# Patient Record
Sex: MALE | Race: WHITE | NOT HISPANIC OR LATINO | Employment: OTHER | URBAN - METROPOLITAN AREA
[De-identification: names, ages, dates, MRNs, and addresses within clinical notes are randomized per-mention and may not be internally consistent; named-entity substitution may affect disease eponyms.]

---

## 2017-01-10 ENCOUNTER — TELEPHONE (OUTPATIENT)
Dept: ORTHOPEDICS | Facility: CLINIC | Age: 77
End: 2017-01-10

## 2017-01-10 DIAGNOSIS — M25.562 LEFT KNEE PAIN, UNSPECIFIED CHRONICITY: Primary | ICD-10-CM

## 2017-01-11 ENCOUNTER — HOSPITAL ENCOUNTER (OUTPATIENT)
Dept: RADIOLOGY | Facility: HOSPITAL | Age: 77
Discharge: HOME | End: 2017-01-11
Attending: PHYSICIAN ASSISTANT
Payer: MEDICARE

## 2017-01-11 ENCOUNTER — OFFICE VISIT (OUTPATIENT)
Dept: ORTHOPEDICS | Facility: CLINIC | Age: 77
End: 2017-01-11
Payer: MEDICARE

## 2017-01-11 VITALS
WEIGHT: 196 LBS | BODY MASS INDEX: 27.44 KG/M2 | RESPIRATION RATE: 18 BRPM | DIASTOLIC BLOOD PRESSURE: 62 MMHG | HEIGHT: 71 IN | SYSTOLIC BLOOD PRESSURE: 122 MMHG | HEART RATE: 58 BPM

## 2017-01-11 DIAGNOSIS — M23.92 INTERNAL DERANGEMENT OF KNEE JOINT, LEFT: ICD-10-CM

## 2017-01-11 DIAGNOSIS — M25.562 ACUTE PAIN OF LEFT KNEE: Primary | ICD-10-CM

## 2017-01-11 DIAGNOSIS — M25.562 LEFT KNEE PAIN, UNSPECIFIED CHRONICITY: ICD-10-CM

## 2017-01-11 PROCEDURE — 73560 X-RAY EXAM OF KNEE 1 OR 2: CPT | Mod: TC,59,RT,LT

## 2017-01-11 PROCEDURE — 73562 X-RAY EXAM OF KNEE 3: CPT | Mod: 26,LT,, | Performed by: RADIOLOGY

## 2017-01-11 PROCEDURE — 99213 OFFICE O/P EST LOW 20 MIN: CPT | Mod: S$GLB,,, | Performed by: PHYSICIAN ASSISTANT

## 2017-01-11 PROCEDURE — 1157F ADVNC CARE PLAN IN RCRD: CPT | Mod: S$GLB,,, | Performed by: PHYSICIAN ASSISTANT

## 2017-01-11 PROCEDURE — 73560 X-RAY EXAM OF KNEE 1 OR 2: CPT | Mod: 26,59,LT, | Performed by: RADIOLOGY

## 2017-01-11 PROCEDURE — 3074F SYST BP LT 130 MM HG: CPT | Mod: S$GLB,,, | Performed by: PHYSICIAN ASSISTANT

## 2017-01-11 PROCEDURE — 99999 PR PBB SHADOW E&M-EST. PATIENT-LVL III: CPT | Mod: PBBFAC,,, | Performed by: PHYSICIAN ASSISTANT

## 2017-01-11 PROCEDURE — 1160F RVW MEDS BY RX/DR IN RCRD: CPT | Mod: S$GLB,,, | Performed by: PHYSICIAN ASSISTANT

## 2017-01-11 PROCEDURE — 1125F AMNT PAIN NOTED PAIN PRSNT: CPT | Mod: S$GLB,,, | Performed by: PHYSICIAN ASSISTANT

## 2017-01-11 PROCEDURE — 1159F MED LIST DOCD IN RCRD: CPT | Mod: S$GLB,,, | Performed by: PHYSICIAN ASSISTANT

## 2017-01-11 PROCEDURE — 3078F DIAST BP <80 MM HG: CPT | Mod: S$GLB,,, | Performed by: PHYSICIAN ASSISTANT

## 2017-01-11 NOTE — PROGRESS NOTES
Subjective:      Patient ID: Ezequiel Hughes is a 76 y.o. male.    Chief Complaint: Knee Pain (left)    HPI  The patient presents to clinic for evaluation for left knee pain. He fell playing football about 8 weeks ago with his girlfriend's grandson. He fumbled to avoid knocking into the grandson. Not exactly sure what happened but he ended up on the ground. He states his pain was getting better and then kneeled and had severe pain. He feels that now it is getting better. Increased periods of sitting causes the pain. He does not report painful popping, swelling or giving out. The patient currently rates his pain a 2/10. X-rays were taken today. He localizes his pain to lateral joint line of left knee.    About two years ago, he tore a meniscus in his right knee. Dr. Jain performed a knee ATS.     Review of Systems   Constitution: Negative for chills, decreased appetite, weight gain and weight loss.   HENT: Negative for congestion, ear pain, hearing loss and sore throat.    Eyes: Negative for blurred vision, double vision, vision loss in left eye, vision loss in right eye and visual disturbance.   Cardiovascular: Negative for chest pain, irregular heartbeat, leg swelling and palpitations.   Respiratory: Negative for cough and shortness of breath.    Endocrine: Negative for cold intolerance and heat intolerance.   Hematologic/Lymphatic: Negative for adenopathy. Does not bruise/bleed easily.   Skin: Negative for color change, nail changes, rash and suspicious lesions.   Musculoskeletal: Positive for joint pain. Negative for falls and joint swelling.   Gastrointestinal: Negative for abdominal pain, nausea and vomiting.   Genitourinary: Negative for bladder incontinence and dysuria.   Neurological: Negative for dizziness, paresthesias, sensory change and tremors.   Psychiatric/Behavioral: Negative for altered mental status, depression, memory loss and substance abuse.   Allergic/Immunologic: Negative for hives and  persistent infections.         Objective:            General    Vitals reviewed.  Constitutional: He is oriented to person, place, and time. He appears well-developed and well-nourished.   HENT:   Head: Normocephalic.   Nose: Nose normal.   Eyes: EOM are normal. Pupils are equal, round, and reactive to light.   Neck: Normal range of motion. Neck supple.   Cardiovascular: Normal rate and regular rhythm.    Pulmonary/Chest: Effort normal.   Abdominal: Soft. He exhibits no distension. There is no tenderness.   Neurological: He is alert and oriented to person, place, and time.   Psychiatric: He has a normal mood and affect. His behavior is normal.     General Musculoskeletal Exam   Gait: normal   Pelvic Obliquity: none    Right Ankle/Foot Exam     Alignment   Forefoot Alignment: normal    Tests   Varus tilt: negative  Heel Walk: able to perform  Tiptoe Walk: able to perform    Other   Sensation: normal    Left Ankle/Foot Exam     Alignment   Forefoot Alignment: normal    Tests   Varus tilt: negative  Heel Walk: able to perform  Tiptoe Walk: able to perform    Other   Sensation: normal    Right Knee Exam     Inspection   Erythema: absent  Scars: absent  Effusion: effusion    Tenderness   The patient is experiencing no tenderness.         Range of Motion   Extension: normal   Flexion: normal     Tests   Ligament Examination   Posterior Sag Test: negative  Posterolateral Corner: unstable (>15 degrees difference)  Patella   Patellar Tracking: normal    Other   Sensation: normal    Left Knee Exam     Inspection   Erythema: absent  Scars: absent  Swelling: absent  Effusion: absent  Deformity: deformity  Bruising: absent    Tenderness   The patient tender to palpation of the lateral joint line, condyle and LCL.    Crepitus   The patient has crepitus of the patella, MFC and LFC.    Range of Motion   Extension: normal   Flexion: normal     Tests   Stability Lachman: normal (-1 to 2mm) PCL-Posterior Drawer: normal (0 to 2mm)  MCL  - Valgus: normal (0 to 2mm)  LCL - Varus: abnormal - grade I  Posterior Sag Test: negative  Posterolateral Corner: unstable (>15 degrees difference)  Patella   Patellar Tracking: normal  Patellar Grind: positive    Other   Sensation: normal    Right Hip Exam     Inspection   Swelling: absent  Bruising: absent    Other   Sensation: normal  Left Hip Exam     Inspection   Swelling: absent  Bruising: absent    Other   Sensation: normal      Back (L-Spine & T-Spine) / Neck (C-Spine) Exam   Back exam is normal.    Neck (C-Spine) Range of Motion   Flexion:     Normal  Extension: Normal  Right Lateral Bend: normal  Left Lateral Bend: normal  Right Rotation: normal  Left Rotation: normal    Spinal Sensation   Right Side Sensation  C-Spine Level: normal   L-Spine Level: normal  S-Spine Level: normal  T-Spine Level: normal  Left Side Sensation  C-Spine Level: normal  L-Spine Level: normal  S-Spine Level: normal  T-Spine Level: normal    Other He has no scoliosis .      Right Hand/Wrist Exam     Inspection   Deformity: Wrist - deformity     Tests   Phalens Sign: negative  Tinels Sign (Medial Nerve): negative  Finkelstein: negative  Cubital Tunnel Compression Test: negative      Other     Neuorologic Exam    Median Distribution: normal  Ulnar Distribution: normal  Radial Distribution: normal      Left Hand/Wrist Exam     Inspection   Deformity: Wrist - absent     Tests   Phalens Sign: negative  Tinels Sign (Medial Nerve): negative  Finkelstein: negative  Cubital Tunnel Compression Test: negative      Other     Sensory Exam  Median Distribution: normal  Ulnar Distribution: normal  Radial Distribution: normal      Right Elbow Exam     Inspection   Effusion: absent  Bruising: absent  Deformity: absent    Tests Tinel's Sign (cubital tunnel): negative    Other   Sensation: normal      Left Elbow Exam     Inspection   Effusion: absent  Bruising: absent  Deformity: absent    Tests Tinel's Sign (cubital tunnel): negative    Other    Sensation: normal    Right Shoulder Exam     Range of Motion   Active Abduction: normal   Passive Abduction: normal   Extension: normal   Forward Flexion: normal   Forward Elevation: normal  Adduction: normal  External Rotation 0 degrees: normal   Internal Rotation 0 degrees: normal     Tests & Signs   Apprehension: negative  Cross Arm: negative  Impingement: negative    Other   Sensation: normal    Left Shoulder Exam     Range of Motion   Active Abduction: normal   Passive Abduction: normal   Extension: normal   Forward Flexion: normal   Forward Elevation: normal  Adduction: normal  External Rotation 0 degrees: normal   Internal Rotation 0 degrees: normal     Tests & Signs   Apprehension: negative  Cross Arm: negative  Impingement: negative    Other   Sensation: normal       Muscle Strength   Right Upper Extremity   Wrist Extension:    Wrist Flexion:    :    Elbow Pronation:     Elbow Supination:     Elbow Extension:   Elbow Flexion:   Intrinsics:   EPL (Extensor Pollicis Longus):   Left Upper Extremity  Wrist Extension:    Wrist Flexion:    :     Elbow Pronation:     Elbow Supination:     Elbow Extension:   Elbow Flexion:   Intrinsics:   EPL (Extensor Pollicis Longus):   Right Lower Extremity   Hip Abduction:    Quadriceps:     Hamstrin/5   Left Lower Extremity   Hip Abduction:    Quadriceps:     Hamstrin/     Reflexes     Left Side  Biceps:  2+  Triceps:  2+  Quadriceps:  2+  Achilles:  2+    Right Side   Biceps:  2+  Triceps:  2+  Quadriceps:  2+  Achilles:  2+    Vascular Exam     Right Pulses      Radial:                    2+      Left Pulses      Radial:                    2+      Capillary Refill  Right Hand: normal capillary refill  Left Hand: normal capillary refill    Edema  Right Lower Leg: absent  Left Lower Leg: absent        X-rays:   Technique: AP standing views of both knees as well as lateral and  Merchant views of the left knee and a Merchant view of the right knee were obtained.    Comparison: 10/24/2014    Findings: There is no radiographic evidence of acute osseous, articular, or soft tissue abnormality. Small patellofemoral margin ossified present bilaterally with mild joint space loss on the left.  Findings appear similar to previous exam.   Impression     As above. No acute findings.             Assessment:       Encounter Diagnoses   Name Primary?    Acute pain of left knee Yes    Internal derangement of knee joint, left           Plan:       Ezequiel was seen today for knee pain.    Diagnoses and all orders for this visit:    Acute pain of left knee    Internal derangement of knee joint, left    The patient does not present with a surgical knee today.  He was instructed on application of warm compresses and the use of Tylenol arthritis or extra strength Tylenol.  He can take 2 tablets up to 3 times daily as needed.  The patient opted out of a steroid injection today.  He will hold off for now.  If the patient has any concerns, questions or increased pain he is instructed to return for a follow-up in 6 weeks.  If his pain is increased, I will order the MRI for further evaluation and to rule out for meniscal injury.  Otherwise he will return to the clinic as needed.

## 2017-01-11 NOTE — MR AVS SNAPSHOT
[facility]  [address] [facility] [address]  Phone: [phone]  Fax: [phone]                  Ezequiel Hughes   2017 8:15 AM   Office Visit    Description:  Male : 1940   Provider:  Sanaz Ventura PA-C   Department:  [facility]           Reason for Visit     Knee Pain                To Do List           Future Appointments        Provider Department Dept Phone    2017 8:00 AM Sanaz Ventura PA-C [facility] [phone]    3/14/2017 8:20 AM [facility] [phone]      Goals (5 Years of Data)     None      [facility]     [facility] Nurse Care Line -  Assistance  Registered nurses in the [facility] provide clinical advisement, health education, appointment booking, and other advisory services.  Call for this free service at [phone].             Medications           Message regarding Medications     Verify the changes and/or additions to your medication regime listed below are the same as discussed with your clinician today.  If any of these changes or additions are incorrect, please notify your healthcare provider.             Verify that the below list of medications is an accurate representation of the medications you are currently taking.  If none reported, the list may be blank. If incorrect, please contact your healthcare provider. Carry this list with you in case of emergency.           Current Medications     aspirin 81 MG Chew Take 81 mg by mouth once daily.    baclofen (LIORESAL) 10 MG tablet TAKE (1) TABLET by mouth (3) TIMES A DAY    CIALIS 5 mg tablet 5 mg.     famotidine (PEPCID) 10 MG tablet Take 10 mg by mouth 2 (two) times daily.    finasteride (PROSCAR) 5 mg tablet     fluticasone (FLONASE) 50 mcg/actuation nasal spray 1 spray by Each Nare route continuous prn.    losartan-hydrochlorothiazide 50-12.5 mg (HYZAAR) 50-12.5 mg per tablet TAKE 1  "TABLET ONE TIME DAILY    pantoprazole (PROTONIX) 40 MG tablet Take 1 tablet (40 mg total) by mouth once daily.    simvastatin (ZOCOR) 40 MG tablet TAKE 1 TABLET ONE TIME DAILY           Clinical Reference Information           Vital Signs - Last Recorded  Most recent update: 1/11/2017  8:13 AM by Maty Ch MA    BP Pulse Resp Ht Wt BMI    122/62 (BP Location: Right arm, Patient Position: Sitting, BP Method: Automatic) (!) 58 18 5' 11" (1.803 m) 88.9 kg (196 lb) 27.34 kg/m2      Blood Pressure          Most Recent Value    BP  122/62      Allergies as of 1/11/2017     Mobic  [Meloxicam]      Immunizations Administered on Date of Encounter - 1/11/2017     None      "

## 2017-01-12 RX ORDER — PANTOPRAZOLE SODIUM 40 MG/1
40 TABLET, DELAYED RELEASE ORAL DAILY
Qty: 30 TABLET | Refills: 3 | Status: SHIPPED | OUTPATIENT
Start: 2017-01-12 | End: 2017-03-14 | Stop reason: SDUPTHER

## 2017-01-16 ENCOUNTER — TELEPHONE (OUTPATIENT)
Dept: ORTHOPEDICS | Facility: CLINIC | Age: 77
End: 2017-01-16

## 2017-01-16 DIAGNOSIS — M25.562 ACUTE PAIN OF LEFT KNEE: Primary | ICD-10-CM

## 2017-01-16 NOTE — TELEPHONE ENCOUNTER
Contacted pt. Mri scheduled 1/23/17 and f/u scheduled 1/25/17 per pt's request. All questions answered.//lp

## 2017-01-16 NOTE — TELEPHONE ENCOUNTER
----- Message from Ambrose Dejesus sent at 1/16/2017  8:15 AM CST -----  Contact: PT  She's calling in regard to having orders for an MRI to be scheduled, please advise, [phone] (home)

## 2017-01-23 ENCOUNTER — HOSPITAL ENCOUNTER (OUTPATIENT)
Dept: RADIOLOGY | Facility: HOSPITAL | Age: 77
Discharge: HOME | End: 2017-01-23
Attending: ORTHOPAEDIC SURGERY
Payer: MEDICARE

## 2017-01-23 DIAGNOSIS — M25.562 ACUTE PAIN OF LEFT KNEE: ICD-10-CM

## 2017-01-23 PROCEDURE — 73721 MRI JNT OF LWR EXTRE W/O DYE: CPT | Mod: TC,LT

## 2017-01-25 ENCOUNTER — HOSPITAL ENCOUNTER (OUTPATIENT)
Dept: RADIOLOGY | Facility: HOSPITAL | Age: 77
Discharge: HOME | End: 2017-01-25
Attending: ORTHOPAEDIC SURGERY
Payer: MEDICARE

## 2017-01-25 ENCOUNTER — OFFICE VISIT (OUTPATIENT)
Dept: ORTHOPEDICS | Facility: CLINIC | Age: 77
End: 2017-01-25
Payer: MEDICARE

## 2017-01-25 VITALS
RESPIRATION RATE: 16 BRPM | HEIGHT: 70 IN | SYSTOLIC BLOOD PRESSURE: 126 MMHG | BODY MASS INDEX: 28.06 KG/M2 | DIASTOLIC BLOOD PRESSURE: 59 MMHG | WEIGHT: 196 LBS | HEART RATE: 58 BPM

## 2017-01-25 DIAGNOSIS — M25.541 PAIN, JOINT, HAND, RIGHT: ICD-10-CM

## 2017-01-25 DIAGNOSIS — M17.12 ARTHRITIS OF KNEE, LEFT: ICD-10-CM

## 2017-01-25 DIAGNOSIS — M25.562 LEFT KNEE PAIN, UNSPECIFIED CHRONICITY: ICD-10-CM

## 2017-01-25 DIAGNOSIS — M23.92 INTERNAL DERANGEMENT OF KNEE JOINT, LEFT: ICD-10-CM

## 2017-01-25 DIAGNOSIS — M25.541 PAIN, JOINT, HAND, RIGHT: Primary | ICD-10-CM

## 2017-01-25 DIAGNOSIS — M18.11 PRIMARY OSTEOARTHRITIS OF FIRST CARPOMETACARPAL JOINT OF RIGHT HAND: ICD-10-CM

## 2017-01-25 PROCEDURE — 3074F SYST BP LT 130 MM HG: CPT | Mod: S$GLB,,, | Performed by: PHYSICIAN ASSISTANT

## 2017-01-25 PROCEDURE — 99213 OFFICE O/P EST LOW 20 MIN: CPT | Mod: S$GLB,,, | Performed by: PHYSICIAN ASSISTANT

## 2017-01-25 PROCEDURE — 73130 X-RAY EXAM OF HAND: CPT | Mod: 26,RT,, | Performed by: RADIOLOGY

## 2017-01-25 PROCEDURE — 3078F DIAST BP <80 MM HG: CPT | Mod: S$GLB,,, | Performed by: PHYSICIAN ASSISTANT

## 2017-01-25 PROCEDURE — 1160F RVW MEDS BY RX/DR IN RCRD: CPT | Mod: S$GLB,,, | Performed by: PHYSICIAN ASSISTANT

## 2017-01-25 PROCEDURE — 1125F AMNT PAIN NOTED PAIN PRSNT: CPT | Mod: S$GLB,,, | Performed by: PHYSICIAN ASSISTANT

## 2017-01-25 PROCEDURE — 1157F ADVNC CARE PLAN IN RCRD: CPT | Mod: S$GLB,,, | Performed by: PHYSICIAN ASSISTANT

## 2017-01-25 PROCEDURE — 99999 PR PBB SHADOW E&M-EST. PATIENT-LVL III: CPT | Mod: PBBFAC,,, | Performed by: PHYSICIAN ASSISTANT

## 2017-01-25 PROCEDURE — 1159F MED LIST DOCD IN RCRD: CPT | Mod: S$GLB,,, | Performed by: PHYSICIAN ASSISTANT

## 2017-01-25 PROCEDURE — 73130 X-RAY EXAM OF HAND: CPT | Mod: TC,RT

## 2017-01-25 RX ORDER — DICLOFENAC SODIUM 75 MG/1
75 TABLET, DELAYED RELEASE ORAL 2 TIMES DAILY
Qty: 60 TABLET | Refills: 1 | Status: SHIPPED | OUTPATIENT
Start: 2017-01-25 | End: 2017-04-24

## 2017-01-25 RX ORDER — DICLOFENAC SODIUM 75 MG/1
75 TABLET, DELAYED RELEASE ORAL 2 TIMES DAILY
Qty: 60 TABLET | Refills: 1 | Status: SHIPPED | OUTPATIENT
Start: 2017-01-25 | End: 2017-01-25 | Stop reason: SDUPTHER

## 2017-01-25 NOTE — PROGRESS NOTES
Subjective:      Patient ID: Ezequiel Hughes is a 76 y.o. male.    Chief Complaint: Knee Pain (left)    HPI the patient returns to clinic for follow-up of left knee pain and to review MRI results.  The patient states that he has intermittent pain of his left knee mainly at the posterior lateral joint line.  However, the patient states that his pain has significantly improved.  The patient does not have a known meniscal tear or ligament injury.  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    The patient however does complain of acute right pain.  He states that he woke up yesterday morning, January 24 and had sudden swelling and pain over the dorsum of his right hand.  He does not attribute this to any trauma, injury or known bite.    Review of Systems   Constitution: Negative for chills and fever.   Gastrointestinal: Negative for abdominal pain, diarrhea, nausea and vomiting.         Objective:            Ortho/SPM Exam   On exam, the patient ambulates with a normal gait.  He has positive lateral joint line tenderness.  He also has tenderness over lateral femoral condyle.  His range of motion is within normal limits.  Cruciate and collateral ligaments are intact.    The patient has tenderness, pain, mild erythema and warmth over the dorsum of his right hand extending from distal radius to base of second and first metacarpal phalanges.  The patient also has swelling in this distribution.  He has a positive grind test of first CMC joint on right hand.        X-rays:  Findings: 3 views. Generalized osteopenia. Degenerative change in the distal interphalangeal joints and triscaphe joint with advanced degenerative change in the first carpometacarpal joint. No acute osseous findings.   Impression    As above.           MRI:  MRI of the left knee without IV contrast    Clinical History: Pain in left knee    Technique: Standard knee MRI protocol without IV contrast was performed.       Finding:  There is no fracture. There is no dislocation. There is a normal amount of fluid within the knee. There is no Baker's cyst. The cruciate and collateral ligaments appear intact.  There are moderate degenerative changes in both menisci.  There is no definite meniscal tear visualized.  There is moderate narrowing of the cartilage in all 3 compartments of the knee.   Impression     1.  There are moderate degenerative changes in both menisci.  There is no definite meniscal tear visualized.  If additional imaging evaluation is clinically indicated, I recommend consideration of an MRI arthrogram of the left knee.  2.  There is moderate narrowing of the cartilage in all 3 compartments of the knee           Assessment:       Encounter Diagnoses   Name Primary?    Pain, joint, hand, right Yes    Internal derangement of knee joint, left     Left knee pain, unspecified chronicity     Primary osteoarthritis of first carpometacarpal joint of right hand           Plan:       Eezquiel was seen today for knee pain.    Diagnoses and all orders for this visit:    Pain, joint, hand, right  -     X-Ray Hand 3 view Right; Future  -     CBC auto differential; Future  -     Uric acid; Future  -     Sedimentation rate, manual; Future  -     Rheumatoid factor; Future  -     C-reactive protein; Future  -     Sedimentation rate, manual; Future  -     PARRISH; Future    Internal derangement of knee joint, left    Left knee pain, unspecified chronicity    Primary osteoarthritis of first carpometacarpal joint of right hand  -     CBC auto differential; Future  -     Uric acid; Future  -     Sedimentation rate, manual; Future  -     Rheumatoid factor; Future  -     C-reactive protein; Future  -     Sedimentation rate, manual; Future  -     PARRISH; Future    Other orders  -     Discontinue: diclofenac (VOLTAREN) 75 MG EC tablet; Take 1 tablet (75 mg total) by mouth 2 (two) times daily.  -     diclofenac (VOLTAREN) 75 MG EC tablet; Take 1 tablet (75 mg  total) by mouth 2 (two) times daily.        MRI results were discussed with the patient.  He is aware that he does not have a confirmed injury or tear of his meniscus.  If his knee continues to give him problems, the patient is a candidate for a diagnostic knee arthroscopy.  The patient states that his pain has improved and is not much of a concern now as is his right hand.  I ordered x-rays of the patient's right hand today to rule out any type of fracture or soft tissue swelling.  The patient has evolving arthritis in his hand but nothing else was seen.  Given the patient's exam findings, I ordered lab work to rule out any type of inflammatory or infectious process.  The patient was started on Voltaren daily.  If the patient has any abnormalities in his labs, I will notify him via phone call and prescribe the necessary medications.

## 2017-01-25 NOTE — MR AVS SNAPSHOT
[facility]  [address] [facility] [address]  Phone: [phone]  Fax: [phone]                  Ezequiel Hughes   2017 8:45 AM   Office Visit    Description:  Male : 1940   Provider:  Sanaz Ventura PA-C   Department:  [facility]           Reason for Visit     Knee Pain           Diagnoses this Visit        Comments    Pain, joint, hand, right    -  Primary     Internal derangement of knee joint, left         Left knee pain, unspecified chronicity         Primary osteoarthritis of first carpometacarpal joint of right hand                To Do List           Future Appointments        Provider Department Dept Phone    2017 9:45 AM LAB, SAME DAY [facility] [phone]    3/14/2017 8:20 AM Valery Ozuna MD [facility] [phone]      Goals (5 Years of Data)     None       These Medications        Disp Refills Start End    diclofenac (VOLTAREN) 75 MG EC tablet 60 tablet 1 2017     Take 1 tablet (75 mg total) by mouth 2 (two) times daily. - Oral    Pharmacy: [facility], [address] #: [phone]         [facility]     [facility] Nurse Care [facility] -  Assistance  Registered nurses in the [facility] Center provide clinical advisement, health education, appointment booking, and other advisory services.  Call for this free service at [phone].             Medications           Message regarding Medications     Verify the changes and/or additions to your medication regime listed below are the same as discussed with your clinician today.  If any of these changes or additions are incorrect, please notify your healthcare provider.        START taking these NEW medications        Refills    diclofenac (VOLTAREN) 75 MG EC tablet 1    Sig: Take 1 tablet (75 mg total) by mouth 2 (two) times daily.    Class: Normal    Route: Oral          "  Verify that the below list of medications is an accurate representation of the medications you are currently taking.  If none reported, the list may be blank. If incorrect, please contact your healthcare provider. Carry this list with you in case of emergency.           Current Medications     aspirin 81 MG Chew Take 81 mg by mouth once daily.    baclofen (LIORESAL) 10 MG tablet TAKE (1) TABLET by mouth (3) TIMES A DAY    CIALIS 5 mg tablet 5 mg.     famotidine (PEPCID) 10 MG tablet Take 10 mg by mouth 2 (two) times daily.    finasteride (PROSCAR) 5 mg tablet     fluticasone (FLONASE) 50 mcg/actuation nasal spray 1 spray by Each Nare route continuous prn.    losartan-hydrochlorothiazide 50-12.5 mg (HYZAAR) 50-12.5 mg per tablet TAKE 1 TABLET ONE TIME DAILY    pantoprazole (PROTONIX) 40 MG tablet Take 1 tablet (40 mg total) by mouth once daily.    simvastatin (ZOCOR) 40 MG tablet TAKE 1 TABLET ONE TIME DAILY    diclofenac (VOLTAREN) 75 MG EC tablet Take 1 tablet (75 mg total) by mouth 2 (two) times daily.           Clinical Reference Information           Vital Signs - Last Recorded  Most recent update: 1/25/2017  8:34 AM by Maty Ch MA    BP Pulse Resp Ht Wt BMI    (!) 126/59 (BP Location: Right arm, Patient Position: Sitting, BP Method: Automatic) (!) 58 16 5' 10" (1.778 m) 88.9 kg (196 lb) 28.12 kg/m2      Blood Pressure          Most Recent Value    BP  (!)  126/59      Allergies as of 1/25/2017     Mobic  [Meloxicam]      Immunizations Administered on Date of Encounter - 1/25/2017     None      Orders Placed During Today's Visit     Future Labs/Procedures Expected by Expires    PARRISH  1/25/2017 3/26/2018    C-reactive protein  1/25/2017 3/26/2018    CBC auto differential  1/25/2017 3/26/2018    Rheumatoid factor  1/25/2017 3/26/2018    Sedimentation rate, manual  1/25/2017 3/26/2018    Sedimentation rate, manual  1/25/2017 3/26/2018    Uric acid  1/25/2017 3/26/2018    X-Ray Hand 3 view Right  1/25/2017 " 1/25/2018

## 2017-02-02 ENCOUNTER — HOSPITAL ENCOUNTER (OUTPATIENT)
Dept: RADIOLOGY | Facility: HOSPITAL | Age: 77
Discharge: HOME | End: 2017-02-02
Attending: FAMILY MEDICINE
Payer: MEDICARE

## 2017-02-02 ENCOUNTER — TELEPHONE (OUTPATIENT)
Dept: INTERNAL MEDICINE | Facility: CLINIC | Age: 77
End: 2017-02-02

## 2017-02-02 ENCOUNTER — OFFICE VISIT (OUTPATIENT)
Dept: INTERNAL MEDICINE | Facility: CLINIC | Age: 77
End: 2017-02-02
Payer: MEDICARE

## 2017-02-02 VITALS
OXYGEN SATURATION: 98 % | TEMPERATURE: 101 F | DIASTOLIC BLOOD PRESSURE: 72 MMHG | WEIGHT: 200 LBS | HEART RATE: 74 BPM | BODY MASS INDEX: 28 KG/M2 | SYSTOLIC BLOOD PRESSURE: 140 MMHG | HEIGHT: 71 IN

## 2017-02-02 DIAGNOSIS — R50.9 FEVER IN ADULT: ICD-10-CM

## 2017-02-02 DIAGNOSIS — R50.9 FEVER IN ADULT: Primary | ICD-10-CM

## 2017-02-02 DIAGNOSIS — J22 LOWER RESP. TRACT INFECTION: ICD-10-CM

## 2017-02-02 LAB
CTP QC/QA: YES
FLUAV AG NPH QL: NEGATIVE
FLUBV AG NPH QL: NEGATIVE

## 2017-02-02 PROCEDURE — 71020 XR CHEST PA AND LATERAL: CPT | Mod: 26,,, | Performed by: RADIOLOGY

## 2017-02-02 PROCEDURE — 87804 INFLUENZA ASSAY W/OPTIC: CPT | Mod: QW,S$GLB,, | Performed by: PHYSICIAN ASSISTANT

## 2017-02-02 PROCEDURE — 99999 PR PBB SHADOW E&M-EST. PATIENT-LVL III: CPT | Mod: PBBFAC,,, | Performed by: PHYSICIAN ASSISTANT

## 2017-02-02 PROCEDURE — 1159F MED LIST DOCD IN RCRD: CPT | Mod: S$GLB,,, | Performed by: PHYSICIAN ASSISTANT

## 2017-02-02 PROCEDURE — 3077F SYST BP >= 140 MM HG: CPT | Mod: S$GLB,,, | Performed by: PHYSICIAN ASSISTANT

## 2017-02-02 PROCEDURE — 1125F AMNT PAIN NOTED PAIN PRSNT: CPT | Mod: S$GLB,,, | Performed by: PHYSICIAN ASSISTANT

## 2017-02-02 PROCEDURE — 71020 XR CHEST PA AND LATERAL: CPT | Mod: TC,PO

## 2017-02-02 PROCEDURE — 1157F ADVNC CARE PLAN IN RCRD: CPT | Mod: S$GLB,,, | Performed by: PHYSICIAN ASSISTANT

## 2017-02-02 PROCEDURE — 1160F RVW MEDS BY RX/DR IN RCRD: CPT | Mod: S$GLB,,, | Performed by: PHYSICIAN ASSISTANT

## 2017-02-02 PROCEDURE — 99214 OFFICE O/P EST MOD 30 MIN: CPT | Mod: S$GLB,,, | Performed by: PHYSICIAN ASSISTANT

## 2017-02-02 PROCEDURE — 3078F DIAST BP <80 MM HG: CPT | Mod: S$GLB,,, | Performed by: PHYSICIAN ASSISTANT

## 2017-02-02 RX ORDER — AZITHROMYCIN 250 MG/1
TABLET, FILM COATED ORAL
Qty: 6 TABLET | Refills: 0 | Status: SHIPPED | OUTPATIENT
Start: 2017-02-02 | End: 2017-03-14

## 2017-02-02 RX ORDER — PROMETHAZINE HYDROCHLORIDE AND DEXTROMETHORPHAN HYDROBROMIDE 6.25; 15 MG/5ML; MG/5ML
5 SYRUP ORAL EVERY 6 HOURS PRN
Qty: 118 ML | Refills: 0 | Status: SHIPPED | OUTPATIENT
Start: 2017-02-02 | End: 2017-02-12

## 2017-02-02 RX ORDER — AZITHROMYCIN 250 MG/1
TABLET, FILM COATED ORAL
Qty: 6 TABLET | Refills: 0 | Status: SHIPPED | OUTPATIENT
Start: 2017-02-02 | End: 2017-02-02 | Stop reason: SDUPTHER

## 2017-02-02 RX ORDER — PROMETHAZINE HYDROCHLORIDE AND DEXTROMETHORPHAN HYDROBROMIDE 6.25; 15 MG/5ML; MG/5ML
5 SYRUP ORAL EVERY 6 HOURS PRN
Qty: 118 ML | Refills: 0 | Status: SHIPPED | OUTPATIENT
Start: 2017-02-02 | End: 2017-02-02 | Stop reason: SDUPTHER

## 2017-02-02 NOTE — TELEPHONE ENCOUNTER
Pt informed of xray results. Medications were sent to [facility] for .

## 2017-02-02 NOTE — PROGRESS NOTES
Subjective:       Patient ID: Ezequiel Hughes is a 76 y.o. male.    Chief Complaint: Wheezing; Generalized Body Aches; Fever; and Cough    Wheezing    This is a new problem. The current episode started yesterday. Associated symptoms include coryza, coughing, a fever and headaches. Pertinent negatives include no abdominal pain, chest pain, chills, diarrhea, ear pain, hemoptysis, neck pain, rash, rhinorrhea, shortness of breath, sore throat, sputum production, swollen glands or vomiting. Associated symptoms comments: Wheezing . Nothing aggravates the symptoms. There is no history of asthma, bronchiolitis, CAD, chronic lung disease, COPD, DVT or heart failure.   Fever    This is a new problem. Episode onset: on/off x 1 week  The maximum temperature noted was 100 to 100.9 F. The temperature was taken using an oral thermometer. Associated symptoms include coughing, headaches and wheezing. Pertinent negatives include no abdominal pain, chest pain, diarrhea, ear pain, rash, sore throat or vomiting.   Cough   Associated symptoms include a fever, headaches and wheezing. Pertinent negatives include no chest pain, chills, ear pain, hemoptysis, rash, rhinorrhea, sore throat or shortness of breath. There is no history of asthma or COPD.       Past Medical History   Diagnosis Date    Allergic rhinitis     Anemia     Back pain     BPH (benign prostatic hyperplasia)     Cancer      skin cancer to neck    Cataract     Disorder of kidney and ureter     ED (erectile dysfunction)     HLD (hyperlipidemia)     Hyperlipidemia     Hypertension     Lateral epicondylitis     OA (osteoarthritis)     Trouble in sleeping        Current Outpatient Prescriptions   Medication Sig Dispense Refill    CIALIS 5 mg tablet 5 mg.       diclofenac (VOLTAREN) 75 MG EC tablet Take 1 tablet (75 mg total) by mouth 2 (two) times daily. 60 tablet 1    finasteride (PROSCAR) 5 mg tablet       losartan-hydrochlorothiazide 50-12.5 mg (HYZAAR) 50-12.5  "mg per tablet TAKE 1 TABLET ONE TIME DAILY 90 tablet 3    pantoprazole (PROTONIX) 40 MG tablet Take 1 tablet (40 mg total) by mouth once daily. 30 tablet 3    simvastatin (ZOCOR) 40 MG tablet TAKE 1 TABLET ONE TIME DAILY 90 tablet 3    aspirin 81 MG Chew Take 81 mg by mouth once daily.      azithromycin (Z-MADDIE) 250 MG tablet As per packet instructions 6 tablet 0    baclofen (LIORESAL) 10 MG tablet TAKE (1) TABLET by mouth (3) TIMES A DAY 30 tablet 11    famotidine (PEPCID) 10 MG tablet Take 10 mg by mouth 2 (two) times daily.      fluticasone (FLONASE) 50 mcg/actuation nasal spray 1 spray by Each Nare route continuous prn. 3 Bottle 3    promethazine-dextromethorphan (PROMETHAZINE-DM) 6.25-15 mg/5 mL Syrp Take 5 mLs by mouth every 6 (six) hours as needed (cough). 118 mL 0     No current facility-administered medications for this visit.        Review of Systems   Constitutional: Positive for fever. Negative for chills.   HENT: Negative for ear pain, rhinorrhea and sore throat.    Respiratory: Positive for cough and wheezing. Negative for hemoptysis, sputum production and shortness of breath.    Cardiovascular: Negative for chest pain.   Gastrointestinal: Negative for abdominal pain, diarrhea and vomiting.   Musculoskeletal: Negative for neck pain.   Skin: Negative for rash.   Neurological: Positive for headaches.       Objective:     Visit Vitals    BP (!) 140/72    Pulse 74    Temp (!) 100.7 °F (38.2 °C) (Oral)    Ht 5' 10.5" (1.791 m)    Wt 90.7 kg (200 lb)    SpO2 98%    BMI 28.29 kg/m2        Physical Exam   Constitutional: He is oriented to person, place, and time. He appears well-developed and well-nourished. No distress.   HENT:   Head: Normocephalic and atraumatic.   Right Ear: Hearing, tympanic membrane, external ear and ear canal normal.   Left Ear: Hearing, tympanic membrane, external ear and ear canal normal.   Nose: Nose normal.   Mouth/Throat: Oropharynx is clear and moist.   Eyes: " Conjunctivae and EOM are normal. Pupils are equal, round, and reactive to light.   Neck: Normal range of motion. No thyromegaly present.   Cardiovascular: Normal rate, regular rhythm, normal heart sounds and intact distal pulses.    Pulmonary/Chest: Effort normal and breath sounds normal.   Lymphadenopathy:     He has no cervical adenopathy.   Neurological: He is alert and oriented to person, place, and time.         Lab Results   Component Value Date    WBC 5.61 01/25/2017    HGB 13.2 (L) 01/25/2017    HCT 39.7 (L) 01/25/2017     (L) 01/25/2017    CHOL 173 09/01/2016    TRIG 71 09/01/2016    HDL 56 09/01/2016    ALT 10 12/19/2016    AST 16 12/19/2016     12/19/2016    K 4.3 12/19/2016     12/19/2016    CREATININE 1.5 (H) 12/19/2016    BUN 26 (H) 12/19/2016    CO2 25 12/19/2016    TSH 1.435 09/03/2015    PSA 1.5 09/01/2016    INR 1.2 09/03/2015       Assessment:       1. Fever in adult    2. Lower resp. tract infection        Plan:   Fever in adult  -     POCT Influenza A/B--negative   -     X-Ray Chest PA And Lateral; Future; Expected date: 2/2/17    Lower resp. tract infection    Zpack  Promethazine DM   Fluids, rest.   Follow up if no improvement in 3 days

## 2017-03-06 ENCOUNTER — TELEPHONE (OUTPATIENT)
Dept: INTERNAL MEDICINE | Facility: CLINIC | Age: 77
End: 2017-03-06

## 2017-03-06 DIAGNOSIS — D69.6 THROMBOCYTOPENIA: Primary | ICD-10-CM

## 2017-03-06 DIAGNOSIS — D64.9 ANEMIA, UNSPECIFIED: ICD-10-CM

## 2017-03-06 DIAGNOSIS — E79.0 ELEVATED URIC ACID IN BLOOD: ICD-10-CM

## 2017-03-06 DIAGNOSIS — N18.30 CHRONIC KIDNEY DISEASE, STAGE 3: ICD-10-CM

## 2017-03-06 DIAGNOSIS — E78.2 MIXED HYPERLIPIDEMIA: ICD-10-CM

## 2017-03-06 DIAGNOSIS — Z00.00 WELLNESS EXAMINATION: ICD-10-CM

## 2017-03-06 DIAGNOSIS — I10 ESSENTIAL HYPERTENSION: ICD-10-CM

## 2017-03-06 NOTE — TELEPHONE ENCOUNTER
----- Message from Pinky Garcia sent at 3/6/2017 11:19 AM CST -----  please input labwork for pt. ..[phone] (home)

## 2017-03-07 ENCOUNTER — LAB VISIT (OUTPATIENT)
Dept: LAB | Facility: HOSPITAL | Age: 77
End: 2017-03-07
Attending: FAMILY MEDICINE
Payer: MEDICARE

## 2017-03-07 DIAGNOSIS — Z00.00 WELLNESS EXAMINATION: ICD-10-CM

## 2017-03-07 DIAGNOSIS — N18.30 CHRONIC KIDNEY DISEASE, STAGE 3: ICD-10-CM

## 2017-03-07 DIAGNOSIS — E79.0 ELEVATED URIC ACID IN BLOOD: ICD-10-CM

## 2017-03-07 DIAGNOSIS — E78.2 MIXED HYPERLIPIDEMIA: ICD-10-CM

## 2017-03-07 DIAGNOSIS — D64.9 ANEMIA, UNSPECIFIED: ICD-10-CM

## 2017-03-07 DIAGNOSIS — D69.6 THROMBOCYTOPENIA: ICD-10-CM

## 2017-03-07 DIAGNOSIS — I10 ESSENTIAL HYPERTENSION: ICD-10-CM

## 2017-03-07 LAB
ALBUMIN SERPL BCP-MCNC: 3.6 G/DL
ALP SERPL-CCNC: 46 U/L
ALT SERPL W/O P-5'-P-CCNC: 17 U/L
ANION GAP SERPL CALC-SCNC: 7 MMOL/L
AST SERPL-CCNC: 22 U/L
BASOPHILS # BLD AUTO: 0.04 K/UL
BASOPHILS NFR BLD: 0.9 %
BILIRUB SERPL-MCNC: 0.6 MG/DL
BUN SERPL-MCNC: 41 MG/DL
CALCIUM SERPL-MCNC: 8.5 MG/DL
CHLORIDE SERPL-SCNC: 109 MMOL/L
CHOLEST/HDLC SERPL: 3 {RATIO}
CO2 SERPL-SCNC: 24 MMOL/L
CREAT SERPL-MCNC: 1.3 MG/DL
DIFFERENTIAL METHOD: ABNORMAL
EOSINOPHIL # BLD AUTO: 0.2 K/UL
EOSINOPHIL NFR BLD: 5.3 %
ERYTHROCYTE [DISTWIDTH] IN BLOOD BY AUTOMATED COUNT: 13.8 %
EST. GFR  (AFRICAN AMERICAN): >60 ML/MIN/1.73 M^2
EST. GFR  (NON AFRICAN AMERICAN): 52.3 ML/MIN/1.73 M^2
GLUCOSE SERPL-MCNC: 92 MG/DL
HCT VFR BLD AUTO: 38.5 %
HDL/CHOLESTEROL RATIO: 33.1 %
HDLC SERPL-MCNC: 157 MG/DL
HDLC SERPL-MCNC: 52 MG/DL
HGB BLD-MCNC: 12.4 G/DL
LDLC SERPL CALC-MCNC: 89.6 MG/DL
LYMPHOCYTES # BLD AUTO: 1 K/UL
LYMPHOCYTES NFR BLD: 22.9 %
MCH RBC QN AUTO: 31.1 PG
MCHC RBC AUTO-ENTMCNC: 32.2 %
MCV RBC AUTO: 97 FL
MONOCYTES # BLD AUTO: 0.4 K/UL
MONOCYTES NFR BLD: 9.8 %
NEUTROPHILS # BLD AUTO: 2.7 K/UL
NEUTROPHILS NFR BLD: 60.9 %
NONHDLC SERPL-MCNC: 105 MG/DL
PLATELET # BLD AUTO: 123 K/UL
PMV BLD AUTO: 8.5 FL
POTASSIUM SERPL-SCNC: 4.2 MMOL/L
PROT SERPL-MCNC: 6.4 G/DL
RBC # BLD AUTO: 3.99 M/UL
SODIUM SERPL-SCNC: 140 MMOL/L
TRIGL SERPL-MCNC: 77 MG/DL
TSH SERPL DL<=0.005 MIU/L-ACNC: 1.63 UIU/ML
URATE SERPL-MCNC: 6.7 MG/DL
WBC # BLD AUTO: 4.5 K/UL

## 2017-03-07 PROCEDURE — 36415 COLL VENOUS BLD VENIPUNCTURE: CPT | Mod: PO

## 2017-03-07 PROCEDURE — 80061 LIPID PANEL: CPT

## 2017-03-07 PROCEDURE — 84443 ASSAY THYROID STIM HORMONE: CPT

## 2017-03-07 PROCEDURE — 85025 COMPLETE CBC W/AUTO DIFF WBC: CPT

## 2017-03-07 PROCEDURE — 80053 COMPREHEN METABOLIC PANEL: CPT

## 2017-03-07 PROCEDURE — 84550 ASSAY OF BLOOD/URIC ACID: CPT

## 2017-03-14 ENCOUNTER — OFFICE VISIT (OUTPATIENT)
Dept: INTERNAL MEDICINE | Facility: CLINIC | Age: 77
End: 2017-03-14
Payer: MEDICARE

## 2017-03-14 VITALS
BODY MASS INDEX: 28.31 KG/M2 | HEIGHT: 71 IN | DIASTOLIC BLOOD PRESSURE: 78 MMHG | HEART RATE: 64 BPM | WEIGHT: 202.19 LBS | TEMPERATURE: 98 F | SYSTOLIC BLOOD PRESSURE: 138 MMHG

## 2017-03-14 DIAGNOSIS — D64.9 ANEMIA, UNSPECIFIED: ICD-10-CM

## 2017-03-14 DIAGNOSIS — J84.10 LUNG GRANULOMA: ICD-10-CM

## 2017-03-14 DIAGNOSIS — E78.2 MIXED HYPERLIPIDEMIA: ICD-10-CM

## 2017-03-14 DIAGNOSIS — Z00.00 ROUTINE GENERAL MEDICAL EXAMINATION AT A HEALTH CARE FACILITY: Primary | ICD-10-CM

## 2017-03-14 DIAGNOSIS — D69.6 THROMBOCYTOPENIA: ICD-10-CM

## 2017-03-14 DIAGNOSIS — C61 PROSTATE CANCER: Chronic | ICD-10-CM

## 2017-03-14 DIAGNOSIS — I77.1 TORTUOUS AORTA: ICD-10-CM

## 2017-03-14 DIAGNOSIS — N18.30 CHRONIC KIDNEY DISEASE, STAGE 3: ICD-10-CM

## 2017-03-14 PROCEDURE — 3075F SYST BP GE 130 - 139MM HG: CPT | Mod: S$GLB,,, | Performed by: FAMILY MEDICINE

## 2017-03-14 PROCEDURE — 99999 PR PBB SHADOW E&M-EST. PATIENT-LVL III: CPT | Mod: PBBFAC,,, | Performed by: FAMILY MEDICINE

## 2017-03-14 PROCEDURE — 3078F DIAST BP <80 MM HG: CPT | Mod: S$GLB,,, | Performed by: FAMILY MEDICINE

## 2017-03-14 PROCEDURE — 99397 PER PM REEVAL EST PAT 65+ YR: CPT | Mod: S$GLB,,, | Performed by: FAMILY MEDICINE

## 2017-03-14 RX ORDER — SIMVASTATIN 20 MG/1
20 TABLET, FILM COATED ORAL NIGHTLY
Qty: 90 TABLET | Refills: 3 | Status: SHIPPED | OUTPATIENT
Start: 2017-03-14 | End: 2017-08-15

## 2017-03-14 RX ORDER — PANTOPRAZOLE SODIUM 40 MG/1
40 TABLET, DELAYED RELEASE ORAL DAILY PRN
Qty: 90 TABLET | Refills: 3 | Status: SHIPPED | OUTPATIENT
Start: 2017-03-14 | End: 2017-08-15

## 2017-03-14 RX ORDER — MOMETASONE FUROATE 50 UG/1
2 SPRAY, METERED NASAL DAILY
COMMUNITY
End: 2017-03-14

## 2017-03-14 RX ORDER — FLUTICASONE PROPIONATE 50 MCG
2 SPRAY, SUSPENSION (ML) NASAL DAILY
Qty: 3 BOTTLE | Refills: 3 | Status: SHIPPED | OUTPATIENT
Start: 2017-03-14 | End: 2017-06-12

## 2017-03-14 NOTE — MR AVS SNAPSHOT
[facility]  [address] [place] [address]  Phone: [phone]  Fax: [phone]                  Ezequiel Hughes   3/14/2017 8:20 AM   Office Visit    Description:  Male : 1940   Provider:  Valery Ozuna MD   Department[facility]  [facility]           Reason for Visit     Annual Exam           Diagnoses this Visit        Comments    Routine general medical examination at a health care facility    -  Primary     Prostate cancer     seeing Dr Hernandez urologist, Dr Wong, Dr Vegas with radiation oncology. active monitoring,  + for malignancy low danita score, 2016    Tortuous aorta     noted on cxr in 2017. not doing asa due to low platelets. control blood pressure.     Thrombocytopenia     followed by dr morgan. avoid asa.     Mixed hyperlipidemia     stable with statin. labs reviewed.     Chronic kidney disease, stage 3         Lung granuloma     noted of left lung on CT scan from 2016. no new problems. no sob.     Anemia, unspecified     with some decline, seeing Dr Morgan yearly. repeat cbc in 1 mo. start iron supplement due to some RLS symptoms.            To Do List           Future Appointments        Provider Department Dept Phone    2017 10:10 AM LABORATORY, [place] [facility] [phone]    2017 8:20 AM Valery Ozuna MD [facility] [phone]      Goals (5 Years of Data)     None      Follow-Up and Disposition     Return in about 6 months (around 2017) for chronic issues.       These Medications        Disp Refills Start End    pantoprazole (PROTONIX) 40 MG tablet 90 tablet 3 3/14/2017 3/14/2018    Take 1 tablet (40 mg total) by mouth daily as needed. - Oral    Pharmacy: [facility] - [place] [address] [facility] Ph #: [phone]       fluticasone (FLONASE) 50 mcg/actuation nasal spray 3 Bottle 3 3/14/2017 2017    2 sprays by Each  Nare route once daily. - Each Nare    Pharmacy: [facility] - [place], [place] - [address] [facility] Ph #: [phone]       simvastatin (ZOCOR) 20 MG tablet 90 tablet 3 3/14/2017     Take 1 tablet (20 mg total) by mouth every evening. - Oral    Pharmacy: [facility] - [place], [place] - [address] [facility] Ph #: [phone]         [facility]     [facility] On [facility] Nurse Care Line -  Assistance  Registered nurses in the [facility] provide clinical advisement, health education, appointment booking, and other advisory services.  Call for this free service at [phone].             Medications           Message regarding Medications     Verify the changes and/or additions to your medication regime listed below are the same as discussed with your clinician today.  If any of these changes or additions are incorrect, please notify your healthcare provider.        START taking these NEW medications        Refills    fluticasone (FLONASE) 50 mcg/actuation nasal spray 3    Si sprays by Each Nare route once daily.    Class: Normal    Route: Each Nare      CHANGE how you are taking these medications     Start Taking Instead of    pantoprazole (PROTONIX) 40 MG tablet pantoprazole (PROTONIX) 40 MG tablet    Dosage:  Take 1 tablet (40 mg total) by mouth daily as needed. Dosage:  Take 1 tablet (40 mg total) by mouth once daily.    Reason for Change:  Reorder     simvastatin (ZOCOR) 20 MG tablet simvastatin (ZOCOR) 40 MG tablet    Dosage:  Take 1 tablet (20 mg total) by mouth every evening. Dosage:  TAKE 1 TABLET ONE TIME DAILY    Reason for Change:  Reorder       STOP taking these medications     aspirin 81 MG Chew Take 81 mg by mouth once daily.    azithromycin (Z-MADDIE) 250 MG tablet As per packet instructions    baclofen (LIORESAL) 10 MG tablet TAKE (1) TABLET by mouth (3) TIMES A DAY    CIALIS 5 mg tablet 5 mg.     famotidine (PEPCID) 10 MG tablet Take 10 mg by mouth 2  "(two) times daily.    mometasone (NASONEX) 50 mcg/actuation nasal spray 2 sprays by Nasal route once daily.           Verify that the below list of medications is an accurate representation of the medications you are currently taking.  If none reported, the list may be blank. If incorrect, please contact your healthcare provider. Carry this list with you in case of emergency.           Current Medications     diclofenac (VOLTAREN) 75 MG EC tablet Take 1 tablet (75 mg total) by mouth 2 (two) times daily.    finasteride (PROSCAR) 5 mg tablet     losartan-hydrochlorothiazide 50-12.5 mg (HYZAAR) 50-12.5 mg per tablet TAKE 1 TABLET ONE TIME DAILY    pantoprazole (PROTONIX) 40 MG tablet Take 1 tablet (40 mg total) by mouth daily as needed.    simvastatin (ZOCOR) 20 MG tablet Take 1 tablet (20 mg total) by mouth every evening.    fluticasone (FLONASE) 50 mcg/actuation nasal spray 2 sprays by Each Nare route once daily.           Clinical Reference Information           Your Vitals Were     BP Pulse Temp Height Weight BMI    138/78 64 98 °F (36.7 °C) (Tympanic) 5' 10.5" (1.791 m) 91.7 kg (202 lb 2.6 oz) 28.6 kg/m2      Blood Pressure          Most Recent Value    BP  138/78      Allergies as of 3/14/2017     Mobic  [Meloxicam]      Immunizations Administered on Date of Encounter - 3/14/2017     None      Orders Placed During Today's Visit     Future Labs/Procedures Expected by Expires    Ferritin  3/14/2017 3/14/2018    Basic metabolic panel  4/13/2017 5/13/2018    CBC auto differential  4/13/2017 5/13/2018      Language Assistance Services     ATTENTION: Language assistance services are available, free of charge. Please call [phone].      ATENCIÓN: Si habla español, tiene a gorman disposición servicios gratuitos de asistencia lingüística. Llame al [phone].     CHÚ Ý: N?u b?n nói Ti?ng Vi?t, có các d?ch v? h? tr? ngôn ng? mi?n phí dành cho b?n. G?i s? [phone].         [facility] complies " with applicable Federal civil rights laws and does not discriminate on the basis of race, color, national origin, age, disability, or sex.

## 2017-03-14 NOTE — PROGRESS NOTES
Subjective:      Patient ID: Ezequiel Hughes is a 76 y.o. male.    Chief Complaint: Annual Exam    HPI Comments: Patient is a 76-year-old male with history of hypertension, hyperlipidemia, BPH, back pain, reflux, and ALLERGIC rhinitis.  He's presenting today for his prevention exam.  Recently met with the urologist and had 12 biopsies performed of the prostate with 1 being positive.  He reports that it had a very low score based on its progression and at this time they're just actively monitoring it.  He also has a history of low platelets for which she is seen Dr. Mccracken in the past for this.  For this reason he is not currently on aspirin or anti-inflammatory therapy.  He does occasionally use diclofenac for flareups of back pain.  He has some chronic kidney disease that is noted to be stage III.  On prior exams of his CT scan he had noted a calcified granuloma in the left lung base but currently no coughing no shortness of breath and no chest pain.  He also noted on a chest x-ray and a tortuous aorta but no current complaints of chest pain and no blood pressure problems.          Lab Results   Component Value Date    WBC 4.50 03/07/2017    HGB 12.4 (L) 03/07/2017    HCT 38.5 (L) 03/07/2017     (L) 03/07/2017    CHOL 157 03/07/2017    TRIG 77 03/07/2017    HDL 52 03/07/2017    ALT 17 03/07/2017    AST 22 03/07/2017     03/07/2017    K 4.2 03/07/2017     03/07/2017    CREATININE 1.3 03/07/2017    BUN 41 (H) 03/07/2017    CO2 24 03/07/2017    TSH 1.634 03/07/2017    PSA 1.5 09/01/2016    INR 1.2 09/03/2015       Review of Systems   Constitutional: Negative for chills, fatigue and unexpected weight change.   HENT: Negative for congestion, ear pain, postnasal drip, sneezing, sore throat and trouble swallowing.    Eyes: Negative for pain and visual disturbance.   Respiratory: Negative for cough and shortness of breath.    Cardiovascular: Negative for chest pain and leg swelling.   Gastrointestinal:  Negative for abdominal pain, constipation, diarrhea, nausea and vomiting.   Endocrine: Negative for cold intolerance and heat intolerance.   Genitourinary: Negative for difficulty urinating, dysuria and flank pain.   Musculoskeletal: Negative for arthralgias, back pain, joint swelling and neck pain.   Skin: Negative for color change and rash.   Neurological: Negative for dizziness, seizures and headaches.   Psychiatric/Behavioral: Negative for behavioral problems and dysphoric mood.     Objective:     Physical Exam   Constitutional: He is oriented to person, place, and time. He appears well-developed and well-nourished.   HENT:   Head: Normocephalic and atraumatic.   Right Ear: Tympanic membrane normal.   Left Ear: Tympanic membrane normal.   Mouth/Throat: Oropharynx is clear and moist.   Eyes: Conjunctivae and EOM are normal.   Neck: Normal range of motion. Neck supple.   Cardiovascular: Normal rate and regular rhythm.    Pulmonary/Chest: Effort normal and breath sounds normal.   Abdominal: Soft. Bowel sounds are normal. He exhibits no distension. There is no tenderness.   Neurological: He is alert and oriented to person, place, and time.   Psychiatric: He has a normal mood and affect. His behavior is normal.   Nursing note and vitals reviewed.    Assessment:     1. Routine general medical examination at a health care facility    2. Prostate cancer    3. Tortuous aorta    4. Thrombocytopenia    5. Mixed hyperlipidemia    6. Chronic kidney disease, stage 3    7. Lung granuloma    8. Anemia, unspecified      Plan:   Ezequiel was seen today for annual exam.    Diagnoses and all orders for this visit:    Routine general medical examination at a health care facility-reviewed labs today, discussed health maintenance issues.  Updated problem list.    Prostate cancer  Comments:  seeing Dr Hernandez urologist, Dr Wong, Dr Vegas with radiation oncology. active monitoring, 1 /12 + for malignancy low danita score,  12/2016    Tortuous aorta  Comments:  noted on cxr in 2017. not doing asa due to low platelets. control blood pressure.     Thrombocytopenia  Comments:  followed by dr galan. avoid asa.   Orders:  -     CBC auto differential; Future  -     Basic metabolic panel; Future    Mixed hyperlipidemia  Comments:  stable with statin. labs reviewed.     Chronic kidney disease, stage 3-repeat labs again in one month.  Discuss not to use NSAIDs and to push fluids.  -     CBC auto differential; Future  -     Basic metabolic panel; Future    Lung granuloma  Comments:  noted of left lung on CT scan from 1/2016. no new problems. no sob.     Anemia, unspecified  Comments:  with some decline, seeing Dr Galan yearly. repeat cbc in 1 mo. start iron supplement due to some RLS symptoms.   Orders:  -     CBC auto differential; Future  -     Basic metabolic panel; Future  -     Ferritin; Future    Other orders  -     pantoprazole (PROTONIX) 40 MG tablet; Take 1 tablet (40 mg total) by mouth daily as needed.  -     fluticasone (FLONASE) 50 mcg/actuation nasal spray; 2 sprays by Each Nare route once daily.  -     simvastatin (ZOCOR) 20 MG tablet; Take 1 tablet (20 mg total) by mouth every evening.            Return in about 6 months (around 9/14/2017) for chronic issues.

## 2017-03-16 PROBLEM — J84.10 LUNG GRANULOMA: Status: ACTIVE | Noted: 2017-03-16

## 2017-03-16 PROBLEM — I77.1 TORTUOUS AORTA: Status: ACTIVE | Noted: 2017-03-16

## 2017-04-11 ENCOUNTER — LAB VISIT (OUTPATIENT)
Dept: LAB | Facility: HOSPITAL | Age: 77
End: 2017-04-11
Attending: FAMILY MEDICINE
Payer: MEDICARE

## 2017-04-11 DIAGNOSIS — D64.9 ANEMIA, UNSPECIFIED: ICD-10-CM

## 2017-04-11 DIAGNOSIS — N18.30 CHRONIC KIDNEY DISEASE, STAGE 3: ICD-10-CM

## 2017-04-11 DIAGNOSIS — D69.6 THROMBOCYTOPENIA: ICD-10-CM

## 2017-04-11 LAB
ANION GAP SERPL CALC-SCNC: 9 MMOL/L
BASOPHILS # BLD AUTO: 0.02 K/UL
BASOPHILS NFR BLD: 0.5 %
BUN SERPL-MCNC: 41 MG/DL
CALCIUM SERPL-MCNC: 8.6 MG/DL
CHLORIDE SERPL-SCNC: 106 MMOL/L
CO2 SERPL-SCNC: 25 MMOL/L
CREAT SERPL-MCNC: 1.7 MG/DL
DIFFERENTIAL METHOD: ABNORMAL
EOSINOPHIL # BLD AUTO: 0.2 K/UL
EOSINOPHIL NFR BLD: 4.6 %
ERYTHROCYTE [DISTWIDTH] IN BLOOD BY AUTOMATED COUNT: 13.1 %
EST. GFR  (AFRICAN AMERICAN): 43.4 ML/MIN/1.73 M^2
EST. GFR  (NON AFRICAN AMERICAN): 37.5 ML/MIN/1.73 M^2
FERRITIN SERPL-MCNC: 167 NG/ML
GLUCOSE SERPL-MCNC: 93 MG/DL
HCT VFR BLD AUTO: 37.9 %
HGB BLD-MCNC: 12.6 G/DL
LYMPHOCYTES # BLD AUTO: 0.8 K/UL
LYMPHOCYTES NFR BLD: 17.3 %
MCH RBC QN AUTO: 31.6 PG
MCHC RBC AUTO-ENTMCNC: 33.2 %
MCV RBC AUTO: 95 FL
MONOCYTES # BLD AUTO: 0.4 K/UL
MONOCYTES NFR BLD: 8 %
NEUTROPHILS # BLD AUTO: 3.1 K/UL
NEUTROPHILS NFR BLD: 69.4 %
PLATELET # BLD AUTO: 116 K/UL
PMV BLD AUTO: 9 FL
POTASSIUM SERPL-SCNC: 4.5 MMOL/L
RBC # BLD AUTO: 3.99 M/UL
SODIUM SERPL-SCNC: 140 MMOL/L
WBC # BLD AUTO: 4.39 K/UL

## 2017-04-11 PROCEDURE — 85025 COMPLETE CBC W/AUTO DIFF WBC: CPT

## 2017-04-11 PROCEDURE — 82728 ASSAY OF FERRITIN: CPT

## 2017-04-11 PROCEDURE — 36415 COLL VENOUS BLD VENIPUNCTURE: CPT | Mod: PO

## 2017-04-11 PROCEDURE — 80048 BASIC METABOLIC PNL TOTAL CA: CPT

## 2017-04-12 ENCOUNTER — PATIENT MESSAGE (OUTPATIENT)
Dept: INTERNAL MEDICINE | Facility: CLINIC | Age: 77
End: 2017-04-12

## 2017-04-12 DIAGNOSIS — N18.3 CHRONIC KIDNEY DISEASE (CKD), STAGE 3 (MODERATE): Primary | ICD-10-CM

## 2017-04-12 NOTE — TELEPHONE ENCOUNTER
Pt needs to see kidney specialist due to worsening kidney function than last month. Anemia is stable. No signs of low iron at this time. If iron is helping his restless legs symptoms then can continue

## 2017-04-24 ENCOUNTER — TELEPHONE (OUTPATIENT)
Dept: INTERNAL MEDICINE | Facility: CLINIC | Age: 77
End: 2017-04-24

## 2017-04-24 DIAGNOSIS — N18.3 CKD (CHRONIC KIDNEY DISEASE), STAGE 3 (MODERATE): Primary | ICD-10-CM

## 2017-04-24 NOTE — TELEPHONE ENCOUNTER
Kidney function does decline just by pure aging, however in his case, his first decline was only minimal and noted in 2013. He had one episode of some dehydration in 2016 which caused some issues with his kidney function, but then it normalized. This time however, his kidney numbers didn't improve in the last 2 months, it got slightly worse. For this reason, he needs to have the imaging studies of the u/s done as well as meeting with kidney specialist. It would also help his kidney function for him to stop the diclofenac as this can worsen kidney function over time. Any anti-inflammatory can cause worsening of the kidney function. If he wants to see me first to discuss face to face I am happy to do so.

## 2017-04-24 NOTE — TELEPHONE ENCOUNTER
Pt received our message regarding needing to see a Kidney specialist regarding the functioning getting worse. Pt stated that he is confused as no one has every told him he had any kidney issues. He stated he was previously seeing another Dr and that he never got much from him about his labs anyway so he is wondering when this started and whats going on?

## 2017-04-24 NOTE — TELEPHONE ENCOUNTER
Called and gave pt this information. He verbalized understanding. He stated that he will go ahead and get off the diclofenac and any anti inflammatories. He will also increase his water intake as he does not drink enough at all. He stated that if you think this will make a change in his kidney's can he hold off and not see nephrology just yet? And maybe do some retesting in a couple months?     He stated that if you think he needs to see them and that this is a must he has no problem doing so...

## 2017-06-05 ENCOUNTER — LAB VISIT (OUTPATIENT)
Dept: LAB | Facility: HOSPITAL | Age: 77
End: 2017-06-05
Attending: FAMILY MEDICINE
Payer: MEDICARE

## 2017-06-05 DIAGNOSIS — N18.3 CKD (CHRONIC KIDNEY DISEASE), STAGE 3 (MODERATE): ICD-10-CM

## 2017-06-05 LAB
ANION GAP SERPL CALC-SCNC: 9 MMOL/L
BUN SERPL-MCNC: 27 MG/DL
CALCIUM SERPL-MCNC: 9 MG/DL
CHLORIDE SERPL-SCNC: 105 MMOL/L
CO2 SERPL-SCNC: 24 MMOL/L
CREAT SERPL-MCNC: 1.3 MG/DL
EST. GFR  (AFRICAN AMERICAN): 60 ML/MIN/1.73 M^2
EST. GFR  (NON AFRICAN AMERICAN): 51.9 ML/MIN/1.73 M^2
GLUCOSE SERPL-MCNC: 83 MG/DL
POTASSIUM SERPL-SCNC: 3.9 MMOL/L
SODIUM SERPL-SCNC: 138 MMOL/L

## 2017-06-05 PROCEDURE — 80048 BASIC METABOLIC PNL TOTAL CA: CPT

## 2017-06-05 PROCEDURE — 36415 COLL VENOUS BLD VENIPUNCTURE: CPT | Mod: PO

## 2017-07-24 ENCOUNTER — TELEPHONE (OUTPATIENT)
Dept: INTERNAL MEDICINE | Facility: CLINIC | Age: 77
End: 2017-07-24

## 2017-07-24 RX ORDER — DICLOFENAC SODIUM 75 MG/1
75 TABLET, DELAYED RELEASE ORAL 2 TIMES DAILY
Qty: 60 TABLET | Refills: 1 | Status: CANCELLED | OUTPATIENT
Start: 2017-07-24

## 2017-07-24 NOTE — TELEPHONE ENCOUNTER
----- Message from Allyn Lomas sent at 7/24/2017  3:47 PM CDT -----  Patient requesting a Rx for an anti inflammatory.    Pt use...  [facility] - [place], [place] - [address]  [address]  [facility] [address]  Phone: [phone] Fax: [phone]    Please adv/call [phone].//thanks. cw

## 2017-07-25 NOTE — TELEPHONE ENCOUNTER
I don't want him using the anti-inflammatory due to prior issues with his kidneys. Can use tylenol. If needing something stronger could use tramadol which would require a prescription. Please inform. Denying the diclofenac

## 2017-07-25 NOTE — TELEPHONE ENCOUNTER
Patient states that he will be willing to try tramadol you can send to [facility]

## 2017-07-26 RX ORDER — TRAMADOL HYDROCHLORIDE 50 MG/1
50 TABLET ORAL EVERY 6 HOURS PRN
Qty: 60 TABLET | Refills: 1 | Status: SHIPPED | OUTPATIENT
Start: 2017-07-26 | End: 2017-08-05

## 2017-08-02 ENCOUNTER — TELEPHONE (OUTPATIENT)
Dept: INTERNAL MEDICINE | Facility: CLINIC | Age: 77
End: 2017-08-02

## 2017-08-02 ENCOUNTER — OFFICE VISIT (OUTPATIENT)
Dept: URGENT CARE | Facility: CLINIC | Age: 77
End: 2017-08-02
Payer: MEDICARE

## 2017-08-02 VITALS
OXYGEN SATURATION: 100 % | DIASTOLIC BLOOD PRESSURE: 79 MMHG | BODY MASS INDEX: 28.56 KG/M2 | WEIGHT: 199.5 LBS | HEIGHT: 70 IN | HEART RATE: 86 BPM | SYSTOLIC BLOOD PRESSURE: 182 MMHG | TEMPERATURE: 98 F

## 2017-08-02 DIAGNOSIS — R11.0 NAUSEA: ICD-10-CM

## 2017-08-02 DIAGNOSIS — I10 ESSENTIAL HYPERTENSION: Primary | ICD-10-CM

## 2017-08-02 DIAGNOSIS — R42 DIZZINESS: ICD-10-CM

## 2017-08-02 DIAGNOSIS — R47.81 SLURRED SPEECH: ICD-10-CM

## 2017-08-02 PROCEDURE — 99999 PR PBB SHADOW E&M-EST. PATIENT-LVL IV: CPT | Mod: PBBFAC,,, | Performed by: NURSE PRACTITIONER

## 2017-08-02 PROCEDURE — 99499 UNLISTED E&M SERVICE: CPT | Mod: S$GLB,,, | Performed by: NURSE PRACTITIONER

## 2017-08-02 NOTE — TELEPHONE ENCOUNTER
Patient is feeling dizzy, nauseas, weak, and bp 181/ he is uncertain of bottem #  He is having some sob and have been having gas this has all been since today. He started tramadol on yesterday and took three but this am he took one on a empty stomache and he has eaten since but still feel this way. I consulted with  and she recc patient check in for u/c I went out and advised patient and he did check in for urgent care.aa

## 2017-08-02 NOTE — PROGRESS NOTES
"Subjective:      Patient ID: Ezequiel Hughes is a 77 y.o. male.    Chief Complaint: Dizziness; Nausea; and Hypertension ("weakness")    Mr. Hughes was brought in by a family member to Urgent Care today with complaints of dizziness, nausea, dyspepsia, and elevated BP. He does admit to some slurred speech earlier today. The dizziness is constant. He checked his BP when he started feeling bad this morning and got a reading of 175/85. Denies chest pain but reports some "acid reflux and belching." He did not have any vomiting or diaphoresis. Denies changes in diet. Only medication change was the addition of Tramadol, which he started taking two days ago.       Review of Systems   Constitutional: Negative for diaphoresis and fever.   HENT: Negative.    Respiratory: Negative.    Cardiovascular: Negative.    Gastrointestinal: Positive for nausea. Negative for vomiting.   Neurological: Positive for dizziness, speech difficulty, weakness (generalized) and light-headedness. Negative for facial asymmetry and headaches.       Objective:   BP (!) 182/79 (BP Location: Left arm, Patient Position: Sitting, BP Method: Manual)   Pulse 86   Temp 97.7 °F (36.5 °C) (Tympanic)   Ht 5' 10" (1.778 m)   Wt 90.5 kg (199 lb 8.3 oz)   SpO2 100%   BMI 28.63 kg/m²   Physical Exam   Constitutional: He is oriented to person, place, and time.   Full exam deferred as patient was sent to ER for evaluation.    Neurological: He is alert and oriented to person, place, and time.   Psychiatric: His speech is normal.     Assessment:      1. Essential hypertension    2. Dizziness    3. Nausea    4. Slurred speech       Plan:   Essential hypertension    Dizziness    Nausea    Slurred speech    To ER to rule out CVA or ACS as cause of symptoms.   Mr. Hughes requests to go to  General ER. Declines ambulance transport. He ambulated to the exit without difficulty. Report called to  General triage nurse.     "

## 2017-08-10 ENCOUNTER — TELEPHONE (OUTPATIENT)
Dept: INTERNAL MEDICINE | Facility: CLINIC | Age: 77
End: 2017-08-10

## 2017-08-10 NOTE — TELEPHONE ENCOUNTER
Pt was schedule with [facility] this week for a  general Hosp F/U. Pt needs to be rescheduled. He is requesting an apt next week some time with Dr. Ozuna. Requested records today from [facility]. Please advise if he can be worked in next week or if he needs to be schedule with [facility].

## 2017-08-15 ENCOUNTER — OFFICE VISIT (OUTPATIENT)
Dept: INTERNAL MEDICINE | Facility: CLINIC | Age: 77
End: 2017-08-15
Payer: MEDICARE

## 2017-08-15 VITALS
HEIGHT: 70 IN | BODY MASS INDEX: 28.28 KG/M2 | WEIGHT: 197.56 LBS | SYSTOLIC BLOOD PRESSURE: 110 MMHG | TEMPERATURE: 98 F | HEART RATE: 74 BPM | DIASTOLIC BLOOD PRESSURE: 70 MMHG | OXYGEN SATURATION: 99 %

## 2017-08-15 DIAGNOSIS — G45.9 TRANSIENT CEREBRAL ISCHEMIA, UNSPECIFIED TYPE: Primary | ICD-10-CM

## 2017-08-15 DIAGNOSIS — M79.604 BILATERAL LEG PAIN: ICD-10-CM

## 2017-08-15 DIAGNOSIS — I10 ESSENTIAL HYPERTENSION: ICD-10-CM

## 2017-08-15 DIAGNOSIS — N18.30 CHRONIC KIDNEY DISEASE, STAGE 3: ICD-10-CM

## 2017-08-15 DIAGNOSIS — E78.2 MIXED HYPERLIPIDEMIA: ICD-10-CM

## 2017-08-15 DIAGNOSIS — I77.1 TORTUOUS AORTA: ICD-10-CM

## 2017-08-15 DIAGNOSIS — I73.9 CLAUDICATION OF BOTH LOWER EXTREMITIES: ICD-10-CM

## 2017-08-15 DIAGNOSIS — M79.605 BILATERAL LEG PAIN: ICD-10-CM

## 2017-08-15 PROCEDURE — 3078F DIAST BP <80 MM HG: CPT | Mod: S$GLB,,, | Performed by: FAMILY MEDICINE

## 2017-08-15 PROCEDURE — 3074F SYST BP LT 130 MM HG: CPT | Mod: S$GLB,,, | Performed by: FAMILY MEDICINE

## 2017-08-15 PROCEDURE — 3008F BODY MASS INDEX DOCD: CPT | Mod: S$GLB,,, | Performed by: FAMILY MEDICINE

## 2017-08-15 PROCEDURE — 1126F AMNT PAIN NOTED NONE PRSNT: CPT | Mod: S$GLB,,, | Performed by: FAMILY MEDICINE

## 2017-08-15 PROCEDURE — 99214 OFFICE O/P EST MOD 30 MIN: CPT | Mod: S$GLB,,, | Performed by: FAMILY MEDICINE

## 2017-08-15 PROCEDURE — 99999 PR PBB SHADOW E&M-EST. PATIENT-LVL III: CPT | Mod: PBBFAC,,, | Performed by: FAMILY MEDICINE

## 2017-08-15 PROCEDURE — 1159F MED LIST DOCD IN RCRD: CPT | Mod: S$GLB,,, | Performed by: FAMILY MEDICINE

## 2017-08-15 PROCEDURE — 99499 UNLISTED E&M SERVICE: CPT | Mod: S$GLB,,, | Performed by: FAMILY MEDICINE

## 2017-08-15 RX ORDER — ATORVASTATIN CALCIUM 40 MG/1
TABLET, FILM COATED ORAL
COMMUNITY
Start: 2017-08-03 | End: 2017-08-15 | Stop reason: SDUPTHER

## 2017-08-15 RX ORDER — TADALAFIL 5 MG/1
TABLET, FILM COATED ORAL
COMMUNITY
Start: 2017-06-20 | End: 2018-01-23

## 2017-08-15 RX ORDER — ASPIRIN 81 MG/1
81 TABLET ORAL DAILY
COMMUNITY
End: 2017-08-24

## 2017-08-15 RX ORDER — AMLODIPINE BESYLATE 10 MG/1
TABLET ORAL
COMMUNITY
Start: 2017-08-03 | End: 2017-08-15 | Stop reason: SDUPTHER

## 2017-08-15 RX ORDER — ATORVASTATIN CALCIUM 40 MG/1
40 TABLET, FILM COATED ORAL DAILY
Qty: 90 TABLET | Refills: 3 | Status: SHIPPED | OUTPATIENT
Start: 2017-08-15 | End: 2018-08-13 | Stop reason: SDUPTHER

## 2017-08-15 RX ORDER — AMLODIPINE BESYLATE 10 MG/1
10 TABLET ORAL DAILY
Qty: 90 TABLET | Refills: 3 | Status: SHIPPED | OUTPATIENT
Start: 2017-08-15 | End: 2017-12-07 | Stop reason: ALTCHOICE

## 2017-08-15 NOTE — PROGRESS NOTES
Subjective:      Patient ID: Ezequiel Hughes is a 77 y.o. male.    Chief Complaint: Hospital Follow Up ([facility])    Patient's coming in today for hospital follow-up of recently at the August 2 to August 3 at [facility] for suspected TIA.  He had taken for tramadol that day and started feeling dizzy with some slurred speech and had some significantly elevated blood pressures.  He initially presented here to the urgent care however due to his signs and symptoms was recommended that he get evaluated by the emergency room.  There they kept him overnight a CT scan of the head chest x-ray carotid ultrasounds echo and cholesterol lab work.  He was kept overnight and discharged.  They changed his cholesterol medicine from simvastatin 22 dorsum 40.  The placed on a baby aspirin.  They discontinued the losartan HCTZ and switch him over to amlodipine 10.  He's presenting today for follow-up.  He reports he's doing well.  He really hasn't had any leg pains or charley horses since switching the blood pressure medicines.  He does have leg pain however sometimes with claudication symptoms before.  He has some known varicosities enters present today.  He is not having any leg swelling today.  He has a history of thrombus cytopenia for which she's being followed by hematology.  We also had limited his NSAID use prior because of some chronic kidney disease that was noted.  However he's now on the aspirin therapy due to some carotid atherosclerosis that was noted.  He feels fairly good.  He scheduled next month for annual exam with me.  He has not yet been taking his Cialis but he would like to get back on it.        Lab Results   Component Value Date    WBC 4.39 04/11/2017    HGB 12.6 (L) 04/11/2017    HCT 37.9 (L) 04/11/2017     (L) 04/11/2017    CHOL 157 03/07/2017    TRIG 77 03/07/2017    HDL 52 03/07/2017    ALT 17 03/07/2017    AST 22 03/07/2017     06/05/2017    K 3.9 06/05/2017     06/05/2017     CREATININE 1.3 06/05/2017    BUN 27 (H) 06/05/2017    CO2 24 06/05/2017    TSH 1.634 03/07/2017    PSA 1.5 09/01/2016    INR 1.2 09/03/2015       Review of Systems   Constitutional: Positive for activity change. Negative for chills, fatigue and unexpected weight change.   HENT: Negative for congestion, ear pain, postnasal drip, sneezing, sore throat and trouble swallowing.    Eyes: Negative for pain and visual disturbance.   Respiratory: Negative for cough and shortness of breath.    Cardiovascular: Negative for chest pain and leg swelling.   Gastrointestinal: Negative for abdominal pain, constipation, diarrhea, nausea and vomiting.   Endocrine: Negative for cold intolerance and heat intolerance.   Genitourinary: Negative for difficulty urinating, dysuria and flank pain.   Musculoskeletal: Positive for myalgias. Negative for arthralgias, back pain, joint swelling and neck pain.   Skin: Negative for color change and rash.   Neurological: Negative for dizziness, seizures and headaches.   Psychiatric/Behavioral: Negative for behavioral problems and dysphoric mood. The patient is not nervous/anxious.      Objective:     Physical Exam   Constitutional: He is oriented to person, place, and time. He appears well-developed and well-nourished. No distress.   HENT:   Head: Normocephalic and atraumatic.   Right Ear: External ear normal.   Left Ear: External ear normal.   Nose: Nose normal.   Mouth/Throat: Oropharynx is clear and moist.   Eyes: EOM are normal. Pupils are equal, round, and reactive to light.   Neck: Normal range of motion. Neck supple. Carotid bruit is not present. No thyroid mass and no thyromegaly present.   Cardiovascular: Normal rate and regular rhythm.  Exam reveals no gallop and no friction rub.    No murmur heard.  Pulses:       Dorsalis pedis pulses are 2+ on the right side, and 2+ on the left side.   Pulmonary/Chest: Effort normal and breath sounds normal. No respiratory distress.   Abdominal: Soft.  Bowel sounds are normal. He exhibits no distension. There is no tenderness. There is no rebound.   Musculoskeletal: Normal range of motion.   Lymphadenopathy:     He has no cervical adenopathy.   Neurological: He is alert and oriented to person, place, and time. Coordination normal.   Skin: Skin is warm and dry. No lesion noted. No erythema.        Psychiatric: He has a normal mood and affect.   Vitals reviewed.    Assessment:     1. Transient cerebral ischemia, unspecified type    2. Essential hypertension    3. Claudication of both lower extremities    4. Tortuous aorta    5. Chronic kidney disease, stage 3    6. Mixed hyperlipidemia    7. Bilateral leg pain      Plan:   Ezequiel was seen today for hospital follow up.    Diagnoses and all orders for this visit:    Transient cerebral ischemia, unspecified type-suspected will obtain discharge summary for follow-up.  Continue with aspirin therapy for now.  Repeat labs prior to next visit.  -     US Lower Extremity Veins Bilateral; Future  -     US Lower Extrem Arteries Bilat with NOEMI (xpd); Future  -     CBC auto differential; Future  -     Comprehensive metabolic panel; Future  -     Lipid panel; Future  -     TSH; Future    Essential hypertension-continue with amlodipine 10 holding losartan HCTZ for now.  -     US Lower Extremity Veins Bilateral; Future  -     US Lower Extrem Arteries Bilat with NOEMI (xpd); Future  -     CBC auto differential; Future  -     Comprehensive metabolic panel; Future  -     Lipid panel; Future  -     TSH; Future    Claudication of both lower extremities-new obtain ultrasound of veins and arteries prior to next visit to evaluate if need for Plavix necessary  -     US Lower Extremity Veins Bilateral; Future  -     US Lower Extrem Arteries Bilat with NOEMI (xpd); Future  -     CBC auto differential; Future  -     Comprehensive metabolic panel; Future  -     Lipid panel; Future  -     TSH; Future    Tortuous aorta-now on high intensity statin  therapy and aspirin  -     US Lower Extremity Veins Bilateral; Future  -     US Lower Extrem Arteries Bilat with NOEMI (xpd); Future  -     CBC auto differential; Future  -     Comprehensive metabolic panel; Future  -     Lipid panel; Future  -     TSH; Future    Chronic kidney disease, stage 3-we'll be monitoring since now on aspirin therapy  -     US Lower Extremity Veins Bilateral; Future  -     US Lower Extrem Arteries Bilat with NOEMI (xpd); Future  -     CBC auto differential; Future  -     Comprehensive metabolic panel; Future  -     Lipid panel; Future  -     TSH; Future    Mixed hyperlipidemia-now on atorvastatin 40  -     US Lower Extremity Veins Bilateral; Future  -     US Lower Extrem Arteries Bilat with NOEMI (xpd); Future  -     CBC auto differential; Future  -     Comprehensive metabolic panel; Future  -     Lipid panel; Future  -     TSH; Future    Bilateral leg pain-needing ultrasound to evaluate for varicosities  -     US Lower Extremity Veins Bilateral; Future  -     US Lower Extrem Arteries Bilat with NOEMI (xpd); Future  -     CBC auto differential; Future  -     Comprehensive metabolic panel; Future  -     Lipid panel; Future  -     TSH; Future    Other orders  -     amlodipine (NORVASC) 10 MG tablet; Take 1 tablet (10 mg total) by mouth once daily.  -     atorvastatin (LIPITOR) 40 MG tablet; Take 1 tablet (40 mg total) by mouth once daily.            Return if symptoms worsen or fail to improve.

## 2017-08-22 ENCOUNTER — TELEPHONE (OUTPATIENT)
Dept: RADIOLOGY | Facility: HOSPITAL | Age: 77
End: 2017-08-22

## 2017-08-23 ENCOUNTER — HOSPITAL ENCOUNTER (OUTPATIENT)
Dept: RADIOLOGY | Facility: HOSPITAL | Age: 77
Discharge: HOME | End: 2017-08-23
Attending: FAMILY MEDICINE
Payer: MEDICARE

## 2017-08-23 DIAGNOSIS — M79.604 BILATERAL LEG PAIN: ICD-10-CM

## 2017-08-23 DIAGNOSIS — E78.2 MIXED HYPERLIPIDEMIA: ICD-10-CM

## 2017-08-23 DIAGNOSIS — N18.30 CHRONIC KIDNEY DISEASE, STAGE 3: ICD-10-CM

## 2017-08-23 DIAGNOSIS — I73.9 CLAUDICATION OF BOTH LOWER EXTREMITIES: ICD-10-CM

## 2017-08-23 DIAGNOSIS — I10 ESSENTIAL HYPERTENSION: ICD-10-CM

## 2017-08-23 DIAGNOSIS — M79.605 BILATERAL LEG PAIN: ICD-10-CM

## 2017-08-23 DIAGNOSIS — G45.9 TRANSIENT CEREBRAL ISCHEMIA, UNSPECIFIED TYPE: ICD-10-CM

## 2017-08-23 DIAGNOSIS — I77.1 TORTUOUS AORTA: ICD-10-CM

## 2017-08-23 PROCEDURE — 93970 EXTREMITY STUDY: CPT | Mod: TC,PO

## 2017-08-23 PROCEDURE — 93925 LOWER EXTREMITY STUDY: CPT | Mod: TC,PO

## 2017-08-23 PROCEDURE — 93970 EXTREMITY STUDY: CPT | Mod: 26,,, | Performed by: RADIOLOGY

## 2017-08-23 PROCEDURE — 93925 LOWER EXTREMITY STUDY: CPT | Mod: 26,,, | Performed by: RADIOLOGY

## 2017-08-24 ENCOUNTER — TELEPHONE (OUTPATIENT)
Dept: INTERNAL MEDICINE | Facility: CLINIC | Age: 77
End: 2017-08-24

## 2017-08-24 RX ORDER — CLOPIDOGREL BISULFATE 75 MG/1
75 TABLET ORAL DAILY
Qty: 30 TABLET | Refills: 11 | Status: SHIPPED | OUTPATIENT
Start: 2017-08-24 | End: 2017-09-18 | Stop reason: SDUPTHER

## 2017-08-24 NOTE — TELEPHONE ENCOUNTER
I sent message with results. Cont with atorvastatin 40mg. Stop aspirin. Start plavix. Sent in plavix.

## 2017-08-24 NOTE — TELEPHONE ENCOUNTER
----- Message from Crystal Lomax sent at 8/24/2017  1:34 PM CDT -----  Contact: pt   Pt calling to discuss his results, he doesn't understand them,, please bobby him back at [phone]

## 2017-08-25 ENCOUNTER — TELEPHONE (OUTPATIENT)
Dept: INTERNAL MEDICINE | Facility: CLINIC | Age: 77
End: 2017-08-25

## 2017-08-25 NOTE — TELEPHONE ENCOUNTER
----- Message from Elin Crespo sent at 8/25/2017  8:46 AM CDT -----  Contact: patient  Calling concerning what medication should he take. Not sure if he stop taking aspirin with the Plavix. Please call patient ASAP @ [phone]. Thanks, gabriela

## 2017-08-25 NOTE — TELEPHONE ENCOUNTER
Called pt and notified that Dr. Ozuna stated to Stop aspirin while taking the plavix. Pt verbalized understanding.

## 2017-09-07 ENCOUNTER — LAB VISIT (OUTPATIENT)
Dept: LAB | Facility: HOSPITAL | Age: 77
End: 2017-09-07
Attending: FAMILY MEDICINE
Payer: MEDICARE

## 2017-09-07 DIAGNOSIS — E78.2 MIXED HYPERLIPIDEMIA: ICD-10-CM

## 2017-09-07 DIAGNOSIS — I73.9 CLAUDICATION OF BOTH LOWER EXTREMITIES: ICD-10-CM

## 2017-09-07 DIAGNOSIS — N18.30 CHRONIC KIDNEY DISEASE, STAGE 3: ICD-10-CM

## 2017-09-07 DIAGNOSIS — M79.605 BILATERAL LEG PAIN: ICD-10-CM

## 2017-09-07 DIAGNOSIS — G45.9 TRANSIENT CEREBRAL ISCHEMIA, UNSPECIFIED TYPE: ICD-10-CM

## 2017-09-07 DIAGNOSIS — I77.1 TORTUOUS AORTA: ICD-10-CM

## 2017-09-07 DIAGNOSIS — M79.604 BILATERAL LEG PAIN: ICD-10-CM

## 2017-09-07 DIAGNOSIS — I10 ESSENTIAL HYPERTENSION: ICD-10-CM

## 2017-09-07 LAB
ALBUMIN SERPL BCP-MCNC: 3.6 G/DL
ALP SERPL-CCNC: 54 U/L
ALT SERPL W/O P-5'-P-CCNC: 16 U/L
ANION GAP SERPL CALC-SCNC: 9 MMOL/L
AST SERPL-CCNC: 17 U/L
BASOPHILS # BLD AUTO: 0.05 K/UL
BASOPHILS NFR BLD: 1.2 %
BILIRUB SERPL-MCNC: 0.9 MG/DL
BUN SERPL-MCNC: 25 MG/DL
CALCIUM SERPL-MCNC: 8.9 MG/DL
CHLORIDE SERPL-SCNC: 107 MMOL/L
CHOLEST SERPL-MCNC: 132 MG/DL
CHOLEST/HDLC SERPL: 2.1 {RATIO}
CO2 SERPL-SCNC: 23 MMOL/L
CREAT SERPL-MCNC: 1.2 MG/DL
DIFFERENTIAL METHOD: ABNORMAL
EOSINOPHIL # BLD AUTO: 0.2 K/UL
EOSINOPHIL NFR BLD: 5.5 %
ERYTHROCYTE [DISTWIDTH] IN BLOOD BY AUTOMATED COUNT: 13.2 %
EST. GFR  (AFRICAN AMERICAN): >60 ML/MIN/1.73 M^2
EST. GFR  (NON AFRICAN AMERICAN): 57.2 ML/MIN/1.73 M^2
GLUCOSE SERPL-MCNC: 76 MG/DL
HCT VFR BLD AUTO: 38.6 %
HDLC SERPL-MCNC: 62 MG/DL
HDLC SERPL: 47 %
HGB BLD-MCNC: 13 G/DL
LDLC SERPL CALC-MCNC: 60 MG/DL
LYMPHOCYTES # BLD AUTO: 1 K/UL
LYMPHOCYTES NFR BLD: 24.2 %
MCH RBC QN AUTO: 31.3 PG
MCHC RBC AUTO-ENTMCNC: 33.7 G/DL
MCV RBC AUTO: 93 FL
MONOCYTES # BLD AUTO: 0.5 K/UL
MONOCYTES NFR BLD: 12.6 %
NEUTROPHILS # BLD AUTO: 2.4 K/UL
NEUTROPHILS NFR BLD: 56 %
NONHDLC SERPL-MCNC: 70 MG/DL
PLATELET # BLD AUTO: 126 K/UL
PMV BLD AUTO: 7.7 FL
POTASSIUM SERPL-SCNC: 4.3 MMOL/L
PROT SERPL-MCNC: 6.6 G/DL
RBC # BLD AUTO: 4.16 M/UL
SODIUM SERPL-SCNC: 139 MMOL/L
TRIGL SERPL-MCNC: 50 MG/DL
WBC # BLD AUTO: 4.22 K/UL

## 2017-09-07 PROCEDURE — 80053 COMPREHEN METABOLIC PANEL: CPT

## 2017-09-07 PROCEDURE — 85025 COMPLETE CBC W/AUTO DIFF WBC: CPT

## 2017-09-07 PROCEDURE — 80061 LIPID PANEL: CPT

## 2017-09-07 PROCEDURE — 36415 COLL VENOUS BLD VENIPUNCTURE: CPT | Mod: PO

## 2017-09-07 PROCEDURE — 84443 ASSAY THYROID STIM HORMONE: CPT

## 2017-09-08 LAB — TSH SERPL DL<=0.005 MIU/L-ACNC: 1.91 UIU/ML

## 2017-09-14 ENCOUNTER — OFFICE VISIT (OUTPATIENT)
Dept: INTERNAL MEDICINE | Facility: CLINIC | Age: 77
End: 2017-09-14
Payer: MEDICARE

## 2017-09-14 VITALS
BODY MASS INDEX: 28.12 KG/M2 | SYSTOLIC BLOOD PRESSURE: 130 MMHG | DIASTOLIC BLOOD PRESSURE: 80 MMHG | HEIGHT: 70 IN | WEIGHT: 196.44 LBS | TEMPERATURE: 98 F | HEART RATE: 66 BPM

## 2017-09-14 DIAGNOSIS — E78.2 MIXED HYPERLIPIDEMIA: ICD-10-CM

## 2017-09-14 DIAGNOSIS — I10 ESSENTIAL HYPERTENSION: ICD-10-CM

## 2017-09-14 DIAGNOSIS — D69.6 THROMBOCYTOPENIA: ICD-10-CM

## 2017-09-14 DIAGNOSIS — N18.30 CHRONIC KIDNEY DISEASE, STAGE 3: ICD-10-CM

## 2017-09-14 DIAGNOSIS — C61 PROSTATE CANCER: ICD-10-CM

## 2017-09-14 DIAGNOSIS — I77.1 TORTUOUS AORTA: ICD-10-CM

## 2017-09-14 DIAGNOSIS — I73.9 PAD (PERIPHERAL ARTERY DISEASE): Primary | ICD-10-CM

## 2017-09-14 DIAGNOSIS — R73.09 ABNORMAL GLUCOSE: ICD-10-CM

## 2017-09-14 PROCEDURE — 99499 UNLISTED E&M SERVICE: CPT | Mod: S$GLB,,, | Performed by: FAMILY MEDICINE

## 2017-09-14 PROCEDURE — 3075F SYST BP GE 130 - 139MM HG: CPT | Mod: S$GLB,,, | Performed by: FAMILY MEDICINE

## 2017-09-14 PROCEDURE — 3008F BODY MASS INDEX DOCD: CPT | Mod: S$GLB,,, | Performed by: FAMILY MEDICINE

## 2017-09-14 PROCEDURE — 1159F MED LIST DOCD IN RCRD: CPT | Mod: S$GLB,,, | Performed by: FAMILY MEDICINE

## 2017-09-14 PROCEDURE — 99999 PR PBB SHADOW E&M-EST. PATIENT-LVL III: CPT | Mod: PBBFAC,,, | Performed by: FAMILY MEDICINE

## 2017-09-14 PROCEDURE — 99214 OFFICE O/P EST MOD 30 MIN: CPT | Mod: S$GLB,,, | Performed by: FAMILY MEDICINE

## 2017-09-14 PROCEDURE — 3079F DIAST BP 80-89 MM HG: CPT | Mod: S$GLB,,, | Performed by: FAMILY MEDICINE

## 2017-09-14 PROCEDURE — 1126F AMNT PAIN NOTED NONE PRSNT: CPT | Mod: S$GLB,,, | Performed by: FAMILY MEDICINE

## 2017-09-14 NOTE — PROGRESS NOTES
Subjective:      Patient ID: Ezequiel Hughes is a 77 y.o. male.    Chief Complaint: Follow-up (Labs, peripheral vascular disease, medications)    Disclaimer:  This note is prepared using voice recognition software and as such is likely to have errors and has not been proof read. Please contact me for questions.     Patient's coming in today for follow-up of recent leg studies.  He was having symptoms related to peripheral vascular disease.  We did ultrasounds which showed no evidence of DVT but he does have 50% stenosis of his peripheral arteries in his legs.  For this reason we changed him from aspirin to Plavix therapy.  I also increased his atorvastatin from 20-40 mg.  His cholesterol medicines were also increased because of his TIA that he had just experienced as well.  Blood pressure medicines were change was in the hospital to amlodipine.  He is noticing some leg swelling with the amlodipine but is controlling his blood pressure.  While in the hospital he was also noted that he had carotid atherosclerosis as well.    He reports he does get pain with prolonged walking that improves with rest however he also gets pain in his legs with swelling after prolonged standing.  He is to issues ongoing not only the multiple varicosities in his main but also with the amlodipine this is causing some of the leg swelling.  The Plavix should help to improve his overall blood flow.  He would be willing to see an interventional cardiologist in the future but he would like to wait until November.      His cholesterol levels look good with the current dosing of the atorvastatin 40 mg.      He also has a history of thrombocytopenia for which she is now followed by hematology.  For this reason were not using the aspirin either.              Lab Results   Component Value Date    WBC 4.22 09/07/2017    HGB 13.0 (L) 09/07/2017    HCT 38.6 (L) 09/07/2017     (L) 09/07/2017    CHOL 132 09/07/2017    TRIG 50 09/07/2017    HDL 62  09/07/2017    ALT 16 09/07/2017    AST 17 09/07/2017     09/07/2017    K 4.3 09/07/2017     09/07/2017    CREATININE 1.2 09/07/2017    BUN 25 (H) 09/07/2017    CO2 23 09/07/2017    TSH 1.909 09/07/2017    PSA 1.5 09/01/2016    INR 1.2 09/03/2015       Review of Systems   Constitutional: Negative for chills, fatigue and unexpected weight change.   HENT: Negative for congestion, ear pain, postnasal drip, sneezing, sore throat and trouble swallowing.    Eyes: Negative for pain and visual disturbance.   Respiratory: Negative for cough and shortness of breath.    Cardiovascular: Positive for leg swelling. Negative for chest pain and palpitations.   Gastrointestinal: Negative for abdominal pain, constipation, diarrhea, nausea and vomiting.   Endocrine: Negative for cold intolerance and heat intolerance.   Genitourinary: Negative for difficulty urinating, dysuria and flank pain.   Musculoskeletal: Negative for arthralgias, back pain, joint swelling and neck pain.   Skin: Negative for color change and rash.   Neurological: Negative for dizziness, seizures and headaches.   Hematological: Bruises/bleeds easily.   Psychiatric/Behavioral: Negative for behavioral problems, dysphoric mood and sleep disturbance.     Objective:     Physical Exam   Constitutional: He is oriented to person, place, and time. He appears well-developed and well-nourished. No distress.   HENT:   Head: Normocephalic and atraumatic.   Right Ear: External ear normal.   Left Ear: External ear normal.   Nose: Nose normal.   Mouth/Throat: Oropharynx is clear and moist.   Eyes: EOM are normal. Pupils are equal, round, and reactive to light.   Neck: Normal range of motion. Neck supple. No thyromegaly present.   Cardiovascular: Normal rate and regular rhythm.  Exam reveals no gallop and no friction rub.    No murmur heard.  Pulmonary/Chest: Effort normal and breath sounds normal. No respiratory distress.   Abdominal: Soft. Bowel sounds are normal. He  exhibits no distension. There is no tenderness. There is no rebound.   Musculoskeletal: Normal range of motion.   Lymphadenopathy:     He has no cervical adenopathy.   Neurological: He is alert and oriented to person, place, and time. Coordination normal.   Skin: Skin is warm and dry.   Psychiatric: He has a normal mood and affect.   Vitals reviewed.    Assessment:     1. PAD (peripheral artery disease)    2. Chronic kidney disease, stage 3    3. Essential hypertension    4. Prostate cancer    5. Thrombocytopenia    6. Tortuous aorta    7. Mixed hyperlipidemia    8. Abnormal glucose      Plan:   Ezequiel was seen today for follow-up.    Diagnoses and all orders for this visit:    PAD (peripheral artery disease)-new problem.  Comments:  now on plavix and higher dose lipitor.  Establish care with interventional cardiology in November.  Orders:  -     Ambulatory referral to Cardiology  -     Lipid panel; Future  -     Hemoglobin A1c; Future  -     Comprehensive metabolic panel; Future  -     CBC auto differential; Future  -     Uric acid; Future    Chronic kidney disease, stage 3-steadily improving continue with medications for blood pressure control and increased water  -     Lipid panel; Future  -     Hemoglobin A1c; Future  -     Comprehensive metabolic panel; Future  -     CBC auto differential; Future  -     Uric acid; Future    Essential hypertension-controlled with amlodipine 10 discussed the side effects of leg swelling with this medication  -     Ambulatory referral to Cardiology  -     Lipid panel; Future  -     Hemoglobin A1c; Future  -     Comprehensive metabolic panel; Future  -     CBC auto differential; Future  -     Uric acid; Future    Prostate cancer  Comments:  every 3 mo dpsa with dr hadley. [facility]. mri no lesions    Thrombocytopenia-followed by Dr. Morgan currently stable  -     Ambulatory referral to Cardiology  -     Lipid panel; Future  -     Hemoglobin A1c; Future  -     Comprehensive  metabolic panel; Future  -     CBC auto differential; Future  -     Uric acid; Future    Tortuous aorta-on Plavix and Lipitor  -     Ambulatory referral to Cardiology  -     Lipid panel; Future  -     Hemoglobin A1c; Future  -     Comprehensive metabolic panel; Future  -     CBC auto differential; Future  -     Uric acid; Future    Mixed hyperlipidemia-on Plavix and Lipitor, labs reviewed    -     Ambulatory referral to Cardiology  -     Lipid panel; Future  -     Hemoglobin A1c; Future  -     Comprehensive metabolic panel; Future  -     CBC auto differential; Future  -     Uric acid; Future    Abnormal glucose-noted but not diabetic  -     Lipid panel; Future  -     Hemoglobin A1c; Future  -     Comprehensive metabolic panel; Future  -     CBC auto differential; Future  -     Uric acid; Future            Return in about 6 months (around 3/14/2018) for physical, pad, platelets, chol, bp.

## 2017-09-18 ENCOUNTER — OFFICE VISIT (OUTPATIENT)
Dept: INTERNAL MEDICINE | Facility: CLINIC | Age: 77
End: 2017-09-18
Payer: MEDICARE

## 2017-09-18 VITALS
SYSTOLIC BLOOD PRESSURE: 128 MMHG | DIASTOLIC BLOOD PRESSURE: 78 MMHG | HEIGHT: 70 IN | BODY MASS INDEX: 28.37 KG/M2 | OXYGEN SATURATION: 98 % | WEIGHT: 198.19 LBS | HEART RATE: 76 BPM

## 2017-09-18 DIAGNOSIS — D64.9 ANEMIA, UNSPECIFIED: ICD-10-CM

## 2017-09-18 DIAGNOSIS — N40.0 BENIGN PROSTATIC HYPERPLASIA WITHOUT LOWER URINARY TRACT SYMPTOMS: ICD-10-CM

## 2017-09-18 DIAGNOSIS — I77.1 TORTUOUS AORTA: ICD-10-CM

## 2017-09-18 DIAGNOSIS — I70.203 ATHEROSCLEROSIS OF ARTERY OF BOTH LOWER EXTREMITIES: ICD-10-CM

## 2017-09-18 DIAGNOSIS — I10 ESSENTIAL HYPERTENSION: ICD-10-CM

## 2017-09-18 DIAGNOSIS — Z00.00 ENCOUNTER FOR PREVENTIVE HEALTH EXAMINATION: Primary | ICD-10-CM

## 2017-09-18 DIAGNOSIS — N18.30 CHRONIC KIDNEY DISEASE, STAGE 3: ICD-10-CM

## 2017-09-18 DIAGNOSIS — H26.9 CATARACT OF BOTH EYES, UNSPECIFIED CATARACT TYPE: ICD-10-CM

## 2017-09-18 DIAGNOSIS — C61 PROSTATE CANCER: ICD-10-CM

## 2017-09-18 DIAGNOSIS — M47.817 LUMBOSACRAL SPONDYLOSIS WITHOUT MYELOPATHY: ICD-10-CM

## 2017-09-18 DIAGNOSIS — E78.2 MIXED HYPERLIPIDEMIA: ICD-10-CM

## 2017-09-18 DIAGNOSIS — D69.6 THROMBOCYTOPENIA: ICD-10-CM

## 2017-09-18 PROCEDURE — G0439 PPPS, SUBSEQ VISIT: HCPCS | Mod: S$GLB,,, | Performed by: PHYSICIAN ASSISTANT

## 2017-09-18 PROCEDURE — 99999 PR PBB SHADOW E&M-EST. PATIENT-LVL IV: CPT | Mod: PBBFAC,,, | Performed by: PHYSICIAN ASSISTANT

## 2017-09-18 PROCEDURE — 99499 UNLISTED E&M SERVICE: CPT | Mod: S$GLB,,, | Performed by: PHYSICIAN ASSISTANT

## 2017-09-18 RX ORDER — CLOPIDOGREL BISULFATE 75 MG/1
75 TABLET ORAL DAILY
Qty: 90 TABLET | Refills: 3 | Status: SHIPPED | OUTPATIENT
Start: 2017-09-18 | End: 2018-09-03 | Stop reason: SDUPTHER

## 2017-09-18 NOTE — PATIENT INSTRUCTIONS
Counseling and Referral of Other Preventative  (Italic type indicates deductible and co-insurance are waived)    Patient Name: Ezequiel Hughes  Today's Date: 9/18/2017      SERVICE LIMITATIONS RECOMMENDATION    Vaccines    · Pneumococcal (once after 65)    · Influenza (annually)    · Hepatitis B (if medium/high risk)    · Prevnar 13      Hepatitis B medium/high risk factors:       - End-stage renal disease       - Hemophiliacs who received Factor VII or         IX concentrates       - Clients of institutions for the mentally             retarded       - Persons who live in the same house as          a HepB carrier       - Homosexual men       - Illicit injectable drug abusers     Pneumococcal: done 7/2011     Influenza: done 12/2016     Hepatitis B: N/A     Prevnar 13: done 9/2016    Prostate cancer screening (annually to age 75)     Prostate specific antigen (PSA) Shared decision making with Provider. Sometimes a co-pay may be required if the patient decides to have this test. The USPSTF no longer recommends prostate cancer screening routinely in medicine: Follow Dr. Wong    Colorectal cancer screening (to age 75)    · Fecal occult blood test (annual)  · Flexible sigmoidoscopy (5y)  · Screening colonoscopy (10y)  · Barium enema   Last done 11/2016, recommend to repeat every 3  years    Diabetes self-management training (no USPSTF recommendations)  Requires referral by treating physician for patient with diabetes or renal disease. 10 hours of initial DSMT sessions of no less than 30 minutes each in a continuous 12-month period. 2 hours of follow-up DSMT in subsequent years.  N/A    Glaucoma screening (no USPSTF recommendation)  Diabetes mellitus, family history   , age 50 or over    American, age 65 or over  Recommend follow up with eye care professional regularly. Dr. JAY Vizcarra    Medical nutrition therapy for diabetes or renal disease (no recommended schedule)  Requires referral by  treating physician for patient with diabetes or renal disease or kidney transplant within the past 3 years.  Can be provided in same year as diabetes self-management training (DSMT), and CMS recommends medical nutrition therapy take place after DSMT. Up to 3 hours for initial year and 2 hours in subsequent years.  N/A    Cardiovascular screening blood tests (every 5 years)  · Fasting lipid panel  Order as a panel if possible  Done this year, repeat every year    Diabetes screening tests (at least every 3 years, Medicare covers annually or at 6-month intervals for prediabetic patients)  · Fasting blood sugar (FBS) or glucose tolerance test (GTT)  Patient must be diagnosed with one of the following:       - Hypertension       - Dyslipidemia       - Obesity (BMI 30kg/m2)       - Previous elevated impaired FBS or GTT       ... or any two of the following:       - Overweight (BMI 25 but <30)       - Family history of diabetes       - Age 65 or older       - History of gestational diabetes or birth of baby weighing more than 9 pounds  follow PCP    Abdominal aortic aneurysm screening (once)  · Sonogram   Limited to patients who meet one of the following criteria:       - Men who are 65-75 years old and have smoked more than 100 cigarette in their lifetime       - Anyone with a family history of abdominal aortic aneurysm       - Anyone recommended for screening by the USPSTF  N/A    HIV screening (annually for increased risk patients)  · HIV-1 and HIV-2 by EIA, or ALIRIO, rapid antibody test or oral mucosa transudate  Patients must be at increased risk for HIV infection per USPSTF guidelines or pregnant. Tests covered annually for patient at increased risk or as requested by the patient. Pregnant patients may receive up to 3 tests during pregnancy.  Risks discussed, screening is not recommended    Smoking cessation counseling (up to 8 sessions per year)  Patients must be asymptomatic of tobacco-related conditions to receive  as a preventative service.  Non-smoker    Subsequent annual wellness visit  At least 12 months since last AWV  Return in one year     The following information is provided to all patients.  This information is to help you find resources for any of the problems found today that may be affecting your health:                Living healthy guide: www.[facility].[place].[facility]      Understanding Diabetes: www.diabetes.org      Eating healthy: www.cdc.gov/healthyweight      CDC home safety checklist: www.cdc.gov/steadi/patient.html      Agency on Aging: www.goea.[place].[facility]      Alcoholics anonymous (AA): www.aa.org      Physical Activity: www.maureen.nih.gov/uy8zwmp      Tobacco use: www.quitwithusla.org

## 2017-09-18 NOTE — TELEPHONE ENCOUNTER
----- Message from Lucy Glaser sent at 9/18/2017  8:08 AM CDT -----  Contact: pt   .1. What is the name of the medication you are requesting? Clopidogrel plavix  2. What is the dose? 75 mg   3. How do you take the medication? Orally, topically, etc?   4. How often do you take this medication? One a day   5. Do you need a 30 day or 90 day supply? 90  6. How many refills are you requesting? 1  7. What is your preferred pharmacy and location of the pharmacy?.  [facility]     8. Who can we contact with further questions? the patient

## 2017-09-18 NOTE — PROGRESS NOTES
"Ezequiel Hughes presented for a  Medicare AWV and comprehensive Health Risk Assessment today. The following components were reviewed and updated:    · Medical history  · Family History  · Social history  · Allergies and Current Medications  · Health Risk Assessment  · Health Maintenance  · Care Team     ** See Completed Assessments for Annual Wellness Visit within the encounter summary.**       The following assessments were completed:  · Living Situation  · CAGE  · Depression Screening  · Timed Get Up and Go  · Whisper Test  · Cognitive Function Screening  · Nutrition Screening  · ADL Screening  · PAQ Screening    Vitals:    09/18/17 1328   BP: 128/78   BP Location: Left arm   Patient Position: Sitting   BP Method: Large (Manual)   Pulse: 76   SpO2: 98%   Weight: 89.9 kg (198 lb 3.1 oz)   Height: 5' 10" (1.778 m)     Body mass index is 28.44 kg/m².  Physical Exam   Constitutional: He appears well-developed and well-nourished. He is cooperative. No distress.   HENT:   Head: Normocephalic and atraumatic.   Eyes: Conjunctivae and EOM are normal. Right eye exhibits no discharge. Left eye exhibits no discharge.   Neck: Normal range of motion. Neck supple. No tracheal deviation present. No thyromegaly present.   Cardiovascular: Normal rate, regular rhythm and normal heart sounds.    No murmur heard.  Pulses:       Radial pulses are 2+ on the right side, and 2+ on the left side.   Pulmonary/Chest: Effort normal and breath sounds normal. No respiratory distress. He has no wheezes.   Abdominal: Soft. Bowel sounds are normal. He exhibits no distension. There is no tenderness. There is no rebound and no guarding.   Musculoskeletal: Normal range of motion. He exhibits no edema or tenderness.   Neurological: He displays no tremor. No cranial nerve deficit.   Grasp equal both hands,No tremors, or muscle fasciculations noted. Toes downgoing, Sensation intact to soft touch. Gait: No ataxia.    Skin: Skin is warm and dry. No rash noted. " He is not diaphoretic. No erythema.   Psychiatric: He has a normal mood and affect. His behavior is normal. Judgment and thought content normal.   Nursing note and vitals reviewed.        Diagnoses and health risks identified today and associated recommendations/orders:    1. Encounter for preventive health examination  Completed today    2. Thrombocytopenia  Stable. Follows Dr. Cates. Continue current treatment plan as previously prescribed with your hematologist. Appt 1/23/18    3. Prostate cancer  Stable. Follows Dr. Wong q3m. Continue current treatment plan as previously prescribed with your urologist.    4. Anemia, unspecified  Stable. Continue current treatment plan as previously prescribed with your PCP and hematology.    5. Mixed hyperlipidemia  Stable. On Lipitor. Continue current treatment plan as previously prescribed with your PCP.    6. Atherosclerosis of artery of both lower extremities  US LE 8/23/17- Normal triphasic arterial waveforms are present throughout the bilateral lower extremities with no focal elevated segmental velocities demonstrated to suggest stenosis greater than 50%.  Scattered calcified atherosclerotic plaque noted, most notably in the bilateral lower legs.. On Plavix and increased Lipitor 20 to 40mg.   Continue follow PCP and was referred to cardiology. Appt Dr. Thompson 11/17/2017    7. Essential hypertension  Stable. On Amlodipine. Continue current treatment plan as previously prescribed with your PCP.    8. Benign prostatic hyperplasia without lower urinary tract symptoms  Stable. On Finasteride. Continue current treatment plan as previously prescribed with your PCP and urology.    9. Chronic kidney disease, stage 3  Stable. Recommend increase water intake. Avoid NSAIDS. Continue current treatment plan as previously prescribed with your PCP.    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   Discussed need to continue control BP, lipids, glucose, diet/  exercise, avoid smoking. Follow PCP and cardiology.    11. Lumbosacral spondylosis without myelopathy  Lumbar xray 8/216- There is mild anterolisthesis of L4 on L5 and L5 on S1. Mild narrowing of the disc spaces and early marginal osteophyte formation is noted. No compression fractures or acute osseous findings. Facet arthropathy at L4-5 and L5-S1 and degenerative change in the SI joints.  Stable. Intermittent discomfort. No radiating pain. Continue current treatment plan as previously prescribed with your PCP.    12. Cataract of both eyes, unspecified cataract type  Stable. Continue current treatment plan as previously prescribed with your optometrist, Dr. JAY Vizcarra. Pt is due. Will make F/u. .    Provided Ezequiel with a 5-10 year written screening schedule and personal prevention plan. Recommendations were developed using the USPSTF age appropriate recommendations. Education, counseling, and referrals were provided as needed. After Visit Summary printed and given to patient which includes a list of additional screenings\tests needed.  Continue to follow with your PCP as scheduled 3/14/18 or sooner if necessary.     Renny Luu PA-C

## 2017-09-26 ENCOUNTER — TELEPHONE (OUTPATIENT)
Dept: CARDIOLOGY | Facility: CLINIC | Age: 77
End: 2017-09-26

## 2017-09-26 ENCOUNTER — HOSPITAL ENCOUNTER (EMERGENCY)
Facility: HOSPITAL | Age: 77
Discharge: HOME | End: 2017-09-26
Attending: EMERGENCY MEDICINE
Payer: MEDICARE

## 2017-09-26 VITALS
BODY MASS INDEX: 27.3 KG/M2 | HEART RATE: 72 BPM | HEIGHT: 71 IN | RESPIRATION RATE: 18 BRPM | DIASTOLIC BLOOD PRESSURE: 58 MMHG | TEMPERATURE: 99 F | WEIGHT: 195 LBS | SYSTOLIC BLOOD PRESSURE: 135 MMHG | OXYGEN SATURATION: 99 %

## 2017-09-26 DIAGNOSIS — J32.9 SINUSITIS, UNSPECIFIED CHRONICITY, UNSPECIFIED LOCATION: ICD-10-CM

## 2017-09-26 DIAGNOSIS — J40 BRONCHITIS: ICD-10-CM

## 2017-09-26 DIAGNOSIS — R05.9 COUGH: Primary | ICD-10-CM

## 2017-09-26 DIAGNOSIS — R07.9 CHEST PAIN: ICD-10-CM

## 2017-09-26 LAB
ALBUMIN SERPL BCP-MCNC: 4 G/DL
ALP SERPL-CCNC: 70 U/L
ALT SERPL W/O P-5'-P-CCNC: 18 U/L
ANION GAP SERPL CALC-SCNC: 11 MMOL/L
AST SERPL-CCNC: 16 U/L
BASOPHILS # BLD AUTO: 0.02 K/UL
BASOPHILS NFR BLD: 0.4 %
BILIRUB SERPL-MCNC: 1 MG/DL
BNP SERPL-MCNC: 35 PG/ML
BUN SERPL-MCNC: 23 MG/DL
CALCIUM SERPL-MCNC: 9.6 MG/DL
CHLORIDE SERPL-SCNC: 109 MMOL/L
CK MB SERPL-MCNC: 1.6 NG/ML
CK MB SERPL-RTO: 1.7 %
CK SERPL-CCNC: 92 U/L
CK SERPL-CCNC: 92 U/L
CO2 SERPL-SCNC: 21 MMOL/L
CREAT SERPL-MCNC: 1.4 MG/DL
DIFFERENTIAL METHOD: ABNORMAL
EOSINOPHIL # BLD AUTO: 0.1 K/UL
EOSINOPHIL NFR BLD: 0.9 %
ERYTHROCYTE [DISTWIDTH] IN BLOOD BY AUTOMATED COUNT: 12.6 %
EST. GFR  (AFRICAN AMERICAN): 55 ML/MIN/1.73 M^2
EST. GFR  (NON AFRICAN AMERICAN): 47 ML/MIN/1.73 M^2
GLUCOSE SERPL-MCNC: 134 MG/DL
HCT VFR BLD AUTO: 42 %
HGB BLD-MCNC: 14.5 G/DL
INR PPP: 1.2
LYMPHOCYTES # BLD AUTO: 0.5 K/UL
LYMPHOCYTES NFR BLD: 8.7 %
MCH RBC QN AUTO: 31.8 PG
MCHC RBC AUTO-ENTMCNC: 34.5 G/DL
MCV RBC AUTO: 92 FL
MONOCYTES # BLD AUTO: 0.4 K/UL
MONOCYTES NFR BLD: 7.1 %
NEUTROPHILS # BLD AUTO: 4.6 K/UL
NEUTROPHILS NFR BLD: 82.9 %
PLATELET # BLD AUTO: 136 K/UL
PMV BLD AUTO: 8.1 FL
POTASSIUM SERPL-SCNC: 4.4 MMOL/L
PROT SERPL-MCNC: 7.6 G/DL
PROTHROMBIN TIME: 12.1 SEC
RBC # BLD AUTO: 4.56 M/UL
SODIUM SERPL-SCNC: 141 MMOL/L
TROPONIN I SERPL DL<=0.01 NG/ML-MCNC: 0.02 NG/ML
WBC # BLD AUTO: 5.53 K/UL

## 2017-09-26 PROCEDURE — 93005 ELECTROCARDIOGRAM TRACING: CPT

## 2017-09-26 PROCEDURE — 82553 CREATINE MB FRACTION: CPT

## 2017-09-26 PROCEDURE — 85610 PROTHROMBIN TIME: CPT

## 2017-09-26 PROCEDURE — 80053 COMPREHEN METABOLIC PANEL: CPT

## 2017-09-26 PROCEDURE — 93010 ELECTROCARDIOGRAM REPORT: CPT | Mod: ,,, | Performed by: INTERNAL MEDICINE

## 2017-09-26 PROCEDURE — 99284 EMERGENCY DEPT VISIT MOD MDM: CPT

## 2017-09-26 PROCEDURE — 83880 ASSAY OF NATRIURETIC PEPTIDE: CPT

## 2017-09-26 PROCEDURE — 84484 ASSAY OF TROPONIN QUANT: CPT

## 2017-09-26 PROCEDURE — 85025 COMPLETE CBC W/AUTO DIFF WBC: CPT

## 2017-09-26 RX ORDER — AZITHROMYCIN 250 MG/1
250 TABLET, FILM COATED ORAL DAILY
Qty: 6 TABLET | Refills: 0 | Status: SHIPPED | OUTPATIENT
Start: 2017-09-26 | End: 2017-10-06

## 2017-09-26 RX ORDER — BENZONATATE 100 MG/1
100 CAPSULE ORAL 3 TIMES DAILY PRN
Qty: 20 CAPSULE | Refills: 0 | Status: SHIPPED | OUTPATIENT
Start: 2017-09-26 | End: 2017-10-06

## 2017-09-26 NOTE — TELEPHONE ENCOUNTER
Returned phone call to Zeeshan with [facility] regarding Mr. Hughes  Received copy of Ekgs today's and two for comparison (August 2017 and December 2015)  Dr. Thompson notified and sent ekg copies

## 2017-09-26 NOTE — TELEPHONE ENCOUNTER
----- Message from Gerald Melvin sent at 9/26/2017 12:31 PM CDT -----  Contact: [facility] (Zeeshan)  Caller needs to speak with nurse regarding pt. Please give Zeeshan a call at [phone]

## 2017-09-26 NOTE — ED NOTES
Patient reports being sent to ER by physician at Urgent Care to see Dr. Thompson for an abnormal EKG. Patient did not bring EKG from Urgent Care, but reports Dr. Thompson has seen the EKG.    Patient identifiers verified and correct for Ezequiel Hughes.    LOC: The patient is awake, alert and aware of environment with an appropriate affect, the patient is oriented x 3 and speaking appropriately.  APPEARANCE: Patient resting comfortably and in no acute distress, patient is clean and well groomed, patient's clothing is properly fastened.  SKIN: The skin is warm and dry, color consistent with ethnicity, patient has normal skin turgor and moist mucus membranes, skin intact, no breakdown or bruising noted.  MUSCULOSKELETAL: Patient moving all extremities spontaneously.  RESPIRATORY: Airway is open and patent, respirations are spontaneous.  CARDIAC: Patient has a normal rate, no periphreal edema noted, capillary refill < 3 seconds.  ABDOMEN: Soft and non tender to palpation.

## 2017-09-26 NOTE — ED PROVIDER NOTES
SCRIBE #1 NOTE: I, Shawanda Rivera, am scribing for, and in the presence of, Benton Trevino MD. I have scribed the HPI, ROS, and PEx.     SCRIBE #2 NOTE: I, Angela Dejesus, am scribing for, and in the presence of,  Braulio Valadez MD. I have scribed the remaining portions of the note not scribed by Scribe #1.     History      Chief Complaint   Patient presents with    other     had abnormal ekg today and was sent to ED from urgent care       Review of patient's allergies indicates:   Allergen Reactions    Mobic  [meloxicam]      Other reaction(s): hypertension        HPI   HPI    9/26/2017, 2:38 PM   History obtained from the patient      History of Present Illness: Ezequiel Hughes is a 77 y.o. male patient with PMHx of TIA who presents to the Emergency Department for abnormal EKG which onset gradually today. Symptoms are constant and moderate in severity. Urgent care consulted Dr. Thompson and sent pt to ED. Pt went to urgent care for cough and sore throat. Pt reports imaging shows NAF. No mitigating or exacerbating factors reported. No associated sxs. Patient denies any CP, leg swelling, SOB, fever, n/v/d, abd pain, HA, dizziness, weakness, numbness, and all other sxs at this time. No further complaints or concerns at this time.       Arrival mode: Personal vehicle     PCP: Valery Ozuna MD       Past Medical History:  Past Medical History:   Diagnosis Date    Allergic rhinitis     Anemia     Back pain     BPH (benign prostatic hyperplasia)     Cancer     skin cancer to neck, Dr. Graves    Cataract     Disorder of kidney and ureter     ED (erectile dysfunction)     Hiatal hernia     small    History of colon polyps     colonoscopy 11/2016    HLD (hyperlipidemia)     Hyperlipidemia     Hypertension     Lateral epicondylitis     OA (osteoarthritis)     Prostate cancer     Dr. Wong    TIA (transient ischemic attack)     Trouble in sleeping        Past Surgical History:  Past Surgical History:    Procedure Laterality Date    COLONOSCOPY N/A 11/14/2016    Procedure: COLONOSCOPY;  Surgeon: Karuna Rodriguez MD;  Location: [facility];  Service: Endoscopy;  Laterality: N/A;    HEMORRHOID SURGERY      KNEE ARTHROSCOPY W/ MENISCAL REPAIR Right 2015    Dr. Jain    PLANTAR FASCIA RELEASE      right    ROTATOR CUFF REPAIR Bilateral     bilateral    SHOULDER SURGERY Bilateral around 2000    Dr. Pepper.  rotator cuff surgeries         Family History:  Family History   Problem Relation Age of Onset    Lung cancer Father      life long smoker    Cancer Father      prostate, lung    Stroke Sister      TIA    Cancer Brother      prostate    Melanoma Neg Hx     Psoriasis Neg Hx     Lupus Neg Hx     Eczema Neg Hx     Diabetes Neg Hx     Heart disease Neg Hx     Kidney disease Neg Hx     Colon cancer Neg Hx        Social History:  Social History     Social History Main Topics    Smoking status: Former Smoker     Packs/day: 3.00     Years: 20.00     Types: Cigarettes     Quit date: 7/23/1985    Smokeless tobacco: Never Used    Alcohol use 4.2 oz/week     6 Cans of beer, 1 Standard drinks or equivalent per week      Comment: socially    Drug use: No    Sexual activity: No       ROS   Review of Systems   Constitutional: Negative for fever.   HENT: Negative for sore throat.    Respiratory: Negative for shortness of breath.    Cardiovascular: Negative for chest pain and leg swelling.        (+) abnormal EKG   Gastrointestinal: Negative for abdominal pain, blood in stool, constipation, diarrhea, nausea and vomiting.   Genitourinary: Negative for decreased urine volume, difficulty urinating, dysuria, flank pain and hematuria.   Musculoskeletal: Negative for back pain.   Skin: Negative for rash.   Neurological: Negative for dizziness, weakness, light-headedness, numbness and headaches.   Hematological: Does not bruise/bleed easily.       Physical Exam      Initial Vitals [09/26/17 1402]   BP Pulse Resp  "Temp SpO2   (!) 141/75 83 18 98.6 °F (37 °C) 97 %      MAP       97          Physical Exam  Nursing Notes and Vital Signs Reviewed.  Constitutional: Patient is in no acute distress. Well-developed and well-nourished.  Head: Atraumatic. Normocephalic.  Eyes: PERRL. EOM intact. Conjunctivae are not pale. No scleral icterus.  ENT: Mucous membranes are moist. Oropharynx is clear and symmetric.    Neck: Supple. Full ROM. No lymphadenopathy.  Cardiovascular: Regular rate. Regular rhythm. No murmurs, rubs, or gallops. Distal pulses are 2+ and symmetric.  Pulmonary/Chest: No respiratory distress. Clear to auscultation bilaterally. No wheezing, rales, or rhonchi.  Abdominal: Soft and non-distended.  There is no tenderness.  No rebound, guarding, or rigidity. Good bowel sounds.  Musculoskeletal: Moves all extremities. No obvious deformities. No edema. No calf tenderness.  Skin: Warm and dry.  Neurological:  Alert, awake, and appropriate.  Normal speech.  No acute focal neurological deficits are appreciated.  Psychiatric: Normal affect. Good eye contact. Appropriate in content.    ED Course    Procedures  ED Vital Signs:  Vitals:    09/26/17 1402 09/26/17 1459   BP: (!) 141/75    Pulse: 83 83   Resp: 18    Temp: 98.6 °F (37 °C)    TempSrc: Oral    SpO2: 97%    Weight: 88.5 kg (195 lb)    Height: 5' 10.5" (1.791 m)        Abnormal Lab Results:  Labs Reviewed   CBC W/ AUTO DIFFERENTIAL - Abnormal; Notable for the following:        Result Value    RBC 4.56 (*)     MCH 31.8 (*)     Platelets 136 (*)     MPV 8.1 (*)     Lymph # 0.5 (*)     Gran% 82.9 (*)     Lymph% 8.7 (*)     All other components within normal limits   COMPREHENSIVE METABOLIC PANEL - Abnormal; Notable for the following:     CO2 21 (*)     Glucose 134 (*)     eGFR if  55 (*)     eGFR if non  47 (*)     All other components within normal limits   TROPONIN I   PROTIME-INR   B-TYPE NATRIURETIC PEPTIDE   CK   CK-MB        All Lab " Results:  Results for orders placed or performed during the hospital encounter of 09/26/17   CBC auto differential   Result Value Ref Range    WBC 5.53 3.90 - 12.70 K/uL    RBC 4.56 (L) 4.60 - 6.20 M/uL    Hemoglobin 14.5 14.0 - 18.0 g/dL    Hematocrit 42.0 40.0 - 54.0 %    MCV 92 82 - 98 fL    MCH 31.8 (H) 27.0 - 31.0 pg    MCHC 34.5 32.0 - 36.0 g/dL    RDW 12.6 11.5 - 14.5 %    Platelets 136 (L) 150 - 350 K/uL    MPV 8.1 (L) 9.2 - 12.9 fL    Gran # 4.6 1.8 - 7.7 K/uL    Lymph # 0.5 (L) 1.0 - 4.8 K/uL    Mono # 0.4 0.3 - 1.0 K/uL    Eos # 0.1 0.0 - 0.5 K/uL    Baso # 0.02 0.00 - 0.20 K/uL    Gran% 82.9 (H) 38.0 - 73.0 %    Lymph% 8.7 (L) 18.0 - 48.0 %    Mono% 7.1 4.0 - 15.0 %    Eosinophil% 0.9 0.0 - 8.0 %    Basophil% 0.4 0.0 - 1.9 %    Differential Method Automated    Comprehensive metabolic panel   Result Value Ref Range    Sodium 141 136 - 145 mmol/L    Potassium 4.4 3.5 - 5.1 mmol/L    Chloride 109 95 - 110 mmol/L    CO2 21 (L) 23 - 29 mmol/L    Glucose 134 (H) 70 - 110 mg/dL    BUN, Bld 23 8 - 23 mg/dL    Creatinine 1.4 0.5 - 1.4 mg/dL    Calcium 9.6 8.7 - 10.5 mg/dL    Total Protein 7.6 6.0 - 8.4 g/dL    Albumin 4.0 3.5 - 5.2 g/dL    Total Bilirubin 1.0 0.1 - 1.0 mg/dL    Alkaline Phosphatase 70 55 - 135 U/L    AST 16 10 - 40 U/L    ALT 18 10 - 44 U/L    Anion Gap 11 8 - 16 mmol/L    eGFR if African American 55 (A) >60 mL/min/1.73 m^2    eGFR if non African American 47 (A) >60 mL/min/1.73 m^2   Troponin I   Result Value Ref Range    Troponin I 0.019 0.000 - 0.026 ng/mL   Protime-INR   Result Value Ref Range    Prothrombin Time 12.1 9.0 - 12.5 sec    INR 1.2 0.8 - 1.2   Brain natriuretic peptide   Result Value Ref Range    BNP 35 0 - 99 pg/mL   CK   Result Value Ref Range    CPK 92 20 - 200 U/L   CK-MB   Result Value Ref Range    CPK 92 20 - 200 U/L    CPK MB 1.6 0.1 - 6.5 ng/mL    MB% 1.7 0.0 - 5.0 %         Imaging Results:  Imaging Results          X-Ray Chest 1 View (Final result)  Result time 09/26/17  16:34:34    Final result by Jaya Howell MD (09/26/17 16:34:34)                 Impression:       No acute process seen.      Electronically signed by: JAYA HOWELL MD  Date:     09/26/17  Time:    16:34              Narrative:    Clinical Data:Abnormal EKG    Comparison:  2/2/17    Findings:  Single view of the chest.   Aorta demonstrates atherosclerotic disease.    Cardiac silhouette is normal.  The lungs demonstrate no evidence of active disease.  No evidence of pleural effusion or pneumothorax.  Bones demonstrate degenerative changes.                             The EKG was ordered, reviewed, and independently interpreted by the ED provider.  Interpretation time: 1427  Rate: 81 BPM  Rhythm: normal sinus rhythm  Interpretation: left axis deviation. Incomplete right bundle branch block.. No STEMI.         The Emergency Provider reviewed the vital signs and test results, which are outlined above.    ED Discussion       4:00 PM: Dr. Trevino transfers care of pt to Dr. Valadez, pending lab and imaging results.    4:07 PM: Re-evaluated pt. Pt is resting comfortably and is in no acute distress. Pt only c/o cough and denies any CP or SOB.  Pt states that he has an upcoming appointment with Dr. Thompson, cardiologist. D/w pt any concerns expressed at this time. Answered all questions. Pt expresses understanding at this time.    4:28 PM: Dr. Rory MD discussed the pt's case with Dr. Thompson (Cardiologist) who recommends d/c to home and pt should attend scheduled appointment as out pt.    4:42 PM: Reassessed pt at this time. Discussed with pt all pertinent ED information and results. Discussed pt dx  and plan of tx. Gave pt all f/u and return to the ED instructions. All questions and concerns were addressed at this time. Pt expresses understanding of information and instructions, and is comfortable with plan to discharge. Pt is stable for discharge.        ED Medication(s):  Medications - No data to display    New  Prescriptions    No medications on file             Medical Decision Making    Medical Decision Making:   Clinical Tests:   Lab Tests: Ordered and Reviewed  Radiological Study: Reviewed and Ordered  Medical Tests: Ordered and Reviewed           Scribe Attestation:   Scribe #1: I performed the above scribed service and the documentation accurately describes the services I performed. I attest to the accuracy of the note.    Attending:   Physician Attestation Statement for Scribe #1: I, Benton Trevino MD, personally performed the services described in this documentation, as scribed by Shawanda Rivera, in my presence, and it is both accurate and complete.       Scribe Attestation:   Scribe #2: I performed the above scribed service and the documentation accurately describes the services I performed. I attest to the accuracy of the note.    Attending Attestation:           Physician Attestation for Scribe:    Physician Attestation Statement for Scribe #2: I, Braulio Valadez MD, reviewed documentation, as scribed by Angela Dejesus in my presence, and it is both accurate and complete. I also acknowledge and confirm the content of the note done by Scribe #1.          Clinical Impression       ICD-10-CM ICD-9-CM   1. Cough R05 786.2   2. Chest pain R07.9 786.50   3. Bronchitis J40 490   4. Sinusitis, unspecified chronicity, unspecified location J32.9 473.9       Disposition:   Disposition: Discharged  Condition: Stable         Braulio Valadez Jr., MD  09/27/17 0004

## 2017-09-28 ENCOUNTER — OFFICE VISIT (OUTPATIENT)
Dept: OPHTHALMOLOGY | Facility: CLINIC | Age: 77
End: 2017-09-28
Payer: MEDICARE

## 2017-09-28 DIAGNOSIS — H40.003 GLAUCOMA SUSPECT, BILATERAL: ICD-10-CM

## 2017-09-28 PROCEDURE — 92014 COMPRE OPH EXAM EST PT 1/>: CPT | Mod: S$GLB,,, | Performed by: OPTOMETRIST

## 2017-09-28 PROCEDURE — 99999 PR PBB SHADOW E&M-EST. PATIENT-LVL II: CPT | Mod: PBBFAC,,, | Performed by: OPTOMETRIST

## 2017-09-28 NOTE — PROGRESS NOTES
HPI     Yearly Eye Exam  Problem with near [place], feels cataract getting worse  No CL x 8-9 months  [facility] patient    Last edited by Nereida Smith on 9/28/2017  8:55 AM. (History)            Assessment /Plan     For exam results, see Encounter Report.    Cataract, nuclear, bilateral    Glaucoma suspect, bilateral      Cataract(s)  affecting activities of daily living and patient consents to surgery consult.  We briefly discussed premium IOLs options and the information on pricing was given to the patient. Patient was very comfortable with monovision contacts, and is inclined to elect a monovision for his post-op vision.  Intraocular pressure higher today, which could be phacomorphic in origin.  Patient referred to Dr. Javier for surgery consult.

## 2017-10-04 ENCOUNTER — TELEPHONE (OUTPATIENT)
Dept: OPHTHALMOLOGY | Facility: CLINIC | Age: 77
End: 2017-10-04

## 2017-10-04 NOTE — TELEPHONE ENCOUNTER
----- Message from Sherri Osborne sent at 10/4/2017  1:53 PM CDT -----  Contact: Pt  Pt called and stated he needed to speak to the nurse. He stated that he had questions about his last visit. He can be reached at [phone].    Thanks,  Tf

## 2017-10-05 ENCOUNTER — IMMUNIZATION (OUTPATIENT)
Dept: INTERNAL MEDICINE | Facility: CLINIC | Age: 77
End: 2017-10-05
Payer: MEDICARE

## 2017-10-05 PROCEDURE — G0008 ADMIN INFLUENZA VIRUS VAC: HCPCS | Mod: S$GLB,,, | Performed by: FAMILY MEDICINE

## 2017-10-05 PROCEDURE — 90662 IIV NO PRSV INCREASED AG IM: CPT | Mod: S$GLB,,, | Performed by: FAMILY MEDICINE

## 2017-10-06 ENCOUNTER — INITIAL CONSULT (OUTPATIENT)
Dept: CARDIOLOGY | Facility: CLINIC | Age: 77
End: 2017-10-06
Payer: MEDICARE

## 2017-10-06 VITALS
SYSTOLIC BLOOD PRESSURE: 132 MMHG | BODY MASS INDEX: 27.66 KG/M2 | WEIGHT: 197.56 LBS | HEART RATE: 72 BPM | DIASTOLIC BLOOD PRESSURE: 60 MMHG | HEIGHT: 71 IN

## 2017-10-06 DIAGNOSIS — N40.0 BENIGN PROSTATIC HYPERPLASIA WITHOUT LOWER URINARY TRACT SYMPTOMS: ICD-10-CM

## 2017-10-06 DIAGNOSIS — D64.9 ANEMIA, UNSPECIFIED TYPE: ICD-10-CM

## 2017-10-06 DIAGNOSIS — I70.203 ATHEROSCLEROSIS OF ARTERY OF BOTH LOWER EXTREMITIES: Primary | ICD-10-CM

## 2017-10-06 DIAGNOSIS — N18.30 CHRONIC KIDNEY DISEASE, STAGE 3: ICD-10-CM

## 2017-10-06 DIAGNOSIS — I77.1 TORTUOUS AORTA: ICD-10-CM

## 2017-10-06 DIAGNOSIS — C61 PROSTATE CANCER: ICD-10-CM

## 2017-10-06 DIAGNOSIS — R07.9 CHEST PAIN SYNDROME: ICD-10-CM

## 2017-10-06 DIAGNOSIS — E78.2 MIXED HYPERLIPIDEMIA: Chronic | ICD-10-CM

## 2017-10-06 DIAGNOSIS — M47.817 LUMBOSACRAL SPONDYLOSIS WITHOUT MYELOPATHY: ICD-10-CM

## 2017-10-06 DIAGNOSIS — I10 ESSENTIAL HYPERTENSION: Chronic | ICD-10-CM

## 2017-10-06 DIAGNOSIS — I45.10 INCOMPLETE RIGHT BUNDLE BRANCH BLOCK: ICD-10-CM

## 2017-10-06 DIAGNOSIS — D69.6 THROMBOCYTOPENIA: ICD-10-CM

## 2017-10-06 PROCEDURE — 99499 UNLISTED E&M SERVICE: CPT | Mod: S$GLB,,, | Performed by: INTERNAL MEDICINE

## 2017-10-06 PROCEDURE — 99204 OFFICE O/P NEW MOD 45 MIN: CPT | Mod: S$GLB,,, | Performed by: INTERNAL MEDICINE

## 2017-10-06 PROCEDURE — 99999 PR PBB SHADOW E&M-EST. PATIENT-LVL III: CPT | Mod: PBBFAC,,, | Performed by: INTERNAL MEDICINE

## 2017-10-06 NOTE — PROGRESS NOTES
Subjective:   Patient ID:  Ezequiel Hughes is a 77 y.o. male who presents for evaluation of Chest Pain      HPI  A 78 yo male with h/o htn hlp oa exsmoker tia (2 months ago had htn nausea slurred speech negative w/u) is here for evaluation of pvd he has leg pain but no definite claudication has also knee pain while standing. He also has been complaining of  Cold like symptoms manifested as cough was seen in after hours and in er tretaed with antibiotics he still complains of chest tightness occurring at rest and while walking. He is on his elliptical w/o any worsening symptoms. His vascular studies doid not show any critical disease.his htn is usually controlled but now is a little elEvated when he got his uri. Ha SNO CHF SYMPTOMS. HE FEELS HE IS NOT ABLE TO DO MUCHA DN HIS EXERCISE TOLERANCE IS DECREASED. HIS COLD NOT IMPROVING FAST ENOUGH. NO PLAPITATION SYNCOPE NEAR SYNCOPE OR BLEEDING.LIPIDS ON TARGET  Past Medical History:   Diagnosis Date    Allergic rhinitis     Anemia     Back pain     BPH (benign prostatic hyperplasia)     Cancer     skin cancer to neck, Dr. Graves    Cataract     Disorder of kidney and ureter     ED (erectile dysfunction)     Hiatal hernia     small    History of colon polyps     colonoscopy 11/2016    HLD (hyperlipidemia)     Hyperlipidemia     Hypertension     Lateral epicondylitis     OA (osteoarthritis)     Prostate cancer     Dr. Wong    TIA (transient ischemic attack)     Trouble in sleeping        Past Surgical History:   Procedure Laterality Date    COLONOSCOPY N/A 11/14/2016    Procedure: COLONOSCOPY;  Surgeon: Karuna Rodriguez MD;  Location: [facility];  Service: Endoscopy;  Laterality: N/A;    HEMORRHOID SURGERY      KNEE ARTHROSCOPY W/ MENISCAL REPAIR Right 2015    Dr. Jain    PLANTAR FASCIA RELEASE      right    ROTATOR CUFF REPAIR Bilateral     bilateral    SHOULDER SURGERY Bilateral around 2000    Dr. Pepper.  rotator cuff surgeries        Social History   Substance Use Topics    Smoking status: Former Smoker     Packs/day: 3.00     Years: 20.00     Types: Cigarettes     Quit date: 7/23/1985    Smokeless tobacco: Never Used    Alcohol use 4.2 oz/week     6 Cans of beer, 1 Standard drinks or equivalent per week      Comment: socially       Family History   Problem Relation Age of Onset    Lung cancer Father      life long smoker    Cancer Father      prostate, lung    Stroke Sister      TIA    Cataracts Sister     Cancer Brother      prostate    Cataracts Brother     Cataracts Sister     Melanoma Neg Hx     Psoriasis Neg Hx     Lupus Neg Hx     Eczema Neg Hx     Diabetes Neg Hx     Heart disease Neg Hx     Kidney disease Neg Hx     Colon cancer Neg Hx        Current Outpatient Prescriptions   Medication Sig    amlodipine (NORVASC) 10 MG tablet Take 1 tablet (10 mg total) by mouth once daily.    atorvastatin (LIPITOR) 40 MG tablet Take 1 tablet (40 mg total) by mouth once daily.    clopidogrel (PLAVIX) 75 mg tablet Take 1 tablet (75 mg total) by mouth once daily.    finasteride (PROSCAR) 5 mg tablet     CIALIS 5 mg tablet      No current facility-administered medications for this visit.      Current Outpatient Prescriptions on File Prior to Visit   Medication Sig    amlodipine (NORVASC) 10 MG tablet Take 1 tablet (10 mg total) by mouth once daily.    atorvastatin (LIPITOR) 40 MG tablet Take 1 tablet (40 mg total) by mouth once daily.    clopidogrel (PLAVIX) 75 mg tablet Take 1 tablet (75 mg total) by mouth once daily.    finasteride (PROSCAR) 5 mg tablet     CIALIS 5 mg tablet     [DISCONTINUED] azithromycin (Z-AMDDIE) 250 MG tablet Take 1 tablet (250 mg total) by mouth once daily. Take first 2 tablets together, then 1 every day until finished.    [DISCONTINUED] benzonatate (TESSALON) 100 MG capsule Take 1 capsule (100 mg total) by mouth 3 (three) times daily as needed.     No current facility-administered medications on  file prior to visit.        Review of patient's allergies indicates:   Allergen Reactions    Mobic  [meloxicam]      Other reaction(s): hypertension       Review of Systems   Constitution: Negative for weakness and malaise/fatigue.   Eyes: Negative for blurred vision.   Cardiovascular: Negative for chest pain, claudication, cyanosis, dyspnea on exertion, irregular heartbeat, leg swelling, near-syncope, orthopnea, palpitations and paroxysmal nocturnal dyspnea.   Respiratory: Positive for cough and sleep disturbances due to breathing. Negative for hemoptysis and shortness of breath.    Hematologic/Lymphatic: Negative for bleeding problem. Does not bruise/bleed easily.   Skin: Negative for dry skin and itching.   Musculoskeletal: Positive for arthritis, back pain and joint pain. Negative for falls, muscle weakness and myalgias.   Gastrointestinal: Negative for abdominal pain, diarrhea, heartburn, hematemesis, hematochezia and melena.   Genitourinary: Negative for flank pain and hematuria.   Neurological: Negative for dizziness, focal weakness, headaches, light-headedness, numbness, paresthesias and seizures.   Psychiatric/Behavioral: Negative for altered mental status and memory loss. The patient is not nervous/anxious.    Allergic/Immunologic: Negative for hives.       Objective:   Physical Exam   Constitutional: He is oriented to person, place, and time. He appears well-developed and well-nourished. No distress.   HENT:   Head: Normocephalic and atraumatic.   Eyes: EOM are normal. Pupils are equal, round, and reactive to light. Right eye exhibits no discharge. Left eye exhibits no discharge.   Neck: Neck supple. No JVD present. No thyromegaly present.   Cardiovascular: Normal rate, regular rhythm, normal heart sounds and intact distal pulses.  Exam reveals no gallop and no friction rub.    No murmur heard.  HAS AORTIC SCLEROSIS MURMUR G2/6   Pulmonary/Chest: Effort normal and breath sounds normal. No respiratory  "distress. He has no wheezes. He has no rales. He exhibits no tenderness.   Abdominal: Soft. Bowel sounds are normal. He exhibits no distension. There is no tenderness.   Musculoskeletal: Normal range of motion. He exhibits no edema.   Neurological: He is alert and oriented to person, place, and time. No cranial nerve deficit.   Skin: Skin is warm and dry. No rash noted. He is not diaphoretic. No erythema.   Psychiatric: He has a normal mood and affect. His behavior is normal.   Nursing note and vitals reviewed.    Vitals:    10/06/17 0820   BP: 132/60   Pulse: 72   Weight: 89.6 kg (197 lb 8.5 oz)   Height: 5' 10.5" (1.791 m)     Lab Results   Component Value Date    CHOL 132 09/07/2017    CHOL 157 03/07/2017    CHOL 173 09/01/2016     Lab Results   Component Value Date    HDL 62 09/07/2017    HDL 52 03/07/2017    HDL 56 09/01/2016     Lab Results   Component Value Date    LDLCALC 60.0 (L) 09/07/2017    LDLCALC 89.6 03/07/2017    LDLCALC 102.8 09/01/2016     Lab Results   Component Value Date    TRIG 50 09/07/2017    TRIG 77 03/07/2017    TRIG 71 09/01/2016     Lab Results   Component Value Date    CHOLHDL 47.0 09/07/2017    CHOLHDL 33.1 03/07/2017    CHOLHDL 32.4 09/01/2016       Chemistry        Component Value Date/Time     09/26/2017 1503    K 4.4 09/26/2017 1503     09/26/2017 1503    CO2 21 (L) 09/26/2017 1503    BUN 23 09/26/2017 1503    CREATININE 1.4 09/26/2017 1503     (H) 09/26/2017 1503        Component Value Date/Time    CALCIUM 9.6 09/26/2017 1503    ALKPHOS 70 09/26/2017 1503    AST 16 09/26/2017 1503    ALT 18 09/26/2017 1503    BILITOT 1.0 09/26/2017 1503    ESTGFRAFRICA 55 (A) 09/26/2017 1503    EGFRNONAA 47 (A) 09/26/2017 1503        No results found for: LABA1C, HGBA1C    Lab Results   Component Value Date    TSH 1.909 09/07/2017     Lab Results   Component Value Date    INR 1.2 09/26/2017    INR 1.2 09/03/2015     Lab Results   Component Value Date    WBC 5.53 09/26/2017    HGB " 14.5 09/26/2017    HCT 42.0 09/26/2017    MCV 92 09/26/2017     (L) 09/26/2017     BNP  @LABRCNTIP(BNP,BNPTRIAGEBLO)@  CrCl cannot be calculated (Patient's most recent lab result is older than the maximum 7 days allowed.).   REEVIEWED ER VISIT AT [facility] AND [facility] AND ALL ORDERED TESTS  Assessment:     1. Essential hypertension    2. Mixed hyperlipidemia    3. Lumbosacral spondylosis without myelopathy    4. Benign prostatic hyperplasia without lower urinary tract symptoms    5. Thrombocytopenia    6. Anemia, unspecified type    7. Chronic kidney disease, stage 3    8. Incomplete right bundle branch block    9. Tortuous aorta    10. Prostate cancer    11. Atherosclerosis of artery of both lower extremities    12. Chest pain syndrome      HAS ASYMPTOMATIC PVD LIPIDS ON TARGET ANTIPLATELET ON BOARD   SYMPTOMS OF URI ARE LINGUERING  BUT NO DEFINITE ANGINA   HAS RBBB CHRONIC WILL NEED TO ASSESS FOR PULMONARY HTN   ISCHEMIA NEEDS TO BE R/O SINCE HE IS A SET UP FOR IT DUE TO HIS RISK FACTORS.  Plan:   PROFILE BP  LOW SALT DIET   CONTINUE CURRENT THERAPY  ANTIPLATELET AS HE IS DOING  NEEDS BASELINE NOEMI   WILL OBTAIN STRESS CARDIOLITE   PHONE REVIEW AND DECIDE F/U

## 2017-10-06 NOTE — LETTER
October 6, 2017      Valery Ozuna MD  [address]  [address]  [place] [address]           [facility] - Vascular Cardiology  [address]  [place] [address]  Phone: [phone]  Fax: [phone]          Patient: Ezequiel Hughes   MR Number: [id number]   YOB: 1940   Date of Visit: 10/6/2017       Dear Dr. Valery Ozuna:    Thank you for referring Ezequiel Hughes to me for evaluation. Attached you will find relevant portions of my assessment and plan of care.    If you have questions, please do not hesitate to call me. I look forward to following Ezequiel Hughes along with you.    Sincerely,    Aparna Thompson MD    Enclosure  CC:  No Recipients    If you would like to receive this communication electronically, please contact [email or web address] or [phone] to request more information on [employer] Link access.    For providers and/or their staff who would like to refer a patient to [facility], please contact us through our one-stop-shop provider referral line, [facility], at [phone].    If you feel you have received this communication in error or would no longer like to receive these types of communications, please e-mail [email or web address]

## 2017-10-10 ENCOUNTER — CLINICAL SUPPORT (OUTPATIENT)
Dept: CARDIOLOGY | Facility: CLINIC | Age: 77
End: 2017-10-10
Payer: MEDICARE

## 2017-10-10 ENCOUNTER — HOSPITAL ENCOUNTER (OUTPATIENT)
Dept: RADIOLOGY | Facility: HOSPITAL | Age: 77
Discharge: HOME | End: 2017-10-10
Attending: INTERNAL MEDICINE
Payer: MEDICARE

## 2017-10-10 DIAGNOSIS — R07.9 CHEST PAIN SYNDROME: ICD-10-CM

## 2017-10-10 DIAGNOSIS — I70.203 ATHEROSCLEROSIS OF ARTERY OF BOTH LOWER EXTREMITIES: ICD-10-CM

## 2017-10-10 LAB
DIASTOLIC DYSFUNCTION: NO
VASCULAR ANKLE BRACHIAL INDEX (ABI) LEFT: 1.33 (ref 0.9–1.2)

## 2017-10-10 PROCEDURE — 78452 HT MUSCLE IMAGE SPECT MULT: CPT | Mod: TC,PO

## 2017-10-10 PROCEDURE — A9502 TC99M TETROFOSMIN: HCPCS | Mod: PO

## 2017-10-10 PROCEDURE — 93015 CV STRESS TEST SUPVJ I&R: CPT | Mod: S$GLB,,, | Performed by: INTERNAL MEDICINE

## 2017-10-10 PROCEDURE — 93922 UPR/L XTREMITY ART 2 LEVELS: CPT | Mod: S$GLB,,, | Performed by: INTERNAL MEDICINE

## 2017-10-10 PROCEDURE — 78452 HT MUSCLE IMAGE SPECT MULT: CPT | Mod: 26,,, | Performed by: INTERNAL MEDICINE

## 2017-10-12 ENCOUNTER — TELEPHONE (OUTPATIENT)
Dept: CARDIOLOGY | Facility: CLINIC | Age: 77
End: 2017-10-12

## 2017-10-12 NOTE — TELEPHONE ENCOUNTER
----- Message from Allyn Tejada sent at 10/12/2017  3:28 PM CDT -----  Would like to speak to nurse about results. Please call back at [phone]. thanks

## 2017-11-10 ENCOUNTER — TELEPHONE (OUTPATIENT)
Dept: INTERNAL MEDICINE | Facility: CLINIC | Age: 77
End: 2017-11-10

## 2017-11-10 NOTE — TELEPHONE ENCOUNTER
dont think he needs another specialist. Nuclear stress test neg. abis at baseline show good blood flow even though on cholesterol and plavix. Needs to give the medications time to work further and reduce the over all cholesterol. Can use the stockings. Needing to maintain his BP. If starts to worsen with leg pain in next 1-2 months with activity would let dr monroy know so additional tests can be done. At now, looks like they are trying to avoid any invasive tests such as heart cath / dye studies as symptoms are not severe yet and on maximum medical management for now.

## 2017-11-10 NOTE — TELEPHONE ENCOUNTER
----- Message from Elin Crespo sent at 11/10/2017  9:36 AM CST -----  Contact: patient  Calling concerning having issues with leg pain. Please call patient @ [phone]. Thanks, gabriela

## 2017-11-10 NOTE — TELEPHONE ENCOUNTER
Pt says, he has a stress test and seen Dr. Thompson for leg pain, but he doesn't think he had a good follow up for this. He still had pain from his knees to his feet and occasional swelling if he's on feet a lot. He has more pain in the rt leg than the left. He did buy compression stockings and they help with the swelling some. He wants to know if he should see another specialist?

## 2017-12-06 ENCOUNTER — TELEPHONE (OUTPATIENT)
Dept: CARDIOLOGY | Facility: CLINIC | Age: 77
End: 2017-12-06

## 2017-12-06 NOTE — TELEPHONE ENCOUNTER
----- Message from Faith Caldwell sent at 12/6/2017 10:00 AM CST -----  Contact: pt  He's calling to see if he can get a sooner appointment than 1/05/18, please advise [phone] (home)

## 2017-12-07 ENCOUNTER — OFFICE VISIT (OUTPATIENT)
Dept: CARDIOLOGY | Facility: CLINIC | Age: 77
End: 2017-12-07
Payer: MEDICARE

## 2017-12-07 ENCOUNTER — OFFICE VISIT (OUTPATIENT)
Dept: PULMONOLOGY | Facility: CLINIC | Age: 77
End: 2017-12-07
Payer: MEDICARE

## 2017-12-07 VITALS
SYSTOLIC BLOOD PRESSURE: 140 MMHG | DIASTOLIC BLOOD PRESSURE: 68 MMHG | BODY MASS INDEX: 27.64 KG/M2 | HEART RATE: 80 BPM | WEIGHT: 197.44 LBS | HEIGHT: 71 IN

## 2017-12-07 VITALS
RESPIRATION RATE: 15 BRPM | WEIGHT: 198.19 LBS | OXYGEN SATURATION: 97 % | DIASTOLIC BLOOD PRESSURE: 78 MMHG | BODY MASS INDEX: 28.37 KG/M2 | HEIGHT: 70 IN | SYSTOLIC BLOOD PRESSURE: 148 MMHG | HEART RATE: 71 BPM

## 2017-12-07 DIAGNOSIS — R60.0 LOCALIZED EDEMA: ICD-10-CM

## 2017-12-07 DIAGNOSIS — I10 ESSENTIAL HYPERTENSION: Primary | Chronic | ICD-10-CM

## 2017-12-07 DIAGNOSIS — J84.10 LUNG GRANULOMA: ICD-10-CM

## 2017-12-07 DIAGNOSIS — I70.203 ATHEROSCLEROSIS OF ARTERY OF BOTH LOWER EXTREMITIES: ICD-10-CM

## 2017-12-07 DIAGNOSIS — E78.2 MIXED HYPERLIPIDEMIA: Chronic | ICD-10-CM

## 2017-12-07 DIAGNOSIS — I77.1 TORTUOUS AORTA: ICD-10-CM

## 2017-12-07 DIAGNOSIS — I10 ESSENTIAL HYPERTENSION: ICD-10-CM

## 2017-12-07 DIAGNOSIS — G47.33 OSA (OBSTRUCTIVE SLEEP APNEA): Primary | ICD-10-CM

## 2017-12-07 DIAGNOSIS — M79.89 LEG SWELLING: ICD-10-CM

## 2017-12-07 PROCEDURE — 99205 OFFICE O/P NEW HI 60 MIN: CPT | Mod: S$GLB,,, | Performed by: INTERNAL MEDICINE

## 2017-12-07 PROCEDURE — 99999 PR PBB SHADOW E&M-EST. PATIENT-LVL III: CPT | Mod: PBBFAC,,, | Performed by: INTERNAL MEDICINE

## 2017-12-07 PROCEDURE — 99499 UNLISTED E&M SERVICE: CPT | Mod: S$GLB,,, | Performed by: INTERNAL MEDICINE

## 2017-12-07 PROCEDURE — 99214 OFFICE O/P EST MOD 30 MIN: CPT | Mod: S$GLB,,, | Performed by: INTERNAL MEDICINE

## 2017-12-07 RX ORDER — FLUTICASONE PROPIONATE 50 MCG
1 SPRAY, SUSPENSION (ML) NASAL DAILY PRN
COMMUNITY
Start: 2017-11-14 | End: 2018-05-09 | Stop reason: SDUPTHER

## 2017-12-07 RX ORDER — FUROSEMIDE 20 MG/1
20 TABLET ORAL DAILY
Qty: 30 TABLET | Refills: 11 | Status: SHIPPED | OUTPATIENT
Start: 2017-12-07 | End: 2017-12-07 | Stop reason: CLARIF

## 2017-12-07 RX ORDER — LOSARTAN POTASSIUM AND HYDROCHLOROTHIAZIDE 12.5; 5 MG/1; MG/1
1 TABLET ORAL DAILY
Qty: 90 TABLET | Refills: 3 | Status: SHIPPED | OUTPATIENT
Start: 2017-12-07 | End: 2018-02-19 | Stop reason: SDUPTHER

## 2017-12-07 NOTE — LETTER
December 7, 2017      Aparna Thompson MD  [address] [place] [address]           [facility] - Pulmonary Services  [address] [place] [address]  Phone: [phone]  Fax: [phone]          Patient: Ezequiel Hughes   MR Number: [id number]   YOB: 1940   Date of Visit: 12/7/2017       Dear Dr. Aparna Thompson:    Thank you for referring Ezequiel Hughes to me for evaluation. Attached you will find relevant portions of my assessment and plan of care.    If you have questions, please do not hesitate to call me. I look forward to following Ezequiel Hughes along with you.    Sincerely,    Tawanda Riley MD    Enclosure  CC:  No Recipients    If you would like to receive this communication electronically, please contact [email or web address] or [phone] to request more information on [employer] Link access.    For providers and/or their staff who would like to refer a patient to [facility], please contact us through our one-stop-shop provider referral line, [facility], at [phone].    If you feel you have received this communication in error or would no longer like to receive these types of communications, please e-mail [email or web address]

## 2017-12-07 NOTE — PROGRESS NOTES
Subjective:   Patient ID:  Ezequiel Hughes is a 77 y.o. male who presents for follow up of Edema; Shortness of Breath; Hypertension; and Hyperlipidemia      HPI  A 78 yo male with h/o htn hlp is here f0r f/u pvd . Has noticed the leg swelling that gets worse at the end of the day. Leg elevation helps wearing stocking helps he exercises regularily . Has negative stress test,. Still short of breath withe xertion has snoring he needs a little better compliance with salt. jhas no chest pain  Has  Daytime sleepiness.he has decrease energy.  Past Medical History:   Diagnosis Date    Allergic rhinitis     Anemia     Back pain     BPH (benign prostatic hyperplasia)     Cancer     skin cancer to neck, Dr. Graves    Cataract     Disorder of kidney and ureter     ED (erectile dysfunction)     Hiatal hernia     small    History of colon polyps     colonoscopy 11/2016    HLD (hyperlipidemia)     Hyperlipidemia     Hypertension     Lateral epicondylitis     OA (osteoarthritis)     Prostate cancer     Dr. Wong    TIA (transient ischemic attack)     Trouble in sleeping        Past Surgical History:   Procedure Laterality Date    COLONOSCOPY N/A 11/14/2016    Procedure: COLONOSCOPY;  Surgeon: Karuna Rdoriguez MD;  Location: [facility];  Service: Endoscopy;  Laterality: N/A;    HEMORRHOID SURGERY      KNEE ARTHROSCOPY W/ MENISCAL REPAIR Right 2015    Dr. Jain    PLANTAR FASCIA RELEASE      right    ROTATOR CUFF REPAIR Bilateral     bilateral    SHOULDER SURGERY Bilateral around 2000    Dr. Pepper.  rotator cuff surgeries       Social History   Substance Use Topics    Smoking status: Former Smoker     Packs/day: 3.00     Years: 20.00     Types: Cigarettes     Quit date: 7/23/1985    Smokeless tobacco: Never Used    Alcohol use 4.2 oz/week     6 Cans of beer, 1 Standard drinks or equivalent per week      Comment: socially       Family History   Problem Relation Age of Onset    Lung cancer Father       life long smoker    Cancer Father      prostate, lung    Stroke Sister      TIA    Cataracts Sister     Cancer Brother      prostate    Cataracts Brother     Cataracts Sister     Melanoma Neg Hx     Psoriasis Neg Hx     Lupus Neg Hx     Eczema Neg Hx     Diabetes Neg Hx     Heart disease Neg Hx     Kidney disease Neg Hx     Colon cancer Neg Hx        Current Outpatient Prescriptions   Medication Sig    amlodipine (NORVASC) 10 MG tablet Take 1 tablet (10 mg total) by mouth once daily.    atorvastatin (LIPITOR) 40 MG tablet Take 1 tablet (40 mg total) by mouth once daily.    clopidogrel (PLAVIX) 75 mg tablet Take 1 tablet (75 mg total) by mouth once daily.    finasteride (PROSCAR) 5 mg tablet     CIALIS 5 mg tablet     fluticasone (FLONASE) 50 mcg/actuation nasal spray      No current facility-administered medications for this visit.      Current Outpatient Prescriptions on File Prior to Visit   Medication Sig    amlodipine (NORVASC) 10 MG tablet Take 1 tablet (10 mg total) by mouth once daily.    atorvastatin (LIPITOR) 40 MG tablet Take 1 tablet (40 mg total) by mouth once daily.    clopidogrel (PLAVIX) 75 mg tablet Take 1 tablet (75 mg total) by mouth once daily.    finasteride (PROSCAR) 5 mg tablet     CIALIS 5 mg tablet      No current facility-administered medications on file prior to visit.      Review of patient's allergies indicates:   Allergen Reactions    Mobic  [meloxicam]      Other reaction(s): hypertension       Review of Systems   Constitution: Negative for weakness and malaise/fatigue.   Eyes: Negative for blurred vision.   Cardiovascular: Positive for dyspnea on exertion and leg swelling. Negative for chest pain, claudication, cyanosis, irregular heartbeat, near-syncope, orthopnea, palpitations and paroxysmal nocturnal dyspnea.   Respiratory: Positive for shortness of breath and snoring. Negative for cough and hemoptysis.    Hematologic/Lymphatic: Negative for bleeding  problem. Does not bruise/bleed easily.   Skin: Negative for dry skin and itching.   Musculoskeletal: Positive for arthritis and joint pain. Negative for falls, muscle weakness and myalgias.   Gastrointestinal: Negative for abdominal pain, diarrhea, heartburn, hematemesis, hematochezia and melena.   Genitourinary: Negative for flank pain and hematuria.   Neurological: Negative for dizziness, focal weakness, headaches, light-headedness, numbness, paresthesias and seizures.   Psychiatric/Behavioral: Negative for altered mental status and memory loss. The patient is not nervous/anxious.    Allergic/Immunologic: Negative for hives.       Objective:   Physical Exam   Constitutional: He is oriented to person, place, and time. He appears well-developed and well-nourished. No distress.   HENT:   Head: Normocephalic and atraumatic.   Eyes: EOM are normal. Pupils are equal, round, and reactive to light. Right eye exhibits no discharge. Left eye exhibits no discharge.   Neck: Neck supple. No JVD present. No thyromegaly present.   Cardiovascular: Normal rate, regular rhythm and intact distal pulses.  Exam reveals no gallop and no friction rub.    Murmur heard.   Harsh midsystolic murmur is present with a grade of 2/6  at the upper right sternal border radiating to the neck  Pulses:       Carotid pulses are 2+ on the right side.       Radial pulses are 2+ on the right side, and 2+ on the left side.        Femoral pulses are 2+ on the right side, and 2+ on the left side.       Popliteal pulses are 2+ on the right side, and 2+ on the left side.        Dorsalis pedis pulses are 2+ on the right side, and 2+ on the left side.        Posterior tibial pulses are 2+ on the right side, and 2+ on the left side.   Trace edema bilateral   Spider veins and superficial varicosities in lower extremity noted.   Pulmonary/Chest: Effort normal and breath sounds normal. No respiratory distress. He has no wheezes. He has no rales. He exhibits no  "tenderness.   Abdominal: Soft. Bowel sounds are normal. He exhibits no distension. There is no tenderness.   Musculoskeletal: Normal range of motion. He exhibits no edema.   Neurological: He is alert and oriented to person, place, and time. No cranial nerve deficit.   Skin: Skin is warm and dry. No rash noted. He is not diaphoretic. No erythema.   Psychiatric: He has a normal mood and affect. His behavior is normal.   Nursing note and vitals reviewed.    Vitals:    12/07/17 0814   BP: (!) 140/68   Pulse: 80   Weight: 89.5 kg (197 lb 6.8 oz)   Height: 5' 10.5" (1.791 m)     Lab Results   Component Value Date    CHOL 132 09/07/2017    CHOL 157 03/07/2017    CHOL 173 09/01/2016     Lab Results   Component Value Date    HDL 62 09/07/2017    HDL 52 03/07/2017    HDL 56 09/01/2016     Lab Results   Component Value Date    LDLCALC 60.0 (L) 09/07/2017    LDLCALC 89.6 03/07/2017    LDLCALC 102.8 09/01/2016     Lab Results   Component Value Date    TRIG 50 09/07/2017    TRIG 77 03/07/2017    TRIG 71 09/01/2016     Lab Results   Component Value Date    CHOLHDL 47.0 09/07/2017    CHOLHDL 33.1 03/07/2017    CHOLHDL 32.4 09/01/2016       Chemistry        Component Value Date/Time     09/26/2017 1503    K 4.4 09/26/2017 1503     09/26/2017 1503    CO2 21 (L) 09/26/2017 1503    BUN 23 09/26/2017 1503    CREATININE 1.4 09/26/2017 1503     (H) 09/26/2017 1503        Component Value Date/Time    CALCIUM 9.6 09/26/2017 1503    ALKPHOS 70 09/26/2017 1503    AST 16 09/26/2017 1503    ALT 18 09/26/2017 1503    BILITOT 1.0 09/26/2017 1503    ESTGFRAFRICA 55 (A) 09/26/2017 1503    EGFRNONAA 47 (A) 09/26/2017 1503          Lab Results   Component Value Date    TSH 1.909 09/07/2017     Lab Results   Component Value Date    INR 1.2 09/26/2017    INR 1.2 09/03/2015     Lab Results   Component Value Date    WBC 5.53 09/26/2017    HGB 14.5 09/26/2017    HCT 42.0 09/26/2017    MCV 92 09/26/2017     (L) 09/26/2017 "     BMP  Sodium   Date Value Ref Range Status   09/26/2017 141 136 - 145 mmol/L Final     Potassium   Date Value Ref Range Status   09/26/2017 4.4 3.5 - 5.1 mmol/L Final     Chloride   Date Value Ref Range Status   09/26/2017 109 95 - 110 mmol/L Final     CO2   Date Value Ref Range Status   09/26/2017 21 (L) 23 - 29 mmol/L Final     BUN, Bld   Date Value Ref Range Status   09/26/2017 23 8 - 23 mg/dL Final     Creatinine   Date Value Ref Range Status   09/26/2017 1.4 0.5 - 1.4 mg/dL Final     Calcium   Date Value Ref Range Status   09/26/2017 9.6 8.7 - 10.5 mg/dL Final     Anion Gap   Date Value Ref Range Status   09/26/2017 11 8 - 16 mmol/L Final     eGFR if    Date Value Ref Range Status   09/26/2017 55 (A) >60 mL/min/1.73 m^2 Final     eGFR if non    Date Value Ref Range Status   09/26/2017 47 (A) >60 mL/min/1.73 m^2 Final     Comment:     Calculation used to obtain the estimated glomerular filtration  rate (eGFR) is the CKD-EPI equation. Since race is unknown   in our information system, the eGFR values for   -American and Non--American patients are given   for each creatinine result.       CrCl cannot be calculated (Patient's most recent lab result is older than the maximum 7 days allowed.).    Assessment:     1. Essential hypertension    2. Mixed hyperlipidemia    3. Tortuous aorta    4. Atherosclerosis of artery of both lower extremities    5. Lung granuloma    6. Leg swelling      Has stigmata for sleep apnea.  Leg swelling is related to amlodipine and venous insufficiency   Plan:   Leg elevation  Low salt diet   Compression stockings   Take amlodipine at nite   Continue exercise.  Sleep consult   Echo bnp .  Phone review   F/u in 3 months

## 2017-12-07 NOTE — PATIENT INSTRUCTIONS
What is a Sleep Study?    Do you often have problems sleeping? Do you feel tired most days of the week? Talk to your healthcare provider or a sleep specialist. He or she may suggest that you have a sleep study. It can help diagnose a sleep disorder such as sleep apnea or narcolepsy. During the study, a special machine is used to monitor your sleep.  Who needs a sleep study?  If you have sleep problems that last longer than a few weeks, you may need a sleep study. Talk to your healthcare provider. Be prepared to answer questions about your health history. Try to keep a daily sleep diary for a week or 2. Write down the time you go to bed, the time you wake up, and anything that seems to affect your sleep. Then your healthcare provider can refer you to a sleep specialist and recommend a sleep study.  Monitoring your sleep  Your sleep can be monitored at a sleep clinic or at your home. In either case, your healthcare provider will discuss the results with you at a future visit:  · At a sleep clinic. Most sleep studies are done at a sleep clinic or a sleep lab. In many cases, you will need to stay overnight. You will sleep in a private room, much like a hotel or hospital room. A family member or a friend can come along, but cannot stay overnight. Most people dont have trouble sleeping during the study. In the morning you can go home. Sometimes you may be asked to remain at the lab the next day for a daytime nap study.  · At home. At times, a sleep study can be done at home. A home sleep study provides most of the same information as a study done at a clinic. A special computer is loaned to you by a sleep clinic or a medical supplier. You will be given instructions on how to use it. Or, someone may come to your home to help. Before bedtime, the computer is turned on to monitor your sleep all night. In the morning, you return the computer.  Date Last Reviewed: 8/9/2015  © 5973-6570 [employer] [address]  [place] [address]. All rights reserved. This information is not intended as a substitute for professional medical care. Always follow your healthcare professional's instructions.        Monitoring Your Sleep: Sleep Lab Testing     During a sleep study, sensors track and record body functions.     Checking your sleep during a nighttime sleep study is often the only way to find out if you have conditions such as sleep apnea or other sleep problems. A sleep study records how your lungs, heart, brain, and other parts of your body function while youre asleep. Its painless, risk-free, and in most cases takes a single, full night.  Testing in a sleep clinic  If you spend the night in a sleep clinic, you will have a private bedroom. A technician will attach many sensors to your body, then go into another room. As you sleep, your heart rate, breathing, oxygen level, brain activity, and other functions will be tracked. A microphone and video camera will record your breathing sounds and body movements. The technician will keep watch nearby. If you need an air pressure device to help you breathe, one will be available.  Tips for testing in a sleep lab  · Before your sleep study, bathe and wash your hair. Dont use conditioners, oils, or makeup.  · Stick to your normal routine. If you usually drink alcohol, exercise, or take medication before bed, ask your healthcare provider whether you should do so the night of your study. Most people undergoing a sleep study should take all of their medicines as they typically would at home. But, it is best to check with your healthcare provider to confirm.  · Bring your toothbrush, sleepwear, pillow, something to read, and anything else that will help you sleep well.  Getting the results  The results of your sleep study need to be scored and interpreted. Once this is done, your healthcare provider will discuss the findings with you. The sleep study results will show  whether you have sleep apnea. It can also tell how severe the apnea is. The findings help your healthcare provider know which treatment or treatments may be the right ones for you.  Date Last Reviewed: 8/9/2015 © 2000-2017 [employer]. [address] [place] [address], [place] [address]. All rights reserved. This information is not intended as a substitute for professional medical care. Always follow your healthcare professional's instructions.        Obstructive Sleep Apnea  Obstructive sleep apnea is a condition that causes your air passages to become narrowed or blocked during sleep. As a result, breathing stops for short periods. Your body wakes up enough for breathing to begin again, though you don't remember it. The cycle of stopped breathing and brief awakenings can repeat dozens of times a night. This prevents the body from getting to the deeper stages of sleep that are needed for good rest and may cause your body's oxygen level to fall.  Signs of sleep apnea include loud snoring, noisy breathing, and gasping sounds during sleep. Daytime symptoms include waking up tired after a full night's sleep, waking up with headaches, feeling very sleepy or falling asleep during the day, and having problems with memory or concentration.  Risk factors for sleep apnea include:  · Being overweight  · Being a man, or a woman in menopause  · Smoking  · Using alcohol or sedating medicines  · Having enlarged structures in the nose or throat  Home care  Lifestyle changes that can help treat snoring and sleep apnea include the following:  · If you are overweight, lose weight. Talk to your healthcare provider about a weight-loss plan for you.  · Avoid alcohol for 3 to 4 hours before bedtime. Avoid sedating medications. Ask your healthcare provider about the medicines you take.  · If you smoke, talk to your healthcare provider about ways to quit.  · Sleep on your side. This can help prevent gravity from pulling relaxed throat  tissues into your breathing passages.  · If you have allergies or sinus problems that block your nose, ask your healthcare provider for help.  Follow-up care  Follow up with your healthcare provider, or as advised. A diagnosis of sleep apnea is made with a sleep study. Your healthcare provider can tell you more about this test.  When to seek medical advice  Sleep apnea can make you more likely to have certain health problems. These include high blood pressure, heart attack, stroke, and sexual dysfunction. If you have sleep apnea, talk to your healthcare provider about the best treatments for you.  Date Last Reviewed: 4/1/2017  © 4614-1141 [employer]. [address] [place] [address]. All rights reserved. This information is not intended as a substitute for professional medical care. Always follow your healthcare professional's instructions.      Please call the [facility] to schedule sleep study at  [phone] . Usually take 1- 2 days to get insurance company approval.  You will need to schedule at follow up clinic appointment 7 days after the sleep study to review the results.

## 2017-12-07 NOTE — PROGRESS NOTES
Subjective:       Patient ID: Ezequiel Hughes is a 77 y.o. male.    Chief Complaint: He       Sleep Apnea    HPI     Transient Ischemic Attack in June 2017  Daily leg swelling since he was placed on norvasc  Pain in leg due to swelling    Sleep Apnea  He presents for a sleep evaluation. He complains of snoring, periods of not breathing, decreased concentration, excessive daytime sleepiness, falling asleep while reading, watching television, feels sleepy during the day, take naps during the day.  Symptoms began 6 months ago, gradually worsening since that time.  He goes to sleep at 10 weekdays and  weekends. He awakens 5:30 weekdays and  weekends. He falls asleep in 15  min minutes.  Collar size na. He denies knees buckling with laughing, completely or partially paralyzed while falling asleep or waking up. Previous evaluation and treatment has included none.    Legs are moving at night  history Of smoking in past    Past Medical History:   Diagnosis Date    Allergic rhinitis     Anemia     Back pain     BPH (benign prostatic hyperplasia)     Cancer     skin cancer to neck, Dr. Graves    Cataract     Disorder of kidney and ureter     ED (erectile dysfunction)     Hiatal hernia     small    History of colon polyps     colonoscopy 11/2016    HLD (hyperlipidemia)     Hyperlipidemia     Hypertension     Lateral epicondylitis     OA (osteoarthritis)     Prostate cancer     Dr. Wong    TIA (transient ischemic attack)     Trouble in sleeping      Past Surgical History:   Procedure Laterality Date    COLONOSCOPY N/A 11/14/2016    Procedure: COLONOSCOPY;  Surgeon: Karuna Rodriguez MD;  Location: [facility];  Service: Endoscopy;  Laterality: N/A;    HEMORRHOID SURGERY      KNEE ARTHROSCOPY W/ MENISCAL REPAIR Right 2015    Dr. Jain    PLANTAR FASCIA RELEASE      right    ROTATOR CUFF REPAIR Bilateral     bilateral    SHOULDER SURGERY Bilateral around 2000    Dr. Pepper.  rotator cuff  surgeries     Social History     Social History    Marital status:      Spouse name: N/A    Number of children: N/A    Years of education: N/A     Occupational History    Not on file.     Social History Main Topics    Smoking status: Former Smoker     Packs/day: 3.00     Years: 35.00     Types: Cigarettes     Start date: 1/1/1960     Quit date: 7/23/1985    Smokeless tobacco: Never Used    Alcohol use 4.2 oz/week     6 Cans of beer, 1 Standard drinks or equivalent per week      Comment: socially    Drug use: No    Sexual activity: No     Other Topics Concern    Not on file     Social History Narrative         Review of Systems   Constitutional: Positive for fatigue. Negative for fever.   HENT: Negative for postnasal drip and rhinorrhea.    Eyes: Negative for redness and itching.   Respiratory: Positive for apnea and Paroxysmal Nocturnal Dyspnea. Negative for cough, shortness of breath, wheezing and dyspnea on extertion.    Cardiovascular: Positive for leg swelling. Negative for chest pain.   Genitourinary: Negative for difficulty urinating and hematuria.   Endocrine: Negative for polyphagia, cold intolerance and heat intolerance.    Musculoskeletal: Negative for arthralgias.   Skin: Negative for rash.   Gastrointestinal: Negative for nausea, vomiting, abdominal pain and abdominal distention.   Neurological: Negative for dizziness and headaches.   Hematological: Negative for adenopathy. Does not bruise/bleed easily and no excessive bruising.   Psychiatric/Behavioral: Positive for sleep disturbance. The patient is not nervous/anxious.        Objective:      Physical Exam   Constitutional: He is oriented to person, place, and time. He appears well-developed and well-nourished.   HENT:   Head: Normocephalic and atraumatic.   Eyes: Conjunctivae are normal. Pupils are equal, round, and reactive to light.   Neck: Neck supple. No JVD present. No tracheal deviation present. No thyromegaly present.    Cardiovascular: Normal rate, regular rhythm and normal heart sounds.    Pulmonary/Chest: Effort normal. He has decreased breath sounds in the right lower field and the left lower field.   Abdominal: Soft.   Musculoskeletal: Normal range of motion. He exhibits edema.   Lymphadenopathy:     He has no cervical adenopathy.   Neurological: He is alert and oriented to person, place, and time.   Skin: Skin is warm and dry.   Nursing note and vitals reviewed.    Personal Diagnostic Review  Chest x-ray: hyperinflation      No flowsheet data found.      Assessment:       1. GUIDO (obstructive sleep apnea)    2. Localized edema    3. Essential hypertension        Outpatient Encounter Prescriptions as of 12/7/2017   Medication Sig Dispense Refill    atorvastatin (LIPITOR) 40 MG tablet Take 1 tablet (40 mg total) by mouth once daily. 90 tablet 3    CIALIS 5 mg tablet       clopidogrel (PLAVIX) 75 mg tablet Take 1 tablet (75 mg total) by mouth once daily. 90 tablet 3    finasteride (PROSCAR) 5 mg tablet       fluticasone (FLONASE) 50 mcg/actuation nasal spray       [DISCONTINUED] amlodipine (NORVASC) 10 MG tablet Take 1 tablet (10 mg total) by mouth once daily. 90 tablet 3    losartan-hydrochlorothiazide 50-12.5 mg (HYZAAR) 50-12.5 mg per tablet Take 1 tablet by mouth once daily. 90 tablet 3    [DISCONTINUED] furosemide (LASIX) 20 MG tablet Take 1 tablet (20 mg total) by mouth once daily. 30 tablet 11     No facility-administered encounter medications on file as of 12/7/2017.      Orders Placed This Encounter   Procedures    Polysomnogram (CPAP will be added if patient meets diagnostic criteria.)     Standing Status:   Future     Standing Expiration Date:   12/7/2018    COMPRESSION STOCKINGS     [facility] for CPAP/Oxygen/Nebulizer supplies.  Customer Service: [phone]  Call: [phone]  Fax: [phone]  Billing Inquiries: [phone] or [phone]     Order Specific Question:   Pressure amount:      Answer:   20-30 mmHg     Plan:       Requested Prescriptions     Signed Prescriptions Disp Refills    losartan-hydrochlorothiazide 50-12.5 mg (HYZAAR) 50-12.5 mg per tablet 90 tablet 3     Sig: Take 1 tablet by mouth once daily.     GUIDO (obstructive sleep apnea)  -     Polysomnogram (CPAP will be added if patient meets diagnostic criteria.); Future    Localized edema  -     Discontinue: furosemide (LASIX) 20 MG tablet; Take 1 tablet (20 mg total) by mouth once daily.  Dispense: 30 tablet; Refill: 11  -     COMPRESSION STOCKINGS    Essential hypertension  -     losartan-hydrochlorothiazide 50-12.5 mg (HYZAAR) 50-12.5 mg per tablet; Take 1 tablet by mouth once daily.  Dispense: 90 tablet; Refill: 3           Return in about 5 weeks (around 1/11/2018) for review of PSG.    MEDICAL DECISION MAKING: Moderate to high complexity.  Overall, the multiple problems listed are of moderate to high severity that may impact quality of life and activities of daily living. Side effects of medications, treatment plan as well as options and alternatives reviewed and discussed with patient. There was counseling of patient concerning these issues.    Total time spent in face to face counseling and coordination of care - 60  minutes over 50% of time was used in discussion of prognosis, risks, benefits of treatment, instructions and compliance with regimen . Discussion with other physicians or health care providers (DME, NP, pharmacy, respiratory therapy) occurred.

## 2017-12-08 ENCOUNTER — PATIENT MESSAGE (OUTPATIENT)
Dept: PULMONOLOGY | Facility: CLINIC | Age: 77
End: 2017-12-08

## 2017-12-09 DIAGNOSIS — M79.89 LEG SWELLING: ICD-10-CM

## 2017-12-09 DIAGNOSIS — I70.203 ATHEROSCLEROSIS OF ARTERY OF BOTH LOWER EXTREMITIES: ICD-10-CM

## 2017-12-09 DIAGNOSIS — N18.30 CHRONIC KIDNEY DISEASE, STAGE 3: ICD-10-CM

## 2017-12-09 DIAGNOSIS — N18.30 CKD (CHRONIC KIDNEY DISEASE) STAGE 3, GFR 30-59 ML/MIN: Primary | ICD-10-CM

## 2017-12-11 ENCOUNTER — TELEPHONE (OUTPATIENT)
Dept: CARDIOLOGY | Facility: CLINIC | Age: 77
End: 2017-12-11

## 2017-12-11 ENCOUNTER — CLINICAL SUPPORT (OUTPATIENT)
Dept: CARDIOLOGY | Facility: CLINIC | Age: 77
End: 2017-12-11
Attending: INTERNAL MEDICINE
Payer: MEDICARE

## 2017-12-11 ENCOUNTER — LAB VISIT (OUTPATIENT)
Dept: LAB | Facility: HOSPITAL | Age: 77
End: 2017-12-11
Attending: INTERNAL MEDICINE
Payer: MEDICARE

## 2017-12-11 DIAGNOSIS — M79.89 LEG SWELLING: ICD-10-CM

## 2017-12-11 DIAGNOSIS — I10 ESSENTIAL HYPERTENSION: Chronic | ICD-10-CM

## 2017-12-11 LAB
AORTIC VALVE REGURGITATION: NORMAL
BNP SERPL-MCNC: 33 PG/ML
DIASTOLIC DYSFUNCTION: NO
ESTIMATED PA SYSTOLIC PRESSURE: 29.01
RETIRED EF AND QEF - SEE NOTES: 60 (ref 55–65)
TRICUSPID VALVE REGURGITATION: NORMAL

## 2017-12-11 PROCEDURE — 93306 TTE W/DOPPLER COMPLETE: CPT | Mod: S$GLB,,, | Performed by: NUCLEAR MEDICINE

## 2017-12-11 PROCEDURE — 36415 COLL VENOUS BLD VENIPUNCTURE: CPT

## 2017-12-11 PROCEDURE — 83880 ASSAY OF NATRIURETIC PEPTIDE: CPT

## 2017-12-11 NOTE — TELEPHONE ENCOUNTER
----- Message from Aparna Thompson MD sent at 12/11/2017  4:43 PM CST -----  Heart function normal.

## 2017-12-12 ENCOUNTER — TELEPHONE (OUTPATIENT)
Dept: CARDIOLOGY | Facility: CLINIC | Age: 77
End: 2017-12-12

## 2017-12-14 ENCOUNTER — HOSPITAL ENCOUNTER (OUTPATIENT)
Dept: SLEEP MEDICINE | Facility: HOSPITAL | Age: 77
Discharge: HOME | End: 2017-12-14
Attending: INTERNAL MEDICINE
Payer: MEDICARE

## 2017-12-14 DIAGNOSIS — G47.61 PERIODIC LIMB MOVEMENT DISORDER (PLMD): ICD-10-CM

## 2017-12-14 DIAGNOSIS — Z72.821 INADEQUATE SLEEP HYGIENE: ICD-10-CM

## 2017-12-14 DIAGNOSIS — G47.33 OSA (OBSTRUCTIVE SLEEP APNEA): Primary | ICD-10-CM

## 2017-12-14 PROCEDURE — 95810 POLYSOM 6/> YRS 4/> PARAM: CPT | Mod: 26,,, | Performed by: PSYCHOLOGIST

## 2017-12-14 PROCEDURE — 95810 POLYSOM 6/> YRS 4/> PARAM: CPT

## 2018-01-10 ENCOUNTER — OFFICE VISIT (OUTPATIENT)
Dept: OPHTHALMOLOGY | Facility: CLINIC | Age: 78
End: 2018-01-10
Payer: MEDICARE

## 2018-01-10 ENCOUNTER — OFFICE VISIT (OUTPATIENT)
Dept: PULMONOLOGY | Facility: CLINIC | Age: 78
End: 2018-01-10
Payer: MEDICARE

## 2018-01-10 VITALS
DIASTOLIC BLOOD PRESSURE: 70 MMHG | OXYGEN SATURATION: 98 % | WEIGHT: 199.5 LBS | HEART RATE: 55 BPM | HEIGHT: 70 IN | RESPIRATION RATE: 12 BRPM | SYSTOLIC BLOOD PRESSURE: 118 MMHG | BODY MASS INDEX: 28.56 KG/M2

## 2018-01-10 DIAGNOSIS — H35.373 EPIRETINAL MEMBRANE (ERM) OF BOTH EYES: ICD-10-CM

## 2018-01-10 DIAGNOSIS — H40.1122 PRIMARY OPEN ANGLE GLAUCOMA (POAG) OF LEFT EYE, MODERATE STAGE: ICD-10-CM

## 2018-01-10 DIAGNOSIS — H25.13 NUCLEAR SCLEROSIS OF BOTH EYES: Primary | ICD-10-CM

## 2018-01-10 DIAGNOSIS — H26.9 CORTICAL CATARACT OF BOTH EYES: ICD-10-CM

## 2018-01-10 DIAGNOSIS — H40.1111 PRIMARY OPEN ANGLE GLAUCOMA (POAG) OF RIGHT EYE, MILD STAGE: ICD-10-CM

## 2018-01-10 DIAGNOSIS — G47.61 PERIODIC LIMB MOVEMENT SLEEP DISORDER: Primary | ICD-10-CM

## 2018-01-10 PROCEDURE — 99499 UNLISTED E&M SERVICE: CPT | Mod: S$GLB,,, | Performed by: OPHTHALMOLOGY

## 2018-01-10 PROCEDURE — 99999 PR PBB SHADOW E&M-EST. PATIENT-LVL III: CPT | Mod: PBBFAC,,, | Performed by: OPHTHALMOLOGY

## 2018-01-10 PROCEDURE — 92134 CPTRZ OPH DX IMG PST SGM RTA: CPT | Mod: S$GLB,,, | Performed by: OPHTHALMOLOGY

## 2018-01-10 PROCEDURE — 92014 COMPRE OPH EXAM EST PT 1/>: CPT | Mod: S$GLB,,, | Performed by: OPHTHALMOLOGY

## 2018-01-10 PROCEDURE — 99999 PR PBB SHADOW E&M-EST. PATIENT-LVL IV: CPT | Mod: PBBFAC,,, | Performed by: INTERNAL MEDICINE

## 2018-01-10 PROCEDURE — 99214 OFFICE O/P EST MOD 30 MIN: CPT | Mod: S$GLB,,, | Performed by: INTERNAL MEDICINE

## 2018-01-10 RX ORDER — KETOROLAC TROMETHAMINE 5 MG/ML
1 SOLUTION OPHTHALMIC 4 TIMES DAILY
Qty: 1 BOTTLE | Refills: 2 | Status: CANCELLED | OUTPATIENT
Start: 2018-01-10

## 2018-01-10 RX ORDER — PREDNISOLONE ACETATE 10 MG/ML
1 SUSPENSION/ DROPS OPHTHALMIC 4 TIMES DAILY
Qty: 1 BOTTLE | Refills: 2 | Status: CANCELLED | OUTPATIENT
Start: 2018-01-10

## 2018-01-10 RX ORDER — TRAVOPROST OPHTHALMIC SOLUTION 0.04 MG/ML
1 SOLUTION OPHTHALMIC NIGHTLY
Qty: 3 ML | Refills: 11
Start: 2018-01-10 | End: 2018-01-22

## 2018-01-10 RX ORDER — PRAMIPEXOLE DIHYDROCHLORIDE 0.25 MG/1
0.25 TABLET ORAL NIGHTLY
Qty: 30 TABLET | Refills: 11 | Status: SHIPPED | OUTPATIENT
Start: 2018-01-10 | End: 2018-01-23

## 2018-01-10 RX ORDER — GATIFLOXACIN 5 MG/ML
1 SOLUTION/ DROPS OPHTHALMIC 2 TIMES DAILY
Qty: 1 BOTTLE | Refills: 2 | Status: CANCELLED | OUTPATIENT
Start: 2018-01-10

## 2018-01-10 NOTE — PROGRESS NOTES
SUBJECTIVE:   Ezequiel Hughes is a 77 y.o. male   Uncorrected distance visual acuity was 20/60 +1 in the right eye and 20/70 -2 in the left eye.   Chief Complaint   Patient presents with    Cataract     cataract eval per [facility]        HPI:  HPI     Cataract    Additional comments: cataract eval per [facility]           Comments   Pt states he's been having trouble seeing clearly at a distance, only   using readers for now. Saw Dr. Vizcarra last year and was advised to come in   for a cataract eval. He desires surgery to see better to read and drive.   Not using any drops. No ocular pain or irritation.     Referred by [facility]  1. Cataract OU  2. COAG Susp       Last edited by Alonso Moulton on 1/10/2018  1:50 PM. (History)        Assessment /Plan :  1. Nuclear sclerosis of both eyes monitor for now, discussed RBA of [place] with IOL OS with I stent   2. Cortical cataract of both eyes monitor for now   3. Primary open angle glaucoma (POAG) of left eye, moderate stage IOP not within acceptable range relative to target IOP with risk of irreversible visual loss. Better IOP control is recommended. Discussed options, risks, and benefits of additional medication, SLT laser, and/or incisional glaucoma surgery. Reviewed importance of continued compliance with treatment and follow up.     Patient chooses to add Travatan Z qhs OU     4. Epiretinal membrane (ERM) of both eyes mild, monitor for now   5. Primary open angle glaucoma (POAG) of right eye, mild stage IOP not within acceptable range relative to target IOP with risk of irreversible visual loss. Better IOP control is recommended. Discussed options, risks, and benefits of additional medication, SLT laser, and/or incisional glaucoma surgery. Reviewed importance of continued compliance with treatment and follow up.     Patient chooses to add Travatan Z qhs OU       Return to clinic in 2-3 weeks  or as needed.  With gonioscopy, IOP Check and 24-2 HVF

## 2018-01-10 NOTE — LETTER
January 10, 2018      Valery Ozuna MD  [address]  [address]  [place] [address]           [facility]  [address] [facility]  [place] [address]  Phone: [phone]  Fax: [phone]          Patient: Ezequiel Hughes   MR Number: [id number]   YOB: 1940   Date of Visit: 1/10/2018       Dear No ref. provider found:    Thank you for referring Ezequiel Hughes to me for evaluation. Attached you will find relevant portions of my assessment and plan of care.    If you have questions, please do not hesitate to call me. I look forward to following Ezequiel Hughes along with you.    Sincerely,    Tawanda Riley MD    Enclosure  CC:  No Recipients    IIf you would like to receive this communication electronically, please contact [email or web address] or [phone] to request [employer] Link access.    [employer] Link is a tool which provides read-only access to select patient information with whom you have a relationship. Its easy to use and provides real time access to review your patients record including encounter summaries, notes, results, and demographic information.    If you feel you have received this communication in error or would no longer like to receive these types of communications, please e-mail [email or web address]

## 2018-01-10 NOTE — PROGRESS NOTES
Subjective:       Patient ID: Ezequiel Hughes is a 77 y.o. male.    Chief Complaint: He       Sleep Apnea (rev PSG)    HPI     Sleep Apnea  He presents for a sleep evaluation. He complains of mild snoring, periods of not breathing, decreased concentration, excessive daytime sleepiness, falling asleep while reading, watching television, feels sleepy during the day, take naps during the day. Leg movement awakens him at night. WIfe reports his legs move so much that she sometimes has to leave room.  Symptoms began 6 months ago, gradually worsening since that time.  He goes to sleep at 10 weekdays and  weekends. He awakens 5:30 weekdays and  weekends. He falls asleep in 15  min minutes.  Collar size na. He denies knees buckling with laughing, completely or partially paralyzed while falling asleep or waking up. Previous evaluation and treatment has included polysomnogram that was reviewed today     Swelling:  Swelling in legs has gone down with losartan/hctz    Past Medical History:   Diagnosis Date    Allergic rhinitis     Anemia     Back pain     BPH (benign prostatic hyperplasia)     Cancer     skin cancer to neck, Dr. Graves    Cataract     Disorder of kidney and ureter     ED (erectile dysfunction)     Hiatal hernia     small    History of colon polyps     colonoscopy 11/2016    HLD (hyperlipidemia)     Hyperlipidemia     Hypertension     Lateral epicondylitis     OA (osteoarthritis)     GUIDO (obstructive sleep apnea)     Prostate cancer     Dr. Wong    TIA (transient ischemic attack)     Trouble in sleeping      Past Surgical History:   Procedure Laterality Date    COLONOSCOPY N/A 11/14/2016    Procedure: COLONOSCOPY;  Surgeon: Karuna Rodriguez MD;  Location: [facility];  Service: Endoscopy;  Laterality: N/A;    HEMORRHOID SURGERY      KNEE ARTHROSCOPY W/ MENISCAL REPAIR Right 2015    Dr. Jain    PLANTAR FASCIA RELEASE      right    ROTATOR CUFF REPAIR Bilateral     bilateral     SHOULDER SURGERY Bilateral around 2000    Dr. Pepper.  rotator cuff surgeries     Social History     Social History    Marital status:      Spouse name: N/A    Number of children: N/A    Years of education: N/A     Occupational History    Not on file.     Social History Main Topics    Smoking status: Former Smoker     Packs/day: 3.00     Years: 35.00     Types: Cigarettes     Start date: 1/1/1960     Quit date: 7/23/1985    Smokeless tobacco: Never Used    Alcohol use 4.2 oz/week     6 Cans of beer, 1 Standard drinks or equivalent per week      Comment: socially    Drug use: No    Sexual activity: No     Other Topics Concern    Not on file     Social History Narrative         Review of Systems   Constitutional: Negative for fever and fatigue.   HENT: Negative for postnasal drip and rhinorrhea.    Eyes: Negative for redness and itching.   Respiratory: Positive for somnolence. Negative for cough, shortness of breath, wheezing, dyspnea on extertion and Paroxysmal Nocturnal Dyspnea.    Cardiovascular: Negative for chest pain.   Genitourinary: Negative for difficulty urinating and hematuria.   Endocrine: Negative for polyphagia, cold intolerance and heat intolerance.    Musculoskeletal: Negative for arthralgias.   Skin: Negative for rash.   Gastrointestinal: Negative for nausea, vomiting, abdominal pain and abdominal distention.   Neurological: Negative for dizziness and headaches.   Hematological: Negative for adenopathy. Does not bruise/bleed easily and no excessive bruising.   Psychiatric/Behavioral: Positive for sleep disturbance. The patient is not nervous/anxious.        Objective:      Physical Exam   Constitutional: He is oriented to person, place, and time. He appears well-developed and well-nourished.   HENT:   Head: Normocephalic and atraumatic.   Eyes: Conjunctivae are normal. Pupils are equal, round, and reactive to light.   Neck: Neck supple. No JVD present. No tracheal  deviation present. No thyromegaly present.   Cardiovascular: Normal rate, regular rhythm and normal heart sounds.    Pulmonary/Chest: Effort normal and breath sounds normal.   Abdominal: Soft.   Musculoskeletal: Normal range of motion.   Lymphadenopathy:     He has no cervical adenopathy.   Neurological: He is alert and oriented to person, place, and time.   Skin: Skin is warm and dry.   Nursing note and vitals reviewed.    Personal Diagnostic Review  PSG: significant for borderline Obstructive Sleep Apnea and severe PLMD    No flowsheet data found.      Assessment:       1. Periodic limb movement sleep disorder        Outpatient Encounter Prescriptions as of 1/10/2018   Medication Sig Dispense Refill    atorvastatin (LIPITOR) 40 MG tablet Take 1 tablet (40 mg total) by mouth once daily. 90 tablet 3    CIALIS 5 mg tablet       clopidogrel (PLAVIX) 75 mg tablet Take 1 tablet (75 mg total) by mouth once daily. 90 tablet 3    finasteride (PROSCAR) 5 mg tablet       fluticasone (FLONASE) 50 mcg/actuation nasal spray       losartan-hydrochlorothiazide 50-12.5 mg (HYZAAR) 50-12.5 mg per tablet Take 1 tablet by mouth once daily. 90 tablet 3    travoprost (TRAVATAN Z) 0.004 % Drop Place 1 drop into both eyes every evening. 3 mL 11    pramipexole (MIRAPEX) 0.25 MG tablet Take 1 tablet (0.25 mg total) by mouth every evening. 30 tablet 11     No facility-administered encounter medications on file as of 1/10/2018.      No orders of the defined types were placed in this encounter.    Plan:       Requested Prescriptions     Signed Prescriptions Disp Refills    pramipexole (MIRAPEX) 0.25 MG tablet 30 tablet 11     Sig: Take 1 tablet (0.25 mg total) by mouth every evening.     Periodic limb movement sleep disorder  -     pramipexole (MIRAPEX) 0.25 MG tablet; Take 1 tablet (0.25 mg total) by mouth every evening.  Dispense: 30 tablet; Refill: 11           Return in about 6 months (around 7/10/2018) for review.    MEDICAL  DECISION MAKING: Moderate to high complexity.  Overall, the multiple problems listed are of moderate to high severity that may impact quality of life and activities of daily living. Side effects of medications, treatment plan as well as options and alternatives reviewed and discussed with patient. There was counseling of patient concerning these issues.    Total time spent in face to face counseling and coordination of care - 25  minutes over 50% of time was used in discussion of prognosis, risks, benefits of treatment, instructions and compliance with regimen . Discussion with other physicians or health care providers (DME, NP, pharmacy, respiratory therapy) occurred.

## 2018-01-10 NOTE — PATIENT INSTRUCTIONS
Pramipexole tablets  What is this medicine?  PRAMIPEXOLE (pra mi PEX ole) is used to treat symptoms of Parkinson's disease. It is also used to treat Restless Legs Syndrome.  How should I use this medicine?  Take this medicine by mouth with a glass of water. Follow the directions on the prescription label. Take with food. Take your doses at regular intervals. Do not take your medicine more often than directed. Do not stop taking except on the advice of your doctor or health care professional.  Talk to your pediatrician regarding the use of this medicine in children. Special care may be needed.  What side effects may I notice from receiving this medicine?  Side effects that you should report to your doctor or health care professional as soon as possible:  · confusion  · double vision or other vision problems  · fainting spells  · falling asleep during normal activities like driving  · hallucination, loss of contact with reality  · mental changes  · muscle pain or severe muscle weakness  · uncontrollable movements of the arms, face, hands, head, mouth, shoulders, or upper body  Side effects that usually do not require medical attention (report to your doctor or health care professional if they continue or are bothersome):  · constipation  · frequent urination  · mild weakness  · nausea  What may interact with this medicine?  · amantadine  · cimetidine  · diltiazem  · medicines for mental problems or psychotic disturbances  · medicines for sleep  · metoclopramide  · quinidine or quinine  · ranitidine  · triamterene  · verapamil  What if I miss a dose?  If you miss a dose, take it as soon as you can. If it is almost time for your next dose, take only that dose. Do not take double or extra doses.  Where should I keep my medicine?  Keep out of the reach of children.  Store at room temperature between 15 and 30 degrees C (59 and 86 degrees F). Protect from light. Throw away any unused medicine after the expiration  date.  What should I tell my health care provider before I take this medicine?  They need to know if you have any of these conditions:  · dizzy or fainting spells  · heart disease  · kidney disease  · low blood pressure  · schizophrenia  · sleeping problems  · an unusual or allergic reaction to pramipexole, other medicines, foods, dyes, or preservatives  · pregnant or trying to get pregnant  · breast-feeding  What should I watch for while using this medicine?  Visit your doctor or health care professional for regular checks on your progress. It may be several weeks or months before you feel the full effect of this medicine. Continue to take your medicine on a regular schedule.  You may get drowsy or dizzy. Do not drive, use machinery, or do anything that needs mental alertness until you know how this drug affects you. Do not stand or sit up quickly, especially if you are an older patient. This reduces the risk of dizzy or fainting spells. If you find that you have sudden feelings of wanting to sleep during normal activities, like cooking, watching television, or while driving or riding in a car, you should contact your health care professional.  Your mouth may get dry. Chewing sugarless gum or sucking hard candy, and drinking plenty of water may help. Contact your doctor if the problem does not go away or is severe.  There have been reports of increased sexual urges or other strong urges such as gambling while taking some medicines for Parkinson's disease. If you experience any of these urges while taking this medicine, you should report it to your health care provider as soon as possible.  You should check your skin often for changes to moles and new growths while taking this medicine. Call your doctor if you notice any of these changes.  NOTE:This sheet is a summary. It may not cover all possible information. If you have questions about this medicine, talk to your doctor, pharmacist, or health care provider.  Copyright© 2017 [employer]        Hydrochlorothiazide, HCTZ; Losartan tablets  What is this medicine?  LOSARTAN; HYDROCHLOROTHIAZIDE (juan BRIANDA tan; janina droe klor oh THYE a zide) is a combination of a drug that relaxes blood vessels and a diuretic. It is used to treat high blood pressure. This medicine may also reduce the risk of stroke in certain patients.  How should I use this medicine?  Take this medicine by mouth with a glass of water. Follow the directions on the prescription label. You can take it with or without food. If it upsets your stomach, take it with food. Take your medicine at regular intervals. Do not take it more often than directed. Do not stop taking except on your doctor's advice.  Talk to your pediatrician regarding the use of this medicine in children. Special care may be needed.  What side effects may I notice from receiving this medicine?  Side effects that you should report to your doctor or health care professional as soon as possible:  · allergic reactions like skin rash, itching or hives, swelling of the face, lips, or tongue  · breathing problems  · changes in vision  · dark urine  · eye pain  · fast or irregular heart beat, palpitations, or chest pain  · feeling faint or lightheaded  · muscle cramps  · persistent dry cough  · redness, blistering, peeling or loosening of the skin, including inside the mouth  · stomach pain  · trouble passing urine or change in the amount of urine  · unusual bleeding or bruising  · worsened gout pain  · yellowing of the eyes or skin  Side effects that usually do not require medical attention (report to your doctor or health care professional if they continue or are bothersome):  · change in sex drive or performance  · headache  What may interact with this medicine?  · barbiturates, like phenobarbital  · blood pressure medicines  · celecoxib  · cimetidine  · corticosteroids  · diabetic medicines  · diuretics, especially triamterene, spironolactone or  amiloride  · fluconazole  · lithium  · NSAIDs, medicines for pain and inflammation, like ibuprofen or naproxen  · potassium salts or potassium supplements  · prescription pain medicines  · rifampin  · skeletal muscle relaxants like tubocurarine  · some cholesterol-lowering medicines like cholestyramine or colestipol  What if I miss a dose?  If you miss a dose, take it as soon as you can. If it is almost time for your next dose, take only that dose. Do not take double or extra doses.  Where should I keep my medicine?  Keep out of the reach of children.  Store at room temperature between 15 and 30 degrees C (59 and 86 degrees F). Protect from light. Keep container tightly closed. Throw away any unused medicine after the expiration date.  What should I tell my health care provider before I take this medicine?  They need to know if you have any of these conditions:  · decreased urine  · kidney disease  · liver disease  · if you are on a special diet, like a low-salt diet  · immune system problems, like lupus  · an unusual or allergic reaction to losartan, hydrochlorothiazide, sulfa drugs, other medicines, foods, dyes, or preservatives  · pregnant or trying to get pregnant  · breast-feeding  What should I watch for while using this medicine?  Check your blood pressure regularly while you are taking this medicine. Ask your doctor or health care professional what your blood pressure should be and when you should contact him or her. When you check your blood pressure, write down the measurements to show your doctor or health care professional. If you are taking this medicine for a long time, you must visit your health care professional for regular checks on your progress. Make sure you schedule appointments on a regular basis.  You must not get dehydrated. Ask your doctor or health care professional how much fluid you need to drink a day. Check with him or her if you get an attack of severe diarrhea, nausea and vomiting, or  if you sweat a lot. The loss of too much body fluid can make it dangerous for you to take this medicine.  Women should inform their doctor if they wish to become pregnant or think they might be pregnant. There is a potential for serious side effects to an unborn child, particularly in the second or third trimester. Talk to your health care professional or pharmacist for more information.  You may get drowsy or dizzy. Do not drive, use machinery, or do anything that needs mental alertness until you know how this drug affects you. Do not stand or sit up quickly, especially if you are an older patient. This reduces the risk of dizzy or fainting spells. Alcohol can make you more drowsy and dizzy. Avoid alcoholic drinks.  This medicine may affect your blood sugar level. If you have diabetes, check with your doctor or health care professional before changing the dose of your diabetic medicine.  Avoid salt substitutes unless you are told otherwise by your doctor or health care professional.  Do not treat yourself for coughs, colds, or pain while you are taking this medicine without asking your doctor or health care professional for advice. Some ingredients may increase your blood pressure.  NOTE:This sheet is a summary. It may not cover all possible information. If you have questions about this medicine, talk to your doctor, pharmacist, or health care provider. Copyright© 2017 [employer]

## 2018-01-22 RX ORDER — LATANOPROST 50 UG/ML
1 SOLUTION/ DROPS OPHTHALMIC DAILY
Qty: 2.5 ML | Refills: 1 | Status: SHIPPED | OUTPATIENT
Start: 2018-01-22 | End: 2018-07-10

## 2018-01-22 NOTE — TELEPHONE ENCOUNTER
----- Message from Khushbu Lomas sent at 1/22/2018  9:33 AM CST -----  Contact: pt  [phone] c/o burning and discomfort with drops pls advise travoprost (TRAVATAN Z) 0.004 % Drop

## 2018-01-23 ENCOUNTER — LAB VISIT (OUTPATIENT)
Dept: LAB | Facility: HOSPITAL | Age: 78
End: 2018-01-23
Attending: INTERNAL MEDICINE
Payer: MEDICARE

## 2018-01-23 ENCOUNTER — OFFICE VISIT (OUTPATIENT)
Dept: HEMATOLOGY/ONCOLOGY | Facility: CLINIC | Age: 78
End: 2018-01-23
Payer: MEDICARE

## 2018-01-23 VITALS
SYSTOLIC BLOOD PRESSURE: 118 MMHG | TEMPERATURE: 98 F | HEIGHT: 70 IN | BODY MASS INDEX: 28 KG/M2 | OXYGEN SATURATION: 99 % | DIASTOLIC BLOOD PRESSURE: 64 MMHG | WEIGHT: 195.56 LBS | HEART RATE: 64 BPM

## 2018-01-23 DIAGNOSIS — N18.30 CHRONIC KIDNEY DISEASE, STAGE 3: Primary | ICD-10-CM

## 2018-01-23 DIAGNOSIS — D69.6 THROMBOCYTOPENIA: ICD-10-CM

## 2018-01-23 DIAGNOSIS — N18.30 CHRONIC KIDNEY DISEASE, STAGE 3: ICD-10-CM

## 2018-01-23 LAB
ALBUMIN SERPL BCP-MCNC: 3.5 G/DL
ALP SERPL-CCNC: 67 U/L
ALT SERPL W/O P-5'-P-CCNC: 15 U/L
ANION GAP SERPL CALC-SCNC: 6 MMOL/L
AST SERPL-CCNC: 18 U/L
BASOPHILS # BLD AUTO: 0.01 K/UL
BASOPHILS NFR BLD: 0.3 %
BILIRUB SERPL-MCNC: 0.8 MG/DL
BUN SERPL-MCNC: 24 MG/DL
CALCIUM SERPL-MCNC: 8.7 MG/DL
CHLORIDE SERPL-SCNC: 105 MMOL/L
CO2 SERPL-SCNC: 29 MMOL/L
CREAT SERPL-MCNC: 1.3 MG/DL
DIFFERENTIAL METHOD: ABNORMAL
EOSINOPHIL # BLD AUTO: 0.2 K/UL
EOSINOPHIL NFR BLD: 4.1 %
ERYTHROCYTE [DISTWIDTH] IN BLOOD BY AUTOMATED COUNT: 12.3 %
EST. GFR  (AFRICAN AMERICAN): 60 ML/MIN/1.73 M^2
EST. GFR  (NON AFRICAN AMERICAN): 52 ML/MIN/1.73 M^2
GLUCOSE SERPL-MCNC: 94 MG/DL
HCT VFR BLD AUTO: 39.9 %
HGB BLD-MCNC: 13.2 G/DL
LDH SERPL L TO P-CCNC: 157 U/L
LYMPHOCYTES # BLD AUTO: 0.8 K/UL
LYMPHOCYTES NFR BLD: 22.1 %
MCH RBC QN AUTO: 30.7 PG
MCHC RBC AUTO-ENTMCNC: 33.1 G/DL
MCV RBC AUTO: 93 FL
MONOCYTES # BLD AUTO: 0.3 K/UL
MONOCYTES NFR BLD: 9 %
NEUTROPHILS # BLD AUTO: 2.4 K/UL
NEUTROPHILS NFR BLD: 64.5 %
PLATELET # BLD AUTO: 137 K/UL
PMV BLD AUTO: 8.7 FL
POTASSIUM SERPL-SCNC: 4.4 MMOL/L
PROT SERPL-MCNC: 6.7 G/DL
RBC # BLD AUTO: 4.3 M/UL
SODIUM SERPL-SCNC: 140 MMOL/L
WBC # BLD AUTO: 3.66 K/UL

## 2018-01-23 PROCEDURE — 99999 PR PBB SHADOW E&M-EST. PATIENT-LVL III: CPT | Mod: PBBFAC,,, | Performed by: INTERNAL MEDICINE

## 2018-01-23 PROCEDURE — 85025 COMPLETE CBC W/AUTO DIFF WBC: CPT | Mod: PO

## 2018-01-23 PROCEDURE — 83615 LACTATE (LD) (LDH) ENZYME: CPT | Mod: PO

## 2018-01-23 PROCEDURE — 99499 UNLISTED E&M SERVICE: CPT | Mod: S$GLB,,, | Performed by: INTERNAL MEDICINE

## 2018-01-23 PROCEDURE — 80053 COMPREHEN METABOLIC PANEL: CPT | Mod: PO

## 2018-01-23 PROCEDURE — 99214 OFFICE O/P EST MOD 30 MIN: CPT | Mod: S$GLB,,, | Performed by: INTERNAL MEDICINE

## 2018-01-23 PROCEDURE — 36415 COLL VENOUS BLD VENIPUNCTURE: CPT | Mod: PO

## 2018-01-23 RX ORDER — AZITHROMYCIN 250 MG/1
250 TABLET, FILM COATED ORAL DAILY
COMMUNITY
Start: 2018-01-19 | End: 2018-02-01

## 2018-01-23 NOTE — PROGRESS NOTES
Hematology/Oncology Office Note    Reason for referral:  Thrombocytopenia    CC:  Thrombocytopenia    Referred by:  No ref. provider found    Diagnosis:  Progressive thrombocytopenia    75-year-old gentleman referred for asymptomatic slowly progressive thrombocytopenia.  Thrombocytopenia first noted in 2008And noted to be slowly progressive over the previous 6 years with most recent platelet count of 109 on 8/26/15.  He denies any new or recent medication changes.  No report of excessive bruising/bleeding/fever, chills, night sweats or lymphadenopathy.    I have reviewed and updated the HPI, ROS, PMHx, Social Hx, Family Hx and treatment history.    Today's visit:  Patient follows up for routine visit today.  He has no specific complaints and specifically denies excessive bleeding or bruising.      Past Medical History:   Diagnosis Date    Allergic rhinitis     Anemia     Back pain     BPH (benign prostatic hyperplasia)     Cancer     skin cancer to neck, Dr. Graves    Cataract     Disorder of kidney and ureter     ED (erectile dysfunction)     Hiatal hernia     small    History of colon polyps     colonoscopy 11/2016    HLD (hyperlipidemia)     Hyperlipidemia     Hypertension     Lateral epicondylitis     OA (osteoarthritis)     GUIDO (obstructive sleep apnea)     Prostate cancer     Dr. Wong    TIA (transient ischemic attack)     Trouble in sleeping          Social History:  Former smoker quit in 1985 (60-pack-year history)  4 drinks per night most nights  No illicit drugs    Family History: family history includes Cancer in his brother and father; Cataracts in his brother, sister, and sister; Lung cancer in his father; Stroke in his sister.        HPI    Review of Systems   Constitutional: Negative for activity change, appetite change, fatigue, fever and unexpected weight change.   HENT: Negative for congestion, dental problem, drooling, ear discharge, ear pain, hearing loss, sneezing,  "trouble swallowing and voice change.    Eyes: Negative.    Respiratory: Negative for apnea, cough, shortness of breath, wheezing and stridor.    Cardiovascular: Negative for chest pain, palpitations and leg swelling.   Gastrointestinal: Negative for abdominal distention, abdominal pain, constipation, diarrhea, nausea, rectal pain and vomiting.   Endocrine: Negative.    Genitourinary: Negative for decreased urine volume, difficulty urinating, dysuria, flank pain, genital sores, hematuria, testicular pain and urgency.   Musculoskeletal: Negative for arthralgias, back pain, joint swelling, neck pain and neck stiffness.   Skin: Negative for color change, pallor, rash and wound.   Allergic/Immunologic: Negative.  Negative for environmental allergies.   Neurological: Negative for dizziness, syncope, speech difficulty, weakness and headaches.   Hematological: Negative for adenopathy. Does not bruise/bleed easily.   Psychiatric/Behavioral: Negative for agitation, behavioral problems, confusion, decreased concentration and hallucinations. The patient is not nervous/anxious and is not hyperactive.        Objective:       Vitals:    01/23/18 1326   BP: 118/64   Pulse: 64   Temp: 97.9 °F (36.6 °C)   TempSrc: Oral   SpO2: 99%   Weight: 88.7 kg (195 lb 8.8 oz)   Height: 5' 10" (1.778 m)     Physical Exam   Constitutional: He is oriented to person, place, and time. He appears well-developed and well-nourished.  Non-toxic appearance. He does not appear ill. No distress.   HENT:   Head: Normocephalic and atraumatic.   Right Ear: External ear normal.   Left Ear: External ear normal.   Nose: Nose normal. Right sinus exhibits no maxillary sinus tenderness and no frontal sinus tenderness. Left sinus exhibits no maxillary sinus tenderness and no frontal sinus tenderness.   Mouth/Throat: Uvula is midline, oropharynx is clear and moist and mucous membranes are normal. No oral lesions. Normal dentition.   Eyes: Conjunctivae, EOM and lids " are normal. Pupils are equal, round, and reactive to light. Right eye exhibits no discharge. Left eye exhibits no discharge. Right conjunctiva is not injected. Right conjunctiva has no hemorrhage. Left conjunctiva is not injected. Left conjunctiva has no hemorrhage. No scleral icterus.   Neck: Normal range of motion. Neck supple. No JVD present. No spinous process tenderness and no muscular tenderness present. No tracheal deviation present. No thyromegaly present.   Cardiovascular: Normal rate, regular rhythm, normal heart sounds and intact distal pulses.    No murmur heard.  Pulmonary/Chest: Effort normal and breath sounds normal. No accessory muscle usage. No respiratory distress. He has no decreased breath sounds. He has no wheezes. He has no rhonchi. He has no rales. He exhibits no tenderness.   Abdominal: Soft. Bowel sounds are normal. He exhibits no distension and no mass. There is no hepatosplenomegaly. There is no tenderness. There is no rebound and no guarding.   Musculoskeletal: Normal range of motion. He exhibits no edema or tenderness.   Lymphadenopathy:        Head (right side): No submental and no submandibular adenopathy present.        Head (left side): No submental and no submandibular adenopathy present.     He has no cervical adenopathy.     He has no axillary adenopathy.        Right: No supraclavicular adenopathy present.        Left: No supraclavicular adenopathy present.   Neurological: He is alert and oriented to person, place, and time. No cranial nerve deficit. He exhibits normal muscle tone. Coordination normal.   Skin: Skin is warm, dry and intact. Capillary refill takes less than 2 seconds. No abrasion, no bruising, no ecchymosis and no rash noted. He is not diaphoretic. No pallor.   Psychiatric: He has a normal mood and affect. His speech is normal and behavior is normal. Judgment and thought content normal.         Lab Results   Component Value Date    WBC 3.66 (L) 01/23/2018    HGB  13.2 (L) 01/23/2018    HCT 39.9 (L) 01/23/2018    MCV 93 01/23/2018     (L) 01/23/2018     Lab Results   Component Value Date    CREATININE 1.3 01/23/2018    BUN 24 (H) 01/23/2018     01/23/2018    K 4.4 01/23/2018     01/23/2018    CO2 29 01/23/2018     Lab Results   Component Value Date    ALT 15 01/23/2018    AST 18 01/23/2018    ALKPHOS 67 01/23/2018    BILITOT 0.8 01/23/2018         Assessment:       75-year-old gentleman referred for thrombocytopenia with normal WBC and hemoglobin.    Platelet count has steadily improved after decreasing alcohol intake, however, mild thrombocytopenia remains.  I suspect there may be a component of low-grade MDS but cytopenias are not significant enough to warrant bone marrow biopsy at this time.  He will follow-up in 1 year with repeat CBC    Mild asymptomatic thrombocytopenia:    --continue to decrease alcohol intake  --repeat cbc, cmp, ldh in 12 months  --bone marrow bx upon progression of cytopenias

## 2018-02-01 ENCOUNTER — OFFICE VISIT (OUTPATIENT)
Dept: OPHTHALMOLOGY | Facility: CLINIC | Age: 78
End: 2018-02-01
Payer: MEDICARE

## 2018-02-01 DIAGNOSIS — H40.1111 PRIMARY OPEN ANGLE GLAUCOMA (POAG) OF RIGHT EYE, MILD STAGE: Primary | ICD-10-CM

## 2018-02-01 DIAGNOSIS — H40.1122 PRIMARY OPEN ANGLE GLAUCOMA (POAG) OF LEFT EYE, MODERATE STAGE: ICD-10-CM

## 2018-02-01 DIAGNOSIS — H26.9 CORTICAL CATARACT OF BOTH EYES: ICD-10-CM

## 2018-02-01 PROCEDURE — 92020 GONIOSCOPY: CPT | Mod: S$GLB,,, | Performed by: OPHTHALMOLOGY

## 2018-02-01 PROCEDURE — 92083 EXTENDED VISUAL FIELD XM: CPT | Mod: S$GLB,,, | Performed by: OPHTHALMOLOGY

## 2018-02-01 PROCEDURE — 99999 PR PBB SHADOW E&M-EST. PATIENT-LVL II: CPT | Mod: PBBFAC,,, | Performed by: OPHTHALMOLOGY

## 2018-02-01 PROCEDURE — 92136 OPHTHALMIC BIOMETRY: CPT | Mod: RT,S$GLB,, | Performed by: OPHTHALMOLOGY

## 2018-02-01 PROCEDURE — 92012 INTRM OPH EXAM EST PATIENT: CPT | Mod: S$GLB,,, | Performed by: OPHTHALMOLOGY

## 2018-02-01 PROCEDURE — 99499 UNLISTED E&M SERVICE: CPT | Mod: S$GLB,,, | Performed by: OPHTHALMOLOGY

## 2018-02-01 RX ORDER — PREDNISOLONE ACETATE 10 MG/ML
1 SUSPENSION/ DROPS OPHTHALMIC 4 TIMES DAILY
Qty: 1 BOTTLE | Refills: 2 | Status: SHIPPED | OUTPATIENT
Start: 2018-02-01 | End: 2018-03-28 | Stop reason: ALTCHOICE

## 2018-02-01 RX ORDER — KETOROLAC TROMETHAMINE 5 MG/ML
1 SOLUTION OPHTHALMIC 4 TIMES DAILY
Qty: 1 BOTTLE | Refills: 2 | Status: SHIPPED | OUTPATIENT
Start: 2018-02-01 | End: 2018-03-28 | Stop reason: ALTCHOICE

## 2018-02-01 RX ORDER — GATIFLOXACIN 5 MG/ML
1 SOLUTION/ DROPS OPHTHALMIC 2 TIMES DAILY
Qty: 1 BOTTLE | Refills: 2 | Status: SHIPPED | OUTPATIENT
Start: 2018-02-01 | End: 2018-03-28 | Stop reason: ALTCHOICE

## 2018-02-01 NOTE — PROGRESS NOTES
SUBJECTIVE:   Ezequiel Hughes is a 77 y.o. male   Uncorrected distance visual acuity was 20/50 -1 in the right eye and 20/60 +1 in the left eye.   Chief Complaint   Patient presents with    Glaucoma     2-3 wk gonioscopy, iop ck, and hvf        HPI:  HPI     Glaucoma    Additional comments: 2-3 wk gonioscopy, iop ck, and hvf           Comments   Pt is intolerant of travatan     1. Mod COAG OS + Mild COAG OD (init 22/26 post dilation IOP ) goal = 17  2. Cataract OU  3. ERM OU     Latanoprost qhs ou        Last edited by Deb Del Cid MA on 2/1/2018  2:25 PM. (History)        Assessment /Plan :  1. Primary open angle glaucoma (POAG) of right eye, mild stage Doing well, IOP within acceptable range relative to target IOP and no evidence of progression. Continue current treatment. Reviewed importance of continued compliance with treatment and follow up.     2. Primary open angle glaucoma (POAG) of left eye, moderate stage as above   3. Cortical cataract of both eyes  Visually Significant Cataract OU:   Patient reports decreased vision consistent with the clinical amount of lenticular opacity,  which reaches the level of visual significance and affects activities of daily living such as  read. Risks, benefits, and alternatives to cataract surgery were  discussed.  IOL options were discussed, including Premium IOL'S and the associated  side effects and additional patient cost associated with them as well as patient's visual  goals. The pt expressed a desire to proceed with surgery with the potential for some  reasonable degree of visual improvement and was consented.  Risks of loss of vision and eye reviewed as well as possibility of need for spectacle correction after surgery even with premium implants. Verbal and written preop  instructions were provided to the pt.            Pt is interested in traditional monofocal IOL aiming for:       Distance OU and understands that  glasses will be generally needed at all times  for near vision and often for finer distance correction especially for higher degrees of astigmatism.           Final visual acuity may be limited by Astigmatism,Glaucoma, ERM    Pt wishes to have Phaco/IOL with iStent  OD     Requests a Monofocal IOL.    Will aim for distance    Other considerations: None    Monofocal vision with contacts may elect os set for near when ready for surgery

## 2018-02-14 ENCOUNTER — TELEPHONE (OUTPATIENT)
Dept: OPHTHALMOLOGY | Facility: CLINIC | Age: 78
End: 2018-02-14

## 2018-02-19 DIAGNOSIS — I10 ESSENTIAL HYPERTENSION: ICD-10-CM

## 2018-02-19 NOTE — TELEPHONE ENCOUNTER
----- Message from Brenda Floyd sent at 2/19/2018  1:31 PM CST -----  Contact: pt  Please call pt @ [phone] regarding medication Losartan HCtz-50/12.5, pt need a script called in [facility].

## 2018-02-20 RX ORDER — LOSARTAN POTASSIUM AND HYDROCHLOROTHIAZIDE 12.5; 5 MG/1; MG/1
1 TABLET ORAL DAILY
Qty: 90 TABLET | Refills: 3 | Status: SHIPPED | OUTPATIENT
Start: 2018-02-20 | End: 2018-02-27 | Stop reason: SDUPTHER

## 2018-02-22 ENCOUNTER — OFFICE VISIT (OUTPATIENT)
Dept: OPHTHALMOLOGY | Facility: CLINIC | Age: 78
End: 2018-02-22
Payer: MEDICARE

## 2018-02-22 DIAGNOSIS — Z98.890 POST-OPERATIVE STATE: Primary | ICD-10-CM

## 2018-02-22 PROCEDURE — 99999 PR PBB SHADOW E&M-EST. PATIENT-LVL II: CPT | Mod: PBBFAC,,, | Performed by: OPHTHALMOLOGY

## 2018-02-22 PROCEDURE — 99024 POSTOP FOLLOW-UP VISIT: CPT | Mod: S$GLB,,, | Performed by: OPHTHALMOLOGY

## 2018-02-27 DIAGNOSIS — I10 ESSENTIAL HYPERTENSION: ICD-10-CM

## 2018-02-27 RX ORDER — LOSARTAN POTASSIUM AND HYDROCHLOROTHIAZIDE 12.5; 5 MG/1; MG/1
1 TABLET ORAL DAILY
Qty: 90 TABLET | Refills: 3 | Status: SHIPPED | OUTPATIENT
Start: 2018-02-27 | End: 2018-07-10 | Stop reason: SDUPTHER

## 2018-02-27 NOTE — TELEPHONE ENCOUNTER
----- Message from George Bingham sent at 2/27/2018  7:25 AM CST -----  Contact: [phone]  Pt would like a 90 day supply and refills called in on Rx medication  Losartan HCTZ 50-12.5.  Please call back at [phone]. Domenic             Pt Uses:  [facility] .

## 2018-03-01 ENCOUNTER — OFFICE VISIT (OUTPATIENT)
Dept: OPHTHALMOLOGY | Facility: CLINIC | Age: 78
End: 2018-03-01
Payer: MEDICARE

## 2018-03-01 DIAGNOSIS — H40.1122 PRIMARY OPEN ANGLE GLAUCOMA (POAG) OF LEFT EYE, MODERATE STAGE: ICD-10-CM

## 2018-03-01 DIAGNOSIS — H25.12 NUCLEAR SCLEROSIS OF LEFT EYE: ICD-10-CM

## 2018-03-01 DIAGNOSIS — H26.9 CORTICAL CATARACT OF LEFT EYE: ICD-10-CM

## 2018-03-01 DIAGNOSIS — Z98.890 POST-OPERATIVE STATE: Primary | ICD-10-CM

## 2018-03-01 PROCEDURE — 92136 OPHTHALMIC BIOMETRY: CPT | Mod: 26,LT,S$GLB, | Performed by: OPHTHALMOLOGY

## 2018-03-01 PROCEDURE — 99499 UNLISTED E&M SERVICE: CPT | Mod: S$GLB,,, | Performed by: OPHTHALMOLOGY

## 2018-03-01 PROCEDURE — 99024 POSTOP FOLLOW-UP VISIT: CPT | Mod: S$GLB,,, | Performed by: OPHTHALMOLOGY

## 2018-03-01 PROCEDURE — 99999 PR PBB SHADOW E&M-EST. PATIENT-LVL II: CPT | Mod: PBBFAC,,, | Performed by: OPHTHALMOLOGY

## 2018-03-01 RX ORDER — PREDNISOLONE ACETATE 10 MG/ML
1 SUSPENSION/ DROPS OPHTHALMIC 4 TIMES DAILY
Qty: 1 BOTTLE | Refills: 2 | Status: SHIPPED | OUTPATIENT
Start: 2018-03-01 | End: 2018-04-30

## 2018-03-01 RX ORDER — KETOROLAC TROMETHAMINE 5 MG/ML
1 SOLUTION OPHTHALMIC 4 TIMES DAILY
Qty: 1 BOTTLE | Refills: 2 | Status: SHIPPED | OUTPATIENT
Start: 2018-03-01 | End: 2018-04-30

## 2018-03-01 RX ORDER — GATIFLOXACIN 5 MG/ML
1 SOLUTION/ DROPS OPHTHALMIC 2 TIMES DAILY
Qty: 1 BOTTLE | Refills: 2 | Status: SHIPPED | OUTPATIENT
Start: 2018-03-01 | End: 2018-04-30

## 2018-03-01 NOTE — PROGRESS NOTES
SUBJECTIVE:   Ezequiel Hughes is a 77 y.o. male   Uncorrected distance visual acuity was 20/25 +2 in the right eye and not recorded in the left eye.   Chief Complaint   Patient presents with    Post-op Evaluation        HPI:  HPI     Patient returns for a one week p.o. Visit OD, patient states OS  Is very   blurry and vision dingy , patient c/o difficulty reading and is ready to   schedule Phaco OS , patient has been using Latanoprost bid os        Referred by [facility]    1. Mod COAG OS + Mild COAG OD (init 22/26 post dilation IOP ) goal = 17  2. PCIOL / I-Stent OD +18.5 SN6OWF (distance) 2-21-18  Cat os  3. ERM OU   *intolerant of travatan*    Latanoprost bid  os  Pred QID, Ket QID, Gat BID OD              Last edited by VINICIO Anne on 3/1/2018  7:58 AM. (History)        Assessment /Plan :  1. Post-operative state Slit lamp exam:  L/L: nl  K: clear, wound sealed  AC: 0 cell and flare  Iris/Lens: IOL centered and stable      IMP:  PO Week 1 S/P Phaco/ IOL OD with I stent : Doing well with no evidence of infection or abnormal inflammation.     Plan:  Pt given and instructed in one week PO instructions. D/C zymaxid and start to taper off Ketorlac and Pred Forte 1% weekly. Can resume normal activitites and d/c eye shield. OTC reading glasses can be used until evaluated for final MR. Follow up in one month or PRN pain, redness, vision loss, or other concerns.     2. Nuclear sclerosis of left eye   Patient reports decreased vision in the fellow eye consistent with the clinical amount of lenticular opacity, which reaches the level of visual significance and affects activities of daily living including reading and glare. Risks, benefits, and alternatives to cataract surgery were discussed and pt desired to schedule cataract surgery. Pt was consented and the biometry and lens options were reviewed.     Schedule Cataract Surgery OS        Final visual acuity may be limited by ERM, astigmatism and glaucoma damage     Requests  a Monofocal  IOL.     Will aim for -1.75     Other considerations: i stent         3. Cortical cataract of left eye    4. Primary open angle glaucoma (POAG) of left eye, moderate stage

## 2018-03-07 ENCOUNTER — LAB VISIT (OUTPATIENT)
Dept: LAB | Facility: HOSPITAL | Age: 78
End: 2018-03-07
Attending: FAMILY MEDICINE
Payer: MEDICARE

## 2018-03-07 DIAGNOSIS — I73.9 PAD (PERIPHERAL ARTERY DISEASE): ICD-10-CM

## 2018-03-07 DIAGNOSIS — R73.09 ABNORMAL GLUCOSE: ICD-10-CM

## 2018-03-07 DIAGNOSIS — N18.30 CHRONIC KIDNEY DISEASE, STAGE 3: ICD-10-CM

## 2018-03-07 DIAGNOSIS — E78.2 MIXED HYPERLIPIDEMIA: ICD-10-CM

## 2018-03-07 DIAGNOSIS — I77.1 TORTUOUS AORTA: ICD-10-CM

## 2018-03-07 DIAGNOSIS — D69.6 THROMBOCYTOPENIA: ICD-10-CM

## 2018-03-07 DIAGNOSIS — I10 ESSENTIAL HYPERTENSION: ICD-10-CM

## 2018-03-07 LAB
ALBUMIN SERPL BCP-MCNC: 3.9 G/DL
ALP SERPL-CCNC: 55 U/L
ALT SERPL W/O P-5'-P-CCNC: 17 U/L
ANION GAP SERPL CALC-SCNC: 9 MMOL/L
AST SERPL-CCNC: 17 U/L
BASOPHILS # BLD AUTO: 0.04 K/UL
BASOPHILS NFR BLD: 1.1 %
BILIRUB SERPL-MCNC: 1.3 MG/DL
BUN SERPL-MCNC: 26 MG/DL
CALCIUM SERPL-MCNC: 9.2 MG/DL
CHLORIDE SERPL-SCNC: 106 MMOL/L
CHOLEST SERPL-MCNC: 136 MG/DL
CHOLEST/HDLC SERPL: 2.1 {RATIO}
CO2 SERPL-SCNC: 25 MMOL/L
CREAT SERPL-MCNC: 1.3 MG/DL
DIFFERENTIAL METHOD: ABNORMAL
EOSINOPHIL # BLD AUTO: 0.2 K/UL
EOSINOPHIL NFR BLD: 4.6 %
ERYTHROCYTE [DISTWIDTH] IN BLOOD BY AUTOMATED COUNT: 13.8 %
EST. GFR  (AFRICAN AMERICAN): 60 ML/MIN/1.73 M^2
EST. GFR  (NON AFRICAN AMERICAN): 51.9 ML/MIN/1.73 M^2
ESTIMATED AVG GLUCOSE: 103 MG/DL
GLUCOSE SERPL-MCNC: 91 MG/DL
HBA1C MFR BLD HPLC: 5.2 %
HCT VFR BLD AUTO: 38.3 %
HDLC SERPL-MCNC: 65 MG/DL
HDLC SERPL: 47.8 %
HGB BLD-MCNC: 12.3 G/DL
IMM GRANULOCYTES # BLD AUTO: 0.02 K/UL
IMM GRANULOCYTES NFR BLD AUTO: 0.5 %
LDLC SERPL CALC-MCNC: 58.2 MG/DL
LYMPHOCYTES # BLD AUTO: 0.7 K/UL
LYMPHOCYTES NFR BLD: 18.8 %
MCH RBC QN AUTO: 30.3 PG
MCHC RBC AUTO-ENTMCNC: 32.1 G/DL
MCV RBC AUTO: 94 FL
MONOCYTES # BLD AUTO: 0.4 K/UL
MONOCYTES NFR BLD: 9.9 %
NEUTROPHILS # BLD AUTO: 2.4 K/UL
NEUTROPHILS NFR BLD: 65.1 %
NONHDLC SERPL-MCNC: 71 MG/DL
NRBC BLD-RTO: 0 /100 WBC
PLATELET # BLD AUTO: 140 K/UL
PMV BLD AUTO: 8.3 FL
POTASSIUM SERPL-SCNC: 4.3 MMOL/L
PROT SERPL-MCNC: 6.7 G/DL
RBC # BLD AUTO: 4.06 M/UL
SODIUM SERPL-SCNC: 140 MMOL/L
TRIGL SERPL-MCNC: 64 MG/DL
URATE SERPL-MCNC: 6.9 MG/DL
WBC # BLD AUTO: 3.72 K/UL

## 2018-03-07 PROCEDURE — 36415 COLL VENOUS BLD VENIPUNCTURE: CPT | Mod: PO

## 2018-03-07 PROCEDURE — 80061 LIPID PANEL: CPT

## 2018-03-07 PROCEDURE — 85025 COMPLETE CBC W/AUTO DIFF WBC: CPT

## 2018-03-07 PROCEDURE — 80053 COMPREHEN METABOLIC PANEL: CPT

## 2018-03-07 PROCEDURE — 83036 HEMOGLOBIN GLYCOSYLATED A1C: CPT

## 2018-03-07 PROCEDURE — 84550 ASSAY OF BLOOD/URIC ACID: CPT

## 2018-03-14 ENCOUNTER — OFFICE VISIT (OUTPATIENT)
Dept: INTERNAL MEDICINE | Facility: CLINIC | Age: 78
End: 2018-03-14
Payer: MEDICARE

## 2018-03-14 VITALS
DIASTOLIC BLOOD PRESSURE: 68 MMHG | HEART RATE: 74 BPM | TEMPERATURE: 97 F | SYSTOLIC BLOOD PRESSURE: 122 MMHG | WEIGHT: 201.5 LBS | HEIGHT: 71 IN | BODY MASS INDEX: 28.21 KG/M2

## 2018-03-14 DIAGNOSIS — D64.9 ANEMIA, UNSPECIFIED TYPE: ICD-10-CM

## 2018-03-14 DIAGNOSIS — S86.912A KNEE STRAIN, LEFT, INITIAL ENCOUNTER: ICD-10-CM

## 2018-03-14 DIAGNOSIS — I10 ESSENTIAL HYPERTENSION: Chronic | ICD-10-CM

## 2018-03-14 DIAGNOSIS — C61 PROSTATE CANCER: ICD-10-CM

## 2018-03-14 DIAGNOSIS — D69.6 THROMBOCYTOPENIA: ICD-10-CM

## 2018-03-14 DIAGNOSIS — Z00.00 ROUTINE GENERAL MEDICAL EXAMINATION AT A HEALTH CARE FACILITY: Primary | ICD-10-CM

## 2018-03-14 DIAGNOSIS — E79.0 ELEVATED URIC ACID IN BLOOD: ICD-10-CM

## 2018-03-14 DIAGNOSIS — N18.30 CHRONIC KIDNEY DISEASE, STAGE 3: ICD-10-CM

## 2018-03-14 DIAGNOSIS — M47.817 LUMBOSACRAL SPONDYLOSIS WITHOUT MYELOPATHY: ICD-10-CM

## 2018-03-14 DIAGNOSIS — E78.2 MIXED HYPERLIPIDEMIA: Chronic | ICD-10-CM

## 2018-03-14 DIAGNOSIS — G47.33 OSA (OBSTRUCTIVE SLEEP APNEA): ICD-10-CM

## 2018-03-14 DIAGNOSIS — I70.203 ATHEROSCLEROSIS OF ARTERY OF BOTH LOWER EXTREMITIES: ICD-10-CM

## 2018-03-14 PROCEDURE — 99499 UNLISTED E&M SERVICE: CPT | Mod: S$GLB,,, | Performed by: FAMILY MEDICINE

## 2018-03-14 PROCEDURE — 99397 PER PM REEVAL EST PAT 65+ YR: CPT | Mod: S$GLB,,, | Performed by: FAMILY MEDICINE

## 2018-03-14 PROCEDURE — 99999 PR PBB SHADOW E&M-EST. PATIENT-LVL III: CPT | Mod: PBBFAC,,, | Performed by: FAMILY MEDICINE

## 2018-03-14 NOTE — PROGRESS NOTES
Subjective:      Patient ID: Ezequiel Hughes is a 78 y.o. male.    Chief Complaint: Annual Exam    Disclaimer:  This note is prepared using voice recognition software and as such is likely to have errors and has not been proof read. Please contact me for questions.       He's presenting today for his prevention exam.    Sees Dr. Rendon for peripheral vascular disease.  Has an upcoming appointment in a few weeks.  Currently doing well now working out at the gym 3 times a week.  Did fill left knee pop but it's better now.  Does have still little bit of swelling.  Walking his hips hurt at times but on the treadmill and elliptical it seems to be doing fine for him.  Does not currently take anything but would be willing to take some Tylenol arthritis.    From a peripheral vascular standpoint he is currently on Lipitor 40 and Plavix at 75 mg.  He's doing well with these medications and is here to review his lab work.    Also has some thrombocytopenia for which he has seen Dr. Garcia.  He also has slightly lower white count last check as well.  He has upcoming appointment yearly at this time.    He has a known conditions of BPH and prostate cancer that  is currently being followed by Dr. Wong for his prostate.  For this reason he's on the Proscar    Does have occasional ALLERGY flareups as well for which she'll use Flonase.      We did take him off any anti-inflammatories due to some mildly decreased GFR.  He also had consistent with chronic kidney disease stage III.  Since that time he still has a little bit of decline but otherwise doing well.      There are no current blood pressure issues at this time.      Does have evidence of a tortuous aorta but no current complaints of chest pain.      Has a history of a calcified granuloma in the left lung base but no current issues with chest discomfort or shortness of breath.      Does report there is a visual change for him but he sees an outside eye specialist and is  "going to have his second cataract performed as well by Dr. Almaraz soon.    Does have mildly elevated uric acid levels but no recent gout flareups              Lab Results   Component Value Date    WBC 3.72 (L) 03/07/2018    HGB 12.3 (L) 03/07/2018    HCT 38.3 (L) 03/07/2018     (L) 03/07/2018    CHOL 136 03/07/2018    TRIG 64 03/07/2018    HDL 65 03/07/2018    ALT 17 03/07/2018    AST 17 03/07/2018     03/07/2018    K 4.3 03/07/2018     03/07/2018    CREATININE 1.3 03/07/2018    BUN 26 (H) 03/07/2018    CO2 25 03/07/2018    TSH 1.909 09/07/2017    PSA 1.5 09/01/2016    INR 1.2 09/26/2017    HGBA1C 5.2 03/07/2018       Review of Systems   Constitutional: Negative for activity change, appetite change, fatigue and unexpected weight change.   HENT: Negative for hearing loss, rhinorrhea and trouble swallowing.    Eyes: Positive for visual disturbance. Negative for discharge.   Respiratory: Negative for chest tightness and wheezing.    Cardiovascular: Negative for chest pain and palpitations.   Gastrointestinal: Negative for blood in stool, constipation, diarrhea and vomiting.   Endocrine: Negative for polydipsia and polyuria.   Genitourinary: Negative for difficulty urinating, hematuria and urgency.   Musculoskeletal: Positive for arthralgias. Negative for joint swelling and neck pain.   Neurological: Negative for weakness and headaches.   Psychiatric/Behavioral: Negative for confusion and dysphoric mood.     Objective:     Vitals:    03/14/18 0716   BP: 122/68   Pulse: 74   Temp: 97.3 °F (36.3 °C)   TempSrc: Tympanic   Weight: 91.4 kg (201 lb 8 oz)   Height: 5' 10.5" (1.791 m)     Physical Exam   Constitutional: He appears well-developed and well-nourished.   HENT:   Head: Normocephalic and atraumatic.   Right Ear: Tympanic membrane normal.   Left Ear: Tympanic membrane normal.   Mouth/Throat: Oropharynx is clear and moist.   Eyes: Conjunctivae and EOM are normal.   Neck: Normal range of motion. Neck " supple.   Cardiovascular: Normal rate and regular rhythm.    Pulmonary/Chest: Effort normal and breath sounds normal.   Abdominal: Soft. Bowel sounds are normal. He exhibits no distension and no mass. There is no tenderness. There is no guarding.   Musculoskeletal:        Left knee: He exhibits swelling and effusion. He exhibits normal range of motion. Tenderness found. Medial joint line tenderness noted.        Lumbar back: He exhibits decreased range of motion and pain. He exhibits no tenderness, no bony tenderness and no spasm.   Psychiatric: He has a normal mood and affect. His behavior is normal.   Nursing note and vitals reviewed.    Assessment:     1. Routine general medical examination at a health care facility    2. Essential hypertension    3. Lumbosacral spondylosis without myelopathy    4. Mixed hyperlipidemia    5. Chronic kidney disease, stage 3    6. Atherosclerosis of artery of both lower extremities    7. Anemia, unspecified type    8. Thrombocytopenia    9. Knee strain, left, initial encounter    10. Prostate cancer    11. GUIDO (obstructive sleep apnea)    12. Elevated uric acid in blood      Plan:   Ezequiel was seen today for annual exam.    Diagnoses and all orders for this visit:    Routine general medical examination at a health care facility-labs reviewed today discussed health maintenance issues reviewed questions with the patient extensively    Essential hypertension  Comments:  stable with losartan/ hctz.   Orders:  -     Iron and TIBC; Future  -     CBC auto differential; Future  -     Comprehensive metabolic panel; Future  -     Uric acid; Future    Lumbosacral spondylosis without myelopathy-advised patient to try Tylenol arthritis avoiding NSAIDs due to chronic kidney disease reviewed x-rays from prior  -     Iron and TIBC; Future  -     CBC auto differential; Future  -     Comprehensive metabolic panel; Future  -     Uric acid; Future    Mixed hyperlipidemia - stable, Continue with current  medications and interventions. Labs reviewed.     -     Iron and TIBC; Future  -     CBC auto differential; Future  -     Comprehensive metabolic panel; Future  -     Uric acid; Future    Chronic kidney disease, stage 3- stable, Continue with current medications and interventions. Labs reviewed.   -     Iron and TIBC; Future  -     CBC auto differential; Future  -     Comprehensive metabolic panel; Future  -     Uric acid; Future    Atherosclerosis of artery of both lower extremities - stable, Continue with current medications and interventions. Labs reviewed.  Followed by Dr. Rendon  -     Iron and TIBC; Future  -     CBC auto differential; Future  -     Comprehensive metabolic panel; Future  -     Uric acid; Future    Anemia, unspecified type  Comments:  stable, on plavix. followed by dr galan.   Orders:  -     Iron and TIBC; Future  -     CBC auto differential; Future  -     Comprehensive metabolic panel; Future  -     Uric acid; Future    Thrombocytopenia  Comments:  followed by dr galan. stable. at 140.   Orders:  -     Iron and TIBC; Future  -     CBC auto differential; Future  -     Comprehensive metabolic panel; Future  -     Uric acid; Future    Knee strain, left, initial encounter-new finding continue with ice and conservative treatment.  Improving    Prostate cancer  Comments:  followed by dr hadley, psa every 3 months, stable MRI. on proscar    GUIDO (obstructive sleep apnea)  Comments:  borderline, not on cpap.     Elevated uric acid in blood  Comments:  no gout flareups, monitor for now. increase water. less processed food.   Orders:  -     Iron and TIBC; Future  -     CBC auto differential; Future  -     Comprehensive metabolic panel; Future  -     Uric acid; Future            Follow-up in about 6 months (around 9/14/2018) for chronic issues Dr Ozuna.

## 2018-03-22 ENCOUNTER — OFFICE VISIT (OUTPATIENT)
Dept: OPHTHALMOLOGY | Facility: CLINIC | Age: 78
End: 2018-03-22
Payer: MEDICARE

## 2018-03-22 DIAGNOSIS — Z98.890 POST-OPERATIVE STATE: Primary | ICD-10-CM

## 2018-03-22 DIAGNOSIS — Z98.42 CATARACT EXTRACTION STATUS OF EYE, LEFT: ICD-10-CM

## 2018-03-22 PROCEDURE — 99024 POSTOP FOLLOW-UP VISIT: CPT | Mod: S$GLB,,, | Performed by: OPHTHALMOLOGY

## 2018-03-22 PROCEDURE — 99999 PR PBB SHADOW E&M-EST. PATIENT-LVL II: CPT | Mod: PBBFAC,,, | Performed by: OPHTHALMOLOGY

## 2018-03-22 NOTE — PROGRESS NOTES
SUBJECTIVE:   Ezequiel Hughes is a 78 y.o. male   Uncorrected distance visual acuity was not recorded in the right eye and 20/200 in the left eye. Uncorrected near visual acuity was not recorded in the right eye and J1 in the left eye.   Chief Complaint   Patient presents with    Post-op Evaluation        HPI:  HPI     Pt here for 1 day PCIOL OS. No pain or discomfort. Pt states that   yesterday, he was experiencing some fb sensation.     Referred by [facility] [place]    1. Mod COAG OS + Mild COAG OD (init 22/26 post dilation IOP ) goal = 17  2. PCIOL / I-Stent OD +18.5 SN6OWF (distance) 2-21-18  PCIOL /I-Stent OS 3/21/18  3. ERM OU   *intolerant of travatan*    Latanoprost bid os   Pred QID, Ket QID, Gat BID OS    Last edited by Govind Luu, Patient Care Assistant on 3/22/2018  7:48   AM. (History)        Assessment /Plan :  1. Post-operative state    2. Cataract extraction status of eye, left Exam:  SLE:  S/C: normal  K : trace k fold  AC: trace cell and flare  Iris: normal  Lens: PCIOL    IMP:  PO Day 1 S/P Phaco/IOL with Istent  OS : Doing well.    Plan:  Continue gtts to operative eye:  Pred 1% QID  Ketorolac QID  Zymaxid BID  Reinstructed in importance of absolute compliance with Post-OP instructions including medications, shield at bedtime, and limitation of activities. Follow up appointments in approximately one and six weeks or call immediately for increased pain, redness or vision loss.

## 2018-03-28 ENCOUNTER — OFFICE VISIT (OUTPATIENT)
Dept: OPHTHALMOLOGY | Facility: CLINIC | Age: 78
End: 2018-03-28
Payer: MEDICARE

## 2018-03-28 ENCOUNTER — OFFICE VISIT (OUTPATIENT)
Dept: CARDIOLOGY | Facility: CLINIC | Age: 78
End: 2018-03-28
Payer: MEDICARE

## 2018-03-28 VITALS
DIASTOLIC BLOOD PRESSURE: 58 MMHG | HEART RATE: 64 BPM | HEIGHT: 71 IN | SYSTOLIC BLOOD PRESSURE: 110 MMHG | BODY MASS INDEX: 27.84 KG/M2 | WEIGHT: 198.88 LBS

## 2018-03-28 DIAGNOSIS — I45.10 INCOMPLETE RIGHT BUNDLE BRANCH BLOCK: ICD-10-CM

## 2018-03-28 DIAGNOSIS — N18.30 CHRONIC KIDNEY DISEASE, STAGE 3: ICD-10-CM

## 2018-03-28 DIAGNOSIS — I10 ESSENTIAL HYPERTENSION: Chronic | ICD-10-CM

## 2018-03-28 DIAGNOSIS — Z98.42 CATARACT EXTRACTION STATUS OF EYE, LEFT: ICD-10-CM

## 2018-03-28 DIAGNOSIS — Z98.890 POST-OPERATIVE STATE: Primary | ICD-10-CM

## 2018-03-28 DIAGNOSIS — E78.2 MIXED HYPERLIPIDEMIA: Chronic | ICD-10-CM

## 2018-03-28 DIAGNOSIS — H40.1122 PRIMARY OPEN ANGLE GLAUCOMA (POAG) OF LEFT EYE, MODERATE STAGE: ICD-10-CM

## 2018-03-28 DIAGNOSIS — I70.203 ATHEROSCLEROSIS OF ARTERY OF BOTH LOWER EXTREMITIES: Primary | ICD-10-CM

## 2018-03-28 DIAGNOSIS — D64.9 ANEMIA, UNSPECIFIED TYPE: ICD-10-CM

## 2018-03-28 DIAGNOSIS — H40.1111 PRIMARY OPEN ANGLE GLAUCOMA (POAG) OF RIGHT EYE, MILD STAGE: ICD-10-CM

## 2018-03-28 DIAGNOSIS — G47.33 OSA (OBSTRUCTIVE SLEEP APNEA): ICD-10-CM

## 2018-03-28 PROCEDURE — 99999 PR PBB SHADOW E&M-EST. PATIENT-LVL III: CPT | Mod: PBBFAC,,, | Performed by: INTERNAL MEDICINE

## 2018-03-28 PROCEDURE — 99999 PR PBB SHADOW E&M-EST. PATIENT-LVL II: CPT | Mod: PBBFAC,,, | Performed by: OPHTHALMOLOGY

## 2018-03-28 PROCEDURE — 3078F DIAST BP <80 MM HG: CPT | Mod: CPTII,S$GLB,, | Performed by: INTERNAL MEDICINE

## 2018-03-28 PROCEDURE — 99213 OFFICE O/P EST LOW 20 MIN: CPT | Mod: S$GLB,,, | Performed by: INTERNAL MEDICINE

## 2018-03-28 PROCEDURE — 99024 POSTOP FOLLOW-UP VISIT: CPT | Mod: S$GLB,,, | Performed by: OPHTHALMOLOGY

## 2018-03-28 PROCEDURE — 99499 UNLISTED E&M SERVICE: CPT | Mod: S$GLB,,, | Performed by: INTERNAL MEDICINE

## 2018-03-28 PROCEDURE — 3074F SYST BP LT 130 MM HG: CPT | Mod: CPTII,S$GLB,, | Performed by: INTERNAL MEDICINE

## 2018-03-28 NOTE — PROGRESS NOTES
Subjective:   Patient ID:  Ezequiel Hughes is a 78 y.o. male who presents for follow up of Hypertension (Patient presents to clinic today for 3 month follow up. ) and Hyperlipidemia      HPI  An 77 yo male with htn hlp is here for f/u.he has been compliant with salt now he has no legs welling has chest pain or shortness of breath bp well controlled has no shortness of breath. No tia or claudication.he exercises regularily.no more ankle symptoms. lipids on target.  Past Medical History:   Diagnosis Date    Allergic rhinitis     Anemia     Back pain     BPH (benign prostatic hyperplasia)     Cancer     skin cancer to neck, Dr. Graves    Cataract     Disorder of kidney and ureter     ED (erectile dysfunction)     Hiatal hernia     small    History of colon polyps     colonoscopy 11/2016    HLD (hyperlipidemia)     Hyperlipidemia     Hypertension     Lateral epicondylitis     OA (osteoarthritis)     GUIDO (obstructive sleep apnea)     Prostate cancer     Dr. Wong    TIA (transient ischemic attack)     Trouble in sleeping        Past Surgical History:   Procedure Laterality Date    CATARACT EXTRACTION W/  INTRAOCULAR LENS IMPLANT Right 02/21/2018    I-Stent    CATARACT EXTRACTION W/  INTRAOCULAR LENS IMPLANT Left 03/21/2018    I - Stent    COLONOSCOPY N/A 11/14/2016    Procedure: COLONOSCOPY;  Surgeon: Karuna Rodriguez MD;  Location: [facility];  Service: Endoscopy;  Laterality: N/A;    HEMORRHOID SURGERY      I-STENT Right 02/21/2018    DR. REECE    KNEE ARTHROSCOPY W/ MENISCAL REPAIR Right 2015    Dr. Jain    PCIOL Right 02/21/2018    DR. REECE    PLANTAR FASCIA RELEASE      right    ROTATOR CUFF REPAIR Bilateral     bilateral    SHOULDER SURGERY Bilateral around 2000    Dr. Pepper.  rotator cuff surgeries       Social History   Substance Use Topics    Smoking status: Former Smoker     Packs/day: 3.00     Years: 35.00     Types: Cigarettes     Start date: 1/1/1960     Quit  date: 7/23/1985    Smokeless tobacco: Never Used    Alcohol use 4.2 oz/week     6 Cans of beer, 1 Standard drinks or equivalent per week      Comment: socially       Family History   Problem Relation Age of Onset    Lung cancer Father      life long smoker    Cancer Father      prostate, lung    Stroke Sister      TIA    Cataracts Sister     Cancer Brother      prostate    Cataracts Brother     Cataracts Sister     Melanoma Neg Hx     Psoriasis Neg Hx     Lupus Neg Hx     Eczema Neg Hx     Diabetes Neg Hx     Heart disease Neg Hx     Kidney disease Neg Hx     Colon cancer Neg Hx        Current Outpatient Prescriptions   Medication Sig    atorvastatin (LIPITOR) 40 MG tablet Take 1 tablet (40 mg total) by mouth once daily.    clopidogrel (PLAVIX) 75 mg tablet Take 1 tablet (75 mg total) by mouth once daily.    finasteride (PROSCAR) 5 mg tablet Take 5 mg by mouth once daily.     fluticasone (FLONASE) 50 mcg/actuation nasal spray 1 spray by Each Nare route daily as needed.     gatifloxacin 0.5 % Drop drops Place 1 drop into the left eye 2 (two) times daily. Eyedrops to start one day before surgery    ketorolac 0.5% (ACULAR) 0.5 % Drop Place 1 drop into the left eye 4 (four) times daily. Eyedrops to start one day before surgery    latanoprost (XALATAN) 0.005 % ophthalmic solution Place 1 drop into both eyes once daily.    losartan-hydrochlorothiazide 50-12.5 mg (HYZAAR) 50-12.5 mg per tablet Take 1 tablet by mouth once daily.    prednisoLONE acetate (PRED FORTE) 1 % DrpS Place 1 drop into the left eye 4 (four) times daily. Eyedrops to start one day before surgery     No current facility-administered medications for this visit.      Current Outpatient Prescriptions on File Prior to Visit   Medication Sig    atorvastatin (LIPITOR) 40 MG tablet Take 1 tablet (40 mg total) by mouth once daily.    clopidogrel (PLAVIX) 75 mg tablet Take 1 tablet (75 mg total) by mouth once daily.    finasteride  (PROSCAR) 5 mg tablet Take 5 mg by mouth once daily.     fluticasone (FLONASE) 50 mcg/actuation nasal spray 1 spray by Each Nare route daily as needed.     gatifloxacin 0.5 % Drop drops Place 1 drop into the left eye 2 (two) times daily. Eyedrops to start one day before surgery    ketorolac 0.5% (ACULAR) 0.5 % Drop Place 1 drop into the left eye 4 (four) times daily. Eyedrops to start one day before surgery    latanoprost (XALATAN) 0.005 % ophthalmic solution Place 1 drop into both eyes once daily.    losartan-hydrochlorothiazide 50-12.5 mg (HYZAAR) 50-12.5 mg per tablet Take 1 tablet by mouth once daily.    prednisoLONE acetate (PRED FORTE) 1 % DrpS Place 1 drop into the left eye 4 (four) times daily. Eyedrops to start one day before surgery    [DISCONTINUED] gatifloxacin 0.5 % Drop drops Apply 1 drop to eye 2 (two) times daily. Start one day before surgery    [DISCONTINUED] ketorolac 0.5% (ACULAR) 0.5 % Drop Place 1 drop into the right eye 4 (four) times daily. Eyedrops to start one day before surgery    [DISCONTINUED] prednisoLONE acetate (PRED FORTE) 1 % DrpS Place 1 drop into the right eye 4 (four) times daily. Start one day before surgery     No current facility-administered medications on file prior to visit.      Review of patient's allergies indicates:   Allergen Reactions    Mobic  [meloxicam]      Other reaction(s): hypertension       Review of Systems   Constitution: Negative for weakness and malaise/fatigue.   Eyes: Negative for blurred vision.   Cardiovascular: Negative for chest pain, claudication, cyanosis, dyspnea on exertion, irregular heartbeat, leg swelling, near-syncope, orthopnea, palpitations and paroxysmal nocturnal dyspnea.   Respiratory: Negative for cough, hemoptysis and shortness of breath.    Hematologic/Lymphatic: Negative for bleeding problem. Does not bruise/bleed easily.   Skin: Negative for dry skin and itching.   Musculoskeletal: Negative for falls, muscle weakness and  "myalgias.   Gastrointestinal: Negative for abdominal pain, diarrhea, heartburn, hematemesis, hematochezia and melena.   Genitourinary: Negative for flank pain and hematuria.   Neurological: Negative for dizziness, focal weakness, headaches, light-headedness, numbness, paresthesias and seizures.   Psychiatric/Behavioral: Negative for altered mental status and memory loss. The patient is not nervous/anxious.    Allergic/Immunologic: Negative for hives.       Objective:   Physical Exam  Vitals:    03/28/18 1459   BP: (!) 110/58   Pulse: 64   Weight: 90.2 kg (198 lb 13.7 oz)   Height: 5' 10.5" (1.791 m)   Constitutional: He is oriented to person, place, and time. He appears well-developed and well-nourished. No distress.   HENT:   Head: Normocephalic and atraumatic.   Eyes: EOM are normal. Pupils are equal, round, and reactive to light. Right eye exhibits no discharge. Left eye exhibits no discharge.   Neck: Neck supple. No JVD present. No thyromegaly present.   Cardiovascular: Normal rate, regular rhythm and intact distal pulses.  Exam reveals no gallop and no friction rub.    Murmur heard.   Harsh midsystolic murmur is present with a grade of 2/6  at the upper right sternal border radiating to the neck  Pulses:       Carotid pulses are 2+ on the right side.       Radial pulses are 2+ on the right side, and 2+ on the left side.        Femoral pulses are 2+ on the right side, and 2+ on the left side.       Popliteal pulses are 2+ on the right side, and 2+ on the left side.        Dorsalis pedis pulses are 2+ on the right side, and 2+ on the left side.        Posterior tibial pulses are 2+ on the right side, and 2+ on the left side.   Trace edema bilateral   Spider veins and superficial varicosities in lower extremity noted.   Pulmonary/Chest: Effort normal and breath sounds normal. No respiratory distress. He has no wheezes. He has no rales. He exhibits no tenderness.   Abdominal: Soft. Bowel sounds are normal. He " exhibits no distension. There is no tenderness.   Musculoskeletal: Normal range of motion. He exhibits no edema.   Neurological: He is alert and oriented to person, place, and time. No cranial nerve deficit.   Skin: Skin is warm and dry. No rash noted. He is not diaphoretic. No erythema.   Psychiatric: He has a normal mood and affect. His behavior is normal.   Nursing note and vitals reviewed  Lab Results   Component Value Date    CHOL 136 03/07/2018    CHOL 132 09/07/2017    CHOL 157 03/07/2017     Lab Results   Component Value Date    HDL 65 03/07/2018    HDL 62 09/07/2017    HDL 52 03/07/2017     Lab Results   Component Value Date    LDLCALC 58.2 (L) 03/07/2018    LDLCALC 60.0 (L) 09/07/2017    LDLCALC 89.6 03/07/2017     Lab Results   Component Value Date    TRIG 64 03/07/2018    TRIG 50 09/07/2017    TRIG 77 03/07/2017     Lab Results   Component Value Date    CHOLHDL 47.8 03/07/2018    CHOLHDL 47.0 09/07/2017    CHOLHDL 33.1 03/07/2017       Chemistry        Component Value Date/Time     03/07/2018 0826    K 4.3 03/07/2018 0826     03/07/2018 0826    CO2 25 03/07/2018 0826    BUN 26 (H) 03/07/2018 0826    CREATININE 1.3 03/07/2018 0826    GLU 91 03/07/2018 0826        Component Value Date/Time    CALCIUM 9.2 03/07/2018 0826    ALKPHOS 55 03/07/2018 0826    AST 17 03/07/2018 0826    ALT 17 03/07/2018 0826    BILITOT 1.3 (H) 03/07/2018 0826    ESTGFRAFRICA 60.0 03/07/2018 0826    EGFRNONAA 51.9 (A) 03/07/2018 0826          Lab Results   Component Value Date    TSH 1.909 09/07/2017     Lab Results   Component Value Date    INR 1.2 09/26/2017    INR 1.2 09/03/2015     Lab Results   Component Value Date    WBC 3.72 (L) 03/07/2018    HGB 12.3 (L) 03/07/2018    HCT 38.3 (L) 03/07/2018    MCV 94 03/07/2018     (L) 03/07/2018     BMP  Sodium   Date Value Ref Range Status   03/07/2018 140 136 - 145 mmol/L Final     Potassium   Date Value Ref Range Status   03/07/2018 4.3 3.5 - 5.1 mmol/L Final      Chloride   Date Value Ref Range Status   03/07/2018 106 95 - 110 mmol/L Final     CO2   Date Value Ref Range Status   03/07/2018 25 23 - 29 mmol/L Final     BUN, Bld   Date Value Ref Range Status   03/07/2018 26 (H) 8 - 23 mg/dL Final     Creatinine   Date Value Ref Range Status   03/07/2018 1.3 0.5 - 1.4 mg/dL Final     Calcium   Date Value Ref Range Status   03/07/2018 9.2 8.7 - 10.5 mg/dL Final     Anion Gap   Date Value Ref Range Status   03/07/2018 9 8 - 16 mmol/L Final     eGFR if    Date Value Ref Range Status   03/07/2018 60.0 >60 mL/min/1.73 m^2 Final     eGFR if non    Date Value Ref Range Status   03/07/2018 51.9 (A) >60 mL/min/1.73 m^2 Final     Comment:     Calculation used to obtain the estimated glomerular filtration  rate (eGFR) is the CKD-EPI equation.        CrCl cannot be calculated (Patient's most recent lab result is older than the maximum 7 days allowed.).    Assessment:     1. Atherosclerosis of artery of both lower extremities    2. Essential hypertension    3. Mixed hyperlipidemia    4. Anemia, unspecified type    5. Chronic kidney disease, stage 3    6. Incomplete right bundle branch block    7. GUIDO (obstructive sleep apnea)      Asymptomatic doing well.  Plan:   Continue current therapy   Cardiac low salt diet.  Risk factor modification and excercise program.  F/u in 1 year earlier if any problem

## 2018-03-28 NOTE — PROGRESS NOTES
SUBJECTIVE:   Ezequiel Hughes is a 78 y.o. male   Uncorrected distance visual acuity was not recorded in the right eye and 20/50 in the left eye. Uncorrected near visual acuity was not recorded in the right eye and J1 in the left eye.   Chief Complaint   Patient presents with    Post-op Evaluation     1 wk PO PCIOL / I Stent OS        HPI:  HPI     Post-op Evaluation    Additional comments: 1 wk PO PCIOL / I Stent OS           Comments   1 wk PO PCIOL / I Stent OS  No pain or discomfort OU  VA improving OS    Referred by [facility]    1. Mod COAG OS + Mild COAG OD (init 22/26 post dilation IOP ) goal = 17  2. PCIOL / I-Stent OD +18.5 SN6OWF (distance) 2-21-18  PCIOL /I-Stent OS +21.0 (-1.75) 3/21/18  3. ERM OU   *intolerant of travatan*    Pred QID, Ket QID, Gat BID OS  No Latanoprost since 3-21-18       Last edited by Nereida Smith on 3/28/2018  1:40 PM. (History)        Assessment /Plan :  1. Post-operative state    2. Cataract extraction status of eye, left Slit lamp exam:  L/L: nl  K: clear, wound sealed  AC: trace cell and flare  Iris/Lens: IOL centered and stable      IMP:  PO Week 1 S/P Phaco/ IOL with istent  OS : Doing well with no evidence of infection or abnormal inflammation.     Plan:  Pt given and instructed in one week PO instructions. D/C zymaxid and start to taper off Ketorlac and Pred Forte 1% weekly. Can resume normal activitites and d/c eye shield. OTC reading glasses can be used until evaluated for final MR. Follow up in one month or PRN pain, redness, vision loss, or other concerns.

## 2018-04-30 ENCOUNTER — OFFICE VISIT (OUTPATIENT)
Dept: OPHTHALMOLOGY | Facility: CLINIC | Age: 78
End: 2018-04-30
Payer: MEDICARE

## 2018-04-30 DIAGNOSIS — H02.831 DERMATOCHALASIS OF BOTH UPPER EYELIDS: ICD-10-CM

## 2018-04-30 DIAGNOSIS — Z98.890 POST-OPERATIVE STATE: Primary | ICD-10-CM

## 2018-04-30 DIAGNOSIS — H57.819 BROW PTOSIS: ICD-10-CM

## 2018-04-30 DIAGNOSIS — H02.834 DERMATOCHALASIS OF BOTH UPPER EYELIDS: ICD-10-CM

## 2018-04-30 PROCEDURE — 99999 PR PBB SHADOW E&M-EST. PATIENT-LVL II: CPT | Mod: PBBFAC,,, | Performed by: OPHTHALMOLOGY

## 2018-04-30 PROCEDURE — 99024 POSTOP FOLLOW-UP VISIT: CPT | Mod: S$GLB,,, | Performed by: OPHTHALMOLOGY

## 2018-04-30 NOTE — PROGRESS NOTES
SUBJECTIVE:   Ezequiel Hughes is a 78 y.o. male   Uncorrected distance visual acuity was 20/25 +1 in the right eye and 20/40 -2 in the left eye. Uncorrected near visual acuity was not recorded in the right eye and J1 in the left eye.   Chief Complaint   Patient presents with    Post-op Evaluation     1 mth s/p phaco/istent OU. doing well.     Blurred Vision     c/o cloudy vision over OD. clears when he raises lid.         HPI:  HPI     Post-op Evaluation    Additional comments: 1 mth s/p phaco/istent OU. doing well.            Blurred Vision    Additional comments: c/o cloudy vision over OD. clears when he raises   lid.            Comments   1. Mod COAG OS + Mild COAG OD (init 22/26 post dilation IOP ) goal = 17  2. PCIOL / I-Stent OD +18.5 SN6OWF (distance) 2-21-18  PCIOL /I-Stent OS +21.0 (-1.75) 3/21/18  3. ERM OU   *intolerant of travatan*         Last edited by Deb Del Cid MA on 4/30/2018  7:49 AM. (History)        Assessment /Plan :  1. Post-operative state   Exam:    Slit lamp exam:  L/L: nl  S/C: quiet and uninflamed  K: clear, wound sealed  AC: no cell or flare  Iris/Lens: IOL centered and stable      Assessment:    PO Month 1 S/P Phaco/IOL OS with I stent: Doing Well and completing healing process. Final visual correction options discussed and appropriate prescriptions and/or OTC reading glass recommendations offered to patient. Pt desires no glasses at this time. Pt instructed to follow up with Dr. Javier in 4 months for IOP check or PRN any pain, redness, vision changes or other concerns.     2.      Dermatochalasis and Brow Ptosis Consult Dr. Black, Dr. Black's information was given to the patient today

## 2018-05-10 RX ORDER — FLUTICASONE PROPIONATE 50 MCG
SPRAY, SUSPENSION (ML) NASAL
Qty: 48 G | Refills: 3 | Status: SHIPPED | OUTPATIENT
Start: 2018-05-10 | End: 2019-08-07 | Stop reason: SDUPTHER

## 2018-06-12 ENCOUNTER — TELEPHONE (OUTPATIENT)
Dept: OPHTHALMOLOGY | Facility: CLINIC | Age: 78
End: 2018-06-12

## 2018-07-10 ENCOUNTER — OFFICE VISIT (OUTPATIENT)
Dept: PULMONOLOGY | Facility: CLINIC | Age: 78
End: 2018-07-10
Payer: MEDICARE

## 2018-07-10 VITALS
WEIGHT: 197.31 LBS | SYSTOLIC BLOOD PRESSURE: 122 MMHG | HEIGHT: 70 IN | OXYGEN SATURATION: 97 % | RESPIRATION RATE: 14 BRPM | DIASTOLIC BLOOD PRESSURE: 66 MMHG | BODY MASS INDEX: 28.25 KG/M2 | HEART RATE: 66 BPM

## 2018-07-10 DIAGNOSIS — I10 ESSENTIAL HYPERTENSION: ICD-10-CM

## 2018-07-10 PROCEDURE — 3074F SYST BP LT 130 MM HG: CPT | Mod: CPTII,S$GLB,, | Performed by: INTERNAL MEDICINE

## 2018-07-10 PROCEDURE — 99999 PR PBB SHADOW E&M-EST. PATIENT-LVL III: CPT | Mod: PBBFAC,,, | Performed by: INTERNAL MEDICINE

## 2018-07-10 PROCEDURE — 99213 OFFICE O/P EST LOW 20 MIN: CPT | Mod: S$GLB,,, | Performed by: INTERNAL MEDICINE

## 2018-07-10 PROCEDURE — 3078F DIAST BP <80 MM HG: CPT | Mod: CPTII,S$GLB,, | Performed by: INTERNAL MEDICINE

## 2018-07-10 RX ORDER — LOSARTAN POTASSIUM AND HYDROCHLOROTHIAZIDE 12.5; 5 MG/1; MG/1
1 TABLET ORAL DAILY
Qty: 90 TABLET | Refills: 3 | Status: SHIPPED | OUTPATIENT
Start: 2018-07-10 | End: 2019-05-04 | Stop reason: SDUPTHER

## 2018-07-10 NOTE — PATIENT INSTRUCTIONS
Hydrochlorothiazide, HCTZ; Lisinopril tablets  What is this medicine?  HYDROCHLOROTHIAZIDE; LISINOPRIL (janina droe klor oh THYE a zide; lyse IN oh pril) is a combination of a diuretic and an ACE inhibitor. It is used to treat high blood pressure.  How should I use this medicine?  Take this medicine by mouth with a glass of water. Follow the directions on the prescription label. You can take it with or without food. If it upsets your stomach, take it with food. Take your medicine at regular intervals. Do not take it more often than directed. Do not stop taking except on your doctor's advice.  Talk to your pediatrician regarding the use of this medicine in children. Special care may be needed.  What side effects may I notice from receiving this medicine?  Side effects that you should report to your doctor or health care professional as soon as possible:  · changes in vision  · confusion, dizziness, light headedness or fainting spells  · decreased amount of urine passed  · difficulty breathing or swallowing, hoarseness, or tightening of the throat  · eye pain  · fast or irregular heart beat, palpitations, or chest pain  · muscle cramps  · nausea and vomiting  · persistent dry cough  · redness, blistering, peeling or loosening of the skin, including inside the mouth  · stomach pain  · swelling of your face, lips, tongue, hands, or feet  · unusual rash, bleeding or bruising, or pinpoint red spots on the skin  · worsened gout pain  · yellowing of the eyes or skin  Side effects that usually do not require medical attention (report to your doctor or health care professional if they continue or are bothersome):  · change in sex drive or performance  · cough  · headache  What may interact with this medicine?  · barbiturates like phenobarbital  · blood pressure medicines  · corticosteroids like prednisone  · diabetic medications  · diuretics, especially triamterene, spironolactone or amiloride  · lithium  · NSAIDs like  ibuprofen  · potassium salts or potassium supplements  · prescription pain medicines  · skeletal muscle relaxants like tubocurarine  · some cholesterol lowering medications like cholestyramine or colestipol  What if I miss a dose?  If you miss a dose, take it as soon as you can. If it is almost time for your next dose, take only that dose. Do not take double or extra doses.  Where should I keep my medicine?  Keep out of the reach of children.  Store at room temperature between 20 and 25 degrees C (68 and 77 degrees F). Protect from moisture and excessive light. Keep container tightly closed. Throw away any unused medicine after the expiration date.  What should I tell my health care provider before I take this medicine?  They need to know if you have any of these conditions:  · bone marrow disease  · decreased urine  · heart or blood vessel disease  · if you are on a special diet like a low salt diet  · immune system problems, like lupus  · kidney disease  · liver disease  · previous swelling of the tongue, face, or lips with difficulty breathing, difficulty swallowing, hoarseness, or tightening of the throat  · recent heart attack or stroke  · an unusual or allergic reaction to lisinopril, hydrochlorothiazide, sulfa drugs, other medicines, insect venom, foods, dyes, or preservatives  · pregnant or trying to get pregnant  · breast-feeding  What should I watch for while using this medicine?  Visit your doctor or health care professional for regular checks on your progress. Check your blood pressure as directed. Ask your doctor or health care professional what your blood pressure should be and when you should contact him or her. Call your doctor or health care professional if you notice an irregular or fast heart beat.  You must not get dehydrated. Ask your doctor or health care professional how much fluid you need to drink a day. Check with him or her if you get an attack of severe diarrhea, nausea and vomiting, or  if you sweat a lot. The loss of too much body fluid can make it dangerous for you to take this medicine.  Women should inform their doctor if they wish to become pregnant or think they might be pregnant. There is a potential for serious side effects to an unborn child. Talk to your health care professional or pharmacist for more information.  You may get drowsy or dizzy. Do not drive, use machinery, or do anything that needs mental alertness until you know how this drug affects you. Do not stand or sit up quickly, especially if you are an older patient. This reduces the risk of dizzy or fainting spells. Alcohol can make you more drowsy and dizzy. Avoid alcoholic drinks.  This medicine may affect your blood sugar level. If you have diabetes, check with your doctor or health care professional before changing the dose of your diabetic medicine.  Avoid salt substitutes unless you are told otherwise by your doctor or health care professional.  This medicine can make you more sensitive to the sun. Keep out of the sun. If you cannot avoid being in the sun, wear protective clothing and use sunscreen. Do not use sun lamps or tanning beds/booths.  Do not treat yourself for coughs, colds, or pain while you are taking this medicine without asking your doctor or health care professional for advice. Some ingredients may increase your blood pressure.  NOTE:This sheet is a summary. It may not cover all possible information. If you have questions about this medicine, talk to your doctor, pharmacist, or health care provider. Copyright© 2017 [employer]

## 2018-07-10 NOTE — PROGRESS NOTES
Subjective:       Patient ID: Ezequiel Hughes is a 78 y.o. male.    Chief Complaint: He       No chief complaint on file.    HPI     No longer taking medications for restless legs  ONly meds he is on include:  BP meds   Plavix and PSA    Sleep Apnea  He presents for a sleep evaluation. He complains of mild snoring, periods of not breathing, decreased concentration, excessive daytime sleepiness, falling asleep while reading, watching television, feels sleepy during the day, take naps during the day. Leg movement awakens him at night. WIfe reports his legs move so much that she sometimes has to leave room.  Symptoms began 6 months ago, gradually worsening since that time.  He goes to sleep at 10 weekdays and  weekends. He awakens 5:30 weekdays and  weekends. He falls asleep in 15  min minutes.  Collar size na. He denies knees buckling with laughing, completely or partially paralyzed while falling asleep or waking up. Previous evaluation and treatment has included polysomnogram that was reviewed today       Past Medical History:   Diagnosis Date    Allergic rhinitis     Anemia     Back pain     BPH (benign prostatic hyperplasia)     Cancer     skin cancer to neck, Dr. Graves    Cataract     Disorder of kidney and ureter     ED (erectile dysfunction)     Hiatal hernia     small    History of colon polyps     colonoscopy 11/2016    HLD (hyperlipidemia)     Hyperlipidemia     Hypertension     Lateral epicondylitis     OA (osteoarthritis)     GUIDO (obstructive sleep apnea)     Prostate cancer     Dr. Wong    TIA (transient ischemic attack)     Trouble in sleeping      Past Surgical History:   Procedure Laterality Date    CATARACT EXTRACTION W/  INTRAOCULAR LENS IMPLANT Right 02/21/2018    I-Stent    CATARACT EXTRACTION W/  INTRAOCULAR LENS IMPLANT Left 03/21/2018    I - Stent    COLONOSCOPY N/A 11/14/2016    Procedure: COLONOSCOPY;  Surgeon: Karuna Rodriguez MD;  Location: [facility];   Service: Endoscopy;  Laterality: N/A;    HEMORRHOID SURGERY      I-STENT Right 02/21/2018    DR. REECE    KNEE ARTHROSCOPY W/ MENISCAL REPAIR Right 2015    Dr. Jain    PCIOL Right 02/21/2018    DR. REECE    PLANTAR FASCIA RELEASE      right    ROTATOR CUFF REPAIR Bilateral     bilateral    SHOULDER SURGERY Bilateral around 2000    Dr. Pepper.  rotator cuff surgeries     Social History     Social History    Marital status:      Spouse name: N/A    Number of children: N/A    Years of education: N/A     Occupational History    Not on file.     Social History Main Topics    Smoking status: Former Smoker     Packs/day: 3.00     Years: 35.00     Types: Cigarettes     Start date: 1/1/1960     Quit date: 7/23/1985    Smokeless tobacco: Never Used    Alcohol use 4.2 oz/week     6 Cans of beer, 1 Standard drinks or equivalent per week      Comment: socially    Drug use: No    Sexual activity: No     Other Topics Concern    Not on file     Social History Narrative         Review of Systems   Constitutional: Negative for fever and fatigue.   HENT: Negative for postnasal drip and rhinorrhea.    Eyes: Negative for redness and itching.   Respiratory: Negative for cough, shortness of breath, wheezing, dyspnea on extertion and Paroxysmal Nocturnal Dyspnea.    Cardiovascular: Negative for chest pain.   Genitourinary: Negative for difficulty urinating and hematuria.   Endocrine: Negative for polyphagia, cold intolerance and heat intolerance.    Musculoskeletal: Negative for arthralgias.   Skin: Negative for rash.   Gastrointestinal: Negative for nausea, vomiting, abdominal pain and abdominal distention.   Neurological: Negative for dizziness and headaches.   Hematological: Negative for adenopathy. Does not bruise/bleed easily and no excessive bruising.   Psychiatric/Behavioral: The patient is not nervous/anxious.        Objective:      Physical Exam   Constitutional: He is oriented to person,  place, and time. He appears well-developed and well-nourished.   HENT:   Head: Normocephalic and atraumatic.   Eyes: Conjunctivae are normal. Pupils are equal, round, and reactive to light.   Neck: Neck supple. No JVD present. No tracheal deviation present. No thyromegaly present.   Cardiovascular: Normal rate, regular rhythm and normal heart sounds.    Pulmonary/Chest: Effort normal and breath sounds normal.   Abdominal: Soft.   Musculoskeletal: Normal range of motion.   Lymphadenopathy:     He has no cervical adenopathy.   Neurological: He is alert and oriented to person, place, and time.   Skin: Skin is warm and dry.   Nursing note and vitals reviewed.    Personal Diagnostic Review  none pertinent  Office Spirometry Results:     No flowsheet data found.  Pulmonary Studies Review 7/10/2018   SpO2 97   Height 70.472   Weight 3156.99   BMI (Calculated) 28   Predicted Distance 274.72   Predicted Distance Meters (Calculated) 486.91         Assessment:       1. Essential hypertension        Outpatient Encounter Prescriptions as of 7/10/2018   Medication Sig Dispense Refill    atorvastatin (LIPITOR) 40 MG tablet Take 1 tablet (40 mg total) by mouth once daily. 90 tablet 3    clopidogrel (PLAVIX) 75 mg tablet Take 1 tablet (75 mg total) by mouth once daily. 90 tablet 3    finasteride (PROSCAR) 5 mg tablet Take 5 mg by mouth once daily.       fluticasone (FLONASE) 50 mcg/actuation nasal spray USE 2 SPRAYS IN EACH NOSTRIL ONE TIME DAILY 48 g 3    losartan-hydrochlorothiazide 50-12.5 mg (HYZAAR) 50-12.5 mg per tablet Take 1 tablet by mouth once daily. 90 tablet 3    [DISCONTINUED] latanoprost (XALATAN) 0.005 % ophthalmic solution Place 1 drop into both eyes once daily. 2.5 mL 1    [DISCONTINUED] losartan-hydrochlorothiazide 50-12.5 mg (HYZAAR) 50-12.5 mg per tablet Take 1 tablet by mouth once daily. 90 tablet 3     No facility-administered encounter medications on file as of 7/10/2018.      No orders of the defined  types were placed in this encounter.    Plan:       Requested Prescriptions     Signed Prescriptions Disp Refills    losartan-hydrochlorothiazide 50-12.5 mg (HYZAAR) 50-12.5 mg per tablet 90 tablet 3     Sig: Take 1 tablet by mouth once daily.     Essential hypertension  -     losartan-hydrochlorothiazide 50-12.5 mg (HYZAAR) 50-12.5 mg per tablet; Take 1 tablet by mouth once daily.  Dispense: 90 tablet; Refill: 3           Follow-up if symptoms worsen or fail to improve.    MEDICAL DECISION MAKING: Moderate to high complexity.  Overall, the multiple problems listed are of moderate to high severity that may impact quality of life and activities of daily living. Side effects of medications, treatment plan as well as options and alternatives reviewed and discussed with patient. There was counseling of patient concerning these issues.    Total time spent in face to face counseling and coordination of care - 20  minutes over 50% of time was used in discussion of prognosis, risks, benefits of treatment, instructions and compliance with regimen . Discussion with other physicians or health care providers (DME, NP, pharmacy, respiratory therapy) occurred.

## 2018-07-12 ENCOUNTER — OFFICE VISIT (OUTPATIENT)
Dept: OPHTHALMOLOGY | Facility: CLINIC | Age: 78
End: 2018-07-12
Payer: MEDICARE

## 2018-07-12 DIAGNOSIS — H02.834 DERMATOCHALASIS OF BOTH UPPER EYELIDS: ICD-10-CM

## 2018-07-12 DIAGNOSIS — H40.1111 PRIMARY OPEN ANGLE GLAUCOMA (POAG) OF RIGHT EYE, MILD STAGE: ICD-10-CM

## 2018-07-12 DIAGNOSIS — H43.813 POSTERIOR VITREOUS DETACHMENT OF BOTH EYES: Primary | ICD-10-CM

## 2018-07-12 DIAGNOSIS — H57.819 BROW PTOSIS: ICD-10-CM

## 2018-07-12 DIAGNOSIS — Z96.1 PSEUDOPHAKIA: ICD-10-CM

## 2018-07-12 DIAGNOSIS — H02.831 DERMATOCHALASIS OF BOTH UPPER EYELIDS: ICD-10-CM

## 2018-07-12 PROCEDURE — 92014 COMPRE OPH EXAM EST PT 1/>: CPT | Mod: S$GLB,,, | Performed by: OPHTHALMOLOGY

## 2018-07-12 PROCEDURE — 99999 PR PBB SHADOW E&M-EST. PATIENT-LVL II: CPT | Mod: PBBFAC,,, | Performed by: OPHTHALMOLOGY

## 2018-07-12 NOTE — PROGRESS NOTES
SUBJECTIVE:   Ezequiel Hughes is a 78 y.o. male   Uncorrected distance visual acuity was 20/25 +2 in the right eye and 20/30 -1 in the left eye.   Chief Complaint   Patient presents with    Blurred Vision     OD        HPI:  HPI     Blurred Vision    Additional comments: OD           Comments   Pt states that he has been seeing a fog in his right eye's vision since   the cataract surgery. He says when he looks to the right corner, he sees   the cloud move with his vision. He says the fog doesn't cover his whole   vision. No pain or discomfort.     Referred by [facility]    1. Mod COAG OS + Mild COAG OD (init 22/26 post dilation IOP ) goal = 17  2. PCIOL / I-Stent OD +18.5 [id number] (distance) 2-21-18  PCIOL /I-Stent OS +21.0 (-1.75) 3/21/18  3. ERM OU   *intolerant of travatan*      No Latanoprost since 3-21-18       Last edited by Govind Luu, Patient Care Assistant on 7/12/2018  8:29   AM. (History)        Assessment /Plan :  1. Posterior vitreous detachment of both eyes Patient seen and evaluated for PVD OU. No tears or breaks were seen after careful retinal evaluation. Discussed retinal detachment signs and symptoms including flashes of lights, floaters, perceived curtains or veils. Advised to patient to monitor visual status including increase in flashes and floaters, or the development of visual field changes including curtain and /or veils. Advised patient to RTC urgently if these symptoms occur. Explained the need for follow up exams to the patient even if there are no changes in the symptoms.         2. Primary open angle glaucoma (POAG) of right eye, mild stage Doing well, IOP within acceptable range relative to target IOP and no evidence of progression. Continue current treatment. Reviewed importance of continued compliance with treatment and follow up.     3. Dermatochalasis of both upper eyelids Scheduled with  for surgery   4. Brow ptosis as stated above   5. Pseudophakia  -- Condition stable, no  therapeutic change required. Monitoring routinely.           Return to clinic in 3-4 months  or as needed.  With IOP Check and GOCT

## 2018-07-18 ENCOUNTER — OFFICE VISIT (OUTPATIENT)
Dept: INTERNAL MEDICINE | Facility: CLINIC | Age: 78
End: 2018-07-18
Payer: MEDICARE

## 2018-07-18 VITALS
WEIGHT: 195.31 LBS | TEMPERATURE: 98 F | DIASTOLIC BLOOD PRESSURE: 60 MMHG | HEIGHT: 70 IN | HEART RATE: 76 BPM | OXYGEN SATURATION: 95 % | SYSTOLIC BLOOD PRESSURE: 110 MMHG | BODY MASS INDEX: 27.96 KG/M2

## 2018-07-18 DIAGNOSIS — I10 ESSENTIAL HYPERTENSION: ICD-10-CM

## 2018-07-18 DIAGNOSIS — J32.9 SINOBRONCHITIS: ICD-10-CM

## 2018-07-18 DIAGNOSIS — R05.9 COUGH: Primary | ICD-10-CM

## 2018-07-18 DIAGNOSIS — J40 SINOBRONCHITIS: ICD-10-CM

## 2018-07-18 PROCEDURE — 3078F DIAST BP <80 MM HG: CPT | Mod: CPTII,S$GLB,, | Performed by: PHYSICIAN ASSISTANT

## 2018-07-18 PROCEDURE — 3074F SYST BP LT 130 MM HG: CPT | Mod: CPTII,S$GLB,, | Performed by: PHYSICIAN ASSISTANT

## 2018-07-18 PROCEDURE — 99999 PR PBB SHADOW E&M-EST. PATIENT-LVL III: CPT | Mod: PBBFAC,,, | Performed by: PHYSICIAN ASSISTANT

## 2018-07-18 PROCEDURE — 99213 OFFICE O/P EST LOW 20 MIN: CPT | Mod: S$GLB,,, | Performed by: PHYSICIAN ASSISTANT

## 2018-07-18 RX ORDER — DOXYCYCLINE 100 MG/1
100 CAPSULE ORAL EVERY 12 HOURS
Qty: 14 CAPSULE | Refills: 0 | Status: SHIPPED | OUTPATIENT
Start: 2018-07-18 | End: 2018-07-18 | Stop reason: SDUPTHER

## 2018-07-18 RX ORDER — DOXYCYCLINE 100 MG/1
100 CAPSULE ORAL EVERY 12 HOURS
Qty: 14 CAPSULE | Refills: 0 | Status: SHIPPED | OUTPATIENT
Start: 2018-07-18 | End: 2018-07-25

## 2018-07-18 RX ORDER — ALBUTEROL SULFATE 90 UG/1
2 AEROSOL, METERED RESPIRATORY (INHALATION) EVERY 6 HOURS PRN
Qty: 1 INHALER | Refills: 1 | Status: SHIPPED | OUTPATIENT
Start: 2018-07-18 | End: 2019-01-04

## 2018-07-18 NOTE — PROGRESS NOTES
Subjective:       Patient ID: Ezequiel Hughes is a 78 y.o. male.    Chief Complaint: Nasal Congestion    Sinus Problem   This is a new problem. The current episode started 1 to 4 weeks ago. The problem is unchanged. There has been no fever. Associated symptoms include congestion, coughing and sinus pressure. Pertinent negatives include no chills, diaphoresis, ear pain, headaches, hoarse voice, neck pain, shortness of breath, sneezing, sore throat or swollen glands.     Went to  twice within the last 2 weeks, given some cough medication   Still not feeling like he is getting much better   There are no preventive care reminders to display for this patient.    Past Medical History:   Diagnosis Date    Allergic rhinitis     Anemia     Back pain     BPH (benign prostatic hyperplasia)     Cancer     skin cancer to neck, Dr. Graves    Cataract     Disorder of kidney and ureter     ED (erectile dysfunction)     Hiatal hernia     small    History of colon polyps     colonoscopy 11/2016    HLD (hyperlipidemia)     Hyperlipidemia     Hypertension     Lateral epicondylitis     OA (osteoarthritis)     GUIDO (obstructive sleep apnea)     Prostate cancer     Dr. Wong    TIA (transient ischemic attack)     Trouble in sleeping        Current Outpatient Prescriptions   Medication Sig Dispense Refill    atorvastatin (LIPITOR) 40 MG tablet Take 1 tablet (40 mg total) by mouth once daily. 90 tablet 3    clopidogrel (PLAVIX) 75 mg tablet Take 1 tablet (75 mg total) by mouth once daily. 90 tablet 3    finasteride (PROSCAR) 5 mg tablet Take 5 mg by mouth once daily.       fluticasone (FLONASE) 50 mcg/actuation nasal spray USE 2 SPRAYS IN EACH NOSTRIL ONE TIME DAILY 48 g 3    losartan-hydrochlorothiazide 50-12.5 mg (HYZAAR) 50-12.5 mg per tablet Take 1 tablet by mouth once daily. 90 tablet 3    albuterol 90 mcg/actuation inhaler Inhale 2 puffs into the lungs every 6 (six) hours as needed for Wheezing or  "Shortness of Breath. Rescue 1 Inhaler 1    doxycycline (VIBRAMYCIN) 100 MG Cap Take 1 capsule (100 mg total) by mouth every 12 (twelve) hours. for 7 days 14 capsule 0    inhalation spacing device Use as directed for inhalation. 1 each 0     No current facility-administered medications for this visit.        Review of Systems   Constitutional: Negative for chills and diaphoresis.   HENT: Positive for congestion and sinus pressure. Negative for ear pain, hoarse voice, sneezing and sore throat.    Respiratory: Positive for cough. Negative for shortness of breath.    Musculoskeletal: Negative for neck pain.   Neurological: Negative for headaches.       Objective:   /60   Pulse 76   Temp 97.9 °F (36.6 °C) (Tympanic)   Ht 5' 10.47" (1.79 m)   Wt 88.6 kg (195 lb 5.2 oz)   SpO2 95%   BMI 27.65 kg/m²      Physical Exam   Constitutional: He is oriented to person, place, and time. He appears well-developed and well-nourished. No distress.   HENT:   Head: Normocephalic and atraumatic.   Right Ear: Hearing, tympanic membrane, external ear and ear canal normal.   Left Ear: Hearing, tympanic membrane, external ear and ear canal normal.   Nose: Nose normal.   Mouth/Throat: Oropharynx is clear and moist. No oropharyngeal exudate.   TMs normal    Eyes: Conjunctivae and EOM are normal. Pupils are equal, round, and reactive to light.   Neck: Normal range of motion. Neck supple. No thyromegaly present.   Cardiovascular: Normal rate, regular rhythm, normal heart sounds and intact distal pulses.    Pulmonary/Chest: Effort normal and breath sounds normal.   Abdominal: Soft. Bowel sounds are normal.   Genitourinary:   Genitourinary Comments: Exam not done    Musculoskeletal: Normal range of motion.   Lymphadenopathy:     He has no cervical adenopathy.   Neurological: He is alert and oriented to person, place, and time. He has normal reflexes.   Skin: Skin is warm.   Psychiatric: He has a normal mood and affect. His behavior is " normal. Judgment and thought content normal.   Vitals reviewed.        Lab Results   Component Value Date    WBC 3.72 (L) 03/07/2018    HGB 12.3 (L) 03/07/2018    HCT 38.3 (L) 03/07/2018     (L) 03/07/2018    CHOL 136 03/07/2018    TRIG 64 03/07/2018    HDL 65 03/07/2018    ALT 17 03/07/2018    AST 17 03/07/2018     03/07/2018    K 4.3 03/07/2018     03/07/2018    CREATININE 1.3 03/07/2018    BUN 26 (H) 03/07/2018    CO2 25 03/07/2018    TSH 1.909 09/07/2017    PSA 1.5 09/01/2016    INR 1.2 09/26/2017    HGBA1C 5.2 03/07/2018       Assessment:       1. Cough    2. Essential hypertension    3. Sinobronchitis        Plan:   Cough  Inhaler for bronchial symptoms   Essential hypertension  Avoid decongestants due to hypertension   Sinobronchitis  Trial of inhaler.   Other orders  -     Discontinue: doxycycline (VIBRAMYCIN) 100 MG Cap; Take 1 capsule (100 mg total) by mouth every 12 (twelve) hours. for 7 days  Dispense: 14 capsule; Refill: 0  -     albuterol 90 mcg/actuation inhaler; Inhale 2 puffs into the lungs every 6 (six) hours as needed for Wheezing or Shortness of Breath. Rescue  Dispense: 1 Inhaler; Refill: 1  -     inhalation spacing device; Use as directed for inhalation.  Dispense: 1 each; Refill: 0  Only use abx if no improvement in the next 72 hours -     doxycycline (VIBRAMYCIN) 100 MG Cap; Take 1 capsule (100 mg total) by mouth every 12 (twelve) hours. for 7 days  Dispense: 14 capsule; Refill: 0        No Follow-up on file.

## 2018-08-07 ENCOUNTER — TELEPHONE (OUTPATIENT)
Dept: INTERNAL MEDICINE | Facility: CLINIC | Age: 78
End: 2018-08-07

## 2018-08-07 NOTE — TELEPHONE ENCOUNTER
Pre op - 8/28/18  Surgery - 9/14/18    Dr. Black - eyelid surgery     Called and spoke with pt. He is going to call and speak with Dr. Black, finding out what all he needs for clearance. If the EKG is all he needs or does he need Dr. Ozuna to clear him. He will call back regarding this.

## 2018-08-07 NOTE — TELEPHONE ENCOUNTER
----- Message from Ambrose Dejesus sent at 8/7/2018  1:39 PM CDT -----  Contact: pt  She's calling in regards to a missed call, [phone] (home)

## 2018-08-07 NOTE — TELEPHONE ENCOUNTER
----- Message from Natalie Beatty sent at 8/7/2018 11:30 AM CDT -----  Contact: self  Patient scheduled for surgery in Sept. Requesting order for EKG. Please call back at [phone].    Thanks,   Natalie Beatty

## 2018-08-08 ENCOUNTER — TELEPHONE (OUTPATIENT)
Dept: INTERNAL MEDICINE | Facility: CLINIC | Age: 78
End: 2018-08-08

## 2018-08-08 DIAGNOSIS — Z01.810 PREOP CARDIOVASCULAR EXAM: Primary | ICD-10-CM

## 2018-08-08 NOTE — TELEPHONE ENCOUNTER
Patient is having eyebrow sx 9/14/18 and he has a 6m with you on 9/12/18 lab on 9/5/18 he needs a ekg can you place order? Also is this date for you ok or would you like him to come earlier for preop clearence?

## 2018-08-13 RX ORDER — ATORVASTATIN CALCIUM 40 MG/1
TABLET, FILM COATED ORAL
Qty: 90 TABLET | Refills: 3 | Status: SHIPPED | OUTPATIENT
Start: 2018-08-13 | End: 2019-12-20

## 2018-08-18 ENCOUNTER — PES CALL (OUTPATIENT)
Dept: ADMINISTRATIVE | Facility: CLINIC | Age: 78
End: 2018-08-18

## 2018-08-30 ENCOUNTER — PATIENT OUTREACH (OUTPATIENT)
Dept: INTERNAL MEDICINE | Facility: CLINIC | Age: 78
End: 2018-08-30

## 2018-09-04 RX ORDER — CLOPIDOGREL BISULFATE 75 MG/1
TABLET ORAL
Qty: 90 TABLET | Refills: 3 | Status: SHIPPED | OUTPATIENT
Start: 2018-09-04 | End: 2019-09-03 | Stop reason: SDUPTHER

## 2018-09-05 ENCOUNTER — CLINICAL SUPPORT (OUTPATIENT)
Dept: CARDIOLOGY | Facility: CLINIC | Age: 78
End: 2018-09-05
Payer: MEDICARE

## 2018-09-05 ENCOUNTER — LAB VISIT (OUTPATIENT)
Dept: LAB | Facility: HOSPITAL | Age: 78
End: 2018-09-05
Attending: FAMILY MEDICINE
Payer: MEDICARE

## 2018-09-05 ENCOUNTER — OFFICE VISIT (OUTPATIENT)
Dept: INTERNAL MEDICINE | Facility: CLINIC | Age: 78
End: 2018-09-05
Payer: MEDICARE

## 2018-09-05 VITALS
WEIGHT: 195.31 LBS | HEIGHT: 70 IN | BODY MASS INDEX: 27.96 KG/M2 | SYSTOLIC BLOOD PRESSURE: 128 MMHG | DIASTOLIC BLOOD PRESSURE: 80 MMHG | TEMPERATURE: 97 F

## 2018-09-05 DIAGNOSIS — M47.817 LUMBOSACRAL SPONDYLOSIS WITHOUT MYELOPATHY: ICD-10-CM

## 2018-09-05 DIAGNOSIS — H53.40 VISUAL FIELD DEFECTS: ICD-10-CM

## 2018-09-05 DIAGNOSIS — K21.9 GASTROESOPHAGEAL REFLUX DISEASE, ESOPHAGITIS PRESENCE NOT SPECIFIED: ICD-10-CM

## 2018-09-05 DIAGNOSIS — Z01.810 PREOP CARDIOVASCULAR EXAM: ICD-10-CM

## 2018-09-05 DIAGNOSIS — E79.0 ELEVATED URIC ACID IN BLOOD: ICD-10-CM

## 2018-09-05 DIAGNOSIS — I10 ESSENTIAL HYPERTENSION: Chronic | ICD-10-CM

## 2018-09-05 DIAGNOSIS — I70.203 ATHEROSCLEROSIS OF ARTERY OF BOTH LOWER EXTREMITIES: ICD-10-CM

## 2018-09-05 DIAGNOSIS — N18.30 CHRONIC KIDNEY DISEASE, STAGE 3: ICD-10-CM

## 2018-09-05 DIAGNOSIS — D64.9 ANEMIA, UNSPECIFIED TYPE: ICD-10-CM

## 2018-09-05 DIAGNOSIS — D69.6 THROMBOCYTOPENIA: ICD-10-CM

## 2018-09-05 DIAGNOSIS — E78.2 MIXED HYPERLIPIDEMIA: Chronic | ICD-10-CM

## 2018-09-05 DIAGNOSIS — N18.30 CKD (CHRONIC KIDNEY DISEASE) STAGE 3, GFR 30-59 ML/MIN: ICD-10-CM

## 2018-09-05 DIAGNOSIS — I77.1 TORTUOUS AORTA: ICD-10-CM

## 2018-09-05 DIAGNOSIS — Z01.818 PREOP EXAM FOR INTERNAL MEDICINE: Primary | ICD-10-CM

## 2018-09-05 LAB
ALBUMIN SERPL BCP-MCNC: 3.8 G/DL
ALP SERPL-CCNC: 58 U/L
ALT SERPL W/O P-5'-P-CCNC: 12 U/L
ANION GAP SERPL CALC-SCNC: 7 MMOL/L
ANION GAP SERPL CALC-SCNC: 7 MMOL/L
AST SERPL-CCNC: 16 U/L
BASOPHILS # BLD AUTO: 0.04 K/UL
BASOPHILS NFR BLD: 1 %
BILIRUB SERPL-MCNC: 1.3 MG/DL
BUN SERPL-MCNC: 30 MG/DL
BUN SERPL-MCNC: 30 MG/DL
CALCIUM SERPL-MCNC: 9.5 MG/DL
CALCIUM SERPL-MCNC: 9.5 MG/DL
CHLORIDE SERPL-SCNC: 106 MMOL/L
CHLORIDE SERPL-SCNC: 106 MMOL/L
CO2 SERPL-SCNC: 27 MMOL/L
CO2 SERPL-SCNC: 27 MMOL/L
CREAT SERPL-MCNC: 1.3 MG/DL
CREAT SERPL-MCNC: 1.3 MG/DL
DIFFERENTIAL METHOD: ABNORMAL
EOSINOPHIL # BLD AUTO: 0.2 K/UL
EOSINOPHIL NFR BLD: 5.9 %
ERYTHROCYTE [DISTWIDTH] IN BLOOD BY AUTOMATED COUNT: 13.3 %
EST. GFR  (AFRICAN AMERICAN): 59.6 ML/MIN/1.73 M^2
EST. GFR  (AFRICAN AMERICAN): 59.6 ML/MIN/1.73 M^2
EST. GFR  (NON AFRICAN AMERICAN): 51.5 ML/MIN/1.73 M^2
EST. GFR  (NON AFRICAN AMERICAN): 51.5 ML/MIN/1.73 M^2
GLUCOSE SERPL-MCNC: 90 MG/DL
GLUCOSE SERPL-MCNC: 90 MG/DL
HCT VFR BLD AUTO: 41.4 %
HGB BLD-MCNC: 13 G/DL
IMM GRANULOCYTES # BLD AUTO: 0.01 K/UL
IMM GRANULOCYTES NFR BLD AUTO: 0.2 %
IRON SERPL-MCNC: 98 UG/DL
LYMPHOCYTES # BLD AUTO: 1 K/UL
LYMPHOCYTES NFR BLD: 24.6 %
MCH RBC QN AUTO: 31.4 PG
MCHC RBC AUTO-ENTMCNC: 31.4 G/DL
MCV RBC AUTO: 100 FL
MONOCYTES # BLD AUTO: 0.6 K/UL
MONOCYTES NFR BLD: 13.8 %
NEUTROPHILS # BLD AUTO: 2.2 K/UL
NEUTROPHILS NFR BLD: 54.5 %
NRBC BLD-RTO: 0 /100 WBC
PLATELET # BLD AUTO: 133 K/UL
PMV BLD AUTO: 8.2 FL
POTASSIUM SERPL-SCNC: 4.3 MMOL/L
POTASSIUM SERPL-SCNC: 4.3 MMOL/L
PROT SERPL-MCNC: 6.7 G/DL
RBC # BLD AUTO: 4.14 M/UL
SATURATED IRON: 32 %
SODIUM SERPL-SCNC: 140 MMOL/L
SODIUM SERPL-SCNC: 140 MMOL/L
TOTAL IRON BINDING CAPACITY: 308 UG/DL
TRANSFERRIN SERPL-MCNC: 208 MG/DL
URATE SERPL-MCNC: 7.1 MG/DL
WBC # BLD AUTO: 4.07 K/UL

## 2018-09-05 PROCEDURE — 80053 COMPREHEN METABOLIC PANEL: CPT

## 2018-09-05 PROCEDURE — 99999 PR PBB SHADOW E&M-EST. PATIENT-LVL III: CPT | Mod: PBBFAC,,, | Performed by: FAMILY MEDICINE

## 2018-09-05 PROCEDURE — 3074F SYST BP LT 130 MM HG: CPT | Mod: CPTII,,, | Performed by: FAMILY MEDICINE

## 2018-09-05 PROCEDURE — 83540 ASSAY OF IRON: CPT

## 2018-09-05 PROCEDURE — 1101F PT FALLS ASSESS-DOCD LE1/YR: CPT | Mod: CPTII,,, | Performed by: FAMILY MEDICINE

## 2018-09-05 PROCEDURE — 3079F DIAST BP 80-89 MM HG: CPT | Mod: CPTII,,, | Performed by: FAMILY MEDICINE

## 2018-09-05 PROCEDURE — 93010 ELECTROCARDIOGRAM REPORT: CPT | Mod: ,,, | Performed by: INTERNAL MEDICINE

## 2018-09-05 PROCEDURE — 99499 UNLISTED E&M SERVICE: CPT | Mod: HCNC,S$GLB,, | Performed by: FAMILY MEDICINE

## 2018-09-05 PROCEDURE — 36415 COLL VENOUS BLD VENIPUNCTURE: CPT | Mod: PO

## 2018-09-05 PROCEDURE — 99214 OFFICE O/P EST MOD 30 MIN: CPT | Mod: S$PBB,,, | Performed by: FAMILY MEDICINE

## 2018-09-05 PROCEDURE — 85025 COMPLETE CBC W/AUTO DIFF WBC: CPT

## 2018-09-05 PROCEDURE — 93005 ELECTROCARDIOGRAM TRACING: CPT | Mod: PBBFAC,PO | Performed by: FAMILY MEDICINE

## 2018-09-05 PROCEDURE — 84550 ASSAY OF BLOOD/URIC ACID: CPT

## 2018-09-05 PROCEDURE — 99213 OFFICE O/P EST LOW 20 MIN: CPT | Mod: PBBFAC,PO | Performed by: FAMILY MEDICINE

## 2018-09-05 RX ORDER — PANTOPRAZOLE SODIUM 40 MG/1
40 TABLET, DELAYED RELEASE ORAL DAILY PRN
Qty: 90 TABLET | Refills: 0 | Status: SHIPPED | OUTPATIENT
Start: 2018-09-05 | End: 2018-12-26 | Stop reason: SDUPTHER

## 2018-09-05 NOTE — PROGRESS NOTES
Subjective:      Patient ID: Ezequiel Hughes is a 78 y.o. male.    Chief Complaint: Pre-op Exam and Follow-up (6 month )    Disclaimer:  This note is prepared using voice recognition software and as such is likely to have errors and has not been proof read. Please contact me for questions.     Ezequiel Hughes is a 78 y.o. male who presents today for preop exam. Having eyelid surgery on 18 by Dr Black. Doing well. Needing to stop plavix 7 days prior to surgery. ekg done. Similar to before. No chest pain, no sob.  Does affect his visual fields. On tylenol arthritis and helping.  Does want to have the ability to take some Protonix occasionally if he needs it.  Has an old prescription but it has .  Also want to make sure it is okay to use Cialis if needed prior to sexual activity with his current medications.  He is okay to do this.  Has some pending lab work including uric acid levels iron levels and platelet counts.  It will be back tomorrow.    Patient Active Problem List:     Lumbosacral spondylosis without myelopathy     BPH (benign prostatic hyperplasia)     Essential hypertension     Mixed hyperlipidemia     Cataract     Thrombocytopenia     Anemia     Chronic kidney disease, stage 3     Incomplete right bundle branch block     Prostate cancer     Tortuous aorta     Lung granuloma     Atherosclerosis of artery of both lower extremities      GUIDO (obstructive sleep apnea)     Periodic limb movement disorder (PLMD)     Inadequate sleep hygiene          Lab Results   Component Value Date    WBC 3.72 (L) 2018    HGB 12.3 (L) 2018    HCT 38.3 (L) 2018     (L) 2018    CHOL 136 2018    TRIG 64 2018    HDL 65 2018    ALT 17 2018    AST 17 2018     2018    K 4.3 2018     2018    CREATININE 1.3 2018    BUN 26 (H) 2018    CO2 25 2018    TSH 1.909 2017    PSA 1.5 2016    INR 1.2 2017    HGBA1C  "5.2 03/07/2018       Review of Systems   Constitutional: Negative for activity change, appetite change, chills and fatigue.   HENT: Negative for sore throat and trouble swallowing.    Eyes: Positive for visual disturbance.   Respiratory: Negative for cough and shortness of breath.    Cardiovascular: Negative for chest pain and leg swelling.   Gastrointestinal: Negative for abdominal pain, constipation, diarrhea and nausea.   Genitourinary: Negative for difficulty urinating, dysuria and flank pain.   Musculoskeletal: Negative for arthralgias and joint swelling.   Neurological: Negative for dizziness and headaches.     Objective:     Vitals:    09/05/18 0846   BP: 128/80   Temp: 97 °F (36.1 °C)   TempSrc: Tympanic   Weight: 88.6 kg (195 lb 5.2 oz)   Height: 5' 10.47" (1.79 m)     Physical Exam   Constitutional: He appears well-developed and well-nourished.   HENT:   Head: Normocephalic and atraumatic.   Right Ear: Tympanic membrane and external ear normal.   Left Ear: Tympanic membrane and external ear normal.   Mouth/Throat: Oropharynx is clear and moist.   Eyes: Conjunctivae and EOM are normal.   Neck: Normal range of motion. Neck supple.   Cardiovascular: Normal rate and regular rhythm.   Pulmonary/Chest: Effort normal and breath sounds normal.   Psychiatric: He has a normal mood and affect. His behavior is normal.   Nursing note and vitals reviewed.    Assessment:     1. Preop exam for internal medicine    2. Visual field defects    3. Atherosclerosis of artery of both lower extremities    4. Tortuous aorta    5. Gastroesophageal reflux disease, esophagitis presence not specified      Plan:   Ezequiel was seen today for pre-op exam and follow-up.    Diagnoses and all orders for this visit:    Preop exam for internal medicine  Comments:  ok to proceed with proposed surgery. hold plavix 1 week prior to surgery. I reviewed the patient's past medical, surgical, social and family history and with  physical exam findings " and the proposed surgery and I make the following recommendations:     From a cardiac standpoint the patient is low risk for surgery with a low risk surgery.  The patient may proceed with surgery without further cardiac workup.     From a pulmonary standpoint the patient presents as a good candidate as well. The patient has no history of lung disease or pulmonary symptoms. Good pulmonary toilet, incentive spirometry, early ambulation are all recommended to improve the pulmonary outcome. No further pulmonary workup as noted prior to surgery.   DVT prophylaxis should be per standard. Venous compression devices are recommended. Early ambulation. Patient has been educated on signs and symptoms of both DVT and pulmonary embolus and instructed on what to do if there are symptoms postop.     The patient has been instructed to take  blood pressure medication the morning of surgery with a sip of water. Avoid any aspirin or anti-inflammatories between now and surgery.     If there is any further I can do to assist in the care of this patient please not hesitate to contact me. I will forward the lab results upon my receipt.        Visual field defects-   Patient is going for the proposed surgery for eyelids for  improvement for visual field defects by Dr. Burgos.    Atherosclerosis of artery of both lower extremities -   Okay to hold Plavix 1 week prior to surgery.  Continue with Lipitor 40    Tortuous aortaOkay to hold Plavix 1 week prior to surgery.  Continue with Lipitor 40    Gastroesophageal reflux disease, esophagitis presence not specified-  Refill Protonix for the patient to use intermittently     erectile dysfunction - okay to use Cialis as needed prior to sexual intercourse with his current medications  -     pantoprazole (PROTONIX) 40 MG tablet; Take 1 tablet (40 mg total) by mouth daily as needed.

## 2018-09-12 ENCOUNTER — OFFICE VISIT (OUTPATIENT)
Dept: INTERNAL MEDICINE | Facility: CLINIC | Age: 78
End: 2018-09-12
Payer: MEDICARE

## 2018-09-12 VITALS
HEART RATE: 60 BPM | BODY MASS INDEX: 27.28 KG/M2 | DIASTOLIC BLOOD PRESSURE: 64 MMHG | WEIGHT: 194.88 LBS | HEIGHT: 71 IN | TEMPERATURE: 98 F | SYSTOLIC BLOOD PRESSURE: 118 MMHG

## 2018-09-12 DIAGNOSIS — E78.2 MIXED HYPERLIPIDEMIA: Chronic | ICD-10-CM

## 2018-09-12 DIAGNOSIS — N18.30 CHRONIC KIDNEY DISEASE, STAGE 3: ICD-10-CM

## 2018-09-12 DIAGNOSIS — D69.6 THROMBOCYTOPENIA: ICD-10-CM

## 2018-09-12 DIAGNOSIS — E79.0 ELEVATED URIC ACID IN BLOOD: Primary | ICD-10-CM

## 2018-09-12 DIAGNOSIS — I10 ESSENTIAL HYPERTENSION: Chronic | ICD-10-CM

## 2018-09-12 DIAGNOSIS — D64.9 ANEMIA, UNSPECIFIED TYPE: ICD-10-CM

## 2018-09-12 PROCEDURE — 99214 OFFICE O/P EST MOD 30 MIN: CPT | Mod: S$PBB,,, | Performed by: FAMILY MEDICINE

## 2018-09-12 PROCEDURE — 3078F DIAST BP <80 MM HG: CPT | Mod: CPTII,,, | Performed by: FAMILY MEDICINE

## 2018-09-12 PROCEDURE — 99213 OFFICE O/P EST LOW 20 MIN: CPT | Mod: PBBFAC,PO | Performed by: FAMILY MEDICINE

## 2018-09-12 PROCEDURE — 1101F PT FALLS ASSESS-DOCD LE1/YR: CPT | Mod: CPTII,,, | Performed by: FAMILY MEDICINE

## 2018-09-12 PROCEDURE — 3074F SYST BP LT 130 MM HG: CPT | Mod: CPTII,,, | Performed by: FAMILY MEDICINE

## 2018-09-12 PROCEDURE — 99999 PR PBB SHADOW E&M-EST. PATIENT-LVL III: CPT | Mod: PBBFAC,,, | Performed by: FAMILY MEDICINE

## 2018-09-12 RX ORDER — ALLOPURINOL 100 MG/1
100 TABLET ORAL DAILY
Qty: 90 TABLET | Refills: 3 | Status: SHIPPED | OUTPATIENT
Start: 2018-09-12 | End: 2019-09-03 | Stop reason: SDUPTHER

## 2018-09-12 NOTE — PROGRESS NOTES
Subjective:      Patient ID: Ezequiel Hughes is a 78 y.o. male.    Chief Complaint: Follow-up (6 months chronic )    Disclaimer:  This note is prepared using voice recognition software and as such is likely to have errors and has not been proof read. Please contact me for questions.       He's presenting today for followup of labs.  Has surgery scheduled for tomorrow for Ophthalmology.  This is being done by Dr. Burgos.  Currently stop this Tylenol but can't tell a big difference without taking it for the arthritis.    Uric acid level is elevated at 7.1 but no gout flare ups.  Does have stage 3 chronic kidney disease based on age baseline creatinine is at 1.3.  When discussing the diet does not think he can eliminate these things.  Willing to start low-dose allopurinol 100 mg.    Blood count still showed low platelet counts but no issues with low iron.  Hematocrit is normal hemoglobin at 13 it is improving.    Does have peripheral vascular disease and hyperlipidemia for which she is on Plavix and Lipitor.  Has held the Plavix currently for the eye surgery.    Otherwise doing well.  No blood pressure issues at this time.            Lab Results   Component Value Date    WBC 4.07 09/05/2018    HGB 13.0 (L) 09/05/2018    HCT 41.4 09/05/2018     (L) 09/05/2018    CHOL 136 03/07/2018    TRIG 64 03/07/2018    HDL 65 03/07/2018    ALT 12 09/05/2018    AST 16 09/05/2018     09/05/2018     09/05/2018    K 4.3 09/05/2018    K 4.3 09/05/2018     09/05/2018     09/05/2018    CREATININE 1.3 09/05/2018    CREATININE 1.3 09/05/2018    BUN 30 (H) 09/05/2018    BUN 30 (H) 09/05/2018    CO2 27 09/05/2018    CO2 27 09/05/2018    TSH 1.909 09/07/2017    PSA 1.5 09/01/2016    INR 1.2 09/26/2017    HGBA1C 5.2 03/07/2018       Review of Systems   Constitutional: Negative for activity change, appetite change, fatigue and unexpected weight change.   HENT: Negative for hearing loss, rhinorrhea and trouble  "swallowing.    Eyes: Positive for visual disturbance. Negative for discharge.   Respiratory: Negative for chest tightness and wheezing.    Cardiovascular: Negative for chest pain and palpitations.   Gastrointestinal: Negative for blood in stool, constipation, diarrhea and vomiting.   Endocrine: Negative for polydipsia and polyuria.   Genitourinary: Negative for difficulty urinating, hematuria and urgency.   Musculoskeletal: Negative for arthralgias, joint swelling and neck pain.   Neurological: Negative for weakness and headaches.   Psychiatric/Behavioral: Negative for confusion and dysphoric mood.     Objective:     Vitals:    09/12/18 0704   BP: 118/64   Pulse: 60   Temp: 97.7 °F (36.5 °C)   TempSrc: Tympanic   Weight: 88.4 kg (194 lb 14.2 oz)   Height: 5' 10.5" (1.791 m)     Physical Exam   Constitutional: He appears well-developed and well-nourished.   HENT:   Head: Normocephalic and atraumatic.   Right Ear: Tympanic membrane normal.   Left Ear: Tympanic membrane normal.   Mouth/Throat: Oropharynx is clear and moist.   Eyes: Conjunctivae and EOM are normal.   Neck: Normal range of motion. Neck supple. Carotid bruit is not present. No thyroid mass and no thyromegaly present.   Cardiovascular: Normal rate and regular rhythm.   Pulmonary/Chest: Effort normal and breath sounds normal.   Psychiatric: He has a normal mood and affect. His behavior is normal.   Nursing note and vitals reviewed.    Assessment:     1. Elevated uric acid in blood    2. Chronic kidney disease, stage 3    3. Essential hypertension    4. Mixed hyperlipidemia    5. Thrombocytopenia    6. Anemia, unspecified type      Plan:   Ezequiel was seen today for follow-up.    Diagnoses and all orders for this visit:    Elevated uric acid in blood  Comments:  elevated 7.1, stage 3 ckd, start allopurinol 100mg and cherry juice in am. repeat labs in 3 mo  Orders:  -     Uric acid; Future  -     Comprehensive metabolic panel; Future  -     CBC auto " differential; Future  -     Lipid panel; Future  -     TSH; Future    Chronic kidney disease, stage 3  Comments:  creatinine 1.3, starting low dose allopurinol  Orders:  -     Uric acid; Future  -     Comprehensive metabolic panel; Future  -     CBC auto differential; Future  -     Lipid panel; Future  -     TSH; Future    Essential hypertension-stable with medications  -     Uric acid; Future  -     Comprehensive metabolic panel; Future  -     CBC auto differential; Future  -     Lipid panel; Future  -     TSH; Future    Mixed hyperlipidemia continue with Lipitor continue with Plavix once completed eye surgery  -     Uric acid; Future  -     Comprehensive metabolic panel; Future  -     CBC auto differential; Future  -     Lipid panel; Future  -     TSH; Future    Thrombocytopenia  Comments:  stable, no iron issues, improving  Orders:  -     Uric acid; Future  -     Comprehensive metabolic panel; Future  -     CBC auto differential; Future  -     Lipid panel; Future  -     TSH; Future    Anemia, unspecified type  Comments:  resolved, no iron issues  Orders:  -     Uric acid; Future  -     Comprehensive metabolic panel; Future  -     CBC auto differential; Future  -     Lipid panel; Future  -     TSH; Future    Other orders  -     allopurinol (ZYLOPRIM) 100 MG tablet; Take 1 tablet (100 mg total) by mouth once daily.            Follow-up in about 4 months (around 1/12/2019) for physical with Dr BAIRD.

## 2018-09-12 NOTE — PATIENT INSTRUCTIONS
Shingrix vaccine is the new shingles vaccine. Ask at pharmacy.  2 shots, not live, covered by insurance. 90 % effective against Shingles. Can start it now at age 50. Not doing the old Zostavax anymore. Even if you got zostavax, it is recommended to get the new shingles vaccine.         Gout Diet  Gout is a painful condition caused by an excess of uric acid, a waste product made by the body. Uric acid forms crystals that collect in the joints. The immune response to these crystals brings on symptoms of joint pain and swelling. This is called a gout attack. Often, medications and diet changes are combined to manage gout. Below are some guidelines for changing your diet to help you manage gout and prevent attacks. Your health care provider will help you determine the best eating plan for you.     Eating to manage gout  Weight loss for those who are overweight may help reduce gout attacks.  Eat less of these foods  Eating too many foods containing purines may raise the levels of uric acid in your body. This raises your risk for a gout attack. Try to limit these foods and drinks:  · Alcohol, such as beer and red wine. You may be told to avoid alcohol completely.  · Soft drinks that contain sugar or high fructose corn syrup  · Certain fish, including anchovies, sardines, fish eggs, and herring  · Shellfish  · Certain meats, such as red meat, hot dogs, luncheon meats, and turkey  · Organ meats, such as liver, kidneys, and sweetbreads  · Legumes, such as dried beans and peas  · Other high fat foods such as gravy, whole milk, and high fat cheeses  · Vegetables such as asparagus, cauliflower, spinach, and mushrooms used to be thought to contribute to an increased risk for a gout attack, but recent studies show that high purine vegetables don't increase the risk for a gout attack.  Eat more of these foods  Other foods may be helpful for people with gout. Add some of these foods to your diet:  · Cherries contain chemicals that  may lower uric acid.  · Omega fatty acids. These are found in some fatty fish such as salmon, certain oils (flax, olive, or nut), and nuts themselves. Omega fatty acids may help prevent inflammation due to gout.  · Dairy products that are low-fat or fat-free, such as cheese and yogurt  · Complex carbohydrate foods, including whole grains, brown rice, oats, and beans  · Coffee, in moderation  · Water, approximately 64 ounces per day  Follow-up care  Follow up with your healthcare provider as advised.  When to seek medical advice  Call your healthcare provider right away if any of these occur:  · Return of gout symptoms, usually at night:  · Severe pain, swelling, and heat in a joint, especially the base of the big toe  · Affected joint is hard to move  · Skin of the affected joint is purple or red  · Fever of 100.4°F (38°C) or higher  · Pain that doesn't get better even with prescribed medicine   Date Last Reviewed: 1/12/2016  © 4338-3595 [employer]. [address] [place] [address]. All rights reserved. This information is not intended as a substitute for professional medical care. Always follow your healthcare professional's instructions.        Eating to Prevent Gout  Gout is a painful form of arthritis caused by an excess of uric acid. This is a waste product made by the body. It builds up in the body and forms crystals that collect in the joints, bringing on a gout attack. Alcohol and certain foods can trigger a gout attack. Below are some guidelines for changing your diet to help you manage gout. Your healthcare provider can work with you to determine the best eating plan for you. Know that diet is only one part of managing gout. Take your medicines as prescribed and follow the other guidelines your healthcare provider has given you.  Foods to limit  Eating too many foods containing purines may increase the levels of uric acid in your body and increase your risk for a gout attack. It may  be best to limit these high-purine foods:  · Alcohol (beer, red wine). You may be told to avoid alcohol completely.  · Certain fish (anchovies, sardines, fish roes, herring, tuna, mussels, codfish, scallops, trout, and marcus)  · Certain meats (red meat, processed meat, varela, turkey, wild game, and goose)  · Sauces and gravies made with meat  · Organ meats (such as liver, kidneys, sweetbreads, and tripe)  · Legumes (such as dried beans, peas)  · Mushrooms, spinach, asparagus, and cauliflower  · Yeast and yeast extract supplements  Foods to try  Some foods may be helpful for people with gout. You may want to try adding some of the following foods to your diet:  · Dark berries: These include blueberries, blackberries, and cherries. These berries contain chemicals that may lower uric acid.  · Tofu: Tofu, which is made from soy, is a good source of protein. Studies have shown that it may be a better choice than meat for people with gout.  · Omega fatty acids: These acids are found in fatty fish (such as salmon), certain oils (such as flax, olive, or nut oils), or nuts. They may help prevent inflammation due to gout.  The following guidelines are recommended by the American Medical Association for people with gout. Your diet should be:  · High in fiber, whole grains, fruits, and vegetables.  · Low in protein (15% of calories should come from protein. Choose lean sources such as soy, lean meats, and poultry).  · Low in fat (no more than 30% of calories should come from fat, with only 10% coming from animal fat).   Date Last Reviewed: 6/17/2015  © 5776-0726 [employer]. [address] [place] [address], [place] [address]. All rights reserved. This information is not intended as a substitute for professional medical care. Always follow your healthcare professional's instructions.

## 2018-10-22 ENCOUNTER — IMMUNIZATION (OUTPATIENT)
Dept: INTERNAL MEDICINE | Facility: CLINIC | Age: 78
End: 2018-10-22
Payer: MEDICARE

## 2018-10-22 PROCEDURE — 90662 IIV NO PRSV INCREASED AG IM: CPT | Mod: PBBFAC,HCNC,PO

## 2018-10-22 PROCEDURE — 99999 PR PBB SHADOW E&M-EST. PATIENT-LVL I: CPT | Mod: PBBFAC,HCNC,,

## 2018-10-22 PROCEDURE — 99211 OFF/OP EST MAY X REQ PHY/QHP: CPT | Mod: PBBFAC,PO,HCNC,25

## 2018-11-05 ENCOUNTER — PES CALL (OUTPATIENT)
Dept: ADMINISTRATIVE | Facility: CLINIC | Age: 78
End: 2018-11-05

## 2018-11-29 ENCOUNTER — OFFICE VISIT (OUTPATIENT)
Dept: OPHTHALMOLOGY | Facility: CLINIC | Age: 78
End: 2018-11-29
Payer: MEDICARE

## 2018-11-29 DIAGNOSIS — Z96.1 PSEUDOPHAKIA: ICD-10-CM

## 2018-11-29 DIAGNOSIS — H40.1111 PRIMARY OPEN ANGLE GLAUCOMA (POAG) OF RIGHT EYE, MILD STAGE: Primary | ICD-10-CM

## 2018-11-29 PROCEDURE — 92133 CPTRZD OPH DX IMG PST SGM ON: CPT | Mod: HCNC,S$GLB,, | Performed by: OPHTHALMOLOGY

## 2018-11-29 PROCEDURE — 99999 PR PBB SHADOW E&M-EST. PATIENT-LVL II: CPT | Mod: PBBFAC,HCNC,, | Performed by: OPHTHALMOLOGY

## 2018-11-29 PROCEDURE — 92012 INTRM OPH EXAM EST PATIENT: CPT | Mod: HCNC,S$GLB,, | Performed by: OPHTHALMOLOGY

## 2018-11-29 NOTE — PROGRESS NOTES
SUBJECTIVE:   Ezequiel Hughes is a 78 y.o. male   Uncorrected distance visual acuity was 20/25 -2 in the right eye and 20/30 -2 in the left eye.   Chief Complaint   Patient presents with    Glaucoma     3-4 mth IOP ck and GOCT         HPI:  HPI     Glaucoma      Additional comments: 3-4 mth IOP ck and GOCT               Comments       1. Mod COAG OS + Mild COAG OD (init 22/26 post dilation IOP ) goal = 17  2. PCIOL / I-Stent OD +18.5 [id number] (distance) 2-21-18  PCIOL /I-Stent OS +21.0 (-1.75) 3/21/18  3. ERM OU   *intolerant of travatan*          Last edited by Deb Del Cid MA on 11/29/2018  8:06 AM. (History)        Assessment /Plan :  1. Primary open angle glaucoma (POAG) of right eye, mild stage Doing well, IOP within acceptable range relative to target IOP and no evidence of progression. Continue current treatment. Reviewed importance of continued compliance with treatment and follow up.     2. Pseudophakia  -- Condition stable, no therapeutic change required. Monitoring routinely.           Return to clinic in 3-4 months  or as needed.  With IOP Check

## 2018-12-26 RX ORDER — PANTOPRAZOLE SODIUM 40 MG/1
TABLET, DELAYED RELEASE ORAL
Qty: 90 TABLET | Refills: 0 | Status: SHIPPED | OUTPATIENT
Start: 2018-12-26 | End: 2019-01-16

## 2019-01-02 ENCOUNTER — HOSPITAL ENCOUNTER (EMERGENCY)
Facility: HOSPITAL | Age: 79
Discharge: HOME | End: 2019-01-02
Attending: EMERGENCY MEDICINE
Payer: MEDICARE

## 2019-01-02 ENCOUNTER — TELEPHONE (OUTPATIENT)
Dept: CARDIOLOGY | Facility: CLINIC | Age: 79
End: 2019-01-02

## 2019-01-02 VITALS
HEART RATE: 66 BPM | RESPIRATION RATE: 16 BRPM | DIASTOLIC BLOOD PRESSURE: 73 MMHG | OXYGEN SATURATION: 99 % | SYSTOLIC BLOOD PRESSURE: 149 MMHG | TEMPERATURE: 98 F | BODY MASS INDEX: 28.88 KG/M2 | WEIGHT: 201.75 LBS | HEIGHT: 70 IN

## 2019-01-02 DIAGNOSIS — R07.9 CHEST PAIN: Primary | ICD-10-CM

## 2019-01-02 LAB
ALBUMIN SERPL BCP-MCNC: 4.3 G/DL
ALP SERPL-CCNC: 61 U/L
ALT SERPL W/O P-5'-P-CCNC: 12 U/L
ANION GAP SERPL CALC-SCNC: 10 MMOL/L
AST SERPL-CCNC: 18 U/L
BASOPHILS # BLD AUTO: 0.02 K/UL
BASOPHILS NFR BLD: 0.4 %
BILIRUB SERPL-MCNC: 1.1 MG/DL
BNP SERPL-MCNC: 58 PG/ML
BUN SERPL-MCNC: 29 MG/DL
CALCIUM SERPL-MCNC: 9.6 MG/DL
CHLORIDE SERPL-SCNC: 105 MMOL/L
CO2 SERPL-SCNC: 24 MMOL/L
CREAT SERPL-MCNC: 1.5 MG/DL
DIFFERENTIAL METHOD: ABNORMAL
EOSINOPHIL # BLD AUTO: 0.1 K/UL
EOSINOPHIL NFR BLD: 2.2 %
ERYTHROCYTE [DISTWIDTH] IN BLOOD BY AUTOMATED COUNT: 13 %
EST. GFR  (AFRICAN AMERICAN): 50 ML/MIN/1.73 M^2
EST. GFR  (NON AFRICAN AMERICAN): 43 ML/MIN/1.73 M^2
GLUCOSE SERPL-MCNC: 101 MG/DL
HCT VFR BLD AUTO: 41.3 %
HGB BLD-MCNC: 13.8 G/DL
LYMPHOCYTES # BLD AUTO: 0.7 K/UL
LYMPHOCYTES NFR BLD: 16.3 %
MCH RBC QN AUTO: 32 PG
MCHC RBC AUTO-ENTMCNC: 33.4 G/DL
MCV RBC AUTO: 96 FL
MONOCYTES # BLD AUTO: 0.3 K/UL
MONOCYTES NFR BLD: 6.2 %
NEUTROPHILS # BLD AUTO: 3.4 K/UL
NEUTROPHILS NFR BLD: 74.9 %
PLATELET # BLD AUTO: 123 K/UL
PMV BLD AUTO: 7.9 FL
POTASSIUM SERPL-SCNC: 4.2 MMOL/L
PROT SERPL-MCNC: 7.3 G/DL
RBC # BLD AUTO: 4.31 M/UL
SODIUM SERPL-SCNC: 139 MMOL/L
TROPONIN I SERPL DL<=0.01 NG/ML-MCNC: <0.006 NG/ML
WBC # BLD AUTO: 4.55 K/UL

## 2019-01-02 PROCEDURE — 84484 ASSAY OF TROPONIN QUANT: CPT | Mod: HCNC

## 2019-01-02 PROCEDURE — 85025 COMPLETE CBC W/AUTO DIFF WBC: CPT | Mod: HCNC

## 2019-01-02 PROCEDURE — 93010 EKG 12-LEAD: ICD-10-PCS | Mod: HCNC,,, | Performed by: INTERNAL MEDICINE

## 2019-01-02 PROCEDURE — 99285 EMERGENCY DEPT VISIT HI MDM: CPT | Mod: 25,HCNC

## 2019-01-02 PROCEDURE — 80053 COMPREHEN METABOLIC PANEL: CPT | Mod: HCNC

## 2019-01-02 PROCEDURE — 83880 ASSAY OF NATRIURETIC PEPTIDE: CPT | Mod: HCNC

## 2019-01-02 PROCEDURE — 93010 ELECTROCARDIOGRAM REPORT: CPT | Mod: HCNC,,, | Performed by: INTERNAL MEDICINE

## 2019-01-02 NOTE — ED PROVIDER NOTES
SCRIBE #1 NOTE: I, Nellie Darcy, am scribing for, and in the presence of, Benton Trevino MD. I have scribed the entire note.       History     Chief Complaint   Patient presents with    Chest Pain     tightness in chest, bilat calf tightness. States he feels the same way he did when he had TIA in past. Denies slurred speech, extremity weakness, or confusion.      Review of patient's allergies indicates:   Allergen Reactions    Mobic  [meloxicam]      Other reaction(s): hypertension         History of Present Illness     HPI    1/2/2019, 11:30 AM  History obtained from the patient      History of Present Illness: Ezequiel Hughes is a 78 y.o. male patient with a PMHx of TIA who presents to the Emergency Department for evaluation of chest tightness which onset suddenly today and lasted for a short period of time. Pt states the tightness was a 1-2 out of a 10. Symptoms are episodic and moderate in severity. Pt states he is not having tightness at this time. No mitigating or exacerbating factors reported. Associated sxs include bilat calf soreness. Pt states these sxs onset after working on a ladder all day. Patient denies any fever, chills, n/v/d, abd pain, weakness, numbness, CP, SOB, palpitations, leg swelling, and all other sxs at this time. No prior tx. No further complaints or concerns at this time.       Arrival mode: Personal vehicle      PCP: Valery Ozuna MD        Past Medical History:  Past Medical History:   Diagnosis Date    Allergic rhinitis     Anemia     Back pain     BPH (benign prostatic hyperplasia)     Cancer     skin cancer to neck, Dr. Graves    Cataract     Disorder of kidney and ureter     ED (erectile dysfunction)     Hiatal hernia     small    History of colon polyps     colonoscopy 11/2016    HLD (hyperlipidemia)     Hyperlipidemia     Hypertension     Lateral epicondylitis     OA (osteoarthritis)     GUIDO (obstructive sleep apnea)     Prostate cancer       Marvin    TIA (transient ischemic attack)     Trouble in sleeping        Past Surgical History:  Past Surgical History:   Procedure Laterality Date    ARTHROSCOPY-KNEE Right 2015    Performed by [facility] Sr[facility] OR    CATARACT EXTRACTION W/  INTRAOCULAR LENS IMPLANT Right 2018    I-Stent    CATARACT EXTRACTION W/  INTRAOCULAR LENS IMPLANT Left 2018    I - Stent    COLONOSCOPY N/A 2016    Performed by Karuna Rodriguez [facility] at [facility] ENDO    COLONOSCOPY N/A 2013    Performed by Tushar Madrid MD at [facility] ENDO    HEMORRHOID SURGERY      I-STENT Right 2018    DR. REECE    KNEE ARTHROSCOPY W/ MENISCAL REPAIR Right     Dr. Jain    MENISCECTOMY Right 2015    Performed by [facility] Sr[facility] at [facility] OR    PCIOL Right 2018    DR. REECE    PLANTAR FASCIA RELEASE      right    ROTATOR CUFF REPAIR Bilateral     bilateral    SHOULDER SURGERY Bilateral around     Dr. Pepper.  rotator cuff surgeries    SYNOVECTOMY-KNEE Right 2015    Performed by [facility] Sr[facility] at [facility] OR         Family History:  Family History   Problem Relation Age of Onset    Lung cancer Father         life long smoker    Cancer Father         prostate, lung    Stroke Sister         TIA    Cataracts Sister     Cancer Brother         prostate    Cataracts Brother     Cataracts Sister     Melanoma Neg Hx     Psoriasis Neg Hx     Lupus Neg Hx     Eczema Neg Hx     Diabetes Neg Hx     Heart disease Neg Hx     Kidney disease Neg Hx     Colon cancer Neg Hx        Social History:  Social History     Tobacco Use    Smoking status: Former Smoker     Packs/day: 3.00     Years: 35.00     Pack years: 105.00     Types: Cigarettes     Start date: 1960     Last attempt to quit: 1985     Years since quittin.4    Smokeless tobacco: Never Used   Substance and Sexual Activity    Alcohol use: Yes     Alcohol/week: 4.2 oz     Types: 6 Cans of  beer, 1 Standard drinks or equivalent per week     Comment: socially    Drug use: No    Sexual activity: No        Review of Systems     Review of Systems   Constitutional: Negative for chills, diaphoresis and fever.   HENT: Negative for congestion, rhinorrhea and sore throat.    Respiratory: Positive for chest tightness. Negative for cough and shortness of breath.    Cardiovascular: Negative for chest pain.   Gastrointestinal: Negative for abdominal pain, diarrhea, nausea and vomiting.   Genitourinary: Negative for dysuria, frequency and hematuria.   Musculoskeletal: Negative for back pain and neck pain.        (+) bilat calf soreness   Skin: Negative for rash.   Neurological: Negative for dizziness, weakness, numbness and headaches.   Hematological: Does not bruise/bleed easily.   All other systems reviewed and are negative.       Physical Exam     Initial Vitals [01/02/19 1111]   BP Pulse Resp Temp SpO2   (!) 167/82 70 18 97.8 °F (36.6 °C) 98 %      MAP       --          Physical Exam  Nursing Notes and Vital Signs Reviewed.  Constitutional: Patient is in no acute distress. Well-developed and well-nourished.  Head: Atraumatic. Normocephalic.  Eyes: PERRL. EOM intact. Conjunctivae are not pale. No scleral icterus.  ENT: Mucous membranes are moist. Oropharynx is clear and symmetric.    Neck: Supple. Full ROM. No lymphadenopathy.  Cardiovascular: Regular rate. Regular rhythm. No murmurs, rubs, or gallops. Distal pulses are 2+ and symmetric.  Pulmonary/Chest: No respiratory distress. Clear to auscultation bilaterally. No wheezing or rales.  Abdominal: Soft and non-distended.  There is no tenderness.  No rebound, guarding, or rigidity.   Musculoskeletal: Moves all extremities. No obvious deformities. No edema. No calf tenderness.  Neurological: Patient is alert and oriented to person, place and time. Pupils ERRL and EOM normal. Cranial nerves II-XII are intact. Strength is full bilaterally; it is equal and 5/5 in  "bilateral upper and lower extremities. There is no pronator drift of outstretched arms. Light touch sense is intact. Speech is clear and normal. No acute focal neurological deficits noted.  Skin: Warm and dry.  Psychiatric: Normal affect. Good eye contact. Appropriate in content.     ED Course   Procedures  ED Vital Signs:  Vitals:    01/02/19 1111 01/02/19 1134 01/02/19 1137 01/02/19 1232   BP: (!) 167/82 (!) 147/66  (!) 157/73   Pulse: 70 63 60 61   Resp: 18 19  16   Temp: 97.8 °F (36.6 °C)      TempSrc: Oral      SpO2: 98% 98%  97%   Weight: 91.5 kg (201 lb 11.5 oz)      Height: 5' 10" (1.778 m)          Abnormal Lab Results:  Labs Reviewed   CBC W/ AUTO DIFFERENTIAL - Abnormal; Notable for the following components:       Result Value    RBC 4.31 (*)     Hemoglobin 13.8 (*)     MCH 32.0 (*)     Platelets 123 (*)     MPV 7.9 (*)     Lymph # 0.7 (*)     Gran% 74.9 (*)     Lymph% 16.3 (*)     All other components within normal limits   COMPREHENSIVE METABOLIC PANEL - Abnormal; Notable for the following components:    BUN, Bld 29 (*)     Creatinine 1.5 (*)     Total Bilirubin 1.1 (*)     eGFR if  50 (*)     eGFR if non  43 (*)     All other components within normal limits   TROPONIN I   B-TYPE NATRIURETIC PEPTIDE        All Lab Results:  Results for orders placed or performed during the hospital encounter of 01/02/19   CBC auto differential   Result Value Ref Range    WBC 4.55 3.90 - 12.70 K/uL    RBC 4.31 (L) 4.60 - 6.20 M/uL    Hemoglobin 13.8 (L) 14.0 - 18.0 g/dL    Hematocrit 41.3 40.0 - 54.0 %    MCV 96 82 - 98 fL    MCH 32.0 (H) 27.0 - 31.0 pg    MCHC 33.4 32.0 - 36.0 g/dL    RDW 13.0 11.5 - 14.5 %    Platelets 123 (L) 150 - 350 K/uL    MPV 7.9 (L) 9.2 - 12.9 fL    Gran # (ANC) 3.4 1.8 - 7.7 K/uL    Lymph # 0.7 (L) 1.0 - 4.8 K/uL    Mono # 0.3 0.3 - 1.0 K/uL    Eos # 0.1 0.0 - 0.5 K/uL    Baso # 0.02 0.00 - 0.20 K/uL    Gran% 74.9 (H) 38.0 - 73.0 %    Lymph% 16.3 (L) 18.0 - 48.0 " %    Mono% 6.2 4.0 - 15.0 %    Eosinophil% 2.2 0.0 - 8.0 %    Basophil% 0.4 0.0 - 1.9 %    Differential Method Automated    Comprehensive metabolic panel   Result Value Ref Range    Sodium 139 136 - 145 mmol/L    Potassium 4.2 3.5 - 5.1 mmol/L    Chloride 105 95 - 110 mmol/L    CO2 24 23 - 29 mmol/L    Glucose 101 70 - 110 mg/dL    BUN, Bld 29 (H) 8 - 23 mg/dL    Creatinine 1.5 (H) 0.5 - 1.4 mg/dL    Calcium 9.6 8.7 - 10.5 mg/dL    Total Protein 7.3 6.0 - 8.4 g/dL    Albumin 4.3 3.5 - 5.2 g/dL    Total Bilirubin 1.1 (H) 0.1 - 1.0 mg/dL    Alkaline Phosphatase 61 55 - 135 U/L    AST 18 10 - 40 U/L    ALT 12 10 - 44 U/L    Anion Gap 10 8 - 16 mmol/L    eGFR if African American 50 (A) >60 mL/min/1.73 m^2    eGFR if non African American 43 (A) >60 mL/min/1.73 m^2   Troponin I #1   Result Value Ref Range    Troponin I <0.006 0.000 - 0.026 ng/mL   B-Type natriuretic peptide (BNP)   Result Value Ref Range    BNP 58 0 - 99 pg/mL       Imaging Results:  Imaging Results          X-Ray Chest AP Portable (Final result)  Result time 01/02/19 11:58:40    Final result by Wiley Wong MD (01/02/19 11:58:40)                 Impression:      No acute findings.      Electronically signed by: Wiley Wong MD  Date:    01/02/2019  Time:    11:58             Narrative:    EXAMINATION:  XR CHEST AP PORTABLE    CLINICAL HISTORY:  Chest Pain;    TECHNIQUE:  AP view of the chest was performed.    COMPARISON:  09/26/2017    FINDINGS:  The cardiac and mediastinal silhouettes appear within normal limits.   The lungs are clear bilaterally.  No acute osseous findings demonstrated.                                 The EKG was ordered, reviewed, and independently interpreted by the ED provider.  Interpretation time: 11:18  Rate: 63 BPM  Rhythm: normal sinus rhythm  Interpretation: LAD. Incomplete RBBB. No STEMI.           The Emergency Provider reviewed the vital signs and test results, which are outlined above.     ED Discussion     1:00 PM:  Reassessed pt at this time. Patient is awake, alert, and in NAD. Pt states his condition has improved at this time. Discussed with pt all pertinent ED information and results. Discussed pt dx and plan of tx. Gave pt all f/u and return to the ED instructions. All questions and concerns were addressed at this time. Pt expresses understanding of information and instructions, and is comfortable with plan to discharge. Pt is stable for discharge.    I have discussed with patient and/or family/caretaker chest pain precautions, specifically to return for worsening chest pain, shortness of breath, fever, or any concern.  I have low suspicion for cardiopulmonary, vascular, infectious, respiratory, or other emergent medical condition based on my evaluation in the ED.    ED Medication(s):  Medications - No data to display      Follow-up Information     Valery Ozuna MD. Call in 1 day.    Specialty:  Family Medicine  Contact information:  [id number]  [address]  [facility] [id number] [phone]             Cardiology. Call in 1 day.    Why:  As needed                       Medical Decision Making:   Clinical Tests:   Lab Tests: Reviewed and Ordered  Radiological Study: Reviewed and Ordered  Medical Tests: Reviewed and Ordered             Scribe Attestation:   Scribe #1: I performed the above scribed service and the documentation accurately describes the services I performed. I attest to the accuracy of the note.     Attending:   Physician Attestation Statement for Scribe #1: I, Benton Trevino MD, personally performed the services described in this documentation, as scribed by Nellie Taveras, in my presence, and it is both accurate and complete.           Clinical Impression       ICD-10-CM ICD-9-CM   1. Chest pain R07.9 786.50       Disposition:   Disposition: Discharged  Condition: Stable         Benton Trevino MD  01/02/19 1958

## 2019-01-02 NOTE — TELEPHONE ENCOUNTER
----- Message from Ofelia Collins sent at 1/2/2019  1:55 PM CST -----  Contact: Patient  Patient needs a hospital follow up but there are no openings, please call him back at [phone]. Thank you

## 2019-01-04 ENCOUNTER — OFFICE VISIT (OUTPATIENT)
Dept: CARDIOLOGY | Facility: CLINIC | Age: 79
End: 2019-01-04
Payer: MEDICARE

## 2019-01-04 VITALS
DIASTOLIC BLOOD PRESSURE: 68 MMHG | HEIGHT: 70 IN | HEART RATE: 64 BPM | WEIGHT: 201.25 LBS | SYSTOLIC BLOOD PRESSURE: 120 MMHG | BODY MASS INDEX: 28.81 KG/M2

## 2019-01-04 DIAGNOSIS — E78.2 MIXED HYPERLIPIDEMIA: Chronic | ICD-10-CM

## 2019-01-04 DIAGNOSIS — G47.33 OSA (OBSTRUCTIVE SLEEP APNEA): ICD-10-CM

## 2019-01-04 DIAGNOSIS — I10 ESSENTIAL HYPERTENSION: Primary | Chronic | ICD-10-CM

## 2019-01-04 DIAGNOSIS — N18.9 CHRONIC KIDNEY DISEASE, UNSPECIFIED CKD STAGE: ICD-10-CM

## 2019-01-04 DIAGNOSIS — I45.10 INCOMPLETE RIGHT BUNDLE BRANCH BLOCK: ICD-10-CM

## 2019-01-04 PROCEDURE — 1101F PR PT FALLS ASSESS DOC 0-1 FALLS W/OUT INJ PAST YR: ICD-10-PCS | Mod: CPTII,HCNC,S$GLB, | Performed by: PHYSICIAN ASSISTANT

## 2019-01-04 PROCEDURE — 3078F PR MOST RECENT DIASTOLIC BLOOD PRESSURE < 80 MM HG: ICD-10-PCS | Mod: CPTII,HCNC,S$GLB, | Performed by: PHYSICIAN ASSISTANT

## 2019-01-04 PROCEDURE — 99213 PR OFFICE/OUTPT VISIT, EST, LEVL III, 20-29 MIN: ICD-10-PCS | Mod: HCNC,S$GLB,, | Performed by: PHYSICIAN ASSISTANT

## 2019-01-04 PROCEDURE — 99999 PR PBB SHADOW E&M-EST. PATIENT-LVL III: CPT | Mod: PBBFAC,HCNC,, | Performed by: PHYSICIAN ASSISTANT

## 2019-01-04 PROCEDURE — 99213 OFFICE O/P EST LOW 20 MIN: CPT | Mod: HCNC,S$GLB,, | Performed by: PHYSICIAN ASSISTANT

## 2019-01-04 PROCEDURE — 3074F PR MOST RECENT SYSTOLIC BLOOD PRESSURE < 130 MM HG: ICD-10-PCS | Mod: CPTII,HCNC,S$GLB, | Performed by: PHYSICIAN ASSISTANT

## 2019-01-04 PROCEDURE — 3078F DIAST BP <80 MM HG: CPT | Mod: CPTII,HCNC,S$GLB, | Performed by: PHYSICIAN ASSISTANT

## 2019-01-04 PROCEDURE — 1101F PT FALLS ASSESS-DOCD LE1/YR: CPT | Mod: CPTII,HCNC,S$GLB, | Performed by: PHYSICIAN ASSISTANT

## 2019-01-04 PROCEDURE — 99999 PR PBB SHADOW E&M-EST. PATIENT-LVL III: ICD-10-PCS | Mod: PBBFAC,HCNC,, | Performed by: PHYSICIAN ASSISTANT

## 2019-01-04 PROCEDURE — 3074F SYST BP LT 130 MM HG: CPT | Mod: CPTII,HCNC,S$GLB, | Performed by: PHYSICIAN ASSISTANT

## 2019-01-04 NOTE — PROGRESS NOTES
Subjective:    Patient ID:  Ezequiel Hughes is a 78 y.o. male who presents for follow-up of ED follow-up    HPI   Mr. Hughes is an 78 year old male patient with a PMHx of HTN, hyperlipidemia, and TIA who presents today for follow-up. Patient with recent ED visit due to bilateral leg tightness, ? Chest discomfort, and nausea. Felt symptoms were similar to what he experienced prior to TIA. ED workup unremarkable except for mildly elevated BP and patient was subsequently discharged. He returns today and states he feels well. Reports he did some heavy leg exercises and climbed a ladder prior to ED visit and noticed his calves were extremely sore and tight. Feels that led to his constellation of symptoms. No real complaints today in office. Denies any chest pain or SOB. No lightheadedness, dizziness, palpitations, near syncope, or syncope. No s/s suggestive of CHF. Exercises regularly on treadmill without any issues. BP stable in office. Patient reports compliance with his medications. Previous stress test and 2D echo in 12/17 were normal. NOEMI also showed adequate flow (10/17).    Review of Systems   Constitution: Negative for chills, decreased appetite, fever, weakness and malaise/fatigue.   HENT: Negative for congestion, hoarse voice and sore throat.    Eyes: Negative for blurred vision and discharge.   Cardiovascular: Negative for chest pain, claudication, cyanosis, dyspnea on exertion, irregular heartbeat, leg swelling, near-syncope, orthopnea, palpitations and paroxysmal nocturnal dyspnea.   Respiratory: Negative for cough, hemoptysis, shortness of breath, snoring, sputum production and wheezing.    Endocrine: Negative for cold intolerance and heat intolerance.   Hematologic/Lymphatic: Negative for bleeding problem. Does not bruise/bleed easily.   Skin: Negative for rash.   Musculoskeletal: Positive for arthritis. Negative for back pain, joint pain, joint swelling, muscle cramps, muscle weakness and myalgias.  "  Gastrointestinal: Negative for abdominal pain, constipation, diarrhea, heartburn, melena and nausea.   Genitourinary: Negative for hematuria.   Neurological: Negative for dizziness, focal weakness, headaches, light-headedness, loss of balance, numbness, paresthesias and seizures.   Psychiatric/Behavioral: Negative for memory loss. The patient does not have insomnia.    Allergic/Immunologic: Negative for hives.       /68 (BP Location: Left arm, Patient Position: Sitting, BP Method: Medium (Manual))   Pulse 64   Ht 5' 10" (1.778 m)   Wt 91.3 kg (201 lb 4.5 oz)   BMI 28.88 kg/m²     Objective:    Physical Exam   Constitutional: He is oriented to person, place, and time. He appears well-developed and well-nourished. No distress.   HENT:   Head: Normocephalic and atraumatic.   Eyes: Pupils are equal, round, and reactive to light. Right eye exhibits no discharge. Left eye exhibits no discharge.   Neck: Neck supple. No JVD present. No thyromegaly present.   Cardiovascular: Normal rate, regular rhythm, S1 normal, S2 normal and normal heart sounds.   No murmur heard.  Pulmonary/Chest: Effort normal and breath sounds normal. No respiratory distress. He has no wheezes. He has no rales.   Abdominal: Soft. He exhibits no distension. There is no tenderness. There is no rebound.   Musculoskeletal: He exhibits no edema.   Neurological: He is alert and oriented to person, place, and time.   Skin: Skin is warm and dry. He is not diaphoretic. No erythema.   Psychiatric: He has a normal mood and affect. His behavior is normal. Thought content normal.   Nursing note and vitals reviewed.        2D Echo CONCLUSIONS     1 - No wall motion abnormalities.     2 - Normal left ventricular systolic function (EF 60-65%).     3 - Normal left ventricular diastolic function.     4 - Normal right ventricular systolic function .     5 - The estimated PA systolic pressure is 29 mmHg.     6 - Mild aortic regurgitation.     7 - Trivial " tricuspid regurgitation.     Impression: NORMAL MYOCARDIAL PERFUSION  1. The perfusion scan is free of evidence for myocardial ischemia or injury.   2. Resting wall motion is physiologic.   3. Resting LV function is normal.   4. The ventricular volumes are normal at rest and stress.   5. The extracardiac distribution of radioactivity is normal.       Chemistry        Component Value Date/Time     01/02/2019 1139    K 4.2 01/02/2019 1139     01/02/2019 1139    CO2 24 01/02/2019 1139    BUN 29 (H) 01/02/2019 1139    CREATININE 1.5 (H) 01/02/2019 1139     01/02/2019 1139        Component Value Date/Time    CALCIUM 9.6 01/02/2019 1139    ALKPHOS 61 01/02/2019 1139    AST 18 01/02/2019 1139    ALT 12 01/02/2019 1139    BILITOT 1.1 (H) 01/02/2019 1139    ESTGFRAFRICA 50 (A) 01/02/2019 1139    EGFRNONAA 43 (A) 01/02/2019 1139        Lab Results   Component Value Date    CHOL 136 03/07/2018    CHOL 132 09/07/2017    CHOL 157 03/07/2017     Lab Results   Component Value Date    HDL 65 03/07/2018    HDL 62 09/07/2017    HDL 52 03/07/2017     Lab Results   Component Value Date    LDLCALC 58.2 (L) 03/07/2018    LDLCALC 60.0 (L) 09/07/2017    LDLCALC 89.6 03/07/2017     Lab Results   Component Value Date    TRIG 64 03/07/2018    TRIG 50 09/07/2017    TRIG 77 03/07/2017     Lab Results   Component Value Date    CHOLHDL 47.8 03/07/2018    CHOLHDL 47.0 09/07/2017    CHOLHDL 33.1 03/07/2017       Assessment:       1. Essential hypertension    2. GUIDO (obstructive sleep apnea)    3. Mixed hyperlipidemia    4. Incomplete right bundle branch block       Patient presents for f/u. Suspect ED visit caused by over-exertion and bumped BP. Appears stable CV wise in clinic today. No chest pain or anginal equivalent with recent negative workup. Declined repeating exercise NOEMI for now. Continue same mgmt.   Plan:   -Continue current medical management and risk factor modification  -Cardiac, low salt diet  -Continue active  lifestyle  -Patient to contact office if any issues beforehand  -RTC 3 months with Dr. Thompson          Chart reviewed. Dr. Thompson agrees with plan as outlined above.

## 2019-01-07 ENCOUNTER — LAB VISIT (OUTPATIENT)
Dept: LAB | Facility: HOSPITAL | Age: 79
End: 2019-01-07
Attending: FAMILY MEDICINE
Payer: MEDICARE

## 2019-01-07 DIAGNOSIS — D64.9 ANEMIA, UNSPECIFIED TYPE: ICD-10-CM

## 2019-01-07 DIAGNOSIS — I10 ESSENTIAL HYPERTENSION: Chronic | ICD-10-CM

## 2019-01-07 DIAGNOSIS — D69.6 THROMBOCYTOPENIA: ICD-10-CM

## 2019-01-07 DIAGNOSIS — E78.2 MIXED HYPERLIPIDEMIA: Chronic | ICD-10-CM

## 2019-01-07 DIAGNOSIS — N18.30 CHRONIC KIDNEY DISEASE, STAGE 3: ICD-10-CM

## 2019-01-07 DIAGNOSIS — E79.0 ELEVATED URIC ACID IN BLOOD: ICD-10-CM

## 2019-01-07 LAB
ALBUMIN SERPL BCP-MCNC: 4 G/DL
ALP SERPL-CCNC: 58 U/L
ALT SERPL W/O P-5'-P-CCNC: 15 U/L
ANION GAP SERPL CALC-SCNC: 6 MMOL/L
AST SERPL-CCNC: 16 U/L
BASOPHILS # BLD AUTO: 0.05 K/UL
BASOPHILS NFR BLD: 1 %
BILIRUB SERPL-MCNC: 1.2 MG/DL
BUN SERPL-MCNC: 29 MG/DL
CALCIUM SERPL-MCNC: 9.7 MG/DL
CHLORIDE SERPL-SCNC: 106 MMOL/L
CHOLEST SERPL-MCNC: 151 MG/DL
CHOLEST/HDLC SERPL: 2.6 {RATIO}
CO2 SERPL-SCNC: 28 MMOL/L
CREAT SERPL-MCNC: 1.4 MG/DL
DIFFERENTIAL METHOD: ABNORMAL
EOSINOPHIL # BLD AUTO: 0.3 K/UL
EOSINOPHIL NFR BLD: 5.1 %
ERYTHROCYTE [DISTWIDTH] IN BLOOD BY AUTOMATED COUNT: 12.8 %
EST. GFR  (AFRICAN AMERICAN): 54.5 ML/MIN/1.73 M^2
EST. GFR  (NON AFRICAN AMERICAN): 47.1 ML/MIN/1.73 M^2
GLUCOSE SERPL-MCNC: 78 MG/DL
HCT VFR BLD AUTO: 42.3 %
HDLC SERPL-MCNC: 58 MG/DL
HDLC SERPL: 38.4 %
HGB BLD-MCNC: 13.6 G/DL
IMM GRANULOCYTES # BLD AUTO: 0.02 K/UL
IMM GRANULOCYTES NFR BLD AUTO: 0.4 %
LDLC SERPL CALC-MCNC: 68.2 MG/DL
LYMPHOCYTES # BLD AUTO: 1.1 K/UL
LYMPHOCYTES NFR BLD: 21.7 %
MCH RBC QN AUTO: 32.2 PG
MCHC RBC AUTO-ENTMCNC: 32.2 G/DL
MCV RBC AUTO: 100 FL
MONOCYTES # BLD AUTO: 0.5 K/UL
MONOCYTES NFR BLD: 10 %
NEUTROPHILS # BLD AUTO: 3 K/UL
NEUTROPHILS NFR BLD: 61.8 %
NONHDLC SERPL-MCNC: 93 MG/DL
NRBC BLD-RTO: 0 /100 WBC
PLATELET # BLD AUTO: 136 K/UL
PMV BLD AUTO: 8.3 FL
POTASSIUM SERPL-SCNC: 4 MMOL/L
PROT SERPL-MCNC: 7 G/DL
RBC # BLD AUTO: 4.23 M/UL
SODIUM SERPL-SCNC: 140 MMOL/L
TRIGL SERPL-MCNC: 124 MG/DL
TSH SERPL DL<=0.005 MIU/L-ACNC: 2.1 UIU/ML
URATE SERPL-MCNC: 5.9 MG/DL
WBC # BLD AUTO: 4.89 K/UL

## 2019-01-07 PROCEDURE — 80061 LIPID PANEL: CPT | Mod: HCNC

## 2019-01-07 PROCEDURE — 85025 COMPLETE CBC W/AUTO DIFF WBC: CPT | Mod: HCNC

## 2019-01-07 PROCEDURE — 80053 COMPREHEN METABOLIC PANEL: CPT | Mod: HCNC

## 2019-01-07 PROCEDURE — 84550 ASSAY OF BLOOD/URIC ACID: CPT | Mod: HCNC

## 2019-01-07 PROCEDURE — 84443 ASSAY THYROID STIM HORMONE: CPT | Mod: HCNC

## 2019-01-07 PROCEDURE — 36415 COLL VENOUS BLD VENIPUNCTURE: CPT | Mod: HCNC,PO

## 2019-01-16 ENCOUNTER — OFFICE VISIT (OUTPATIENT)
Dept: INTERNAL MEDICINE | Facility: CLINIC | Age: 79
End: 2019-01-16
Payer: MEDICARE

## 2019-01-16 VITALS
BODY MASS INDEX: 28.55 KG/M2 | HEIGHT: 71 IN | DIASTOLIC BLOOD PRESSURE: 76 MMHG | TEMPERATURE: 98 F | SYSTOLIC BLOOD PRESSURE: 122 MMHG | HEART RATE: 72 BPM | WEIGHT: 203.94 LBS

## 2019-01-16 DIAGNOSIS — I77.1 TORTUOUS AORTA: ICD-10-CM

## 2019-01-16 DIAGNOSIS — K21.9 GASTROESOPHAGEAL REFLUX DISEASE, ESOPHAGITIS PRESENCE NOT SPECIFIED: ICD-10-CM

## 2019-01-16 DIAGNOSIS — C61 PROSTATE CANCER: ICD-10-CM

## 2019-01-16 DIAGNOSIS — N18.30 CHRONIC KIDNEY DISEASE, STAGE 3: ICD-10-CM

## 2019-01-16 DIAGNOSIS — Z00.00 ROUTINE GENERAL MEDICAL EXAMINATION AT A HEALTH CARE FACILITY: Primary | ICD-10-CM

## 2019-01-16 DIAGNOSIS — M1A.9XX0 CHRONIC GOUT WITHOUT TOPHUS, UNSPECIFIED CAUSE, UNSPECIFIED SITE: ICD-10-CM

## 2019-01-16 DIAGNOSIS — I70.203 ATHEROSCLEROSIS OF ARTERY OF BOTH LOWER EXTREMITIES: ICD-10-CM

## 2019-01-16 DIAGNOSIS — I10 ESSENTIAL HYPERTENSION: Chronic | ICD-10-CM

## 2019-01-16 DIAGNOSIS — D69.6 THROMBOCYTOPENIA: ICD-10-CM

## 2019-01-16 DIAGNOSIS — D64.9 ANEMIA, UNSPECIFIED TYPE: ICD-10-CM

## 2019-01-16 DIAGNOSIS — E78.2 MIXED HYPERLIPIDEMIA: Chronic | ICD-10-CM

## 2019-01-16 DIAGNOSIS — C44.519 BASAL CELL CARCINOMA (BCC) OF ANTERIOR CHEST: ICD-10-CM

## 2019-01-16 PROCEDURE — 3074F SYST BP LT 130 MM HG: CPT | Mod: CPTII,HCNC,S$GLB, | Performed by: FAMILY MEDICINE

## 2019-01-16 PROCEDURE — 3074F PR MOST RECENT SYSTOLIC BLOOD PRESSURE < 130 MM HG: ICD-10-PCS | Mod: CPTII,HCNC,S$GLB, | Performed by: FAMILY MEDICINE

## 2019-01-16 PROCEDURE — 99499 UNLISTED E&M SERVICE: CPT | Mod: HCNC,S$GLB,, | Performed by: FAMILY MEDICINE

## 2019-01-16 PROCEDURE — 3078F PR MOST RECENT DIASTOLIC BLOOD PRESSURE < 80 MM HG: ICD-10-PCS | Mod: CPTII,HCNC,S$GLB, | Performed by: FAMILY MEDICINE

## 2019-01-16 PROCEDURE — 99999 PR PBB SHADOW E&M-EST. PATIENT-LVL III: ICD-10-PCS | Mod: PBBFAC,HCNC,, | Performed by: FAMILY MEDICINE

## 2019-01-16 PROCEDURE — 99999 PR PBB SHADOW E&M-EST. PATIENT-LVL III: CPT | Mod: PBBFAC,HCNC,, | Performed by: FAMILY MEDICINE

## 2019-01-16 PROCEDURE — 99499 RISK ADDL DX/OHS AUDIT: ICD-10-PCS | Mod: HCNC,S$GLB,, | Performed by: FAMILY MEDICINE

## 2019-01-16 PROCEDURE — 99397 PER PM REEVAL EST PAT 65+ YR: CPT | Mod: HCNC,S$GLB,, | Performed by: FAMILY MEDICINE

## 2019-01-16 PROCEDURE — 3078F DIAST BP <80 MM HG: CPT | Mod: CPTII,HCNC,S$GLB, | Performed by: FAMILY MEDICINE

## 2019-01-16 PROCEDURE — 99397 PR PREVENTIVE VISIT,EST,65 & OVER: ICD-10-PCS | Mod: HCNC,S$GLB,, | Performed by: FAMILY MEDICINE

## 2019-01-16 NOTE — PATIENT INSTRUCTIONS
You can get a shingles vaccine, but currently the old one is not recommended anymore. Shingrix vaccine is the new shingles vaccine. You can ask at your local pharmacy.  It is 2 shots, and is not a live vaccine. It is usually  covered by insurance. It is 90 % effective against Shingles. You can start it now at age 50.  It is currently on backorder at some pharmacies and thus it may be difficult to get the 2nd shot. We will have it at our clinics around Jan and Feb of 2019 once the back-order has been dealt with by the . You do not need a prescription to receive it.     Please let me know if you have further questions.    Sincerely,   Valery Ozuna MD

## 2019-01-16 NOTE — PROGRESS NOTES
Subjective:      Patient ID: Ezequiel Hughes is a 78 y.o. male.    Chief Complaint: Annual Exam    Disclaimer:  This note is prepared using voice recognition software and as such is likely to have errors and has not been proof read. Please contact me for questions.     Ezequiel Hughes is a 78 y.o. male who presents today for chronic issues and annual [employer] exam.   Mr. Hughes is an 78 year old male patient with a PMHx of HTN, hyperlipidemia, PAD, and  TIA . Went to  ED  due to bilateral leg tightness, ED workup negative except for elevated BP. Pt felt like it was more related to fatigue with being on 2 step ladder doing overhead activity. With known hx of PAD. Improved already. Saw Cards for followup. No current symptoms at this time.     No s/s suggestive of CHF. Exercises regularly on treadmill without any issues. BP stable in office. Patient reports compliance with his medications. Previous stress test and 2D echo in 12/17 were normal. NOEMI also showed adequate flow (10/17).    Didn't go to gym for 2 weeks because girlfriend was sick. This past week started back to gym and felt better last 2 days since exercises.     On allopurinol 100mg. Uric acid is < 7. No flareups.     On Plavix and statin. Doing well.     Has skin lesion on chest, saw Dr Graves and Dr Irving. Recommended he see Dr Corcoran. Has upcoming appt soon. Suspected BCC on chest.     jonelle- stable.     CKD stage 3- stable. No significant decline.               Lab Results   Component Value Date    WBC 4.89 01/07/2019    HGB 13.6 (L) 01/07/2019    HCT 42.3 01/07/2019     (L) 01/07/2019    CHOL 151 01/07/2019    TRIG 124 01/07/2019    HDL 58 01/07/2019    ALT 15 01/07/2019    AST 16 01/07/2019     01/07/2019    K 4.0 01/07/2019     01/07/2019    CREATININE 1.4 01/07/2019    BUN 29 (H) 01/07/2019    CO2 28 01/07/2019    TSH 2.104 01/07/2019    PSA 1.5 09/01/2016    INR 1.2 09/26/2017    HGBA1C 5.2 03/07/2018       X-Ray Chest AP  "Portable  Narrative: EXAMINATION:  XR CHEST AP PORTABLE    CLINICAL HISTORY:  Chest Pain;    TECHNIQUE:  AP view of the chest was performed.    COMPARISON:  09/26/2017    FINDINGS:  The cardiac and mediastinal silhouettes appear within normal limits.   The lungs are clear bilaterally.  No acute osseous findings demonstrated.  Impression: No acute findings.    Electronically signed by: Wiley Wong MD  Date:    01/02/2019  Time:    11:58        Review of Systems   Constitutional: Negative for activity change, appetite change, chills, fatigue and unexpected weight change.   HENT: Negative for congestion, ear pain, postnasal drip, sneezing, sore throat and trouble swallowing.    Eyes: Negative for pain and visual disturbance.   Respiratory: Negative for cough and shortness of breath.    Cardiovascular: Negative for chest pain and leg swelling.   Gastrointestinal: Negative for abdominal pain, constipation, diarrhea, nausea and vomiting.   Endocrine: Negative for cold intolerance and heat intolerance.   Genitourinary: Negative for difficulty urinating, dysuria and flank pain.   Musculoskeletal: Negative for arthralgias, back pain, joint swelling and neck pain.   Skin: Positive for color change. Negative for rash.   Neurological: Negative for dizziness, seizures and headaches.   Psychiatric/Behavioral: Negative for behavioral problems, dysphoric mood and sleep disturbance.     Objective:     Vitals:    01/16/19 0655   BP: 122/76   Pulse: 72   Temp: 97.6 °F (36.4 °C)   TempSrc: Tympanic   Weight: 92.5 kg (203 lb 14.8 oz)   Height: 5' 10.5" (1.791 m)     Physical Exam   Constitutional: He is oriented to person, place, and time. He appears well-developed and well-nourished. No distress.   HENT:   Head: Normocephalic and atraumatic.   Right Ear: External ear normal.   Left Ear: External ear normal.   Nose: Nose normal.   Mouth/Throat: Oropharynx is clear and moist.   Eyes: EOM are normal. Pupils are equal, round, and reactive " to light.   Neck: Normal range of motion. Neck supple. No thyromegaly present.   Cardiovascular: Normal rate, regular rhythm, normal heart sounds and intact distal pulses. Exam reveals no gallop and no friction rub.   No murmur heard.  Pulses:       Posterior tibial pulses are 2+ on the right side, and 2+ on the left side.   Pulmonary/Chest: Effort normal and breath sounds normal. No respiratory distress.   Abdominal: Soft. Bowel sounds are normal. He exhibits no distension. There is no tenderness. There is no rebound.   Musculoskeletal: Normal range of motion.   Feet:   Right Foot:   Skin Integrity: Negative for skin breakdown or erythema.   Left Foot:   Skin Integrity: Negative for skin breakdown or erythema.   Lymphadenopathy:     He has no cervical adenopathy.   Neurological: He is alert and oriented to person, place, and time. Coordination normal.   Skin: Skin is warm and dry. Lesion noted. No rash noted.        Psychiatric: He has a normal mood and affect. His speech is normal and behavior is normal. Thought content normal.   Vitals reviewed.    Assessment:     1. Routine general medical examination at a health care facility    2. Atherosclerosis of artery of both lower extremities    3. Tortuous aorta    4. Thrombocytopenia    5. Mixed hyperlipidemia    6. Essential hypertension    7. Anemia, unspecified type    8. Chronic kidney disease, stage 3    9. Prostate cancer    10. Basal cell carcinoma (BCC) of anterior chest    11. Chronic gout without tophus, unspecified cause, unspecified site    12. Gastroesophageal reflux disease, esophagitis presence not specified      Plan:   Ezequiel was seen today for annual exam.    Diagnoses and all orders for this visit:    Routine general medical examination at a health care facility  Comments:  labs reviewed. Discussed HM.   Orders:  -     Lipid panel; Future  -     TSH; Future  -     Uric acid; Future  -     Comprehensive metabolic panel; Future  -     CBC auto  differential; Future    Atherosclerosis of artery of both lower extremities  Comments:  on plavix and atorvastatin 40mg. cont with exercise.   Orders:  -     Lipid panel; Future  -     TSH; Future  -     Uric acid; Future  -     Comprehensive metabolic panel; Future  -     CBC auto differential; Future    Tortuous aorta  Comments:  on plavix and atorvastatin 40mg.   Orders:  -     Lipid panel; Future  -     TSH; Future  -     Uric acid; Future  -     Comprehensive metabolic panel; Future  -     CBC auto differential; Future    Thrombocytopenia  Comments:  at 136,000, improved. Cont with hem/onc followup yearly.   Orders:  -     Lipid panel; Future  -     TSH; Future  -     Uric acid; Future  -     Comprehensive metabolic panel; Future  -     CBC auto differential; Future    Mixed hyperlipidemia  Comments:  stable < ldl 70. Cont with statin.   Orders:  -     Lipid panel; Future  -     TSH; Future  -     Uric acid; Future  -     Comprehensive metabolic panel; Future  -     CBC auto differential; Future    Essential hypertension  Comments:  well controlled.   Orders:  -     Lipid panel; Future  -     TSH; Future  -     Uric acid; Future  -     Comprehensive metabolic panel; Future  -     CBC auto differential; Future    Anemia, unspecified type  Comments:  improving with time, followed by Dr Morgan.  Orders:  -     Lipid panel; Future  -     TSH; Future  -     Uric acid; Future  -     Comprehensive metabolic panel; Future  -     CBC auto differential; Future    Chronic kidney disease, stage 3  Comments:  improving, cont with hydration.   Orders:  -     Lipid panel; Future  -     TSH; Future  -     Uric acid; Future  -     Comprehensive metabolic panel; Future  -     CBC auto differential; Future    Prostate cancer  -     Lipid panel; Future  -     TSH; Future  -     Uric acid; Future  -     Comprehensive metabolic panel; Future  -     CBC auto differential; Future    Basal cell carcinoma (BCC) of anterior  chest  Comments:  going to see Dr Moraes next week for discussion on removal.   Orders:  -     Lipid panel; Future  -     TSH; Future  -     Uric acid; Future  -     Comprehensive metabolic panel; Future  -     CBC auto differential; Future    Chronic gout without tophus, unspecified cause, unspecified site  Comments:  continue with allopurinol 100mg. levels < 6.   Orders:  -     Lipid panel; Future  -     TSH; Future  -     Uric acid; Future  -     Comprehensive metabolic panel; Future  -     CBC auto differential; Future    Gastroesophageal reflux disease, esophagitis presence not specified  Comments:  stopping protonix due to resolution of symptoms and ckd.   Orders:  -     Lipid panel; Future  -     TSH; Future  -     Uric acid; Future  -     Comprehensive metabolic panel; Future  -     CBC auto differential; Future            Follow-up in about 6 months (around 7/16/2019) for chronic issues Dr Ozuna.    Patient Instructions   You can get a shingles vaccine, but currently the old one is not recommended anymore. Shingrix vaccine is the new shingles vaccine. You can ask at your local pharmacy.  It is 2 shots, and is not a live vaccine. It is usually  covered by insurance. It is 90 % effective against Shingles. You can start it now at age 50.  It is currently on backorder at some pharmacies and thus it may be difficult to get the 2nd shot. We will have it at our clinics around Jan and Feb of 2019 once the back-order has been dealt with by the . You do not need a prescription to receive it.     Please let me know if you have further questions.    Sincerely,   Valery Ozuna MD

## 2019-01-30 ENCOUNTER — TELEPHONE (OUTPATIENT)
Dept: INTERNAL MEDICINE | Facility: CLINIC | Age: 79
End: 2019-01-30

## 2019-01-30 NOTE — TELEPHONE ENCOUNTER
----- Message from Marietta Marie sent at 1/30/2019 12:41 PM CST -----  Contact: Patient  Patient called to find out if the office has the Shingles shot. He can be contacted at [phone].    Thanks,  Marietta

## 2019-01-30 NOTE — TELEPHONE ENCOUNTER
I called and advised patient that we did not have injection and he can check pharmacy and he expressed understanding.aa

## 2019-03-15 ENCOUNTER — PES CALL (OUTPATIENT)
Dept: ADMINISTRATIVE | Facility: CLINIC | Age: 79
End: 2019-03-15

## 2019-03-28 ENCOUNTER — OFFICE VISIT (OUTPATIENT)
Dept: OPHTHALMOLOGY | Facility: CLINIC | Age: 79
End: 2019-03-28
Payer: MEDICARE

## 2019-03-28 DIAGNOSIS — Z96.1 PSEUDOPHAKIA: ICD-10-CM

## 2019-03-28 DIAGNOSIS — H43.391 VITREOUS FLOATERS OF RIGHT EYE: ICD-10-CM

## 2019-03-28 DIAGNOSIS — H40.1111 PRIMARY OPEN ANGLE GLAUCOMA (POAG) OF RIGHT EYE, MILD STAGE: Primary | ICD-10-CM

## 2019-03-28 PROCEDURE — 92012 PR EYE EXAM, EST PATIENT,INTERMED: ICD-10-PCS | Mod: HCNC,S$GLB,, | Performed by: OPHTHALMOLOGY

## 2019-03-28 PROCEDURE — 92012 INTRM OPH EXAM EST PATIENT: CPT | Mod: HCNC,S$GLB,, | Performed by: OPHTHALMOLOGY

## 2019-03-28 PROCEDURE — 99999 PR PBB SHADOW E&M-EST. PATIENT-LVL II: ICD-10-PCS | Mod: PBBFAC,HCNC,, | Performed by: OPHTHALMOLOGY

## 2019-03-28 PROCEDURE — 99999 PR PBB SHADOW E&M-EST. PATIENT-LVL II: CPT | Mod: PBBFAC,HCNC,, | Performed by: OPHTHALMOLOGY

## 2019-03-28 PROCEDURE — 92250 COLOR FUNDUS PHOTOGRAPHY - OU - BOTH EYES: ICD-10-PCS | Mod: HCNC,S$GLB,, | Performed by: OPHTHALMOLOGY

## 2019-03-28 PROCEDURE — 92250 FUNDUS PHOTOGRAPHY W/I&R: CPT | Mod: HCNC,S$GLB,, | Performed by: OPHTHALMOLOGY

## 2019-03-28 NOTE — PROGRESS NOTES
SUBJECTIVE:   Ezequiel Hughes is a 79 y.o. male   Uncorrected distance visual acuity was 20/20 -1 in the right eye and 20/30 -1 in the left eye.   Chief Complaint   Patient presents with    Glaucoma     no eyedrops        HPI:  HPI     Glaucoma      Additional comments: no eyedrops              Comments     3 to 4 months glaucoma check (IOP check)    Patient states that he has hazy vision when he looks to the right then   straight ahead. (Lasts for about a couple of seconds)  No eye pain  No flashing lights   No veils and no curtains    1. Mod COAG OS + Mild COAG OD (init 22/26 post dilation IOP ) goal = 17  2. PCIOL / I-Stent OD +18.5 [id number] (distance) 2-21-18  PCIOL /I-Stent OS +21.0 (-1.75) 3/21/18  3. ERM OU   4. Brow Lift 9/18   *intolerant of travatan*      No eyedrops          Last edited by Daria Fernandez on 3/28/2019  8:57 AM. (History)        Assessment /Plan :  1. Primary open angle glaucoma (POAG) of right eye, mild stage Doing well, IOP within acceptable range relative to target IOP and no evidence of progression. Continue current treatment. Reviewed importance of continued compliance with treatment and follow up.     2. Pseudophakia stable   3. Vitreous floaters of right eye monitor for now     Return to clinic in 3-4 months  or as needed.  With Dilation, HVF 24-2 and SDP

## 2019-03-29 ENCOUNTER — OFFICE VISIT (OUTPATIENT)
Dept: CARDIOLOGY | Facility: CLINIC | Age: 79
End: 2019-03-29
Payer: MEDICARE

## 2019-03-29 VITALS
WEIGHT: 203.69 LBS | HEIGHT: 71 IN | DIASTOLIC BLOOD PRESSURE: 66 MMHG | BODY MASS INDEX: 28.52 KG/M2 | HEART RATE: 64 BPM | SYSTOLIC BLOOD PRESSURE: 122 MMHG

## 2019-03-29 DIAGNOSIS — E78.2 MIXED HYPERLIPIDEMIA: Chronic | ICD-10-CM

## 2019-03-29 DIAGNOSIS — D69.6 THROMBOCYTOPENIA: ICD-10-CM

## 2019-03-29 DIAGNOSIS — I77.1 TORTUOUS AORTA: ICD-10-CM

## 2019-03-29 DIAGNOSIS — N18.30 CHRONIC KIDNEY DISEASE, STAGE 3: ICD-10-CM

## 2019-03-29 DIAGNOSIS — I70.203 ATHEROSCLEROSIS OF ARTERY OF BOTH LOWER EXTREMITIES: ICD-10-CM

## 2019-03-29 DIAGNOSIS — I10 ESSENTIAL HYPERTENSION: Primary | Chronic | ICD-10-CM

## 2019-03-29 DIAGNOSIS — D64.9 ANEMIA, UNSPECIFIED TYPE: ICD-10-CM

## 2019-03-29 DIAGNOSIS — G47.61 PERIODIC LIMB MOVEMENT DISORDER (PLMD): ICD-10-CM

## 2019-03-29 DIAGNOSIS — G47.33 OSA (OBSTRUCTIVE SLEEP APNEA): ICD-10-CM

## 2019-03-29 DIAGNOSIS — I45.10 INCOMPLETE RIGHT BUNDLE BRANCH BLOCK: ICD-10-CM

## 2019-03-29 PROCEDURE — 99999 PR PBB SHADOW E&M-EST. PATIENT-LVL III: ICD-10-PCS | Mod: PBBFAC,HCNC,, | Performed by: INTERNAL MEDICINE

## 2019-03-29 PROCEDURE — 3074F SYST BP LT 130 MM HG: CPT | Mod: HCNC,CPTII,S$GLB, | Performed by: INTERNAL MEDICINE

## 2019-03-29 PROCEDURE — 99999 PR PBB SHADOW E&M-EST. PATIENT-LVL III: CPT | Mod: PBBFAC,HCNC,, | Performed by: INTERNAL MEDICINE

## 2019-03-29 PROCEDURE — 1101F PR PT FALLS ASSESS DOC 0-1 FALLS W/OUT INJ PAST YR: ICD-10-PCS | Mod: HCNC,CPTII,S$GLB, | Performed by: INTERNAL MEDICINE

## 2019-03-29 PROCEDURE — 99214 OFFICE O/P EST MOD 30 MIN: CPT | Mod: HCNC,S$GLB,, | Performed by: INTERNAL MEDICINE

## 2019-03-29 PROCEDURE — 3074F PR MOST RECENT SYSTOLIC BLOOD PRESSURE < 130 MM HG: ICD-10-PCS | Mod: HCNC,CPTII,S$GLB, | Performed by: INTERNAL MEDICINE

## 2019-03-29 PROCEDURE — 99214 PR OFFICE/OUTPT VISIT, EST, LEVL IV, 30-39 MIN: ICD-10-PCS | Mod: HCNC,S$GLB,, | Performed by: INTERNAL MEDICINE

## 2019-03-29 PROCEDURE — 99499 RISK ADDL DX/OHS AUDIT: ICD-10-PCS | Mod: HCNC,S$GLB,, | Performed by: INTERNAL MEDICINE

## 2019-03-29 PROCEDURE — 1101F PT FALLS ASSESS-DOCD LE1/YR: CPT | Mod: HCNC,CPTII,S$GLB, | Performed by: INTERNAL MEDICINE

## 2019-03-29 PROCEDURE — 3078F DIAST BP <80 MM HG: CPT | Mod: HCNC,CPTII,S$GLB, | Performed by: INTERNAL MEDICINE

## 2019-03-29 PROCEDURE — 3078F PR MOST RECENT DIASTOLIC BLOOD PRESSURE < 80 MM HG: ICD-10-PCS | Mod: HCNC,CPTII,S$GLB, | Performed by: INTERNAL MEDICINE

## 2019-03-29 PROCEDURE — 99499 UNLISTED E&M SERVICE: CPT | Mod: HCNC,S$GLB,, | Performed by: INTERNAL MEDICINE

## 2019-03-29 NOTE — PROGRESS NOTES
Subjective:   Patient ID:  Ezequiel Hughes is a 79 y.o. male who presents for follow up of Annual Exam; Claudication; and Hypertension      HPI  An 80 yo male with htn ckd sleep apnea untretaed is her efor f/u has been exercising doing well clinicaally no chest pain or shortness of breath ehe xercises regularily no symptoms of angina. He ha sleg pain and cramps but can 2 miles on treadmill w/o symptoms. He feels wore out at the end of the day.  Past Medical History:   Diagnosis Date    Allergic rhinitis     Anemia     Back pain     BPH (benign prostatic hyperplasia)     Cancer     skin cancer to neck, Dr. Graves    Cataract     Disorder of kidney and ureter     ED (erectile dysfunction)     Hiatal hernia     small    History of colon polyps     colonoscopy 11/2016    HLD (hyperlipidemia)     Hyperlipidemia     Hypertension     Lateral epicondylitis     OA (osteoarthritis)     GUIDO (obstructive sleep apnea)     Prostate cancer     Dr. Wong    TIA (transient ischemic attack)     Trouble in sleeping        Past Surgical History:   Procedure Laterality Date    ARTHROSCOPY-KNEE Right 4/22/2015    Performed by Alexandre [facility]ain Sr., [facility] at [facility] OR    CATARACT EXTRACTION W/  INTRAOCULAR LENS IMPLANT Right 02/21/2018    I-Stent    CATARACT EXTRACTION W/  INTRAOCULAR LENS IMPLANT Left 03/21/2018    I - Stent    COLONOSCOPY N/A 11/14/2016    Performed by Karuna Rodriguez MD at [facility] ENDO    COLONOSCOPY N/A 8/29/2013    Performed by Tushar Madrid MD at [facility] ENDO    HEMORRHOID SURGERY      I-STENT Right 02/21/2018    DR. REECE    KNEE ARTHROSCOPY W/ MENISCAL REPAIR Right 2015    Dr. Jain    MENISCECTOMY Right 4/22/2015    Performed by Alexand[facility] Sr[facility] at [facility] OR    PCIOL Right 02/21/2018    DR. REECE    PLANTAR FASCIA RELEASE      right    ROTATOR CUFF REPAIR Bilateral     bilateral    SHOULDER SURGERY Bilateral around 2000    Dr. Pepper.  rotator cuff surgeries     SYNOVECTOMY-KNEE Right 2015    [facility]       Social History     Tobacco Use    Smoking status: Former Smoker     Packs/day: 3.00     Years: 35.00     Pack years: 105.00     Types: Cigarettes     Start date: 1960     Last attempt to quit: 1985     Years since quittin.7    Smokeless tobacco: Never Used   Substance Use Topics    Alcohol use: Yes     Alcohol/week: 4.2 oz     Types: 6 Cans of beer, 1 Standard drinks or equivalent per week     Comment: socially    Drug use: No       Family History   Problem Relation Age of Onset    Lung cancer Father         life long smoker    Cancer Father         prostate, lung    Stroke Sister         TIA    Cataracts Sister     Cancer Brother         prostate    Cataracts Brother     Cataracts Sister     Melanoma Neg Hx     Psoriasis Neg Hx     Lupus Neg Hx     Eczema Neg Hx     Diabetes Neg Hx     Heart disease Neg Hx     Kidney disease Neg Hx     Colon cancer Neg Hx        Current Outpatient Medications   Medication Sig    acetaminophen (TYLENOL ARTHRITIS PAIN ORAL) Take by mouth. Patient states that he takes 2 tablets in the morning and 2 tablets in the evening    allopurinol (ZYLOPRIM) 100 MG tablet Take 1 tablet (100 mg total) by mouth once daily.    atorvastatin (LIPITOR) 40 MG tablet TAKE 1 TABLET (40 MG TOTAL) BY MOUTH ONE TIME DAILY.    clopidogrel (PLAVIX) 75 mg tablet TAKE 1 TABLET EVERY DAY    finasteride (PROSCAR) 5 mg tablet Take 5 mg by mouth once daily.     fluticasone (FLONASE) 50 mcg/actuation nasal spray USE 2 SPRAYS IN EACH NOSTRIL ONE TIME DAILY (Patient taking differently: USE 2 SPRAYS IN EACH NOSTRIL ONE TIME DAILY patient states that he uses this medication as needed)    losartan-hydrochlorothiazide 50-12.5 mg (HYZAAR) 50-12.5 mg per tablet Take 1 tablet by mouth once daily.    UNKNOWN TO PATIENT Acid reflux medication     No current facility-administered medications for this  visit.      Current Outpatient Medications on File Prior to Visit   Medication Sig    acetaminophen (TYLENOL ARTHRITIS PAIN ORAL) Take by mouth. Patient states that he takes 2 tablets in the morning and 2 tablets in the evening    allopurinol (ZYLOPRIM) 100 MG tablet Take 1 tablet (100 mg total) by mouth once daily.    atorvastatin (LIPITOR) 40 MG tablet TAKE 1 TABLET (40 MG TOTAL) BY MOUTH ONE TIME DAILY.    clopidogrel (PLAVIX) 75 mg tablet TAKE 1 TABLET EVERY DAY    finasteride (PROSCAR) 5 mg tablet Take 5 mg by mouth once daily.     fluticasone (FLONASE) 50 mcg/actuation nasal spray USE 2 SPRAYS IN EACH NOSTRIL ONE TIME DAILY (Patient taking differently: USE 2 SPRAYS IN EACH NOSTRIL ONE TIME DAILY patient states that he uses this medication as needed)    losartan-hydrochlorothiazide 50-12.5 mg (HYZAAR) 50-12.5 mg per tablet Take 1 tablet by mouth once daily.    UNKNOWN TO PATIENT Acid reflux medication     No current facility-administered medications on file prior to visit.      Review of patient's allergies indicates:   Allergen Reactions    Mobic  [meloxicam]      Other reaction(s): hypertension     Review of Systems   Constitution: Negative for malaise/fatigue.   Eyes: Negative for blurred vision.   Cardiovascular: Negative for chest pain, claudication, cyanosis, dyspnea on exertion, irregular heartbeat, leg swelling, near-syncope, orthopnea, palpitations and paroxysmal nocturnal dyspnea.   Respiratory: Negative for cough, hemoptysis and shortness of breath.    Hematologic/Lymphatic: Negative for bleeding problem. Does not bruise/bleed easily.   Skin: Negative for dry skin and itching.   Musculoskeletal: Negative for falls, muscle weakness and myalgias.        Leg pain.   Gastrointestinal: Negative for abdominal pain, diarrhea, heartburn, hematemesis, hematochezia and melena.   Genitourinary: Negative for flank pain and hematuria.   Neurological: Negative for dizziness, focal weakness, headaches,  "light-headedness, numbness, paresthesias, seizures and weakness.   Psychiatric/Behavioral: Negative for altered mental status and memory loss. The patient is not nervous/anxious.    Allergic/Immunologic: Negative for hives.       Objective:   Physical Exam   Constitutional: He is oriented to person, place, and time. He appears well-developed and well-nourished. No distress.   HENT:   Head: Normocephalic and atraumatic.   Eyes: Pupils are equal, round, and reactive to light. EOM are normal. Right eye exhibits no discharge. Left eye exhibits no discharge.   Neck: Neck supple. No JVD present. No thyromegaly present.   Cardiovascular: Normal rate, regular rhythm, normal heart sounds and intact distal pulses. Exam reveals no gallop and no friction rub.   No murmur heard.  Pulmonary/Chest: Effort normal and breath sounds normal. No respiratory distress. He has no wheezes. He has no rales. He exhibits no tenderness.   Abdominal: Soft. Bowel sounds are normal. He exhibits no distension. There is no tenderness.   Musculoskeletal: Normal range of motion. He exhibits no edema.   Neurological: He is alert and oriented to person, place, and time. No cranial nerve deficit.   Skin: Skin is warm and dry. No rash noted. He is not diaphoretic. No erythema.   Psychiatric: He has a normal mood and affect. His behavior is normal.   Nursing note and vitals reviewed.    Vitals:    03/29/19 1029   BP: 122/66   BP Location: Right arm   Patient Position: Sitting   BP Method: Medium (Manual)   Pulse: 64   Weight: 92.4 kg (203 lb 11.3 oz)   Height: 5' 10.5" (1.791 m)     Lab Results   Component Value Date    CHOL 151 01/07/2019    CHOL 136 03/07/2018    CHOL 132 09/07/2017     Lab Results   Component Value Date    HDL 58 01/07/2019    HDL 65 03/07/2018    HDL 62 09/07/2017     Lab Results   Component Value Date    LDLCALC 68.2 01/07/2019    LDLCALC 58.2 (L) 03/07/2018    LDLCALC 60.0 (L) 09/07/2017     Lab Results   Component Value Date    " TRIG 124 01/07/2019    TRIG 64 03/07/2018    TRIG 50 09/07/2017     Lab Results   Component Value Date    CHOLHDL 38.4 01/07/2019    CHOLHDL 47.8 03/07/2018    CHOLHDL 47.0 09/07/2017       Chemistry        Component Value Date/Time     01/07/2019 0853    K 4.0 01/07/2019 0853     01/07/2019 0853    CO2 28 01/07/2019 0853    BUN 29 (H) 01/07/2019 0853    CREATININE 1.4 01/07/2019 0853    GLU 78 01/07/2019 0853        Component Value Date/Time    CALCIUM 9.7 01/07/2019 0853    ALKPHOS 58 01/07/2019 0853    AST 16 01/07/2019 0853    ALT 15 01/07/2019 0853    BILITOT 1.2 (H) 01/07/2019 0853    ESTGFRAFRICA 54.5 (A) 01/07/2019 0853    EGFRNONAA 47.1 (A) 01/07/2019 0853          Lab Results   Component Value Date    TSH 2.104 01/07/2019     Lab Results   Component Value Date    INR 1.2 09/26/2017    INR 1.2 09/03/2015     Lab Results   Component Value Date    WBC 4.89 01/07/2019    HGB 13.6 (L) 01/07/2019    HCT 42.3 01/07/2019     (H) 01/07/2019     (L) 01/07/2019     BMP  Sodium   Date Value Ref Range Status   01/07/2019 140 136 - 145 mmol/L Final     Potassium   Date Value Ref Range Status   01/07/2019 4.0 3.5 - 5.1 mmol/L Final     Chloride   Date Value Ref Range Status   01/07/2019 106 95 - 110 mmol/L Final     CO2   Date Value Ref Range Status   01/07/2019 28 23 - 29 mmol/L Final     BUN, Bld   Date Value Ref Range Status   01/07/2019 29 (H) 8 - 23 mg/dL Final     Creatinine   Date Value Ref Range Status   01/07/2019 1.4 0.5 - 1.4 mg/dL Final     Calcium   Date Value Ref Range Status   01/07/2019 9.7 8.7 - 10.5 mg/dL Final     Anion Gap   Date Value Ref Range Status   01/07/2019 6 (L) 8 - 16 mmol/L Final     eGFR if    Date Value Ref Range Status   01/07/2019 54.5 (A) >60 mL/min/1.73 m^2 Final     eGFR if non    Date Value Ref Range Status   01/07/2019 47.1 (A) >60 mL/min/1.73 m^2 Final     Comment:     Calculation used to obtain the estimated glomerular  filtration  rate (eGFR) is the CKD-EPI equation.        CrCl cannot be calculated (Patient's most recent lab result is older than the maximum 7 days allowed.).    Assessment:     1. Essential hypertension    2. Mixed hyperlipidemia    3. Thrombocytopenia    4. Anemia, unspecified type    5. Chronic kidney disease, stage 3    6. Incomplete right bundle branch block    7. Atherosclerosis of artery of both lower extremities    8. GUIDO (obstructive sleep apnea)    9. Periodic limb movement disorder (PLMD)    10. Tortuous aorta      Stable clinically has leg pain has typical and atypical features will obtain joceline with exercise.  htn controlled  Lipids on target.  Plan:   joceline withe xercise   Continue current therapy  Cardiac low salt diet.  Risk factor modification and excercise program.  F/u in 6 months with lipid cmp .

## 2019-04-02 RX ORDER — PANTOPRAZOLE SODIUM 40 MG/1
TABLET, DELAYED RELEASE ORAL
Qty: 90 TABLET | Refills: 0 | Status: SHIPPED | OUTPATIENT
Start: 2019-04-02 | End: 2019-06-26 | Stop reason: SDUPTHER

## 2019-04-22 ENCOUNTER — CLINICAL SUPPORT (OUTPATIENT)
Dept: CARDIOLOGY | Facility: CLINIC | Age: 79
End: 2019-04-22
Attending: INTERNAL MEDICINE
Payer: MEDICARE

## 2019-04-22 DIAGNOSIS — I77.1 TORTUOUS AORTA: ICD-10-CM

## 2019-04-22 DIAGNOSIS — I70.203 ATHEROSCLEROSIS OF ARTERY OF BOTH LOWER EXTREMITIES: ICD-10-CM

## 2019-04-22 PROCEDURE — 93924 LWR XTR VASC STDY BILAT: CPT | Mod: HCNC,S$GLB,, | Performed by: INTERNAL MEDICINE

## 2019-04-22 PROCEDURE — 93924 CAR ANKLE BRACHIAL INDICES W STRESS: ICD-10-PCS | Mod: HCNC,S$GLB,, | Performed by: INTERNAL MEDICINE

## 2019-04-25 ENCOUNTER — TELEPHONE (OUTPATIENT)
Dept: CARDIOLOGY | Facility: CLINIC | Age: 79
End: 2019-04-25

## 2019-04-25 LAB — VASCULAR ANKLE BRACHIAL INDEX (ABI) RIGHT: 1.33 (ref 0.9–1.2)

## 2019-04-25 NOTE — TELEPHONE ENCOUNTER
----- Message from Aparna Thompson MD sent at 4/25/2019 12:35 PM CDT -----  No significant pvd on stress joceline

## 2019-04-26 ENCOUNTER — TELEPHONE (OUTPATIENT)
Dept: INTERNAL MEDICINE | Facility: CLINIC | Age: 79
End: 2019-04-26

## 2019-04-26 NOTE — TELEPHONE ENCOUNTER
----- Message from Monica Medina sent at 4/26/2019 10:05 AM CDT -----  Contact: self [phone]  .Type:  Patient Returning Call    Who Called:Ezequiel Hughes  Who Left Message for Patient:Tricia  Does the patient know what this is regarding?:no  Would the patient rather a call back or a response via [facility]? Call back  Best Call Back Number:[phone]  Additional Information:

## 2019-04-26 NOTE — TELEPHONE ENCOUNTER
----- Message from Oneida Lama sent at 4/25/2019  4:17 PM CDT -----  Contact: pt  Type:  Needs Medical Advice    Who Called: Ezequiel  Symptoms (please be specific): legs pain  How long has patient had these symptoms: 1 year  Pharmacy name and phone #:    [email or web address] [address] [address] [address] [facility] [address]  Phone: [phone] Fax: [phone]  Would the patient rather a call back or a response via [facility]? Callback  Best Call Back Number: [phone]  Additional Information: No

## 2019-04-26 NOTE — TELEPHONE ENCOUNTER
----- Message from Natalie Beatty sent at 4/26/2019 11:02 AM CDT -----  Contact: Patient   Type:  Patient Returning Call    Who Called:Altagracia   Who Left Message for Patient:Dr. Ozuna nurse   Does the patient know what this is regarding?:leg pain  Would the patient rather a call back or a response via [facility]? call  Best Call Back Number:[phone]  Additional Information: n/a

## 2019-04-26 NOTE — TELEPHONE ENCOUNTER
Pt stated that he has been having leg cramps and was told by several people that it may be his Lipitor causing the cramps. Pt would like to know if he can switch cholesterol medications.

## 2019-04-29 ENCOUNTER — TELEPHONE (OUTPATIENT)
Dept: INTERNAL MEDICINE | Facility: CLINIC | Age: 79
End: 2019-04-29

## 2019-04-29 DIAGNOSIS — M79.18 MUSCLE ACHE OF EXTREMITY: Primary | ICD-10-CM

## 2019-04-29 NOTE — TELEPHONE ENCOUNTER
"Returned call to patient and gave results of leg study.  Patient questioned then "why does my legs hurt so bad do you think it could be that Cholesterol medicine"  Advised would check with Dr. Thompson and that he should stop taking for 2 weeks also that he may need a blood test to check his muscles and we can get it done in [place].    Dr. Thompson- please advise further      Notes recorded by Aparna Thompson MD on 4/25/2019 at 12:35 PM CDT  No significant pvd on stress joceline  "

## 2019-04-29 NOTE — TELEPHONE ENCOUNTER
For him he could potentially try an alternative cholesterol medicine but he also has peripheral vascular issues so it may be more of a sign of his leg cramping.  Would advise him to follow up with cardiology Dr. Thompson

## 2019-04-29 NOTE — TELEPHONE ENCOUNTER
----- Message from Ofelia Collins sent at 4/29/2019  9:31 AM CDT -----  Contact: Patient  Type:  Test Results    Who Called: Patient  Name of Test (Lab/Mammo/Etc): Stress test  Date of Test: 4/22/19  Ordering Provider: Dr Montejo  Where the test was performed: [facility]  Would the patient rather a call back or a response via [facility]? Call  Best Call Back Number: [phone]  Additional Information:

## 2019-04-30 NOTE — TELEPHONE ENCOUNTER
Spoke with pt. He did speak with Dr. Thompson yesterday. He was instructed to hold cholesterol medicine for 2 weeks and then they will repeat CPK at that time and go from there.

## 2019-05-04 DIAGNOSIS — I10 ESSENTIAL HYPERTENSION: ICD-10-CM

## 2019-05-05 RX ORDER — LOSARTAN POTASSIUM AND HYDROCHLOROTHIAZIDE 12.5; 5 MG/1; MG/1
TABLET ORAL
Qty: 90 TABLET | Refills: 3 | Status: SHIPPED | OUTPATIENT
Start: 2019-05-05 | End: 2020-01-09 | Stop reason: ALTCHOICE

## 2019-05-10 ENCOUNTER — LAB VISIT (OUTPATIENT)
Dept: LAB | Facility: HOSPITAL | Age: 79
End: 2019-05-10
Attending: INTERNAL MEDICINE
Payer: MEDICARE

## 2019-05-10 DIAGNOSIS — M79.18 MUSCLE ACHE OF EXTREMITY: ICD-10-CM

## 2019-05-10 LAB — CK SERPL-CCNC: 129 U/L (ref 20–200)

## 2019-05-10 PROCEDURE — 36415 COLL VENOUS BLD VENIPUNCTURE: CPT | Mod: HCNC,PO

## 2019-05-10 PROCEDURE — 82550 ASSAY OF CK (CPK): CPT | Mod: HCNC

## 2019-05-14 ENCOUNTER — TELEPHONE (OUTPATIENT)
Dept: CARDIOLOGY | Facility: CLINIC | Age: 79
End: 2019-05-14

## 2019-05-14 DIAGNOSIS — E78.2 MIXED HYPERLIPIDEMIA: Primary | ICD-10-CM

## 2019-05-14 NOTE — TELEPHONE ENCOUNTER
----- Message from Aparna Thompson MD sent at 5/13/2019  5:49 PM CDT -----  Yes lower dose   ----- Message -----  From: Alyx Camara LPN  Sent: 5/13/2019   2:40 PM  To: Aparna Thompson MD    Do you want him to restart atorvastatin at a lower dose has been on hold > 2 weeks ?  ----- Message -----  From: Aparna Thompson MD  Sent: 5/11/2019   4:23 PM  To: Donna HARTMAN Staff    MUSCLE ENZYMES NORMAL

## 2019-06-26 RX ORDER — PANTOPRAZOLE SODIUM 40 MG/1
TABLET, DELAYED RELEASE ORAL
Qty: 90 TABLET | Refills: 1 | Status: SHIPPED | OUTPATIENT
Start: 2019-06-26 | End: 2019-07-29

## 2019-07-01 ENCOUNTER — OFFICE VISIT (OUTPATIENT)
Dept: OPHTHALMOLOGY | Facility: CLINIC | Age: 79
End: 2019-07-01
Payer: MEDICARE

## 2019-07-01 DIAGNOSIS — Z96.1 PSEUDOPHAKIA: ICD-10-CM

## 2019-07-01 DIAGNOSIS — H40.1111 PRIMARY OPEN ANGLE GLAUCOMA (POAG) OF RIGHT EYE, MILD STAGE: Primary | ICD-10-CM

## 2019-07-01 PROCEDURE — 92014 COMPRE OPH EXAM EST PT 1/>: CPT | Mod: HCNC,S$GLB,, | Performed by: OPHTHALMOLOGY

## 2019-07-01 PROCEDURE — 92250 FUNDUS PHOTOGRAPHY W/I&R: CPT | Mod: HCNC,S$GLB,, | Performed by: OPHTHALMOLOGY

## 2019-07-01 PROCEDURE — 92083 EXTENDED VISUAL FIELD XM: CPT | Mod: HCNC,S$GLB,, | Performed by: OPHTHALMOLOGY

## 2019-07-01 PROCEDURE — 99999 PR PBB SHADOW E&M-EST. PATIENT-LVL II: CPT | Mod: PBBFAC,HCNC,, | Performed by: OPHTHALMOLOGY

## 2019-07-01 PROCEDURE — 99999 PR PBB SHADOW E&M-EST. PATIENT-LVL II: ICD-10-PCS | Mod: PBBFAC,HCNC,, | Performed by: OPHTHALMOLOGY

## 2019-07-01 PROCEDURE — 92014 PR EYE EXAM, EST PATIENT,COMPREHESV: ICD-10-PCS | Mod: HCNC,S$GLB,, | Performed by: OPHTHALMOLOGY

## 2019-07-01 PROCEDURE — 92250 COLOR FUNDUS PHOTOGRAPHY - OU - BOTH EYES: ICD-10-PCS | Mod: HCNC,S$GLB,, | Performed by: OPHTHALMOLOGY

## 2019-07-01 PROCEDURE — 92083 HUMPHREY VISUAL FIELD - OU - BOTH EYES: ICD-10-PCS | Mod: HCNC,S$GLB,, | Performed by: OPHTHALMOLOGY

## 2019-07-01 NOTE — PROGRESS NOTES
SUBJECTIVE:   Ezequiel Hughes is a 79 y.o. male   Uncorrected near visual acuity was not recorded in the right eye and J1 in the left eye.   Chief Complaint   Patient presents with    Glaucoma        HPI:  HPI     Patient returns for a 3 month hvf review, and dilation.    Last edited by VINICIO Anne on 7/1/2019  8:13 AM. (History)        Assessment /Plan :  1. Primary open angle glaucoma (POAG) of right eye, mild stage Doing well, IOP within acceptable range relative to target IOP and no evidence of progression. Continue current treatment. Reviewed importance of continued compliance with treatment and follow up.     2. Pseudophakia  -- Condition stable, no therapeutic change required. Monitoring routinely.           Return to clinic in 3-4 months  or as needed.  With IOP Check and GOCT

## 2019-07-16 ENCOUNTER — LAB VISIT (OUTPATIENT)
Dept: LAB | Facility: HOSPITAL | Age: 79
End: 2019-07-16
Attending: FAMILY MEDICINE
Payer: MEDICARE

## 2019-07-16 DIAGNOSIS — D64.9 ANEMIA, UNSPECIFIED TYPE: ICD-10-CM

## 2019-07-16 DIAGNOSIS — I10 ESSENTIAL HYPERTENSION: Chronic | ICD-10-CM

## 2019-07-16 DIAGNOSIS — E78.2 MIXED HYPERLIPIDEMIA: Chronic | ICD-10-CM

## 2019-07-16 DIAGNOSIS — I70.203 ATHEROSCLEROSIS OF ARTERY OF BOTH LOWER EXTREMITIES: ICD-10-CM

## 2019-07-16 DIAGNOSIS — Z00.00 ROUTINE GENERAL MEDICAL EXAMINATION AT A HEALTH CARE FACILITY: ICD-10-CM

## 2019-07-16 DIAGNOSIS — C44.519 BASAL CELL CARCINOMA (BCC) OF ANTERIOR CHEST: ICD-10-CM

## 2019-07-16 DIAGNOSIS — I77.1 TORTUOUS AORTA: ICD-10-CM

## 2019-07-16 DIAGNOSIS — M1A.9XX0 CHRONIC GOUT WITHOUT TOPHUS, UNSPECIFIED CAUSE, UNSPECIFIED SITE: ICD-10-CM

## 2019-07-16 DIAGNOSIS — K21.9 GASTROESOPHAGEAL REFLUX DISEASE, ESOPHAGITIS PRESENCE NOT SPECIFIED: ICD-10-CM

## 2019-07-16 DIAGNOSIS — C61 PROSTATE CANCER: ICD-10-CM

## 2019-07-16 DIAGNOSIS — D69.6 THROMBOCYTOPENIA: ICD-10-CM

## 2019-07-16 DIAGNOSIS — N18.30 CHRONIC KIDNEY DISEASE, STAGE 3: ICD-10-CM

## 2019-07-16 LAB
ALBUMIN SERPL BCP-MCNC: 3.8 G/DL (ref 3.5–5.2)
ALP SERPL-CCNC: 54 U/L (ref 55–135)
ALT SERPL W/O P-5'-P-CCNC: 11 U/L (ref 10–44)
ANION GAP SERPL CALC-SCNC: 7 MMOL/L (ref 8–16)
AST SERPL-CCNC: 12 U/L (ref 10–40)
BASOPHILS # BLD AUTO: 0.05 K/UL (ref 0–0.2)
BASOPHILS NFR BLD: 1.3 % (ref 0–1.9)
BILIRUB SERPL-MCNC: 0.7 MG/DL (ref 0.1–1)
BUN SERPL-MCNC: 29 MG/DL (ref 8–23)
CALCIUM SERPL-MCNC: 9 MG/DL (ref 8.7–10.5)
CHLORIDE SERPL-SCNC: 106 MMOL/L (ref 95–110)
CHOLEST SERPL-MCNC: 145 MG/DL (ref 120–199)
CHOLEST/HDLC SERPL: 2.9 {RATIO} (ref 2–5)
CO2 SERPL-SCNC: 27 MMOL/L (ref 23–29)
CREAT SERPL-MCNC: 1.8 MG/DL (ref 0.5–1.4)
DIFFERENTIAL METHOD: ABNORMAL
EOSINOPHIL # BLD AUTO: 0.3 K/UL (ref 0–0.5)
EOSINOPHIL NFR BLD: 7.1 % (ref 0–8)
ERYTHROCYTE [DISTWIDTH] IN BLOOD BY AUTOMATED COUNT: 12.7 % (ref 11.5–14.5)
EST. GFR  (AFRICAN AMERICAN): 39.9 ML/MIN/1.73 M^2
EST. GFR  (NON AFRICAN AMERICAN): 34.5 ML/MIN/1.73 M^2
GLUCOSE SERPL-MCNC: 91 MG/DL (ref 70–110)
HCT VFR BLD AUTO: 41.7 % (ref 40–54)
HDLC SERPL-MCNC: 50 MG/DL (ref 40–75)
HDLC SERPL: 34.5 % (ref 20–50)
HGB BLD-MCNC: 13.1 G/DL (ref 14–18)
IMM GRANULOCYTES # BLD AUTO: 0.02 K/UL (ref 0–0.04)
IMM GRANULOCYTES NFR BLD AUTO: 0.5 % (ref 0–0.5)
LDLC SERPL CALC-MCNC: 70.6 MG/DL (ref 63–159)
LYMPHOCYTES # BLD AUTO: 1 K/UL (ref 1–4.8)
LYMPHOCYTES NFR BLD: 26.9 % (ref 18–48)
MCH RBC QN AUTO: 32.3 PG (ref 27–31)
MCHC RBC AUTO-ENTMCNC: 31.4 G/DL (ref 32–36)
MCV RBC AUTO: 103 FL (ref 82–98)
MONOCYTES # BLD AUTO: 0.5 K/UL (ref 0.3–1)
MONOCYTES NFR BLD: 12.1 % (ref 4–15)
NEUTROPHILS # BLD AUTO: 2 K/UL (ref 1.8–7.7)
NEUTROPHILS NFR BLD: 52.1 % (ref 38–73)
NONHDLC SERPL-MCNC: 95 MG/DL
NRBC BLD-RTO: 0 /100 WBC
PLATELET # BLD AUTO: 130 K/UL (ref 150–350)
PMV BLD AUTO: 8.4 FL (ref 9.2–12.9)
POTASSIUM SERPL-SCNC: 4.4 MMOL/L (ref 3.5–5.1)
PROT SERPL-MCNC: 6.4 G/DL (ref 6–8.4)
RBC # BLD AUTO: 4.06 M/UL (ref 4.6–6.2)
SODIUM SERPL-SCNC: 140 MMOL/L (ref 136–145)
TRIGL SERPL-MCNC: 122 MG/DL (ref 30–150)
TSH SERPL DL<=0.005 MIU/L-ACNC: 1.74 UIU/ML (ref 0.4–4)
URATE SERPL-MCNC: 6.1 MG/DL (ref 3.4–7)
WBC # BLD AUTO: 3.79 K/UL (ref 3.9–12.7)

## 2019-07-16 PROCEDURE — 85025 COMPLETE CBC W/AUTO DIFF WBC: CPT | Mod: HCNC

## 2019-07-16 PROCEDURE — 80053 COMPREHEN METABOLIC PANEL: CPT | Mod: HCNC

## 2019-07-16 PROCEDURE — 84550 ASSAY OF BLOOD/URIC ACID: CPT | Mod: HCNC

## 2019-07-16 PROCEDURE — 80061 LIPID PANEL: CPT | Mod: HCNC

## 2019-07-16 PROCEDURE — 36415 COLL VENOUS BLD VENIPUNCTURE: CPT | Mod: HCNC,PO

## 2019-07-16 PROCEDURE — 84443 ASSAY THYROID STIM HORMONE: CPT | Mod: HCNC

## 2019-07-29 ENCOUNTER — OFFICE VISIT (OUTPATIENT)
Dept: INTERNAL MEDICINE | Facility: CLINIC | Age: 79
End: 2019-07-29
Payer: MEDICARE

## 2019-07-29 VITALS
HEIGHT: 71 IN | SYSTOLIC BLOOD PRESSURE: 112 MMHG | TEMPERATURE: 98 F | RESPIRATION RATE: 18 BRPM | WEIGHT: 200.81 LBS | DIASTOLIC BLOOD PRESSURE: 60 MMHG | BODY MASS INDEX: 28.11 KG/M2 | HEART RATE: 88 BPM

## 2019-07-29 DIAGNOSIS — E78.2 MIXED HYPERLIPIDEMIA: Chronic | ICD-10-CM

## 2019-07-29 DIAGNOSIS — I10 ESSENTIAL HYPERTENSION: Chronic | ICD-10-CM

## 2019-07-29 DIAGNOSIS — N18.30 CKD (CHRONIC KIDNEY DISEASE) STAGE 3, GFR 30-59 ML/MIN: Primary | ICD-10-CM

## 2019-07-29 DIAGNOSIS — N18.30 CHRONIC KIDNEY DISEASE, STAGE 3: ICD-10-CM

## 2019-07-29 PROCEDURE — 99213 PR OFFICE/OUTPT VISIT, EST, LEVL III, 20-29 MIN: ICD-10-PCS | Mod: HCNC,S$GLB,, | Performed by: NURSE PRACTITIONER

## 2019-07-29 PROCEDURE — 1101F PR PT FALLS ASSESS DOC 0-1 FALLS W/OUT INJ PAST YR: ICD-10-PCS | Mod: HCNC,CPTII,S$GLB, | Performed by: NURSE PRACTITIONER

## 2019-07-29 PROCEDURE — 3078F DIAST BP <80 MM HG: CPT | Mod: HCNC,CPTII,S$GLB, | Performed by: NURSE PRACTITIONER

## 2019-07-29 PROCEDURE — 99999 PR PBB SHADOW E&M-EST. PATIENT-LVL IV: ICD-10-PCS | Mod: PBBFAC,HCNC,, | Performed by: NURSE PRACTITIONER

## 2019-07-29 PROCEDURE — 99213 OFFICE O/P EST LOW 20 MIN: CPT | Mod: HCNC,S$GLB,, | Performed by: NURSE PRACTITIONER

## 2019-07-29 PROCEDURE — 3074F PR MOST RECENT SYSTOLIC BLOOD PRESSURE < 130 MM HG: ICD-10-PCS | Mod: HCNC,CPTII,S$GLB, | Performed by: NURSE PRACTITIONER

## 2019-07-29 PROCEDURE — 3074F SYST BP LT 130 MM HG: CPT | Mod: HCNC,CPTII,S$GLB, | Performed by: NURSE PRACTITIONER

## 2019-07-29 PROCEDURE — 99999 PR PBB SHADOW E&M-EST. PATIENT-LVL IV: CPT | Mod: PBBFAC,HCNC,, | Performed by: NURSE PRACTITIONER

## 2019-07-29 PROCEDURE — 1101F PT FALLS ASSESS-DOCD LE1/YR: CPT | Mod: HCNC,CPTII,S$GLB, | Performed by: NURSE PRACTITIONER

## 2019-07-29 PROCEDURE — 3078F PR MOST RECENT DIASTOLIC BLOOD PRESSURE < 80 MM HG: ICD-10-PCS | Mod: HCNC,CPTII,S$GLB, | Performed by: NURSE PRACTITIONER

## 2019-07-29 RX ORDER — PANTOPRAZOLE SODIUM 40 MG/1
40 TABLET, DELAYED RELEASE ORAL DAILY
COMMUNITY
End: 2019-11-12 | Stop reason: SDUPTHER

## 2019-07-29 NOTE — PROGRESS NOTES
"Subjective:       Patient ID: Ezequiel Hughes is a 79 y.o. male.    Chief Complaint: Follow-up    Patient here for 6 month follow up and lab review. He is followed by Hem/onc, Dr. Cates for thrombocytopenia. Also followed by cards. Taking lipitor 40mg 1/2 tablet daily. Lipids stable today. Thyroid stable. CMET on labs show an elevated creatinine at 1.8 which has worsened since labs 6 months ago. States he is not good at drinking water. Willing to work on that. CBC stable. Has follow up with Hem onc in January. bp stable. Not taking nsaids.       /60 (BP Location: Left arm, Patient Position: Sitting)   Pulse 88   Temp 97.6 °F (36.4 °C) (Tympanic)   Resp 18   Ht 5' 10.5" (1.791 m)   Wt 91.1 kg (200 lb 13.4 oz)   BMI 28.41 kg/m²     Review of Systems   Constitutional: Negative for activity change, appetite change, chills, diaphoresis, fatigue, fever and unexpected weight change.   HENT: Negative for congestion, ear pain, nosebleeds, postnasal drip, rhinorrhea, sinus pressure, sneezing, sore throat and trouble swallowing.    Eyes: Negative for photophobia, pain and visual disturbance.   Respiratory: Negative for apnea, cough, choking, chest tightness, shortness of breath and wheezing.    Cardiovascular: Negative for chest pain, palpitations and leg swelling.   Gastrointestinal: Negative for abdominal pain, blood in stool, constipation, diarrhea, nausea and vomiting.   Genitourinary: Negative for decreased urine volume, difficulty urinating, dysuria, hematuria and urgency.   Musculoskeletal: Negative for arthralgias, gait problem, joint swelling and myalgias.   Skin: Negative for rash.   Neurological: Negative for dizziness, tremors, seizures, syncope, weakness, light-headedness, numbness and headaches.   Psychiatric/Behavioral: Negative for agitation, confusion, decreased concentration, hallucinations and sleep disturbance. The patient is not nervous/anxious.        Objective:      Physical Exam "   Constitutional: He is oriented to person, place, and time. He appears well-developed and well-nourished. He is cooperative. No distress.   HENT:   Head: Normocephalic and atraumatic.   Eyes: Conjunctivae are normal. Right eye exhibits no discharge. Left eye exhibits no discharge.   Cardiovascular: Normal rate, regular rhythm and normal heart sounds.   No murmur heard.  Pulmonary/Chest: Effort normal and breath sounds normal. No respiratory distress. He has no wheezes. He has no rales. He exhibits no tenderness.   Abdominal: Soft. He exhibits no distension.   Musculoskeletal: Normal range of motion.   Neurological: He is alert and oriented to person, place, and time.   Skin: Skin is warm and dry. No rash noted. He is not diaphoretic.   Psychiatric: He has a normal mood and affect. His behavior is normal. Judgment and thought content normal.   Nursing note and vitals reviewed.      Assessment:       1. CKD (chronic kidney disease) stage 3, GFR 30-59 ml/min    2. Essential hypertension    3. Mixed hyperlipidemia    4. Chronic kidney disease, stage 3        Plan:       Ezequiel was seen today for follow-up.    Diagnoses and all orders for this visit:    CKD (chronic kidney disease) stage 3, GFR 30-59 ml/min  -     Basic metabolic panel; Future    Essential hypertension    Mixed hyperlipidemia    Chronic kidney disease, stage 3      Patient Instructions   Drink 64 ounces of water a day MINIMUM    Avoid advil, motrin, aleve, ibuprofen  repeat labs in 4 weeks after working on hydration   bp stable  Keep follow up with hem onc in Jan  Follow up with Dr. Ozuna in January for annual

## 2019-08-07 RX ORDER — FLUTICASONE PROPIONATE 50 MCG
SPRAY, SUSPENSION (ML) NASAL
Qty: 48 G | Refills: 3 | Status: SHIPPED | OUTPATIENT
Start: 2019-08-07 | End: 2020-11-09

## 2019-08-29 ENCOUNTER — LAB VISIT (OUTPATIENT)
Dept: LAB | Facility: HOSPITAL | Age: 79
End: 2019-08-29
Attending: NURSE PRACTITIONER
Payer: MEDICARE

## 2019-08-29 DIAGNOSIS — N18.30 CKD (CHRONIC KIDNEY DISEASE) STAGE 3, GFR 30-59 ML/MIN: ICD-10-CM

## 2019-09-04 RX ORDER — ALLOPURINOL 100 MG/1
TABLET ORAL
Qty: 90 TABLET | Refills: 3 | Status: SHIPPED | OUTPATIENT
Start: 2019-09-04 | End: 2020-08-13

## 2019-09-04 RX ORDER — CLOPIDOGREL BISULFATE 75 MG/1
TABLET ORAL
Qty: 90 TABLET | Refills: 3 | Status: SHIPPED | OUTPATIENT
Start: 2019-09-04 | End: 2020-08-22 | Stop reason: SDUPTHER

## 2019-09-11 ENCOUNTER — OFFICE VISIT (OUTPATIENT)
Dept: CARDIOLOGY | Facility: CLINIC | Age: 79
End: 2019-09-11
Payer: MEDICARE

## 2019-09-11 VITALS
HEART RATE: 63 BPM | HEIGHT: 71 IN | OXYGEN SATURATION: 98 % | SYSTOLIC BLOOD PRESSURE: 126 MMHG | WEIGHT: 199.31 LBS | BODY MASS INDEX: 27.9 KG/M2 | DIASTOLIC BLOOD PRESSURE: 66 MMHG

## 2019-09-11 DIAGNOSIS — I45.10 INCOMPLETE RIGHT BUNDLE BRANCH BLOCK: ICD-10-CM

## 2019-09-11 DIAGNOSIS — G47.33 OSA (OBSTRUCTIVE SLEEP APNEA): ICD-10-CM

## 2019-09-11 DIAGNOSIS — E78.2 MIXED HYPERLIPIDEMIA: Chronic | ICD-10-CM

## 2019-09-11 DIAGNOSIS — I10 ESSENTIAL HYPERTENSION: Primary | Chronic | ICD-10-CM

## 2019-09-11 DIAGNOSIS — N18.30 CHRONIC KIDNEY DISEASE, STAGE 3: ICD-10-CM

## 2019-09-11 PROCEDURE — 99999 PR PBB SHADOW E&M-EST. PATIENT-LVL III: CPT | Mod: PBBFAC,HCNC,, | Performed by: INTERNAL MEDICINE

## 2019-09-11 PROCEDURE — 99214 PR OFFICE/OUTPT VISIT, EST, LEVL IV, 30-39 MIN: ICD-10-PCS | Mod: HCNC,S$GLB,, | Performed by: INTERNAL MEDICINE

## 2019-09-11 PROCEDURE — 3078F DIAST BP <80 MM HG: CPT | Mod: HCNC,CPTII,S$GLB, | Performed by: INTERNAL MEDICINE

## 2019-09-11 PROCEDURE — 1101F PR PT FALLS ASSESS DOC 0-1 FALLS W/OUT INJ PAST YR: ICD-10-PCS | Mod: HCNC,CPTII,S$GLB, | Performed by: INTERNAL MEDICINE

## 2019-09-11 PROCEDURE — 3074F SYST BP LT 130 MM HG: CPT | Mod: HCNC,CPTII,S$GLB, | Performed by: INTERNAL MEDICINE

## 2019-09-11 PROCEDURE — 99999 PR PBB SHADOW E&M-EST. PATIENT-LVL III: ICD-10-PCS | Mod: PBBFAC,HCNC,, | Performed by: INTERNAL MEDICINE

## 2019-09-11 PROCEDURE — 1101F PT FALLS ASSESS-DOCD LE1/YR: CPT | Mod: HCNC,CPTII,S$GLB, | Performed by: INTERNAL MEDICINE

## 2019-09-11 PROCEDURE — 99214 OFFICE O/P EST MOD 30 MIN: CPT | Mod: HCNC,S$GLB,, | Performed by: INTERNAL MEDICINE

## 2019-09-11 PROCEDURE — 3078F PR MOST RECENT DIASTOLIC BLOOD PRESSURE < 80 MM HG: ICD-10-PCS | Mod: HCNC,CPTII,S$GLB, | Performed by: INTERNAL MEDICINE

## 2019-09-11 PROCEDURE — 3074F PR MOST RECENT SYSTOLIC BLOOD PRESSURE < 130 MM HG: ICD-10-PCS | Mod: HCNC,CPTII,S$GLB, | Performed by: INTERNAL MEDICINE

## 2019-09-11 NOTE — PROGRESS NOTES
Subjective:   Patient ID:  Ezequiel Hughes is a 79 y.o. male who presents for follow up of Follow-up (6 month f/u)      HPI  An 80 yo male with ckd htn guido is here for f/u. He has been exercising regularily has no chest pain or shortness of breath. He has been on lower dose of stains his leg pain improved. He has no new issues of chest pain shortness of breath no claudication tia. He has joint pain. No leg swelling he does not use cpap.lipids on target.joceline with exercise unremarkable.  Past Medical History:   Diagnosis Date    Allergic rhinitis     Anemia     Back pain     BPH (benign prostatic hyperplasia)     Cancer     skin cancer to neck, Dr. Graves    Cataract     Disorder of kidney and ureter     ED (erectile dysfunction)     Hiatal hernia     small    History of colon polyps     colonoscopy 11/2016    HLD (hyperlipidemia)     Hyperlipidemia     Hypertension     Lateral epicondylitis     OA (osteoarthritis)     GUIDO (obstructive sleep apnea)     Prostate cancer     Dr. Wong    TIA (transient ischemic attack)     Trouble in sleeping        Past Surgical History:   Procedure Laterality Date    ARTHROSCOPY-KNEE Right 4/22/2015    Performed by Alexandre Jain Sr., [facility] at [facility] OR    CATARACT EXTRACTION W/  INTRAOCULAR LENS IMPLANT Right 02/21/2018    I-Stent    CATARACT EXTRACTION W/  INTRAOCULAR LENS IMPLANT Left 03/21/2018    I - Stent    COLONOSCOPY N/A 11/14/2016    Performed by Karuna Rodriguez MD at [facility] ENDO    COLONOSCOPY N/A 8/29/2013    Performed by Tushar Madrid MD at [facility] ENDO    HEMORRHOID SURGERY      I-STENT Right 02/21/2018    DR. REECE    KNEE ARTHROSCOPY W/ MENISCAL REPAIR Right 2015    Dr. Jain    MENISCECTOMY Right 4/22/2015    Performed by Alexandre [facility] Sr., [facility] at [facility] OR    PCIOL Right 02/21/2018    DR. REECE    PLANTAR FASCIA RELEASE      right    ROTATOR CUFF REPAIR Bilateral     bilateral    SHOULDER SURGERY Bilateral around 2000      Shantelle.  rotator cuff surgeries    SYNOVECTOMY-KNEE Right 2015    Per[facility] Alexand[facility] OR       Social History     Tobacco Use    Smoking status: Former Smoker     Packs/day: 3.00     Years: 35.00     Pack years: 105.00     Types: Cigarettes     Start date: 1960     Last attempt to quit: 1985     Years since quittin.1    Smokeless tobacco: Never Used   Substance Use Topics    Alcohol use: Yes     Alcohol/week: 4.2 oz     Types: 6 Cans of beer, 1 Standard drinks or equivalent per week     Comment: socially    Drug use: No       Family History   Problem Relation Age of Onset    Lung cancer Father         life long smoker    Cancer Father         prostate, lung    Stroke Sister         TIA    Cataracts Sister     Cancer Brother         prostate    Cataracts Brother     Cataracts Sister     Melanoma Neg Hx     Psoriasis Neg Hx     Lupus Neg Hx     Eczema Neg Hx     Diabetes Neg Hx     Heart disease Neg Hx     Kidney disease Neg Hx     Colon cancer Neg Hx        Current Outpatient Medications   Medication Sig    acetaminophen (TYLENOL ARTHRITIS PAIN ORAL) Take by mouth. Patient states that he takes 2 tablets in the morning and 2 tablets in the evening    allopurinol (ZYLOPRIM) 100 MG tablet TAKE 1 TABLET EVERY DAY    atorvastatin (LIPITOR) 40 MG tablet TAKE 1 TABLET (40 MG TOTAL) BY MOUTH ONE TIME DAILY. (Patient taking differently: restart with 20 mg (half tablet) daily as of 19)    clopidogrel (PLAVIX) 75 mg tablet TAKE 1 TABLET EVERY DAY    finasteride (PROSCAR) 5 mg tablet Take 5 mg by mouth once daily.     fluticasone propionate (FLONASE) 50 mcg/actuation nasal spray USE 2 SPRAYS IN EACH NOSTRIL ONE TIME DAILY    losartan-hydrochlorothiazide 50-12.5 mg (HYZAAR) 50-12.5 mg per tablet TAKE 1 TABLET EVERY DAY    pantoprazole (PROTONIX) 40 MG tablet Take 40 mg by mouth once daily.    UNKNOWN TO PATIENT Acid reflux medication     No current  facility-administered medications for this visit.      Current Outpatient Medications on File Prior to Visit   Medication Sig    acetaminophen (TYLENOL ARTHRITIS PAIN ORAL) Take by mouth. Patient states that he takes 2 tablets in the morning and 2 tablets in the evening    allopurinol (ZYLOPRIM) 100 MG tablet TAKE 1 TABLET EVERY DAY    atorvastatin (LIPITOR) 40 MG tablet TAKE 1 TABLET (40 MG TOTAL) BY MOUTH ONE TIME DAILY. (Patient taking differently: restart with 20 mg (half tablet) daily as of 5/14/19)    clopidogrel (PLAVIX) 75 mg tablet TAKE 1 TABLET EVERY DAY    finasteride (PROSCAR) 5 mg tablet Take 5 mg by mouth once daily.     fluticasone propionate (FLONASE) 50 mcg/actuation nasal spray USE 2 SPRAYS IN EACH NOSTRIL ONE TIME DAILY    losartan-hydrochlorothiazide 50-12.5 mg (HYZAAR) 50-12.5 mg per tablet TAKE 1 TABLET EVERY DAY    pantoprazole (PROTONIX) 40 MG tablet Take 40 mg by mouth once daily.    UNKNOWN TO PATIENT Acid reflux medication     No current facility-administered medications on file prior to visit.      Review of patient's allergies indicates:   Allergen Reactions    Mobic  [meloxicam]      Other reaction(s): hypertension     Review of Systems   Constitution: Negative for malaise/fatigue.   Eyes: Negative for blurred vision.   Cardiovascular: Negative for chest pain, claudication, cyanosis, dyspnea on exertion, irregular heartbeat, leg swelling, near-syncope, orthopnea, palpitations and paroxysmal nocturnal dyspnea.   Respiratory: Negative for cough, hemoptysis and shortness of breath.    Hematologic/Lymphatic: Negative for bleeding problem. Does not bruise/bleed easily.   Skin: Negative for dry skin and itching.   Musculoskeletal: Positive for arthritis, back pain and joint pain. Negative for falls, muscle weakness and myalgias.   Gastrointestinal: Negative for abdominal pain, diarrhea, heartburn, hematemesis, hematochezia and melena.   Genitourinary: Negative for flank pain and  hematuria.   Neurological: Negative for dizziness, focal weakness, headaches, light-headedness, numbness, paresthesias, seizures and weakness.   Psychiatric/Behavioral: Negative for altered mental status and memory loss. The patient is not nervous/anxious.    Allergic/Immunologic: Negative for hives.       Objective:   Physical Exam   Constitutional: He is oriented to person, place, and time. He appears well-developed and well-nourished. No distress.   HENT:   Head: Normocephalic and atraumatic.   Eyes: Pupils are equal, round, and reactive to light. EOM are normal. Right eye exhibits no discharge. Left eye exhibits no discharge.   Neck: Neck supple. No JVD present. No thyromegaly present.   Cardiovascular: Normal rate, regular rhythm, normal heart sounds and intact distal pulses. Exam reveals no gallop and no friction rub.   No murmur heard.  Pulses:       Carotid pulses are 2+ on the right side, and 2+ on the left side.       Radial pulses are 2+ on the right side, and 2+ on the left side.        Femoral pulses are 2+ on the right side, and 2+ on the left side.       Popliteal pulses are 2+ on the right side, and 2+ on the left side.        Dorsalis pedis pulses are 2+ on the right side, and 2+ on the left side.        Posterior tibial pulses are 2+ on the right side, and 2+ on the left side.   Pulmonary/Chest: Effort normal and breath sounds normal. No respiratory distress. He has no wheezes. He has no rales. He exhibits no tenderness.   Abdominal: Soft. Bowel sounds are normal. He exhibits no distension. There is no tenderness.   Musculoskeletal: Normal range of motion. He exhibits no edema.   Spider veins in both legs. And arthritic knees./   Neurological: He is alert and oriented to person, place, and time. No cranial nerve deficit.   Skin: Skin is warm and dry. No rash noted. He is not diaphoretic. No erythema.   Psychiatric: He has a normal mood and affect. His behavior is normal.   Nursing note and vitals  "reviewed.    Vitals:    09/11/19 0845 09/11/19 0849   BP: 124/64 126/66   BP Location: Right arm Left arm   Patient Position: Sitting Sitting   BP Method: Large (Manual) Large (Manual)   Pulse: 63    SpO2: 98%    Weight: 90.4 kg (199 lb 4.7 oz)    Height: 5' 10.5" (1.791 m)      Lab Results   Component Value Date    CHOL 146 08/29/2019    CHOL 145 07/16/2019    CHOL 151 01/07/2019     Lab Results   Component Value Date    HDL 60 08/29/2019    HDL 50 07/16/2019    HDL 58 01/07/2019     Lab Results   Component Value Date    LDLCALC 71.4 08/29/2019    LDLCALC 70.6 07/16/2019    LDLCALC 68.2 01/07/2019     Lab Results   Component Value Date    TRIG 73 08/29/2019    TRIG 122 07/16/2019    TRIG 124 01/07/2019     Lab Results   Component Value Date    CHOLHDL 41.1 08/29/2019    CHOLHDL 34.5 07/16/2019    CHOLHDL 38.4 01/07/2019       Chemistry        Component Value Date/Time     08/29/2019 0853    K 4.1 08/29/2019 0853     08/29/2019 0853    CO2 25 08/29/2019 0853    BUN 39 (H) 08/29/2019 0853    CREATININE 1.8 (H) 08/29/2019 0853    GLU 97 08/29/2019 0853        Component Value Date/Time    CALCIUM 9.0 08/29/2019 0853    ALKPHOS 47 (L) 08/29/2019 0853    AST 15 08/29/2019 0853    ALT 11 08/29/2019 0853    BILITOT 0.9 08/29/2019 0853    ESTGFRAFRICA 39.9 (A) 08/29/2019 0853    EGFRNONAA 34.5 (A) 08/29/2019 0853          Lab Results   Component Value Date    TSH 1.741 07/16/2019     Lab Results   Component Value Date    INR 1.2 09/26/2017    INR 1.2 09/03/2015     Lab Results   Component Value Date    WBC 3.79 (L) 07/16/2019    HGB 13.1 (L) 07/16/2019    HCT 41.7 07/16/2019     (H) 07/16/2019     (L) 07/16/2019     BMP  Sodium   Date Value Ref Range Status   08/29/2019 138 136 - 145 mmol/L Final     Potassium   Date Value Ref Range Status   08/29/2019 4.1 3.5 - 5.1 mmol/L Final     Chloride   Date Value Ref Range Status   08/29/2019 106 95 - 110 mmol/L Final     CO2   Date Value Ref Range Status "   08/29/2019 25 23 - 29 mmol/L Final     BUN, Bld   Date Value Ref Range Status   08/29/2019 39 (H) 8 - 23 mg/dL Final     Creatinine   Date Value Ref Range Status   08/29/2019 1.8 (H) 0.5 - 1.4 mg/dL Final     Calcium   Date Value Ref Range Status   08/29/2019 9.0 8.7 - 10.5 mg/dL Final     Anion Gap   Date Value Ref Range Status   08/29/2019 7 (L) 8 - 16 mmol/L Final     eGFR if    Date Value Ref Range Status   08/29/2019 39.9 (A) >60 mL/min/1.73 m^2 Final     eGFR if non    Date Value Ref Range Status   08/29/2019 34.5 (A) >60 mL/min/1.73 m^2 Final     Comment:     Calculation used to obtain the estimated glomerular filtration  rate (eGFR) is the CKD-EPI equation.        CrCl cannot be calculated (Patient's most recent lab result is older than the maximum 7 days allowed.).    Assessment:     1. Essential hypertension    2. Mixed hyperlipidemia    3. Chronic kidney disease, stage 3    4. GUIDO (obstructive sleep apnea)    5. Incomplete right bundle branch block      Stable clinically no claudication tia angina.  counseled about continued exercise.  Lipids on target.   Plan:   Continue current therapy  Cardiac low salt diet.  Risk factor modification and excercise program.  F/u in 6 months with lipid cmp

## 2019-10-28 ENCOUNTER — OFFICE VISIT (OUTPATIENT)
Dept: OPHTHALMOLOGY | Facility: CLINIC | Age: 79
End: 2019-10-28
Payer: MEDICARE

## 2019-10-28 DIAGNOSIS — Z96.1 PSEUDOPHAKIA: ICD-10-CM

## 2019-10-28 DIAGNOSIS — H40.1111 PRIMARY OPEN ANGLE GLAUCOMA (POAG) OF RIGHT EYE, MILD STAGE: Primary | ICD-10-CM

## 2019-10-28 PROCEDURE — 92012 INTRM OPH EXAM EST PATIENT: CPT | Mod: HCNC,S$GLB,, | Performed by: OPHTHALMOLOGY

## 2019-10-28 PROCEDURE — 92012 PR EYE EXAM, EST PATIENT,INTERMED: ICD-10-PCS | Mod: HCNC,S$GLB,, | Performed by: OPHTHALMOLOGY

## 2019-10-28 PROCEDURE — 92133 CPTRZD OPH DX IMG PST SGM ON: CPT | Mod: HCNC,S$GLB,, | Performed by: OPHTHALMOLOGY

## 2019-10-28 PROCEDURE — 99999 PR PBB SHADOW E&M-EST. PATIENT-LVL II: CPT | Mod: PBBFAC,HCNC,, | Performed by: OPHTHALMOLOGY

## 2019-10-28 PROCEDURE — 92133 POSTERIOR SEGMENT OCT OPTIC NERVE(OCULAR COHERENCE TOMOGRAPHY) - OU - BOTH EYES: ICD-10-PCS | Mod: HCNC,S$GLB,, | Performed by: OPHTHALMOLOGY

## 2019-10-28 PROCEDURE — 99999 PR PBB SHADOW E&M-EST. PATIENT-LVL II: ICD-10-PCS | Mod: PBBFAC,HCNC,, | Performed by: OPHTHALMOLOGY

## 2019-10-28 NOTE — PROGRESS NOTES
SUBJECTIVE:   Ezequiel Hughes is a 79 y.o. male   Uncorrected distance visual acuity was 20/20 in the right eye and 20/30 -2 in the left eye.   Chief Complaint   Patient presents with    Glaucoma     3-4 mth IOP check and GOCT         HPI:  HPI     Glaucoma      Additional comments: 3-4 mth IOP check and GOCT               Comments     1. Mod COAG OS + Mild COAG OD (init 22/26 post dilation IOP ) goal = 17  2. PCIOL / I-Stent OD +18.5 [id number] (distance) 2-21-18  PCIOL /I-Stent OS +21.0 (-1.75) 3/21/18  3. ERM OU   4. Brow Lift 9/18   *intolerant of travatan*      No eyedrops          Last edited by Deb Del Cid MA on 10/28/2019  7:55 AM. (History)        Assessment /Plan :  1. Primary open angle glaucoma (POAG) of right eye, mild stage Doing well, IOP within acceptable range relative to target IOP and no evidence of progression. Continue current treatment. Reviewed importance of continued compliance with treatment and follow up.     2. Pseudophakia  -- Condition stable, no therapeutic change required. Monitoring routinely.       Return to clinic in 4 months  or as needed.  With IOP Check

## 2019-10-29 ENCOUNTER — IMMUNIZATION (OUTPATIENT)
Dept: INTERNAL MEDICINE | Facility: CLINIC | Age: 79
End: 2019-10-29
Payer: MEDICARE

## 2019-10-29 PROCEDURE — 90662 IIV NO PRSV INCREASED AG IM: CPT | Mod: HCNC,S$GLB,, | Performed by: FAMILY MEDICINE

## 2019-10-29 PROCEDURE — G0008 FLU VACCINE - HIGH DOSE (65+) PRESERVATIVE FREE IM: ICD-10-PCS | Mod: HCNC,S$GLB,, | Performed by: FAMILY MEDICINE

## 2019-10-29 PROCEDURE — G0008 ADMIN INFLUENZA VIRUS VAC: HCPCS | Mod: HCNC,S$GLB,, | Performed by: FAMILY MEDICINE

## 2019-10-29 PROCEDURE — 90662 FLU VACCINE - HIGH DOSE (65+) PRESERVATIVE FREE IM: ICD-10-PCS | Mod: HCNC,S$GLB,, | Performed by: FAMILY MEDICINE

## 2019-11-12 ENCOUNTER — TELEPHONE (OUTPATIENT)
Dept: INTERNAL MEDICINE | Facility: CLINIC | Age: 79
End: 2019-11-12

## 2019-11-12 RX ORDER — PANTOPRAZOLE SODIUM 40 MG/1
40 TABLET, DELAYED RELEASE ORAL DAILY
Qty: 90 TABLET | Refills: 3 | Status: SHIPPED | OUTPATIENT
Start: 2019-11-12 | End: 2020-11-09

## 2019-11-12 NOTE — TELEPHONE ENCOUNTER
----- Message from Mely Anderson sent at 11/12/2019 11:28 AM CST -----  Patient needs call back to check status of medication.[phone] (home)

## 2019-11-12 NOTE — TELEPHONE ENCOUNTER
----- Message from Chely Villegas sent at 11/12/2019 12:02 PM CST -----  Contact: pt  Type:  Patient Returning Call    Who Called: Pt   Who Left Message for Patient:   Does the patient know what this is regarding?: medication refill  Would the patient rather a call back or a response via [employer]? call  Best Call Back Number: [phone]  Additional Information: n/a

## 2019-12-20 RX ORDER — ATORVASTATIN CALCIUM 40 MG/1
TABLET, FILM COATED ORAL
Qty: 90 TABLET | Refills: 0 | Status: SHIPPED | OUTPATIENT
Start: 2019-12-20 | End: 2020-01-16 | Stop reason: SDUPTHER

## 2020-01-06 ENCOUNTER — HOSPITAL ENCOUNTER (EMERGENCY)
Facility: HOSPITAL | Age: 80
Discharge: HOME | End: 2020-01-07
Attending: EMERGENCY MEDICINE
Payer: MEDICARE

## 2020-01-06 ENCOUNTER — TELEPHONE (OUTPATIENT)
Dept: INTERNAL MEDICINE | Facility: CLINIC | Age: 80
End: 2020-01-06

## 2020-01-06 DIAGNOSIS — E86.0 DEHYDRATION: ICD-10-CM

## 2020-01-06 DIAGNOSIS — R07.9 CHEST PAIN: ICD-10-CM

## 2020-01-06 DIAGNOSIS — I95.1 ORTHOSTATIC HYPOTENSION: Primary | ICD-10-CM

## 2020-01-06 PROCEDURE — 84484 ASSAY OF TROPONIN QUANT: CPT | Mod: HCNC

## 2020-01-06 PROCEDURE — 99285 EMERGENCY DEPT VISIT HI MDM: CPT | Mod: 25,HCNC

## 2020-01-06 PROCEDURE — 80053 COMPREHEN METABOLIC PANEL: CPT | Mod: HCNC

## 2020-01-06 PROCEDURE — 93010 EKG 12-LEAD: ICD-10-PCS | Mod: HCNC,,, | Performed by: INTERNAL MEDICINE

## 2020-01-06 PROCEDURE — 81000 URINALYSIS NONAUTO W/SCOPE: CPT | Mod: HCNC

## 2020-01-06 PROCEDURE — 93005 ELECTROCARDIOGRAM TRACING: CPT | Mod: HCNC

## 2020-01-06 PROCEDURE — 82550 ASSAY OF CK (CPK): CPT | Mod: HCNC

## 2020-01-06 PROCEDURE — 83880 ASSAY OF NATRIURETIC PEPTIDE: CPT | Mod: HCNC

## 2020-01-06 PROCEDURE — 93010 ELECTROCARDIOGRAM REPORT: CPT | Mod: HCNC,,, | Performed by: INTERNAL MEDICINE

## 2020-01-06 PROCEDURE — 96360 HYDRATION IV INFUSION INIT: CPT | Mod: HCNC

## 2020-01-06 PROCEDURE — 85025 COMPLETE CBC W/AUTO DIFF WBC: CPT | Mod: HCNC

## 2020-01-06 PROCEDURE — 63600175 PHARM REV CODE 636 W HCPCS: Mod: HCNC | Performed by: EMERGENCY MEDICINE

## 2020-01-06 RX ADMIN — SODIUM CHLORIDE 500 ML: 0.9 INJECTION, SOLUTION INTRAVENOUS at 11:01

## 2020-01-07 VITALS
BODY MASS INDEX: 27.9 KG/M2 | DIASTOLIC BLOOD PRESSURE: 62 MMHG | WEIGHT: 199.31 LBS | SYSTOLIC BLOOD PRESSURE: 136 MMHG | HEART RATE: 59 BPM | OXYGEN SATURATION: 97 % | TEMPERATURE: 67 F | HEIGHT: 71 IN | RESPIRATION RATE: 13 BRPM

## 2020-01-07 LAB
ALBUMIN SERPL BCP-MCNC: 4.1 G/DL (ref 3.5–5.2)
ALP SERPL-CCNC: 50 U/L (ref 55–135)
ALT SERPL W/O P-5'-P-CCNC: 11 U/L (ref 10–44)
ANION GAP SERPL CALC-SCNC: 13 MMOL/L (ref 8–16)
AST SERPL-CCNC: 15 U/L (ref 10–40)
BASOPHILS # BLD AUTO: 0.04 K/UL (ref 0–0.2)
BASOPHILS NFR BLD: 0.8 % (ref 0–1.9)
BILIRUB SERPL-MCNC: 0.7 MG/DL (ref 0.1–1)
BILIRUB UR QL STRIP: NEGATIVE
BNP SERPL-MCNC: 30 PG/ML (ref 0–99)
BUN SERPL-MCNC: 21 MG/DL (ref 8–23)
CALCIUM SERPL-MCNC: 9 MG/DL (ref 8.7–10.5)
CHLORIDE SERPL-SCNC: 106 MMOL/L (ref 95–110)
CK SERPL-CCNC: 111 U/L (ref 20–200)
CLARITY UR: CLEAR
CO2 SERPL-SCNC: 22 MMOL/L (ref 23–29)
COLOR UR: YELLOW
CREAT SERPL-MCNC: 1.3 MG/DL (ref 0.5–1.4)
DIFFERENTIAL METHOD: ABNORMAL
EOSINOPHIL # BLD AUTO: 0.1 K/UL (ref 0–0.5)
EOSINOPHIL NFR BLD: 2.5 % (ref 0–8)
ERYTHROCYTE [DISTWIDTH] IN BLOOD BY AUTOMATED COUNT: 12 % (ref 11.5–14.5)
EST. GFR  (AFRICAN AMERICAN): 59 ML/MIN/1.73 M^2
EST. GFR  (NON AFRICAN AMERICAN): 51 ML/MIN/1.73 M^2
GLUCOSE SERPL-MCNC: 91 MG/DL (ref 70–110)
GLUCOSE UR QL STRIP: NEGATIVE
HCT VFR BLD AUTO: 40.3 % (ref 40–54)
HGB BLD-MCNC: 13.1 G/DL (ref 14–18)
HGB UR QL STRIP: ABNORMAL
IMM GRANULOCYTES # BLD AUTO: 0.02 K/UL (ref 0–0.04)
IMM GRANULOCYTES NFR BLD AUTO: 0.4 % (ref 0–0.5)
KETONES UR QL STRIP: NEGATIVE
LEUKOCYTE ESTERASE UR QL STRIP: NEGATIVE
LYMPHOCYTES # BLD AUTO: 0.6 K/UL (ref 1–4.8)
LYMPHOCYTES NFR BLD: 13.3 % (ref 18–48)
MCH RBC QN AUTO: 31.6 PG (ref 27–31)
MCHC RBC AUTO-ENTMCNC: 32.5 G/DL (ref 32–36)
MCV RBC AUTO: 97 FL (ref 82–98)
MICROSCOPIC COMMENT: NORMAL
MONOCYTES # BLD AUTO: 0.4 K/UL (ref 0.3–1)
MONOCYTES NFR BLD: 9.1 % (ref 4–15)
NEUTROPHILS # BLD AUTO: 3.5 K/UL (ref 1.8–7.7)
NEUTROPHILS NFR BLD: 73.9 % (ref 38–73)
NITRITE UR QL STRIP: NEGATIVE
NRBC BLD-RTO: 0 /100 WBC
PH UR STRIP: 6 [PH] (ref 5–8)
PLATELET # BLD AUTO: 113 K/UL (ref 150–350)
PMV BLD AUTO: 8.1 FL (ref 9.2–12.9)
POTASSIUM SERPL-SCNC: 4.2 MMOL/L (ref 3.5–5.1)
PROT SERPL-MCNC: 6.9 G/DL (ref 6–8.4)
PROT UR QL STRIP: NEGATIVE
RBC # BLD AUTO: 4.14 M/UL (ref 4.6–6.2)
RBC #/AREA URNS HPF: 0 /HPF (ref 0–4)
SODIUM SERPL-SCNC: 141 MMOL/L (ref 136–145)
SP GR UR STRIP: 1.01 (ref 1–1.03)
TROPONIN I SERPL DL<=0.01 NG/ML-MCNC: 0.01 NG/ML (ref 0–0.03)
URN SPEC COLLECT METH UR: ABNORMAL
UROBILINOGEN UR STRIP-ACNC: NEGATIVE EU/DL
WBC # BLD AUTO: 4.75 K/UL (ref 3.9–12.7)

## 2020-01-07 NOTE — DISCHARGE INSTRUCTIONS
Drink plenty of fluids.  Supplement your intake with Gatorade.  Months her blood pressure in right and the findings 3 times daily.  Brings with her to her next appointment with primary care doctor.  Return as needed for any worsening symptoms, problems, questions or concerns.

## 2020-01-07 NOTE — ED NOTES
Pt presents to ED for hypertension. He states that his systolic bp was in the 170s which is unusual for him. Pt bp is currently 137/68. He states that he is feeling good right now. He is not in any distress. He is lying in bed calm and cooperative.

## 2020-01-07 NOTE — ED PROVIDER NOTES
SCRIBE #1 NOTE: I, Tammy Sauceda, am scribing for, and in the presence of, Anshul Mark Jr., MD. I have scribed the entire note.       History     Chief Complaint   Patient presents with    Hypertension     patient reports high blood pressure readings at home with systolic ove 170's. reports feeling fatigued and lightheaded     Review of patient's allergies indicates:   Allergen Reactions    Mobic  [meloxicam]      Other reaction(s): hypertension         History of Present Illness     HPI    1/6/2020, 10:38 PM  History obtained from the patient      History of Present Illness: Ezequiel Hughes is a 79 y.o. male patient with a PMHx of anemia, BPH, cataract, hiatal hernia, TIA, prostate CA currently being managed with watchful waiting, who presents to the Emergency Department for evaluation of weakness and dizziness which onset gradually 3 days ago. Pt reports feeling numbness in her fingers 3 days ago in the morning when sxs presented. Symptoms are intermittent and moderate in severity. No mitigating or exacerbating factors reported. Associated sxs include light-headedness, fatigue, and generalized weakness. Patient denies any fever/chills, congestion, sore throat, SOB, cough, CP, palpitations, n/v/d, dysuria, hematuria, rash, back pain, bruises/blds easily, HA, and all other sxs at this time. No prior Tx reported. No further complaints or concerns at this time.  Patient has a prior history of TIA.  Patient also has a history of hypertension is compliant with his medications.  Patient notes mild generalized weakness and light-headedness over the past several days.  This is not positional.  Has no URI symptoms. No tinnitus.  No visual changes or headache. The patient does note over the past several days that his blood pressure has elevated.  This was noticed after he had been symptomatic for several days.  He has no palpitations or shortness of breath.  No abdominal pain. No urinary complaints. Patient has no  prior history of similar.      Arrival mode: Personal vehicle     PCP: Valery Ozuna MD        Past Medical History:  Past Medical History:   Diagnosis Date    Allergic rhinitis     Anemia     Back pain     BPH (benign prostatic hyperplasia)     Cancer     skin cancer to neck, Dr. Graves    Cataract     Disorder of kidney and ureter     ED (erectile dysfunction)     Hiatal hernia     small    History of colon polyps     colonoscopy 11/2016    HLD (hyperlipidemia)     Hyperlipidemia     Hypertension     Lateral epicondylitis     OA (osteoarthritis)     GUIDO (obstructive sleep apnea)     Prostate cancer     Dr. Wong    TIA (transient ischemic attack)     Trouble in sleeping        Past Surgical History:  Past Surgical History:   Procedure Laterality Date    CATARACT EXTRACTION W/  INTRAOCULAR LENS IMPLANT Right 02/21/2018    I-Stent    CATARACT EXTRACTION W/  INTRAOCULAR LENS IMPLANT Left 03/21/2018    I - Stent    COLONOSCOPY N/A 11/14/2016    Procedure: COLONOSCOPY;  Surgeon: Karuna Rodriguez MD;  Location: [facility];  Service: Endoscopy;  Laterality: N/A;    HEMORRHOID SURGERY      I-STENT Right 02/21/2018    DR. REECE    KNEE ARTHROSCOPY W/ MENISCAL REPAIR Right 2015    Dr. Jain    PCIOL Right 02/21/2018    DR. REECE    PLANTAR FASCIA RELEASE      right    ROTATOR CUFF REPAIR Bilateral     bilateral    SHOULDER SURGERY Bilateral around 2000    Dr. Pepper.  rotator cuff surgeries         Family History:  Family History   Problem Relation Age of Onset    Lung cancer Father         life long smoker    Cancer Father         prostate, lung    Stroke Sister         TIA    Cataracts Sister     Cancer Brother         prostate    Cataracts Brother     Cataracts Sister     Melanoma Neg Hx     Psoriasis Neg Hx     Lupus Neg Hx     Eczema Neg Hx     Diabetes Neg Hx     Heart disease Neg Hx     Kidney disease Neg Hx     Colon cancer Neg Hx        Social  History:  Social History     Tobacco Use    Smoking status: Former Smoker     Packs/day: 3.00     Years: 35.00     Pack years: 105.00     Types: Cigarettes     Start date: 1960     Last attempt to quit: 1985     Years since quittin.4    Smokeless tobacco: Never Used   Substance and Sexual Activity    Alcohol use: Yes     Alcohol/week: 7.0 standard drinks     Types: 6 Cans of beer, 1 Standard drinks or equivalent per week     Comment: socially    Drug use: No    Sexual activity: Never        Review of Systems     Review of Systems   Constitutional: Positive for fatigue. Negative for chills and fever.   HENT: Negative for congestion and sore throat.    Respiratory: Negative for cough and shortness of breath.    Cardiovascular: Negative for chest pain and palpitations.   Gastrointestinal: Negative for diarrhea, nausea and vomiting.   Genitourinary: Negative for dysuria and hematuria.   Musculoskeletal: Negative for back pain.   Skin: Negative for rash.   Neurological: Positive for weakness (generalized) and light-headedness. Negative for dizziness and headaches.   Hematological: Does not bruise/bleed easily.        (+) HTN   All other systems reviewed and are negative.     Physical Exam     Initial Vitals [20 2231]   BP Pulse Resp Temp SpO2   (!) 169/75 68 20 98.4 °F (36.9 °C) 96 %      MAP       --          Physical Exam  Nursing Notes and Vital Signs Reviewed.  Constitutional: Patient is in no apparent distress. Well-developed and well-nourished.  Head: Atraumatic. Normocephalic.  Eyes: EOM intact. No scleral icterus.  No nystagmus noted.  ENT: Mucous membranes are moist. Oropharynx is clear and symmetric.  nares are clear.  TMs are normal.  There is no mastoid tenderness. There is no erythema over the mastoid.  Neck: Supple. Full ROM. No lymphadenopathy.  No nuchal rigidity or meningismus.  Cardiovascular: Regular rate. Regular rhythm. No murmurs, rubs, or gallops. Distal pulses are 2+ and  "symmetric.  Pulmonary/Chest: No respiratory distress. Clear to auscultation bilaterally. No wheezing or rales.  Abdominal: Soft and non-distended.  There is no tenderness.  No rebound, guarding, or rigidity. Good bowel sounds.  Genitourinary: No CVA tenderness no suprapubic tenderness.  Musculoskeletal: Moves all extremities. No obvious deformities. No edema. No calf tenderness.  Skin: Warm and dry.  Neurological:  Alert, awake, and appropriate.  Normal speech.  No acute focal neurological deficits are appreciated. Negative Romberg.  Finger-to-nose intact. Normal gait.  5 x 5 strength in all extremities. No focal lateralizing signs.  Two through 12 intact bilaterally.  Psychiatric: Normal affect. Good eye contact. Appropriate in content.     ED Course   Procedures  ED Vital Signs:  Vitals:    01/06/20 2231 01/06/20 2340 01/06/20 2343 01/06/20 2347   BP: (!) 169/75 136/66 136/69 137/68   Pulse: 68 64  73   Resp: 20      Temp: 98.4 °F (36.9 °C)  (!) 67 °F (19.4 °C)    TempSrc: Oral      SpO2: 96% 98% 98% 97%   Weight: 90.4 kg (199 lb 4.7 oz)      Height: 5' 10.5" (1.791 m)       01/06/20 2356   BP:    Pulse: (!) 58   Resp:    Temp:    TempSrc:    SpO2:    Weight:    Height:        Abnormal Lab Results:  Labs Reviewed   CBC W/ AUTO DIFFERENTIAL - Abnormal; Notable for the following components:       Result Value    RBC 4.14 (*)     Hemoglobin 13.1 (*)     Mean Corpuscular Hemoglobin 31.6 (*)     Platelets 113 (*)     MPV 8.1 (*)     Lymph # 0.6 (*)     Gran% 73.9 (*)     Lymph% 13.3 (*)     All other components within normal limits   COMPREHENSIVE METABOLIC PANEL - Abnormal; Notable for the following components:    CO2 22 (*)     Alkaline Phosphatase 50 (*)     eGFR if  59 (*)     eGFR if non  51 (*)     All other components within normal limits   URINALYSIS, REFLEX TO URINE CULTURE - Abnormal; Notable for the following components:    Occult Blood UA 1+ (*)     All other components " within normal limits    Narrative:     Preferred Collection Type->Urine, Clean Catch   TROPONIN I   B-TYPE NATRIURETIC PEPTIDE   CK   URINALYSIS MICROSCOPIC    Narrative:     Preferred Collection Type->Urine, Clean Catch        All Lab Results:  Results for orders placed or performed during the hospital encounter of 01/06/20   CBC auto differential   Result Value Ref Range    WBC 4.75 3.90 - 12.70 K/uL    RBC 4.14 (L) 4.60 - 6.20 M/uL    Hemoglobin 13.1 (L) 14.0 - 18.0 g/dL    Hematocrit 40.3 40.0 - 54.0 %    Mean Corpuscular Volume 97 82 - 98 fL    Mean Corpuscular Hemoglobin 31.6 (H) 27.0 - 31.0 pg    Mean Corpuscular Hemoglobin Conc 32.5 32.0 - 36.0 g/dL    RDW 12.0 11.5 - 14.5 %    Platelets 113 (L) 150 - 350 K/uL    MPV 8.1 (L) 9.2 - 12.9 fL    Immature Granulocytes 0.4 0.0 - 0.5 %    Gran # (ANC) 3.5 1.8 - 7.7 K/uL    Immature Grans (Abs) 0.02 0.00 - 0.04 K/uL    Lymph # 0.6 (L) 1.0 - 4.8 K/uL    Mono # 0.4 0.3 - 1.0 K/uL    Eos # 0.1 0.0 - 0.5 K/uL    Baso # 0.04 0.00 - 0.20 K/uL    nRBC 0 0 /100 WBC    Gran% 73.9 (H) 38.0 - 73.0 %    Lymph% 13.3 (L) 18.0 - 48.0 %    Mono% 9.1 4.0 - 15.0 %    Eosinophil% 2.5 0.0 - 8.0 %    Basophil% 0.8 0.0 - 1.9 %    Differential Method Automated    Comprehensive metabolic panel   Result Value Ref Range    Sodium 141 136 - 145 mmol/L    Potassium 4.2 3.5 - 5.1 mmol/L    Chloride 106 95 - 110 mmol/L    CO2 22 (L) 23 - 29 mmol/L    Glucose 91 70 - 110 mg/dL    BUN, Bld 21 8 - 23 mg/dL    Creatinine 1.3 0.5 - 1.4 mg/dL    Calcium 9.0 8.7 - 10.5 mg/dL    Total Protein 6.9 6.0 - 8.4 g/dL    Albumin 4.1 3.5 - 5.2 g/dL    Total Bilirubin 0.7 0.1 - 1.0 mg/dL    Alkaline Phosphatase 50 (L) 55 - 135 U/L    AST 15 10 - 40 U/L    ALT 11 10 - 44 U/L    Anion Gap 13 8 - 16 mmol/L    eGFR if African American 59 (A) >60 mL/min/1.73 m^2    eGFR if non African American 51 (A) >60 mL/min/1.73 m^2   Troponin I #1   Result Value Ref Range    Troponin I 0.010 0.000 - 0.026 ng/mL   B-Type  natriuretic peptide (BNP)   Result Value Ref Range    BNP 30 0 - 99 pg/mL   CPK   Result Value Ref Range     20 - 200 U/L   Urinalysis, Reflex to Urine Culture Urine, Clean Catch   Result Value Ref Range    Specimen UA Urine, Clean Catch     Color, UA Yellow Yellow, Straw, Sonia    Appearance, UA Clear Clear    pH, UA 6.0 5.0 - 8.0    Specific Gravity, UA 1.015 1.005 - 1.030    Protein, UA Negative Negative    Glucose, UA Negative Negative    Ketones, UA Negative Negative    Bilirubin (UA) Negative Negative    Occult Blood UA 1+ (A) Negative    Nitrite, UA Negative Negative    Urobilinogen, UA Negative <2.0 EU/dL    Leukocytes, UA Negative Negative   Urinalysis Microscopic   Result Value Ref Range    RBC, UA 0 0 - 4 /hpf    Microscopic Comment SEE COMMENT        Imaging Results:  Imaging Results          X-Ray Chest AP Portable (Final result)  Result time 01/06/20 23:15:43    Final result by Jaya Schroeder III, MD (01/06/20 23:15:43)                 Impression:      No detrimental change.  No new/acute abnormality suggested.      Electronically signed by: Jaya Schroeder MD  Date:    01/06/2020  Time:    23:15             Narrative:    EXAMINATION:  XR CHEST AP PORTABLE    CLINICAL HISTORY:  Chest Pain;    COMPARISON:  January 2nd    FINDINGS:  Heart size is normal. The lung fields are clear. No acute pulmonary infiltrate.                                 The EKG was ordered, reviewed, and independently interpreted by the ED provider.  Interpretation time: 23:38:56  Rate: 61 BPM  Rhythm: sinus rhythm with occasional premature ventricular complexes  Interpretation: Left axis deviation. Incomplete right bundle branch block. No STEMI.         The Emergency Provider reviewed the vital signs and test results, which are outlined above.     ED Discussion     11:50 PM: Re-evaluated pt. Pt is resting comfortably and is in no acute distress.    1:02 AM: Reassessed pt at this time.  Pt states his condition has  improved at this time. Discussed with pt all pertinent ED information and results. Discussed pt dx and plan of tx. Gave pt all f/u and return to the ED instructions. All questions and concerns were addressed at this time. Pt expresses understanding of information and instructions, and is comfortable with plan to discharge. Pt is stable for discharge.    I discussed with patient and/or family/caretaker that evaluation in the ED does not suggest any emergent or life threatening medical conditions requiring immediate intervention beyond what was provided in the ED, and I believe patient is safe for discharge.  Regardless, an unremarkable evaluation in the ED does not preclude the development or presence of a serious of life threatening condition. As such, patient was instructed to return immediately for any worsening or change in current symptoms.      1:11 AM  Patient is stable nontoxic.  Patient appears very well. Has mild orthostatic changes with dizziness prior to her arrival.  Blood pressure here as well controlled although he was concerned with his pressure being bumped earlier.  Pressure came down nicely on its own.  I have advised the patient to monitor his blood pressure morning and night and a marked these numbers down for his primary care doctor.  I have advised to increase his fluid intake as well. He is to follow up with primary care physician and will return if his symptoms worsen in any way.  He is very reliable.  Patient looks very well and appears much younger than his stated age.    Medical Decision Making:   Clinical Tests:   Lab Tests: Ordered and Reviewed  Radiological Study: Ordered and Reviewed  Medical Tests: Ordered and Reviewed           ED Medication(s):  Medications   sodium chloride 0.9% bolus 500 mL (0 mLs Intravenous Stopped 1/7/20 0103)       New Prescriptions    No medications on file               Scribe Attestation:   Scribe #1: I performed the above scribed service and the  documentation accurately describes the services I performed. I attest to the accuracy of the note.     Attending:   Physician Attestation Statement for Scribe #1: I, Anshul Mark Jr., MD, personally performed the services described in this documentation, as scribed by Tammy Sauceda, in my presence, and it is both accurate and complete.           Clinical Impression       ICD-10-CM ICD-9-CM   1. Chest pain R07.9 786.50       Disposition:   Disposition: Discharged  Condition: Stable       Anshul Mark Jr., MD  01/07/20 0112

## 2020-01-09 ENCOUNTER — PATIENT OUTREACH (OUTPATIENT)
Dept: ADMINISTRATIVE | Facility: HOSPITAL | Age: 80
End: 2020-01-09

## 2020-01-09 ENCOUNTER — OFFICE VISIT (OUTPATIENT)
Dept: INTERNAL MEDICINE | Facility: CLINIC | Age: 80
End: 2020-01-09
Payer: MEDICARE

## 2020-01-09 VITALS
HEART RATE: 66 BPM | HEIGHT: 71 IN | WEIGHT: 200.81 LBS | DIASTOLIC BLOOD PRESSURE: 78 MMHG | TEMPERATURE: 98 F | BODY MASS INDEX: 28.11 KG/M2 | SYSTOLIC BLOOD PRESSURE: 144 MMHG

## 2020-01-09 DIAGNOSIS — I70.203 ATHEROSCLEROSIS OF ARTERY OF BOTH LOWER EXTREMITIES: ICD-10-CM

## 2020-01-09 DIAGNOSIS — D69.6 THROMBOCYTOPENIA: ICD-10-CM

## 2020-01-09 DIAGNOSIS — I10 ESSENTIAL HYPERTENSION: Primary | Chronic | ICD-10-CM

## 2020-01-09 PROCEDURE — 1101F PT FALLS ASSESS-DOCD LE1/YR: CPT | Mod: HCNC,CPTII,S$GLB, | Performed by: FAMILY MEDICINE

## 2020-01-09 PROCEDURE — 1101F PR PT FALLS ASSESS DOC 0-1 FALLS W/OUT INJ PAST YR: ICD-10-PCS | Mod: HCNC,CPTII,S$GLB, | Performed by: FAMILY MEDICINE

## 2020-01-09 PROCEDURE — 99999 PR PBB SHADOW E&M-EST. PATIENT-LVL III: ICD-10-PCS | Mod: PBBFAC,HCNC,, | Performed by: FAMILY MEDICINE

## 2020-01-09 PROCEDURE — 3078F DIAST BP <80 MM HG: CPT | Mod: HCNC,CPTII,S$GLB, | Performed by: FAMILY MEDICINE

## 2020-01-09 PROCEDURE — 1126F AMNT PAIN NOTED NONE PRSNT: CPT | Mod: HCNC,S$GLB,, | Performed by: FAMILY MEDICINE

## 2020-01-09 PROCEDURE — 1159F PR MEDICATION LIST DOCUMENTED IN MEDICAL RECORD: ICD-10-PCS | Mod: HCNC,S$GLB,, | Performed by: FAMILY MEDICINE

## 2020-01-09 PROCEDURE — 1126F PR PAIN SEVERITY QUANTIFIED, NO PAIN PRESENT: ICD-10-PCS | Mod: HCNC,S$GLB,, | Performed by: FAMILY MEDICINE

## 2020-01-09 PROCEDURE — 99214 OFFICE O/P EST MOD 30 MIN: CPT | Mod: HCNC,S$GLB,, | Performed by: FAMILY MEDICINE

## 2020-01-09 PROCEDURE — 99999 PR PBB SHADOW E&M-EST. PATIENT-LVL III: CPT | Mod: PBBFAC,HCNC,, | Performed by: FAMILY MEDICINE

## 2020-01-09 PROCEDURE — 3077F SYST BP >= 140 MM HG: CPT | Mod: HCNC,CPTII,S$GLB, | Performed by: FAMILY MEDICINE

## 2020-01-09 PROCEDURE — 1159F MED LIST DOCD IN RCRD: CPT | Mod: HCNC,S$GLB,, | Performed by: FAMILY MEDICINE

## 2020-01-09 PROCEDURE — 99214 PR OFFICE/OUTPT VISIT, EST, LEVL IV, 30-39 MIN: ICD-10-PCS | Mod: HCNC,S$GLB,, | Performed by: FAMILY MEDICINE

## 2020-01-09 PROCEDURE — 3077F PR MOST RECENT SYSTOLIC BLOOD PRESSURE >= 140 MM HG: ICD-10-PCS | Mod: HCNC,CPTII,S$GLB, | Performed by: FAMILY MEDICINE

## 2020-01-09 PROCEDURE — 3078F PR MOST RECENT DIASTOLIC BLOOD PRESSURE < 80 MM HG: ICD-10-PCS | Mod: HCNC,CPTII,S$GLB, | Performed by: FAMILY MEDICINE

## 2020-01-09 RX ORDER — OLMESARTAN MEDOXOMIL 40 MG/1
40 TABLET ORAL DAILY
Qty: 30 TABLET | Refills: 0 | Status: SHIPPED | OUTPATIENT
Start: 2020-01-09 | End: 2020-01-16 | Stop reason: SDUPTHER

## 2020-01-09 NOTE — PROGRESS NOTES
Subjective:       Patient ID: Ezequiel Hughes is a 79 y.o. male.    Chief Complaint: Hypertension (went to ER for HTN - put on IV fluids) and health maintenance (shingles, colonoscopy)    Hypertension   This is a chronic problem. The current episode started more than 1 year ago. The problem has been gradually worsening since onset. The problem is uncontrolled. Pertinent negatives include no anxiety, blurred vision, chest pain, headaches or shortness of breath. There are no associated agents to hypertension. Risk factors for coronary artery disease include male gender. Past treatments include angiotensin blockers and diuretics. The current treatment provides moderate improvement. There are no compliance problems.      Review of Systems   Eyes: Negative for blurred vision.   Respiratory: Negative for shortness of breath.    Cardiovascular: Negative for chest pain.   Gastrointestinal: Negative for abdominal pain.   Neurological: Negative for headaches.       Objective:      Physical Exam   Constitutional: He appears well-developed and well-nourished. No distress.   HENT:   Head: Normocephalic and atraumatic.   Pulmonary/Chest: Effort normal and breath sounds normal. No respiratory distress. He has no wheezes.   Abdominal: Soft. He exhibits no distension. There is no tenderness. There is no guarding.   Skin: Skin is warm and dry. No rash noted. He is not diaphoretic. No erythema.   Nursing note and vitals reviewed.      Assessment:       1. Essential hypertension    2. Atherosclerosis of artery of both lower extremities    3. Thrombocytopenia        Plan:     Problem List Items Addressed This Visit        Cardiac/Vascular    Essential hypertension - Primary (Chronic)    Current Assessment & Plan     Take 1/2 tab, if elevated above 140/90, start taking 1 tab         Relevant Medications    olmesartan (BENICAR) 40 MG tablet    Atherosclerosis of artery of both lower extremities    Overview     Cont plavix             Hematology    Thrombocytopenia    Overview     1/6/2020 - plts less than 120

## 2020-01-16 ENCOUNTER — OFFICE VISIT (OUTPATIENT)
Dept: INTERNAL MEDICINE | Facility: CLINIC | Age: 80
End: 2020-01-16
Payer: MEDICARE

## 2020-01-16 VITALS
WEIGHT: 203.5 LBS | TEMPERATURE: 99 F | HEIGHT: 71 IN | DIASTOLIC BLOOD PRESSURE: 72 MMHG | HEART RATE: 62 BPM | SYSTOLIC BLOOD PRESSURE: 124 MMHG | BODY MASS INDEX: 28.49 KG/M2

## 2020-01-16 DIAGNOSIS — Z12.11 ENCOUNTER FOR SCREENING COLONOSCOPY: ICD-10-CM

## 2020-01-16 DIAGNOSIS — E78.2 MIXED HYPERLIPIDEMIA: Chronic | ICD-10-CM

## 2020-01-16 DIAGNOSIS — J84.10 LUNG GRANULOMA: ICD-10-CM

## 2020-01-16 DIAGNOSIS — N18.30 CHRONIC KIDNEY DISEASE, STAGE 3: ICD-10-CM

## 2020-01-16 DIAGNOSIS — I10 ESSENTIAL HYPERTENSION: Primary | Chronic | ICD-10-CM

## 2020-01-16 PROCEDURE — 1126F AMNT PAIN NOTED NONE PRSNT: CPT | Mod: HCNC,S$GLB,, | Performed by: FAMILY MEDICINE

## 2020-01-16 PROCEDURE — 99215 OFFICE O/P EST HI 40 MIN: CPT | Mod: HCNC,S$GLB,, | Performed by: FAMILY MEDICINE

## 2020-01-16 PROCEDURE — 1126F PR PAIN SEVERITY QUANTIFIED, NO PAIN PRESENT: ICD-10-PCS | Mod: HCNC,S$GLB,, | Performed by: FAMILY MEDICINE

## 2020-01-16 PROCEDURE — 99499 UNLISTED E&M SERVICE: CPT | Mod: HCNC,S$GLB,, | Performed by: FAMILY MEDICINE

## 2020-01-16 PROCEDURE — 99215 PR OFFICE/OUTPT VISIT, EST, LEVL V, 40-54 MIN: ICD-10-PCS | Mod: HCNC,S$GLB,, | Performed by: FAMILY MEDICINE

## 2020-01-16 PROCEDURE — 99999 PR PBB SHADOW E&M-EST. PATIENT-LVL III: CPT | Mod: PBBFAC,HCNC,, | Performed by: FAMILY MEDICINE

## 2020-01-16 PROCEDURE — 1159F PR MEDICATION LIST DOCUMENTED IN MEDICAL RECORD: ICD-10-PCS | Mod: HCNC,S$GLB,, | Performed by: FAMILY MEDICINE

## 2020-01-16 PROCEDURE — 99999 PR PBB SHADOW E&M-EST. PATIENT-LVL III: ICD-10-PCS | Mod: PBBFAC,HCNC,, | Performed by: FAMILY MEDICINE

## 2020-01-16 PROCEDURE — 3078F PR MOST RECENT DIASTOLIC BLOOD PRESSURE < 80 MM HG: ICD-10-PCS | Mod: HCNC,CPTII,S$GLB, | Performed by: FAMILY MEDICINE

## 2020-01-16 PROCEDURE — 1101F PR PT FALLS ASSESS DOC 0-1 FALLS W/OUT INJ PAST YR: ICD-10-PCS | Mod: HCNC,CPTII,S$GLB, | Performed by: FAMILY MEDICINE

## 2020-01-16 PROCEDURE — 3078F DIAST BP <80 MM HG: CPT | Mod: HCNC,CPTII,S$GLB, | Performed by: FAMILY MEDICINE

## 2020-01-16 PROCEDURE — 3074F PR MOST RECENT SYSTOLIC BLOOD PRESSURE < 130 MM HG: ICD-10-PCS | Mod: HCNC,CPTII,S$GLB, | Performed by: FAMILY MEDICINE

## 2020-01-16 PROCEDURE — 1101F PT FALLS ASSESS-DOCD LE1/YR: CPT | Mod: HCNC,CPTII,S$GLB, | Performed by: FAMILY MEDICINE

## 2020-01-16 PROCEDURE — 3074F SYST BP LT 130 MM HG: CPT | Mod: HCNC,CPTII,S$GLB, | Performed by: FAMILY MEDICINE

## 2020-01-16 PROCEDURE — 1159F MED LIST DOCD IN RCRD: CPT | Mod: HCNC,S$GLB,, | Performed by: FAMILY MEDICINE

## 2020-01-16 PROCEDURE — 99499 RISK ADDL DX/OHS AUDIT: ICD-10-PCS | Mod: HCNC,S$GLB,, | Performed by: FAMILY MEDICINE

## 2020-01-16 RX ORDER — ATORVASTATIN CALCIUM 40 MG/1
40 TABLET, FILM COATED ORAL DAILY
Qty: 90 TABLET | Refills: 1 | Status: SHIPPED | OUTPATIENT
Start: 2020-01-16 | End: 2021-06-11

## 2020-01-16 RX ORDER — OLMESARTAN MEDOXOMIL 40 MG/1
40 TABLET ORAL DAILY
Qty: 90 TABLET | Refills: 1 | Status: SHIPPED | OUTPATIENT
Start: 2020-01-16 | End: 2020-01-29

## 2020-01-16 NOTE — PROGRESS NOTES
Subjective:       Patient ID: Ezequiel Hughes is a 79 y.o. male.    Chief Complaint: Hypertension (1 week follow up) and health maintenance (shingles/ colonoscopy)    Hypertension   This is a chronic problem. The current episode started more than 1 year ago. The problem has been resolved since onset. The problem is controlled. Pertinent negatives include no anxiety, blurred vision, chest pain, headaches, neck pain or shortness of breath. There are no associated agents to hypertension. Risk factors for coronary artery disease include male gender. Past treatments include diuretics and angiotensin blockers. The current treatment provides moderate improvement. There are no compliance problems.      Review of Systems   Constitutional: Negative for activity change.   HENT: Negative for ear pain.    Eyes: Negative for blurred vision and pain.   Respiratory: Negative for shortness of breath.    Cardiovascular: Negative for chest pain.   Gastrointestinal: Negative for abdominal pain.   Genitourinary: Negative for dysuria.   Musculoskeletal: Negative for neck pain.   Skin: Negative for rash.   Neurological: Negative for headaches.       Objective:      Physical Exam   Constitutional: He appears well-developed and well-nourished. No distress.   HENT:   Head: Normocephalic and atraumatic.   Cardiovascular: Normal rate and regular rhythm.   Pulmonary/Chest: Effort normal and breath sounds normal. No respiratory distress. He has no wheezes.   Abdominal: Soft. Bowel sounds are normal. There is no tenderness.   Musculoskeletal: He exhibits no edema.   Neurological: He is alert.   Skin: Skin is warm and dry. No rash noted. He is not diaphoretic. No erythema.   Psychiatric: He has a normal mood and affect. His behavior is normal.   Nursing note and vitals reviewed.      Assessment:       1. Essential hypertension    2. Lung granuloma    3. Mixed hyperlipidemia    4. Chronic kidney disease, stage 3    5. Encounter for screening  colonoscopy        Plan:     Problem List Items Addressed This Visit        Pulmonary    Lung granuloma    Overview     Per IM OV of 3/14/17, Dr. Valery Ozuna, diagnosis:   Lung granuloma    ICD-10-CM: J84.10  ICD-9-CM: 515   noted of left lung on CT scan from 1/2016. no new problems. no sob.                Relevant Orders    CT Chest With Contrast       Cardiac/Vascular    Essential hypertension - Primary (Chronic)    Relevant Medications    olmesartan (BENICAR) 40 MG tablet    Mixed hyperlipidemia (Chronic)    Current Assessment & Plan     Cont statin         Relevant Medications    atorvastatin (LIPITOR) 40 MG tablet       Renal/    Chronic kidney disease, stage 3    Current Assessment & Plan     GFR 59 - 01/07/2020           Other Visit Diagnoses     Encounter for screening colonoscopy        Relevant Orders    Case request GI: COLONOSCOPY (Completed)

## 2020-01-17 ENCOUNTER — TELEPHONE (OUTPATIENT)
Dept: ENDOSCOPY | Facility: HOSPITAL | Age: 80
End: 2020-01-17

## 2020-01-23 ENCOUNTER — LAB VISIT (OUTPATIENT)
Dept: LAB | Facility: HOSPITAL | Age: 80
End: 2020-01-23
Attending: FAMILY MEDICINE
Payer: MEDICARE

## 2020-01-23 ENCOUNTER — TELEPHONE (OUTPATIENT)
Dept: INTERNAL MEDICINE | Facility: CLINIC | Age: 80
End: 2020-01-23

## 2020-01-23 DIAGNOSIS — N18.30 CHRONIC KIDNEY DISEASE, STAGE 3: ICD-10-CM

## 2020-01-23 DIAGNOSIS — I10 ESSENTIAL HYPERTENSION: ICD-10-CM

## 2020-01-23 DIAGNOSIS — D69.6 THROMBOCYTOPENIA: ICD-10-CM

## 2020-01-23 DIAGNOSIS — E78.2 MIXED HYPERLIPIDEMIA: ICD-10-CM

## 2020-01-23 DIAGNOSIS — I10 ESSENTIAL HYPERTENSION: Primary | ICD-10-CM

## 2020-01-23 DIAGNOSIS — Z12.5 PROSTATE CANCER SCREENING: ICD-10-CM

## 2020-01-23 DIAGNOSIS — M1A.9XX0 CHRONIC GOUT WITHOUT TOPHUS, UNSPECIFIED CAUSE, UNSPECIFIED SITE: ICD-10-CM

## 2020-01-23 DIAGNOSIS — E78.2 MIXED HYPERLIPIDEMIA: Chronic | ICD-10-CM

## 2020-01-23 LAB
ALBUMIN SERPL BCP-MCNC: 3.8 G/DL (ref 3.5–5.2)
ALP SERPL-CCNC: 51 U/L (ref 55–135)
ALT SERPL W/O P-5'-P-CCNC: 13 U/L (ref 10–44)
ANION GAP SERPL CALC-SCNC: 9 MMOL/L (ref 8–16)
AST SERPL-CCNC: 16 U/L (ref 10–40)
BASOPHILS # BLD AUTO: 0.04 K/UL (ref 0–0.2)
BASOPHILS NFR BLD: 1 % (ref 0–1.9)
BILIRUB SERPL-MCNC: 0.5 MG/DL (ref 0.1–1)
BUN SERPL-MCNC: 31 MG/DL (ref 8–23)
CALCIUM SERPL-MCNC: 8.6 MG/DL (ref 8.7–10.5)
CHLORIDE SERPL-SCNC: 111 MMOL/L (ref 95–110)
CHOLEST SERPL-MCNC: 142 MG/DL (ref 120–199)
CHOLEST/HDLC SERPL: 2.5 {RATIO} (ref 2–5)
CO2 SERPL-SCNC: 24 MMOL/L (ref 23–29)
COMPLEXED PSA SERPL-MCNC: 1.1 NG/ML (ref 0–4)
CREAT SERPL-MCNC: 1.5 MG/DL (ref 0.5–1.4)
DIFFERENTIAL METHOD: ABNORMAL
EOSINOPHIL # BLD AUTO: 0.3 K/UL (ref 0–0.5)
EOSINOPHIL NFR BLD: 8.1 % (ref 0–8)
ERYTHROCYTE [DISTWIDTH] IN BLOOD BY AUTOMATED COUNT: 13.1 % (ref 11.5–14.5)
EST. GFR  (AFRICAN AMERICAN): 49.8 ML/MIN/1.73 M^2
EST. GFR  (NON AFRICAN AMERICAN): 43 ML/MIN/1.73 M^2
FERRITIN SERPL-MCNC: 102 NG/ML (ref 20–300)
GLUCOSE SERPL-MCNC: 93 MG/DL (ref 70–110)
HCT VFR BLD AUTO: 39.6 % (ref 40–54)
HDLC SERPL-MCNC: 57 MG/DL (ref 40–75)
HDLC SERPL: 40.1 % (ref 20–50)
HGB BLD-MCNC: 12.6 G/DL (ref 14–18)
IMM GRANULOCYTES # BLD AUTO: 0.02 K/UL (ref 0–0.04)
IMM GRANULOCYTES NFR BLD AUTO: 0.5 % (ref 0–0.5)
IRON SERPL-MCNC: 54 UG/DL (ref 45–160)
LDLC SERPL CALC-MCNC: 73.4 MG/DL (ref 63–159)
LYMPHOCYTES # BLD AUTO: 0.9 K/UL (ref 1–4.8)
LYMPHOCYTES NFR BLD: 24.1 % (ref 18–48)
MCH RBC QN AUTO: 32.5 PG (ref 27–31)
MCHC RBC AUTO-ENTMCNC: 31.8 G/DL (ref 32–36)
MCV RBC AUTO: 102 FL (ref 82–98)
MONOCYTES # BLD AUTO: 0.4 K/UL (ref 0.3–1)
MONOCYTES NFR BLD: 11.5 % (ref 4–15)
NEUTROPHILS # BLD AUTO: 2.1 K/UL (ref 1.8–7.7)
NEUTROPHILS NFR BLD: 54.8 % (ref 38–73)
NONHDLC SERPL-MCNC: 85 MG/DL
NRBC BLD-RTO: 0 /100 WBC
PLATELET # BLD AUTO: 102 K/UL (ref 150–350)
PMV BLD AUTO: 8.2 FL (ref 9.2–12.9)
POTASSIUM SERPL-SCNC: 4.5 MMOL/L (ref 3.5–5.1)
PROT SERPL-MCNC: 6.3 G/DL (ref 6–8.4)
RBC # BLD AUTO: 3.88 M/UL (ref 4.6–6.2)
SATURATED IRON: 19 % (ref 20–50)
SODIUM SERPL-SCNC: 144 MMOL/L (ref 136–145)
TOTAL IRON BINDING CAPACITY: 281 UG/DL (ref 250–450)
TRANSFERRIN SERPL-MCNC: 190 MG/DL (ref 200–375)
TRIGL SERPL-MCNC: 58 MG/DL (ref 30–150)
TSH SERPL DL<=0.005 MIU/L-ACNC: 1.33 UIU/ML (ref 0.4–4)
URATE SERPL-MCNC: 5.5 MG/DL (ref 3.4–7)
WBC # BLD AUTO: 3.81 K/UL (ref 3.9–12.7)

## 2020-01-23 PROCEDURE — 84443 ASSAY THYROID STIM HORMONE: CPT | Mod: HCNC

## 2020-01-23 PROCEDURE — 84153 ASSAY OF PSA TOTAL: CPT | Mod: HCNC

## 2020-01-23 PROCEDURE — 83540 ASSAY OF IRON: CPT | Mod: HCNC

## 2020-01-23 PROCEDURE — 80061 LIPID PANEL: CPT | Mod: HCNC

## 2020-01-23 PROCEDURE — 36415 COLL VENOUS BLD VENIPUNCTURE: CPT | Mod: HCNC,PO

## 2020-01-23 PROCEDURE — 82728 ASSAY OF FERRITIN: CPT | Mod: HCNC

## 2020-01-23 PROCEDURE — 80053 COMPREHEN METABOLIC PANEL: CPT | Mod: HCNC

## 2020-01-23 PROCEDURE — 84550 ASSAY OF BLOOD/URIC ACID: CPT | Mod: HCNC

## 2020-01-23 PROCEDURE — 85025 COMPLETE CBC W/AUTO DIFF WBC: CPT | Mod: HCNC

## 2020-01-23 NOTE — TELEPHONE ENCOUNTER
Added labs for patient today under my name along with dr monroy's lipid and cmet please. In lobby now. Thanks.

## 2020-01-29 ENCOUNTER — OFFICE VISIT (OUTPATIENT)
Dept: INTERNAL MEDICINE | Facility: CLINIC | Age: 80
End: 2020-01-29
Payer: MEDICARE

## 2020-01-29 VITALS
WEIGHT: 208.13 LBS | SYSTOLIC BLOOD PRESSURE: 138 MMHG | BODY MASS INDEX: 29.14 KG/M2 | DIASTOLIC BLOOD PRESSURE: 84 MMHG | HEART RATE: 64 BPM | HEIGHT: 71 IN

## 2020-01-29 DIAGNOSIS — M21.70 DISPARITY OF LEG LENGTH, ACQUIRED: ICD-10-CM

## 2020-01-29 DIAGNOSIS — M1A.9XX0 CHRONIC GOUT WITHOUT TOPHUS, UNSPECIFIED CAUSE, UNSPECIFIED SITE: ICD-10-CM

## 2020-01-29 DIAGNOSIS — R10.9 FLANK PAIN: ICD-10-CM

## 2020-01-29 DIAGNOSIS — I10 ESSENTIAL HYPERTENSION: Primary | Chronic | ICD-10-CM

## 2020-01-29 DIAGNOSIS — N18.30 CHRONIC KIDNEY DISEASE, STAGE 3: ICD-10-CM

## 2020-01-29 DIAGNOSIS — M79.89 LEG SWELLING: ICD-10-CM

## 2020-01-29 DIAGNOSIS — D69.6 THROMBOCYTOPENIA: ICD-10-CM

## 2020-01-29 PROCEDURE — 1159F MED LIST DOCD IN RCRD: CPT | Mod: HCNC,S$GLB,, | Performed by: FAMILY MEDICINE

## 2020-01-29 PROCEDURE — 99499 UNLISTED E&M SERVICE: CPT | Mod: HCNC,S$GLB,, | Performed by: FAMILY MEDICINE

## 2020-01-29 PROCEDURE — 3079F PR MOST RECENT DIASTOLIC BLOOD PRESSURE 80-89 MM HG: ICD-10-PCS | Mod: HCNC,CPTII,S$GLB, | Performed by: FAMILY MEDICINE

## 2020-01-29 PROCEDURE — 1126F PR PAIN SEVERITY QUANTIFIED, NO PAIN PRESENT: ICD-10-PCS | Mod: HCNC,S$GLB,, | Performed by: FAMILY MEDICINE

## 2020-01-29 PROCEDURE — 1126F AMNT PAIN NOTED NONE PRSNT: CPT | Mod: HCNC,S$GLB,, | Performed by: FAMILY MEDICINE

## 2020-01-29 PROCEDURE — 99215 OFFICE O/P EST HI 40 MIN: CPT | Mod: HCNC,S$GLB,, | Performed by: FAMILY MEDICINE

## 2020-01-29 PROCEDURE — 99215 PR OFFICE/OUTPT VISIT, EST, LEVL V, 40-54 MIN: ICD-10-PCS | Mod: HCNC,S$GLB,, | Performed by: FAMILY MEDICINE

## 2020-01-29 PROCEDURE — 3079F DIAST BP 80-89 MM HG: CPT | Mod: HCNC,CPTII,S$GLB, | Performed by: FAMILY MEDICINE

## 2020-01-29 PROCEDURE — 1101F PR PT FALLS ASSESS DOC 0-1 FALLS W/OUT INJ PAST YR: ICD-10-PCS | Mod: HCNC,CPTII,S$GLB, | Performed by: FAMILY MEDICINE

## 2020-01-29 PROCEDURE — 3075F PR MOST RECENT SYSTOLIC BLOOD PRESS GE 130-139MM HG: ICD-10-PCS | Mod: HCNC,CPTII,S$GLB, | Performed by: FAMILY MEDICINE

## 2020-01-29 PROCEDURE — 1159F PR MEDICATION LIST DOCUMENTED IN MEDICAL RECORD: ICD-10-PCS | Mod: HCNC,S$GLB,, | Performed by: FAMILY MEDICINE

## 2020-01-29 PROCEDURE — 1101F PT FALLS ASSESS-DOCD LE1/YR: CPT | Mod: HCNC,CPTII,S$GLB, | Performed by: FAMILY MEDICINE

## 2020-01-29 PROCEDURE — 3075F SYST BP GE 130 - 139MM HG: CPT | Mod: HCNC,CPTII,S$GLB, | Performed by: FAMILY MEDICINE

## 2020-01-29 PROCEDURE — 99999 PR PBB SHADOW E&M-EST. PATIENT-LVL III: CPT | Mod: PBBFAC,HCNC,, | Performed by: FAMILY MEDICINE

## 2020-01-29 PROCEDURE — 99999 PR PBB SHADOW E&M-EST. PATIENT-LVL III: ICD-10-PCS | Mod: PBBFAC,HCNC,, | Performed by: FAMILY MEDICINE

## 2020-01-29 PROCEDURE — 99499 RISK ADDL DX/OHS AUDIT: ICD-10-PCS | Mod: HCNC,S$GLB,, | Performed by: FAMILY MEDICINE

## 2020-01-29 RX ORDER — HYDROCHLOROTHIAZIDE 12.5 MG/1
12.5 TABLET ORAL DAILY
Qty: 30 TABLET | Refills: 3 | Status: SHIPPED | OUTPATIENT
Start: 2020-01-29 | End: 2020-03-06

## 2020-01-29 RX ORDER — LOSARTAN POTASSIUM 50 MG/1
50 TABLET ORAL DAILY
Qty: 30 TABLET | Refills: 3 | Status: SHIPPED | OUTPATIENT
Start: 2020-01-29 | End: 2020-03-06

## 2020-01-29 NOTE — PATIENT INSTRUCTIONS
Collagen protein + MCT oil ( at [facility])     Ear wax debrox and h202 + warm water to flush out the ear wax   Oil ( tea tree or mineral EVOO) to moisturize the ear.     Wear your tennis shoes ( good ones) for next few days while working to see if that helps with the back.

## 2020-01-29 NOTE — PROGRESS NOTES
Subjective:      Patient ID: Ezequiel Hughes is a 79 y.o. male.    Chief Complaint: Follow-up (6 months )    Disclaimer:  This note is prepared using voice recognition software and as such is likely to have errors and has not been proof read. Please contact me for questions.     Ezequiel Hughes is a 79 y.o. male who presents today for recent emergency room visit due to elevated blood pressure and recent visits here at the clinic with similar diagnosis.  Reports that went to the emergency room in December because had significantly elevated blood pressure there received 1 bag of normal saline IV fluids.  Seem to improve dramatically and blood pressure resolved on its own and 2 days later followed up with Dr. Barboza here.  During that time blood pressure was still elevated so his medications were changed.  He was previously on losartan 50 HCTZ 12.5.  He was changed to Benicar 20.  He was noticing at that time that if his blood pressure was not getting better control that he could increase the Benicar to additional 40 mg.  They discontinued HCTZ due to some concerns about some acute kidney injury and with the noted previous dehydration.  Since that time he returned back another week later without any improvement with his blood pressure and noticing also that his legs have been swelling more.  He reports he gets lightheaded at times.  Calves also seem to hurt.  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    He reports that he has had a leg length discrepancy for quite some time.  Been wearing several she use but none recently with any shoe inserts.  Would be interested in seeing a podiatrist to see about inserts as well as additional options for the leg with describes the as well as to see if his foot issues may be related to his back issues as well.  Sometimes he appears to be wobbling with some his walking as well.  Seems to be  bothering his right lower back worse but when he is doing the treadmill it is better.    Also reports that his ears have been itching a good bit more.  Uncertain why.  He has been trying to take some supplements as well to help not only with joints and arthritis but also was uncertain what he could do for the itching in his ears.    No recent gout flare ups is still on allopurinol 200 mg.    Is still on Plavix and statin therapy due to previous peripheral vascular disease. Does still see Cardiology as well in going to see them soon.    Does have issues in the past with chronic kidney disease and had to have it 1 bag of normal saline previously at the emergency room however creatinine appears to be worse recently than prior.  He did benefit before with HCTZ and would like to go back on it as well.    Brings is daughter in today with him for further assistance as well.      jonelle- stable.     Wt Readings from Last 10 Encounters:  01/29/20 : 94.4 kg (208 lb 1.8 oz)  01/16/20 : 92.3 kg (203 lb 7.8 oz)  01/09/20 : 91.1 kg (200 lb 13.4 oz)  01/06/20 : 90.4 kg (199 lb 4.7 oz)  09/11/19 : 90.4 kg (199 lb 4.7 oz)  07/29/19 : 91.1 kg (200 lb 13.4 oz)  03/29/19 : 92.4 kg (203 lb 11.3 oz)  01/16/19 : 92.5 kg (203 lb 14.8 oz)  01/04/19 : 91.3 kg (201 lb 4.5 oz)  01/02/19 : 91.5 kg (201 lb 11.5 oz)    The ASCVD Risk score (Barbara AVIS Jr., et al., 2013) failed to calculate for the following reasons:    The 2013 ASCVD risk score is only valid for ages 40 to 79  Lab Results       Component                Value               Date                       HGBA1C                   5.2                 03/07/2018             Lab Results       Component                Value               Date                       CHOL                     142                 01/23/2020                 CHOL                     146                 08/29/2019                 CHOL                     145                 07/16/2019            Lab Results        Component                Value               Date                       LDLCALC                  73.4                01/23/2020                 LDLCALC                  71.4                08/29/2019                 LDLCALC                  70.6                07/16/2019                          Lab Results   Component Value Date    WBC 3.81 (L) 01/23/2020    HGB 12.6 (L) 01/23/2020    HCT 39.6 (L) 01/23/2020     (L) 01/23/2020    CHOL 142 01/23/2020    TRIG 58 01/23/2020    HDL 57 01/23/2020    ALT 13 01/23/2020    AST 16 01/23/2020     01/23/2020    K 4.5 01/23/2020     (H) 01/23/2020    CREATININE 1.5 (H) 01/23/2020    BUN 31 (H) 01/23/2020    CO2 24 01/23/2020    TSH 1.330 01/23/2020    PSA 1.1 01/23/2020    INR 1.2 09/26/2017    HGBA1C 5.2 03/07/2018       X-Ray Chest AP Portable  Narrative: EXAMINATION:  XR CHEST AP PORTABLE    CLINICAL HISTORY:  Chest Pain;    COMPARISON:  January 2nd    FINDINGS:  Heart size is normal. The lung fields are clear. No acute pulmonary infiltrate.  Impression: No detrimental change.  No new/acute abnormality suggested.    Electronically signed by: Jaya Schroeder MD  Date:    01/06/2020  Time:    23:15        Review of Systems   Constitutional: Positive for activity change, fatigue and unexpected weight change. Negative for appetite change and chills.   HENT: Positive for ear pain. Negative for congestion, postnasal drip, sneezing, sore throat and trouble swallowing.    Eyes: Negative for pain and visual disturbance.   Respiratory: Negative for cough and shortness of breath.    Cardiovascular: Positive for leg swelling. Negative for chest pain.   Gastrointestinal: Negative for abdominal pain, constipation, diarrhea, nausea and vomiting.   Endocrine: Negative for cold intolerance and heat intolerance.   Genitourinary: Negative for difficulty urinating, dysuria and flank pain.   Musculoskeletal: Positive for arthralgias, back pain and gait problem. Negative for  "joint swelling and neck pain.   Skin: Negative for color change, rash and wound.   Neurological: Positive for dizziness, weakness and numbness. Negative for seizures and headaches.   Psychiatric/Behavioral: Negative for behavioral problems, decreased concentration, dysphoric mood and sleep disturbance. The patient is not nervous/anxious.      Objective:     Vitals:    01/29/20 0705   BP: 138/84   Pulse: 64   Weight: 94.4 kg (208 lb 1.8 oz)   Height: 5' 10.5" (1.791 m)     Physical Exam   Constitutional: He is oriented to person, place, and time. He appears well-developed and well-nourished. No distress.   HENT:   Head: Normocephalic and atraumatic.   Right Ear: Tympanic membrane and external ear normal.   Left Ear: Tympanic membrane and external ear normal.   Ears:    Nose: Nose normal.   Mouth/Throat: Oropharynx is clear and moist.   Eyes: Pupils are equal, round, and reactive to light. EOM are normal.   Neck: Normal range of motion. Neck supple. Carotid bruit is not present. No thyroid mass and no thyromegaly present.   Cardiovascular: Normal rate and regular rhythm. Exam reveals no gallop and no friction rub.   No murmur heard.  Pulses:       Dorsalis pedis pulses are 2+ on the right side, and 2+ on the left side.   Pulmonary/Chest: Effort normal and breath sounds normal. No respiratory distress.   Abdominal: Soft. Bowel sounds are normal. He exhibits no distension. There is no tenderness. There is no rebound.   Musculoskeletal: Normal range of motion.        Right foot: There is normal range of motion and no deformity.        Left foot: There is normal range of motion and no deformity.   Feet:   Right Foot:   Protective Sensation: 4 sites tested. 4 sites sensed.   Skin Integrity: Positive for warmth. Negative for ulcer, blister, skin breakdown, erythema, callus or dry skin.   Left Foot:   Protective Sensation: 4 sites tested. 4 sites sensed.   Skin Integrity: Positive for warmth. Negative for ulcer, blister, " skin breakdown, erythema, callus or dry skin.   Lymphadenopathy:     He has no cervical adenopathy.   Neurological: He is alert and oriented to person, place, and time. Coordination normal.   Skin: Skin is warm and dry. No rash noted. No erythema.   Psychiatric: He has a normal mood and affect. His speech is normal and behavior is normal.   Vitals reviewed.    Assessment:     1. Essential hypertension    2. Disparity of leg length, acquired    3. Flank pain    4. Chronic kidney disease, stage 3    5. Thrombocytopenia    6. Leg swelling    7. Chronic gout without tophus, unspecified cause, unspecified site      Plan:   Ezequiel was seen today for follow-up.    Diagnoses and all orders for this visit:    Essential hypertension-not controlled.  Patient at this time would like to go back to the losartan HCTZ but we will separate out the too.  Will also get enrolled in the digital hypertension program to better assess this.  Suspect his weight increased recently is more from the dehydration previously and the resultant 1 bag of IV fluids as well as discontinuation of the HCTZ.  Will monitor his kidney function at this time.  Follow-up based on the results.  Patient is finding that the losartan was more beneficial than the Benicar.  He will also start to do daily weights as well at home  -     Comprehensive metabolic panel; Future  -     CBC auto differential; Future  -     Iron and TIBC; Future  -     MyChart Patient Entered Weight  -     MyChart Patient Entered Blood Pressure  -     [facility]  -     [facility] Assign Onboarding Questionnaires    Disparity of leg length, acquired-per the patient would like to see Podiatry may be affecting his gait and his back issues.  Would benefit from a shoe insert.  Refer to Podiatry  -     Ambulatory referral to Podiatry    Flank pain-suspect this is more related to musculoskeletal issues due to severity of the leg  length refer to podiatry if not can refer back to do additional workup  -     Ambulatory referral to Podiatry    Chronic kidney disease, stage 3-noted but previously benefit greatly from the HCTZ without complications will restart HCTZ and monitor closely.  -     Comprehensive metabolic panel; Future  -     CBC auto differential; Future  -     Iron and TIBC; Future  -     MyChart Patient Entered Weight  -     MyChart Patient Entered Blood Pressure    Thrombocytopenia-noted previously in the setting of most likely dilutional due the 1 bag of IV fluids so recent to the blood work.  Repeat lab work again in about 2 months.  -     Comprehensive metabolic panel; Future  -     CBC auto differential; Future  -     Iron and TIBC; Future  -     MyChart Patient Entered Weight  -     MyChart Patient Entered Blood Pressure    Leg swelling-suspect related to the discontinuation of the HCTZ in the setting of the previous dehydration.  Restart at this time monitor weights.  -     Ambulatory referral to Podiatry  -     Comprehensive metabolic panel; Future  -     CBC auto differential; Future  -     Iron and TIBC; Future  -     MyChart Patient Entered Weight  -     MyChart Patient Entered Blood Pressure    Chronic gout without tophus, unspecified cause, unspecified site-no recent flare ups continue with allopurinol lab work prior to his next visit.  -     Comprehensive metabolic panel; Future  -     CBC auto differential; Future  -     Iron and TIBC; Future  -     Uric acid; Future  -     MyChart Patient Entered Weight  -     MyChart Patient Entered Blood Pressure    Other orders  -     losartan (COZAAR) 50 MG tablet; Take 1 tablet (50 mg total) by mouth once daily.  -     hydroCHLOROthiazide (HYDRODIURIL) 12.5 MG Tab; Take 1 tablet (12.5 mg total) by mouth once daily.        Time spent: 40 minutes in face to face discussion concerning diagnosis, prognosis, review of lab and test results, benefits of treatment as well as management  of disease, counseling of patient and coordination of care between various health care providers . Greater than half the time spent was used for coordination of care and counseling of patient.       Follow up in about 3 months (around 4/29/2020) for F/u WT, Labs, Meds Dr Oznua.    Patient Instructions   Collagen protein + MCT oil ( at [employer])     Ear wax debrox and h202 + warm water to flush out the ear wax   Oil ( tea tree or mineral EVOO) to moisturize the ear.     Wear your tennis shoes ( good ones) for next few days while working to see if that helps with the back.

## 2020-01-31 ENCOUNTER — OFFICE VISIT (OUTPATIENT)
Dept: PODIATRY | Facility: CLINIC | Age: 80
End: 2020-01-31
Payer: MEDICARE

## 2020-01-31 VITALS — HEIGHT: 71 IN | BODY MASS INDEX: 29.14 KG/M2 | WEIGHT: 208.13 LBS

## 2020-01-31 DIAGNOSIS — M54.50 LOW BACK PAIN WITHOUT SCIATICA, UNSPECIFIED BACK PAIN LATERALITY, UNSPECIFIED CHRONICITY: ICD-10-CM

## 2020-01-31 DIAGNOSIS — I70.203 ATHEROSCLEROSIS OF ARTERY OF BOTH LOWER EXTREMITIES: ICD-10-CM

## 2020-01-31 DIAGNOSIS — M21.70 ACQUIRED UNEQUAL LIMB LENGTH: Primary | ICD-10-CM

## 2020-01-31 DIAGNOSIS — N18.30 CHRONIC KIDNEY DISEASE, STAGE 3: ICD-10-CM

## 2020-01-31 PROCEDURE — 99999 PR PBB SHADOW E&M-EST. PATIENT-LVL II: ICD-10-PCS | Mod: PBBFAC,HCNC,, | Performed by: PODIATRIST

## 2020-01-31 PROCEDURE — 1159F PR MEDICATION LIST DOCUMENTED IN MEDICAL RECORD: ICD-10-PCS | Mod: HCNC,S$GLB,, | Performed by: PODIATRIST

## 2020-01-31 PROCEDURE — 99999 PR PBB SHADOW E&M-EST. PATIENT-LVL II: CPT | Mod: PBBFAC,HCNC,, | Performed by: PODIATRIST

## 2020-01-31 PROCEDURE — 1126F AMNT PAIN NOTED NONE PRSNT: CPT | Mod: HCNC,S$GLB,, | Performed by: PODIATRIST

## 2020-01-31 PROCEDURE — 1101F PR PT FALLS ASSESS DOC 0-1 FALLS W/OUT INJ PAST YR: ICD-10-PCS | Mod: HCNC,CPTII,S$GLB, | Performed by: PODIATRIST

## 2020-01-31 PROCEDURE — 99203 PR OFFICE/OUTPT VISIT, NEW, LEVL III, 30-44 MIN: ICD-10-PCS | Mod: HCNC,S$GLB,, | Performed by: PODIATRIST

## 2020-01-31 PROCEDURE — 1159F MED LIST DOCD IN RCRD: CPT | Mod: HCNC,S$GLB,, | Performed by: PODIATRIST

## 2020-01-31 PROCEDURE — 1126F PR PAIN SEVERITY QUANTIFIED, NO PAIN PRESENT: ICD-10-PCS | Mod: HCNC,S$GLB,, | Performed by: PODIATRIST

## 2020-01-31 PROCEDURE — 1101F PT FALLS ASSESS-DOCD LE1/YR: CPT | Mod: HCNC,CPTII,S$GLB, | Performed by: PODIATRIST

## 2020-01-31 PROCEDURE — 99203 OFFICE O/P NEW LOW 30 MIN: CPT | Mod: HCNC,S$GLB,, | Performed by: PODIATRIST

## 2020-01-31 NOTE — PROGRESS NOTES
Subjective:       Patient ID: Ezequiel Hughes is a 79 y.o. male.    Chief Complaint: Follow-up (Pt states the R hip has been painful in regards to one leg being shorter. c/o no pain. Non diabetic Pt. Wears casual shoes. PCP Dr Ozuna, )    HPI: Ezequiel Hughes presents to the office today, complaining of right hip pain.  Patient states that he feels as if he has the left leg shorter than the right which is causing him to be off kill her.  States that he does hobble when he walks secondary to pain.  He denies history of total knee arthroplasty or total hip arthroplasty.  States that he has not utilized inserts or heel lifts in the past.  Also relates back issues as well when he is ambulating.  He states that the pain dissipates when he is off his feet.  Denies history recent trauma or injury.  Denies foot or ankle pain.    Review of patient's allergies indicates:   Allergen Reactions    Mobic  [meloxicam]      Other reaction(s): hypertension       Past Medical History:   Diagnosis Date    Allergic rhinitis     Anemia     Back pain     BPH (benign prostatic hyperplasia)     Cancer     skin cancer to neck, Dr. Graves    Cataract     Disorder of kidney and ureter     ED (erectile dysfunction)     Hiatal hernia     small    History of colon polyps     colonoscopy 11/2016    HLD (hyperlipidemia)     Hyperlipidemia     Hypertension     Lateral epicondylitis     OA (osteoarthritis)     GUIDO (obstructive sleep apnea)     Prostate cancer     Dr. Wong    TIA (transient ischemic attack)     Trouble in sleeping        Family History   Problem Relation Age of Onset    Lung cancer Father         life long smoker    Cancer Father         prostate, lung    Stroke Sister         TIA    Cataracts Sister     Cancer Brother         prostate    Cataracts Brother     Cataracts Sister     Melanoma Neg Hx     Psoriasis Neg Hx     Lupus Neg Hx     Eczema Neg Hx     Diabetes Neg Hx     Heart disease Neg Hx      Kidney disease Neg Hx     Colon cancer Neg Hx        Social History     Socioeconomic History    Marital status:      Spouse name: Not on file    Number of children: Not on file    Years of education: Not on file    Highest education level: Not on file   Occupational History    Not on file   Social Needs    Financial resource strain: Not on file    Food insecurity:     Worry: Not on file     Inability: Not on file    Transportation needs:     Medical: Not on file     Non-medical: Not on file   Tobacco Use    Smoking status: Former Smoker     Packs/day: 3.00     Years: 35.00     Pack years: 105.00     Types: Cigarettes     Start date: 1960     Last attempt to quit: 1985     Years since quittin.5    Smokeless tobacco: Never Used   Substance and Sexual Activity    Alcohol use: Yes     Alcohol/week: 7.0 standard drinks     Types: 6 Cans of beer, 1 Standard drinks or equivalent per week     Comment: socially    Drug use: No    Sexual activity: Never   Lifestyle    Physical activity:     Days per week: Not on file     Minutes per session: Not on file    Stress: Not on file   Relationships    Social connections:     Talks on phone: Not on file     Gets together: Not on file     Attends [employer] service: Not on file     Active member of club or organization: Not on file     Attends meetings of clubs or organizations: Not on file     Relationship status: Not on file   Other Topics Concern    Not on file   Social History Narrative           Past Surgical History:   Procedure Laterality Date    CATARACT EXTRACTION W/  INTRAOCULAR LENS IMPLANT Right 2018    I-Stent    CATARACT EXTRACTION W/  INTRAOCULAR LENS IMPLANT Left 2018    I - Stent    COLONOSCOPY N/A 2016    Procedure: COLONOSCOPY;  Surgeon: Karuna Rodriguez MD;  Location: [facility];  Service: Endoscopy;  Laterality: N/A;    HEMORRHOID SURGERY      I-STENT Right 2018    DR. REECE     "KNEE ARTHROSCOPY W/ MENISCAL REPAIR Right 2015    Dr. Jain    PCIOL Right 02/21/2018    DR. REECE    PLANTAR FASCIA RELEASE      right    ROTATOR CUFF REPAIR Bilateral     bilateral    SHOULDER SURGERY Bilateral around 2000    Dr. Pepper.  rotator cuff surgeries       Review of Systems   Constitutional: Negative for activity change, appetite change, chills and fever.   HENT: Negative for sinus pain, sore throat and voice change.    Eyes: Negative for pain, redness and visual disturbance.   Respiratory: Negative for cough and shortness of breath.    Cardiovascular: Negative for chest pain, palpitations and leg swelling.   Gastrointestinal: Negative for diarrhea, nausea and vomiting.   Musculoskeletal: Positive for arthralgias ( hip pain), back pain and gait problem (Limb length discrepancy: left shorter than right). Negative for joint swelling.   Skin: Negative for color change and wound.   Neurological: Negative for dizziness, weakness and numbness.   Psychiatric/Behavioral: The patient is not nervous/anxious.           Objective:   Ht 5' 10.5" (1.791 m)   Wt 94.4 kg (208 lb 1.8 oz)   BMI 29.44 kg/m²     X-Ray Chest AP Portable  Narrative: EXAMINATION:  XR CHEST AP PORTABLE    CLINICAL HISTORY:  Chest Pain;    COMPARISON:  January 2nd    FINDINGS:  Heart size is normal. The lung fields are clear. No acute pulmonary infiltrate.  Impression: No detrimental change.  No new/acute abnormality suggested.    Electronically signed by: Jaya Schroeder MD  Date:    01/06/2020  Time:    23:15       Physical Exam   LOWER EXTREMITY PHYSICAL EXAMINATION    Vascular: Dorsalis pedis pulse on the right 2/4, on the left 1/4.  Posterior tibial pulse on the right 2/4, on the left 2/4.  Capillary refill intact less than 3 sec of bilateral lower extremities.  Pedal hair growth is present to the dorsal aspect of the foot and digits bilaterally.  No presence of edema to lower extremities. Rubor on dependency is noted. "   Neurological:  Protective sensation is intact with [place]iana monofilament. Proprioception is intact. Intact sensation to light touch. No neurological symptoms noted on palpation of interspace with radiating sensations proximally or distally. Tinel's and Valliex's sign is negative. No neurological symptoms with compression of metatarsal heads. No numbness or tingling reported on examination.     Musculoskeletal:  No pain on palpation of the bilateral foot or ankle region.  There is a limb length discrepancy with the left heel being approximately 1/2 inch shorter than the right.  This is also causing ipsilateral hip pain as well as low back pain.  Patient does have a rectus foot on weight-bearing.  There is no equinus deformity noted.  There is a slight hallux abductovalgus deformity of the right hallux however this is asymptomatic.  Manual Muscle Testing is 5/5 with dorsiflexion, plantar flexion, abduction, and adduction.   There is normal range of motion in the forefoot, hindfoot, and Ankle joint.  No pain with muscle testing with or without resistance. Gait pattern is slightly antalgic.    Dermatological:  Skin is supple, moist, intact..  There are no ulcerations, calluses, or lesions noted to the dorsal or plantar aspect of the bilateral feet.  Mild plantar fat pad atrophy noted.   Hair growth is present.      Assessment:     1. Acquired unequal limb length    2. Low back pain without sciatica, unspecified back pain laterality, unspecified chronicity    3. Atherosclerosis of artery of both lower extremities    4. Chronic kidney disease, stage 3        Plan:     Acquired unequal limb length  -     HME - OTHER  Discuss limb length discrepancy with the patient. I do recommend that he obtain a heel lift or shoe insert to make up the difference of the left leg being shorter than the right.  A prescription for heel lift was given to the patient. Encourage patient to wear this in his shoe gear and transition  between other shoes that he is wearing to prevent from developing subsequent pain in the low back region secondary to the limb length discrepancy.    Low back pain without sciatica, unspecified back pain laterality, unspecified chronicity  With patient's allergy to meloxicam, patient has been taking Tylenol Arthritis twice daily which was prescribed by his PCP.  I recommend he continue to utilize this for his pain and discomfort.    Atherosclerosis of artery of both lower extremities  Continue anticoagulation therapy being on her by Dr. Thompson with Cardiology    Chronic kidney disease, stage 3  Co-morbid state being managed PCP.            Future Appointments   Date Time Provider Department Center   2/28/2020  7:45 AM Benton Javier MD [facility]   3/11/2020  9:00 AM Aparna Thompson MD [facility] [place]   3/17/2020  9:00 AM LAB[facility]ATORY, [place] [facility]   4/29/2020  9:20 AM Valery Ozuna MD [facility]

## 2020-01-31 NOTE — LETTER
January 31, 2020      Valery Ozuna MD  [address] [facility]  [address]  [place] [id number]           [place] - Podiatry  [address] [facility] [address]  Phone: [phone]          Patient: Ezequiel Hughes   MR Number: [id number]   YOB: 1940   Date of Visit: 1/31/2020       Dear Dr. Valery Ozuna:    Thank you for referring Ezequiel Hughes to me for evaluation. Attached you will find relevant portions of my assessment and plan of care.    If you have questions, please do not hesitate to call me. I look forward to following Ezequiel Hughes along with you.    Sincerely,    Cinthia Escudero, ROSA    Enclosure  CC:  No Recipients    If you would like to receive this communication electronically, please contact [email or web address] or [phone] to request more information on [facility] access.    For providers and/or their staff who would like to refer a patient to [facility], please contact us through our one-stop-shop provider referral line, [facility], at [phone].    If you feel you have received this communication in error or would no longer like to receive these types of communications, please e-mail [email or web address]

## 2020-02-15 ENCOUNTER — PATIENT MESSAGE (OUTPATIENT)
Dept: ADMINISTRATIVE | Facility: OTHER | Age: 80
End: 2020-02-15

## 2020-02-17 ENCOUNTER — PATIENT OUTREACH (OUTPATIENT)
Dept: OTHER | Facility: OTHER | Age: 80
End: 2020-02-17

## 2020-02-17 NOTE — LETTER
February 18, 2020     Ezequiel Hughes  [address] [address]  [place] [address]       Dear Ezequiel,    Welcome to [facility] Our goal is to make care effective, proactive and convenient by using data you send us from home to better treat your chronic conditions.              My name is Nichole Gonzalez, and I am your dedicated Digital Medicine clinician. As an expert in medication management, I will help ensure that the medications you are taking continue to provide the intended benefits and help you reach your goals. You can reach me directly at [phone] or by sending me a message directly through your [facility] account.      I am Adriana Mojica and I will be your health . My job is to help you identify lifestyle changes to improve your disease control. We will talk about nutrition, exercise, and other ways you may be able to adjust your current habits to better your health. Additionally, we will help ensure you are completing the tests and screenings that are necessary to help manage your conditions. You can reach me directly at [phone] or by sending me a message directly through your [facility] account.    Most importantly, YOU are at the center of this team. Together, we will work to improve your overall health and encourage you to meet your goals for a healthier lifestyle.     What we expect from YOU:  · Please take frequent home blood pressure measurements. We ask that you take at least 1 blood pressure reading per week, but more information will better help us get you know you. Be sure you rest for a few minutes before taking the reading in a quiet, comfortable place.     Be available to receive phone calls or [employer] messages, when appropriate, from your care team. Please let us know if there are any specific days or times that work best for us to reach you via phone.     Complete routine tests and screenings. Dont worry, we will help keep you on track!           What you  should expect from your Digital Medicine Care Team:   We will work with you to create a personalized plan of care and provide you with encouragement and education, including regarding lifestyle changes, that could help you manage your disease states.     We will adjust your current medications, if needed, and continue to monitor your long-term progress.     We will provide you and your physician with monthly progress reports after you have been in the program for more than 30 days.     We will send you reminders through [employer] and text messages to help ensure you do not miss any testing deadlines to help manage your disease states.    You will be able to reach us by phone or through your [employer] account by clicking our names under Care Team on the right side of the home screen.    I look forward to working with you to achieve your blood pressure goals!    We look forward to working with you to help manage your health,    Sincerely,    Your Digital Medicine Team    Please visit our websites to learn more:   · Hypertension: [email or web address]      Remember, we are not available for emergencies. If you have an emergency, please contact your doctors office directly or call [facility] on[facility]call ([phone] or [phone]) or [phone].

## 2020-02-17 NOTE — LETTER
February 18, 2020     Ezequiel Hughes  [address] [address]  [place] [address]       Dear Ezequiel,    Welcome to [facility] Our goal is to make care effective, proactive and convenient by using data you send us from home to better treat your chronic conditions.              My name is Nichole Gonzalez, and I am your dedicated Digital Medicine clinician. As an expert in medication management, I will help ensure that the medications you are taking continue to provide the intended benefits and help you reach your goals. You can reach me directly at [phone] or by sending me a message directly through your [facility] account.      I am Adriana Mojica and I will be your health . My job is to help you identify lifestyle changes to improve your disease control. We will talk about nutrition, exercise, and other ways you may be able to adjust your current habits to better your health. Additionally, we will help ensure you are completing the tests and screenings that are necessary to help manage your conditions. You can reach me directly at [phone] or by sending me a message directly through your [facility] account.    Most importantly, YOU are at the center of this team. Together, we will work to improve your overall health and encourage you to meet your goals for a healthier lifestyle.     What we expect from YOU:  · Please take frequent home blood pressure measurements. We ask that you take at least 1 blood pressure reading per week, but more information will better help us get you know you. Be sure you rest for a few minutes before taking the reading in a quiet, comfortable place.     Be available to receive phone calls or [employer] messages, when appropriate, from your care team. Please let us know if there are any specific days or times that work best for us to reach you via phone.     Complete routine tests and screenings. Dont worry, we will help keep you on track!           What you  should expect from your Digital Medicine Care Team:   We will work with you to create a personalized plan of care and provide you with encouragement and education, including regarding lifestyle changes, that could help you manage your disease states.     We will adjust your current medications, if needed, and continue to monitor your long-term progress.     We will provide you and your physician with monthly progress reports after you have been in the program for more than 30 days.     We will send you reminders through [employer] and text messages to help ensure you do not miss any testing deadlines to help manage your disease states.    You will be able to reach us by phone or through your [employer] account by clicking our names under Care Team on the right side of the home screen.    I look forward to working with you to achieve your blood pressure goals!    We look forward to working with you to help manage your health,    Sincerely,    Your Digital Medicine Team    Please visit our websites to learn more:   · Hypertension: [email or web address]      Remember, we are not available for emergencies. If you have an emergency, please contact your doctors office directly or call [facility] on-call ([phone] or [phone]) or [phone].

## 2020-02-18 NOTE — PROGRESS NOTES
Digital Medicine Program Enrollment      Our goal is to get BP to consistently below 130/80mmHg and make the process convenient so patient can avoid extra trips to the office. Getting your blood pressure below 130/80mmHg (definition of control) will reduce your risk for heart attack, kidney failure, stroke and death (as well as kidney failure, eye disease, & dementia)      Reviewed that the Digital Medicine care team - consisting of a clinician and a health  - will follow the most current evidence-based national guidelines for treating your condition.  The health  will focus on lifestyle modifications and motivation while the clinician will focus on medication therapy.  The care team will review all data on a regular basis and reach out as needed.      Explained that one of the key parts of the program is communication with the care team.  Asked patient to respond to outreach attempts and complete questionnaires.  Stressed importance of medication adherence.      Explained that we expect patient to obtain several blood pressures per week at random times of day.  Instructed patient not to allow anyone else to use phone and monitoring device.  Confirmed appropriate BP monitoring technique.      Explained to patient that the digital medicine team is not available for emergencies.  Patient will call [facility] ([phone] or [phone]) or [phone] if needed.

## 2020-02-20 ENCOUNTER — PATIENT OUTREACH (OUTPATIENT)
Dept: OTHER | Facility: OTHER | Age: 80
End: 2020-02-20

## 2020-02-20 NOTE — PROGRESS NOTES
Digital Medicine: Health  Introduction    Introduced Ezequiel Hughes to Digital Medicine. Discussed health  role and recommended lifestyle modifications.    Patient stated he used to take his BP while seated on his recliner. Recently switched to firm chair with back. Patient requests weekly check in early in the program by health . Then taper off.    The history is provided by the patient.     HYPERTENSION  Our goal is to get BP to consistently below 130/80mmHg and make the process convenient so patient can avoid extra trips to the office. Getting your blood pressure below 130/80mmHg (definition of control) will reduce your risk for heart attack, kidney failure, stroke and death (as well as kidney failure, eye disease, & dementia)      Reviewed that the Digital Medicine care team - consisting of a clinician and a health  - will follow the most current evidence-based national guidelines for treating your condition.  The health  will focus on lifestyle modifications and motivation while the clinician will focus on medication therapy.  The care team will review all data on a regular basis and reach out as needed.      Explained that one of the key parts of the program is communication with the care team.  Asked patient to respond to outreach attempts and complete questionnaires.  Stressed importance of medication adherence.    Reviewed non-pharmacologic therapies and impact on BP.      Explained that we expect patient to obtain several blood pressures per week at random times of day.  Instructed patient not to allow anyone else to use phone and monitoring device.  Confirmed appropriate BP monitoring technique.      Explained to patient that the digital medicine team is not available for emergencies.  [facility] call [facility] on[facility]call ([phone] or [phone]) or [phone] if needed.      Patient's BP goal is 140/90.Patient's BP average is 145/77 mmHg, which is above goal, per 2017 ACC/AHA  Hypertension Guidelines.          Last 5 Patient Entered Readings                                      Current 30 Day Average: 145/77     Recent Readings 2/20/2020 2/20/2020 2/19/2020 2/19/2020 2/19/2020    SBP (mmHg) 152 152 139 139 157    DBP (mmHg) 76 76 73 73 76            INTERVENTION(S)  reviewed monitoring technique, encouragement/support and denied questions    PLAN  patient verbalizes understanding and patient amenable to changes      There are no preventive care reminders to display for this patient.    Reviewed the importance of self-monitoring, medication adherence, and that the health  can be used as a resource for lifestyle modifications to help reduce or maintain a healthy lifestyle.    Sent link to [facility] webpages and my contact information via [employer] for future questions. Follow up scheduled.             Physical Activity Screening   When asked if exercising, patient responded: yes    Goes to gym at 6 am 3 or 4 x per week.      SDOH

## 2020-02-26 ENCOUNTER — OFFICE VISIT (OUTPATIENT)
Dept: URGENT CARE | Facility: CLINIC | Age: 80
End: 2020-02-26
Payer: MEDICARE

## 2020-02-26 VITALS
BODY MASS INDEX: 27.99 KG/M2 | WEIGHT: 199.94 LBS | HEART RATE: 93 BPM | SYSTOLIC BLOOD PRESSURE: 162 MMHG | DIASTOLIC BLOOD PRESSURE: 76 MMHG | OXYGEN SATURATION: 97 % | RESPIRATION RATE: 18 BRPM | HEIGHT: 71 IN | TEMPERATURE: 99 F

## 2020-02-26 DIAGNOSIS — J32.9 BACTERIAL SINUSITIS: Primary | ICD-10-CM

## 2020-02-26 DIAGNOSIS — B96.89 BACTERIAL SINUSITIS: Primary | ICD-10-CM

## 2020-02-26 DIAGNOSIS — R05.9 COUGH: ICD-10-CM

## 2020-02-26 DIAGNOSIS — R09.82 POSTNASAL DRIP: ICD-10-CM

## 2020-02-26 PROCEDURE — 99214 PR OFFICE/OUTPT VISIT, EST, LEVL IV, 30-39 MIN: ICD-10-PCS | Mod: S$GLB,,, | Performed by: NURSE PRACTITIONER

## 2020-02-26 PROCEDURE — 99214 OFFICE O/P EST MOD 30 MIN: CPT | Mod: S$GLB,,, | Performed by: NURSE PRACTITIONER

## 2020-02-26 RX ORDER — DOXYCYCLINE HYCLATE 100 MG
100 TABLET ORAL 2 TIMES DAILY
Qty: 14 TABLET | Refills: 0 | Status: SHIPPED | OUTPATIENT
Start: 2020-02-26 | End: 2020-03-04

## 2020-02-26 NOTE — PATIENT INSTRUCTIONS
· Rest and increase fluids.   · May apply warm compresses as needed.   · Saline nasal spray or saline irrigation (Neti pot) to loosen nasal congestion.  · Flonase or Nasacort to reduce inflammation in the sinus cavities.  · Take over the counter 10 mg Claritin daily for the next 2-3 weeks for allergy symptoms.  · Take antibiotics exactly as prescribed. Make sure to complete the entire course of antibiotics even if you start feeling better. This will prevent recurrence of your infection and bacterial resistance.   · Do not drive, drink alcohol, or take any other sedating medications or substances while taking cough syrup.   · Follow up with your primary care provider or with ENT if not improved within a few days or sooner for any new or worsening symptoms.   · Go to the ER for any fever that does not improve with Tylenol/Ibuprofen, neck stiffness, rash, severe headache, vision changes, shortness of breath, chest pain, severe facial pain or swelling, or for any other new and concerning symptoms.

## 2020-02-26 NOTE — PROGRESS NOTES
"Subjective:       Patient ID: Ezequiel Hughes is a 79 y.o. male.    Vitals:  height is 5' 10.5" (1.791 m) and weight is 90.7 kg (199 lb 15.3 oz). His temperature is 98.9 °F (37.2 °C). His blood pressure is 162/76 (abnormal) and his pulse is 93. His respiration is 18 and oxygen saturation is 97%.     Chief Complaint: URI    URI    This is a new problem. The current episode started 1 to 4 weeks ago (Onset week ago). The problem has been unchanged. There has been no fever. Associated symptoms include coughing, sinus pain, sneezing and a sore throat. Pertinent negatives include no abdominal pain, chest pain, congestion, diarrhea, dysuria, ear pain, headaches, joint pain, joint swelling, nausea, neck pain, plugged ear sensation, rash, rhinorrhea, swollen glands, vomiting or wheezing. He has tried acetaminophen (Sambuccol, OTC Cold and flu) for the symptoms. The treatment provided no relief.       Constitution: Negative for chills, sweating, fatigue and fever.   HENT: Positive for postnasal drip, sinus pain, sinus pressure and sore throat. Negative for ear pain, congestion and voice change.    Neck: Negative for neck pain and painful lymph nodes.   Cardiovascular: Negative for chest pain.   Eyes: Negative for eye redness.   Respiratory: Positive for cough and shortness of breath. Negative for chest tightness, sputum production, bloody sputum, COPD, stridor, wheezing and asthma.    Gastrointestinal: Negative for abdominal pain, nausea, vomiting and diarrhea.   Genitourinary: Negative for dysuria.   Musculoskeletal: Negative for muscle ache.   Skin: Negative for rash.   Allergic/Immunologic: Positive for sneezing. Negative for seasonal allergies and asthma.   Neurological: Negative for headaches.   Hematologic/Lymphatic: Negative for swollen lymph nodes.       Objective:      Physical Exam   Constitutional: He is oriented to person, place, and time. He appears well-developed and well-nourished. He is cooperative.  Non-toxic " appearance. He does not have a sickly appearance. He does not appear ill. No distress.   HENT:   Head: Normocephalic and atraumatic.   Right Ear: Hearing, tympanic membrane, external ear and ear canal normal. No middle ear effusion.   Left Ear: Hearing, tympanic membrane, external ear and ear canal normal. Tympanic membrane is not erythematous.  No middle ear effusion.   Nose: Mucosal edema and rhinorrhea present. No nasal deformity. No epistaxis. Right sinus exhibits no maxillary sinus tenderness and no frontal sinus tenderness. Left sinus exhibits no maxillary sinus tenderness and no frontal sinus tenderness.   Mouth/Throat: Uvula is midline, oropharynx is clear and moist and mucous membranes are normal. No trismus in the jaw. Normal dentition. No uvula swelling. Cobblestoning present. No oropharyngeal exudate, posterior oropharyngeal edema or posterior oropharyngeal erythema.   Eyes: Conjunctivae and lids are normal. No scleral icterus.   Neck: Trachea normal, full passive range of motion without pain and phonation normal. Neck supple. No neck rigidity. No edema and no erythema present.   Cardiovascular: Normal rate, regular rhythm, intact distal pulses and normal pulses.   Murmur heard.   Systolic murmur is present.  Pulmonary/Chest: Effort normal and breath sounds normal. No respiratory distress. He has no decreased breath sounds. He has no rhonchi.   Abdominal: Normal appearance.   Musculoskeletal: Normal range of motion. He exhibits no edema or deformity.   Neurological: He is alert and oriented to person, place, and time. He exhibits normal muscle tone. Coordination normal.   Skin: Skin is warm, dry, intact, not diaphoretic and not pale.   Psychiatric: He has a normal mood and affect. His speech is normal and behavior is normal. Judgment and thought content normal. Cognition and memory are normal.   Nursing note and vitals reviewed.        Assessment:       1. Bacterial sinusitis    2. Cough    3. Postnasal  drip        Plan:         Bacterial sinusitis  -     doxycycline (VIBRA-TABS) 100 MG tablet; Take 1 tablet (100 mg total) by mouth 2 (two) times daily. for 7 days  Dispense: 14 tablet; Refill: 0    Cough    Postnasal drip         · Rest and increase fluids.   · May apply warm compresses as needed.   · Saline nasal spray or saline irrigation (Neti pot) to loosen nasal congestion.  · Flonase or Nasacort to reduce inflammation in the sinus cavities.  · Take over the counter 10 mg Claritin daily for the next 2-3 weeks for allergy symptoms.  · Take antibiotics exactly as prescribed. Make sure to complete the entire course of antibiotics even if you start feeling better. This will prevent recurrence of your infection and bacterial resistance.   · Do not drive, drink alcohol, or take any other sedating medications or substances while taking cough syrup.   · Follow up with your primary care provider or with ENT if not improved within a few days or sooner for any new or worsening symptoms.   · Go to the ER for any fever that does not improve with Tylenol/Ibuprofen, neck stiffness, rash, severe headache, vision changes, shortness of breath, chest pain, severe facial pain or swelling, or for any other new and concerning symptoms.

## 2020-02-28 ENCOUNTER — PATIENT OUTREACH (OUTPATIENT)
Dept: OTHER | Facility: OTHER | Age: 80
End: 2020-02-28

## 2020-02-28 ENCOUNTER — OFFICE VISIT (OUTPATIENT)
Dept: OPHTHALMOLOGY | Facility: CLINIC | Age: 80
End: 2020-02-28
Payer: MEDICARE

## 2020-02-28 ENCOUNTER — TELEPHONE (OUTPATIENT)
Dept: URGENT CARE | Facility: CLINIC | Age: 80
End: 2020-02-28

## 2020-02-28 DIAGNOSIS — H40.1122 PRIMARY OPEN ANGLE GLAUCOMA (POAG) OF LEFT EYE, MODERATE STAGE: ICD-10-CM

## 2020-02-28 DIAGNOSIS — H40.1111 PRIMARY OPEN ANGLE GLAUCOMA (POAG) OF RIGHT EYE, MILD STAGE: Primary | ICD-10-CM

## 2020-02-28 DIAGNOSIS — Z96.1 PSEUDOPHAKIA: ICD-10-CM

## 2020-02-28 PROCEDURE — 99999 PR PBB SHADOW E&M-EST. PATIENT-LVL II: ICD-10-PCS | Mod: PBBFAC,HCNC,, | Performed by: OPHTHALMOLOGY

## 2020-02-28 PROCEDURE — 92012 INTRM OPH EXAM EST PATIENT: CPT | Mod: HCNC,S$GLB,, | Performed by: OPHTHALMOLOGY

## 2020-02-28 PROCEDURE — 99999 PR PBB SHADOW E&M-EST. PATIENT-LVL II: CPT | Mod: PBBFAC,HCNC,, | Performed by: OPHTHALMOLOGY

## 2020-02-28 PROCEDURE — 92012 PR EYE EXAM, EST PATIENT,INTERMED: ICD-10-PCS | Mod: HCNC,S$GLB,, | Performed by: OPHTHALMOLOGY

## 2020-02-28 NOTE — PROGRESS NOTES
SUBJECTIVE  Ezequiel Hughes is 79 y.o. male  Uncorrected distance visual acuity was 20/20 -2 in the right eye and 20/30 -2 in the left eye.   Chief Complaint   Patient presents with    Glaucoma          HPI     Here for 4 month iop check per pt doing well       1. Mod COAG OS + Mild COAG OD (init 22/26 post dilation IOP ) goal = 17  2. PCIOL / I-Stent OD +18.5 SN6OWF (distance) 2-21-18  PCIOL /I-Stent OS +21.0 (-1.75) 3/21/18  3. ERM OU   4. Brow Lift 9/18   *intolerant of travatan*      No eyedrops    Last edited by Pat Nazario MA on 2/28/2020  7:50 AM. (History)         Assessment /Plan :  1. Primary open angle glaucoma (POAG) of right eye, mild stage Doing well, IOP within acceptable range relative to target IOP and no evidence of progression. Continue current treatment. Reviewed importance of continued compliance with treatment and follow up.     2. Primary open angle glaucoma (POAG) of left eye, moderate stage Doing well, IOP within acceptable range relative to target IOP and no evidence of progression. Continue current treatment. Reviewed importance of continued compliance with treatment and follow up.     3. Pseudophakia  -- Condition stable, no therapeutic change required. Monitoring routinely.       Return to clinic in 4 months  or as needed.  With Dilation, HVF 24-2 and SDP

## 2020-02-28 NOTE — PROGRESS NOTES
Digital Medicine: Health  Follow-Up    Patient requests weekly Health  check in. Stated he has been sick the past week or so and is on antibiotics. He had company at time of call so call was brief.    The history is provided by the patient.       INTERVENTION(S)  encouragement/support and denied questions    PLAN  patient amenable to changes      There are no preventive care reminders to display for this patient.    Last 5 Patient Entered Readings                                      Current 30 Day Average: 142/75     Recent Readings 2/25/2020 2/25/2020 2/24/2020 2/24/2020 2/22/2020    SBP (mmHg) 116 116 124 124 139    DBP (mmHg) 65 65 67 67 80                      Physical Activity Screening   When asked if exercising, patient responded: no    Patient has been sick so he has not gone to the gym in 2 weeks.       SDOH

## 2020-03-06 ENCOUNTER — PATIENT OUTREACH (OUTPATIENT)
Dept: OTHER | Facility: OTHER | Age: 80
End: 2020-03-06

## 2020-03-06 DIAGNOSIS — I10 ESSENTIAL HYPERTENSION: Primary | Chronic | ICD-10-CM

## 2020-03-06 RX ORDER — HYDROCHLOROTHIAZIDE 12.5 MG/1
12.5 TABLET ORAL DAILY PRN
Qty: 30 TABLET | Refills: 3
Start: 2020-03-06 | End: 2020-03-11 | Stop reason: SDUPTHER

## 2020-03-06 RX ORDER — LOSARTAN POTASSIUM 100 MG/1
100 TABLET ORAL DAILY
Qty: 90 TABLET | Refills: 1 | Status: SHIPPED | OUTPATIENT
Start: 2020-03-06 | End: 2020-03-23 | Stop reason: ALTCHOICE

## 2020-03-06 NOTE — PROGRESS NOTES
Digital Medicine: Clinician Introduction    Ezequiel Hughes is a 79 y.o. male who is newly enrolled in the Digital Medicine Clinic.    The following information was reviewed and updated:  Preferred pharmacy   [facility] - [place], [place] - [address]  [address] [address]  Phone: [phone] Fax: [phone]    [facility], - [place] - [address]  [address]  [place] [address]  Phone: [phone] Fax: [phone]      Patient prefers a 90 days supply.     Review of patient's allergies indicates:   Allergen Reactions    Mobic  [meloxicam]      Other reaction(s): hypertension       I spoke with the patient today for the initial enrollment call. He is taking losartan in the morning. And was told by his PCP to take the HCTZ prn due to his kidney Dz.   Diet: sodium intake goal discussed  Exercise: he goes to the gym around 5:30-6am, 3 days a week  Caffeine - 1-2 cups of coffee  PMH: GIUDO no CPAP and says he does not want one    The history is provided by the patient. No  was used.     HYPERTENSION  Our goal is to get BP to consistently below 130/80mmHg and make the process convenient so patient can avoid extra trips to the office. Getting your blood pressure below 130/80mmHg (definition of control) will reduce your risk for heart attack, kidney failure, stroke and death (as well as kidney failure, eye disease, & dementia)      Reviewed non-pharmacologic therapies and impact on BP      Explained that we expect patient to obtain several blood pressures per week at random times of day.  Instructed patient not to allow anyone else to use phone and monitoring device.  Confirmed appropriate BP monitoring technique.      Explained to patient that the digital medicine team is not available for emergencies.  Patient [facility] call [facility] on-call ([phone] or [phone]) or [phone] if needed.     Patient's BP goal is 130/80. Patients BP average is 140/75 mmHg, which is above goal, per 2017 ACC/AHA Hypertension Guidelines.  When asked what the patient thinks is causing BP to be elevated, they state: he is unsure      Medication Change: dose increase    Med Review complete.    Allergies reviewed.      Last 5 Patient Entered Readings                                      Current 30 Day Average: 140/75     Recent Readings 3/5/2020 3/5/2020 3/4/2020 3/4/2020 3/3/2020    SBP (mmHg) 162 162 138 138 123    DBP (mmHg) 78 78 73 73 75            INTERVENTION(S)  reviewed appropriate dose schedule, recommended diet modifications, recommended physical activity, reviewed monitoring technique and encouragement/support    PLAN  patient verbalizes understanding, patient amenable to changes and additional monitoring needed    Avr BP above goal 140/75    Will increase losartan to 100mg. F/u in 2 weeks      There are no preventive care reminders to display for this patient.    Current Medication Regimen:  Hypertension Medications             hydroCHLOROthiazide (HYDRODIURIL) 12.5 MG Tab Take 1 tablet (12.5 mg total) by mouth once daily.    losartan (COZAAR) 50 MG tablet Take 1 tablet (50 mg total) by mouth once daily.            Reviewed the importance of self-monitoring, medication adherence, and that the health  can be used as a resource for lifestyle modifications to help reduce or maintain a healthy lifestyle.    Sent link to [facility] webpages and my contact information via [employer] for future questions. Follow up scheduled.             Sleep Apnea Screening  Patient previously diagnosed with GUIDO He reports he is not currently using CPAP. He is not using CPAP because: unable to tolerate machine.     Medication Affordability Screening  Patient is currently not having problems affording medications    Medication Adherence Screening   He did not miss a dose this month.

## 2020-03-06 NOTE — PROGRESS NOTES
Digital Medicine: Health  Follow-Up    Patient was busy so call was a brief check-in. He spoke with clinician this morning and she is increasing the losartan. Otherwise, he stated he is doing well and is over his illness from last week. He has also returned to his workout routine. Patient requests weekly check-in from health .    The history is provided by the patient.       INTERVENTION(S)  encouragement/support and denied questions    PLAN  patient amenable to changes      There are no preventive care reminders to display for this patient.    Last 5 Patient Entered Readings                                      Current 30 Day Average: 140/75     Recent Readings 3/5/2020 3/5/2020 3/4/2020 3/4/2020 3/3/2020    SBP (mmHg) 162 162 138 138 123    DBP (mmHg) 78 78 73 73 75                      Physical Activity Screening   No change to exercise routine.          SDOH

## 2020-03-09 ENCOUNTER — PATIENT OUTREACH (OUTPATIENT)
Dept: ADMINISTRATIVE | Facility: OTHER | Age: 80
End: 2020-03-09

## 2020-03-09 NOTE — PROGRESS NOTES
LINKS immunization registry, Care Everywhere and Health Maintenance updated.  Chart reviewed for overdue Proactive [facility] health maintenance testing.  Patient has open case request for colonoscopy.

## 2020-03-11 ENCOUNTER — OFFICE VISIT (OUTPATIENT)
Dept: CARDIOLOGY | Facility: CLINIC | Age: 80
End: 2020-03-11
Payer: MEDICARE

## 2020-03-11 VITALS
DIASTOLIC BLOOD PRESSURE: 70 MMHG | BODY MASS INDEX: 28.75 KG/M2 | HEART RATE: 64 BPM | SYSTOLIC BLOOD PRESSURE: 116 MMHG | HEIGHT: 70 IN | WEIGHT: 200.81 LBS

## 2020-03-11 DIAGNOSIS — E78.2 MIXED HYPERLIPIDEMIA: Chronic | ICD-10-CM

## 2020-03-11 DIAGNOSIS — I70.203 ATHEROSCLEROSIS OF ARTERY OF BOTH LOWER EXTREMITIES: ICD-10-CM

## 2020-03-11 DIAGNOSIS — C61 PROSTATE CANCER: ICD-10-CM

## 2020-03-11 DIAGNOSIS — I10 ESSENTIAL HYPERTENSION: Chronic | ICD-10-CM

## 2020-03-11 DIAGNOSIS — N18.30 CHRONIC KIDNEY DISEASE, STAGE 3: ICD-10-CM

## 2020-03-11 DIAGNOSIS — I10 ESSENTIAL HYPERTENSION: Primary | Chronic | ICD-10-CM

## 2020-03-11 DIAGNOSIS — I77.1 TORTUOUS AORTA: ICD-10-CM

## 2020-03-11 DIAGNOSIS — G47.33 OSA (OBSTRUCTIVE SLEEP APNEA): ICD-10-CM

## 2020-03-11 PROCEDURE — 99499 RISK ADDL DX/OHS AUDIT: ICD-10-PCS | Mod: HCNC,S$GLB,, | Performed by: INTERNAL MEDICINE

## 2020-03-11 PROCEDURE — 3074F SYST BP LT 130 MM HG: CPT | Mod: HCNC,CPTII,S$GLB, | Performed by: INTERNAL MEDICINE

## 2020-03-11 PROCEDURE — 1159F PR MEDICATION LIST DOCUMENTED IN MEDICAL RECORD: ICD-10-PCS | Mod: HCNC,S$GLB,, | Performed by: INTERNAL MEDICINE

## 2020-03-11 PROCEDURE — 99999 PR PBB SHADOW E&M-EST. PATIENT-LVL III: CPT | Mod: PBBFAC,HCNC,, | Performed by: INTERNAL MEDICINE

## 2020-03-11 PROCEDURE — 1159F MED LIST DOCD IN RCRD: CPT | Mod: HCNC,S$GLB,, | Performed by: INTERNAL MEDICINE

## 2020-03-11 PROCEDURE — 1126F PR PAIN SEVERITY QUANTIFIED, NO PAIN PRESENT: ICD-10-PCS | Mod: HCNC,S$GLB,, | Performed by: INTERNAL MEDICINE

## 2020-03-11 PROCEDURE — 1101F PR PT FALLS ASSESS DOC 0-1 FALLS W/OUT INJ PAST YR: ICD-10-PCS | Mod: HCNC,CPTII,S$GLB, | Performed by: INTERNAL MEDICINE

## 2020-03-11 PROCEDURE — 1126F AMNT PAIN NOTED NONE PRSNT: CPT | Mod: HCNC,S$GLB,, | Performed by: INTERNAL MEDICINE

## 2020-03-11 PROCEDURE — 99214 PR OFFICE/OUTPT VISIT, EST, LEVL IV, 30-39 MIN: ICD-10-PCS | Mod: HCNC,S$GLB,, | Performed by: INTERNAL MEDICINE

## 2020-03-11 PROCEDURE — 99499 UNLISTED E&M SERVICE: CPT | Mod: HCNC,S$GLB,, | Performed by: INTERNAL MEDICINE

## 2020-03-11 PROCEDURE — 3078F DIAST BP <80 MM HG: CPT | Mod: HCNC,CPTII,S$GLB, | Performed by: INTERNAL MEDICINE

## 2020-03-11 PROCEDURE — 1101F PT FALLS ASSESS-DOCD LE1/YR: CPT | Mod: HCNC,CPTII,S$GLB, | Performed by: INTERNAL MEDICINE

## 2020-03-11 PROCEDURE — 3078F PR MOST RECENT DIASTOLIC BLOOD PRESSURE < 80 MM HG: ICD-10-PCS | Mod: HCNC,CPTII,S$GLB, | Performed by: INTERNAL MEDICINE

## 2020-03-11 PROCEDURE — 99999 PR PBB SHADOW E&M-EST. PATIENT-LVL III: ICD-10-PCS | Mod: PBBFAC,HCNC,, | Performed by: INTERNAL MEDICINE

## 2020-03-11 PROCEDURE — 99214 OFFICE O/P EST MOD 30 MIN: CPT | Mod: HCNC,S$GLB,, | Performed by: INTERNAL MEDICINE

## 2020-03-11 PROCEDURE — 3074F PR MOST RECENT SYSTOLIC BLOOD PRESSURE < 130 MM HG: ICD-10-PCS | Mod: HCNC,CPTII,S$GLB, | Performed by: INTERNAL MEDICINE

## 2020-03-11 RX ORDER — LOSARTAN POTASSIUM 50 MG/1
50 TABLET ORAL DAILY
Qty: 90 TABLET | Refills: 3 | Status: SHIPPED | OUTPATIENT
Start: 2020-03-11 | End: 2020-12-03

## 2020-03-11 RX ORDER — LOSARTAN POTASSIUM 50 MG/1
50 TABLET ORAL DAILY
COMMUNITY
End: 2020-03-11 | Stop reason: SDUPTHER

## 2020-03-11 RX ORDER — HYDROCHLOROTHIAZIDE 12.5 MG/1
12.5 TABLET ORAL DAILY PRN
Qty: 90 TABLET | Refills: 3 | Status: SHIPPED | OUTPATIENT
Start: 2020-03-11 | End: 2020-12-10

## 2020-03-11 NOTE — TELEPHONE ENCOUNTER
Pt states that he went to cardiology and saw Dr. Thompson stay at 50 mg of losartan and HCTZ 12.5 mg every day. He would like to make sure you are okay with this as you were increasing the losartan to 100 mg, however he was never able to get due to insurance. Also, will need refills.

## 2020-03-11 NOTE — PROGRESS NOTES
Subjective:   Patient ID:  Ezequiel Hughes is a 79 y.o. male who presents for follow up of Hypertension (6 mo f/u) and Hyperlipidemia      HPI  An 78 yo male with htn hlp is here fro f/u had an orthostatic hypotension episode  Treated with ivf. He ahs a change in bp meds. He is walking for exercise goes to the gym he is asymptomatic he  Uses the treadmill. Has been losing few pounds has no complaints of angina chf tia claudication.his bp well controlled. He is on digital htn program. He si doing well w/o muscle aches on lower dose statins. His lipids on target.   Past Medical History:   Diagnosis Date    Allergic rhinitis     Anemia     Back pain     BPH (benign prostatic hyperplasia)     Cancer     skin cancer to neck, Dr. Graves    Cataract     Disorder of kidney and ureter     ED (erectile dysfunction)     Hiatal hernia     small    History of colon polyps     colonoscopy 11/2016    HLD (hyperlipidemia)     Hyperlipidemia     Hypertension     Lateral epicondylitis     OA (osteoarthritis)     GUIDO (obstructive sleep apnea)     Prostate cancer     Dr. Wong    TIA (transient ischemic attack)     Trouble in sleeping        Past Surgical History:   Procedure Laterality Date    CATARACT EXTRACTION W/  INTRAOCULAR LENS IMPLANT Right 02/21/2018    I-Stent    CATARACT EXTRACTION W/  INTRAOCULAR LENS IMPLANT Left 03/21/2018    I - Stent    COLONOSCOPY N/A 11/14/2016    Procedure: COLONOSCOPY;  Surgeon: Karuna Rodriguez MD;  Location: [facility];  Service: Endoscopy;  Laterality: N/A;    HEMORRHOID SURGERY      I-STENT Right 02/21/2018    DR. REECE    KNEE ARTHROSCOPY W/ MENISCAL REPAIR Right 2015    Dr. Jain    PCIOL Right 02/21/2018    DR. REECE    PLANTAR FASCIA RELEASE      right    ROTATOR CUFF REPAIR Bilateral     bilateral    SHOULDER SURGERY Bilateral around 2000    Dr. Pepper.  rotator cuff surgeries       Social History     Tobacco Use    Smoking status: Former Smoker      Packs/day: 3.00     Years: 35.00     Pack years: 105.00     Types: Cigarettes     Start date: 1960     Last attempt to quit: 1985     Years since quittin.6    Smokeless tobacco: Never Used   Substance Use Topics    Alcohol use: Yes     Alcohol/week: 7.0 standard drinks     Types: 6 Cans of beer, 1 Standard drinks or equivalent per week     Frequency: 4 or more times a week     Drinks per session: 1 or 2     Binge frequency: Less than monthly     Comment: socially    Drug use: No       Family History   Problem Relation Age of Onset    Lung cancer Father         life long smoker    Cancer Father         prostate, lung    Stroke Sister         TIA    Cataracts Sister     Cancer Brother         prostate    Cataracts Brother     Cataracts Sister     Melanoma Neg Hx     Psoriasis Neg Hx     Lupus Neg Hx     Eczema Neg Hx     Diabetes Neg Hx     Heart disease Neg Hx     Kidney disease Neg Hx     Colon cancer Neg Hx        Current Outpatient Medications   Medication Sig    acetaminophen (TYLENOL ARTHRITIS PAIN ORAL) Take by mouth. Patient states that he takes 2 tablets in the morning and 2 tablets in the evening    allopurinol (ZYLOPRIM) 100 MG tablet TAKE 1 TABLET EVERY DAY    atorvastatin (LIPITOR) 40 MG tablet Take 1 tablet (40 mg total) by mouth once daily.    clopidogrel (PLAVIX) 75 mg tablet TAKE 1 TABLET EVERY DAY    finasteride (PROSCAR) 5 mg tablet Take 5 mg by mouth once daily.     fluticasone propionate (FLONASE) 50 mcg/actuation nasal spray USE 2 SPRAYS IN EACH NOSTRIL ONE TIME DAILY    hydroCHLOROthiazide (HYDRODIURIL) 12.5 MG Tab Take 1 tablet (12.5 mg total) by mouth daily as needed (before a meal high in sodium).    losartan (COZAAR) 50 MG tablet Take 50 mg by mouth once daily.    pantoprazole (PROTONIX) 40 MG tablet Take 1 tablet (40 mg total) by mouth once daily.    losartan (COZAAR) 100 MG tablet Take 1 tablet (100 mg total) by mouth once daily. (Patient not  taking: Reported on 3/11/2020)     No current facility-administered medications for this visit.      Current Outpatient Medications on File Prior to Visit   Medication Sig    acetaminophen (TYLENOL ARTHRITIS PAIN ORAL) Take by mouth. Patient states that he takes 2 tablets in the morning and 2 tablets in the evening    allopurinol (ZYLOPRIM) 100 MG tablet TAKE 1 TABLET EVERY DAY    atorvastatin (LIPITOR) 40 MG tablet Take 1 tablet (40 mg total) by mouth once daily.    clopidogrel (PLAVIX) 75 mg tablet TAKE 1 TABLET EVERY DAY    finasteride (PROSCAR) 5 mg tablet Take 5 mg by mouth once daily.     fluticasone propionate (FLONASE) 50 mcg/actuation nasal spray USE 2 SPRAYS IN EACH NOSTRIL ONE TIME DAILY    hydroCHLOROthiazide (HYDRODIURIL) 12.5 MG Tab Take 1 tablet (12.5 mg total) by mouth daily as needed (before a meal high in sodium).    losartan (COZAAR) 50 MG tablet Take 50 mg by mouth once daily.    pantoprazole (PROTONIX) 40 MG tablet Take 1 tablet (40 mg total) by mouth once daily.    losartan (COZAAR) 100 MG tablet Take 1 tablet (100 mg total) by mouth once daily. (Patient not taking: Reported on 3/11/2020)     No current facility-administered medications on file prior to visit.      Review of patient's allergies indicates:   Allergen Reactions    Mobic  [meloxicam]      Other reaction(s): hypertension     Review of Systems   Constitution: Negative for malaise/fatigue.   Eyes: Negative for blurred vision.   Cardiovascular: Negative for chest pain, claudication, cyanosis, dyspnea on exertion, irregular heartbeat, leg swelling, near-syncope, orthopnea, palpitations and paroxysmal nocturnal dyspnea.   Respiratory: Negative for cough, hemoptysis and shortness of breath.    Hematologic/Lymphatic: Negative for bleeding problem. Does not bruise/bleed easily.   Skin: Negative for dry skin and itching.   Musculoskeletal: Negative for falls, muscle weakness and myalgias.   Gastrointestinal: Negative for  abdominal pain, diarrhea, heartburn, hematemesis, hematochezia and melena.   Genitourinary: Negative for flank pain and hematuria.   Neurological: Negative for dizziness, focal weakness, headaches, light-headedness, numbness, paresthesias, seizures and weakness.   Psychiatric/Behavioral: Negative for altered mental status and memory loss. The patient is not nervous/anxious.    Allergic/Immunologic: Negative for hives.       Objective:   Physical Exam   Constitutional: He is oriented to person, place, and time. He appears well-developed and well-nourished. No distress.   HENT:   Head: Normocephalic and atraumatic.   Eyes: Pupils are equal, round, and reactive to light. EOM are normal. Right eye exhibits no discharge. Left eye exhibits no discharge.   Neck: Neck supple. No JVD present. No thyromegaly present.   Cardiovascular: Normal rate, regular rhythm, normal heart sounds and intact distal pulses. Exam reveals no gallop and no friction rub.   No murmur heard.  Pulses:       Carotid pulses are 2+ on the right side, and 2+ on the left side.       Radial pulses are 2+ on the right side, and 2+ on the left side.        Femoral pulses are 2+ on the right side, and 2+ on the left side.       Popliteal pulses are 2+ on the right side, and 2+ on the left side.        Dorsalis pedis pulses are 2+ on the right side, and 2+ on the left side.        Posterior tibial pulses are 2+ on the right side, and 2+ on the left side.   Pulmonary/Chest: Effort normal and breath sounds normal. No respiratory distress. He has no wheezes. He has no rales. He exhibits no tenderness.   Abdominal: Soft. Bowel sounds are normal. He exhibits no distension. There is no tenderness.   Musculoskeletal: Normal range of motion. He exhibits no edema.   Neurological: He is alert and oriented to person, place, and time. No cranial nerve deficit.   Skin: Skin is warm and dry. No rash noted. He is not diaphoretic. No erythema.   Psychiatric: He has a normal  "mood and affect. His behavior is normal.   Nursing note and vitals reviewed.    Vitals:    03/11/20 0916   BP: 116/70   BP Method: Large (Manual)   Pulse: 64   Weight: 91.1 kg (200 lb 13.4 oz)   Height: 5' 10" (1.778 m)     Lab Results   Component Value Date    CHOL 142 01/23/2020    CHOL 146 08/29/2019    CHOL 145 07/16/2019     Lab Results   Component Value Date    HDL 57 01/23/2020    HDL 60 08/29/2019    HDL 50 07/16/2019     Lab Results   Component Value Date    LDLCALC 73.4 01/23/2020    LDLCALC 71.4 08/29/2019    LDLCALC 70.6 07/16/2019     Lab Results   Component Value Date    TRIG 58 01/23/2020    TRIG 73 08/29/2019    TRIG 122 07/16/2019     Lab Results   Component Value Date    CHOLHDL 40.1 01/23/2020    CHOLHDL 41.1 08/29/2019    CHOLHDL 34.5 07/16/2019       Chemistry        Component Value Date/Time     01/23/2020 0834    K 4.5 01/23/2020 0834     (H) 01/23/2020 0834    CO2 24 01/23/2020 0834    BUN 31 (H) 01/23/2020 0834    CREATININE 1.5 (H) 01/23/2020 0834    GLU 93 01/23/2020 0834        Component Value Date/Time    CALCIUM 8.6 (L) 01/23/2020 0834    ALKPHOS 51 (L) 01/23/2020 0834    AST 16 01/23/2020 0834    ALT 13 01/23/2020 0834    BILITOT 0.5 01/23/2020 0834    ESTGFRAFRICA 49.8 (A) 01/23/2020 0834    EGFRNONAA 43.0 (A) 01/23/2020 0834          Lab Results   Component Value Date    TSH 1.330 01/23/2020     Lab Results   Component Value Date    INR 1.2 09/26/2017    INR 1.2 09/03/2015     Lab Results   Component Value Date    WBC 3.81 (L) 01/23/2020    HGB 12.6 (L) 01/23/2020    HCT 39.6 (L) 01/23/2020     (H) 01/23/2020     (L) 01/23/2020     BMP  Sodium   Date Value Ref Range Status   01/23/2020 144 136 - 145 mmol/L Final     Potassium   Date Value Ref Range Status   01/23/2020 4.5 3.5 - 5.1 mmol/L Final     Chloride   Date Value Ref Range Status   01/23/2020 111 (H) 95 - 110 mmol/L Final     CO2   Date Value Ref Range Status   01/23/2020 24 23 - 29 mmol/L Final "     BUN, Bld   Date Value Ref Range Status   01/23/2020 31 (H) 8 - 23 mg/dL Final     Creatinine   Date Value Ref Range Status   01/23/2020 1.5 (H) 0.5 - 1.4 mg/dL Final     Calcium   Date Value Ref Range Status   01/23/2020 8.6 (L) 8.7 - 10.5 mg/dL Final     Anion Gap   Date Value Ref Range Status   01/23/2020 9 8 - 16 mmol/L Final     eGFR if    Date Value Ref Range Status   01/23/2020 49.8 (A) >60 mL/min/1.73 m^2 Final     eGFR if non    Date Value Ref Range Status   01/23/2020 43.0 (A) >60 mL/min/1.73 m^2 Final     Comment:     Calculation used to obtain the estimated glomerular filtration  rate (eGFR) is the CKD-EPI equation.        CrCl cannot be calculated (Patient's most recent lab result is older than the maximum 7 days allowed.).    Assessment:     1. Essential hypertension    2. Mixed hyperlipidemia    3. Chronic kidney disease, stage 3    4. Prostate cancer    5. Atherosclerosis of artery of both lower extremities    6. Tortuous aorta    7. GUIDO (obstructive sleep apnea)      asymptomatic doing well clinically lipids on target bp well controlled.   Plan:   Continue current therapy  Cardiac low salt diet.  Risk factor modification and excercise program.  F/u in 6 months with lipid cmp .

## 2020-03-11 NOTE — TELEPHONE ENCOUNTER
----- Message from Ofelia Collins sent at 3/11/2020 12:18 PM CDT -----  Contact: Patient  Patient has a question regarding his heart medications, please call him back at [phone]. Thank you

## 2020-03-13 ENCOUNTER — PATIENT OUTREACH (OUTPATIENT)
Dept: OTHER | Facility: OTHER | Age: 80
End: 2020-03-13

## 2020-03-13 NOTE — PROGRESS NOTES
Digital Medicine: Health  Follow-Up    Patient request frequent Health  check-in. States he is doing well.     The history is provided by the patient.     Follow Up  Follow-up reason(s): reading review      Readings are trending down       INTERVENTION(S)  encouragement/support and denied questions    PLAN  patient verbalizes understanding and patient amenable to changes      There are no preventive care reminders to display for this patient.    Last 5 Patient Entered Readings                                      Current 30 Day Average: 139/75     Recent Readings 3/13/2020 3/13/2020 3/12/2020 3/12/2020 3/10/2020    SBP (mmHg) 136 136 114 114 131    DBP (mmHg) 78 78 63 63 70                      Diet Screening       Patient stated he is trying to cut back and lose a few pounds. Admits he does not consider sodium content.    Physical Activity Screening   No change to exercise routine.    When asked if exercising, patient responded: yes    Patient participates in the following activities: walking, weights and body weight exercises    Goes to [employer] 4 x per week. Only 2 x this week. Walks 1 1/2 miles, does weight machines, and sit ups.      SDOH

## 2020-03-23 ENCOUNTER — PATIENT OUTREACH (OUTPATIENT)
Dept: OTHER | Facility: OTHER | Age: 80
End: 2020-03-23

## 2020-03-23 NOTE — PROGRESS NOTES
Digital Medicine: Clinician Follow-Up    I spoke with the patient today to follow-up on his blood pressure.  At our last seeing outreach my notes indicate that his dose of the losartan was increased to 100 mg.  However he says that this is not the case.  His PCP and cardiologist instructed him to return back to the 50 mg dosing.  He did have a cardiology appointment on 03/13 and is in office blood pressure was at goal 116/70.  Today he is doing well.  He is trying to take walks for exercise.    The history is provided by the patient. No  was used.     Follow Up  Follow-up reason(s): reading review          INTERVENTION(S)  reviewed appropriate dose schedule and recommended physical activity    PLAN  patient amenable to changes, additional monitoring needed and Health  follow up    Average blood pressure 136/75, near goal    In office BP at goal.  No med changes today. F/u in 4-6 months      There are no preventive care reminders to display for this patient.    Last 5 Patient Entered Readings                                      Current 30 Day Average: 136/75     Recent Readings 3/22/2020 3/22/2020 3/21/2020 3/21/2020 3/19/2020    SBP (mmHg) 139 139 143 143 144    DBP (mmHg) 71 71 80 80 75             Hypertension Medications             hydroCHLOROthiazide (HYDRODIURIL) 12.5 MG Tab Take 1 tablet (12.5 mg total) by mouth daily as needed (before a meal high in sodium).         losartan (COZAAR) 50 MG tablet Take 1 tablet (50 mg total) by mouth once daily.                 Screenings

## 2020-03-24 ENCOUNTER — PATIENT OUTREACH (OUTPATIENT)
Dept: OTHER | Facility: OTHER | Age: 80
End: 2020-03-24

## 2020-03-26 NOTE — PROGRESS NOTES
Digital Medicine: Health  Follow-Up    Patient prefers frequent, brief check-ins from health . Stated he is doing well, exercising, eating right, and trying to lose a couple of pounds.    The history is provided by the patient.       INTERVENTION(S)  encouragement/support and denied questions    PLAN  patient verbalizes understanding and patient amenable to changes      There are no preventive care reminders to display for this patient.    Last 5 Patient Entered Readings                                      Current 30 Day Average: 136/75     Recent Readings 3/25/2020 3/25/2020 3/23/2020 3/23/2020 3/22/2020    SBP (mmHg) 134 134 132 132 139    DBP (mmHg) 73 73 80 80 71                      Diet Screening   No change to diet.      Patient stated he is maintaining a balanced diet and is not stress eating.    Physical Activity Screening   When asked if exercising, patient responded: yes    Patient was going to the gym but has replaced this with walking around the block 3-4 times daily.      SDOH

## 2020-04-08 ENCOUNTER — PATIENT MESSAGE (OUTPATIENT)
Dept: INTERNAL MEDICINE | Facility: CLINIC | Age: 80
End: 2020-04-08

## 2020-04-08 ENCOUNTER — PATIENT OUTREACH (OUTPATIENT)
Dept: OTHER | Facility: OTHER | Age: 80
End: 2020-04-08

## 2020-04-08 NOTE — PROGRESS NOTES
Digital Medicine: Health  Follow-Up    Patient stated he is doing well. Prefers frequent check-in from health . Wants to discuss lifestyle further once coronavirus crisis has passed. He is experiencing some stress about safety and wellness of loved ones.    The history is provided by the patient.     Follow Up  Follow-up reason(s): reading review      Readings are trending up due to lifestyle change.        INTERVENTION(S)  encouragement/support and denied questions    PLAN  patient verbalizes understanding and patient amenable to changes      There are no preventive care reminders to display for this patient.    Last 5 Patient Entered Readings                                      Current 30 Day Average: 133/73     Recent Readings 4/7/2020 4/7/2020 4/7/2020 4/7/2020 4/5/2020    SBP (mmHg) 135 135 163 163 145    DBP (mmHg) 80 80 79 79 71                      Physical Activity Screening   When asked if exercising, patient responded: yes    Patient participates in the following activities: walking    Patient can't go to his gym now so he has been walking 1 mile in the park everyday. Stated that he remains active throughout the day.      SDOH

## 2020-04-15 ENCOUNTER — PATIENT MESSAGE (OUTPATIENT)
Dept: INTERNAL MEDICINE | Facility: CLINIC | Age: 80
End: 2020-04-15

## 2020-04-22 ENCOUNTER — PATIENT OUTREACH (OUTPATIENT)
Dept: OTHER | Facility: OTHER | Age: 80
End: 2020-04-22

## 2020-04-22 NOTE — PROGRESS NOTES
Digital Medicine: Health  Follow-Up    Patient stated he is doing well. He wants to hold off on further lifestyle discussion until coronavirus crisis has passed.  Request less frequent contact from health , for now.    The history is provided by the patient.     Follow Up  Follow-up reason(s): reading review          INTERVENTION(S)  encouragement/support and denied questions    PLAN  patient verbalizes understanding      There are no preventive care reminders to display for this patient.    Last 5 Patient Entered Readings                                      Current 30 Day Average: 132/72     Recent Readings 4/20/2020 4/20/2020 4/17/2020 4/17/2020 4/15/2020    SBP (mmHg) 122 122 123 123 139    DBP (mmHg) 73 73 68 68 71                      Diet Screening   No change to diet.      Admits to snacking a little bit more than before.    Physical Activity Screening   When asked if exercising, patient responded: yes    Patient participates in the following activities: walking and weights    Admits he is not getting as much exercise as before. Has also been using some weights at home.      SDOH

## 2020-04-29 ENCOUNTER — PATIENT MESSAGE (OUTPATIENT)
Dept: INTERNAL MEDICINE | Facility: CLINIC | Age: 80
End: 2020-04-29

## 2020-04-29 ENCOUNTER — OFFICE VISIT (OUTPATIENT)
Dept: INTERNAL MEDICINE | Facility: CLINIC | Age: 80
End: 2020-04-29
Payer: MEDICARE

## 2020-04-29 DIAGNOSIS — I70.203 ATHEROSCLEROSIS OF ARTERY OF BOTH LOWER EXTREMITIES: ICD-10-CM

## 2020-04-29 DIAGNOSIS — C61 PROSTATE CANCER: ICD-10-CM

## 2020-04-29 DIAGNOSIS — H91.93 BILATERAL HEARING LOSS, UNSPECIFIED HEARING LOSS TYPE: ICD-10-CM

## 2020-04-29 DIAGNOSIS — I10 ESSENTIAL HYPERTENSION: Primary | Chronic | ICD-10-CM

## 2020-04-29 DIAGNOSIS — N40.0 BENIGN PROSTATIC HYPERPLASIA WITHOUT LOWER URINARY TRACT SYMPTOMS: ICD-10-CM

## 2020-04-29 DIAGNOSIS — R73.09 ABNORMAL GLUCOSE: ICD-10-CM

## 2020-04-29 PROCEDURE — 1101F PR PT FALLS ASSESS DOC 0-1 FALLS W/OUT INJ PAST YR: ICD-10-PCS | Mod: CPTII,95,, | Performed by: FAMILY MEDICINE

## 2020-04-29 PROCEDURE — 1159F PR MEDICATION LIST DOCUMENTED IN MEDICAL RECORD: ICD-10-PCS | Mod: 95,,, | Performed by: FAMILY MEDICINE

## 2020-04-29 PROCEDURE — 1101F PT FALLS ASSESS-DOCD LE1/YR: CPT | Mod: CPTII,95,, | Performed by: FAMILY MEDICINE

## 2020-04-29 PROCEDURE — 1159F MED LIST DOCD IN RCRD: CPT | Mod: 95,,, | Performed by: FAMILY MEDICINE

## 2020-04-29 PROCEDURE — 99214 PR OFFICE/OUTPT VISIT, EST, LEVL IV, 30-39 MIN: ICD-10-PCS | Mod: 95,,, | Performed by: FAMILY MEDICINE

## 2020-04-29 PROCEDURE — 99214 OFFICE O/P EST MOD 30 MIN: CPT | Mod: 95,,, | Performed by: FAMILY MEDICINE

## 2020-04-29 NOTE — PROGRESS NOTES
"Subjective:      Patient ID: Ezequiel Hughes is a 80 y.o. male.    Chief Complaint: Other (wt, labs, bp)    Disclaimer:  This note is prepared using voice recognition software and as such is likely to have errors and has not been proof read. Please contact me for questions.     The patient location is:home  The chief complaint leading to consultation is: followup / my chart request  Visit type: Virtual visit with synchronous audio and video  Total time spent with patient: 920am -944am  Each patient to whom he or she provides medical services by telemedicine is:  (1) informed of the relationship between the physician and patient and the respective role of any other health care provider with respect to management of the patient; and (2) notified that he or she may decline to receive medical services by telemedicine and may withdraw from such care at any time.    Notes: pt is enrolled in digital BP program. Below is copy of most recent readings and weights.     4/28/2020  9:49 AM 4/28/2020  9:49 AM    193.6   4/28/2020  9:48 AM 4/28/2020  9:49  63     4/25/2020  8:37 AM 4/25/2020  8:37 AM    194   4/25/2020  8:36 AM 4/25/2020  8:37  66     4/22/2020 10:08 AM 4/25/2020  8:37  76     4/22/2020 10:03 AM 4/22/2020 10:04 AM    194.2   4/20/2020 10:34 AM 4/20/2020 10:35 AM    192.9   4/20/2020 10:34 AM 4/20/2020 10:35  73     4/17/2020  9:52 AM 4/17/2020  9:52 AM    194.7   4/17/2020  9:51 AM 4/17/2020  9:52  68       Currently on Losartan on 50mg and hctz 12.5- switched from a capsule to tablet.  Explained reasons and meds. Does go to the bathroom a lot of night at times. Sometimes is 4-5 times a night. Sometimes has little control with leakage.   Needing a "new" urologist. Using Dr. Wong currently.  Takes all bp meds in the am.     Has appt with Dr. Vegas with [facility] and [facility].   Wants to know about Nathanael Cintron but discussed not really affective as the proscar. Is being monitored by " "them for a "spot".   Seeing them May 25th. Doing MRI of prostate each year. Backed off from doing psa from every 3 months to every 6 months. Declined biopsy.     Still on the allopurinol at 200mg. No gout flareups currently.     Does wake up at night with leg cramps sometimes outside of calfs. Has PAD did last cad in 4/2019. Wants to hold off on doing addtl procedures for now. Is scheduled to see Dr. Thompson again in Sept.     Hearing issues. Desires to have hearing eval done in 1 month.     Is still on Plavix and statin therapy due to previous peripheral vascular disease.     jonelle- stable.         Lab Results   Component Value Date    WBC 3.81 (L) 01/23/2020    HGB 12.6 (L) 01/23/2020    HCT 39.6 (L) 01/23/2020     (L) 01/23/2020    CHOL 142 01/23/2020    TRIG 58 01/23/2020    HDL 57 01/23/2020    ALT 13 01/23/2020    AST 16 01/23/2020     01/23/2020    K 4.5 01/23/2020     (H) 01/23/2020    CREATININE 1.5 (H) 01/23/2020    BUN 31 (H) 01/23/2020    CO2 24 01/23/2020    TSH 1.330 01/23/2020    PSA 1.1 01/23/2020    INR 1.2 09/26/2017    HGBA1C 5.2 03/07/2018       X-Ray Chest AP Portable  Narrative: EXAMINATION:  XR CHEST AP PORTABLE    CLINICAL HISTORY:  Chest Pain;    COMPARISON:  January 2nd    FINDINGS:  Heart size is normal. The lung fields are clear. No acute pulmonary infiltrate.  Impression: No detrimental change.  No new/acute abnormality suggested.    Electronically signed by: Jaya Schroeder MD  Date:    01/06/2020  Time:    23:15        Review of Systems   Constitutional: Negative for activity change, appetite change, fatigue and unexpected weight change.   HENT: Positive for hearing loss. Negative for rhinorrhea and trouble swallowing.    Eyes: Negative for discharge and visual disturbance.   Respiratory: Negative for chest tightness and wheezing.    Cardiovascular: Negative for chest pain and palpitations.   Gastrointestinal: Negative for blood in stool, constipation, diarrhea and " vomiting.   Endocrine: Positive for polyuria. Negative for polydipsia.   Genitourinary: Positive for frequency. Negative for difficulty urinating, hematuria and urgency.   Musculoskeletal: Positive for myalgias. Negative for arthralgias, joint swelling and neck pain.   Neurological: Negative for weakness and headaches.   Psychiatric/Behavioral: Positive for sleep disturbance. Negative for confusion and dysphoric mood. The patient is not nervous/anxious.      Objective:   There were no vitals filed for this visit.  Physical Exam   Constitutional: He appears well-developed and well-nourished. He is active and cooperative. He does not have a sickly appearance. He does not appear ill. No distress.   HENT:   Head: Normocephalic and atraumatic.   Right Ear: External ear normal.   Left Ear: External ear normal.   Pulmonary/Chest: Effort normal.   Neurological: He is alert.   Psychiatric: He has a normal mood and affect. His behavior is normal. Judgment and thought content normal. His mood appears not anxious. His affect is not angry, not blunt, not labile and not inappropriate. His speech is not rapid and/or pressured, not delayed, not tangential and not slurred. He is not agitated, not aggressive, not hyperactive, not slowed, not withdrawn, not actively hallucinating and not combative. Thought content is not paranoid and not delusional. Cognition and memory are normal. Cognition and memory are not impaired. He does not express impulsivity or inappropriate judgment. He does not exhibit a depressed mood. He expresses no homicidal and no suicidal ideation. He expresses no suicidal plans and no homicidal plans. He is communicative. He exhibits normal recent memory and normal remote memory. He is attentive.   Vitals reviewed.    Assessment:     1. Essential hypertension    2. Bilateral hearing loss, unspecified hearing loss type    3. Benign prostatic hyperplasia without lower urinary tract symptoms    4. Prostate cancer    5.  Atherosclerosis of artery of both lower extremities    6. Abnormal glucose      Plan:   Ezequiel was seen today for other.    Diagnoses and all orders for this visit:    Essential hypertension  Comments:  reviewed digital bp program readings. improved.   Orders:  -     Hemoglobin A1c; Future  -     Lipid panel; Future  -     CBC auto differential; Future  -     Comprehensive metabolic panel; Future  -     Uric acid; Future    Bilateral hearing loss, unspecified hearing loss type  Comments:  desires referral to audiology in 1 month. placed.   Orders:  -     Ambulatory referral/consult to Audiology; Future  -     Hemoglobin A1c; Future  -     Lipid panel; Future  -     CBC auto differential; Future  -     Comprehensive metabolic panel; Future  -     Uric acid; Future    Benign prostatic hyperplasia without lower urinary tract symptoms  Comments:  will followup with dr coto in 1 month for mri and med adjustment. on proscar 5mg.   Orders:  -     Hemoglobin A1c; Future  -     Lipid panel; Future  -     CBC auto differential; Future  -     Comprehensive metabolic panel; Future  -     Uric acid; Future    Prostate cancer  Comments:  will followup with dr coto in 1 month for mri and med adjustment.   Orders:  -     Hemoglobin A1c; Future  -     Lipid panel; Future  -     CBC auto differential; Future  -     Comprehensive metabolic panel; Future  -     Uric acid; Future    Atherosclerosis of artery of both lower extremities  Comments:  waiting till sept for now to see dr monroy. doesn't feel it is needed at this time, but advised if leg cramping is worse to message us to setup sooner appt.   Orders:  -     Hemoglobin A1c; Future  -     Lipid panel; Future  -     CBC auto differential; Future  -     Comprehensive metabolic panel; Future  -     Uric acid; Future    Abnormal glucose  -     Hemoglobin A1c; Future            Follow up in about 3 months (around 7/29/2020) for f/u telemed Dr Ozuna/ f/u labs.    There are no Patient  Instructions on file for this visit.

## 2020-05-28 ENCOUNTER — PATIENT OUTREACH (OUTPATIENT)
Dept: OTHER | Facility: OTHER | Age: 80
End: 2020-05-28

## 2020-05-28 NOTE — PROGRESS NOTES
Digital Medicine: Health  Follow-Up    Brief follow-up. Patient stated he is doing well and staying active.    The history is provided by the patient.     Follow Up  Follow-up reason(s): reading review          INTERVENTION(S)  encouragement/support and denied questions    PLAN  patient verbalizes understanding      There are no preventive care reminders to display for this patient.    Last 5 Patient Entered Readings                                      Current 30 Day Average: 131/71     Recent Readings 5/26/2020 5/26/2020 5/23/2020 5/23/2020 5/19/2020    SBP (mmHg) 130 130 142 142 132    DBP (mmHg) 78 78 71 71 75                      Diet Screening   No change to diet.      Physical Activity Screening   No change to exercise routine.    When asked if exercising, patient responded: yes    Patient stated his gym just reopened and he will be returning next week.      SDOH

## 2020-06-29 ENCOUNTER — TELEPHONE (OUTPATIENT)
Dept: ENDOSCOPY | Facility: HOSPITAL | Age: 80
End: 2020-06-29

## 2020-06-29 DIAGNOSIS — Z12.11 COLON CANCER SCREENING: ICD-10-CM

## 2020-06-29 DIAGNOSIS — Z12.11 SCREEN FOR COLON CANCER: Primary | ICD-10-CM

## 2020-06-29 RX ORDER — SODIUM, POTASSIUM,MAG SULFATES 17.5-3.13G
SOLUTION, RECONSTITUTED, ORAL ORAL
Qty: 354 ML | Refills: 0 | Status: SHIPPED | OUTPATIENT
Start: 2020-06-29 | End: 2020-10-28

## 2020-06-29 NOTE — TELEPHONE ENCOUNTER
Ezequiel Hughes  Male, 80 y.o., 1940  Phone:   [phone] (M)  PCP:   Valery Ozuna MD  Language:   English  Need Interp:   No  Allergies Last Reviewed:   04/29/20  Allergies:   Mobic [Meloxicam]  Health Maintenance:   Due  Primary Ins.:   [employer]  MRN:   [id number]  Pt Comm Pref:   Patient Portal, Mail  Last MyChart Login:   6/24/2020 2:28 PM  Next Appt:   With Internal Medicine  07/08/2020 at 1:00 PM  My Sticky Note:     Specialty Comments:   Message  Received: 5 days ago  Message Contents   Rama Broussard RN             COVID Screening      1. Have you had a fever in the last 7 days or have you used fever reducing medicines for a fever in the last 7 days?  no     2. Are you experiencing shortness of breath, cough, muscle aches, loss of taste or loss of smell?  no     3. Are you residing with anyone who has tested positive for Covid?  no     If answered yes to any of the above questions, the pt must be scheduled for an appointment with their PCP.     A message also needs to be sent to the endoscopist to ensure the patient gets rescheduled at a later date.     ENDO screening     Have you been admitted overnight to the hospital in the past 3 months? no   If yes, schedule an appointment with PCP before scheduling endoscopic procedure.     2. Have you had a stent placed in the last 12 months? no   If yes, for a screening visit, cancel and message the ordering provider.  The patient will need a new order when the time is appropriate.     3. Have you had a stroke or heart attack in the past 6 months? no   If yes, cancel and refer patient to ordering provider for clearance, also message ordering provider to inform.     4. Have you had any chest pain in the past 3 months? no   If yes, Have you been evaluated by your PCP and/or cardiologist and it was determined to not be heart related? not applicable   If No, Pt needs to be seen by PCP or Cardiologist .  Pt can be scheduled once clearance obtained by  "either of those providers.     5. Do you take prescription weight loss medications?  no   If yes, must stop for 2 weeks prior to procedure.     6. Have you been diagnosed with diverticulitis within the past 3 months? no   If yes, must have been seen by GI within the last 3 months, if not schedule with GI MIGDALIA.   If pt has been seen by GI, schedule procedure 8-12 weeks post antibiotic treatment.     7. Are you on Dialysis? no   If yes, schedule procedure for the day AFTER dialysis.  Appt time should be 9am or later, patient arrival time is 2 hours prior.  Nulytely or    miralax prep for all patients with kidney disease.     8. Are you diabetic?  no   If yes, schedule morning appt. Advise pt to hold all diabetic meds day of procedure.     9. If pt is older than 80 years of age and HAS NOT been seen by GI or PCP within the last 6 months, needs appt with GI MIGDALIA.   If pt has been seen by the GI provider or PCP within the past 6  months AND meets criteria, schedule procedure AND send message to the endoscopist.     10. Is patient on a "high risk" medication (blood thinner/antiplatelet agent)?  yes   If yes, has cardiac clearance been obtained within the last 60 days? No   If no, a new clearance needs to be obtained.       I have reviewed the last colonoscopy for recommendations regarding next procedure bowel prep.  yes   I have reviewed medications and allergies.  yes   I have verified the pharmacy information and appropriate prep sent if needed. yes   Prep instructions have been mailed or sent to portal per patient request. yes        "

## 2020-06-30 ENCOUNTER — TELEPHONE (OUTPATIENT)
Dept: ENDOSCOPY | Facility: HOSPITAL | Age: 80
End: 2020-06-30

## 2020-07-07 ENCOUNTER — TELEPHONE (OUTPATIENT)
Dept: ENDOSCOPY | Facility: HOSPITAL | Age: 80
End: 2020-07-07

## 2020-07-07 ENCOUNTER — LAB VISIT (OUTPATIENT)
Dept: LAB | Facility: HOSPITAL | Age: 80
End: 2020-07-07
Attending: FAMILY MEDICINE
Payer: MEDICARE

## 2020-07-07 ENCOUNTER — OFFICE VISIT (OUTPATIENT)
Dept: INTERNAL MEDICINE | Facility: CLINIC | Age: 80
End: 2020-07-07
Payer: MEDICARE

## 2020-07-07 VITALS
RESPIRATION RATE: 16 BRPM | DIASTOLIC BLOOD PRESSURE: 64 MMHG | WEIGHT: 196.44 LBS | TEMPERATURE: 99 F | HEART RATE: 80 BPM | BODY MASS INDEX: 28.18 KG/M2 | SYSTOLIC BLOOD PRESSURE: 122 MMHG

## 2020-07-07 DIAGNOSIS — Z01.818 PRE-OP EXAM: ICD-10-CM

## 2020-07-07 DIAGNOSIS — Z01.818 PRE-OP EXAM: Primary | ICD-10-CM

## 2020-07-07 DIAGNOSIS — Z79.02 PLATELET INHIBITION DUE TO PLAVIX: ICD-10-CM

## 2020-07-07 DIAGNOSIS — R79.1 ABNORMAL COAGULATION PROFILE: ICD-10-CM

## 2020-07-07 DIAGNOSIS — Z85.46 HISTORY OF PROSTATE CANCER: ICD-10-CM

## 2020-07-07 LAB
ALBUMIN SERPL BCP-MCNC: 3.8 G/DL (ref 3.5–5.2)
ALP SERPL-CCNC: 52 U/L (ref 55–135)
ALT SERPL W/O P-5'-P-CCNC: 12 U/L (ref 10–44)
ANION GAP SERPL CALC-SCNC: 6 MMOL/L (ref 8–16)
AST SERPL-CCNC: 14 U/L (ref 10–40)
BASOPHILS # BLD AUTO: 0.06 K/UL (ref 0–0.2)
BASOPHILS NFR BLD: 1.3 % (ref 0–1.9)
BILIRUB SERPL-MCNC: 0.6 MG/DL (ref 0.1–1)
BUN SERPL-MCNC: 24 MG/DL (ref 8–23)
CALCIUM SERPL-MCNC: 9 MG/DL (ref 8.7–10.5)
CHLORIDE SERPL-SCNC: 104 MMOL/L (ref 95–110)
CO2 SERPL-SCNC: 29 MMOL/L (ref 23–29)
CREAT SERPL-MCNC: 1.4 MG/DL (ref 0.5–1.4)
DIFFERENTIAL METHOD: ABNORMAL
EOSINOPHIL # BLD AUTO: 0.3 K/UL (ref 0–0.5)
EOSINOPHIL NFR BLD: 6.2 % (ref 0–8)
ERYTHROCYTE [DISTWIDTH] IN BLOOD BY AUTOMATED COUNT: 12.5 % (ref 11.5–14.5)
EST. GFR  (AFRICAN AMERICAN): 53.7 ML/MIN/1.73 M^2
EST. GFR  (NON AFRICAN AMERICAN): 46.5 ML/MIN/1.73 M^2
GLUCOSE SERPL-MCNC: 91 MG/DL (ref 70–110)
HCT VFR BLD AUTO: 41.6 % (ref 40–54)
HGB BLD-MCNC: 13 G/DL (ref 14–18)
IMM GRANULOCYTES # BLD AUTO: 0.03 K/UL (ref 0–0.04)
IMM GRANULOCYTES NFR BLD AUTO: 0.6 % (ref 0–0.5)
LYMPHOCYTES # BLD AUTO: 0.9 K/UL (ref 1–4.8)
LYMPHOCYTES NFR BLD: 19.1 % (ref 18–48)
MCH RBC QN AUTO: 32 PG (ref 27–31)
MCHC RBC AUTO-ENTMCNC: 31.3 G/DL (ref 32–36)
MCV RBC AUTO: 103 FL (ref 82–98)
MONOCYTES # BLD AUTO: 0.5 K/UL (ref 0.3–1)
MONOCYTES NFR BLD: 9.9 % (ref 4–15)
NEUTROPHILS # BLD AUTO: 2.9 K/UL (ref 1.8–7.7)
NEUTROPHILS NFR BLD: 62.9 % (ref 38–73)
NRBC BLD-RTO: 0 /100 WBC
PLATELET # BLD AUTO: 121 K/UL (ref 150–350)
PMV BLD AUTO: 8.2 FL (ref 9.2–12.9)
POTASSIUM SERPL-SCNC: 4.3 MMOL/L (ref 3.5–5.1)
PROT SERPL-MCNC: 6.5 G/DL (ref 6–8.4)
RBC # BLD AUTO: 4.06 M/UL (ref 4.6–6.2)
SODIUM SERPL-SCNC: 139 MMOL/L (ref 136–145)
WBC # BLD AUTO: 4.65 K/UL (ref 3.9–12.7)

## 2020-07-07 PROCEDURE — 99214 OFFICE O/P EST MOD 30 MIN: CPT | Mod: HCNC,S$GLB,, | Performed by: NURSE PRACTITIONER

## 2020-07-07 PROCEDURE — 1101F PT FALLS ASSESS-DOCD LE1/YR: CPT | Mod: HCNC,CPTII,S$GLB, | Performed by: NURSE PRACTITIONER

## 2020-07-07 PROCEDURE — 3078F PR MOST RECENT DIASTOLIC BLOOD PRESSURE < 80 MM HG: ICD-10-PCS | Mod: HCNC,CPTII,S$GLB, | Performed by: NURSE PRACTITIONER

## 2020-07-07 PROCEDURE — 3078F DIAST BP <80 MM HG: CPT | Mod: HCNC,CPTII,S$GLB, | Performed by: NURSE PRACTITIONER

## 2020-07-07 PROCEDURE — 1159F MED LIST DOCD IN RCRD: CPT | Mod: HCNC,S$GLB,, | Performed by: NURSE PRACTITIONER

## 2020-07-07 PROCEDURE — 1126F AMNT PAIN NOTED NONE PRSNT: CPT | Mod: HCNC,S$GLB,, | Performed by: NURSE PRACTITIONER

## 2020-07-07 PROCEDURE — 99999 PR PBB SHADOW E&M-EST. PATIENT-LVL IV: CPT | Mod: PBBFAC,HCNC,, | Performed by: NURSE PRACTITIONER

## 2020-07-07 PROCEDURE — 99214 PR OFFICE/OUTPT VISIT, EST, LEVL IV, 30-39 MIN: ICD-10-PCS | Mod: HCNC,S$GLB,, | Performed by: NURSE PRACTITIONER

## 2020-07-07 PROCEDURE — 85730 THROMBOPLASTIN TIME PARTIAL: CPT | Mod: HCNC

## 2020-07-07 PROCEDURE — 85025 COMPLETE CBC W/AUTO DIFF WBC: CPT | Mod: HCNC

## 2020-07-07 PROCEDURE — 85610 PROTHROMBIN TIME: CPT | Mod: HCNC

## 2020-07-07 PROCEDURE — 36415 COLL VENOUS BLD VENIPUNCTURE: CPT | Mod: HCNC,PO

## 2020-07-07 PROCEDURE — 1101F PR PT FALLS ASSESS DOC 0-1 FALLS W/OUT INJ PAST YR: ICD-10-PCS | Mod: HCNC,CPTII,S$GLB, | Performed by: NURSE PRACTITIONER

## 2020-07-07 PROCEDURE — 99999 PR PBB SHADOW E&M-EST. PATIENT-LVL IV: ICD-10-PCS | Mod: PBBFAC,HCNC,, | Performed by: NURSE PRACTITIONER

## 2020-07-07 PROCEDURE — 3074F SYST BP LT 130 MM HG: CPT | Mod: HCNC,CPTII,S$GLB, | Performed by: NURSE PRACTITIONER

## 2020-07-07 PROCEDURE — 1126F PR PAIN SEVERITY QUANTIFIED, NO PAIN PRESENT: ICD-10-PCS | Mod: HCNC,S$GLB,, | Performed by: NURSE PRACTITIONER

## 2020-07-07 PROCEDURE — 80053 COMPREHEN METABOLIC PANEL: CPT | Mod: HCNC

## 2020-07-07 PROCEDURE — 3074F PR MOST RECENT SYSTOLIC BLOOD PRESSURE < 130 MM HG: ICD-10-PCS | Mod: HCNC,CPTII,S$GLB, | Performed by: NURSE PRACTITIONER

## 2020-07-07 PROCEDURE — 1159F PR MEDICATION LIST DOCUMENTED IN MEDICAL RECORD: ICD-10-PCS | Mod: HCNC,S$GLB,, | Performed by: NURSE PRACTITIONER

## 2020-07-07 RX ORDER — MUPIROCIN 20 MG/G
OINTMENT TOPICAL
COMMUNITY
Start: 2020-06-01 | End: 2020-12-10

## 2020-07-07 RX ORDER — TADALAFIL 5 MG/1
TABLET ORAL
COMMUNITY
Start: 2020-05-15 | End: 2021-05-11

## 2020-07-07 NOTE — PROGRESS NOTES
Subjective:     Ezequiel Hughes is a 80 y.o. male who presents to the office today for a preoperative consultation at the request of surgeon Dr. Wong who plans on performing mri fusion biopsy on August 4. This consultation is requested for the specific conditions prompting preoperative evaluation (i.e. because of potential affect on operative risk). Planned anesthesia: general. The patient has the following known anesthesia issues: none. Patients bleeding risk: pt on plavix. has been instructed to stop plavix 5 days prior. Patient does not have objections to receiving blood products if needed.    The following portions of the patient's history were reviewed and updated as appropriate:  Past Medical History:   Diagnosis Date    Allergic rhinitis     Anemia     Back pain     BPH (benign prostatic hyperplasia)     Cancer     skin cancer to neck, Dr. Graves    Cataract     Disorder of kidney and ureter     ED (erectile dysfunction)     Hiatal hernia     small    History of colon polyps     colonoscopy 11/2016    HLD (hyperlipidemia)     Hyperlipidemia     Hypertension     Lateral epicondylitis     OA (osteoarthritis)     GUIDO (obstructive sleep apnea)     Prostate cancer     Dr. Wong    TIA (transient ischemic attack)     Trouble in sleeping      Past Medical History:   Diagnosis Date    Allergic rhinitis     Anemia     Back pain     BPH (benign prostatic hyperplasia)     Cancer     skin cancer to neck, Dr. Graves    Cataract     Disorder of kidney and ureter     ED (erectile dysfunction)     Hiatal hernia     small    History of colon polyps     colonoscopy 11/2016    HLD (hyperlipidemia)     Hyperlipidemia     Hypertension     Lateral epicondylitis     OA (osteoarthritis)     GUIDO (obstructive sleep apnea)     Prostate cancer     Dr. Wong    TIA (transient ischemic attack)     Trouble in sleeping      Family History   Problem Relation Age of Onset    Lung  cancer Father         life long smoker    Cancer Father         prostate, lung    Stroke Sister         TIA    Cataracts Sister     Cancer Brother         prostate    Cataracts Brother     Cataracts Sister     Melanoma Neg Hx     Psoriasis Neg Hx     Lupus Neg Hx     Eczema Neg Hx     Diabetes Neg Hx     Heart disease Neg Hx     Kidney disease Neg Hx     Colon cancer Neg Hx      Past Surgical History:   Procedure Laterality Date    CATARACT EXTRACTION W/  INTRAOCULAR LENS IMPLANT Right 2018    I-Stent    CATARACT EXTRACTION W/  INTRAOCULAR LENS IMPLANT Left 2018    I - Stent    COLONOSCOPY N/A 2016    Procedure: COLONOSCOPY;  Surgeon: Karuna Rodriguez MD;  Location: [facility];  Service: Endoscopy;  Laterality: N/A;    HEMORRHOID SURGERY      I-STENT Right 2018    DR. REECE    KNEE ARTHROSCOPY W/ MENISCAL REPAIR Right     Dr. Jain    PCIOL Right 2018    DR. REECE    PLANTAR FASCIA RELEASE      right    ROTATOR CUFF REPAIR Bilateral     bilateral    SHOULDER SURGERY Bilateral around     Dr. Pepper.  rotator cuff surgeries     Social History     Socioeconomic History    Marital status:      Spouse name: Not on file    Number of children: Not on file    Years of education: Not on file    Highest education level: Not on file   Occupational History    Not on file   Social Needs    Financial resource strain: Not hard at all    Food insecurity     Worry: Never true     Inability: Never true    Transportation needs     Medical: No     Non-medical: No   Tobacco Use    Smoking status: Former Smoker     Packs/day: 3.00     Years: 35.00     Pack years: 105.00     Types: Cigarettes     Start date: 1960     Quit date: 1985     Years since quittin.9    Smokeless tobacco: Never Used   Substance and Sexual Activity    Alcohol use: Yes     Alcohol/week: 7.0 standard drinks     Types: 6 Cans of beer, 1 Standard drinks or  equivalent per week     Frequency: 4 or more times a week     Drinks per session: 1 or 2     Binge frequency: Never     Comment: socially    Drug use: No    Sexual activity: Never   Lifestyle    Physical activity     Days per week: 3 days     Minutes per session: 60 min    Stress: Not at all   Relationships    Social connections     Talks on phone: More than three times a week     Gets together: More than three times a week     Attends Shinto service: 1 to 4 times per year     Active member of club or organization: Yes     Attends meetings of clubs or organizations: Never     Relationship status:    Other Topics Concern    Not on file   Social History Narrative         Review of patient's allergies indicates:   Allergen Reactions    Mobic  [meloxicam]      Other reaction(s): hypertension     Medication List with Changes/Refills   Current Medications    ACETAMINOPHEN (TYLENOL ARTHRITIS PAIN ORAL)    Take by mouth. Patient states that he takes 2 tablets in the morning and 2 tablets in the evening    ALLOPURINOL (ZYLOPRIM) 100 MG TABLET    TAKE 1 TABLET EVERY DAY    ATORVASTATIN (LIPITOR) 40 MG TABLET    Take 1 tablet (40 mg total) by mouth once daily.    CLOPIDOGREL (PLAVIX) 75 MG TABLET    TAKE 1 TABLET EVERY DAY    FINASTERIDE (PROSCAR) 5 MG TABLET    Take 5 mg by mouth once daily.     FLUTICASONE PROPIONATE (FLONASE) 50 MCG/ACTUATION NASAL SPRAY    USE 2 SPRAYS IN EACH NOSTRIL ONE TIME DAILY    HYDROCHLOROTHIAZIDE (HYDRODIURIL) 12.5 MG TAB    Take 1 tablet (12.5 mg total) by mouth daily as needed (before a meal high in sodium).    LOSARTAN (COZAAR) 50 MG TABLET    Take 1 tablet (50 mg total) by mouth once daily.    MUPIROCIN (BACTROBAN) 2 % OINTMENT    Apply to open wounds twice daily    PANTOPRAZOLE (PROTONIX) 40 MG TABLET    Take 1 tablet (40 mg total) by mouth once daily.    SODIUM,POTASSIUM,MAG SULFATES (SUPREP BOWEL PREP KIT) 17.5-3.13-1.6 GRAM SOLR    Use as directed    TADALAFIL  (CIALIS) 5 MG TABLET         Patient Active Problem List   Diagnosis    Lumbosacral spondylosis without myelopathy    BPH (benign prostatic hyperplasia)    Essential hypertension    Mixed hyperlipidemia    Cataract    Thrombocytopenia    Anemia    Chronic kidney disease, stage 3    Incomplete right bundle branch block    Screen for colon cancer    Prostate cancer    Tortuous aorta    Lung granuloma    Atherosclerosis of artery of both lower extremities    Leg swelling    GUIDO (obstructive sleep apnea)    Periodic limb movement disorder (PLMD)    Inadequate sleep hygiene    Basal cell carcinoma (BCC) of anterior chest    Chronic gout without tophus       Review of Systems  Review of Systems   Constitutional: Negative for activity change, appetite change, chills, diaphoresis, fatigue, fever and unexpected weight change.   HENT: Negative for congestion, ear pain, hearing loss, nosebleeds, postnasal drip, rhinorrhea, sinus pressure, sneezing, sore throat and trouble swallowing.    Eyes: Negative for photophobia, pain, discharge and visual disturbance.   Respiratory: Negative for apnea, cough, choking, chest tightness, shortness of breath and wheezing.    Cardiovascular: Negative for chest pain, palpitations and leg swelling.   Gastrointestinal: Negative for abdominal pain, blood in stool, constipation, diarrhea, nausea and vomiting.   Endocrine: Positive for polyuria. Negative for polydipsia.   Genitourinary: Positive for urgency. Negative for decreased urine volume, difficulty urinating, dysuria and hematuria.   Musculoskeletal: Negative for arthralgias, gait problem, joint swelling, myalgias and neck pain.   Skin: Negative for rash.   Neurological: Negative for dizziness, tremors, seizures, syncope, weakness, light-headedness, numbness and headaches.   Hematological: Bruises/bleeds easily.   Psychiatric/Behavioral: Negative for agitation, confusion, decreased concentration, dysphoric mood,  hallucinations and sleep disturbance. The patient is not nervous/anxious.         Objective:     Vitals:    07/07/20 0713   BP: 122/64   Pulse: 80   Resp: 16   Temp: 98.8 °F (37.1 °C)       Physical Exam  Vitals signs and nursing note reviewed.   Constitutional:       General: He is not in acute distress.     Appearance: He is well-developed. He is not diaphoretic.   HENT:      Head: Normocephalic and atraumatic.   Eyes:      General:         Right eye: No discharge.         Left eye: No discharge.      Conjunctiva/sclera: Conjunctivae normal.   Cardiovascular:      Rate and Rhythm: Normal rate and regular rhythm.      Heart sounds: Normal heart sounds. No murmur.   Pulmonary:      Effort: Pulmonary effort is normal. No respiratory distress.      Breath sounds: Normal breath sounds. No wheezing or rales.   Chest:      Chest wall: No tenderness.   Abdominal:      General: There is no distension.      Palpations: Abdomen is soft.   Musculoskeletal: Normal range of motion.   Skin:     General: Skin is warm and dry.      Findings: No rash.   Neurological:      Mental Status: He is alert and oriented to person, place, and time.   Psychiatric:         Behavior: Behavior normal. Behavior is cooperative.         Thought Content: Thought content normal.         Judgment: Judgment normal.          Assessment:       Encounter Diagnoses   Name Primary?    Pre-op exam Yes    History of prostate cancer     Platelet inhibition due to Plavix     Abnormal coagulation profile          Plan:     Pre-op exam  -     CBC auto differential; Future; Expected date: 07/07/2020  -     Comprehensive metabolic panel; Future; Expected date: 07/07/2020  -     Protime-INR; Future; Expected date: 07/07/2020  -     APTT; Future; Expected date: 07/07/2020  -     X-Ray Chest PA And Lateral; Future; Expected date: 07/07/2020    History of prostate cancer    Platelet inhibition due to Plavix  -     Protime-INR; Future; Expected date: 07/07/2020  -      APTT; Future; Expected date: 07/07/2020    Abnormal coagulation profile   -     Protime-INR; Future; Expected date: 07/07/2020  -     APTT; Future; Expected date: 07/07/2020        I reviewed the patient's past medical, surgical, social and family history and with  physical exam findings and the proposed surgery and I make the following recommendations:     From a cardiac standpoint the patient will be getting clearance from his cardiologist, Dr. Thompson.    From a pulmonary standpoint the patient presents as a good candidate as well. The patient has no history of lung disease or pulmonary symptoms. Good pulmonary toilet, incentive spirometry, early ambulation are all recommended to improve the pulmonary outcome. No further pulmonary workup as noted prior to surgery.     DVT prophylaxis should be per standard. Venous compression devices are recommended. Early ambulation. Patient has been educated on signs and symptoms of both DVT and pulmonary embolus and instructed on what to do if there are symptoms postop.     The patient has been instructed to take blood pressure medication the morning of surgery with a sip of water. Avoid any aspirin or anti-inflammatories between now and surgery.     If there is any further I can do to assist in the care of this patient please not hesitate to contact me. I will forward the lab results upon my receipt.    PARESH Arboleda

## 2020-07-08 ENCOUNTER — HOSPITAL ENCOUNTER (OUTPATIENT)
Dept: RADIOLOGY | Facility: CLINIC | Age: 80
Discharge: HOME | End: 2020-07-08
Attending: NURSE PRACTITIONER
Payer: MEDICARE

## 2020-07-08 ENCOUNTER — PATIENT OUTREACH (OUTPATIENT)
Dept: OTHER | Facility: OTHER | Age: 80
End: 2020-07-08

## 2020-07-08 DIAGNOSIS — Z01.818 PRE-OP EXAM: ICD-10-CM

## 2020-07-08 LAB
APTT BLDCRRT: 24.9 SEC (ref 21–32)
INR PPP: 1.2 (ref 0.8–1.2)
PROTHROMBIN TIME: 11.8 SEC (ref 9–12.5)

## 2020-07-08 PROCEDURE — 71046 XR CHEST PA AND LATERAL: ICD-10-PCS | Mod: S$GLB,,, | Performed by: RADIOLOGY

## 2020-07-08 PROCEDURE — 71046 X-RAY EXAM CHEST 2 VIEWS: CPT | Mod: S$GLB,,, | Performed by: RADIOLOGY

## 2020-07-09 NOTE — PROGRESS NOTES
Digital Medicine: Health  Follow-Up    Patient stated he is doing well, eating right, and staying active.    The history is provided by the patient.   Follow Up  Follow-up reason(s): reading review and routine education      Readings are trending down due to lifestyle change.    Routine Education Topics: physical activity        INTERVENTION(S)  encouragement/support and denied questions    PLAN  patient verbalizes understanding      There are no preventive care reminders to display for this patient.    Last 5 Patient Entered Readings                                      Current 30 Day Average: 127/67     Recent Readings 7/6/2020 7/6/2020 7/6/2020 7/6/2020 7/5/2020    SBP (mmHg) 122 122 108 108 133    DBP (mmHg) 67 67 56 56 72                      Diet Screening   No change to diet.      Patient is currently working on increasing his water intake.    Physical Activity Screening   No change to exercise routine.    When asked if exercising, patient responded: yes    Patient has been to the gym 4 x since it reopened. He is currently exercising more at home because he is not sure how safe the gym is at this time.      SDOH

## 2020-07-13 ENCOUNTER — OFFICE VISIT (OUTPATIENT)
Dept: INTERNAL MEDICINE | Facility: CLINIC | Age: 80
End: 2020-07-13
Payer: MEDICARE

## 2020-07-13 VITALS
DIASTOLIC BLOOD PRESSURE: 62 MMHG | TEMPERATURE: 98 F | SYSTOLIC BLOOD PRESSURE: 116 MMHG | BODY MASS INDEX: 27.75 KG/M2 | WEIGHT: 193.81 LBS | HEART RATE: 64 BPM | HEIGHT: 70 IN

## 2020-07-13 DIAGNOSIS — L29.9 SCALP ITCH: ICD-10-CM

## 2020-07-13 DIAGNOSIS — R23.4 FLAKING OF SCALP: Primary | ICD-10-CM

## 2020-07-13 PROCEDURE — 1126F AMNT PAIN NOTED NONE PRSNT: CPT | Mod: HCNC,S$GLB,, | Performed by: NURSE PRACTITIONER

## 2020-07-13 PROCEDURE — 99214 OFFICE O/P EST MOD 30 MIN: CPT | Mod: HCNC,S$GLB,, | Performed by: NURSE PRACTITIONER

## 2020-07-13 PROCEDURE — 1101F PT FALLS ASSESS-DOCD LE1/YR: CPT | Mod: HCNC,CPTII,S$GLB, | Performed by: NURSE PRACTITIONER

## 2020-07-13 PROCEDURE — 3074F PR MOST RECENT SYSTOLIC BLOOD PRESSURE < 130 MM HG: ICD-10-PCS | Mod: HCNC,CPTII,S$GLB, | Performed by: NURSE PRACTITIONER

## 2020-07-13 PROCEDURE — 3078F DIAST BP <80 MM HG: CPT | Mod: HCNC,CPTII,S$GLB, | Performed by: NURSE PRACTITIONER

## 2020-07-13 PROCEDURE — 1126F PR PAIN SEVERITY QUANTIFIED, NO PAIN PRESENT: ICD-10-PCS | Mod: HCNC,S$GLB,, | Performed by: NURSE PRACTITIONER

## 2020-07-13 PROCEDURE — 1101F PR PT FALLS ASSESS DOC 0-1 FALLS W/OUT INJ PAST YR: ICD-10-PCS | Mod: HCNC,CPTII,S$GLB, | Performed by: NURSE PRACTITIONER

## 2020-07-13 PROCEDURE — 3078F PR MOST RECENT DIASTOLIC BLOOD PRESSURE < 80 MM HG: ICD-10-PCS | Mod: HCNC,CPTII,S$GLB, | Performed by: NURSE PRACTITIONER

## 2020-07-13 PROCEDURE — 99999 PR PBB SHADOW E&M-EST. PATIENT-LVL IV: CPT | Mod: PBBFAC,HCNC,, | Performed by: NURSE PRACTITIONER

## 2020-07-13 PROCEDURE — 3074F SYST BP LT 130 MM HG: CPT | Mod: HCNC,CPTII,S$GLB, | Performed by: NURSE PRACTITIONER

## 2020-07-13 PROCEDURE — 99999 PR PBB SHADOW E&M-EST. PATIENT-LVL IV: ICD-10-PCS | Mod: PBBFAC,HCNC,, | Performed by: NURSE PRACTITIONER

## 2020-07-13 PROCEDURE — 1159F PR MEDICATION LIST DOCUMENTED IN MEDICAL RECORD: ICD-10-PCS | Mod: HCNC,S$GLB,, | Performed by: NURSE PRACTITIONER

## 2020-07-13 PROCEDURE — 99214 PR OFFICE/OUTPT VISIT, EST, LEVL IV, 30-39 MIN: ICD-10-PCS | Mod: HCNC,S$GLB,, | Performed by: NURSE PRACTITIONER

## 2020-07-13 PROCEDURE — 1159F MED LIST DOCD IN RCRD: CPT | Mod: HCNC,S$GLB,, | Performed by: NURSE PRACTITIONER

## 2020-07-13 RX ORDER — FLUOCINONIDE TOPICAL SOLUTION USP, 0.05% 0.5 MG/ML
SOLUTION TOPICAL 2 TIMES DAILY
Qty: 60 ML | Refills: 1 | Status: SHIPPED | OUTPATIENT
Start: 2020-07-13 | End: 2020-09-08

## 2020-07-13 RX ORDER — KETOCONAZOLE 20 MG/ML
SHAMPOO, SUSPENSION TOPICAL
Qty: 120 ML | Refills: 1 | Status: SHIPPED | OUTPATIENT
Start: 2020-07-13 | End: 2020-09-08

## 2020-07-13 NOTE — PROGRESS NOTES
"Subjective:       Patient ID: Ezequiel Hughes is a 80 y.o. male.    Chief Complaint: itching (head )    Patient here today with itching scalp x 3 months  No new shampoos, detergents  He reports some occasional flaking  No headaches  Denies hx of psoriasis   No fever      /62 (BP Location: Right arm, Patient Position: Sitting, BP Method: Large (Manual))   Pulse 64   Temp 98.4 °F (36.9 °C) (Tympanic)   Ht 5' 10" (1.778 m)   Wt 87.9 kg (193 lb 12.6 oz)   BMI 27.81 kg/m²     Review of Systems   Constitutional: Negative for activity change, appetite change, chills, diaphoresis, fatigue, fever and unexpected weight change.   HENT: Negative.    Eyes: Negative.    Respiratory: Negative for apnea, chest tightness, shortness of breath and stridor.    Cardiovascular: Negative for chest pain, palpitations and leg swelling.   Gastrointestinal: Negative.    Endocrine: Negative.    Genitourinary: Negative.    Musculoskeletal: Negative for arthralgias and myalgias.   Skin: Negative for color change, pallor, rash and wound.        Positive for scalp itching   Allergic/Immunologic: Negative.    Neurological: Negative for dizziness, facial asymmetry, light-headedness and headaches.   Hematological: Negative for adenopathy.   Psychiatric/Behavioral: Negative for agitation and behavioral problems.       Objective:      Physical Exam  Vitals signs and nursing note reviewed.   Constitutional:       General: He is not in acute distress.     Appearance: He is well-developed. He is not diaphoretic.   HENT:      Head: Normocephalic and atraumatic.   Cardiovascular:      Rate and Rhythm: Normal rate and regular rhythm.      Heart sounds: Normal heart sounds.   Pulmonary:      Effort: Pulmonary effort is normal. No respiratory distress.      Breath sounds: Normal breath sounds.   Skin:     General: Skin is warm and dry.      Findings: No rash.      Comments: Some excoriations to posterior scalp. No flaking or lesions   Neurological:     "  Mental Status: He is alert and oriented to person, place, and time.   Psychiatric:         Behavior: Behavior normal.         Thought Content: Thought content normal.         Judgment: Judgment normal.         Assessment:       1. Flaking of scalp    2. Scalp itch        Plan:       Ezequiel was seen today for itching.    Diagnoses and all orders for this visit:    Flaking of scalp    Scalp itch    Other orders  -     ketoconazole (NIZORAL) 2 % shampoo; Apply topically twice a week.  -     fluocinonide (LIDEX) 0.05 % external solution; Apply topically 2 (two) times daily.    meds as above  Derm if not improving

## 2020-07-22 ENCOUNTER — LAB VISIT (OUTPATIENT)
Dept: LAB | Facility: HOSPITAL | Age: 80
End: 2020-07-22
Attending: FAMILY MEDICINE
Payer: MEDICARE

## 2020-07-22 DIAGNOSIS — N40.0 BENIGN PROSTATIC HYPERPLASIA WITHOUT LOWER URINARY TRACT SYMPTOMS: ICD-10-CM

## 2020-07-22 DIAGNOSIS — C61 PROSTATE CANCER: ICD-10-CM

## 2020-07-22 DIAGNOSIS — I70.203 ATHEROSCLEROSIS OF ARTERY OF BOTH LOWER EXTREMITIES: ICD-10-CM

## 2020-07-22 DIAGNOSIS — H91.93 BILATERAL HEARING LOSS, UNSPECIFIED HEARING LOSS TYPE: ICD-10-CM

## 2020-07-22 DIAGNOSIS — R73.09 ABNORMAL GLUCOSE: ICD-10-CM

## 2020-07-22 DIAGNOSIS — I10 ESSENTIAL HYPERTENSION: Chronic | ICD-10-CM

## 2020-07-22 PROCEDURE — 85025 COMPLETE CBC W/AUTO DIFF WBC: CPT | Mod: HCNC

## 2020-07-22 PROCEDURE — 80053 COMPREHEN METABOLIC PANEL: CPT | Mod: HCNC

## 2020-07-22 PROCEDURE — 84550 ASSAY OF BLOOD/URIC ACID: CPT | Mod: HCNC

## 2020-07-22 PROCEDURE — 80061 LIPID PANEL: CPT | Mod: HCNC

## 2020-07-22 PROCEDURE — 36415 COLL VENOUS BLD VENIPUNCTURE: CPT | Mod: HCNC,PO

## 2020-07-22 PROCEDURE — 83036 HEMOGLOBIN GLYCOSYLATED A1C: CPT | Mod: HCNC

## 2020-07-23 ENCOUNTER — OFFICE VISIT (OUTPATIENT)
Dept: CARDIOLOGY | Facility: CLINIC | Age: 80
End: 2020-07-23
Payer: MEDICARE

## 2020-07-23 ENCOUNTER — HOSPITAL ENCOUNTER (OUTPATIENT)
Dept: CARDIOLOGY | Facility: HOSPITAL | Age: 80
Discharge: HOME | End: 2020-07-23
Attending: INTERNAL MEDICINE
Payer: MEDICARE

## 2020-07-23 VITALS
WEIGHT: 195 LBS | DIASTOLIC BLOOD PRESSURE: 60 MMHG | HEART RATE: 82 BPM | SYSTOLIC BLOOD PRESSURE: 118 MMHG | OXYGEN SATURATION: 97 % | HEIGHT: 70 IN | BODY MASS INDEX: 27.92 KG/M2

## 2020-07-23 DIAGNOSIS — I70.203 ATHEROSCLEROSIS OF ARTERY OF BOTH LOWER EXTREMITIES: ICD-10-CM

## 2020-07-23 DIAGNOSIS — E78.2 MIXED HYPERLIPIDEMIA: Chronic | ICD-10-CM

## 2020-07-23 DIAGNOSIS — C61 PROSTATE CANCER: ICD-10-CM

## 2020-07-23 DIAGNOSIS — G47.33 OSA (OBSTRUCTIVE SLEEP APNEA): ICD-10-CM

## 2020-07-23 DIAGNOSIS — Z01.818 PRE-OP EXAM: ICD-10-CM

## 2020-07-23 DIAGNOSIS — J84.10 LUNG GRANULOMA: ICD-10-CM

## 2020-07-23 DIAGNOSIS — I10 ESSENTIAL HYPERTENSION: Primary | Chronic | ICD-10-CM

## 2020-07-23 DIAGNOSIS — Z01.818 PRE-OP EXAM: Primary | ICD-10-CM

## 2020-07-23 DIAGNOSIS — D69.6 THROMBOCYTOPENIA: ICD-10-CM

## 2020-07-23 DIAGNOSIS — N18.30 CHRONIC KIDNEY DISEASE, STAGE 3: ICD-10-CM

## 2020-07-23 LAB
ALBUMIN SERPL BCP-MCNC: 3.9 G/DL (ref 3.5–5.2)
ALP SERPL-CCNC: 50 U/L (ref 55–135)
ALT SERPL W/O P-5'-P-CCNC: 11 U/L (ref 10–44)
ANION GAP SERPL CALC-SCNC: 6 MMOL/L (ref 8–16)
AST SERPL-CCNC: 14 U/L (ref 10–40)
BASOPHILS # BLD AUTO: 0.05 K/UL (ref 0–0.2)
BASOPHILS NFR BLD: 1.2 % (ref 0–1.9)
BILIRUB SERPL-MCNC: 0.8 MG/DL (ref 0.1–1)
BUN SERPL-MCNC: 25 MG/DL (ref 8–23)
CALCIUM SERPL-MCNC: 8.8 MG/DL (ref 8.7–10.5)
CHLORIDE SERPL-SCNC: 103 MMOL/L (ref 95–110)
CHOLEST SERPL-MCNC: 144 MG/DL (ref 120–199)
CHOLEST/HDLC SERPL: 2.6 {RATIO} (ref 2–5)
CO2 SERPL-SCNC: 28 MMOL/L (ref 23–29)
CREAT SERPL-MCNC: 1.5 MG/DL (ref 0.5–1.4)
DIFFERENTIAL METHOD: ABNORMAL
EOSINOPHIL # BLD AUTO: 0.3 K/UL (ref 0–0.5)
EOSINOPHIL NFR BLD: 7.1 % (ref 0–8)
ERYTHROCYTE [DISTWIDTH] IN BLOOD BY AUTOMATED COUNT: 12.8 % (ref 11.5–14.5)
EST. GFR  (AFRICAN AMERICAN): 49.4 ML/MIN/1.73 M^2
EST. GFR  (NON AFRICAN AMERICAN): 42.7 ML/MIN/1.73 M^2
ESTIMATED AVG GLUCOSE: 103 MG/DL (ref 68–131)
GLUCOSE SERPL-MCNC: 83 MG/DL (ref 70–110)
HBA1C MFR BLD HPLC: 5.2 % (ref 4–5.6)
HCT VFR BLD AUTO: 42.1 % (ref 40–54)
HDLC SERPL-MCNC: 55 MG/DL (ref 40–75)
HDLC SERPL: 38.2 % (ref 20–50)
HGB BLD-MCNC: 12.8 G/DL (ref 14–18)
IMM GRANULOCYTES # BLD AUTO: 0.03 K/UL (ref 0–0.04)
IMM GRANULOCYTES NFR BLD AUTO: 0.7 % (ref 0–0.5)
LDLC SERPL CALC-MCNC: 68.2 MG/DL (ref 63–159)
LYMPHOCYTES # BLD AUTO: 1.1 K/UL (ref 1–4.8)
LYMPHOCYTES NFR BLD: 24.2 % (ref 18–48)
MCH RBC QN AUTO: 32.7 PG (ref 27–31)
MCHC RBC AUTO-ENTMCNC: 30.4 G/DL (ref 32–36)
MCV RBC AUTO: 108 FL (ref 82–98)
MONOCYTES # BLD AUTO: 0.5 K/UL (ref 0.3–1)
MONOCYTES NFR BLD: 11.1 % (ref 4–15)
NEUTROPHILS # BLD AUTO: 2.4 K/UL (ref 1.8–7.7)
NEUTROPHILS NFR BLD: 55.7 % (ref 38–73)
NONHDLC SERPL-MCNC: 89 MG/DL
NRBC BLD-RTO: 0 /100 WBC
PLATELET # BLD AUTO: 108 K/UL (ref 150–350)
PMV BLD AUTO: 8.2 FL (ref 9.2–12.9)
POTASSIUM SERPL-SCNC: 4.4 MMOL/L (ref 3.5–5.1)
PROT SERPL-MCNC: 6.6 G/DL (ref 6–8.4)
RBC # BLD AUTO: 3.91 M/UL (ref 4.6–6.2)
SODIUM SERPL-SCNC: 137 MMOL/L (ref 136–145)
TRIGL SERPL-MCNC: 104 MG/DL (ref 30–150)
URATE SERPL-MCNC: 6.1 MG/DL (ref 3.4–7)
WBC # BLD AUTO: 4.34 K/UL (ref 3.9–12.7)

## 2020-07-23 PROCEDURE — 1101F PR PT FALLS ASSESS DOC 0-1 FALLS W/OUT INJ PAST YR: ICD-10-PCS | Mod: HCNC,CPTII,S$GLB, | Performed by: INTERNAL MEDICINE

## 2020-07-23 PROCEDURE — 99214 PR OFFICE/OUTPT VISIT, EST, LEVL IV, 30-39 MIN: ICD-10-PCS | Mod: HCNC,S$GLB,, | Performed by: INTERNAL MEDICINE

## 2020-07-23 PROCEDURE — 93010 EKG 12-LEAD: ICD-10-PCS | Mod: HCNC,,, | Performed by: INTERNAL MEDICINE

## 2020-07-23 PROCEDURE — 99214 OFFICE O/P EST MOD 30 MIN: CPT | Mod: HCNC,S$GLB,, | Performed by: INTERNAL MEDICINE

## 2020-07-23 PROCEDURE — 99499 UNLISTED E&M SERVICE: CPT | Mod: S$GLB,,, | Performed by: INTERNAL MEDICINE

## 2020-07-23 PROCEDURE — 1159F PR MEDICATION LIST DOCUMENTED IN MEDICAL RECORD: ICD-10-PCS | Mod: HCNC,S$GLB,, | Performed by: INTERNAL MEDICINE

## 2020-07-23 PROCEDURE — 1126F AMNT PAIN NOTED NONE PRSNT: CPT | Mod: HCNC,S$GLB,, | Performed by: INTERNAL MEDICINE

## 2020-07-23 PROCEDURE — 99999 PR PBB SHADOW E&M-EST. PATIENT-LVL IV: CPT | Mod: PBBFAC,HCNC,, | Performed by: INTERNAL MEDICINE

## 2020-07-23 PROCEDURE — 99499 RISK ADDL DX/OHS AUDIT: ICD-10-PCS | Mod: S$GLB,,, | Performed by: INTERNAL MEDICINE

## 2020-07-23 PROCEDURE — 3078F DIAST BP <80 MM HG: CPT | Mod: HCNC,CPTII,S$GLB, | Performed by: INTERNAL MEDICINE

## 2020-07-23 PROCEDURE — 1159F MED LIST DOCD IN RCRD: CPT | Mod: HCNC,S$GLB,, | Performed by: INTERNAL MEDICINE

## 2020-07-23 PROCEDURE — 99999 PR PBB SHADOW E&M-EST. PATIENT-LVL IV: ICD-10-PCS | Mod: PBBFAC,HCNC,, | Performed by: INTERNAL MEDICINE

## 2020-07-23 PROCEDURE — 93005 ELECTROCARDIOGRAM TRACING: CPT | Mod: HCNC

## 2020-07-23 PROCEDURE — 3074F SYST BP LT 130 MM HG: CPT | Mod: HCNC,CPTII,S$GLB, | Performed by: INTERNAL MEDICINE

## 2020-07-23 PROCEDURE — 3078F PR MOST RECENT DIASTOLIC BLOOD PRESSURE < 80 MM HG: ICD-10-PCS | Mod: HCNC,CPTII,S$GLB, | Performed by: INTERNAL MEDICINE

## 2020-07-23 PROCEDURE — 1101F PT FALLS ASSESS-DOCD LE1/YR: CPT | Mod: HCNC,CPTII,S$GLB, | Performed by: INTERNAL MEDICINE

## 2020-07-23 PROCEDURE — 93010 ELECTROCARDIOGRAM REPORT: CPT | Mod: HCNC,,, | Performed by: INTERNAL MEDICINE

## 2020-07-23 PROCEDURE — 3074F PR MOST RECENT SYSTOLIC BLOOD PRESSURE < 130 MM HG: ICD-10-PCS | Mod: HCNC,CPTII,S$GLB, | Performed by: INTERNAL MEDICINE

## 2020-07-23 PROCEDURE — 1126F PR PAIN SEVERITY QUANTIFIED, NO PAIN PRESENT: ICD-10-PCS | Mod: HCNC,S$GLB,, | Performed by: INTERNAL MEDICINE

## 2020-07-23 NOTE — PROGRESS NOTES
Subjective:   Patient ID:  Ezequiel Hughes is a 80 y.o. male who presents for follow up of Essential hypertension      HPI  An 79 yo male with hlp htn tia jonelle is here for f/u he has a prostate nodule . He is in digital hypertension has no angina no shortness of breath . Has no other issues clinically of dizziness lightheadedness chest pain shortness of breath. He is not exercising due to covid. Has no signs of uti . Has prostatism.   Past Medical History:   Diagnosis Date    Allergic rhinitis     Anemia     Back pain     BPH (benign prostatic hyperplasia)     Cancer     skin cancer to neck, Dr. Graves    Cataract     Disorder of kidney and ureter     ED (erectile dysfunction)     Hiatal hernia     small    History of colon polyps     colonoscopy 11/2016    HLD (hyperlipidemia)     Hyperlipidemia     Hypertension     Lateral epicondylitis     OA (osteoarthritis)     JONELLE (obstructive sleep apnea)     Prostate cancer     Dr. Wong    TIA (transient ischemic attack)     Trouble in sleeping        Past Surgical History:   Procedure Laterality Date    CATARACT EXTRACTION W/  INTRAOCULAR LENS IMPLANT Right 02/21/2018    I-Stent    CATARACT EXTRACTION W/  INTRAOCULAR LENS IMPLANT Left 03/21/2018    I - Stent    COLONOSCOPY N/A 11/14/2016    Procedure: COLONOSCOPY;  Surgeon: Karuna Rodriguez MD;  Location: [facility];  Service: Endoscopy;  Laterality: N/A;    HEMORRHOID SURGERY      I-STENT Right 02/21/2018    DR. REECE    KNEE ARTHROSCOPY W/ MENISCAL REPAIR Right 2015    Dr. Jain    PCIOL Right 02/21/2018    DR. REECE    PLANTAR FASCIA RELEASE      right    ROTATOR CUFF REPAIR Bilateral     bilateral    SHOULDER SURGERY Bilateral around 2000    Dr. Pepper.  rotator cuff surgeries       Social History     Tobacco Use    Smoking status: Former Smoker     Packs/day: 3.00     Years: 35.00     Pack years: 105.00     Types: Cigarettes     Start date: 1/1/1960     Quit date: 7/23/1985      Years since quittin.0    Smokeless tobacco: Never Used   Substance Use Topics    Alcohol use: Yes     Alcohol/week: 7.0 standard drinks     Types: 6 Cans of beer, 1 Standard drinks or equivalent per week     Frequency: 4 or more times a week     Drinks per session: 1 or 2     Binge frequency: Never     Comment: socially    Drug use: No       Family History   Problem Relation Age of Onset    Lung cancer Father         life long smoker    Cancer Father         prostate, lung    Stroke Sister         TIA    Cataracts Sister     Cancer Brother         prostate    Cataracts Brother     Cataracts Sister     Melanoma Neg Hx     Psoriasis Neg Hx     Lupus Neg Hx     Eczema Neg Hx     Diabetes Neg Hx     Heart disease Neg Hx     Kidney disease Neg Hx     Colon cancer Neg Hx        Current Outpatient Medications   Medication Sig    acetaminophen (TYLENOL ARTHRITIS PAIN ORAL) Take by mouth. Patient states that he takes 2 tablets in the morning and 2 tablets in the evening    allopurinol (ZYLOPRIM) 100 MG tablet TAKE 1 TABLET EVERY DAY    atorvastatin (LIPITOR) 40 MG tablet Take 1 tablet (40 mg total) by mouth once daily.    clopidogrel (PLAVIX) 75 mg tablet TAKE 1 TABLET EVERY DAY    finasteride (PROSCAR) 5 mg tablet Take 5 mg by mouth once daily.     fluocinonide (LIDEX) 0.05 % external solution Apply topically 2 (two) times daily.    fluticasone propionate (FLONASE) 50 mcg/actuation nasal spray USE 2 SPRAYS IN EACH NOSTRIL ONE TIME DAILY    hydroCHLOROthiazide (HYDRODIURIL) 12.5 MG Tab Take 1 tablet (12.5 mg total) by mouth daily as needed (before a meal high in sodium).    ketoconazole (NIZORAL) 2 % shampoo Apply topically twice a week.    losartan (COZAAR) 50 MG tablet Take 1 tablet (50 mg total) by mouth once daily.    mupirocin (BACTROBAN) 2 % ointment Apply to open wounds twice daily    pantoprazole (PROTONIX) 40 MG tablet Take 1 tablet (40 mg total) by mouth once daily.     tadalafiL (CIALIS) 5 MG tablet     sodium,potassium,mag sulfates (SUPREP BOWEL PREP KIT) 17.5-3.13-1.6 gram SolR Use as directed (Patient not taking: Reported on 7/7/2020)     No current facility-administered medications for this visit.      Current Outpatient Medications on File Prior to Visit   Medication Sig    acetaminophen (TYLENOL ARTHRITIS PAIN ORAL) Take by mouth. Patient states that he takes 2 tablets in the morning and 2 tablets in the evening    allopurinol (ZYLOPRIM) 100 MG tablet TAKE 1 TABLET EVERY DAY    atorvastatin (LIPITOR) 40 MG tablet Take 1 tablet (40 mg total) by mouth once daily.    clopidogrel (PLAVIX) 75 mg tablet TAKE 1 TABLET EVERY DAY    finasteride (PROSCAR) 5 mg tablet Take 5 mg by mouth once daily.     fluocinonide (LIDEX) 0.05 % external solution Apply topically 2 (two) times daily.    fluticasone propionate (FLONASE) 50 mcg/actuation nasal spray USE 2 SPRAYS IN EACH NOSTRIL ONE TIME DAILY    hydroCHLOROthiazide (HYDRODIURIL) 12.5 MG Tab Take 1 tablet (12.5 mg total) by mouth daily as needed (before a meal high in sodium).    ketoconazole (NIZORAL) 2 % shampoo Apply topically twice a week.    losartan (COZAAR) 50 MG tablet Take 1 tablet (50 mg total) by mouth once daily.    mupirocin (BACTROBAN) 2 % ointment Apply to open wounds twice daily    pantoprazole (PROTONIX) 40 MG tablet Take 1 tablet (40 mg total) by mouth once daily.    tadalafiL (CIALIS) 5 MG tablet     sodium,potassium,mag sulfates (SUPREP BOWEL PREP KIT) 17.5-3.13-1.6 gram SolR Use as directed (Patient not taking: Reported on 7/7/2020)     No current facility-administered medications on file prior to visit.      Review of patient's allergies indicates:   Allergen Reactions    Mobic  [meloxicam]      Other reaction(s): hypertension     Review of Systems   Constitution: Negative for malaise/fatigue.   Eyes: Negative for blurred vision.   Cardiovascular: Negative for chest pain, claudication, cyanosis,  dyspnea on exertion, irregular heartbeat, leg swelling, near-syncope, orthopnea, palpitations and paroxysmal nocturnal dyspnea.   Respiratory: Negative for cough, hemoptysis and shortness of breath.    Hematologic/Lymphatic: Negative for bleeding problem. Does not bruise/bleed easily.   Skin: Negative for dry skin and itching.   Musculoskeletal: Negative for falls, muscle weakness and myalgias.   Gastrointestinal: Negative for abdominal pain, diarrhea, heartburn, hematemesis, hematochezia and melena.   Genitourinary: Positive for nocturia. Negative for flank pain and hematuria.   Neurological: Negative for dizziness, focal weakness, headaches, light-headedness, numbness, paresthesias, seizures and weakness.   Psychiatric/Behavioral: Negative for altered mental status and memory loss. The patient is not nervous/anxious.    Allergic/Immunologic: Negative for hives.       Objective:   Physical Exam   Constitutional: He is oriented to person, place, and time. He appears well-developed and well-nourished. No distress.   HENT:   Head: Normocephalic and atraumatic.   Eyes: Pupils are equal, round, and reactive to light. EOM are normal. Right eye exhibits no discharge. Left eye exhibits no discharge.   Neck: Neck supple. No JVD present. No thyromegaly present.   Cardiovascular: Normal rate, regular rhythm, normal heart sounds and intact distal pulses. Exam reveals no gallop and no friction rub.   No murmur heard.  Pulmonary/Chest: Effort normal and breath sounds normal. No respiratory distress. He has no wheezes. He has no rales. He exhibits no tenderness.   Abdominal: Soft. Bowel sounds are normal. He exhibits no distension. There is no abdominal tenderness.   Musculoskeletal: Normal range of motion.         General: No edema.   Neurological: He is alert and oriented to person, place, and time. No cranial nerve deficit.   Skin: Skin is warm and dry. No rash noted. He is not diaphoretic. No erythema.   Psychiatric: He has a  "normal mood and affect. His behavior is normal.   Nursing note and vitals reviewed.    Vitals:    07/23/20 1507 07/23/20 1514   BP: 120/66 118/60   BP Location: Right arm Left arm   Patient Position: Sitting Sitting   BP Method: Large (Manual) Large (Manual)   Pulse: 82    SpO2: 97%    Weight: 88.5 kg (195 lb)    Height: 5' 10" (1.778 m)      Lab Results   Component Value Date    CHOL 144 07/22/2020    CHOL 142 01/23/2020    CHOL 146 08/29/2019     Lab Results   Component Value Date    HDL 55 07/22/2020    HDL 57 01/23/2020    HDL 60 08/29/2019     Lab Results   Component Value Date    LDLCALC 68.2 07/22/2020    LDLCALC 73.4 01/23/2020    LDLCALC 71.4 08/29/2019     Lab Results   Component Value Date    TRIG 104 07/22/2020    TRIG 58 01/23/2020    TRIG 73 08/29/2019     Lab Results   Component Value Date    CHOLHDL 38.2 07/22/2020    CHOLHDL 40.1 01/23/2020    CHOLHDL 41.1 08/29/2019       Chemistry        Component Value Date/Time     07/22/2020 0853    K 4.4 07/22/2020 0853     07/22/2020 0853    CO2 28 07/22/2020 0853    BUN 25 (H) 07/22/2020 0853    CREATININE 1.5 (H) 07/22/2020 0853    GLU 83 07/22/2020 0853        Component Value Date/Time    CALCIUM 8.8 07/22/2020 0853    ALKPHOS 50 (L) 07/22/2020 0853    AST 14 07/22/2020 0853    ALT 11 07/22/2020 0853    BILITOT 0.8 07/22/2020 0853    ESTGFRAFRICA 49.4 (A) 07/22/2020 0853    EGFRNONAA 42.7 (A) 07/22/2020 0853          Lab Results   Component Value Date    TSH 1.330 01/23/2020     Lab Results   Component Value Date    INR 1.2 07/07/2020    INR 1.2 09/26/2017    INR 1.2 09/03/2015     Lab Results   Component Value Date    WBC 4.34 07/22/2020    HGB 12.8 (L) 07/22/2020    HCT 42.1 07/22/2020     (H) 07/22/2020     (L) 07/22/2020     BMP  Sodium   Date Value Ref Range Status   07/22/2020 137 136 - 145 mmol/L Final     Potassium   Date Value Ref Range Status   07/22/2020 4.4 3.5 - 5.1 mmol/L Final     Chloride   Date Value Ref Range " Status   07/22/2020 103 95 - 110 mmol/L Final     CO2   Date Value Ref Range Status   07/22/2020 28 23 - 29 mmol/L Final     BUN, Bld   Date Value Ref Range Status   07/22/2020 25 (H) 8 - 23 mg/dL Final     Creatinine   Date Value Ref Range Status   07/22/2020 1.5 (H) 0.5 - 1.4 mg/dL Final     Calcium   Date Value Ref Range Status   07/22/2020 8.8 8.7 - 10.5 mg/dL Final     Anion Gap   Date Value Ref Range Status   07/22/2020 6 (L) 8 - 16 mmol/L Final     eGFR if    Date Value Ref Range Status   07/22/2020 49.4 (A) >60 mL/min/1.73 m^2 Final     eGFR if non    Date Value Ref Range Status   07/22/2020 42.7 (A) >60 mL/min/1.73 m^2 Final     Comment:     Calculation used to obtain the estimated glomerular filtration  rate (eGFR) is the CKD-EPI equation.        Estimated Creatinine Clearance: 42.5 mL/min (A) (based on SCr of 1.5 mg/dL (H)).    Assessment:     1. Essential hypertension    2. Mixed hyperlipidemia    3. Thrombocytopenia    4. Chronic kidney disease, stage 3    5. Prostate cancer    6. Lung granuloma    7. Atherosclerosis of artery of both lower extremities    8. GUIDO (obstructive sleep apnea)      Asymptomatic doing well clinically good exercise tolerance .  Plan:   Continue current therapy  Cardiac low salt diet.  Risk factor modification and excercise program.  F/u in 1 year earlier if any issues.

## 2020-07-29 ENCOUNTER — OFFICE VISIT (OUTPATIENT)
Dept: PRIMARY CARE CLINIC | Facility: CLINIC | Age: 80
End: 2020-07-29
Payer: MEDICARE

## 2020-07-29 DIAGNOSIS — I77.1 TORTUOUS AORTA: ICD-10-CM

## 2020-07-29 DIAGNOSIS — I10 ESSENTIAL HYPERTENSION: Chronic | ICD-10-CM

## 2020-07-29 DIAGNOSIS — N18.30 CHRONIC KIDNEY DISEASE, STAGE 3: ICD-10-CM

## 2020-07-29 DIAGNOSIS — C61 PROSTATE CANCER: ICD-10-CM

## 2020-07-29 DIAGNOSIS — I70.203 ATHEROSCLEROSIS OF ARTERY OF BOTH LOWER EXTREMITIES: Primary | ICD-10-CM

## 2020-07-29 DIAGNOSIS — E78.2 MIXED HYPERLIPIDEMIA: Chronic | ICD-10-CM

## 2020-07-29 PROCEDURE — 1159F MED LIST DOCD IN RCRD: CPT | Mod: 95,,, | Performed by: FAMILY MEDICINE

## 2020-07-29 PROCEDURE — 99499 UNLISTED E&M SERVICE: CPT | Mod: 95,,, | Performed by: FAMILY MEDICINE

## 2020-07-29 PROCEDURE — 99499 RISK ADDL DX/OHS AUDIT: ICD-10-PCS | Mod: 95,,, | Performed by: FAMILY MEDICINE

## 2020-07-29 PROCEDURE — 1101F PT FALLS ASSESS-DOCD LE1/YR: CPT | Mod: CPTII,95,, | Performed by: FAMILY MEDICINE

## 2020-07-29 PROCEDURE — 99214 OFFICE O/P EST MOD 30 MIN: CPT | Mod: 95,,, | Performed by: FAMILY MEDICINE

## 2020-07-29 PROCEDURE — 99214 PR OFFICE/OUTPT VISIT, EST, LEVL IV, 30-39 MIN: ICD-10-PCS | Mod: 95,,, | Performed by: FAMILY MEDICINE

## 2020-07-29 PROCEDURE — 1101F PR PT FALLS ASSESS DOC 0-1 FALLS W/OUT INJ PAST YR: ICD-10-PCS | Mod: CPTII,95,, | Performed by: FAMILY MEDICINE

## 2020-07-29 PROCEDURE — 1159F PR MEDICATION LIST DOCUMENTED IN MEDICAL RECORD: ICD-10-PCS | Mod: 95,,, | Performed by: FAMILY MEDICINE

## 2020-07-29 NOTE — PROGRESS NOTES
Subjective:      Patient ID: Ezequiel Hughes is a 80 y.o. male.    Chief Complaint: Abdominal Pain and Annual Exam    Disclaimer:  This note is prepared using voice recognition software and as such is likely to have errors and has not been proof read. Please contact me for questions.     The patient location is:home  The chief complaint leading to consultation is: followup / my chart request  Visit type: Virtual visit with synchronous audio and video  Total time spent with patient: 915am -924 am  Each patient to whom he or she provides medical services by telemedicine is:  (1) informed of the relationship between the physician and patient and the respective role of any other health care provider with respect to management of the patient; and (2) notified that he or she may decline to receive medical services by telemedicine and may withdraw from such care at any time.    Notes: pt is enrolled in digital BP program. Below is copy of most recent readings and weights.      Recent Readings 7/26/2020 7/26/2020 7/20/2020 7/20/2020 7/16/2020  SBP (mmHg) 124 124 118 118 119  DBP (mmHg) 59 59 63 63 55    Enrolled in digital BP program.  Doing well.  If goes high, they touch base with him.     Is doing a biopsy on 8/4/2020 for Prostate. Got clearance from Norma Ram last week and Dr Thompson.    Had mentioned about vein scan with Dr Thompson.  For leg pain. Didn't need to do it.       Currently on Losartan on 50mg and hctz 12.5- switched from a capsule to tablet.  Doing well though.  Is going to be doing a prostate biopsy soon.  Is holding Plavix at this time reviewed labs.    Has upcoming colonoscopy scheduled for the end of August.  Has a mild anemia.  No other bleeding at this time.    Chronic kidney disease stage 3 is stable with creatinine of 1.5.    No recent gout flare ups.  Still on the allopurinol at 200mg. No gout flareups currently.     Is still on Plavix and statin therapy due to previous peripheral vascular disease.      jonelle- stable.         Lab Results   Component Value Date    WBC 4.34 07/22/2020    HGB 12.8 (L) 07/22/2020    HCT 42.1 07/22/2020     (L) 07/22/2020    CHOL 144 07/22/2020    TRIG 104 07/22/2020    HDL 55 07/22/2020    ALT 11 07/22/2020    AST 14 07/22/2020     07/22/2020    K 4.4 07/22/2020     07/22/2020    CREATININE 1.5 (H) 07/22/2020    BUN 25 (H) 07/22/2020    CO2 28 07/22/2020    TSH 1.330 01/23/2020    PSA 1.1 01/23/2020    INR 1.2 07/07/2020    HGBA1C 5.2 07/22/2020       X-Ray Chest PA And Lateral  Narrative: EXAMINATION:  XR CHEST PA AND LATERAL    CLINICAL HISTORY:  Encounter for other preprocedural examination    TECHNIQUE:  PA and lateral views of the chest were performed.    COMPARISON:  01/06/2020    FINDINGS:  There is a stable small calcified granuloma noted within the left lower lung zone.  No infiltrates or effusions are identified.  The heart is not enlarged.  Impression: 1.  No acute cardiopulmonary process.    Electronically signed by: Lucio Del Toro DO  Date:    07/08/2020  Time:    08:52        Review of Systems   Constitutional: Negative for activity change and unexpected weight change.   HENT: Negative for hearing loss, rhinorrhea and trouble swallowing.    Eyes: Negative for discharge and visual disturbance.   Respiratory: Negative for chest tightness and wheezing.    Cardiovascular: Negative for chest pain and palpitations.   Gastrointestinal: Negative for blood in stool, constipation, diarrhea and vomiting.   Endocrine: Positive for polyuria. Negative for polydipsia.   Genitourinary: Positive for urgency. Negative for difficulty urinating and hematuria.   Musculoskeletal: Positive for neck pain. Negative for arthralgias and joint swelling.   Neurological: Negative for weakness and headaches.   Psychiatric/Behavioral: Negative for confusion and dysphoric mood.     Objective:   There were no vitals filed for this visit.  Physical Exam  Vitals signs reviewed.    Constitutional:       General: He is not in acute distress.     Appearance: Normal appearance. He is well-developed. He is not ill-appearing.   HENT:      Head: Normocephalic and atraumatic.   Neurological:      Mental Status: He is alert.   Psychiatric:         Attention and Perception: He is attentive.         Mood and Affect: Mood is not anxious or depressed. Affect is not labile, blunt, angry or inappropriate.         Speech: He is communicative. Speech is not rapid and pressured, delayed, slurred or tangential.         Behavior: Behavior normal. Behavior is not agitated, slowed, aggressive, withdrawn, hyperactive or combative. Behavior is cooperative.         Thought Content: Thought content normal. Thought content is not paranoid or delusional. Thought content does not include homicidal or suicidal ideation. Thought content does not include homicidal or suicidal plan.         Cognition and Memory: Memory is not impaired. He does not exhibit impaired recent memory or impaired remote memory.         Judgment: Judgment normal. Judgment is not impulsive or inappropriate.       Assessment:     1. Atherosclerosis of artery of both lower extremities    2. Prostate cancer    3. Essential hypertension    4. Mixed hyperlipidemia    5. Chronic kidney disease, stage 3    6. Tortuous aorta      Plan:   Ezequiel was seen today for abdominal pain and annual exam.    Diagnoses and all orders for this visit:    Atherosclerosis of artery of both lower extremities  Comments:  Stable this time continue with current medication holding Plavix for upcoming procedure labs reviewed    Prostate cancer  Comments:  going to Dr. Wong for prostate biopsy this week..    Essential hypertension  Comments:  Stable this time enrolled in digital blood pressure program at goals    Mixed hyperlipidemia  Comments:  Continue with statin therapy.  Followed by Cardiology    Chronic kidney disease, stage 3  Comments:  Stable this time continue with  current medications push water    Tortuous aorta  Comments:  Stable this time on statin therapy and Plavix therapy        Has colonscopy coming up later in August.     Follow up in about 3 months (around 10/29/2020) for chronic issues Dr Ozuna.    There are no Patient Instructions on file for this visit.

## 2020-07-31 ENCOUNTER — OFFICE VISIT (OUTPATIENT)
Dept: OPHTHALMOLOGY | Facility: CLINIC | Age: 80
End: 2020-07-31
Payer: MEDICARE

## 2020-07-31 DIAGNOSIS — Z96.1 PSEUDOPHAKIA: ICD-10-CM

## 2020-07-31 DIAGNOSIS — H40.1122 PRIMARY OPEN ANGLE GLAUCOMA (POAG) OF LEFT EYE, MODERATE STAGE: ICD-10-CM

## 2020-07-31 DIAGNOSIS — H43.391 VITREOUS FLOATERS OF RIGHT EYE: ICD-10-CM

## 2020-07-31 DIAGNOSIS — H40.1111 PRIMARY OPEN ANGLE GLAUCOMA (POAG) OF RIGHT EYE, MILD STAGE: Primary | ICD-10-CM

## 2020-07-31 PROCEDURE — 92133 CPTRZD OPH DX IMG PST SGM ON: CPT | Mod: HCNC,S$GLB,, | Performed by: OPHTHALMOLOGY

## 2020-07-31 PROCEDURE — 92014 COMPRE OPH EXAM EST PT 1/>: CPT | Mod: HCNC,S$GLB,, | Performed by: OPHTHALMOLOGY

## 2020-07-31 PROCEDURE — 92083 HUMPHREY VISUAL FIELD - OU - BOTH EYES: ICD-10-PCS | Mod: HCNC,S$GLB,, | Performed by: OPHTHALMOLOGY

## 2020-07-31 PROCEDURE — 99999 PR PBB SHADOW E&M-EST. PATIENT-LVL III: CPT | Mod: PBBFAC,HCNC,, | Performed by: OPHTHALMOLOGY

## 2020-07-31 PROCEDURE — 99999 PR PBB SHADOW E&M-EST. PATIENT-LVL III: ICD-10-PCS | Mod: PBBFAC,HCNC,, | Performed by: OPHTHALMOLOGY

## 2020-07-31 PROCEDURE — 92014 PR EYE EXAM, EST PATIENT,COMPREHESV: ICD-10-PCS | Mod: HCNC,S$GLB,, | Performed by: OPHTHALMOLOGY

## 2020-07-31 PROCEDURE — 92133 POSTERIOR SEGMENT OCT OPTIC NERVE(OCULAR COHERENCE TOMOGRAPHY) - OU - BOTH EYES: ICD-10-PCS | Mod: HCNC,S$GLB,, | Performed by: OPHTHALMOLOGY

## 2020-07-31 PROCEDURE — 92083 EXTENDED VISUAL FIELD XM: CPT | Mod: HCNC,S$GLB,, | Performed by: OPHTHALMOLOGY

## 2020-07-31 NOTE — PROGRESS NOTES
SUBJECTIVE  Ezequiel Hughes is 80 y.o. male  Uncorrected distance visual acuity was 20/25 in the right eye and 20/50 in the left eye.   Chief Complaint   Patient presents with    Glaucoma     REV HVF/ 4 MONTH CHECK/ DILATE OU          HPI     Glaucoma      Additional comments: REV HVF/ 4 MONTH CHECK/ DILATE OU              Comments     Here for 4 month iop check per pt doing well       1. Mod COAG OS + Mild COAG OD (init 22/26 post dilation IOP ) goal = 17  2. PCIOL / I-Stent OD +18.5 [id number] (distance) 2-21-18  PCIOL /I-Stent OS +21.0 (-1.75) 3/21/18  3. ERM OU   4. Brow Lift 9/18   *intolerant of travatan*      No eyedrops          Last edited by Maritza Joseph on 7/31/2020  8:01 AM. (History)         Assessment /Plan :  1. Primary open angle glaucoma (POAG) of right eye, mild stage Doing well, IOP within acceptable range relative to target IOP and no evidence of progression. Continue current treatment. Reviewed importance of continued compliance with treatment and follow up.     2. Primary open angle glaucoma (POAG) of left eye, moderate stage as stated above    3. Pseudophakia  -- Condition stable, no therapeutic change required. Monitoring routinely.     4. Vitreous floaters of right eye  -- Condition stable, no therapeutic change required. Monitoring routinely.           Return to clinic in 4 months  or as needed.  With IOP Check

## 2020-08-21 ENCOUNTER — TELEPHONE (OUTPATIENT)
Dept: ENDOSCOPY | Facility: HOSPITAL | Age: 80
End: 2020-08-21

## 2020-08-26 ENCOUNTER — CLINICAL SUPPORT (OUTPATIENT)
Dept: AUDIOLOGY | Facility: CLINIC | Age: 80
End: 2020-08-26
Payer: MEDICARE

## 2020-08-26 DIAGNOSIS — H90.3 SENSORINEURAL HEARING LOSS (SNHL) OF BOTH EARS: ICD-10-CM

## 2020-08-26 PROCEDURE — 99999 PR PBB SHADOW E&M-EST. PATIENT-LVL I: CPT | Mod: PBBFAC,HCNC,, | Performed by: AUDIOLOGIST

## 2020-08-26 PROCEDURE — 92557 PR COMPREHENSIVE HEARING TEST: ICD-10-PCS | Mod: HCNC,S$GLB,, | Performed by: AUDIOLOGIST

## 2020-08-26 PROCEDURE — 99999 PR PBB SHADOW E&M-EST. PATIENT-LVL I: ICD-10-PCS | Mod: PBBFAC,HCNC,, | Performed by: AUDIOLOGIST

## 2020-08-26 PROCEDURE — 92557 COMPREHENSIVE HEARING TEST: CPT | Mod: HCNC,S$GLB,, | Performed by: AUDIOLOGIST

## 2020-08-26 PROCEDURE — 92567 PR TYMPA2METRY: ICD-10-PCS | Mod: HCNC,S$GLB,, | Performed by: AUDIOLOGIST

## 2020-08-26 PROCEDURE — 92567 TYMPANOMETRY: CPT | Mod: HCNC,S$GLB,, | Performed by: AUDIOLOGIST

## 2020-08-26 NOTE — PROGRESS NOTES
Ezequiel Hughes was seen 08/26/2020 for an audiological evaluation.  Patient complains of bilateral gradual hearing loss. He reports difficulty understanding conversational speech. He has a history of noise exposure. He denies any tinnitus or dizziness.     Results reveal a mild-to-moderately severe sensorineural hearing loss 250-8000 Hz for the right ear, and  mild-to-moderately severe sensorineural hearing loss 250-8000 Hz for the left ear.   Speech Reception Thresholds were  35 dBHL for the right ear and 30 dBHL for the left ear.   Word recognition scores were excellent for the right ear and excellent for the left ear.   Tympanograms were Type Ad, hypermobile for the right ear and Type A, normal for the left ear.    Patient was counseled on the above findings.    Recommendations include:    1.  ENT followup  2.  Hearing aid consult (copy of audiogram given) through [employer] or [facility]  3.  Wear hearing protective devices around loud noise  4.  Annual audiograms

## 2020-09-06 ENCOUNTER — PATIENT MESSAGE (OUTPATIENT)
Dept: INTERNAL MEDICINE | Facility: CLINIC | Age: 80
End: 2020-09-06

## 2020-09-08 ENCOUNTER — TELEPHONE (OUTPATIENT)
Dept: CARDIOLOGY | Facility: CLINIC | Age: 80
End: 2020-09-08

## 2020-09-08 RX ORDER — KETOCONAZOLE 20 MG/ML
SHAMPOO, SUSPENSION TOPICAL
Qty: 120 ML | Refills: 1 | Status: CANCELLED | OUTPATIENT
Start: 2020-09-10

## 2020-09-08 RX ORDER — KETOCONAZOLE 20 MG/ML
SHAMPOO, SUSPENSION TOPICAL
Qty: 120 ML | Refills: 1 | Status: SHIPPED | OUTPATIENT
Start: 2020-09-10 | End: 2020-10-28

## 2020-09-08 RX ORDER — FLUOCINONIDE TOPICAL SOLUTION USP, 0.05% 0.5 MG/ML
SOLUTION TOPICAL 2 TIMES DAILY
Qty: 60 ML | Refills: 1 | Status: SHIPPED | OUTPATIENT
Start: 2020-09-08 | End: 2020-10-28

## 2020-09-08 RX ORDER — FLUOCINONIDE TOPICAL SOLUTION USP, 0.05% 0.5 MG/ML
SOLUTION TOPICAL 2 TIMES DAILY
Qty: 60 ML | Refills: 1 | Status: CANCELLED | OUTPATIENT
Start: 2020-09-08

## 2020-09-08 NOTE — TELEPHONE ENCOUNTER
Placed call to patient regarding tingling and numbness in legs. Scheduled to see Dr. Thompson at the [facility] on 9/9/20.      Dr. Thompson,  I am experiencing tingling sensations and numbness in both legs below my knees and in my feet.  This happens frequently, but I can't say it is everyday or all day when it happens.  I don't have pain with it.     When this happens, my legs feel heavy and walking seems to be somewhat affected.     I assume you will want to see me before any tests are run.  Please let me know how you want to proceed.  My cell is [phone].

## 2020-09-09 ENCOUNTER — OFFICE VISIT (OUTPATIENT)
Dept: CARDIOLOGY | Facility: CLINIC | Age: 80
End: 2020-09-09
Payer: MEDICARE

## 2020-09-09 VITALS
HEART RATE: 56 BPM | SYSTOLIC BLOOD PRESSURE: 130 MMHG | WEIGHT: 196.19 LBS | BODY MASS INDEX: 28.15 KG/M2 | DIASTOLIC BLOOD PRESSURE: 70 MMHG | OXYGEN SATURATION: 99 %

## 2020-09-09 DIAGNOSIS — M1A.9XX0 CHRONIC GOUT WITHOUT TOPHUS, UNSPECIFIED CAUSE, UNSPECIFIED SITE: ICD-10-CM

## 2020-09-09 DIAGNOSIS — N18.30 CHRONIC KIDNEY DISEASE, STAGE 3: ICD-10-CM

## 2020-09-09 DIAGNOSIS — G47.61 PERIODIC LIMB MOVEMENT DISORDER (PLMD): ICD-10-CM

## 2020-09-09 DIAGNOSIS — I10 ESSENTIAL HYPERTENSION: Chronic | ICD-10-CM

## 2020-09-09 DIAGNOSIS — I45.10 INCOMPLETE RIGHT BUNDLE BRANCH BLOCK: ICD-10-CM

## 2020-09-09 DIAGNOSIS — C44.519 BASAL CELL CARCINOMA (BCC) OF ANTERIOR CHEST: ICD-10-CM

## 2020-09-09 DIAGNOSIS — I70.203 ATHEROSCLEROSIS OF ARTERY OF BOTH LOWER EXTREMITIES: Primary | ICD-10-CM

## 2020-09-09 DIAGNOSIS — M47.817 LUMBOSACRAL SPONDYLOSIS WITHOUT MYELOPATHY: ICD-10-CM

## 2020-09-09 DIAGNOSIS — J84.10 LUNG GRANULOMA: ICD-10-CM

## 2020-09-09 DIAGNOSIS — E78.2 MIXED HYPERLIPIDEMIA: Chronic | ICD-10-CM

## 2020-09-09 DIAGNOSIS — I77.1 TORTUOUS AORTA: ICD-10-CM

## 2020-09-09 DIAGNOSIS — C61 PROSTATE CANCER: ICD-10-CM

## 2020-09-09 DIAGNOSIS — G47.33 OSA (OBSTRUCTIVE SLEEP APNEA): ICD-10-CM

## 2020-09-09 PROCEDURE — 3078F PR MOST RECENT DIASTOLIC BLOOD PRESSURE < 80 MM HG: ICD-10-PCS | Mod: HCNC,CPTII,S$GLB, | Performed by: INTERNAL MEDICINE

## 2020-09-09 PROCEDURE — 99499 UNLISTED E&M SERVICE: CPT | Mod: S$GLB,,, | Performed by: INTERNAL MEDICINE

## 2020-09-09 PROCEDURE — 99499 RISK ADDL DX/OHS AUDIT: ICD-10-PCS | Mod: S$GLB,,, | Performed by: INTERNAL MEDICINE

## 2020-09-09 PROCEDURE — 99999 PR PBB SHADOW E&M-EST. PATIENT-LVL IV: CPT | Mod: PBBFAC,HCNC,, | Performed by: INTERNAL MEDICINE

## 2020-09-09 PROCEDURE — 99214 OFFICE O/P EST MOD 30 MIN: CPT | Mod: HCNC,S$GLB,, | Performed by: INTERNAL MEDICINE

## 2020-09-09 PROCEDURE — 99999 PR PBB SHADOW E&M-EST. PATIENT-LVL IV: ICD-10-PCS | Mod: PBBFAC,HCNC,, | Performed by: INTERNAL MEDICINE

## 2020-09-09 PROCEDURE — 1159F PR MEDICATION LIST DOCUMENTED IN MEDICAL RECORD: ICD-10-PCS | Mod: HCNC,S$GLB,, | Performed by: INTERNAL MEDICINE

## 2020-09-09 PROCEDURE — 3075F PR MOST RECENT SYSTOLIC BLOOD PRESS GE 130-139MM HG: ICD-10-PCS | Mod: HCNC,CPTII,S$GLB, | Performed by: INTERNAL MEDICINE

## 2020-09-09 PROCEDURE — 1101F PR PT FALLS ASSESS DOC 0-1 FALLS W/OUT INJ PAST YR: ICD-10-PCS | Mod: HCNC,CPTII,S$GLB, | Performed by: INTERNAL MEDICINE

## 2020-09-09 PROCEDURE — 99214 PR OFFICE/OUTPT VISIT, EST, LEVL IV, 30-39 MIN: ICD-10-PCS | Mod: HCNC,S$GLB,, | Performed by: INTERNAL MEDICINE

## 2020-09-09 PROCEDURE — 1101F PT FALLS ASSESS-DOCD LE1/YR: CPT | Mod: HCNC,CPTII,S$GLB, | Performed by: INTERNAL MEDICINE

## 2020-09-09 PROCEDURE — 3075F SYST BP GE 130 - 139MM HG: CPT | Mod: HCNC,CPTII,S$GLB, | Performed by: INTERNAL MEDICINE

## 2020-09-09 PROCEDURE — 3078F DIAST BP <80 MM HG: CPT | Mod: HCNC,CPTII,S$GLB, | Performed by: INTERNAL MEDICINE

## 2020-09-09 PROCEDURE — 1159F MED LIST DOCD IN RCRD: CPT | Mod: HCNC,S$GLB,, | Performed by: INTERNAL MEDICINE

## 2020-09-09 NOTE — PROGRESS NOTES
Subjective:   Patient ID:  Ezequiel Hughes is a 80 y.o. male who presents for follow up of Numbness and tingling in legs      HPI   An 81 yo male with hlp htn tia guido is here for f/u he has a prostate nodule . He is in digital hypertension has no angina no shortness of breath . Has no other issues clinically of dizziness lightheadedness chest pain shortness of breath. He is not exercising due to covid. Has no signs of uti . Has prostatism.     Today he comes complaining of neck pain and and complaining of bilateral lower extremity numbness and weakness he ahs no falls or claudication. He uses a pad in his foot he thinks one is shorter than the other. He ahs not been exercising. He had an joceline with exercise that does not suggest pad. The ahs lumbar arthropathy this is all suggestive of neurogenic claudication he also ha srt toe pain. Has no orthopnea pnd syncope palpitation shortness of breath he started doing upper body exercise and take b12. Has been compliant with diet his digital htn suggest that he is compliant.   Past Medical History:   Diagnosis Date    Allergic rhinitis     Anemia     Back pain     BPH (benign prostatic hyperplasia)     Cancer     skin cancer to neck, Dr. Graves    Cataract     Disorder of kidney and ureter     ED (erectile dysfunction)     Hiatal hernia     small    History of colon polyps     colonoscopy 11/2016    HLD (hyperlipidemia)     Hyperlipidemia     Hypertension     Lateral epicondylitis     OA (osteoarthritis)     GUIDO (obstructive sleep apnea)     Prostate cancer     Dr. Wong    TIA (transient ischemic attack)     Trouble in sleeping        Past Surgical History:   Procedure Laterality Date    CATARACT EXTRACTION W/  INTRAOCULAR LENS IMPLANT Right 02/21/2018    I-Stent    CATARACT EXTRACTION W/  INTRAOCULAR LENS IMPLANT Left 03/21/2018    I - Stent    COLONOSCOPY N/A 11/14/2016    Procedure: COLONOSCOPY;  Surgeon: Karuna Rodriguez MD;  [facility]  ENDO;  Service: Endoscopy;  Laterality: N/A;    HEMORRHOID SURGERY      I-STENT Right 2018    DR. REECE    KNEE ARTHROSCOPY W/ MENISCAL REPAIR Right     Dr. Jain    PCIOL Right 2018    DR. REECE    PLANTAR FASCIA RELEASE      right    ROTATOR CUFF REPAIR Bilateral     bilateral    SHOULDER SURGERY Bilateral around     Dr. Pepper.  rotator cuff surgeries       Social History     Tobacco Use    Smoking status: Former Smoker     Packs/day: 3.00     Years: 35.00     Pack years: 105.00     Types: Cigarettes     Start date: 1960     Quit date: 1985     Years since quittin.1    Smokeless tobacco: Never Used   Substance Use Topics    Alcohol use: Yes     Alcohol/week: 7.0 standard drinks     Types: 6 Cans of beer, 1 Standard drinks or equivalent per week     Frequency: 4 or more times a week     Drinks per session: 1 or 2     Binge frequency: Never     Comment: socially    Drug use: No       Family History   Problem Relation Age of Onset    Lung cancer Father         life long smoker    Cancer Father         prostate, lung    Stroke Sister         TIA    Cataracts Sister     Cancer Brother         prostate    Cataracts Brother     Cataracts Sister     Melanoma Neg Hx     Psoriasis Neg Hx     Lupus Neg Hx     Eczema Neg Hx     Diabetes Neg Hx     Heart disease Neg Hx     Kidney disease Neg Hx     Colon cancer Neg Hx        Current Outpatient Medications   Medication Sig    acetaminophen (TYLENOL ARTHRITIS PAIN ORAL) Take by mouth. Patient states that he takes 2 tablets in the morning and 2 tablets in the evening    allopurinoL (ZYLOPRIM) 100 MG tablet TAKE 1 TABLET EVERY DAY    atorvastatin (LIPITOR) 40 MG tablet Take 1 tablet (40 mg total) by mouth once daily.    clopidogreL (PLAVIX) 75 mg tablet Take 1 tablet (75 mg total) by mouth once daily.    finasteride (PROSCAR) 5 mg tablet Take 5 mg by mouth once daily.     fluocinonide (LIDEX) 0.05 % external  solution Apply topically 2 (two) times daily.    fluticasone propionate (FLONASE) 50 mcg/actuation nasal spray USE 2 SPRAYS IN EACH NOSTRIL ONE TIME DAILY    hydroCHLOROthiazide (HYDRODIURIL) 12.5 MG Tab Take 1 tablet (12.5 mg total) by mouth daily as needed (before a meal high in sodium).    [START ON 9/10/2020] ketoconazole (NIZORAL) 2 % shampoo Apply topically twice a week.    losartan (COZAAR) 50 MG tablet Take 1 tablet (50 mg total) by mouth once daily.    pantoprazole (PROTONIX) 40 MG tablet Take 1 tablet (40 mg total) by mouth once daily.    tadalafiL (CIALIS) 5 MG tablet     mupirocin (BACTROBAN) 2 % ointment Apply to open wounds twice daily    sodium,potassium,mag sulfates (SUPREP BOWEL PREP KIT) 17.5-3.13-1.6 gram SolR Use as directed (Patient not taking: Reported on 9/9/2020)     No current facility-administered medications for this visit.      Current Outpatient Medications on File Prior to Visit   Medication Sig    acetaminophen (TYLENOL ARTHRITIS PAIN ORAL) Take by mouth. Patient states that he takes 2 tablets in the morning and 2 tablets in the evening    allopurinoL (ZYLOPRIM) 100 MG tablet TAKE 1 TABLET EVERY DAY    atorvastatin (LIPITOR) 40 MG tablet Take 1 tablet (40 mg total) by mouth once daily.    clopidogreL (PLAVIX) 75 mg tablet Take 1 tablet (75 mg total) by mouth once daily.    finasteride (PROSCAR) 5 mg tablet Take 5 mg by mouth once daily.     fluocinonide (LIDEX) 0.05 % external solution Apply topically 2 (two) times daily.    fluticasone propionate (FLONASE) 50 mcg/actuation nasal spray USE 2 SPRAYS IN EACH NOSTRIL ONE TIME DAILY    hydroCHLOROthiazide (HYDRODIURIL) 12.5 MG Tab Take 1 tablet (12.5 mg total) by mouth daily as needed (before a meal high in sodium).    [START ON 9/10/2020] ketoconazole (NIZORAL) 2 % shampoo Apply topically twice a week.    losartan (COZAAR) 50 MG tablet Take 1 tablet (50 mg total) by mouth once daily.    pantoprazole (PROTONIX) 40 MG  tablet Take 1 tablet (40 mg total) by mouth once daily.    tadalafiL (CIALIS) 5 MG tablet     mupirocin (BACTROBAN) 2 % ointment Apply to open wounds twice daily    sodium,potassium,mag sulfates (SUPREP BOWEL PREP KIT) 17.5-3.13-1.6 gram SolR Use as directed (Patient not taking: Reported on 9/9/2020)     No current facility-administered medications on file prior to visit.      Review of patient's allergies indicates:   Allergen Reactions    Mobic  [meloxicam]      Other reaction(s): hypertension     Review of Systems   Constitution: Negative for malaise/fatigue.   Eyes: Negative for blurred vision.   Cardiovascular: Negative for chest pain, claudication, cyanosis, dyspnea on exertion, irregular heartbeat, leg swelling, near-syncope, orthopnea, palpitations and paroxysmal nocturnal dyspnea.   Respiratory: Negative for cough, hemoptysis and shortness of breath.    Hematologic/Lymphatic: Negative for bleeding problem. Does not bruise/bleed easily.   Skin: Negative for dry skin and itching.   Musculoskeletal: Positive for arthritis, back pain, neck pain and stiffness. Negative for falls, muscle weakness and myalgias.   Gastrointestinal: Negative for abdominal pain, diarrhea, heartburn, hematemesis, hematochezia and melena.   Genitourinary: Negative for flank pain and hematuria.   Neurological: Positive for numbness and paresthesias. Negative for dizziness, focal weakness, headaches, light-headedness, seizures and weakness.   Psychiatric/Behavioral: Negative for altered mental status and memory loss. The patient is not nervous/anxious.    Allergic/Immunologic: Negative for hives.       Objective:   Physical Exam   Constitutional: He is oriented to person, place, and time. He appears well-developed and well-nourished. No distress.   HENT:   Head: Normocephalic and atraumatic.   Eyes: Pupils are equal, round, and reactive to light. EOM are normal. Right eye exhibits no discharge. Left eye exhibits no discharge.   Neck:  Neck supple. No JVD present. No thyromegaly present.   Cardiovascular: Normal rate, regular rhythm and intact distal pulses. Exam reveals no gallop and no friction rub.   Murmur heard.   Harsh midsystolic murmur is present with a grade of 1/6 at the upper right sternal border radiating to the neck.  Pulses:       Carotid pulses are 2+ on the right side and 2+ on the left side.       Radial pulses are 2+ on the right side and 2+ on the left side.        Femoral pulses are 2+ on the right side and 2+ on the left side.       Popliteal pulses are 2+ on the right side and 2+ on the left side.        Dorsalis pedis pulses are 2+ on the right side and 2+ on the left side.        Posterior tibial pulses are 2+ on the right side and 2+ on the left side.   Pulmonary/Chest: Effort normal and breath sounds normal. No respiratory distress. He has no wheezes. He has no rales. He exhibits no tenderness.   Abdominal: Soft. Bowel sounds are normal. He exhibits no distension. There is no abdominal tenderness.   Musculoskeletal: Normal range of motion.         General: No edema.   Neurological: He is alert and oriented to person, place, and time. No cranial nerve deficit.   Skin: Skin is warm and dry. No rash noted. He is not diaphoretic. No erythema.   Psychiatric: He has a normal mood and affect. His behavior is normal.   Nursing note and vitals reviewed.    Vitals:    09/09/20 0950 09/09/20 0953   BP: 124/68 130/70   BP Location: Left arm Right arm   Patient Position: Sitting Sitting   BP Method: Medium (Manual) Medium (Manual)   Pulse: (!) 56    SpO2: 99%    Weight: 89 kg (196 lb 3.4 oz)      Lab Results   Component Value Date    CHOL 144 07/22/2020    CHOL 142 01/23/2020    CHOL 146 08/29/2019     Lab Results   Component Value Date    HDL 55 07/22/2020    HDL 57 01/23/2020    HDL 60 08/29/2019     Lab Results   Component Value Date    LDLCALC 68.2 07/22/2020    LDLCALC 73.4 01/23/2020    LDLCALC 71.4 08/29/2019     Lab Results    Component Value Date    TRIG 104 07/22/2020    TRIG 58 01/23/2020    TRIG 73 08/29/2019     Lab Results   Component Value Date    CHOLHDL 38.2 07/22/2020    CHOLHDL 40.1 01/23/2020    CHOLHDL 41.1 08/29/2019       Chemistry        Component Value Date/Time     07/22/2020 0853    K 4.4 07/22/2020 0853     07/22/2020 0853    CO2 28 07/22/2020 0853    BUN 25 (H) 07/22/2020 0853    CREATININE 1.5 (H) 07/22/2020 0853    GLU 83 07/22/2020 0853        Component Value Date/Time    CALCIUM 8.8 07/22/2020 0853    ALKPHOS 50 (L) 07/22/2020 0853    AST 14 07/22/2020 0853    ALT 11 07/22/2020 0853    BILITOT 0.8 07/22/2020 0853    ESTGFRAFRICA 49.4 (A) 07/22/2020 0853    EGFRNONAA 42.7 (A) 07/22/2020 0853          Lab Results   Component Value Date    TSH 1.330 01/23/2020     Lab Results   Component Value Date    INR 1.2 07/07/2020    INR 1.2 09/26/2017    INR 1.2 09/03/2015     Lab Results   Component Value Date    WBC 4.34 07/22/2020    HGB 12.8 (L) 07/22/2020    HCT 42.1 07/22/2020     (H) 07/22/2020     (L) 07/22/2020     BMP  Sodium   Date Value Ref Range Status   07/22/2020 137 136 - 145 mmol/L Final     Potassium   Date Value Ref Range Status   07/22/2020 4.4 3.5 - 5.1 mmol/L Final     Chloride   Date Value Ref Range Status   07/22/2020 103 95 - 110 mmol/L Final     CO2   Date Value Ref Range Status   07/22/2020 28 23 - 29 mmol/L Final     BUN, Bld   Date Value Ref Range Status   07/22/2020 25 (H) 8 - 23 mg/dL Final     Creatinine   Date Value Ref Range Status   07/22/2020 1.5 (H) 0.5 - 1.4 mg/dL Final     Calcium   Date Value Ref Range Status   07/22/2020 8.8 8.7 - 10.5 mg/dL Final     Anion Gap   Date Value Ref Range Status   07/22/2020 6 (L) 8 - 16 mmol/L Final     eGFR if    Date Value Ref Range Status   07/22/2020 49.4 (A) >60 mL/min/1.73 m^2 Final     eGFR if non    Date Value Ref Range Status   07/22/2020 42.7 (A) >60 mL/min/1.73 m^2 Final     Comment:      Calculation used to obtain the estimated glomerular filtration  rate (eGFR) is the CKD-EPI equation.        CrCl cannot be calculated (Patient's most recent lab result is older than the maximum 7 days allowed.).    Assessment:     1. Atherosclerosis of artery of both lower extremities    2. Chronic gout without tophus, unspecified cause, unspecified site    3. Lung granuloma    4. Tortuous aorta    5. GUIDO (obstructive sleep apnea)    6. Periodic limb movement disorder (PLMD)    7. Basal cell carcinoma (BCC) of anterior chest    8. Lumbosacral spondylosis without myelopathy    9. Mixed hyperlipidemia    10. Essential hypertension    11. Chronic kidney disease, stage 3    12. Incomplete right bundle branch block    13. Prostate cancer    the patient has non significant pad his symptoms are neurogenic claudication thatt needs pain mangement imaging evaluation.   Has no angina or shortness of breath   Lipids on taregt   htn controlled.   Encouraged back to exercise stretching   Plan:     Continue current therapy  Cardiac low salt diet.  Risk factor modification and excercise program.  F/u in 6 months with lipid cmp

## 2020-09-13 ENCOUNTER — LAB VISIT (OUTPATIENT)
Dept: URGENT CARE | Facility: CLINIC | Age: 80
End: 2020-09-13
Payer: MEDICARE

## 2020-09-13 VITALS — OXYGEN SATURATION: 98 % | TEMPERATURE: 97 F | HEART RATE: 81 BPM

## 2020-09-13 DIAGNOSIS — Z12.11 COLON CANCER SCREENING: ICD-10-CM

## 2020-09-13 DIAGNOSIS — Z12.11 SCREEN FOR COLON CANCER: ICD-10-CM

## 2020-09-13 PROCEDURE — U0003 INFECTIOUS AGENT DETECTION BY NUCLEIC ACID (DNA OR RNA); SEVERE ACUTE RESPIRATORY SYNDROME CORONAVIRUS 2 (SARS-COV-2) (CORONAVIRUS DISEASE [COVID-19]), AMPLIFIED PROBE TECHNIQUE, MAKING USE OF HIGH THROUGHPUT TECHNOLOGIES AS DESCRIBED BY CMS-2020-01-R: HCPCS | Mod: HCNC

## 2020-09-14 ENCOUNTER — TELEPHONE (OUTPATIENT)
Dept: ENDOSCOPY | Facility: HOSPITAL | Age: 80
End: 2020-09-14

## 2020-09-14 DIAGNOSIS — Z12.11 SCREEN FOR COLON CANCER: Primary | ICD-10-CM

## 2020-09-14 DIAGNOSIS — Z01.818 PRE-OPERATIVE CLEARANCE: ICD-10-CM

## 2020-09-14 LAB — SARS-COV-2 RNA RESP QL NAA+PROBE: NOT DETECTED

## 2020-09-21 ENCOUNTER — LAB VISIT (OUTPATIENT)
Dept: URGENT CARE | Facility: CLINIC | Age: 80
End: 2020-09-21
Payer: MEDICARE

## 2020-09-21 VITALS — TEMPERATURE: 98 F | OXYGEN SATURATION: 99 % | RESPIRATION RATE: 18 BRPM | HEART RATE: 73 BPM

## 2020-09-21 DIAGNOSIS — Z01.818 PRE-OPERATIVE CLEARANCE: ICD-10-CM

## 2020-09-21 DIAGNOSIS — Z12.11 SCREEN FOR COLON CANCER: ICD-10-CM

## 2020-09-21 PROCEDURE — U0003 INFECTIOUS AGENT DETECTION BY NUCLEIC ACID (DNA OR RNA); SEVERE ACUTE RESPIRATORY SYNDROME CORONAVIRUS 2 (SARS-COV-2) (CORONAVIRUS DISEASE [COVID-19]), AMPLIFIED PROBE TECHNIQUE, MAKING USE OF HIGH THROUGHPUT TECHNOLOGIES AS DESCRIBED BY CMS-2020-01-R: HCPCS | Mod: HCNC

## 2020-09-22 ENCOUNTER — HOSPITAL ENCOUNTER (OUTPATIENT)
Facility: HOSPITAL | Age: 80
Discharge: HOME | End: 2020-09-22
Attending: FAMILY MEDICINE | Admitting: FAMILY MEDICINE
Payer: MEDICARE

## 2020-09-22 ENCOUNTER — ANESTHESIA (OUTPATIENT)
Dept: ENDOSCOPY | Facility: HOSPITAL | Age: 80
End: 2020-09-22
Payer: MEDICARE

## 2020-09-22 ENCOUNTER — PATIENT MESSAGE (OUTPATIENT)
Dept: INTERNAL MEDICINE | Facility: CLINIC | Age: 80
End: 2020-09-22

## 2020-09-22 ENCOUNTER — ANESTHESIA EVENT (OUTPATIENT)
Dept: ENDOSCOPY | Facility: HOSPITAL | Age: 80
End: 2020-09-22
Payer: MEDICARE

## 2020-09-22 VITALS
SYSTOLIC BLOOD PRESSURE: 141 MMHG | RESPIRATION RATE: 14 BRPM | HEIGHT: 71 IN | BODY MASS INDEX: 27.62 KG/M2 | HEART RATE: 58 BPM | TEMPERATURE: 98 F | OXYGEN SATURATION: 97 % | WEIGHT: 197.31 LBS | DIASTOLIC BLOOD PRESSURE: 86 MMHG

## 2020-09-22 DIAGNOSIS — K63.5 POLYP OF COLON, UNSPECIFIED PART OF COLON, UNSPECIFIED TYPE: ICD-10-CM

## 2020-09-22 DIAGNOSIS — Z12.11 COLON CANCER SCREENING: Primary | ICD-10-CM

## 2020-09-22 DIAGNOSIS — K57.30 DIVERTICULOSIS OF COLON: ICD-10-CM

## 2020-09-22 DIAGNOSIS — K64.8 INTERNAL HEMORRHOIDS: ICD-10-CM

## 2020-09-22 DIAGNOSIS — Z86.010 HISTORY OF COLON POLYPS: ICD-10-CM

## 2020-09-22 DIAGNOSIS — R23.4 FLAKING OF SCALP: Primary | ICD-10-CM

## 2020-09-22 DIAGNOSIS — L29.9 SCALP ITCH: ICD-10-CM

## 2020-09-22 PROBLEM — Z86.0100 HISTORY OF COLON POLYPS: Status: ACTIVE | Noted: 2020-09-22

## 2020-09-22 LAB
SARS-COV-2 RDRP RESP QL NAA+PROBE: NEGATIVE
SARS-COV-2 RNA RESP QL NAA+PROBE: NOT DETECTED

## 2020-09-22 PROCEDURE — 45380 COLONOSCOPY AND BIOPSY: CPT | Mod: 59,HCNC,, | Performed by: FAMILY MEDICINE

## 2020-09-22 PROCEDURE — 37000009 HC ANESTHESIA EA ADD 15 MINS: Mod: HCNC | Performed by: FAMILY MEDICINE

## 2020-09-22 PROCEDURE — 45385 COLONOSCOPY W/LESION REMOVAL: CPT | Mod: HCNC | Performed by: FAMILY MEDICINE

## 2020-09-22 PROCEDURE — 63600175 PHARM REV CODE 636 W HCPCS: Mod: HCNC | Performed by: NURSE ANESTHETIST, CERTIFIED REGISTERED

## 2020-09-22 PROCEDURE — 27201089 HC SNARE, DISP (ANY): Mod: HCNC | Performed by: FAMILY MEDICINE

## 2020-09-22 PROCEDURE — 25000003 PHARM REV CODE 250: Mod: HCNC | Performed by: NURSE ANESTHETIST, CERTIFIED REGISTERED

## 2020-09-22 PROCEDURE — U0002 COVID-19 LAB TEST NON-CDC: HCPCS | Mod: HCNC

## 2020-09-22 PROCEDURE — 45380 PR COLONOSCOPY,BIOPSY: ICD-10-PCS | Mod: 59,HCNC,, | Performed by: FAMILY MEDICINE

## 2020-09-22 PROCEDURE — 45385 COLONOSCOPY W/LESION REMOVAL: CPT | Mod: PT,HCNC,, | Performed by: FAMILY MEDICINE

## 2020-09-22 PROCEDURE — 45385 PR COLONOSCOPY,REMV LESN,SNARE: ICD-10-PCS | Mod: PT,HCNC,, | Performed by: FAMILY MEDICINE

## 2020-09-22 PROCEDURE — 27201012 HC FORCEPS, HOT/COLD, DISP: Mod: HCNC | Performed by: FAMILY MEDICINE

## 2020-09-22 PROCEDURE — 88305 TISSUE EXAM BY PATHOLOGIST: CPT | Mod: HCNC | Performed by: PATHOLOGY

## 2020-09-22 PROCEDURE — 88305 TISSUE EXAM BY PATHOLOGIST: ICD-10-PCS | Mod: 26,HCNC,, | Performed by: PATHOLOGY

## 2020-09-22 PROCEDURE — 37000008 HC ANESTHESIA 1ST 15 MINUTES: Mod: HCNC | Performed by: FAMILY MEDICINE

## 2020-09-22 PROCEDURE — 45380 COLONOSCOPY AND BIOPSY: CPT | Mod: HCNC | Performed by: FAMILY MEDICINE

## 2020-09-22 PROCEDURE — 88305 TISSUE EXAM BY PATHOLOGIST: CPT | Mod: 26,HCNC,, | Performed by: PATHOLOGY

## 2020-09-22 RX ORDER — LIDOCAINE HYDROCHLORIDE 10 MG/ML
INJECTION, SOLUTION EPIDURAL; INFILTRATION; INTRACAUDAL; PERINEURAL
Status: DISCONTINUED | OUTPATIENT
Start: 2020-09-22 | End: 2020-09-22

## 2020-09-22 RX ORDER — SODIUM CHLORIDE, SODIUM LACTATE, POTASSIUM CHLORIDE, CALCIUM CHLORIDE 600; 310; 30; 20 MG/100ML; MG/100ML; MG/100ML; MG/100ML
INJECTION, SOLUTION INTRAVENOUS CONTINUOUS PRN
Status: DISCONTINUED | OUTPATIENT
Start: 2020-09-22 | End: 2020-09-22

## 2020-09-22 RX ORDER — PROPOFOL 10 MG/ML
VIAL (ML) INTRAVENOUS
Status: DISCONTINUED | OUTPATIENT
Start: 2020-09-22 | End: 2020-09-22

## 2020-09-22 RX ADMIN — PROPOFOL 20 MG: 10 INJECTION, EMULSION INTRAVENOUS at 08:09

## 2020-09-22 RX ADMIN — PROPOFOL 30 MG: 10 INJECTION, EMULSION INTRAVENOUS at 08:09

## 2020-09-22 RX ADMIN — LIDOCAINE HYDROCHLORIDE 50 MG: 10 INJECTION, SOLUTION EPIDURAL; INFILTRATION; INTRACAUDAL; PERINEURAL at 08:09

## 2020-09-22 RX ADMIN — SODIUM CHLORIDE, SODIUM LACTATE, POTASSIUM CHLORIDE, AND CALCIUM CHLORIDE: .6; .31; .03; .02 INJECTION, SOLUTION INTRAVENOUS at 08:09

## 2020-09-22 RX ADMIN — PROPOFOL 70 MG: 10 INJECTION, EMULSION INTRAVENOUS at 08:09

## 2020-09-22 NOTE — DISCHARGE INSTRUCTIONS
Hemorrhoids    Hemorrhoids are swollen and inflamed veins inside the rectum and near the anus. The rectum is the last several inches of the colon. The anus is the passage between the rectum and the outside of the body.  Causes  The veins can become swollen due to increased pressure in them. This is most often caused by:  · Chronic constipation or diarrhea  · Straining when having a bowel movement  · Sitting too long on the toilet  · A low-fiber diet  · Pregnancy  Symptoms  · Bleeding from the rectum (this may be noticeable after bowel movements)  · Lump near the anus  · Itching around the anus  · Pain around the anus  There are different types of hemorrhoids. Depending on the type you have and the severity, you may be able to treat yourself at home. In some cases, a procedure may be the best treatment option. Your healthcare provider can tell you more about this, if needed.  Home care  General care  · To get relief from pain or itching, try:  ¨ Topical products. Your healthcare provider may prescribe or recommend creams, ointments, or pads that can be applied to the hemorrhoid. Use these exactly as directed.  ¨ Medicines. Your healthcare provider may recommend stool softeners, suppositories, or laxatives to help manage constipation. Use these exactly as directed.  ¨ Sitz baths. A sitz bath involves sitting in a few inches of warm bath water. Be careful not to make the water so hot that you burn yourself--test it before sitting in it. Soak for about 10 to 15 minutes a few times a day. This may help relieve pain.  Tips to help prevent hemorrhoids  · Eat more fiber. Fiber adds bulk to stool and absorbs water as it moves through your colon. This makes stool softer and easier to pass.  ¨ Increase the fiber in your diet with more fiber-rich foods. These include fresh fruit, vegetables, and whole grains.  ¨ Take a fiber supplement or bulking agent, if advised to by your provider. These include products such as psyllium  or methylcellulose.  · Drink plenty of water, if directed to by your provider. This can help keep stool soft.  · Be more active. Frequent exercise aids digestion and helps prevent constipation. It may also help make bowel movements more regular.  · Dont strain during bowel movements. This can make hemorrhoids more likely. Also, dont sit on the toilet for long periods of time.  Follow-up care  Follow up with your healthcare provider, or as advised. If a culture or imaging tests were done, you will be notified of the results when they are ready. This may take a few days or longer.  When to seek medical advice  Call your healthcare provider right away if any of these occur:  · Increased bleeding from the rectum  · Increased pain around the rectum or anus  · Weakness or dizziness  Call 911  Call 911 or return to the emergency department right away if any of these occur:  · Trouble breathing or swallowing  · Fainting or loss of consciousness  · Unusually fast heart rate  · Vomiting blood  · Large amounts of blood in stool  Date Last Reviewed: 6/22/2015 © 2000-2017 [employer]. [address] [place] [address]. All rights reserved. This information is not intended as a substitute for professional medical care. Always follow your healthcare professional's instructions.        Discharge Instructions for Diverticulitis  You have been diagnosed with diverticulitis. This is a condition in which small pouches form in your colon (large intestine) and become inflamed or infected. Follow the guidelines below for home care.  As you recover  Tips for recovery include:  · Eat a low-fiber diet. Your healthcare provider may advise a liquid diet. This gives your bowel a chance to rest so that it can recover.  · Foods to include: flake cereal, mashed potatoes, pancakes, waffles, pasta, white bread, rice, applesauce, bananas, eggs, fish, poultry, tofu, and well-cooked vegetables  · Take your medicines as  directed. Do not stop taking the medicines, even if you feel better.  · Monitor your temperature and report any rising temperature to your healthcare provider.  · Take antibiotics exactly as directed. Do not miss any and keep taking them even if you feel better.   · Drink 6 to 8 glasses of water every day, unless directed otherwise.  · Use a heating pad or hot water bottle to reduce abdominal cramping or pain.  Preventing diverticulitis in the future  Tips for prevention include:  · Eat a high-fiber diet. Fiber adds bulk to the stool so that it passes through the large intestine more easily.  · Keep drinking 6 to 8 glasses of water every day, unless directed otherwise.  · Begin an exercise program. Ask your healthcare provider how to get started. You can benefit from simple activities such as walking or gardening.  · Treat diarrhea with a bland diet. Start with liquids only, then slowly add fiber over time.  · Watch for changes in your bowel movements (constipation to diarrhea).  · Avoid constipation with fiber and add a stool softener if needed.   · Get plenty of rest and sleep.  Follow-up care  Make a follow-up appointment as directed by our staff.  When to call your healthcare provider  Call your healthcare provider immediately if you have any of the following:  · Fever of 100.4°F (38.0°C) or higher, or as directed by your healthcare provider  · Chills  · Severe cramps in the belly, most commonly the lower left side  · Tenderness in the belly, most commonly the lower left side  · Nausea and vomiting  · Bleeding from your rectum   Date Last Reviewed: 7/1/2016  © 3742-3200 [employer]. [address] [place] [address], [place] [address]. All rights reserved. This information is not intended as a substitute for professional medical care. Always follow your healthcare professional's instructions.        Understanding Colon and Rectal Polyps    The colon (also called the large intestine) is a muscular tube that  forms the last part of the digestive tract. It absorbs water and stores food waste. The colon is about 4 to 6 feet long. The rectum is the last 6 inches of the colon. The colon and rectum have a smooth lining composed of millions of cells. Changes in these cells can lead to growths in the colon that can become cancerous and should be removed. Multiple tests are available to screen for colon cancer, but the colonoscopy is the most recommended test. During colonoscopy, these polyps can be removed. How often you need this test depends on many things including your condition, your family history, symptoms, and what the findings were at the previous colonoscopy.   When the colon lining changes  Changes that happen in the cells that line the colon or rectum can lead to growths called polyps. Over a period of years, polyps can turn cancerous. Removing polyps early may prevent cancer from ever forming.  Polyps  Polyps are fleshy clumps of tissue that form on the lining of the colon or rectum. Small polyps are usually benign (not cancerous). However, over time, cells in a polyp can change and become cancerous. Certain types of polyps known as adenomatous polyps are premalignant. The risk for invasive cancer increases with the size of the polyp and certain cell and gene features. This means that they can become cancerous if they're not removed. Hyperplastic polyps are benign. They can grow quite large and not turn cancerous.   Cancer  Almost all colorectal cancers start when polyp cells begin growing abnormally. As a cancerous tumor grows, it may involve more and more of the colon or rectum. In time, cancer can also grow beyond the colon or rectum and spread to nearby organs or to glands called lymph nodes. The cells can also travel to other parts of the body. This is known as metastasis. The earlier a cancerous tumor is removed, the better the chance of preventing its spread.    Date Last Reviewed: 8/1/2016  © 1485-4902 [employer]  [employer]. [address] [place] [address], [place] [address]. All rights reserved. This information is not intended as a substitute for professional medical care. Always follow your healthcare professional's instructions.

## 2020-09-22 NOTE — PLAN OF CARE
DR TRAN BEDSIDE TO SPEAK TO PT. REGARDING RESULTS.  VSS, NO GI BLEEDING, NO ABD. PAIN, NO N/V. PT. DISCHARGED FROM UNIT.

## 2020-09-22 NOTE — TRANSFER OF CARE
"Anesthesia Transfer of Care Note    Patient: Ezequiel Hughes    Procedure(s) Performed: Procedure(s) (LRB):  COLONOSCOPY (N/A)    Patient location: PACU    Anesthesia Type: MAC    Transport from OR: Transported from OR on room air with adequate spontaneous ventilation    Post pain: adequate analgesia    Post assessment: no apparent anesthetic complications and tolerated procedure well    Post vital signs: stable    Level of consciousness: awake    Nausea/Vomiting: no nausea/vomiting    Complications: none    Transfer of care protocol was followed      Last vitals:   Visit Vitals  BP (!) 114/94   Pulse 62   Temp 36.5 °C (97.7 °F)   Resp 16   Ht 5' 10.5" (1.791 m)   Wt 89.5 kg (197 lb 5 oz)   SpO2 98%   BMI 27.91 kg/m²     "

## 2020-09-22 NOTE — ANESTHESIA PREPROCEDURE EVALUATION
09/22/2020  Ezequiel Hughes is a 80 y.o., male.    Anesthesia Evaluation    I have reviewed the Patient Summary Reports.    I have reviewed the Nursing Notes. I have reviewed the NPO Status.   I have reviewed the Medications.     Review of Systems  Anesthesia Hx:  No problems with previous Anesthesia  Denies Family Hx of Anesthesia complications.   Denies Personal Hx of Anesthesia complications.   Social:  Former Smoker, Alcohol Use    Hematology/Oncology:         -- Anemia: Hematology Comments: Thrombocytopenia Oncology Comments: Prostate  Basal cell carcinoma (BCC) of anterior chest     EENT/Dental:   chronic allergic rhinitis cataract   Cardiovascular:   Hypertension Denies MI.   Denies CABG/stent. Dysrhythmias      hyperlipidemia Echo 12/2017:   1 - No wall motion abnormalities.     2 - Normal left ventricular systolic function (EF 60-65%).     3 - Normal left ventricular diastolic function.     4 - Normal right ventricular systolic function .     5 - The estimated PA systolic pressure is 29 mmHg.     6 - Mild aortic regurgitation.     7 - Trivial tricuspid regurgitation   Pulmonary:   Denies COPD.  Denies Asthma. Sleep Apnea Lung granuloma   Renal/:   Chronic Renal Disease, CRI BPH    Hepatic/GI:   Bowel Prep. Hiatal Hernia, Denies Liver Disease. Denies Hepatitis.    Musculoskeletal:   Arthritis  Lumbosacral spondylosis without myelopathy    gout   Neurological:   TIA, Denies CVA. Denies Seizures. Periodic limb movement disorder (PLMD   Endocrine:  Endocrine Normal        Physical Exam  General:  Well nourished    Airway/Jaw/Neck:  Airway Findings: Mouth Opening: Normal Tongue: Normal  General Airway Assessment: Adult  Mallampati: II      Dental:  Dental Findings: In tact   Chest/Lungs:  Chest/Lungs Findings: Clear to auscultation, Normal Respiratory Rate     Heart/Vascular:  Heart Findings: Rate: Normal   Rhythm: Regular Rhythm             Anesthesia Plan  Type of Anesthesia, risks & benefits discussed:  Anesthesia Type:  MAC  Patient's Preference:   Intra-op Monitoring Plan: standard ASA monitors  Intra-op Monitoring Plan Comments:   Post Op Pain Control Plan:   Post Op Pain Control Plan Comments:   Induction:   IV  Beta Blocker:  Patient is not currently on a Beta-Blocker (No further documentation required).       Informed Consent: Patient understands risks and agrees with Anesthesia plan.  Questions answered. Anesthesia consent signed with patient.  ASA Score: 2     Day of Surgery Review of History & Physical: I have interviewed and examined the patient. I have reviewed the patient's H&P dated:  There are no significant changes.  H&P update referred to the surgeon.         Ready For Surgery From Anesthesia Perspective.

## 2020-09-22 NOTE — H&P
Short Stay Endoscopy History and Physical    PCP - Valery Ozuna MD    Procedure - Colonoscopy  ASA - 2  Mallampati - per anesthesia  History of Anesthesia problems - no  Family history Anesthesia problems -  no     HPI:  This is a 80 y.o. male here for evaluation of :   Active Hospital Problems    Diagnosis  POA    *Colon cancer screening [Z12.11]  Not Applicable    History of colon polyps [Z86.010]  Not Applicable      Resolved Hospital Problems   No resolved problems to display.         Health Maintenance       Date Due Completion Date    Shingles Vaccine (2 of 3) 09/18/2012 7/24/2012    Colonoscopy 11/14/2019 11/14/2016    Lipid Panel 07/22/2021 7/22/2020    Aspirin/Antiplatelet Therapy 09/09/2021 9/9/2020    TETANUS VACCINE 07/24/2022 7/24/2012          Screening - Yes  History of polyps - Yes     Diarrhea - no  Anemia - no  Blood in stools - no  Abdominal pain - no  Other - no    ROS:  CONSTITUTIONAL: Denies weight change,  fatigue, fevers, chills, night sweats.  CARDIOVASCULAR: Denies chest pain, shortness of breath, orthopnea and edema.  RESPIRATORY: Denies cough, hemoptysis, dyspnea, and wheezing.  GI: See HPI.    Medical History:   Past Medical History:   Diagnosis Date    Allergic rhinitis     Anemia     Back pain     BPH (benign prostatic hyperplasia)     Cancer     skin cancer to neck, Dr. Graves    Cataract     Disorder of kidney and ureter     ED (erectile dysfunction)     Hiatal hernia     small    History of colon polyps     colonoscopy 11/2016    HLD (hyperlipidemia)     Hyperlipidemia     Hypertension     Lateral epicondylitis     OA (osteoarthritis)     GUIDO (obstructive sleep apnea)     Prostate cancer     Dr. Wong    TIA (transient ischemic attack)     Trouble in sleeping        Surgical History:   Past Surgical History:   Procedure Laterality Date    CATARACT EXTRACTION W/  INTRAOCULAR LENS IMPLANT Right 02/21/2018    I-Stent    CATARACT EXTRACTION W/   INTRAOCULAR LENS IMPLANT Left 2018    I - Stent    COLONOSCOPY N/A 2016    Procedure: COLONOSCOPY;  Surgeon: Karuna Rodriguez MD;  Location: [facility];  Service: Endoscopy;  Laterality: N/A;    HEMORRHOID SURGERY      I-STENT Right 2018    DR. REECE    KNEE ARTHROSCOPY W/ MENISCAL REPAIR Right     Dr. Jain    PCIOL Right 2018    DR. REECE    PLANTAR FASCIA RELEASE      right    ROTATOR CUFF REPAIR Bilateral     bilateral    SHOULDER SURGERY Bilateral around     Dr. Pepper.  rotator cuff surgeries       Family History:   Family History   Problem Relation Age of Onset    Lung cancer Father         life long smoker    Cancer Father         prostate, lung    Stroke Sister         TIA    Cataracts Sister     Cancer Brother         prostate    Cataracts Brother     Cataracts Sister     Melanoma Neg Hx     Psoriasis Neg Hx     Lupus Neg Hx     Eczema Neg Hx     Diabetes Neg Hx     Heart disease Neg Hx     Kidney disease Neg Hx     Colon cancer Neg Hx        Social History:   Social History     Tobacco Use    Smoking status: Former Smoker     Packs/day: 3.00     Years: 35.00     Pack years: 105.00     Types: Cigarettes     Start date: 1960     Quit date: 1985     Years since quittin.1    Smokeless tobacco: Never Used   Substance Use Topics    Alcohol use: Yes     Alcohol/week: 7.0 standard drinks     Types: 6 Cans of beer, 1 Standard drinks or equivalent per week     Frequency: 4 or more times a week     Drinks per session: 1 or 2     Binge frequency: Never     Comment: socially    Drug use: No       Allergies:   Review of patient's allergies indicates:   Allergen Reactions    Mobic  [meloxicam]      Other reaction(s): hypertension       Medications:   No current facility-administered medications on file prior to encounter.      Current Outpatient Medications on File Prior to Encounter   Medication Sig Dispense Refill    acetaminophen  (TYLENOL ARTHRITIS PAIN ORAL) Take by mouth. Patient states that he takes 2 tablets in the morning and 2 tablets in the evening      atorvastatin (LIPITOR) 40 MG tablet Take 1 tablet (40 mg total) by mouth once daily. 90 tablet 1    finasteride (PROSCAR) 5 mg tablet Take 5 mg by mouth once daily.       fluticasone propionate (FLONASE) 50 mcg/actuation nasal spray USE 2 SPRAYS IN EACH NOSTRIL ONE TIME DAILY 48 g 3    pantoprazole (PROTONIX) 40 MG tablet Take 1 tablet (40 mg total) by mouth once daily. 90 tablet 3       Physical Exam:  Vital Signs:   Vitals:    09/22/20 0743   BP: (!) 114/94   Pulse: 62   Resp: 16   Temp: 97.7 °F (36.5 °C)     General Appearance: Well appearing in no acute distress  ENT: OP clear  Chest: CTA B  CV: RRR, no m/r/g  Abd: s/nt/nd/nabs  Ext: no edema    Labs:Reviewed    IMP:  Active Hospital Problems    Diagnosis  POA    *Colon cancer screening [Z12.11]  Not Applicable    History of colon polyps [Z86.010]  Not Applicable      Resolved Hospital Problems   No resolved problems to display.         Plan:   I have explained the risks and benefits of colonoscopy to the patient including but not limited to bleeding, perforation, infection, and death. The patient wishes to proceed.

## 2020-09-22 NOTE — ANESTHESIA POSTPROCEDURE EVALUATION
Anesthesia Post Evaluation    Patient: Ezequiel Hughes    Procedure(s) Performed: Procedure(s) (LRB):  COLONOSCOPY (N/A)    Final Anesthesia Type: MAC    Patient location during evaluation: PACU  Patient participation: Yes- Able to Participate  Level of consciousness: awake  Post-procedure vital signs: reviewed and stable  Pain management: adequate  Airway patency: patent    PONV status at discharge: No PONV  Anesthetic complications: no      Cardiovascular status: blood pressure returned to baseline and hemodynamically stable  Respiratory status: unassisted and spontaneous ventilation  Hydration status: euvolemic  Follow-up not needed.          Vitals Value Taken Time   BP  09/22/20 0907   Temp  09/22/20 0907   Pulse  09/22/20 0907   Resp  09/22/20 0907   SpO2  09/22/20 0907         No case tracking events are documented in the log.      Pain/Gucci Score: No data recorded

## 2020-09-22 NOTE — [employer] PATIENT INSTRUCTIONS
Discharge Summary/Instructions after an Endoscopic Procedure  Patient Name: Ezequiel Hughes  Patient MRN: [id number]  Patient YOB: 1940  Tuesday, September 22, 2020 Chuy Fish MD  RESTRICTIONS:  During your procedure today, you received medications for sedation.  These   medications may affect your judgment, balance and coordination.  Therefore,   for 24 hours, you have the following restrictions:   - DO NOT drive a car, operate machinery, make legal/financial decisions,   sign important papers or drink alcohol.    ACTIVITY:  Today: no heavy lifting, straining or running due to procedural   sedation/anesthesia.  The following day: return to full activity including work.  DIET:  Eat and drink normally unless instructed otherwise.     TREATMENT FOR COMMON SIDE EFFECTS:  - Mild abdominal pain, nausea, belching, bloating or excessive gas:  rest,   eat lightly and use a heating pad.  - Sore Throat: treat with throat lozenges and/or gargle with warm salt   water.  - Because air was used during the procedure, expelling large amounts of air   from your rectum or belching is normal.  - If a bowel prep was taken, you may not have a bowel movement for 1-3 days.    This is normal.  SYMPTOMS TO WATCH FOR AND REPORT TO YOUR PHYSICIAN:  1. Abdominal pain or bloating, other than gas cramps.  2. Chest pain.  3. Back pain.  4. Signs of infection such as: chills or fever occurring within 24 hours   after the procedure.  5. Rectal bleeding, which would show as bright red, maroon, or black stools.   (A tablespoon of blood from the rectum is not serious, especially if   hemorrhoids are present.)  6. Vomiting.  7. Weakness or dizziness.  GO DIRECTLY TO THE NEAREST EMERGENCY ROOM IF YOU HAVE ANY OF THE FOLLOWING:      Difficulty breathing              Chills and/or fever over 101 F   Persistent vomiting and/or vomiting blood   Severe abdominal pain   Severe chest pain   Black, tarry stools   Bleeding- more than one  tablespoon   Any other symptom or condition that you feel may need urgent attention  Your doctor recommends these additional instructions:  If any biopsies were taken, your doctors clinic will contact you in 1 to 2   weeks with any results.  - Patient has a contact number available for emergencies.  The signs and   symptoms of potential delayed complications were discussed with the   patient.  Return to normal activities tomorrow.  Written discharge   instructions were provided to the patient.   - Resume previous diet.   - Continue present medications.   - Await pathology results.   - No repeat colonoscopy due to age.   - Telephone my office for pathology results in 2 weeks.   - Discharge patient to home (via wheelchair).  For questions, problems or results please call your physician Chuy Fish MD at Work:  [phone]  If you have any questions about the above instructions, call the GI   department at [phone] or call the endoscopy unit at [phone]   from 7am until 3 pm.  [facility] [place], EMERGENCY ROOM PHONE NUMBER:   [phone]  IF A COMPLICATION OR EMERGENCY SITUATION ARISES AND YOU ARE UNABLE TO REACH   YOUR PHYSICIAN - GO DIRECTLY TO THE EMERGENCY ROOM.  I have read or have had read to me these discharge instructions for my   procedure and have received a written copy.  I understand these   instructions and will follow-up with my physician if I have any questions.     __________________________________       _____________________________________  Nurse Signature                                          Patient/Designated   Responsible Party Signature  Chuy Fish MD  9/22/2020 9:12:58 AM  This report has been verified and signed electronically.  [employer]

## 2020-09-22 NOTE — DISCHARGE SUMMARY
Endoscopy Discharge Summary      Admit Date: 9/22/2020    Discharge Date and Time:  9/22/2020 9:15 AM    Attending Physician: Chuy Fish MD     Discharge Physician: Chuy Fish MD     Principal Admitting Diagnoses: Colon cancer screening         Discharge Diagnosis: The primary encounter diagnosis was Colon cancer screening. Diagnoses of History of colon polyps, Polyp of colon, unspecified part of colon, unspecified type, Diverticulosis of colon, and Internal hemorrhoids were also pertinent to this visit.     Discharged Condition: Good    Indication for Admission: Colon cancer screening     Hospital Course: Patient was admitted for an inpatient procedure and tolerated the procedure well with no complications.    Significant Diagnostic Studies: Colonoscopy with cold biopsy polypectomy, Colonoscopy with cold snare polypectomy and Colonoscopy with hot snare polypectomy    Pathology (if any):  Specimen (12h ago, onward)    None          Estimated Blood Loss: 1 ml.    Discussed with: patient and family.    Disposition: Home.    Follow Up/Patient Instructions:   Current Discharge Medication List      CONTINUE these medications which have NOT CHANGED    Details   acetaminophen (TYLENOL ARTHRITIS PAIN ORAL) Take by mouth. Patient states that he takes 2 tablets in the morning and 2 tablets in the evening      allopurinoL (ZYLOPRIM) 100 MG tablet TAKE 1 TABLET EVERY DAY  Qty: 90 tablet, Refills: 3      atorvastatin (LIPITOR) 40 MG tablet Take 1 tablet (40 mg total) by mouth once daily.  Qty: 90 tablet, Refills: 1    Associated Diagnoses: Mixed hyperlipidemia      finasteride (PROSCAR) 5 mg tablet Take 5 mg by mouth once daily.       fluocinonide (LIDEX) 0.05 % external solution Apply topically 2 (two) times daily.  Qty: 60 mL, Refills: 1      fluticasone propionate (FLONASE) 50 mcg/actuation nasal spray USE 2 SPRAYS IN EACH NOSTRIL ONE TIME DAILY  Qty: 48 g, Refills: 3      hydroCHLOROthiazide (HYDRODIURIL) 12.5  MG Tab Take 1 tablet (12.5 mg total) by mouth daily as needed (before a meal high in sodium).  Qty: 90 tablet, Refills: 3    Associated Diagnoses: Essential hypertension      ketoconazole (NIZORAL) 2 % shampoo Apply topically twice a week.  Qty: 120 mL, Refills: 1      losartan (COZAAR) 50 MG tablet Take 1 tablet (50 mg total) by mouth once daily.  Qty: 90 tablet, Refills: 3      mupirocin (BACTROBAN) 2 % ointment Apply to open wounds twice daily      pantoprazole (PROTONIX) 40 MG tablet Take 1 tablet (40 mg total) by mouth once daily.  Qty: 90 tablet, Refills: 3      tadalafiL (CIALIS) 5 MG tablet       clopidogreL (PLAVIX) 75 mg tablet Take 1 tablet (75 mg total) by mouth once daily.  Qty: 90 tablet, Refills: 3      sodium,potassium,mag sulfates (SUPREP BOWEL PREP KIT) 17.5-3.13-1.6 gram SolR Use as directed  Qty: 354 mL, Refills: 0             Discharge Procedure Orders   Diet general     Call MD for:  temperature >100.4     Call MD for:  persistent nausea and vomiting     Call MD for:  severe uncontrolled pain     Call MD for:  difficulty breathing, headache or visual disturbances     Call MD for:  redness, tenderness, or signs of infection (pain, swelling, redness, odor or green/yellow discharge around incision site)     Call MD for:  hives     Call MD for:  persistent dizziness or light-headedness     No dressing needed       Follow-up Information     Chuy Fish MD. Call in 2 weeks.    Specialty: Family Medicine  Why: To receive pathology results.  Contact information:  [address] [facility] [place] [id number] [phone]

## 2020-09-24 ENCOUNTER — TELEPHONE (OUTPATIENT)
Dept: ALLERGY | Facility: CLINIC | Age: 80
End: 2020-09-24

## 2020-09-24 ENCOUNTER — OFFICE VISIT (OUTPATIENT)
Dept: DERMATOLOGY | Facility: CLINIC | Age: 80
End: 2020-09-24
Payer: MEDICARE

## 2020-09-24 DIAGNOSIS — Z85.828 HISTORY OF NONMELANOMA SKIN CANCER: Primary | ICD-10-CM

## 2020-09-24 DIAGNOSIS — Z12.83 SCREENING EXAM FOR SKIN CANCER: ICD-10-CM

## 2020-09-24 DIAGNOSIS — L57.0 ACTINIC KERATOSES: ICD-10-CM

## 2020-09-24 DIAGNOSIS — D18.00 HEMANGIOMA, UNSPECIFIED SITE: ICD-10-CM

## 2020-09-24 DIAGNOSIS — L81.4 LENTIGO: ICD-10-CM

## 2020-09-24 PROCEDURE — 1159F PR MEDICATION LIST DOCUMENTED IN MEDICAL RECORD: ICD-10-PCS | Mod: HCNC,S$GLB,, | Performed by: STUDENT IN AN ORGANIZED HEALTH CARE EDUCATION/TRAINING PROGRAM

## 2020-09-24 PROCEDURE — 1101F PR PT FALLS ASSESS DOC 0-1 FALLS W/OUT INJ PAST YR: ICD-10-PCS | Mod: HCNC,CPTII,S$GLB, | Performed by: STUDENT IN AN ORGANIZED HEALTH CARE EDUCATION/TRAINING PROGRAM

## 2020-09-24 PROCEDURE — 17003 DESTRUCTION, PREMALIGNANT LESIONS; SECOND THROUGH 14 LESIONS: ICD-10-PCS | Mod: HCNC,S$GLB,, | Performed by: STUDENT IN AN ORGANIZED HEALTH CARE EDUCATION/TRAINING PROGRAM

## 2020-09-24 PROCEDURE — 99999 PR PBB SHADOW E&M-EST. PATIENT-LVL III: ICD-10-PCS | Mod: PBBFAC,HCNC,, | Performed by: STUDENT IN AN ORGANIZED HEALTH CARE EDUCATION/TRAINING PROGRAM

## 2020-09-24 PROCEDURE — 99999 PR PBB SHADOW E&M-EST. PATIENT-LVL III: CPT | Mod: PBBFAC,HCNC,, | Performed by: STUDENT IN AN ORGANIZED HEALTH CARE EDUCATION/TRAINING PROGRAM

## 2020-09-24 PROCEDURE — 1159F MED LIST DOCD IN RCRD: CPT | Mod: HCNC,S$GLB,, | Performed by: STUDENT IN AN ORGANIZED HEALTH CARE EDUCATION/TRAINING PROGRAM

## 2020-09-24 PROCEDURE — 1101F PT FALLS ASSESS-DOCD LE1/YR: CPT | Mod: HCNC,CPTII,S$GLB, | Performed by: STUDENT IN AN ORGANIZED HEALTH CARE EDUCATION/TRAINING PROGRAM

## 2020-09-24 PROCEDURE — 17000 PR DESTRUCTION(LASER SURGERY,CRYOSURGERY,CHEMOSURGERY),PREMALIGNANT LESIONS,FIRST LESION: ICD-10-PCS | Mod: HCNC,S$GLB,, | Performed by: STUDENT IN AN ORGANIZED HEALTH CARE EDUCATION/TRAINING PROGRAM

## 2020-09-24 PROCEDURE — 99202 OFFICE O/P NEW SF 15 MIN: CPT | Mod: 25,HCNC,S$GLB, | Performed by: STUDENT IN AN ORGANIZED HEALTH CARE EDUCATION/TRAINING PROGRAM

## 2020-09-24 PROCEDURE — 99202 PR OFFICE/OUTPT VISIT, NEW, LEVL II, 15-29 MIN: ICD-10-PCS | Mod: 25,HCNC,S$GLB, | Performed by: STUDENT IN AN ORGANIZED HEALTH CARE EDUCATION/TRAINING PROGRAM

## 2020-09-24 PROCEDURE — 17003 DESTRUCT PREMALG LES 2-14: CPT | Mod: HCNC,S$GLB,, | Performed by: STUDENT IN AN ORGANIZED HEALTH CARE EDUCATION/TRAINING PROGRAM

## 2020-09-24 PROCEDURE — 17000 DESTRUCT PREMALG LESION: CPT | Mod: HCNC,S$GLB,, | Performed by: STUDENT IN AN ORGANIZED HEALTH CARE EDUCATION/TRAINING PROGRAM

## 2020-09-24 RX ORDER — SULFAMETHOXAZOLE AND TRIMETHOPRIM 800; 160 MG/1; MG/1
TABLET ORAL
COMMUNITY
Start: 2020-08-04 | End: 2020-10-28

## 2020-09-24 RX ORDER — FLUOROURACIL 50 MG/G
CREAM TOPICAL 2 TIMES DAILY
Qty: 40 G | Refills: 1 | Status: SHIPPED | OUTPATIENT
Start: 2020-09-24 | End: 2020-10-16 | Stop reason: SDUPTHER

## 2020-09-24 NOTE — PATIENT INSTRUCTIONS
Detail Level: Zone Field Treatment for Actinic Keratoses (precancerous lesions)    5-Fluorouracil - This is a topical chemotherapy for your skin. This cream should never be applied without discussing with you dermatologist.    This treatment gets your immune system involved in fighting precancers (actinic keratoses), even those we can't yet see.     HOW TO APPLY: Apply a fingertip length amount to the entire area 2x/day for 2 weeks on, one for 1 week, resume for 2 weeks. Wash your hands carefully after applying the cream and wipe glasses, BIPAP/CPAP machines, or anything else that comes in frequent contact with the cream.    WHAT TO EXPECT: Your skin will likely become red, crusted, sore, and tender during the treatment usually starting around day 3 and continuing for about 1 week after last application. Your skin may take up to 6 weeks to return to its normal coloring (lose the pink).     HOW TO HEAL FAST: To speed healing, wash with a gentle wash and apply Vaseline jelly especially to crusted open areas.    WE CAN HELP: Please contact us for any questions or problem shooting the application of these creams: [email or web address]    IT WILL BE WORTH IT!!!    CRYOSURGERY      Your doctor has used a method called cryosurgery to treat your skin condition. Cryosurgery refers to the use of very cold substances to treat a variety of skin conditions such as warts, pre-skin cancers, molluscum contagiosum, sun spots, and several benign growths. The substance we use in cryosurgery is liquid nitrogen and is so cold (-195 degrees Celsius) that is burns when administered.     Following treatment in the office, the skin may immediately burn and become red. You may find the area around the lesion is affected as well. It is sometimes necessary to treat not only the lesion, but a small area of the surrounding normal skin to achieve a good response.     A blister, and even a blood filled blister, may form after treatment.   This is a normal response. If the  blister is painful, it is acceptable to sterilize a needle and with rubbing alcohol and gently pop the blister. It is important that you gently wash the area with soap and warm water as the blister fluid may contain wart virus if a wart was treated. Do no remove the roof of the blister.     The area treated can take anywhere from 1-3 weeks to heal. Healing time depends on the kind skin lesion treated, the location, and how aggressively the lesion was treated. It is recommended that the areas treated are covered with Vaseline or bacitracin ointment and a band-aid. If a band-aid is not practical, just ointment applied several times per day will do. Keeping these areas moist will speed the healing time.    Treatment with liquid nitrogen can leave a scar. In dark skin, it may be a light or dark scar, in light skin it may be a white or pink scar. These will generally fade with time, but may never go away completely.     If you have any concerns after your treatment, please feel free to call the office.

## 2020-09-24 NOTE — TELEPHONE ENCOUNTER
Looking at the chart, I believe he is suppose to be seeing Dermatology and not us, please assist in changing his appointment.    Thank you

## 2020-09-24 NOTE — PROGRESS NOTES
Subjective:       Patient ID:  Ezequiel Hughes is a 80 y.o. male who presents for   Chief Complaint   Patient presents with    dry scalp     x months , tx ketoconazle shampoo and flucinoinde shampoo     History of Present Illness: The patient presents with chief complaint of dry scalp .  Location: head   Duration: months   Signs/Symptoms: itching   Prior treatments: ketconazole shampoo     Pt also has a hx of skin cancer (last one on his neck). Reports that he needed Moh's. He reports a few lesions on his hands. Scaly. Present for a while. No treatment tried. This is a high risk patient here to check for the development of new lesions.        Review of Systems   Skin: Positive for wears hat. Negative for itching, rash, daily sunscreen use and recent sunburn.   Hematologic/Lymphatic: Bruises/bleeds easily.        Objective:    Physical Exam   Constitutional: He appears well-developed and well-nourished. No distress.   Neurological: He is alert and oriented to person, place, and time. He is not disoriented.   Psychiatric: He has a normal mood and affect.   Skin:   Areas Examined (abnormalities noted in diagram):   Scalp / Hair Palpated and Inspected  Head / Face Inspection Performed  Neck Inspection Performed  Chest / Axilla Inspection Performed  Back Inspection Performed  RUE Inspected  LUE Inspection Performed                   Diagram Legend     Erythematous scaling macule/papule c/w actinic keratosis       Vascular papule c/w angioma      Pigmented verrucoid papule/plaque c/w seborrheic keratosis      Yellow umbilicated papule c/w sebaceous hyperplasia      Irregularly shaped tan macule c/w lentigo     1-2 mm smooth white papules consistent with Milia      Movable subcutaneous cyst with punctum c/w epidermal inclusion cyst      Subcutaneous movable cyst c/w pilar cyst      Firm pink to brown papule c/w dermatofibroma      Pedunculated fleshy papule(s) c/w skin tag(s)      Evenly pigmented macule c/w junctional  nevus     Mildly variegated pigmented, slightly irregular-bordered macule c/w mildly atypical nevus      Flesh colored to evenly pigmented papule c/w intradermal nevus       Pink pearly papule/plaque c/w basal cell carcinoma      Erythematous hyperkeratotic cursted plaque c/w SCC      Surgical scar with no sign of skin cancer recurrence      Open and closed comedones      Inflammatory papules and pustules      Verrucoid papule consistent consistent with wart     Erythematous eczematous patches and plaques     Dystrophic onycholytic nail with subungual debris c/w onychomycosis     Umbilicated papule    Erythematous-base heme-crusted tan verrucoid plaque consistent with inflamed seborrheic keratosis     Erythematous Silvery Scaling Plaque c/w Psoriasis     See annotation      Assessment / Plan:        History of nonmelanoma skin cancer/Screening exam for skin cancer  Area(s) of previous NMSC evaluated with no signs of recurrence.  Upper body skin examination performed today including at least 6 points as noted in physical examination. No lesions suspicious for malignancy noted.    Actinic keratoses  -     fluorouraciL (EFUDEX) 5 % cream; Apply topically 2 (two) times daily. Apply two weeks on, hold off for one week, resume for another 2 weeks.  Dispense: 40 g; Refill: 1  Cryosurgery Procedure Note    Verbal consent from the patient is obtained including, but not limited to, risk of hypopigmentation/hyperpigmentation, scar, recurrence of lesion. The patient is aware of the precancerous quality and need for treatment of these lesions. Liquid nitrogen cryosurgery is applied to the 5 actinic keratoses, as detailed in the physical exam, to produce a freeze injury. The patient is aware that blisters may form and is instructed on wound care with gentle cleansing and use of vaseline ointment to keep moist until healed. The patient is supplied a handout on cryosurgery and is instructed to call if lesions do not completely  resolve.    Lentigo  This is a benign hyperpigmented sun induced lesion. Daily sun protection will reduce the number of new lesions. Treatment of these benign lesions are considered cosmetic.    Hemangioma, unspecified site  This is a benign vascular lesion. Reassurance given. No treatment required.              Follow up in about 2 months (around 11/24/2020).

## 2020-09-29 ENCOUNTER — PATIENT MESSAGE (OUTPATIENT)
Dept: DERMATOLOGY | Facility: CLINIC | Age: 80
End: 2020-09-29

## 2020-09-29 LAB
FINAL PATHOLOGIC DIAGNOSIS: NORMAL
GROSS: NORMAL

## 2020-09-30 ENCOUNTER — PATIENT OUTREACH (OUTPATIENT)
Dept: OTHER | Facility: OTHER | Age: 80
End: 2020-09-30

## 2020-09-30 ENCOUNTER — PATIENT MESSAGE (OUTPATIENT)
Dept: ADMINISTRATIVE | Facility: OTHER | Age: 80
End: 2020-09-30

## 2020-09-30 NOTE — PROGRESS NOTES
Digital Medicine: Clinician Follow-Up    I spoke with the patient today to follow-up on his blood pressure readings.  Blood pressure is typically at goal.  About 3 times a month his diastolic blood pressure is dropping into the 50s and 40s.  Systolic blood pressures typically at goal.  He denies lightheadedness or dizziness.  We discussed hydration, any does think that he drinks a good bit of water.  He did not have any questions or concerns regarding his medication today.    The history is provided by the patient.   Follow-up reason(s): routine follow up.     Hypertension    Patient readings are stable   Patient is not experiencing signs/symptoms of hypotension.  Patient is not experiencing signs/symptoms of hypertension.          Last 5 Patient Entered Readings                                      Current 30 Day Average: 130/72     Recent Readings 9/26/2020 9/26/2020 9/23/2020 9/23/2020 9/21/2020    SBP (mmHg) 119 119 134 134 141    DBP (mmHg) 62 62 95 95 84               Screenings    ASSESSMENT(S)  Patients BP average is 130/72 mmHg, which is above goal. Patient's BP goal is less than or equal to 130/80 per 2017 ACC/AHA Hypertension Guidelines.     Hypertension Plan  Additional monitoring needed.  Continue current therapy.       Addressed patient questions and patient has my contact information if needed prior to next outreach. Patient verbalizes understanding.            There are no preventive care reminders to display for this patient.  There are no preventive care reminders to display for this patient.    Hypertension Medications             hydroCHLOROthiazide (HYDRODIURIL) 12.5 MG Tab Take 1 tablet (12.5 mg total) by mouth daily as needed (before a meal high in sodium).    losartan (COZAAR) 50 MG tablet Take 1 tablet (50 mg total) by mouth once daily.

## 2020-10-06 ENCOUNTER — IMMUNIZATION (OUTPATIENT)
Dept: INTERNAL MEDICINE | Facility: CLINIC | Age: 80
End: 2020-10-06
Payer: MEDICARE

## 2020-10-06 PROCEDURE — 90694 FLU VACCINE - QUADRIVALENT - ADJUVANTED: ICD-10-PCS | Mod: HCNC,S$GLB,, | Performed by: FAMILY MEDICINE

## 2020-10-06 PROCEDURE — G0008 FLU VACCINE - QUADRIVALENT - ADJUVANTED: ICD-10-PCS | Mod: HCNC,S$GLB,, | Performed by: FAMILY MEDICINE

## 2020-10-06 PROCEDURE — 90694 VACC AIIV4 NO PRSRV 0.5ML IM: CPT | Mod: HCNC,S$GLB,, | Performed by: FAMILY MEDICINE

## 2020-10-06 PROCEDURE — G0008 ADMIN INFLUENZA VIRUS VAC: HCPCS | Mod: HCNC,S$GLB,, | Performed by: FAMILY MEDICINE

## 2020-10-08 ENCOUNTER — PATIENT MESSAGE (OUTPATIENT)
Dept: INTERNAL MEDICINE | Facility: CLINIC | Age: 80
End: 2020-10-08

## 2020-10-08 ENCOUNTER — PATIENT MESSAGE (OUTPATIENT)
Dept: DERMATOLOGY | Facility: CLINIC | Age: 80
End: 2020-10-08

## 2020-10-16 DIAGNOSIS — L57.0 ACTINIC KERATOSES: ICD-10-CM

## 2020-10-16 RX ORDER — FLUOROURACIL 50 MG/G
CREAM TOPICAL 2 TIMES DAILY
Qty: 40 G | Refills: 1 | Status: SHIPPED | OUTPATIENT
Start: 2020-10-16 | End: 2020-12-10

## 2020-10-18 ENCOUNTER — HOSPITAL ENCOUNTER (EMERGENCY)
Facility: HOSPITAL | Age: 80
Discharge: HOME | End: 2020-10-18
Attending: EMERGENCY MEDICINE
Payer: MEDICARE

## 2020-10-18 VITALS
WEIGHT: 200.75 LBS | DIASTOLIC BLOOD PRESSURE: 72 MMHG | BODY MASS INDEX: 28.1 KG/M2 | SYSTOLIC BLOOD PRESSURE: 158 MMHG | OXYGEN SATURATION: 99 % | RESPIRATION RATE: 20 BRPM | HEART RATE: 63 BPM | HEIGHT: 71 IN | TEMPERATURE: 98 F

## 2020-10-18 DIAGNOSIS — I10 HTN (HYPERTENSION): ICD-10-CM

## 2020-10-18 DIAGNOSIS — Z78.9 ALCOHOL USE: ICD-10-CM

## 2020-10-18 DIAGNOSIS — F41.9 ANXIOUSNESS: ICD-10-CM

## 2020-10-18 DIAGNOSIS — I10 ACCELERATED HYPERTENSION: Primary | ICD-10-CM

## 2020-10-18 LAB
ALBUMIN SERPL BCP-MCNC: 4.1 G/DL (ref 3.5–5.2)
ALP SERPL-CCNC: 51 U/L (ref 55–135)
ALT SERPL W/O P-5'-P-CCNC: 14 U/L (ref 10–44)
ANION GAP SERPL CALC-SCNC: 9 MMOL/L (ref 8–16)
AST SERPL-CCNC: 19 U/L (ref 10–40)
BASOPHILS # BLD AUTO: 0.04 K/UL (ref 0–0.2)
BASOPHILS NFR BLD: 0.7 % (ref 0–1.9)
BILIRUB SERPL-MCNC: 0.9 MG/DL (ref 0.1–1)
BILIRUB UR QL STRIP: NEGATIVE
BUN SERPL-MCNC: 22 MG/DL (ref 8–23)
CALCIUM SERPL-MCNC: 9 MG/DL (ref 8.7–10.5)
CHLORIDE SERPL-SCNC: 100 MMOL/L (ref 95–110)
CLARITY UR: CLEAR
CO2 SERPL-SCNC: 23 MMOL/L (ref 23–29)
COLOR UR: YELLOW
CREAT SERPL-MCNC: 1.4 MG/DL (ref 0.5–1.4)
DIFFERENTIAL METHOD: ABNORMAL
EOSINOPHIL # BLD AUTO: 0.2 K/UL (ref 0–0.5)
EOSINOPHIL NFR BLD: 2.9 % (ref 0–8)
ERYTHROCYTE [DISTWIDTH] IN BLOOD BY AUTOMATED COUNT: 11.7 % (ref 11.5–14.5)
EST. GFR  (AFRICAN AMERICAN): 54 ML/MIN/1.73 M^2
EST. GFR  (NON AFRICAN AMERICAN): 46 ML/MIN/1.73 M^2
GLUCOSE SERPL-MCNC: 94 MG/DL (ref 70–110)
GLUCOSE UR QL STRIP: NEGATIVE
HCT VFR BLD AUTO: 37.1 % (ref 40–54)
HGB BLD-MCNC: 12.5 G/DL (ref 14–18)
HGB UR QL STRIP: NEGATIVE
IMM GRANULOCYTES # BLD AUTO: 0.03 K/UL (ref 0–0.04)
IMM GRANULOCYTES NFR BLD AUTO: 0.5 % (ref 0–0.5)
KETONES UR QL STRIP: NEGATIVE
LEUKOCYTE ESTERASE UR QL STRIP: NEGATIVE
LYMPHOCYTES # BLD AUTO: 0.6 K/UL (ref 1–4.8)
LYMPHOCYTES NFR BLD: 10.1 % (ref 18–48)
MCH RBC QN AUTO: 32.3 PG (ref 27–31)
MCHC RBC AUTO-ENTMCNC: 33.7 G/DL (ref 32–36)
MCV RBC AUTO: 96 FL (ref 82–98)
MONOCYTES # BLD AUTO: 0.4 K/UL (ref 0.3–1)
MONOCYTES NFR BLD: 7.1 % (ref 4–15)
NEUTROPHILS # BLD AUTO: 4.7 K/UL (ref 1.8–7.7)
NEUTROPHILS NFR BLD: 78.7 % (ref 38–73)
NITRITE UR QL STRIP: NEGATIVE
NRBC BLD-RTO: 0 /100 WBC
PH UR STRIP: 6 [PH] (ref 5–8)
PLATELET # BLD AUTO: 109 K/UL (ref 150–350)
PMV BLD AUTO: 8.1 FL (ref 9.2–12.9)
POTASSIUM SERPL-SCNC: 4.1 MMOL/L (ref 3.5–5.1)
PROT SERPL-MCNC: 6.8 G/DL (ref 6–8.4)
PROT UR QL STRIP: NEGATIVE
RBC # BLD AUTO: 3.87 M/UL (ref 4.6–6.2)
SODIUM SERPL-SCNC: 132 MMOL/L (ref 136–145)
SP GR UR STRIP: 1.01 (ref 1–1.03)
TROPONIN I SERPL DL<=0.01 NG/ML-MCNC: 0.01 NG/ML (ref 0–0.03)
URN SPEC COLLECT METH UR: NORMAL
UROBILINOGEN UR STRIP-ACNC: NEGATIVE EU/DL
WBC # BLD AUTO: 5.95 K/UL (ref 3.9–12.7)

## 2020-10-18 PROCEDURE — 80053 COMPREHEN METABOLIC PANEL: CPT | Mod: HCNC

## 2020-10-18 PROCEDURE — 84484 ASSAY OF TROPONIN QUANT: CPT | Mod: HCNC

## 2020-10-18 PROCEDURE — 99284 EMERGENCY DEPT VISIT MOD MDM: CPT | Mod: 25,HCNC

## 2020-10-18 PROCEDURE — 93010 EKG 12-LEAD: ICD-10-PCS | Mod: HCNC,,, | Performed by: INTERNAL MEDICINE

## 2020-10-18 PROCEDURE — 93010 ELECTROCARDIOGRAM REPORT: CPT | Mod: HCNC,,, | Performed by: INTERNAL MEDICINE

## 2020-10-18 PROCEDURE — 85025 COMPLETE CBC W/AUTO DIFF WBC: CPT | Mod: HCNC

## 2020-10-18 PROCEDURE — 81003 URINALYSIS AUTO W/O SCOPE: CPT | Mod: HCNC

## 2020-10-18 PROCEDURE — 93005 ELECTROCARDIOGRAM TRACING: CPT | Mod: HCNC

## 2020-10-18 RX ORDER — CLONIDINE HYDROCHLORIDE 0.1 MG/1
0.1 TABLET ORAL
Status: DISCONTINUED | OUTPATIENT
Start: 2020-10-18 | End: 2020-10-18 | Stop reason: HOSPADM

## 2020-10-18 NOTE — ED PROVIDER NOTES
"SCRIBE #1 NOTE: I, Jeanie Rodríguez, am scribing for, and in the presence of, Madalyn Taveras MD. I have scribed the HPI, ROS, and PEx.     SCRIBE #2 NOTE: I, Oliver Reed, am scribing for, and in the presence of,  Patricia Miller MD. I have scribed the remaining portions of the note not scribed by Scribe #1.      History     Chief Complaint   Patient presents with    Hypertension     pt c/o elevated BP readings at home today (SBP 180s), pt c/o "lightheadedness"     Review of patient's allergies indicates:   Allergen Reactions    Mobic  [meloxicam]      Other reaction(s): hypertension         History of Present Illness     HPI    10/18/2020, 3:24 PM  History obtained from the patient      History of Present Illness: Ezequiel Hughes is a 80 y.o. male patient with a PMHx of anemia, back pain, BPH, HLD, HTN, GUIDO, and TIA who presents to the Emergency Department for evaluation of elevated BP which onset gradually PTA. Symptoms are constant and moderate in severity. No mitigating or exacerbating factors reported. Associated sxs include light-headedness and neck pain. Patient denies any fever, chills, n/v, dizziness, numbness, weakness, HA, SOB, CP, palpitations, and all other sxs at this time. Pt reports compliance with his current BP medications. No further complaints or concerns at this time.       Arrival mode: Personal vehicle    PCP: Valery Ozuna MD        Past Medical History:  Past Medical History:   Diagnosis Date    Allergic rhinitis     Anemia     Back pain     BPH (benign prostatic hyperplasia)     Cancer     skin cancer to neck, Dr. Graves    Cataract     Disorder of kidney and ureter     ED (erectile dysfunction)     Hiatal hernia     small    History of colon polyps     colonoscopy 11/2016    HLD (hyperlipidemia)     Hyperlipidemia     Hypertension     Lateral epicondylitis     OA (osteoarthritis)     GUIDO (obstructive sleep apnea)     Prostate cancer     Dr. Wong    TIA " (transient ischemic attack)     Trouble in sleeping        Past Surgical History:  Past Surgical History:   Procedure Laterality Date    CATARACT EXTRACTION W/  INTRAOCULAR LENS IMPLANT Right 2018    I-Stent    CATARACT EXTRACTION W/  INTRAOCULAR LENS IMPLANT Left 2018    I - Stent    COLONOSCOPY N/A 2016    Procedure: COLONOSCOPY;  Surgeon: Karuna Rodriguez MD;  Location: [facility] ENDO;  Service: Endoscopy;  Laterality: N/A;    COLONOSCOPY N/A 2020    Procedure: COLONOSCOPY;  Surgeon: Chuy Fish MD;  Location: [facility] ENDO;  Service: Endoscopy;  Laterality: N/A;    HEMORRHOID SURGERY      I-STENT Right 2018    DR. REECE    KNEE ARTHROSCOPY W/ MENISCAL REPAIR Right     Dr. Jain    PCIOL Right 2018    DR. REECE    PLANTAR FASCIA RELEASE      right    ROTATOR CUFF REPAIR Bilateral     bilateral    SHOULDER SURGERY Bilateral around     Dr. Pepper.  rotator cuff surgeries         Family History:  Family History   Problem Relation Age of Onset    Lung cancer Father         life long smoker    Cancer Father         prostate, lung    Stroke Sister         TIA    Cataracts Sister     Cancer Brother         prostate    Cataracts Brother     Cataracts Sister     Melanoma Neg Hx     Psoriasis Neg Hx     Lupus Neg Hx     Eczema Neg Hx     Diabetes Neg Hx     Heart disease Neg Hx     Kidney disease Neg Hx     Colon cancer Neg Hx        Social History:  Social History     Tobacco Use    Smoking status: Former Smoker     Packs/day: 3.00     Years: 35.00     Pack years: 105.00     Types: Cigarettes     Start date: 1960     Quit date: 1985     Years since quittin.2    Smokeless tobacco: Never Used   Substance and Sexual Activity    Alcohol use: Yes     Alcohol/week: 7.0 standard drinks     Types: 6 Cans of beer, 1 Standard drinks or equivalent per week     Frequency: 4 or more times a week     Drinks per session: 1 or 2     Binge  frequency: Never     Comment: socially    Drug use: No    Sexual activity: Never        Review of Systems     Review of Systems   Constitutional: Negative for chills and fever.        + elevated BP   HENT: Negative for sore throat.    Respiratory: Negative for shortness of breath.    Cardiovascular: Negative for chest pain and palpitations.   Gastrointestinal: Negative for nausea and vomiting.   Genitourinary: Negative for dysuria.   Musculoskeletal: Positive for neck pain. Negative for back pain.   Skin: Negative for rash.   Neurological: Positive for light-headedness. Negative for dizziness, weakness, numbness and headaches.   Hematological: Does not bruise/bleed easily.   All other systems reviewed and are negative.     Physical Exam     Initial Vitals [10/18/20 1443]   BP Pulse Resp Temp SpO2   (!) 172/78 60 20 98 °F (36.7 °C) 99 %      MAP       --          Physical Exam  Nursing Notes and Vital Signs Reviewed.  Constitutional: Patient is in no acute distress. Well-developed and well-nourished.  Head: Atraumatic. Normocephalic.  Eyes: PERRL. EOM intact. Conjunctivae are not pale. No scleral icterus.  ENT: Mucous membranes are moist. Oropharynx is clear and symmetric.    Neck: Supple. Full ROM. No lymphadenopathy.  Cardiovascular: Regular rate. Regular rhythm. No murmurs, rubs, or gallops. Distal pulses are 2+ and symmetric.  Pulmonary/Chest: No respiratory distress. Clear to auscultation bilaterally. No wheezing or rales.  Abdominal: Soft and non-distended.  There is no tenderness.  No rebound, guarding, or rigidity. Good bowel sounds.  Genitourinary: No CVA tenderness  Musculoskeletal: Moves all extremities. No obvious deformities. No edema. No calf tenderness.  Skin: Warm and dry.  Neurological:  Alert, awake, and appropriate.  Normal speech.  No acute focal neurological deficits are appreciated.  Psychiatric: Normal affect. Good eye contact. Appropriate in content.     ED Course   Procedures  ED Vital  "Signs:  Vitals:    10/18/20 1443 10/18/20 1445 10/18/20 1518 10/18/20 1645   BP: (!) 172/78 (!) 166/70 (!) 168/75 (!) 158/72   Pulse: 60  63    Resp: 20      Temp: 98 °F (36.7 °C)      TempSrc: Oral      SpO2: 99%  99%    Weight: 91 kg (200 lb 11.7 oz)      Height: 5' 10.5" (1.791 m)          Abnormal Lab Results:  Labs Reviewed   CBC W/ AUTO DIFFERENTIAL - Abnormal; Notable for the following components:       Result Value    RBC 3.87 (*)     Hemoglobin 12.5 (*)     Hematocrit 37.1 (*)     Mean Corpuscular Hemoglobin 32.3 (*)     Platelets 109 (*)     MPV 8.1 (*)     Lymph # 0.6 (*)     Gran% 78.7 (*)     Lymph% 10.1 (*)     All other components within normal limits   COMPREHENSIVE METABOLIC PANEL - Abnormal; Notable for the following components:    Sodium 132 (*)     Alkaline Phosphatase 51 (*)     eGFR if  54 (*)     eGFR if non  46 (*)     All other components within normal limits   TROPONIN I   URINALYSIS, REFLEX TO URINE CULTURE    Narrative:     Specimen Source->Urine        All Lab Results:  Results for orders placed or performed during the hospital encounter of 10/18/20   CBC auto differential   Result Value Ref Range    WBC 5.95 3.90 - 12.70 K/uL    RBC 3.87 (L) 4.60 - 6.20 M/uL    Hemoglobin 12.5 (L) 14.0 - 18.0 g/dL    Hematocrit 37.1 (L) 40.0 - 54.0 %    Mean Corpuscular Volume 96 82 - 98 fL    Mean Corpuscular Hemoglobin 32.3 (H) 27.0 - 31.0 pg    Mean Corpuscular Hemoglobin Conc 33.7 32.0 - 36.0 g/dL    RDW 11.7 11.5 - 14.5 %    Platelets 109 (L) 150 - 350 K/uL    MPV 8.1 (L) 9.2 - 12.9 fL    Immature Granulocytes 0.5 0.0 - 0.5 %    Gran # (ANC) 4.7 1.8 - 7.7 K/uL    Immature Grans (Abs) 0.03 0.00 - 0.04 K/uL    Lymph # 0.6 (L) 1.0 - 4.8 K/uL    Mono # 0.4 0.3 - 1.0 K/uL    Eos # 0.2 0.0 - 0.5 K/uL    Baso # 0.04 0.00 - 0.20 K/uL    nRBC 0 0 /100 WBC    Gran% 78.7 (H) 38.0 - 73.0 %    Lymph% 10.1 (L) 18.0 - 48.0 %    Mono% 7.1 4.0 - 15.0 %    Eosinophil% 2.9 0.0 - 8.0 % "    Basophil% 0.7 0.0 - 1.9 %    Differential Method Automated    Comprehensive metabolic panel   Result Value Ref Range    Sodium 132 (L) 136 - 145 mmol/L    Potassium 4.1 3.5 - 5.1 mmol/L    Chloride 100 95 - 110 mmol/L    CO2 23 23 - 29 mmol/L    Glucose 94 70 - 110 mg/dL    BUN, Bld 22 8 - 23 mg/dL    Creatinine 1.4 0.5 - 1.4 mg/dL    Calcium 9.0 8.7 - 10.5 mg/dL    Total Protein 6.8 6.0 - 8.4 g/dL    Albumin 4.1 3.5 - 5.2 g/dL    Total Bilirubin 0.9 0.1 - 1.0 mg/dL    Alkaline Phosphatase 51 (L) 55 - 135 U/L    AST 19 10 - 40 U/L    ALT 14 10 - 44 U/L    Anion Gap 9 8 - 16 mmol/L    eGFR if African American 54 (A) >60 mL/min/1.73 m^2    eGFR if non African American 46 (A) >60 mL/min/1.73 m^2   Troponin I   Result Value Ref Range    Troponin I 0.012 0.000 - 0.026 ng/mL   Urinalysis, Reflex to Urine Culture Urine, Clean Catch    Specimen: Urine   Result Value Ref Range    Specimen UA Urine, Clean Catch     Color, UA Yellow Yellow, Straw, Sonia    Appearance, UA Clear Clear    pH, UA 6.0 5.0 - 8.0    Specific Gravity, UA 1.010 1.005 - 1.030    Protein, UA Negative Negative    Glucose, UA Negative Negative    Ketones, UA Negative Negative    Bilirubin (UA) Negative Negative    Occult Blood UA Negative Negative    Nitrite, UA Negative Negative    Urobilinogen, UA Negative <2.0 EU/dL    Leukocytes, UA Negative Negative         Imaging Results:  Imaging Results    None          The EKG was ordered, reviewed, and independently interpreted by the ED provider.  Interpretation time: 1532  Rate: 54 BPM  Rhythm: sinus bradycardia  Interpretation: Incomplete RBBB. No STEMI.           The Emergency Provider reviewed the vital signs and test results, which are outlined above.     ED Discussion     4:00 PM: Dr. Taveras transfers care of patient to Dr. Miller pending lab results.    4:21 PM: Reassessed pt at this time.  Pt states his condition has improved at this time. Pt reports that he drank 7 beers yesterday and ate  nette during a celebration. He reports that he does not normally drink this much in one day. Explained to patient likely cause in an abrupt change in BP. Discussed with pt all pertinent ED information and results. Discussed pt dx and plan of tx. Gave pt all f/u and return to the ED instructions. All questions and concerns were addressed at this time. Pt expresses understanding of information and instructions, and is comfortable with plan to discharge. Pt is stable for discharge.    I discussed with patient and/or family/caretaker that evaluation in the ED does not suggest any emergent or life threatening medical conditions requiring immediate intervention beyond what was provided in the ED, and I believe patient is safe for discharge.  Regardless, an unremarkable evaluation in the ED does not preclude the development or presence of a serious of life threatening condition. As such, patient was instructed to return immediately for any worsening or change in current symptoms.           Medical Decision Making:   Clinical Tests:   Lab Tests: Ordered and Reviewed  Medical Tests: Ordered and Reviewed           ED Medication(s):  Medications   cloNIDine tablet 0.1 mg (0 mg Oral Hold 10/18/20 1615)       Discharge Medication List as of 10/18/2020  4:29 PM          Follow-up Information     Valery Ozuna MD. Schedule an appointment as soon as possible for a visit in 2 days.    Specialty: Family Medicine  Why: Return to the Emergency Room, If symptoms worsen  Contact information:  [address] [facility]  [address]  [facility] [id number] [phone]                       Scribe Attestation:   Scribe #1: I performed the above scribed service and the documentation accurately describes the services I performed. I attest to the accuracy of the note.     Attending:   Physician Attestation Statement for Scribe #1: I, Madalyn Taveras MD, personally performed the services described in this documentation, as scribed by Jeanie Rodríguez, in  my presence, and it is both accurate and complete.       Scribe Attestation:   Scribe #2: I performed the above scribed service and the documentation accurately describes the services I performed. I attest to the accuracy of the note.    Attending Attestation:           Physician Attestation for Scribe:    Physician Attestation Statement for Scribe #2: I, Patricia Miller MD, reviewed documentation, as scribed by Oliver Reed in my presence, and it is both accurate and complete. I also acknowledge and confirm the content of the note done by Scribe #1.           Clinical Impression       ICD-10-CM ICD-9-CM   1. Accelerated hypertension  I10 401.0   2. HTN (hypertension)  I10 401.9   3. Alcohol use  Z72.89 V49.89   4. Anxiousness  F41.9 300.00       Disposition:   Disposition: Discharged  Condition: Stable         Patricia Miller MD  10/18/20 2176

## 2020-10-19 ENCOUNTER — PATIENT MESSAGE (OUTPATIENT)
Dept: PRIMARY CARE CLINIC | Facility: CLINIC | Age: 80
End: 2020-10-19

## 2020-10-19 DIAGNOSIS — E87.1 HYPONATREMIA: Primary | ICD-10-CM

## 2020-10-20 ENCOUNTER — TELEPHONE (OUTPATIENT)
Dept: PRIMARY CARE CLINIC | Facility: CLINIC | Age: 80
End: 2020-10-20

## 2020-10-20 DIAGNOSIS — E87.1 LOW SODIUM LEVELS: Primary | ICD-10-CM

## 2020-10-20 NOTE — TELEPHONE ENCOUNTER
----- Message from Ofelia Collins sent at 10/20/2020 10:14 AM CDT -----  Regarding: labs  Contact: patient  Patient requesting lab orders, please call him back at [phone]

## 2020-10-23 ENCOUNTER — LAB VISIT (OUTPATIENT)
Dept: LAB | Facility: HOSPITAL | Age: 80
End: 2020-10-23
Attending: FAMILY MEDICINE
Payer: MEDICARE

## 2020-10-23 DIAGNOSIS — E87.1 LOW SODIUM LEVELS: ICD-10-CM

## 2020-10-23 LAB
ALBUMIN SERPL BCP-MCNC: 4 G/DL (ref 3.5–5.2)
ALP SERPL-CCNC: 54 U/L (ref 55–135)
ALT SERPL W/O P-5'-P-CCNC: 11 U/L (ref 10–44)
ANION GAP SERPL CALC-SCNC: 10 MMOL/L (ref 8–16)
AST SERPL-CCNC: 16 U/L (ref 10–40)
BILIRUB SERPL-MCNC: 0.9 MG/DL (ref 0.1–1)
BUN SERPL-MCNC: 33 MG/DL (ref 8–23)
CALCIUM SERPL-MCNC: 9.1 MG/DL (ref 8.7–10.5)
CHLORIDE SERPL-SCNC: 103 MMOL/L (ref 95–110)
CO2 SERPL-SCNC: 26 MMOL/L (ref 23–29)
CREAT SERPL-MCNC: 1.6 MG/DL (ref 0.5–1.4)
EST. GFR  (AFRICAN AMERICAN): 45.7 ML/MIN/1.73 M^2
EST. GFR  (NON AFRICAN AMERICAN): 39.5 ML/MIN/1.73 M^2
GLUCOSE SERPL-MCNC: 87 MG/DL (ref 70–110)
POTASSIUM SERPL-SCNC: 4.3 MMOL/L (ref 3.5–5.1)
PROT SERPL-MCNC: 6.8 G/DL (ref 6–8.4)
SODIUM SERPL-SCNC: 139 MMOL/L (ref 136–145)

## 2020-10-23 PROCEDURE — 80053 COMPREHEN METABOLIC PANEL: CPT | Mod: HCNC

## 2020-10-23 PROCEDURE — 36415 COLL VENOUS BLD VENIPUNCTURE: CPT | Mod: HCNC,PO

## 2020-10-28 ENCOUNTER — OFFICE VISIT (OUTPATIENT)
Dept: PRIMARY CARE CLINIC | Facility: CLINIC | Age: 80
End: 2020-10-28
Payer: MEDICARE

## 2020-10-28 VITALS
DIASTOLIC BLOOD PRESSURE: 60 MMHG | SYSTOLIC BLOOD PRESSURE: 100 MMHG | OXYGEN SATURATION: 98 % | WEIGHT: 194.88 LBS | HEART RATE: 68 BPM | TEMPERATURE: 98 F | BODY MASS INDEX: 27.57 KG/M2

## 2020-10-28 DIAGNOSIS — I10 ESSENTIAL HYPERTENSION: Primary | Chronic | ICD-10-CM

## 2020-10-28 DIAGNOSIS — L29.9 ITCHY SCALP: ICD-10-CM

## 2020-10-28 DIAGNOSIS — N18.30 STAGE 3 CHRONIC KIDNEY DISEASE, UNSPECIFIED WHETHER STAGE 3A OR 3B CKD: ICD-10-CM

## 2020-10-28 DIAGNOSIS — M54.30 SCIATICA, UNSPECIFIED LATERALITY: ICD-10-CM

## 2020-10-28 DIAGNOSIS — R29.898 DECREASED RANGE OF MOTION OF NECK: ICD-10-CM

## 2020-10-28 PROCEDURE — 3078F PR MOST RECENT DIASTOLIC BLOOD PRESSURE < 80 MM HG: ICD-10-PCS | Mod: HCNC,CPTII,S$GLB, | Performed by: FAMILY MEDICINE

## 2020-10-28 PROCEDURE — 1159F PR MEDICATION LIST DOCUMENTED IN MEDICAL RECORD: ICD-10-PCS | Mod: HCNC,S$GLB,, | Performed by: FAMILY MEDICINE

## 2020-10-28 PROCEDURE — 3074F SYST BP LT 130 MM HG: CPT | Mod: HCNC,CPTII,S$GLB, | Performed by: FAMILY MEDICINE

## 2020-10-28 PROCEDURE — 1159F MED LIST DOCD IN RCRD: CPT | Mod: HCNC,S$GLB,, | Performed by: FAMILY MEDICINE

## 2020-10-28 PROCEDURE — 99999 PR PBB SHADOW E&M-EST. PATIENT-LVL V: ICD-10-PCS | Mod: PBBFAC,HCNC,, | Performed by: FAMILY MEDICINE

## 2020-10-28 PROCEDURE — 99999 PR PBB SHADOW E&M-EST. PATIENT-LVL V: CPT | Mod: PBBFAC,HCNC,, | Performed by: FAMILY MEDICINE

## 2020-10-28 PROCEDURE — 1101F PT FALLS ASSESS-DOCD LE1/YR: CPT | Mod: HCNC,CPTII,S$GLB, | Performed by: FAMILY MEDICINE

## 2020-10-28 PROCEDURE — 1126F PR PAIN SEVERITY QUANTIFIED, NO PAIN PRESENT: ICD-10-PCS | Mod: HCNC,S$GLB,, | Performed by: FAMILY MEDICINE

## 2020-10-28 PROCEDURE — 99214 PR OFFICE/OUTPT VISIT, EST, LEVL IV, 30-39 MIN: ICD-10-PCS | Mod: HCNC,S$GLB,, | Performed by: FAMILY MEDICINE

## 2020-10-28 PROCEDURE — 1101F PR PT FALLS ASSESS DOC 0-1 FALLS W/OUT INJ PAST YR: ICD-10-PCS | Mod: HCNC,CPTII,S$GLB, | Performed by: FAMILY MEDICINE

## 2020-10-28 PROCEDURE — 1126F AMNT PAIN NOTED NONE PRSNT: CPT | Mod: HCNC,S$GLB,, | Performed by: FAMILY MEDICINE

## 2020-10-28 PROCEDURE — 3074F PR MOST RECENT SYSTOLIC BLOOD PRESSURE < 130 MM HG: ICD-10-PCS | Mod: HCNC,CPTII,S$GLB, | Performed by: FAMILY MEDICINE

## 2020-10-28 PROCEDURE — 3078F DIAST BP <80 MM HG: CPT | Mod: HCNC,CPTII,S$GLB, | Performed by: FAMILY MEDICINE

## 2020-10-28 PROCEDURE — 99214 OFFICE O/P EST MOD 30 MIN: CPT | Mod: HCNC,S$GLB,, | Performed by: FAMILY MEDICINE

## 2020-10-28 RX ORDER — CLOBETASOL PROPIONATE 0.46 MG/ML
SOLUTION TOPICAL NIGHTLY
Qty: 50 ML | Refills: 1 | Status: SHIPPED | OUTPATIENT
Start: 2020-10-28 | End: 2020-11-05 | Stop reason: SDUPTHER

## 2020-10-28 NOTE — PROGRESS NOTES
Subjective:      Patient ID: Ezequiel Hughes is a 80 y.o. male.    Chief Complaint: Follow-up (ED, for elevated BP) and Back Pain (neck pain )    Disclaimer:  This note is prepared using voice recognition software and as such is likely to have errors and has not been proof read. Please contact me for questions.     Ezequiel is here for followup from ED.  Previously was attending his family members 1-year-old birthday party had 6 beers and some gentle IR.  Blood pressure when significantly elevated.  When on into the emergency room.  Initially was uncertain why but appears to have been the amount of salt in beer he had had.  Weight was up to 200 lb at that time now back down to 194.  Resolved just with medication and fluids.    He is enrolled in the digital hypertension program and has been doing very well.  Also sees cardiology.    Did see dr gutierres for derm for scalp itching.  Base of head does itch and did some salve.  Did 5FU and other steroids but too hard with shampoo to get it to the affected areas.  Would be willing to do a solution of steroids as well for the itching.    Wanting to do some exercises for sciatic nerve. Wasn't certain if should go to back doctor or not.    Does have some home exercises to do as well.  Willing to see Dr. Paez in about 1-2 weeks after trying the exercises.     Some neck soreness and gets into top of head as well.  Has limited range of motion as well.  Would be willing to do physical therapy locally also    Lab Results       Component                Value               Date                       HGBA1C                   5.2                 07/22/2020                 HGBA1C                   5.2                 03/07/2018             Lab Results       Component                Value               Date                       CHOL                     144                 07/22/2020                 CHOL                     142                 01/23/2020                 CHOL                    "  146                 08/29/2019            Lab Results       Component                Value               Date                       LDLCALC                  68.2                07/22/2020                 LDLCALC                  73.4                01/23/2020                 LDLCALC                  71.4                08/29/2019              Wt Readings from Last 10 Encounters:  10/28/20 : 88.4 kg (194 lb 14.2 oz)  10/18/20 : 91 kg (200 lb 11.7 oz)  09/22/20 : 89.5 kg (197 lb 5 oz)  09/09/20 : 89 kg (196 lb 3.4 oz)  07/23/20 : 88.5 kg (195 lb)  07/13/20 : 87.9 kg (193 lb 12.6 oz)  07/07/20 : 89.1 kg (196 lb 6.9 oz)  03/11/20 : 91.1 kg (200 lb 13.4 oz)  02/26/20 : 90.7 kg (199 lb 15.3 oz)  01/31/20 : 94.4 kg (208 lb 1.8 oz)      The ASCVD Risk score (Barbara AVIS Jr., et al., 2013) failed to calculate for the following reasons:    The 2013 ASCVD risk score is only valid for ages 40 to 79       Is still on Plavix and statin therapy due to previous peripheral vascular disease.     guido- stable.   Below is a copy of the emergency room report.  That is been reviewed.  Chief Complaint  Patient presents with   Hypertension      pt c/o elevated BP readings at home today (SBP 180s), pt c/o "lightheadedness"     Review of patient's allergies indicates:  Allergen Reactions   Mobic  (meloxicam)        Other reaction(s): hypertension         History of Present Illness     HPI     10/18/2020, 3:24 PM  History obtained from the patient                  History of Present Illness: Ezequiel Hughes is a 80 y.o. male patient with a PMHx of anemia, back pain, BPH, HLD, HTN, GUIDO, and TIA who presents to the Emergency Department for evaluation of elevated BP which onset gradually PTA. Symptoms are constant and moderate in severity. No mitigating or exacerbating factors reported. Associated sxs include light-headedness and neck pain. Patient denies any fever, chills, n/v, dizziness, numbness, weakness, HA, SOB, CP, palpitations, and all other " sxs at this time. Pt reports compliance with his current BP medications. No further complaints or concerns at this time.         Arrival mode: Personal vehicle     PCP: Valery Ozuna MD         Past Medical History:  Past Medical History:  Diagnosis Date   Allergic rhinitis     Anemia     Back pain     BPH (benign prostatic hyperplasia)     Cancer      skin cancer to neck, Dr. Graves   Cataract     Disorder of kidney and ureter     ED (erectile dysfunction)     Hiatal hernia      small   History of colon polyps      colonoscopy 11/2016   HLD (hyperlipidemia)     Hyperlipidemia     Hypertension     Lateral epicondylitis     OA (osteoarthritis)     GUIDO (obstructive sleep apnea)     Prostate cancer      Dr. Wong   TIA (transient ischemic attack)     Trouble in sleeping          Past Surgical History:  Past Surgical History:  Procedure Laterality Date   CATARACT EXTRACTION W/  INTRAOCULAR LENS IMPLANT Right 02/21/2018    I-Stent   CATARACT EXTRACTION W/  INTRAOCULAR LENS IMPLANT Left 03/21/2018    I - Stent   COLONOSCOPY N/A 11/14/2016    Procedure: COLONOSCOPY;  Surgeon: Karuna Rodriguez MD;  Location: [facility];  Service: Endoscopy;  Laterality: N/A;   COLONOSCOPY N/A 9/22/2020    Procedure: COLONOSCOPY;  Surgeon: Chuy Fish MD;  Location: [facility];  Service: Endoscopy;  Laterality: N/A;   HEMORRHOID SURGERY       I-STENT Right 02/21/2018    DR. REECE   KNEE ARTHROSCOPY W/ MENISCAL REPAIR Right 2015    Dr. Jain   PCIOL Right 02/21/2018    DR. REECE   PLANTAR FASCIA RELEASE        right   ROTATOR CUFF REPAIR Bilateral      bilateral   SHOULDER SURGERY Bilateral around 2000    Dr. Pepper.  rotator cuff surgeries         Family History:  Family History  Problem Relation Age of Onset   Lung cancer Father          life long smoker   Cancer Father          prostate, lung   Stroke Sister          TIA   Cataracts Sister     Cancer Brother           prostate   Cataracts Brother     Cataracts Sister     Melanoma Neg Hx     Psoriasis Neg Hx     Lupus Neg Hx     Eczema Neg Hx     Diabetes Neg Hx     Heart disease Neg Hx     Kidney disease Neg Hx     Colon cancer Neg Hx          Social History:  Social History     Tobacco Use   Smoking status: Former Smoker      Packs/day: 3.00      Years: 35.00      Pack years: 105.00      Types: Cigarettes      Start date: 1960      Quit date: 1985      Years since quittin.2   Smokeless tobacco: Never Used  Substance and Sexual Activity   Alcohol use: Yes      Alcohol/week: 7.0 standard drinks      Types: 6 Cans of beer, 1 Standard drinks or equivalent per week      Frequency: 4 or more times a week      Drinks per session: 1 or 2      Binge frequency: Never      Comment: socially   Drug use: No   Sexual activity: Never         Review of Systems     Review of Systems   Constitutional: Negative for chills and fever.        + elevated BP   HENT: Negative for sore throat.    Respiratory: Negative for shortness of breath.    Cardiovascular: Negative for chest pain and palpitations.   Gastrointestinal: Negative for nausea and vomiting.   Genitourinary: Negative for dysuria.   Musculoskeletal: Positive for neck pain. Negative for back pain.   Skin: Negative for rash.   Neurological: Positive for light-headedness. Negative for dizziness, weakness, numbness and headaches.   Hematological: Does not bruise/bleed easily.   All other systems reviewed and are negative.      Physical Exam     Initial Vitals (10/18/20 1443)  BP Pulse Resp Temp SpO2  (!) 172/78 60 20 98 °F (36.7 °C) 99 %     MAP          --             Physical Exam  Nursing Notes and Vital Signs Reviewed.  Constitutional: Patient is in no acute distress. Well-developed and well-nourished.  Head: Atraumatic. Normocephalic.  Eyes: PERRL. EOM intact. Conjunctivae are not pale. No scleral icterus.  ENT: Mucous membranes are moist. Oropharynx is clear  "and symmetric.    Neck: Supple. Full ROM. No lymphadenopathy.  Cardiovascular: Regular rate. Regular rhythm. No murmurs, rubs, or gallops. Distal pulses are 2+ and symmetric.  Pulmonary/Chest: No respiratory distress. Clear to auscultation bilaterally. No wheezing or rales.  Abdominal: Soft and non-distended.  There is no tenderness.  No rebound, guarding, or rigidity. Good bowel sounds.  Genitourinary: No CVA tenderness  Musculoskeletal: Moves all extremities. No obvious deformities. No edema. No calf tenderness.  Skin: Warm and dry.  Neurological:  Alert, awake, and appropriate.  Normal speech.  No acute focal neurological deficits are appreciated.  Psychiatric: Normal affect. Good eye contact. Appropriate in content.      ED Course  Procedures  ED Vital Signs:  Vitals:    10/18/20 1443 10/18/20 1445 10/18/20 1518 10/18/20 1645  BP: (!) 172/78 (!) 166/70 (!) 168/75 (!) 158/72  Pulse: 60   63    Resp: 20        Temp: 98 °F (36.7 °C)        TempSrc: Oral        SpO2: 99%   99%    Weight: 91 kg (200 lb 11.7 oz)        Height: 5' 10.5" (1.791 m)              Abnormal Lab Results:  Labs Reviewed  CBC W/ AUTO DIFFERENTIAL - Abnormal; Notable for the following components:      Result Value     RBC 3.87 (*)      Hemoglobin 12.5 (*)      Hematocrit 37.1 (*)      Mean Corpuscular Hemoglobin 32.3 (*)      Platelets 109 (*)      MPV 8.1 (*)      Lymph # 0.6 (*)      Gran% 78.7 (*)      Lymph% 10.1 (*)      All other components within normal limits  COMPREHENSIVE METABOLIC PANEL - Abnormal; Notable for the following components:    Sodium 132 (*)      Alkaline Phosphatase 51 (*)      eGFR if  54 (*)      eGFR if non  46 (*)      All other components within normal limits  TROPONIN I  URINALYSIS, REFLEX TO URINE CULTURE    Narrative:     Specimen Source->Urine        All Lab Results:  Results for orders placed or performed during the hospital encounter of 10/18/20  CBC auto " differential  Result Value Ref Range    WBC 5.95 3.90 - 12.70 K/uL    RBC 3.87 (L) 4.60 - 6.20 M/uL    Hemoglobin 12.5 (L) 14.0 - 18.0 g/dL    Hematocrit 37.1 (L) 40.0 - 54.0 %    Mean Corpuscular Volume 96 82 - 98 fL    Mean Corpuscular Hemoglobin 32.3 (H) 27.0 - 31.0 pg    Mean Corpuscular Hemoglobin Conc 33.7 32.0 - 36.0 g/dL    RDW 11.7 11.5 - 14.5 %    Platelets 109 (L) 150 - 350 K/uL    MPV 8.1 (L) 9.2 - 12.9 fL    Immature Granulocytes 0.5 0.0 - 0.5 %    Gran # (ANC) 4.7 1.8 - 7.7 K/uL    Immature Grans (Abs) 0.03 0.00 - 0.04 K/uL    Lymph # 0.6 (L) 1.0 - 4.8 K/uL    Mono # 0.4 0.3 - 1.0 K/uL    Eos # 0.2 0.0 - 0.5 K/uL    Baso # 0.04 0.00 - 0.20 K/uL    nRBC 0 0 /100 WBC    Gran% 78.7 (H) 38.0 - 73.0 %    Lymph% 10.1 (L) 18.0 - 48.0 %    Mono% 7.1 4.0 - 15.0 %    Eosinophil% 2.9 0.0 - 8.0 %    Basophil% 0.7 0.0 - 1.9 %    Differential Method Automated    Comprehensive metabolic panel  Result Value Ref Range    Sodium 132 (L) 136 - 145 mmol/L    Potassium 4.1 3.5 - 5.1 mmol/L    Chloride 100 95 - 110 mmol/L    CO2 23 23 - 29 mmol/L    Glucose 94 70 - 110 mg/dL    BUN, Bld 22 8 - 23 mg/dL    Creatinine 1.4 0.5 - 1.4 mg/dL    Calcium 9.0 8.7 - 10.5 mg/dL    Total Protein 6.8 6.0 - 8.4 g/dL    Albumin 4.1 3.5 - 5.2 g/dL    Total Bilirubin 0.9 0.1 - 1.0 mg/dL    Alkaline Phosphatase 51 (L) 55 - 135 U/L    AST 19 10 - 40 U/L    ALT 14 10 - 44 U/L    Anion Gap 9 8 - 16 mmol/L    eGFR if African American 54 (A) >60 mL/min/1.73 m^2    eGFR if non African American 46 (A) >60 mL/min/1.73 m^2  Troponin I  Result Value Ref Range    Troponin I 0.012 0.000 - 0.026 ng/mL  Urinalysis, Reflex to Urine Culture Urine, Clean Catch    Specimen: Urine  Result Value Ref Range    Specimen UA Urine, Clean Catch      Color, UA Yellow Yellow, Straw, Sonia    Appearance, UA Clear Clear    pH, UA 6.0 5.0 - 8.0    Specific Gravity, UA 1.010 1.005 - 1.030    Protein, UA Negative Negative    Glucose, UA Negative Negative    Ketones,  UA Negative Negative    Bilirubin (UA) Negative Negative    Occult Blood UA Negative Negative    Nitrite, UA Negative Negative    Urobilinogen, UA Negative <2.0 EU/dL    Leukocytes, UA Negative Negative           Imaging Results:  Imaging Results   None           The EKG was ordered, reviewed, and independently interpreted by the ED provider.  Interpretation time: 1532  Rate: 54 BPM  Rhythm: sinus bradycardia  Interpretation: Incomplete RBBB. No STEMI.             The Emergency Provider reviewed the vital signs and test results, which are outlined above.      ED Discussion     4:00 PM: Dr. Taveras transfers care of patient to Dr. Miller pending lab results.     4:21 PM: Reassessed pt at this time.  Pt states his condition has improved at this time. Pt reports that he drank 7 beers yesterday and ate jambalaya during a celebration. He reports that he does not normally drink this much in one day. Explained to patient likely cause in an abrupt change in BP. Discussed with pt all pertinent ED information and results. Discussed pt dx and plan of tx. Gave pt all f/u and return to the ED instructions. All questions and concerns were addressed at this time. Pt expresses understanding of information and instructions, and is comfortable with plan to discharge. Pt is stable for discharge.          Lab Results   Component Value Date    WBC 5.95 10/18/2020    HGB 12.5 (L) 10/18/2020    HCT 37.1 (L) 10/18/2020     (L) 10/18/2020    CHOL 144 07/22/2020    TRIG 104 07/22/2020    HDL 55 07/22/2020    ALT 11 10/23/2020    AST 16 10/23/2020     10/23/2020    K 4.3 10/23/2020     10/23/2020    CREATININE 1.6 (H) 10/23/2020    BUN 33 (H) 10/23/2020    CO2 26 10/23/2020    TSH 1.330 01/23/2020    PSA 1.1 01/23/2020    INR 1.2 07/07/2020    HGBA1C 5.2 07/22/2020       Posterior Segment OCT Optic Nerve- Both eyes  Findings  Right Eye  Progression has been stable.     Left Eye  Progression has been stable.     Notes  RNFL  Thinning OU but no significant progression of either RNFL compared   with previous studies.  Ceballos Visual Field - OU - Extended - Both Eyes  Right Eye  Reliability was borderline. Findings include non-specific defects. Stable.     Left Eye  Reliability was borderline. Findings include normal observations. Stable.         Review of Systems   Constitutional: Negative for activity change and unexpected weight change.   HENT: Negative for hearing loss, rhinorrhea and trouble swallowing.    Eyes: Negative for discharge and visual disturbance.   Respiratory: Negative for chest tightness and wheezing.    Cardiovascular: Negative for chest pain, palpitations and leg swelling.   Gastrointestinal: Negative for blood in stool, constipation, diarrhea and vomiting.   Endocrine: Negative for polydipsia and polyuria.   Genitourinary: Negative for difficulty urinating, hematuria and urgency.   Musculoskeletal: Positive for back pain, neck pain and neck stiffness. Negative for arthralgias and joint swelling.   Neurological: Negative for weakness and headaches.   Psychiatric/Behavioral: Negative for confusion, dysphoric mood and sleep disturbance. The patient is not nervous/anxious.      Objective:     Vitals:    10/28/20 0942   BP: 100/60   Pulse: 68   Temp: 97.9 °F (36.6 °C)   SpO2: 98%   Weight: 88.4 kg (194 lb 14.2 oz)     Physical Exam  Vitals signs and nursing note reviewed.   Constitutional:       General: He is awake.      Appearance: Normal appearance. He is well-developed, well-groomed and normal weight.   HENT:      Head: Normocephalic and atraumatic.      Right Ear: External ear normal.      Left Ear: External ear normal.      Nose: Nose normal.   Eyes:      Conjunctiva/sclera: Conjunctivae normal.   Neck:      Musculoskeletal: Normal range of motion and neck supple.      Thyroid: No thyromegaly or thyroid tenderness.   Cardiovascular:      Rate and Rhythm: Normal rate and regular rhythm.      Heart sounds: Normal  heart sounds.   Pulmonary:      Effort: Pulmonary effort is normal. No accessory muscle usage.      Breath sounds: Normal breath sounds.   Musculoskeletal:      Cervical back: He exhibits decreased range of motion, tenderness and bony tenderness.      Lumbar back: He exhibits decreased range of motion, tenderness, pain and spasm. He exhibits no bony tenderness.   Neurological:      General: No focal deficit present.      Mental Status: He is alert. Mental status is at baseline.   Psychiatric:         Attention and Perception: Attention normal.         Mood and Affect: Mood normal.         Speech: Speech normal.         Behavior: Behavior normal. Behavior is cooperative.         Thought Content: Thought content normal.         Judgment: Judgment normal.       Assessment:     1. Essential hypertension    2. Stage 3 chronic kidney disease, unspecified whether stage 3a or 3b CKD    3. Sciatica, unspecified laterality    4. Decreased range of motion of neck    5. Itchy scalp      Plan:   Ezequiel was seen today for follow-up and back pain.    Diagnoses and all orders for this visit:    Essential hypertension  Comments:  Discussed diet salt intake continue current medicines continue with digital hypertension program,    Stage 3 chronic kidney disease, unspecified whether stage 3a or 3b CKD  Comments:  Noted continue with hydration.  Labs reviewed    Sciatica, unspecified laterality  Comments:  Patient can complete exercises referral given to oxygen physical therapy can see physical medicine in 2 weeks if not improving for possibly healthy back program  Orders:  -     Ambulatory referral/consult to Physical Medicine Rehab; Future  -     Ambulatory referral/consult to Physical/Occupational Therapy; Future    Decreased range of motion of neck  Comments:  Can refer to physical therapy at [facility] for strengthening and stretching exercises.  Follow-up with physical medicine if not improving in 2 weeks  Orders:  -     Ambulatory  referral/consult to Physical/Occupational Therapy; Future    Itchy scalp  Comments:  Send in clobetasol solution for itchy scalp noted.    Other orders  -     clobetasoL (TEMOVATE) 0.05 % external solution; Apply topically every evening. To scalp massage in for itchy areas            Follow up in about 3 months (around 1/28/2021) for f/u Telemed Dr Ozuna/ bp, physical therapy .    Patient Instructions   Use eurcerin lotion to hands or aquaphor medicated lotions to hands for itch/dry skin.

## 2020-11-05 RX ORDER — CLOBETASOL PROPIONATE 0.46 MG/ML
SOLUTION TOPICAL NIGHTLY
Qty: 50 ML | Refills: 1 | Status: SHIPPED | OUTPATIENT
Start: 2020-11-05 | End: 2020-12-10

## 2020-11-08 ENCOUNTER — PATIENT MESSAGE (OUTPATIENT)
Dept: PRIMARY CARE CLINIC | Facility: CLINIC | Age: 80
End: 2020-11-08

## 2020-11-08 DIAGNOSIS — M65.319 TRIGGER FINGER OF THUMB, UNSPECIFIED LATERALITY: Primary | ICD-10-CM

## 2020-11-09 ENCOUNTER — PATIENT MESSAGE (OUTPATIENT)
Dept: PRIMARY CARE CLINIC | Facility: CLINIC | Age: 80
End: 2020-11-09

## 2020-11-09 ENCOUNTER — TELEPHONE (OUTPATIENT)
Dept: ORTHOPEDICS | Facility: CLINIC | Age: 80
End: 2020-11-09

## 2020-11-09 ENCOUNTER — OFFICE VISIT (OUTPATIENT)
Dept: OPHTHALMOLOGY | Facility: CLINIC | Age: 80
End: 2020-11-09
Payer: MEDICARE

## 2020-11-09 DIAGNOSIS — M79.641 PAIN OF RIGHT HAND: Primary | ICD-10-CM

## 2020-11-09 DIAGNOSIS — Z96.1 PSEUDOPHAKIA: ICD-10-CM

## 2020-11-09 DIAGNOSIS — H40.1122 PRIMARY OPEN ANGLE GLAUCOMA (POAG) OF LEFT EYE, MODERATE STAGE: ICD-10-CM

## 2020-11-09 DIAGNOSIS — H40.1111 PRIMARY OPEN ANGLE GLAUCOMA (POAG) OF RIGHT EYE, MILD STAGE: Primary | ICD-10-CM

## 2020-11-09 PROCEDURE — 92012 INTRM OPH EXAM EST PATIENT: CPT | Mod: HCNC,S$GLB,, | Performed by: OPHTHALMOLOGY

## 2020-11-09 PROCEDURE — 92012 PR EYE EXAM, EST PATIENT,INTERMED: ICD-10-PCS | Mod: HCNC,S$GLB,, | Performed by: OPHTHALMOLOGY

## 2020-11-09 PROCEDURE — 99999 PR PBB SHADOW E&M-EST. PATIENT-LVL III: CPT | Mod: PBBFAC,HCNC,, | Performed by: OPHTHALMOLOGY

## 2020-11-09 PROCEDURE — 99999 PR PBB SHADOW E&M-EST. PATIENT-LVL III: ICD-10-PCS | Mod: PBBFAC,HCNC,, | Performed by: OPHTHALMOLOGY

## 2020-11-09 RX ORDER — KETOROLAC TROMETHAMINE 5 MG/ML
SOLUTION OPHTHALMIC
Qty: 1 BOTTLE | Refills: 0 | Status: SHIPPED | OUTPATIENT
Start: 2020-11-09 | End: 2020-12-10

## 2020-11-09 NOTE — PROGRESS NOTES
SUBJECTIVE  Ezequiel Hughes is 80 y.o. male  Uncorrected distance visual acuity was 20/25 +2 in the right eye and not recorded in the left eye. Uncorrected near visual acuity was not recorded in the right eye and J1 in the left eye.   Chief Complaint   Patient presents with    Glaucoma     4 month IOP check, no drops          HPI     Glaucoma      Additional comments: 4 month IOP check, no drops              Comments     1. Mod COAG OS + Mild COAG OD (init 22/26 post dilation IOP ) goal = 17  2. PCIOL / I-Stent OD +18.5 [id number] (distance) 2-21-18  PCIOL /I-Stent OS +21.0 (-1.75) 3/21/18  3. ERM OU   4. Brow Lift 9/18   *intolerant of travatan*      No eyedrops          Last edited by Alonso Moulton on 11/9/2020  3:05 PM. (History)         Assessment /Plan :  1. Primary open angle glaucoma (POAG) of right eye, mild stage IOP OU not within acceptable range relative to target IOP with risk of irreversible visual loss. Better IOP control is recommended. Discussed options, risks, and benefits of additional medication, SLT laser, and/or incisional glaucoma surgery. Reviewed importance of continued compliance with treatment and follow up.     Patient chooses schedule SLT  OS     2. Primary open angle glaucoma (POAG) of left eye, moderate stage    3. Pseudophakia  -- Condition stable, no therapeutic change required. Monitoring routinely.       Return for the SLT OS

## 2020-11-10 ENCOUNTER — OFFICE VISIT (OUTPATIENT)
Dept: ORTHOPEDICS | Facility: CLINIC | Age: 80
End: 2020-11-10
Payer: MEDICARE

## 2020-11-10 ENCOUNTER — PATIENT OUTREACH (OUTPATIENT)
Dept: ADMINISTRATIVE | Facility: HOSPITAL | Age: 80
End: 2020-11-10

## 2020-11-10 ENCOUNTER — HOSPITAL ENCOUNTER (OUTPATIENT)
Dept: RADIOLOGY | Facility: HOSPITAL | Age: 80
Discharge: HOME | End: 2020-11-10
Attending: STUDENT IN AN ORGANIZED HEALTH CARE EDUCATION/TRAINING PROGRAM
Payer: MEDICARE

## 2020-11-10 ENCOUNTER — TELEPHONE (OUTPATIENT)
Dept: ORTHOPEDICS | Facility: CLINIC | Age: 80
End: 2020-11-10

## 2020-11-10 DIAGNOSIS — M65.319 TRIGGER FINGER OF THUMB, UNSPECIFIED LATERALITY: Primary | ICD-10-CM

## 2020-11-10 DIAGNOSIS — M79.641 PAIN OF RIGHT HAND: ICD-10-CM

## 2020-11-10 PROCEDURE — 73130 XR HAND COMPLETE 3 VIEW RIGHT: ICD-10-PCS | Mod: 26,HCNC,RT, | Performed by: RADIOLOGY

## 2020-11-10 PROCEDURE — 73130 X-RAY EXAM OF HAND: CPT | Mod: TC,HCNC,RT

## 2020-11-10 PROCEDURE — 73130 X-RAY EXAM OF HAND: CPT | Mod: 26,HCNC,RT, | Performed by: RADIOLOGY

## 2020-11-10 PROCEDURE — 1101F PT FALLS ASSESS-DOCD LE1/YR: CPT | Mod: HCNC,CPTII,S$GLB, | Performed by: STUDENT IN AN ORGANIZED HEALTH CARE EDUCATION/TRAINING PROGRAM

## 2020-11-10 PROCEDURE — 1101F PR PT FALLS ASSESS DOC 0-1 FALLS W/OUT INJ PAST YR: ICD-10-PCS | Mod: HCNC,CPTII,S$GLB, | Performed by: STUDENT IN AN ORGANIZED HEALTH CARE EDUCATION/TRAINING PROGRAM

## 2020-11-10 PROCEDURE — 99203 OFFICE O/P NEW LOW 30 MIN: CPT | Mod: HCNC,25,S$GLB, | Performed by: STUDENT IN AN ORGANIZED HEALTH CARE EDUCATION/TRAINING PROGRAM

## 2020-11-10 PROCEDURE — 1159F MED LIST DOCD IN RCRD: CPT | Mod: HCNC,S$GLB,, | Performed by: STUDENT IN AN ORGANIZED HEALTH CARE EDUCATION/TRAINING PROGRAM

## 2020-11-10 PROCEDURE — 20550 NJX 1 TENDON SHEATH/LIGAMENT: CPT | Mod: HCNC,F5,S$GLB, | Performed by: STUDENT IN AN ORGANIZED HEALTH CARE EDUCATION/TRAINING PROGRAM

## 2020-11-10 PROCEDURE — 99999 PR PBB SHADOW E&M-EST. PATIENT-LVL III: ICD-10-PCS | Mod: PBBFAC,HCNC,, | Performed by: STUDENT IN AN ORGANIZED HEALTH CARE EDUCATION/TRAINING PROGRAM

## 2020-11-10 PROCEDURE — 1159F PR MEDICATION LIST DOCUMENTED IN MEDICAL RECORD: ICD-10-PCS | Mod: HCNC,S$GLB,, | Performed by: STUDENT IN AN ORGANIZED HEALTH CARE EDUCATION/TRAINING PROGRAM

## 2020-11-10 PROCEDURE — 99203 PR OFFICE/OUTPT VISIT, NEW, LEVL III, 30-44 MIN: ICD-10-PCS | Mod: HCNC,25,S$GLB, | Performed by: STUDENT IN AN ORGANIZED HEALTH CARE EDUCATION/TRAINING PROGRAM

## 2020-11-10 PROCEDURE — 99999 PR PBB SHADOW E&M-EST. PATIENT-LVL III: CPT | Mod: PBBFAC,HCNC,, | Performed by: STUDENT IN AN ORGANIZED HEALTH CARE EDUCATION/TRAINING PROGRAM

## 2020-11-10 PROCEDURE — 20550 TENDON SHEATH: R THUMB MCP: ICD-10-PCS | Mod: HCNC,F5,S$GLB, | Performed by: STUDENT IN AN ORGANIZED HEALTH CARE EDUCATION/TRAINING PROGRAM

## 2020-11-10 RX ORDER — BETAMETHASONE SODIUM PHOSPHATE AND BETAMETHASONE ACETATE 3; 3 MG/ML; MG/ML
3 INJECTION, SUSPENSION INTRA-ARTICULAR; INTRALESIONAL; INTRAMUSCULAR; SOFT TISSUE
Status: DISCONTINUED | OUTPATIENT
Start: 2020-11-10 | End: 2020-11-10 | Stop reason: HOSPADM

## 2020-11-10 RX ADMIN — BETAMETHASONE SODIUM PHOSPHATE AND BETAMETHASONE ACETATE 3 MG: 3; 3 INJECTION, SUSPENSION INTRA-ARTICULAR; INTRALESIONAL; INTRAMUSCULAR; SOFT TISSUE at 04:11

## 2020-11-10 NOTE — PROGRESS NOTES
Patient ID: Ezequiel Hughes  YOB: 1940  MRN: [id number]    Chief Complaint: Pain of the Right Hand      Referred By: Dr. Valery Ozuna for trigger finger of Right hand pain    History of Present Illness: Ezequiel Hughes is a Right hand dominant 80 y.o. male who presents today with right hand pain .     It has been present for several years.   Pain is located in right hand / right thumb.   The pain is described as intermittent.  The symptoms are worse with movement  The patient has hand exercises,injection treatment.   Related to injury: no.    The patient is active in none.  Occupation: Retired     He had a right thumb trigger finger injection in 2017 with good relief for many years.  He has noticed it starting to trigger again, it is not so much painful as just an annoyance to him, and can be painful from time to time in the distal IP joint of the thumb.  He denies any distal numbness and tingling or weakness associated.  He has not been taking any medicine or doing anything else to try make it better.    Past Medical History:   Past Medical History:   Diagnosis Date    Allergic rhinitis     Anemia     Back pain     BPH (benign prostatic hyperplasia)     Cancer     skin cancer to neck, Dr. Graves    Cataract     Disorder of kidney and ureter     ED (erectile dysfunction)     Hiatal hernia     small    History of colon polyps     colonoscopy 11/2016    HLD (hyperlipidemia)     Hyperlipidemia     Hypertension     Lateral epicondylitis     OA (osteoarthritis)     GUIDO (obstructive sleep apnea)     Prostate cancer     Dr. Wong    TIA (transient ischemic attack)     Trouble in sleeping      Past Surgical History:   Procedure Laterality Date    CATARACT EXTRACTION W/  INTRAOCULAR LENS IMPLANT Right 02/21/2018    I-Stent    CATARACT EXTRACTION W/  INTRAOCULAR LENS IMPLANT Left 03/21/2018    I - Stent    COLONOSCOPY N/A 11/14/2016    Procedure: COLONOSCOPY;  Surgeon: Karuna NIEVES  MD Jennifer;  Location: [facility] ENDO;  Service: Endoscopy;  Laterality: N/A;    COLONOSCOPY N/A 2020    Procedure: COLONOSCOPY;  Surgeon: Chuy Fish MD;  Location: [facility] ENDO;  Service: Endoscopy;  Laterality: N/A;    HEMORRHOID SURGERY      I-STENT Right 2018    DR. REECE    KNEE ARTHROSCOPY W/ MENISCAL REPAIR Right     Dr. Jain    PCIOL Right 2018    DR. REECE    PLANTAR FASCIA RELEASE      right    ROTATOR CUFF REPAIR Bilateral     bilateral    SHOULDER SURGERY Bilateral around     Dr. Pepper.  rotator cuff surgeries     Family History   Problem Relation Age of Onset    Lung cancer Father         life long smoker    Cancer Father         prostate, lung    Stroke Sister         TIA    Cataracts Sister     Cancer Brother         prostate    Cataracts Brother     Cataracts Sister     Melanoma Neg Hx     Psoriasis Neg Hx     Lupus Neg Hx     Eczema Neg Hx     Diabetes Neg Hx     Heart disease Neg Hx     Kidney disease Neg Hx     Colon cancer Neg Hx      Social History     Socioeconomic History    Marital status:      Spouse name: Not on file    Number of children: Not on file    Years of education: Not on file    Highest education level: Not on file   Occupational History    Not on file   Social Needs    Financial resource strain: Not hard at all    Food insecurity     Worry: Never true     Inability: Never true    Transportation needs     Medical: No     Non-medical: No   Tobacco Use    Smoking status: Former Smoker     Packs/day: 3.00     Years: 35.00     Pack years: 105.00     Types: Cigarettes     Start date: 1960     Quit date: 1985     Years since quittin.3    Smokeless tobacco: Never Used   Substance and Sexual Activity    Alcohol use: Yes     Alcohol/week: 7.0 standard drinks     Types: 6 Cans of beer, 1 Standard drinks or equivalent per week     Frequency: 4 or more times a week     Drinks per session: 1 or 2      Binge frequency: Never     Comment: socially    Drug use: No    Sexual activity: Never   Lifestyle    Physical activity     Days per week: 3 days     Minutes per session: 60 min    Stress: Not at all   Relationships    Social connections     Talks on phone: More than three times a week     Gets together: More than three times a week     Attends [employer] service: 1 to 4 times per year     Active member of club or organization: Yes     Attends meetings of clubs or organizations: Never     Relationship status:    Other Topics Concern    Not on file   Social History Narrative         Medication List with Changes/Refills   Current Medications    ACETAMINOPHEN (TYLENOL ARTHRITIS PAIN ORAL)    Take by mouth. Patient states that he takes 2 tablets in the morning and 2 tablets in the evening    ALLOPURINOL (ZYLOPRIM) 100 MG TABLET    TAKE 1 TABLET EVERY DAY    ATORVASTATIN (LIPITOR) 40 MG TABLET    Take 1 tablet (40 mg total) by mouth once daily.    CLOBETASOL (TEMOVATE) 0.05 % EXTERNAL SOLUTION    Apply topically every evening. To scalp massage in for itchy areas    CLOPIDOGREL (PLAVIX) 75 MG TABLET    Take 1 tablet (75 mg total) by mouth once daily.    FINASTERIDE (PROSCAR) 5 MG TABLET    Take 5 mg by mouth once daily.     FLUOROURACIL (EFUDEX) 5 % CREAM    Apply topically 2 (two) times daily. Apply two weeks on, hold off for one week, resume for another 2 weeks.    FLUTICASONE PROPIONATE (FLONASE) 50 MCG/ACTUATION NASAL SPRAY    USE 2 SPRAYS IN EACH NOSTRIL ONE TIME DAILY  (SUBSTITUTED FOR FLONASE)    HYDROCHLOROTHIAZIDE (HYDRODIURIL) 12.5 MG TAB    Take 1 tablet (12.5 mg total) by mouth daily as needed (before a meal high in sodium).    KETOROLAC 0.5% (ACULAR) 0.5 % DROP    Put one eyedrop in left eye four times a day. Start first drop morning of laser and stop after five days.    LOSARTAN (COZAAR) 50 MG TABLET    Take 1 tablet (50 mg total) by mouth once daily.    MUPIROCIN (BACTROBAN) 2 %  OINTMENT    Apply to open wounds twice daily    PANTOPRAZOLE (PROTONIX) 40 MG TABLET    TAKE 1 TABLET EVERY DAY    TADALAFIL (CIALIS) 5 MG TABLET         Review of patient's allergies indicates:   Allergen Reactions    Mobic  [meloxicam]      Other reaction(s): hypertension     Review of Systems   Constitution: Negative for chills and fever.   HENT: Negative for congestion.    Eyes: Negative for photophobia.   Cardiovascular: Negative for chest pain and leg swelling.   Respiratory: Negative for shortness of breath.    Musculoskeletal: Negative for falls and myalgias.   Gastrointestinal: Negative for abdominal pain, constipation and diarrhea.   Neurological: Negative for headaches, numbness and weakness.       Physical Exam:   There is no height or weight on file to calculate BMI.    GENERAL: Well appearing, in no acute distress.  HEAD: Normocephalic and atraumatic.  ENT: External ears and nose grossly normal.  EYES: EOMI bilaterally  PULMONARY: Respirations are grossly even and non-labored.  NEURO: Awake, alert, and oriented x 3.  SKIN: No obvious rashes appreciated.  PSYCH: Mood & affect are appropriate.    Detailed MSK exam:     Right hand exam  Full range of motion of the wrist in flexion-extension and good strength without any pain.  Good strength in flexion at the distal IP as well as the 1st MCP joint.  No tenderness noted at the CMC joint and a negative Arredondo grind test.  We are able to palpate a tender lump over the 1st digit A1 pulley system that is slightly bent tender to touch with positive triggering on my exam today.    Imaging:    Narrative & Impression     EXAMINATION:  XR HAND COMPLETE 3 VIEW RIGHT     CLINICAL HISTORY:  Pain in right hand     TECHNIQUE:  PA, lateral, and oblique views of the right hand were performed.     COMPARISON:  01/25/2017     FINDINGS:  Moderate degenerative changes seen scattered throughout the interphalangeal joints and MCP joints particularly the 2nd and 3rd MCP joints.   Prominent degenerative change noted at the 1st CMC joint with subluxation.     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  This was discussed with the patient and / or family today.     Assessment:  Ezequiel Hughes is a 80 y.o. male presenting today with right thumb trigger finger.  He had good relief with an injection in 2017 for many years and is asking for another cortisone injection today.  Please refer to the procedure note for further details on the injection.  He can follow up with me as needed, unfortunately we did not have an oval 8 splint large enough for his thumb, we discussed the increased amount of triggering over the next 24-48 hours secondary to the injection.    Trigger finger of thumb, unspecified laterality  -     Ambulatory referral/consult to Orthopedics         A copy of today's visit note has been sent to the referring provider.     Elena Smith    Disclaimer: This note was prepared using a voice recognition system and is likely to have sound alike errors within the text.

## 2020-11-10 NOTE — TELEPHONE ENCOUNTER
Called the pt to offer him to be seen for 1 due to a cancellation. Pt stated that he will be coming in between 1-2pm today 11/10/2020. Pt was grateful for the call.

## 2020-11-10 NOTE — LETTER
November 10, 2020      Valery Ozuna MD  [address] [facility] [address]           [facility]  [address] [address] [address]  Phone: [phone]  Fax: [phone]          Patient: Ezequiel Hughes   MR Number: [id number]   YOB: 1940   Date of Visit: 11/10/2020       Dear Dr. Valery Ozuna:    Thank you for referring Ezequiel Hughes to me for evaluation. Attached you will find relevant portions of my assessment and plan of care.    If you have questions, please do not hesitate to call me. I look forward to following Ezequiel Hughes along with you.    Sincerely,    Darrian Cole MD    Enclosure  CC:  No Recipients    If you would like to receive this communication electronically, please contact [email or web address] or [phone] to request more information on [employer] Link access.    For providers and/or their staff who would like to refer a patient to [facility], please contact us through our one-stop-shop provider referral line, [facility] , at [phone].    If you feel you have received this communication in error or would no longer like to receive these types of communications, please e-mail [email or web address]

## 2020-11-10 NOTE — PROCEDURES
Tendon Sheath: R thumb MCP    Date/Time: 11/10/2020 4:00 PM  Performed by: Darrian Cole MD  Authorized by: Darrian Cole MD     Consent Done?:  Yes (Verbal)  Indications:  Pain  Site marked: the procedure site was marked    Timeout: prior to procedure the correct patient, procedure, and site was verified    Prep: patient was prepped and draped in usual sterile fashion      Local anesthesia used?: Yes    Anesthesia:  Local infiltration  Local anesthetic:  Lidocaine 1% without epinephrine  Location:  Thumb  Site:  R thumb MCP (A1 pulley system)  Ultrasonic guidance for needle placement?: No    Needle size:  25 G  Approach:  Volar  Medications:  3 mg betamethasone acetate-betamethasone sodium phosphate 6 mg/mL  Patient tolerance:  Patient tolerated the procedure well with no immediate complications

## 2020-11-15 ENCOUNTER — PATIENT MESSAGE (OUTPATIENT)
Dept: PRIMARY CARE CLINIC | Facility: CLINIC | Age: 80
End: 2020-11-15

## 2020-11-17 ENCOUNTER — PATIENT MESSAGE (OUTPATIENT)
Dept: PRIMARY CARE CLINIC | Facility: CLINIC | Age: 80
End: 2020-11-17

## 2020-11-17 ENCOUNTER — OFFICE VISIT (OUTPATIENT)
Dept: DERMATOLOGY | Facility: CLINIC | Age: 80
End: 2020-11-17
Payer: MEDICARE

## 2020-11-17 DIAGNOSIS — L29.9 SCALP PRURITUS: Primary | ICD-10-CM

## 2020-11-17 PROCEDURE — 99999 PR PBB SHADOW E&M-EST. PATIENT-LVL III: CPT | Mod: PBBFAC,HCNC,, | Performed by: STUDENT IN AN ORGANIZED HEALTH CARE EDUCATION/TRAINING PROGRAM

## 2020-11-17 PROCEDURE — 1159F PR MEDICATION LIST DOCUMENTED IN MEDICAL RECORD: ICD-10-PCS | Mod: HCNC,S$GLB,, | Performed by: STUDENT IN AN ORGANIZED HEALTH CARE EDUCATION/TRAINING PROGRAM

## 2020-11-17 PROCEDURE — 99214 PR OFFICE/OUTPT VISIT, EST, LEVL IV, 30-39 MIN: ICD-10-PCS | Mod: HCNC,S$GLB,, | Performed by: STUDENT IN AN ORGANIZED HEALTH CARE EDUCATION/TRAINING PROGRAM

## 2020-11-17 PROCEDURE — 99214 OFFICE O/P EST MOD 30 MIN: CPT | Mod: HCNC,S$GLB,, | Performed by: STUDENT IN AN ORGANIZED HEALTH CARE EDUCATION/TRAINING PROGRAM

## 2020-11-17 PROCEDURE — 99999 PR PBB SHADOW E&M-EST. PATIENT-LVL III: ICD-10-PCS | Mod: PBBFAC,HCNC,, | Performed by: STUDENT IN AN ORGANIZED HEALTH CARE EDUCATION/TRAINING PROGRAM

## 2020-11-17 PROCEDURE — 1159F MED LIST DOCD IN RCRD: CPT | Mod: HCNC,S$GLB,, | Performed by: STUDENT IN AN ORGANIZED HEALTH CARE EDUCATION/TRAINING PROGRAM

## 2020-11-17 RX ORDER — HYDROXYZINE HYDROCHLORIDE 25 MG/1
25 TABLET, FILM COATED ORAL NIGHTLY
Qty: 30 TABLET | Refills: 0 | Status: SHIPPED | OUTPATIENT
Start: 2020-11-17 | End: 2020-12-10

## 2020-11-17 RX ORDER — FLUOCINONIDE TOPICAL SOLUTION USP, 0.05% 0.5 MG/ML
SOLUTION TOPICAL 2 TIMES DAILY
Qty: 120 ML | Refills: 2 | Status: SHIPPED | OUTPATIENT
Start: 2020-11-17 | End: 2020-11-27 | Stop reason: SDUPTHER

## 2020-11-17 RX ORDER — CETIRIZINE HYDROCHLORIDE 10 MG/1
10 TABLET ORAL DAILY
Qty: 30 TABLET | Refills: 0 | Status: SHIPPED | OUTPATIENT
Start: 2020-11-17 | End: 2020-12-10

## 2020-11-17 NOTE — PROGRESS NOTES
Subjective:       Patient ID:  Ezequiel Hughes is a 80 y.o. male who presents for   Chief Complaint   Patient presents with    dry scalp     itching , shampoo and cream isnt working      Mr. Nieves is an 79 yo M with hx of AKs here for follow up. Patient states that he has been using the efudex cream but it has not helped with his scalp pruritus. He currently uses topical clobetasol solution and ketoconazole shampoo without much improvement.       Review of Systems   Skin: Positive for itching. Negative for rash.   Hematologic/Lymphatic: Does not bruise/bleed easily.        Objective:    Physical Exam   Constitutional: He appears well-developed and well-nourished. No distress.   Neurological: He is alert and oriented to person, place, and time. He is not disoriented.   Psychiatric: He has a normal mood and affect.   Skin:   Areas Examined (abnormalities noted in diagram):   Scalp / Hair Palpated and Inspected  Head / Face Inspection Performed  Neck Inspection Performed  RUE Inspected  LUE Inspection Performed              Diagram Legend     Erythematous scaling macule/papule c/w actinic keratosis       Vascular papule c/w angioma      Pigmented verrucoid papule/plaque c/w seborrheic keratosis      Yellow umbilicated papule c/w sebaceous hyperplasia      Irregularly shaped tan macule c/w lentigo     1-2 mm smooth white papules consistent with Milia      Movable subcutaneous cyst with punctum c/w epidermal inclusion cyst      Subcutaneous movable cyst c/w pilar cyst      Firm pink to brown papule c/w dermatofibroma      Pedunculated fleshy papule(s) c/w skin tag(s)      Evenly pigmented macule c/w junctional nevus     Mildly variegated pigmented, slightly irregular-bordered macule c/w mildly atypical nevus      Flesh colored to evenly pigmented papule c/w intradermal nevus       Pink pearly papule/plaque c/w basal cell carcinoma      Erythematous hyperkeratotic cursted plaque c/w SCC      Surgical scar with no sign of  skin cancer recurrence      Open and closed comedones      Inflammatory papules and pustules      Verrucoid papule consistent consistent with wart     Erythematous eczematous patches and plaques     Dystrophic onycholytic nail with subungual debris c/w onychomycosis     Umbilicated papule    Erythematous-base heme-crusted tan verrucoid plaque consistent with inflamed seborrheic keratosis     Erythematous Silvery Scaling Plaque c/w Psoriasis     See annotation      Assessment / Plan:        Scalp pruritus  Patient reports no improvement on topical clobetasol and ketoconazole shampoo. Patient reports no effect with efudex; however, I think his scalp looks much improved in terms of the amount of AKs on his scalp. Will treat his scalp pruritus as dry skin/seb derm. Adding antihistamines to help with pruritus.   -     fluocinonide (LIDEX) 0.05 % external solution; Apply topically 2 (two) times daily.  Dispense: 120 mL; Refill: 2  -     hydrOXYzine HCL (ATARAX) 25 MG tablet; Take 1 tablet (25 mg total) by mouth every evening. Prn pruritus. Do not drive or operate machinery while taking this medicine.  Dispense: 30 tablet; Refill: 0  -     cetirizine (ZYRTEC) 10 MG tablet; Take 1 tablet (10 mg total) by mouth once daily.  Dispense: 30 tablet; Refill: 0  - Recommend OTC apple cidar vinegar shampoo              Follow up in about 3 months (around 2/17/2021).

## 2020-11-18 ENCOUNTER — PES CALL (OUTPATIENT)
Dept: ADMINISTRATIVE | Facility: CLINIC | Age: 80
End: 2020-11-18

## 2020-11-18 ENCOUNTER — CLINICAL SUPPORT (OUTPATIENT)
Dept: REHABILITATION | Facility: HOSPITAL | Age: 80
End: 2020-11-18
Attending: FAMILY MEDICINE
Payer: MEDICARE

## 2020-11-18 DIAGNOSIS — R29.898 DECREASED RANGE OF MOTION OF NECK: ICD-10-CM

## 2020-11-18 DIAGNOSIS — M54.30 SCIATICA, UNSPECIFIED LATERALITY: ICD-10-CM

## 2020-11-18 DIAGNOSIS — G89.29 CHRONIC MIDLINE LOW BACK PAIN, UNSPECIFIED WHETHER SCIATICA PRESENT: ICD-10-CM

## 2020-11-18 DIAGNOSIS — G89.29 CHRONIC BILATERAL LOW BACK PAIN WITHOUT SCIATICA: ICD-10-CM

## 2020-11-18 DIAGNOSIS — R26.2 DIFFICULTY WALKING: ICD-10-CM

## 2020-11-18 DIAGNOSIS — M54.50 CHRONIC BILATERAL LOW BACK PAIN WITHOUT SCIATICA: ICD-10-CM

## 2020-11-18 DIAGNOSIS — R53.1 GENERALIZED WEAKNESS: ICD-10-CM

## 2020-11-18 DIAGNOSIS — M54.50 CHRONIC MIDLINE LOW BACK PAIN, UNSPECIFIED WHETHER SCIATICA PRESENT: ICD-10-CM

## 2020-11-18 PROCEDURE — 97162 PT EVAL MOD COMPLEX 30 MIN: CPT | Mod: HCNC,PN

## 2020-11-18 NOTE — PLAN OF CARE
"[facility]  Physical Therapy Initial Evaluation    Date: 11/18/2020   Name: Ezequiel Hughes  Clinic Number: [id number]    Therapy Diagnosis:   Encounter Diagnoses   Name Primary?    Sciatica, unspecified laterality     Decreased range of motion of neck     Chronic bilateral low back pain without sciatica     Chronic midline low back pain, unspecified whether sciatica present     Difficulty walking     Generalized weakness      Physician: Valery Ozuna MD    Physician Orders: PT Eval and Treat   Medical Diagnosis from Referral:   M54.30 (ICD-10-CM) - Sciatica, unspecified laterality   R29.898 (ICD-10-CM) - Decreased range of motion of neck   Evaluation Date: 11/18/2020  Authorization Period Expiration: 11/30/2020  Plan of Care Expiration: 2/18/2020  Visit # / Visits authorized: 1/ 1    Time In: 7:45 am   Time Out: 8:30 am   Total Appointment Time (timed & untimed codes): 45 minutes    Precautions: Standard    Subjective   Date of onset: about 2 years  History of current condition - Ezequiel reports: to PT with complaints of low back, R LE and neck pain that has been ongoing for at least 2 years. Pt states that he has learned to live with the pain, however, recently spoke with his doctor about PT and at this time is seeking guidance on correct exercise and stretching routines in order to help alleviate and make pain more tolerable. He states that he is very motivated to address his pain and is willing to do all exercise and stretches asked of him. Pt states that neck pain is more of "a bother because it limits my motion" however, states that his low back and RLE pain restricts his mobility and activity level d/t pain intensifying with standing or walking for long periods of time.      Medical History:   Past Medical History:   Diagnosis Date    Allergic rhinitis     Anemia     Back pain     BPH (benign prostatic hyperplasia)     Cancer     skin cancer to neck, Dr. Graves    " Cataract     Disorder of kidney and ureter     ED (erectile dysfunction)     Hiatal hernia     small    History of colon polyps     colonoscopy 11/2016    HLD (hyperlipidemia)     Hyperlipidemia     Hypertension     Lateral epicondylitis     OA (osteoarthritis)     GUIDO (obstructive sleep apnea)     Prostate cancer     Dr. Wong    TIA (transient ischemic attack)     Trouble in sleeping        Surgical History:   Ezequiel Hughes  has a past surgical history that includes Rotator cuff repair (Bilateral); Plantar fascia release; Hemorrhoid surgery; Shoulder surgery (Bilateral, around 2000); Knee arthroscopy w/ meniscal repair (Right, 2015); Colonoscopy (N/A, 11/14/2016); PCIOL (Right, 02/21/2018); I-STENT (Right, 02/21/2018); Cataract extraction w/  intraocular lens implant (Right, 02/21/2018); Cataract extraction w/  intraocular lens implant (Left, 03/21/2018); and Colonoscopy (N/A, 9/22/2020).    Medications:   Ezequiel has a current medication list which includes the following prescription(s): acetaminophen, allopurinol, atorvastatin, cetirizine, clobetasol, clopidogrel, finasteride, fluocinonide, fluorouracil, fluticasone propionate, hydrochlorothiazide, hydroxyzine hcl, ketorolac 0.5%, losartan, mupirocin, pantoprazole, and tadalafil.    Allergies:   Review of patient's allergies indicates:   Allergen Reactions    Mobic  [meloxicam]      Other reaction(s): hypertension        Imaging, no recent relevant imaging    Prior Therapy: no prior PT   Social History: pt lives with his family   Occupation: pt is retired   Prior Level of Function: pt states that prior to about 2 years ago he was very active and was able to work out frequently without pain or discomfort  Current Level of Function: pt has daily pain and states that he has pain in his R LE when standing for prolonged periods of time. Reports daily neck stiffness    Pain:  Current 3/10, worst 5/10, best 2/10   Location:   Low back, R side of low  "back, neck   Description: Aching, Dull and Deep  Aggravating Factors: Standing, Walking, Lifting and Getting out of bed/chair  Easing Factors: rest and warm shower     Patients goals: "I just want to be able to move my neck and get rid of some of the pain"     Objective       Observation: pt pleasant and appropriate throughout evaluation; A&O x 4       Posture:  Mild forward head, rounded shoulders, R iliac crest elevation in standing     Lumbar Range of Motion:    % of normal motion Pain   Flexion 50   no        Extension 25   no        Left Side Bending 25 no        Right Side Bending 25 no        Left rotation   25 no        Right Rotation   25 no             Lower Extremity Strength  Right LE  Left LE    Knee extension: 4-/5 Knee extension: 4-/5   Knee flexion: 4-/5 Knee flexion: 4-/5   Hip flexion: 3+/5 Hip flexion: 3+/5   Hip extension:  3+/5 Hip extension: 3+/5   Hip abduction: 3+/5 Hip abduction: 3+/5   Hip adduction: 3+/5 Hip adduction 3+/5   Ankle dorsiflexion: 4+/5 Ankle dorsiflexion: 4+/5   Ankle plantarflexion: 4+/5 Ankle plantarflexion: 4+/5         Special Tests:  -Repeated Flexion: no increased pain   -Repeated Ext: no increased pain   -Piriformis Test: + on R   -Bridge Test: negative     Neuro Dynamic Testing:    Sciatic nerve:      Slump: R = +      L = negative       Femoral Nerve:    Femoral nerve test: negative       Flexibility: impaired B piriformis extensibility       Cervical Range of Motion:    Degrees   Flexion 35   Extension 25   Right Rotation 55   Left Rotation 55   Right Side Bending 10   Left Side Bending 10        Upper Extremity Strength  (R) UE  (L) UE    Shoulder flexion: 4-/5 Shoulder flexion: 4-/5   Shoulder Abduction: 4-/5 Shoulder abduction: 4-/5   Shoulder ER 4-/5 Shoulder ER 4-/5   Shoulder IR 4-/5 Shoulder IR 4-/5   Lower Trap 3+/5 Lower Trap 3+/5   Middle Trap 3+/5 Middle Trap 3+/5   Rhomboids 3+/5 Rhomboids 3+/5       KARI: 3/50 (6% disability)     TREATMENT   Treatment " "Time In: 8:23  Treatment Time Out: 8:30 am   Total Treatment time (time-based codes) separate from Evaluation: 7 minutes    Ezequiel received therapeutic exercises to develop strength, endurance, ROM, flexibility, posture and core stabilization for 7 minutes including:    LTR x 1'   Bridges 2 x 10   Piriformis stretch 3 x 30" each   Open books x 10 each side   Supine chin tucks 3" hold x 10 reps  Seated UT stretch 3 x 30"    Standing rows RTB 2 x 10   Standing resisted pulldowns RTB 2 x 10        Home Exercises and Patient Education Provided    Education provided:   - - PT POC  - HEP  - PT prognosis and diagnosis  - all questions and concerns answered       Written Home Exercises Provided: yes.  Exercises were reviewed and Ezequiel was able to demonstrate them prior to the end of the session.  Ezequiel demonstrated good  understanding of the education provided.     See EMR under Patient Instructions for exercises provided 11/18/2020.    Assessment   Ezequiel is a 80 y.o. male referred to outpatient Physical Therapy with a medical diagnosis of   M54.30 (ICD-10-CM) - Sciatica, unspecified laterality   R29.898 (ICD-10-CM) - Decreased range of motion of neck     Patient presents with reports of low back and neck pain. Pt with evaluative findings indicative of R sided sciatica as well as impaired overall spinal mobility. Pt with decreased ROM in lumbar and cervical spine with restrictions to B UT as well as B piriformis. No SIJ obliquity noted. Pt will benefit from skilled PT intervention in order to address deficits identified above in order to promote improved QOL and return to PLOF as well as facilitate a more active lifestyle.     Patient prognosis is Good.   Patientt will benefit from skilled outpatient Physical Therapy to address the deficits stated above and in the chart below, provide patient /family education, and to maximize patientt's level of independence.     Plan of care discussed with patient: Yes  Patient's " spiritual, cultural and educational needs considered and patient is agreeable to the plan of care and goals as stated below:     Anticipated Barriers for therapy: none anticipated     Medical Necessity is demonstrated by the following  History  Co-morbidities and personal factors that may impact the plan of care Co-morbidities:   see PMH above     Personal Factors:   age     high   Examination  Body Structures and Functions, activity limitations and participation restrictions that may impact the plan of care Body Regions:   head  neck  back  lower extremities  upper extremities  trunk    Body Systems:    gross symmetry  ROM  strength  gross coordinated movement  balance  gait  transfers  transitions  motor control    Participation Restrictions:   Daily pain, difficulty with ADLs, difficulty walking    Activity limitations:   Learning and applying knowledge  no deficits    General Tasks and Commands  no deficits    Communication  no deficits    Mobility  lifting and carrying objects  walking    Self care  toileting  looking after one's health    Domestic Life  shopping  cooking  doing house work (cleaning house, washing dishes, laundry)  assisting others    Interactions/Relationships  no deficits    Life Areas  no deficits    Community and Social Life  no deficits         moderate   Clinical Presentation evolving clinical presentation with changing clinical characteristics moderate   Decision Making/ Complexity Score: moderate     Goals:  Short Term Goals: 4 weeks   1. Pt will be independent with HEP performance in order to continue PT progress at home.   2. Pt will report centralization of R LE sciatica symptoms   3. Pt will demonstrate improved cervical ROM by 10 degrees or greater  4. Pt will improve LE strength to at least 4/5 in all planes     Long Term Goals: 8 weeks   1. Pt will improve KARI disability score to 5% disability or less in order to improve overall QOL and return to PLOF.   2. Pt will demo ability  to ambulate up to 20 minutes for exercise without increased low back or RLE pain  3. Pt will be independent with progressed HEP   4. Pt will demo cervical ROM that is at least 75% of normal ROM in all planes     Plan   Plan of care Certification: 11/18/2020 to 2/18/2020.    Outpatient Physical Therapy 1-2 times weekly for 8 weeks to include the following interventions: Cervical/Lumbar Traction, Manual Therapy, Moist Heat/ Ice, Neuromuscular Re-ed, Patient Education, Self Care, Therapeutic Activites and Therapeutic Exercise. And any other therapies and modalities as clinically indicated and appropriate, including but not limited to FDN and telehealth. Pt may be seen by PTA as a part of pt's care team.     Pt reports that he is confident in his ability to perform HEP at home and at this time d/t time and financial concerns requests to attend once weekly.       Shannon Wilcox, PT, DPT  11/18/2020

## 2020-11-23 ENCOUNTER — PATIENT OUTREACH (OUTPATIENT)
Dept: OTHER | Facility: OTHER | Age: 80
End: 2020-11-23

## 2020-11-23 NOTE — PROGRESS NOTES
"  Physical Therapy Treatment Note     Name: Ezequiel Hughes  Clinic Number: [id number]    Therapy Diagnosis:   Encounter Diagnoses   Name Primary?    Chronic bilateral low back pain without sciatica     Chronic midline low back pain, unspecified whether sciatica present     Difficulty walking     Generalized weakness      Physician: Valery Ozuna MD    Visit Date: 11/24/2020    Physician Orders: PT Eval and Treat  Medical Diagnosis:   M54.30 (ICD-10-CM) - Sciatica, unspecified laterality   R29.898 (ICD-10-CM) - Decreased range of motion of neck   Evaluation Date: 11/18/2020  Authorization Period Expiration: 11/30/2020  Plan of Care Certification Period: 2/18/2021  Visit #/Visits authorized: 2/ 20     Time In: 7:30  Time Out: 8:15  Total Billable Time: 45 minutes    Precautions: Standard    Subjective     Pt reports: Very tight back today but he has been doing his home exercises and is getting a treadmill today to start exercising agan.  He was compliant with home exercise program.  Response to previous treatment: pain is a little better  Functional change: none reported    Pain: 3/10  Location: right hip    Objective     Ezequiel received therapeutic exercises to develop strength, endurance, ROM, flexibility, posture and core stabilization for 35 minutes including:    LTR x 2'   Bridges with ball between knees 2 x 10   +DKTC on physioball 2'  Piriformis stretch 3 x 30" each   Open books x 10 each side   Supine chin tucks 3" hold x 10 reps  Seated UT/LS stretch 3 x 30" *added Levator scap stretch  +Seated W's RTB 2 x 10  +Side plank on elbow with combined shoulder ER 3# *Partial side plank     Standing rows RTB 2 x 10   Standing resisted pulldowns RTB 2 x 10      Ezequiel received the following manual therapy techniques: Myofacial release and Soft tissue Mobilization were applied to the following for 10 minutes, including:    Myofascial release under passive stretch to R piriformis and bilateral quadratus " lumborum.  Traction to Low back applied in HL with gait belt to address muscle tension and pain    Home Exercises Provided and Patient Education Provided     Education provided:   - Mechanism of pain with his condition. Provided eduction on posible benefits of inversion table and precautions for it as well when asked if it would be beneficial to use at home.    Written Home Exercises Provided: Patient instructed to cont prior HEP.  Exercises were reviewed and Ezequiel was able to demonstrate them prior to the end of the session.  Ezequiel demonstrated good  understanding of the education provided.     See EMR under Patient Instructions for exercises provided 11/18/2020.    Assessment     Pt presents today with low back pain affecting his gait and posture with a posterior pelvic tilt. He tolerated exercises well today with no reproduction of pain. Pt presents with hypertonic R QL and hip musculature compared to the left, relieved by manual therapy. Pt presents with weakness in core musculature but otherwise tolerated exercises well today with no reproduction of pain. Pt will benefit from cont skilled PT and progression of core stability and extension exercises.    Ezequiel is progressing well towards his goals.   Pt prognosis is Good.     Pt will continue to benefit from skilled outpatient physical therapy to address the deficits listed in the problem list box on initial evaluation, provide pt/family education and to maximize pt's level of independence in the home and community environment.     Pt's spiritual, cultural and educational needs considered and pt agreeable to plan of care and goals.     Anticipated barriers to physical therapy: none anticipated    Goals:  Short Term Goals: 4 weeks   1. Pt will be independent with HEP performance in order to continue PT progress at home. Met 11/24/2020  2. Pt will report centralization of R LE sciatica symptoms Progressing not met  3. Pt will demonstrate improved cervical ROM by 10  degrees or greater Progressing not met  4. Pt will improve LE strength to at least 4/5 in all planes Progressing not met     Long Term Goals: 8 weeks   1. Pt will improve KARI disability score to 5% disability or less in order to improve overall QOL and return to PLOF. Progressing not met  2. Pt will demo ability to ambulate up to 20 minutes for exercise without increased low back or RLE pain Progressing not met  3. Pt will be independent with progressed HEP  Progressing not met  4. Pt will demo cervical ROM that is at least 75% of normal ROM in all planes Progressing not met    Plan     Continue to progress pt as tolerated and indicated per POC. Postural education/re-education for pelvis.    Plan of care Certification: 11/18/2020 to 2/18/2021.  (updated to year 2021 d/t typo by PT on eval)      Outpatient Physical Therapy 1-2 times weekly for 8 weeks to include the following interventions: Cervical/Lumbar Traction, Manual Therapy, Moist Heat/ Ice, Neuromuscular Re-ed, Patient Education, Self Care, Therapeutic Activites and Therapeutic Exercise. And any other therapies and modalities as clinically indicated and appropriate, including but not limited to FDN and telehealth. Pt may be seen by PTA as a part of pt's care team.     Keven Amin, PTA   Shannon Wilcox, PT

## 2020-11-23 NOTE — PROGRESS NOTES
[facility]: Health  Follow-Up    The history is provided by the patient.             Reason for review: Blood pressure at goal        Topics Covered on Call: physical activity and Diet    Additional Follow-up details: Patient stated he is doing well.             Diet-no change to diet    No change to diet.        Physical Activity-Change      Additional physical activity details: Patient stated he has not been comfortable going to the gym during the pandemic so he recently bought a treadmill. Arrives 11/27/20. Also bought some weights. Believes his BP readings are getting higher because he has not been getting enough exercise, since he is not going to the gym anymore.      Medication Adherence-Medication Adherence not addressed.        Substance, Sleep, Stress-change  stress-not assessed  Details:  Intervention(s):    Sleep-not assessed  Details:  Intervention(s):    Alcohol -assessed  Details:Patient stated he did have several beers one night and his BP was elevated after. Does not usually drink.  Intervention(s):    Tobacco-Not Assessed  Details:  Intervention(s):          Provided patient education.       Addressed patient questions and patient has my contact information if needed prior to next outreach. Patient verbalizes understanding.      Explained the importance of self-monitoring and medication adherence. Encouraged the patient to communicate with their health  for lifestyle modifications to help improve or maintain a healthy lifestyle.               There are no preventive care reminders to display for this patient.      Last 5 Patient Entered Readings                                      Current 30 Day Average: 128/65     Recent Readings 11/21/2020 11/21/2020 11/16/2020 11/16/2020 11/11/2020    SBP (mmHg) 137 137 130 130 115    DBP (mmHg) 66 66 61 61 56

## 2020-11-24 ENCOUNTER — CLINICAL SUPPORT (OUTPATIENT)
Dept: REHABILITATION | Facility: HOSPITAL | Age: 80
End: 2020-11-24
Payer: MEDICARE

## 2020-11-24 DIAGNOSIS — G89.29 CHRONIC MIDLINE LOW BACK PAIN, UNSPECIFIED WHETHER SCIATICA PRESENT: ICD-10-CM

## 2020-11-24 DIAGNOSIS — R53.1 GENERALIZED WEAKNESS: ICD-10-CM

## 2020-11-24 DIAGNOSIS — R26.2 DIFFICULTY WALKING: ICD-10-CM

## 2020-11-24 DIAGNOSIS — G89.29 CHRONIC BILATERAL LOW BACK PAIN WITHOUT SCIATICA: ICD-10-CM

## 2020-11-24 DIAGNOSIS — M54.50 CHRONIC BILATERAL LOW BACK PAIN WITHOUT SCIATICA: ICD-10-CM

## 2020-11-24 DIAGNOSIS — M54.50 CHRONIC MIDLINE LOW BACK PAIN, UNSPECIFIED WHETHER SCIATICA PRESENT: ICD-10-CM

## 2020-11-24 PROCEDURE — 97110 THERAPEUTIC EXERCISES: CPT | Mod: HCNC,PN,CQ

## 2020-11-24 PROCEDURE — 97140 MANUAL THERAPY 1/> REGIONS: CPT | Mod: HCNC,PN,CQ

## 2020-11-24 NOTE — PROGRESS NOTES
"  Physical Therapy Treatment Note     Name: Ezequiel Hughes  Clinic Number: [id number]    Therapy Diagnosis:   Encounter Diagnoses   Name Primary?    Chronic bilateral low back pain without sciatica     Chronic midline low back pain, unspecified whether sciatica present     Difficulty walking     Generalized weakness      Physician: Valery Ozuna MD    Visit Date: 12/1/2020    Physician Orders: PT Eval and Treat  Medical Diagnosis:   M54.30 (ICD-10-CM) - Sciatica, unspecified laterality   R29.898 (ICD-10-CM) - Decreased range of motion of neck   Evaluation Date: 11/18/2020  Authorization Period Expiration: 11/30/2020  Plan of Care Certification Period: 2/18/2021  Visit #/Visits authorized: 3/ 20     Time In: 9:45 am   Time Out: 10:30 am   Total Billable Time: 45 minutes    Precautions: Standard    Subjective     Pt reports: no new complaints. He states that he has still been performing his HEP daily and feels that his hip and sciatica is getting somewhat better.   He was compliant with home exercise program.  Response to previous treatment: pain is much less  Functional change: able to walk 20-30 minutes with no pain     Pain: 0/10  Location: right hip    Objective     Ezequiel received therapeutic exercises (exercises performed today are bolded)  to develop strength, endurance, ROM, flexibility, posture and core stabilization for 40 minutes including:    LTR x 1'   Bridges with ball between knees 2 x 10   +SLR 2 x 10 each   DKTC on physioball 2'  Piriformis stretch 3 x 30" each      Supine chin tucks 3" hold x 10 reps  Side plank on elbow with combined shoulder ER 3# *Partial side plank  Open books x 10 each side  +clamshells 2 x 10 each side  +SL SLR 2 x 10 each side     Seated UT/LS stretch 3 x 30"   Standing W's RTB 2 x 10       Standing rows GTB 2 x 10   Standing resisted pulldowns GTB 2 x 10   B shoulder extension at free motion #10 2 x 10   antirotational press at free motion #10 x 10 each side     Ezequiel " received the following manual therapy techniques: Myofacial release and Soft tissue Mobilization were applied to the following for 5 minutes, including:    Myofascial release under passive stretch to R piriformis and bilateral quadratus lumborum.  +manual R scaitic nerve glides   Traction to Low back applied in HL with gait belt to address muscle tension and pain    Home Exercises Provided and Patient Education Provided     Education provided:   - Mechanism of pain with his condition. Provided eduction on posible benefits of inversion table and precautions for it as well when asked if it would be beneficial to use at home.    Written Home Exercises Provided: Patient instructed to cont prior HEP.  Exercises were reviewed and Ezequiel was able to demonstrate them prior to the end of the session.  Ezequiel demonstrated good  understanding of the education provided.     See EMR under Patient Instructions for exercises provided 11/18/2020.    Assessment     Pt tolerated session well with no complaints. He demonstrates much improved postural stability and postural awareness today and is progressing with his functional strength. Pt with good fatigue with progressed exercise today with no complaints. Pt to continue with skilled PT intervention for one further session to assess tolerance to progressed exercise today and to progress HEP for discharge to [facility].   Ezequiel is progressing well towards his goals.   Pt prognosis is Good.     Pt will continue to benefit from skilled outpatient physical therapy to address the deficits listed in the problem list box on initial evaluation, provide pt/family education and to maximize pt's level of independence in the home and community environment.     Pt's spiritual, cultural and educational needs considered and pt agreeable to plan of care and goals.     Anticipated barriers to physical therapy: none anticipated    Goals:  Short Term Goals: 4 weeks   1. Pt will be independent with HEP  performance in order to continue PT progress at home. Met 11/24/2020  2. Pt will report centralization of R LE sciatica symptoms goal met, 12/1/2020  3. Pt will demonstrate improved cervical ROM by 10 degrees or greater Progressing not met  4. Pt will improve LE strength to at least 4/5 in all planes Progressing not met     Long Term Goals: 8 weeks   1. Pt will improve KARI disability score to 5% disability or less in order to improve overall QOL and return to PLOF. Progressing not met  2. Pt will demo ability to ambulate up to 20 minutes for exercise without increased low back or RLE pain goal met, 12/1/2020  3. Pt will be independent with progressed HEP  Progressing not met  4. Pt will demo cervical ROM that is at least 75% of normal ROM in all planes Progressing not met    Plan     Continue to progress pt as tolerated and indicated per POC. Postural education/re-education for pelvis., issued progressed HEP, discharge to HEP if pt reports no complaints.     Plan of care Certification: 11/18/2020 to 2/18/2021       Shannon Wilcox, PT   12/1/2020

## 2020-11-27 ENCOUNTER — PATIENT MESSAGE (OUTPATIENT)
Dept: OPHTHALMOLOGY | Facility: CLINIC | Age: 80
End: 2020-11-27

## 2020-11-27 ENCOUNTER — PATIENT MESSAGE (OUTPATIENT)
Dept: DERMATOLOGY | Facility: CLINIC | Age: 80
End: 2020-11-27

## 2020-11-30 ENCOUNTER — TELEPHONE (OUTPATIENT)
Dept: PRIMARY CARE CLINIC | Facility: CLINIC | Age: 80
End: 2020-11-30

## 2020-11-30 NOTE — TELEPHONE ENCOUNTER
Those two medications are really just for itching on the scalp, so yes he can stop those two and see how he is doing with blood pressure after stopping them for a few days. Valery Ozuna MD

## 2020-11-30 NOTE — TELEPHONE ENCOUNTER
Pt called. He is concerned because he feels as if his blood pressure has been elevated since he started two new medicines. It was the hydroxyzine and zrytec that he recently started on. Since then he feels as if his blood pressure is up. Today he stated that it was 160 something on top number. He would like to know if he should increase his blood pressure medicine or if he can stop the two new medicines?

## 2020-12-01 ENCOUNTER — CLINICAL SUPPORT (OUTPATIENT)
Dept: REHABILITATION | Facility: HOSPITAL | Age: 80
End: 2020-12-01
Payer: MEDICARE

## 2020-12-01 DIAGNOSIS — M54.50 CHRONIC MIDLINE LOW BACK PAIN, UNSPECIFIED WHETHER SCIATICA PRESENT: ICD-10-CM

## 2020-12-01 DIAGNOSIS — R53.1 GENERALIZED WEAKNESS: ICD-10-CM

## 2020-12-01 DIAGNOSIS — M54.50 CHRONIC BILATERAL LOW BACK PAIN WITHOUT SCIATICA: ICD-10-CM

## 2020-12-01 DIAGNOSIS — G89.29 CHRONIC MIDLINE LOW BACK PAIN, UNSPECIFIED WHETHER SCIATICA PRESENT: ICD-10-CM

## 2020-12-01 DIAGNOSIS — G89.29 CHRONIC BILATERAL LOW BACK PAIN WITHOUT SCIATICA: ICD-10-CM

## 2020-12-01 DIAGNOSIS — R26.2 DIFFICULTY WALKING: ICD-10-CM

## 2020-12-01 PROCEDURE — 97110 THERAPEUTIC EXERCISES: CPT | Mod: HCNC,PN

## 2020-12-02 ENCOUNTER — OUTSIDE PLACE OF SERVICE (OUTPATIENT)
Dept: ADMINISTRATIVE | Facility: OTHER | Age: 80
End: 2020-12-02
Payer: MEDICARE

## 2020-12-02 ENCOUNTER — PATIENT MESSAGE (OUTPATIENT)
Dept: PRIMARY CARE CLINIC | Facility: CLINIC | Age: 80
End: 2020-12-02

## 2020-12-02 PROCEDURE — 65855 PR TRABECULOPLASTY LASER SURGERY: ICD-10-PCS | Mod: LT,,, | Performed by: OPHTHALMOLOGY

## 2020-12-02 PROCEDURE — 65855 TRABECULOPLASTY LASER SURG: CPT | Mod: LT,,, | Performed by: OPHTHALMOLOGY

## 2020-12-03 ENCOUNTER — PATIENT OUTREACH (OUTPATIENT)
Dept: OTHER | Facility: OTHER | Age: 80
End: 2020-12-03

## 2020-12-03 ENCOUNTER — TELEPHONE (OUTPATIENT)
Dept: PRIMARY CARE CLINIC | Facility: CLINIC | Age: 80
End: 2020-12-03

## 2020-12-03 DIAGNOSIS — I10 ESSENTIAL HYPERTENSION: Primary | ICD-10-CM

## 2020-12-03 DIAGNOSIS — I10 ESSENTIAL HYPERTENSION: Primary | Chronic | ICD-10-CM

## 2020-12-03 RX ORDER — LOSARTAN POTASSIUM 50 MG/1
50 TABLET ORAL 2 TIMES DAILY
Qty: 90 TABLET | Refills: 3
Start: 2020-12-03 | End: 2020-12-10

## 2020-12-03 NOTE — TELEPHONE ENCOUNTER
Pt called. He is stating that blood pressure is still high. Today it was 171 on the top and then again a little while later 160 something on top. Below is his medicines he is taking. Takes both in the morning.     Losartan 50 mg once a day  HCTZ 12.5 mg once a day     He is on the HTN program. States other readings should be on there.

## 2020-12-03 NOTE — TELEPHONE ENCOUNTER
Spoke to patient.   Not feeling well. bp has been running high. In upper 170s on systolics.      Encouraged to increase losartan to 50mg bid. Keep the hctz at 12.5mg     Labs tomorrow [facility] [place]  Please assist with scheduling. Nonfasting.     Will message his digital health  and PA to check in on him next week and to look at readings and note adjustment in meds.     Adriana Mojica 1/18/2021 7/8/2020  9:03 AM Telephone  Adriana Mojica 9/3/2020   [facility] - Clinician    Date Method of Outreach Associated Actions User Next Outreach   9/30/2020  1:13 PM Telephone  Nichole Gonzalez PA-C

## 2020-12-03 NOTE — PROGRESS NOTES
Physical Therapy Treatment Note     Name: Ezequiel Hughes  Clinic Number: [id number]    Therapy Diagnosis:   Encounter Diagnoses   Name Primary?    Chronic bilateral low back pain without sciatica     Chronic midline low back pain, unspecified whether sciatica present     Difficulty walking     Generalized weakness      Physician: Valery Ozuna MD    Visit Date: 12/8/2020    Physician Orders: PT Eval and Treat  Medical Diagnosis:   M54.30 (ICD-10-CM) - Sciatica, unspecified laterality   R29.898 (ICD-10-CM) - Decreased range of motion of neck   Evaluation Date: 11/18/2020  Authorization Period Expiration: 11/30/2020  Plan of Care Certification Period: 2/18/2021  Visit #/Visits authorized:  4/ 20     Time In: 11:10 am    Time Out: 11:52 am    Total Billable Time: 42 minutes    Precautions: Standard    Subjective     Pt reports: that he has still been having trouble with his BP. He states that he knows this is not exercise related and that he is in contact with his PCP about addressing his BP and adjusting his BP meds. Pt states that overall he has found PT helpful and that he feels that he can continue with HEP on his own to continue his progress.   He was compliant with home exercise program.  Response to previous treatment: pain is much less  Functional change: able to walk 20-30 minutes with no pain     Pain: 0/10  Location: right hip    Objective     MEASURES:       Lumbar Range of Motion:     % of normal motion Pain   Flexion 75    no         Extension 75    no         Left Side Bending 75 no         Right Side Bending 75 no         Left rotation    75 no         Right Rotation    75 no               Lower Extremity Strength  Right LE   Left LE     Knee extension: 4/5 Knee extension: 4/5   Knee flexion: 4/5 Knee flexion: 4/5   Hip flexion: 4/5 Hip flexion: 4/5   Hip extension:  4/5 Hip extension: 4/5   Hip abduction: 4/5 Hip abduction: 4/5   Hip adduction: 4/5 Hip adduction 4/5   Ankle dorsiflexion: 4+/5 Ankle  "dorsiflexion: 4+/5   Ankle plantarflexion: 4+/5 Ankle plantarflexion: 4+/5      Cervical Range of Motion:     Degrees   Flexion 45   Extension 25   Right Rotation 65   Left Rotation 65   Right Side Bending 10   Left Side Bending 10         Upper Extremity Strength  (R) UE   (L) UE     Shoulder flexion: 4+/5 Shoulder flexion: 4+/5   Shoulder Abduction: 4+/5 Shoulder abduction: 4+/5   Shoulder ER 4+/5 Shoulder ER 4+/5   Shoulder IR 4+/5 Shoulder IR 4+/5   Lower Trap 4-/5 Lower Trap 4-/5   Middle Trap 4-/5 Middle Trap 4-/5   Rhomboids 4-/5 Rhomboids 4-/5         KARI: 2/50 (4% disability)       TREATMENT:   Ezequiel received therapeutic exercises (exercises performed today are bolded)  to develop strength, endurance, ROM, flexibility, posture and core stabilization for 42 minutes including:    LTR x 1'   Bridges with ball between knees 2 x 10   SLR 2 x 10 each   DKTC on physioball 2'  Piriformis stretch 3 x 30" each      Supine chin tucks 3" hold x 10 reps  Side plank on elbow with combined shoulder ER 3# *Partial side plank  Open books x 10 each side  clamshells 2 x 10 each side  SL SLR 2 x 10 each side     Seated UT/LS stretch 3 x 30"   Standing W's RTB 2 x 10       Standing rows GTB 2 x 10   Standing resisted pulldowns GTB 2 x 10   B shoulder extension at free motion #10 2 x 10   antirotational press at free motion #10 x 10 each side     Ezequiel received the following manual therapy techniques: Myofacial release and Soft tissue Mobilization were applied to the following for 0 minutes, including:    Myofascial release under passive stretch to R piriformis and bilateral quadratus lumborum.  manual R scaitic nerve glides   Traction to Low back applied in HL with gait belt to address muscle tension and pain    Home Exercises Provided and Patient Education Provided     Education provided:   - Mechanism of pain with his condition. Provided eduction on posible benefits of inversion table and precautions for it as well when asked " if it would be beneficial to use at home.  - educated pt in progressed HEP and the importance of continuing with HEP performance even though discharged from PT services     Written Home Exercises Provided: Patient instructed to cont prior HEP. And issued updated and progressed HEP today   Exercises were reviewed and Ezequiel was able to demonstrate them prior to the end of the session.  Ezequiel demonstrated good  understanding of the education provided.     See EMR under Patient Instructions for exercises provided 11/18/2020.    Assessment     Pt progressed well with skilled PT. He demonstrates good knowledge of improved posture as well as the need for daily HEP performance and the need to be aware of his posture throughout the day. Pt reports that he has had a resolution of pain that was initially limiting him and has demonstrated good knowledge in self management, especially with self management of sciatica symptoms. Pt has met all LTGs and self reports that he feels that he is ready for discharge at this time and feels confident that he will be able to continue his progress at home. Pt issued revised and progressed HEP today for continued performance at home.   Ezequiel is progressing well towards his goals.   Pt prognosis is Good.     Pt will continue to benefit from skilled outpatient physical therapy to address the deficits listed in the problem list box on initial evaluation, provide pt/family education and to maximize pt's level of independence in the home and community environment.     Pt's spiritual, cultural and educational needs considered and pt agreeable to plan of care and goals.     Anticipated barriers to physical therapy: none anticipated    Goals:  Short Term Goals: 4 weeks   1. Pt will be independent with HEP performance in order to continue PT progress at home. Met 11/24/2020  2. Pt will report centralization of R LE sciatica symptoms goal met, 12/1/2020  3. Pt will demonstrate improved cervical ROM by  10 degrees or greater goal met, 12/8/2020  4. Pt will improve LE strength to at least 4/5 in all planes goal met, 12/8/2020     Long Term Goals: 8 weeks   1. Pt will improve KARI disability score to 5% disability or less in order to improve overall QOL and return to PLOF. goal met, 12/8/2020  2. Pt will demo ability to ambulate up to 20 minutes for exercise without increased low back or RLE pain goal met, 12/1/2020  3. Pt will be independent with progressed HEP  goal met, 12/8/2020  4. Pt will demo cervical ROM that is at least 75% of normal ROM in all planes goal met, 12/8/2020    Plan     Pt discharged from skilled PT today. Instructed to follow up with BP concerns with PCP and to reach out to our office and clinic with any further concerns and if he feels that he needs to return to PT.     Plan of care Certification: 11/18/2020 to 2/18/2021       Shannon Wilcox, PT   12/8/2020

## 2020-12-04 ENCOUNTER — LAB VISIT (OUTPATIENT)
Dept: LAB | Facility: HOSPITAL | Age: 80
End: 2020-12-04
Attending: FAMILY MEDICINE
Payer: MEDICARE

## 2020-12-04 DIAGNOSIS — I10 ESSENTIAL HYPERTENSION: ICD-10-CM

## 2020-12-04 LAB
ANION GAP SERPL CALC-SCNC: 6 MMOL/L (ref 8–16)
BUN SERPL-MCNC: 18 MG/DL (ref 8–23)
CALCIUM SERPL-MCNC: 9 MG/DL (ref 8.7–10.5)
CHLORIDE SERPL-SCNC: 94 MMOL/L (ref 95–110)
CO2 SERPL-SCNC: 28 MMOL/L (ref 23–29)
CREAT SERPL-MCNC: 1.3 MG/DL (ref 0.5–1.4)
EST. GFR  (AFRICAN AMERICAN): 58.7 ML/MIN/1.73 M^2
EST. GFR  (NON AFRICAN AMERICAN): 50.8 ML/MIN/1.73 M^2
GLUCOSE SERPL-MCNC: 107 MG/DL (ref 70–110)
POTASSIUM SERPL-SCNC: 3.6 MMOL/L (ref 3.5–5.1)
SODIUM SERPL-SCNC: 128 MMOL/L (ref 136–145)

## 2020-12-04 PROCEDURE — 80048 BASIC METABOLIC PNL TOTAL CA: CPT | Mod: HCNC

## 2020-12-04 PROCEDURE — 36415 COLL VENOUS BLD VENIPUNCTURE: CPT | Mod: HCNC,PO

## 2020-12-08 ENCOUNTER — TELEPHONE (OUTPATIENT)
Dept: PRIMARY CARE CLINIC | Facility: CLINIC | Age: 80
End: 2020-12-08

## 2020-12-08 ENCOUNTER — PATIENT MESSAGE (OUTPATIENT)
Dept: PRIMARY CARE CLINIC | Facility: CLINIC | Age: 80
End: 2020-12-08

## 2020-12-08 ENCOUNTER — HOSPITAL ENCOUNTER (EMERGENCY)
Facility: HOSPITAL | Age: 80
Discharge: HOME | End: 2020-12-08
Attending: FAMILY MEDICINE
Payer: MEDICARE

## 2020-12-08 ENCOUNTER — CLINICAL SUPPORT (OUTPATIENT)
Dept: REHABILITATION | Facility: HOSPITAL | Age: 80
End: 2020-12-08
Payer: MEDICARE

## 2020-12-08 ENCOUNTER — NURSE TRIAGE (OUTPATIENT)
Dept: ADMINISTRATIVE | Facility: CLINIC | Age: 80
End: 2020-12-08

## 2020-12-08 VITALS
DIASTOLIC BLOOD PRESSURE: 79 MMHG | BODY MASS INDEX: 27.96 KG/M2 | OXYGEN SATURATION: 100 % | SYSTOLIC BLOOD PRESSURE: 135 MMHG | TEMPERATURE: 98 F | HEART RATE: 59 BPM | WEIGHT: 195.31 LBS | HEIGHT: 70 IN | RESPIRATION RATE: 22 BRPM

## 2020-12-08 DIAGNOSIS — G89.29 CHRONIC MIDLINE LOW BACK PAIN, UNSPECIFIED WHETHER SCIATICA PRESENT: ICD-10-CM

## 2020-12-08 DIAGNOSIS — R53.1 GENERALIZED WEAKNESS: ICD-10-CM

## 2020-12-08 DIAGNOSIS — R53.83 FATIGUE: ICD-10-CM

## 2020-12-08 DIAGNOSIS — G89.29 CHRONIC BILATERAL LOW BACK PAIN WITHOUT SCIATICA: ICD-10-CM

## 2020-12-08 DIAGNOSIS — M54.50 CHRONIC BILATERAL LOW BACK PAIN WITHOUT SCIATICA: ICD-10-CM

## 2020-12-08 DIAGNOSIS — M54.50 CHRONIC MIDLINE LOW BACK PAIN, UNSPECIFIED WHETHER SCIATICA PRESENT: ICD-10-CM

## 2020-12-08 DIAGNOSIS — E87.1 HYPONATREMIA: Primary | ICD-10-CM

## 2020-12-08 DIAGNOSIS — R26.2 DIFFICULTY WALKING: ICD-10-CM

## 2020-12-08 LAB
ALBUMIN SERPL BCP-MCNC: 4.5 G/DL (ref 3.5–5.2)
ALP SERPL-CCNC: 62 U/L (ref 55–135)
ALT SERPL W/O P-5'-P-CCNC: 13 U/L (ref 10–44)
ANION GAP SERPL CALC-SCNC: 14 MMOL/L (ref 8–16)
AST SERPL-CCNC: 17 U/L (ref 10–40)
BASOPHILS # BLD AUTO: 0.05 K/UL (ref 0–0.2)
BASOPHILS NFR BLD: 0.8 % (ref 0–1.9)
BILIRUB SERPL-MCNC: 0.9 MG/DL (ref 0.1–1)
BILIRUB UR QL STRIP: NEGATIVE
BNP SERPL-MCNC: 22 PG/ML (ref 0–99)
BUN SERPL-MCNC: 19 MG/DL (ref 8–23)
CALCIUM SERPL-MCNC: 8.8 MG/DL (ref 8.7–10.5)
CHLORIDE SERPL-SCNC: 90 MMOL/L (ref 95–110)
CLARITY UR: CLEAR
CO2 SERPL-SCNC: 23 MMOL/L (ref 23–29)
COLOR UR: YELLOW
CREAT SERPL-MCNC: 1.3 MG/DL (ref 0.5–1.4)
DIFFERENTIAL METHOD: ABNORMAL
EOSINOPHIL # BLD AUTO: 0.1 K/UL (ref 0–0.5)
EOSINOPHIL NFR BLD: 1.8 % (ref 0–8)
ERYTHROCYTE [DISTWIDTH] IN BLOOD BY AUTOMATED COUNT: 11.3 % (ref 11.5–14.5)
EST. GFR  (AFRICAN AMERICAN): 59 ML/MIN/1.73 M^2
EST. GFR  (NON AFRICAN AMERICAN): 51 ML/MIN/1.73 M^2
GLUCOSE SERPL-MCNC: 97 MG/DL (ref 70–110)
GLUCOSE UR QL STRIP: NEGATIVE
HCT VFR BLD AUTO: 40.8 % (ref 40–54)
HGB BLD-MCNC: 14.4 G/DL (ref 14–18)
HGB UR QL STRIP: ABNORMAL
IMM GRANULOCYTES # BLD AUTO: 0.04 K/UL (ref 0–0.04)
IMM GRANULOCYTES NFR BLD AUTO: 0.7 % (ref 0–0.5)
KETONES UR QL STRIP: NEGATIVE
LEUKOCYTE ESTERASE UR QL STRIP: NEGATIVE
LYMPHOCYTES # BLD AUTO: 0.9 K/UL (ref 1–4.8)
LYMPHOCYTES NFR BLD: 14.6 % (ref 18–48)
MCH RBC QN AUTO: 33 PG (ref 27–31)
MCHC RBC AUTO-ENTMCNC: 35.3 G/DL (ref 32–36)
MCV RBC AUTO: 93 FL (ref 82–98)
MONOCYTES # BLD AUTO: 0.6 K/UL (ref 0.3–1)
MONOCYTES NFR BLD: 9.4 % (ref 4–15)
NEUTROPHILS # BLD AUTO: 4.3 K/UL (ref 1.8–7.7)
NEUTROPHILS NFR BLD: 72.7 % (ref 38–73)
NITRITE UR QL STRIP: NEGATIVE
NRBC BLD-RTO: 0 /100 WBC
PH UR STRIP: 7 [PH] (ref 5–8)
PLATELET # BLD AUTO: 160 K/UL (ref 150–350)
PMV BLD AUTO: 8 FL (ref 9.2–12.9)
POTASSIUM SERPL-SCNC: 4.4 MMOL/L (ref 3.5–5.1)
PROT SERPL-MCNC: 7.4 G/DL (ref 6–8.4)
PROT UR QL STRIP: NEGATIVE
RBC # BLD AUTO: 4.37 M/UL (ref 4.6–6.2)
SARS-COV-2 RDRP RESP QL NAA+PROBE: NEGATIVE
SODIUM SERPL-SCNC: 127 MMOL/L (ref 136–145)
SP GR UR STRIP: 1.01 (ref 1–1.03)
TROPONIN I SERPL DL<=0.01 NG/ML-MCNC: 0.01 NG/ML (ref 0–0.03)
URN SPEC COLLECT METH UR: ABNORMAL
UROBILINOGEN UR STRIP-ACNC: NEGATIVE EU/DL
WBC # BLD AUTO: 5.96 K/UL (ref 3.9–12.7)

## 2020-12-08 PROCEDURE — 80053 COMPREHEN METABOLIC PANEL: CPT | Mod: HCNC

## 2020-12-08 PROCEDURE — 93010 EKG 12-LEAD: ICD-10-PCS | Mod: HCNC,,, | Performed by: INTERNAL MEDICINE

## 2020-12-08 PROCEDURE — U0002 COVID-19 LAB TEST NON-CDC: HCPCS | Mod: HCNC

## 2020-12-08 PROCEDURE — 96360 HYDRATION IV INFUSION INIT: CPT | Mod: HCNC

## 2020-12-08 PROCEDURE — 36415 COLL VENOUS BLD VENIPUNCTURE: CPT | Mod: HCNC

## 2020-12-08 PROCEDURE — 81003 URINALYSIS AUTO W/O SCOPE: CPT | Mod: HCNC

## 2020-12-08 PROCEDURE — 93005 ELECTROCARDIOGRAM TRACING: CPT | Mod: HCNC

## 2020-12-08 PROCEDURE — 25000003 PHARM REV CODE 250: Mod: HCNC | Performed by: EMERGENCY MEDICINE

## 2020-12-08 PROCEDURE — 85025 COMPLETE CBC W/AUTO DIFF WBC: CPT | Mod: HCNC

## 2020-12-08 PROCEDURE — 99285 EMERGENCY DEPT VISIT HI MDM: CPT | Mod: 25,HCNC,CS

## 2020-12-08 PROCEDURE — 84484 ASSAY OF TROPONIN QUANT: CPT | Mod: HCNC

## 2020-12-08 PROCEDURE — 97110 THERAPEUTIC EXERCISES: CPT | Mod: HCNC,PN

## 2020-12-08 PROCEDURE — 83880 ASSAY OF NATRIURETIC PEPTIDE: CPT | Mod: HCNC

## 2020-12-08 PROCEDURE — 93010 ELECTROCARDIOGRAM REPORT: CPT | Mod: HCNC,,, | Performed by: INTERNAL MEDICINE

## 2020-12-08 RX ORDER — SODIUM CHLORIDE 9 MG/ML
INJECTION, SOLUTION INTRAVENOUS
Status: COMPLETED | OUTPATIENT
Start: 2020-12-08 | End: 2020-12-08

## 2020-12-08 RX ORDER — SODIUM CHLORIDE 9 MG/ML
INJECTION, SOLUTION INTRAVENOUS
Status: DISCONTINUED | OUTPATIENT
Start: 2020-12-08 | End: 2020-12-08

## 2020-12-08 RX ORDER — LABETALOL HYDROCHLORIDE 5 MG/ML
10 INJECTION, SOLUTION INTRAVENOUS
Status: DISCONTINUED | OUTPATIENT
Start: 2020-12-08 | End: 2020-12-08 | Stop reason: HOSPADM

## 2020-12-08 RX ADMIN — SODIUM CHLORIDE 500 ML/HR: 0.9 INJECTION, SOLUTION INTRAVENOUS at 08:12

## 2020-12-08 NOTE — TELEPHONE ENCOUNTER
----- Message from Lisa Ramsey sent at 12/8/2020  1:15 PM CST -----  Regarding: Sooner Appt  Contact: Patient  Type:  Sooner Apoointment Request    Caller is requesting a sooner appointment.  Caller declined first available appointment listed below.  Caller will not accept being placed on the waitlist and is requesting a message be sent to doctor.  Name of Caller:Patient   When is the first available appointment?01/20/2021  Symptoms: high blood pressure   Would the patient rather a call back or a response via [facility]? Call back   Best Call Back Number: [phone]  Additional Information: Patient stated bp has been high would like to be seen as soon as possible

## 2020-12-08 NOTE — ED PROVIDER NOTES
12/8/2020, 4:10 PM   History obtained from the patient      History of Present Illness: Ezequiel Hughes is a 80 y.o. male patient who presents to the Emergency Department for hyponatremia. Pt states that he feels fatigued with poor appetite which onset approx 10 days ago.    4:11 PM: Pt understands they will be seen and dispositioned by the next available physician. Pt voices understanding and agrees with plan. Pt also understands the longer than normal wait time. I am removing myself from the care of pt and pt will now be assigned to the next available physician.     RENO Hollingsworth FNP-C      SCRIBE #1 NOTE: I, Oliver Reed, am scribing for, and in the presence of, Jannet Mckeon MD. I have scribed the HPI, ROS, & PEx.     SCRIBE #2 NOTE: I, Karlene Amor, am scribing for, and in the presence of,  Madalyn Taveras MD. I have scribed the remaining portions of the note not scribed by Scribe #1.      History     Chief Complaint   Patient presents with    Abnormal Lab     low sodium, pt reports extreme fatigue x 10days. Dr. Ozuna sent pt over      Review of patient's allergies indicates:   Allergen Reactions    Mobic  [meloxicam]      Other reaction(s): hypertension         History of Present Illness     HPI    12/8/2020, 7:17 PM  History obtained from the patient      History of Present Illness: Ezequiel Hughes is a 80 y.o. male patient who presents to the Emergency Department for evaluation of abnormal lab results. Pt reports he checks his BP at home and his pressure has been elevated for that past 10 days. C/o weakness and fatigue over that time. Pt was seen by his PCP who took pt off his diuretic because his sodium was low on most recent blood work. Symptoms are constant and moderate in severity. No mitigating or exacerbating factors reported. No other associated sxs reported. Patient denies any CP, SOB, fever, chills, n/v, abd pain, and all other sxs at this time. No further complaints or concerns at this time.        Arrival mode: Personal vehicle    PCP: Valery Ozuna MD        Past Medical History:  Past Medical History:   Diagnosis Date    Allergic rhinitis     Anemia     Back pain     BPH (benign prostatic hyperplasia)     Cancer     skin cancer to neck, Dr. Graves    Cataract     Disorder of kidney and ureter     ED (erectile dysfunction)     Hiatal hernia     small    History of colon polyps     colonoscopy 11/2016    HLD (hyperlipidemia)     Hyperlipidemia     Hypertension     Lateral epicondylitis     OA (osteoarthritis)     GUIDO (obstructive sleep apnea)     Prostate cancer     Dr. Wong    TIA (transient ischemic attack)     Trouble in sleeping        Past Surgical History:  Past Surgical History:   Procedure Laterality Date    CATARACT EXTRACTION W/  INTRAOCULAR LENS IMPLANT Right 02/21/2018    I-Stent    CATARACT EXTRACTION W/  INTRAOCULAR LENS IMPLANT Left 03/21/2018    I - Stent    COLONOSCOPY N/A 11/14/2016    Procedure: COLONOSCOPY;  Surgeon: Karuna Rodriguez MD;  Location: [facility];  Service: Endoscopy;  Laterality: N/A;    COLONOSCOPY N/A 9/22/2020    Procedure: COLONOSCOPY;  Surgeon: Chuy Fish MD;  Location: [facility];  Service: Endoscopy;  Laterality: N/A;    HEMORRHOID SURGERY      I-STENT Right 02/21/2018    DR. REECE    KNEE ARTHROSCOPY W/ MENISCAL REPAIR Right 2015    Dr. Jain    PCIOL Right 02/21/2018    DR. REEEC    PLANTAR FASCIA RELEASE      right    ROTATOR CUFF REPAIR Bilateral     bilateral    SHOULDER SURGERY Bilateral around 2000    Dr. Pepper.  rotator cuff surgeries         Family History:  Family History   Problem Relation Age of Onset    Lung cancer Father         life long smoker    Cancer Father         prostate, lung    Stroke Sister         TIA    Cataracts Sister     Cancer Brother         prostate    Cataracts Brother     Cataracts Sister     Melanoma Neg Hx     Psoriasis Neg Hx     Lupus Neg Hx     Eczema Neg  Hx     Diabetes Neg Hx     Heart disease Neg Hx     Kidney disease Neg Hx     Colon cancer Neg Hx        Social History:  Social History     Tobacco Use    Smoking status: Former Smoker     Packs/day: 3.00     Years: 35.00     Pack years: 105.00     Types: Cigarettes     Start date: 1960     Quit date: 1985     Years since quittin.4    Smokeless tobacco: Never Used   Substance and Sexual Activity    Alcohol use: Yes     Alcohol/week: 7.0 standard drinks     Types: 6 Cans of beer, 1 Standard drinks or equivalent per week     Frequency: 4 or more times a week     Drinks per session: 1 or 2     Binge frequency: Never     Comment: socially    Drug use: No    Sexual activity: Never        Review of Systems     Review of Systems   Constitutional: Positive for fatigue. Negative for activity change, chills, diaphoresis and fever.        (+) abnormal labs   HENT: Negative for congestion, drooling, ear pain, rhinorrhea, sneezing, sore throat and trouble swallowing.    Eyes: Negative for pain.   Respiratory: Negative for cough, chest tightness, shortness of breath, wheezing and stridor.    Cardiovascular: Negative for chest pain, palpitations and leg swelling.        (+) HTN   Gastrointestinal: Negative for abdominal distention, abdominal pain, constipation, diarrhea, nausea and vomiting.   Genitourinary: Negative for difficulty urinating, dysuria, frequency and urgency.   Musculoskeletal: Negative for arthralgias, back pain, myalgias, neck pain and neck stiffness.   Skin: Negative for pallor, rash and wound.   Neurological: Positive for weakness (generalized). Negative for dizziness, syncope, light-headedness, numbness and headaches.   All other systems reviewed and are negative.       Physical Exam     Initial Vitals [20 1611]   BP Pulse Resp Temp SpO2   133/72 66 18 97.9 °F (36.6 °C) 98 %      MAP       --          Physical Exam  Nursing Notes and Vital Signs Reviewed.  Constitutional: Patient  "is in no acute distress.  Head: Atraumatic. Normocephalic.  Eyes: EOM intact. Conjunctivae are not pale. No scleral icterus.  ENT: Mucous membranes are moist. Oropharynx is clear and symmetric.    Neck: Supple. Full ROM.  Cardiovascular: Regular rate. Regular rhythm. No murmurs, rubs, or gallops. Distal pulses are 2+ and symmetric.  Pulmonary/Chest: No respiratory distress. Clear to auscultation bilaterally. No wheezing or rales.  Abdominal: Soft and non-distended.  There is no tenderness.  No rebound, guarding, or rigidity. Good bowel sounds.  Genitourinary: No CVA tenderness  Musculoskeletal: Moves all extremities. No obvious deformities. No edema. No calf tenderness.  Skin: Warm and dry.  Neurological:  Alert, awake, and appropriate.  Normal speech.  No acute focal neurological deficits are appreciated.  Psychiatric: Normal affect. Good eye contact. Appropriate in content.     ED Course   Procedures  ED Vital Signs:  Vitals:    12/08/20 1611 12/08/20 1805 12/08/20 2006   BP: 133/72 (!) 177/78 135/79   Pulse: 66 64 (!) 59   Resp: 18 18 (!) 22   Temp: 97.9 °F (36.6 °C)     TempSrc: Oral     SpO2: 98% 99% 100%   Weight: 88.6 kg (195 lb 5.2 oz)     Height: 5' 10" (1.778 m)         Abnormal Lab Results:  Labs Reviewed   CBC W/ AUTO DIFFERENTIAL - Abnormal; Notable for the following components:       Result Value    RBC 4.37 (*)     MCH 33.0 (*)     RDW 11.3 (*)     MPV 8.0 (*)     Immature Granulocytes 0.7 (*)     Lymph # 0.9 (*)     Lymph % 14.6 (*)     All other components within normal limits   COMPREHENSIVE METABOLIC PANEL - Abnormal; Notable for the following components:    Sodium 127 (*)     Chloride 90 (*)     eGFR if  59 (*)     eGFR if non  51 (*)     All other components within normal limits   URINALYSIS, REFLEX TO URINE CULTURE - Abnormal; Notable for the following components:    Occult Blood UA Trace (*)     All other components within normal limits    Narrative:     " Specimen Source->Urine   TROPONIN I   B-TYPE NATRIURETIC PEPTIDE   SARS-COV-2 RNA AMPLIFICATION, QUAL   B-TYPE NATRIURETIC PEPTIDE        All Lab Results:  Results for orders placed or performed during the hospital encounter of 12/08/20   CBC auto differential   Result Value Ref Range    WBC 5.96 3.90 - 12.70 K/uL    RBC 4.37 (L) 4.60 - 6.20 M/uL    Hemoglobin 14.4 14.0 - 18.0 g/dL    Hematocrit 40.8 40.0 - 54.0 %    MCV 93 82 - 98 fL    MCH 33.0 (H) 27.0 - 31.0 pg    MCHC 35.3 32.0 - 36.0 g/dL    RDW 11.3 (L) 11.5 - 14.5 %    Platelets 160 150 - 350 K/uL    MPV 8.0 (L) 9.2 - 12.9 fL    Immature Granulocytes 0.7 (H) 0.0 - 0.5 %    Gran # (ANC) 4.3 1.8 - 7.7 K/uL    Immature Grans (Abs) 0.04 0.00 - 0.04 K/uL    Lymph # 0.9 (L) 1.0 - 4.8 K/uL    Mono # 0.6 0.3 - 1.0 K/uL    Eos # 0.1 0.0 - 0.5 K/uL    Baso # 0.05 0.00 - 0.20 K/uL    nRBC 0 0 /100 WBC    Gran % 72.7 38.0 - 73.0 %    Lymph % 14.6 (L) 18.0 - 48.0 %    Mono % 9.4 4.0 - 15.0 %    Eosinophil % 1.8 0.0 - 8.0 %    Basophil % 0.8 0.0 - 1.9 %    Differential Method Automated    Comprehensive metabolic panel   Result Value Ref Range    Sodium 127 (L) 136 - 145 mmol/L    Potassium 4.4 3.5 - 5.1 mmol/L    Chloride 90 (L) 95 - 110 mmol/L    CO2 23 23 - 29 mmol/L    Glucose 97 70 - 110 mg/dL    BUN 19 8 - 23 mg/dL    Creatinine 1.3 0.5 - 1.4 mg/dL    Calcium 8.8 8.7 - 10.5 mg/dL    Total Protein 7.4 6.0 - 8.4 g/dL    Albumin 4.5 3.5 - 5.2 g/dL    Total Bilirubin 0.9 0.1 - 1.0 mg/dL    Alkaline Phosphatase 62 55 - 135 U/L    AST 17 10 - 40 U/L    ALT 13 10 - 44 U/L    Anion Gap 14 8 - 16 mmol/L    eGFR if African American 59 (A) >60 mL/min/1.73 m^2    eGFR if non African American 51 (A) >60 mL/min/1.73 m^2   Troponin I   Result Value Ref Range    Troponin I 0.011 0.000 - 0.026 ng/mL   Urinalysis, Reflex to Urine Culture Urine, Clean Catch    Specimen: Urine   Result Value Ref Range    Specimen UA Urine, Clean Catch     Color, UA Yellow Yellow, Straw, Sonia     Appearance, UA Clear Clear    pH, UA 7.0 5.0 - 8.0    Specific Gravity, UA 1.010 1.005 - 1.030    Protein, UA Negative Negative    Glucose, UA Negative Negative    Ketones, UA Negative Negative    Bilirubin (UA) Negative Negative    Occult Blood UA Trace (A) Negative    Nitrite, UA Negative Negative    Urobilinogen, UA Negative <2.0 EU/dL    Leukocytes, UA Negative Negative   COVID-19 Rapid Screening   Result Value Ref Range    SARS-CoV-2 RNA, Amplification, Qual Negative Negative   BNP   Result Value Ref Range    BNP 22 0 - 99 pg/mL         Imaging Results:  Imaging Results          X-Ray Chest 1 View (Final result)  Result time 12/08/20 18:05:46    Final result by Britt Baeza MD (12/08/20 18:05:46)                 Impression:      No acute abnormality.      Electronically signed by: Aryan De La Torre  Date:    12/08/2020  Time:    18:05             Narrative:    EXAMINATION:  XR CHEST 1 VIEW    CLINICAL HISTORY:  Other fatigue    TECHNIQUE:  Single frontal view of the chest was performed.    COMPARISON:  Prior    FINDINGS:  The lungs are clear, with normal appearance of pulmonary vasculature and no pleural effusion or pneumothorax.    The cardiac silhouette is prominent. The hilar and mediastinal contours are unremarkable.    Bones are intact.                                 The EKG was ordered, reviewed, and independently interpreted by the ED provider.  Interpretation time: 16:47  Rate: 67 BPM  Rhythm: Sinus rhythm with PAC's  Interpretation: Left axis deviation. Incomplete RBBB. Septal infarct. No STEMI.  When compared to EKG performed 18-OCT-2020, PAC's are now present. Septal infarct is now present.           The Emergency Provider reviewed the vital signs and test results, which are outlined above.     ED Discussion       8:00 PM: Dr. Mckeon transfers care of patient to Dr. Taveras pending imaging results.    8:59 PM: Reassessed pt at this time. Discussed with pt all pertinent ED information and results.  Discussed pt dx and plan of tx. Gave pt all f/u and return to the ED instructions. All questions and concerns were addressed at this time. Pt expresses understanding of information and instructions, and is comfortable with plan to discharge. Pt is stable for discharge.    I discussed with patient and/or family/caretaker that evaluation in the ED does not suggest any emergent or life threatening medical conditions requiring immediate intervention beyond what was provided in the ED, and I believe patient is safe for discharge.  Regardless, an unremarkable evaluation in the ED does not preclude the development or presence of a serious of life threatening condition. As such, patient was instructed to return immediately for any worsening or change in current symptoms.         Medical Decision Making:   Clinical Tests:   Lab Tests: Ordered and Reviewed  Radiological Study: Ordered and Reviewed  Medical Tests: Ordered and Reviewed           ED Medication(s):  Medications   0.9%  NaCl infusion ( Intravenous Stopped 12/8/20 2123)       Discharge Medication List as of 12/8/2020  9:06 PM          Follow-up Information     Valery Ozuna MD In 2 days.    Specialty: Family Medicine  Contact information:  [address]  [address]  [facility] [address] [phone]             [facility]    Specialty: Emergency Medicine  Why: As needed, If symptoms worsen  Contact information:  [address]  [facility] [address] [phone]                     Scribe Attestation:   Scribe #1: I performed the above scribed service and the documentation accurately describes the services I performed. I attest to the accuracy of the note.     Attending:   Physician Attestation Statement for Scribe #1: I, Jannet Mckeon MD, personally performed the services described in this documentation, as scribed by Oliver Reed, in my presence, and it is both accurate and complete.       Scribe Attestation:    Scribe #2: I performed the above scribed service and the documentation accurately describes the services I performed. I attest to the accuracy of the note.    Attending Attestation:           Physician Attestation for Scribe:    Physician Attestation Statement for Scribe #2: I, Maadlyn Taveras MD, reviewed documentation, as scribed by Karlene Amor in my presence, and it is both accurate and complete. I also acknowledge and confirm the content of the note done by Bre #1.           Clinical Impression       ICD-10-CM ICD-9-CM   1. Hyponatremia  E87.1 276.1   2. Fatigue  R53.83 780.79       Disposition:   Disposition: Discharged  Condition: Stable                   Madalyn Taveras MD  12/09/20 0423

## 2020-12-08 NOTE — TELEPHONE ENCOUNTER
Sodium level too low again. Not feeling well. Needs to stop the HCTZ again. Possibly may need to go on in to ED to get proper hydration as labs form 12/4 shows low sodium levels and can cause dizziness, lightheadedness, and confusion. Would need to be normal saline and monitored please inform.

## 2020-12-08 NOTE — PLAN OF CARE
Outpatient Therapy Discharge Summary     Name: Ezequiel Hughes  Clinic Number: [id number]    Therapy Diagnosis:   Encounter Diagnoses   Name Primary?    Chronic bilateral low back pain without sciatica     Chronic midline low back pain, unspecified whether sciatica present     Difficulty walking     Generalized weakness      Physician: Valery Ozuna MD    Physician Orders: PT Eval and Treat  Medical Diagnosis:   M54.30 (ICD-10-CM) - Sciatica, unspecified laterality   R29.898 (ICD-10-CM) - Decreased range of motion of neck   Evaluation Date: 11/18/2020  Authorization Period Expiration: 11/30/2020  Plan of Care Certification Period: 2/18/2021  Visit #/Visits authorized:  4/ 20       Assessment    Goals: Pt progressed well with skilled PT. He demonstrates good knowledge of improved posture as well as the need for daily HEP performance and the need to be aware of his posture throughout the day. Pt reports that he has had a resolution of pain that was initially limiting him and has demonstrated good knowledge in self management, especially with self management of sciatica symptoms. Pt has met all LTGs and self reports that he feels that he is ready for discharge at this time and feels confident that he will be able to continue his progress at home. Pt issued revised and progressed HEP today for continued performance at home.   Ezequiel is progressing well towards his goals.   Pt prognosis is Good.     Pt will continue to benefit from skilled outpatient physical therapy to address the deficits listed in the problem list box on initial evaluation, provide pt/family education and to maximize pt's level of independence in the home and community environment.     Pt's spiritual, cultural and educational needs considered and pt agreeable to plan of care and goals.     Anticipated barriers to physical therapy: none anticipated    Goals:  Short Term Goals: 4 weeks   1. Pt will be independent with HEP performance in order to  continue PT progress at home. Met 11/24/2020  2. Pt will report centralization of R LE sciatica symptoms goal met, 12/1/2020  3. Pt will demonstrate improved cervical ROM by 10 degrees or greater goal met, 12/8/2020  4. Pt will improve LE strength to at least 4/5 in all planes goal met, 12/8/2020     Long Term Goals: 8 weeks   1. Pt will improve KARI disability score to 5% disability or less in order to improve overall QOL and return to PLOF. goal met, 12/8/2020  2. Pt will demo ability to ambulate up to 20 minutes for exercise without increased low back or RLE pain goal met, 12/1/2020  3. Pt will be independent with progressed HEP  goal met, 12/8/2020  4. Pt will demo cervical ROM that is at least 75% of normal ROM in all planes goal met, 12/8/2020    Plan     Pt discharged from skilled PT today. Instructed to follow up with BP concerns with PCP and to reach out to our office and clinic with any further concerns and if he feels that he needs to return to PT.     Plan of care Certification: 11/18/2020 to 2/18/2021       Shannon Wilcox, PT   12/8/2020

## 2020-12-08 NOTE — TELEPHONE ENCOUNTER
OCC RN  Patient is calling to get doctors appt for his b/p.   I did blood work on Friday.  146/78 at 1100.   States he feels terrible, crick in my neck,  Nausea.  Denies fever, no HA.  Fatigued.  I did PT today for neck and Sciatic nerve.  Have neck pain.  Care protocol states to make appointment to see  In 2 weeks.  Warm transfer to . For worsening symptoms to call back.  Patient VU.     Reason for Disposition   Systolic BP >= 130 OR Diastolic >= 80, and is taking BP medications    Additional Information   Negative: Sounds like a life-threatening emergency to the triager   Negative: Pregnant > 20 weeks or postpartum (< 6 weeks after delivery) and new hand or face swelling   Negative: Pregnant > 20 weeks and BP > 140/90   Negative: Systolic BP >= 160 OR Diastolic >= 100, and any cardiac or neurologic symptoms (e.g., chest pain, difficulty breathing, unsteady gait, blurred vision)   Negative: Patient sounds very sick or weak to the triager   Negative: BP Systolic BP >= 140 OR Diastolic >= 90 and postpartum (from 0 to 6 weeks after delivery)   Negative: Systolic BP >= 180 OR Diastolic >= 110   Negative: Patient wants to be seen   Negative: Ran out of BP medications   Negative: Taking BP medications and feels is having side effects (e.g., impotence, cough, dizziness)   Negative: Systolic BP >= 130 OR Diastolic >= 80, and pregnant   Negative: Systolic BP >= 160 OR Diastolic >= 100    Protocols used: HIGH BLOOD PRESSURE-A-OH

## 2020-12-10 ENCOUNTER — OFFICE VISIT (OUTPATIENT)
Dept: PRIMARY CARE CLINIC | Facility: CLINIC | Age: 80
End: 2020-12-10
Payer: MEDICARE

## 2020-12-10 DIAGNOSIS — E87.1 HYPONATREMIA: Primary | ICD-10-CM

## 2020-12-10 DIAGNOSIS — I70.203 ATHEROSCLEROSIS OF ARTERY OF BOTH LOWER EXTREMITIES: ICD-10-CM

## 2020-12-10 DIAGNOSIS — T45.0X5A: ICD-10-CM

## 2020-12-10 DIAGNOSIS — I10 ESSENTIAL HYPERTENSION: Chronic | ICD-10-CM

## 2020-12-10 PROCEDURE — 1159F MED LIST DOCD IN RCRD: CPT | Mod: HCNC,95,, | Performed by: FAMILY MEDICINE

## 2020-12-10 PROCEDURE — 99215 OFFICE O/P EST HI 40 MIN: CPT | Mod: HCNC,95,, | Performed by: FAMILY MEDICINE

## 2020-12-10 PROCEDURE — 1159F PR MEDICATION LIST DOCUMENTED IN MEDICAL RECORD: ICD-10-PCS | Mod: HCNC,95,, | Performed by: FAMILY MEDICINE

## 2020-12-10 PROCEDURE — 99215 PR OFFICE/OUTPT VISIT, EST, LEVL V, 40-54 MIN: ICD-10-PCS | Mod: HCNC,95,, | Performed by: FAMILY MEDICINE

## 2020-12-10 RX ORDER — LOSARTAN POTASSIUM 50 MG/1
50 TABLET ORAL DAILY
Qty: 90 TABLET | Refills: 3
Start: 2020-12-10 | End: 2020-12-21

## 2020-12-10 NOTE — PROGRESS NOTES
Subjective:      Patient ID: Ezequiel Hughes is a 80 y.o. male.    Chief Complaint: Follow-up (ED, sodium )    Disclaimer:  This note is prepared using voice recognition software and as such is likely to have errors and has not been proof read. Please contact me for questions.     The patient location is:home  The chief complaint leading to consultation is: followup / my chart request/ facetime  Visit type: Virtual visit with synchronous audio and video  Total time spent with patient:1226pm -105pm   Each patient to whom he or she provides medical services by telemedicine is:  (1) informed of the relationship between the physician and patient and the respective role of any other health care provider with respect to management of the patient; and (2) notified that he or she may decline to receive medical services by telemedicine and may withdraw from such care at any time.    Notes:  Patient is here today for telemedicine visit for recent follow-up of emergency room as well as not feeling well.  Has had significantly elevated blood pressures in the past 2 weeks.  Recently did a car to see call with face time for the patient.  Was not feeling as well ended up doing lab work which showed a low sodium.  Ultimately advised patient to get into the emergency room to have IV fluids.  Received 1 bag of normal saline IV fluids at the emergency room as well as additional labs.  Sodium there was 127.  Felt a little bit better.  Since that time he has discontinued the HCTZ.  He is now only on losartan 50 mg once daily.  Blood pressure readings have been reviewed through the digital monitoring program..  He is in very good control other than on November 30th he had significantly elevated blood pressure readings.  It appears at that time was related to antihistamines as being prescribed for itching in for dermatologic reasons.  After further discussion he has discontinued all of these as well.  These been now labeled on his  intolerance list.    Otherwise he does have a treadmill was hoping to get back in resume walking.  Has known peripheral artery disease but currently on Plavix and atorvastatin.  BNP at the emergency room was noted be normal.  Does have a cardiologist has been told that his heart is fine.    Did discuss occasionally if he is feeling bad he can check his blood pressure if blood pressure systolic [place] as above 150 can take an additional dose of losartan to be equal to 50 mg b.i.d. if necessary.  Were going to discontinue HCTZ at this time.    Also patient was wanting to know what to do occasionally has leg cramping did advise potentially to use Powerade or Gatorade low-dose as well as hydration to help with this as well.  Patient was in agreement with this plan.    Lab Results       Component                Value               Date                       HGBA1C                   5.2                 07/22/2020                 HGBA1C                   5.2                 03/07/2018             Lab Results       Component                Value               Date                       CHOL                     144                 07/22/2020                 CHOL                     142                 01/23/2020                 CHOL                     146                 08/29/2019            Lab Results       Component                Value               Date                       LDLCALC                  68.2                07/22/2020                 LDLCALC                  73.4                01/23/2020                 LDLCALC                  71.4                08/29/2019              Wt Readings from Last 10 Encounters:  12/08/20 : 88.6 kg (195 lb 5.2 oz)  10/28/20 : 88.4 kg (194 lb 14.2 oz)  10/18/20 : 91 kg (200 lb 11.7 oz)  09/22/20 : 89.5 kg (197 lb 5 oz)  09/09/20 : 89 kg (196 lb 3.4 oz)  07/23/20 : 88.5 kg (195 lb)  07/13/20 : 87.9 kg (193 lb 12.6 oz)  07/07/20 : 89.1 kg (196 lb 6.9 oz)  03/11/20 : 91.1 kg (200 lb  13.4 oz)  02/26/20 : 90.7 kg (199 lb 15.3 oz)      The ASCVD Risk score (Barbara DC Jr., et al., 2013) failed to calculate for the following reasons:    The 2013 ASCVD risk score is only valid for ages 40 to 79          Hypertension  This is a recurrent problem. The current episode started more than 1 year ago. The problem has been waxing and waning since onset. The problem is resistant. Associated symptoms include anxiety, malaise/fatigue and neck pain. Pertinent negatives include no blurred vision, chest pain, headaches, orthopnea, palpitations, peripheral edema, PND, shortness of breath or sweats. Agents associated with hypertension include NSAIDs. Risk factors for coronary artery disease include dyslipidemia, family history and stress. Past treatments include diuretics. The current treatment provides moderate improvement. Compliance problems include exercise.        Lab Results   Component Value Date    WBC 5.96 12/08/2020    HGB 14.4 12/08/2020    HCT 40.8 12/08/2020     12/08/2020    CHOL 144 07/22/2020    TRIG 104 07/22/2020    HDL 55 07/22/2020    ALT 13 12/08/2020    AST 17 12/08/2020     (L) 12/08/2020    K 4.4 12/08/2020    CL 90 (L) 12/08/2020    CREATININE 1.3 12/08/2020    BUN 19 12/08/2020    CO2 23 12/08/2020    TSH 1.330 01/23/2020    PSA 1.1 01/23/2020    INR 1.2 07/07/2020    HGBA1C 5.2 07/22/2020       X-Ray Chest 1 View  Narrative: EXAMINATION:  XR CHEST 1 VIEW    CLINICAL HISTORY:  Other fatigue    TECHNIQUE:  Single frontal view of the chest was performed.    COMPARISON:  Prior    FINDINGS:  The lungs are clear, with normal appearance of pulmonary vasculature and no pleural effusion or pneumothorax.    The cardiac silhouette is prominent. The hilar and mediastinal contours are unremarkable.    Bones are intact.  Impression: No acute abnormality.    Electronically signed by: Aryan De La Torre  Date:    12/08/2020  Time:    18:05        Review of Systems   Constitutional: Positive for  activity change and malaise/fatigue. Negative for appetite change, fatigue and unexpected weight change.   Eyes: Negative for blurred vision.   Respiratory: Negative for cough, shortness of breath and stridor.    Cardiovascular: Negative for chest pain, palpitations, orthopnea, leg swelling and PND.   Musculoskeletal: Positive for neck pain.   Neurological: Negative for weakness, light-headedness and headaches.     Objective:   There were no vitals filed for this visit.  Physical Exam  Vitals signs reviewed.   Constitutional:       General: He is awake. He is not in acute distress.     Appearance: Normal appearance. He is well-developed, well-groomed and normal weight. He is not ill-appearing.   HENT:      Head: Normocephalic and atraumatic.      Right Ear: External ear normal.      Left Ear: External ear normal.      Nose: Nose normal.      Mouth/Throat:      Lips: Pink.   Eyes:      Conjunctiva/sclera: Conjunctivae normal.   Pulmonary:      Effort: Pulmonary effort is normal.   Neurological:      Mental Status: He is alert.   Psychiatric:         Attention and Perception: Attention and perception normal. He is attentive.         Mood and Affect: Mood and affect normal. Mood is not anxious or depressed. Affect is not labile, blunt, angry or inappropriate.         Speech: Speech normal. He is communicative. Speech is not rapid and pressured, delayed, slurred or tangential.         Behavior: Behavior normal. Behavior is not agitated, slowed, aggressive, withdrawn, hyperactive or combative. Behavior is cooperative.         Thought Content: Thought content normal. Thought content is not paranoid or delusional. Thought content does not include homicidal or suicidal ideation. Thought content does not include homicidal or suicidal plan.         Cognition and Memory: Cognition and memory normal. Memory is not impaired. He does not exhibit impaired recent memory or impaired remote memory.         Judgment: Judgment normal.  Judgment is not impulsive or inappropriate.       Assessment:     1. Hyponatremia    2. Essential hypertension    3. Atherosclerosis of artery of both lower extremities    4. Adverse effect of antihistamine, initial encounter      Plan:   Ezequiel was seen today for follow-up.    Diagnoses and all orders for this visit:    Hyponatremia  Comments:  Improved symptomaticly with IV normal saline 1 bag and discontinuation of HCTZ repeat labs again on Monday  Orders:  -     Basic Metabolic Panel; Future    Essential hypertension  Comments:  Stable improved with losartan 50 mg once daily for systolic blood pressures above 150 and symptomatic can increase to 50 mg b.i.d. avoid HCTZ due to hyponatremi    Atherosclerosis of artery of both lower extremities  Comments:  On Plavix on statin therapy okay to resume daily exercise    Adverse effect of antihistamine, initial encounter  Comments:  Appears to have called significantly higher blood pressure readings on November 30th advised patient not to use at this time placed on allergy intolerance list    Other orders  -     losartan (COZAAR) 50 MG tablet; Take 1 tablet (50 mg total) by mouth once daily.        Time spent: 40 minutes in face to face discussion concerning diagnosis, prognosis, review of lab and test results, benefits of treatment as well as management of disease, counseling of patient and coordination of care between various health care providers . Greater than half the time spent was used for coordination of care and counseling of patient.       No follow-ups on file.    There are no Patient Instructions on file for this visit.

## 2020-12-14 ENCOUNTER — LAB VISIT (OUTPATIENT)
Dept: LAB | Facility: HOSPITAL | Age: 80
End: 2020-12-14
Attending: FAMILY MEDICINE
Payer: MEDICARE

## 2020-12-14 DIAGNOSIS — E87.1 HYPONATREMIA: ICD-10-CM

## 2020-12-14 LAB
ANION GAP SERPL CALC-SCNC: 9 MMOL/L (ref 8–16)
BUN SERPL-MCNC: 20 MG/DL (ref 8–23)
CALCIUM SERPL-MCNC: 9 MG/DL (ref 8.7–10.5)
CHLORIDE SERPL-SCNC: 104 MMOL/L (ref 95–110)
CO2 SERPL-SCNC: 24 MMOL/L (ref 23–29)
CREAT SERPL-MCNC: 1.5 MG/DL (ref 0.5–1.4)
EST. GFR  (AFRICAN AMERICAN): 49.4 ML/MIN/1.73 M^2
EST. GFR  (NON AFRICAN AMERICAN): 42.7 ML/MIN/1.73 M^2
GLUCOSE SERPL-MCNC: 112 MG/DL (ref 70–110)
POTASSIUM SERPL-SCNC: 4.3 MMOL/L (ref 3.5–5.1)
SODIUM SERPL-SCNC: 137 MMOL/L (ref 136–145)

## 2020-12-14 PROCEDURE — 36415 COLL VENOUS BLD VENIPUNCTURE: CPT | Mod: HCNC,PO

## 2020-12-14 PROCEDURE — 80048 BASIC METABOLIC PNL TOTAL CA: CPT | Mod: HCNC

## 2020-12-15 ENCOUNTER — PATIENT MESSAGE (OUTPATIENT)
Dept: PRIMARY CARE CLINIC | Facility: CLINIC | Age: 80
End: 2020-12-15

## 2020-12-15 ENCOUNTER — TELEPHONE (OUTPATIENT)
Dept: PRIMARY CARE CLINIC | Facility: CLINIC | Age: 80
End: 2020-12-15

## 2020-12-15 DIAGNOSIS — I10 ESSENTIAL HYPERTENSION: Primary | ICD-10-CM

## 2020-12-15 RX ORDER — AMLODIPINE BESYLATE 2.5 MG/1
2.5 TABLET ORAL DAILY
Qty: 30 TABLET | Refills: 0 | Status: SHIPPED | OUTPATIENT
Start: 2020-12-15 | End: 2021-01-04 | Stop reason: SDUPTHER

## 2020-12-15 NOTE — TELEPHONE ENCOUNTER
Patient stated his BP is elevated 172/? AM  179/? PM   He took his meds this morning and at 1 pm he took his bp again and it was higher than this morning. He is worried and would like you too call him.

## 2020-12-15 NOTE — TELEPHONE ENCOUNTER
Ok, so are you doing the 2 losartan 50mg a day now since the top #'s have been > 160? If yes, then please let me know as we will start a new medication called amlodpine 2.5mg.   If you are NOT yet doing the 2 losartan 50mg a day, then start. 1 50mg losartan in the AM and 1 50mg losartan in the PM.   Let me know how this works.     Blood work was good this time on your sodium levels.   Valery Ozuna MD

## 2020-12-15 NOTE — TELEPHONE ENCOUNTER
I called and spoke with Mr. Hughes. He hadn't seen Dr. Ozuna's message regarding the twice daily losartan and possibly adding norvasc. He took the Losartan twice daily today (just took dose #2 about 30 minutes ago). Hadn't been doing that consistently though. Advised to start having him regularly do morning and evening doses of that one for now. We also discussed possibly adding amlodipine per Dr. Ozuna's message. He would like to have that sent over since the numbers he's getting are causing him a good bit of stress. He was advised to hold the norvasc for now. See what the BP looks like now that he's taking the two doses of losartan and will have him go ahead and start the amlodipine if SBP still >160 or DBP >90. He's going to hold off on any dedicated exercise while the BP is up, but is aware that he can do small amounts of walking here and there throughout the day and he'll still get some benefits that way. Also of note, he's been drinking about 3 cups of coffee daily. He feels like he would definitely be able to cut back some on that. Watch sodium in the diet. He will continue to keep in touch with us about the BP.

## 2020-12-15 NOTE — TELEPHONE ENCOUNTER
----- Message from Sammie Marin sent at 12/15/2020  1:12 PM CST -----  Contact: Ezequiel Canales is requesting a call in regards to his blood pressure being high. Please call him back at [phone].      Thanks  DD

## 2020-12-16 ENCOUNTER — PATIENT MESSAGE (OUTPATIENT)
Dept: PRIMARY CARE CLINIC | Facility: CLINIC | Age: 80
End: 2020-12-16

## 2020-12-17 ENCOUNTER — PATIENT MESSAGE (OUTPATIENT)
Dept: PRIMARY CARE CLINIC | Facility: CLINIC | Age: 80
End: 2020-12-17

## 2020-12-21 ENCOUNTER — PATIENT MESSAGE (OUTPATIENT)
Dept: PRIMARY CARE CLINIC | Facility: CLINIC | Age: 80
End: 2020-12-21

## 2020-12-21 RX ORDER — LOSARTAN POTASSIUM 50 MG/1
50 TABLET ORAL 2 TIMES DAILY
Qty: 180 TABLET | Refills: 3
Start: 2020-12-21 | End: 2021-01-06 | Stop reason: SDUPTHER

## 2020-12-28 NOTE — PROGRESS NOTES
Digital Medicine: Clinician Follow-Up    I spoke with the patient today to f/u on his elevated BP readings. Recently his PCP increase his losartan to 50mg twice daily and added amlodipine 2.5mg. Today he says he is tolerating the amlodipine well and denies swelling in the foot or legs.     He admits to going to the [facility] today for a device check and a large discrepency was found between his and another device. The readings he took this morning with his new device have not transmitted.    The history is provided by the patient.      Review of patient's allergies indicates:   -- Atarax (hydroxyzine hcl) -- Other (See Comments)    --  Raised blood pressure   -- Zyrtec (cetirizine) -- Other (See Comments)    --  Raised blood pressure   -- Mobic  (meloxicam)     --  Other reaction(s): hypertension  Follow-up reason(s): medication change follow-up.     Patient is not experiencing signs/symptoms of hypotension.  Patient is not experiencing signs/symptoms of hypertension.            Last 5 Patient Entered Readings                                      Current 30 Day Average: 151/80     Recent Readings 12/28/2020 12/28/2020 12/27/2020 12/27/2020 12/26/2020    SBP (mmHg) 150 150 125 125 163    DBP (mmHg) 98 98 64 64 74                 Depression Screening  Did not address depression screening.    Sleep Apnea Screening    Did not address sleep apnea screening.     Medication Affordability Screening  Did not address medication affordability screening.     Medication Adherence-Medication Adherence not addressed.          ASSESSMENT(S)  Patients BP average is 151/80 mmHg, which is above goal. Patient's BP goal is less than or equal to 130/80.     Hypertension Plan  Additional monitoring needed.  Continue current therapy.  F/u in 2 week, will consider increasing amlodipine dose further if needed     Addressed patient questions and patient has my contact information if needed prior to next outreach. Patient verbalizes  understanding.             There are no preventive care reminders to display for this patient.  There are no preventive care reminders to display for this patient.      Hypertension Medications             amLODIPine (NORVASC) 2.5 MG tablet Take 1 tablet (2.5 mg total) by mouth once daily.    losartan (COZAAR) 50 MG tablet Take 1 tablet (50 mg total) by mouth 2 (two) times a day.

## 2021-01-01 ENCOUNTER — PATIENT MESSAGE (OUTPATIENT)
Dept: PRIMARY CARE CLINIC | Facility: CLINIC | Age: 81
End: 2021-01-01

## 2021-01-01 DIAGNOSIS — I10 ESSENTIAL HYPERTENSION: ICD-10-CM

## 2021-01-04 ENCOUNTER — PATIENT MESSAGE (OUTPATIENT)
Dept: PRIMARY CARE CLINIC | Facility: CLINIC | Age: 81
End: 2021-01-04

## 2021-01-04 RX ORDER — AMLODIPINE BESYLATE 2.5 MG/1
2.5 TABLET ORAL DAILY
Qty: 90 TABLET | Refills: 3 | Status: SHIPPED | OUTPATIENT
Start: 2021-01-04 | End: 2021-01-06

## 2021-01-13 ENCOUNTER — IMMUNIZATION (OUTPATIENT)
Dept: INTERNAL MEDICINE | Facility: CLINIC | Age: 81
End: 2021-01-13
Payer: MEDICARE

## 2021-01-13 DIAGNOSIS — Z23 NEED FOR VACCINATION: ICD-10-CM

## 2021-01-13 PROCEDURE — 91300 COVID-19, MRNA, LNP-S, PF, 30 MCG/0.3 ML DOSE VACCINE: CPT | Mod: HCNC,PBBFAC | Performed by: FAMILY MEDICINE

## 2021-01-14 ENCOUNTER — PATIENT MESSAGE (OUTPATIENT)
Dept: DERMATOLOGY | Facility: CLINIC | Age: 81
End: 2021-01-14

## 2021-01-15 ENCOUNTER — OFFICE VISIT (OUTPATIENT)
Dept: DERMATOLOGY | Facility: CLINIC | Age: 81
End: 2021-01-15
Payer: MEDICARE

## 2021-01-15 DIAGNOSIS — L57.0 ACTINIC KERATOSIS: ICD-10-CM

## 2021-01-15 DIAGNOSIS — L29.9 SCALP PRURITUS: Primary | ICD-10-CM

## 2021-01-15 PROCEDURE — 1159F MED LIST DOCD IN RCRD: CPT | Mod: HCNC,S$GLB,, | Performed by: STUDENT IN AN ORGANIZED HEALTH CARE EDUCATION/TRAINING PROGRAM

## 2021-01-15 PROCEDURE — 99999 PR PBB SHADOW E&M-EST. PATIENT-LVL III: CPT | Mod: PBBFAC,HCNC,, | Performed by: STUDENT IN AN ORGANIZED HEALTH CARE EDUCATION/TRAINING PROGRAM

## 2021-01-15 PROCEDURE — 17003 DESTRUCT PREMALG LES 2-14: CPT | Mod: HCNC,S$GLB,, | Performed by: STUDENT IN AN ORGANIZED HEALTH CARE EDUCATION/TRAINING PROGRAM

## 2021-01-15 PROCEDURE — 17000 PR DESTRUCTION(LASER SURGERY,CRYOSURGERY,CHEMOSURGERY),PREMALIGNANT LESIONS,FIRST LESION: ICD-10-PCS | Mod: HCNC,S$GLB,, | Performed by: STUDENT IN AN ORGANIZED HEALTH CARE EDUCATION/TRAINING PROGRAM

## 2021-01-15 PROCEDURE — 99999 PR PBB SHADOW E&M-EST. PATIENT-LVL III: ICD-10-PCS | Mod: PBBFAC,HCNC,, | Performed by: STUDENT IN AN ORGANIZED HEALTH CARE EDUCATION/TRAINING PROGRAM

## 2021-01-15 PROCEDURE — 17000 DESTRUCT PREMALG LESION: CPT | Mod: HCNC,S$GLB,, | Performed by: STUDENT IN AN ORGANIZED HEALTH CARE EDUCATION/TRAINING PROGRAM

## 2021-01-15 PROCEDURE — 99214 PR OFFICE/OUTPT VISIT, EST, LEVL IV, 30-39 MIN: ICD-10-PCS | Mod: 25,HCNC,S$GLB, | Performed by: STUDENT IN AN ORGANIZED HEALTH CARE EDUCATION/TRAINING PROGRAM

## 2021-01-15 PROCEDURE — 1159F PR MEDICATION LIST DOCUMENTED IN MEDICAL RECORD: ICD-10-PCS | Mod: HCNC,S$GLB,, | Performed by: STUDENT IN AN ORGANIZED HEALTH CARE EDUCATION/TRAINING PROGRAM

## 2021-01-15 PROCEDURE — 99214 OFFICE O/P EST MOD 30 MIN: CPT | Mod: 25,HCNC,S$GLB, | Performed by: STUDENT IN AN ORGANIZED HEALTH CARE EDUCATION/TRAINING PROGRAM

## 2021-01-15 PROCEDURE — 17003 DESTRUCTION, PREMALIGNANT LESIONS; SECOND THROUGH 14 LESIONS: ICD-10-PCS | Mod: HCNC,S$GLB,, | Performed by: STUDENT IN AN ORGANIZED HEALTH CARE EDUCATION/TRAINING PROGRAM

## 2021-01-15 RX ORDER — FLUOCINOLONE ACETONIDE 0.11 MG/ML
OIL TOPICAL 3 TIMES DAILY
Qty: 120 ML | Refills: 5 | Status: SHIPPED | OUTPATIENT
Start: 2021-01-15 | End: 2021-03-11

## 2021-01-19 ENCOUNTER — PATIENT MESSAGE (OUTPATIENT)
Dept: DERMATOLOGY | Facility: CLINIC | Age: 81
End: 2021-01-19

## 2021-01-22 ENCOUNTER — OFFICE VISIT (OUTPATIENT)
Dept: OPHTHALMOLOGY | Facility: CLINIC | Age: 81
End: 2021-01-22
Payer: MEDICARE

## 2021-01-22 DIAGNOSIS — H40.1111 PRIMARY OPEN ANGLE GLAUCOMA (POAG) OF RIGHT EYE, MILD STAGE: ICD-10-CM

## 2021-01-22 DIAGNOSIS — H40.1122 PRIMARY OPEN ANGLE GLAUCOMA (POAG) OF LEFT EYE, MODERATE STAGE: Primary | ICD-10-CM

## 2021-01-22 DIAGNOSIS — Z96.1 PSEUDOPHAKIA: ICD-10-CM

## 2021-01-22 PROCEDURE — 92012 INTRM OPH EXAM EST PATIENT: CPT | Mod: HCNC,S$GLB,, | Performed by: OPHTHALMOLOGY

## 2021-01-22 PROCEDURE — 99999 PR PBB SHADOW E&M-EST. PATIENT-LVL III: ICD-10-PCS | Mod: PBBFAC,HCNC,, | Performed by: OPHTHALMOLOGY

## 2021-01-22 PROCEDURE — 99999 PR PBB SHADOW E&M-EST. PATIENT-LVL III: CPT | Mod: PBBFAC,HCNC,, | Performed by: OPHTHALMOLOGY

## 2021-01-22 PROCEDURE — 92012 PR EYE EXAM, EST PATIENT,INTERMED: ICD-10-PCS | Mod: HCNC,S$GLB,, | Performed by: OPHTHALMOLOGY

## 2021-01-26 ENCOUNTER — LAB VISIT (OUTPATIENT)
Dept: LAB | Facility: HOSPITAL | Age: 81
End: 2021-01-26
Attending: STUDENT IN AN ORGANIZED HEALTH CARE EDUCATION/TRAINING PROGRAM
Payer: MEDICARE

## 2021-01-26 ENCOUNTER — PATIENT MESSAGE (OUTPATIENT)
Dept: DERMATOLOGY | Facility: CLINIC | Age: 81
End: 2021-01-26

## 2021-01-26 DIAGNOSIS — D64.9 ANEMIA, UNSPECIFIED TYPE: ICD-10-CM

## 2021-01-26 DIAGNOSIS — L29.9 SCALP PRURITUS: Primary | ICD-10-CM

## 2021-01-26 DIAGNOSIS — L29.9 PRURITUS: Primary | ICD-10-CM

## 2021-01-26 DIAGNOSIS — L29.9 PRURITUS: ICD-10-CM

## 2021-01-26 DIAGNOSIS — D72.19 EOSINOPHILIC LEUKOCYTOSIS, UNSPECIFIED TYPE: ICD-10-CM

## 2021-01-26 LAB
ALBUMIN SERPL BCP-MCNC: 3.9 G/DL (ref 3.5–5.2)
ALP SERPL-CCNC: 56 U/L (ref 55–135)
ALT SERPL W/O P-5'-P-CCNC: 11 U/L (ref 10–44)
ANION GAP SERPL CALC-SCNC: 8 MMOL/L (ref 8–16)
AST SERPL-CCNC: 15 U/L (ref 10–40)
BASOPHILS # BLD AUTO: 0.05 K/UL (ref 0–0.2)
BASOPHILS NFR BLD: 0.9 % (ref 0–1.9)
BILIRUB SERPL-MCNC: 0.7 MG/DL (ref 0.1–1)
BUN SERPL-MCNC: 28 MG/DL (ref 8–23)
CALCIUM SERPL-MCNC: 8.7 MG/DL (ref 8.7–10.5)
CHLORIDE SERPL-SCNC: 108 MMOL/L (ref 95–110)
CO2 SERPL-SCNC: 24 MMOL/L (ref 23–29)
CREAT SERPL-MCNC: 1.4 MG/DL (ref 0.5–1.4)
DIFFERENTIAL METHOD: ABNORMAL
EOSINOPHIL # BLD AUTO: 0.5 K/UL (ref 0–0.5)
EOSINOPHIL NFR BLD: 8.9 % (ref 0–8)
ERYTHROCYTE [DISTWIDTH] IN BLOOD BY AUTOMATED COUNT: 12.5 % (ref 11.5–14.5)
EST. GFR  (AFRICAN AMERICAN): 53.7 ML/MIN/1.73 M^2
EST. GFR  (NON AFRICAN AMERICAN): 46.5 ML/MIN/1.73 M^2
GLUCOSE SERPL-MCNC: 116 MG/DL (ref 70–110)
HCT VFR BLD AUTO: 38.7 % (ref 40–54)
HGB BLD-MCNC: 12.5 G/DL (ref 14–18)
IMM GRANULOCYTES # BLD AUTO: 0.03 K/UL (ref 0–0.04)
IMM GRANULOCYTES NFR BLD AUTO: 0.6 % (ref 0–0.5)
LYMPHOCYTES # BLD AUTO: 0.9 K/UL (ref 1–4.8)
LYMPHOCYTES NFR BLD: 16.4 % (ref 18–48)
MCH RBC QN AUTO: 32.5 PG (ref 27–31)
MCHC RBC AUTO-ENTMCNC: 32.3 G/DL (ref 32–36)
MCV RBC AUTO: 101 FL (ref 82–98)
MONOCYTES # BLD AUTO: 0.5 K/UL (ref 0.3–1)
MONOCYTES NFR BLD: 8.5 % (ref 4–15)
NEUTROPHILS # BLD AUTO: 3.5 K/UL (ref 1.8–7.7)
NEUTROPHILS NFR BLD: 64.7 % (ref 38–73)
NRBC BLD-RTO: 0 /100 WBC
PLATELET # BLD AUTO: 137 K/UL (ref 150–350)
PMV BLD AUTO: 8.4 FL (ref 9.2–12.9)
POTASSIUM SERPL-SCNC: 4.2 MMOL/L (ref 3.5–5.1)
PROT SERPL-MCNC: 6.6 G/DL (ref 6–8.4)
RBC # BLD AUTO: 3.85 M/UL (ref 4.6–6.2)
SODIUM SERPL-SCNC: 140 MMOL/L (ref 136–145)
TSH SERPL DL<=0.005 MIU/L-ACNC: 1.43 UIU/ML (ref 0.4–4)
WBC # BLD AUTO: 5.42 K/UL (ref 3.9–12.7)

## 2021-01-26 PROCEDURE — 84443 ASSAY THYROID STIM HORMONE: CPT

## 2021-01-26 PROCEDURE — 85025 COMPLETE CBC W/AUTO DIFF WBC: CPT

## 2021-01-26 PROCEDURE — 80053 COMPREHEN METABOLIC PANEL: CPT

## 2021-01-26 PROCEDURE — 36415 COLL VENOUS BLD VENIPUNCTURE: CPT | Mod: PO

## 2021-01-27 ENCOUNTER — PATIENT MESSAGE (OUTPATIENT)
Dept: HEMATOLOGY/ONCOLOGY | Facility: CLINIC | Age: 81
End: 2021-01-27

## 2021-01-28 PROBLEM — D72.19 OTHER EOSINOPHILIA: Status: ACTIVE | Noted: 2021-01-28

## 2021-01-29 ENCOUNTER — PATIENT MESSAGE (OUTPATIENT)
Dept: PRIMARY CARE CLINIC | Facility: CLINIC | Age: 81
End: 2021-01-29

## 2021-01-29 ENCOUNTER — OFFICE VISIT (OUTPATIENT)
Dept: HEMATOLOGY/ONCOLOGY | Facility: CLINIC | Age: 81
End: 2021-01-29
Payer: MEDICARE

## 2021-01-29 ENCOUNTER — LAB VISIT (OUTPATIENT)
Dept: LAB | Facility: HOSPITAL | Age: 81
End: 2021-01-29
Attending: INTERNAL MEDICINE
Payer: MEDICARE

## 2021-01-29 VITALS
WEIGHT: 199.94 LBS | OXYGEN SATURATION: 97 % | SYSTOLIC BLOOD PRESSURE: 130 MMHG | HEIGHT: 70 IN | DIASTOLIC BLOOD PRESSURE: 78 MMHG | HEART RATE: 65 BPM | TEMPERATURE: 99 F | BODY MASS INDEX: 28.62 KG/M2

## 2021-01-29 DIAGNOSIS — D72.19 EOSINOPHILIC LEUKOCYTOSIS, UNSPECIFIED TYPE: ICD-10-CM

## 2021-01-29 DIAGNOSIS — L29.9 ITCHING: ICD-10-CM

## 2021-01-29 DIAGNOSIS — C61 PROSTATE CANCER: ICD-10-CM

## 2021-01-29 DIAGNOSIS — K63.5 POLYP OF COLON, UNSPECIFIED PART OF COLON, UNSPECIFIED TYPE: ICD-10-CM

## 2021-01-29 DIAGNOSIS — D69.6 THROMBOCYTOPENIA: ICD-10-CM

## 2021-01-29 DIAGNOSIS — L29.9 SCALP PRURITUS: ICD-10-CM

## 2021-01-29 DIAGNOSIS — D72.19 EOSINOPHILIC LEUKOCYTOSIS, UNSPECIFIED TYPE: Primary | ICD-10-CM

## 2021-01-29 DIAGNOSIS — N40.0 BENIGN PROSTATIC HYPERPLASIA WITHOUT LOWER URINARY TRACT SYMPTOMS: ICD-10-CM

## 2021-01-29 DIAGNOSIS — D64.9 ANEMIA, UNSPECIFIED TYPE: ICD-10-CM

## 2021-01-29 DIAGNOSIS — D72.19 OTHER EOSINOPHILIA: ICD-10-CM

## 2021-01-29 LAB
ALBUMIN SERPL BCP-MCNC: 4 G/DL (ref 3.5–5.2)
ALP SERPL-CCNC: 62 U/L (ref 55–135)
ALT SERPL W/O P-5'-P-CCNC: 16 U/L (ref 10–44)
ANION GAP SERPL CALC-SCNC: 11 MMOL/L (ref 8–16)
AST SERPL-CCNC: 17 U/L (ref 10–40)
BASOPHILS # BLD AUTO: 0.07 K/UL (ref 0–0.2)
BASOPHILS NFR BLD: 1 % (ref 0–1.9)
BILIRUB SERPL-MCNC: 1 MG/DL (ref 0.1–1)
BUN SERPL-MCNC: 37 MG/DL (ref 8–23)
CALCIUM SERPL-MCNC: 8.5 MG/DL (ref 8.7–10.5)
CHLORIDE SERPL-SCNC: 107 MMOL/L (ref 95–110)
CO2 SERPL-SCNC: 21 MMOL/L (ref 23–29)
CREAT SERPL-MCNC: 1.4 MG/DL (ref 0.5–1.4)
DIFFERENTIAL METHOD: ABNORMAL
EOSINOPHIL # BLD AUTO: 0.5 K/UL (ref 0–0.5)
EOSINOPHIL NFR BLD: 7.4 % (ref 0–8)
ERYTHROCYTE [DISTWIDTH] IN BLOOD BY AUTOMATED COUNT: 12.1 % (ref 11.5–14.5)
EST. GFR  (AFRICAN AMERICAN): 54 ML/MIN/1.73 M^2
EST. GFR  (NON AFRICAN AMERICAN): 46 ML/MIN/1.73 M^2
GLUCOSE SERPL-MCNC: 98 MG/DL (ref 70–110)
HCT VFR BLD AUTO: 40.7 % (ref 40–54)
HGB BLD-MCNC: 13.1 G/DL (ref 14–18)
IMM GRANULOCYTES # BLD AUTO: 0.03 K/UL (ref 0–0.04)
IMM GRANULOCYTES NFR BLD AUTO: 0.4 % (ref 0–0.5)
LYMPHOCYTES # BLD AUTO: 0.8 K/UL (ref 1–4.8)
LYMPHOCYTES NFR BLD: 11.6 % (ref 18–48)
MCH RBC QN AUTO: 32 PG (ref 27–31)
MCHC RBC AUTO-ENTMCNC: 32.2 G/DL (ref 32–36)
MCV RBC AUTO: 99 FL (ref 82–98)
MONOCYTES # BLD AUTO: 0.6 K/UL (ref 0.3–1)
MONOCYTES NFR BLD: 8.7 % (ref 4–15)
NEUTROPHILS # BLD AUTO: 4.9 K/UL (ref 1.8–7.7)
NEUTROPHILS NFR BLD: 70.9 % (ref 38–73)
NRBC BLD-RTO: 0 /100 WBC
PATH REV BLD -IMP: NORMAL
PLATELET # BLD AUTO: 150 K/UL (ref 150–350)
PMV BLD AUTO: 8.2 FL (ref 9.2–12.9)
POTASSIUM SERPL-SCNC: 4.2 MMOL/L (ref 3.5–5.1)
PROT SERPL-MCNC: 6.8 G/DL (ref 6–8.4)
RBC # BLD AUTO: 4.1 M/UL (ref 4.6–6.2)
SODIUM SERPL-SCNC: 139 MMOL/L (ref 136–145)
TSH SERPL DL<=0.005 MIU/L-ACNC: 1.05 UIU/ML (ref 0.4–4)
WBC # BLD AUTO: 6.88 K/UL (ref 3.9–12.7)

## 2021-01-29 PROCEDURE — 86334 IMMUNOFIX E-PHORESIS SERUM: CPT | Mod: 26,,, | Performed by: PATHOLOGY

## 2021-01-29 PROCEDURE — 83540 ASSAY OF IRON: CPT

## 2021-01-29 PROCEDURE — 1126F PR PAIN SEVERITY QUANTIFIED, NO PAIN PRESENT: ICD-10-PCS | Mod: S$GLB,,, | Performed by: INTERNAL MEDICINE

## 2021-01-29 PROCEDURE — 86334 PATHOLOGIST INTERPRETATION IFE: ICD-10-PCS | Mod: 26,,, | Performed by: PATHOLOGY

## 2021-01-29 PROCEDURE — 3078F PR MOST RECENT DIASTOLIC BLOOD PRESSURE < 80 MM HG: ICD-10-PCS | Mod: CPTII,S$GLB,, | Performed by: INTERNAL MEDICINE

## 2021-01-29 PROCEDURE — 99205 PR OFFICE/OUTPT VISIT, NEW, LEVL V, 60-74 MIN: ICD-10-PCS | Mod: S$GLB,,, | Performed by: INTERNAL MEDICINE

## 2021-01-29 PROCEDURE — 84165 PROTEIN E-PHORESIS SERUM: CPT | Mod: 26,,, | Performed by: PATHOLOGY

## 2021-01-29 PROCEDURE — 1159F PR MEDICATION LIST DOCUMENTED IN MEDICAL RECORD: ICD-10-PCS | Mod: S$GLB,,, | Performed by: INTERNAL MEDICINE

## 2021-01-29 PROCEDURE — 85025 COMPLETE CBC W/AUTO DIFF WBC: CPT

## 2021-01-29 PROCEDURE — 3075F SYST BP GE 130 - 139MM HG: CPT | Mod: CPTII,S$GLB,, | Performed by: INTERNAL MEDICINE

## 2021-01-29 PROCEDURE — 86334 IMMUNOFIX E-PHORESIS SERUM: CPT

## 2021-01-29 PROCEDURE — 1159F MED LIST DOCD IN RCRD: CPT | Mod: S$GLB,,, | Performed by: INTERNAL MEDICINE

## 2021-01-29 PROCEDURE — 84443 ASSAY THYROID STIM HORMONE: CPT

## 2021-01-29 PROCEDURE — 84165 PATHOLOGIST INTERPRETATION SPE: ICD-10-PCS | Mod: 26,,, | Performed by: PATHOLOGY

## 2021-01-29 PROCEDURE — 3078F DIAST BP <80 MM HG: CPT | Mod: CPTII,S$GLB,, | Performed by: INTERNAL MEDICINE

## 2021-01-29 PROCEDURE — 84165 PROTEIN E-PHORESIS SERUM: CPT

## 2021-01-29 PROCEDURE — 82746 ASSAY OF FOLIC ACID SERUM: CPT

## 2021-01-29 PROCEDURE — 83520 IMMUNOASSAY QUANT NOS NONAB: CPT | Mod: 59

## 2021-01-29 PROCEDURE — 86038 ANTINUCLEAR ANTIBODIES: CPT

## 2021-01-29 PROCEDURE — 1101F PT FALLS ASSESS-DOCD LE1/YR: CPT | Mod: CPTII,S$GLB,, | Performed by: INTERNAL MEDICINE

## 2021-01-29 PROCEDURE — 3075F PR MOST RECENT SYSTOLIC BLOOD PRESS GE 130-139MM HG: ICD-10-PCS | Mod: CPTII,S$GLB,, | Performed by: INTERNAL MEDICINE

## 2021-01-29 PROCEDURE — 82607 VITAMIN B-12: CPT

## 2021-01-29 PROCEDURE — 99999 PR PBB SHADOW E&M-EST. PATIENT-LVL IV: CPT | Mod: PBBFAC,,, | Performed by: INTERNAL MEDICINE

## 2021-01-29 PROCEDURE — 99205 OFFICE O/P NEW HI 60 MIN: CPT | Mod: S$GLB,,, | Performed by: INTERNAL MEDICINE

## 2021-01-29 PROCEDURE — 1126F AMNT PAIN NOTED NONE PRSNT: CPT | Mod: S$GLB,,, | Performed by: INTERNAL MEDICINE

## 2021-01-29 PROCEDURE — 86703 HIV-1/HIV-2 1 RESULT ANTBDY: CPT

## 2021-01-29 PROCEDURE — 3288F FALL RISK ASSESSMENT DOCD: CPT | Mod: CPTII,S$GLB,, | Performed by: INTERNAL MEDICINE

## 2021-01-29 PROCEDURE — 80053 COMPREHEN METABOLIC PANEL: CPT

## 2021-01-29 PROCEDURE — 82784 ASSAY IGA/IGD/IGG/IGM EACH: CPT

## 2021-01-29 PROCEDURE — 83520 IMMUNOASSAY QUANT NOS NONAB: CPT

## 2021-01-29 PROCEDURE — 36415 COLL VENOUS BLD VENIPUNCTURE: CPT | Mod: PO

## 2021-01-29 PROCEDURE — 99999 PR PBB SHADOW E&M-EST. PATIENT-LVL IV: ICD-10-PCS | Mod: PBBFAC,,, | Performed by: INTERNAL MEDICINE

## 2021-01-29 PROCEDURE — 3288F PR FALLS RISK ASSESSMENT DOCUMENTED: ICD-10-PCS | Mod: CPTII,S$GLB,, | Performed by: INTERNAL MEDICINE

## 2021-01-29 PROCEDURE — 83921 ORGANIC ACID SINGLE QUANT: CPT

## 2021-01-29 PROCEDURE — 1101F PR PT FALLS ASSESS DOC 0-1 FALLS W/OUT INJ PAST YR: ICD-10-PCS | Mod: CPTII,S$GLB,, | Performed by: INTERNAL MEDICINE

## 2021-01-29 PROCEDURE — 82785 ASSAY OF IGE: CPT

## 2021-01-29 PROCEDURE — 82728 ASSAY OF FERRITIN: CPT

## 2021-01-30 ENCOUNTER — PATIENT MESSAGE (OUTPATIENT)
Dept: DERMATOLOGY | Facility: CLINIC | Age: 81
End: 2021-01-30

## 2021-01-30 ENCOUNTER — PATIENT MESSAGE (OUTPATIENT)
Dept: PRIMARY CARE CLINIC | Facility: CLINIC | Age: 81
End: 2021-01-30

## 2021-01-30 DIAGNOSIS — I10 ESSENTIAL HYPERTENSION: ICD-10-CM

## 2021-01-30 LAB
FERRITIN SERPL-MCNC: 134 NG/ML (ref 20–300)
FOLATE SERPL-MCNC: 15.9 NG/ML (ref 4–24)
IGA SERPL-MCNC: 84 MG/DL (ref 40–350)
IGE SERPL-ACNC: <35 IU/ML (ref 0–100)
IGG SERPL-MCNC: 902 MG/DL (ref 650–1600)
IGM SERPL-MCNC: 45 MG/DL (ref 50–300)
IRON SERPL-MCNC: 102 UG/DL (ref 45–160)
SATURATED IRON: 33 % (ref 20–50)
TOTAL IRON BINDING CAPACITY: 305 UG/DL (ref 250–450)
TRANSFERRIN SERPL-MCNC: 206 MG/DL (ref 200–375)
VIT B12 SERPL-MCNC: 511 PG/ML (ref 210–950)

## 2021-01-31 PROCEDURE — 99457 PR MONITORING, PHYSIOL PARAM, REMOTE, 1ST 20 MINS, PER MONTH: ICD-10-PCS | Mod: S$GLB,,, | Performed by: FAMILY MEDICINE

## 2021-01-31 PROCEDURE — 99457 RPM TX MGMT 1ST 20 MIN: CPT | Mod: S$GLB,,, | Performed by: FAMILY MEDICINE

## 2021-02-01 ENCOUNTER — OFFICE VISIT (OUTPATIENT)
Dept: ALLERGY | Facility: CLINIC | Age: 81
End: 2021-02-01
Payer: MEDICARE

## 2021-02-01 ENCOUNTER — LAB VISIT (OUTPATIENT)
Dept: LAB | Facility: HOSPITAL | Age: 81
End: 2021-02-01
Attending: ALLERGY & IMMUNOLOGY
Payer: MEDICARE

## 2021-02-01 VITALS
HEIGHT: 71 IN | BODY MASS INDEX: 28.18 KG/M2 | WEIGHT: 201.25 LBS | SYSTOLIC BLOOD PRESSURE: 141 MMHG | TEMPERATURE: 98 F | DIASTOLIC BLOOD PRESSURE: 71 MMHG

## 2021-02-01 DIAGNOSIS — L29.9 ITCHING: ICD-10-CM

## 2021-02-01 DIAGNOSIS — R21 RASH: Primary | ICD-10-CM

## 2021-02-01 DIAGNOSIS — R21 RASH: ICD-10-CM

## 2021-02-01 LAB
ALBUMIN SERPL ELPH-MCNC: 4.08 G/DL (ref 3.35–5.55)
ALPHA1 GLOB SERPL ELPH-MCNC: 0.29 G/DL (ref 0.17–0.41)
ALPHA2 GLOB SERPL ELPH-MCNC: 0.68 G/DL (ref 0.43–0.99)
ANA SER QL IF: NORMAL
B-GLOBULIN SERPL ELPH-MCNC: 0.65 G/DL (ref 0.5–1.1)
BASOPHILS # BLD AUTO: 0.06 K/UL (ref 0–0.2)
BASOPHILS NFR BLD: 1.1 % (ref 0–1.9)
DIFFERENTIAL METHOD: ABNORMAL
EOSINOPHIL # BLD AUTO: 0.6 K/UL (ref 0–0.5)
EOSINOPHIL NFR BLD: 10.6 % (ref 0–8)
ERYTHROCYTE [DISTWIDTH] IN BLOOD BY AUTOMATED COUNT: 12.5 % (ref 11.5–14.5)
GAMMA GLOB SERPL ELPH-MCNC: 0.8 G/DL (ref 0.67–1.58)
HCT VFR BLD AUTO: 41 % (ref 40–54)
HGB BLD-MCNC: 13.1 G/DL (ref 14–18)
HIV 1+2 AB+HIV1 P24 AG SERPL QL IA: NEGATIVE
IMM GRANULOCYTES # BLD AUTO: 0.02 K/UL (ref 0–0.04)
IMM GRANULOCYTES NFR BLD AUTO: 0.4 % (ref 0–0.5)
INTERPRETATION SERPL IFE-IMP: NORMAL
KAPPA LC SER QL IA: 2.88 MG/DL (ref 0.33–1.94)
KAPPA LC/LAMBDA SER IA: 2.06 (ref 0.26–1.65)
LAMBDA LC SER QL IA: 1.4 MG/DL (ref 0.57–2.63)
LYMPHOCYTES # BLD AUTO: 0.8 K/UL (ref 1–4.8)
LYMPHOCYTES NFR BLD: 14.4 % (ref 18–48)
MCH RBC QN AUTO: 32.3 PG (ref 27–31)
MCHC RBC AUTO-ENTMCNC: 32 G/DL (ref 32–36)
MCV RBC AUTO: 101 FL (ref 82–98)
MONOCYTES # BLD AUTO: 0.5 K/UL (ref 0.3–1)
MONOCYTES NFR BLD: 8.7 % (ref 4–15)
NEUTROPHILS # BLD AUTO: 3.4 K/UL (ref 1.8–7.7)
NEUTROPHILS NFR BLD: 64.8 % (ref 38–73)
NRBC BLD-RTO: 0 /100 WBC
PATHOLOGIST INTERPRETATION IFE: NORMAL
PATHOLOGIST INTERPRETATION SPE: NORMAL
PLATELET # BLD AUTO: 161 K/UL (ref 150–350)
PMV BLD AUTO: 8.7 FL (ref 9.2–12.9)
PROT SERPL-MCNC: 6.5 G/DL (ref 6–8.4)
RBC # BLD AUTO: 4.06 M/UL (ref 4.6–6.2)
TRYPTASE LEVEL: 7.5 NG/ML
WBC # BLD AUTO: 5.28 K/UL (ref 3.9–12.7)

## 2021-02-01 PROCEDURE — 84165 PROTEIN E-PHORESIS SERUM: CPT | Mod: 26,,, | Performed by: PATHOLOGY

## 2021-02-01 PROCEDURE — 36415 COLL VENOUS BLD VENIPUNCTURE: CPT

## 2021-02-01 PROCEDURE — 99999 PR PBB SHADOW E&M-EST. PATIENT-LVL III: CPT | Mod: PBBFAC,,, | Performed by: ALLERGY & IMMUNOLOGY

## 2021-02-01 PROCEDURE — 99204 PR OFFICE/OUTPT VISIT, NEW, LEVL IV, 45-59 MIN: ICD-10-PCS | Mod: S$GLB,,, | Performed by: ALLERGY & IMMUNOLOGY

## 2021-02-01 PROCEDURE — 85025 COMPLETE CBC W/AUTO DIFF WBC: CPT

## 2021-02-01 PROCEDURE — 1159F MED LIST DOCD IN RCRD: CPT | Mod: S$GLB,,, | Performed by: ALLERGY & IMMUNOLOGY

## 2021-02-01 PROCEDURE — 86592 SYPHILIS TEST NON-TREP QUAL: CPT

## 2021-02-01 PROCEDURE — 3078F PR MOST RECENT DIASTOLIC BLOOD PRESSURE < 80 MM HG: ICD-10-PCS | Mod: CPTII,S$GLB,, | Performed by: ALLERGY & IMMUNOLOGY

## 2021-02-01 PROCEDURE — 86038 ANTINUCLEAR ANTIBODIES: CPT

## 2021-02-01 PROCEDURE — 3077F SYST BP >= 140 MM HG: CPT | Mod: CPTII,S$GLB,, | Performed by: ALLERGY & IMMUNOLOGY

## 2021-02-01 PROCEDURE — 99204 OFFICE O/P NEW MOD 45 MIN: CPT | Mod: S$GLB,,, | Performed by: ALLERGY & IMMUNOLOGY

## 2021-02-01 PROCEDURE — 84165 PROTEIN E-PHORESIS SERUM: CPT

## 2021-02-01 PROCEDURE — 3077F PR MOST RECENT SYSTOLIC BLOOD PRESSURE >= 140 MM HG: ICD-10-PCS | Mod: CPTII,S$GLB,, | Performed by: ALLERGY & IMMUNOLOGY

## 2021-02-01 PROCEDURE — 86803 HEPATITIS C AB TEST: CPT

## 2021-02-01 PROCEDURE — 86705 HEP B CORE ANTIBODY IGM: CPT

## 2021-02-01 PROCEDURE — 86352 CELL FUNCTION ASSAY W/STIM: CPT

## 2021-02-01 PROCEDURE — 80053 COMPREHEN METABOLIC PANEL: CPT

## 2021-02-01 PROCEDURE — 84165 PATHOLOGIST INTERPRETATION SPE: ICD-10-PCS | Mod: 26,,, | Performed by: PATHOLOGY

## 2021-02-01 PROCEDURE — 1159F PR MEDICATION LIST DOCUMENTED IN MEDICAL RECORD: ICD-10-PCS | Mod: S$GLB,,, | Performed by: ALLERGY & IMMUNOLOGY

## 2021-02-01 PROCEDURE — 3078F DIAST BP <80 MM HG: CPT | Mod: CPTII,S$GLB,, | Performed by: ALLERGY & IMMUNOLOGY

## 2021-02-01 PROCEDURE — 99999 PR PBB SHADOW E&M-EST. PATIENT-LVL III: ICD-10-PCS | Mod: PBBFAC,,, | Performed by: ALLERGY & IMMUNOLOGY

## 2021-02-01 RX ORDER — LOSARTAN POTASSIUM 50 MG/1
50 TABLET ORAL 2 TIMES DAILY
Qty: 180 TABLET | Refills: 3 | Status: SHIPPED | OUTPATIENT
Start: 2021-02-01 | End: 2021-11-21

## 2021-02-01 RX ORDER — HYDROCORTISONE 25 MG/G
OINTMENT TOPICAL 2 TIMES DAILY
Qty: 60 G | Refills: 1 | Status: SHIPPED | OUTPATIENT
Start: 2021-02-01 | End: 2021-03-11

## 2021-02-01 RX ORDER — TRIAMCINOLONE ACETONIDE 1 MG/G
CREAM TOPICAL 2 TIMES DAILY
Qty: 60 G | Refills: 1 | Status: SHIPPED | OUTPATIENT
Start: 2021-02-01 | End: 2021-03-11

## 2021-02-01 RX ORDER — AMLODIPINE BESYLATE 2.5 MG/1
2.5 TABLET ORAL 2 TIMES DAILY
Qty: 180 TABLET | Refills: 3 | Status: SHIPPED | OUTPATIENT
Start: 2021-02-01 | End: 2021-05-18

## 2021-02-01 RX ORDER — MONTELUKAST SODIUM 10 MG/1
10 TABLET ORAL NIGHTLY
Qty: 30 TABLET | Refills: 0 | Status: SHIPPED | OUTPATIENT
Start: 2021-02-01 | End: 2021-03-03

## 2021-02-02 ENCOUNTER — PATIENT MESSAGE (OUTPATIENT)
Dept: PRIMARY CARE CLINIC | Facility: CLINIC | Age: 81
End: 2021-02-02

## 2021-02-02 LAB
ALBUMIN SERPL BCP-MCNC: 4.1 G/DL (ref 3.5–5.2)
ALBUMIN SERPL ELPH-MCNC: 4.08 G/DL (ref 3.35–5.55)
ALP SERPL-CCNC: 61 U/L (ref 55–135)
ALPHA1 GLOB SERPL ELPH-MCNC: 0.29 G/DL (ref 0.17–0.41)
ALPHA2 GLOB SERPL ELPH-MCNC: 0.68 G/DL (ref 0.43–0.99)
ALT SERPL W/O P-5'-P-CCNC: 13 U/L (ref 10–44)
ANA SER QL IF: NORMAL
ANION GAP SERPL CALC-SCNC: 11 MMOL/L (ref 8–16)
AST SERPL-CCNC: 16 U/L (ref 10–40)
B-GLOBULIN SERPL ELPH-MCNC: 0.65 G/DL (ref 0.5–1.1)
BILIRUB SERPL-MCNC: 0.8 MG/DL (ref 0.1–1)
BUN SERPL-MCNC: 24 MG/DL (ref 8–23)
CALCIUM SERPL-MCNC: 8.8 MG/DL (ref 8.7–10.5)
CHLORIDE SERPL-SCNC: 108 MMOL/L (ref 95–110)
CO2 SERPL-SCNC: 21 MMOL/L (ref 23–29)
CREAT SERPL-MCNC: 1.4 MG/DL (ref 0.5–1.4)
EST. GFR  (AFRICAN AMERICAN): 53.7 ML/MIN/1.73 M^2
EST. GFR  (NON AFRICAN AMERICAN): 46.5 ML/MIN/1.73 M^2
GAMMA GLOB SERPL ELPH-MCNC: 0.81 G/DL (ref 0.67–1.58)
GLUCOSE SERPL-MCNC: 100 MG/DL (ref 70–110)
HBV CORE IGM SERPL QL IA: NEGATIVE
HCV AB SERPL QL IA: NEGATIVE
METHYLMALONATE SERPL-SCNC: 0.79 UMOL/L
POTASSIUM SERPL-SCNC: 4.2 MMOL/L (ref 3.5–5.1)
PROT SERPL-MCNC: 6.5 G/DL (ref 6–8.4)
PROT SERPL-MCNC: 7 G/DL (ref 6–8.4)
RPR SER QL: NORMAL
SODIUM SERPL-SCNC: 140 MMOL/L (ref 136–145)

## 2021-02-03 ENCOUNTER — IMMUNIZATION (OUTPATIENT)
Dept: INTERNAL MEDICINE | Facility: CLINIC | Age: 81
End: 2021-02-03
Payer: MEDICARE

## 2021-02-03 DIAGNOSIS — Z23 NEED FOR VACCINATION: Primary | ICD-10-CM

## 2021-02-03 LAB — PATHOLOGIST INTERPRETATION SPE: NORMAL

## 2021-02-03 PROCEDURE — 0002A COVID-19, MRNA, LNP-S, PF, 30 MCG/0.3 ML DOSE VACCINE: CPT | Mod: HCNC,PBBFAC | Performed by: FAMILY MEDICINE

## 2021-02-03 PROCEDURE — 91300 COVID-19, MRNA, LNP-S, PF, 30 MCG/0.3 ML DOSE VACCINE: CPT | Mod: HCNC,PBBFAC | Performed by: FAMILY MEDICINE

## 2021-02-04 ENCOUNTER — PES CALL (OUTPATIENT)
Dept: ADMINISTRATIVE | Facility: CLINIC | Age: 81
End: 2021-02-04

## 2021-02-05 ENCOUNTER — PATIENT MESSAGE (OUTPATIENT)
Dept: ALLERGY | Facility: CLINIC | Age: 81
End: 2021-02-05

## 2021-02-05 RX ORDER — PREDNISONE 20 MG/1
20 TABLET ORAL DAILY
Qty: 10 TABLET | Refills: 0 | Status: SHIPPED | OUTPATIENT
Start: 2021-02-05 | End: 2021-03-11

## 2021-02-09 LAB
CU INDEX: 4.6
CU, ANTI-THYROGLOBULIN IGG: <20 IU/ML
CU, ANTI-THYROID PEROXIDASE IGG: <10 IU/ML
CU, TSH (THYROTROPIN): 1.78 UIU/ML (ref 0.4–4)

## 2021-02-10 ENCOUNTER — LAB VISIT (OUTPATIENT)
Dept: LAB | Facility: HOSPITAL | Age: 81
End: 2021-02-10
Attending: STUDENT IN AN ORGANIZED HEALTH CARE EDUCATION/TRAINING PROGRAM
Payer: MEDICARE

## 2021-02-10 ENCOUNTER — OFFICE VISIT (OUTPATIENT)
Dept: INTERNAL MEDICINE | Facility: CLINIC | Age: 81
End: 2021-02-10
Payer: MEDICARE

## 2021-02-10 VITALS — HEIGHT: 71 IN | WEIGHT: 196 LBS | BODY MASS INDEX: 27.44 KG/M2

## 2021-02-10 DIAGNOSIS — D72.19 OTHER EOSINOPHILIA: ICD-10-CM

## 2021-02-10 DIAGNOSIS — G47.33 OSA (OBSTRUCTIVE SLEEP APNEA): ICD-10-CM

## 2021-02-10 DIAGNOSIS — I45.10 INCOMPLETE RIGHT BUNDLE BRANCH BLOCK: ICD-10-CM

## 2021-02-10 DIAGNOSIS — D69.6 THROMBOCYTOPENIA: ICD-10-CM

## 2021-02-10 DIAGNOSIS — I77.1 TORTUOUS AORTA: ICD-10-CM

## 2021-02-10 DIAGNOSIS — Z00.00 ENCOUNTER FOR PREVENTIVE HEALTH EXAMINATION: Primary | ICD-10-CM

## 2021-02-10 DIAGNOSIS — D64.9 ANEMIA, UNSPECIFIED TYPE: ICD-10-CM

## 2021-02-10 DIAGNOSIS — C61 PROSTATE CANCER: ICD-10-CM

## 2021-02-10 DIAGNOSIS — J84.10 LUNG GRANULOMA: ICD-10-CM

## 2021-02-10 DIAGNOSIS — G89.29 CHRONIC MIDLINE LOW BACK PAIN, UNSPECIFIED WHETHER SCIATICA PRESENT: ICD-10-CM

## 2021-02-10 DIAGNOSIS — E78.2 MIXED HYPERLIPIDEMIA: Chronic | ICD-10-CM

## 2021-02-10 DIAGNOSIS — M1A.9XX0 CHRONIC GOUT WITHOUT TOPHUS, UNSPECIFIED CAUSE, UNSPECIFIED SITE: ICD-10-CM

## 2021-02-10 DIAGNOSIS — M46.94 UNSPECIFIED INFLAMMATORY SPONDYLOPATHY, THORACIC REGION: ICD-10-CM

## 2021-02-10 DIAGNOSIS — H26.9 CATARACT OF BOTH EYES, UNSPECIFIED CATARACT TYPE: ICD-10-CM

## 2021-02-10 DIAGNOSIS — M54.50 CHRONIC MIDLINE LOW BACK PAIN, UNSPECIFIED WHETHER SCIATICA PRESENT: ICD-10-CM

## 2021-02-10 DIAGNOSIS — M47.817 LUMBOSACRAL SPONDYLOSIS WITHOUT MYELOPATHY: ICD-10-CM

## 2021-02-10 DIAGNOSIS — N18.31 STAGE 3A CHRONIC KIDNEY DISEASE: ICD-10-CM

## 2021-02-10 DIAGNOSIS — I10 ESSENTIAL HYPERTENSION: Chronic | ICD-10-CM

## 2021-02-10 DIAGNOSIS — I70.203 ATHEROSCLEROSIS OF ARTERY OF BOTH LOWER EXTREMITIES: ICD-10-CM

## 2021-02-10 DIAGNOSIS — Z72.821 INADEQUATE SLEEP HYGIENE: ICD-10-CM

## 2021-02-10 DIAGNOSIS — N40.0 BENIGN PROSTATIC HYPERPLASIA WITHOUT LOWER URINARY TRACT SYMPTOMS: ICD-10-CM

## 2021-02-10 PROBLEM — I49.5 SICK SINUS SYNDROME: Status: ACTIVE | Noted: 2021-02-10

## 2021-02-10 PROBLEM — I49.5 SICK SINUS SYNDROME: Status: RESOLVED | Noted: 2021-02-10 | Resolved: 2021-02-10

## 2021-02-10 LAB
ALBUMIN SERPL BCP-MCNC: 3.8 G/DL (ref 3.5–5.2)
ALP SERPL-CCNC: 55 U/L (ref 55–135)
ALT SERPL W/O P-5'-P-CCNC: 14 U/L (ref 10–44)
ANION GAP SERPL CALC-SCNC: 7 MMOL/L (ref 8–16)
AST SERPL-CCNC: 13 U/L (ref 10–40)
BASOPHILS # BLD AUTO: 0.06 K/UL (ref 0–0.2)
BASOPHILS NFR BLD: 1 % (ref 0–1.9)
BILIRUB SERPL-MCNC: 0.9 MG/DL (ref 0.1–1)
BUN SERPL-MCNC: 24 MG/DL (ref 8–23)
CALCIUM SERPL-MCNC: 8.6 MG/DL (ref 8.7–10.5)
CHLORIDE SERPL-SCNC: 107 MMOL/L (ref 95–110)
CO2 SERPL-SCNC: 27 MMOL/L (ref 23–29)
CREAT SERPL-MCNC: 1.5 MG/DL (ref 0.5–1.4)
DIFFERENTIAL METHOD: ABNORMAL
EOSINOPHIL # BLD AUTO: 0.1 K/UL (ref 0–0.5)
EOSINOPHIL NFR BLD: 1 % (ref 0–8)
ERYTHROCYTE [DISTWIDTH] IN BLOOD BY AUTOMATED COUNT: 12.3 % (ref 11.5–14.5)
EST. GFR  (AFRICAN AMERICAN): 49 ML/MIN/1.73 M^2
EST. GFR  (NON AFRICAN AMERICAN): 43 ML/MIN/1.73 M^2
GLUCOSE SERPL-MCNC: 83 MG/DL (ref 70–110)
HCT VFR BLD AUTO: 39 % (ref 40–54)
HGB BLD-MCNC: 12.5 G/DL (ref 14–18)
IMM GRANULOCYTES # BLD AUTO: 0.04 K/UL (ref 0–0.04)
IMM GRANULOCYTES NFR BLD AUTO: 0.6 % (ref 0–0.5)
LYMPHOCYTES # BLD AUTO: 1.2 K/UL (ref 1–4.8)
LYMPHOCYTES NFR BLD: 18.9 % (ref 18–48)
MCH RBC QN AUTO: 31.6 PG (ref 27–31)
MCHC RBC AUTO-ENTMCNC: 32.1 G/DL (ref 32–36)
MCV RBC AUTO: 99 FL (ref 82–98)
MONOCYTES # BLD AUTO: 0.7 K/UL (ref 0.3–1)
MONOCYTES NFR BLD: 11.4 % (ref 4–15)
NEUTROPHILS # BLD AUTO: 4.2 K/UL (ref 1.8–7.7)
NEUTROPHILS NFR BLD: 67.1 % (ref 38–73)
NRBC BLD-RTO: 0 /100 WBC
PLATELET # BLD AUTO: 166 K/UL (ref 150–350)
PMV BLD AUTO: 8.1 FL (ref 9.2–12.9)
POTASSIUM SERPL-SCNC: 4 MMOL/L (ref 3.5–5.1)
PROT SERPL-MCNC: 6.4 G/DL (ref 6–8.4)
RBC # BLD AUTO: 3.96 M/UL (ref 4.6–6.2)
SODIUM SERPL-SCNC: 141 MMOL/L (ref 136–145)
WBC # BLD AUTO: 6.23 K/UL (ref 3.9–12.7)

## 2021-02-10 PROCEDURE — 1101F PR PT FALLS ASSESS DOC 0-1 FALLS W/OUT INJ PAST YR: ICD-10-PCS | Mod: CPTII,S$GLB,, | Performed by: NURSE PRACTITIONER

## 2021-02-10 PROCEDURE — 1126F AMNT PAIN NOTED NONE PRSNT: CPT | Mod: S$GLB,,, | Performed by: NURSE PRACTITIONER

## 2021-02-10 PROCEDURE — 3288F PR FALLS RISK ASSESSMENT DOCUMENTED: ICD-10-PCS | Mod: CPTII,S$GLB,, | Performed by: NURSE PRACTITIONER

## 2021-02-10 PROCEDURE — 99499 RISK ADDL DX/OHS AUDIT: ICD-10-PCS | Mod: HCNC,S$GLB,, | Performed by: NURSE PRACTITIONER

## 2021-02-10 PROCEDURE — 99499 UNLISTED E&M SERVICE: CPT | Mod: HCNC,S$GLB,, | Performed by: NURSE PRACTITIONER

## 2021-02-10 PROCEDURE — G0439 PPPS, SUBSEQ VISIT: HCPCS | Mod: 95,,, | Performed by: NURSE PRACTITIONER

## 2021-02-10 PROCEDURE — 1101F PT FALLS ASSESS-DOCD LE1/YR: CPT | Mod: CPTII,S$GLB,, | Performed by: NURSE PRACTITIONER

## 2021-02-10 PROCEDURE — 85025 COMPLETE CBC W/AUTO DIFF WBC: CPT

## 2021-02-10 PROCEDURE — 1158F PR ADVANCE CARE PLANNING DISCUSS DOCUMENTED IN MEDICAL RECORD: ICD-10-PCS | Mod: S$GLB,,, | Performed by: NURSE PRACTITIONER

## 2021-02-10 PROCEDURE — 3288F FALL RISK ASSESSMENT DOCD: CPT | Mod: CPTII,S$GLB,, | Performed by: NURSE PRACTITIONER

## 2021-02-10 PROCEDURE — 1158F ADVNC CARE PLAN TLK DOCD: CPT | Mod: S$GLB,,, | Performed by: NURSE PRACTITIONER

## 2021-02-10 PROCEDURE — G0439 PR MEDICARE ANNUAL WELLNESS SUBSEQUENT VISIT: ICD-10-PCS | Mod: 95,,, | Performed by: NURSE PRACTITIONER

## 2021-02-10 PROCEDURE — 1126F PR PAIN SEVERITY QUANTIFIED, NO PAIN PRESENT: ICD-10-PCS | Mod: S$GLB,,, | Performed by: NURSE PRACTITIONER

## 2021-02-10 PROCEDURE — 80053 COMPREHEN METABOLIC PANEL: CPT

## 2021-02-10 PROCEDURE — 36415 COLL VENOUS BLD VENIPUNCTURE: CPT | Mod: PO

## 2021-02-10 RX ORDER — CETIRIZINE HYDROCHLORIDE 10 MG/1
10 TABLET ORAL DAILY
COMMUNITY
End: 2021-03-11

## 2021-02-11 ENCOUNTER — TELEPHONE (OUTPATIENT)
Dept: HEMATOLOGY/ONCOLOGY | Facility: CLINIC | Age: 81
End: 2021-02-11

## 2021-02-11 ENCOUNTER — PATIENT MESSAGE (OUTPATIENT)
Dept: CARDIOLOGY | Facility: CLINIC | Age: 81
End: 2021-02-11

## 2021-02-11 ENCOUNTER — OFFICE VISIT (OUTPATIENT)
Dept: HEMATOLOGY/ONCOLOGY | Facility: CLINIC | Age: 81
End: 2021-02-11
Payer: MEDICARE

## 2021-02-11 DIAGNOSIS — K63.5 POLYP OF COLON, UNSPECIFIED PART OF COLON, UNSPECIFIED TYPE: ICD-10-CM

## 2021-02-11 DIAGNOSIS — D64.9 ANEMIA, UNSPECIFIED TYPE: ICD-10-CM

## 2021-02-11 DIAGNOSIS — D72.19 OTHER EOSINOPHILIA: ICD-10-CM

## 2021-02-11 DIAGNOSIS — C61 PROSTATE CANCER: ICD-10-CM

## 2021-02-11 PROCEDURE — 1159F MED LIST DOCD IN RCRD: CPT | Mod: 95,,, | Performed by: INTERNAL MEDICINE

## 2021-02-11 PROCEDURE — 99215 OFFICE O/P EST HI 40 MIN: CPT | Mod: 95,,, | Performed by: INTERNAL MEDICINE

## 2021-02-11 PROCEDURE — 99215 PR OFFICE/OUTPT VISIT, EST, LEVL V, 40-54 MIN: ICD-10-PCS | Mod: 95,,, | Performed by: INTERNAL MEDICINE

## 2021-02-11 PROCEDURE — 1159F PR MEDICATION LIST DOCUMENTED IN MEDICAL RECORD: ICD-10-PCS | Mod: 95,,, | Performed by: INTERNAL MEDICINE

## 2021-02-11 PROCEDURE — 99499 UNLISTED E&M SERVICE: CPT | Mod: HCNC,95,, | Performed by: INTERNAL MEDICINE

## 2021-02-11 PROCEDURE — 99499 RISK ADDL DX/OHS AUDIT: ICD-10-PCS | Mod: HCNC,95,, | Performed by: INTERNAL MEDICINE

## 2021-02-12 ENCOUNTER — PATIENT MESSAGE (OUTPATIENT)
Dept: HEMATOLOGY/ONCOLOGY | Facility: CLINIC | Age: 81
End: 2021-02-12

## 2021-02-15 ENCOUNTER — PATIENT MESSAGE (OUTPATIENT)
Dept: ALLERGY | Facility: CLINIC | Age: 81
End: 2021-02-15

## 2021-02-16 ENCOUNTER — OFFICE VISIT (OUTPATIENT)
Dept: ALLERGY | Facility: CLINIC | Age: 81
End: 2021-02-16
Payer: MEDICARE

## 2021-02-16 ENCOUNTER — TELEPHONE (OUTPATIENT)
Dept: ALLERGY | Facility: CLINIC | Age: 81
End: 2021-02-16

## 2021-02-16 ENCOUNTER — TELEPHONE (OUTPATIENT)
Dept: RADIOLOGY | Facility: HOSPITAL | Age: 81
End: 2021-02-16

## 2021-02-16 DIAGNOSIS — L29.9 ITCHING: ICD-10-CM

## 2021-02-16 DIAGNOSIS — C61 PROSTATE CANCER: ICD-10-CM

## 2021-02-16 DIAGNOSIS — D69.6 THROMBOCYTOPENIA: ICD-10-CM

## 2021-02-16 DIAGNOSIS — N40.0 BENIGN PROSTATIC HYPERPLASIA WITHOUT LOWER URINARY TRACT SYMPTOMS: ICD-10-CM

## 2021-02-16 DIAGNOSIS — I10 ESSENTIAL HYPERTENSION: ICD-10-CM

## 2021-02-16 DIAGNOSIS — D72.19 OTHER EOSINOPHILIA: ICD-10-CM

## 2021-02-16 DIAGNOSIS — I77.1 TORTUOUS AORTA: ICD-10-CM

## 2021-02-16 DIAGNOSIS — D64.9 ANEMIA, UNSPECIFIED TYPE: ICD-10-CM

## 2021-02-16 DIAGNOSIS — M47.817 LUMBOSACRAL SPONDYLOSIS WITHOUT MYELOPATHY: ICD-10-CM

## 2021-02-16 DIAGNOSIS — N18.31 STAGE 3A CHRONIC KIDNEY DISEASE: ICD-10-CM

## 2021-02-16 DIAGNOSIS — C44.519 BASAL CELL CARCINOMA (BCC) OF ANTERIOR CHEST: ICD-10-CM

## 2021-02-16 DIAGNOSIS — R21 RASH: Primary | ICD-10-CM

## 2021-02-16 DIAGNOSIS — R53.1 GENERALIZED WEAKNESS: ICD-10-CM

## 2021-02-16 DIAGNOSIS — J84.10 LUNG GRANULOMA: ICD-10-CM

## 2021-02-16 DIAGNOSIS — I70.203 ATHEROSCLEROSIS OF ARTERY OF BOTH LOWER EXTREMITIES: ICD-10-CM

## 2021-02-16 DIAGNOSIS — Z01.818 PRE-OP EXAM: Primary | ICD-10-CM

## 2021-02-16 DIAGNOSIS — R26.2 DIFFICULTY WALKING: ICD-10-CM

## 2021-02-16 DIAGNOSIS — E78.2 MIXED HYPERLIPIDEMIA: ICD-10-CM

## 2021-02-16 DIAGNOSIS — H26.9 CATARACT OF BOTH EYES, UNSPECIFIED CATARACT TYPE: ICD-10-CM

## 2021-02-16 DIAGNOSIS — I45.10 INCOMPLETE RIGHT BUNDLE BRANCH BLOCK: ICD-10-CM

## 2021-02-16 PROCEDURE — 99214 OFFICE O/P EST MOD 30 MIN: CPT | Mod: 95,,, | Performed by: ALLERGY & IMMUNOLOGY

## 2021-02-16 PROCEDURE — 99214 PR OFFICE/OUTPT VISIT, EST, LEVL IV, 30-39 MIN: ICD-10-PCS | Mod: 95,,, | Performed by: ALLERGY & IMMUNOLOGY

## 2021-02-16 PROCEDURE — 1159F PR MEDICATION LIST DOCUMENTED IN MEDICAL RECORD: ICD-10-PCS | Mod: 95,,, | Performed by: ALLERGY & IMMUNOLOGY

## 2021-02-16 PROCEDURE — 1159F MED LIST DOCD IN RCRD: CPT | Mod: 95,,, | Performed by: ALLERGY & IMMUNOLOGY

## 2021-02-23 ENCOUNTER — TELEPHONE (OUTPATIENT)
Dept: RADIOLOGY | Facility: HOSPITAL | Age: 81
End: 2021-02-23

## 2021-02-24 ENCOUNTER — TELEPHONE (OUTPATIENT)
Dept: PRIMARY CARE CLINIC | Facility: CLINIC | Age: 81
End: 2021-02-24

## 2021-02-24 ENCOUNTER — HOSPITAL ENCOUNTER (OUTPATIENT)
Dept: RADIOLOGY | Facility: HOSPITAL | Age: 81
Discharge: HOME | End: 2021-02-24
Attending: INTERNAL MEDICINE
Payer: MEDICARE

## 2021-02-24 VITALS
RESPIRATION RATE: 14 BRPM | HEIGHT: 70 IN | BODY MASS INDEX: 26.63 KG/M2 | HEART RATE: 65 BPM | DIASTOLIC BLOOD PRESSURE: 69 MMHG | WEIGHT: 186 LBS | OXYGEN SATURATION: 98 % | SYSTOLIC BLOOD PRESSURE: 127 MMHG

## 2021-02-24 DIAGNOSIS — D72.19 OTHER EOSINOPHILIA: ICD-10-CM

## 2021-02-24 DIAGNOSIS — D64.9 ANEMIA, UNSPECIFIED TYPE: ICD-10-CM

## 2021-02-24 PROCEDURE — 88313 SPECIAL STAINS GROUP 2: CPT | Mod: 26,,, | Performed by: PATHOLOGY

## 2021-02-24 PROCEDURE — 88305 TISSUE EXAM BY PATHOLOGIST: ICD-10-PCS | Mod: 26,,, | Performed by: PATHOLOGY

## 2021-02-24 PROCEDURE — 88305 TISSUE EXAM BY PATHOLOGIST: CPT | Mod: 26,,, | Performed by: PATHOLOGY

## 2021-02-24 PROCEDURE — 88311 DECALCIFY TISSUE: CPT | Mod: 26,,, | Performed by: PATHOLOGY

## 2021-02-24 PROCEDURE — 88299 UNLISTED CYTOGENETIC STUDY: CPT

## 2021-02-24 PROCEDURE — 88185 FLOWCYTOMETRY/TC ADD-ON: CPT | Mod: 59 | Performed by: PATHOLOGY

## 2021-02-24 PROCEDURE — 88184 FLOWCYTOMETRY/ TC 1 MARKER: CPT | Performed by: PATHOLOGY

## 2021-02-24 PROCEDURE — 88342 CHG IMMUNOCYTOCHEMISTRY: ICD-10-PCS | Mod: 26,59,, | Performed by: PATHOLOGY

## 2021-02-24 PROCEDURE — 88189 FLOWCYTOMETRY/READ 16 & >: CPT | Mod: ,,, | Performed by: PATHOLOGY

## 2021-02-24 PROCEDURE — 77012 CT SCAN FOR NEEDLE BIOPSY: CPT | Mod: TC

## 2021-02-24 PROCEDURE — 88341 PR IHC OR ICC EACH ADD'L SINGLE ANTIBODY  STAINPR: ICD-10-PCS | Mod: 26,,, | Performed by: PATHOLOGY

## 2021-02-24 PROCEDURE — 88341 IMHCHEM/IMCYTCHM EA ADD ANTB: CPT | Performed by: PATHOLOGY

## 2021-02-24 PROCEDURE — 85097 BONE MARROW INTERPRETATION: CPT | Mod: ,,, | Performed by: PATHOLOGY

## 2021-02-24 PROCEDURE — 88341 IMHCHEM/IMCYTCHM EA ADD ANTB: CPT | Mod: 26,,, | Performed by: PATHOLOGY

## 2021-02-24 PROCEDURE — 88237 TISSUE CULTURE BONE MARROW: CPT

## 2021-02-24 PROCEDURE — 88311 DECALCIFY TISSUE: CPT | Performed by: PATHOLOGY

## 2021-02-24 PROCEDURE — 88313 SPECIAL STAINS GROUP 2: CPT | Mod: 59 | Performed by: PATHOLOGY

## 2021-02-24 PROCEDURE — 88342 IMHCHEM/IMCYTCHM 1ST ANTB: CPT | Mod: 26,59,, | Performed by: PATHOLOGY

## 2021-02-24 PROCEDURE — 88311 PR  DECALCIFY TISSUE: ICD-10-PCS | Mod: 26,,, | Performed by: PATHOLOGY

## 2021-02-24 PROCEDURE — 63600175 PHARM REV CODE 636 W HCPCS: Performed by: RADIOLOGY

## 2021-02-24 PROCEDURE — 88342 IMHCHEM/IMCYTCHM 1ST ANTB: CPT | Performed by: PATHOLOGY

## 2021-02-24 PROCEDURE — 88305 TISSUE EXAM BY PATHOLOGIST: CPT | Mod: 59 | Performed by: PATHOLOGY

## 2021-02-24 PROCEDURE — 88264 CHROMOSOME ANALYSIS 20-25: CPT | Performed by: INTERNAL MEDICINE

## 2021-02-24 PROCEDURE — 85097 PR  BONE MARROW,SMEAR INTERPRETATION: ICD-10-PCS | Mod: ,,, | Performed by: PATHOLOGY

## 2021-02-24 PROCEDURE — 88313 PR  SPECIAL STAINS,GROUP II: ICD-10-PCS | Mod: 26,,, | Performed by: PATHOLOGY

## 2021-02-24 PROCEDURE — 88189 PR  FLOWCYTOMETRY/READ, 16 & > MARKERS: ICD-10-PCS | Mod: ,,, | Performed by: PATHOLOGY

## 2021-02-24 RX ORDER — FENTANYL CITRATE 50 UG/ML
INJECTION, SOLUTION INTRAMUSCULAR; INTRAVENOUS CODE/TRAUMA/SEDATION MEDICATION
Status: COMPLETED | OUTPATIENT
Start: 2021-02-24 | End: 2021-02-24

## 2021-02-24 RX ORDER — MIDAZOLAM HYDROCHLORIDE 1 MG/ML
INJECTION INTRAMUSCULAR; INTRAVENOUS CODE/TRAUMA/SEDATION MEDICATION
Status: COMPLETED | OUTPATIENT
Start: 2021-02-24 | End: 2021-02-24

## 2021-02-24 RX ADMIN — MIDAZOLAM HYDROCHLORIDE 1 MG: 1 INJECTION INTRAMUSCULAR; INTRAVENOUS at 09:02

## 2021-02-24 RX ADMIN — FENTANYL CITRATE 50 MCG: 50 INJECTION, SOLUTION INTRAMUSCULAR; INTRAVENOUS at 09:02

## 2021-02-25 ENCOUNTER — OFFICE VISIT (OUTPATIENT)
Dept: PRIMARY CARE CLINIC | Facility: CLINIC | Age: 81
End: 2021-02-25
Payer: MEDICARE

## 2021-02-25 VITALS
HEART RATE: 78 BPM | OXYGEN SATURATION: 98 % | SYSTOLIC BLOOD PRESSURE: 130 MMHG | WEIGHT: 204.38 LBS | BODY MASS INDEX: 29.32 KG/M2 | DIASTOLIC BLOOD PRESSURE: 70 MMHG | TEMPERATURE: 97 F

## 2021-02-25 DIAGNOSIS — L21.9 SEBORRHEIC DERMATITIS OF SCALP: ICD-10-CM

## 2021-02-25 DIAGNOSIS — L30.9 DERMATITIS: Primary | ICD-10-CM

## 2021-02-25 PROCEDURE — 96372 PR INJECTION,THERAP/PROPH/DIAG2ST, IM OR SUBCUT: ICD-10-PCS | Mod: S$GLB,,, | Performed by: PHYSICIAN ASSISTANT

## 2021-02-25 PROCEDURE — 1159F MED LIST DOCD IN RCRD: CPT | Mod: S$GLB,,, | Performed by: PHYSICIAN ASSISTANT

## 2021-02-25 PROCEDURE — 96372 THER/PROPH/DIAG INJ SC/IM: CPT | Mod: S$GLB,,, | Performed by: PHYSICIAN ASSISTANT

## 2021-02-25 PROCEDURE — 1125F AMNT PAIN NOTED PAIN PRSNT: CPT | Mod: S$GLB,,, | Performed by: PHYSICIAN ASSISTANT

## 2021-02-25 PROCEDURE — 3288F PR FALLS RISK ASSESSMENT DOCUMENTED: ICD-10-PCS | Mod: CPTII,S$GLB,, | Performed by: PHYSICIAN ASSISTANT

## 2021-02-25 PROCEDURE — 99214 OFFICE O/P EST MOD 30 MIN: CPT | Mod: 25,S$GLB,, | Performed by: PHYSICIAN ASSISTANT

## 2021-02-25 PROCEDURE — 1101F PT FALLS ASSESS-DOCD LE1/YR: CPT | Mod: CPTII,S$GLB,, | Performed by: PHYSICIAN ASSISTANT

## 2021-02-25 PROCEDURE — 3075F PR MOST RECENT SYSTOLIC BLOOD PRESS GE 130-139MM HG: ICD-10-PCS | Mod: CPTII,S$GLB,, | Performed by: PHYSICIAN ASSISTANT

## 2021-02-25 PROCEDURE — 99214 PR OFFICE/OUTPT VISIT, EST, LEVL IV, 30-39 MIN: ICD-10-PCS | Mod: 25,S$GLB,, | Performed by: PHYSICIAN ASSISTANT

## 2021-02-25 PROCEDURE — 99999 PR PBB SHADOW E&M-EST. PATIENT-LVL V: CPT | Mod: PBBFAC,,, | Performed by: PHYSICIAN ASSISTANT

## 2021-02-25 PROCEDURE — 3078F PR MOST RECENT DIASTOLIC BLOOD PRESSURE < 80 MM HG: ICD-10-PCS | Mod: CPTII,S$GLB,, | Performed by: PHYSICIAN ASSISTANT

## 2021-02-25 PROCEDURE — 3288F FALL RISK ASSESSMENT DOCD: CPT | Mod: CPTII,S$GLB,, | Performed by: PHYSICIAN ASSISTANT

## 2021-02-25 PROCEDURE — 1125F PR PAIN SEVERITY QUANTIFIED, PAIN PRESENT: ICD-10-PCS | Mod: S$GLB,,, | Performed by: PHYSICIAN ASSISTANT

## 2021-02-25 PROCEDURE — 3078F DIAST BP <80 MM HG: CPT | Mod: CPTII,S$GLB,, | Performed by: PHYSICIAN ASSISTANT

## 2021-02-25 PROCEDURE — 99999 PR PBB SHADOW E&M-EST. PATIENT-LVL V: ICD-10-PCS | Mod: PBBFAC,,, | Performed by: PHYSICIAN ASSISTANT

## 2021-02-25 PROCEDURE — 1159F PR MEDICATION LIST DOCUMENTED IN MEDICAL RECORD: ICD-10-PCS | Mod: S$GLB,,, | Performed by: PHYSICIAN ASSISTANT

## 2021-02-25 PROCEDURE — 3075F SYST BP GE 130 - 139MM HG: CPT | Mod: CPTII,S$GLB,, | Performed by: PHYSICIAN ASSISTANT

## 2021-02-25 PROCEDURE — 1101F PR PT FALLS ASSESS DOC 0-1 FALLS W/OUT INJ PAST YR: ICD-10-PCS | Mod: CPTII,S$GLB,, | Performed by: PHYSICIAN ASSISTANT

## 2021-02-25 RX ORDER — FAMOTIDINE 20 MG/1
20 TABLET, FILM COATED ORAL 2 TIMES DAILY PRN
Qty: 60 TABLET | Refills: 11 | Status: SHIPPED | OUTPATIENT
Start: 2021-02-25 | End: 2021-03-11

## 2021-02-25 RX ORDER — PREDNISONE 20 MG/1
20 TABLET ORAL DAILY
Qty: 5 TABLET | Refills: 0 | Status: SHIPPED | OUTPATIENT
Start: 2021-02-25 | End: 2021-03-02

## 2021-02-25 RX ORDER — KETOCONAZOLE 20 MG/ML
SHAMPOO, SUSPENSION TOPICAL
Qty: 120 ML | Refills: 2 | Status: SHIPPED | OUTPATIENT
Start: 2021-02-25 | End: 2021-05-11

## 2021-02-25 RX ORDER — CLOBETASOL PROPIONATE 0.05 G/100ML
SHAMPOO TOPICAL 2 TIMES DAILY
Qty: 118 BOTTLE | Refills: 1 | Status: SHIPPED | OUTPATIENT
Start: 2021-02-25 | End: 2021-05-11

## 2021-02-25 RX ORDER — ALLOPURINOL 100 MG/1
TABLET ORAL
COMMUNITY
Start: 2021-02-05 | End: 2021-03-11

## 2021-02-25 RX ORDER — BETAMETHASONE SODIUM PHOSPHATE AND BETAMETHASONE ACETATE 3; 3 MG/ML; MG/ML
9 INJECTION, SUSPENSION INTRA-ARTICULAR; INTRALESIONAL; INTRAMUSCULAR; SOFT TISSUE
Status: COMPLETED | OUTPATIENT
Start: 2021-02-25 | End: 2021-02-25

## 2021-02-25 RX ADMIN — BETAMETHASONE SODIUM PHOSPHATE AND BETAMETHASONE ACETATE 9 MG: 3; 3 INJECTION, SUSPENSION INTRA-ARTICULAR; INTRALESIONAL; INTRAMUSCULAR; SOFT TISSUE at 08:02

## 2021-03-01 ENCOUNTER — PATIENT MESSAGE (OUTPATIENT)
Dept: ALLERGY | Facility: CLINIC | Age: 81
End: 2021-03-01

## 2021-03-01 LAB
BODY SITE - BONE MARROW: NORMAL
CLINICAL DIAGNOSIS - BONE MARROW: NORMAL
DNA/RNA EXTRACT AND HOLD RESULT: NORMAL
DNA/RNA EXTRACTION: NORMAL
EXHR SPECIMEN TYPE: NORMAL
FLOW CYTOMETRY ANTIBODIES ANALYZED - BONE MARROW: NORMAL
FLOW CYTOMETRY COMMENT - BONE MARROW: NORMAL
FLOW CYTOMETRY INTERPRETATION - BONE MARROW: NORMAL

## 2021-03-03 ENCOUNTER — LAB VISIT (OUTPATIENT)
Dept: LAB | Facility: HOSPITAL | Age: 81
End: 2021-03-03
Attending: INTERNAL MEDICINE
Payer: MEDICARE

## 2021-03-03 DIAGNOSIS — D72.19 OTHER EOSINOPHILIA: ICD-10-CM

## 2021-03-03 DIAGNOSIS — I70.203 ATHEROSCLEROSIS OF ARTERY OF BOTH LOWER EXTREMITIES: ICD-10-CM

## 2021-03-03 DIAGNOSIS — E78.2 MIXED HYPERLIPIDEMIA: Chronic | ICD-10-CM

## 2021-03-03 DIAGNOSIS — D64.9 ANEMIA, UNSPECIFIED TYPE: ICD-10-CM

## 2021-03-03 LAB
ALBUMIN SERPL BCP-MCNC: 3.4 G/DL (ref 3.5–5.2)
ALP SERPL-CCNC: 48 U/L (ref 55–135)
ALT SERPL W/O P-5'-P-CCNC: 18 U/L (ref 10–44)
ANION GAP SERPL CALC-SCNC: 7 MMOL/L (ref 8–16)
AST SERPL-CCNC: 13 U/L (ref 10–40)
BASOPHILS # BLD AUTO: 0.03 K/UL (ref 0–0.2)
BASOPHILS NFR BLD: 0.5 % (ref 0–1.9)
BILIRUB SERPL-MCNC: 0.8 MG/DL (ref 0.1–1)
BUN SERPL-MCNC: 40 MG/DL (ref 8–23)
CALCIUM SERPL-MCNC: 8.5 MG/DL (ref 8.7–10.5)
CHLORIDE SERPL-SCNC: 106 MMOL/L (ref 95–110)
CHOLEST SERPL-MCNC: 162 MG/DL (ref 120–199)
CHOLEST/HDLC SERPL: 2.1 {RATIO} (ref 2–5)
CO2 SERPL-SCNC: 27 MMOL/L (ref 23–29)
CREAT SERPL-MCNC: 1.5 MG/DL (ref 0.5–1.4)
DIFFERENTIAL METHOD: ABNORMAL
EOSINOPHIL # BLD AUTO: 0.1 K/UL (ref 0–0.5)
EOSINOPHIL NFR BLD: 1 % (ref 0–8)
ERYTHROCYTE [DISTWIDTH] IN BLOOD BY AUTOMATED COUNT: 12.3 % (ref 11.5–14.5)
EST. GFR  (AFRICAN AMERICAN): 49.4 ML/MIN/1.73 M^2
EST. GFR  (NON AFRICAN AMERICAN): 42.7 ML/MIN/1.73 M^2
GLUCOSE SERPL-MCNC: 89 MG/DL (ref 70–110)
HCT VFR BLD AUTO: 38.4 % (ref 40–54)
HDLC SERPL-MCNC: 78 MG/DL (ref 40–75)
HDLC SERPL: 48.1 % (ref 20–50)
HGB BLD-MCNC: 12.2 G/DL (ref 14–18)
IMM GRANULOCYTES # BLD AUTO: 0.15 K/UL (ref 0–0.04)
IMM GRANULOCYTES NFR BLD AUTO: 2.5 % (ref 0–0.5)
LDLC SERPL CALC-MCNC: 72.2 MG/DL (ref 63–159)
LYMPHOCYTES # BLD AUTO: 1.4 K/UL (ref 1–4.8)
LYMPHOCYTES NFR BLD: 22.5 % (ref 18–48)
MCH RBC QN AUTO: 31.5 PG (ref 27–31)
MCHC RBC AUTO-ENTMCNC: 31.8 G/DL (ref 32–36)
MCV RBC AUTO: 99 FL (ref 82–98)
MONOCYTES # BLD AUTO: 0.7 K/UL (ref 0.3–1)
MONOCYTES NFR BLD: 11.5 % (ref 4–15)
NEUTROPHILS # BLD AUTO: 3.8 K/UL (ref 1.8–7.7)
NEUTROPHILS NFR BLD: 62 % (ref 38–73)
NONHDLC SERPL-MCNC: 84 MG/DL
NRBC BLD-RTO: 0 /100 WBC
PLATELET # BLD AUTO: 179 K/UL (ref 150–350)
PMV BLD AUTO: 8.4 FL (ref 9.2–12.9)
POTASSIUM SERPL-SCNC: 4.2 MMOL/L (ref 3.5–5.1)
PROT SERPL-MCNC: 6 G/DL (ref 6–8.4)
RBC # BLD AUTO: 3.87 M/UL (ref 4.6–6.2)
SODIUM SERPL-SCNC: 140 MMOL/L (ref 136–145)
TRIGL SERPL-MCNC: 59 MG/DL (ref 30–150)
WBC # BLD AUTO: 6.08 K/UL (ref 3.9–12.7)

## 2021-03-03 PROCEDURE — 36415 COLL VENOUS BLD VENIPUNCTURE: CPT | Mod: PO

## 2021-03-03 PROCEDURE — 80053 COMPREHEN METABOLIC PANEL: CPT | Performed by: INTERNAL MEDICINE

## 2021-03-03 PROCEDURE — 80061 LIPID PANEL: CPT | Performed by: INTERNAL MEDICINE

## 2021-03-03 PROCEDURE — 85025 COMPLETE CBC W/AUTO DIFF WBC: CPT | Performed by: INTERNAL MEDICINE

## 2021-03-04 LAB
CHROM BANDING METHOD: NORMAL
CHROMOSOME ANALYSIS BM ADDITIONAL INFORMATION: NORMAL
CHROMOSOME ANALYSIS BM RELEASED BY: NORMAL
CHROMOSOME ANALYSIS BM RESULT SUMMARY: NORMAL
CLINICAL CYTOGENETICIST REVIEW: NORMAL
KARYOTYP MAR: NORMAL
REASON FOR REFERRAL (NARRATIVE): NORMAL
REF LAB TEST METHOD: NORMAL
SPECIMEN SOURCE: NORMAL
SPECIMEN: NORMAL

## 2021-03-05 LAB
COMMENT: NORMAL
FINAL PATHOLOGIC DIAGNOSIS: NORMAL
GROSS: NORMAL
Lab: NORMAL
MICROSCOPIC EXAM: NORMAL
SUPPLEMENTAL DIAGNOSIS: NORMAL

## 2021-03-08 ENCOUNTER — PATIENT MESSAGE (OUTPATIENT)
Dept: HEMATOLOGY/ONCOLOGY | Facility: CLINIC | Age: 81
End: 2021-03-08

## 2021-03-08 ENCOUNTER — PATIENT MESSAGE (OUTPATIENT)
Dept: ALLERGY | Facility: CLINIC | Age: 81
End: 2021-03-08

## 2021-03-09 ENCOUNTER — PATIENT MESSAGE (OUTPATIENT)
Dept: ALLERGY | Facility: CLINIC | Age: 81
End: 2021-03-09

## 2021-03-10 ENCOUNTER — OFFICE VISIT (OUTPATIENT)
Dept: HEMATOLOGY/ONCOLOGY | Facility: CLINIC | Age: 81
End: 2021-03-10
Payer: MEDICARE

## 2021-03-10 ENCOUNTER — LAB VISIT (OUTPATIENT)
Dept: LAB | Facility: HOSPITAL | Age: 81
End: 2021-03-10
Attending: INTERNAL MEDICINE
Payer: MEDICARE

## 2021-03-10 ENCOUNTER — PATIENT MESSAGE (OUTPATIENT)
Dept: HEMATOLOGY/ONCOLOGY | Facility: CLINIC | Age: 81
End: 2021-03-10

## 2021-03-10 VITALS
DIASTOLIC BLOOD PRESSURE: 72 MMHG | WEIGHT: 203.5 LBS | RESPIRATION RATE: 16 BRPM | BODY MASS INDEX: 28.49 KG/M2 | SYSTOLIC BLOOD PRESSURE: 133 MMHG | OXYGEN SATURATION: 98 % | TEMPERATURE: 98 F | HEART RATE: 80 BPM | HEIGHT: 71 IN

## 2021-03-10 DIAGNOSIS — D72.19 OTHER EOSINOPHILIA: ICD-10-CM

## 2021-03-10 LAB
ALBUMIN SERPL BCP-MCNC: 3.7 G/DL (ref 3.5–5.2)
ALP SERPL-CCNC: 58 U/L (ref 55–135)
ALT SERPL W/O P-5'-P-CCNC: 15 U/L (ref 10–44)
ANION GAP SERPL CALC-SCNC: 7 MMOL/L (ref 8–16)
AST SERPL-CCNC: 14 U/L (ref 10–40)
BASOPHILS # BLD AUTO: 0.06 K/UL (ref 0–0.2)
BASOPHILS NFR BLD: 1 % (ref 0–1.9)
BILIRUB SERPL-MCNC: 0.7 MG/DL (ref 0.1–1)
BUN SERPL-MCNC: 26 MG/DL (ref 8–23)
CALCIUM SERPL-MCNC: 8.6 MG/DL (ref 8.7–10.5)
CHLORIDE SERPL-SCNC: 105 MMOL/L (ref 95–110)
CO2 SERPL-SCNC: 26 MMOL/L (ref 23–29)
CREAT SERPL-MCNC: 1.5 MG/DL (ref 0.5–1.4)
DIFFERENTIAL METHOD: ABNORMAL
EOSINOPHIL # BLD AUTO: 0.6 K/UL (ref 0–0.5)
EOSINOPHIL NFR BLD: 9.7 % (ref 0–8)
ERYTHROCYTE [DISTWIDTH] IN BLOOD BY AUTOMATED COUNT: 12 % (ref 11.5–14.5)
EST. GFR  (AFRICAN AMERICAN): 49 ML/MIN/1.73 M^2
EST. GFR  (NON AFRICAN AMERICAN): 43 ML/MIN/1.73 M^2
GLUCOSE SERPL-MCNC: 73 MG/DL (ref 70–110)
HCT VFR BLD AUTO: 39.3 % (ref 40–54)
HGB BLD-MCNC: 12.9 G/DL (ref 14–18)
IMM GRANULOCYTES # BLD AUTO: 0.04 K/UL (ref 0–0.04)
IMM GRANULOCYTES NFR BLD AUTO: 0.7 % (ref 0–0.5)
LYMPHOCYTES # BLD AUTO: 0.9 K/UL (ref 1–4.8)
LYMPHOCYTES NFR BLD: 14.3 % (ref 18–48)
MCH RBC QN AUTO: 32.3 PG (ref 27–31)
MCHC RBC AUTO-ENTMCNC: 32.8 G/DL (ref 32–36)
MCV RBC AUTO: 98 FL (ref 82–98)
MONOCYTES # BLD AUTO: 0.7 K/UL (ref 0.3–1)
MONOCYTES NFR BLD: 11.4 % (ref 4–15)
NEUTROPHILS # BLD AUTO: 3.7 K/UL (ref 1.8–7.7)
NEUTROPHILS NFR BLD: 62.9 % (ref 38–73)
NRBC BLD-RTO: 0 /100 WBC
PLATELET # BLD AUTO: 151 K/UL (ref 150–350)
PMV BLD AUTO: 7.7 FL (ref 9.2–12.9)
POTASSIUM SERPL-SCNC: 4.3 MMOL/L (ref 3.5–5.1)
PROT SERPL-MCNC: 6.4 G/DL (ref 6–8.4)
RBC # BLD AUTO: 4 M/UL (ref 4.6–6.2)
SODIUM SERPL-SCNC: 138 MMOL/L (ref 136–145)
WBC # BLD AUTO: 5.95 K/UL (ref 3.9–12.7)

## 2021-03-10 PROCEDURE — 3288F FALL RISK ASSESSMENT DOCD: CPT | Mod: CPTII,S$GLB,, | Performed by: INTERNAL MEDICINE

## 2021-03-10 PROCEDURE — 1159F MED LIST DOCD IN RCRD: CPT | Mod: S$GLB,,, | Performed by: INTERNAL MEDICINE

## 2021-03-10 PROCEDURE — 99214 OFFICE O/P EST MOD 30 MIN: CPT | Mod: S$GLB,,, | Performed by: INTERNAL MEDICINE

## 2021-03-10 PROCEDURE — 80053 COMPREHEN METABOLIC PANEL: CPT | Performed by: INTERNAL MEDICINE

## 2021-03-10 PROCEDURE — 1101F PR PT FALLS ASSESS DOC 0-1 FALLS W/OUT INJ PAST YR: ICD-10-PCS | Mod: CPTII,S$GLB,, | Performed by: INTERNAL MEDICINE

## 2021-03-10 PROCEDURE — 3075F SYST BP GE 130 - 139MM HG: CPT | Mod: CPTII,S$GLB,, | Performed by: INTERNAL MEDICINE

## 2021-03-10 PROCEDURE — 3075F PR MOST RECENT SYSTOLIC BLOOD PRESS GE 130-139MM HG: ICD-10-PCS | Mod: CPTII,S$GLB,, | Performed by: INTERNAL MEDICINE

## 2021-03-10 PROCEDURE — 99214 PR OFFICE/OUTPT VISIT, EST, LEVL IV, 30-39 MIN: ICD-10-PCS | Mod: S$GLB,,, | Performed by: INTERNAL MEDICINE

## 2021-03-10 PROCEDURE — 1126F AMNT PAIN NOTED NONE PRSNT: CPT | Mod: S$GLB,,, | Performed by: INTERNAL MEDICINE

## 2021-03-10 PROCEDURE — 1159F PR MEDICATION LIST DOCUMENTED IN MEDICAL RECORD: ICD-10-PCS | Mod: S$GLB,,, | Performed by: INTERNAL MEDICINE

## 2021-03-10 PROCEDURE — 1126F PR PAIN SEVERITY QUANTIFIED, NO PAIN PRESENT: ICD-10-PCS | Mod: S$GLB,,, | Performed by: INTERNAL MEDICINE

## 2021-03-10 PROCEDURE — 99999 PR PBB SHADOW E&M-EST. PATIENT-LVL V: ICD-10-PCS | Mod: PBBFAC,,, | Performed by: INTERNAL MEDICINE

## 2021-03-10 PROCEDURE — 3288F PR FALLS RISK ASSESSMENT DOCUMENTED: ICD-10-PCS | Mod: CPTII,S$GLB,, | Performed by: INTERNAL MEDICINE

## 2021-03-10 PROCEDURE — 81450 HL NEO GSAP 5-50DNA/DNA&RNA: CPT | Mod: 91 | Performed by: INTERNAL MEDICINE

## 2021-03-10 PROCEDURE — 36415 COLL VENOUS BLD VENIPUNCTURE: CPT | Performed by: INTERNAL MEDICINE

## 2021-03-10 PROCEDURE — 3078F PR MOST RECENT DIASTOLIC BLOOD PRESSURE < 80 MM HG: ICD-10-PCS | Mod: CPTII,S$GLB,, | Performed by: INTERNAL MEDICINE

## 2021-03-10 PROCEDURE — 3078F DIAST BP <80 MM HG: CPT | Mod: CPTII,S$GLB,, | Performed by: INTERNAL MEDICINE

## 2021-03-10 PROCEDURE — 88271 CYTOGENETICS DNA PROBE: CPT | Performed by: INTERNAL MEDICINE

## 2021-03-10 PROCEDURE — 99999 PR PBB SHADOW E&M-EST. PATIENT-LVL V: CPT | Mod: PBBFAC,,, | Performed by: INTERNAL MEDICINE

## 2021-03-10 PROCEDURE — 81450 HL NEO GSAP 5-50DNA/DNA&RNA: CPT | Performed by: INTERNAL MEDICINE

## 2021-03-10 PROCEDURE — 1101F PT FALLS ASSESS-DOCD LE1/YR: CPT | Mod: CPTII,S$GLB,, | Performed by: INTERNAL MEDICINE

## 2021-03-10 PROCEDURE — 85025 COMPLETE CBC W/AUTO DIFF WBC: CPT | Performed by: INTERNAL MEDICINE

## 2021-03-11 ENCOUNTER — OFFICE VISIT (OUTPATIENT)
Dept: PRIMARY CARE CLINIC | Facility: CLINIC | Age: 81
End: 2021-03-11
Payer: MEDICARE

## 2021-03-11 VITALS — DIASTOLIC BLOOD PRESSURE: 72 MMHG | SYSTOLIC BLOOD PRESSURE: 137 MMHG

## 2021-03-11 DIAGNOSIS — L30.9 DERMATITIS: ICD-10-CM

## 2021-03-11 DIAGNOSIS — G47.33 OSA (OBSTRUCTIVE SLEEP APNEA): ICD-10-CM

## 2021-03-11 DIAGNOSIS — I10 ESSENTIAL HYPERTENSION: Chronic | ICD-10-CM

## 2021-03-11 DIAGNOSIS — C61 PROSTATE CANCER: ICD-10-CM

## 2021-03-11 DIAGNOSIS — I77.1 TORTUOUS AORTA: ICD-10-CM

## 2021-03-11 DIAGNOSIS — N40.0 BENIGN PROSTATIC HYPERPLASIA WITHOUT LOWER URINARY TRACT SYMPTOMS: ICD-10-CM

## 2021-03-11 DIAGNOSIS — D72.19 OTHER EOSINOPHILIA: Primary | ICD-10-CM

## 2021-03-11 DIAGNOSIS — D69.6 THROMBOCYTOPENIA: ICD-10-CM

## 2021-03-11 DIAGNOSIS — D64.9 ANEMIA, UNSPECIFIED TYPE: ICD-10-CM

## 2021-03-11 DIAGNOSIS — E78.2 MIXED HYPERLIPIDEMIA: Chronic | ICD-10-CM

## 2021-03-11 DIAGNOSIS — I70.203 ATHEROSCLEROSIS OF ARTERY OF BOTH LOWER EXTREMITIES: ICD-10-CM

## 2021-03-11 DIAGNOSIS — N18.31 STAGE 3A CHRONIC KIDNEY DISEASE: ICD-10-CM

## 2021-03-11 DIAGNOSIS — M47.817 LUMBOSACRAL SPONDYLOSIS WITHOUT MYELOPATHY: ICD-10-CM

## 2021-03-11 DIAGNOSIS — L29.9 ITCHING: ICD-10-CM

## 2021-03-11 DIAGNOSIS — C44.519 BASAL CELL CARCINOMA (BCC) OF ANTERIOR CHEST: ICD-10-CM

## 2021-03-11 PROCEDURE — 99214 OFFICE O/P EST MOD 30 MIN: CPT | Mod: 95,,, | Performed by: FAMILY MEDICINE

## 2021-03-11 PROCEDURE — 3075F SYST BP GE 130 - 139MM HG: CPT | Mod: CPTII,95,, | Performed by: FAMILY MEDICINE

## 2021-03-11 PROCEDURE — 3078F PR MOST RECENT DIASTOLIC BLOOD PRESSURE < 80 MM HG: ICD-10-PCS | Mod: CPTII,95,, | Performed by: FAMILY MEDICINE

## 2021-03-11 PROCEDURE — 1159F MED LIST DOCD IN RCRD: CPT | Mod: 95,,, | Performed by: FAMILY MEDICINE

## 2021-03-11 PROCEDURE — 3075F PR MOST RECENT SYSTOLIC BLOOD PRESS GE 130-139MM HG: ICD-10-PCS | Mod: CPTII,95,, | Performed by: FAMILY MEDICINE

## 2021-03-11 PROCEDURE — 3078F DIAST BP <80 MM HG: CPT | Mod: CPTII,95,, | Performed by: FAMILY MEDICINE

## 2021-03-11 PROCEDURE — 99214 PR OFFICE/OUTPT VISIT, EST, LEVL IV, 30-39 MIN: ICD-10-PCS | Mod: 95,,, | Performed by: FAMILY MEDICINE

## 2021-03-11 PROCEDURE — 1159F PR MEDICATION LIST DOCUMENTED IN MEDICAL RECORD: ICD-10-PCS | Mod: 95,,, | Performed by: FAMILY MEDICINE

## 2021-03-15 ENCOUNTER — TELEPHONE (OUTPATIENT)
Dept: RADIOLOGY | Facility: HOSPITAL | Age: 81
End: 2021-03-15

## 2021-03-15 ENCOUNTER — OFFICE VISIT (OUTPATIENT)
Dept: ALLERGY | Facility: CLINIC | Age: 81
End: 2021-03-15
Payer: MEDICARE

## 2021-03-15 VITALS
HEIGHT: 71 IN | WEIGHT: 203.06 LBS | SYSTOLIC BLOOD PRESSURE: 139 MMHG | DIASTOLIC BLOOD PRESSURE: 80 MMHG | BODY MASS INDEX: 28.43 KG/M2 | HEART RATE: 62 BPM | TEMPERATURE: 99 F

## 2021-03-15 DIAGNOSIS — L25.9 CONTACT DERMATITIS, UNSPECIFIED CONTACT DERMATITIS TYPE, UNSPECIFIED TRIGGER: Primary | ICD-10-CM

## 2021-03-15 LAB
AML PANEL GENE LIST, BM: NORMAL
ANNOTATION COMMENT IMP: NORMAL
NGAML CLINICAL TRIALS, BM: NORMAL
NGAML INTERPRETATION, BM: NORMAL
NGAML PATHOGENIC MUTATIONS DETECTED, BM: NORMAL
NGAML RESULT, BM: NORMAL
NGAML REVIEWED BY, BM: NORMAL
NGAML VARIANTS OF UNKNOWN SIGNIFICANCE, BM: NORMAL
REF LAB TEST METHOD: NORMAL
SPECIMEN SOURCE: NORMAL
TEST PERFORMANCE INFO SPEC: NORMAL

## 2021-03-15 PROCEDURE — 95044 PR ALLERGY PATCH TESTS: ICD-10-PCS | Mod: S$GLB,,, | Performed by: ALLERGY & IMMUNOLOGY

## 2021-03-15 PROCEDURE — 1159F PR MEDICATION LIST DOCUMENTED IN MEDICAL RECORD: ICD-10-PCS | Mod: S$GLB,,, | Performed by: ALLERGY & IMMUNOLOGY

## 2021-03-15 PROCEDURE — 1159F MED LIST DOCD IN RCRD: CPT | Mod: S$GLB,,, | Performed by: ALLERGY & IMMUNOLOGY

## 2021-03-15 PROCEDURE — 3075F SYST BP GE 130 - 139MM HG: CPT | Mod: CPTII,S$GLB,, | Performed by: ALLERGY & IMMUNOLOGY

## 2021-03-15 PROCEDURE — 3075F PR MOST RECENT SYSTOLIC BLOOD PRESS GE 130-139MM HG: ICD-10-PCS | Mod: CPTII,S$GLB,, | Performed by: ALLERGY & IMMUNOLOGY

## 2021-03-15 PROCEDURE — 99214 OFFICE O/P EST MOD 30 MIN: CPT | Mod: 25,S$GLB,, | Performed by: ALLERGY & IMMUNOLOGY

## 2021-03-15 PROCEDURE — 99999 PR PBB SHADOW E&M-EST. PATIENT-LVL III: CPT | Mod: PBBFAC,,, | Performed by: ALLERGY & IMMUNOLOGY

## 2021-03-15 PROCEDURE — 3079F DIAST BP 80-89 MM HG: CPT | Mod: CPTII,S$GLB,, | Performed by: ALLERGY & IMMUNOLOGY

## 2021-03-15 PROCEDURE — 99214 PR OFFICE/OUTPT VISIT, EST, LEVL IV, 30-39 MIN: ICD-10-PCS | Mod: 25,S$GLB,, | Performed by: ALLERGY & IMMUNOLOGY

## 2021-03-15 PROCEDURE — 99999 PR PBB SHADOW E&M-EST. PATIENT-LVL III: ICD-10-PCS | Mod: PBBFAC,,, | Performed by: ALLERGY & IMMUNOLOGY

## 2021-03-15 PROCEDURE — 95044 PATCH/APPLICATION TESTS: CPT | Mod: S$GLB,,, | Performed by: ALLERGY & IMMUNOLOGY

## 2021-03-15 PROCEDURE — 3079F PR MOST RECENT DIASTOLIC BLOOD PRESSURE 80-89 MM HG: ICD-10-PCS | Mod: CPTII,S$GLB,, | Performed by: ALLERGY & IMMUNOLOGY

## 2021-03-17 ENCOUNTER — OFFICE VISIT (OUTPATIENT)
Dept: ALLERGY | Facility: CLINIC | Age: 81
End: 2021-03-17
Payer: MEDICARE

## 2021-03-17 VITALS
TEMPERATURE: 97 F | WEIGHT: 203.25 LBS | BODY MASS INDEX: 28.75 KG/M2 | HEART RATE: 63 BPM | DIASTOLIC BLOOD PRESSURE: 76 MMHG | SYSTOLIC BLOOD PRESSURE: 153 MMHG

## 2021-03-17 DIAGNOSIS — L25.9 CONTACT DERMATITIS, UNSPECIFIED CONTACT DERMATITIS TYPE, UNSPECIFIED TRIGGER: Primary | ICD-10-CM

## 2021-03-17 PROCEDURE — 3078F DIAST BP <80 MM HG: CPT | Mod: CPTII,S$GLB,, | Performed by: ALLERGY & IMMUNOLOGY

## 2021-03-17 PROCEDURE — 99214 PR OFFICE/OUTPT VISIT, EST, LEVL IV, 30-39 MIN: ICD-10-PCS | Mod: S$GLB,,, | Performed by: ALLERGY & IMMUNOLOGY

## 2021-03-17 PROCEDURE — 99999 PR PBB SHADOW E&M-EST. PATIENT-LVL III: CPT | Mod: PBBFAC,,, | Performed by: ALLERGY & IMMUNOLOGY

## 2021-03-17 PROCEDURE — 3078F PR MOST RECENT DIASTOLIC BLOOD PRESSURE < 80 MM HG: ICD-10-PCS | Mod: CPTII,S$GLB,, | Performed by: ALLERGY & IMMUNOLOGY

## 2021-03-17 PROCEDURE — 99999 PR PBB SHADOW E&M-EST. PATIENT-LVL III: ICD-10-PCS | Mod: PBBFAC,,, | Performed by: ALLERGY & IMMUNOLOGY

## 2021-03-17 PROCEDURE — 1159F MED LIST DOCD IN RCRD: CPT | Mod: S$GLB,,, | Performed by: ALLERGY & IMMUNOLOGY

## 2021-03-17 PROCEDURE — 3077F PR MOST RECENT SYSTOLIC BLOOD PRESSURE >= 140 MM HG: ICD-10-PCS | Mod: CPTII,S$GLB,, | Performed by: ALLERGY & IMMUNOLOGY

## 2021-03-17 PROCEDURE — 1159F PR MEDICATION LIST DOCUMENTED IN MEDICAL RECORD: ICD-10-PCS | Mod: S$GLB,,, | Performed by: ALLERGY & IMMUNOLOGY

## 2021-03-17 PROCEDURE — 99214 OFFICE O/P EST MOD 30 MIN: CPT | Mod: S$GLB,,, | Performed by: ALLERGY & IMMUNOLOGY

## 2021-03-17 PROCEDURE — 3077F SYST BP >= 140 MM HG: CPT | Mod: CPTII,S$GLB,, | Performed by: ALLERGY & IMMUNOLOGY

## 2021-03-18 ENCOUNTER — PATIENT MESSAGE (OUTPATIENT)
Dept: HEMATOLOGY/ONCOLOGY | Facility: CLINIC | Age: 81
End: 2021-03-18

## 2021-03-19 ENCOUNTER — OFFICE VISIT (OUTPATIENT)
Dept: ALLERGY | Facility: CLINIC | Age: 81
End: 2021-03-19
Payer: MEDICARE

## 2021-03-19 ENCOUNTER — TELEPHONE (OUTPATIENT)
Dept: RADIOLOGY | Facility: HOSPITAL | Age: 81
End: 2021-03-19

## 2021-03-19 ENCOUNTER — PATIENT MESSAGE (OUTPATIENT)
Dept: HEMATOLOGY/ONCOLOGY | Facility: CLINIC | Age: 81
End: 2021-03-19

## 2021-03-19 VITALS
DIASTOLIC BLOOD PRESSURE: 86 MMHG | WEIGHT: 203.69 LBS | BODY MASS INDEX: 29.16 KG/M2 | HEIGHT: 70 IN | SYSTOLIC BLOOD PRESSURE: 156 MMHG | HEART RATE: 76 BPM | TEMPERATURE: 97 F

## 2021-03-19 DIAGNOSIS — L29.9 PRURITUS: Primary | ICD-10-CM

## 2021-03-19 DIAGNOSIS — L25.9 CONTACT DERMATITIS, UNSPECIFIED CONTACT DERMATITIS TYPE, UNSPECIFIED TRIGGER: ICD-10-CM

## 2021-03-19 PROCEDURE — 99214 PR OFFICE/OUTPT VISIT, EST, LEVL IV, 30-39 MIN: ICD-10-PCS | Mod: S$GLB,,, | Performed by: ALLERGY & IMMUNOLOGY

## 2021-03-19 PROCEDURE — 1159F PR MEDICATION LIST DOCUMENTED IN MEDICAL RECORD: ICD-10-PCS | Mod: S$GLB,,, | Performed by: ALLERGY & IMMUNOLOGY

## 2021-03-19 PROCEDURE — 1159F MED LIST DOCD IN RCRD: CPT | Mod: S$GLB,,, | Performed by: ALLERGY & IMMUNOLOGY

## 2021-03-19 PROCEDURE — 3077F PR MOST RECENT SYSTOLIC BLOOD PRESSURE >= 140 MM HG: ICD-10-PCS | Mod: CPTII,S$GLB,, | Performed by: ALLERGY & IMMUNOLOGY

## 2021-03-19 PROCEDURE — 3079F PR MOST RECENT DIASTOLIC BLOOD PRESSURE 80-89 MM HG: ICD-10-PCS | Mod: CPTII,S$GLB,, | Performed by: ALLERGY & IMMUNOLOGY

## 2021-03-19 PROCEDURE — 3077F SYST BP >= 140 MM HG: CPT | Mod: CPTII,S$GLB,, | Performed by: ALLERGY & IMMUNOLOGY

## 2021-03-19 PROCEDURE — 1126F AMNT PAIN NOTED NONE PRSNT: CPT | Mod: S$GLB,,, | Performed by: ALLERGY & IMMUNOLOGY

## 2021-03-19 PROCEDURE — 1126F PR PAIN SEVERITY QUANTIFIED, NO PAIN PRESENT: ICD-10-PCS | Mod: S$GLB,,, | Performed by: ALLERGY & IMMUNOLOGY

## 2021-03-19 PROCEDURE — 99999 PR PBB SHADOW E&M-EST. PATIENT-LVL IV: ICD-10-PCS | Mod: PBBFAC,,, | Performed by: ALLERGY & IMMUNOLOGY

## 2021-03-19 PROCEDURE — 3079F DIAST BP 80-89 MM HG: CPT | Mod: CPTII,S$GLB,, | Performed by: ALLERGY & IMMUNOLOGY

## 2021-03-19 PROCEDURE — 99214 OFFICE O/P EST MOD 30 MIN: CPT | Mod: S$GLB,,, | Performed by: ALLERGY & IMMUNOLOGY

## 2021-03-19 PROCEDURE — 99999 PR PBB SHADOW E&M-EST. PATIENT-LVL IV: CPT | Mod: PBBFAC,,, | Performed by: ALLERGY & IMMUNOLOGY

## 2021-03-19 RX ORDER — DOXEPIN HYDROCHLORIDE 10 MG/1
CAPSULE ORAL
Qty: 60 CAPSULE | Refills: 11 | OUTPATIENT
Start: 2021-03-19 | End: 2021-05-11

## 2021-03-19 RX ORDER — DOXEPIN HYDROCHLORIDE 10 MG/1
CAPSULE ORAL
Qty: 60 CAPSULE | Refills: 11 | Status: SHIPPED | OUTPATIENT
Start: 2021-03-19 | End: 2021-03-19 | Stop reason: SDUPTHER

## 2021-03-19 RX ORDER — PREDNISONE 20 MG/1
20 TABLET ORAL DAILY
Qty: 30 TABLET | Refills: 0 | Status: SHIPPED | OUTPATIENT
Start: 2021-03-19 | End: 2021-05-11

## 2021-03-22 ENCOUNTER — HOSPITAL ENCOUNTER (OUTPATIENT)
Dept: RADIOLOGY | Facility: HOSPITAL | Age: 81
Discharge: HOME | End: 2021-03-22
Attending: INTERNAL MEDICINE
Payer: MEDICARE

## 2021-03-22 ENCOUNTER — PATIENT MESSAGE (OUTPATIENT)
Dept: HEMATOLOGY/ONCOLOGY | Facility: CLINIC | Age: 81
End: 2021-03-22

## 2021-03-22 DIAGNOSIS — D72.19 OTHER EOSINOPHILIA: ICD-10-CM

## 2021-03-22 PROCEDURE — 74177 CT ABD & PELVIS W/CONTRAST: CPT | Mod: TC

## 2021-03-22 PROCEDURE — 25500020 PHARM REV CODE 255: Performed by: INTERNAL MEDICINE

## 2021-03-22 PROCEDURE — A9698 NON-RAD CONTRAST MATERIALNOC: HCPCS | Performed by: INTERNAL MEDICINE

## 2021-03-22 PROCEDURE — 71260 CT THORAX DX C+: CPT | Mod: TC

## 2021-03-22 PROCEDURE — 71260 CT THORAX DX C+: CPT | Mod: 26,,, | Performed by: RADIOLOGY

## 2021-03-22 PROCEDURE — 71260 CT CHEST ABDOMEN PELVIS WITH CONTRAST (XPD): ICD-10-PCS | Mod: 26,,, | Performed by: RADIOLOGY

## 2021-03-22 PROCEDURE — 74177 CT CHEST ABDOMEN PELVIS WITH CONTRAST (XPD): ICD-10-PCS | Mod: 26,,, | Performed by: RADIOLOGY

## 2021-03-22 PROCEDURE — 74177 CT ABD & PELVIS W/CONTRAST: CPT | Mod: 26,,, | Performed by: RADIOLOGY

## 2021-03-22 RX ADMIN — IOHEXOL 1000 ML: 12 SOLUTION ORAL at 12:03

## 2021-03-22 RX ADMIN — IOHEXOL 100 ML: 350 INJECTION, SOLUTION INTRAVENOUS at 01:03

## 2021-03-23 LAB
CHIC2, 4Q12 DELETION DISCLAIMER: NORMAL
CHIC2, 4Q12 DELETION RELEASED BY: NORMAL
CHIC2, 4Q12 DELETION RESULT SUMMARY: NORMAL
CHIC2, 4Q12 DELETION RESULT TABLE: NORMAL
CHIC2, 4Q12 DELETION, INTERPRETATION: NORMAL
CHIC2, 4Q12 DELETION, REASON FOR REFERRAL: NORMAL
CHIC2, 4Q12 DELETION, RESULTS: NORMAL
CHIC2, 4Q12 DELETION, SPECIMEN: NORMAL
REF LAB TEST METHOD: NORMAL
REF LAB TEST METHOD: NORMAL
SPECIMEN SOURCE: NORMAL

## 2021-03-30 ENCOUNTER — TELEPHONE (OUTPATIENT)
Dept: HEMATOLOGY/ONCOLOGY | Facility: CLINIC | Age: 81
End: 2021-03-30

## 2021-03-30 LAB
ANNOTATION COMMENT IMP: NORMAL
DX: NORMAL
NGS CLINCIAL TRIALS: NORMAL
NGS INTERPRETATION: NORMAL
NGS ONCOHEME PANEL GENE LIST: NORMAL
NGS PATHOGENIC MUTATIONS DETECTED: NORMAL
NGS REVIEWED BY:: NORMAL
NGS VARIANTS OF UNKNOWN SIGNIFICANCE: NORMAL
NGSHM RESULT, BLOOD: NORMAL
REF LAB TEST METHOD: NORMAL
SPECIMEN SOURCE: NORMAL
TEST PERFORMANCE INFO SPEC: NORMAL

## 2021-03-31 ENCOUNTER — OFFICE VISIT (OUTPATIENT)
Dept: RHEUMATOLOGY | Facility: CLINIC | Age: 81
End: 2021-03-31
Payer: MEDICARE

## 2021-03-31 ENCOUNTER — OFFICE VISIT (OUTPATIENT)
Dept: CARDIOLOGY | Facility: CLINIC | Age: 81
End: 2021-03-31
Payer: MEDICARE

## 2021-03-31 ENCOUNTER — HOSPITAL ENCOUNTER (OUTPATIENT)
Dept: CARDIOLOGY | Facility: HOSPITAL | Age: 81
Discharge: HOME | End: 2021-03-31
Payer: MEDICARE

## 2021-03-31 ENCOUNTER — OFFICE VISIT (OUTPATIENT)
Dept: HEMATOLOGY/ONCOLOGY | Facility: CLINIC | Age: 81
End: 2021-03-31
Payer: MEDICARE

## 2021-03-31 VITALS
BODY MASS INDEX: 28.52 KG/M2 | OXYGEN SATURATION: 96 % | SYSTOLIC BLOOD PRESSURE: 148 MMHG | WEIGHT: 203.69 LBS | HEART RATE: 78 BPM | DIASTOLIC BLOOD PRESSURE: 80 MMHG | HEIGHT: 71 IN

## 2021-03-31 VITALS
OXYGEN SATURATION: 96 % | TEMPERATURE: 98 F | WEIGHT: 203.5 LBS | DIASTOLIC BLOOD PRESSURE: 82 MMHG | HEART RATE: 55 BPM | HEIGHT: 71 IN | SYSTOLIC BLOOD PRESSURE: 149 MMHG | RESPIRATION RATE: 17 BRPM | BODY MASS INDEX: 28.49 KG/M2

## 2021-03-31 VITALS
SYSTOLIC BLOOD PRESSURE: 123 MMHG | DIASTOLIC BLOOD PRESSURE: 69 MMHG | BODY MASS INDEX: 29.26 KG/M2 | HEIGHT: 70 IN | HEART RATE: 86 BPM | WEIGHT: 204.38 LBS

## 2021-03-31 DIAGNOSIS — I77.1 TORTUOUS AORTA: ICD-10-CM

## 2021-03-31 DIAGNOSIS — I10 ESSENTIAL HYPERTENSION: ICD-10-CM

## 2021-03-31 DIAGNOSIS — I10 ESSENTIAL HYPERTENSION: Primary | ICD-10-CM

## 2021-03-31 DIAGNOSIS — L29.9 PRURITUS: Primary | ICD-10-CM

## 2021-03-31 DIAGNOSIS — N18.31 STAGE 3A CHRONIC KIDNEY DISEASE: ICD-10-CM

## 2021-03-31 DIAGNOSIS — G47.33 OSA (OBSTRUCTIVE SLEEP APNEA): ICD-10-CM

## 2021-03-31 DIAGNOSIS — I45.10 INCOMPLETE RIGHT BUNDLE BRANCH BLOCK: ICD-10-CM

## 2021-03-31 DIAGNOSIS — C61 PROSTATE CANCER: ICD-10-CM

## 2021-03-31 DIAGNOSIS — E78.2 MIXED HYPERLIPIDEMIA: ICD-10-CM

## 2021-03-31 DIAGNOSIS — D72.19 OTHER EOSINOPHILIA: ICD-10-CM

## 2021-03-31 DIAGNOSIS — C44.519 BASAL CELL CARCINOMA (BCC) OF ANTERIOR CHEST: ICD-10-CM

## 2021-03-31 PROCEDURE — 1126F AMNT PAIN NOTED NONE PRSNT: CPT | Mod: S$GLB,,, | Performed by: PHYSICIAN ASSISTANT

## 2021-03-31 PROCEDURE — 99499 RISK ADDL DX/OHS AUDIT: ICD-10-PCS | Mod: HCNC,S$GLB,, | Performed by: INTERNAL MEDICINE

## 2021-03-31 PROCEDURE — 3079F PR MOST RECENT DIASTOLIC BLOOD PRESSURE 80-89 MM HG: ICD-10-PCS | Mod: CPTII,S$GLB,, | Performed by: INTERNAL MEDICINE

## 2021-03-31 PROCEDURE — 1126F AMNT PAIN NOTED NONE PRSNT: CPT | Mod: S$GLB,,, | Performed by: INTERNAL MEDICINE

## 2021-03-31 PROCEDURE — 1126F PR PAIN SEVERITY QUANTIFIED, NO PAIN PRESENT: ICD-10-PCS | Mod: S$GLB,,, | Performed by: PHYSICIAN ASSISTANT

## 2021-03-31 PROCEDURE — 99214 PR OFFICE/OUTPT VISIT, EST, LEVL IV, 30-39 MIN: ICD-10-PCS | Mod: S$GLB,,, | Performed by: INTERNAL MEDICINE

## 2021-03-31 PROCEDURE — 99499 UNLISTED E&M SERVICE: CPT | Mod: HCNC,S$GLB,, | Performed by: INTERNAL MEDICINE

## 2021-03-31 PROCEDURE — 99999 PR PBB SHADOW E&M-EST. PATIENT-LVL IV: CPT | Mod: PBBFAC,,, | Performed by: INTERNAL MEDICINE

## 2021-03-31 PROCEDURE — 99214 OFFICE O/P EST MOD 30 MIN: CPT | Mod: S$GLB,,, | Performed by: PHYSICIAN ASSISTANT

## 2021-03-31 PROCEDURE — 3079F DIAST BP 80-89 MM HG: CPT | Mod: CPTII,S$GLB,, | Performed by: PHYSICIAN ASSISTANT

## 2021-03-31 PROCEDURE — 1159F MED LIST DOCD IN RCRD: CPT | Mod: S$GLB,,, | Performed by: INTERNAL MEDICINE

## 2021-03-31 PROCEDURE — 3077F PR MOST RECENT SYSTOLIC BLOOD PRESSURE >= 140 MM HG: ICD-10-PCS | Mod: CPTII,S$GLB,, | Performed by: PHYSICIAN ASSISTANT

## 2021-03-31 PROCEDURE — 1159F PR MEDICATION LIST DOCUMENTED IN MEDICAL RECORD: ICD-10-PCS | Mod: S$GLB,,, | Performed by: INTERNAL MEDICINE

## 2021-03-31 PROCEDURE — 1101F PT FALLS ASSESS-DOCD LE1/YR: CPT | Mod: CPTII,S$GLB,, | Performed by: PHYSICIAN ASSISTANT

## 2021-03-31 PROCEDURE — 99999 PR PBB SHADOW E&M-EST. PATIENT-LVL IV: ICD-10-PCS | Mod: PBBFAC,,, | Performed by: INTERNAL MEDICINE

## 2021-03-31 PROCEDURE — 99204 OFFICE O/P NEW MOD 45 MIN: CPT | Mod: S$GLB,,, | Performed by: INTERNAL MEDICINE

## 2021-03-31 PROCEDURE — 3074F SYST BP LT 130 MM HG: CPT | Mod: CPTII,S$GLB,, | Performed by: INTERNAL MEDICINE

## 2021-03-31 PROCEDURE — 3077F SYST BP >= 140 MM HG: CPT | Mod: CPTII,S$GLB,, | Performed by: PHYSICIAN ASSISTANT

## 2021-03-31 PROCEDURE — 99999 PR PBB SHADOW E&M-EST. PATIENT-LVL IV: ICD-10-PCS | Mod: PBBFAC,,, | Performed by: PHYSICIAN ASSISTANT

## 2021-03-31 PROCEDURE — 3288F FALL RISK ASSESSMENT DOCD: CPT | Mod: CPTII,S$GLB,, | Performed by: PHYSICIAN ASSISTANT

## 2021-03-31 PROCEDURE — 1101F PR PT FALLS ASSESS DOC 0-1 FALLS W/OUT INJ PAST YR: ICD-10-PCS | Mod: CPTII,S$GLB,, | Performed by: INTERNAL MEDICINE

## 2021-03-31 PROCEDURE — 3078F DIAST BP <80 MM HG: CPT | Mod: CPTII,S$GLB,, | Performed by: INTERNAL MEDICINE

## 2021-03-31 PROCEDURE — 1126F PR PAIN SEVERITY QUANTIFIED, NO PAIN PRESENT: ICD-10-PCS | Mod: S$GLB,,, | Performed by: INTERNAL MEDICINE

## 2021-03-31 PROCEDURE — 93010 EKG 12-LEAD: ICD-10-PCS | Mod: ,,, | Performed by: INTERNAL MEDICINE

## 2021-03-31 PROCEDURE — 99999 PR PBB SHADOW E&M-EST. PATIENT-LVL III: ICD-10-PCS | Mod: PBBFAC,,, | Performed by: INTERNAL MEDICINE

## 2021-03-31 PROCEDURE — 3288F PR FALLS RISK ASSESSMENT DOCUMENTED: ICD-10-PCS | Mod: CPTII,S$GLB,, | Performed by: INTERNAL MEDICINE

## 2021-03-31 PROCEDURE — 3079F PR MOST RECENT DIASTOLIC BLOOD PRESSURE 80-89 MM HG: ICD-10-PCS | Mod: CPTII,S$GLB,, | Performed by: PHYSICIAN ASSISTANT

## 2021-03-31 PROCEDURE — 3078F PR MOST RECENT DIASTOLIC BLOOD PRESSURE < 80 MM HG: ICD-10-PCS | Mod: CPTII,S$GLB,, | Performed by: INTERNAL MEDICINE

## 2021-03-31 PROCEDURE — 1159F MED LIST DOCD IN RCRD: CPT | Mod: S$GLB,,, | Performed by: PHYSICIAN ASSISTANT

## 2021-03-31 PROCEDURE — 1159F PR MEDICATION LIST DOCUMENTED IN MEDICAL RECORD: ICD-10-PCS | Mod: S$GLB,,, | Performed by: PHYSICIAN ASSISTANT

## 2021-03-31 PROCEDURE — 3079F DIAST BP 80-89 MM HG: CPT | Mod: CPTII,S$GLB,, | Performed by: INTERNAL MEDICINE

## 2021-03-31 PROCEDURE — 99214 PR OFFICE/OUTPT VISIT, EST, LEVL IV, 30-39 MIN: ICD-10-PCS | Mod: S$GLB,,, | Performed by: PHYSICIAN ASSISTANT

## 2021-03-31 PROCEDURE — 99999 PR PBB SHADOW E&M-EST. PATIENT-LVL IV: CPT | Mod: PBBFAC,,, | Performed by: PHYSICIAN ASSISTANT

## 2021-03-31 PROCEDURE — 3288F FALL RISK ASSESSMENT DOCD: CPT | Mod: CPTII,S$GLB,, | Performed by: INTERNAL MEDICINE

## 2021-03-31 PROCEDURE — 3288F PR FALLS RISK ASSESSMENT DOCUMENTED: ICD-10-PCS | Mod: CPTII,S$GLB,, | Performed by: PHYSICIAN ASSISTANT

## 2021-03-31 PROCEDURE — 1101F PT FALLS ASSESS-DOCD LE1/YR: CPT | Mod: CPTII,S$GLB,, | Performed by: INTERNAL MEDICINE

## 2021-03-31 PROCEDURE — 93010 ELECTROCARDIOGRAM REPORT: CPT | Mod: ,,, | Performed by: INTERNAL MEDICINE

## 2021-03-31 PROCEDURE — 3074F PR MOST RECENT SYSTOLIC BLOOD PRESSURE < 130 MM HG: ICD-10-PCS | Mod: CPTII,S$GLB,, | Performed by: INTERNAL MEDICINE

## 2021-03-31 PROCEDURE — 1101F PR PT FALLS ASSESS DOC 0-1 FALLS W/OUT INJ PAST YR: ICD-10-PCS | Mod: CPTII,S$GLB,, | Performed by: PHYSICIAN ASSISTANT

## 2021-03-31 PROCEDURE — 99999 PR PBB SHADOW E&M-EST. PATIENT-LVL III: CPT | Mod: PBBFAC,,, | Performed by: INTERNAL MEDICINE

## 2021-03-31 PROCEDURE — 99204 PR OFFICE/OUTPT VISIT, NEW, LEVL IV, 45-59 MIN: ICD-10-PCS | Mod: S$GLB,,, | Performed by: INTERNAL MEDICINE

## 2021-03-31 PROCEDURE — 99214 OFFICE O/P EST MOD 30 MIN: CPT | Mod: S$GLB,,, | Performed by: INTERNAL MEDICINE

## 2021-03-31 PROCEDURE — 93005 ELECTROCARDIOGRAM TRACING: CPT

## 2021-03-31 PROCEDURE — 3077F SYST BP >= 140 MM HG: CPT | Mod: CPTII,S$GLB,, | Performed by: INTERNAL MEDICINE

## 2021-03-31 PROCEDURE — 3077F PR MOST RECENT SYSTOLIC BLOOD PRESSURE >= 140 MM HG: ICD-10-PCS | Mod: CPTII,S$GLB,, | Performed by: INTERNAL MEDICINE

## 2021-03-31 RX ORDER — FAMOTIDINE 20 MG/1
TABLET, FILM COATED ORAL
COMMUNITY
Start: 2021-03-20 | End: 2021-05-11

## 2021-04-01 ENCOUNTER — PATIENT MESSAGE (OUTPATIENT)
Dept: PRIMARY CARE CLINIC | Facility: CLINIC | Age: 81
End: 2021-04-01

## 2021-04-01 ENCOUNTER — TELEPHONE (OUTPATIENT)
Dept: DERMATOLOGY | Facility: CLINIC | Age: 81
End: 2021-04-01

## 2021-04-01 DIAGNOSIS — L29.9 PRURITUS: Primary | ICD-10-CM

## 2021-04-01 RX ORDER — GABAPENTIN 100 MG/1
100 CAPSULE ORAL NIGHTLY
Qty: 30 CAPSULE | Refills: 0 | Status: SHIPPED | OUTPATIENT
Start: 2021-04-01 | End: 2021-05-11

## 2021-04-05 ENCOUNTER — PATIENT MESSAGE (OUTPATIENT)
Dept: DERMATOLOGY | Facility: CLINIC | Age: 81
End: 2021-04-05

## 2021-04-08 ENCOUNTER — PATIENT MESSAGE (OUTPATIENT)
Dept: PRIMARY CARE CLINIC | Facility: CLINIC | Age: 81
End: 2021-04-08

## 2021-04-09 ENCOUNTER — PATIENT MESSAGE (OUTPATIENT)
Dept: PRIMARY CARE CLINIC | Facility: CLINIC | Age: 81
End: 2021-04-09

## 2021-04-12 ENCOUNTER — OFFICE VISIT (OUTPATIENT)
Dept: PRIMARY CARE CLINIC | Facility: CLINIC | Age: 81
End: 2021-04-12
Payer: MEDICARE

## 2021-04-12 VITALS
TEMPERATURE: 99 F | DIASTOLIC BLOOD PRESSURE: 70 MMHG | OXYGEN SATURATION: 96 % | SYSTOLIC BLOOD PRESSURE: 110 MMHG | WEIGHT: 204.06 LBS | BODY MASS INDEX: 29.28 KG/M2 | HEART RATE: 72 BPM

## 2021-04-12 DIAGNOSIS — R21 RASH: ICD-10-CM

## 2021-04-12 DIAGNOSIS — L29.9 ITCHING: Primary | ICD-10-CM

## 2021-04-12 PROCEDURE — 3288F PR FALLS RISK ASSESSMENT DOCUMENTED: ICD-10-PCS | Mod: HCNC,CPTII,S$GLB, | Performed by: PHYSICIAN ASSISTANT

## 2021-04-12 PROCEDURE — 99999 PR PBB SHADOW E&M-EST. PATIENT-LVL IV: ICD-10-PCS | Mod: PBBFAC,HCNC,, | Performed by: PHYSICIAN ASSISTANT

## 2021-04-12 PROCEDURE — 99214 PR OFFICE/OUTPT VISIT, EST, LEVL IV, 30-39 MIN: ICD-10-PCS | Mod: HCNC,25,S$GLB, | Performed by: PHYSICIAN ASSISTANT

## 2021-04-12 PROCEDURE — 99214 OFFICE O/P EST MOD 30 MIN: CPT | Mod: HCNC,25,S$GLB, | Performed by: PHYSICIAN ASSISTANT

## 2021-04-12 PROCEDURE — 3288F FALL RISK ASSESSMENT DOCD: CPT | Mod: HCNC,CPTII,S$GLB, | Performed by: PHYSICIAN ASSISTANT

## 2021-04-12 PROCEDURE — 1125F PR PAIN SEVERITY QUANTIFIED, PAIN PRESENT: ICD-10-PCS | Mod: HCNC,S$GLB,, | Performed by: PHYSICIAN ASSISTANT

## 2021-04-12 PROCEDURE — 1159F PR MEDICATION LIST DOCUMENTED IN MEDICAL RECORD: ICD-10-PCS | Mod: HCNC,S$GLB,, | Performed by: PHYSICIAN ASSISTANT

## 2021-04-12 PROCEDURE — 99999 PR PBB SHADOW E&M-EST. PATIENT-LVL IV: CPT | Mod: PBBFAC,HCNC,, | Performed by: PHYSICIAN ASSISTANT

## 2021-04-12 PROCEDURE — 1101F PT FALLS ASSESS-DOCD LE1/YR: CPT | Mod: HCNC,CPTII,S$GLB, | Performed by: PHYSICIAN ASSISTANT

## 2021-04-12 PROCEDURE — 96372 PR INJECTION,THERAP/PROPH/DIAG2ST, IM OR SUBCUT: ICD-10-PCS | Mod: HCNC,S$GLB,, | Performed by: PHYSICIAN ASSISTANT

## 2021-04-12 PROCEDURE — 1159F MED LIST DOCD IN RCRD: CPT | Mod: HCNC,S$GLB,, | Performed by: PHYSICIAN ASSISTANT

## 2021-04-12 PROCEDURE — 96372 THER/PROPH/DIAG INJ SC/IM: CPT | Mod: HCNC,S$GLB,, | Performed by: PHYSICIAN ASSISTANT

## 2021-04-12 PROCEDURE — 1125F AMNT PAIN NOTED PAIN PRSNT: CPT | Mod: HCNC,S$GLB,, | Performed by: PHYSICIAN ASSISTANT

## 2021-04-12 PROCEDURE — 1101F PR PT FALLS ASSESS DOC 0-1 FALLS W/OUT INJ PAST YR: ICD-10-PCS | Mod: HCNC,CPTII,S$GLB, | Performed by: PHYSICIAN ASSISTANT

## 2021-04-12 RX ORDER — BETAMETHASONE SODIUM PHOSPHATE AND BETAMETHASONE ACETATE 3; 3 MG/ML; MG/ML
9 INJECTION, SUSPENSION INTRA-ARTICULAR; INTRALESIONAL; INTRAMUSCULAR; SOFT TISSUE
Status: COMPLETED | OUTPATIENT
Start: 2021-04-12 | End: 2021-04-12

## 2021-04-12 RX ADMIN — BETAMETHASONE SODIUM PHOSPHATE AND BETAMETHASONE ACETATE 9 MG: 3; 3 INJECTION, SUSPENSION INTRA-ARTICULAR; INTRALESIONAL; INTRAMUSCULAR; SOFT TISSUE at 03:04

## 2021-04-18 ENCOUNTER — PATIENT MESSAGE (OUTPATIENT)
Dept: PRIMARY CARE CLINIC | Facility: CLINIC | Age: 81
End: 2021-04-18

## 2021-04-19 ENCOUNTER — LAB VISIT (OUTPATIENT)
Dept: LAB | Facility: HOSPITAL | Age: 81
End: 2021-04-19
Attending: FAMILY MEDICINE
Payer: MEDICARE

## 2021-04-19 DIAGNOSIS — L29.9 PRURITUS: ICD-10-CM

## 2021-04-19 LAB
CRP SERPL-MCNC: 0.6 MG/L (ref 0–8.2)
ERYTHROCYTE [SEDIMENTATION RATE] IN BLOOD BY WESTERGREN METHOD: 1 MM/HR (ref 0–10)

## 2021-04-19 PROCEDURE — 36415 COLL VENOUS BLD VENIPUNCTURE: CPT | Mod: HCNC,PO | Performed by: INTERNAL MEDICINE

## 2021-04-19 PROCEDURE — 86160 COMPLEMENT ANTIGEN: CPT | Mod: 59,HCNC | Performed by: INTERNAL MEDICINE

## 2021-04-19 PROCEDURE — 86140 C-REACTIVE PROTEIN: CPT | Mod: HCNC | Performed by: INTERNAL MEDICINE

## 2021-04-19 PROCEDURE — 85651 RBC SED RATE NONAUTOMATED: CPT | Mod: HCNC | Performed by: INTERNAL MEDICINE

## 2021-04-19 PROCEDURE — 86160 COMPLEMENT ANTIGEN: CPT | Mod: HCNC | Performed by: INTERNAL MEDICINE

## 2021-04-20 LAB
C3 SERPL-MCNC: 110 MG/DL (ref 50–180)
C4 SERPL-MCNC: 22 MG/DL (ref 11–44)

## 2021-05-10 NOTE — TELEPHONE ENCOUNTER
Pt is requesting lab orders to be placed for upcoming annual. Please advise.    Valtrex Counseling: I discussed with the patient the risks of valacyclovir including but not limited to kidney damage, nausea, vomiting and severe allergy.  The patient understands that if the infection seems to be worsening or is not improving, they are to call.

## 2021-05-11 ENCOUNTER — OFFICE VISIT (OUTPATIENT)
Dept: PRIMARY CARE CLINIC | Facility: CLINIC | Age: 81
End: 2021-05-11
Payer: MEDICARE

## 2021-05-11 DIAGNOSIS — R21 RASH: ICD-10-CM

## 2021-05-11 DIAGNOSIS — N40.0 BENIGN PROSTATIC HYPERPLASIA WITHOUT LOWER URINARY TRACT SYMPTOMS: ICD-10-CM

## 2021-05-11 DIAGNOSIS — I10 ESSENTIAL HYPERTENSION: Chronic | ICD-10-CM

## 2021-05-11 DIAGNOSIS — N18.31 STAGE 3A CHRONIC KIDNEY DISEASE: ICD-10-CM

## 2021-05-11 DIAGNOSIS — I70.203 ATHEROSCLEROSIS OF ARTERY OF BOTH LOWER EXTREMITIES: ICD-10-CM

## 2021-05-11 DIAGNOSIS — D72.19 OTHER EOSINOPHILIA: ICD-10-CM

## 2021-05-11 DIAGNOSIS — C61 PROSTATE CANCER: ICD-10-CM

## 2021-05-11 DIAGNOSIS — L29.9 PRURITUS: Primary | ICD-10-CM

## 2021-05-11 PROCEDURE — 99215 PR OFFICE/OUTPT VISIT, EST, LEVL V, 40-54 MIN: ICD-10-PCS | Mod: HCNC,95,, | Performed by: FAMILY MEDICINE

## 2021-05-11 PROCEDURE — 99215 OFFICE O/P EST HI 40 MIN: CPT | Mod: HCNC,95,, | Performed by: FAMILY MEDICINE

## 2021-05-11 PROCEDURE — 99499 RISK ADDL DX/OHS AUDIT: ICD-10-PCS | Mod: HCNC,95,, | Performed by: FAMILY MEDICINE

## 2021-05-11 PROCEDURE — 1159F PR MEDICATION LIST DOCUMENTED IN MEDICAL RECORD: ICD-10-PCS | Mod: HCNC,95,, | Performed by: FAMILY MEDICINE

## 2021-05-11 PROCEDURE — 1159F MED LIST DOCD IN RCRD: CPT | Mod: HCNC,95,, | Performed by: FAMILY MEDICINE

## 2021-05-11 PROCEDURE — 99499 UNLISTED E&M SERVICE: CPT | Mod: HCNC,95,, | Performed by: FAMILY MEDICINE

## 2021-05-11 RX ORDER — FAMOTIDINE 20 MG/1
20 TABLET, FILM COATED ORAL 2 TIMES DAILY
Qty: 180 TABLET | Refills: 3 | Status: SHIPPED | OUTPATIENT
Start: 2021-05-11 | End: 2021-08-02 | Stop reason: SDUPTHER

## 2021-05-11 RX ORDER — TRIAMCINOLONE ACETONIDE 1 MG/G
CREAM TOPICAL 4 TIMES DAILY
Qty: 453 G | Refills: 1 | Status: SHIPPED | OUTPATIENT
Start: 2021-05-11 | End: 2021-11-01

## 2021-05-12 ENCOUNTER — PATIENT MESSAGE (OUTPATIENT)
Dept: PRIMARY CARE CLINIC | Facility: CLINIC | Age: 81
End: 2021-05-12

## 2021-05-13 ENCOUNTER — OFFICE VISIT (OUTPATIENT)
Dept: RHEUMATOLOGY | Facility: CLINIC | Age: 81
End: 2021-05-13
Payer: MEDICARE

## 2021-05-13 ENCOUNTER — LAB VISIT (OUTPATIENT)
Dept: LAB | Facility: HOSPITAL | Age: 81
End: 2021-05-13
Attending: INTERNAL MEDICINE
Payer: MEDICARE

## 2021-05-13 VITALS
DIASTOLIC BLOOD PRESSURE: 81 MMHG | SYSTOLIC BLOOD PRESSURE: 133 MMHG | BODY MASS INDEX: 28.91 KG/M2 | HEART RATE: 62 BPM | WEIGHT: 201.5 LBS

## 2021-05-13 DIAGNOSIS — L29.9 PRURITUS: ICD-10-CM

## 2021-05-13 DIAGNOSIS — L29.9 PRURITUS: Primary | ICD-10-CM

## 2021-05-13 DIAGNOSIS — N18.31 STAGE 3A CHRONIC KIDNEY DISEASE: ICD-10-CM

## 2021-05-13 DIAGNOSIS — D72.19 OTHER EOSINOPHILIA: ICD-10-CM

## 2021-05-13 DIAGNOSIS — D69.6 THROMBOCYTOPENIA: ICD-10-CM

## 2021-05-13 LAB
CRP SERPL-MCNC: 7.8 MG/L (ref 0–8.2)
ERYTHROCYTE [SEDIMENTATION RATE] IN BLOOD BY WESTERGREN METHOD: 18 MM/HR (ref 0–10)

## 2021-05-13 PROCEDURE — 1125F AMNT PAIN NOTED PAIN PRSNT: CPT | Mod: HCNC,S$GLB,, | Performed by: INTERNAL MEDICINE

## 2021-05-13 PROCEDURE — 86140 C-REACTIVE PROTEIN: CPT | Mod: HCNC | Performed by: INTERNAL MEDICINE

## 2021-05-13 PROCEDURE — 99499 UNLISTED E&M SERVICE: CPT | Mod: HCNC,S$GLB,, | Performed by: INTERNAL MEDICINE

## 2021-05-13 PROCEDURE — 86235 NUCLEAR ANTIGEN ANTIBODY: CPT | Mod: HCNC | Performed by: INTERNAL MEDICINE

## 2021-05-13 PROCEDURE — 1159F MED LIST DOCD IN RCRD: CPT | Mod: HCNC,S$GLB,, | Performed by: INTERNAL MEDICINE

## 2021-05-13 PROCEDURE — 85651 RBC SED RATE NONAUTOMATED: CPT | Mod: HCNC | Performed by: INTERNAL MEDICINE

## 2021-05-13 PROCEDURE — 86235 NUCLEAR ANTIGEN ANTIBODY: CPT | Mod: 59,HCNC | Performed by: INTERNAL MEDICINE

## 2021-05-13 PROCEDURE — 3288F PR FALLS RISK ASSESSMENT DOCUMENTED: ICD-10-PCS | Mod: HCNC,CPTII,S$GLB, | Performed by: INTERNAL MEDICINE

## 2021-05-13 PROCEDURE — 99999 PR PBB SHADOW E&M-EST. PATIENT-LVL III: ICD-10-PCS | Mod: PBBFAC,HCNC,, | Performed by: INTERNAL MEDICINE

## 2021-05-13 PROCEDURE — 1159F PR MEDICATION LIST DOCUMENTED IN MEDICAL RECORD: ICD-10-PCS | Mod: HCNC,S$GLB,, | Performed by: INTERNAL MEDICINE

## 2021-05-13 PROCEDURE — 99499 RISK ADDL DX/OHS AUDIT: ICD-10-PCS | Mod: HCNC,S$GLB,, | Performed by: INTERNAL MEDICINE

## 2021-05-13 PROCEDURE — 1101F PT FALLS ASSESS-DOCD LE1/YR: CPT | Mod: HCNC,CPTII,S$GLB, | Performed by: INTERNAL MEDICINE

## 2021-05-13 PROCEDURE — 99999 PR PBB SHADOW E&M-EST. PATIENT-LVL III: CPT | Mod: PBBFAC,HCNC,, | Performed by: INTERNAL MEDICINE

## 2021-05-13 PROCEDURE — 36415 COLL VENOUS BLD VENIPUNCTURE: CPT | Mod: HCNC | Performed by: INTERNAL MEDICINE

## 2021-05-13 PROCEDURE — 99214 PR OFFICE/OUTPT VISIT, EST, LEVL IV, 30-39 MIN: ICD-10-PCS | Mod: HCNC,S$GLB,, | Performed by: INTERNAL MEDICINE

## 2021-05-13 PROCEDURE — 83516 IMMUNOASSAY NONANTIBODY: CPT | Mod: 59,HCNC | Performed by: INTERNAL MEDICINE

## 2021-05-13 PROCEDURE — 86255 FLUORESCENT ANTIBODY SCREEN: CPT | Mod: 91,HCNC | Performed by: INTERNAL MEDICINE

## 2021-05-13 PROCEDURE — 86160 COMPLEMENT ANTIGEN: CPT | Mod: HCNC | Performed by: INTERNAL MEDICINE

## 2021-05-13 PROCEDURE — 86256 FLUORESCENT ANTIBODY TITER: CPT | Mod: HCNC | Performed by: INTERNAL MEDICINE

## 2021-05-13 PROCEDURE — 86160 COMPLEMENT ANTIGEN: CPT | Mod: 59,HCNC | Performed by: INTERNAL MEDICINE

## 2021-05-13 PROCEDURE — 1101F PR PT FALLS ASSESS DOC 0-1 FALLS W/OUT INJ PAST YR: ICD-10-PCS | Mod: HCNC,CPTII,S$GLB, | Performed by: INTERNAL MEDICINE

## 2021-05-13 PROCEDURE — 3288F FALL RISK ASSESSMENT DOCD: CPT | Mod: HCNC,CPTII,S$GLB, | Performed by: INTERNAL MEDICINE

## 2021-05-13 PROCEDURE — 99214 OFFICE O/P EST MOD 30 MIN: CPT | Mod: HCNC,S$GLB,, | Performed by: INTERNAL MEDICINE

## 2021-05-13 PROCEDURE — 1125F PR PAIN SEVERITY QUANTIFIED, PAIN PRESENT: ICD-10-PCS | Mod: HCNC,S$GLB,, | Performed by: INTERNAL MEDICINE

## 2021-05-13 PROCEDURE — 83520 IMMUNOASSAY QUANT NOS NONAB: CPT | Mod: HCNC | Performed by: INTERNAL MEDICINE

## 2021-05-14 LAB
C3 SERPL-MCNC: 142 MG/DL (ref 50–180)
C4 SERPL-MCNC: 40 MG/DL (ref 11–44)

## 2021-05-17 ENCOUNTER — PATIENT MESSAGE (OUTPATIENT)
Dept: PRIMARY CARE CLINIC | Facility: CLINIC | Age: 81
End: 2021-05-17

## 2021-05-17 LAB
ANCA AB TITR SER IF: NORMAL TITER
ANTI-CENTROMERE ANTIBODY: NEGATIVE
ANTI-SSA ANTIBODY: 0.04 RATIO (ref 0–0.99)
ANTI-SSA INTERPRETATION: NEGATIVE
ANTI-SSB ANTIBODY: 0.06 RATIO (ref 0–0.99)
ANTI-SSB INTERPRETATION: NEGATIVE
CENTROMERE AB TITR SER IF: NORMAL TITER
ENA SCL70 AB SER-ACNC: 4 UNITS
P-ANCA TITR SER IF: NORMAL TITER

## 2021-05-18 ENCOUNTER — OFFICE VISIT (OUTPATIENT)
Dept: PRIMARY CARE CLINIC | Facility: CLINIC | Age: 81
End: 2021-05-18
Payer: MEDICARE

## 2021-05-18 DIAGNOSIS — D72.19 OTHER EOSINOPHILIA: ICD-10-CM

## 2021-05-18 DIAGNOSIS — I70.203 ATHEROSCLEROSIS OF ARTERY OF BOTH LOWER EXTREMITIES: ICD-10-CM

## 2021-05-18 DIAGNOSIS — L29.9 PRURITUS: Primary | ICD-10-CM

## 2021-05-18 DIAGNOSIS — R21 RASH: ICD-10-CM

## 2021-05-18 DIAGNOSIS — I10 ESSENTIAL HYPERTENSION: ICD-10-CM

## 2021-05-18 DIAGNOSIS — N40.0 BENIGN PROSTATIC HYPERPLASIA WITHOUT LOWER URINARY TRACT SYMPTOMS: ICD-10-CM

## 2021-05-18 LAB — C1INH SERPL-MCNC: 37 MG/DL (ref 21–39)

## 2021-05-18 PROCEDURE — 99214 PR OFFICE/OUTPT VISIT, EST, LEVL IV, 30-39 MIN: ICD-10-PCS | Mod: HCNC,95,, | Performed by: FAMILY MEDICINE

## 2021-05-18 PROCEDURE — 1159F MED LIST DOCD IN RCRD: CPT | Mod: HCNC,95,, | Performed by: FAMILY MEDICINE

## 2021-05-18 PROCEDURE — 1159F PR MEDICATION LIST DOCUMENTED IN MEDICAL RECORD: ICD-10-PCS | Mod: HCNC,95,, | Performed by: FAMILY MEDICINE

## 2021-05-18 PROCEDURE — 99214 OFFICE O/P EST MOD 30 MIN: CPT | Mod: HCNC,95,, | Performed by: FAMILY MEDICINE

## 2021-05-18 RX ORDER — AMLODIPINE BESYLATE 2.5 MG/1
TABLET ORAL
Qty: 270 TABLET | Refills: 3 | Status: SHIPPED | OUTPATIENT
Start: 2021-05-18 | End: 2022-02-24 | Stop reason: SDUPTHER

## 2021-05-24 ENCOUNTER — TELEPHONE (OUTPATIENT)
Dept: CARDIOLOGY | Facility: CLINIC | Age: 81
End: 2021-05-24

## 2021-05-24 ENCOUNTER — TELEPHONE (OUTPATIENT)
Dept: PRIMARY CARE CLINIC | Facility: CLINIC | Age: 81
End: 2021-05-24

## 2021-05-26 LAB
ANTI-PM/SCL AB: <20 UNITS
ANTI-SS-A 52 KD AB, IGG: <20 UNITS
EJ AB SER QL: NEGATIVE
ENA JO1 AB SER IA-ACNC: <20 UNITS
ENA SM+RNP AB SER IA-ACNC: <20 UNITS
FIBRILLARIN (U3 RNP): NEGATIVE
KU AB SER QL: NEGATIVE
MDA-5 (P140): <20 UNITS
MI2 AB SER QL: NEGATIVE
NXP-2 (P140): <20 UNITS
OJ AB SER QL: NEGATIVE
PL12 AB SER QL: NEGATIVE
PL7 AB SER QL: NEGATIVE
SRP AB SERPL QL: NEGATIVE
TIF1 GAMMA (P155/140): <20 UNITS
U2 SNRNP: NEGATIVE

## 2021-05-28 ENCOUNTER — HOSPITAL ENCOUNTER (OUTPATIENT)
Dept: CARDIOLOGY | Facility: HOSPITAL | Age: 81
Discharge: HOME | End: 2021-05-28
Attending: INTERNAL MEDICINE
Payer: MEDICARE

## 2021-05-28 ENCOUNTER — OFFICE VISIT (OUTPATIENT)
Dept: CARDIOLOGY | Facility: CLINIC | Age: 81
End: 2021-05-28
Payer: MEDICARE

## 2021-05-28 VITALS
HEART RATE: 88 BPM | OXYGEN SATURATION: 97 % | BODY MASS INDEX: 28.44 KG/M2 | WEIGHT: 198.63 LBS | DIASTOLIC BLOOD PRESSURE: 68 MMHG | HEIGHT: 70 IN | SYSTOLIC BLOOD PRESSURE: 116 MMHG

## 2021-05-28 DIAGNOSIS — G89.29 CHRONIC BILATERAL LOW BACK PAIN WITHOUT SCIATICA: ICD-10-CM

## 2021-05-28 DIAGNOSIS — R94.31 ABNORMAL ELECTROCARDIOGRAM (ECG) (EKG): ICD-10-CM

## 2021-05-28 DIAGNOSIS — I10 ESSENTIAL HYPERTENSION: Primary | ICD-10-CM

## 2021-05-28 DIAGNOSIS — I10 ESSENTIAL HYPERTENSION: ICD-10-CM

## 2021-05-28 DIAGNOSIS — M79.89 LEG SWELLING: ICD-10-CM

## 2021-05-28 DIAGNOSIS — G47.33 OSA (OBSTRUCTIVE SLEEP APNEA): ICD-10-CM

## 2021-05-28 DIAGNOSIS — I10 ESSENTIAL HYPERTENSION: Primary | Chronic | ICD-10-CM

## 2021-05-28 DIAGNOSIS — M54.50 CHRONIC BILATERAL LOW BACK PAIN WITHOUT SCIATICA: ICD-10-CM

## 2021-05-28 DIAGNOSIS — E78.2 MIXED HYPERLIPIDEMIA: Chronic | ICD-10-CM

## 2021-05-28 DIAGNOSIS — M46.94 UNSPECIFIED INFLAMMATORY SPONDYLOPATHY, THORACIC REGION: ICD-10-CM

## 2021-05-28 DIAGNOSIS — I70.203 ATHEROSCLEROSIS OF ARTERY OF BOTH LOWER EXTREMITIES: ICD-10-CM

## 2021-05-28 DIAGNOSIS — R53.1 GENERALIZED WEAKNESS: ICD-10-CM

## 2021-05-28 DIAGNOSIS — I45.10 INCOMPLETE RIGHT BUNDLE BRANCH BLOCK: ICD-10-CM

## 2021-05-28 DIAGNOSIS — C61 PROSTATE CANCER: ICD-10-CM

## 2021-05-28 DIAGNOSIS — N18.31 STAGE 3A CHRONIC KIDNEY DISEASE: ICD-10-CM

## 2021-05-28 PROCEDURE — 93010 EKG 12-LEAD: ICD-10-PCS | Mod: HCNC,,, | Performed by: INTERNAL MEDICINE

## 2021-05-28 PROCEDURE — 99499 UNLISTED E&M SERVICE: CPT | Mod: HCNC,S$GLB,, | Performed by: INTERNAL MEDICINE

## 2021-05-28 PROCEDURE — 1159F MED LIST DOCD IN RCRD: CPT | Mod: HCNC,S$GLB,, | Performed by: INTERNAL MEDICINE

## 2021-05-28 PROCEDURE — 99999 PR PBB SHADOW E&M-EST. PATIENT-LVL III: CPT | Mod: PBBFAC,HCNC,, | Performed by: INTERNAL MEDICINE

## 2021-05-28 PROCEDURE — 93010 ELECTROCARDIOGRAM REPORT: CPT | Mod: HCNC,,, | Performed by: INTERNAL MEDICINE

## 2021-05-28 PROCEDURE — 99999 PR PBB SHADOW E&M-EST. PATIENT-LVL III: ICD-10-PCS | Mod: PBBFAC,HCNC,, | Performed by: INTERNAL MEDICINE

## 2021-05-28 PROCEDURE — 93005 ELECTROCARDIOGRAM TRACING: CPT | Mod: HCNC

## 2021-05-28 PROCEDURE — 99214 OFFICE O/P EST MOD 30 MIN: CPT | Mod: HCNC,S$GLB,, | Performed by: INTERNAL MEDICINE

## 2021-05-28 PROCEDURE — 1159F PR MEDICATION LIST DOCUMENTED IN MEDICAL RECORD: ICD-10-PCS | Mod: HCNC,S$GLB,, | Performed by: INTERNAL MEDICINE

## 2021-05-28 PROCEDURE — 3074F PR MOST RECENT SYSTOLIC BLOOD PRESSURE < 130 MM HG: ICD-10-PCS | Mod: HCNC,CPTII,S$GLB, | Performed by: INTERNAL MEDICINE

## 2021-05-28 PROCEDURE — 99499 RISK ADDL DX/OHS AUDIT: ICD-10-PCS | Mod: HCNC,S$GLB,, | Performed by: INTERNAL MEDICINE

## 2021-05-28 PROCEDURE — 1126F AMNT PAIN NOTED NONE PRSNT: CPT | Mod: HCNC,S$GLB,, | Performed by: INTERNAL MEDICINE

## 2021-05-28 PROCEDURE — 3078F DIAST BP <80 MM HG: CPT | Mod: HCNC,CPTII,S$GLB, | Performed by: INTERNAL MEDICINE

## 2021-05-28 PROCEDURE — 3078F PR MOST RECENT DIASTOLIC BLOOD PRESSURE < 80 MM HG: ICD-10-PCS | Mod: HCNC,CPTII,S$GLB, | Performed by: INTERNAL MEDICINE

## 2021-05-28 PROCEDURE — 99214 PR OFFICE/OUTPT VISIT, EST, LEVL IV, 30-39 MIN: ICD-10-PCS | Mod: HCNC,S$GLB,, | Performed by: INTERNAL MEDICINE

## 2021-05-28 PROCEDURE — 3074F SYST BP LT 130 MM HG: CPT | Mod: HCNC,CPTII,S$GLB, | Performed by: INTERNAL MEDICINE

## 2021-05-28 PROCEDURE — 1126F PR PAIN SEVERITY QUANTIFIED, NO PAIN PRESENT: ICD-10-PCS | Mod: HCNC,S$GLB,, | Performed by: INTERNAL MEDICINE

## 2021-05-31 ENCOUNTER — TELEPHONE (OUTPATIENT)
Dept: CARDIOLOGY | Facility: CLINIC | Age: 81
End: 2021-05-31

## 2021-05-31 RX ORDER — ACETAMINOPHEN 500 MG
1 TABLET ORAL DAILY
COMMUNITY
End: 2022-07-27 | Stop reason: ALTCHOICE

## 2021-06-01 ENCOUNTER — TELEPHONE (OUTPATIENT)
Dept: PRIMARY CARE CLINIC | Facility: CLINIC | Age: 81
End: 2021-06-01

## 2021-06-04 ENCOUNTER — PATIENT MESSAGE (OUTPATIENT)
Dept: PRIMARY CARE CLINIC | Facility: CLINIC | Age: 81
End: 2021-06-04

## 2021-06-04 ENCOUNTER — PATIENT MESSAGE (OUTPATIENT)
Dept: HEMATOLOGY/ONCOLOGY | Facility: CLINIC | Age: 81
End: 2021-06-04

## 2021-06-11 ENCOUNTER — TELEPHONE (OUTPATIENT)
Dept: INTERNAL MEDICINE | Facility: CLINIC | Age: 81
End: 2021-06-11

## 2021-06-16 ENCOUNTER — HOSPITAL ENCOUNTER (OUTPATIENT)
Dept: RADIOLOGY | Facility: HOSPITAL | Age: 81
Discharge: HOME | End: 2021-06-16
Attending: FAMILY MEDICINE
Payer: MEDICARE

## 2021-06-16 ENCOUNTER — OFFICE VISIT (OUTPATIENT)
Dept: PRIMARY CARE CLINIC | Facility: CLINIC | Age: 81
End: 2021-06-16
Payer: MEDICARE

## 2021-06-16 ENCOUNTER — PATIENT MESSAGE (OUTPATIENT)
Dept: PRIMARY CARE CLINIC | Facility: CLINIC | Age: 81
End: 2021-06-16

## 2021-06-16 DIAGNOSIS — L29.9 PRURITUS: Primary | ICD-10-CM

## 2021-06-16 DIAGNOSIS — R21 RASH: ICD-10-CM

## 2021-06-16 DIAGNOSIS — M54.2 NECK PAIN OF OVER 3 MONTHS DURATION: ICD-10-CM

## 2021-06-16 DIAGNOSIS — D72.19 OTHER EOSINOPHILIA: ICD-10-CM

## 2021-06-16 PROCEDURE — 99215 PR OFFICE/OUTPT VISIT, EST, LEVL V, 40-54 MIN: ICD-10-PCS | Mod: HCNC,95,, | Performed by: FAMILY MEDICINE

## 2021-06-16 PROCEDURE — 1159F PR MEDICATION LIST DOCUMENTED IN MEDICAL RECORD: ICD-10-PCS | Mod: HCNC,95,, | Performed by: FAMILY MEDICINE

## 2021-06-16 PROCEDURE — 72040 X-RAY EXAM NECK SPINE 2-3 VW: CPT | Mod: 26,HCNC,, | Performed by: RADIOLOGY

## 2021-06-16 PROCEDURE — 3288F FALL RISK ASSESSMENT DOCD: CPT | Mod: HCNC,CPTII,95, | Performed by: FAMILY MEDICINE

## 2021-06-16 PROCEDURE — 3288F PR FALLS RISK ASSESSMENT DOCUMENTED: ICD-10-PCS | Mod: HCNC,CPTII,95, | Performed by: FAMILY MEDICINE

## 2021-06-16 PROCEDURE — 1101F PT FALLS ASSESS-DOCD LE1/YR: CPT | Mod: HCNC,CPTII,95, | Performed by: FAMILY MEDICINE

## 2021-06-16 PROCEDURE — 1126F PR PAIN SEVERITY QUANTIFIED, NO PAIN PRESENT: ICD-10-PCS | Mod: HCNC,95,, | Performed by: FAMILY MEDICINE

## 2021-06-16 PROCEDURE — 1101F PR PT FALLS ASSESS DOC 0-1 FALLS W/OUT INJ PAST YR: ICD-10-PCS | Mod: HCNC,CPTII,95, | Performed by: FAMILY MEDICINE

## 2021-06-16 PROCEDURE — 1159F MED LIST DOCD IN RCRD: CPT | Mod: HCNC,95,, | Performed by: FAMILY MEDICINE

## 2021-06-16 PROCEDURE — 1126F AMNT PAIN NOTED NONE PRSNT: CPT | Mod: HCNC,95,, | Performed by: FAMILY MEDICINE

## 2021-06-16 PROCEDURE — 72040 XR CERVICAL SPINE AP LATERAL: ICD-10-PCS | Mod: 26,HCNC,, | Performed by: RADIOLOGY

## 2021-06-16 PROCEDURE — 99215 OFFICE O/P EST HI 40 MIN: CPT | Mod: HCNC,95,, | Performed by: FAMILY MEDICINE

## 2021-06-16 PROCEDURE — 72040 X-RAY EXAM NECK SPINE 2-3 VW: CPT | Mod: TC,HCNC,FY,PO

## 2021-06-17 ENCOUNTER — OFFICE VISIT (OUTPATIENT)
Dept: DERMATOLOGY | Facility: CLINIC | Age: 81
End: 2021-06-17
Payer: MEDICARE

## 2021-06-17 DIAGNOSIS — L29.9 PRURITUS: ICD-10-CM

## 2021-06-17 DIAGNOSIS — B35.1 ONYCHOMYCOSIS: Primary | ICD-10-CM

## 2021-06-17 PROCEDURE — 1159F PR MEDICATION LIST DOCUMENTED IN MEDICAL RECORD: ICD-10-PCS | Mod: HCNC,S$GLB,, | Performed by: STUDENT IN AN ORGANIZED HEALTH CARE EDUCATION/TRAINING PROGRAM

## 2021-06-17 PROCEDURE — 88312 PR  SPECIAL STAINS,GROUP I: ICD-10-PCS | Mod: 26,HCNC,, | Performed by: PATHOLOGY

## 2021-06-17 PROCEDURE — 99999 PR PBB SHADOW E&M-EST. PATIENT-LVL III: ICD-10-PCS | Mod: PBBFAC,HCNC,, | Performed by: STUDENT IN AN ORGANIZED HEALTH CARE EDUCATION/TRAINING PROGRAM

## 2021-06-17 PROCEDURE — 99214 PR OFFICE/OUTPT VISIT, EST, LEVL IV, 30-39 MIN: ICD-10-PCS | Mod: 25,HCNC,S$GLB, | Performed by: STUDENT IN AN ORGANIZED HEALTH CARE EDUCATION/TRAINING PROGRAM

## 2021-06-17 PROCEDURE — 88312 SPECIAL STAINS GROUP 1: CPT | Mod: HCNC | Performed by: PATHOLOGY

## 2021-06-17 PROCEDURE — 99214 OFFICE O/P EST MOD 30 MIN: CPT | Mod: 25,HCNC,S$GLB, | Performed by: STUDENT IN AN ORGANIZED HEALTH CARE EDUCATION/TRAINING PROGRAM

## 2021-06-17 PROCEDURE — 99999 PR PBB SHADOW E&M-EST. PATIENT-LVL III: CPT | Mod: PBBFAC,HCNC,, | Performed by: STUDENT IN AN ORGANIZED HEALTH CARE EDUCATION/TRAINING PROGRAM

## 2021-06-17 PROCEDURE — 11104 PR PUNCH BIOPSY, SKIN, SINGLE LESION: ICD-10-PCS | Mod: HCNC,S$GLB,, | Performed by: STUDENT IN AN ORGANIZED HEALTH CARE EDUCATION/TRAINING PROGRAM

## 2021-06-17 PROCEDURE — 88305 TISSUE EXAM BY PATHOLOGIST: ICD-10-PCS | Mod: 26,HCNC,, | Performed by: PATHOLOGY

## 2021-06-17 PROCEDURE — 88312 SPECIAL STAINS GROUP 1: CPT | Mod: 26,HCNC,, | Performed by: PATHOLOGY

## 2021-06-17 PROCEDURE — 11104 PUNCH BX SKIN SINGLE LESION: CPT | Mod: HCNC,S$GLB,, | Performed by: STUDENT IN AN ORGANIZED HEALTH CARE EDUCATION/TRAINING PROGRAM

## 2021-06-17 PROCEDURE — 88305 TISSUE EXAM BY PATHOLOGIST: CPT | Mod: 26,HCNC,, | Performed by: PATHOLOGY

## 2021-06-17 PROCEDURE — 1159F MED LIST DOCD IN RCRD: CPT | Mod: HCNC,S$GLB,, | Performed by: STUDENT IN AN ORGANIZED HEALTH CARE EDUCATION/TRAINING PROGRAM

## 2021-06-17 PROCEDURE — 88305 TISSUE EXAM BY PATHOLOGIST: CPT | Mod: HCNC | Performed by: PATHOLOGY

## 2021-06-17 RX ORDER — EFINACONAZOLE 100 MG/ML
SOLUTION TOPICAL
Qty: 4 ML | Refills: 3 | Status: SHIPPED | OUTPATIENT
Start: 2021-06-17 | End: 2021-08-16

## 2021-06-22 ENCOUNTER — HOSPITAL ENCOUNTER (OUTPATIENT)
Dept: CARDIOLOGY | Facility: HOSPITAL | Age: 81
Discharge: HOME | End: 2021-06-22
Attending: INTERNAL MEDICINE
Payer: MEDICARE

## 2021-06-22 VITALS — HEIGHT: 70 IN | BODY MASS INDEX: 28.35 KG/M2 | WEIGHT: 198 LBS

## 2021-06-22 DIAGNOSIS — I10 ESSENTIAL HYPERTENSION: Chronic | ICD-10-CM

## 2021-06-22 DIAGNOSIS — M79.89 LEG SWELLING: ICD-10-CM

## 2021-06-22 DIAGNOSIS — R94.31 ABNORMAL ELECTROCARDIOGRAM (ECG) (EKG): ICD-10-CM

## 2021-06-22 DIAGNOSIS — I10 ESSENTIAL HYPERTENSION: ICD-10-CM

## 2021-06-22 DIAGNOSIS — R53.1 GENERALIZED WEAKNESS: ICD-10-CM

## 2021-06-22 DIAGNOSIS — I70.203 ATHEROSCLEROSIS OF ARTERY OF BOTH LOWER EXTREMITIES: ICD-10-CM

## 2021-06-22 LAB
AORTIC ROOT ANNULUS: 3.42 CM
AV INDEX (PROSTH): 0.78
AV MEAN GRADIENT: 8 MMHG
AV PEAK GRADIENT: 11 MMHG
AV VALVE AREA: 2.39 CM2
AV VELOCITY RATIO: 0.92
BSA FOR ECHO PROCEDURE: 2.11 M2
CV ECHO LV RWT: 0.58 CM
CV STRESS BASE HR: 71 BPM
DIASTOLIC BLOOD PRESSURE: 72 MMHG
DOP CALC AO PEAK VEL: 1.69 M/S
DOP CALC AO VTI: 43.67 CM
DOP CALC LVOT AREA: 3 CM2
DOP CALC LVOT DIAMETER: 1.97 CM
DOP CALC LVOT PEAK VEL: 1.55 M/S
DOP CALC LVOT STROKE VOLUME: 104.37 CM3
DOP CALC RVOT PEAK VEL: 0.66 M/S
DOP CALC RVOT VTI: 16.98 CM
DOP CALCLVOT PEAK VEL VTI: 34.26 CM
E WAVE DECELERATION TIME: 286.24 MSEC
E/A RATIO: 1
E/E' RATIO: 11.18 M/S
ECHO LV POSTERIOR WALL: 1.26 CM (ref 0.6–1.1)
EJECTION FRACTION: 65 %
FRACTIONAL SHORTENING: 24 % (ref 28–44)
INTERVENTRICULAR SEPTUM: 1.11 CM (ref 0.6–1.1)
IVRT: 88.49 MSEC
LA MAJOR: 5.55 CM
LA MINOR: 5.33 CM
LA WIDTH: 3.47 CM
LEFT ATRIUM SIZE: 3.49 CM
LEFT ATRIUM VOLUME INDEX: 26.9 ML/M2
LEFT ATRIUM VOLUME: 55.98 CM3
LEFT INTERNAL DIMENSION IN SYSTOLE: 3.28 CM (ref 2.1–4)
LEFT VENTRICLE DIASTOLIC VOLUME INDEX: 40.63 ML/M2
LEFT VENTRICLE DIASTOLIC VOLUME: 84.51 ML
LEFT VENTRICLE MASS INDEX: 88 G/M2
LEFT VENTRICLE SYSTOLIC VOLUME INDEX: 21 ML/M2
LEFT VENTRICLE SYSTOLIC VOLUME: 43.64 ML
LEFT VENTRICULAR INTERNAL DIMENSION IN DIASTOLE: 4.33 CM (ref 3.5–6)
LEFT VENTRICULAR MASS: 183.29 G
LV LATERAL E/E' RATIO: 10.56 M/S
LV SEPTAL E/E' RATIO: 11.88 M/S
MV A" WAVE DURATION": 9.99 MSEC
MV MEAN GRADIENT: 0 MMHG
MV PEAK A VEL: 0.95 M/S
MV PEAK E VEL: 0.95 M/S
MV PEAK GRADIENT: 3 MMHG
OHS CV CPX 1 MINUTE RECOVERY HEART RATE: 112 BPM
OHS CV CPX 85 PERCENT MAX PREDICTED HEART RATE MALE: 116
OHS CV CPX ESTIMATED METS: 7
OHS CV CPX MAX PREDICTED HEART RATE: 137
OHS CV CPX PATIENT IS FEMALE: 0
OHS CV CPX PATIENT IS MALE: 1
OHS CV CPX PEAK DIASTOLIC BLOOD PRESSURE: 66 MMHG
OHS CV CPX PEAK HEAR RATE: 196 BPM
OHS CV CPX PEAK RATE PRESSURE PRODUCT: NORMAL
OHS CV CPX PEAK SYSTOLIC BLOOD PRESSURE: 198 MMHG
OHS CV CPX PERCENT MAX PREDICTED HEART RATE ACHIEVED: 143
OHS CV CPX RATE PRESSURE PRODUCT PRESENTING: NORMAL
PISA MRMAX VEL: 0.06 M/S
PISA TR MAX VEL: 3.16 M/S
PULM VEIN S/D RATIO: 1.56
PV MEAN GRADIENT: 1 MMHG
PV PEAK D VEL: 0.39 M/S
PV PEAK S VEL: 0.61 M/S
PV PEAK VELOCITY: 1.23 CM/S
RA MAJOR: 4.71 CM
RA PRESSURE: 3 MMHG
RA WIDTH: 3.02 CM
RIGHT VENTRICULAR END-DIASTOLIC DIMENSION: 2.58 CM
SINUS: 2.99 CM
STJ: 3.13 CM
STRESS ECHO POST EXERCISE DUR MIN: 6 MINUTES
STRESS ECHO POST EXERCISE DUR SEC: 1 SECONDS
SYSTOLIC BLOOD PRESSURE: 148 MMHG
TDI LATERAL: 0.09 M/S
TDI SEPTAL: 0.08 M/S
TDI: 0.09 M/S
TR MAX PG: 40 MMHG
TV REST PULMONARY ARTERY PRESSURE: 43 MMHG

## 2021-06-22 PROCEDURE — 93227 XTRNL ECG REC<48 HR R&I: CPT | Mod: HCNC,,, | Performed by: INTERNAL MEDICINE

## 2021-06-22 PROCEDURE — 93018 CV STRESS TEST I&R ONLY: CPT | Mod: HCNC,,, | Performed by: INTERNAL MEDICINE

## 2021-06-22 PROCEDURE — 93306 TTE W/DOPPLER COMPLETE: CPT | Mod: 26,HCNC,, | Performed by: INTERNAL MEDICINE

## 2021-06-22 PROCEDURE — 93225 XTRNL ECG REC<48 HRS REC: CPT | Mod: HCNC,PO

## 2021-06-22 PROCEDURE — 93227 HOLTER MONITOR - 48 HOUR (CUPID ONLY): ICD-10-PCS | Mod: HCNC,,, | Performed by: INTERNAL MEDICINE

## 2021-06-22 PROCEDURE — 93306 TTE W/DOPPLER COMPLETE: CPT | Mod: HCNC,PO

## 2021-06-22 PROCEDURE — 93016 CV STRESS TEST SUPVJ ONLY: CPT | Mod: HCNC,,, | Performed by: INTERNAL MEDICINE

## 2021-06-22 PROCEDURE — 93016 EXERCISE STRESS - EKG (CUPID ONLY): ICD-10-PCS | Mod: HCNC,,, | Performed by: INTERNAL MEDICINE

## 2021-06-22 PROCEDURE — 93306 ECHO (CUPID ONLY): ICD-10-PCS | Mod: 26,HCNC,, | Performed by: INTERNAL MEDICINE

## 2021-06-22 PROCEDURE — 93018 EXERCISE STRESS - EKG (CUPID ONLY): ICD-10-PCS | Mod: HCNC,,, | Performed by: INTERNAL MEDICINE

## 2021-06-23 ENCOUNTER — PATIENT MESSAGE (OUTPATIENT)
Dept: DERMATOLOGY | Facility: CLINIC | Age: 81
End: 2021-06-23

## 2021-06-23 ENCOUNTER — PATIENT MESSAGE (OUTPATIENT)
Dept: PRIMARY CARE CLINIC | Facility: CLINIC | Age: 81
End: 2021-06-23

## 2021-06-24 ENCOUNTER — TELEPHONE (OUTPATIENT)
Dept: CARDIOLOGY | Facility: CLINIC | Age: 81
End: 2021-06-24

## 2021-06-24 ENCOUNTER — PATIENT MESSAGE (OUTPATIENT)
Dept: RHEUMATOLOGY | Facility: CLINIC | Age: 81
End: 2021-06-24

## 2021-06-24 DIAGNOSIS — R06.02 SHORTNESS OF BREATH: ICD-10-CM

## 2021-06-24 DIAGNOSIS — R94.39 ABNORMAL STRESS TEST: Primary | ICD-10-CM

## 2021-06-25 LAB
OHS CV EVENT MONITOR DAY: 2
OHS CV HOLTER LENGTH DECIMAL HOURS: 96
OHS CV HOLTER LENGTH HOURS: 48
OHS CV HOLTER LENGTH MINUTES: 0

## 2021-06-28 DIAGNOSIS — G47.33 OSA (OBSTRUCTIVE SLEEP APNEA): Primary | ICD-10-CM

## 2021-06-28 DIAGNOSIS — I27.20 PULMONARY HTN: ICD-10-CM

## 2021-06-30 ENCOUNTER — LAB VISIT (OUTPATIENT)
Dept: LAB | Facility: HOSPITAL | Age: 81
End: 2021-06-30
Attending: INTERNAL MEDICINE
Payer: MEDICARE

## 2021-06-30 DIAGNOSIS — L29.9 PRURITUS: ICD-10-CM

## 2021-06-30 DIAGNOSIS — D72.19 OTHER EOSINOPHILIA: ICD-10-CM

## 2021-06-30 LAB
ALBUMIN SERPL BCP-MCNC: 4 G/DL (ref 3.5–5.2)
ALP SERPL-CCNC: 53 U/L (ref 55–135)
ALT SERPL W/O P-5'-P-CCNC: 13 U/L (ref 10–44)
ANION GAP SERPL CALC-SCNC: 6 MMOL/L (ref 8–16)
AST SERPL-CCNC: 15 U/L (ref 10–40)
BASOPHILS # BLD AUTO: 0.07 K/UL (ref 0–0.2)
BASOPHILS NFR BLD: 1.4 % (ref 0–1.9)
BILIRUB SERPL-MCNC: 1 MG/DL (ref 0.1–1)
BUN SERPL-MCNC: 21 MG/DL (ref 8–23)
CALCIUM SERPL-MCNC: 9 MG/DL (ref 8.7–10.5)
CHLORIDE SERPL-SCNC: 106 MMOL/L (ref 95–110)
CO2 SERPL-SCNC: 27 MMOL/L (ref 23–29)
CREAT SERPL-MCNC: 1.3 MG/DL (ref 0.5–1.4)
DIFFERENTIAL METHOD: ABNORMAL
EOSINOPHIL # BLD AUTO: 0.6 K/UL (ref 0–0.5)
EOSINOPHIL NFR BLD: 12 % (ref 0–8)
ERYTHROCYTE [DISTWIDTH] IN BLOOD BY AUTOMATED COUNT: 12.5 % (ref 11.5–14.5)
EST. GFR  (AFRICAN AMERICAN): 58 ML/MIN/1.73 M^2
EST. GFR  (NON AFRICAN AMERICAN): 50 ML/MIN/1.73 M^2
GLUCOSE SERPL-MCNC: 91 MG/DL (ref 70–110)
HCT VFR BLD AUTO: 42.2 % (ref 40–54)
HGB BLD-MCNC: 13.6 G/DL (ref 14–18)
IMM GRANULOCYTES # BLD AUTO: 0.01 K/UL (ref 0–0.04)
IMM GRANULOCYTES NFR BLD AUTO: 0.2 % (ref 0–0.5)
LYMPHOCYTES # BLD AUTO: 0.7 K/UL (ref 1–4.8)
LYMPHOCYTES NFR BLD: 14.5 % (ref 18–48)
MCH RBC QN AUTO: 31.5 PG (ref 27–31)
MCHC RBC AUTO-ENTMCNC: 32.2 G/DL (ref 32–36)
MCV RBC AUTO: 98 FL (ref 82–98)
MONOCYTES # BLD AUTO: 0.5 K/UL (ref 0.3–1)
MONOCYTES NFR BLD: 9.4 % (ref 4–15)
NEUTROPHILS # BLD AUTO: 3.2 K/UL (ref 1.8–7.7)
NEUTROPHILS NFR BLD: 62.5 % (ref 38–73)
NRBC BLD-RTO: 0 /100 WBC
PLATELET # BLD AUTO: 136 K/UL (ref 150–450)
PMV BLD AUTO: 8.2 FL (ref 9.2–12.9)
POTASSIUM SERPL-SCNC: 4.5 MMOL/L (ref 3.5–5.1)
PROT SERPL-MCNC: 6.5 G/DL (ref 6–8.4)
RBC # BLD AUTO: 4.32 M/UL (ref 4.6–6.2)
SODIUM SERPL-SCNC: 139 MMOL/L (ref 136–145)
WBC # BLD AUTO: 5.09 K/UL (ref 3.9–12.7)

## 2021-06-30 PROCEDURE — 84165 PATHOLOGIST INTERPRETATION SPE: ICD-10-PCS | Mod: 26,HCNC,, | Performed by: PATHOLOGY

## 2021-06-30 PROCEDURE — 86334 IMMUNOFIX E-PHORESIS SERUM: CPT | Mod: 26,HCNC,, | Performed by: PATHOLOGY

## 2021-06-30 PROCEDURE — 86334 PATHOLOGIST INTERPRETATION IFE: ICD-10-PCS | Mod: 26,HCNC,, | Performed by: PATHOLOGY

## 2021-06-30 PROCEDURE — 84165 PROTEIN E-PHORESIS SERUM: CPT | Mod: HCNC | Performed by: INTERNAL MEDICINE

## 2021-06-30 PROCEDURE — 85025 COMPLETE CBC W/AUTO DIFF WBC: CPT | Mod: HCNC | Performed by: INTERNAL MEDICINE

## 2021-06-30 PROCEDURE — 80053 COMPREHEN METABOLIC PANEL: CPT | Mod: HCNC | Performed by: INTERNAL MEDICINE

## 2021-06-30 PROCEDURE — 86334 IMMUNOFIX E-PHORESIS SERUM: CPT | Mod: HCNC | Performed by: INTERNAL MEDICINE

## 2021-06-30 PROCEDURE — 83520 IMMUNOASSAY QUANT NOS NONAB: CPT | Mod: HCNC | Performed by: INTERNAL MEDICINE

## 2021-06-30 PROCEDURE — 36415 COLL VENOUS BLD VENIPUNCTURE: CPT | Mod: HCNC,PO | Performed by: INTERNAL MEDICINE

## 2021-06-30 PROCEDURE — 84165 PROTEIN E-PHORESIS SERUM: CPT | Mod: 26,HCNC,, | Performed by: PATHOLOGY

## 2021-07-01 DIAGNOSIS — L73.9 FOLLICULITIS: Primary | ICD-10-CM

## 2021-07-01 LAB
ALBUMIN SERPL ELPH-MCNC: 4.06 G/DL (ref 3.35–5.55)
ALPHA1 GLOB SERPL ELPH-MCNC: 0.3 G/DL (ref 0.17–0.41)
ALPHA2 GLOB SERPL ELPH-MCNC: 0.76 G/DL (ref 0.43–0.99)
B-GLOBULIN SERPL ELPH-MCNC: 0.66 G/DL (ref 0.5–1.1)
FINAL PATHOLOGIC DIAGNOSIS: NORMAL
GAMMA GLOB SERPL ELPH-MCNC: 0.72 G/DL (ref 0.67–1.58)
GROSS: NORMAL
INTERPRETATION SERPL IFE-IMP: NORMAL
KAPPA LC SER QL IA: 2.23 MG/DL (ref 0.33–1.94)
KAPPA LC/LAMBDA SER IA: 1.54 (ref 0.26–1.65)
LAMBDA LC SER QL IA: 1.45 MG/DL (ref 0.57–2.63)
Lab: NORMAL
MICROSCOPIC EXAM: NORMAL
PATHOLOGIST INTERPRETATION IFE: NORMAL
PATHOLOGIST INTERPRETATION SPE: NORMAL
PROT SERPL-MCNC: 6.5 G/DL (ref 6–8.4)

## 2021-07-01 RX ORDER — ERYTHROMYCIN 20 MG/G
GEL TOPICAL
Qty: 60 G | Refills: 1 | Status: SHIPPED | OUTPATIENT
Start: 2021-07-01 | End: 2021-07-21 | Stop reason: SDUPTHER

## 2021-07-01 RX ORDER — DOXYCYCLINE HYCLATE 100 MG
100 TABLET ORAL EVERY 12 HOURS
Qty: 14 TABLET | Refills: 0 | Status: SHIPPED | OUTPATIENT
Start: 2021-07-01 | End: 2021-07-08

## 2021-07-02 ENCOUNTER — OFFICE VISIT (OUTPATIENT)
Dept: HEMATOLOGY/ONCOLOGY | Facility: CLINIC | Age: 81
End: 2021-07-02
Payer: MEDICARE

## 2021-07-02 VITALS
SYSTOLIC BLOOD PRESSURE: 110 MMHG | TEMPERATURE: 98 F | HEART RATE: 98 BPM | OXYGEN SATURATION: 98 % | DIASTOLIC BLOOD PRESSURE: 76 MMHG | WEIGHT: 200.38 LBS | RESPIRATION RATE: 16 BRPM | HEIGHT: 71 IN | BODY MASS INDEX: 28.05 KG/M2

## 2021-07-02 DIAGNOSIS — D72.19 OTHER EOSINOPHILIA: ICD-10-CM

## 2021-07-02 DIAGNOSIS — C61 PROSTATE CANCER: ICD-10-CM

## 2021-07-02 PROCEDURE — 99499 RISK ADDL DX/OHS AUDIT: ICD-10-PCS | Mod: HCNC,S$GLB,, | Performed by: INTERNAL MEDICINE

## 2021-07-02 PROCEDURE — 99999 PR PBB SHADOW E&M-EST. PATIENT-LVL IV: CPT | Mod: PBBFAC,HCNC,, | Performed by: INTERNAL MEDICINE

## 2021-07-02 PROCEDURE — 1159F PR MEDICATION LIST DOCUMENTED IN MEDICAL RECORD: ICD-10-PCS | Mod: HCNC,S$GLB,, | Performed by: INTERNAL MEDICINE

## 2021-07-02 PROCEDURE — 1101F PR PT FALLS ASSESS DOC 0-1 FALLS W/OUT INJ PAST YR: ICD-10-PCS | Mod: HCNC,CPTII,S$GLB, | Performed by: INTERNAL MEDICINE

## 2021-07-02 PROCEDURE — 3288F FALL RISK ASSESSMENT DOCD: CPT | Mod: HCNC,CPTII,S$GLB, | Performed by: INTERNAL MEDICINE

## 2021-07-02 PROCEDURE — 3288F PR FALLS RISK ASSESSMENT DOCUMENTED: ICD-10-PCS | Mod: HCNC,CPTII,S$GLB, | Performed by: INTERNAL MEDICINE

## 2021-07-02 PROCEDURE — 1126F AMNT PAIN NOTED NONE PRSNT: CPT | Mod: HCNC,S$GLB,, | Performed by: INTERNAL MEDICINE

## 2021-07-02 PROCEDURE — 1126F PR PAIN SEVERITY QUANTIFIED, NO PAIN PRESENT: ICD-10-PCS | Mod: HCNC,S$GLB,, | Performed by: INTERNAL MEDICINE

## 2021-07-02 PROCEDURE — 99214 OFFICE O/P EST MOD 30 MIN: CPT | Mod: HCNC,S$GLB,, | Performed by: INTERNAL MEDICINE

## 2021-07-02 PROCEDURE — 99214 PR OFFICE/OUTPT VISIT, EST, LEVL IV, 30-39 MIN: ICD-10-PCS | Mod: HCNC,S$GLB,, | Performed by: INTERNAL MEDICINE

## 2021-07-02 PROCEDURE — 99999 PR PBB SHADOW E&M-EST. PATIENT-LVL IV: ICD-10-PCS | Mod: PBBFAC,HCNC,, | Performed by: INTERNAL MEDICINE

## 2021-07-02 PROCEDURE — 1159F MED LIST DOCD IN RCRD: CPT | Mod: HCNC,S$GLB,, | Performed by: INTERNAL MEDICINE

## 2021-07-02 PROCEDURE — 1101F PT FALLS ASSESS-DOCD LE1/YR: CPT | Mod: HCNC,CPTII,S$GLB, | Performed by: INTERNAL MEDICINE

## 2021-07-02 PROCEDURE — 99499 UNLISTED E&M SERVICE: CPT | Mod: HCNC,S$GLB,, | Performed by: INTERNAL MEDICINE

## 2021-07-06 ENCOUNTER — PATIENT MESSAGE (OUTPATIENT)
Dept: PRIMARY CARE CLINIC | Facility: CLINIC | Age: 81
End: 2021-07-06

## 2021-07-06 DIAGNOSIS — R97.20 ELEVATED PSA: Primary | ICD-10-CM

## 2021-07-06 DIAGNOSIS — B35.1 ONYCHOMYCOSIS: ICD-10-CM

## 2021-07-07 ENCOUNTER — LAB VISIT (OUTPATIENT)
Dept: LAB | Facility: HOSPITAL | Age: 81
End: 2021-07-07
Attending: FAMILY MEDICINE
Payer: MEDICARE

## 2021-07-07 DIAGNOSIS — R97.20 ELEVATED PSA: ICD-10-CM

## 2021-07-07 LAB — COMPLEXED PSA SERPL-MCNC: 1.3 NG/ML (ref 0–4)

## 2021-07-07 PROCEDURE — 84153 ASSAY OF PSA TOTAL: CPT | Mod: HCNC | Performed by: FAMILY MEDICINE

## 2021-07-07 PROCEDURE — 36415 COLL VENOUS BLD VENIPUNCTURE: CPT | Mod: HCNC,PO | Performed by: FAMILY MEDICINE

## 2021-07-08 DIAGNOSIS — D72.19 OTHER EOSINOPHILIA: Primary | ICD-10-CM

## 2021-07-14 ENCOUNTER — OFFICE VISIT (OUTPATIENT)
Dept: PRIMARY CARE CLINIC | Facility: CLINIC | Age: 81
End: 2021-07-14
Payer: MEDICARE

## 2021-07-14 VITALS — BODY MASS INDEX: 28.05 KG/M2 | HEIGHT: 71 IN | WEIGHT: 200.38 LBS

## 2021-07-14 DIAGNOSIS — L73.9 FOLLICULITIS: Primary | ICD-10-CM

## 2021-07-14 DIAGNOSIS — I10 ESSENTIAL HYPERTENSION: Chronic | ICD-10-CM

## 2021-07-14 DIAGNOSIS — D72.19 OTHER EOSINOPHILIA: ICD-10-CM

## 2021-07-14 DIAGNOSIS — N18.31 STAGE 3A CHRONIC KIDNEY DISEASE: ICD-10-CM

## 2021-07-14 DIAGNOSIS — I70.203 ATHEROSCLEROSIS OF ARTERY OF BOTH LOWER EXTREMITIES: ICD-10-CM

## 2021-07-14 DIAGNOSIS — I45.10 INCOMPLETE RIGHT BUNDLE BRANCH BLOCK: ICD-10-CM

## 2021-07-14 DIAGNOSIS — I49.3 FREQUENT VENTRICULAR PREMATURE BEATS: ICD-10-CM

## 2021-07-14 DIAGNOSIS — L29.9 PRURITUS: ICD-10-CM

## 2021-07-14 PROCEDURE — 1126F AMNT PAIN NOTED NONE PRSNT: CPT | Mod: HCNC,95,, | Performed by: FAMILY MEDICINE

## 2021-07-14 PROCEDURE — 1159F PR MEDICATION LIST DOCUMENTED IN MEDICAL RECORD: ICD-10-PCS | Mod: HCNC,95,, | Performed by: FAMILY MEDICINE

## 2021-07-14 PROCEDURE — 99215 OFFICE O/P EST HI 40 MIN: CPT | Mod: HCNC,95,, | Performed by: FAMILY MEDICINE

## 2021-07-14 PROCEDURE — 1101F PR PT FALLS ASSESS DOC 0-1 FALLS W/OUT INJ PAST YR: ICD-10-PCS | Mod: HCNC,CPTII,95, | Performed by: FAMILY MEDICINE

## 2021-07-14 PROCEDURE — 1126F PR PAIN SEVERITY QUANTIFIED, NO PAIN PRESENT: ICD-10-PCS | Mod: HCNC,95,, | Performed by: FAMILY MEDICINE

## 2021-07-14 PROCEDURE — 1159F MED LIST DOCD IN RCRD: CPT | Mod: HCNC,95,, | Performed by: FAMILY MEDICINE

## 2021-07-14 PROCEDURE — 1101F PT FALLS ASSESS-DOCD LE1/YR: CPT | Mod: HCNC,CPTII,95, | Performed by: FAMILY MEDICINE

## 2021-07-14 PROCEDURE — 3288F PR FALLS RISK ASSESSMENT DOCUMENTED: ICD-10-PCS | Mod: HCNC,CPTII,95, | Performed by: FAMILY MEDICINE

## 2021-07-14 PROCEDURE — 3288F FALL RISK ASSESSMENT DOCD: CPT | Mod: HCNC,CPTII,95, | Performed by: FAMILY MEDICINE

## 2021-07-14 PROCEDURE — 99215 PR OFFICE/OUTPT VISIT, EST, LEVL V, 40-54 MIN: ICD-10-PCS | Mod: HCNC,95,, | Performed by: FAMILY MEDICINE

## 2021-07-14 RX ORDER — GABAPENTIN 100 MG/1
100 CAPSULE ORAL 3 TIMES DAILY
COMMUNITY
End: 2021-08-02 | Stop reason: SDUPTHER

## 2021-07-21 ENCOUNTER — HOSPITAL ENCOUNTER (OUTPATIENT)
Dept: CARDIOLOGY | Facility: HOSPITAL | Age: 81
Discharge: HOME | End: 2021-07-21
Attending: INTERNAL MEDICINE
Payer: MEDICARE

## 2021-07-21 ENCOUNTER — PATIENT MESSAGE (OUTPATIENT)
Dept: PRIMARY CARE CLINIC | Facility: CLINIC | Age: 81
End: 2021-07-21

## 2021-07-21 ENCOUNTER — HOSPITAL ENCOUNTER (OUTPATIENT)
Dept: RADIOLOGY | Facility: HOSPITAL | Age: 81
Discharge: HOME | End: 2021-07-21
Attending: INTERNAL MEDICINE
Payer: MEDICARE

## 2021-07-21 DIAGNOSIS — R06.02 SHORTNESS OF BREATH: ICD-10-CM

## 2021-07-21 DIAGNOSIS — L73.9 FOLLICULITIS: ICD-10-CM

## 2021-07-21 DIAGNOSIS — R94.39 ABNORMAL STRESS TEST: ICD-10-CM

## 2021-07-21 LAB
CV STRESS BASE HR: 59 BPM
DIASTOLIC BLOOD PRESSURE: 71 MMHG
NUC REST EJECTION FRACTION: 70
NUC STRESS EJECTION FRACTION: 77 %
OHS CV CPX 85 PERCENT MAX PREDICTED HEART RATE MALE: 116
OHS CV CPX ESTIMATED METS: 1
OHS CV CPX MAX PREDICTED HEART RATE: 137
OHS CV CPX PATIENT IS FEMALE: 0
OHS CV CPX PATIENT IS MALE: 1
OHS CV CPX PEAK DIASTOLIC BLOOD PRESSURE: 71 MMHG
OHS CV CPX PEAK HEAR RATE: 109 BPM
OHS CV CPX PEAK RATE PRESSURE PRODUCT: NORMAL
OHS CV CPX PEAK SYSTOLIC BLOOD PRESSURE: 175 MMHG
OHS CV CPX PERCENT MAX PREDICTED HEART RATE ACHIEVED: 80
OHS CV CPX RATE PRESSURE PRODUCT PRESENTING: 9086
STRESS ECHO POST EXERCISE DUR MIN: 1 MINUTES
STRESS ECHO POST EXERCISE DUR SEC: 12 SECONDS
SYSTOLIC BLOOD PRESSURE: 154 MMHG

## 2021-07-21 PROCEDURE — 93016 STRESS TEST WITH MYOCARDIAL PERFUSION (CUPID ONLY): ICD-10-PCS | Mod: HCNC,,, | Performed by: INTERNAL MEDICINE

## 2021-07-21 PROCEDURE — 78452 HT MUSCLE IMAGE SPECT MULT: CPT | Mod: 26,HCNC,, | Performed by: INTERNAL MEDICINE

## 2021-07-21 PROCEDURE — 93016 CV STRESS TEST SUPVJ ONLY: CPT | Mod: HCNC,,, | Performed by: INTERNAL MEDICINE

## 2021-07-21 PROCEDURE — 78452 HT MUSCLE IMAGE SPECT MULT: CPT | Mod: HCNC

## 2021-07-21 PROCEDURE — 93018 CV STRESS TEST I&R ONLY: CPT | Mod: HCNC,,, | Performed by: INTERNAL MEDICINE

## 2021-07-21 PROCEDURE — 93018 STRESS TEST WITH MYOCARDIAL PERFUSION (CUPID ONLY): ICD-10-PCS | Mod: HCNC,,, | Performed by: INTERNAL MEDICINE

## 2021-07-21 PROCEDURE — A9502 TC99M TETROFOSMIN: HCPCS | Mod: HCNC

## 2021-07-21 PROCEDURE — 78452 STRESS TEST WITH MYOCARDIAL PERFUSION (CUPID ONLY): ICD-10-PCS | Mod: 26,HCNC,, | Performed by: INTERNAL MEDICINE

## 2021-07-21 PROCEDURE — 93017 CV STRESS TEST TRACING ONLY: CPT | Mod: HCNC

## 2021-07-21 PROCEDURE — 63600175 PHARM REV CODE 636 W HCPCS: Mod: HCNC | Performed by: INTERNAL MEDICINE

## 2021-07-21 RX ORDER — REGADENOSON 0.08 MG/ML
0.4 INJECTION, SOLUTION INTRAVENOUS ONCE
Status: COMPLETED | OUTPATIENT
Start: 2021-07-21 | End: 2021-07-21

## 2021-07-21 RX ORDER — ERYTHROMYCIN 20 MG/G
GEL TOPICAL
Qty: 60 G | Refills: 1 | Status: SHIPPED | OUTPATIENT
Start: 2021-07-21 | End: 2021-08-16

## 2021-07-21 RX ORDER — DOXYCYCLINE 100 MG/1
100 CAPSULE ORAL 2 TIMES DAILY
Qty: 60 CAPSULE | Refills: 0 | Status: ON HOLD | OUTPATIENT
Start: 2021-07-21 | End: 2022-01-26

## 2021-07-21 RX ADMIN — REGADENOSON 0.4 MG: 0.08 INJECTION, SOLUTION INTRAVENOUS at 10:07

## 2021-07-22 ENCOUNTER — TELEPHONE (OUTPATIENT)
Dept: CARDIOLOGY | Facility: CLINIC | Age: 81
End: 2021-07-22

## 2021-08-02 ENCOUNTER — PATIENT MESSAGE (OUTPATIENT)
Dept: PRIMARY CARE CLINIC | Facility: CLINIC | Age: 81
End: 2021-08-02

## 2021-08-02 RX ORDER — GABAPENTIN 100 MG/1
100 CAPSULE ORAL 3 TIMES DAILY
Qty: 90 CAPSULE | Refills: 11 | Status: SHIPPED | OUTPATIENT
Start: 2021-08-02 | End: 2021-08-16

## 2021-08-02 RX ORDER — FAMOTIDINE 20 MG/1
20 TABLET, FILM COATED ORAL 2 TIMES DAILY
Qty: 60 TABLET | Refills: 11 | Status: SHIPPED | OUTPATIENT
Start: 2021-08-02 | End: 2022-06-16 | Stop reason: ALTCHOICE

## 2021-08-10 ENCOUNTER — PATIENT OUTREACH (OUTPATIENT)
Dept: ADMINISTRATIVE | Facility: OTHER | Age: 81
End: 2021-08-10

## 2021-08-10 ENCOUNTER — OFFICE VISIT (OUTPATIENT)
Dept: CARDIOLOGY | Facility: CLINIC | Age: 81
End: 2021-08-10
Payer: MEDICARE

## 2021-08-10 VITALS
DIASTOLIC BLOOD PRESSURE: 58 MMHG | BODY MASS INDEX: 28.53 KG/M2 | SYSTOLIC BLOOD PRESSURE: 110 MMHG | OXYGEN SATURATION: 96 % | WEIGHT: 204.56 LBS | HEART RATE: 65 BPM

## 2021-08-10 DIAGNOSIS — I70.203 ATHEROSCLEROSIS OF ARTERY OF BOTH LOWER EXTREMITIES: ICD-10-CM

## 2021-08-10 DIAGNOSIS — D69.6 THROMBOCYTOPENIA: ICD-10-CM

## 2021-08-10 DIAGNOSIS — I10 ESSENTIAL HYPERTENSION: Primary | Chronic | ICD-10-CM

## 2021-08-10 DIAGNOSIS — G47.33 OSA (OBSTRUCTIVE SLEEP APNEA): ICD-10-CM

## 2021-08-10 DIAGNOSIS — Z72.821 INADEQUATE SLEEP HYGIENE: ICD-10-CM

## 2021-08-10 DIAGNOSIS — N18.31 STAGE 3A CHRONIC KIDNEY DISEASE: ICD-10-CM

## 2021-08-10 DIAGNOSIS — E78.2 MIXED HYPERLIPIDEMIA: Chronic | ICD-10-CM

## 2021-08-10 DIAGNOSIS — I45.10 INCOMPLETE RIGHT BUNDLE BRANCH BLOCK: ICD-10-CM

## 2021-08-10 DIAGNOSIS — D64.9 ANEMIA, UNSPECIFIED TYPE: ICD-10-CM

## 2021-08-10 DIAGNOSIS — I77.1 TORTUOUS AORTA: ICD-10-CM

## 2021-08-10 PROCEDURE — 1159F MED LIST DOCD IN RCRD: CPT | Mod: HCNC,CPTII,S$GLB, | Performed by: INTERNAL MEDICINE

## 2021-08-10 PROCEDURE — 99214 OFFICE O/P EST MOD 30 MIN: CPT | Mod: HCNC,S$GLB,, | Performed by: INTERNAL MEDICINE

## 2021-08-10 PROCEDURE — 3078F PR MOST RECENT DIASTOLIC BLOOD PRESSURE < 80 MM HG: ICD-10-PCS | Mod: HCNC,CPTII,S$GLB, | Performed by: INTERNAL MEDICINE

## 2021-08-10 PROCEDURE — 1126F AMNT PAIN NOTED NONE PRSNT: CPT | Mod: HCNC,CPTII,S$GLB, | Performed by: INTERNAL MEDICINE

## 2021-08-10 PROCEDURE — 1126F PR PAIN SEVERITY QUANTIFIED, NO PAIN PRESENT: ICD-10-PCS | Mod: HCNC,CPTII,S$GLB, | Performed by: INTERNAL MEDICINE

## 2021-08-10 PROCEDURE — 3074F PR MOST RECENT SYSTOLIC BLOOD PRESSURE < 130 MM HG: ICD-10-PCS | Mod: HCNC,CPTII,S$GLB, | Performed by: INTERNAL MEDICINE

## 2021-08-10 PROCEDURE — 3074F SYST BP LT 130 MM HG: CPT | Mod: HCNC,CPTII,S$GLB, | Performed by: INTERNAL MEDICINE

## 2021-08-10 PROCEDURE — 99999 PR PBB SHADOW E&M-EST. PATIENT-LVL III: CPT | Mod: PBBFAC,HCNC,, | Performed by: INTERNAL MEDICINE

## 2021-08-10 PROCEDURE — 99214 PR OFFICE/OUTPT VISIT, EST, LEVL IV, 30-39 MIN: ICD-10-PCS | Mod: HCNC,S$GLB,, | Performed by: INTERNAL MEDICINE

## 2021-08-10 PROCEDURE — 3078F DIAST BP <80 MM HG: CPT | Mod: HCNC,CPTII,S$GLB, | Performed by: INTERNAL MEDICINE

## 2021-08-10 PROCEDURE — 1159F PR MEDICATION LIST DOCUMENTED IN MEDICAL RECORD: ICD-10-PCS | Mod: HCNC,CPTII,S$GLB, | Performed by: INTERNAL MEDICINE

## 2021-08-10 PROCEDURE — 99999 PR PBB SHADOW E&M-EST. PATIENT-LVL III: ICD-10-PCS | Mod: PBBFAC,HCNC,, | Performed by: INTERNAL MEDICINE

## 2021-08-10 PROCEDURE — 1160F RVW MEDS BY RX/DR IN RCRD: CPT | Mod: HCNC,CPTII,S$GLB, | Performed by: INTERNAL MEDICINE

## 2021-08-10 PROCEDURE — 1160F PR REVIEW ALL MEDS BY PRESCRIBER/CLIN PHARMACIST DOCUMENTED: ICD-10-PCS | Mod: HCNC,CPTII,S$GLB, | Performed by: INTERNAL MEDICINE

## 2021-08-16 ENCOUNTER — OFFICE VISIT (OUTPATIENT)
Dept: PRIMARY CARE CLINIC | Facility: CLINIC | Age: 81
End: 2021-08-16
Payer: MEDICARE

## 2021-08-16 DIAGNOSIS — M54.9 DORSALGIA, UNSPECIFIED: ICD-10-CM

## 2021-08-16 DIAGNOSIS — M47.817 SPONDYLOSIS WITHOUT MYELOPATHY OR RADICULOPATHY, LUMBOSACRAL REGION: ICD-10-CM

## 2021-08-16 DIAGNOSIS — L73.9 FOLLICULITIS: ICD-10-CM

## 2021-08-16 DIAGNOSIS — L29.9 PRURITUS: ICD-10-CM

## 2021-08-16 DIAGNOSIS — M43.10 ANTEROLISTHESIS: ICD-10-CM

## 2021-08-16 DIAGNOSIS — M47.817 FACET ARTHROPATHY, LUMBOSACRAL: Primary | ICD-10-CM

## 2021-08-16 PROCEDURE — 1160F RVW MEDS BY RX/DR IN RCRD: CPT | Mod: HCNC,CPTII,95, | Performed by: FAMILY MEDICINE

## 2021-08-16 PROCEDURE — 99215 PR OFFICE/OUTPT VISIT, EST, LEVL V, 40-54 MIN: ICD-10-PCS | Mod: HCNC,95,, | Performed by: FAMILY MEDICINE

## 2021-08-16 PROCEDURE — 1159F PR MEDICATION LIST DOCUMENTED IN MEDICAL RECORD: ICD-10-PCS | Mod: HCNC,CPTII,95, | Performed by: FAMILY MEDICINE

## 2021-08-16 PROCEDURE — 1160F PR REVIEW ALL MEDS BY PRESCRIBER/CLIN PHARMACIST DOCUMENTED: ICD-10-PCS | Mod: HCNC,CPTII,95, | Performed by: FAMILY MEDICINE

## 2021-08-16 PROCEDURE — 1159F MED LIST DOCD IN RCRD: CPT | Mod: HCNC,CPTII,95, | Performed by: FAMILY MEDICINE

## 2021-08-16 PROCEDURE — 99215 OFFICE O/P EST HI 40 MIN: CPT | Mod: HCNC,95,, | Performed by: FAMILY MEDICINE

## 2021-08-16 RX ORDER — GABAPENTIN 100 MG/1
CAPSULE ORAL
Qty: 120 CAPSULE | Refills: 11 | Status: SHIPPED | OUTPATIENT
Start: 2021-08-16 | End: 2021-12-09 | Stop reason: ALTCHOICE

## 2021-08-17 ENCOUNTER — HOSPITAL ENCOUNTER (OUTPATIENT)
Dept: RADIOLOGY | Facility: HOSPITAL | Age: 81
Discharge: HOME | End: 2021-08-17
Attending: FAMILY MEDICINE
Payer: MEDICARE

## 2021-08-17 DIAGNOSIS — M54.9 DORSALGIA, UNSPECIFIED: ICD-10-CM

## 2021-08-17 DIAGNOSIS — M47.817 SPONDYLOSIS WITHOUT MYELOPATHY OR RADICULOPATHY, LUMBOSACRAL REGION: ICD-10-CM

## 2021-08-17 PROCEDURE — 72100 X-RAY EXAM L-S SPINE 2/3 VWS: CPT | Mod: 26,HCNC,, | Performed by: RADIOLOGY

## 2021-08-17 PROCEDURE — 72100 XR LUMBAR SPINE 2 OR 3 VIEWS: ICD-10-PCS | Mod: 26,HCNC,, | Performed by: RADIOLOGY

## 2021-08-17 PROCEDURE — 72100 X-RAY EXAM L-S SPINE 2/3 VWS: CPT | Mod: TC,HCNC,FY,PO

## 2021-08-25 ENCOUNTER — CLINICAL SUPPORT (OUTPATIENT)
Dept: REHABILITATION | Facility: HOSPITAL | Age: 81
End: 2021-08-25
Payer: MEDICARE

## 2021-08-25 DIAGNOSIS — M25.60 DECREASED RANGE OF MOTION: ICD-10-CM

## 2021-08-25 DIAGNOSIS — M54.50 CHRONIC BILATERAL LOW BACK PAIN WITHOUT SCIATICA: ICD-10-CM

## 2021-08-25 DIAGNOSIS — M43.10 ANTEROLISTHESIS: ICD-10-CM

## 2021-08-25 DIAGNOSIS — M47.817 SPONDYLOSIS WITHOUT MYELOPATHY OR RADICULOPATHY, LUMBOSACRAL REGION: ICD-10-CM

## 2021-08-25 DIAGNOSIS — M47.817 FACET ARTHROPATHY, LUMBOSACRAL: ICD-10-CM

## 2021-08-25 DIAGNOSIS — M54.2 NECK PAIN: ICD-10-CM

## 2021-08-25 DIAGNOSIS — G89.29 CHRONIC BILATERAL LOW BACK PAIN WITHOUT SCIATICA: ICD-10-CM

## 2021-08-25 PROBLEM — R26.2 DIFFICULTY WALKING: Status: RESOLVED | Noted: 2020-11-18 | Resolved: 2021-08-25

## 2021-08-25 PROBLEM — R53.1 GENERALIZED WEAKNESS: Status: RESOLVED | Noted: 2020-11-18 | Resolved: 2021-08-25

## 2021-08-25 PROCEDURE — 97162 PT EVAL MOD COMPLEX 30 MIN: CPT | Mod: HCNC,PN

## 2021-09-01 ENCOUNTER — CLINICAL SUPPORT (OUTPATIENT)
Dept: REHABILITATION | Facility: HOSPITAL | Age: 81
End: 2021-09-01
Payer: MEDICARE

## 2021-09-01 DIAGNOSIS — G89.29 CHRONIC BILATERAL LOW BACK PAIN WITHOUT SCIATICA: ICD-10-CM

## 2021-09-01 DIAGNOSIS — M25.60 DECREASED RANGE OF MOTION: ICD-10-CM

## 2021-09-01 DIAGNOSIS — M54.50 CHRONIC BILATERAL LOW BACK PAIN WITHOUT SCIATICA: ICD-10-CM

## 2021-09-01 DIAGNOSIS — M54.2 NECK PAIN: ICD-10-CM

## 2021-09-01 PROCEDURE — 97140 MANUAL THERAPY 1/> REGIONS: CPT | Mod: HCNC,PN

## 2021-09-01 PROCEDURE — 97110 THERAPEUTIC EXERCISES: CPT | Mod: HCNC,PN

## 2021-09-05 ENCOUNTER — PATIENT MESSAGE (OUTPATIENT)
Dept: HEMATOLOGY/ONCOLOGY | Facility: CLINIC | Age: 81
End: 2021-09-05

## 2021-09-08 ENCOUNTER — CLINICAL SUPPORT (OUTPATIENT)
Dept: REHABILITATION | Facility: HOSPITAL | Age: 81
End: 2021-09-08
Payer: MEDICARE

## 2021-09-08 DIAGNOSIS — M54.50 CHRONIC BILATERAL LOW BACK PAIN WITHOUT SCIATICA: ICD-10-CM

## 2021-09-08 DIAGNOSIS — G89.29 CHRONIC BILATERAL LOW BACK PAIN WITHOUT SCIATICA: ICD-10-CM

## 2021-09-08 DIAGNOSIS — M25.60 DECREASED RANGE OF MOTION: ICD-10-CM

## 2021-09-08 DIAGNOSIS — M54.2 NECK PAIN: ICD-10-CM

## 2021-09-08 PROCEDURE — 97110 THERAPEUTIC EXERCISES: CPT | Mod: HCNC,PN

## 2021-09-08 PROCEDURE — 97140 MANUAL THERAPY 1/> REGIONS: CPT | Mod: HCNC,PN

## 2021-09-14 ENCOUNTER — PATIENT MESSAGE (OUTPATIENT)
Dept: PRIMARY CARE CLINIC | Facility: CLINIC | Age: 81
End: 2021-09-14

## 2021-09-14 ENCOUNTER — TELEPHONE (OUTPATIENT)
Dept: CARDIOLOGY | Facility: CLINIC | Age: 81
End: 2021-09-14

## 2021-09-15 ENCOUNTER — CLINICAL SUPPORT (OUTPATIENT)
Dept: REHABILITATION | Facility: HOSPITAL | Age: 81
End: 2021-09-15
Payer: MEDICARE

## 2021-09-15 DIAGNOSIS — M54.50 CHRONIC BILATERAL LOW BACK PAIN WITHOUT SCIATICA: ICD-10-CM

## 2021-09-15 DIAGNOSIS — M54.2 NECK PAIN: ICD-10-CM

## 2021-09-15 DIAGNOSIS — M25.60 DECREASED RANGE OF MOTION: ICD-10-CM

## 2021-09-15 DIAGNOSIS — G89.29 CHRONIC BILATERAL LOW BACK PAIN WITHOUT SCIATICA: ICD-10-CM

## 2021-09-15 PROCEDURE — 97110 THERAPEUTIC EXERCISES: CPT | Mod: HCNC,PN

## 2021-09-21 ENCOUNTER — PATIENT MESSAGE (OUTPATIENT)
Dept: PULMONOLOGY | Facility: CLINIC | Age: 81
End: 2021-09-21

## 2021-09-22 ENCOUNTER — CLINICAL SUPPORT (OUTPATIENT)
Dept: REHABILITATION | Facility: HOSPITAL | Age: 81
End: 2021-09-22
Payer: MEDICARE

## 2021-09-22 DIAGNOSIS — M54.2 NECK PAIN: ICD-10-CM

## 2021-09-22 DIAGNOSIS — M54.50 CHRONIC BILATERAL LOW BACK PAIN WITHOUT SCIATICA: ICD-10-CM

## 2021-09-22 DIAGNOSIS — M25.60 DECREASED RANGE OF MOTION: ICD-10-CM

## 2021-09-22 DIAGNOSIS — G89.29 CHRONIC BILATERAL LOW BACK PAIN WITHOUT SCIATICA: ICD-10-CM

## 2021-09-22 PROCEDURE — 97110 THERAPEUTIC EXERCISES: CPT | Mod: HCNC,PN

## 2021-09-24 ENCOUNTER — HOSPITAL ENCOUNTER (OUTPATIENT)
Dept: CARDIOLOGY | Facility: HOSPITAL | Age: 81
Discharge: HOME | End: 2021-09-24
Attending: PHYSICIAN ASSISTANT
Payer: MEDICARE

## 2021-09-24 VITALS
BODY MASS INDEX: 28.56 KG/M2 | WEIGHT: 204 LBS | HEIGHT: 71 IN | SYSTOLIC BLOOD PRESSURE: 114 MMHG | HEART RATE: 53 BPM | DIASTOLIC BLOOD PRESSURE: 62 MMHG

## 2021-09-24 DIAGNOSIS — I10 ESSENTIAL HYPERTENSION: ICD-10-CM

## 2021-09-24 DIAGNOSIS — E78.2 MIXED HYPERLIPIDEMIA: ICD-10-CM

## 2021-09-24 LAB
AORTIC ROOT ANNULUS: 3.03 CM
ASCENDING AORTA: 3.41 CM
AV INDEX (PROSTH): 0.78
AV MEAN GRADIENT: 6 MMHG
AV PEAK GRADIENT: 12 MMHG
AV VALVE AREA: 4.07 CM2
AV VELOCITY RATIO: 0.7
BSA FOR ECHO PROCEDURE: 2.15 M2
CV ECHO LV RWT: 0.36 CM
DOP CALC AO PEAK VEL: 1.7 M/S
DOP CALC AO VTI: 38.14 CM
DOP CALC LVOT AREA: 5.2 CM2
DOP CALC LVOT DIAMETER: 2.58 CM
DOP CALC LVOT PEAK VEL: 1.19 M/S
DOP CALC LVOT STROKE VOLUME: 155.19 CM3
DOP CALC RVOT PEAK VEL: 0.71 M/S
DOP CALC RVOT VTI: 15 CM
DOP CALCLVOT PEAK VEL VTI: 29.7 CM
E WAVE DECELERATION TIME: 185.27 MSEC
E/A RATIO: 0.94
E/E' RATIO: 13.29 M/S
ECHO LV POSTERIOR WALL: 0.9 CM (ref 0.6–1.1)
EJECTION FRACTION: 60 %
FRACTIONAL SHORTENING: 29 % (ref 28–44)
INTERVENTRICULAR SEPTUM: 0.93 CM (ref 0.6–1.1)
IVRT: 100.86 MSEC
LA MAJOR: 5.86 CM
LA MINOR: 5.72 CM
LA WIDTH: 3.32 CM
LEFT ATRIUM SIZE: 3.18 CM
LEFT ATRIUM VOLUME INDEX: 24.4 ML/M2
LEFT ATRIUM VOLUME: 51.95 CM3
LEFT INTERNAL DIMENSION IN SYSTOLE: 3.54 CM (ref 2.1–4)
LEFT VENTRICLE DIASTOLIC VOLUME INDEX: 55.69 ML/M2
LEFT VENTRICLE DIASTOLIC VOLUME: 118.61 ML
LEFT VENTRICLE MASS INDEX: 76 G/M2
LEFT VENTRICLE SYSTOLIC VOLUME INDEX: 24.5 ML/M2
LEFT VENTRICLE SYSTOLIC VOLUME: 52.25 ML
LEFT VENTRICULAR INTERNAL DIMENSION IN DIASTOLE: 5.01 CM (ref 3.5–6)
LEFT VENTRICULAR MASS: 162.23 G
LV LATERAL E/E' RATIO: 13.29 M/S
LV SEPTAL E/E' RATIO: 13.29 M/S
MV A" WAVE DURATION": 11.99 MSEC
MV PEAK A VEL: 0.99 M/S
MV PEAK E VEL: 0.93 M/S
MV STENOSIS PRESSURE HALF TIME: 53.73 MS
MV VALVE AREA P 1/2 METHOD: 4.09 CM2
PISA TR MAX VEL: 2.54 M/S
PULM VEIN S/D RATIO: 1.57
PV MEAN GRADIENT: 1 MMHG
PV PEAK D VEL: 0.42 M/S
PV PEAK S VEL: 0.66 M/S
RA MAJOR: 4.72 CM
RA PRESSURE: 3 MMHG
RA WIDTH: 3.02 CM
SINUS: 3.22 CM
STJ: 3.4 CM
TDI LATERAL: 0.07 M/S
TDI SEPTAL: 0.07 M/S
TDI: 0.07 M/S
TR MAX PG: 26 MMHG
TRICUSPID ANNULAR PLANE SYSTOLIC EXCURSION: 3.33 CM
TV REST PULMONARY ARTERY PRESSURE: 29 MMHG

## 2021-09-24 PROCEDURE — 93306 ECHO (CUPID ONLY): ICD-10-PCS | Mod: 26,HCNC,, | Performed by: INTERNAL MEDICINE

## 2021-09-24 PROCEDURE — 93306 TTE W/DOPPLER COMPLETE: CPT | Mod: HCNC

## 2021-09-24 PROCEDURE — 93306 TTE W/DOPPLER COMPLETE: CPT | Mod: 26,HCNC,, | Performed by: INTERNAL MEDICINE

## 2021-09-28 ENCOUNTER — TELEPHONE (OUTPATIENT)
Dept: CARDIOLOGY | Facility: CLINIC | Age: 81
End: 2021-09-28

## 2021-09-30 ENCOUNTER — TELEPHONE (OUTPATIENT)
Dept: PULMONOLOGY | Facility: CLINIC | Age: 81
End: 2021-09-30

## 2021-09-30 ENCOUNTER — OFFICE VISIT (OUTPATIENT)
Dept: PULMONOLOGY | Facility: CLINIC | Age: 81
End: 2021-09-30
Payer: MEDICARE

## 2021-09-30 VITALS — WEIGHT: 199 LBS | HEIGHT: 71 IN | BODY MASS INDEX: 27.86 KG/M2

## 2021-09-30 DIAGNOSIS — I27.20 PULMONARY HTN: ICD-10-CM

## 2021-09-30 DIAGNOSIS — I10 ESSENTIAL HYPERTENSION: Primary | ICD-10-CM

## 2021-09-30 DIAGNOSIS — G47.33 OSA (OBSTRUCTIVE SLEEP APNEA): ICD-10-CM

## 2021-09-30 PROCEDURE — 99203 PR OFFICE/OUTPT VISIT, NEW, LEVL III, 30-44 MIN: ICD-10-PCS | Mod: HCNC,95,, | Performed by: NURSE PRACTITIONER

## 2021-09-30 PROCEDURE — 99203 OFFICE O/P NEW LOW 30 MIN: CPT | Mod: HCNC,95,, | Performed by: NURSE PRACTITIONER

## 2021-09-30 PROCEDURE — 1159F PR MEDICATION LIST DOCUMENTED IN MEDICAL RECORD: ICD-10-PCS | Mod: HCNC,CPTII,95, | Performed by: NURSE PRACTITIONER

## 2021-09-30 PROCEDURE — 1160F RVW MEDS BY RX/DR IN RCRD: CPT | Mod: HCNC,CPTII,95, | Performed by: NURSE PRACTITIONER

## 2021-09-30 PROCEDURE — 1160F PR REVIEW ALL MEDS BY PRESCRIBER/CLIN PHARMACIST DOCUMENTED: ICD-10-PCS | Mod: HCNC,CPTII,95, | Performed by: NURSE PRACTITIONER

## 2021-09-30 PROCEDURE — 1159F MED LIST DOCD IN RCRD: CPT | Mod: HCNC,CPTII,95, | Performed by: NURSE PRACTITIONER

## 2021-10-18 ENCOUNTER — OFFICE VISIT (OUTPATIENT)
Dept: PRIMARY CARE CLINIC | Facility: CLINIC | Age: 81
End: 2021-10-18
Payer: MEDICARE

## 2021-10-18 DIAGNOSIS — I70.203 ATHEROSCLEROSIS OF ARTERY OF BOTH LOWER EXTREMITIES: ICD-10-CM

## 2021-10-18 DIAGNOSIS — L29.9 PRURITIC DISORDER: Primary | ICD-10-CM

## 2021-10-18 DIAGNOSIS — M54.31 SCIATICA OF RIGHT SIDE: ICD-10-CM

## 2021-10-18 DIAGNOSIS — Z71.89 EDUCATED ABOUT COVID-19 VIRUS INFECTION: ICD-10-CM

## 2021-10-18 DIAGNOSIS — K21.9 GASTROESOPHAGEAL REFLUX DISEASE WITHOUT ESOPHAGITIS: ICD-10-CM

## 2021-10-18 DIAGNOSIS — E78.2 MIXED HYPERLIPIDEMIA: Chronic | ICD-10-CM

## 2021-10-18 DIAGNOSIS — I10 ESSENTIAL HYPERTENSION: Chronic | ICD-10-CM

## 2021-10-18 PROCEDURE — 99214 OFFICE O/P EST MOD 30 MIN: CPT | Mod: HCNC,95,, | Performed by: FAMILY MEDICINE

## 2021-10-18 PROCEDURE — 99214 PR OFFICE/OUTPT VISIT, EST, LEVL IV, 30-39 MIN: ICD-10-PCS | Mod: HCNC,95,, | Performed by: FAMILY MEDICINE

## 2021-10-18 PROCEDURE — 1160F RVW MEDS BY RX/DR IN RCRD: CPT | Mod: HCNC,CPTII,95, | Performed by: FAMILY MEDICINE

## 2021-10-18 PROCEDURE — 1159F PR MEDICATION LIST DOCUMENTED IN MEDICAL RECORD: ICD-10-PCS | Mod: HCNC,CPTII,95, | Performed by: FAMILY MEDICINE

## 2021-10-18 PROCEDURE — 1160F PR REVIEW ALL MEDS BY PRESCRIBER/CLIN PHARMACIST DOCUMENTED: ICD-10-PCS | Mod: HCNC,CPTII,95, | Performed by: FAMILY MEDICINE

## 2021-10-18 PROCEDURE — 1159F MED LIST DOCD IN RCRD: CPT | Mod: HCNC,CPTII,95, | Performed by: FAMILY MEDICINE

## 2021-10-21 ENCOUNTER — IMMUNIZATION (OUTPATIENT)
Dept: PHARMACY | Facility: CLINIC | Age: 81
End: 2021-10-21
Payer: MEDICARE

## 2021-10-21 DIAGNOSIS — Z23 NEED FOR VACCINATION: Primary | ICD-10-CM

## 2021-10-29 ENCOUNTER — PROCEDURE VISIT (OUTPATIENT)
Dept: SLEEP MEDICINE | Facility: CLINIC | Age: 81
End: 2021-10-29
Payer: MEDICARE

## 2021-10-29 DIAGNOSIS — G47.33 OSA (OBSTRUCTIVE SLEEP APNEA): ICD-10-CM

## 2021-10-31 ENCOUNTER — PATIENT MESSAGE (OUTPATIENT)
Dept: PRIMARY CARE CLINIC | Facility: CLINIC | Age: 81
End: 2021-10-31
Payer: MEDICARE

## 2021-10-31 DIAGNOSIS — G89.29 CHRONIC BILATERAL LOW BACK PAIN WITH SCIATICA, SCIATICA LATERALITY UNSPECIFIED: Primary | ICD-10-CM

## 2021-10-31 DIAGNOSIS — M54.40 CHRONIC BILATERAL LOW BACK PAIN WITH SCIATICA, SCIATICA LATERALITY UNSPECIFIED: Primary | ICD-10-CM

## 2021-10-31 PROCEDURE — 95806 PR SLEEP STUDY, UNATTENDED, SIMUL RECORD HR/O2 SAT/RESP FLOW/RESP EFFT: ICD-10-PCS | Mod: 26,52,HCNC,S$GLB | Performed by: INTERNAL MEDICINE

## 2021-10-31 PROCEDURE — 95806 SLEEP STUDY UNATT&RESP EFFT: CPT | Mod: 26,52,HCNC,S$GLB | Performed by: INTERNAL MEDICINE

## 2021-11-01 ENCOUNTER — TELEPHONE (OUTPATIENT)
Dept: PHYSICAL MEDICINE AND REHAB | Facility: CLINIC | Age: 81
End: 2021-11-01
Payer: MEDICARE

## 2021-11-01 ENCOUNTER — TELEPHONE (OUTPATIENT)
Dept: PULMONOLOGY | Facility: CLINIC | Age: 81
End: 2021-11-01
Payer: MEDICARE

## 2021-11-01 RX ORDER — PANTOPRAZOLE SODIUM 40 MG/1
40 TABLET, DELAYED RELEASE ORAL DAILY
Qty: 90 TABLET | Refills: 3 | Status: SHIPPED | OUTPATIENT
Start: 2021-11-01 | End: 2022-02-15

## 2021-11-10 ENCOUNTER — OFFICE VISIT (OUTPATIENT)
Dept: CARDIOLOGY | Facility: CLINIC | Age: 81
End: 2021-11-10
Payer: MEDICARE

## 2021-11-10 ENCOUNTER — LAB VISIT (OUTPATIENT)
Dept: LAB | Facility: HOSPITAL | Age: 81
End: 2021-11-10
Attending: UROLOGY
Payer: MEDICARE

## 2021-11-10 VITALS
DIASTOLIC BLOOD PRESSURE: 64 MMHG | BODY MASS INDEX: 28.37 KG/M2 | HEIGHT: 71 IN | WEIGHT: 202.63 LBS | OXYGEN SATURATION: 98 % | HEART RATE: 59 BPM | SYSTOLIC BLOOD PRESSURE: 112 MMHG

## 2021-11-10 DIAGNOSIS — G47.61 PERIODIC LIMB MOVEMENT DISORDER (PLMD): ICD-10-CM

## 2021-11-10 DIAGNOSIS — G47.33 OSA (OBSTRUCTIVE SLEEP APNEA): ICD-10-CM

## 2021-11-10 DIAGNOSIS — E78.2 MIXED HYPERLIPIDEMIA: Chronic | ICD-10-CM

## 2021-11-10 DIAGNOSIS — M46.94 UNSPECIFIED INFLAMMATORY SPONDYLOPATHY, THORACIC REGION: ICD-10-CM

## 2021-11-10 DIAGNOSIS — N18.31 STAGE 3A CHRONIC KIDNEY DISEASE: ICD-10-CM

## 2021-11-10 DIAGNOSIS — C61 MALIGNANT NEOPLASM OF PROSTATE: Primary | ICD-10-CM

## 2021-11-10 DIAGNOSIS — I10 ESSENTIAL HYPERTENSION: Primary | Chronic | ICD-10-CM

## 2021-11-10 LAB — COMPLEXED PSA SERPL-MCNC: 1.3 NG/ML (ref 0–4)

## 2021-11-10 PROCEDURE — 3074F SYST BP LT 130 MM HG: CPT | Mod: HCNC,CPTII,S$GLB, | Performed by: INTERNAL MEDICINE

## 2021-11-10 PROCEDURE — 3288F PR FALLS RISK ASSESSMENT DOCUMENTED: ICD-10-PCS | Mod: HCNC,CPTII,S$GLB, | Performed by: INTERNAL MEDICINE

## 2021-11-10 PROCEDURE — 36415 COLL VENOUS BLD VENIPUNCTURE: CPT | Mod: HCNC,PO | Performed by: UROLOGY

## 2021-11-10 PROCEDURE — 1101F PR PT FALLS ASSESS DOC 0-1 FALLS W/OUT INJ PAST YR: ICD-10-PCS | Mod: HCNC,CPTII,S$GLB, | Performed by: INTERNAL MEDICINE

## 2021-11-10 PROCEDURE — 1126F PR PAIN SEVERITY QUANTIFIED, NO PAIN PRESENT: ICD-10-PCS | Mod: HCNC,CPTII,S$GLB, | Performed by: INTERNAL MEDICINE

## 2021-11-10 PROCEDURE — 99999 PR PBB SHADOW E&M-EST. PATIENT-LVL III: ICD-10-PCS | Mod: PBBFAC,HCNC,, | Performed by: INTERNAL MEDICINE

## 2021-11-10 PROCEDURE — 3074F PR MOST RECENT SYSTOLIC BLOOD PRESSURE < 130 MM HG: ICD-10-PCS | Mod: HCNC,CPTII,S$GLB, | Performed by: INTERNAL MEDICINE

## 2021-11-10 PROCEDURE — 99214 OFFICE O/P EST MOD 30 MIN: CPT | Mod: HCNC,S$GLB,, | Performed by: INTERNAL MEDICINE

## 2021-11-10 PROCEDURE — 1101F PT FALLS ASSESS-DOCD LE1/YR: CPT | Mod: HCNC,CPTII,S$GLB, | Performed by: INTERNAL MEDICINE

## 2021-11-10 PROCEDURE — 3078F DIAST BP <80 MM HG: CPT | Mod: HCNC,CPTII,S$GLB, | Performed by: INTERNAL MEDICINE

## 2021-11-10 PROCEDURE — 1159F MED LIST DOCD IN RCRD: CPT | Mod: HCNC,CPTII,S$GLB, | Performed by: INTERNAL MEDICINE

## 2021-11-10 PROCEDURE — 99999 PR PBB SHADOW E&M-EST. PATIENT-LVL III: CPT | Mod: PBBFAC,HCNC,, | Performed by: INTERNAL MEDICINE

## 2021-11-10 PROCEDURE — 3078F PR MOST RECENT DIASTOLIC BLOOD PRESSURE < 80 MM HG: ICD-10-PCS | Mod: HCNC,CPTII,S$GLB, | Performed by: INTERNAL MEDICINE

## 2021-11-10 PROCEDURE — 3288F FALL RISK ASSESSMENT DOCD: CPT | Mod: HCNC,CPTII,S$GLB, | Performed by: INTERNAL MEDICINE

## 2021-11-10 PROCEDURE — 99499 RISK ADDL DX/OHS AUDIT: ICD-10-PCS | Mod: HCNC,S$GLB,, | Performed by: INTERNAL MEDICINE

## 2021-11-10 PROCEDURE — 84153 ASSAY OF PSA TOTAL: CPT | Mod: GA,HCNC | Performed by: UROLOGY

## 2021-11-10 PROCEDURE — 1126F AMNT PAIN NOTED NONE PRSNT: CPT | Mod: HCNC,CPTII,S$GLB, | Performed by: INTERNAL MEDICINE

## 2021-11-10 PROCEDURE — 1159F PR MEDICATION LIST DOCUMENTED IN MEDICAL RECORD: ICD-10-PCS | Mod: HCNC,CPTII,S$GLB, | Performed by: INTERNAL MEDICINE

## 2021-11-10 PROCEDURE — 99499 UNLISTED E&M SERVICE: CPT | Mod: HCNC,S$GLB,, | Performed by: INTERNAL MEDICINE

## 2021-11-10 PROCEDURE — 99214 PR OFFICE/OUTPT VISIT, EST, LEVL IV, 30-39 MIN: ICD-10-PCS | Mod: HCNC,S$GLB,, | Performed by: INTERNAL MEDICINE

## 2021-12-09 ENCOUNTER — OFFICE VISIT (OUTPATIENT)
Dept: PAIN MEDICINE | Facility: CLINIC | Age: 81
End: 2021-12-09
Payer: MEDICARE

## 2021-12-09 ENCOUNTER — TELEPHONE (OUTPATIENT)
Dept: PAIN MEDICINE | Facility: CLINIC | Age: 81
End: 2021-12-09

## 2021-12-09 VITALS
HEART RATE: 70 BPM | DIASTOLIC BLOOD PRESSURE: 73 MMHG | SYSTOLIC BLOOD PRESSURE: 138 MMHG | RESPIRATION RATE: 17 BRPM | BODY MASS INDEX: 27.87 KG/M2 | HEIGHT: 71 IN | WEIGHT: 199.06 LBS

## 2021-12-09 DIAGNOSIS — M51.36 DDD (DEGENERATIVE DISC DISEASE), LUMBAR: Primary | ICD-10-CM

## 2021-12-09 DIAGNOSIS — M54.12 RADICULOPATHY, CERVICAL REGION: ICD-10-CM

## 2021-12-09 DIAGNOSIS — M54.16 LUMBAR RADICULOPATHY, CHRONIC: ICD-10-CM

## 2021-12-09 DIAGNOSIS — M47.816 LUMBAR FACET ARTHROPATHY: ICD-10-CM

## 2021-12-09 DIAGNOSIS — M54.16 LUMBAR RADICULOPATHY: ICD-10-CM

## 2021-12-09 DIAGNOSIS — M53.3 SACROILIAC JOINT PAIN: ICD-10-CM

## 2021-12-09 DIAGNOSIS — G89.29 CHRONIC BILATERAL LOW BACK PAIN WITH SCIATICA, SCIATICA LATERALITY UNSPECIFIED: ICD-10-CM

## 2021-12-09 DIAGNOSIS — M54.40 CHRONIC BILATERAL LOW BACK PAIN WITH SCIATICA, SCIATICA LATERALITY UNSPECIFIED: ICD-10-CM

## 2021-12-09 DIAGNOSIS — M54.9 DORSALGIA, UNSPECIFIED: ICD-10-CM

## 2021-12-09 DIAGNOSIS — M50.30 DDD (DEGENERATIVE DISC DISEASE), CERVICAL: ICD-10-CM

## 2021-12-09 PROCEDURE — 99204 PR OFFICE/OUTPT VISIT, NEW, LEVL IV, 45-59 MIN: ICD-10-PCS | Mod: HCNC,S$GLB,, | Performed by: ANESTHESIOLOGY

## 2021-12-09 PROCEDURE — 99204 OFFICE O/P NEW MOD 45 MIN: CPT | Mod: HCNC,S$GLB,, | Performed by: ANESTHESIOLOGY

## 2021-12-09 PROCEDURE — 99999 PR PBB SHADOW E&M-EST. PATIENT-LVL V: ICD-10-PCS | Mod: PBBFAC,HCNC,, | Performed by: ANESTHESIOLOGY

## 2021-12-09 PROCEDURE — 99999 PR PBB SHADOW E&M-EST. PATIENT-LVL V: CPT | Mod: PBBFAC,HCNC,, | Performed by: ANESTHESIOLOGY

## 2021-12-09 RX ORDER — PREGABALIN 75 MG/1
CAPSULE ORAL
Qty: 147 CAPSULE | Refills: 0 | Status: SHIPPED | OUTPATIENT
Start: 2021-12-09 | End: 2022-02-15 | Stop reason: SDUPTHER

## 2021-12-10 ENCOUNTER — TELEPHONE (OUTPATIENT)
Dept: PAIN MEDICINE | Facility: CLINIC | Age: 81
End: 2021-12-10
Payer: MEDICARE

## 2021-12-21 ENCOUNTER — TELEPHONE (OUTPATIENT)
Dept: PAIN MEDICINE | Facility: CLINIC | Age: 81
End: 2021-12-21
Payer: MEDICARE

## 2022-01-07 ENCOUNTER — PATIENT MESSAGE (OUTPATIENT)
Dept: PRIMARY CARE CLINIC | Facility: CLINIC | Age: 82
End: 2022-01-07
Payer: MEDICARE

## 2022-01-07 ENCOUNTER — TELEPHONE (OUTPATIENT)
Dept: PRIMARY CARE CLINIC | Facility: CLINIC | Age: 82
End: 2022-01-07
Payer: MEDICARE

## 2022-01-07 DIAGNOSIS — Z23 NEED FOR SHINGLES VACCINE: Primary | ICD-10-CM

## 2022-01-07 RX ORDER — ZOSTER VACCINE RECOMBINANT, ADJUVANTED 50 MCG/0.5
0.5 KIT INTRAMUSCULAR ONCE
Qty: 1 EACH | Refills: 0 | Status: SHIPPED | OUTPATIENT
Start: 2022-01-07 | End: 2022-12-28

## 2022-01-07 NOTE — TELEPHONE ENCOUNTER
----- Message from Britta Porras sent at 1/6/2022  1:20 PM CST -----  Contact: Patient [phone]  Good Afternoon   Patient would like to schedule a shingle vaccine     Please call and advise

## 2022-01-12 NOTE — H&P (VIEW-ONLY)
New Patient Chronic Pain Note (Initial Visit)    Referring Physician: No ref. provider found    PCP: Valery Ozuna MD    Chief Complaint:   Chief Complaint   Patient presents with    Low-back Pain    Neck Pain    Knee Pain     right    Hip Pain     right        SUBJECTIVE:  Interval Hx: 1/13/22  Patient presents for MRI cervical and lumbar review.  Today patient again reports lower back pain which radiates down the anterior aspects of bilateral lower extremities in L4 distribution to the foot.  Today pain is rated as a 4/10.  Pain is exacerbated with prolonged standing and with ambulation the patient reports he is able to ambulate at least 1 mi on the treadmill before requiring rest.  He also reports neck pain which radiates in bilateral trapezius distributions in C5-6 distribution.  Patient has continued Lyrica and is currently taking 150 mg twice daily.  He is anticipated to increase this dosage next week.  He denies any side effects from this medication.    HPI 12/9/21  Ezequiel Hughes is a 81 y.o. male with past medical history significant for transient ischemic attack, hyperlipidemia, hypertension, right bundle branch block, stage 3 chronic kidney disease, thrombocytopenia and anemia , prostate cancer, gout, obstructive sleep apnea, periodically limb movement disorder who presents to the clinic for the evaluation of neck, lower back and leg pain.  Today patient reports pain began approximately 1 year prior without inciting accident, injury or trauma.  Today patient reports lower back pain which is constant and today is rated as a 3/10.  Patient reports radicular symptoms beginning along the anterior aspects of bilateral lower extremities below the knee to the foot in L4 distribution.  Patient is having difficulty describing the character but believes it is burning in nature.  Pain is exacerbated with prolonged standing and with ambulation.  Patient reports he is quite active, goes to the gym and walks on his  "treadmill and is able to ambulate approximately half to 3/4 of a mi before requiring rest.  Pain is improved with sitting.He denies any bowel or bladder incontinence or saddle anesthesia. Patient has performed physical therapy for the lower back without any improvement in his symptoms.     Patient also reports constant neck pain.  Pain presents in a bandlike distribution in bilateral cervical paraspinous territory and radiates into bilateral trapezius distribution.  Patient also reports numbness in bilateral thumbs.  Pain is exacerbated with cervical flexion, extension and lateral flexion.  Patient denies upper extremity weakness, compromise in hand  strength or dexterity.  Patient has been trialed on gabapentin for what his wife describes as "scalp itching"at a lower dose of 100 mg 3 times daily without any improvement in his neck or in his back pain.      Patient denies night fever/night sweats, urinary incontinence, bowel incontinence, significant weight loss, significant motor weakness and loss of sensations.    Pain Disability Index Review:     Last 3 PDI Scores 1/13/2022 12/9/2021   Pain Disability Index (PDI) 24 35       Non-Pharmacologic Treatments:  Physical Therapy/Home Exercise: yes  Ice/Heat:yes  TENS: no  Acupuncture: no  Massage: no  Chiropractic: no    Other: no      Pain Medications:  - Adjuvant Medications: Neurontin (Gabapentin) and Tylenol (Acetaminophen)  - Anti-Coagulants: Plavix ( Clopidogrel)    Pain Procedures:   None    Past Medical History:   Diagnosis Date    Allergic rhinitis     Anemia     Back pain     BPH (benign prostatic hyperplasia)     Cancer     skin cancer to neck, Dr. Graves    Cataract     Disorder of kidney and ureter     ED (erectile dysfunction)     Hiatal hernia     small    History of colon polyps     colonoscopy 11/2016    HLD (hyperlipidemia)     Hyperlipidemia     Hypertension     Lateral epicondylitis     OA (osteoarthritis)     GUIDO (obstructive " sleep apnea)     Prostate cancer     Dr. Wong    TIA (transient ischemic attack)     Trouble in sleeping      Past Surgical History:   Procedure Laterality Date    CATARACT EXTRACTION W/  INTRAOCULAR LENS IMPLANT Right 02/21/2018    I-Stent    CATARACT EXTRACTION W/  INTRAOCULAR LENS IMPLANT Left 03/21/2018    I - Stent    COLONOSCOPY N/A 11/14/2016    Procedure: COLONOSCOPY;  Surgeon: Karuna Rodriguez MD;  Location: [facility] ENDO;  Service: Endoscopy;  Laterality: N/A;    COLONOSCOPY N/A 9/22/2020    Procedure: COLONOSCOPY;  Surgeon: Chuy Fish MD;  Location: [facility] ENDO;  Service: Endoscopy;  Laterality: N/A;    EYE SURGERY      HEMORRHOID SURGERY      I-STENT Right 02/21/2018    DR. REECE    KNEE ARTHROSCOPY W/ MENISCAL REPAIR Right 2015    Dr. Jain    PCIOL Right 02/21/2018    DR. REECE    PLANTAR FASCIA RELEASE      right    ROTATOR CUFF REPAIR Bilateral     bilateral    SHOULDER SURGERY Bilateral around 2000    Dr. Pepper.  rotator cuff surgeries    VASECTOMY       Review of patient's allergies indicates:   Allergen Reactions    Atarax [hydroxyzine hcl] Other (See Comments)     Raised blood pressure     Zyrtec [cetirizine] Other (See Comments)     Raised blood pressure     Gold sodium thiosulfate      Patch test positive       Current Outpatient Medications   Medication Sig    acetaminophen (TYLENOL ARTHRITIS PAIN ORAL) Take by mouth. Patient states that he takes 2 tablets in the morning and 2 tablets in the evening    amLODIPine (NORVASC) 2.5 MG tablet 2 tablets in am and 1 tablets in pm for blood pressure    atorvastatin (LIPITOR) 40 MG tablet TAKE 1 TABLET EVERY DAY    cholecalciferol, vitamin D3, (VITAMIN D3) 50 mcg (2,000 unit) Cap Take 1 capsule by mouth once daily.    clopidogreL (PLAVIX) 75 mg tablet TAKE 1 TABLET EVERY DAY    famotidine (PEPCID) 20 MG tablet Take 1 tablet (20 mg total) by mouth 2 (two) times daily.    finasteride (PROSCAR) 5 mg tablet Take 5  mg by mouth once daily.     losartan (COZAAR) 50 MG tablet TAKE 1 TABLET TWICE DAILY    pantoprazole (PROTONIX) 40 MG tablet Take 1 tablet (40 mg total) by mouth once daily.    pregabalin (LYRICA) 75 MG capsule Take 1 capsule, 75 mg, once daily for 1 week.  Followed by take 1 capsule 75 mg twice daily for 1 week.  Followed by 2 capsules, 150 mg in the morning and 75 mg at night for 1 week.  Followed by 150 mg twice daily for 1 week.  Followed by 225 mg in the morning and 150 mg in the evening for 1 week.  Followed by 225 mg b.i.d..    doxycycline (MONODOX) 100 MG capsule Take 1 capsule (100 mg total) by mouth 2 (two) times daily.     No current facility-administered medications for this visit.       Review of Systems     GENERAL:  No weight loss, malaise or fevers.  HEENT:   No recent changes in vision or hearing  NECK:  Negative for lumps, no difficulty with swallowing.  RESPIRATORY:  Negative for cough, wheezing or shortness of breath, patient denies any recent URI.  CARDIOVASCULAR:  Negative for chest pain, leg swelling or palpitations.  GI:  Negative for abdominal discomfort, blood in stools or black stools or change in bowel habits.  MUSCULOSKELETAL:  See HPI.  SKIN:  Negative for lesions, rash, and itching.  PSYCH:  No mood disorder or recent psychosocial stressors.   HEMATOLOGY/LYMPHOLOGY:  Negative for prolonged bleeding, bruising easily or swollen nodes.    NEURO:   No history of headaches, syncope, paralysis, seizures or tremors.  All other reviewed and negative other than HPI.    OBJECTIVE:    BP (!) 142/68   Pulse 65   Wt 96 kg (211 lb 10.3 oz)   BMI 29.52 kg/m²       Physical Exam    GENERAL: Well appearing, in no acute distress, alert and oriented x3.  PSYCH:  Mood and affect appropriate.  SKIN: Skin color, texture, turgor normal, no rashes or lesions.  HEAD/FACE:  Normocephalic, atraumatic. Cranial nerves grossly intact.    NECK:  pain to palpation over the cervical paraspinous muscles.  Spurling Negative.  pain with neck flexion, extension, or lateral flexion.   Normaltesting biceps, triceps and brachioradialis bilaterally.    NegativeHoffmann's bilaterally.    5/5 strength testing deltoid, biceps, triceps, wrist extensor, wrist flexor and ulnar intrinsics bilaterally.    Normal  strength bilaterally    CV: RRR with palpation of the radial artery.  PULM: No evidence of respiratory difficulty, symmetric chest rise.  GI:  Soft and non-tender.    BACK: Straight leg raising in the sitting and supine positions is negative to radicular pain. No pain to palpation over the facet joints of the lumbar spine or spinous processes. Normal range of motion without pain reproduction.  EXTREMITIES: Peripheral joint ROM is full and pain free without obvious instability or laxity in all four extremities. No deformities, edema, or skin discoloration. Good capillary refill.  MUSCULOSKELETAL: Able to stand on heels & toes.   Shoulder, hip, and knee provocative maneuvers are negative.  There is no pain with palpation over the sacroiliac joints bilaterally.  FABERs test is negative.  FADIR test is negative bilaterally.   Bilateral upper and lower extremity strength is normal and symmetric.  No atrophy or tone abnormalities are noted.  RIGHT Lower extremity: Hip flexion 5/5, Hip Abduction 5/5, Hip Adduction 5/5, Knee extension 5/5, Knee flexion 5/5, Ankle dorsiflexion5/5, Extensor hallucis longus 5/5, Ankle plantarflexion 5/5  LEFT Lower extremity:  Hip flexion 5/5, Hip Abduction 5/5,Hip Adduction 5/5, Knee extension 5/5, Knee flexion 5/5, Ankle dorsiflexion 5/5, Extensor hallucis longus 5/5, Ankle plantarflexion 5/5  -Normal testing knee (patellar) jerk and ankle (achilles) jerk    NEURO: Bilateral upper and lower extremity coordination and muscle stretch reflexes are physiologic and symmetric. No loss of sensation is noted.  GAIT: normal.    Imagin/02/16  X-Ray Lumbar Spine AP And  Lateral  Narrative  Findings: 2 views. Comparison March 25, 2013. There is mild anterolisthesis of L4 on L5 and L5 on S1. Mild narrowing of the disc spaces and early marginal osteophyte formation is noted. No compression fractures or acute osseous findings. Facet arthropathy at L4-5 and L5-S1 and degenerative change in the SI joints.    06/16/21  X-Ray Cervical Spine AP And Lateral  FINDINGS:  The vertebral bodies demonstrate a normal height.  There is a couple mm of anterolisthesis of C3 on C4 and C4 on C5.  Moderate disc space narrowing and spondylosis present at the C5-6 and C6-7 levels.  Facet arthropathy seen within the mid cervical spine on the left.  The C1-C2 articulation is within normal limits.      ASSESSMENT: 81 y.o. year old male with neck, upper extremity lower back and lower leg pain, consistent with     1. Lumbar facet arthropathy     2. DDD (degenerative disc disease), lumbar     3. DDD (degenerative disc disease), cervical     4. Cervical radiculopathy     5. Facet arthropathy, cervical     6. Lumbar radiculopathy  IR Epidural Transfor 1st Vert Lumbar Dov    Case Request-RAD/Other Procedure Area: Bilateral L4/5 TF IAN with RN IV sedation         PLAN:   - Interventions:  Schedule for bilateral L4/5 transforaminal epidural steroid injection to see if this helps with radicular symptoms.  Explained the risks and benefits of the procedure in detail with the patient today in clinic along with alternative treatment options .  Patient elected to pursue this procedure.    - Anticoagulation use: yes Plavix  Per ASA guidelines for secondary prophylaxis patient will have to pause Plavix 5 days prior to injection.  Cardiac clearance requested from Dr. Thompson    -We have discussed considering a cervical epidural steroid injection in the future to address cervical radicular symptoms.     report:  Reviewed and consistent with medication use as prescribed.    - Medications:  -Continue Lyrica titration.  We  discussed potential side effects of this medication which may include drowsiness,dizziness, dry mouth, constipation or peripheral edema.  -Week 1: Take 2 capsules, 150 mg BID  -Week 2: Take 3 capsules, 225 mg in the morning and 150 mg (2 capsules) QHS  -Week 3+: Take 3 capsules, 225 mgBID    - Therapy  We discussed continuing physical therapy to help manage the patient/s painful condition. The patient was counseled that muscle strengthening will improve the long term prognosis in regards to pain and may also help increase range of motion and mobility.     - Imaging: Reviewed available imaging with patient and answered any questions they had regarding study.    - Follow up visit: return to clinic in 4-6 weeks      The above plan and management options were discussed at length with patient. Patient is in agreement with the above and verbalized understanding.    - I discussed the goals of interventional chronic pain management with the patient on today's visit. We discussed a multimodal and systematic approach to pain.  This includes diagnostic and therapeutic injections, adjuvant pharmacologic treatment, physical therapy, and at times psychiatry.  I emphasized the importance of regular exercise, core strengthening and stretching, diet and weight loss as a cornerstone of long-term pain management.    - This condition does not require this patient to take time off of work, and the primary goal of our Pain Management services is to improve the patient's functional capacity.  - Patient Questions: Answered all of the patient's questions regarding diagnoses, therapy, treatment and next steps        Mak Young MD  Interventional Pain Management  [facility]    Disclaimer:  This note was prepared using voice recognition system and is likely to have sound alike errors that may have been overlooked even after proof reading.  Please call me with any questions

## 2022-01-12 NOTE — PROGRESS NOTES
New Patient Chronic Pain Note (Initial Visit)    Referring Physician: No ref. provider found    PCP: Valery Ozuna MD    Chief Complaint:   Chief Complaint   Patient presents with    Low-back Pain    Neck Pain    Knee Pain     right    Hip Pain     right        SUBJECTIVE:  Interval Hx: 1/13/22  Patient presents for MRI cervical and lumbar review.  Today patient again reports lower back pain which radiates down the anterior aspects of bilateral lower extremities in L4 distribution to the foot.  Today pain is rated as a 4/10.  Pain is exacerbated with prolonged standing and with ambulation the patient reports he is able to ambulate at least 1 mi on the treadmill before requiring rest.  He also reports neck pain which radiates in bilateral trapezius distributions in C5-6 distribution.  Patient has continued Lyrica and is currently taking 150 mg twice daily.  He is anticipated to increase this dosage next week.  He denies any side effects from this medication.    HPI 12/9/21  Ezequiel Hughes is a 81 y.o. male with past medical history significant for transient ischemic attack, hyperlipidemia, hypertension, right bundle branch block, stage 3 chronic kidney disease, thrombocytopenia and anemia , prostate cancer, gout, obstructive sleep apnea, periodically limb movement disorder who presents to the clinic for the evaluation of neck, lower back and leg pain.  Today patient reports pain began approximately 1 year prior without inciting accident, injury or trauma.  Today patient reports lower back pain which is constant and today is rated as a 3/10.  Patient reports radicular symptoms beginning along the anterior aspects of bilateral lower extremities below the knee to the foot in L4 distribution.  Patient is having difficulty describing the character but believes it is burning in nature.  Pain is exacerbated with prolonged standing and with ambulation.  Patient reports he is quite active, goes to the gym and walks on his  "treadmill and is able to ambulate approximately half to 3/4 of a mi before requiring rest.  Pain is improved with sitting.He denies any bowel or bladder incontinence or saddle anesthesia. Patient has performed physical therapy for the lower back without any improvement in his symptoms.     Patient also reports constant neck pain.  Pain presents in a bandlike distribution in bilateral cervical paraspinous territory and radiates into bilateral trapezius distribution.  Patient also reports numbness in bilateral thumbs.  Pain is exacerbated with cervical flexion, extension and lateral flexion.  Patient denies upper extremity weakness, compromise in hand  strength or dexterity.  Patient has been trialed on gabapentin for what his wife describes as "scalp itching"at a lower dose of 100 mg 3 times daily without any improvement in his neck or in his back pain.      Patient denies night fever/night sweats, urinary incontinence, bowel incontinence, significant weight loss, significant motor weakness and loss of sensations.    Pain Disability Index Review:     Last 3 PDI Scores 1/13/2022 12/9/2021   Pain Disability Index (PDI) 24 35       Non-Pharmacologic Treatments:  Physical Therapy/Home Exercise: yes  Ice/Heat:yes  TENS: no  Acupuncture: no  Massage: no  Chiropractic: no    Other: no      Pain Medications:  - Adjuvant Medications: Neurontin (Gabapentin) and Tylenol (Acetaminophen)  - Anti-Coagulants: Plavix ( Clopidogrel)    Pain Procedures:   None    Past Medical History:   Diagnosis Date    Allergic rhinitis     Anemia     Back pain     BPH (benign prostatic hyperplasia)     Cancer     skin cancer to neck, Dr. Graves    Cataract     Disorder of kidney and ureter     ED (erectile dysfunction)     Hiatal hernia     small    History of colon polyps     colonoscopy 11/2016    HLD (hyperlipidemia)     Hyperlipidemia     Hypertension     Lateral epicondylitis     OA (osteoarthritis)     GUIDO (obstructive " sleep apnea)     Prostate cancer     Dr. Wong    TIA (transient ischemic attack)     Trouble in sleeping      Past Surgical History:   Procedure Laterality Date    CATARACT EXTRACTION W/  INTRAOCULAR LENS IMPLANT Right 02/21/2018    I-Stent    CATARACT EXTRACTION W/  INTRAOCULAR LENS IMPLANT Left 03/21/2018    I - Stent    COLONOSCOPY N/A 11/14/2016    Procedure: COLONOSCOPY;  Surgeon: Karuna Rodriguez MD;  Location: [facility] ENDO;  Service: Endoscopy;  Laterality: N/A;    COLONOSCOPY N/A 9/22/2020    Procedure: COLONOSCOPY;  Surgeon: Chuy Fish MD;  Location: [facility] ENDO;  Service: Endoscopy;  Laterality: N/A;    EYE SURGERY      HEMORRHOID SURGERY      I-STENT Right 02/21/2018    DR. REECE    KNEE ARTHROSCOPY W/ MENISCAL REPAIR Right 2015    Dr. Jain    PCIOL Right 02/21/2018    DR. REECE    PLANTAR FASCIA RELEASE      right    ROTATOR CUFF REPAIR Bilateral     bilateral    SHOULDER SURGERY Bilateral around 2000    Dr. Pepper.  rotator cuff surgeries    VASECTOMY       Review of patient's allergies indicates:   Allergen Reactions    Atarax [hydroxyzine hcl] Other (See Comments)     Raised blood pressure     Zyrtec [cetirizine] Other (See Comments)     Raised blood pressure     Gold sodium thiosulfate      Patch test positive       Current Outpatient Medications   Medication Sig    acetaminophen (TYLENOL ARTHRITIS PAIN ORAL) Take by mouth. Patient states that he takes 2 tablets in the morning and 2 tablets in the evening    amLODIPine (NORVASC) 2.5 MG tablet 2 tablets in am and 1 tablets in pm for blood pressure    atorvastatin (LIPITOR) 40 MG tablet TAKE 1 TABLET EVERY DAY    cholecalciferol, vitamin D3, (VITAMIN D3) 50 mcg (2,000 unit) Cap Take 1 capsule by mouth once daily.    clopidogreL (PLAVIX) 75 mg tablet TAKE 1 TABLET EVERY DAY    famotidine (PEPCID) 20 MG tablet Take 1 tablet (20 mg total) by mouth 2 (two) times daily.    finasteride (PROSCAR) 5 mg tablet Take 5  mg by mouth once daily.     losartan (COZAAR) 50 MG tablet TAKE 1 TABLET TWICE DAILY    pantoprazole (PROTONIX) 40 MG tablet Take 1 tablet (40 mg total) by mouth once daily.    pregabalin (LYRICA) 75 MG capsule Take 1 capsule, 75 mg, once daily for 1 week.  Followed by take 1 capsule 75 mg twice daily for 1 week.  Followed by 2 capsules, 150 mg in the morning and 75 mg at night for 1 week.  Followed by 150 mg twice daily for 1 week.  Followed by 225 mg in the morning and 150 mg in the evening for 1 week.  Followed by 225 mg b.i.d..    doxycycline (MONODOX) 100 MG capsule Take 1 capsule (100 mg total) by mouth 2 (two) times daily.     No current facility-administered medications for this visit.       Review of Systems     GENERAL:  No weight loss, malaise or fevers.  HEENT:   No recent changes in vision or hearing  NECK:  Negative for lumps, no difficulty with swallowing.  RESPIRATORY:  Negative for cough, wheezing or shortness of breath, patient denies any recent URI.  CARDIOVASCULAR:  Negative for chest pain, leg swelling or palpitations.  GI:  Negative for abdominal discomfort, blood in stools or black stools or change in bowel habits.  MUSCULOSKELETAL:  See HPI.  SKIN:  Negative for lesions, rash, and itching.  PSYCH:  No mood disorder or recent psychosocial stressors.   HEMATOLOGY/LYMPHOLOGY:  Negative for prolonged bleeding, bruising easily or swollen nodes.    NEURO:   No history of headaches, syncope, paralysis, seizures or tremors.  All other reviewed and negative other than HPI.    OBJECTIVE:    BP (!) 142/68   Pulse 65   Wt 96 kg (211 lb 10.3 oz)   BMI 29.52 kg/m²       Physical Exam    GENERAL: Well appearing, in no acute distress, alert and oriented x3.  PSYCH:  Mood and affect appropriate.  SKIN: Skin color, texture, turgor normal, no rashes or lesions.  HEAD/FACE:  Normocephalic, atraumatic. Cranial nerves grossly intact.    NECK:  pain to palpation over the cervical paraspinous muscles.  Spurling Negative.  pain with neck flexion, extension, or lateral flexion.   Normaltesting biceps, triceps and brachioradialis bilaterally.    NegativeHoffmann's bilaterally.    5/5 strength testing deltoid, biceps, triceps, wrist extensor, wrist flexor and ulnar intrinsics bilaterally.    Normal  strength bilaterally    CV: RRR with palpation of the radial artery.  PULM: No evidence of respiratory difficulty, symmetric chest rise.  GI:  Soft and non-tender.    BACK: Straight leg raising in the sitting and supine positions is negative to radicular pain. No pain to palpation over the facet joints of the lumbar spine or spinous processes. Normal range of motion without pain reproduction.  EXTREMITIES: Peripheral joint ROM is full and pain free without obvious instability or laxity in all four extremities. No deformities, edema, or skin discoloration. Good capillary refill.  MUSCULOSKELETAL: Able to stand on heels & toes.   Shoulder, hip, and knee provocative maneuvers are negative.  There is no pain with palpation over the sacroiliac joints bilaterally.  FABERs test is negative.  FADIR test is negative bilaterally.   Bilateral upper and lower extremity strength is normal and symmetric.  No atrophy or tone abnormalities are noted.  RIGHT Lower extremity: Hip flexion 5/5, Hip Abduction 5/5, Hip Adduction 5/5, Knee extension 5/5, Knee flexion 5/5, Ankle dorsiflexion5/5, Extensor hallucis longus 5/5, Ankle plantarflexion 5/5  LEFT Lower extremity:  Hip flexion 5/5, Hip Abduction 5/5,Hip Adduction 5/5, Knee extension 5/5, Knee flexion 5/5, Ankle dorsiflexion 5/5, Extensor hallucis longus 5/5, Ankle plantarflexion 5/5  -Normal testing knee (patellar) jerk and ankle (achilles) jerk    NEURO: Bilateral upper and lower extremity coordination and muscle stretch reflexes are physiologic and symmetric. No loss of sensation is noted.  GAIT: normal.    Imagin/02/16  X-Ray Lumbar Spine AP And  Lateral  Narrative  Findings: 2 views. Comparison March 25, 2013. There is mild anterolisthesis of L4 on L5 and L5 on S1. Mild narrowing of the disc spaces and early marginal osteophyte formation is noted. No compression fractures or acute osseous findings. Facet arthropathy at L4-5 and L5-S1 and degenerative change in the SI joints.    06/16/21  X-Ray Cervical Spine AP And Lateral  FINDINGS:  The vertebral bodies demonstrate a normal height.  There is a couple mm of anterolisthesis of C3 on C4 and C4 on C5.  Moderate disc space narrowing and spondylosis present at the C5-6 and C6-7 levels.  Facet arthropathy seen within the mid cervical spine on the left.  The C1-C2 articulation is within normal limits.      ASSESSMENT: 81 y.o. year old male with neck, upper extremity lower back and lower leg pain, consistent with     1. Lumbar facet arthropathy     2. DDD (degenerative disc disease), lumbar     3. DDD (degenerative disc disease), cervical     4. Cervical radiculopathy     5. Facet arthropathy, cervical     6. Lumbar radiculopathy  IR Epidural Transfor 1st Vert Lumbar Dov    Case Request-RAD/Other Procedure Area: Bilateral L4/5 TF IAN with RN IV sedation         PLAN:   - Interventions:  Schedule for bilateral L4/5 transforaminal epidural steroid injection to see if this helps with radicular symptoms.  Explained the risks and benefits of the procedure in detail with the patient today in clinic along with alternative treatment options .  Patient elected to pursue this procedure.    - Anticoagulation use: yes Plavix  Per ASA guidelines for secondary prophylaxis patient will have to pause Plavix 5 days prior to injection.  Cardiac clearance requested from Dr. Thompson    -We have discussed considering a cervical epidural steroid injection in the future to address cervical radicular symptoms.     report:  Reviewed and consistent with medication use as prescribed.    - Medications:  -Continue Lyrica titration.  We  discussed potential side effects of this medication which may include drowsiness,dizziness, dry mouth, constipation or peripheral edema.  -Week 1: Take 2 capsules, 150 mg BID  -Week 2: Take 3 capsules, 225 mg in the morning and 150 mg (2 capsules) QHS  -Week 3+: Take 3 capsules, 225 mgBID    - Therapy  We discussed continuing physical therapy to help manage the patient/s painful condition. The patient was counseled that muscle strengthening will improve the long term prognosis in regards to pain and may also help increase range of motion and mobility.     - Imaging: Reviewed available imaging with patient and answered any questions they had regarding study.    - Follow up visit: return to clinic in 4-6 weeks      The above plan and management options were discussed at length with patient. Patient is in agreement with the above and verbalized understanding.    - I discussed the goals of interventional chronic pain management with the patient on today's visit. We discussed a multimodal and systematic approach to pain.  This includes diagnostic and therapeutic injections, adjuvant pharmacologic treatment, physical therapy, and at times psychiatry.  I emphasized the importance of regular exercise, core strengthening and stretching, diet and weight loss as a cornerstone of long-term pain management.    - This condition does not require this patient to take time off of work, and the primary goal of our Pain Management services is to improve the patient's functional capacity.  - Patient Questions: Answered all of the patient's questions regarding diagnoses, therapy, treatment and next steps        Mak Young MD  Interventional Pain Management  [facility]    Disclaimer:  This note was prepared using voice recognition system and is likely to have sound alike errors that may have been overlooked even after proof reading.  Please call me with any questions

## 2022-01-13 ENCOUNTER — TELEPHONE (OUTPATIENT)
Dept: PAIN MEDICINE | Facility: CLINIC | Age: 82
End: 2022-01-13

## 2022-01-13 ENCOUNTER — OFFICE VISIT (OUTPATIENT)
Dept: PAIN MEDICINE | Facility: CLINIC | Age: 82
End: 2022-01-13
Payer: MEDICARE

## 2022-01-13 VITALS
BODY MASS INDEX: 29.52 KG/M2 | SYSTOLIC BLOOD PRESSURE: 142 MMHG | HEART RATE: 65 BPM | DIASTOLIC BLOOD PRESSURE: 68 MMHG | WEIGHT: 211.63 LBS

## 2022-01-13 DIAGNOSIS — M47.816 LUMBAR FACET ARTHROPATHY: Primary | ICD-10-CM

## 2022-01-13 DIAGNOSIS — M54.12 CERVICAL RADICULOPATHY: ICD-10-CM

## 2022-01-13 DIAGNOSIS — M50.30 DDD (DEGENERATIVE DISC DISEASE), CERVICAL: ICD-10-CM

## 2022-01-13 DIAGNOSIS — M47.812 FACET ARTHROPATHY, CERVICAL: ICD-10-CM

## 2022-01-13 DIAGNOSIS — M51.36 DDD (DEGENERATIVE DISC DISEASE), LUMBAR: ICD-10-CM

## 2022-01-13 DIAGNOSIS — M54.16 LUMBAR RADICULOPATHY: ICD-10-CM

## 2022-01-13 PROCEDURE — 1101F PR PT FALLS ASSESS DOC 0-1 FALLS W/OUT INJ PAST YR: ICD-10-PCS | Mod: HCNC,CPTII,S$GLB, | Performed by: ANESTHESIOLOGY

## 2022-01-13 PROCEDURE — 3077F SYST BP >= 140 MM HG: CPT | Mod: HCNC,CPTII,S$GLB, | Performed by: ANESTHESIOLOGY

## 2022-01-13 PROCEDURE — 3077F PR MOST RECENT SYSTOLIC BLOOD PRESSURE >= 140 MM HG: ICD-10-PCS | Mod: HCNC,CPTII,S$GLB, | Performed by: ANESTHESIOLOGY

## 2022-01-13 PROCEDURE — 3078F PR MOST RECENT DIASTOLIC BLOOD PRESSURE < 80 MM HG: ICD-10-PCS | Mod: HCNC,CPTII,S$GLB, | Performed by: ANESTHESIOLOGY

## 2022-01-13 PROCEDURE — 1159F MED LIST DOCD IN RCRD: CPT | Mod: HCNC,CPTII,S$GLB, | Performed by: ANESTHESIOLOGY

## 2022-01-13 PROCEDURE — 1125F AMNT PAIN NOTED PAIN PRSNT: CPT | Mod: HCNC,CPTII,S$GLB, | Performed by: ANESTHESIOLOGY

## 2022-01-13 PROCEDURE — 1125F PR PAIN SEVERITY QUANTIFIED, PAIN PRESENT: ICD-10-PCS | Mod: HCNC,CPTII,S$GLB, | Performed by: ANESTHESIOLOGY

## 2022-01-13 PROCEDURE — 99999 PR PBB SHADOW E&M-EST. PATIENT-LVL III: CPT | Mod: PBBFAC,HCNC,, | Performed by: ANESTHESIOLOGY

## 2022-01-13 PROCEDURE — 3288F FALL RISK ASSESSMENT DOCD: CPT | Mod: HCNC,CPTII,S$GLB, | Performed by: ANESTHESIOLOGY

## 2022-01-13 PROCEDURE — 1101F PT FALLS ASSESS-DOCD LE1/YR: CPT | Mod: HCNC,CPTII,S$GLB, | Performed by: ANESTHESIOLOGY

## 2022-01-13 PROCEDURE — 1159F PR MEDICATION LIST DOCUMENTED IN MEDICAL RECORD: ICD-10-PCS | Mod: HCNC,CPTII,S$GLB, | Performed by: ANESTHESIOLOGY

## 2022-01-13 PROCEDURE — 99214 PR OFFICE/OUTPT VISIT, EST, LEVL IV, 30-39 MIN: ICD-10-PCS | Mod: HCNC,S$GLB,, | Performed by: ANESTHESIOLOGY

## 2022-01-13 PROCEDURE — 3288F PR FALLS RISK ASSESSMENT DOCUMENTED: ICD-10-PCS | Mod: HCNC,CPTII,S$GLB, | Performed by: ANESTHESIOLOGY

## 2022-01-13 PROCEDURE — 99999 PR PBB SHADOW E&M-EST. PATIENT-LVL III: ICD-10-PCS | Mod: PBBFAC,HCNC,, | Performed by: ANESTHESIOLOGY

## 2022-01-13 PROCEDURE — 3078F DIAST BP <80 MM HG: CPT | Mod: HCNC,CPTII,S$GLB, | Performed by: ANESTHESIOLOGY

## 2022-01-13 PROCEDURE — 99214 OFFICE O/P EST MOD 30 MIN: CPT | Mod: HCNC,S$GLB,, | Performed by: ANESTHESIOLOGY

## 2022-01-13 NOTE — PATIENT INSTRUCTIONS
General Neck and Back Pain    Both neck and back pain are usually caused by injury to the muscles or ligaments of the spine. Sometimes the disks that separate each bone of the spine may cause pain by pressing on a nearby nerve. Back and neck pain may appear after a sudden twisting or bending force (such as in a car accident), or sometimes after a simple awkward movement. In either case, muscle spasm is often present and adds to the pain.  Acute neck and back pain usually gets better in 1 to 2 weeks. Pain related to disk disease, arthritis in the spinal joints or spinal stenosis (narrowing of the spinal canal) can become chronic and last for months or years.  Back and neck pain are common problems. Most people feel better in 1 or 2 weeks, and most of the rest in 1 to 2 months. Most people can remain active.  People experience and describe pain differently.  · Pain can be sharp, stabbing, shooting, aching, cramping, or burning  · Movement, standing, bending, lifting, sitting, or walking may worsen the pain  · Pain can be localized to one spot or area, or it can be more generalized  · Pain can spread or radiate upwards, downwards, to the front, or go down your arms  · Muscle spasm may occur.  Most of the time mechanical problems with the muscles or spine cause the pain. it is usually caused by an injury, whether known or not, to the muscles or ligaments. While illnesses can cause back pain, it is usually not caused by a serious illness. Pain is usually related to physical activity, whether sports, exercise, work, or normal activity. Sometimes it can occur without an identifiable cause. This can happen simply by stretching or moving wrong, without noting pain at the time. Other causes include:  · Overexertion, lifting, pushing, pulling incorrectly or too aggressively.  · Sudden twisting, bending or stretching from an accident (car or fall), or accidental movement.  · Poor posture  · Poor conditioning, lack of regular  exercise  · Spinal disc disease or arthritis  · Stress  · Pregnancy, or illness like appendicitis, bladder or kidney infection, pelvic infections   Home care  · For neck pain: Use a comfortable pillow that supports the head and keeps the spine in a neutral position. The position of the head should not be tilted forward or backward.  · When in bed, try to find a position of comfort. A firm mattress is best. Try lying flat on your back with pillows under your knees. You can also try lying on your side with your knees bent up towards your chest and a pillow between your knees.  · At first, do not try to stretch out the sore spots. If there is a strain, it is not like the good soreness you get after exercising without an injury. In this case, stretching may make it worse.  · Avoid prolonged sitting, long car rides or travel. This puts more stress on the lower back than standing or walking.  · During the first 24 to 72 hours after an injury, apply an ice pack to the painful area for 20 minutes and then remove it for 20 minutes over a period of 60 to 90 minutes or several times a day.   · You can alternate ice and heat therapies. Talk with your healthcare provider about the best treatment for your back or neck pain. As a safety precaution, do not use a heating pad at bedtime. Sleeping with a heating pad can lead to skin burns or tissue damage.  · Therapeutic massage can help relax the back and neck muscles without stretching them.  · Be aware of safe lifting methods and do not lift anything over 15 pounds until all the pain is gone.  Medications  Talk to your healthcare provider before using medicine, especially if you have other medical problems or are taking other medicines.  · You may use over-the-counter medicine to control pain, unless another pain medicine was prescribed. If you have chronic conditions like diabetes, liver or kidney disease, stomach ulcers,  gastrointestinal bleeding, or are taking blood thinner  medicines.  · Be careful if you are given pain medicines, narcotics, or medicine for muscle spasm. They can cause drowsiness, and can affect your coordination, reflexes, and judgment. Do not drive or operate heavy machinery.  Follow-up care  Follow up with your healthcare provider, or as advised. Physical therapy or further tests may be needed.  If X-rays were taken, you will be notified of any new findings that may affect your care.  Call 911  Seek emergency medical care if any of the following occur:  · Trouble breathing  · Confusion  · Very drowsy or trouble awakening  · Fainting or loss of consciousness  · Rapid or very slow heart rate  · Loss of bowel or bladder control  When to seek medical advice  Call your healthcare provider right away if any of these occur:  · Pain becomes worse or spreads into your arms or legs  · Weakness, numbness or pain in one or both arms or legs  · Numbness in the groin area  · Difficulty walking  · Fever of 100.4ºF (38ºC) or higher, or as directed by your healthcare provider  Date Last Reviewed: 7/1/2016 © 2000-2017 [employer]. [address] [place] [address]. All rights reserved. This information is not intended as a substitute for professional medical care. Always follow your healthcare professional's instructions.          Understanding Cervical Radiculopathy    Cervical radiculopathy is irritation or inflammation of a nerve root in the neck. It causes neck pain and other symptoms that may spread into the chest or down the arm. To understand this condition, it helps to understand the parts of the spine:  · Vertebrae. These are bones that stack to form the spine. The cervical spine contains the 7 vertebrae in the neck.  · Disks. These are soft pads of tissue between the vertebrae. They act as shock absorbers for the spine.  · The spinal canal. This is a tunnel formed within the stacked vertebrae. The spinal cord runs through this canal.  · Nerves. These  branch off the spinal cord. As they leave the spinal canal, nerves pass through openings between the vertebrae. The nerve root is the part of the nerve that is closest to the spinal cord.   With cervical radiculopathy, nerve roots in the neck become irritated. This leads to pain and symptoms that can travel to the nerves that go from the spinal cord down the arms and into the torso.  What causes cervical radiculopathy?  Aging, injury, poor posture, and other issues can lead to problems in the neck. These problems may then irritate nerve roots. These include:  · Damage to a disk in the cervical spine. The damaged disk may then press on nearby nerve roots.  · Degeneration from wear and tear, and aging. This can lead to narrowing (stenosis) of the openings between the vertebrae. The narrowed openings press on nerve roots as they leave the spinal canal.  · An unstable spine. This is when a vertebra slips forward. It can then press on a nerve root.  There are other, less common causes of pressure on nerves in the neck. These include infection, cysts, and tumors.  Symptoms of cervical radiculopathy  These include:  · Neck pain  · Pain, numbness, tingling, or weakness that travels down the arm  · Loss of neck movement  · Muscle spasms  Treatment for cervical radiculopathy  In most cases, your healthcare provider will first try treatments that help relieve symptoms. These may include:  · Prescription or over-the-counter pain medicines. These help relieve pain and swelling.  · Cold packs. These help reduce pain.  · Resting. This involves avoiding positions and activities that increase pain.  · Neck brace (cervical collar). This can help relieve inflammation and pain.  · Physical therapy, including exercises and stretches. This can help decrease pain and increase movement and function.  · Shots of medicinesaround the nerve roots. This is done to help relieve symptoms for a time.  In some cases, your healthcare provider may  advise surgery to fix the underlying problem. This depends on the cause, the symptoms, and how long the pain has lasted.  Possible complications  Over time, an irritated and inflamed nerve may become damaged. This may lead to long-lasting (permanent) numbness or weakness. If symptoms change suddenly or get worse, be sure to let your healthcare provider know.     When to call your healthcare provider  Call your healthcare provider right away if you have any of these:  · New pain or pain that gets worse  · New or increasing weakness, numbness, or tingling in your arm or hand  · Bowel or bladder changes   Date Last Reviewed: 3/10/2016  © 2519-5757 [employer]. [address] [place] [address]. All rights reserved. This information is not intended as a substitute for professional medical care. Always follow your healthcare professional's instructions.          Exercises: Neck Isometrics  To start, sit in a chair with your feet flat on the floor. Your weight should be slightly forward so that youre balanced evenly on your buttocks. Relax your shoulders and keep your head level. Using a chair with arms may help you keep your balance.       1. Press your palm against your forehead. Resist with your neck muscles. Hold for 10 seconds. Relax. Repeat 5 times.  2. Do the exercise again, pressing on the side of your head. Repeat 5 times. Switch sides.  3. Do the exercise again, pressing on the back of your head. Repeat 5 times.  For your safety, check with your healthcare provider before starting an exercise program.   Date Last Reviewed: 8/16/2015  © 7838-0061 [employer]. [address] [place] [address]. All rights reserved. This information is not intended as a substitute for professional medical care. Always follow your healthcare professional's instructions.

## 2022-01-13 NOTE — TELEPHONE ENCOUNTER
Good afternoon Dr. Thompson,  Dr. Young would like to perform a lumbar IAN (epidural steroid injection) on Mr. Hughes.  He is on Plavix and would need to hold it for 5 days prior to the procedure.  Please advise if it is safe and ok for him to hold.  We will wait to hear back from you before getting him scheduled.  Please advise.  Thanks,  Binta  Medical Assistant   Interventional Pain Medicine - [place]

## 2022-01-15 ENCOUNTER — PATIENT MESSAGE (OUTPATIENT)
Dept: PRIMARY CARE CLINIC | Facility: CLINIC | Age: 82
End: 2022-01-15
Payer: MEDICARE

## 2022-01-18 ENCOUNTER — PATIENT MESSAGE (OUTPATIENT)
Dept: PRIMARY CARE CLINIC | Facility: CLINIC | Age: 82
End: 2022-01-18
Payer: MEDICARE

## 2022-01-19 ENCOUNTER — PATIENT MESSAGE (OUTPATIENT)
Dept: PRIMARY CARE CLINIC | Facility: CLINIC | Age: 82
End: 2022-01-19
Payer: MEDICARE

## 2022-01-20 ENCOUNTER — TELEPHONE (OUTPATIENT)
Dept: PAIN MEDICINE | Facility: CLINIC | Age: 82
End: 2022-01-20
Payer: MEDICARE

## 2022-01-20 ENCOUNTER — PATIENT MESSAGE (OUTPATIENT)
Dept: PRIMARY CARE CLINIC | Facility: CLINIC | Age: 82
End: 2022-01-20
Payer: MEDICARE

## 2022-01-20 RX ORDER — FUROSEMIDE 20 MG/1
20 TABLET ORAL DAILY PRN
Qty: 30 TABLET | Refills: 0 | Status: SHIPPED | OUTPATIENT
Start: 2022-01-20 | End: 2022-06-16 | Stop reason: ALTCHOICE

## 2022-01-20 NOTE — TELEPHONE ENCOUNTER
The patient is on Plavix.  Clearance was received .  I went over the pre-procedure instructions with the patient.  The patient verbalized understanding.  All questions answered.

## 2022-01-24 NOTE — PRE-PROCEDURE INSTRUCTIONS
Spoke with patient regarding procedure scheduled on 1.26    Arrival time 0630    Has patient been sick with fever or on antibiotics within the last 7 days? No    Does the patient have any open wounds, sores or rashes? No    Does the patient have any recent fractures? no    Has patient received a vaccination within the last 7 days? No    Received the COVID vaccination? yes    Has the patient stopped all medications as directed? Hold plavix 5 days prior. Cardiac clearance obtained from dr monroy on 1.13.    Does patient have a pacemaker and or defibrillator? no    Does the patient have a ride to and from procedure and someone reliable to remain with patient? Brother mary    Is the patient diabetic? no    Does the patient have sleep apnea? Or use O2 at home? jonelle on cpap     Is the patient receiving sedation? yes    Is the patient instructed to remain NPO beginning at midnight the night before their procedure? yes    Procedure location confirmed with patient? Yes    Covid- Denies signs/symptoms. Instructed to notify PAT/MD if any changes.

## 2022-01-26 ENCOUNTER — TELEPHONE (OUTPATIENT)
Dept: PAIN MEDICINE | Facility: CLINIC | Age: 82
End: 2022-01-26
Payer: MEDICARE

## 2022-01-26 ENCOUNTER — TELEPHONE (OUTPATIENT)
Dept: PHYSICAL MEDICINE AND REHAB | Facility: CLINIC | Age: 82
End: 2022-01-26
Payer: MEDICARE

## 2022-01-26 ENCOUNTER — HOSPITAL ENCOUNTER (OUTPATIENT)
Facility: HOSPITAL | Age: 82
Discharge: HOME | End: 2022-01-26
Attending: ANESTHESIOLOGY | Admitting: ANESTHESIOLOGY
Payer: MEDICARE

## 2022-01-26 VITALS
SYSTOLIC BLOOD PRESSURE: 120 MMHG | HEART RATE: 58 BPM | RESPIRATION RATE: 18 BRPM | WEIGHT: 211.88 LBS | TEMPERATURE: 98 F | BODY MASS INDEX: 29.66 KG/M2 | DIASTOLIC BLOOD PRESSURE: 61 MMHG | OXYGEN SATURATION: 97 % | HEIGHT: 71 IN

## 2022-01-26 DIAGNOSIS — M54.16 LUMBAR RADICULOPATHY, CHRONIC: ICD-10-CM

## 2022-01-26 PROCEDURE — 25000003 PHARM REV CODE 250: Mod: HCNC | Performed by: ANESTHESIOLOGY

## 2022-01-26 PROCEDURE — 64483 NJX AA&/STRD TFRM EPI L/S 1: CPT | Mod: 50,HCNC,, | Performed by: ANESTHESIOLOGY

## 2022-01-26 PROCEDURE — 64483 PR EPIDURAL INJ, ANES/STEROID, TRANSFORAMINAL, LUMB/SACR, SNGL LEVL: ICD-10-PCS | Mod: 50,HCNC,, | Performed by: ANESTHESIOLOGY

## 2022-01-26 PROCEDURE — 64483 NJX AA&/STRD TFRM EPI L/S 1: CPT | Mod: 50,HCNC | Performed by: ANESTHESIOLOGY

## 2022-01-26 PROCEDURE — 25500020 PHARM REV CODE 255: Mod: HCNC | Performed by: ANESTHESIOLOGY

## 2022-01-26 PROCEDURE — 63600175 PHARM REV CODE 636 W HCPCS: Mod: HCNC | Performed by: ANESTHESIOLOGY

## 2022-01-26 RX ORDER — FENTANYL CITRATE 50 UG/ML
INJECTION, SOLUTION INTRAMUSCULAR; INTRAVENOUS
Status: DISCONTINUED | OUTPATIENT
Start: 2022-01-26 | End: 2022-01-26 | Stop reason: HOSPADM

## 2022-01-26 RX ORDER — MIDAZOLAM HYDROCHLORIDE 1 MG/ML
INJECTION, SOLUTION INTRAMUSCULAR; INTRAVENOUS
Status: DISCONTINUED | OUTPATIENT
Start: 2022-01-26 | End: 2022-01-26 | Stop reason: HOSPADM

## 2022-01-26 RX ORDER — INDOMETHACIN 25 MG/1
CAPSULE ORAL
Status: DISCONTINUED | OUTPATIENT
Start: 2022-01-26 | End: 2022-01-26 | Stop reason: HOSPADM

## 2022-01-26 RX ORDER — BUPIVACAINE HYDROCHLORIDE 2.5 MG/ML
INJECTION, SOLUTION EPIDURAL; INFILTRATION; INTRACAUDAL
Status: DISCONTINUED | OUTPATIENT
Start: 2022-01-26 | End: 2022-01-26 | Stop reason: HOSPADM

## 2022-01-26 RX ORDER — DEXAMETHASONE SODIUM PHOSPHATE 10 MG/ML
INJECTION INTRAMUSCULAR; INTRAVENOUS
Status: DISCONTINUED | OUTPATIENT
Start: 2022-01-26 | End: 2022-01-26 | Stop reason: HOSPADM

## 2022-01-26 NOTE — OP NOTE
Ezequiel Hughes  81 y.o. male      Vitals:    01/26/22 0710   BP: (!) 140/72   Pulse: (!) 59   Resp: 15   Temp:      Procedure Date:1/26/22      INFORMED CONSENT: The procedure, risks, benefits and options were discussed with patient. There are no contraindications to the procedure. The patient expressed understanding and agreed to proceed. The personnel performing the procedure was discussed. I verify that I personally obtained consent prior to the start of the procedure and the signed consent can be found on the patient's chart.       Anesthesia:   Minimal anxiolysis provided by M.D    The patient was monitored with continuous pulse oximetry, EKG, and intermittent blood pressure monitors.  The patient was hemodynamically stable throughout the entire process was responsive to voice, and breathing spontaneously.  Supplemental O2 was provided at 2L/min via nasal cannula.  Patient was comfortable for the duration of the procedure. (See nurse documentation and case log for sedation time)    There was a total of 1mg IV Midazolam and 25mcg Fentanyl titrated for the procedure    Pre Procedure diagnosis: Lumbar radiculopathy [M54.16]  Post-Procedure diagnosis: SAME     Complications: None    Specimens: None      DESCRIPTION OF PROCEDURE: The patient was brought to the procedure room. IV access was obtained prior to the procedure. The patient was positioned prone on the fluoroscopy table. Continuous hemodynamic monitoring was initiated including blood pressure, EKG, and pulse oximetry. . The skin was prepped with chlorhexidine and draped in a sterile fashion. Skin anesthesia was achieved using a total of 10mL of lidocaine, 5mL over each respective injection site.     The  L4/5 transforaminal spaces were identified with fluoroscopy in the  AP, oblique, and lateral views.  A 22 gauge spinal quinke needle was then advanced into the area of the trans foraminal spaces bilateral with confirmation of proper needle position using AP,  oblique, and lateral fluoroscopic views. Once the needle tip was in the area of the transforaminal space, and there was no blood, CSF or paraesthesias,  1.5 mL of Omnipaque 300mg/ml was injected on bilateral for a total of 3mL.  Fluoroscopic imaging in the AP and lateral views revealed a clear outline of the spinal nerve with proximal spread of agent through the neural foramen into the epidural space. A total combination of 2 mL of Bupivicaine 0.25% and 10 mg dexamethasone was injected on each side for a total of 6 mL of injected medications with displacement of the contrast dye confirming that the medication went into the area of the transforaminal spaces bilateral. A sterile dressing was applied.   Patient tolerated the procedure well.    Patient was taken back to the recovery room for further observation.     The patient was discharged to home in stable condition

## 2022-01-26 NOTE — DISCHARGE SUMMARY
[facility] 1st Fl  Discharge Note  Short Stay    Procedure(s) (LRB):  Bilateral L4/5 TF IAN with RN IV sedation (Bilateral)    OUTCOME: Patient tolerated treatment/procedure well without complication and is now ready for discharge.    DISPOSITION: Home or Self Care    FINAL DIAGNOSIS:  <principal problem not specified>    FOLLOWUP: In clinic    DISCHARGE INSTRUCTIONS:  No discharge procedures on file.     TIME SPENT ON DISCHARGE: 15 minutes

## 2022-01-26 NOTE — DISCHARGE INSTRUCTIONS

## 2022-02-02 ENCOUNTER — PATIENT MESSAGE (OUTPATIENT)
Dept: PRIMARY CARE CLINIC | Facility: CLINIC | Age: 82
End: 2022-02-02
Payer: MEDICARE

## 2022-02-02 ENCOUNTER — PATIENT MESSAGE (OUTPATIENT)
Dept: PAIN MEDICINE | Facility: CLINIC | Age: 82
End: 2022-02-02
Payer: MEDICARE

## 2022-02-02 DIAGNOSIS — R97.20 ELEVATED PSA: Primary | ICD-10-CM

## 2022-02-02 NOTE — TELEPHONE ENCOUNTER
Last seen:  1/13/22  Procedure:  1/26/22  Next appt:     2/24/22  Please respond to the patient.  ThanksBinta

## 2022-02-03 ENCOUNTER — OFFICE VISIT (OUTPATIENT)
Dept: PULMONOLOGY | Facility: CLINIC | Age: 82
End: 2022-02-03
Payer: MEDICARE

## 2022-02-03 VITALS
OXYGEN SATURATION: 98 % | SYSTOLIC BLOOD PRESSURE: 122 MMHG | RESPIRATION RATE: 16 BRPM | BODY MASS INDEX: 29.1 KG/M2 | WEIGHT: 207.88 LBS | HEIGHT: 71 IN | DIASTOLIC BLOOD PRESSURE: 64 MMHG | HEART RATE: 56 BPM

## 2022-02-03 DIAGNOSIS — I27.20 PULMONARY HTN: ICD-10-CM

## 2022-02-03 DIAGNOSIS — I10 ESSENTIAL HYPERTENSION: ICD-10-CM

## 2022-02-03 DIAGNOSIS — G47.33 OSA (OBSTRUCTIVE SLEEP APNEA): Primary | ICD-10-CM

## 2022-02-03 DIAGNOSIS — G47.01 INSOMNIA DUE TO MEDICAL CONDITION: ICD-10-CM

## 2022-02-03 PROCEDURE — 3074F SYST BP LT 130 MM HG: CPT | Mod: HCNC,CPTII,S$GLB, | Performed by: NURSE PRACTITIONER

## 2022-02-03 PROCEDURE — 3074F PR MOST RECENT SYSTOLIC BLOOD PRESSURE < 130 MM HG: ICD-10-PCS | Mod: HCNC,CPTII,S$GLB, | Performed by: NURSE PRACTITIONER

## 2022-02-03 PROCEDURE — 1159F PR MEDICATION LIST DOCUMENTED IN MEDICAL RECORD: ICD-10-PCS | Mod: HCNC,CPTII,S$GLB, | Performed by: NURSE PRACTITIONER

## 2022-02-03 PROCEDURE — 3288F PR FALLS RISK ASSESSMENT DOCUMENTED: ICD-10-PCS | Mod: HCNC,CPTII,S$GLB, | Performed by: NURSE PRACTITIONER

## 2022-02-03 PROCEDURE — 1160F PR REVIEW ALL MEDS BY PRESCRIBER/CLIN PHARMACIST DOCUMENTED: ICD-10-PCS | Mod: HCNC,CPTII,S$GLB, | Performed by: NURSE PRACTITIONER

## 2022-02-03 PROCEDURE — 1159F MED LIST DOCD IN RCRD: CPT | Mod: HCNC,CPTII,S$GLB, | Performed by: NURSE PRACTITIONER

## 2022-02-03 PROCEDURE — 99214 OFFICE O/P EST MOD 30 MIN: CPT | Mod: HCNC,S$GLB,, | Performed by: NURSE PRACTITIONER

## 2022-02-03 PROCEDURE — 99499 RISK ADDL DX/OHS AUDIT: ICD-10-PCS | Mod: HCNC,S$GLB,, | Performed by: NURSE PRACTITIONER

## 2022-02-03 PROCEDURE — 1160F RVW MEDS BY RX/DR IN RCRD: CPT | Mod: HCNC,CPTII,S$GLB, | Performed by: NURSE PRACTITIONER

## 2022-02-03 PROCEDURE — 3078F PR MOST RECENT DIASTOLIC BLOOD PRESSURE < 80 MM HG: ICD-10-PCS | Mod: HCNC,CPTII,S$GLB, | Performed by: NURSE PRACTITIONER

## 2022-02-03 PROCEDURE — 3288F FALL RISK ASSESSMENT DOCD: CPT | Mod: HCNC,CPTII,S$GLB, | Performed by: NURSE PRACTITIONER

## 2022-02-03 PROCEDURE — 99499 UNLISTED E&M SERVICE: CPT | Mod: HCNC,S$GLB,, | Performed by: NURSE PRACTITIONER

## 2022-02-03 PROCEDURE — 1101F PT FALLS ASSESS-DOCD LE1/YR: CPT | Mod: HCNC,CPTII,S$GLB, | Performed by: NURSE PRACTITIONER

## 2022-02-03 PROCEDURE — 99999 PR PBB SHADOW E&M-EST. PATIENT-LVL IV: CPT | Mod: PBBFAC,HCNC,, | Performed by: NURSE PRACTITIONER

## 2022-02-03 PROCEDURE — 99214 PR OFFICE/OUTPT VISIT, EST, LEVL IV, 30-39 MIN: ICD-10-PCS | Mod: HCNC,S$GLB,, | Performed by: NURSE PRACTITIONER

## 2022-02-03 PROCEDURE — 3078F DIAST BP <80 MM HG: CPT | Mod: HCNC,CPTII,S$GLB, | Performed by: NURSE PRACTITIONER

## 2022-02-03 PROCEDURE — 99999 PR PBB SHADOW E&M-EST. PATIENT-LVL IV: ICD-10-PCS | Mod: PBBFAC,HCNC,, | Performed by: NURSE PRACTITIONER

## 2022-02-03 PROCEDURE — 1101F PR PT FALLS ASSESS DOC 0-1 FALLS W/OUT INJ PAST YR: ICD-10-PCS | Mod: HCNC,CPTII,S$GLB, | Performed by: NURSE PRACTITIONER

## 2022-02-03 RX ORDER — ALBUTEROL SULFATE 90 UG/1
2 AEROSOL, METERED RESPIRATORY (INHALATION) EVERY 6 HOURS PRN
COMMUNITY
Start: 2022-01-21 | End: 2022-07-27

## 2022-02-03 RX ORDER — ALLOPURINOL 100 MG/1
100 TABLET ORAL
COMMUNITY
End: 2022-06-16 | Stop reason: ALTCHOICE

## 2022-02-03 RX ORDER — INFLUENZA VIRUS VACCINE 15; 15; 15; 15 UG/.5ML; UG/.5ML; UG/.5ML; UG/.5ML
SUSPENSION INTRAMUSCULAR
COMMUNITY
Start: 2021-11-18 | End: 2022-06-16 | Stop reason: ALTCHOICE

## 2022-02-03 NOTE — PROGRESS NOTES
"Subjective:      Patient ID: Ezequiel Hughes is a 81 y.o. male.    Chief Complaint: Sleep Apnea    HPI  Presents for sleep apnea. He is benefiting from CPAP use. He has some trouble sleeping due to pain. He feels like he sleeps more soundly when he can get comfortable at night. [place] Sleepiness scale score: Improved from 12 to 4.     Patient Active Problem List   Diagnosis    Lumbosacral spondylosis without myelopathy    BPH (benign prostatic hyperplasia)    Essential hypertension    Mixed hyperlipidemia    Cataract    Thrombocytopenia    Anemia    Chronic kidney disease, stage 3    Incomplete right bundle branch block    Colon cancer screening    Prostate cancer    Tortuous aorta    Lung granuloma    Atherosclerosis of artery of both lower extremities    Leg swelling    GUIDO (obstructive sleep apnea)    Periodic limb movement disorder (PLMD)    Inadequate sleep hygiene    Basal cell carcinoma (BCC) of anterior chest    Chronic gout without tophus    History of colon polyps    Colon polyps    Diverticulosis of colon    Internal hemorrhoids    Generalized weakness    Other eosinophilia    Unspecified inflammatory spondylopathy, thoracic region    Lumbar radiculopathy          /64   Pulse (!) 56   Resp 16   Ht 5' 11" (1.803 m)   Wt 94.3 kg (207 lb 14.3 oz)   SpO2 98%   BMI 29.00 kg/m²   Body mass index is 29 kg/m².    Review of Systems   Constitutional: Negative.    HENT: Negative.    Respiratory: Negative.    Cardiovascular: Negative.    Musculoskeletal: Positive for arthralgias.   Gastrointestinal: Negative.    Neurological: Negative.    Psychiatric/Behavioral: Positive for sleep disturbance.     Objective:      Physical Exam  Constitutional:       Appearance: Normal appearance. He is well-developed.   HENT:      Head: Normocephalic and atraumatic.      Mouth/Throat:      Comments: Wearing mask due to COVID-19 protocol  Neck:      Thyroid: No thyroid mass or thyromegaly.      " Trachea: Trachea normal.   Cardiovascular:      Rate and Rhythm: Normal rate and regular rhythm.      Heart sounds: Normal heart sounds.   Pulmonary:      Effort: Pulmonary effort is normal.      Breath sounds: Normal breath sounds. No wheezing, rhonchi or rales.   Chest:      Chest wall: There is no dullness to percussion.   Abdominal:      Palpations: Abdomen is soft. There is no splenomegaly or mass.      Tenderness: There is no abdominal tenderness.   Musculoskeletal:         General: Normal range of motion.      Cervical back: Normal range of motion and neck supple.   Skin:     General: Skin is warm and dry.   Neurological:      Mental Status: He is alert and oriented to person, place, and time.   Psychiatric:         Mood and Affect: Mood normal.         Behavior: Behavior normal.       Personal Diagnostic Review    Compliance  Payor Standard  Usage 01/01/2022 - 01/30/2022  Usage days 22/30 days (73%)  >= 4 hours 16 days (53%)  < 4 hours 6 days (20%)  Usage hours 126 hours 58 minutes  Average usage (total days) 4 hours 14 minutes  Average usage (days used) 5 hours 46 minutes  Median usage (days used) 6 hours 48 minutes  Total used hours (value since last reset - 01/30/2022) 208 hours  AirSense 11 AutoSet  Serial number [id number]  Mode AutoSet  Min Pressure 5 cmH2O  Max Pressure 9 cmH2O  EPR Fulltime  EPR level 3  Response Standard  Therapy  Pressure - cmH2O Median: 7.5 95th percentile: 9.2 Maximum: 9.5  Leaks - L/min Median: 0.0 95th percentile: 14.1 Maximum: 44.9  Events per hour AI: 0.4 HI: 0.6 AHI: 1.0  Apnea Index Central: 0.2 Obstructive: 0.2 Unknown: 0.1  RERA Index 0.3  Cheyne-Calderon respiration (average duration per night) 0 minutes (0%)  Usage - hours  Printed on 01/31/2022 - Revstr version 4.28.0-5.0 Page 1 of 1  Ezequiel Hughes      Assessment:       1. GUIDO (obstructive sleep apnea)    2. Pulmonary HTN    3. Essential hypertension    4. Insomnia due to medical condition        Outpatient  Encounter Medications as of 2/3/2022   Medication Sig Dispense Refill    acetaminophen (TYLENOL ARTHRITIS PAIN ORAL) Take by mouth. Patient states that he takes 2 tablets in the morning and 2 tablets in the evening      allopurinoL (ZYLOPRIM) 100 MG tablet Take 100 mg by mouth.      amLODIPine (NORVASC) 2.5 MG tablet 2 tablets in am and 1 tablets in pm for blood pressure 270 tablet 3    atorvastatin (LIPITOR) 40 MG tablet TAKE 1 TABLET EVERY DAY 90 tablet 1    cholecalciferol, vitamin D3, (VITAMIN D3) 50 mcg (2,000 unit) Cap Take 1 capsule by mouth once daily.      clopidogreL (PLAVIX) 75 mg tablet TAKE 1 TABLET EVERY DAY 90 tablet 3    finasteride (PROSCAR) 5 mg tablet Take 5 mg by mouth once daily.       FLUARIX QUAD [id number], PF, 60 mcg (15 mcg x 4)/0.5 mL Syrg       furosemide (LASIX) 20 MG tablet Take 1 tablet (20 mg total) by mouth daily as needed (leg swelling). 30 tablet 0    losartan (COZAAR) 50 MG tablet TAKE 1 TABLET TWICE DAILY 180 tablet 3    pantoprazole (PROTONIX) 40 MG tablet Take 1 tablet (40 mg total) by mouth once daily. 90 tablet 3    pregabalin (LYRICA) 75 MG capsule Take 1 capsule, 75 mg, once daily for 1 week.  Followed by take 1 capsule 75 mg twice daily for 1 week.  Followed by 2 capsules, 150 mg in the morning and 75 mg at night for 1 week.  Followed by 150 mg twice daily for 1 week.  Followed by 225 mg in the morning and 150 mg in the evening for 1 week.  Followed by 225 mg b.i.d.. 147 capsule 0    acetaminophen (TYLENOL ORAL) 1000  .      albuterol (PROVENTIL/VENTOLIN HFA) 90 mcg/actuation inhaler Inhale 2 puffs into the lungs every 6 (six) hours as needed.      famotidine (PEPCID) 20 MG tablet Take 1 tablet (20 mg total) by mouth 2 (two) times daily. (Patient not taking: Reported on 2/3/2022) 60 tablet 11     No facility-administered encounter medications on file as of 2/3/2022.     No orders of the defined types were placed in this encounter.    Plan:   Benefiting from  use.   Working on reaching compliance. Wear over 4 hours nightly.      Problem List Items Addressed This Visit        Cardiac/Vascular    Essential hypertension (Chronic)       Other    GUIDO (obstructive sleep apnea) - Primary      Other Visit Diagnoses     Pulmonary HTN        Insomnia due to medical condition        secondary to pain

## 2022-02-15 ENCOUNTER — PATIENT MESSAGE (OUTPATIENT)
Dept: PAIN MEDICINE | Facility: CLINIC | Age: 82
End: 2022-02-15
Payer: MEDICARE

## 2022-02-15 RX ORDER — PREGABALIN 75 MG/1
75 CAPSULE ORAL 2 TIMES DAILY
Qty: 60 CAPSULE | Refills: 2 | Status: SHIPPED | OUTPATIENT
Start: 2022-02-15 | End: 2022-02-17 | Stop reason: SDUPTHER

## 2022-02-15 NOTE — TELEPHONE ENCOUNTER
, patient wants refill of lyrica . Please advise     Last refill:12/09/21  Last visit:01/13/22  Medication:pregabalin (LYRICA) 75 MG capsule BID  Pharmacy :[facility] - [place], [place] - [address]

## 2022-02-16 NOTE — TELEPHONE ENCOUNTER
Dr. Young,  Pregabalin/Lyrica was sent into [facility] rather than [facility] as requested. Can you send it in locally for him?  I have updated the pharmacy for you.  Thanks,  Binta

## 2022-02-17 RX ORDER — PREGABALIN 75 MG/1
75 CAPSULE ORAL 2 TIMES DAILY
Qty: 60 CAPSULE | Refills: 2 | Status: SHIPPED | OUTPATIENT
Start: 2022-02-17 | End: 2022-03-14 | Stop reason: SDUPTHER

## 2022-02-18 ENCOUNTER — OFFICE VISIT (OUTPATIENT)
Dept: PHYSICAL MEDICINE AND REHAB | Facility: CLINIC | Age: 82
End: 2022-02-18
Payer: MEDICARE

## 2022-02-18 ENCOUNTER — TELEPHONE (OUTPATIENT)
Dept: ORTHOPEDICS | Facility: CLINIC | Age: 82
End: 2022-02-18
Payer: MEDICARE

## 2022-02-18 VITALS
WEIGHT: 207 LBS | HEIGHT: 71 IN | SYSTOLIC BLOOD PRESSURE: 155 MMHG | HEART RATE: 56 BPM | RESPIRATION RATE: 14 BRPM | BODY MASS INDEX: 28.98 KG/M2 | DIASTOLIC BLOOD PRESSURE: 74 MMHG

## 2022-02-18 DIAGNOSIS — M54.12 CERVICAL RADICULOPATHY: Primary | ICD-10-CM

## 2022-02-18 DIAGNOSIS — G56.03 BILATERAL CARPAL TUNNEL SYNDROME: ICD-10-CM

## 2022-02-18 PROCEDURE — 95886 MUSC TEST DONE W/N TEST COMP: CPT | Mod: HCNC,S$GLB,, | Performed by: PHYSICAL MEDICINE & REHABILITATION

## 2022-02-18 PROCEDURE — 1126F AMNT PAIN NOTED NONE PRSNT: CPT | Mod: HCNC,CPTII,S$GLB, | Performed by: PHYSICAL MEDICINE & REHABILITATION

## 2022-02-18 PROCEDURE — 3077F PR MOST RECENT SYSTOLIC BLOOD PRESSURE >= 140 MM HG: ICD-10-PCS | Mod: HCNC,CPTII,S$GLB, | Performed by: PHYSICAL MEDICINE & REHABILITATION

## 2022-02-18 PROCEDURE — 95911 NRV CNDJ TEST 9-10 STUDIES: CPT | Mod: HCNC,S$GLB,, | Performed by: PHYSICAL MEDICINE & REHABILITATION

## 2022-02-18 PROCEDURE — 99204 OFFICE O/P NEW MOD 45 MIN: CPT | Mod: 25,HCNC,S$GLB, | Performed by: PHYSICAL MEDICINE & REHABILITATION

## 2022-02-18 PROCEDURE — 1159F MED LIST DOCD IN RCRD: CPT | Mod: HCNC,CPTII,S$GLB, | Performed by: PHYSICAL MEDICINE & REHABILITATION

## 2022-02-18 PROCEDURE — 99999 PR PBB SHADOW E&M-EST. PATIENT-LVL IV: ICD-10-PCS | Mod: PBBFAC,HCNC,, | Performed by: PHYSICAL MEDICINE & REHABILITATION

## 2022-02-18 PROCEDURE — 99999 PR PBB SHADOW E&M-EST. PATIENT-LVL IV: CPT | Mod: PBBFAC,HCNC,, | Performed by: PHYSICAL MEDICINE & REHABILITATION

## 2022-02-18 PROCEDURE — 3078F PR MOST RECENT DIASTOLIC BLOOD PRESSURE < 80 MM HG: ICD-10-PCS | Mod: HCNC,CPTII,S$GLB, | Performed by: PHYSICAL MEDICINE & REHABILITATION

## 2022-02-18 PROCEDURE — 95886 PR EMG COMPLETE, W/ NERVE CONDUCTION STUDIES, 5+ MUSCLES: ICD-10-PCS | Mod: HCNC,S$GLB,, | Performed by: PHYSICAL MEDICINE & REHABILITATION

## 2022-02-18 PROCEDURE — 3077F SYST BP >= 140 MM HG: CPT | Mod: HCNC,CPTII,S$GLB, | Performed by: PHYSICAL MEDICINE & REHABILITATION

## 2022-02-18 PROCEDURE — 3078F DIAST BP <80 MM HG: CPT | Mod: HCNC,CPTII,S$GLB, | Performed by: PHYSICAL MEDICINE & REHABILITATION

## 2022-02-18 PROCEDURE — 1159F PR MEDICATION LIST DOCUMENTED IN MEDICAL RECORD: ICD-10-PCS | Mod: HCNC,CPTII,S$GLB, | Performed by: PHYSICAL MEDICINE & REHABILITATION

## 2022-02-18 PROCEDURE — 95911 PR NERVE CONDUCTION STUDY; 9-10 STUDIES: ICD-10-PCS | Mod: HCNC,S$GLB,, | Performed by: PHYSICAL MEDICINE & REHABILITATION

## 2022-02-18 PROCEDURE — 1160F PR REVIEW ALL MEDS BY PRESCRIBER/CLIN PHARMACIST DOCUMENTED: ICD-10-PCS | Mod: HCNC,CPTII,S$GLB, | Performed by: PHYSICAL MEDICINE & REHABILITATION

## 2022-02-18 PROCEDURE — 1160F RVW MEDS BY RX/DR IN RCRD: CPT | Mod: HCNC,CPTII,S$GLB, | Performed by: PHYSICAL MEDICINE & REHABILITATION

## 2022-02-18 PROCEDURE — 1126F PR PAIN SEVERITY QUANTIFIED, NO PAIN PRESENT: ICD-10-PCS | Mod: HCNC,CPTII,S$GLB, | Performed by: PHYSICAL MEDICINE & REHABILITATION

## 2022-02-18 PROCEDURE — 99204 PR OFFICE/OUTPT VISIT, NEW, LEVL IV, 45-59 MIN: ICD-10-PCS | Mod: 25,HCNC,S$GLB, | Performed by: PHYSICAL MEDICINE & REHABILITATION

## 2022-02-18 NOTE — PATIENT INSTRUCTIONS
Patient Education       Carpal Tunnel Injection   Why is this procedure done?   Your median nerve runs from your lower arm into your hand. If this nerve is squeezed at the wrist area, you may feel pain. You may also have weakness, tingling, or numbness in your hand and wrist. Swelling in this area puts pressure on the nerve and causes the problems you are having. This is called carpal tunnel syndrome. The carpal tunnel is the small area in your wrist that the median nerve runs through. A tough band of tissues called a ligament holds everything in place over the carpal tunnel.  Carpal tunnel syndrome is a very common health problem. It is most often caused by doing hand or wrist movements over and over. It can also be caused by using the lower arm muscles too much. Doctors often try different treatments. If these do not work, surgery may be needed.       What will the results be?   A steroid injection into the carpal tunnel may help with pain in the wrist and hand. It may also help with swelling and feelings of numbness or tingling. You may have less weakness or stiffness. You may feel your hand works better after an injection.   What happens before the procedure?   Your doctor will take your history and do an exam. Talk to your doctor about:  · All the drugs you are taking. Be sure to include all prescription and over-the-counter (OTC) drugs, and herbal supplements. Tell the doctor about any drug allergy. Bring a list of drugs you take with you.  · Any bleeding problems. Be sure to tell your doctor if you are taking any drugs that may cause bleeding. Some of these are warfarin, rivaroxaban, apixaban, ticagrelor, clopidogrel, ketorolac, ibuprofen, naproxen, or aspirin. Certain vitamins and herbs, such as garlic and fish oil, may also add to the risk for bleeding. You may need to stop these drugs as well. Talk to your doctor about them.  What happens during the procedure?   · You will sit on a chair or lie down on a  treatment bed. Your hand will be placed with your palm up while resting on a small rolled towel.  · Your doctor will feel your wrist. The doctor may bre the injection site with a marker. Your wrist will be cleaned with a special soap and the doctor may inject a numbing drug. When your wrist feels numb, your doctor will put a needle deep into your wrist. The steroids will be slowly injected into your wrist and the needle will be taken out. The doctor will cover the area with a bandage and ask you to move your hand and fingers.  · The procedure takes about 2 to 3 minutes.  What happens after the procedure?   · You may not feel the effect of the steroid right away. You may have to wait 1 to 7 days before you feel the effect.  · Your doctor may have you wear a splint to keep your wrist and hand still.  · You may go home right after the procedure.  What care is needed at home?   · Ask your doctor what you need to do when you go home. Make sure you ask questions if you do not understand what the doctor says. This way you will know what you need to do.  · Rest your wrist and hand. If you have a splint or brace, wearing it will keep your wrist from moving too much.  · Place an ice pack or a bag of frozen peas wrapped in a towel over the painful part. Never put ice right on the skin. Do not leave the ice on more than 10 to 15 minutes at a time. You can do this a few times a day to help with pain and swelling.  · Prop your wrist on pillows to help with swelling.  What follow-up care is needed?   · Your doctor may ask you to make visits to the office to check on your progress. Be sure to keep these visits.   · Your doctor may send you to physical therapy (PT). The PT will teach you exercises to help you get back your strength and motion.   What lifestyle changes are needed?   · Avoid doing repeat movements with your wrist and hand, if possible. If you have to do repeat movements, take rest breaks often.  · If you sit at a  computer, make sure your keyboard, mouse, desk, and chair are in the right positions so you do not put stress on the wrist. Think about using a special mouse and keyboard that are made to protect your wrist and hand.  · If you have an assembly job, try alternating tasks, if possible.  · Avoid holding your wrist and hand in the same position for too long.  · Wearing splints or fingerless compression gloves may help:  ? Keep the wrist straight.  ? Keep the muscles warm and flexible.  ? Prevent swelling.  · Do gentle stretching exercises before doing tough movements with your wrist and hands.  · Massage your wrist, palms, and back of the hands.  What problems could happen?   · Infection  · Bleeding  · Nerve injury  · Carpal tunnel syndrome comes back  When do I need to call the doctor?   · Signs of infection. These include a fever of 100.4°F (38°C) or higher, chills, more pain, swelling or redness at the injection site, bleeding, yellowish discharge from the injection site.  · More numbness in your hand  ·  is weaker  Where can I learn more?   National [place] of Neurological Disorders and Stroke  https://www.ninds.nih.gov/Disorders/Patient-Caregiver-Education/Fact-Sheets/Carpal-Tunnel-Syndrome-Fact-Sheet   NHS  https://www.nhs.uk/conditions/carpal-tunnel-syndrome/   Last Reviewed Date   2020-08-26  Consumer Information Use and Disclaimer   This information is not specific medical advice and does not replace information you receive from your health care provider. This is only a brief summary of general information. It does NOT include all information about conditions, illnesses, injuries, tests, procedures, treatments, therapies, discharge instructions or life-style choices that may apply to you. You must talk with your health care provider for complete information about your health and treatment options. This information should not be used to decide whether or not to accept your health care providers advice,  "instructions or recommendations. Only your health care provider has the knowledge and training to provide advice that is right for you.  Copyright   Copyright © 2021 UpToDate, Inc. and its affiliates and/or licensors. All rights reserved.  Patient Education       Radiculopathy   The Basics   Written by the doctors and editors at [facility]   What is radiculopathy? -- "Radiculopathy" is the medical term for the pain, weakness, numbness, or tingling that happens when nerves coming from the spinal cord get pinched or damaged. Radiculopathy can affect different parts of the body, depending on which nerve or group of nerves is affected. People sometimes refer to radiculopathy as having a "pinched nerve."  Here are 2 common examples of radiculopathy:  · Cervical radiculopathy - People with this type of radiculopathy have pain, weakness, numbness, or tingling down one or both arms. The condition happens when one or more of the nerves that go from the spine to the arm get pinched or damaged.  · Lumbosacral radiculopathy - People with this type of radiculopathy have pain, weakness, numbness, or tingling in the buttocks or down the leg. The condition happens when one or more of the nerves that go from the spine to the foot and leg get pinched or damaged. People sometimes refer to the symptoms of this type of radiculopathy as "sciatica."  What causes radiculopathy? -- Radiculopathy is usually caused by a problem with the back. To understand radiculopathy, it's helpful to first learn a little about the back and spine.  The back is made up of (figure 1):  · Vertebrae - A stack of bones that sit on top of one another like a stack of coins. Each of these bones has a hole in the center. When stacked, the bones form a hollow tube that protects the spinal cord.  · Spinal cord and nerves - The spinal cord is the highway of nerves that connects the brain to the rest of the body. It runs through the hole in the center of the vertebrae. " "Nerves branch out from the spinal cord and pass in between the vertebrae. From there they connect to the arms, the legs, and the organs. (This is why problems in the back can cause leg pain or bladder problems.)  · Discs - Rubbery discs sit in between each of the vertebrae to add cushion and allow movement. The discs have a tough outer shell and jelly-like center.  · Muscles, tendons, and ligaments - Together the muscles, tendons, and ligaments are called the "soft tissues" of the back. These soft tissues support the back and help hold it together.  Radiculopathy can happen when changes in the back cause a nerve to get pinched or damaged. This can happen if:  · The vertebrae form bumps called bone spurs, which press on nearby nerves. (People with a condition called "spinal stenosis" often have this problem.)  · The discs between the vertebrae break open and bulge out, causing them to press on or irritate nearby nerves. (A disc that breaks open and bulges is called a "herniated disc.")   · Other medical conditions, such as diabetes, infection, inflammation, or a tumor injure the nerves near the spinal cord.  Should I see a doctor or nurse? -- If you have new pain, weakness, numbness, or tingling that seems to spread out to your arms or legs from your spine, see your doctor or nurse.  Will I need tests? -- Maybe. Doctors can tell a lot about a person's radiculopathy based on which parts of the body are affected and how. Because of that, you might not need any tests, especially if you have had symptoms only for a short time. Still, if your doctor is concerned about nerve damage, they might order one or more of these tests:  · Imaging tests - Imaging tests, such as X-rays, MRIs, or CT scans, create pictures of the inside of the body. These imaging tests can show problems with the back like those described above.  · Electromyography (also called "EMG" or nerve conduction study) - During this test, a technician or " doctor checks how well electrical pulses travel across nerves to the part of your body that has symptoms. The test helps show whether the nerves controlling that body part are working right.  How is radiculopathy treated? -- Many people with radiculopathy do not need formal treatment. In some cases, the radiculopathy goes away as the back and nerves heal. In other cases, people find ways to cope with their symptoms.  When people do get treatment, the treatments can include:  · Pain medicines that you can get without a prescription (If these do not work, stronger prescription pain medicines are available.)  · Medicines to relax the muscles (called muscle relaxants)  · Avoiding activities that make the pain worse  · Injections of medicines that numb the back or reduce swelling  · Physical therapy to learn special exercises and stretches  · Surgery to repair the problem causing symptoms   All topics are updated as new evidence becomes available and our peer review process is complete.  This topic retrieved from [employer] on: Sep 21, 2021.  Topic 54531 Version 7.0  Release: 29.4.2 - C29.263  © 2021 UpToDate, Inc. and/or its affiliates. All rights reserved.  figure 1: Anatomy of the back     Low back pain can be caused by problems with the muscles, ligaments, discs, bones (vertebrae), or nerves. Often, back pain is caused by strains or sprains involving the muscles or ligaments. These problems cannot always be seen on imaging tests, such as MRI or CT scans.  Graphic 94029 Version 5.0    Consumer Information Use and Disclaimer   This information is not specific medical advice and does not replace information you receive from your health care provider. This is only a brief summary of general information. It does NOT include all information about conditions, illnesses, injuries, tests, procedures, treatments, therapies, discharge instructions or life-style choices that may apply to you. You must talk with your health care  provider for complete information about your health and treatment options. This information should not be used to decide whether or not to accept your health care provider's advice, instructions or recommendations. Only your health care provider has the knowledge and training to provide advice that is right for you. The use of this information is governed by the [employer] End User License Agreement, available at https://www.[employer]/en/solutions/[employer]/about/jeanna.The use of [employer] content is governed by the [employer] Terms of Use. ©2021 [employer] All rights reserved.  Copyright   © 2021 [employer] and/or its affiliates. All rights reserved.

## 2022-02-18 NOTE — PROGRESS NOTES
[facility]   [address] [address] [facility] [address]  [place] [address]  Phone: [phone]        Full Name: Ezequiel Hughes YOB: 1940  Patient ID: [id number]      Visit Date: 2/18/2022 08:13  Age: 81 Years 11 Months Old  Examining Physician: Emilia Paez M.D.  Referring Physician: Dr Young  Reason for Referral: upper ext pain      Chief Complaint   Patient presents with    Neck Pain       HPI: This is a 81 y.o.  male being seen in clinic today for evaluation of chronic neck and arm pain with numbness/tingling into his left 1st and 2nd digits -mostly at night.  With repositioning of his arm, his symptoms can improve.  He has a known history of cervical and lumbar DJD/DDD.    History obtained from patient    Past family, medical, social, and surgical history reviewed in chart    Review of Systems:     General- denies lethargy, weight change, fever, chills  Head/neck- denies swallowing difficulties  ENT- denies hearing changes  Cardiovascular-denies chest pain  Pulmonary- denies shortness of breath  GI- denies constipation or bowel incontinence  - denies bladder incontinence +CKD  Skin- denies wounds or rashes  Musculoskeletal- denies weakness, +pain  Neurologic- +numbness and tingling  Psychiatric- denies depressive or psychotic features, denies anxiety  Lymphatic-denies swelling  Endocrine- denies hypoglycemic symptoms/DM history  All other pertinent systems negative     Physical Examination:  General: Well developed, well nourished male, NAD  HEENT:NCAT EOMI bilaterally   Pulmonary:Normal respirations    Spinal Examination: CERVICAL  Active ROM is within normal limits.  Inspection: No deformity of spinal alignment.  Palpation: No vertebral tenderness to percussion.      Spinal Examination: LUMBAR or THORACIC  Active ROM is within normal limits.  Inspection: No deformity of spinal alignment.      Musculoskeletal Tests:  Phalen: neg  Elbow compression (ulnar): neg  Tinels at wrist: neg    Bilateral Upper  and Lower Extremities:  Pulses are 2+ at radial bilaterally.  Shoulder/Elbow/Wrist/Hand ROM wnl, arthritic deformities at fingers  Hip/Knee/Ankle ROM   Bilateral Extremities show normal capillary refill.  No signs of cyanosis, rubor, edema, skin changes, or dysvascular changes of appendages.  Nails appear intact.    Neurological Exam:  Cranial Nerves:  II-XII grossly intact    Manual Muscle Testing: (Motor 5=normal)  5/5 strength bilateral upper extremities    No focal atrophy is noted of either upper extremity.    Bilateral Reflexes:  Caicedo's response is absent bilaterally.    Sensation: tested to light touch  - intact in arms except dec at left 1st digit    Gait: Narrow base and good arm swing.      Entire procedure explained to patient prior to proceeding.  Verbal consent obtained    SNC      Nerve / Sites Rec. Site Onset Lat Peak Lat Amp Segments Distance Velocity     ms ms µV  mm m/s   L Median - Digit II (Antidromic)      Wrist Dig II 8.2 9.4 1.7 Wrist - Dig  17   R Median - Digit II (Antidromic)      Wrist Dig II 3.6 5.2 10.0 Wrist - Dig  39   L Ulnar - Digit V (Antidromic)      Wrist Dig V 2.9 4.0 7.1 Wrist - Dig V 140 48   R Ulnar - Digit V (Antidromic)      Wrist Dig V 2.7 3.8 6.7 Wrist - Dig V 140 52   L Radial - Anatomical snuff box (Forearm)      Forearm Wrist 1.8 2.7 10.2 Forearm - Wrist 100 55   R Radial - Anatomical snuff box (Forearm)      Forearm Wrist 1.9 3.0 8.2 Forearm - Wrist 100 52       MNC      Nerve / Sites Muscle Latency Amplitude Duration Rel Amp Segments Distance Lat Diff Velocity     ms mV ms %  mm ms m/s   L Median - APB      Wrist APB 6.7 2.7 7.1 100 Wrist - APB 80        Elbow APB 11.6 2.6 6.7 95.7 Elbow - Wrist 240 4.9 49   R Median - APB      Wrist APB 5.6 4.6 6.0 100 Wrist - APB 80        Elbow APB 10.4 4.3 6.1 92.7 Elbow - Wrist 240 4.8 50   L Ulnar - ADM      Wrist ADM 3.3 7.6 7.1 100 Wrist - ADM 80        B.Elbow ADM 8.2 6.6 7.3 87 B.Elbow - Wrist 260 4.8 54       A.Elbow ADM 10.3 6.2 7.6 94.3 A.Elbow - B.Elbow 110 2.1 52         A.Elbow - Wrist  7.0    R Ulnar - ADM      Wrist ADM 3.3 6.2 7.2 100 Wrist - ADM 80        B.Elbow ADM 8.2 6.0 7.8 96 B.Elbow - Wrist 250 4.9 51      A.Elbow ADM 10.5 5.6 7.8 94.5 A.Elbow - B.Elbow 120 2.3 51         A.Elbow - Wrist  7.2        EMG         EMG Summary Table     Spontaneous MUAP Recruitment   Muscle IA Fib PSW Fasc Other Dur. Dur Amp Dur Polys Pattern Effort   L. Deltoid N None None None .   N N N N Max   L. Biceps brachii N None None None .   N N N N Max   L. Triceps brachii Incr None None None .   Sl Incr N 1+ sl red Max   L. Pronator teres Incr None None None .   N N 1+ sl red Max   L. First dorsal interosseous Incr 1+ 1+ None .   Sl Incr N 1+ Reduced Max   L. Abductor pollicis brevis Incr 1+ 1+ None CRD   Sl Incr Sl Incr 1+ Reduced Max   R. First dorsal interosseous N None None None .   Sl Incr Sl Incr 1+ Reduced Max   R. Triceps brachii N None None None .   N N N N Max   R. Biceps brachii N None None None .   N N N sl red Max   R. Abductor pollicis brevis N None None None .   N Sl Incr 1+ sl red Max                                    INTERPRETATION  -Bilateral median motor nerve conduction study showed prolonged latency, dec amplitude on the left, and dec conduction velocity on the left  -Bilateral median sensory nerve conduction study showed prolonged peak latency and dec amplitude on the left  -Bilateral ulnar motor nerve conduction study showed normal latency, amplitude, and conduction velocity  -Bilateral ulnar sensory nerve conduction study showed normal peak latency and amplitude  -Bilateral radial sensory nerve conduction study showed prolonged peak latency on the right and dec amplitude om the right  -Needle EMG examination performed to above mentioned muscles     IMPRESSION  1. ABNORMAL study  2. There is electrodiagnostic evidence of an acute on chronic radiculopathy of the left C8, T1 nerve roots, a subacute on chronic  radiculopathy of the left C7 nerve root, and a chronic radiculopathy of the right C6, C8, T1 nerve roots.  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    PLAN  Discussed in detail for greater than 40minutes about diagnosis and treatment plan    1. Follow up with referring provider: Dr. Mak Young  2. Handouts on CTS, cervical  radic provided. Referral placed to ortho for CTS injections. Pt considering cervical ESIs  3. This study is good for one year. If symptoms worsen or do not improve, please re-consult.    Emilia Paez M.D.  Physical Medicine and Rehab

## 2022-02-22 NOTE — PROGRESS NOTES
Established Patient Chronic Pain Note     Referring Physician: No ref. provider found    PCP: Valery Ozuna MD    Chief Complaint:   Chief Complaint   Patient presents with    Back Pain        SUBJECTIVE:  Interval Hx: 2/23/22  Presents status post bilateral L4/5 transforaminal epidural steroid injection January 26, 2022. Patient reports having 100% relief in lower extremity radicular symptoms following epidural steroid injection.  This pain level varies based upon his activity throughout the day.  Patient reports as he is more active he does notice radicular symptoms down the lateral aspects of bilateral lower extremities to the feet.  Patient reports discontinuing Lyrica the day following his injection which he believes caused paresthesias to insidiously return.  Patient does report improvement in functionality including range of motion and strength following his injection.  Patient is interested in repeat intervention.  Patient denies any side effects at the Lyrica dose of 225 mg twice a day.    Interval Hx: 1/13/22  Patient presents for MRI cervical and lumbar review.  Today patient again reports lower back pain which radiates down the anterior aspects of bilateral lower extremities in L4 distribution to the foot.  Today pain is rated as a 4/10.  Pain is exacerbated with prolonged standing and with ambulation the patient reports he is able to ambulate at least 1 mi on the treadmill before requiring rest.  He also reports neck pain which radiates in bilateral trapezius distributions in C5-6 distribution.  Patient has continued Lyrica and is currently taking 150 mg twice daily.  He is anticipated to increase this dosage next week.  He denies any side effects from this medication.    HPI 12/9/21  Ezequiel Hughes is a 81 y.o. male with past medical history significant for transient ischemic attack, hyperlipidemia, hypertension, right bundle branch block, stage 3 chronic kidney disease, thrombocytopenia and anemia ,  "prostate cancer, gout, obstructive sleep apnea, periodically limb movement disorder who presents to the clinic for the evaluation of neck, lower back and leg pain.  Today patient reports pain began approximately 1 year prior without inciting accident, injury or trauma.  Today patient reports lower back pain which is constant and today is rated as a 3/10.  Patient reports radicular symptoms beginning along the anterior aspects of bilateral lower extremities below the knee to the foot in L4 distribution.  Patient is having difficulty describing the character but believes it is burning in nature.  Pain is exacerbated with prolonged standing and with ambulation.  Patient reports he is quite active, goes to the gym and walks on his treadmill and is able to ambulate approximately half to 3/4 of a mi before requiring rest.  Pain is improved with sitting.He denies any bowel or bladder incontinence or saddle anesthesia. Patient has performed physical therapy for the lower back without any improvement in his symptoms.     Patient also reports constant neck pain.  Pain presents in a bandlike distribution in bilateral cervical paraspinous territory and radiates into bilateral trapezius distribution.  Patient also reports numbness in bilateral thumbs.  Pain is exacerbated with cervical flexion, extension and lateral flexion.  Patient denies upper extremity weakness, compromise in hand  strength or dexterity.  Patient has been trialed on gabapentin for what his wife describes as "scalp itching"at a lower dose of 100 mg 3 times daily without any improvement in his neck or in his back pain.      Patient denies night fever/night sweats, urinary incontinence, bowel incontinence, significant weight loss, significant motor weakness and loss of sensations.    Pain Disability Index Review:     Last 3 PDI Scores 1/13/2022 12/9/2021   Pain Disability Index (PDI) 24 35     Non-Pharmacologic Treatments:  Physical Therapy/Home Exercise: " yes  Ice/Heat:yes  TENS: no  Acupuncture: no  Massage: no  Chiropractic: no    Other: no    Pain Medications:  - Adjuvant Medications: Neurontin (Gabapentin) and Tylenol (Acetaminophen)  - Anti-Coagulants: Plavix ( Clopidogrel)    Pain Procedures:   -01/26/2022:  Bilateral L4/5 transforaminal epidural steroid injection    Past Medical History:   Diagnosis Date    Allergic rhinitis     Anemia     Back pain     BPH (benign prostatic hyperplasia)     Cancer     skin cancer to neck, Dr. Graves    Cataract     Disorder of kidney and ureter     ED (erectile dysfunction)     Hiatal hernia     small    History of colon polyps     colonoscopy 11/2016    HLD (hyperlipidemia)     Hyperlipidemia     Hypertension     Lateral epicondylitis     Lumbar radiculopathy 1/26/2022    OA (osteoarthritis)     GUIDO (obstructive sleep apnea)     Prostate cancer     Dr. Wong    TIA (transient ischemic attack)     Trouble in sleeping      Past Surgical History:   Procedure Laterality Date    CATARACT EXTRACTION W/  INTRAOCULAR LENS IMPLANT Right 02/21/2018    I-Stent    CATARACT EXTRACTION W/  INTRAOCULAR LENS IMPLANT Left 03/21/2018    I - Stent    COLONOSCOPY N/A 11/14/2016    Procedure: COLONOSCOPY;  Surgeon: Karuna Rodriguez MD;  Location: [facility];  Service: Endoscopy;  Laterality: N/A;    COLONOSCOPY N/A 9/22/2020    Procedure: COLONOSCOPY;  Surgeon: Chuy Fish MD;  Location: [facility];  Service: Endoscopy;  Laterality: N/A;    EYE SURGERY      HEMORRHOID SURGERY      I-STENT Right 02/21/2018    DR. REECE    KNEE ARTHROSCOPY W/ MENISCAL REPAIR Right 2015    Dr. Jain    PCIOL Right 02/21/2018    DR. REECE    PLANTAR FASCIA RELEASE      right    ROTATOR CUFF REPAIR Bilateral     bilateral    SELECTIVE INJECTION OF ANESTHETIC AGENT AROUND LUMBAR SPINAL NERVE ROOT BY TRANSFORAMINAL APPROACH Bilateral 1/26/2022    Procedure: Bilateral L4/5 TF IAN with RN IV sedation;  Surgeon: Mak THOMPSON  MD Hector;  Location: [facility];  Service: Pain Management;  Laterality: Bilateral;    SHOULDER SURGERY Bilateral around 2000    Dr. Pepper.  rotator cuff surgeries    VASECTOMY       Review of patient's allergies indicates:   Allergen Reactions    Atarax [hydroxyzine hcl] Other (See Comments)     Raised blood pressure     Zyrtec [cetirizine] Other (See Comments)     Raised blood pressure     Gold sodium thiosulfate      Patch test positive       Current Outpatient Medications   Medication Sig    acetaminophen (TYLENOL ARTHRITIS PAIN ORAL) Take by mouth. Patient states that he takes 2 tablets in the morning and 2 tablets in the evening    albuterol (PROVENTIL/VENTOLIN HFA) 90 mcg/actuation inhaler Inhale 2 puffs into the lungs every 6 (six) hours as needed.    allopurinoL (ZYLOPRIM) 100 MG tablet Take 100 mg by mouth.    amLODIPine (NORVASC) 2.5 MG tablet 2 tablets in am and 1 tablets in pm for blood pressure    atorvastatin (LIPITOR) 40 MG tablet TAKE 1 TABLET EVERY DAY    cholecalciferol, vitamin D3, (VITAMIN D3) 50 mcg (2,000 unit) Cap Take 1 capsule by mouth once daily.    clopidogreL (PLAVIX) 75 mg tablet TAKE 1 TABLET EVERY DAY    famotidine (PEPCID) 20 MG tablet Take 1 tablet (20 mg total) by mouth 2 (two) times daily.    finasteride (PROSCAR) 5 mg tablet Take 5 mg by mouth once daily.     FLUARIX QUAD 8283-7784, PF, 60 mcg (15 mcg x 4)/0.5 mL Syrg     fluticasone propionate (FLONASE) 50 mcg/actuation nasal spray USE 2 SPRAYS IN EACH NOSTRIL ONE TIME DAILY  (SUBSTITUTED FOR FLONASE)    furosemide (LASIX) 20 MG tablet Take 1 tablet (20 mg total) by mouth daily as needed (leg swelling).    losartan (COZAAR) 50 MG tablet TAKE 1 TABLET TWICE DAILY    pantoprazole (PROTONIX) 40 MG tablet TAKE 1 TABLET EVERY DAY    pregabalin (LYRICA) 75 MG capsule Take 1 capsule (75 mg total) by mouth 2 (two) times daily.    acetaminophen (TYLENOL ORAL) 1000  .     No current facility-administered  medications for this visit.       Review of Systems     GENERAL:  No weight loss, malaise or fevers.  HEENT:   No recent changes in vision or hearing  NECK:  Negative for lumps, no difficulty with swallowing.  RESPIRATORY:  Negative for cough, wheezing or shortness of breath, patient denies any recent URI.  CARDIOVASCULAR:  Negative for chest pain, leg swelling or palpitations.  GI:  Negative for abdominal discomfort, blood in stools or black stools or change in bowel habits.  MUSCULOSKELETAL:  See HPI.  SKIN:  Negative for lesions, rash, and itching.  PSYCH:  No mood disorder or recent psychosocial stressors.   HEMATOLOGY/LYMPHOLOGY:  Negative for prolonged bleeding, bruising easily or swollen nodes.    NEURO:   No history of headaches, syncope, paralysis, seizures or tremors.  All other reviewed and negative other than HPI.    OBJECTIVE:    /72   Pulse 61   Wt 92.8 kg (204 lb 9.4 oz)   BMI 28.53 kg/m²       Physical Exam    GENERAL: Well appearing, in no acute distress, alert and oriented x3.  PSYCH:  Mood and affect appropriate.  SKIN: Skin color, texture, turgor normal, no rashes or lesions.  HEAD/FACE:  Normocephalic, atraumatic. Cranial nerves grossly intact.    NECK:  pain to palpation over the cervical paraspinous muscles. Spurling Negative.  pain with neck flexion, extension, or lateral flexion.   Normaltesting biceps, triceps and brachioradialis bilaterally.    NegativeHoffmann's bilaterally.    5/5 strength testing deltoid, biceps, triceps, wrist extensor, wrist flexor and ulnar intrinsics bilaterally.    Normal  strength bilaterally    CV: RRR with palpation of the radial artery.  PULM: No evidence of respiratory difficulty, symmetric chest rise.  GI:  Soft and non-tender.    BACK: Straight leg raising in the sitting and supine positions is negative to radicular pain. No pain to palpation over the facet joints of the lumbar spine or spinous processes. Normal range of motion without pain  reproduction.  EXTREMITIES: Peripheral joint ROM is full and pain free without obvious instability or laxity in all four extremities. No deformities, edema, or skin discoloration. Good capillary refill.  MUSCULOSKELETAL: Able to stand on heels & toes.   Shoulder, hip, and knee provocative maneuvers are negative.  There is no pain with palpation over the sacroiliac joints bilaterally.  FABERs test is negative.  FADIR test is negative bilaterally.   Bilateral upper and lower extremity strength is normal and symmetric.  No atrophy or tone abnormalities are noted.  RIGHT Lower extremity: Hip flexion 5/5, Hip Abduction 5/5, Hip Adduction 5/5, Knee extension 5/5, Knee flexion 5/5, Ankle dorsiflexion5/5, Extensor hallucis longus 5/5, Ankle plantarflexion 5/5  LEFT Lower extremity:  Hip flexion 5/5, Hip Abduction 5/5,Hip Adduction 5/5, Knee extension 5/5, Knee flexion 5/5, Ankle dorsiflexion 5/5, Extensor hallucis longus 5/5, Ankle plantarflexion 5/5  -Normal testing knee (patellar) jerk and ankle (achilles) jerk    NEURO: Bilateral upper and lower extremity coordination and muscle stretch reflexes are physiologic and symmetric. No loss of sensation is noted.  GAIT: normal.    Imagin/02/16  X-Ray Lumbar Spine AP And Lateral  Narrative  Findings: 2 views. Comparison 2013. There is mild anterolisthesis of L4 on L5 and L5 on S1. Mild narrowing of the disc spaces and early marginal osteophyte formation is noted. No compression fractures or acute osseous findings. Facet arthropathy at L4-5 and L5-S1 and degenerative change in the SI joints.    21  X-Ray Cervical Spine AP And Lateral  FINDINGS:  The vertebral bodies demonstrate a normal height.  There is a couple mm of anterolisthesis of C3 on C4 and C4 on C5.  Moderate disc space narrowing and spondylosis present at the C5-6 and C6-7 levels.  Facet arthropathy seen within the mid cervical spine on the left.  The C1-C2 articulation is within normal  limits.      ASSESSMENT: 81 y.o. year old male with neck, upper extremity lower back and lower leg pain, consistent with     1. Lumbar radiculopathy, chronic  IR Epidural Transfor 1st Vert Lumbar Dov    Case Request-RAD/Other Procedure Area: Bilateral L4/5 TF IAN with RN IV sedation   2. Lumbar facet arthropathy     3. DDD (degenerative disc disease), lumbar     4. Cervical radiculopathy     5. Facet arthropathy, cervical     6. DDD (degenerative disc disease), cervical           PLAN:   - Interventions:  Schedule for repeat bilateral L4/5 transforaminal epidural steroid injection to see if this produces more profound and sustained relief.  Explained the risks and benefits of the procedure in detail with the patient today in clinic along with alternative treatment options .  Patient elected to pursue this procedure.    - Anticoagulation use: yes Plavix  Per ASA guidelines for secondary prophylaxis patient will have to pause Plavix 5 days prior to injection.  Cardiac clearance documented from Dr. Thompson    -We have discussed considering a cervical epidural steroid injection in the future to address cervical radicular symptoms.     report:  Reviewed and consistent with medication use as prescribed.    - Medications:  -Continue Lyrica titration.  We discussed potential side effects of this medication which may include drowsiness,dizziness, dry mouth, constipation or peripheral edema.  -Week 1: Take 2 capsules, 150 mg BID  -Week 2: Take 3 capsules, 225 mg in the morning and 150 mg (2 capsules) QHS  -Week 3+: Take 3 capsules, 225 mgBID    - Therapy  We discussed continuing physical therapy to help manage the patient/s painful condition. The patient was counseled that muscle strengthening will improve the long term prognosis in regards to pain and may also help increase range of motion and mobility.     - Imaging: Reviewed available imaging with patient and answered any questions they had regarding study.    - Follow up  visit: return to clinic in 4-6 weeks      The above plan and management options were discussed at length with patient. Patient is in agreement with the above and verbalized understanding.    - I discussed the goals of interventional chronic pain management with the patient on today's visit. We discussed a multimodal and systematic approach to pain.  This includes diagnostic and therapeutic injections, adjuvant pharmacologic treatment, physical therapy, and at times psychiatry.  I emphasized the importance of regular exercise, core strengthening and stretching, diet and weight loss as a cornerstone of long-term pain management.    - This condition does not require this patient to take time off of work, and the primary goal of our Pain Management services is to improve the patient's functional capacity.  - Patient Questions: Answered all of the patient's questions regarding diagnoses, therapy, treatment and next steps        Mak Young MD  Interventional Pain Management  [facility]    Disclaimer:  This note was prepared using voice recognition system and is likely to have sound alike errors that may have been overlooked even after proof reading.  Please call me with any questions

## 2022-02-22 NOTE — H&P (VIEW-ONLY)
Established Patient Chronic Pain Note     Referring Physician: No ref. provider found    PCP: Valery Ozuna MD    Chief Complaint:   Chief Complaint   Patient presents with    Back Pain        SUBJECTIVE:  Interval Hx: 2/23/22  Presents status post bilateral L4/5 transforaminal epidural steroid injection January 26, 2022. Patient reports having 100% relief in lower extremity radicular symptoms following epidural steroid injection.  This pain level varies based upon his activity throughout the day.  Patient reports as he is more active he does notice radicular symptoms down the lateral aspects of bilateral lower extremities to the feet.  Patient reports discontinuing Lyrica the day following his injection which he believes caused paresthesias to insidiously return.  Patient does report improvement in functionality including range of motion and strength following his injection.  Patient is interested in repeat intervention.  Patient denies any side effects at the Lyrica dose of 225 mg twice a day.    Interval Hx: 1/13/22  Patient presents for MRI cervical and lumbar review.  Today patient again reports lower back pain which radiates down the anterior aspects of bilateral lower extremities in L4 distribution to the foot.  Today pain is rated as a 4/10.  Pain is exacerbated with prolonged standing and with ambulation the patient reports he is able to ambulate at least 1 mi on the treadmill before requiring rest.  He also reports neck pain which radiates in bilateral trapezius distributions in C5-6 distribution.  Patient has continued Lyrica and is currently taking 150 mg twice daily.  He is anticipated to increase this dosage next week.  He denies any side effects from this medication.    HPI 12/9/21  Ezequiel Hughes is a 81 y.o. male with past medical history significant for transient ischemic attack, hyperlipidemia, hypertension, right bundle branch block, stage 3 chronic kidney disease, thrombocytopenia and anemia ,  "prostate cancer, gout, obstructive sleep apnea, periodically limb movement disorder who presents to the clinic for the evaluation of neck, lower back and leg pain.  Today patient reports pain began approximately 1 year prior without inciting accident, injury or trauma.  Today patient reports lower back pain which is constant and today is rated as a 3/10.  Patient reports radicular symptoms beginning along the anterior aspects of bilateral lower extremities below the knee to the foot in L4 distribution.  Patient is having difficulty describing the character but believes it is burning in nature.  Pain is exacerbated with prolonged standing and with ambulation.  Patient reports he is quite active, goes to the gym and walks on his treadmill and is able to ambulate approximately half to 3/4 of a mi before requiring rest.  Pain is improved with sitting.He denies any bowel or bladder incontinence or saddle anesthesia. Patient has performed physical therapy for the lower back without any improvement in his symptoms.     Patient also reports constant neck pain.  Pain presents in a bandlike distribution in bilateral cervical paraspinous territory and radiates into bilateral trapezius distribution.  Patient also reports numbness in bilateral thumbs.  Pain is exacerbated with cervical flexion, extension and lateral flexion.  Patient denies upper extremity weakness, compromise in hand  strength or dexterity.  Patient has been trialed on gabapentin for what his wife describes as "scalp itching"at a lower dose of 100 mg 3 times daily without any improvement in his neck or in his back pain.      Patient denies night fever/night sweats, urinary incontinence, bowel incontinence, significant weight loss, significant motor weakness and loss of sensations.    Pain Disability Index Review:     Last 3 PDI Scores 1/13/2022 12/9/2021   Pain Disability Index (PDI) 24 35     Non-Pharmacologic Treatments:  Physical Therapy/Home Exercise: " yes  Ice/Heat:yes  TENS: no  Acupuncture: no  Massage: no  Chiropractic: no    Other: no    Pain Medications:  - Adjuvant Medications: Neurontin (Gabapentin) and Tylenol (Acetaminophen)  - Anti-Coagulants: Plavix ( Clopidogrel)    Pain Procedures:   -01/26/2022:  Bilateral L4/5 transforaminal epidural steroid injection    Past Medical History:   Diagnosis Date    Allergic rhinitis     Anemia     Back pain     BPH (benign prostatic hyperplasia)     Cancer     skin cancer to neck, Dr. Graves    Cataract     Disorder of kidney and ureter     ED (erectile dysfunction)     Hiatal hernia     small    History of colon polyps     colonoscopy 11/2016    HLD (hyperlipidemia)     Hyperlipidemia     Hypertension     Lateral epicondylitis     Lumbar radiculopathy 1/26/2022    OA (osteoarthritis)     GUIDO (obstructive sleep apnea)     Prostate cancer     Dr. Wong    TIA (transient ischemic attack)     Trouble in sleeping      Past Surgical History:   Procedure Laterality Date    CATARACT EXTRACTION W/  INTRAOCULAR LENS IMPLANT Right 02/21/2018    I-Stent    CATARACT EXTRACTION W/  INTRAOCULAR LENS IMPLANT Left 03/21/2018    I - Stent    COLONOSCOPY N/A 11/14/2016    Procedure: COLONOSCOPY;  Surgeon: Karuna Rodriguez MD;  Location: [facility];  Service: Endoscopy;  Laterality: N/A;    COLONOSCOPY N/A 9/22/2020    Procedure: COLONOSCOPY;  Surgeon: Chuy Fish MD;  Location: [facility];  Service: Endoscopy;  Laterality: N/A;    EYE SURGERY      HEMORRHOID SURGERY      I-STENT Right 02/21/2018    DR. REECE    KNEE ARTHROSCOPY W/ MENISCAL REPAIR Right 2015    Dr. Jain    PCIOL Right 02/21/2018    DR. REECE    PLANTAR FASCIA RELEASE      right    ROTATOR CUFF REPAIR Bilateral     bilateral    SELECTIVE INJECTION OF ANESTHETIC AGENT AROUND LUMBAR SPINAL NERVE ROOT BY TRANSFORAMINAL APPROACH Bilateral 1/26/2022    Procedure: Bilateral L4/5 TF IAN with RN IV sedation;  Surgeon: Mak THOMPSON  MD Hector;  Location: [facility];  Service: Pain Management;  Laterality: Bilateral;    SHOULDER SURGERY Bilateral around 2000    Dr. Pepper.  rotator cuff surgeries    VASECTOMY       Review of patient's allergies indicates:   Allergen Reactions    Atarax [hydroxyzine hcl] Other (See Comments)     Raised blood pressure     Zyrtec [cetirizine] Other (See Comments)     Raised blood pressure     Gold sodium thiosulfate      Patch test positive       Current Outpatient Medications   Medication Sig    acetaminophen (TYLENOL ARTHRITIS PAIN ORAL) Take by mouth. Patient states that he takes 2 tablets in the morning and 2 tablets in the evening    albuterol (PROVENTIL/VENTOLIN HFA) 90 mcg/actuation inhaler Inhale 2 puffs into the lungs every 6 (six) hours as needed.    allopurinoL (ZYLOPRIM) 100 MG tablet Take 100 mg by mouth.    amLODIPine (NORVASC) 2.5 MG tablet 2 tablets in am and 1 tablets in pm for blood pressure    atorvastatin (LIPITOR) 40 MG tablet TAKE 1 TABLET EVERY DAY    cholecalciferol, vitamin D3, (VITAMIN D3) 50 mcg (2,000 unit) Cap Take 1 capsule by mouth once daily.    clopidogreL (PLAVIX) 75 mg tablet TAKE 1 TABLET EVERY DAY    famotidine (PEPCID) 20 MG tablet Take 1 tablet (20 mg total) by mouth 2 (two) times daily.    finasteride (PROSCAR) 5 mg tablet Take 5 mg by mouth once daily.     FLUARIX QUAD 5252-2513, PF, 60 mcg (15 mcg x 4)/0.5 mL Syrg     fluticasone propionate (FLONASE) 50 mcg/actuation nasal spray USE 2 SPRAYS IN EACH NOSTRIL ONE TIME DAILY  (SUBSTITUTED FOR FLONASE)    furosemide (LASIX) 20 MG tablet Take 1 tablet (20 mg total) by mouth daily as needed (leg swelling).    losartan (COZAAR) 50 MG tablet TAKE 1 TABLET TWICE DAILY    pantoprazole (PROTONIX) 40 MG tablet TAKE 1 TABLET EVERY DAY    pregabalin (LYRICA) 75 MG capsule Take 1 capsule (75 mg total) by mouth 2 (two) times daily.    acetaminophen (TYLENOL ORAL) 1000  .     No current facility-administered  medications for this visit.       Review of Systems     GENERAL:  No weight loss, malaise or fevers.  HEENT:   No recent changes in vision or hearing  NECK:  Negative for lumps, no difficulty with swallowing.  RESPIRATORY:  Negative for cough, wheezing or shortness of breath, patient denies any recent URI.  CARDIOVASCULAR:  Negative for chest pain, leg swelling or palpitations.  GI:  Negative for abdominal discomfort, blood in stools or black stools or change in bowel habits.  MUSCULOSKELETAL:  See HPI.  SKIN:  Negative for lesions, rash, and itching.  PSYCH:  No mood disorder or recent psychosocial stressors.   HEMATOLOGY/LYMPHOLOGY:  Negative for prolonged bleeding, bruising easily or swollen nodes.    NEURO:   No history of headaches, syncope, paralysis, seizures or tremors.  All other reviewed and negative other than HPI.    OBJECTIVE:    /72   Pulse 61   Wt 92.8 kg (204 lb 9.4 oz)   BMI 28.53 kg/m²       Physical Exam    GENERAL: Well appearing, in no acute distress, alert and oriented x3.  PSYCH:  Mood and affect appropriate.  SKIN: Skin color, texture, turgor normal, no rashes or lesions.  HEAD/FACE:  Normocephalic, atraumatic. Cranial nerves grossly intact.    NECK:  pain to palpation over the cervical paraspinous muscles. Spurling Negative.  pain with neck flexion, extension, or lateral flexion.   Normaltesting biceps, triceps and brachioradialis bilaterally.    NegativeHoffmann's bilaterally.    5/5 strength testing deltoid, biceps, triceps, wrist extensor, wrist flexor and ulnar intrinsics bilaterally.    Normal  strength bilaterally    CV: RRR with palpation of the radial artery.  PULM: No evidence of respiratory difficulty, symmetric chest rise.  GI:  Soft and non-tender.    BACK: Straight leg raising in the sitting and supine positions is negative to radicular pain. No pain to palpation over the facet joints of the lumbar spine or spinous processes. Normal range of motion without pain  reproduction.  EXTREMITIES: Peripheral joint ROM is full and pain free without obvious instability or laxity in all four extremities. No deformities, edema, or skin discoloration. Good capillary refill.  MUSCULOSKELETAL: Able to stand on heels & toes.   Shoulder, hip, and knee provocative maneuvers are negative.  There is no pain with palpation over the sacroiliac joints bilaterally.  FABERs test is negative.  FADIR test is negative bilaterally.   Bilateral upper and lower extremity strength is normal and symmetric.  No atrophy or tone abnormalities are noted.  RIGHT Lower extremity: Hip flexion 5/5, Hip Abduction 5/5, Hip Adduction 5/5, Knee extension 5/5, Knee flexion 5/5, Ankle dorsiflexion5/5, Extensor hallucis longus 5/5, Ankle plantarflexion 5/5  LEFT Lower extremity:  Hip flexion 5/5, Hip Abduction 5/5,Hip Adduction 5/5, Knee extension 5/5, Knee flexion 5/5, Ankle dorsiflexion 5/5, Extensor hallucis longus 5/5, Ankle plantarflexion 5/5  -Normal testing knee (patellar) jerk and ankle (achilles) jerk    NEURO: Bilateral upper and lower extremity coordination and muscle stretch reflexes are physiologic and symmetric. No loss of sensation is noted.  GAIT: normal.    Imagin/02/16  X-Ray Lumbar Spine AP And Lateral  Narrative  Findings: 2 views. Comparison 2013. There is mild anterolisthesis of L4 on L5 and L5 on S1. Mild narrowing of the disc spaces and early marginal osteophyte formation is noted. No compression fractures or acute osseous findings. Facet arthropathy at L4-5 and L5-S1 and degenerative change in the SI joints.    21  X-Ray Cervical Spine AP And Lateral  FINDINGS:  The vertebral bodies demonstrate a normal height.  There is a couple mm of anterolisthesis of C3 on C4 and C4 on C5.  Moderate disc space narrowing and spondylosis present at the C5-6 and C6-7 levels.  Facet arthropathy seen within the mid cervical spine on the left.  The C1-C2 articulation is within normal  limits.      ASSESSMENT: 81 y.o. year old male with neck, upper extremity lower back and lower leg pain, consistent with     1. Lumbar radiculopathy, chronic  IR Epidural Transfor 1st Vert Lumbar Dov    Case Request-RAD/Other Procedure Area: Bilateral L4/5 TF IAN with RN IV sedation   2. Lumbar facet arthropathy     3. DDD (degenerative disc disease), lumbar     4. Cervical radiculopathy     5. Facet arthropathy, cervical     6. DDD (degenerative disc disease), cervical           PLAN:   - Interventions:  Schedule for repeat bilateral L4/5 transforaminal epidural steroid injection to see if this produces more profound and sustained relief.  Explained the risks and benefits of the procedure in detail with the patient today in clinic along with alternative treatment options .  Patient elected to pursue this procedure.    - Anticoagulation use: yes Plavix  Per ASA guidelines for secondary prophylaxis patient will have to pause Plavix 5 days prior to injection.  Cardiac clearance documented from Dr. Thompson    -We have discussed considering a cervical epidural steroid injection in the future to address cervical radicular symptoms.     report:  Reviewed and consistent with medication use as prescribed.    - Medications:  -Continue Lyrica titration.  We discussed potential side effects of this medication which may include drowsiness,dizziness, dry mouth, constipation or peripheral edema.  -Week 1: Take 2 capsules, 150 mg BID  -Week 2: Take 3 capsules, 225 mg in the morning and 150 mg (2 capsules) QHS  -Week 3+: Take 3 capsules, 225 mgBID    - Therapy  We discussed continuing physical therapy to help manage the patient/s painful condition. The patient was counseled that muscle strengthening will improve the long term prognosis in regards to pain and may also help increase range of motion and mobility.     - Imaging: Reviewed available imaging with patient and answered any questions they had regarding study.    - Follow up  visit: return to clinic in 4-6 weeks      The above plan and management options were discussed at length with patient. Patient is in agreement with the above and verbalized understanding.    - I discussed the goals of interventional chronic pain management with the patient on today's visit. We discussed a multimodal and systematic approach to pain.  This includes diagnostic and therapeutic injections, adjuvant pharmacologic treatment, physical therapy, and at times psychiatry.  I emphasized the importance of regular exercise, core strengthening and stretching, diet and weight loss as a cornerstone of long-term pain management.    - This condition does not require this patient to take time off of work, and the primary goal of our Pain Management services is to improve the patient's functional capacity.  - Patient Questions: Answered all of the patient's questions regarding diagnoses, therapy, treatment and next steps        Mak Young MD  Interventional Pain Management  [facility]    Disclaimer:  This note was prepared using voice recognition system and is likely to have sound alike errors that may have been overlooked even after proof reading.  Please call me with any questions

## 2022-02-23 ENCOUNTER — PATIENT MESSAGE (OUTPATIENT)
Dept: PRIMARY CARE CLINIC | Facility: CLINIC | Age: 82
End: 2022-02-23
Payer: MEDICARE

## 2022-02-23 DIAGNOSIS — I10 ESSENTIAL HYPERTENSION: ICD-10-CM

## 2022-02-24 ENCOUNTER — PATIENT MESSAGE (OUTPATIENT)
Dept: PRIMARY CARE CLINIC | Facility: CLINIC | Age: 82
End: 2022-02-24
Payer: MEDICARE

## 2022-02-24 ENCOUNTER — OFFICE VISIT (OUTPATIENT)
Dept: PAIN MEDICINE | Facility: CLINIC | Age: 82
End: 2022-02-24
Payer: MEDICARE

## 2022-02-24 VITALS
HEART RATE: 61 BPM | WEIGHT: 204.56 LBS | BODY MASS INDEX: 28.53 KG/M2 | DIASTOLIC BLOOD PRESSURE: 72 MMHG | SYSTOLIC BLOOD PRESSURE: 137 MMHG

## 2022-02-24 DIAGNOSIS — M54.12 CERVICAL RADICULOPATHY: ICD-10-CM

## 2022-02-24 DIAGNOSIS — M47.812 FACET ARTHROPATHY, CERVICAL: ICD-10-CM

## 2022-02-24 DIAGNOSIS — M47.816 LUMBAR FACET ARTHROPATHY: ICD-10-CM

## 2022-02-24 DIAGNOSIS — M54.16 LUMBAR RADICULOPATHY, CHRONIC: Primary | ICD-10-CM

## 2022-02-24 DIAGNOSIS — M51.36 DDD (DEGENERATIVE DISC DISEASE), LUMBAR: ICD-10-CM

## 2022-02-24 DIAGNOSIS — M50.30 DDD (DEGENERATIVE DISC DISEASE), CERVICAL: ICD-10-CM

## 2022-02-24 PROCEDURE — 1160F RVW MEDS BY RX/DR IN RCRD: CPT | Mod: CPTII,S$GLB,, | Performed by: ANESTHESIOLOGY

## 2022-02-24 PROCEDURE — 1101F PR PT FALLS ASSESS DOC 0-1 FALLS W/OUT INJ PAST YR: ICD-10-PCS | Mod: CPTII,S$GLB,, | Performed by: ANESTHESIOLOGY

## 2022-02-24 PROCEDURE — 1101F PT FALLS ASSESS-DOCD LE1/YR: CPT | Mod: CPTII,S$GLB,, | Performed by: ANESTHESIOLOGY

## 2022-02-24 PROCEDURE — 3288F PR FALLS RISK ASSESSMENT DOCUMENTED: ICD-10-PCS | Mod: CPTII,S$GLB,, | Performed by: ANESTHESIOLOGY

## 2022-02-24 PROCEDURE — 3075F SYST BP GE 130 - 139MM HG: CPT | Mod: CPTII,S$GLB,, | Performed by: ANESTHESIOLOGY

## 2022-02-24 PROCEDURE — 3078F DIAST BP <80 MM HG: CPT | Mod: CPTII,S$GLB,, | Performed by: ANESTHESIOLOGY

## 2022-02-24 PROCEDURE — 1159F MED LIST DOCD IN RCRD: CPT | Mod: CPTII,S$GLB,, | Performed by: ANESTHESIOLOGY

## 2022-02-24 PROCEDURE — 99214 OFFICE O/P EST MOD 30 MIN: CPT | Mod: S$GLB,,, | Performed by: ANESTHESIOLOGY

## 2022-02-24 PROCEDURE — 3075F PR MOST RECENT SYSTOLIC BLOOD PRESS GE 130-139MM HG: ICD-10-PCS | Mod: CPTII,S$GLB,, | Performed by: ANESTHESIOLOGY

## 2022-02-24 PROCEDURE — 3288F FALL RISK ASSESSMENT DOCD: CPT | Mod: CPTII,S$GLB,, | Performed by: ANESTHESIOLOGY

## 2022-02-24 PROCEDURE — 99999 PR PBB SHADOW E&M-EST. PATIENT-LVL III: ICD-10-PCS | Mod: PBBFAC,HCNC,, | Performed by: ANESTHESIOLOGY

## 2022-02-24 PROCEDURE — 99214 PR OFFICE/OUTPT VISIT, EST, LEVL IV, 30-39 MIN: ICD-10-PCS | Mod: S$GLB,,, | Performed by: ANESTHESIOLOGY

## 2022-02-24 PROCEDURE — 1159F PR MEDICATION LIST DOCUMENTED IN MEDICAL RECORD: ICD-10-PCS | Mod: CPTII,S$GLB,, | Performed by: ANESTHESIOLOGY

## 2022-02-24 PROCEDURE — 99999 PR PBB SHADOW E&M-EST. PATIENT-LVL III: CPT | Mod: PBBFAC,HCNC,, | Performed by: ANESTHESIOLOGY

## 2022-02-24 PROCEDURE — 1160F PR REVIEW ALL MEDS BY PRESCRIBER/CLIN PHARMACIST DOCUMENTED: ICD-10-PCS | Mod: CPTII,S$GLB,, | Performed by: ANESTHESIOLOGY

## 2022-02-24 PROCEDURE — 1126F PR PAIN SEVERITY QUANTIFIED, NO PAIN PRESENT: ICD-10-PCS | Mod: CPTII,S$GLB,, | Performed by: ANESTHESIOLOGY

## 2022-02-24 PROCEDURE — 1126F AMNT PAIN NOTED NONE PRSNT: CPT | Mod: CPTII,S$GLB,, | Performed by: ANESTHESIOLOGY

## 2022-02-24 PROCEDURE — 3078F PR MOST RECENT DIASTOLIC BLOOD PRESSURE < 80 MM HG: ICD-10-PCS | Mod: CPTII,S$GLB,, | Performed by: ANESTHESIOLOGY

## 2022-02-24 RX ORDER — AMLODIPINE BESYLATE 2.5 MG/1
TABLET ORAL
Qty: 360 TABLET | Refills: 3 | Status: ON HOLD
Start: 2022-02-24 | End: 2022-06-08 | Stop reason: HOSPADM

## 2022-02-24 NOTE — PATIENT INSTRUCTIONS
General Neck and Back Pain    Both neck and back pain are usually caused by injury to the muscles or ligaments of the spine. Sometimes the disks that separate each bone of the spine may cause pain by pressing on a nearby nerve. Back and neck pain may appear after a sudden twisting or bending force (such as in a car accident), or sometimes after a simple awkward movement. In either case, muscle spasm is often present and adds to the pain.  Acute neck and back pain usually gets better in 1 to 2 weeks. Pain related to disk disease, arthritis in the spinal joints or spinal stenosis (narrowing of the spinal canal) can become chronic and last for months or years.  Back and neck pain are common problems. Most people feel better in 1 or 2 weeks, and most of the rest in 1 to 2 months. Most people can remain active.  People experience and describe pain differently.  Pain can be sharp, stabbing, shooting, aching, cramping, or burning  Movement, standing, bending, lifting, sitting, or walking may worsen the pain  Pain can be localized to one spot or area, or it can be more generalized  Pain can spread or radiate upwards, downwards, to the front, or go down your arms  Muscle spasm may occur.  Most of the time mechanical problems with the muscles or spine cause the pain. it is usually caused by an injury, whether known or not, to the muscles or ligaments. While illnesses can cause back pain, it is usually not caused by a serious illness. Pain is usually related to physical activity, whether sports, exercise, work, or normal activity. Sometimes it can occur without an identifiable cause. This can happen simply by stretching or moving wrong, without noting pain at the time. Other causes include:  Overexertion, lifting, pushing, pulling incorrectly or too aggressively.  Sudden twisting, bending or stretching from an accident (car or fall), or accidental movement.  Poor posture  Poor conditioning, lack of regular exercise  Spinal  disc disease or arthritis  Stress  Pregnancy, or illness like appendicitis, bladder or kidney infection, pelvic infections   Home care  For neck pain: Use a comfortable pillow that supports the head and keeps the spine in a neutral position. The position of the head should not be tilted forward or backward.  When in bed, try to find a position of comfort. A firm mattress is best. Try lying flat on your back with pillows under your knees. You can also try lying on your side with your knees bent up towards your chest and a pillow between your knees.  At first, do not try to stretch out the sore spots. If there is a strain, it is not like the good soreness you get after exercising without an injury. In this case, stretching may make it worse.  Avoid prolonged sitting, long car rides or travel. This puts more stress on the lower back than standing or walking.  During the first 24 to 72 hours after an injury, apply an ice pack to the painful area for 20 minutes and then remove it for 20 minutes over a period of 60 to 90 minutes or several times a day.   You can alternate ice and heat therapies. Talk with your healthcare provider about the best treatment for your back or neck pain. As a safety precaution, do not use a heating pad at bedtime. Sleeping with a heating pad can lead to skin burns or tissue damage.  Therapeutic massage can help relax the back and neck muscles without stretching them.  Be aware of safe lifting methods and do not lift anything over 15 pounds until all the pain is gone.  Medications  Talk to your healthcare provider before using medicine, especially if you have other medical problems or are taking other medicines.  You may use over-the-counter medicine to control pain, unless another pain medicine was prescribed. If you have chronic conditions like diabetes, liver or kidney disease, stomach ulcers,  gastrointestinal bleeding, or are taking blood thinner medicines.  Be careful if you are given pain  medicines, narcotics, or medicine for muscle spasm. They can cause drowsiness, and can affect your coordination, reflexes, and judgment. Do not drive or operate heavy machinery.  Follow-up care  Follow up with your healthcare provider, or as advised. Physical therapy or further tests may be needed.  If X-rays were taken, you will be notified of any new findings that may affect your care.  Call 911  Seek emergency medical care if any of the following occur:  Trouble breathing  Confusion  Very drowsy or trouble awakening  Fainting or loss of consciousness  Rapid or very slow heart rate  Loss of bowel or bladder control  When to seek medical advice  Call your healthcare provider right away if any of these occur:  Pain becomes worse or spreads into your arms or legs  Weakness, numbness or pain in one or both arms or legs  Numbness in the groin area  Difficulty walking  Fever of 100.4ºF (38ºC) or higher, or as directed by your healthcare provider  Date Last Reviewed: 7/1/2016  © 9040-7368 [employer]. [address] [place] [address]. All rights reserved. This information is not intended as a substitute for professional medical care. Always follow your healthcare professional's instructions.       Understanding Lumbar Radiculopathy    Lumbar radiculopathy is irritation or inflammation of a nerve root in the low back. It causes symptoms that spread out from the back down one or both legs. To understand this condition, it helps to understand the parts of the spine:  Vertebrae. These are bones that stack to form the spine. The lumbar spine contains the 5 bottom vertebrae.  Disks. These are soft pads of tissue between the vertebrae. They act as shock absorbers for the spine.  Spinal canal. This is a tunnel formed within the stacked vertebrae. In the lumbar spine, nerves run through this canal.  Nerves. These branch off and leave the spinal canal, traveling out to parts of the body. As they leave the  spinal canal, nerves pass through openings between the vertebrae. The nerve root is the part of the nerve that is closest to the spinal canal.  Sciatic nerve. This is a large nerve formed from several nerve roots in the low back. This nerve extends down the back of the leg to the foot.  With lumbar radiculopathy, nerve roots in the low back become irritated. This leads to pain and symptoms. The sciatic nerve is commonly involved, so the condition is often called sciatica.  What causes lumbar radiculopathy?  Aging, injury, poor posture, extra body weight, and other issues can lead to problems in the low back. These problems may then irritate nerve roots. They include:  Damage to a disk in the lumbar spine. The damaged disk may then press on nearby nerve roots.  Degeneration from wear and tear, and aging. This can lead to narrowing (stenosis) of the openings between the vertebrae. The narrowed openings press on nerve roots as they leave the spinal canal.  Unstable spine. This is when a vertebra slips forward. It can then press on a nerve root.  Other, less common things can put pressure on nerves in the low back. These include diabetes, infection, or a tumor.  Symptoms of lumbar radiculopathy  These include:  Pain in the low back  Pain, numbness, tingling, or weakness that travels into the buttocks, hip, groin, or leg  Muscle spasms  Treatment for lumbar radiculopathy  In most cases, your healthcare provider will first try treatments that help relieve symptoms. These may include:  Prescription and over-the-counter pain medicines. These help relieve pain, swelling, and irritation.  Limits on positions and activities that increase pain. But lying in bed or avoiding all movement is only recommended for a short period of time.  Physical therapy, including exercises and stretches. This helps decrease pain and increase movement and function.  Steroid shots into the lower back. This may help relieve symptoms for a  time.  Weight-loss program. If you are overweight, losing extra pounds may help relieve symptoms.  In some cases, you may need surgery to fix the underlying problem. This depends on the cause, the symptoms, and how long the pain has lasted.  Possible complications  Over time, an irritated and inflamed nerve may become damaged. This may lead to long-lasting (permanent) numbness or weakness in your legs and feet. If symptoms change suddenly or get worse, be sure to let your healthcare provider know.  When to call your healthcare provider  Call your healthcare provider right away if you have any of these:  New pain or pain that gets worse  New or increasing weakness, tingling, or numbness in your leg or foot  Problems controlling your bladder or bowel   Date Last Reviewed: 3/10/2016  © 7625-2092 [employer]. [address] [place] [address]. All rights reserved. This information is not intended as a substitute for professional medical care. Always follow your healthcare professional's instructions.       Understanding Cervical Radiculopathy    Cervical radiculopathy is irritation or inflammation of a nerve root in the neck. It causes neck pain and other symptoms that may spread into the chest or down the arm. To understand this condition, it helps to understand the parts of the spine:  Vertebrae. These are bones that stack to form the spine. The cervical spine contains the 7 vertebrae in the neck.  Disks. These are soft pads of tissue between the vertebrae. They act as shock absorbers for the spine.  The spinal canal. This is a tunnel formed within the stacked vertebrae. The spinal cord runs through this canal.  Nerves. These branch off the spinal cord. As they leave the spinal canal, nerves pass through openings between the vertebrae. The nerve root is the part of the nerve that is closest to the spinal cord.   With cervical radiculopathy, nerve roots in the neck become irritated. This leads to  pain and symptoms that can travel to the nerves that go from the spinal cord down the arms and into the torso.  What causes cervical radiculopathy?  Aging, injury, poor posture, and other issues can lead to problems in the neck. These problems may then irritate nerve roots. These include:  Damage to a disk in the cervical spine. The damaged disk may then press on nearby nerve roots.  Degeneration from wear and tear, and aging. This can lead to narrowing (stenosis) of the openings between the vertebrae. The narrowed openings press on nerve roots as they leave the spinal canal.  An unstable spine. This is when a vertebra slips forward. It can then press on a nerve root.  There are other, less common causes of pressure on nerves in the neck. These include infection, cysts, and tumors.  Symptoms of cervical radiculopathy  These include:  Neck pain  Pain, numbness, tingling, or weakness that travels down the arm  Loss of neck movement  Muscle spasms  Treatment for cervical radiculopathy  In most cases, your healthcare provider will first try treatments that help relieve symptoms. These may include:  Prescription or over-the-counter pain medicines. These help relieve pain and swelling.  Cold packs. These help reduce pain.  Resting. This involves avoiding positions and activities that increase pain.  Neck brace (cervical collar). This can help relieve inflammation and pain.  Physical therapy, including exercises and stretches. This can help decrease pain and increase movement and function.  Shots of medicinesaround the nerve roots. This is done to help relieve symptoms for a time.  In some cases, your healthcare provider may advise surgery to fix the underlying problem. This depends on the cause, the symptoms, and how long the pain has lasted.  Possible complications  Over time, an irritated and inflamed nerve may become damaged. This may lead to long-lasting (permanent) numbness or weakness. If symptoms change suddenly or  get worse, be sure to let your healthcare provider know.     When to call your healthcare provider  Call your healthcare provider right away if you have any of these:  New pain or pain that gets worse  New or increasing weakness, numbness, or tingling in your arm or hand  Bowel or bladder changes   Date Last Reviewed: 3/10/2016  © 8758-2748 [employer]. [address] [place] [address]. All rights reserved. This information is not intended as a substitute for professional medical care. Always follow your healthcare professional's instructions.       Exercises to Strengthen Your Lower Back  Strong lower back and abdominal muscles work together to support your spine. The exercises below will help strengthen the lower back. It is important that you begin exercising slowly and increase levels gradually.  Always begin any exercise program with stretching. If you feel pain while doing any of these exercises, stop and talk to your doctor about a more specific exercise program that better suits your condition.   Low back stretch  The point of stretching is to make you more flexible and increase your range of motion. Stretch only as much as you are able. Stretch slowly. Do not push your stretch to the limit. If at any point you feel pain while stretching, this is your (temporary) limit.  Lie on your back with your knees bent and both feet on the ground.  Slowly raise your left knee to your chest as you flatten your lower back against the floor. Hold for 5 seconds.  Relax and repeat the exercise with your right knee.  Do 10 of these exercises for each leg.  Repeat hugging both knees to your chest at the same time.  Building lower back strength  Start your exercise routine with 10 to 30 minutes a day, 1 to 3 times a day.  Initial exercises  Lying on your back:  Ankle pumps: Move your foot up and down, towards your head, and then away. Repeat 10 times with each foot.  Heel slides: Slowly bend your knee,  drawing the heel of your foot towards you. Then slide your heel/foot from you, straightening your knee. Do not lift your foot off the floor (this is not a leg lift).  Abdominal contraction: Bend your knees and put your hands on your stomach. Tighten your stomach muscles. Hold for 5 seconds, then relax. Repeat 10 times.  Straight leg raise: Bend one leg at the knee and keep the other leg straight. Tighten your stomach muscles. Slowly lift your straight leg 6 to 12 inches off the floor and hold for up to 5 seconds. Repeat 10 times on each side.  Standing:  Wall squats: Stand with your back against the wall. Move your feet about 12 inches away from the wall. Tighten your stomach muscles, and slowly bend your knees until they are at about a 45 degree angle. Do not go down too far. Hold about 5 seconds. Then slowly return to your starting position. Repeat 10 times.  Heel raises: Stand facing the wall. Slowly raise the heels of your feet up and down, while keeping your toes on the floor. If you have trouble balancing, you can touch the wall with your hands. Repeat 10 times.  More advanced exercises  When you feel comfortable enough, try these exercises.  Kneeling lumbar extension: Begin on your hands and knees. At the same time, raise and straighten your right arm and left leg until they are parallel to the ground. Hold for 2 seconds and come back slowly to a starting position. Repeat with left arm and right leg, alternating 10 times.  Prone lumbar extension: Lie face down, arms extended overhead, palms on the floor. At the same time, raise your right arm and left leg as high as comfortably possible. Hold for 10 seconds and slowly return to start. Repeat with left arm and right leg, alternating 10 times. Gradually build up to 20 times. (Advanced: Repeat this exercise raising both arms and both legs a few inches off the floor at the same time. Hold for 5 seconds and release.)  Pelvic tilt: Lie on the floor on your back  with your knees bent at 90 degrees. Your feet should be flat on the floor. Inhale, exhale, then slowly contract your abdominal muscles bringing your navel toward your spine. Let your pelvis rock back until your lower back is flat on the floor. Hold for 10 seconds while breathing smoothly.  Abdominal crunch: Perform a pelvic tilt (above) flattening your lower back against the floor. Holding the tension in your abdominal muscles, take another breath and raise your shoulder blades off the ground (this is not a full sit-up). Keep your head in line with your body (dont bend your neck forward). Hold for 2 seconds, then slowly lower.  Date Last Reviewed: 6/1/2016  © 4680-2394 [employer]. [address] [place] [address]. All rights reserved. This information is not intended as a substitute for professional medical care. Always follow your healthcare professional's instructions.       Exercises: Neck Isometrics  To start, sit in a chair with your feet flat on the floor. Your weight should be slightly forward so that youre balanced evenly on your buttocks. Relax your shoulders and keep your head level. Using a chair with arms may help you keep your balance.       Press your palm against your forehead. Resist with your neck muscles. Hold for 10 seconds. Relax. Repeat 5 times.  Do the exercise again, pressing on the side of your head. Repeat 5 times. Switch sides.  Do the exercise again, pressing on the back of your head. Repeat 5 times.  For your safety, check with your healthcare provider before starting an exercise program.   Date Last Reviewed: 8/16/2015  © 7355-2354 [employer]. [address] [place] [address]. All rights reserved. This information is not intended as a substitute for professional medical care. Always follow your healthcare professional's instructions.      Pain Management Pre-Procedure Instructions  (also available in your MyChart account)    Patient  Name:___Ezequiel Hughes____MRN: [id number] you are scheduled to have the following procedure:__ Epidural Steroid Injection  _with______Mak Young MD on: __03/09/22_____ at: [facility]    You will be contacted the day before your procedure to be given an arrival and procedure time                                                                                                        Day of Procedure  Ensure you have obtained arrival time from the Pain Management department  We will call 48 hours in advance with your arrival time. Please check any voicemails you may have  If you arrive past your scheduled procedure time, you may be asked to reschedule your procedure.  For your safety, ensure you have a  with you to remain present throughout your procedure   If you arrive without a responsible adult to stay with you and drive you home, you may be asked to reschedule your procedure  Take all of your prescribed medications (exceptions noted below) with a small amount of water  STOP PLAVIX 5 DAYS PRIOR , Stop all insulin and blood sugar medications night before and day of , take other medications as prescribed    [x] Nothing by mouth after midnight the night before your procedure.  It is ok to take your regular medications with a small sip of water.    Wear loose, comfortable clothing   You may wear glasses, dentures, contact lenses and/or hearing aids. Please leave all valuable items at home.  Contact the Pain Management department at [phone] or via [employer] if you are:  Running a fever above 100 degrees  Feel ill, have any type of infection, or are taking antibiotics now or have in the past 2 weeks  Have had any outpatient procedures in the past 2 weeks (colonoscopy, major dental work, etc.)  If you are allergic to iodine, IVP dye or shellfish.    PLEASE WAIT AT LEAST 7 DAYS AFTER RECEIVING ALL VACCINES (FLU, SHINGLES, ETC.) TO COME IN FOR YOUR PROCEDURES. IF YOU HAVE RECEIVED THE FIRST DOSE OF  COVID VACCINE BUT NOT THE 2ND DOSE, PLEASE NOTIFY THE PAIN DEPARTMENT. FOR THOSE WHO QUALIFY FOR BOOSTER COVID VACCINE, MAY RECEIVE 7 DAYS PRIOR TO PROCEDURE OR TWO WEEKS AFTER PROCEDURE. THIS AVOIDS ANY PROBLEMS WITH YOUR IMMUNE SYSTEM AND IMMUNE RESPONSE TO THE VACCINE.  You have been scheduled for epidural steroid injection. This procedure is for diagnostic and therapeutic purposes. Please allow the injection 5-7 days to take effect and up to 2 weeks for the injection to reach its full potential.    Please call the office at [phone] if you experience any of the following after the procedure: weakness or loss of sensation, fever > 101.5, pain uncontrolled with oral medications, persistent nausea/vomiting/or diarrhea, redness or drainage from the incisions, or any other worrisome concerns. If physician on call was not reached or could not communicate with our office for any reason please go to the nearest emergency department      Contact Information: [phone], ask to speak to the pain management department with any questions or concerns or send a message via [employer]

## 2022-03-01 NOTE — PRE-PROCEDURE INSTRUCTIONS
Spoke with patient regarding procedure scheduled on 3.9     Arrival time 0720     Has patient been sick with fever or on antibiotics within the last 7 days? No     Does the patient have any open wounds, sores or rashes? No     Does the patient have any recent fractures? no     Has patient received a vaccination within the last 7 days? No     Received the COVID vaccination? yes     Has the patient stopped all medications as directed? Hold plavix 5 days prior     Does patient have a pacemaker and or defibrillator? no     Does the patient have a ride to and from procedure and someone reliable to remain with patient? estefanía     Is the patient diabetic? no     Does the patient have sleep apnea? Or use O2 at home? jonelle cpap     Is the patient receiving sedation? yes     Is the patient instructed to remain NPO beginning at midnight the night before their procedure? yes     Procedure location confirmed with patient? Yes     Covid- Denies signs/symptoms. Instructed to notify PAT/MD if any changes.

## 2022-03-04 ENCOUNTER — PATIENT MESSAGE (OUTPATIENT)
Dept: OTHER | Facility: OTHER | Age: 82
End: 2022-03-04
Payer: MEDICARE

## 2022-03-07 ENCOUNTER — TELEPHONE (OUTPATIENT)
Dept: PAIN MEDICINE | Facility: CLINIC | Age: 82
End: 2022-03-07
Payer: MEDICARE

## 2022-03-07 NOTE — TELEPHONE ENCOUNTER
I spoke with Mr. Hughes who forgot to stop his Plavix.  I was able to reschedule him to Monday, 3/14/22.  He verbalized understanding.  All questions answered.

## 2022-03-07 NOTE — PRE-PROCEDURE INSTRUCTIONS
Spoke with patient regarding procedure scheduled on 3.14     Arrival time 0640     Has patient been sick with fever or on antibiotics within the last 7 days? No     Does the patient have any open wounds, sores or rashes? No     Does the patient have any recent fractures? no     Has patient received a vaccination within the last 7 days? No     Received the COVID vaccination? yes     Has the patient stopped all medications as directed? Hold plavix 5 days prior. Cardiac clearance obtained from dr monroy on 1.13.     Does patient have a pacemaker and or defibrillator? no     Does the patient have a ride to and from procedure and someone reliable to remain with patient? estefanía     Is the patient diabetic? no     Does the patient have sleep apnea? Or use O2 at home? jonelle cpap     Is the patient receiving sedation? yes     Is the patient instructed to remain NPO beginning at midnight the night before their procedure? yes     Procedure location confirmed with patient? Yes     Covid- Denies signs/symptoms. Instructed to notify PAT/MD if any changes.

## 2022-03-07 NOTE — TELEPHONE ENCOUNTER
----- Message from Monica Medina sent at 3/7/2022  7:32 AM CST -----  Pt would like return call, pt states he forgot to stop Plavix medication for procedure scheduled Wednesday.    Please call back at [phone].  Chava Mercado

## 2022-03-14 ENCOUNTER — HOSPITAL ENCOUNTER (OUTPATIENT)
Facility: HOSPITAL | Age: 82
Discharge: HOME | End: 2022-03-14
Attending: ANESTHESIOLOGY | Admitting: ANESTHESIOLOGY
Payer: MEDICARE

## 2022-03-14 VITALS
HEIGHT: 71 IN | WEIGHT: 205.25 LBS | RESPIRATION RATE: 15 BRPM | DIASTOLIC BLOOD PRESSURE: 58 MMHG | TEMPERATURE: 98 F | SYSTOLIC BLOOD PRESSURE: 119 MMHG | BODY MASS INDEX: 28.73 KG/M2 | HEART RATE: 60 BPM | OXYGEN SATURATION: 97 %

## 2022-03-14 DIAGNOSIS — M54.16 LUMBAR RADICULOPATHY, CHRONIC: ICD-10-CM

## 2022-03-14 PROCEDURE — 25500020 PHARM REV CODE 255: Performed by: ANESTHESIOLOGY

## 2022-03-14 PROCEDURE — 64483 PR EPIDURAL INJ, ANES/STEROID, TRANSFORAMINAL, LUMB/SACR, SNGL LEVL: ICD-10-PCS | Mod: 50,,, | Performed by: ANESTHESIOLOGY

## 2022-03-14 PROCEDURE — 25000003 PHARM REV CODE 250: Performed by: ANESTHESIOLOGY

## 2022-03-14 PROCEDURE — 64483 NJX AA&/STRD TFRM EPI L/S 1: CPT | Mod: 50 | Performed by: ANESTHESIOLOGY

## 2022-03-14 PROCEDURE — 63600175 PHARM REV CODE 636 W HCPCS: Performed by: ANESTHESIOLOGY

## 2022-03-14 PROCEDURE — 64483 NJX AA&/STRD TFRM EPI L/S 1: CPT | Mod: 50,,, | Performed by: ANESTHESIOLOGY

## 2022-03-14 RX ORDER — FENTANYL CITRATE 50 UG/ML
INJECTION, SOLUTION INTRAMUSCULAR; INTRAVENOUS
Status: DISCONTINUED | OUTPATIENT
Start: 2022-03-14 | End: 2022-03-14 | Stop reason: HOSPADM

## 2022-03-14 RX ORDER — MIDAZOLAM HYDROCHLORIDE 1 MG/ML
INJECTION, SOLUTION INTRAMUSCULAR; INTRAVENOUS
Status: DISCONTINUED | OUTPATIENT
Start: 2022-03-14 | End: 2022-03-14 | Stop reason: HOSPADM

## 2022-03-14 RX ORDER — BUPIVACAINE HYDROCHLORIDE 2.5 MG/ML
INJECTION, SOLUTION EPIDURAL; INFILTRATION; INTRACAUDAL
Status: DISCONTINUED | OUTPATIENT
Start: 2022-03-14 | End: 2022-03-14 | Stop reason: HOSPADM

## 2022-03-14 RX ORDER — PREGABALIN 75 MG/1
150 CAPSULE ORAL 2 TIMES DAILY
Qty: 120 CAPSULE | Refills: 2 | Status: SHIPPED | OUTPATIENT
Start: 2022-03-14 | End: 2022-03-22

## 2022-03-14 RX ORDER — INDOMETHACIN 25 MG/1
CAPSULE ORAL
Status: DISCONTINUED | OUTPATIENT
Start: 2022-03-14 | End: 2022-03-14 | Stop reason: HOSPADM

## 2022-03-14 RX ORDER — DEXAMETHASONE SODIUM PHOSPHATE 10 MG/ML
INJECTION INTRAMUSCULAR; INTRAVENOUS
Status: DISCONTINUED | OUTPATIENT
Start: 2022-03-14 | End: 2022-03-14 | Stop reason: HOSPADM

## 2022-03-14 NOTE — DISCHARGE SUMMARY
[facility] - Pain Mgmt 1st Fl  Discharge Note  Short Stay    Procedure(s) (LRB):  Bilateral L4/5 TF IAN with RN IV sedation (Bilateral)    OUTCOME: Patient tolerated treatment/procedure well without complication and is now ready for discharge.    DISPOSITION: Home or Self Care    FINAL DIAGNOSIS:  <principal problem not specified>    FOLLOWUP: In clinic    DISCHARGE INSTRUCTIONS:  No discharge procedures on file.     TIME SPENT ON DISCHARGE: 15 minutes

## 2022-03-14 NOTE — DISCHARGE INSTRUCTIONS

## 2022-03-14 NOTE — OP NOTE
Ezequiel Hughes  81 y.o. male      Vitals:    03/14/22 0720   BP: 138/63   Pulse: 63   Resp: 16   Temp:      Procedure Date:3/14/22      INFORMED CONSENT: The procedure, risks, benefits and options were discussed with patient. There are no contraindications to the procedure. The patient expressed understanding and agreed to proceed. The personnel performing the procedure was discussed. I verify that I personally obtained consent prior to the start of the procedure and the signed consent can be found on the patient's chart.       Anesthesia:   Conscious sedation provided by M.D    The patient was monitored with continuous pulse oximetry, EKG, and intermittent blood pressure monitors.  The patient was hemodynamically stable throughout the entire process was responsive to voice, and breathing spontaneously.  Supplemental O2 was provided at 2L/min via nasal cannula.  Patient was comfortable for the duration of the procedure. (See nurse documentation and case log for sedation time)    There was a total of 1mg IV Midazolam and 50mcg Fentanyl titrated for the procedure    Pre Procedure diagnosis: Lumbar radiculopathy, chronic [M54.16]  Post-Procedure diagnosis: SAME     Complications: None    Specimens: None      DESCRIPTION OF PROCEDURE: The patient was brought to the procedure room. IV access was obtained prior to the procedure. The patient was positioned prone on the fluoroscopy table. Continuous hemodynamic monitoring was initiated including blood pressure, EKG, and pulse oximetry. . The skin was prepped with chlorhexidine and draped in a sterile fashion. Skin anesthesia was achieved using a total of 10mL of lidocaine, 5mL over each respective injection site.     The  L4/5 transforaminal spaces were identified with fluoroscopy in the  AP, oblique, and lateral views.  A 22 gauge spinal quinke needle was then advanced into the area of the trans foraminal spaces bilateral with confirmation of proper needle position using AP,  oblique, and lateral fluoroscopic views. Once the needle tip was in the area of the transforaminal space, and there was no blood, CSF or paraesthesias,  1.5 mL of Omnipaque 300mg/ml was injected on bilateral for a total of 3mL.  Fluoroscopic imaging in the AP and lateral views revealed a clear outline of the spinal nerve with proximal spread of agent through the neural foramen into the epidural space. A total combination of 2 mL of Bupivicaine 0.25% and 10 mg dexamethasone was injected on each side for a total of 6mL of injected medications with displacement of the contrast dye confirming that the medication went into the area of the transforaminal spaces bilateral. A sterile dressing was applied.   Patient tolerated the procedure well.    Patient was taken back to the recovery room for further observation.     The patient was discharged to home in stable condition

## 2022-03-18 ENCOUNTER — PATIENT MESSAGE (OUTPATIENT)
Dept: PRIMARY CARE CLINIC | Facility: CLINIC | Age: 82
End: 2022-03-18
Payer: MEDICARE

## 2022-03-18 DIAGNOSIS — N18.31 STAGE 3A CHRONIC KIDNEY DISEASE: ICD-10-CM

## 2022-03-18 DIAGNOSIS — I10 ESSENTIAL HYPERTENSION: Primary | ICD-10-CM

## 2022-03-18 DIAGNOSIS — D64.9 ANEMIA, UNSPECIFIED TYPE: ICD-10-CM

## 2022-03-18 DIAGNOSIS — L29.9 PRURITIC DISORDER: ICD-10-CM

## 2022-03-18 DIAGNOSIS — D69.6 THROMBOCYTOPENIA: ICD-10-CM

## 2022-03-18 DIAGNOSIS — G56.03 BILATERAL CARPAL TUNNEL SYNDROME: Primary | ICD-10-CM

## 2022-03-18 DIAGNOSIS — E78.2 MIXED HYPERLIPIDEMIA: ICD-10-CM

## 2022-03-20 ENCOUNTER — PATIENT MESSAGE (OUTPATIENT)
Dept: PAIN MEDICINE | Facility: CLINIC | Age: 82
End: 2022-03-20
Payer: MEDICARE

## 2022-03-20 ENCOUNTER — PATIENT MESSAGE (OUTPATIENT)
Dept: ADMINISTRATIVE | Facility: OTHER | Age: 82
End: 2022-03-20
Payer: MEDICARE

## 2022-03-21 ENCOUNTER — PATIENT MESSAGE (OUTPATIENT)
Dept: SPORTS MEDICINE | Facility: CLINIC | Age: 82
End: 2022-03-21

## 2022-03-21 ENCOUNTER — PATIENT MESSAGE (OUTPATIENT)
Dept: PAIN MEDICINE | Facility: CLINIC | Age: 82
End: 2022-03-21
Payer: MEDICARE

## 2022-03-21 ENCOUNTER — TELEPHONE (OUTPATIENT)
Dept: PAIN MEDICINE | Facility: CLINIC | Age: 82
End: 2022-03-21
Payer: MEDICARE

## 2022-03-21 ENCOUNTER — HOSPITAL ENCOUNTER (OUTPATIENT)
Dept: RADIOLOGY | Facility: HOSPITAL | Age: 82
Discharge: HOME | End: 2022-03-21
Attending: STUDENT IN AN ORGANIZED HEALTH CARE EDUCATION/TRAINING PROGRAM
Payer: MEDICARE

## 2022-03-21 ENCOUNTER — PATIENT MESSAGE (OUTPATIENT)
Dept: PRIMARY CARE CLINIC | Facility: CLINIC | Age: 82
End: 2022-03-21
Payer: MEDICARE

## 2022-03-21 ENCOUNTER — OFFICE VISIT (OUTPATIENT)
Dept: SPORTS MEDICINE | Facility: CLINIC | Age: 82
End: 2022-03-21
Payer: MEDICARE

## 2022-03-21 VITALS — WEIGHT: 205.25 LBS | BODY MASS INDEX: 28.73 KG/M2 | HEIGHT: 71 IN

## 2022-03-21 DIAGNOSIS — G56.03 BILATERAL CARPAL TUNNEL SYNDROME: ICD-10-CM

## 2022-03-21 DIAGNOSIS — G89.29 CHRONIC PAIN OF RIGHT KNEE: Primary | ICD-10-CM

## 2022-03-21 DIAGNOSIS — G56.02 CARPAL TUNNEL SYNDROME OF LEFT WRIST: Primary | ICD-10-CM

## 2022-03-21 DIAGNOSIS — M25.561 CHRONIC PAIN OF RIGHT KNEE: Primary | ICD-10-CM

## 2022-03-21 PROCEDURE — 99999 PR PBB SHADOW E&M-EST. PATIENT-LVL IV: CPT | Mod: PBBFAC,,, | Performed by: STUDENT IN AN ORGANIZED HEALTH CARE EDUCATION/TRAINING PROGRAM

## 2022-03-21 PROCEDURE — 1159F MED LIST DOCD IN RCRD: CPT | Mod: CPTII,S$GLB,, | Performed by: STUDENT IN AN ORGANIZED HEALTH CARE EDUCATION/TRAINING PROGRAM

## 2022-03-21 PROCEDURE — 73110 XR WRIST COMPLETE 3 VIEWS LEFT: ICD-10-PCS | Mod: 26,LT,, | Performed by: RADIOLOGY

## 2022-03-21 PROCEDURE — 1159F PR MEDICATION LIST DOCUMENTED IN MEDICAL RECORD: ICD-10-PCS | Mod: CPTII,S$GLB,, | Performed by: STUDENT IN AN ORGANIZED HEALTH CARE EDUCATION/TRAINING PROGRAM

## 2022-03-21 PROCEDURE — 1101F PR PT FALLS ASSESS DOC 0-1 FALLS W/OUT INJ PAST YR: ICD-10-PCS | Mod: CPTII,S$GLB,, | Performed by: STUDENT IN AN ORGANIZED HEALTH CARE EDUCATION/TRAINING PROGRAM

## 2022-03-21 PROCEDURE — 99214 OFFICE O/P EST MOD 30 MIN: CPT | Mod: 25,S$GLB,, | Performed by: STUDENT IN AN ORGANIZED HEALTH CARE EDUCATION/TRAINING PROGRAM

## 2022-03-21 PROCEDURE — 3288F PR FALLS RISK ASSESSMENT DOCUMENTED: ICD-10-PCS | Mod: CPTII,S$GLB,, | Performed by: STUDENT IN AN ORGANIZED HEALTH CARE EDUCATION/TRAINING PROGRAM

## 2022-03-21 PROCEDURE — 73110 X-RAY EXAM OF WRIST: CPT | Mod: TC,LT

## 2022-03-21 PROCEDURE — 1126F AMNT PAIN NOTED NONE PRSNT: CPT | Mod: CPTII,S$GLB,, | Performed by: STUDENT IN AN ORGANIZED HEALTH CARE EDUCATION/TRAINING PROGRAM

## 2022-03-21 PROCEDURE — 73110 X-RAY EXAM OF WRIST: CPT | Mod: 26,LT,, | Performed by: RADIOLOGY

## 2022-03-21 PROCEDURE — 1101F PT FALLS ASSESS-DOCD LE1/YR: CPT | Mod: CPTII,S$GLB,, | Performed by: STUDENT IN AN ORGANIZED HEALTH CARE EDUCATION/TRAINING PROGRAM

## 2022-03-21 PROCEDURE — 1126F PR PAIN SEVERITY QUANTIFIED, NO PAIN PRESENT: ICD-10-PCS | Mod: CPTII,S$GLB,, | Performed by: STUDENT IN AN ORGANIZED HEALTH CARE EDUCATION/TRAINING PROGRAM

## 2022-03-21 PROCEDURE — 99999 PR PBB SHADOW E&M-EST. PATIENT-LVL IV: ICD-10-PCS | Mod: PBBFAC,,, | Performed by: STUDENT IN AN ORGANIZED HEALTH CARE EDUCATION/TRAINING PROGRAM

## 2022-03-21 PROCEDURE — 3288F FALL RISK ASSESSMENT DOCD: CPT | Mod: CPTII,S$GLB,, | Performed by: STUDENT IN AN ORGANIZED HEALTH CARE EDUCATION/TRAINING PROGRAM

## 2022-03-21 PROCEDURE — 20526 THER INJECTION CARP TUNNEL: CPT | Mod: LT,S$GLB,, | Performed by: STUDENT IN AN ORGANIZED HEALTH CARE EDUCATION/TRAINING PROGRAM

## 2022-03-21 PROCEDURE — 20526 CARPAL TUNNEL: ICD-10-PCS | Mod: LT,S$GLB,, | Performed by: STUDENT IN AN ORGANIZED HEALTH CARE EDUCATION/TRAINING PROGRAM

## 2022-03-21 PROCEDURE — 99214 PR OFFICE/OUTPT VISIT, EST, LEVL IV, 30-39 MIN: ICD-10-PCS | Mod: 25,S$GLB,, | Performed by: STUDENT IN AN ORGANIZED HEALTH CARE EDUCATION/TRAINING PROGRAM

## 2022-03-21 PROCEDURE — 73110 X-RAY EXAM OF WRIST: CPT | Mod: TC,50

## 2022-03-21 RX ORDER — TAMSULOSIN HYDROCHLORIDE 0.4 MG/1
1 CAPSULE ORAL DAILY
COMMUNITY
Start: 2022-02-21 | End: 2022-06-16 | Stop reason: ALTCHOICE

## 2022-03-21 RX ORDER — BETAMETHASONE SODIUM PHOSPHATE AND BETAMETHASONE ACETATE 3; 3 MG/ML; MG/ML
3 INJECTION, SUSPENSION INTRA-ARTICULAR; INTRALESIONAL; INTRAMUSCULAR; SOFT TISSUE
Status: DISCONTINUED | OUTPATIENT
Start: 2022-03-21 | End: 2022-03-21 | Stop reason: HOSPADM

## 2022-03-21 RX ADMIN — BETAMETHASONE SODIUM PHOSPHATE AND BETAMETHASONE ACETATE 3 MG: 3; 3 INJECTION, SUSPENSION INTRA-ARTICULAR; INTRALESIONAL; INTRAMUSCULAR; SOFT TISSUE at 01:03

## 2022-03-21 NOTE — TELEPHONE ENCOUNTER
The patient dropped off some papers that he received from [employer].  It is stating that he is currently on Lyrica 75 mg, 2 tabs twice a day,  This equal 120 capsules.    His plan will only pay for up to 90 capsules.  Can you possibly change the strength to 150 mg, one cap twice a day (which would be 60 tabs) so that we can see if the plan we cover this?   I have scanned the form the patient dropped off into the media section of the patient's chart.  Pharmacy:  [facility]

## 2022-03-21 NOTE — PROGRESS NOTES
Patient ID: Ezequiel Hughes  YOB: 1940  MRN: [id number]    Chief Complaint: Numbness of the Left Hand      Referred By:  Dr. Paez     History of Present Illness: Ezequiel Hughes is a right-hand dominant 82 y.o. male who presents today with numbness in left hand.      Occupation: retired    A 82-year-old male presenting today for carpal tunnel syndrome.  He has severe carpal tunnel changes on the left almost persistent numbness over his left thumb as well as persistent symptoms at night that worsened over his index finger as well.  He had an EMG just over month ago was referred for possible injection and further evaluation.  He is not dropping objects or having any persistent symptoms.  He is not wearing a wrist brace at night.  He is not interested in anything surgical at this time is seeing pain management for chronic radicular symptoms coming from needs his neck as well.    Past Medical History:   Past Medical History:   Diagnosis Date    Allergic rhinitis     Anemia     Back pain     BPH (benign prostatic hyperplasia)     Cancer     skin cancer to neck, Dr. Graves    Cataract     Disorder of kidney and ureter     ED (erectile dysfunction)     Hiatal hernia     small    History of colon polyps     colonoscopy 11/2016    HLD (hyperlipidemia)     Hyperlipidemia     Hypertension     Lateral epicondylitis     Lumbar radiculopathy 1/26/2022    OA (osteoarthritis)     GUIDO (obstructive sleep apnea)     Prostate cancer     Dr. Wong    TIA (transient ischemic attack)     Trouble in sleeping      Past Surgical History:   Procedure Laterality Date    CATARACT EXTRACTION W/  INTRAOCULAR LENS IMPLANT Right 02/21/2018    I-Stent    CATARACT EXTRACTION W/  INTRAOCULAR LENS IMPLANT Left 03/21/2018    I - Stent    COLONOSCOPY N/A 11/14/2016    Procedure: COLONOSCOPY;  Surgeon: Karuna Rodriguez MD;  Location[facility];  Service: Endoscopy;  Laterality: N/A;    COLONOSCOPY  N/A 2020    Procedure: COLONOSCOPY;  Surgeon: Chuy Fish MD;  Location: [facility] ENDO;  Service: Endoscopy;  Laterality: N/A;    EYE SURGERY      HEMORRHOID SURGERY      I-STENT Right 2018    DR. REECE    KNEE ARTHROSCOPY W/ MENISCAL REPAIR Right     Dr. Jain    PCIOL Right 2018    DR. REECE    PLANTAR FASCIA RELEASE      right    ROTATOR CUFF REPAIR Bilateral     bilateral    SELECTIVE INJECTION OF ANESTHETIC AGENT AROUND LUMBAR SPINAL NERVE ROOT BY TRANSFORAMINAL APPROACH Bilateral 2022    Procedure: Bilateral L4/5 TF IAN with RN IV sedation;  Surgeon: Mak Young MD;  Location: [facility] PAIN MGT;  Service: Pain Management;  Laterality: Bilateral;    SELECTIVE INJECTION OF ANESTHETIC AGENT AROUND LUMBAR SPINAL NERVE ROOT BY TRANSFORAMINAL APPROACH Bilateral 3/14/2022    Procedure: Bilateral L4/5 TF IAN with RN IV sedation;  Surgeon: Mak Young MD;  Location: [facility];  Service: Pain Management;  Laterality: Bilateral;    SHOULDER SURGERY Bilateral around     Dr. Pepper.  rotator cuff surgeries    VASECTOMY       Family History   Problem Relation Age of Onset    Lung cancer Father         life long smoker    Cancer Father         prostate, lung    Arthritis Mother     Stroke Sister         TIA    Cataracts Sister     Cancer Brother         prostate    Cataracts Brother     Cataracts Sister     Melanoma Neg Hx     Psoriasis Neg Hx     Lupus Neg Hx     Eczema Neg Hx     Diabetes Neg Hx     Heart disease Neg Hx     Kidney disease Neg Hx     Colon cancer Neg Hx      Social History     Socioeconomic History    Marital status:    Tobacco Use    Smoking status: Former Smoker     Packs/day: 3.00     Years: 35.00     Pack years: 105.00     Types: Cigarettes     Start date: 1960     Quit date: 1985     Years since quittin.6    Smokeless tobacco: Never Used   Substance and Sexual Activity    Alcohol use: Yes     Alcohol/week:  3.0 standard drinks     Types: 3 Cans of beer per week     Comment: socially    Drug use: No    Sexual activity: Yes     Partners: Female     Birth control/protection: None   Social History Narrative         Social Determinants of Health     Financial Resource Strain: Low Risk     Difficulty of Paying Living Expenses: Not hard at all   Food Insecurity: No Food Insecurity    Worried About Running Out of Food in the Last Year: Never true    Ran Out of Food in the Last Year: Never true   Transportation Needs: No Transportation Needs    Lack of Transportation (Medical): No    Lack of Transportation (Non-Medical): No   Physical Activity: Sufficiently Active    Days of Exercise per Week: 5 days    Minutes of Exercise per Session: 30 min   Stress: No Stress Concern Present    Feeling of Stress : Not at all   Social Connections: Unknown    Frequency of Communication with Friends and Family: More than three times a week    Frequency of Social Gatherings with Friends and Family: More than three times a week    Active Member of Clubs or Organizations: Yes    Attends Club or Organization Meetings: More than 4 times per year    Marital Status:    Housing Stability: Low Risk     Unable to Pay for Housing in the Last Year: No    Number of Places Lived in the Last Year: 1    Unstable Housing in the Last Year: No     Medication List with Changes/Refills   Current Medications    ACETAMINOPHEN (TYLENOL ARTHRITIS PAIN ORAL)    Take by mouth. Patient states that he takes 2 tablets in the morning and 2 tablets in the evening    ACETAMINOPHEN (TYLENOL ORAL)    1000  .    ALBUTEROL (PROVENTIL/VENTOLIN HFA) 90 MCG/ACTUATION INHALER    Inhale 2 puffs into the lungs every 6 (six) hours as needed.    ALLOPURINOL (ZYLOPRIM) 100 MG TABLET    Take 100 mg by mouth.    AMLODIPINE (NORVASC) 2.5 MG TABLET    2 tablets in am and 2 tablets in pm for blood pressure    ATORVASTATIN (LIPITOR) 40 MG TABLET    TAKE 1 TABLET  EVERY DAY    CHOLECALCIFEROL, VITAMIN D3, (VITAMIN D3) 50 MCG (2,000 UNIT) CAP    Take 1 capsule by mouth once daily.    CLOPIDOGREL (PLAVIX) 75 MG TABLET    TAKE 1 TABLET EVERY DAY    FAMOTIDINE (PEPCID) 20 MG TABLET    Take 1 tablet (20 mg total) by mouth 2 (two) times daily.    FINASTERIDE (PROSCAR) 5 MG TABLET    Take 5 mg by mouth once daily.     FLUARIX QUAD 2392-2938, PF, 60 MCG (15 MCG X 4)/0.5 ML SYRG        FLUTICASONE PROPIONATE (FLONASE) 50 MCG/ACTUATION NASAL SPRAY    USE 2 SPRAYS IN EACH NOSTRIL ONE TIME DAILY  (SUBSTITUTED FOR FLONASE)    FUROSEMIDE (LASIX) 20 MG TABLET    Take 1 tablet (20 mg total) by mouth daily as needed (leg swelling).    LOSARTAN (COZAAR) 50 MG TABLET    TAKE 1 TABLET TWICE DAILY    PANTOPRAZOLE (PROTONIX) 40 MG TABLET    TAKE 1 TABLET EVERY DAY    PREGABALIN (LYRICA) 75 MG CAPSULE    Take 2 capsules (150 mg total) by mouth 2 (two) times daily.    TAMSULOSIN (FLOMAX) 0.4 MG CAP    Take 1 capsule by mouth once daily.     Review of patient's allergies indicates:   Allergen Reactions    Atarax [hydroxyzine hcl] Other (See Comments)     Raised blood pressure     Zyrtec [cetirizine] Other (See Comments)     Raised blood pressure     Gold sodium thiosulfate      Patch test positive       Physical Exam:   Body mass index is 28.63 kg/m².    GENERAL: Well appearing, in no acute distress.  HEAD: Normocephalic and atraumatic.  ENT: External ears and nose grossly normal.  EYES: EOMI bilaterally  PULMONARY: Respirations are grossly even and non-labored.  NEURO: Awake, alert, and oriented x 3.  SKIN: No obvious rashes appreciated.  PSYCH: Mood & affect are appropriate.    Detailed MSK exam:     Left hand exam  Decreased sensation at the tip of the thumb on left side compared to contralateral negative Tinel's negative carpal compression motor function of median ulnar radial nerve all intact no gross thenar atrophy appreciated hyper extension at the MP joint bilaterally no tenderness at the  CMC joint bilaterally    Imaging:    INTERPRETATION  -Bilateral median motor nerve conduction study showed prolonged latency, dec amplitude on the left, and dec conduction velocity on the left  -Bilateral median sensory nerve conduction study showed prolonged peak latency and dec amplitude on the left  -Bilateral ulnar motor nerve conduction study showed normal latency, amplitude, and conduction velocity  -Bilateral ulnar sensory nerve conduction study showed normal peak latency and amplitude  -Bilateral radial sensory nerve conduction study showed prolonged peak latency on the right and dec amplitude om the right  -Needle EMG examination performed to above mentioned muscles      IMPRESSION  1. ABNORMAL study  2. There is electrodiagnostic evidence of an acute on chronic radiculopathy of the left C8, T1 nerve roots, a subacute on chronic radiculopathy of the left C7 nerve root, and a chronic radiculopathy of the right C6, C8, T1 nerve roots.  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    Relevant imaging results were reviewed and interpreted by me and per my read as above.  This was discussed with the patient and / or family today.     Assessment:  Ezequiel Hughes is a 82 y.o. male presents today for severe carpal tunnel syndrome left.  Asymptomatic on exam likely due to significant nerve injury at this time.  Discussed likely need for carpal tunnel release but he would like to try an injection 1st.  Please refer to procedure note for the details.  Follow-up with me as needed in the future.  Discussed wrist bracing at night he was provided with 1 today.    Carpal tunnel syndrome of left wrist  -     Sports Medicine US - Guidance for Needle Placement  -     Carpal Tunnel    Bilateral carpal tunnel syndrome  Comments:  severe demyelinating and axonal on left, moderate demyelinating on  right  Orders:  -     Ambulatory referral/consult to Orthopedics  -     Sports Medicine US - Guidance for Needle Placement         A copy of today's visit note has been sent to the referring provider.       Darrian Cole MD    Disclaimer: This note was prepared using a voice recognition system and is likely to have sound alike errors within the text.

## 2022-03-21 NOTE — PROCEDURES
Carpal Tunnel    Date/Time: 3/21/2022 1:00 PM  Performed by: Darrian Cole MD  Authorized by: Darrian Cole MD     Consent Done?:  Yes (Verbal)  Indications:  Pain  Site marked: the procedure site was marked    Timeout: prior to procedure the correct patient, procedure, and site was verified    Prep: patient was prepped and draped in usual sterile fashion      Local anesthesia used?: Yes    Local anesthetic:  Topical anesthetic  Location:  Wrist  Site:  L carpal tunnel  Ultrasonic Guidance for Needle Placement?: Yes    Needle size:  25 G  Approach:  Volar  Medications:  3 mg betamethasone acetate-betamethasone sodium phosphate 6 mg/mL  Patient tolerance:  Patient tolerated the procedure well with no immediate complications     Additional Comments: Ultrasound guidance was used for needle localization. Images were saved and stored for documentation. The appropriate structures were visualized. Dynamic visualization of the needle was continuous throughout the procedures and maintained good position.     We discussed the proper protocols after the injection such as no submerging pools, baths tubs, or hot tubs for 24 hr.  Showering is okay today.  We also discussed that blood sugars can be elevated after an injection and asked patient to properly checked her sugars over the next few days and contact their PCP if there are any concerns.  We discussed red flags such as fevers, chills, red, warm, tender joint at the area of injection to please seek medical care immediately.

## 2022-03-21 NOTE — TELEPHONE ENCOUNTER
Labs ordered please schedule for today or tomorrow. Nonfasting, not doing lipid since was done in 9/21.   Can be done at [facility].     I have signed for the following orders AND/OR meds.  Please call the patient and ask the patient to schedule the testing AND/OR inform about any medications that were sent.      Orders Placed This Encounter   Procedures    Comprehensive Metabolic Panel     Standing Status:   Future     Standing Expiration Date:   3/21/2023    CBC Auto Differential     Standing Status:   Future     Standing Expiration Date:   11/19/2022    TSH     Standing Status:   Future     Standing Expiration Date:   3/21/2023    T4, Free     Standing Status:   Future     Standing Expiration Date:   5/20/2023    Vitamin B12     Standing Status:   Future     Standing Expiration Date:   3/21/2023    Iron and TIBC     Standing Status:   Future     Standing Expiration Date:   5/20/2023    Magnesium     Standing Status:   Future     Standing Expiration Date:   5/20/2023

## 2022-03-21 NOTE — TELEPHONE ENCOUNTER
Last visit:  2/24/22  Next visit:  5/5/22  Proc done:  3/14/22 (bilateral L4/5 IAN)    On:  Lyrica 75 mg, 2 twice a day

## 2022-03-22 ENCOUNTER — PATIENT MESSAGE (OUTPATIENT)
Dept: PAIN MEDICINE | Facility: CLINIC | Age: 82
End: 2022-03-22
Payer: MEDICARE

## 2022-03-22 RX ORDER — PREGABALIN 150 MG/1
150 CAPSULE ORAL 2 TIMES DAILY
Qty: 60 CAPSULE | Refills: 1 | Status: SHIPPED | OUTPATIENT
Start: 2022-03-22 | End: 2022-04-18

## 2022-03-23 ENCOUNTER — OFFICE VISIT (OUTPATIENT)
Dept: PRIMARY CARE CLINIC | Facility: CLINIC | Age: 82
End: 2022-03-23
Payer: MEDICARE

## 2022-03-23 ENCOUNTER — LAB VISIT (OUTPATIENT)
Dept: LAB | Facility: HOSPITAL | Age: 82
End: 2022-03-23
Attending: FAMILY MEDICINE
Payer: MEDICARE

## 2022-03-23 VITALS
TEMPERATURE: 98 F | HEART RATE: 62 BPM | OXYGEN SATURATION: 97 % | DIASTOLIC BLOOD PRESSURE: 60 MMHG | WEIGHT: 209.13 LBS | BODY MASS INDEX: 29.16 KG/M2 | SYSTOLIC BLOOD PRESSURE: 122 MMHG

## 2022-03-23 DIAGNOSIS — M23.91 INTERNAL DERANGEMENT OF RIGHT KNEE: ICD-10-CM

## 2022-03-23 DIAGNOSIS — D64.9 ANEMIA, UNSPECIFIED TYPE: ICD-10-CM

## 2022-03-23 DIAGNOSIS — C61 PROSTATE CANCER: ICD-10-CM

## 2022-03-23 DIAGNOSIS — M46.94 UNSPECIFIED INFLAMMATORY SPONDYLOPATHY, THORACIC REGION: ICD-10-CM

## 2022-03-23 DIAGNOSIS — M54.16 LUMBAR RADICULOPATHY, CHRONIC: ICD-10-CM

## 2022-03-23 DIAGNOSIS — M25.561 CHRONIC PAIN OF RIGHT KNEE: Primary | ICD-10-CM

## 2022-03-23 DIAGNOSIS — D69.6 THROMBOCYTOPENIA: ICD-10-CM

## 2022-03-23 DIAGNOSIS — G89.29 CHRONIC PAIN OF RIGHT KNEE: Primary | ICD-10-CM

## 2022-03-23 DIAGNOSIS — N40.0 BENIGN PROSTATIC HYPERPLASIA WITHOUT LOWER URINARY TRACT SYMPTOMS: ICD-10-CM

## 2022-03-23 DIAGNOSIS — J84.10 LUNG GRANULOMA: ICD-10-CM

## 2022-03-23 DIAGNOSIS — N18.31 STAGE 3A CHRONIC KIDNEY DISEASE: ICD-10-CM

## 2022-03-23 DIAGNOSIS — I70.203 ATHEROSCLEROSIS OF ARTERY OF BOTH LOWER EXTREMITIES: ICD-10-CM

## 2022-03-23 LAB — COMPLEXED PSA SERPL-MCNC: 1.5 NG/ML (ref 0–4)

## 2022-03-23 PROCEDURE — 99215 PR OFFICE/OUTPT VISIT, EST, LEVL V, 40-54 MIN: ICD-10-PCS | Mod: S$GLB,,, | Performed by: FAMILY MEDICINE

## 2022-03-23 PROCEDURE — 99999 PR PBB SHADOW E&M-EST. PATIENT-LVL V: CPT | Mod: PBBFAC,,, | Performed by: FAMILY MEDICINE

## 2022-03-23 PROCEDURE — 1126F PR PAIN SEVERITY QUANTIFIED, NO PAIN PRESENT: ICD-10-PCS | Mod: CPTII,S$GLB,, | Performed by: FAMILY MEDICINE

## 2022-03-23 PROCEDURE — 36415 COLL VENOUS BLD VENIPUNCTURE: CPT | Mod: PN | Performed by: FAMILY MEDICINE

## 2022-03-23 PROCEDURE — 99499 UNLISTED E&M SERVICE: CPT | Mod: HCNC,S$GLB,, | Performed by: FAMILY MEDICINE

## 2022-03-23 PROCEDURE — 3288F FALL RISK ASSESSMENT DOCD: CPT | Mod: CPTII,S$GLB,, | Performed by: FAMILY MEDICINE

## 2022-03-23 PROCEDURE — 1101F PT FALLS ASSESS-DOCD LE1/YR: CPT | Mod: CPTII,S$GLB,, | Performed by: FAMILY MEDICINE

## 2022-03-23 PROCEDURE — 3074F SYST BP LT 130 MM HG: CPT | Mod: CPTII,S$GLB,, | Performed by: FAMILY MEDICINE

## 2022-03-23 PROCEDURE — 99999 PR PBB SHADOW E&M-EST. PATIENT-LVL V: ICD-10-PCS | Mod: PBBFAC,,, | Performed by: FAMILY MEDICINE

## 2022-03-23 PROCEDURE — 99215 OFFICE O/P EST HI 40 MIN: CPT | Mod: S$GLB,,, | Performed by: FAMILY MEDICINE

## 2022-03-23 PROCEDURE — 3078F DIAST BP <80 MM HG: CPT | Mod: CPTII,S$GLB,, | Performed by: FAMILY MEDICINE

## 2022-03-23 PROCEDURE — 1101F PR PT FALLS ASSESS DOC 0-1 FALLS W/OUT INJ PAST YR: ICD-10-PCS | Mod: CPTII,S$GLB,, | Performed by: FAMILY MEDICINE

## 2022-03-23 PROCEDURE — 3074F PR MOST RECENT SYSTOLIC BLOOD PRESSURE < 130 MM HG: ICD-10-PCS | Mod: CPTII,S$GLB,, | Performed by: FAMILY MEDICINE

## 2022-03-23 PROCEDURE — 84153 ASSAY OF PSA TOTAL: CPT | Performed by: FAMILY MEDICINE

## 2022-03-23 PROCEDURE — 1126F AMNT PAIN NOTED NONE PRSNT: CPT | Mod: CPTII,S$GLB,, | Performed by: FAMILY MEDICINE

## 2022-03-23 PROCEDURE — 3288F PR FALLS RISK ASSESSMENT DOCUMENTED: ICD-10-PCS | Mod: CPTII,S$GLB,, | Performed by: FAMILY MEDICINE

## 2022-03-23 PROCEDURE — 99499 RISK ADDL DX/OHS AUDIT: ICD-10-PCS | Mod: HCNC,S$GLB,, | Performed by: FAMILY MEDICINE

## 2022-03-23 PROCEDURE — 3078F PR MOST RECENT DIASTOLIC BLOOD PRESSURE < 80 MM HG: ICD-10-PCS | Mod: CPTII,S$GLB,, | Performed by: FAMILY MEDICINE

## 2022-03-23 RX ORDER — ALBUTEROL SULFATE 90 UG/1
AEROSOL, METERED RESPIRATORY (INHALATION)
COMMUNITY
Start: 2022-01-21 | End: 2022-07-27 | Stop reason: ALTCHOICE

## 2022-03-23 NOTE — PROGRESS NOTES
Subjective:      Patient ID: Ezequiel Hughes is a 82 y.o. male.    Chief Complaint: Annual Exam    Disclaimer:  This note is prepared using voice recognition software and as such is likely to have errors and has not been proof read. Please contact me for questions.     Ezequiel Hughes is a 81 y.o. male who presents for annual exam. Doing better.   Is concerned about wt.   Walking 2.8 mph.   Did refill the gabapentin 100mg. Is taking it 1 right before going to bed. Not sure if helping or not. Takes 1 in am and 1 late at night.   Washing hair. Not putting anything on it. Looks like it is drying out and doing better.       Sciatic nerve still hurting [place] in afternoon.   With standing a lot legs go numb. Dr monroy asked if coming from his back. Did PT.   No change really. Does ok till afternoon. Did do IAN's with Dr. Young. Hasn't found as much benefit as he expected. Did 2 shots already.     Reflux: pantoprazole used to have. Has famotidine.     Gets sob occasionally. Is walking a little bit on the treadmill. Has HR monitor as well. Doesn't like hr to get above 100-110.   Will gradually decrease the HR overtime.   Went 1/2 mile and never got 85.   2.5 on treadmill.       Having knee pain also. Hx of arthroscope in past.     In Digital bp program:       Still  has still been taking his Plavix has still been taking his atorvastatin his blood pressure is actually been doing very well he is on amlodipine and losartan.  He is not on any rate control medicines at this time.    Assessment:     1. Essential hypertension   2. Mixed hyperlipidemia   3. Thrombocytopenia   4. Anemia, unspecified type   5. Stage 3a chronic kidney disease   6. Incomplete right bundle branch block   7. Tortuous aorta   8. Atherosclerosis of artery of both lower extremities   9. GUIDO (obstructive sleep apnea)   10. Inadequate sleep hygiene     Lab Results       Component                Value               Date                       HGBA1C                   5.2                  07/22/2020                 HGBA1C                   5.2                 03/07/2018             Lab Results       Component                Value               Date                       CHOL                     139                 09/24/2021                 CHOL                     162                 03/03/2021                 CHOL                     144                 07/22/2020            Lab Results       Component                Value               Date                       LDLCALC                  64.6                09/24/2021                 LDLCALC                  72.2                03/03/2021                 LDLCALC                  68.2                07/22/2020              Wt Readings from Last 50 Encounters:  03/23/22 : 94.8 kg (209 lb 1.7 oz)  03/21/22 : 93.1 kg (205 lb 4 oz)  03/14/22 : 93.1 kg (205 lb 4 oz)  02/24/22 : 92.8 kg (204 lb 9.4 oz)  02/18/22 : 93.9 kg (207 lb)  02/03/22 : 94.3 kg (207 lb 14.3 oz)  01/26/22 : 96.1 kg (211 lb 13.8 oz)  01/13/22 : 96 kg (211 lb 10.3 oz)  12/09/21 : 90.3 kg (199 lb 1.2 oz)  11/10/21 : 91.9 kg (202 lb 9.6 oz)  09/30/21 : 90.3 kg (199 lb)  09/24/21 : 92.5 kg (204 lb)  08/10/21 : 92.8 kg (204 lb 9.4 oz)  07/14/21 : 90.9 kg (200 lb 6.4 oz)  07/02/21 : 90.9 kg (200 lb 6.4 oz)  06/22/21 : 89.8 kg (198 lb)  05/28/21 : 90.1 kg (198 lb 10.2 oz)  05/13/21 : 91.4 kg (201 lb 8 oz)  04/12/21 : 92.5 kg (204 lb 0.6 oz)  03/31/21 : 92.7 kg (204 lb 5.9 oz)  03/31/21 : 92.4 kg (203 lb 11.3 oz)  03/31/21 : 92.3 kg (203 lb 7.8 oz)  03/19/21 : 92.4 kg (203 lb 11.3 oz)  03/17/21 : 92.2 kg (203 lb 4.2 oz)  03/15/21 : 92.1 kg (203 lb 0.7 oz)  03/10/21 : 92.3 kg (203 lb 7.8 oz)  02/25/21 : 92.7 kg (204 lb 5.9 oz)  02/24/21 : 84.4 kg (186 lb)  02/10/21 : 88.9 kg (196 lb)  02/01/21 : 91.3 kg (201 lb 4.5 oz)  01/29/21 : 90.7 kg (199 lb 15.3 oz)  12/08/20 : 88.6 kg (195 lb 5.2 oz)  10/28/20 : 88.4 kg (194 lb 14.2 oz)  10/18/20 : 91 kg (200 lb 11.7 oz)  09/22/20 : 89.5  kg (197 lb 5 oz)  09/09/20 : 89 kg (196 lb 3.4 oz)  07/23/20 : 88.5 kg (195 lb)  07/13/20 : 87.9 kg (193 lb 12.6 oz)  07/07/20 : 89.1 kg (196 lb 6.9 oz)  03/11/20 : 91.1 kg (200 lb 13.4 oz)  02/26/20 : 90.7 kg (199 lb 15.3 oz)  01/31/20 : 94.4 kg (208 lb 1.8 oz)  01/29/20 : 94.4 kg (208 lb 1.8 oz)  01/16/20 : 92.3 kg (203 lb 7.8 oz)  01/09/20 : 91.1 kg (200 lb 13.4 oz)  01/06/20 : 90.4 kg (199 lb 4.7 oz)  09/11/19 : 90.4 kg (199 lb 4.7 oz)  07/29/19 : 91.1 kg (200 lb 13.4 oz)  03/29/19 : 92.4 kg (203 lb 11.3 oz)  01/16/19 : 92.5 kg (203 lb 14.8 oz)      The ASCVD Risk score (Barbara DC Jr., et al., 2013) failed to calculate for the following reasons:    The 2013 ASCVD risk score is only valid for ages 40 to 79                    Lab Results   Component Value Date    WBC 5.35 03/21/2022    HGB 13.0 (L) 03/21/2022    HCT 41.0 03/21/2022     (L) 03/21/2022    CHOL 139 09/24/2021    TRIG 82 09/24/2021    HDL 58 09/24/2021    ALT 20 03/21/2022    AST 16 03/21/2022     03/21/2022    K 5.0 03/21/2022     03/21/2022    CREATININE 1.2 03/21/2022    BUN 23 03/21/2022    CO2 26 03/21/2022    TSH 0.989 03/21/2022    PSA 1.3 11/10/2021    INR 1.1 02/24/2021    HGBA1C 5.2 07/22/2020       X-ray Knee Ortho Right  Narrative: EXAMINATION:  XR KNEE ORTHO RIGHT    CLINICAL HISTORY:  right knee pain, hx of meniscus repair, chronic not improved; Pain in right knee    TECHNIQUE:  AP standing of both knees, Merchant views of both knees as well as a lateral view of the right knee were performed.    COMPARISON:  01/11/2017    FINDINGS:  There is no radiographic evidence of acute osseous, articular, or soft tissue abnormality.  There are small tricompartmental marginal osteophytes are present on the right with otherwise fairly well maintained joint spaces.  Similar degenerative findings are noted involving the left knee.  Impression: Mild degenerative findings as above    Electronically signed by: Reddy Kim,  MD  Date:    03/24/2022  Time:    09:10        Review of Systems   Constitutional: Positive for activity change. Negative for chills and fatigue.   HENT: Negative for sore throat and trouble swallowing.    Respiratory: Positive for shortness of breath. Negative for cough.    Cardiovascular: Negative for chest pain and leg swelling.   Gastrointestinal: Negative for abdominal pain, constipation, diarrhea and nausea.   Genitourinary: Negative for difficulty urinating, dysuria and flank pain.   Musculoskeletal: Positive for arthralgias, back pain, gait problem, joint swelling and myalgias. Negative for neck pain and neck stiffness.   Neurological: Negative for dizziness and headaches.   Psychiatric/Behavioral: Negative for sleep disturbance. The patient is not nervous/anxious.      Objective:     Vitals:    03/23/22 0855   BP: 122/60   Pulse: 62   Temp: 97.6 °F (36.4 °C)   SpO2: 97%   Weight: 94.8 kg (209 lb 1.7 oz)     Physical Exam  Vitals reviewed.   Constitutional:       General: He is not in acute distress.     Appearance: Normal appearance. He is well-developed and normal weight.   HENT:      Head: Normocephalic and atraumatic.      Right Ear: Tympanic membrane and external ear normal.      Left Ear: Tympanic membrane and external ear normal.      Nose: Nose normal.      Mouth/Throat:      Mouth: Mucous membranes are moist.      Pharynx: Oropharynx is clear.   Eyes:      Conjunctiva/sclera: Conjunctivae normal.      Pupils: Pupils are equal, round, and reactive to light.   Neck:      Thyroid: No thyromegaly.   Cardiovascular:      Rate and Rhythm: Normal rate and regular rhythm.      Heart sounds: No murmur heard.    No friction rub. No gallop.   Pulmonary:      Effort: Pulmonary effort is normal. No respiratory distress.      Breath sounds: Normal breath sounds.   Abdominal:      General: Bowel sounds are normal. There is no distension.      Palpations: Abdomen is soft.      Tenderness: There is no abdominal  tenderness. There is no rebound.   Musculoskeletal:         General: Tenderness and deformity present.      Cervical back: Normal range of motion and neck supple.   Lymphadenopathy:      Cervical: No cervical adenopathy.   Skin:     General: Skin is warm and dry.   Neurological:      General: No focal deficit present.      Mental Status: He is alert and oriented to person, place, and time.      Coordination: Coordination normal.   Psychiatric:         Attention and Perception: Attention normal.         Mood and Affect: Mood and affect normal.         Speech: Speech normal.         Behavior: Behavior normal.         Thought Content: Thought content normal.         Cognition and Memory: Cognition normal.         Judgment: Judgment normal.       Assessment:     1. Chronic pain of right knee    2. Internal derangement of right knee    3. Benign prostatic hyperplasia without lower urinary tract symptoms    4. Thrombocytopenia    5. Prostate cancer    6. Anemia, unspecified type    7. Lumbar radiculopathy, chronic    8. Stage 3a chronic kidney disease    9. 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 Unspecified inflammatory spondylopathy, thoracic region      Plan:   Ezequiel was seen today for annual exam.    Diagnoses and all orders for this visit:    Chronic pain of right knee- not improved, hx of surgery in the past. Desires to have MRI. Will place xray orders, and MRI, can refer back to Ortho. Has see Dr. Darrian Cole in past.   -     Ambulatory referral/consult to Orthopedics; Future  -     Cancel: X-Ray Knee Complete 4 Or More Views Right; Future  -     MRI Knee Without Contrast Right; Future    Internal derangement of right knee- not improved, hx of surgery in the past. Desires to have MRI. Will place xray orders, and MRI, can refer back to Ortho. Has see Dr. Darrian Cole in past.   -     Ambulatory referral/consult to Orthopedics; Future  -     Cancel: X-Ray Knee Complete 4 Or  More Views Right; Future  -     MRI Knee Without Contrast Right; Future    Benign prostatic hyperplasia without lower urinary tract symptoms - stable, labs ordered today.  Continue with current medications and interventions until labs are reviewed to make adjustments.       Thrombocytopenia - stable, Continue with current medications and interventions. Labs reviewed.       Prostate cancer - stable, Continue with current medications and interventions. Labs reviewed.     -     PROSTATE SPECIFIC ANTIGEN, DIAGNOSTIC; Future    Anemia, unspecified type - stable, Continue with current medications and interventions. Labs reviewed.       Lumbar radiculopathy, chronic- not improved. Seeing. Pain management dr barcenas. No benefit from 2 injections so far. Advised patient to reach out to Dr. Barcenas to discuss symptoms and differences in injection techniques between last 2 procedures.     Stage 3a chronic kidney disease - stable, Continue with current medications and interventions. Labs reviewed.     Atherosclerosis of artery of both lower extremities- - stable, Continue with current medications and interventions. Labs reviewed.       Lung granuloma - stable, Continue with current medications and interventions. Imaging reviewed.       Unspecified inflammatory spondylopathy, thoracic region - seeing pain management Continue with current medications and interventions. Labs and imaging  reviewed.               Follow up in about 6 months (around 9/23/2022) for f/u office visit Dr. Ozuna/ chronic issues .    Patient Instructions   Bring in bp cuff from home to compare to office manual reading.                   45 minutes of total time spent on the encounter, which includes face to face time and non-face to face time preparing to see the patient (eg, review of tests), Obtaining and/or reviewing separately obtained history, Documenting clinical information in the electronic or other health record, Independently interpreting results (not  separately reported) and communicating results to the patient/family/caregiver, or Care coordination (not separately reported).

## 2022-03-24 ENCOUNTER — HOSPITAL ENCOUNTER (OUTPATIENT)
Dept: RADIOLOGY | Facility: HOSPITAL | Age: 82
Discharge: HOME | End: 2022-03-24
Attending: FAMILY MEDICINE
Payer: MEDICARE

## 2022-03-24 DIAGNOSIS — G89.29 CHRONIC PAIN OF RIGHT KNEE: ICD-10-CM

## 2022-03-24 DIAGNOSIS — M25.561 CHRONIC PAIN OF RIGHT KNEE: ICD-10-CM

## 2022-03-24 DIAGNOSIS — M23.91 INTERNAL DERANGEMENT OF RIGHT KNEE: ICD-10-CM

## 2022-03-24 PROCEDURE — 73560 XR KNEE ORTHO RIGHT: ICD-10-PCS | Mod: 26,LT,, | Performed by: RADIOLOGY

## 2022-03-24 PROCEDURE — 73562 X-RAY EXAM OF KNEE 3: CPT | Mod: 26,RT,, | Performed by: RADIOLOGY

## 2022-03-24 PROCEDURE — 73560 X-RAY EXAM OF KNEE 1 OR 2: CPT | Mod: 26,LT,, | Performed by: RADIOLOGY

## 2022-03-24 PROCEDURE — 73560 X-RAY EXAM OF KNEE 1 OR 2: CPT | Mod: TC,FY,PO,LT

## 2022-03-24 PROCEDURE — 73562 XR KNEE ORTHO RIGHT: ICD-10-PCS | Mod: 26,RT,, | Performed by: RADIOLOGY

## 2022-03-24 PROCEDURE — 73562 X-RAY EXAM OF KNEE 3: CPT | Mod: TC,FY,PO,RT

## 2022-03-29 ENCOUNTER — OFFICE VISIT (OUTPATIENT)
Dept: UROLOGY | Facility: CLINIC | Age: 82
End: 2022-03-29
Payer: MEDICARE

## 2022-03-29 VITALS
BODY MASS INDEX: 29.12 KG/M2 | WEIGHT: 208.75 LBS | DIASTOLIC BLOOD PRESSURE: 74 MMHG | SYSTOLIC BLOOD PRESSURE: 122 MMHG

## 2022-03-29 DIAGNOSIS — R97.20 ELEVATED PSA: ICD-10-CM

## 2022-03-29 DIAGNOSIS — C61 PROSTATE CANCER: Primary | ICD-10-CM

## 2022-03-29 DIAGNOSIS — N52.9 ERECTILE DYSFUNCTION, UNSPECIFIED ERECTILE DYSFUNCTION TYPE: ICD-10-CM

## 2022-03-29 DIAGNOSIS — N39.41 URGE INCONTINENCE: ICD-10-CM

## 2022-03-29 PROCEDURE — 51798 PR MEAS,POST-VOID RES,US,NON-IMAGING: ICD-10-PCS | Mod: S$GLB,,, | Performed by: UROLOGY

## 2022-03-29 PROCEDURE — 99999 PR PBB SHADOW E&M-EST. PATIENT-LVL III: CPT | Mod: PBBFAC,,, | Performed by: UROLOGY

## 2022-03-29 PROCEDURE — 3078F DIAST BP <80 MM HG: CPT | Mod: CPTII,S$GLB,, | Performed by: UROLOGY

## 2022-03-29 PROCEDURE — 99499 RISK ADDL DX/OHS AUDIT: ICD-10-PCS | Mod: HCNC,S$GLB,, | Performed by: UROLOGY

## 2022-03-29 PROCEDURE — 1126F AMNT PAIN NOTED NONE PRSNT: CPT | Mod: CPTII,S$GLB,, | Performed by: UROLOGY

## 2022-03-29 PROCEDURE — 1101F PR PT FALLS ASSESS DOC 0-1 FALLS W/OUT INJ PAST YR: ICD-10-PCS | Mod: CPTII,S$GLB,, | Performed by: UROLOGY

## 2022-03-29 PROCEDURE — 3074F PR MOST RECENT SYSTOLIC BLOOD PRESSURE < 130 MM HG: ICD-10-PCS | Mod: CPTII,S$GLB,, | Performed by: UROLOGY

## 2022-03-29 PROCEDURE — 51798 US URINE CAPACITY MEASURE: CPT | Mod: S$GLB,,, | Performed by: UROLOGY

## 2022-03-29 PROCEDURE — 99204 PR OFFICE/OUTPT VISIT, NEW, LEVL IV, 45-59 MIN: ICD-10-PCS | Mod: S$GLB,,, | Performed by: UROLOGY

## 2022-03-29 PROCEDURE — 99204 OFFICE O/P NEW MOD 45 MIN: CPT | Mod: S$GLB,,, | Performed by: UROLOGY

## 2022-03-29 PROCEDURE — 1159F MED LIST DOCD IN RCRD: CPT | Mod: CPTII,S$GLB,, | Performed by: UROLOGY

## 2022-03-29 PROCEDURE — 99999 PR PBB SHADOW E&M-EST. PATIENT-LVL III: ICD-10-PCS | Mod: PBBFAC,,, | Performed by: UROLOGY

## 2022-03-29 PROCEDURE — 3074F SYST BP LT 130 MM HG: CPT | Mod: CPTII,S$GLB,, | Performed by: UROLOGY

## 2022-03-29 PROCEDURE — 99499 UNLISTED E&M SERVICE: CPT | Mod: HCNC,S$GLB,, | Performed by: UROLOGY

## 2022-03-29 PROCEDURE — 1126F PR PAIN SEVERITY QUANTIFIED, NO PAIN PRESENT: ICD-10-PCS | Mod: CPTII,S$GLB,, | Performed by: UROLOGY

## 2022-03-29 PROCEDURE — 3078F PR MOST RECENT DIASTOLIC BLOOD PRESSURE < 80 MM HG: ICD-10-PCS | Mod: CPTII,S$GLB,, | Performed by: UROLOGY

## 2022-03-29 PROCEDURE — 1159F PR MEDICATION LIST DOCUMENTED IN MEDICAL RECORD: ICD-10-PCS | Mod: CPTII,S$GLB,, | Performed by: UROLOGY

## 2022-03-29 PROCEDURE — 1101F PT FALLS ASSESS-DOCD LE1/YR: CPT | Mod: CPTII,S$GLB,, | Performed by: UROLOGY

## 2022-03-29 PROCEDURE — 3288F PR FALLS RISK ASSESSMENT DOCUMENTED: ICD-10-PCS | Mod: CPTII,S$GLB,, | Performed by: UROLOGY

## 2022-03-29 PROCEDURE — 3288F FALL RISK ASSESSMENT DOCD: CPT | Mod: CPTII,S$GLB,, | Performed by: UROLOGY

## 2022-03-29 RX ORDER — FINASTERIDE 5 MG/1
5 TABLET, FILM COATED ORAL DAILY
Qty: 90 TABLET | Refills: 4 | Status: SHIPPED | OUTPATIENT
Start: 2022-03-29 | End: 2023-04-03

## 2022-03-29 RX ORDER — SILDENAFIL 100 MG/1
TABLET, FILM COATED ORAL
Qty: 30 TABLET | Refills: 3 | Status: SHIPPED | OUTPATIENT
Start: 2022-03-29 | End: 2022-06-16 | Stop reason: ALTCHOICE

## 2022-03-29 NOTE — PROGRESS NOTES
Chief Complaint   Patient presents with    Other     Pt switching urologist. Pt had a prostate biopsy and have the same results from 2014 (small cancer). Elevated psa.        Referring Provider: Dr. Valery Ozuna     History of Present Illness:   Ezequiel Hughes is a 82 y.o. male here for evaluation of Other (Pt switching urologist. Pt had a prostate biopsy and have the same results from 2014 (small cancer). Elevated psa. )    3/29/22- 83yo male with h/o Prostate cancer, here to establish care. He has previously seen Dr. Wong and was undergoing active surveillance. He reports that he was diagnosed with prostate cancer in 2014. He hasn't had any treatment. He is currently managed on finasteride. He does report some UUI, small amounts. He had a repeat TRUS biopsy in 2021, and pt reports path was unchanged. Also had MRIs around that time as well and states that he had a targetable lesion. Recently, visits have gone to every 6 months.         Review of Systems   Respiratory: Negative for shortness of breath.    Cardiovascular: Negative for chest pain and palpitations.   Musculoskeletal: Positive for arthralgias and back pain.   All other systems reviewed and are negative.        Past Medical History:   Diagnosis Date    Allergic rhinitis     Anemia     Back pain     BPH (benign prostatic hyperplasia)     Cancer     skin cancer to neck, Dr. Graves    Cataract     Disorder of kidney and ureter     ED (erectile dysfunction)     Hiatal hernia     small    History of colon polyps     colonoscopy 11/2016    HLD (hyperlipidemia)     Hyperlipidemia     Hypertension     Lateral epicondylitis     Lumbar radiculopathy 1/26/2022    OA (osteoarthritis)     GUIDO (obstructive sleep apnea)     Prostate cancer     Dr. Wong    TIA (transient ischemic attack)     Trouble in sleeping     Urge incontinence 3/29/2022       Past Surgical History:   Procedure Laterality Date    CATARACT EXTRACTION W/   INTRAOCULAR LENS IMPLANT Right 02/21/2018    I-Stent    CATARACT EXTRACTION W/  INTRAOCULAR LENS IMPLANT Left 03/21/2018    I - Stent    COLONOSCOPY N/A 11/14/2016    Procedure: COLONOSCOPY;  Surgeon: Karuna Rodriguez MD;  Location: [facility];  Service: Endoscopy;  Laterality: N/A;    COLONOSCOPY N/A 9/22/2020    Procedure: COLONOSCOPY;  Surgeon: Chuy Fish MD;  Location: [facility] ENDO;  Service: Endoscopy;  Laterality: N/A;    EYE SURGERY      HEMORRHOID SURGERY      I-STENT Right 02/21/2018    DR. REECE    KNEE ARTHROSCOPY W/ MENISCAL REPAIR Right 2015    Dr. Jain    PCIOL Right 02/21/2018    DR. REECE    PLANTAR FASCIA RELEASE      right    ROTATOR CUFF REPAIR Bilateral     bilateral    SELECTIVE INJECTION OF ANESTHETIC AGENT AROUND LUMBAR SPINAL NERVE ROOT BY TRANSFORAMINAL APPROACH Bilateral 1/26/2022    Procedure: Bilateral L4/5 TF IAN with RN IV sedation;  Surgeon: Mak Young MD;  Location: [facility];  Service: Pain Management;  Laterality: Bilateral;    SELECTIVE INJECTION OF ANESTHETIC AGENT AROUND LUMBAR SPINAL NERVE ROOT BY TRANSFORAMINAL APPROACH Bilateral 3/14/2022    Procedure: Bilateral L4/5 TF IAN with RN IV sedation;  Surgeon: Mak Young MD;  Location: [facility];  Service: Pain Management;  Laterality: Bilateral;    SHOULDER SURGERY Bilateral around 2000    Dr. Pepper.  rotator cuff surgeries    VASECTOMY         Family History   Problem Relation Age of Onset    Lung cancer Father         life long smoker    Cancer Father         prostate, lung    Arthritis Mother     Stroke Sister         TIA    Cataracts Sister     Cancer Brother         prostate    Cataracts Brother     Cataracts Sister     Melanoma Neg Hx     Psoriasis Neg Hx     Lupus Neg Hx     Eczema Neg Hx     Diabetes Neg Hx     Heart disease Neg Hx     Kidney disease Neg Hx     Colon cancer Neg Hx        Social History     Tobacco Use    Smoking status: Former Smoker      Packs/day: 3.00     Years: 35.00     Pack years: 105.00     Types: Cigarettes     Start date: 1960     Quit date: 1985     Years since quittin.7    Smokeless tobacco: Never Used   Substance Use Topics    Alcohol use: Yes     Alcohol/week: 3.0 standard drinks     Types: 3 Cans of beer per week     Comment: socially    Drug use: No       Current Outpatient Medications   Medication Sig Dispense Refill    acetaminophen (TYLENOL ARTHRITIS PAIN ORAL) Take by mouth. Patient states that he takes 2 tablets in the morning and 2 tablets in the evening      albuterol (PROVENTIL/VENTOLIN HFA) 90 mcg/actuation inhaler Inhale 2 puffs into the lungs every 6 (six) hours as needed.      allopurinoL (ZYLOPRIM) 100 MG tablet Take 100 mg by mouth.      amLODIPine (NORVASC) 2.5 MG tablet 2 tablets in am and 2 tablets in pm for blood pressure 360 tablet 3    atorvastatin (LIPITOR) 40 MG tablet TAKE 1 TABLET EVERY DAY 90 tablet 1    cholecalciferol, vitamin D3, (VITAMIN D3) 50 mcg (2,000 unit) Cap Take 1 capsule by mouth once daily.      clopidogreL (PLAVIX) 75 mg tablet TAKE 1 TABLET EVERY DAY 90 tablet 3    furosemide (LASIX) 20 MG tablet Take 1 tablet (20 mg total) by mouth daily as needed (leg swelling). 30 tablet 0    losartan (COZAAR) 50 MG tablet TAKE 1 TABLET TWICE DAILY 180 tablet 3    pantoprazole (PROTONIX) 40 MG tablet TAKE 1 TABLET EVERY DAY 90 tablet 3    pregabalin (LYRICA) 150 MG capsule Take 1 capsule (150 mg total) by mouth 2 (two) times daily. Take 1 capsule QHS x 1 week, then increase to BID (if tolerated). 60 capsule 1    acetaminophen (TYLENOL ORAL) 1000  .      albuterol (PROVENTIL/VENTOLIN HFA) 90 mcg/actuation inhaler       famotidine (PEPCID) 20 MG tablet Take 1 tablet (20 mg total) by mouth 2 (two) times daily. (Patient not taking: Reported on 3/29/2022) 60 tablet 11    finasteride (PROSCAR) 5 mg tablet Take 1 tablet (5 mg total) by mouth once daily. 90 tablet 4    FLUARIX QUAD  [id number], PF, 60 mcg (15 mcg x 4)/0.5 mL Syrg       fluticasone propionate (FLONASE) 50 mcg/actuation nasal spray USE 2 SPRAYS IN EACH NOSTRIL ONE TIME DAILY  (SUBSTITUTED FOR FLONASE) (Patient not taking: Reported on 3/29/2022) 48 g 3    sildenafiL (VIAGRA) 100 MG tablet Take 1 po 45 minutes before intercourse 30 tablet 3    tamsulosin (FLOMAX) 0.4 mg Cap Take 1 capsule by mouth once daily.       No current facility-administered medications for this visit.       Review of patient's allergies indicates:   Allergen Reactions    Atarax [hydroxyzine hcl] Other (See Comments)     Raised blood pressure     Zyrtec [cetirizine] Other (See Comments)     Raised blood pressure     Gold sodium thiosulfate      Patch test positive       Physical Exam  Vitals:    03/29/22 1010   BP: 122/74       General: Well-developed, well-nourished in no acute distress  HEENT: Normocephalic, atraumatic, Extraocular movements intact  Neck: supple, trachea midline, no cervical or supraclavicular lymphadenopathy  Respirations: even and unlabored  Back: midline spine, no CVA tenderness  Abdomen: soft, Non-tender, non-distended, no organomegaly or palpable masses, no rebound or guarding  Rectal: >40g prostate, no nodules or tenderness. No gross blood  Extremities: atraumatic, moves all equally, no clubbing, cyanosis or edema  Psych: normal affect  Skin: warm and dry, no lesions  Neuro: Alert and oriented, Cranial nerves II-XII grossly intact    PVR: 102cc 3/29/22    Urinalysis  Negative for blood, LE, nit    Lab Results   Component Value Date    PSA 1.3 11/10/2021    PSA 1.1 01/23/2020    PSA 1.5 09/01/2016       Assessment:   1. Prostate cancer  POCT Bladder Scan    POCT URINE DIPSTICK WITHOUT MICROSCOPE    Prostate Specific Antigen, Diagnostic   2. Urge incontinence     3. Elevated PSA  Ambulatory referral/consult to Urology   4. Erectile dysfunction, unspecified erectile dysfunction type         Plan:  Prostate cancer  -     POCT Bladder  Scan  -     POCT URINE DIPSTICK WITHOUT MICROSCOPE  -     Prostate Specific Antigen, Diagnostic; Future; Expected date: 06/29/2022    Urge incontinence    Elevated PSA  -     Ambulatory referral/consult to Urology    Erectile dysfunction, unspecified erectile dysfunction type    Other orders  -     finasteride (PROSCAR) 5 mg tablet; Take 1 tablet (5 mg total) by mouth once daily.  Dispense: 90 tablet; Refill: 4  -     sildenafiL (VIAGRA) 100 MG tablet; Take 1 po 45 minutes before intercourse  Dispense: 30 tablet; Refill: 3    records from Dr. Wong  Discussed use of OAB meds, risks/benefits. Pt prefers to hold off right now.   Follow up in about 3 months (around 6/29/2022) for labs before visit.

## 2022-04-01 ENCOUNTER — PATIENT MESSAGE (OUTPATIENT)
Dept: PAIN MEDICINE | Facility: CLINIC | Age: 82
End: 2022-04-01
Payer: MEDICARE

## 2022-04-01 ENCOUNTER — PATIENT MESSAGE (OUTPATIENT)
Dept: PRIMARY CARE CLINIC | Facility: CLINIC | Age: 82
End: 2022-04-01
Payer: MEDICARE

## 2022-04-03 ENCOUNTER — PATIENT MESSAGE (OUTPATIENT)
Dept: PRIMARY CARE CLINIC | Facility: CLINIC | Age: 82
End: 2022-04-03
Payer: MEDICARE

## 2022-04-03 NOTE — TELEPHONE ENCOUNTER
Yes referral is in for knee doctor with ortho from 3/23/22, however it doesn't look like he is scheduled for that yet. He has seen Dr. Darrian Cole in the past for injections after back issues. I will forward message to Dr. Cole to see if he can address or get you in sooner.     Thanks. Valery Ozuna MD

## 2022-04-06 ENCOUNTER — CLINICAL SUPPORT (OUTPATIENT)
Dept: PRIMARY CARE CLINIC | Facility: CLINIC | Age: 82
End: 2022-04-06
Payer: MEDICARE

## 2022-04-06 VITALS — DIASTOLIC BLOOD PRESSURE: 82 MMHG | SYSTOLIC BLOOD PRESSURE: 132 MMHG

## 2022-04-06 NOTE — PROGRESS NOTES
Pt came in today for a blood pressure check. He was suppose to bring home blood pressure machine so we could compare. He thinks his machine is off. He did forgot machine. However once we gets back home and settled he will check his blood pressure with his machine to see if it was close to what we got here. He states that his blood pressure seems off with at home machine. It seems like his blood pressure is up and down. Even the other day he got bottom number in low 50s.

## 2022-04-08 ENCOUNTER — TELEPHONE (OUTPATIENT)
Dept: PAIN MEDICINE | Facility: CLINIC | Age: 82
End: 2022-04-08
Payer: MEDICARE

## 2022-04-08 ENCOUNTER — OFFICE VISIT (OUTPATIENT)
Dept: SPORTS MEDICINE | Facility: CLINIC | Age: 82
End: 2022-04-08
Payer: MEDICARE

## 2022-04-08 VITALS — BODY MASS INDEX: 30.36 KG/M2 | RESPIRATION RATE: 20 BRPM | HEIGHT: 70 IN | WEIGHT: 212.06 LBS

## 2022-04-08 DIAGNOSIS — M25.561 CHRONIC PAIN OF RIGHT KNEE: ICD-10-CM

## 2022-04-08 DIAGNOSIS — M47.817 LUMBOSACRAL SPONDYLOSIS WITHOUT MYELOPATHY: ICD-10-CM

## 2022-04-08 DIAGNOSIS — G56.02 CARPAL TUNNEL SYNDROME OF LEFT WRIST: Primary | ICD-10-CM

## 2022-04-08 DIAGNOSIS — G89.29 CHRONIC PAIN OF RIGHT KNEE: ICD-10-CM

## 2022-04-08 PROCEDURE — 99999 PR PBB SHADOW E&M-EST. PATIENT-LVL IV: CPT | Mod: PBBFAC,,, | Performed by: STUDENT IN AN ORGANIZED HEALTH CARE EDUCATION/TRAINING PROGRAM

## 2022-04-08 PROCEDURE — 1125F AMNT PAIN NOTED PAIN PRSNT: CPT | Mod: CPTII,S$GLB,, | Performed by: STUDENT IN AN ORGANIZED HEALTH CARE EDUCATION/TRAINING PROGRAM

## 2022-04-08 PROCEDURE — 99214 PR OFFICE/OUTPT VISIT, EST, LEVL IV, 30-39 MIN: ICD-10-PCS | Mod: S$GLB,,, | Performed by: STUDENT IN AN ORGANIZED HEALTH CARE EDUCATION/TRAINING PROGRAM

## 2022-04-08 PROCEDURE — 99999 PR PBB SHADOW E&M-EST. PATIENT-LVL IV: ICD-10-PCS | Mod: PBBFAC,,, | Performed by: STUDENT IN AN ORGANIZED HEALTH CARE EDUCATION/TRAINING PROGRAM

## 2022-04-08 PROCEDURE — 1125F PR PAIN SEVERITY QUANTIFIED, PAIN PRESENT: ICD-10-PCS | Mod: CPTII,S$GLB,, | Performed by: STUDENT IN AN ORGANIZED HEALTH CARE EDUCATION/TRAINING PROGRAM

## 2022-04-08 PROCEDURE — 1101F PT FALLS ASSESS-DOCD LE1/YR: CPT | Mod: CPTII,S$GLB,, | Performed by: STUDENT IN AN ORGANIZED HEALTH CARE EDUCATION/TRAINING PROGRAM

## 2022-04-08 PROCEDURE — 1101F PR PT FALLS ASSESS DOC 0-1 FALLS W/OUT INJ PAST YR: ICD-10-PCS | Mod: CPTII,S$GLB,, | Performed by: STUDENT IN AN ORGANIZED HEALTH CARE EDUCATION/TRAINING PROGRAM

## 2022-04-08 PROCEDURE — 3288F PR FALLS RISK ASSESSMENT DOCUMENTED: ICD-10-PCS | Mod: CPTII,S$GLB,, | Performed by: STUDENT IN AN ORGANIZED HEALTH CARE EDUCATION/TRAINING PROGRAM

## 2022-04-08 PROCEDURE — 3288F FALL RISK ASSESSMENT DOCD: CPT | Mod: CPTII,S$GLB,, | Performed by: STUDENT IN AN ORGANIZED HEALTH CARE EDUCATION/TRAINING PROGRAM

## 2022-04-08 PROCEDURE — 1159F PR MEDICATION LIST DOCUMENTED IN MEDICAL RECORD: ICD-10-PCS | Mod: CPTII,S$GLB,, | Performed by: STUDENT IN AN ORGANIZED HEALTH CARE EDUCATION/TRAINING PROGRAM

## 2022-04-08 PROCEDURE — 99214 OFFICE O/P EST MOD 30 MIN: CPT | Mod: S$GLB,,, | Performed by: STUDENT IN AN ORGANIZED HEALTH CARE EDUCATION/TRAINING PROGRAM

## 2022-04-08 PROCEDURE — 1159F MED LIST DOCD IN RCRD: CPT | Mod: CPTII,S$GLB,, | Performed by: STUDENT IN AN ORGANIZED HEALTH CARE EDUCATION/TRAINING PROGRAM

## 2022-04-08 NOTE — TELEPHONE ENCOUNTER
----- Message from Paul Son MA sent at 4/8/2022  1:52 PM CDT -----  Regarding: Appt needed    Chantelle,    Per MD Cole request please reach out to patient to get scheduled with  for a second opinion.     Patient previously seen Dr. Young in the past in [facility] and is established .     [facility]

## 2022-04-08 NOTE — PROGRESS NOTES
Patient ID: Ezequiel Hughes  YOB: 1940  MRN: [id number]    Chief Complaint: Back Pain (LBP )      History of Present Illness: Ezequiel Hughes is a right-hand dominant 82 y.o. male who presents today with 8/10 pain c/o LBP .     The patient is active in none.  Occupation: Retired     82-year-old male presenting today for concerns of low back pain.  He was seen by myself a few weeks ago for left carpal tunnel syndrome which she had an injection is doing quite well without any pain or numbness and tingling.  He is presenting today for some right lower back pain which she has been followed by the back and the spine clinic for in the past.  He has L4-L5 MRI in the files on review of severe central canal stenosis and is having right-sided radicular symptoms extending down his leg.  He does note some on and off right knee pain that can occur from time to time but otherwise is not as much concerned as he is his low back pain.  He has had 2 epidural steroid injections the past that he states were not very helpful and has been frustrated with his provider.  He is requesting this see someone else to discuss his back pain at this time.  No red flags noted.    Past Medical History:   Past Medical History:   Diagnosis Date    Allergic rhinitis     Anemia     Back pain     BPH (benign prostatic hyperplasia)     Cancer     skin cancer to neck, Dr. Graves    Cataract     Disorder of kidney and ureter     ED (erectile dysfunction)     Hiatal hernia     small    History of colon polyps     colonoscopy 11/2016    HLD (hyperlipidemia)     Hyperlipidemia     Hypertension     Lateral epicondylitis     Lumbar radiculopathy 1/26/2022    OA (osteoarthritis)     GUIDO (obstructive sleep apnea)     Prostate cancer     Dr. Wong    TIA (transient ischemic attack)     Trouble in sleeping     Urge incontinence 3/29/2022     Past Surgical History:   Procedure Laterality Date    CATARACT EXTRACTION W/   INTRAOCULAR LENS IMPLANT Right 02/21/2018    I-Stent    CATARACT EXTRACTION W/  INTRAOCULAR LENS IMPLANT Left 03/21/2018    I - Stent    COLONOSCOPY N/A 11/14/2016    Procedure: COLONOSCOPY;  Surgeon: Karuna Rodriguez MD;  Location: [facility];  Service: Endoscopy;  Laterality: N/A;    COLONOSCOPY N/A 9/22/2020    Procedure: COLONOSCOPY;  Surgeon: Chuy Fish MD;  Location: [facility] ENDO;  Service: Endoscopy;  Laterality: N/A;    EYE SURGERY      HEMORRHOID SURGERY      I-STENT Right 02/21/2018    DR. REECE    KNEE ARTHROSCOPY W/ MENISCAL REPAIR Right 2015    Dr. Jain    PCIOL Right 02/21/2018    DR. REECE    PLANTAR FASCIA RELEASE      right    ROTATOR CUFF REPAIR Bilateral     bilateral    SELECTIVE INJECTION OF ANESTHETIC AGENT AROUND LUMBAR SPINAL NERVE ROOT BY TRANSFORAMINAL APPROACH Bilateral 1/26/2022    Procedure: Bilateral L4/5 TF IAN with RN IV sedation;  Surgeon: Mak Young MD;  Location: [facility];  Service: Pain Management;  Laterality: Bilateral;    SELECTIVE INJECTION OF ANESTHETIC AGENT AROUND LUMBAR SPINAL NERVE ROOT BY TRANSFORAMINAL APPROACH Bilateral 3/14/2022    Procedure: Bilateral L4/5 TF INA with RN IV sedation;  Surgeon: Mak Young MD;  Location: [facility];  Service: Pain Management;  Laterality: Bilateral;    SHOULDER SURGERY Bilateral around 2000    Dr. Pepper.  rotator cuff surgeries    VASECTOMY       Family History   Problem Relation Age of Onset    Lung cancer Father         life long smoker    Cancer Father         prostate, lung    Arthritis Mother     Stroke Sister         TIA    Cataracts Sister     Cancer Brother         prostate    Cataracts Brother     Cataracts Sister     Melanoma Neg Hx     Psoriasis Neg Hx     Lupus Neg Hx     Eczema Neg Hx     Diabetes Neg Hx     Heart disease Neg Hx     Kidney disease Neg Hx     Colon cancer Neg Hx      Social History     Socioeconomic History    Marital status:    Tobacco  Use    Smoking status: Former Smoker     Packs/day: 3.00     Years: 35.00     Pack years: 105.00     Types: Cigarettes     Start date: 1960     Quit date: 1985     Years since quittin.7    Smokeless tobacco: Never Used   Substance and Sexual Activity    Alcohol use: Yes     Alcohol/week: 3.0 standard drinks     Types: 3 Cans of beer per week     Comment: socially    Drug use: No    Sexual activity: Yes     Partners: Female     Birth control/protection: None   Social History Narrative         Social Determinants of Health     Financial Resource Strain: Low Risk     Difficulty of Paying Living Expenses: Not hard at all   Food Insecurity: No Food Insecurity    Worried About Running Out of Food in the Last Year: Never true    Ran Out of Food in the Last Year: Never true   Transportation Needs: No Transportation Needs    Lack of Transportation (Medical): No    Lack of Transportation (Non-Medical): No   Physical Activity: Sufficiently Active    Days of Exercise per Week: 5 days    Minutes of Exercise per Session: 30 min   Stress: No Stress Concern Present    Feeling of Stress : Not at all   Social Connections: Unknown    Frequency of Communication with Friends and Family: More than three times a week    Frequency of Social Gatherings with Friends and Family: More than three times a week    Active Member of Clubs or Organizations: Yes    Attends Club or Organization Meetings: More than 4 times per year    Marital Status:    Housing Stability: Low Risk     Unable to Pay for Housing in the Last Year: No    Number of Places Lived in the Last Year: 1    Unstable Housing in the Last Year: No     Medication List with Changes/Refills   Current Medications    ACETAMINOPHEN (TYLENOL ARTHRITIS PAIN ORAL)    Take by mouth. Patient states that he takes 2 tablets in the morning and 2 tablets in the evening    ACETAMINOPHEN (TYLENOL ORAL)    1000  .    ALBUTEROL (PROVENTIL/VENTOLIN HFA)  90 MCG/ACTUATION INHALER    Inhale 2 puffs into the lungs every 6 (six) hours as needed.    ALBUTEROL (PROVENTIL/VENTOLIN HFA) 90 MCG/ACTUATION INHALER        ALLOPURINOL (ZYLOPRIM) 100 MG TABLET    Take 100 mg by mouth.    AMLODIPINE (NORVASC) 2.5 MG TABLET    2 tablets in am and 2 tablets in pm for blood pressure    ATORVASTATIN (LIPITOR) 40 MG TABLET    TAKE 1 TABLET EVERY DAY    CHOLECALCIFEROL, VITAMIN D3, (VITAMIN D3) 50 MCG (2,000 UNIT) CAP    Take 1 capsule by mouth once daily.    CLOPIDOGREL (PLAVIX) 75 MG TABLET    TAKE 1 TABLET EVERY DAY    FAMOTIDINE (PEPCID) 20 MG TABLET    Take 1 tablet (20 mg total) by mouth 2 (two) times daily.    FINASTERIDE (PROSCAR) 5 MG TABLET    Take 1 tablet (5 mg total) by mouth once daily.    FLUARIX QUAD [id number], PF, 60 MCG (15 MCG X 4)/0.5 ML SYRG        FLUTICASONE PROPIONATE (FLONASE) 50 MCG/ACTUATION NASAL SPRAY    USE 2 SPRAYS IN EACH NOSTRIL ONE TIME DAILY  (SUBSTITUTED FOR FLONASE)    FUROSEMIDE (LASIX) 20 MG TABLET    Take 1 tablet (20 mg total) by mouth daily as needed (leg swelling).    LOSARTAN (COZAAR) 50 MG TABLET    TAKE 1 TABLET TWICE DAILY    PANTOPRAZOLE (PROTONIX) 40 MG TABLET    TAKE 1 TABLET EVERY DAY    PREGABALIN (LYRICA) 150 MG CAPSULE    Take 1 capsule (150 mg total) by mouth 2 (two) times daily. Take 1 capsule QHS x 1 week, then increase to BID (if tolerated).    SILDENAFIL (VIAGRA) 100 MG TABLET    Take 1 po 45 minutes before intercourse    TAMSULOSIN (FLOMAX) 0.4 MG CAP    Take 1 capsule by mouth once daily.     Review of patient's allergies indicates:   Allergen Reactions    Atarax [hydroxyzine hcl] Other (See Comments)     Raised blood pressure     Zyrtec [cetirizine] Other (See Comments)     Raised blood pressure     Gold sodium thiosulfate      Patch test positive       Physical Exam:   Body mass index is 30.43 kg/m².    GENERAL: Well appearing, in no acute distress.  HEAD: Normocephalic and atraumatic.  ENT: External ears and nose grossly  normal.  EYES: EOMI bilaterally  PULMONARY: Respirations are grossly even and non-labored.  NEURO: Awake, alert, and oriented x 3.  SKIN: No obvious rashes appreciated.  PSYCH: Mood & affect are appropriate.    Detailed MSK exam:     Low back exam  No tenderness over the midline spine mild tenderness over the right SI and right paraspinals.  Negative straight leg raise good strength with hip flexion knee extension flexion ankle dorsiflexion plantar flexion sensation grossly intact throughout the lower extremity    Right knee exam  Decreased patellar mobility no effusion appreciated range of motion intact in section and flexion ligamentously intact no tenderness over the medial or lateral joint line negative Lashonda's.    Imaging:    Narrative & Impression  EXAMINATION:  XR KNEE ORTHO RIGHT     CLINICAL HISTORY:  right knee pain, hx of meniscus repair, chronic not improved; Pain in right knee     TECHNIQUE:  AP standing of both knees, Merchant views of both knees as well as a lateral view of the right knee were performed.     COMPARISON:  01/11/2017     FINDINGS:  There is no radiographic evidence of acute osseous, articular, or soft tissue abnormality.  There are small tricompartmental marginal osteophytes are present on the right with otherwise fairly well maintained joint spaces.  Similar degenerative findings are noted involving the left knee.     Impression:     Mild degenerative findings as above        Electronically signed by: Reddy Kim MD  Date:                                            03/24/2022  Time:                                           09:10    Relevant imaging results were reviewed and interpreted by me and per my read as above.  This was discussed with the patient and / or family today.     Assessment:  Ezequiel Hughes is a 82 y.o. male presents today with right lower back pain consistent with lumbar radiculopathy and previous MRI per pain reports and notes showing significant L4-L5 central  canal stenosis.  Discussed a 2nd opinion from 1 of our other brain providers and will refer to Dr. Haywood for further evaluation potential interventions or potential referral to Neurosurgery.  The patient is not interested in seeing a neurosurgeon at this time would prefer 2nd opinion.  We discussed his knee at this time and overall his knee is doing much better he did have MRI scheduled for next week we discussed discontinuing that as he is feeling much better and is asymptomatic on exam today.  He can follow-up with me as needed in the future.    Carpal tunnel syndrome of left wrist    Chronic pain of right knee  -     Ambulatory referral/consult to Orthopedics    Lumbosacral spondylosis without myelopathy           Darrian Cole MD    Disclaimer: This note was prepared using a voice recognition system and is likely to have sound alike errors within the text.

## 2022-04-12 ENCOUNTER — TELEPHONE (OUTPATIENT)
Dept: PAIN MEDICINE | Facility: CLINIC | Age: 82
End: 2022-04-12
Payer: MEDICARE

## 2022-04-12 NOTE — TELEPHONE ENCOUNTER
MD Hector Terrell Staff 5 minutes ago (12:19 PM)     SD    Can we please schedule a virtual visit for this patient to discuss his symptoms.     TY    Message text

## 2022-04-12 NOTE — TELEPHONE ENCOUNTER
Dr. Young.  Your next opening (even 7 day work in, 2 day work in...anything) is in 3 weeks.  May 3rd.  Do you want me to see if he will see Kathia?  Thanks  Binta

## 2022-04-12 NOTE — TELEPHONE ENCOUNTER
Reached out to patient to schedule appointment from the messages. Apt has been made.  Pt understand. All questions answered.     Taqueria Hicks  Medical Assistant

## 2022-04-12 NOTE — TELEPHONE ENCOUNTER
----- Message from Abel Haywood MD sent at 4/12/2022  2:30 PM CDT -----  Regarding: RE: Appt needed  Understood, I will take care of him.     Staff, please schedule this gentleman with me for my next available appt.     Abel Haywood MD  Interventional Pain Medicine  [facility] [place]      ----- Message -----  From: Darrian Cole MD  Sent: 4/12/2022   9:46 AM CDT  To: Abel Haywood MD  Subject: FW: Appt needed                                  Patient specifically requested another physician in the interventional department and will not follow up with Dr. Young.  Please let us know how you would like to move forward.     Darrian Cole   ----- Message -----  From: Paul Son MA  Sent: 4/12/2022   7:45 AM CDT  To: Darrian Cole MD  Subject: FW: Appt needed                                  For review - Please advise     ----- Message -----  From: Soledad Irvin MA  Sent: 4/11/2022  10:09 AM CDT  To: Paul Son MA  Subject: FW: Appt needed                                  Per Dr. Haywood reviewed patients chart and suggest patient should remain under  care at this time.       ----- Message -----  From: Abel Haywood MD  Sent: 4/11/2022   8:57 AM CDT  To: Chastity Roman Staff  Subject: RE: Appt needed                                  Patient is already known to Dr. Young and treated with lumbar epidurals previously that were affective.  I would maintain appointment with Dr. Young on 05/05/2022  ----- Message -----  From: Paul Son MA  Sent: 4/8/2022   1:58 PM CDT  To: Abel Haywood MD, Darrian Cole MD, #  Subject: Appt needed                                        [facility],    Per MD Cole request please reach out to patient to get scheduled with  for a second opinion.     Patient previously seen Dr. Young in the past in Back and Spine and is established .     [facility]

## 2022-04-18 ENCOUNTER — OFFICE VISIT (OUTPATIENT)
Dept: PRIMARY CARE CLINIC | Facility: CLINIC | Age: 82
End: 2022-04-18
Payer: MEDICARE

## 2022-04-18 ENCOUNTER — LAB VISIT (OUTPATIENT)
Dept: LAB | Facility: HOSPITAL | Age: 82
End: 2022-04-18
Attending: PHYSICIAN ASSISTANT
Payer: MEDICARE

## 2022-04-18 VITALS
TEMPERATURE: 97 F | BODY MASS INDEX: 28.28 KG/M2 | WEIGHT: 197.06 LBS | HEART RATE: 71 BPM | OXYGEN SATURATION: 98 % | SYSTOLIC BLOOD PRESSURE: 130 MMHG | DIASTOLIC BLOOD PRESSURE: 78 MMHG

## 2022-04-18 DIAGNOSIS — R42 DIZZINESS: ICD-10-CM

## 2022-04-18 DIAGNOSIS — R42 DIZZINESS: Primary | ICD-10-CM

## 2022-04-18 LAB
ALBUMIN SERPL BCP-MCNC: 4.1 G/DL (ref 3.5–5.2)
ALP SERPL-CCNC: 68 U/L (ref 55–135)
ALT SERPL W/O P-5'-P-CCNC: 11 U/L (ref 10–44)
ANION GAP SERPL CALC-SCNC: 10 MMOL/L (ref 8–16)
AST SERPL-CCNC: 14 U/L (ref 10–40)
BASOPHILS # BLD AUTO: 0.06 K/UL (ref 0–0.2)
BASOPHILS NFR BLD: 1.2 % (ref 0–1.9)
BILIRUB SERPL-MCNC: 0.7 MG/DL (ref 0.1–1)
BUN SERPL-MCNC: 23 MG/DL (ref 8–23)
CALCIUM SERPL-MCNC: 9.4 MG/DL (ref 8.7–10.5)
CHLORIDE SERPL-SCNC: 105 MMOL/L (ref 95–110)
CO2 SERPL-SCNC: 23 MMOL/L (ref 23–29)
CREAT SERPL-MCNC: 1.4 MG/DL (ref 0.5–1.4)
CTP QC/QA: YES
DIFFERENTIAL METHOD: ABNORMAL
EOSINOPHIL # BLD AUTO: 0.1 K/UL (ref 0–0.5)
EOSINOPHIL NFR BLD: 1.4 % (ref 0–8)
ERYTHROCYTE [DISTWIDTH] IN BLOOD BY AUTOMATED COUNT: 11.9 % (ref 11.5–14.5)
EST. GFR  (AFRICAN AMERICAN): 53 ML/MIN/1.73 M^2
EST. GFR  (NON AFRICAN AMERICAN): 45.8 ML/MIN/1.73 M^2
GLUCOSE SERPL-MCNC: 79 MG/DL (ref 70–110)
HCT VFR BLD AUTO: 44.9 % (ref 40–54)
HGB BLD-MCNC: 14.2 G/DL (ref 14–18)
IMM GRANULOCYTES # BLD AUTO: 0.03 K/UL (ref 0–0.04)
IMM GRANULOCYTES NFR BLD AUTO: 0.6 % (ref 0–0.5)
LYMPHOCYTES # BLD AUTO: 0.6 K/UL (ref 1–4.8)
LYMPHOCYTES NFR BLD: 11.7 % (ref 18–48)
MCH RBC QN AUTO: 30.5 PG (ref 27–31)
MCHC RBC AUTO-ENTMCNC: 31.6 G/DL (ref 32–36)
MCV RBC AUTO: 97 FL (ref 82–98)
MONOCYTES # BLD AUTO: 0.6 K/UL (ref 0.3–1)
MONOCYTES NFR BLD: 12.3 % (ref 4–15)
NEUTROPHILS # BLD AUTO: 3.6 K/UL (ref 1.8–7.7)
NEUTROPHILS NFR BLD: 72.8 % (ref 38–73)
NRBC BLD-RTO: 0 /100 WBC
PLATELET # BLD AUTO: 138 K/UL (ref 150–450)
PMV BLD AUTO: 8.2 FL (ref 9.2–12.9)
POC MOLECULAR INFLUENZA A AGN: NEGATIVE
POC MOLECULAR INFLUENZA B AGN: NEGATIVE
POTASSIUM SERPL-SCNC: 4.9 MMOL/L (ref 3.5–5.1)
PROT SERPL-MCNC: 7.1 G/DL (ref 6–8.4)
RBC # BLD AUTO: 4.65 M/UL (ref 4.6–6.2)
SODIUM SERPL-SCNC: 138 MMOL/L (ref 136–145)
WBC # BLD AUTO: 4.95 K/UL (ref 3.9–12.7)

## 2022-04-18 PROCEDURE — 80053 COMPREHEN METABOLIC PANEL: CPT | Performed by: PHYSICIAN ASSISTANT

## 2022-04-18 PROCEDURE — 1159F MED LIST DOCD IN RCRD: CPT | Mod: CPTII,S$GLB,, | Performed by: PHYSICIAN ASSISTANT

## 2022-04-18 PROCEDURE — U0005 INFEC AGEN DETEC AMPLI PROBE: HCPCS | Performed by: PHYSICIAN ASSISTANT

## 2022-04-18 PROCEDURE — U0003 INFECTIOUS AGENT DETECTION BY NUCLEIC ACID (DNA OR RNA); SEVERE ACUTE RESPIRATORY SYNDROME CORONAVIRUS 2 (SARS-COV-2) (CORONAVIRUS DISEASE [COVID-19]), AMPLIFIED PROBE TECHNIQUE, MAKING USE OF HIGH THROUGHPUT TECHNOLOGIES AS DESCRIBED BY CMS-2020-01-R: HCPCS | Performed by: PHYSICIAN ASSISTANT

## 2022-04-18 PROCEDURE — 3075F SYST BP GE 130 - 139MM HG: CPT | Mod: CPTII,S$GLB,, | Performed by: PHYSICIAN ASSISTANT

## 2022-04-18 PROCEDURE — 3078F DIAST BP <80 MM HG: CPT | Mod: CPTII,S$GLB,, | Performed by: PHYSICIAN ASSISTANT

## 2022-04-18 PROCEDURE — 99214 OFFICE O/P EST MOD 30 MIN: CPT | Mod: S$GLB,,, | Performed by: PHYSICIAN ASSISTANT

## 2022-04-18 PROCEDURE — 87502 POCT INFLUENZA A/B MOLECULAR: ICD-10-PCS | Mod: QW,S$GLB,, | Performed by: PHYSICIAN ASSISTANT

## 2022-04-18 PROCEDURE — 99999 PR PBB SHADOW E&M-EST. PATIENT-LVL V: CPT | Mod: PBBFAC,,, | Performed by: PHYSICIAN ASSISTANT

## 2022-04-18 PROCEDURE — 99999 PR PBB SHADOW E&M-EST. PATIENT-LVL V: ICD-10-PCS | Mod: PBBFAC,,, | Performed by: PHYSICIAN ASSISTANT

## 2022-04-18 PROCEDURE — 87502 INFLUENZA DNA AMP PROBE: CPT | Mod: QW,S$GLB,, | Performed by: PHYSICIAN ASSISTANT

## 2022-04-18 PROCEDURE — 1101F PT FALLS ASSESS-DOCD LE1/YR: CPT | Mod: CPTII,S$GLB,, | Performed by: PHYSICIAN ASSISTANT

## 2022-04-18 PROCEDURE — 99214 PR OFFICE/OUTPT VISIT, EST, LEVL IV, 30-39 MIN: ICD-10-PCS | Mod: S$GLB,,, | Performed by: PHYSICIAN ASSISTANT

## 2022-04-18 PROCEDURE — 1159F PR MEDICATION LIST DOCUMENTED IN MEDICAL RECORD: ICD-10-PCS | Mod: CPTII,S$GLB,, | Performed by: PHYSICIAN ASSISTANT

## 2022-04-18 PROCEDURE — 36415 COLL VENOUS BLD VENIPUNCTURE: CPT | Mod: PN | Performed by: PHYSICIAN ASSISTANT

## 2022-04-18 PROCEDURE — 3075F PR MOST RECENT SYSTOLIC BLOOD PRESS GE 130-139MM HG: ICD-10-PCS | Mod: CPTII,S$GLB,, | Performed by: PHYSICIAN ASSISTANT

## 2022-04-18 PROCEDURE — 3288F FALL RISK ASSESSMENT DOCD: CPT | Mod: CPTII,S$GLB,, | Performed by: PHYSICIAN ASSISTANT

## 2022-04-18 PROCEDURE — 1101F PR PT FALLS ASSESS DOC 0-1 FALLS W/OUT INJ PAST YR: ICD-10-PCS | Mod: CPTII,S$GLB,, | Performed by: PHYSICIAN ASSISTANT

## 2022-04-18 PROCEDURE — 1160F PR REVIEW ALL MEDS BY PRESCRIBER/CLIN PHARMACIST DOCUMENTED: ICD-10-PCS | Mod: CPTII,S$GLB,, | Performed by: PHYSICIAN ASSISTANT

## 2022-04-18 PROCEDURE — 85025 COMPLETE CBC W/AUTO DIFF WBC: CPT | Performed by: PHYSICIAN ASSISTANT

## 2022-04-18 PROCEDURE — 3078F PR MOST RECENT DIASTOLIC BLOOD PRESSURE < 80 MM HG: ICD-10-PCS | Mod: CPTII,S$GLB,, | Performed by: PHYSICIAN ASSISTANT

## 2022-04-18 PROCEDURE — 1160F RVW MEDS BY RX/DR IN RCRD: CPT | Mod: CPTII,S$GLB,, | Performed by: PHYSICIAN ASSISTANT

## 2022-04-18 PROCEDURE — 3288F PR FALLS RISK ASSESSMENT DOCUMENTED: ICD-10-PCS | Mod: CPTII,S$GLB,, | Performed by: PHYSICIAN ASSISTANT

## 2022-04-18 RX ORDER — FLUTICASONE PROPIONATE 50 MCG
2 SPRAY, SUSPENSION (ML) NASAL DAILY
Qty: 16 G | Refills: 0 | Status: SHIPPED | OUTPATIENT
Start: 2022-04-18 | End: 2022-05-18

## 2022-04-18 RX ORDER — MECLIZINE HYDROCHLORIDE 25 MG/1
25 TABLET ORAL 3 TIMES DAILY PRN
Qty: 20 TABLET | Refills: 0 | Status: SHIPPED | OUTPATIENT
Start: 2022-04-18 | End: 2022-04-26 | Stop reason: SDUPTHER

## 2022-04-18 NOTE — PROGRESS NOTES
Subjective:      Patient ID: Ezequiel Hughes is a 82 y.o. male.    Chief Complaint: Fatigue, Dizziness, and Neck Pain    Ezequiel Hughes is a 82 y.o. male who presents to clinic for dizziness, fatigue, neck pain     Do not feel like head/room is spinning.  Feel weak and tired   Does have some sinus congestion - but mild   Hasn't had energy to do anything   Went to urgent care - did labs - all normal - was given meclizine - which did help some   Went to  general ED in [place] - CT scan there was normal   Taking it easy, don't know how to take it any easier   Was started on lyrica by pain mgt - but did not like - stopped taking 2 weeks ago, not sure if related to current symptoms   Saw cards/had full cardiac w/u 6 mths ago   Neck pain is chronic - no change with current symptoms - has follow-up with Dr. Haywood       Ordering physician: Aparna Thompson MD Study date: 7/21/21  Reason for Exam  Priority: Routine  Dx: Abnormal stress test (R94.39 (ICD-10-CM)); Shortness of breath (R06.02 (ICD-10-CM))  Conclusion    Normal myocardial perfusion scan. There is no evidence of myocardial ischemia or infarction.    The gated perfusion images showed an ejection fraction of 70% at rest. The gated perfusion images showed an ejection fraction of 77% post stress.    There is normal wall motion at rest and post stress.    The EKG portion of this study is negative for ischemia.    Dx: Essential hypertension (I10 (ICD-10-CM)); Mixed hyperlipidemia (E78.2 (ICD-10-CM))        Result Image Hyperlink     Show images for Echo Color Flow Doppler? Yes      Summary    · The left ventricle is normal in size with normal systolic function.  · The estimated ejection fraction is 60%.  · Normal left ventricular diastolic function.  · Normal right ventricular size.  · Normal central venous pressure (3 mmHg).  · The estimated PA systolic pressure is 29 mmHg.  · Mild-to-moderate aortic regurgitation.  · Mild tricuspid regurgitation.               Neck  Pain   Associated symptoms include weakness (generalized ). Pertinent negatives include no fever.   Dizziness:    Associated symptoms: weakness (generalized ).no fever, no nausea, no vomiting and no diaphoresis.    Review of Systems   Constitutional: Positive for fatigue. Negative for chills, diaphoresis and fever.   HENT: Positive for congestion and sinus pressure (slight ).    Respiratory: Negative for cough, shortness of breath and wheezing.    Gastrointestinal: Negative for abdominal pain, diarrhea, nausea and vomiting.   Neurological: Positive for dizziness and weakness (generalized ).       Objective:   /78   Pulse 71   Temp 97.2 °F (36.2 °C)   Wt 89.4 kg (197 lb 1.5 oz)   SpO2 98%   BMI 28.28 kg/m²   Physical Exam  Constitutional:       General: He is not in acute distress.     Appearance: He is well-developed. He is not diaphoretic.   HENT:      Head: Normocephalic.      Right Ear: Tympanic membrane, ear canal and external ear normal.      Left Ear: Tympanic membrane, ear canal and external ear normal.      Nose: Nose normal. No mucosal edema or rhinorrhea.      Right Sinus: No maxillary sinus tenderness or frontal sinus tenderness.      Left Sinus: No maxillary sinus tenderness or frontal sinus tenderness.      Mouth/Throat:      Pharynx: Uvula midline. No oropharyngeal exudate or posterior oropharyngeal erythema.   Eyes:      Conjunctiva/sclera: Conjunctivae normal.   Cardiovascular:      Rate and Rhythm: Normal rate and regular rhythm.      Heart sounds: Normal heart sounds.   Pulmonary:      Effort: Pulmonary effort is normal. No tachypnea, bradypnea, accessory muscle usage or respiratory distress.      Breath sounds: Normal breath sounds. No decreased breath sounds, wheezing, rhonchi or rales.   Musculoskeletal:      Cervical back: Normal range of motion and neck supple.   Lymphadenopathy:      Head:      Right side of head: No submental, submandibular or tonsillar adenopathy.      Left side  of head: No submental, submandibular or tonsillar adenopathy.      Cervical: No cervical adenopathy.   Skin:     General: Skin is warm and dry.   Neurological:      Mental Status: He is alert and oriented to person, place, and time.       Assessment:      1. Dizziness       Plan:   Dizziness  -     CBC Auto Differential; Future; Expected date: 04/18/2022  -     Comprehensive Metabolic Panel; Future; Expected date: 04/18/2022  -     Cancel: POCT COVID-19 Rapid Screening; Future; Expected date: 04/18/2022  -     POCT Influenza A/B Molecular  -     Ambulatory referral/consult to ENT; Future; Expected date: 04/25/2022  -     fluticasone propionate (FLONASE) 50 mcg/actuation nasal spray; 2 sprays (100 mcg total) by Each Nostril route once daily.  Dispense: 16 g; Refill: 0  -     meclizine (ANTIVERT) 25 mg tablet; Take 1 tablet (25 mg total) by mouth 3 (three) times daily as needed for Dizziness.  Dispense: 20 tablet; Refill: 0  -     COVID-19 Routine Screening; Future; Expected date: 04/18/2022    Other orders  -     SARS-COV-2- Cycle Number    etiology unclear  Covid/flu tests negative   Less likely cardiac as had full cardiac workup 6 mths ago  Check labs   Referral to ENT for possible vestibular cause - start nasal steroid and cont. Meclizine in meantime   Needs follow-up later this week or next if not improv       Valery Phillips PA-C   Physician Assistant   [facility]

## 2022-04-19 LAB
SARS-COV-2 RNA RESP QL NAA+PROBE: NOT DETECTED
SARS-COV-2- CYCLE NUMBER: NORMAL

## 2022-04-20 ENCOUNTER — PATIENT MESSAGE (OUTPATIENT)
Dept: PRIMARY CARE CLINIC | Facility: CLINIC | Age: 82
End: 2022-04-20
Payer: MEDICARE

## 2022-04-21 ENCOUNTER — TELEPHONE (OUTPATIENT)
Dept: PRIMARY CARE CLINIC | Facility: CLINIC | Age: 82
End: 2022-04-21
Payer: MEDICARE

## 2022-04-21 ENCOUNTER — HOSPITAL ENCOUNTER (EMERGENCY)
Facility: HOSPITAL | Age: 82
Discharge: HOME | End: 2022-04-21
Attending: EMERGENCY MEDICINE
Payer: MEDICARE

## 2022-04-21 VITALS
BODY MASS INDEX: 28.12 KG/M2 | WEIGHT: 196.44 LBS | TEMPERATURE: 98 F | SYSTOLIC BLOOD PRESSURE: 135 MMHG | HEIGHT: 70 IN | HEART RATE: 60 BPM | OXYGEN SATURATION: 100 % | RESPIRATION RATE: 18 BRPM | DIASTOLIC BLOOD PRESSURE: 60 MMHG

## 2022-04-21 DIAGNOSIS — M47.26 OSTEOARTHRITIS OF SPINE WITH RADICULOPATHY, LUMBAR REGION: ICD-10-CM

## 2022-04-21 DIAGNOSIS — R07.9 CHEST PAIN: Primary | ICD-10-CM

## 2022-04-21 DIAGNOSIS — G89.29 CHRONIC BILATERAL LOW BACK PAIN WITH SCIATICA, SCIATICA LATERALITY UNSPECIFIED: Primary | ICD-10-CM

## 2022-04-21 DIAGNOSIS — M54.40 CHRONIC BILATERAL LOW BACK PAIN WITH SCIATICA, SCIATICA LATERALITY UNSPECIFIED: Primary | ICD-10-CM

## 2022-04-21 DIAGNOSIS — G89.29 CHRONIC BILATERAL LOW BACK PAIN WITH BILATERAL SCIATICA: ICD-10-CM

## 2022-04-21 DIAGNOSIS — M54.42 CHRONIC BILATERAL LOW BACK PAIN WITH BILATERAL SCIATICA: ICD-10-CM

## 2022-04-21 DIAGNOSIS — M54.41 CHRONIC BILATERAL LOW BACK PAIN WITH BILATERAL SCIATICA: ICD-10-CM

## 2022-04-21 LAB
ALBUMIN SERPL BCP-MCNC: 4.1 G/DL (ref 3.5–5.2)
ALP SERPL-CCNC: 68 U/L (ref 55–135)
ALT SERPL W/O P-5'-P-CCNC: 10 U/L (ref 10–44)
ANION GAP SERPL CALC-SCNC: 10 MMOL/L (ref 8–16)
AST SERPL-CCNC: 13 U/L (ref 10–40)
BASOPHILS # BLD AUTO: 0.03 K/UL (ref 0–0.2)
BASOPHILS NFR BLD: 0.5 % (ref 0–1.9)
BILIRUB SERPL-MCNC: 0.6 MG/DL (ref 0.1–1)
BUN SERPL-MCNC: 25 MG/DL (ref 8–23)
CALCIUM SERPL-MCNC: 9.1 MG/DL (ref 8.7–10.5)
CHLORIDE SERPL-SCNC: 104 MMOL/L (ref 95–110)
CK SERPL-CCNC: 52 U/L (ref 20–200)
CO2 SERPL-SCNC: 24 MMOL/L (ref 23–29)
CREAT SERPL-MCNC: 1.3 MG/DL (ref 0.5–1.4)
DIFFERENTIAL METHOD: ABNORMAL
EOSINOPHIL # BLD AUTO: 0.1 K/UL (ref 0–0.5)
EOSINOPHIL NFR BLD: 2.1 % (ref 0–8)
ERYTHROCYTE [DISTWIDTH] IN BLOOD BY AUTOMATED COUNT: 11.8 % (ref 11.5–14.5)
EST. GFR  (AFRICAN AMERICAN): 58 ML/MIN/1.73 M^2
EST. GFR  (NON AFRICAN AMERICAN): 50 ML/MIN/1.73 M^2
GLUCOSE SERPL-MCNC: 86 MG/DL (ref 70–110)
HCT VFR BLD AUTO: 43.4 % (ref 40–54)
HGB BLD-MCNC: 14.2 G/DL (ref 14–18)
IMM GRANULOCYTES # BLD AUTO: 0.04 K/UL (ref 0–0.04)
IMM GRANULOCYTES NFR BLD AUTO: 0.7 % (ref 0–0.5)
LYMPHOCYTES # BLD AUTO: 0.8 K/UL (ref 1–4.8)
LYMPHOCYTES NFR BLD: 13.6 % (ref 18–48)
MAGNESIUM SERPL-MCNC: 2.2 MG/DL (ref 1.6–2.6)
MCH RBC QN AUTO: 30.9 PG (ref 27–31)
MCHC RBC AUTO-ENTMCNC: 32.7 G/DL (ref 32–36)
MCV RBC AUTO: 94 FL (ref 82–98)
MONOCYTES # BLD AUTO: 0.4 K/UL (ref 0.3–1)
MONOCYTES NFR BLD: 7.7 % (ref 4–15)
NEUTROPHILS # BLD AUTO: 4.3 K/UL (ref 1.8–7.7)
NEUTROPHILS NFR BLD: 75.4 % (ref 38–73)
NRBC BLD-RTO: 0 /100 WBC
PLATELET # BLD AUTO: 138 K/UL (ref 150–450)
PMV BLD AUTO: 8 FL (ref 9.2–12.9)
POTASSIUM SERPL-SCNC: 4.8 MMOL/L (ref 3.5–5.1)
PROT SERPL-MCNC: 7.1 G/DL (ref 6–8.4)
RBC # BLD AUTO: 4.6 M/UL (ref 4.6–6.2)
SODIUM SERPL-SCNC: 138 MMOL/L (ref 136–145)
TROPONIN I SERPL DL<=0.01 NG/ML-MCNC: 0.01 NG/ML (ref 0–0.03)
WBC # BLD AUTO: 5.73 K/UL (ref 3.9–12.7)

## 2022-04-21 PROCEDURE — 85025 COMPLETE CBC W/AUTO DIFF WBC: CPT | Performed by: EMERGENCY MEDICINE

## 2022-04-21 PROCEDURE — 93005 ELECTROCARDIOGRAM TRACING: CPT

## 2022-04-21 PROCEDURE — 93010 ELECTROCARDIOGRAM REPORT: CPT | Mod: ,,, | Performed by: INTERNAL MEDICINE

## 2022-04-21 PROCEDURE — 84484 ASSAY OF TROPONIN QUANT: CPT | Performed by: EMERGENCY MEDICINE

## 2022-04-21 PROCEDURE — 93010 EKG 12-LEAD: ICD-10-PCS | Mod: ,,, | Performed by: INTERNAL MEDICINE

## 2022-04-21 PROCEDURE — 99285 EMERGENCY DEPT VISIT HI MDM: CPT | Mod: 25

## 2022-04-21 PROCEDURE — 82550 ASSAY OF CK (CPK): CPT | Performed by: EMERGENCY MEDICINE

## 2022-04-21 PROCEDURE — 80053 COMPREHEN METABOLIC PANEL: CPT | Performed by: EMERGENCY MEDICINE

## 2022-04-21 PROCEDURE — 83735 ASSAY OF MAGNESIUM: CPT | Performed by: EMERGENCY MEDICINE

## 2022-04-21 NOTE — TELEPHONE ENCOUNTER
I returned the pts daughters call and Ms. Soriano stated Dr. Fitzpatrick referred her to an ortho outside of [facility]. I advised the pt to call the ortho office they were referred too and to reach out to Dr. Fitzpatrick team since he referred the pt.

## 2022-04-21 NOTE — TELEPHONE ENCOUNTER
----- Message from Gaviota Olson sent at 4/21/2022  8:41 AM CDT -----  Contact: pt daughter - Christie Almonte  Pt was in on Monday / legs weak/ has an appt in June with Ortho but is concerned that the pt is at a risk for falling and wants to discuss the pt getting in earlier with ortho and can be reached at [phone]//thanks/dbw

## 2022-04-21 NOTE — ED PROVIDER NOTES
SCRIBE #1 NOTE: I, Bernie Montejo, am scribing for, and in the presence of, Patricia Miller MD. I have scribed the entire note.       History     Chief Complaint   Patient presents with    Extremity Weakness     Pt presents to ED for BLE weakness x several months.      Review of patient's allergies indicates:   Allergen Reactions    Atarax [hydroxyzine hcl] Other (See Comments)     Raised blood pressure     Zyrtec [cetirizine] Other (See Comments)     Raised blood pressure     Gold sodium thiosulfate      Patch test positive    Meloxicam Rash     Other reaction(s): hypertension         History of Present Illness     HPI    4/21/2022, 3:10 PM  History obtained from the patient      History of Present Illness: Ezequiel Hughes is a 82 y.o. male patient with a PMHx of BPH, cancer, back pain, HTN, who presents to the Emergency Department for evaluation of B/L LE weakness which onset gradually several months PTA. Symptoms are constant and moderate in severity. No mitigating or exacerbating factors reported. Associated sxs include BLE numbness and CP (last PM but resolved now). Pt is concerned he might fall due to the weakness. Patient denies any fever, loss of bowel/bladder function, SOB, nausea, chills, and all other sxs at this time. Pt is currently on Meclizine. Pt has seen Dr. Young (pain management) to address his BLE weakness/numbness and LB pain. No further complaints or concerns at this time.       Arrival mode: Personal vehicle    PCP: Valery Ozuna MD        Past Medical History:  Past Medical History:   Diagnosis Date    Allergic rhinitis     Anemia     Back pain     BPH (benign prostatic hyperplasia)     Cancer     skin cancer to neck, Dr. Graves    Cataract     Disorder of kidney and ureter     ED (erectile dysfunction)     Hiatal hernia     small    History of colon polyps     colonoscopy 11/2016    HLD (hyperlipidemia)     Hyperlipidemia     Hypertension     Lateral epicondylitis      Lumbar radiculopathy 1/26/2022    OA (osteoarthritis)     GUIDO (obstructive sleep apnea)     Prostate cancer     Dr. Wong    TIA (transient ischemic attack)     Trouble in sleeping     Urge incontinence 3/29/2022       Past Surgical History:  Past Surgical History:   Procedure Laterality Date    CATARACT EXTRACTION W/  INTRAOCULAR LENS IMPLANT Right 02/21/2018    I-Stent    CATARACT EXTRACTION W/  INTRAOCULAR LENS IMPLANT Left 03/21/2018    I - Stent    COLONOSCOPY N/A 11/14/2016    Procedure: COLONOSCOPY;  Surgeon: Karuna Rodriguez MD;  Location: [facility] ENDO;  Service: Endoscopy;  Laterality: N/A;    COLONOSCOPY N/A 9/22/2020    Procedure: COLONOSCOPY;  Surgeon: Chuy Fish MD;  Location: [facility];  Service: Endoscopy;  Laterality: N/A;    EYE SURGERY      HEMORRHOID SURGERY      I-STENT Right 02/21/2018    DR. REECE    KNEE ARTHROSCOPY W/ MENISCAL REPAIR Right 2015    Dr. Jain    PCIOL Right 02/21/2018    DR. REECE    PLANTAR FASCIA RELEASE      right    ROTATOR CUFF REPAIR Bilateral     bilateral    SELECTIVE INJECTION OF ANESTHETIC AGENT AROUND LUMBAR SPINAL NERVE ROOT BY TRANSFORAMINAL APPROACH Bilateral 1/26/2022    Procedure: Bilateral L4/5 TF IAN with RN IV sedation;  Surgeon: Mak Young MD;  Location: [facility] PAIN MGT;  Service: Pain Management;  Laterality: Bilateral;    SELECTIVE INJECTION OF ANESTHETIC AGENT AROUND LUMBAR SPINAL NERVE ROOT BY TRANSFORAMINAL APPROACH Bilateral 3/14/2022    Procedure: Bilateral L4/5 TF IAN with RN IV sedation;  Surgeon: Mak Young MD;  Location: [facility] PAIN MGT;  Service: Pain Management;  Laterality: Bilateral;    SHOULDER SURGERY Bilateral around 2000    Dr. Pepper.  rotator cuff surgeries    VASECTOMY           Family History:  Family History   Problem Relation Age of Onset    Lung cancer Father         life long smoker    Cancer Father         prostate, lung    Arthritis Mother     Stroke Sister         TIA    Cataracts  Sister     Cancer Brother         prostate    Cataracts Brother     Cataracts Sister     Melanoma Neg Hx     Psoriasis Neg Hx     Lupus Neg Hx     Eczema Neg Hx     Diabetes Neg Hx     Heart disease Neg Hx     Kidney disease Neg Hx     Colon cancer Neg Hx        Social History:  Social History     Tobacco Use    Smoking status: Former Smoker     Packs/day: 3.00     Years: 35.00     Pack years: 105.00     Types: Cigarettes     Start date: 1960     Quit date: 1985     Years since quittin.7    Smokeless tobacco: Never Used   Substance and Sexual Activity    Alcohol use: Yes     Alcohol/week: 3.0 standard drinks     Types: 3 Cans of beer per week     Comment: socially    Drug use: No    Sexual activity: Yes     Partners: Female     Birth control/protection: None        Review of Systems     Review of Systems   Constitutional: Negative for chills and fever.   HENT: Negative for sore throat.    Respiratory: Negative for shortness of breath.    Cardiovascular: Positive for chest pain (last PM; resolved).   Gastrointestinal: Negative for nausea.        (-) loss of bowel/bladder function   Genitourinary: Negative for dysuria.   Musculoskeletal: Negative for back pain.   Skin: Negative for rash.   Neurological: Positive for weakness (B/L LE) and numbness (B/L LE).   Hematological: Does not bruise/bleed easily.   All other systems reviewed and are negative.       Physical Exam     Initial Vitals [22 1343]   BP Pulse Resp Temp SpO2   136/70 66 18 97.8 °F (36.6 °C) 97 %      MAP       --          Physical Exam  Nursing Notes and Vital Signs Reviewed.  Constitutional: Patient is in no acute distress. Well-developed and well-nourished.  Head: Atraumatic. Normocephalic.  Eyes: PERRL. EOM intact. Conjunctivae are not pale. No scleral icterus.  ENT: Mucous membranes are moist. Oropharynx is clear and symmetric.    Neck: Supple. Full ROM. No lymphadenopathy.  Cardiovascular: Bradycardia. No murmurs,  "rubs, or gallops. Distal pulses are 2+ and symmetric. Neurovascularly intact.  Pulmonary/Chest: No respiratory distress. Clear to auscultation bilaterally. No wheezing or rales.  Abdominal: Soft and non-distended.  There is no tenderness.  No rebound, guarding, or rigidity. Good bowel sounds.  Genitourinary: No CVA tenderness  Musculoskeletal: Moves all extremities. No obvious deformities. No edema. No calf tenderness. Good pulse in b/l feet. DTR 2 + equal and symmetric.  Normal gait observed.   Skin: Warm and dry.  Neurological:  Alert, awake, and appropriate.  Normal speech.  No acute focal neurological deficits are appreciated.  Psychiatric: Normal affect. Good eye contact. Appropriate in content.     ED Course   Procedures  ED Vital Signs:  Vitals:    04/21/22 1343 04/21/22 1522 04/21/22 1702   BP: 136/70  135/60   Pulse: 66 (!) 58 60   Resp: 18  18   Temp: 97.8 °F (36.6 °C)  97.8 °F (36.6 °C)   TempSrc: Oral  Oral   SpO2: 97%  100%   Weight: 89.1 kg (196 lb 6.9 oz)     Height: 5' 10" (1.778 m)         Abnormal Lab Results:  Labs Reviewed   CBC W/ AUTO DIFFERENTIAL - Abnormal; Notable for the following components:       Result Value    Platelets 138 (*)     MPV 8.0 (*)     Immature Granulocytes 0.7 (*)     Lymph # 0.8 (*)     Gran % 75.4 (*)     Lymph % 13.6 (*)     All other components within normal limits   COMPREHENSIVE METABOLIC PANEL - Abnormal; Notable for the following components:    BUN 25 (*)     eGFR if  58 (*)     eGFR if non  50 (*)     All other components within normal limits   TROPONIN I   CK   MAGNESIUM        All Lab Results:  Results for orders placed or performed during the hospital encounter of 04/21/22   CBC auto differential   Result Value Ref Range    WBC 5.73 3.90 - 12.70 K/uL    RBC 4.60 4.60 - 6.20 M/uL    Hemoglobin 14.2 14.0 - 18.0 g/dL    Hematocrit 43.4 40.0 - 54.0 %    MCV 94 82 - 98 fL    MCH 30.9 27.0 - 31.0 pg    MCHC 32.7 32.0 - 36.0 g/dL    RDW " 11.8 11.5 - 14.5 %    Platelets 138 (L) 150 - 450 K/uL    MPV 8.0 (L) 9.2 - 12.9 fL    Immature Granulocytes 0.7 (H) 0.0 - 0.5 %    Gran # (ANC) 4.3 1.8 - 7.7 K/uL    Immature Grans (Abs) 0.04 0.00 - 0.04 K/uL    Lymph # 0.8 (L) 1.0 - 4.8 K/uL    Mono # 0.4 0.3 - 1.0 K/uL    Eos # 0.1 0.0 - 0.5 K/uL    Baso # 0.03 0.00 - 0.20 K/uL    nRBC 0 0 /100 WBC    Gran % 75.4 (H) 38.0 - 73.0 %    Lymph % 13.6 (L) 18.0 - 48.0 %    Mono % 7.7 4.0 - 15.0 %    Eosinophil % 2.1 0.0 - 8.0 %    Basophil % 0.5 0.0 - 1.9 %    Differential Method Automated    Comprehensive metabolic panel   Result Value Ref Range    Sodium 138 136 - 145 mmol/L    Potassium 4.8 3.5 - 5.1 mmol/L    Chloride 104 95 - 110 mmol/L    CO2 24 23 - 29 mmol/L    Glucose 86 70 - 110 mg/dL    BUN 25 (H) 8 - 23 mg/dL    Creatinine 1.3 0.5 - 1.4 mg/dL    Calcium 9.1 8.7 - 10.5 mg/dL    Total Protein 7.1 6.0 - 8.4 g/dL    Albumin 4.1 3.5 - 5.2 g/dL    Total Bilirubin 0.6 0.1 - 1.0 mg/dL    Alkaline Phosphatase 68 55 - 135 U/L    AST 13 10 - 40 U/L    ALT 10 10 - 44 U/L    Anion Gap 10 8 - 16 mmol/L    eGFR if African American 58 (A) >60 mL/min/1.73 m^2    eGFR if non African American 50 (A) >60 mL/min/1.73 m^2   Troponin I #1   Result Value Ref Range    Troponin I 0.007 0.000 - 0.026 ng/mL   CPK   Result Value Ref Range    CPK 52 20 - 200 U/L   Magnesium   Result Value Ref Range    Magnesium 2.2 1.6 - 2.6 mg/dL       Imaging Results:  Imaging Results          X-Ray Chest PA And Lateral (Final result)  Result time 04/21/22 15:48:36    Final result by Kayden Maria MD (04/21/22 15:48:36)                 Impression:      No acute abnormality.      Electronically signed by: Kayden Maria  Date:    04/21/2022  Time:    15:48             Narrative:    EXAMINATION:  XR CHEST PA AND LATERAL    CLINICAL HISTORY:  Chest Pain;    TECHNIQUE:  PA and lateral views of the chest were performed.    COMPARISON:  12/08/2020    FINDINGS:  The lungs are clear, with normal appearance of  pulmonary vasculature and no pleural effusion or pneumothorax.    The cardiac silhouette is normal in size. The hilar and mediastinal contours are unremarkable.    Bones are intact.                                 The EKG was ordered, reviewed, and independently interpreted by the ED provider.  Interpretation time: 15:52  Rate: 59 BPM  Rhythm: sinus bradycardia  Interpretation: left axis deviation. No STEMI.             The Emergency Provider reviewed the vital signs and test results, which are outlined above.     ED Discussion       4:49 PM: Reassessed pt at this time, symptoms present for months and at baseline, neuro exam normal, needs further follow up with spine and pain management as further discussed. Discussed with pt all pertinent ED information and results. Discussed pt dx and plan of tx. Gave pt all f/u and return to the ED instructions. All questions and concerns were addressed at this time. Pt expresses understanding of information and instructions, and is comfortable with plan to discharge. Pt is stable for discharge.    I discussed with patient and/or family/caretaker that evaluation in the ED does not suggest any emergent or life threatening medical conditions requiring immediate intervention beyond what was provided in the ED, and I believe patient is safe for discharge.  Regardless, an unremarkable evaluation in the ED does not preclude the development or presence of a serious of life threatening condition. As such, patient was instructed to return immediately for any worsening or change in current symptoms.       Medical Decision Making:   Clinical Tests:   Lab Tests: Ordered and Reviewed  Radiological Study: Ordered and Reviewed  Medical Tests: Ordered and Reviewed           ED Medication(s):  Medications - No data to display    Discharge Medication List as of 4/21/2022  4:51 PM           Follow-up Information     Mak Young MD. Schedule an appointment as soon as possible for a visit in 1 day.     Specialty: Pain Medicine  Why: Return to the Emergency Room, If symptoms worsen  Contact information:  [address]  [place] [address]  [phone]                             Scribe Attestation:   Scribe #1: I performed the above scribed service and the documentation accurately describes the services I performed. I attest to the accuracy of the note.     Attending:   Physician Attestation Statement for Scribe #1: I, Patricia Miller MD, personally performed the services described in this documentation, as scribed by Bernie Montejo, in my presence, and it is both accurate and complete.           Clinical Impression       ICD-10-CM ICD-9-CM   1. Chest pain  R07.9 786.50   2. Chronic bilateral low back pain with bilateral sciatica  M54.42 724.2    M54.41 724.3    G89.29 338.29   3. Osteoarthritis of spine with radiculopathy, lumbar region  M47.26 721.3       Disposition:   Disposition: Discharged  Condition: Stable       Patricia Miller MD  04/21/22 0331

## 2022-04-26 ENCOUNTER — OFFICE VISIT (OUTPATIENT)
Dept: OTOLARYNGOLOGY | Facility: CLINIC | Age: 82
End: 2022-04-26
Payer: MEDICARE

## 2022-04-26 VITALS — WEIGHT: 200.38 LBS | BODY MASS INDEX: 28.75 KG/M2 | TEMPERATURE: 97 F

## 2022-04-26 DIAGNOSIS — R53.1 WEAKNESS: Primary | ICD-10-CM

## 2022-04-26 DIAGNOSIS — R42 DIZZINESS: ICD-10-CM

## 2022-04-26 DIAGNOSIS — R42 LIGHTHEADEDNESS: ICD-10-CM

## 2022-04-26 DIAGNOSIS — R42 DIZZINESS AND GIDDINESS: ICD-10-CM

## 2022-04-26 PROCEDURE — 99203 PR OFFICE/OUTPT VISIT, NEW, LEVL III, 30-44 MIN: ICD-10-PCS | Mod: S$GLB,,, | Performed by: STUDENT IN AN ORGANIZED HEALTH CARE EDUCATION/TRAINING PROGRAM

## 2022-04-26 PROCEDURE — 1126F AMNT PAIN NOTED NONE PRSNT: CPT | Mod: CPTII,S$GLB,, | Performed by: STUDENT IN AN ORGANIZED HEALTH CARE EDUCATION/TRAINING PROGRAM

## 2022-04-26 PROCEDURE — 99999 PR PBB SHADOW E&M-EST. PATIENT-LVL V: CPT | Mod: PBBFAC,,, | Performed by: STUDENT IN AN ORGANIZED HEALTH CARE EDUCATION/TRAINING PROGRAM

## 2022-04-26 PROCEDURE — 99999 PR PBB SHADOW E&M-EST. PATIENT-LVL V: ICD-10-PCS | Mod: PBBFAC,,, | Performed by: STUDENT IN AN ORGANIZED HEALTH CARE EDUCATION/TRAINING PROGRAM

## 2022-04-26 PROCEDURE — 99203 OFFICE O/P NEW LOW 30 MIN: CPT | Mod: S$GLB,,, | Performed by: STUDENT IN AN ORGANIZED HEALTH CARE EDUCATION/TRAINING PROGRAM

## 2022-04-26 PROCEDURE — 1159F MED LIST DOCD IN RCRD: CPT | Mod: CPTII,S$GLB,, | Performed by: STUDENT IN AN ORGANIZED HEALTH CARE EDUCATION/TRAINING PROGRAM

## 2022-04-26 PROCEDURE — 1101F PR PT FALLS ASSESS DOC 0-1 FALLS W/OUT INJ PAST YR: ICD-10-PCS | Mod: CPTII,S$GLB,, | Performed by: STUDENT IN AN ORGANIZED HEALTH CARE EDUCATION/TRAINING PROGRAM

## 2022-04-26 PROCEDURE — 1159F PR MEDICATION LIST DOCUMENTED IN MEDICAL RECORD: ICD-10-PCS | Mod: CPTII,S$GLB,, | Performed by: STUDENT IN AN ORGANIZED HEALTH CARE EDUCATION/TRAINING PROGRAM

## 2022-04-26 PROCEDURE — 1126F PR PAIN SEVERITY QUANTIFIED, NO PAIN PRESENT: ICD-10-PCS | Mod: CPTII,S$GLB,, | Performed by: STUDENT IN AN ORGANIZED HEALTH CARE EDUCATION/TRAINING PROGRAM

## 2022-04-26 PROCEDURE — 1101F PT FALLS ASSESS-DOCD LE1/YR: CPT | Mod: CPTII,S$GLB,, | Performed by: STUDENT IN AN ORGANIZED HEALTH CARE EDUCATION/TRAINING PROGRAM

## 2022-04-26 PROCEDURE — 3288F PR FALLS RISK ASSESSMENT DOCUMENTED: ICD-10-PCS | Mod: CPTII,S$GLB,, | Performed by: STUDENT IN AN ORGANIZED HEALTH CARE EDUCATION/TRAINING PROGRAM

## 2022-04-26 PROCEDURE — 3288F FALL RISK ASSESSMENT DOCD: CPT | Mod: CPTII,S$GLB,, | Performed by: STUDENT IN AN ORGANIZED HEALTH CARE EDUCATION/TRAINING PROGRAM

## 2022-04-26 RX ORDER — MECLIZINE HYDROCHLORIDE 25 MG/1
25 TABLET ORAL 3 TIMES DAILY PRN
Qty: 20 TABLET | Refills: 0 | Status: SHIPPED | OUTPATIENT
Start: 2022-04-26 | End: 2022-06-16 | Stop reason: ALTCHOICE

## 2022-04-26 NOTE — PROGRESS NOTES
Chief complaint:   Chief Complaint   Patient presents with    Dizziness     Going on for months        History of Present Illness:     Mr. Hughes is a 82 y.o. male with TIA and HTN presenting for evaluation of dizziness.     Onset: several months, gradual onset, no inciting event  Frequency of episodes: daily, more in monrings  Duration of individual episodes: will come and go during the day for several minutes  Exacerbating factors: mostly random, any movement, when standing or while walking   Prior Medications: meclizine (Antivert)  Relieving factors:  Sitting down  Quality: very lightheaded, has not felt like he is going to black out, denies imbalance, denies room spinning or feeling his body is moving  Prior history of similar events: No    Was in the ED recently for chest pain (negative EKG), and bilateral leg weakness from knee down and feel like his legs are asleep.     Associated signs and symptoms:    [] Hearing loss  [] Ear fullness  [] Tinnitus  [] Headache  [] Light sensitivity  [] Sound sensitivity  [] Nausea   [] Vomiting  [] Dizziness with valsalva or weather change  [] Autophony    The patient denies significant hearing loss risk factors, ototoxic or vestibulotoxic medication exposure, chronic vestibular suppressant use, head/ facial/ noah trauma, and otologic surgery.    Has HTN - has been under control.  Has not seen neurology.         Review of Systems     A complete 10 point ROS was completed and are positive as per above HPI.    Otherwise negative for fever, diplopia, chest pain, shortness of breath, vomiting, blood in urine, joint pain, skin rash, seizures and unusual bleeding.       History        Past Medical History:   Past Medical History:   Diagnosis Date    Allergic rhinitis     Anemia     Back pain     BPH (benign prostatic hyperplasia)     Cancer     skin cancer to neck, Dr. Graves    Cataract     Disorder of kidney and ureter     ED (erectile dysfunction)     Hiatal hernia      small    History of colon polyps     colonoscopy 11/2016    HLD (hyperlipidemia)     Hyperlipidemia     Hypertension     Lateral epicondylitis     Lumbar radiculopathy 1/26/2022    OA (osteoarthritis)     GUIDO (obstructive sleep apnea)     Prostate cancer     Dr. Wong    TIA (transient ischemic attack)     Trouble in sleeping     Urge incontinence 3/29/2022           Past Surgical History:  Past Surgical History:   Procedure Laterality Date    CATARACT EXTRACTION W/  INTRAOCULAR LENS IMPLANT Right 02/21/2018    I-Stent    CATARACT EXTRACTION W/  INTRAOCULAR LENS IMPLANT Left 03/21/2018    I - Stent    COLONOSCOPY N/A 11/14/2016    Procedure: COLONOSCOPY;  Surgeon: Karuna Rodriguez MD;  Location: [facility] ENDO;  Service: Endoscopy;  Laterality: N/A;    COLONOSCOPY N/A 9/22/2020    Procedure: COLONOSCOPY;  Surgeon: Chuy Fish MD;  Location: [facility];  Service: Endoscopy;  Laterality: N/A;    EYE SURGERY      HEMORRHOID SURGERY      I-STENT Right 02/21/2018    DR. REECE    KNEE ARTHROSCOPY W/ MENISCAL REPAIR Right 2015    Dr. Jain    PCIOL Right 02/21/2018    DR. REECE    PLANTAR FASCIA RELEASE      right    ROTATOR CUFF REPAIR Bilateral     bilateral    SELECTIVE INJECTION OF ANESTHETIC AGENT AROUND LUMBAR SPINAL NERVE ROOT BY TRANSFORAMINAL APPROACH Bilateral 1/26/2022    Procedure: Bilateral L4/5 TF IAN with RN IV sedation;  Surgeon: Mak Young MD;  Location: [facility] PAIN MGT;  Service: Pain Management;  Laterality: Bilateral;    SELECTIVE INJECTION OF ANESTHETIC AGENT AROUND LUMBAR SPINAL NERVE ROOT BY TRANSFORAMINAL APPROACH Bilateral 3/14/2022    Procedure: Bilateral L4/5 TF IAN with RN IV sedation;  Surgeon: Mak Young MD;  Location: [facility] PAIN MGT;  Service: Pain Management;  Laterality: Bilateral;    SHOULDER SURGERY Bilateral around 2000    Dr. Pepper.  rotator cuff surgeries    VASECTOMY     .         Medications: Medication list was reviewed. He  has a  current medication list which includes the following prescription(s): acetaminophen, allopurinol, amlodipine, atorvastatin, cholecalciferol (vitamin d3), clopidogrel, famotidine, finasteride, fluarix quad [id number] (pf), fluticasone propionate, furosemide, losartan, meclizine, pantoprazole, sildenafil, acetaminophen, albuterol, albuterol, fluticasone propionate, and tamsulosin.         Allergies:   Review of patient's allergies indicates:   Allergen Reactions    Atarax [hydroxyzine hcl] Other (See Comments)     Raised blood pressure     Zyrtec [cetirizine] Other (See Comments)     Raised blood pressure     Gold sodium thiosulfate      Patch test positive    Meloxicam Rash     Other reaction(s): hypertension            Family history: family history includes Arthritis in his mother; Cancer in his brother and father; Cataracts in his brother, sister, and sister; Lung cancer in his father; Stroke in his sister.         Social History          Alcohol use:  reports current alcohol use of about 3.0 standard drinks of alcohol per week.            Tobacco:  reports that he quit smoking about 36 years ago. His smoking use included cigarettes. He started smoking about 62 years ago. He has a 105.00 pack-year smoking history. He has never used smokeless tobacco.         Please see the patient's intake form for full details of past medical history, past surgical history, family history, social history and review of systems. ?This information was reviewed by me and noted.      Physical Examination     Vitals:    04/26/22 1042   Temp: 97 °F (36.1 °C)        General: Well developed, well nourished, well hydrated. Verbal with a strong voice and not dysphonic.     Head/Face: Normocephalic, atraumatic. No scars or lesions. Facial musculature equal.     Eyes: No scleral icterus or conjunctival hemorrhage. EOMI. PERRLA.     Ears:     · Right ear: No gross deformity. EAC is clear of debris and erythema. The TM is intact with a  pneumatized middle ear. No signs of retraction, fluid or infection.      · Left ear: No gross deformity. EAC is clear of debris and erythema. The TM is intact with a pneumatized middle ear. No signs of retraction, fluid or infection.     Neurologic: Moving all extremities without gross abnormality.CN II-XII grossly intact. House-Brackmann 1/6.     · Gait:  No ataxia     · Nystagmus - none present with left and right lateral gaze    · Jay Hallpike - no torsional nystagmus      Assessment     Lightheadedness  Lower extremity weakness    Plan:      Dizziness is an extremely complex problem that may arise from many sources.  I requires the coordination between the visual system, the vestibular system as well as the proprioreceptive system.  Additionally, balance is compromised in the setting of musculoskeletal, cerebral, cardiac, and numerous physiologic disorders.  The complex interplay between these systems may also lead to dizziness if there is dysynchrony between the bilateral vestibular symptoms.    Central vestibular symptoms can generally be distinguished from peripheral vestibulopathies by the presence of other non vestibular neurologic symptoms (focal weakness, headache), light headedness (rather than true vertigo), near syncope, weak limbs, panic, fuzziness/cloudiness in mentation, and clumsiness.  Peripheral vestibulopathies are generally, at some point during their course, characterized by a true vertiginous sensation of movement, difficulty with sudden head movements, nausea, difficulty with sudden head movement, and possibly oscicllopsia.    The diagnosis and options of management were discussed at length with the patient, including hearing, balance function and the risks associated with my recommendations. We spent a considerable amount of time discussing strategies to cope with dizziness. I emphasized the great importance of fall avoidance and activities that may be dangerous if Ezequiel has an episode of  dizziness.  I answered all questions in layman's terms.    Based on the history, physical exam and imaging studies there is more evidence of a central vestibular dysfunction.     I recommend neurology evaluation with MRI brain. He also has been referred to Neurosurgery recently for lower leg numbness.         Cristian Mojica MD  [facility]   [facility]  [id number] [place] [address].  [place] [address]  P: [phone]  F: [phone]

## 2022-04-26 NOTE — PROGRESS NOTES
External MRI order, recent creatinine level and demographics faxed to [facility] per pt request.  Pt going to call and schedule appt later today and verbalized understanding that they need to notify us of appt details once completed.

## 2022-05-02 ENCOUNTER — PATIENT OUTREACH (OUTPATIENT)
Dept: ADMINISTRATIVE | Facility: OTHER | Age: 82
End: 2022-05-02
Payer: MEDICARE

## 2022-05-02 ENCOUNTER — PATIENT MESSAGE (OUTPATIENT)
Dept: PRIMARY CARE CLINIC | Facility: CLINIC | Age: 82
End: 2022-05-02
Payer: MEDICARE

## 2022-05-02 ENCOUNTER — TELEPHONE (OUTPATIENT)
Dept: PRIMARY CARE CLINIC | Facility: CLINIC | Age: 82
End: 2022-05-02
Payer: MEDICARE

## 2022-05-02 NOTE — TELEPHONE ENCOUNTER
Can you please help schedule the pt for the first available , per YANDY Malagon  
You can access the FollowMyHealth Patient Portal offered by [facility] by registering at the following website: [email or web address]. By joining [facility]’s FollowMyHealth portal, you will also be able to view your health information using other applications (apps) compatible with our system.
no CVAT

## 2022-05-03 ENCOUNTER — OFFICE VISIT (OUTPATIENT)
Dept: NEUROSURGERY | Facility: CLINIC | Age: 82
End: 2022-05-03
Payer: MEDICARE

## 2022-05-03 ENCOUNTER — HOSPITAL ENCOUNTER (OUTPATIENT)
Dept: RADIOLOGY | Facility: HOSPITAL | Age: 82
Discharge: HOME | End: 2022-05-03
Attending: NEUROLOGICAL SURGERY
Payer: MEDICARE

## 2022-05-03 ENCOUNTER — TELEPHONE (OUTPATIENT)
Dept: OTOLARYNGOLOGY | Facility: CLINIC | Age: 82
End: 2022-05-03
Payer: MEDICARE

## 2022-05-03 VITALS
RESPIRATION RATE: 16 BRPM | WEIGHT: 200.38 LBS | DIASTOLIC BLOOD PRESSURE: 69 MMHG | SYSTOLIC BLOOD PRESSURE: 133 MMHG | BODY MASS INDEX: 28.69 KG/M2 | HEIGHT: 70 IN | HEART RATE: 59 BPM

## 2022-05-03 DIAGNOSIS — G89.29 CHRONIC BILATERAL LOW BACK PAIN WITH SCIATICA, SCIATICA LATERALITY UNSPECIFIED: Primary | ICD-10-CM

## 2022-05-03 DIAGNOSIS — M51.36 DEGENERATIVE DISC DISEASE, LUMBAR: ICD-10-CM

## 2022-05-03 DIAGNOSIS — M54.40 CHRONIC BILATERAL LOW BACK PAIN WITH SCIATICA, SCIATICA LATERALITY UNSPECIFIED: ICD-10-CM

## 2022-05-03 DIAGNOSIS — M54.40 CHRONIC BILATERAL LOW BACK PAIN WITH SCIATICA, SCIATICA LATERALITY UNSPECIFIED: Primary | ICD-10-CM

## 2022-05-03 DIAGNOSIS — M48.062 LUMBAR STENOSIS WITH NEUROGENIC CLAUDICATION: ICD-10-CM

## 2022-05-03 DIAGNOSIS — M54.16 LUMBAR RADICULOPATHY: ICD-10-CM

## 2022-05-03 DIAGNOSIS — M43.16 SPONDYLOLISTHESIS, LUMBAR REGION: ICD-10-CM

## 2022-05-03 DIAGNOSIS — G89.29 CHRONIC BILATERAL LOW BACK PAIN WITH SCIATICA, SCIATICA LATERALITY UNSPECIFIED: ICD-10-CM

## 2022-05-03 PROCEDURE — 99999 PR PBB SHADOW E&M-EST. PATIENT-LVL V: CPT | Mod: PBBFAC,,, | Performed by: NEUROLOGICAL SURGERY

## 2022-05-03 PROCEDURE — 1101F PT FALLS ASSESS-DOCD LE1/YR: CPT | Mod: CPTII,S$GLB,, | Performed by: NEUROLOGICAL SURGERY

## 2022-05-03 PROCEDURE — 1101F PR PT FALLS ASSESS DOC 0-1 FALLS W/OUT INJ PAST YR: ICD-10-PCS | Mod: CPTII,S$GLB,, | Performed by: NEUROLOGICAL SURGERY

## 2022-05-03 PROCEDURE — 72120 X-RAY BEND ONLY L-S SPINE: CPT | Mod: 26,,, | Performed by: RADIOLOGY

## 2022-05-03 PROCEDURE — 3288F FALL RISK ASSESSMENT DOCD: CPT | Mod: CPTII,S$GLB,, | Performed by: NEUROLOGICAL SURGERY

## 2022-05-03 PROCEDURE — 3288F PR FALLS RISK ASSESSMENT DOCUMENTED: ICD-10-PCS | Mod: CPTII,S$GLB,, | Performed by: NEUROLOGICAL SURGERY

## 2022-05-03 PROCEDURE — 3078F DIAST BP <80 MM HG: CPT | Mod: CPTII,S$GLB,, | Performed by: NEUROLOGICAL SURGERY

## 2022-05-03 PROCEDURE — 99205 OFFICE O/P NEW HI 60 MIN: CPT | Mod: S$GLB,,, | Performed by: NEUROLOGICAL SURGERY

## 2022-05-03 PROCEDURE — 3078F PR MOST RECENT DIASTOLIC BLOOD PRESSURE < 80 MM HG: ICD-10-PCS | Mod: CPTII,S$GLB,, | Performed by: NEUROLOGICAL SURGERY

## 2022-05-03 PROCEDURE — 72120 XR LUMBAR SPINE FLEXION AND EXTENSION ONLY: ICD-10-PCS | Mod: 26,,, | Performed by: RADIOLOGY

## 2022-05-03 PROCEDURE — 99999 PR PBB SHADOW E&M-EST. PATIENT-LVL V: ICD-10-PCS | Mod: PBBFAC,,, | Performed by: NEUROLOGICAL SURGERY

## 2022-05-03 PROCEDURE — 3075F PR MOST RECENT SYSTOLIC BLOOD PRESS GE 130-139MM HG: ICD-10-PCS | Mod: CPTII,S$GLB,, | Performed by: NEUROLOGICAL SURGERY

## 2022-05-03 PROCEDURE — 99205 PR OFFICE/OUTPT VISIT, NEW, LEVL V, 60-74 MIN: ICD-10-PCS | Mod: S$GLB,,, | Performed by: NEUROLOGICAL SURGERY

## 2022-05-03 PROCEDURE — 1125F PR PAIN SEVERITY QUANTIFIED, PAIN PRESENT: ICD-10-PCS | Mod: CPTII,S$GLB,, | Performed by: NEUROLOGICAL SURGERY

## 2022-05-03 PROCEDURE — 1159F MED LIST DOCD IN RCRD: CPT | Mod: CPTII,S$GLB,, | Performed by: NEUROLOGICAL SURGERY

## 2022-05-03 PROCEDURE — 3075F SYST BP GE 130 - 139MM HG: CPT | Mod: CPTII,S$GLB,, | Performed by: NEUROLOGICAL SURGERY

## 2022-05-03 PROCEDURE — 1159F PR MEDICATION LIST DOCUMENTED IN MEDICAL RECORD: ICD-10-PCS | Mod: CPTII,S$GLB,, | Performed by: NEUROLOGICAL SURGERY

## 2022-05-03 PROCEDURE — 1125F AMNT PAIN NOTED PAIN PRSNT: CPT | Mod: CPTII,S$GLB,, | Performed by: NEUROLOGICAL SURGERY

## 2022-05-03 PROCEDURE — 72120 X-RAY BEND ONLY L-S SPINE: CPT | Mod: TC

## 2022-05-03 RX ORDER — TRAMADOL HYDROCHLORIDE 50 MG/1
50 TABLET ORAL EVERY 6 HOURS PRN
Qty: 60 TABLET | Refills: 0 | Status: SHIPPED | OUTPATIENT
Start: 2022-05-03 | End: 2022-06-16 | Stop reason: ALTCHOICE

## 2022-05-03 RX ORDER — METHOCARBAMOL 750 MG/1
750 TABLET, FILM COATED ORAL 3 TIMES DAILY PRN
Qty: 60 TABLET | Refills: 0 | Status: SHIPPED | OUTPATIENT
Start: 2022-05-03 | End: 2022-06-16 | Stop reason: ALTCHOICE

## 2022-05-03 NOTE — TELEPHONE ENCOUNTER
----- Message from Karina Lomax sent at 5/3/2022  2:35 PM CDT -----  Contact: Jane/[facility]./ [phone]  Patient is scheduled to come in on Thursday for a MRI  and patient insurance needs approval.

## 2022-05-03 NOTE — PROGRESS NOTES
Subjective:      Patient ID: Ezequiel Hughes is a 82 y.o. male.    Chief Complaint: Back Pain (Pt presents today for back pain w/ a 3/10 pain. Pt stated he have been having back aches for about 6 months that radiates down bilateral legs but mainly right leg. Pt stated bending and prolong standing aggravates his pain and causes numbness in legs. Pt states sitting and laying help ease his pain and not currently taking any pain meds. )    Mr Hughes is a pleasant 83 yo gentleman referred to my office for evaluation of his lower back pain  He was accompanied with his wife who was present at the time of the initial evaluation  He tells me he has worsening lower back pain and lower extremity symptoms over the past 9 months   The back pain is worse with activity and better with rest   Rates pain as 3/ 10  Occasionally will the symptoms will radiate down to the lower extremities with associated numbness and tingling   Right sided symptoms are equal to the left     He is been through the pain management service and recently underwent 2 epidural steroid injections which did not offer any lasting relief    Review of Systems   Constitutional: Negative for activity change, appetite change and chills.   HENT: Negative for hearing loss, sore throat and tinnitus.    Eyes: Negative for pain, discharge and itching.   Cardiovascular: Negative for chest pain.   Gastrointestinal: Negative for abdominal pain.   Endocrine: Negative for cold intolerance and heat intolerance.   Genitourinary: Negative for difficulty urinating and dysuria.   Musculoskeletal: Positive for back pain and gait problem.   Allergic/Immunologic: Negative for environmental allergies.   Neurological: Positive for weakness. Negative for dizziness, tremors, light-headedness and headaches.   Hematological: Negative for adenopathy.   Psychiatric/Behavioral: Negative for agitation, behavioral problems and confusion.         Objective:       Neurosurgery Physical Exam  Ortho/SPM  Exam  Ortho Exam          X-Ray Lumbar Spine AP And Lateral    Narrative  Findings: 2 views. Comparison March 25, 2013. There is mild anterolisthesis of L4 on L5 and L5 on S1. Mild narrowing of the disc spaces and early marginal osteophyte formation is noted. No compression fractures or acute osseous findings. Facet arthropathy at L4-5 and L5-S1 and degenerative change in the SI joints.  IMPRESSION:  As above.    MRI done at an outside facility shows grade 1 spondylolisthesis at L4-5  There is facet disease and central stenosis along with bilateral foraminal stenosis at this level  To a lesser degree there is a degenerative change seen at L5-S1 without any significant central foraminal stenosis identified  Please note that I do not have the radiology reports review at the time of this evaluation  Assessment:      1. Chronic bilateral low back pain with sciatica, sciatica laterality unspecified    2. Degenerative disc disease, lumbar    3. Lumbar stenosis with neurogenic claudication    4. Lumbar radiculopathy    5. Spondylolisthesis, lumbar region      Plan:     1. Chronic bilateral low back pain with sciatica, sciatica laterality unspecified    2. Degenerative disc disease, lumbar    3. Lumbar stenosis with neurogenic claudication    4. Lumbar radiculopathy    5. Spondylolisthesis, lumbar region      This patient does have degenerative disc disease with grade 1 spondylolisthesis at L4-5 with facet disease and narrowing bilaterally at this level  His bone anatomy can be evaluated based off of CT chest abdomen pelvis which was done for other reasons  There is no evidence of pars defect at the L4-5 level he does have degenerative facet disease at the L4-5 as well as L5-S1 level which corresponds with the MRI findings    I would like him to try 1 more IAN at L4-5  He had 2 previous IAN without any significant lasting relief.   I do think he is having symptoms from the spondylolisthesis and would potentially benefit  from surgical decompression and stabilization after failing conservative treatments.    In addition to the epidural steroid injections I do think he would benefit from short course of muscle relaxers as well as nonsteroidal anti-inflammatory drugs to take as needed    Will order flexion-extension x-rays to be done today to look for instability  Robaxin and tramadol to be sent to his pharmacy  Follow-up 2 weeks after IAN    Thank you for the referral   Please call with any questions    Jessee Castro MD  Neurosurgery     Disclaimer: This note was prepared using a voice recognition system and is likely to have sound alike errors within the text.

## 2022-05-03 NOTE — PROGRESS NOTES
Health Maintenance Due   Topic Date Due    Pneumococcal Vaccines (Age 65+) (3 - PPSV23 or PCV20) 09/08/2017    COVID-19 Vaccine (4 - Booster for Pfizer series) 02/21/2022    Shingles Vaccine (2 of 2) 03/04/2022     Updates were requested from care everywhere.  Chart was reviewed for overdue Proactive [facility] Encounters ([facility]) topics (CRS, Breast Cancer Screening, Eye exam)  Health Maintenance has been updated.  LINKS immunization registry triggered.  Immunizations were reconciled.

## 2022-05-05 ENCOUNTER — TELEPHONE (OUTPATIENT)
Dept: OTOLARYNGOLOGY | Facility: CLINIC | Age: 82
End: 2022-05-05
Payer: MEDICARE

## 2022-05-05 NOTE — TELEPHONE ENCOUNTER
Spoke with [facility] regarding pt's MRI scheduled for this morning (5/5) and they stated that authorization has not been received. According to chart, referral is still pending authorization. Appointment date was entered into referral Tuesday (5/3) and high-priority message was sent to pre-cert asking them to submit for approval. [facility] spoke with [facility]'s pre-cert dept and they stated that nothing was ever submitted to them for approval. I informed [facility] that I would send high-priority message to pre-cert supervisor and that pt's appt would have to be rescheduled at least a week out.

## 2022-05-16 ENCOUNTER — TELEPHONE (OUTPATIENT)
Dept: NEUROSURGERY | Facility: CLINIC | Age: 82
End: 2022-05-16
Payer: MEDICARE

## 2022-05-16 ENCOUNTER — PATIENT MESSAGE (OUTPATIENT)
Dept: OTOLARYNGOLOGY | Facility: CLINIC | Age: 82
End: 2022-05-16
Payer: MEDICARE

## 2022-05-16 ENCOUNTER — PATIENT MESSAGE (OUTPATIENT)
Dept: NEUROSURGERY | Facility: CLINIC | Age: 82
End: 2022-05-16
Payer: MEDICARE

## 2022-05-16 DIAGNOSIS — M54.40 CHRONIC BILATERAL LOW BACK PAIN WITH SCIATICA, SCIATICA LATERALITY UNSPECIFIED: Primary | ICD-10-CM

## 2022-05-16 DIAGNOSIS — G89.29 CHRONIC BILATERAL LOW BACK PAIN WITH SCIATICA, SCIATICA LATERALITY UNSPECIFIED: Primary | ICD-10-CM

## 2022-05-17 ENCOUNTER — TELEPHONE (OUTPATIENT)
Dept: PAIN MEDICINE | Facility: CLINIC | Age: 82
End: 2022-05-17
Payer: MEDICARE

## 2022-05-17 NOTE — TELEPHONE ENCOUNTER
Informed pt that the appointment with Dr. Haywood is for a follow up on his injections to and not to get an injection. Also to discuss Dr. Haywood plan of care for his next steps. I gave the [facility] main number to pt because he wanted to set up an appointment with dr. Castro for xray review.

## 2022-05-17 NOTE — TELEPHONE ENCOUNTER
----- Message from Brenda Floyd sent at 5/17/2022  8:23 AM CDT -----  Please call pt @ [phone] regarding appt on 6/9, pt need to know if he will be getting his injection or what the visit is for, need to know.

## 2022-05-18 ENCOUNTER — TELEPHONE (OUTPATIENT)
Dept: NEUROSURGERY | Facility: CLINIC | Age: 82
End: 2022-05-18
Payer: MEDICARE

## 2022-05-18 ENCOUNTER — TELEPHONE (OUTPATIENT)
Dept: PAIN MEDICINE | Facility: CLINIC | Age: 82
End: 2022-05-18
Payer: MEDICARE

## 2022-05-18 DIAGNOSIS — M54.16 LUMBAR RADICULOPATHY, CHRONIC: Primary | ICD-10-CM

## 2022-05-18 NOTE — TELEPHONE ENCOUNTER
----- Message from Nuris Lomas MA sent at 5/18/2022 10:25 AM CDT -----  Regarding: appt  Cadence Castro plan of care for this pt was to repeat inj with Dr Haywood and follow up 2 weeks after to discuss xrays and progress from the injections, can we get this pt in to have injections with Dr Haywood and after he will f/u with Dr Castro.    Thanks.

## 2022-05-18 NOTE — TELEPHONE ENCOUNTER
Schedule Bilateral L4/5 TF IAN - D2P Dr. Castro Neurosurgery    Patient is taking clopidogrel (Plavix) as 2° prevention (h/o TIA); he will have to stop 5 days prior to procedure.  Will get clearance from Dr. Ozuna (PCP).

## 2022-05-18 NOTE — TELEPHONE ENCOUNTER
Informed pt that we are waiting on clearance from his PCP Dr. Ozuna for his procedure. Told pt we would schedule procedure once we get the clearance.

## 2022-05-18 NOTE — TELEPHONE ENCOUNTER
Spoke with pt informed pt that his upcoming appt with Dr Haywood is a f/u appt to discuss his previous inj.    Pt was adivsed that Dr Castro placed a new order for him to repeat his injs pt was notifed that I sent a staff message to Soledad Irvin MA in PM dept to reach out to pt in regards to him needing to be scheduled for injs.    Pt was advised that he will then f/u with Dr Castro 2 weeks after those injs, and also Dr Castro will review his xray results with him, pt vu and is now aware that his appointment on tomorrow was canceled d/t him being scheduled incorrectly.

## 2022-05-19 ENCOUNTER — PATIENT MESSAGE (OUTPATIENT)
Dept: PRIMARY CARE CLINIC | Facility: CLINIC | Age: 82
End: 2022-05-19
Payer: MEDICARE

## 2022-05-20 ENCOUNTER — TELEPHONE (OUTPATIENT)
Dept: PAIN MEDICINE | Facility: CLINIC | Age: 82
End: 2022-05-20
Payer: MEDICARE

## 2022-05-20 ENCOUNTER — PATIENT MESSAGE (OUTPATIENT)
Dept: OTOLARYNGOLOGY | Facility: CLINIC | Age: 82
End: 2022-05-20
Payer: MEDICARE

## 2022-05-20 NOTE — TELEPHONE ENCOUNTER
Called pt to set up their procedure. Pt answered and procedure has been made on June 2. Inform pt on the procedure instruction. Pt  does need to stop any bloodthiners. Stop May 29 Start Amber 3 Pt understood and all question answered.     Taqueria Hicks   Medical Assistant

## 2022-05-20 NOTE — TELEPHONE ENCOUNTER
----- Message from Ofelia Collins sent at 5/20/2022  8:51 AM CDT -----  Regarding: returned call  Contact: pateint  Patient is returning a call, please call them back at [phone]

## 2022-05-20 NOTE — TELEPHONE ENCOUNTER
----- Message from Trell Chatman sent at 5/20/2022  8:23 AM CDT -----  Contact: PT  Calling to set up his appt. He has stopped taking his medication and his PCP has sent over the OK. Call back at [phone]

## 2022-05-25 NOTE — PRE-PROCEDURE INSTRUCTIONS
Spoke with patient regarding procedure scheduled on 6.2    Arrival time 0940    Has patient been sick with fever or on antibiotics within the last 7 days? No    Does the patient have any open wounds, sores or rashes? No    Does the patient have any recent fractures? no    Has patient received a vaccination within the last 7 days? No    Received the COVID vaccination? yes    Has the patient stopped all medications as directed? Hold plavis 5 days prior to procedure. Cardiac clearance obtained from dr chacon on 5.18.    Does patient have a pacemaker and or defibrillator? no    Does the patient have a ride to and from procedure and someone reliable to remain with patient? Daughter briseida    Is the patient diabetic? no    Does the patient have sleep apnea? Or use O2 at home? jonelle on cpap     Is the patient receiving sedation? yes    Is the patient instructed to remain NPO beginning at midnight the night before their procedure? yes    Procedure location confirmed with patient? Yes    Covid- Denies signs/symptoms. Instructed to notify PAT/MD if any changes.

## 2022-06-02 ENCOUNTER — HOSPITAL ENCOUNTER (OUTPATIENT)
Facility: HOSPITAL | Age: 82
Discharge: HOME | End: 2022-06-02
Attending: PHYSICAL MEDICINE & REHABILITATION | Admitting: PHYSICAL MEDICINE & REHABILITATION
Payer: MEDICARE

## 2022-06-02 VITALS
SYSTOLIC BLOOD PRESSURE: 123 MMHG | DIASTOLIC BLOOD PRESSURE: 62 MMHG | HEART RATE: 60 BPM | OXYGEN SATURATION: 98 % | HEIGHT: 70 IN | BODY MASS INDEX: 28.17 KG/M2 | RESPIRATION RATE: 16 BRPM | TEMPERATURE: 98 F | WEIGHT: 196.75 LBS

## 2022-06-02 DIAGNOSIS — M54.16 LUMBAR RADICULOPATHY, CHRONIC: Primary | ICD-10-CM

## 2022-06-02 DIAGNOSIS — M54.16 LUMBAR RADICULOPATHY: ICD-10-CM

## 2022-06-02 PROCEDURE — 63600175 PHARM REV CODE 636 W HCPCS: Performed by: PHYSICAL MEDICINE & REHABILITATION

## 2022-06-02 PROCEDURE — 64483 PR EPIDURAL INJ, ANES/STEROID, TRANSFORAMINAL, LUMB/SACR, SNGL LEVL: ICD-10-PCS | Mod: 50,,, | Performed by: PHYSICAL MEDICINE & REHABILITATION

## 2022-06-02 PROCEDURE — 25500020 PHARM REV CODE 255: Performed by: PHYSICAL MEDICINE & REHABILITATION

## 2022-06-02 PROCEDURE — 64483 NJX AA&/STRD TFRM EPI L/S 1: CPT | Mod: 50 | Performed by: PHYSICAL MEDICINE & REHABILITATION

## 2022-06-02 PROCEDURE — 64483 NJX AA&/STRD TFRM EPI L/S 1: CPT | Mod: 50,,, | Performed by: PHYSICAL MEDICINE & REHABILITATION

## 2022-06-02 PROCEDURE — 25000003 PHARM REV CODE 250: Performed by: PHYSICAL MEDICINE & REHABILITATION

## 2022-06-02 RX ORDER — FENTANYL CITRATE 50 UG/ML
INJECTION, SOLUTION INTRAMUSCULAR; INTRAVENOUS
Status: DISCONTINUED | OUTPATIENT
Start: 2022-06-02 | End: 2022-06-02 | Stop reason: HOSPADM

## 2022-06-02 RX ORDER — MIDAZOLAM HYDROCHLORIDE 1 MG/ML
INJECTION, SOLUTION INTRAMUSCULAR; INTRAVENOUS
Status: DISCONTINUED | OUTPATIENT
Start: 2022-06-02 | End: 2022-06-02 | Stop reason: HOSPADM

## 2022-06-02 RX ORDER — ONDANSETRON 2 MG/ML
4 INJECTION INTRAMUSCULAR; INTRAVENOUS ONCE AS NEEDED
Status: DISCONTINUED | OUTPATIENT
Start: 2022-06-02 | End: 2022-07-27

## 2022-06-02 RX ORDER — BUPIVACAINE HYDROCHLORIDE 2.5 MG/ML
INJECTION, SOLUTION EPIDURAL; INFILTRATION; INTRACAUDAL
Status: DISCONTINUED | OUTPATIENT
Start: 2022-06-02 | End: 2022-06-02 | Stop reason: HOSPADM

## 2022-06-02 RX ORDER — METHYLPREDNISOLONE ACETATE 40 MG/ML
INJECTION, SUSPENSION INTRA-ARTICULAR; INTRALESIONAL; INTRAMUSCULAR; SOFT TISSUE
Status: DISCONTINUED | OUTPATIENT
Start: 2022-06-02 | End: 2022-06-02 | Stop reason: HOSPADM

## 2022-06-02 NOTE — OP NOTE
INFORMED CONSENT: The procedure, risks, benefits and options were discussed with patient. There are no contraindications to the procedure. The patient expressed understanding and agreed to proceed. The personnel performing the procedure was discussed.    06/02/2022    Surgeon: Abel Haywood MD    Assistants: None    Sedation: Conscious sedation provided by M.D    The patient was monitored with continuous pulse oximetry, EKG, and intermittent blood pressure monitors, immediately prior to administration of sedation.  The patient was hemodynamically stable throughout the entire process was responsive to voice, and breathing spontaneously.  Supplemental O2 was provided at 2L/min via nasal cannula.  Patient was comfortable for the duration of the procedure.     There was a total of 2mg IV Midazolam and 50mcg Fentanyl titrated for the procedure    Total sedation time was <10 minutes      PROCEDURE:  Bilateral  L4/5  1) Left  L4/5 TRANSFORAMINAL EPIDURAL STEROID INJECTION  2) Right  L4/5 TRANSFORAMINAL EPIDURAL STEROID INJECTION      Pre Procedure diagnosis:  Bilateral L4/5 Lumbar radiculopathy, chronic [M54.16]    Post-Procedure diagnosis:   same    Complications: None    Specimens: None      DESCRIPTION OF PROCEDURE: The patient was brought to the procedure room. IV access was obtained prior to the procedure. The patient was positioned prone on the fluoroscopy table. Continuous hemodynamic monitoring was initiated including blood pressure, EKG, and pulse oximetry. . The skin was prepped with chlorhexidine and draped in a sterile fashion. Skin anesthesia was achieved using a total of 10mL of lidocaine, 5mL over each respective injection site.     The  L4/5 transforaminal spaces were identified with fluoroscopy in the  AP, oblique, and lateral views.  A 22 gauge spinal quinke needle was then advanced into the area of the trans foraminal spaces bilaterally with confirmation of proper needle position using AP, oblique,  and lateral fluoroscopic views. Once the needle tip was in the area of the transforaminal space, and there was no blood, CSF or paraesthesias,  1.5 mL of Omnipaque 300mg/ml was injected on each side for a total of 3mL.  Fluoroscopic imaging in the AP and lateral views revealed a clear outline of the spinal nerve with proximal spread of agent through the neural foramen into the epidural space. A total combination of 1 mL of Bupivicaine 0.25% and 40 mg depo medrol was injected on each side for a total of 4mL of injected medications with displacement of the contrast dye confirming that the medication went into the area of the transforaminal spaces bilaterally. A sterile dressing was applied.   Patient tolerated the procedure well.    Patient was taken back to the recovery room for further observation.     The patient was discharged to home in stable condition

## 2022-06-02 NOTE — DISCHARGE INSTRUCTIONS

## 2022-06-02 NOTE — DISCHARGE SUMMARY
Discharge Note  Short Stay      SUMMARY     Admit Date: 6/2/2022    Attending Physician: Abel Haywood MD        Discharge Physician: Abel Haywood MD        Discharge Date: 6/2/2022 10:18 AM    Procedure(s) (LRB):  Bilateral L4/5 TF IAN - D2P Dr. Castro NS (Bilateral)    Final Diagnosis: Lumbar radiculopathy, chronic [M54.16]    Disposition: Home or self care    Patient Instructions:   Current Discharge Medication List      CONTINUE these medications which have NOT CHANGED    Details   amLODIPine (NORVASC) 2.5 MG tablet 2 tablets in am and 2 tablets in pm for blood pressure  Qty: 360 tablet, Refills: 3    Comments: .  Associated Diagnoses: Essential hypertension      losartan (COZAAR) 50 MG tablet TAKE 1 TABLET TWICE DAILY  Qty: 180 tablet, Refills: 3    Associated Diagnoses: Essential hypertension      acetaminophen (TYLENOL ARTHRITIS PAIN ORAL) Take by mouth. Patient states that he takes 2 tablets in the morning and 2 tablets in the evening      acetaminophen (TYLENOL ORAL) 1000  .      !! albuterol (PROVENTIL/VENTOLIN HFA) 90 mcg/actuation inhaler Inhale 2 puffs into the lungs every 6 (six) hours as needed.      !! albuterol (PROVENTIL/VENTOLIN HFA) 90 mcg/actuation inhaler       allopurinoL (ZYLOPRIM) 100 MG tablet Take 100 mg by mouth.      atorvastatin (LIPITOR) 40 MG tablet TAKE 1 TABLET EVERY DAY  Qty: 90 tablet, Refills: 1    Associated Diagnoses: Mixed hyperlipidemia      cholecalciferol, vitamin D3, (VITAMIN D3) 50 mcg (2,000 unit) Cap Take 1 capsule by mouth once daily.      clopidogreL (PLAVIX) 75 mg tablet TAKE 1 TABLET EVERY DAY  Qty: 90 tablet, Refills: 3      famotidine (PEPCID) 20 MG tablet Take 1 tablet (20 mg total) by mouth 2 (two) times daily.  Qty: 60 tablet, Refills: 11      finasteride (PROSCAR) 5 mg tablet Take 1 tablet (5 mg total) by mouth once daily.  Qty: 90 tablet, Refills: 4      FLUARIX QUAD 9900-2308, PF, 60 mcg (15 mcg x 4)/0.5 mL Syrg       fluticasone propionate  (FLONASE) 50 mcg/actuation nasal spray USE 2 SPRAYS IN EACH NOSTRIL ONE TIME DAILY  (SUBSTITUTED FOR FLONASE)  Qty: 48 g, Refills: 3      furosemide (LASIX) 20 MG tablet Take 1 tablet (20 mg total) by mouth daily as needed (leg swelling).  Qty: 30 tablet, Refills: 0      meclizine (ANTIVERT) 25 mg tablet Take 1 tablet (25 mg total) by mouth 3 (three) times daily as needed for Dizziness.  Qty: 20 tablet, Refills: 0    Associated Diagnoses: Dizziness      methocarbamoL (ROBAXIN) 750 MG Tab Take 1 tablet (750 mg total) by mouth 3 (three) times daily as needed (muscle spasms).  Qty: 60 tablet, Refills: 0      pantoprazole (PROTONIX) 40 MG tablet TAKE 1 TABLET EVERY DAY  Qty: 90 tablet, Refills: 3      sildenafiL (VIAGRA) 100 MG tablet Take 1 po 45 minutes before intercourse  Qty: 30 tablet, Refills: 3      tamsulosin (FLOMAX) 0.4 mg Cap Take 1 capsule by mouth once daily.      traMADoL (ULTRAM) 50 mg tablet Take 1 tablet (50 mg total) by mouth every 6 (six) hours as needed for Pain.  Qty: 60 tablet, Refills: 0    Comments: Quantity prescribed more than 7 day supply? Yes, quantity medically necessary       !! - Potential duplicate medications found. Please discuss with provider.              Discharge Diagnosis: Lumbar radiculopathy, chronic [M54.16]  Condition on Discharge: Stable with no complications to procedure   Diet on Discharge: Same as before.  Activity: as per instruction sheet.  Discharge to: Home with a responsible adult.  Follow up: 2-4 weeks       Please call the office at [phone] if you experience any weakness or loss of sensation, fever > 101.5, pain uncontrolled with oral medications, persistent nausea/vomiting/or diarrhea, redness or drainage from the incisions, or any other worrisome concerns. If physician on call was not reached or could not communicate with our office for any reason please go to the nearest emergency department

## 2022-06-02 NOTE — H&P
HPI  Patient presenting for Procedure(s) (LRB):  Bilateral L4/5 TF IAN - D2P Dr. Castro NSG (Bilateral)     Patient on Anti-coagulation Yes    No health changes since previous encounter    Past Medical History:   Diagnosis Date    Allergic rhinitis     Anemia     Back pain     BPH (benign prostatic hyperplasia)     Cancer     skin cancer to neck, Dr. Graves    Cataract     Disorder of kidney and ureter     ED (erectile dysfunction)     Hiatal hernia     small    History of colon polyps     colonoscopy 11/2016    HLD (hyperlipidemia)     Hyperlipidemia     Hypertension     Lateral epicondylitis     Lumbar radiculopathy 1/26/2022    OA (osteoarthritis)     GUIDO (obstructive sleep apnea)     Prostate cancer     Dr. Wong    TIA (transient ischemic attack)     Trouble in sleeping     Urge incontinence 3/29/2022     Past Surgical History:   Procedure Laterality Date    CATARACT EXTRACTION W/  INTRAOCULAR LENS IMPLANT Right 02/21/2018    I-Stent    CATARACT EXTRACTION W/  INTRAOCULAR LENS IMPLANT Left 03/21/2018    I - Stent    COLONOSCOPY N/A 11/14/2016    Procedure: COLONOSCOPY;  Surgeon: Karuna Rodriguez MD;  Location: [facility];  Service: Endoscopy;  Laterality: N/A;    COLONOSCOPY N/A 9/22/2020    Procedure: COLONOSCOPY;  Surgeon: Chuy Fish MD;  Location: [facility];  Service: Endoscopy;  Laterality: N/A;    EYE SURGERY      HEMORRHOID SURGERY      I-STENT Right 02/21/2018    DR. REECE    KNEE ARTHROSCOPY W/ MENISCAL REPAIR Right 2015    Dr. Jain    PCIOL Right 02/21/2018    DR. REECE    PLANTAR FASCIA RELEASE      right    ROTATOR CUFF REPAIR Bilateral     bilateral    SELECTIVE INJECTION OF ANESTHETIC AGENT AROUND LUMBAR SPINAL NERVE ROOT BY TRANSFORAMINAL APPROACH Bilateral 1/26/2022    Procedure: Bilateral L4/5 TF IAN with RN IV sedation;  Surgeon: Mak Young MD;  Location: [facility];  Service: Pain Management;  Laterality: Bilateral;    SELECTIVE  INJECTION OF ANESTHETIC AGENT AROUND LUMBAR SPINAL NERVE ROOT BY TRANSFORAMINAL APPROACH Bilateral 3/14/2022    Procedure: Bilateral L4/5 TF IAN with RN IV sedation;  Surgeon: Mak Young MD;  Location: [facility];  Service: Pain Management;  Laterality: Bilateral;    SHOULDER SURGERY Bilateral around 2000    Dr. Pepper.  rotator cuff surgeries    VASECTOMY       Review of patient's allergies indicates:   Allergen Reactions    Atarax [hydroxyzine hcl] Other (See Comments)     Raised blood pressure     Zyrtec [cetirizine] Other (See Comments)     Raised blood pressure     Gold sodium thiosulfate      Patch test positive    Meloxicam Rash     Other reaction(s): hypertension        No current facility-administered medications on file prior to encounter.     Current Outpatient Medications on File Prior to Encounter   Medication Sig Dispense Refill    amLODIPine (NORVASC) 2.5 MG tablet 2 tablets in am and 2 tablets in pm for blood pressure 360 tablet 3    losartan (COZAAR) 50 MG tablet TAKE 1 TABLET TWICE DAILY 180 tablet 3    acetaminophen (TYLENOL ARTHRITIS PAIN ORAL) Take by mouth. Patient states that he takes 2 tablets in the morning and 2 tablets in the evening      acetaminophen (TYLENOL ORAL) 1000  .      albuterol (PROVENTIL/VENTOLIN HFA) 90 mcg/actuation inhaler Inhale 2 puffs into the lungs every 6 (six) hours as needed.      albuterol (PROVENTIL/VENTOLIN HFA) 90 mcg/actuation inhaler       allopurinoL (ZYLOPRIM) 100 MG tablet Take 100 mg by mouth.      atorvastatin (LIPITOR) 40 MG tablet TAKE 1 TABLET EVERY DAY 90 tablet 1    cholecalciferol, vitamin D3, (VITAMIN D3) 50 mcg (2,000 unit) Cap Take 1 capsule by mouth once daily.      clopidogreL (PLAVIX) 75 mg tablet TAKE 1 TABLET EVERY DAY 90 tablet 3    famotidine (PEPCID) 20 MG tablet Take 1 tablet (20 mg total) by mouth 2 (two) times daily. 60 tablet 11    finasteride (PROSCAR) 5 mg tablet Take 1 tablet (5 mg total) by mouth once  "daily. 90 tablet 4    FLUARIX QUAD 3460-0591, PF, 60 mcg (15 mcg x 4)/0.5 mL Syrg       fluticasone propionate (FLONASE) 50 mcg/actuation nasal spray USE 2 SPRAYS IN EACH NOSTRIL ONE TIME DAILY  (SUBSTITUTED FOR FLONASE) 48 g 3    furosemide (LASIX) 20 MG tablet Take 1 tablet (20 mg total) by mouth daily as needed (leg swelling). 30 tablet 0    meclizine (ANTIVERT) 25 mg tablet Take 1 tablet (25 mg total) by mouth 3 (three) times daily as needed for Dizziness. 20 tablet 0    methocarbamoL (ROBAXIN) 750 MG Tab Take 1 tablet (750 mg total) by mouth 3 (three) times daily as needed (muscle spasms). 60 tablet 0    pantoprazole (PROTONIX) 40 MG tablet TAKE 1 TABLET EVERY DAY 90 tablet 3    sildenafiL (VIAGRA) 100 MG tablet Take 1 po 45 minutes before intercourse 30 tablet 3    tamsulosin (FLOMAX) 0.4 mg Cap Take 1 capsule by mouth once daily.      traMADoL (ULTRAM) 50 mg tablet Take 1 tablet (50 mg total) by mouth every 6 (six) hours as needed for Pain. 60 tablet 0        PMHx, PSHx, Allergies, Medications reviewed in epic    ROS negative except pain complaints in HPI    OBJECTIVE:    BP (!) 148/70 (BP Location: Right arm, Patient Position: Sitting)   Pulse (!) 58   Temp 97.7 °F (36.5 °C) (Temporal)   Resp 19   Ht 5' 10" (1.778 m)   Wt 89.3 kg (196 lb 12.2 oz)   SpO2 98%   BMI 28.23 kg/m²     PHYSICAL EXAMINATION:    GENERAL: Well appearing, in no acute distress, alert and oriented x3.  PSYCH:  Mood and affect appropriate.  SKIN: Skin color, texture, turgor normal, no rashes or lesions which will impact the procedure.  CV: RRR with palpation of the radial artery.  PULM: No evidence of respiratory difficulty, symmetric chest rise. Clear to auscultation.  NEURO: Cranial nerves grossly intact.    Plan:    Proceed with procedure as planned Procedure(s) (LRB):  Bilateral L4/5 TF IAN - D2P Dr. Matthew CABRERA (Bilateral)    Abel Haywood MD  06/02/2022            "

## 2022-06-07 ENCOUNTER — HOSPITAL ENCOUNTER (OUTPATIENT)
Facility: HOSPITAL | Age: 82
Discharge: HOME | End: 2022-06-08
Attending: EMERGENCY MEDICINE | Admitting: INTERNAL MEDICINE
Payer: MEDICARE

## 2022-06-07 DIAGNOSIS — R07.9 CHEST PAIN, UNSPECIFIED TYPE: Primary | ICD-10-CM

## 2022-06-07 DIAGNOSIS — R07.9 CHEST PAIN: ICD-10-CM

## 2022-06-07 LAB
ALBUMIN SERPL BCP-MCNC: 4.1 G/DL (ref 3.5–5.2)
ALP SERPL-CCNC: 55 U/L (ref 55–135)
ALT SERPL W/O P-5'-P-CCNC: 8 U/L (ref 10–44)
ANION GAP SERPL CALC-SCNC: 12 MMOL/L (ref 8–16)
AST SERPL-CCNC: 12 U/L (ref 10–40)
BASOPHILS # BLD AUTO: 0.05 K/UL (ref 0–0.2)
BASOPHILS NFR BLD: 0.8 % (ref 0–1.9)
BILIRUB SERPL-MCNC: 0.8 MG/DL (ref 0.1–1)
BUN SERPL-MCNC: 17 MG/DL (ref 8–23)
CALCIUM SERPL-MCNC: 8.8 MG/DL (ref 8.7–10.5)
CHLORIDE SERPL-SCNC: 103 MMOL/L (ref 95–110)
CO2 SERPL-SCNC: 21 MMOL/L (ref 23–29)
CREAT SERPL-MCNC: 1.2 MG/DL (ref 0.5–1.4)
DIFFERENTIAL METHOD: ABNORMAL
EOSINOPHIL # BLD AUTO: 0.1 K/UL (ref 0–0.5)
EOSINOPHIL NFR BLD: 0.8 % (ref 0–8)
ERYTHROCYTE [DISTWIDTH] IN BLOOD BY AUTOMATED COUNT: 12.3 % (ref 11.5–14.5)
EST. GFR  (AFRICAN AMERICAN): >60 ML/MIN/1.73 M^2
EST. GFR  (NON AFRICAN AMERICAN): 55 ML/MIN/1.73 M^2
GLUCOSE SERPL-MCNC: 97 MG/DL (ref 70–110)
HCT VFR BLD AUTO: 38.7 % (ref 40–54)
HGB BLD-MCNC: 13.1 G/DL (ref 14–18)
IMM GRANULOCYTES # BLD AUTO: 0.09 K/UL (ref 0–0.04)
IMM GRANULOCYTES NFR BLD AUTO: 1.5 % (ref 0–0.5)
LYMPHOCYTES # BLD AUTO: 0.9 K/UL (ref 1–4.8)
LYMPHOCYTES NFR BLD: 14.5 % (ref 18–48)
MCH RBC QN AUTO: 31.5 PG (ref 27–31)
MCHC RBC AUTO-ENTMCNC: 33.9 G/DL (ref 32–36)
MCV RBC AUTO: 93 FL (ref 82–98)
MONOCYTES # BLD AUTO: 0.5 K/UL (ref 0.3–1)
MONOCYTES NFR BLD: 8.2 % (ref 4–15)
NEUTROPHILS # BLD AUTO: 4.6 K/UL (ref 1.8–7.7)
NEUTROPHILS NFR BLD: 74.2 % (ref 38–73)
NRBC BLD-RTO: 0 /100 WBC
PLATELET # BLD AUTO: 141 K/UL (ref 150–450)
PMV BLD AUTO: 8.1 FL (ref 9.2–12.9)
POTASSIUM SERPL-SCNC: 4.3 MMOL/L (ref 3.5–5.1)
PROT SERPL-MCNC: 6.6 G/DL (ref 6–8.4)
RBC # BLD AUTO: 4.16 M/UL (ref 4.6–6.2)
SODIUM SERPL-SCNC: 136 MMOL/L (ref 136–145)
TROPONIN I SERPL DL<=0.01 NG/ML-MCNC: 0.01 NG/ML (ref 0–0.03)
TROPONIN I SERPL DL<=0.01 NG/ML-MCNC: 0.01 NG/ML (ref 0–0.03)
WBC # BLD AUTO: 6.19 K/UL (ref 3.9–12.7)

## 2022-06-07 PROCEDURE — 25000003 PHARM REV CODE 250: Performed by: NURSE PRACTITIONER

## 2022-06-07 PROCEDURE — 85025 COMPLETE CBC W/AUTO DIFF WBC: CPT | Performed by: NURSE PRACTITIONER

## 2022-06-07 PROCEDURE — 25000003 PHARM REV CODE 250: Performed by: EMERGENCY MEDICINE

## 2022-06-07 PROCEDURE — 84484 ASSAY OF TROPONIN QUANT: CPT | Performed by: NURSE PRACTITIONER

## 2022-06-07 PROCEDURE — 84484 ASSAY OF TROPONIN QUANT: CPT | Mod: 91 | Performed by: NURSE PRACTITIONER

## 2022-06-07 PROCEDURE — G0378 HOSPITAL OBSERVATION PER HR: HCPCS

## 2022-06-07 PROCEDURE — 93010 ELECTROCARDIOGRAM REPORT: CPT | Mod: ,,, | Performed by: INTERNAL MEDICINE

## 2022-06-07 PROCEDURE — 80053 COMPREHEN METABOLIC PANEL: CPT | Performed by: NURSE PRACTITIONER

## 2022-06-07 PROCEDURE — 93010 EKG 12-LEAD: ICD-10-PCS | Mod: ,,, | Performed by: INTERNAL MEDICINE

## 2022-06-07 PROCEDURE — 25000242 PHARM REV CODE 250 ALT 637 W/ HCPCS: Performed by: EMERGENCY MEDICINE

## 2022-06-07 PROCEDURE — 99285 EMERGENCY DEPT VISIT HI MDM: CPT | Mod: 25,CS

## 2022-06-07 PROCEDURE — 36415 COLL VENOUS BLD VENIPUNCTURE: CPT | Performed by: NURSE PRACTITIONER

## 2022-06-07 PROCEDURE — 93005 ELECTROCARDIOGRAM TRACING: CPT

## 2022-06-07 RX ORDER — FINASTERIDE 5 MG/1
5 TABLET, FILM COATED ORAL DAILY
Status: DISCONTINUED | OUTPATIENT
Start: 2022-06-08 | End: 2022-06-08 | Stop reason: HOSPADM

## 2022-06-07 RX ORDER — PANTOPRAZOLE SODIUM 40 MG/1
40 TABLET, DELAYED RELEASE ORAL DAILY
Status: DISCONTINUED | OUTPATIENT
Start: 2022-06-08 | End: 2022-06-08 | Stop reason: HOSPADM

## 2022-06-07 RX ORDER — SODIUM CHLORIDE 0.9 % (FLUSH) 0.9 %
10 SYRINGE (ML) INJECTION
Status: DISCONTINUED | OUTPATIENT
Start: 2022-06-07 | End: 2022-06-08 | Stop reason: HOSPADM

## 2022-06-07 RX ORDER — CLOPIDOGREL BISULFATE 75 MG/1
75 TABLET ORAL DAILY
Status: DISCONTINUED | OUTPATIENT
Start: 2022-06-08 | End: 2022-06-08 | Stop reason: HOSPADM

## 2022-06-07 RX ORDER — HYDROCODONE BITARTRATE AND ACETAMINOPHEN 5; 325 MG/1; MG/1
1 TABLET ORAL EVERY 4 HOURS PRN
Status: DISCONTINUED | OUTPATIENT
Start: 2022-06-07 | End: 2022-06-08 | Stop reason: HOSPADM

## 2022-06-07 RX ORDER — HYDROCODONE BITARTRATE AND ACETAMINOPHEN 10; 325 MG/1; MG/1
1 TABLET ORAL EVERY 4 HOURS PRN
Status: DISCONTINUED | OUTPATIENT
Start: 2022-06-07 | End: 2022-06-08 | Stop reason: HOSPADM

## 2022-06-07 RX ORDER — ATORVASTATIN CALCIUM 40 MG/1
40 TABLET, FILM COATED ORAL DAILY
Status: DISCONTINUED | OUTPATIENT
Start: 2022-06-08 | End: 2022-06-08 | Stop reason: HOSPADM

## 2022-06-07 RX ORDER — ACETAMINOPHEN 325 MG/1
650 TABLET ORAL EVERY 4 HOURS PRN
Status: DISCONTINUED | OUTPATIENT
Start: 2022-06-07 | End: 2022-06-08 | Stop reason: HOSPADM

## 2022-06-07 RX ORDER — ASPIRIN 81 MG/1
81 TABLET ORAL DAILY
Status: DISCONTINUED | OUTPATIENT
Start: 2022-06-08 | End: 2022-06-08 | Stop reason: HOSPADM

## 2022-06-07 RX ORDER — ALBUTEROL SULFATE 90 UG/1
2 AEROSOL, METERED RESPIRATORY (INHALATION) EVERY 6 HOURS PRN
Status: DISCONTINUED | OUTPATIENT
Start: 2022-06-07 | End: 2022-06-08 | Stop reason: HOSPADM

## 2022-06-07 RX ORDER — ACETAMINOPHEN 500 MG
1000 TABLET ORAL
Status: COMPLETED | OUTPATIENT
Start: 2022-06-07 | End: 2022-06-07

## 2022-06-07 RX ORDER — AMLODIPINE BESYLATE 5 MG/1
5 TABLET ORAL 2 TIMES DAILY
Status: DISCONTINUED | OUTPATIENT
Start: 2022-06-07 | End: 2022-06-08 | Stop reason: HOSPADM

## 2022-06-07 RX ORDER — ASPIRIN 325 MG
325 TABLET, DELAYED RELEASE (ENTERIC COATED) ORAL
Status: COMPLETED | OUTPATIENT
Start: 2022-06-07 | End: 2022-06-07

## 2022-06-07 RX ORDER — ONDANSETRON 8 MG/1
8 TABLET, ORALLY DISINTEGRATING ORAL EVERY 8 HOURS PRN
Status: DISCONTINUED | OUTPATIENT
Start: 2022-06-07 | End: 2022-06-08 | Stop reason: HOSPADM

## 2022-06-07 RX ORDER — NITROGLYCERIN 0.4 MG/1
0.4 TABLET SUBLINGUAL
Status: COMPLETED | OUTPATIENT
Start: 2022-06-07 | End: 2022-06-07

## 2022-06-07 RX ADMIN — ASPIRIN 325 MG: 325 TABLET, COATED ORAL at 06:06

## 2022-06-07 RX ADMIN — AMLODIPINE BESYLATE 5 MG: 5 TABLET ORAL at 10:06

## 2022-06-07 RX ADMIN — NITROGLYCERIN 0.4 MG: 0.4 TABLET SUBLINGUAL at 07:06

## 2022-06-07 RX ADMIN — ACETAMINOPHEN 1000 MG: 500 TABLET ORAL at 07:06

## 2022-06-07 NOTE — ED PROVIDER NOTES
SCRIBE #1 NOTE: I, Chevy Clarke, am scribing for, and in the presence of, Johnny Soriano Do, MD. I have scribed the entire note.       History     Chief Complaint   Patient presents with    Chest Pain     And lightheadedness, onset a few days ago. L anterior, no radiation.      Review of patient's allergies indicates:   Allergen Reactions    Atarax [hydroxyzine hcl] Other (See Comments)     Raised blood pressure     Zyrtec [cetirizine] Other (See Comments)     Raised blood pressure     Gold sodium thiosulfate      Patch test positive    Meloxicam Rash     Other reaction(s): hypertension         History of Present Illness     HPI    6/7/2022, 6:43 PM  History obtained from the patient      History of Present Illness: Ezequiel Hughes is a 82 y.o. male patient with a PMHx of cancer, HLD, and HTN who presents to the Emergency Department for evaluation of left anterior CP with no radiation which onset gradually a week ago. Pt states that he has had 2 MRI the last couple of weeks, which showed that there was no damage to the brain. He also states that this CP is similar to one he had years ago, where he was told it was gas. Pt. follows with Dr. Thompson. Dr. Thompson performed a stress test last year which showed some concerns, but a nuclear stress test was performed after which did not show any concerns. Symptoms are constant and moderate in severity. No mitigating or exacerbating factors reported. Associated sxs include weakness and light-headedness. Patient denies any leg swelling, palpitations, SOB, wheezing, cough, fever, chills, and all other sxs at this time. No prior Tx reported. No further complaints or concerns at this time.       Arrival mode: Personal vehicle    PCP: Valery Ozuna MD        Past Medical History:  Past Medical History:   Diagnosis Date    Allergic rhinitis     Anemia     Back pain     BPH (benign prostatic hyperplasia)     Cancer     skin cancer to neck, Dr. Graves    Cataract     Disorder  of kidney and ureter     ED (erectile dysfunction)     Hiatal hernia     small    History of colon polyps     colonoscopy 11/2016    HLD (hyperlipidemia)     Hyperlipidemia     Hypertension     Lateral epicondylitis     Lumbar radiculopathy 1/26/2022    OA (osteoarthritis)     GUIDO (obstructive sleep apnea)     Prostate cancer     Dr. Wong    TIA (transient ischemic attack)     Trouble in sleeping     Urge incontinence 3/29/2022       Past Surgical History:  Past Surgical History:   Procedure Laterality Date    CATARACT EXTRACTION W/  INTRAOCULAR LENS IMPLANT Right 02/21/2018    I-Stent    CATARACT EXTRACTION W/  INTRAOCULAR LENS IMPLANT Left 03/21/2018    I - Stent    COLONOSCOPY N/A 11/14/2016    Procedure: COLONOSCOPY;  Surgeon: Karuna Rodriguez MD;  Location: [facility] ENDO;  Service: Endoscopy;  Laterality: N/A;    COLONOSCOPY N/A 9/22/2020    Procedure: COLONOSCOPY;  Surgeon: Chuy Fish MD;  Location: [facility] ENDO;  Service: Endoscopy;  Laterality: N/A;    EYE SURGERY      HEMORRHOID SURGERY      I-STENT Right 02/21/2018    DR. REECE    KNEE ARTHROSCOPY W/ MENISCAL REPAIR Right 2015    Dr. Jain    PCIOL Right 02/21/2018    DR. REECE    PLANTAR FASCIA RELEASE      right    ROTATOR CUFF REPAIR Bilateral     bilateral    SELECTIVE INJECTION OF ANESTHETIC AGENT AROUND LUMBAR SPINAL NERVE ROOT BY TRANSFORAMINAL APPROACH Bilateral 1/26/2022    Procedure: Bilateral L4/5 TF IAN with RN IV sedation;  Surgeon: Mak Young MD;  Location: [facility]GT;  Service: Pain Management;  Laterality: Bilateral;    SELECTIVE INJECTION OF ANESTHETIC AGENT AROUND LUMBAR SPINAL NERVE ROOT BY TRANSFORAMINAL APPROACH Bilateral 3/14/2022    Procedure: Bilateral L4/5 TF IAN with RN IV sedation;  Surgeon: Mak Young MD;  Location: [facility];  Service: Pain Management;  Laterality: Bilateral;    SELECTIVE INJECTION OF ANESTHETIC AGENT AROUND LUMBAR SPINAL NERVE ROOT BY TRANSFORAMINAL  APPROACH Bilateral 2022    Procedure: Bilateral L4/5 TF [place]AN [place] [facility] [place]  Surgeon: Abel Haywood MD;  Location: [facility];  Service: Pain Management;  Laterality: Bilateral;    SHOULDER SURGERY Bilateral around     Dr. Pepper.  rotator cuff surgeries    VASECTOMY           Family History:  Family History   Problem Relation Age of Onset    Lung cancer Father         life long smoker    Cancer Father         prostate, lung    Arthritis Mother     Stroke Sister         TIA    Cataracts Sister     Cancer Brother         prostate    Cataracts Brother     Cataracts Sister     Melanoma Neg Hx     Psoriasis Neg Hx     Lupus Neg Hx     Eczema Neg Hx     Diabetes Neg Hx     Heart disease Neg Hx     Kidney disease Neg Hx     Colon cancer Neg Hx        Social History:  Social History     Tobacco Use    Smoking status: Former Smoker     Packs/day: 3.00     Years: 35.00     Pack years: 105.00     Types: Cigarettes     Start date: 1960     Quit date: 1985     Years since quittin.8    Smokeless tobacco: Never Used   Substance and Sexual Activity    Alcohol use: Yes     Alcohol/week: 3.0 standard drinks     Types: 3 Cans of beer per week     Comment: socially    Drug use: No    Sexual activity: Yes     Partners: Female     Birth control/protection: None        Review of Systems     Review of Systems   Constitutional: Negative for chills and fever.   HENT: Negative for sore throat.    Respiratory: Negative for cough, shortness of breath and wheezing.    Cardiovascular: Positive for chest pain (left anterior with no radiation). Negative for palpitations and leg swelling.   Gastrointestinal: Negative for nausea.   Genitourinary: Negative for dysuria.   Musculoskeletal: Negative for back pain.   Skin: Negative for rash.   Neurological: Positive for weakness and light-headedness.   Hematological: Does not bruise/bleed easily.   All other systems reviewed and are  negative.     Physical Exam     Initial Vitals [06/07/22 1551]   BP Pulse Resp Temp SpO2   139/70 63 16 98.2 °F (36.8 °C) 98 %      MAP       --          Physical Exam  Nursing Notes and Vital Signs Reviewed.  Constitutional: Patient is in no acute distress. Well-developed and well-nourished.  Head: Atraumatic. Normocephalic.  Eyes: PERRL. EOM intact. Conjunctivae are not pale. No scleral icterus.  ENT: Mucous membranes are moist. Oropharynx is clear and symmetric.    Neck: Supple. Full ROM. No lymphadenopathy.  Cardiovascular: Regular rate. Regular rhythm. No murmurs, rubs, or gallops. Distal pulses are 2+ and symmetric.  Pulmonary/Chest: No respiratory distress. Clear to auscultation bilaterally. No wheezing or rales.  Abdominal: Soft and non-distended.  There is no tenderness.  No rebound, guarding, or rigidity. Good bowel sounds.  Genitourinary: No CVA tenderness  Musculoskeletal: Moves all extremities. No obvious deformities. No edema. No calf tenderness.  Skin: Warm and dry.  Neurological:  Alert, awake, and appropriate.  Normal speech.  No acute focal neurological deficits are appreciated.  Psychiatric: Normal affect. Good eye contact. Appropriate in content.     ED Course   Procedures  ED Vital Signs:  Vitals:    06/07/22 1551   BP: 139/70   Pulse: 63   Resp: 16   Temp: 98.2 °F (36.8 °C)   TempSrc: Oral   SpO2: 98%   Weight: 89.8 kg (197 lb 13.8 oz)       Abnormal Lab Results:  Labs Reviewed   CBC W/ AUTO DIFFERENTIAL - Abnormal; Notable for the following components:       Result Value    RBC 4.16 (*)     Hemoglobin 13.1 (*)     Hematocrit 38.7 (*)     MCH 31.5 (*)     Platelets 141 (*)     MPV 8.1 (*)     Immature Granulocytes 1.5 (*)     Immature Grans (Abs) 0.09 (*)     Lymph # 0.9 (*)     Gran % 74.2 (*)     Lymph % 14.5 (*)     All other components within normal limits   COMPREHENSIVE METABOLIC PANEL - Abnormal; Notable for the following components:    CO2 21 (*)     ALT 8 (*)     eGFR if non   American 55 (*)     All other components within normal limits   TROPONIN I   SARS-COV-2 RDRP GENE        All Lab Results:  Results for orders placed or performed during the hospital encounter of 06/07/22   CBC auto differential   Result Value Ref Range    WBC 6.19 3.90 - 12.70 K/uL    RBC 4.16 (L) 4.60 - 6.20 M/uL    Hemoglobin 13.1 (L) 14.0 - 18.0 g/dL    Hematocrit 38.7 (L) 40.0 - 54.0 %    MCV 93 82 - 98 fL    MCH 31.5 (H) 27.0 - 31.0 pg    MCHC 33.9 32.0 - 36.0 g/dL    RDW 12.3 11.5 - 14.5 %    Platelets 141 (L) 150 - 450 K/uL    MPV 8.1 (L) 9.2 - 12.9 fL    Immature Granulocytes 1.5 (H) 0.0 - 0.5 %    Gran # (ANC) 4.6 1.8 - 7.7 K/uL    Immature Grans (Abs) 0.09 (H) 0.00 - 0.04 K/uL    Lymph # 0.9 (L) 1.0 - 4.8 K/uL    Mono # 0.5 0.3 - 1.0 K/uL    Eos # 0.1 0.0 - 0.5 K/uL    Baso # 0.05 0.00 - 0.20 K/uL    nRBC 0 0 /100 WBC    Gran % 74.2 (H) 38.0 - 73.0 %    Lymph % 14.5 (L) 18.0 - 48.0 %    Mono % 8.2 4.0 - 15.0 %    Eosinophil % 0.8 0.0 - 8.0 %    Basophil % 0.8 0.0 - 1.9 %    Differential Method Automated    Comprehensive metabolic panel   Result Value Ref Range    Sodium 136 136 - 145 mmol/L    Potassium 4.3 3.5 - 5.1 mmol/L    Chloride 103 95 - 110 mmol/L    CO2 21 (L) 23 - 29 mmol/L    Glucose 97 70 - 110 mg/dL    BUN 17 8 - 23 mg/dL    Creatinine 1.2 0.5 - 1.4 mg/dL    Calcium 8.8 8.7 - 10.5 mg/dL    Total Protein 6.6 6.0 - 8.4 g/dL    Albumin 4.1 3.5 - 5.2 g/dL    Total Bilirubin 0.8 0.1 - 1.0 mg/dL    Alkaline Phosphatase 55 55 - 135 U/L    AST 12 10 - 40 U/L    ALT 8 (L) 10 - 44 U/L    Anion Gap 12 8 - 16 mmol/L    eGFR if African American >60 >60 mL/min/1.73 m^2    eGFR if non African American 55 (A) >60 mL/min/1.73 m^2   Troponin I   Result Value Ref Range    Troponin I 0.006 0.000 - 0.026 ng/mL         Imaging Results:  Imaging Results          X-Ray Chest PA And Lateral (Final result)  Result time 06/07/22 17:34:15    Final result by Britt Baeza MD (06/07/22 17:34:15)                  Impression:      No acute process seen      Electronically signed by: Aryan Britt  Date:    06/07/2022  Time:    17:34             Narrative:    EXAMINATION:  XR CHEST PA AND LATERAL    CLINICAL HISTORY:  Chest pain, unspecified    TECHNIQUE:  PA and lateral views of the chest were performed.    COMPARISON:  None    FINDINGS:  Lungs are clear.  No acute osseous injury.  Cardiac silhouette is mildly prominent                                 The EKG was ordered, reviewed, and independently interpreted by the ED provider.  Interpretation time: 15:53  Rate: 59 BPM  Rhythm: sinus bradycardia  Interpretation: Left axis deviation. Incomplete RBBB. No STEMI.           The Emergency Provider reviewed the vital signs and test results, which are outlined above.     ED Discussion     6:53 PM: Discussed pt's case with Dr. España (Cardiology) who agrees with admission and repeat troponin if pt desires sooner discharge.    7:49 PM: Discussed case with Debra Abreu NP (Hospital Medicine). Dr. Maldonado agrees with current care and management of pt and accepts admission.   Admitting Service: Hospital Medicine  Admitting Physician: Dr. Maldonado  Admit to: Obs    7:51 PM: Re-evaluated pt. I have discussed test results, shared treatment plan, and the need for admission with patient and family at bedside. Pt and family express understanding at this time and agree with all information. All questions answered. Pt and family have no further questions or concerns at this time. Pt is ready for admit.             Medical Decision Making:   Clinical Tests:   Lab Tests: Ordered and Reviewed  Radiological Study: Ordered and Reviewed  Medical Tests: Ordered and Reviewed           ED Medication(s):  Medications   nitroGLYCERIN SL tablet 0.4 mg (has no administration in time range)   acetaminophen tablet 1,000 mg (has no administration in time range)   aspirin EC tablet 325 mg (325 mg Oral Given 6/7/22 4420)       New Prescriptions    No medications  on file               Scribe Attestation:   Scribe #1: I performed the above scribed service and the documentation accurately describes the services I performed. I attest to the accuracy of the note.     Attending:   Physician Attestation Statement for Scribe #1: I, Johnny Soriano Do, MD, personally performed the services described in this documentation, as scribed by Chevy Clarke, in my presence, and it is both accurate and complete.           Clinical Impression       ICD-10-CM ICD-9-CM   1. Chest pain, unspecified type  R07.9 786.50   2. Chest pain  R07.9 786.50       Disposition:   Disposition: Placed in Observation  Condition: Fair         Johnny Soriano Do, MD  06/07/22 3194

## 2022-06-07 NOTE — FIRST PROVIDER EVALUATION
Medical screening exam completed.  I have conducted a focused provider triage encounter, findings are as follows:    Brief history of present illness:  Pt presents with c/o left sided chest pain x2 days.    Vitals:    06/07/22 1551   BP: 139/70   BP Location: Left arm   Patient Position: Sitting   Pulse: 63   Resp: 16   Temp: 98.2 °F (36.8 °C)   TempSrc: Oral   SpO2: 98%   Weight: 89.8 kg (197 lb 13.8 oz)       Pertinent physical exam:      Brief workup plan:      Preliminary workup initiated; this workup will be continued and followed by the physician or advanced practice provider that is assigned to the patient when roomed.

## 2022-06-08 VITALS
DIASTOLIC BLOOD PRESSURE: 69 MMHG | HEART RATE: 57 BPM | TEMPERATURE: 98 F | RESPIRATION RATE: 18 BRPM | OXYGEN SATURATION: 97 % | HEIGHT: 70 IN | BODY MASS INDEX: 28.2 KG/M2 | SYSTOLIC BLOOD PRESSURE: 126 MMHG | WEIGHT: 197 LBS

## 2022-06-08 PROBLEM — R07.9 CHEST PAIN: Status: RESOLVED | Noted: 2022-06-07 | Resolved: 2022-06-08

## 2022-06-08 LAB
ANION GAP SERPL CALC-SCNC: 9 MMOL/L (ref 8–16)
AORTIC ROOT ANNULUS: 3.87 CM
ASCENDING AORTA: 3.39 CM
AV INDEX (PROSTH): 0.96
AV MEAN GRADIENT: 7 MMHG
AV PEAK GRADIENT: 13 MMHG
AV REGURGITATION PRESSURE HALF TIME: 832.18 MS
AV VALVE AREA: 3.56 CM2
AV VELOCITY RATIO: 0.79
BILIRUB UR QL STRIP: NEGATIVE
BSA FOR ECHO PROCEDURE: 2.1 M2
BUN SERPL-MCNC: 19 MG/DL (ref 8–23)
CALCIUM SERPL-MCNC: 9 MG/DL (ref 8.7–10.5)
CHLORIDE SERPL-SCNC: 108 MMOL/L (ref 95–110)
CHOLEST SERPL-MCNC: 132 MG/DL (ref 120–199)
CHOLEST/HDLC SERPL: 2.4 {RATIO} (ref 2–5)
CLARITY UR: CLEAR
CO2 SERPL-SCNC: 24 MMOL/L (ref 23–29)
COLOR UR: YELLOW
CREAT SERPL-MCNC: 1.2 MG/DL (ref 0.5–1.4)
CV ECHO LV RWT: 0.75 CM
DOP CALC AO PEAK VEL: 1.79 M/S
DOP CALC AO VTI: 39.2 CM
DOP CALC LVOT AREA: 3.7 CM2
DOP CALC LVOT DIAMETER: 2.17 CM
DOP CALC LVOT PEAK VEL: 1.41 M/S
DOP CALC LVOT STROKE VOLUME: 139.73 CM3
DOP CALC RVOT PEAK VEL: 0.56 M/S
DOP CALC RVOT VTI: 12.2 CM
DOP CALCLVOT PEAK VEL VTI: 37.8 CM
E WAVE DECELERATION TIME: 295.2 MSEC
E/A RATIO: 0.69
E/E' RATIO: 8.47 M/S
ECHO LV POSTERIOR WALL: 1.71 CM (ref 0.6–1.1)
EJECTION FRACTION: 60 %
EST. GFR  (AFRICAN AMERICAN): >60 ML/MIN/1.73 M^2
EST. GFR  (NON AFRICAN AMERICAN): 55 ML/MIN/1.73 M^2
FRACTIONAL SHORTENING: 35 % (ref 28–44)
GLUCOSE SERPL-MCNC: 93 MG/DL (ref 70–110)
GLUCOSE UR QL STRIP: NEGATIVE
HDLC SERPL-MCNC: 56 MG/DL (ref 40–75)
HDLC SERPL: 42.4 % (ref 20–50)
HGB UR QL STRIP: ABNORMAL
INTERVENTRICULAR SEPTUM: 1.59 CM (ref 0.6–1.1)
IVC DIAMETER: 1.09 CM
IVRT: 105.61 MSEC
KETONES UR QL STRIP: NEGATIVE
LA MAJOR: 5.72 CM
LA MINOR: 2.96 CM
LDLC SERPL CALC-MCNC: 58.4 MG/DL (ref 63–159)
LEFT ATRIUM SIZE: 3.26 CM
LEFT INTERNAL DIMENSION IN SYSTOLE: 2.96 CM (ref 2.1–4)
LEFT VENTRICLE DIASTOLIC VOLUME INDEX: 45.79 ML/M2
LEFT VENTRICLE DIASTOLIC VOLUME: 94.78 ML
LEFT VENTRICLE MASS INDEX: 157 G/M2
LEFT VENTRICLE SYSTOLIC VOLUME INDEX: 16.3 ML/M2
LEFT VENTRICLE SYSTOLIC VOLUME: 33.75 ML
LEFT VENTRICULAR INTERNAL DIMENSION IN DIASTOLE: 4.55 CM (ref 3.5–6)
LEFT VENTRICULAR MASS: 324.7 G
LEUKOCYTE ESTERASE UR QL STRIP: NEGATIVE
LV LATERAL E/E' RATIO: 8 M/S
LV SEPTAL E/E' RATIO: 9 M/S
LVOT MG: 5.14 MMHG
LVOT MV: 1.08 CM/S
MV PEAK A VEL: 1.05 M/S
MV PEAK E VEL: 0.72 M/S
MV STENOSIS PRESSURE HALF TIME: 85.61 MS
MV VALVE AREA P 1/2 METHOD: 2.57 CM2
NITRITE UR QL STRIP: NEGATIVE
NONHDLC SERPL-MCNC: 76 MG/DL
PH UR STRIP: 6 [PH] (ref 5–8)
PISA AR MAX VEL: 5.08 M/S
PISA TR MAX VEL: 3.36 M/S
POTASSIUM SERPL-SCNC: 4.4 MMOL/L (ref 3.5–5.1)
PROT UR QL STRIP: NEGATIVE
PV MEAN GRADIENT: 0.51 MMHG
RA MAJOR: 4.47 CM
RA WIDTH: 3.03 CM
SARS-COV-2 RDRP RESP QL NAA+PROBE: NEGATIVE
SINUS: 3.58 CM
SODIUM SERPL-SCNC: 141 MMOL/L (ref 136–145)
SP GR UR STRIP: <=1.005 (ref 1–1.03)
STJ: 3.29 CM
TDI LATERAL: 0.09 M/S
TDI SEPTAL: 0.08 M/S
TDI: 0.09 M/S
TR MAX PG: 45 MMHG
TRICUSPID ANNULAR PLANE SYSTOLIC EXCURSION: 2.45 CM
TRIGL SERPL-MCNC: 88 MG/DL (ref 30–150)
TROPONIN I SERPL DL<=0.01 NG/ML-MCNC: 0.01 NG/ML (ref 0–0.03)
URN SPEC COLLECT METH UR: ABNORMAL
UROBILINOGEN UR STRIP-ACNC: NEGATIVE EU/DL

## 2022-06-08 PROCEDURE — 81003 URINALYSIS AUTO W/O SCOPE: CPT | Performed by: NURSE PRACTITIONER

## 2022-06-08 PROCEDURE — 36415 COLL VENOUS BLD VENIPUNCTURE: CPT | Performed by: NURSE PRACTITIONER

## 2022-06-08 PROCEDURE — 99220 PR INITIAL OBSERVATION CARE,LEVL III: CPT | Mod: ,,, | Performed by: INTERNAL MEDICINE

## 2022-06-08 PROCEDURE — G0378 HOSPITAL OBSERVATION PER HR: HCPCS

## 2022-06-08 PROCEDURE — 99220 PR INITIAL OBSERVATION CARE,LEVL III: ICD-10-PCS | Mod: ,,, | Performed by: INTERNAL MEDICINE

## 2022-06-08 PROCEDURE — 25000003 PHARM REV CODE 250: Performed by: NURSE PRACTITIONER

## 2022-06-08 PROCEDURE — 80048 BASIC METABOLIC PNL TOTAL CA: CPT | Performed by: NURSE PRACTITIONER

## 2022-06-08 PROCEDURE — 80061 LIPID PANEL: CPT | Performed by: NURSE PRACTITIONER

## 2022-06-08 PROCEDURE — U0002 COVID-19 LAB TEST NON-CDC: HCPCS | Performed by: NURSE PRACTITIONER

## 2022-06-08 RX ORDER — AMLODIPINE BESYLATE 5 MG/1
5 TABLET ORAL 2 TIMES DAILY
Qty: 60 TABLET | Refills: 11 | Status: SHIPPED | OUTPATIENT
Start: 2022-06-08 | End: 2022-08-04 | Stop reason: SDUPTHER

## 2022-06-08 RX ORDER — CARBAMAZEPINE 100 MG/1
200 CAPSULE, EXTENDED RELEASE ORAL 2 TIMES DAILY
Status: DISCONTINUED | OUTPATIENT
Start: 2022-06-08 | End: 2022-06-08

## 2022-06-08 RX ADMIN — CARBAMAZEPINE 200 MG: 100 CAPSULE, EXTENDED RELEASE ORAL at 11:06

## 2022-06-08 RX ADMIN — ATORVASTATIN CALCIUM 40 MG: 40 TABLET, FILM COATED ORAL at 08:06

## 2022-06-08 RX ADMIN — AMLODIPINE BESYLATE 5 MG: 5 TABLET ORAL at 08:06

## 2022-06-08 RX ADMIN — FINASTERIDE 5 MG: 5 TABLET, FILM COATED ORAL at 08:06

## 2022-06-08 RX ADMIN — PANTOPRAZOLE SODIUM 40 MG: 40 TABLET, DELAYED RELEASE ORAL at 08:06

## 2022-06-08 RX ADMIN — CLOPIDOGREL 75 MG: 75 TABLET, FILM COATED ORAL at 08:06

## 2022-06-08 RX ADMIN — ASPIRIN 81 MG: 81 TABLET, COATED ORAL at 08:06

## 2022-06-08 NOTE — ASSESSMENT & PLAN NOTE
82 y.o. male patient with a PMHx of cancer, TIA, CKD, HLD, and HTN presents with atypical CP. EKG wnl. Cardiac enzymes normal.  Sx at rest. Pt walks on treadmill 1 mile without sx.    Recommend nuclear stress test, we can schedule as outpatient.  F/u Dr. Thompson 2 weeks

## 2022-06-08 NOTE — CONSULTS
[facility] - Telemetry ([facility])  Cardiology  Consult Note    Patient Name: Ezequiel Hughes  MRN: [id number]  Admission Date: 6/7/2022  Hospital Length of Stay: 0 days  Code Status: Full Code   Attending Provider: Raysa Velarde MD   Consulting Provider: Tomas Ramos MD  Primary Care Physician: Valery Ozuna MD  Principal Problem:Chest pain    Patient information was obtained from patient and ER records.     Consults  Subjective:     Chief Complaint:  CP     HPI:   82 y.o. male patient with a PMHx of cancer, TIA, CKD, HLD, and HTN presents with atypical CP. EKG wnl. Cardiac enzymes normal.  Sx at rest. Pt walks on treadmill 1 mile without sx.      Past Medical History:   Diagnosis Date    Allergic rhinitis     Anemia     Back pain     BPH (benign prostatic hyperplasia)     Cancer     skin cancer to neck, Dr. Graves    Cataract     Disorder of kidney and ureter     ED (erectile dysfunction)     Hiatal hernia     small    History of colon polyps     colonoscopy 11/2016    HLD (hyperlipidemia)     Hyperlipidemia     Hypertension     Lateral epicondylitis     Lumbar radiculopathy 1/26/2022    OA (osteoarthritis)     GUIDO (obstructive sleep apnea)     Prostate cancer     Dr. Wong    TIA (transient ischemic attack)     Trouble in sleeping     Urge incontinence 3/29/2022       Past Surgical History:   Procedure Laterality Date    CATARACT EXTRACTION W/  INTRAOCULAR LENS IMPLANT Right 02/21/2018    I-Stent    CATARACT EXTRACTION W/  INTRAOCULAR LENS IMPLANT Left 03/21/2018    I - Stent    COLONOSCOPY N/A 11/14/2016    Procedure: COLONOSCOPY;  Surgeon: Karuna Rodriguez MD;  Location: [facility];  Service: Endoscopy;  Laterality: N/A;    COLONOSCOPY N/A 9/22/2020    Procedure: COLONOSCOPY;  Surgeon: Chuy Fish MD;  Location: [facility];  Service: Endoscopy;  Laterality: N/A;    EYE SURGERY      HEMORRHOID SURGERY      I-STENT Right 02/21/2018    DR. REECE    KNEE ARTHROSCOPY W/  MENISCAL REPAIR Right 2015    Dr. Jain    PCIOL Right 02/21/2018    DR. REECE    PLANTAR FASCIA RELEASE      right    ROTATOR CUFF REPAIR Bilateral     bilateral    SELECTIVE INJECTION OF ANESTHETIC AGENT AROUND LUMBAR SPINAL NERVE ROOT BY TRANSFORAMINAL APPROACH Bilateral 1/26/2022    Procedure: Bilateral L4/5 TF IAN with RN IV sedation;  Surgeon: Mak Young MD;  Location: [facility];  Service: Pain Management;  Laterality: Bilateral;    SELECTIVE INJECTION OF ANESTHETIC AGENT AROUND LUMBAR SPINAL NERVE ROOT BY TRANSFORAMINAL APPROACH Bilateral 3/14/2022    Procedure: Bilateral L4/5 TF IAN with RN IV sedation;  Surgeon: Mak Young MD;  Location: [facility];  Service: Pain Management;  Laterality: Bilateral;    SELECTIVE INJECTION OF ANESTHETIC AGENT AROUND LUMBAR SPINAL NERVE ROOT BY TRANSFORAMINAL APPROACH Bilateral 6/2/2022    Procedure: Bilateral L4/5 TF IAN - D2P Dr. Matthew NEGRETE;  Surgeon: Abel Haywood MD;  Location: [facility];  Service: Pain Management;  Laterality: Bilateral;    SHOULDER SURGERY Bilateral around 2000    Dr. Pepper.  rotator cuff surgeries    VASECTOMY         Review of patient's allergies indicates:   Allergen Reactions    Atarax [hydroxyzine hcl] Other (See Comments)     Raised blood pressure     Zyrtec [cetirizine] Other (See Comments)     Raised blood pressure     Gold sodium thiosulfate      Patch test positive    Meloxicam Rash     Other reaction(s): hypertension       Current Facility-Administered Medications on File Prior to Encounter   Medication    ondansetron injection 4 mg     Current Outpatient Medications on File Prior to Encounter   Medication Sig    acetaminophen (TYLENOL ARTHRITIS PAIN ORAL) Take by mouth. Patient states that he takes 2 tablets in the morning and 2 tablets in the evening    acetaminophen (TYLENOL ORAL) 1000  .    albuterol (PROVENTIL/VENTOLIN HFA) 90 mcg/actuation inhaler Inhale 2 puffs into the lungs every 6 (six)  hours as needed.    albuterol (PROVENTIL/VENTOLIN HFA) 90 mcg/actuation inhaler     allopurinoL (ZYLOPRIM) 100 MG tablet Take 100 mg by mouth.    amLODIPine (NORVASC) 2.5 MG tablet 2 tablets in am and 2 tablets in pm for blood pressure    atorvastatin (LIPITOR) 40 MG tablet TAKE 1 TABLET EVERY DAY    cholecalciferol, vitamin D3, (VITAMIN D3) 50 mcg (2,000 unit) Cap Take 1 capsule by mouth once daily.    clopidogreL (PLAVIX) 75 mg tablet TAKE 1 TABLET EVERY DAY    famotidine (PEPCID) 20 MG tablet Take 1 tablet (20 mg total) by mouth 2 (two) times daily.    finasteride (PROSCAR) 5 mg tablet Take 1 tablet (5 mg total) by mouth once daily.    FLUARIX QUAD 9603-7900, PF, 60 mcg (15 mcg x 4)/0.5 mL Syrg     fluticasone propionate (FLONASE) 50 mcg/actuation nasal spray USE 2 SPRAYS IN EACH NOSTRIL ONE TIME DAILY  (SUBSTITUTED FOR FLONASE)    furosemide (LASIX) 20 MG tablet Take 1 tablet (20 mg total) by mouth daily as needed (leg swelling).    losartan (COZAAR) 50 MG tablet TAKE 1 TABLET TWICE DAILY    meclizine (ANTIVERT) 25 mg tablet Take 1 tablet (25 mg total) by mouth 3 (three) times daily as needed for Dizziness.    methocarbamoL (ROBAXIN) 750 MG Tab Take 1 tablet (750 mg total) by mouth 3 (three) times daily as needed (muscle spasms).    pantoprazole (PROTONIX) 40 MG tablet TAKE 1 TABLET EVERY DAY    sildenafiL (VIAGRA) 100 MG tablet Take 1 po 45 minutes before intercourse    tamsulosin (FLOMAX) 0.4 mg Cap Take 1 capsule by mouth once daily.    traMADoL (ULTRAM) 50 mg tablet Take 1 tablet (50 mg total) by mouth every 6 (six) hours as needed for Pain.     Family History       Problem Relation (Age of Onset)    Arthritis Mother    Cancer Father, Brother    Cataracts Sister, Brother, Sister    Lung cancer Father    Stroke Sister          Tobacco Use    Smoking status: Former Smoker     Packs/day: 3.00     Years: 35.00     Pack years: 105.00     Types: Cigarettes     Start date: 1/1/1960     Quit date:  1985     Years since quittin.9    Smokeless tobacco: Never Used   Substance and Sexual Activity    Alcohol use: Yes     Alcohol/week: 3.0 standard drinks     Types: 3 Cans of beer per week     Comment: socially    Drug use: No    Sexual activity: Yes     Partners: Female     Birth control/protection: None     ROS  Objective:     Vital Signs (Most Recent):  Temp: 97.8 °F (36.6 °C) (22 1115)  Pulse: (Abnormal) 57 (22 1300)  Resp: 18 (22 1115)  BP: 126/69 (22 1115)  SpO2: 97 % (22 1115)   Vital Signs (24h Range):  Temp:  [96.7 °F (35.9 °C)-98.2 °F (36.8 °C)] 97.8 °F (36.6 °C)  Pulse:  [55-78] 57  Resp:  [16-18] 18  SpO2:  [96 %-99 %] 97 %  BP: (116-141)/(57-70) 126/69     Weight: 89.4 kg (197 lb)  Body mass index is 28.27 kg/m².    SpO2: 97 %  O2 Device (Oxygen Therapy): room air      Intake/Output Summary (Last 24 hours) at 2022 1401  Last data filed at 2022 1000  Gross per 24 hour   Intake 300 ml   Output no documentation   Net 300 ml       Lines/Drains/Airways       Peripheral Intravenous Line       Name Duration         Peripheral IV - Single Lumen 22 1620 20 G Left Antecubital <1 day                    Physical Exam    Significant Labs: All pertinent lab results from the last 24 hours have been reviewed.    Significant Imaging: X-Ray: CXR: X-Ray Chest PA and Lateral (CXR):   Results for orders placed or performed during the hospital encounter of 22   X-Ray Chest PA And Lateral    Narrative    EXAMINATION:  XR CHEST PA AND LATERAL    CLINICAL HISTORY:  Chest pain, unspecified    TECHNIQUE:  PA and lateral views of the chest were performed.    COMPARISON:  None    FINDINGS:  Lungs are clear.  No acute osseous injury.  Cardiac silhouette is mildly prominent      Impression    No acute process seen      Electronically signed by: Aryan De La Torre  Date:    2022  Time:    17:34     Assessment and Plan:     * Chest pain  82 y.o. male patient with a PMHx  of cancer, TIA, CKD, HLD, and HTN presents with atypical CP. EKG wnl. Cardiac enzymes normal.  Sx at rest. Pt walks on treadmill 1 mile without sx.    Recommend nuclear stress test, we can schedule as outpatient.  F/u Dr. Thompson 2 weeks        VTE Risk Mitigation (From admission, onward)         Ordered     IP VTE HIGH RISK PATIENT  Once         06/07/22 2107     Place sequential compression device  Until discontinued         06/07/22 2107                Thank you for your consult. I will sign off. Please contact us if you have any additional questions.    Tomas Ramos MD  Cardiology   [facility] - Telemetry ([facility])

## 2022-06-08 NOTE — HPI
82 y.o. male patient with a PMHx of cancer, TIA, CKD, HLD, and HTN presents with atypical CP. EKG wnl. Cardiac enzymes normal.  Sx at rest. Pt walks on treadmill 1 mile without sx.

## 2022-06-08 NOTE — DISCHARGE SUMMARY
[facility] Telemetry ([facility])  [facility] Medicine  Discharge Summary      Patient Name: Ezequiel Hughes  MRN: [id number]  Patient Class: OP- Observation  Admission Date: 6/7/2022  Hospital Length of Stay: 0 days  Discharge Date and Time:  06/08/2022 3:04 PM  Attending Physician: Raysa Velarde MD   Discharging Provider: Maury Nur NP  Primary Care Provider: Valery Ozuna MD      HPI:   Ezequiel Hughes is a 82 y.o. male patient with a PMHx of cancer, TIA, CKD, HLD, and HTN who presents to the Emergency Department for evaluation of left anterior CP with no radiation which onset gradually a week ago. Pt states that he has had 2 MRI the last couple of weeks, which showed that there was no damage to the brain. He also states that this CP is similar to one he had years ago, where he was told it was gas. Pt. follows with Dr. Thompson. Dr. Thompson performed a stress test last year which showed some concerns, but a nuclear stress test was performed after which did not show any concerns. Associated sxs include weakness and light-headedness. Patient denies any leg swelling, palpitations, SOB, wheezing, cough, fever, chills, and all other sxs at this time.    In the ED, CBC and CMP unremarkable; Troponin 0.006; CXR negative.     Patient placed in observation for ACS r/o under the care of hospital medicine.         * No surgery found *      Hospital Course:   6/8/22 No acute events overnight. The patient denies any further chest pain. Troponins were negative. ECHO was also negative and showed an EF of 60%. Cardiology was consulted and recommended a NM stress test and follow up with Dr. Thompson in 2 weeks. The patient was seen and examined today and deemed stable for discharge. The patient is discharging home and will follow up with his PCP and with Cardiology.         Goals of Care Treatment Preferences:  Code Status: Full Code      Consults:   Consults (From admission, onward)        Status Ordering Provider     Inpatient consult to  Cardiology  Once        Provider:  Tomas Ramos MD    Completed [facility]          No new Assessment & Plan notes have been filed under this hospital service since the last note was generated.  Service: Hospital Medicine    Final Active Diagnoses:    Diagnosis Date Noted POA    Essential hypertension [I10] 07/23/2013 Yes     Chronic    BPH (benign prostatic hyperplasia) [N40.0] 07/23/2013 Yes    Mixed hyperlipidemia [E78.2] 07/23/2013 Yes     Chronic      Problems Resolved During this Admission:    Diagnosis Date Noted Date Resolved POA    PRINCIPAL PROBLEM:  Chest pain [R07.9] 06/07/2022 06/08/2022 Yes       Discharged Condition: stable    Disposition: Home or Self Care    Follow Up:   Follow-up Information     Valery Ozuna MD. Schedule an appointment as soon as possible for a visit in 1 week(s).    Specialty: Family Medicine  Why: Hospital follow up  Contact information:  [address]  [address]  [place] [address]  [phone]             Lamar Thompson MD. Schedule an appointment as soon as possible for a visit in 2 week(s).    Specialties: Interventional Cardiology, Cardiology  Why: Hospital follow up/NM stress test  Contact information:  [address]  [place] [address] [phone]                       Patient Instructions:   No discharge procedures on file.    Significant Diagnostic Studies:   Imaging Results          X-Ray Chest PA And Lateral (Final result)  Result time 06/07/22 17:34:15    Final result by Britt Baeza MD (06/07/22 17:34:15)                 Impression:      No acute process seen      Electronically signed by: Aryan De La Torre  Date:    06/07/2022  Time:    17:34             Narrative:    EXAMINATION:  XR CHEST PA AND LATERAL    CLINICAL HISTORY:  Chest pain, unspecified    TECHNIQUE:  PA and lateral views of the chest were performed.    COMPARISON:  None    FINDINGS:  Lungs are clear.  No acute osseous injury.  Cardiac silhouette is mildly prominent                                   Pending Diagnostic Studies:     None         Medications:  Reconciled Home Medications:      Medication List      CHANGE how you take these medications    amLODIPine 5 MG tablet  Commonly known as: NORVASC  Take 1 tablet (5 mg total) by mouth 2 (two) times daily.  What changed:   · medication strength  · how much to take  · how to take this  · when to take this  · additional instructions        CONTINUE taking these medications    * albuterol 90 mcg/actuation inhaler  Commonly known as: PROVENTIL/VENTOLIN HFA  Inhale 2 puffs into the lungs every 6 (six) hours as needed.     * albuterol 90 mcg/actuation inhaler  Commonly known as: PROVENTIL/VENTOLIN HFA     allopurinoL 100 MG tablet  Commonly known as: ZYLOPRIM  Take 100 mg by mouth.     atorvastatin 40 MG tablet  Commonly known as: LIPITOR  TAKE 1 TABLET EVERY DAY     cholecalciferol (vitamin D3) 50 mcg (2,000 unit) Cap capsule  Commonly known as: VITAMIN D3  Take 1 capsule by mouth once daily.     clopidogreL 75 mg tablet  Commonly known as: PLAVIX  TAKE 1 TABLET EVERY DAY     famotidine 20 MG tablet  Commonly known as: PEPCID  Take 1 tablet (20 mg total) by mouth 2 (two) times daily.     finasteride 5 mg tablet  Commonly known as: PROSCAR  Take 1 tablet (5 mg total) by mouth once daily.     FLUARIX QUAD 4547-3049 (PF) 60 mcg (15 mcg x 4)/0.5 mL Syrg  Generic drug: flu vacc [id number] 6mos up(PF)     fluticasone propionate 50 mcg/actuation nasal spray  Commonly known as: FLONASE  USE 2 SPRAYS IN EACH NOSTRIL ONE TIME DAILY  (SUBSTITUTED FOR FLONASE)     furosemide 20 MG tablet  Commonly known as: LASIX  Take 1 tablet (20 mg total) by mouth daily as needed (leg swelling).     losartan 50 MG tablet  Commonly known as: COZAAR  TAKE 1 TABLET TWICE DAILY     meclizine 25 mg tablet  Commonly known as: ANTIVERT  Take 1 tablet (25 mg total) by mouth 3 (three) times daily as needed for Dizziness.     methocarbamoL 750 MG Tab  Commonly known as:  ROBAXIN  Take 1 tablet (750 mg total) by mouth 3 (three) times daily as needed (muscle spasms).     pantoprazole 40 MG tablet  Commonly known as: PROTONIX  TAKE 1 TABLET EVERY DAY     sildenafiL 100 MG tablet  Commonly known as: VIAGRA  Take 1 po 45 minutes before intercourse     tamsulosin 0.4 mg Cap  Commonly known as: FLOMAX  Take 1 capsule by mouth once daily.     traMADoL 50 mg tablet  Commonly known as: ULTRAM  Take 1 tablet (50 mg total) by mouth every 6 (six) hours as needed for Pain.     * TYLENOL ARTHRITIS PAIN ORAL  Take by mouth. Patient states that he takes 2 tablets in the morning and 2 tablets in the evening     * TYLENOL ORAL  1000  .         * This list has 4 medication(s) that are the same as other medications prescribed for you. Read the directions carefully, and ask your doctor or other care provider to review them with you.                Indwelling Lines/Drains at time of discharge:   Lines/Drains/Airways     None                 Time spent on the discharge of patient: >35 minutes         Maury Nur NP  Department of Hospital Medicine  [facility] ([facility])

## 2022-06-08 NOTE — SUBJECTIVE & OBJECTIVE
Past Medical History:   Diagnosis Date    Allergic rhinitis     Anemia     Back pain     BPH (benign prostatic hyperplasia)     Cancer     skin cancer to neck, Dr. Graves    Cataract     Disorder of kidney and ureter     ED (erectile dysfunction)     Hiatal hernia     small    History of colon polyps     colonoscopy 11/2016    HLD (hyperlipidemia)     Hyperlipidemia     Hypertension     Lateral epicondylitis     Lumbar radiculopathy 1/26/2022    OA (osteoarthritis)     GUIDO (obstructive sleep apnea)     Prostate cancer     Dr. Wong    TIA (transient ischemic attack)     Trouble in sleeping     Urge incontinence 3/29/2022       Past Surgical History:   Procedure Laterality Date    CATARACT EXTRACTION W/  INTRAOCULAR LENS IMPLANT Right 02/21/2018    I-Stent    CATARACT EXTRACTION W/  INTRAOCULAR LENS IMPLANT Left 03/21/2018    I - Stent    COLONOSCOPY N/A 11/14/2016    Procedure: COLONOSCOPY;  Surgeon: Karuna Rodriguez MD;  Location: [facility];  Service: Endoscopy;  Laterality: N/A;    COLONOSCOPY N/A 9/22/2020    Procedure: COLONOSCOPY;  Surgeon: Chuy Fish MD;  Location: [facility];  Service: Endoscopy;  Laterality: N/A;    EYE SURGERY      HEMORRHOID SURGERY      I-STENT Right 02/21/2018    DR. REECE    KNEE ARTHROSCOPY W/ MENISCAL REPAIR Right 2015    Dr. Jain    PCIOL Right 02/21/2018    DR. REECE    PLANTAR FASCIA RELEASE      right    ROTATOR CUFF REPAIR Bilateral     bilateral    SELECTIVE INJECTION OF ANESTHETIC AGENT AROUND LUMBAR SPINAL NERVE ROOT BY TRANSFORAMINAL APPROACH Bilateral 1/26/2022    Procedure: Bilateral L4/5 TF IAN with RN IV sedation;  Surgeon: Mak Young MD;  Location: [facility];  Service: Pain Management;  Laterality: Bilateral;    SELECTIVE INJECTION OF ANESTHETIC AGENT AROUND LUMBAR SPINAL NERVE ROOT BY TRANSFORAMINAL APPROACH Bilateral 3/14/2022    Procedure: Bilateral L4/5 TF IAN with RN IV sedation;  Surgeon: Mak Young MD;  Location: [facility];   Service: Pain Management;  Laterality: Bilateral;    SELECTIVE INJECTION OF ANESTHETIC AGENT AROUND LUMBAR SPINAL NERVE ROOT BY TRANSFORAMINAL APPROACH Bilateral 6/2/2022    Procedure: Bilateral L4/5 TF IAN - D2P Dr. Matthew CABRERA;  Surgeon: Abel Haywood MD;  Location: [facility];  Service: Pain Management;  Laterality: Bilateral;    SHOULDER SURGERY Bilateral around 2000    Dr. Pepper.  rotator cuff surgeries    VASECTOMY         Review of patient's allergies indicates:   Allergen Reactions    Atarax [hydroxyzine hcl] Other (See Comments)     Raised blood pressure     Zyrtec [cetirizine] Other (See Comments)     Raised blood pressure     Gold sodium thiosulfate      Patch test positive    Meloxicam Rash     Other reaction(s): hypertension       Current Facility-Administered Medications on File Prior to Encounter   Medication    ondansetron injection 4 mg     Current Outpatient Medications on File Prior to Encounter   Medication Sig    acetaminophen (TYLENOL ARTHRITIS PAIN ORAL) Take by mouth. Patient states that he takes 2 tablets in the morning and 2 tablets in the evening    acetaminophen (TYLENOL ORAL) 1000  .    albuterol (PROVENTIL/VENTOLIN HFA) 90 mcg/actuation inhaler Inhale 2 puffs into the lungs every 6 (six) hours as needed.    albuterol (PROVENTIL/VENTOLIN HFA) 90 mcg/actuation inhaler     allopurinoL (ZYLOPRIM) 100 MG tablet Take 100 mg by mouth.    amLODIPine (NORVASC) 2.5 MG tablet 2 tablets in am and 2 tablets in pm for blood pressure    atorvastatin (LIPITOR) 40 MG tablet TAKE 1 TABLET EVERY DAY    cholecalciferol, vitamin D3, (VITAMIN D3) 50 mcg (2,000 unit) Cap Take 1 capsule by mouth once daily.    clopidogreL (PLAVIX) 75 mg tablet TAKE 1 TABLET EVERY DAY    famotidine (PEPCID) 20 MG tablet Take 1 tablet (20 mg total) by mouth 2 (two) times daily.    finasteride (PROSCAR) 5 mg tablet Take 1 tablet (5 mg total) by mouth once daily.    FLUARIX QUAD 3507-4737, PF, 60 mcg (15 mcg x 4)/0.5 mL  Syrg     fluticasone propionate (FLONASE) 50 mcg/actuation nasal spray USE 2 SPRAYS IN EACH NOSTRIL ONE TIME DAILY  (SUBSTITUTED FOR FLONASE)    furosemide (LASIX) 20 MG tablet Take 1 tablet (20 mg total) by mouth daily as needed (leg swelling).    losartan (COZAAR) 50 MG tablet TAKE 1 TABLET TWICE DAILY    meclizine (ANTIVERT) 25 mg tablet Take 1 tablet (25 mg total) by mouth 3 (three) times daily as needed for Dizziness.    methocarbamoL (ROBAXIN) 750 MG Tab Take 1 tablet (750 mg total) by mouth 3 (three) times daily as needed (muscle spasms).    pantoprazole (PROTONIX) 40 MG tablet TAKE 1 TABLET EVERY DAY    sildenafiL (VIAGRA) 100 MG tablet Take 1 po 45 minutes before intercourse    tamsulosin (FLOMAX) 0.4 mg Cap Take 1 capsule by mouth once daily.    traMADoL (ULTRAM) 50 mg tablet Take 1 tablet (50 mg total) by mouth every 6 (six) hours as needed for Pain.     Family History       Problem Relation (Age of Onset)    Arthritis Mother    Cancer Father, Brother    Cataracts Sister, Brother, Sister    Lung cancer Father    Stroke Sister          Tobacco Use    Smoking status: Former Smoker     Packs/day: 3.00     Years: 35.00     Pack years: 105.00     Types: Cigarettes     Start date: 1960     Quit date: 1985     Years since quittin.8    Smokeless tobacco: Never Used   Substance and Sexual Activity    Alcohol use: Yes     Alcohol/week: 3.0 standard drinks     Types: 3 Cans of beer per week     Comment: socially    Drug use: No    Sexual activity: Yes     Partners: Female     Birth control/protection: None     Review of Systems   Constitutional: Negative.    HENT: Negative.     Eyes: Negative.    Respiratory: Negative.     Cardiovascular:  Positive for chest pain.   Gastrointestinal: Negative.    Endocrine: Negative.    Genitourinary: Negative.    Musculoskeletal: Negative.    Skin: Negative.    Allergic/Immunologic: Negative.    Neurological:  Positive for weakness and light-headedness.    Hematological: Negative.    Psychiatric/Behavioral: Negative.     Objective:     Vital Signs (Most Recent):  Temp: 97.6 °F (36.4 °C) (06/07/22 2028)  Pulse: 62 (06/07/22 2028)  Resp: 18 (06/07/22 2028)  BP: 116/69 (06/07/22 2028)  SpO2: 99 % (06/07/22 2028) Vital Signs (24h Range):  Temp:  [97.6 °F (36.4 °C)-98.2 °F (36.8 °C)] 97.6 °F (36.4 °C)  Pulse:  [62-78] 62  Resp:  [16-18] 18  SpO2:  [96 %-99 %] 99 %  BP: (116-141)/(67-70) 116/69     Weight: 89.8 kg (197 lb 13.8 oz)  Body mass index is 28.39 kg/m².    Physical Exam  Vitals and nursing note reviewed.   Constitutional:       Appearance: Normal appearance. He is well-developed.   HENT:      Head: Normocephalic and atraumatic.   Eyes:      Pupils: Pupils are equal, round, and reactive to light.   Cardiovascular:      Rate and Rhythm: Normal rate and regular rhythm.      Pulses: Normal pulses.      Heart sounds: Normal heart sounds.   Pulmonary:      Effort: Pulmonary effort is normal. No tachypnea, accessory muscle usage or respiratory distress.      Breath sounds: Normal breath sounds.   Abdominal:      General: Bowel sounds are normal.      Palpations: Abdomen is soft.      Tenderness: There is no abdominal tenderness.   Musculoskeletal:         General: Normal range of motion.      Cervical back: Normal range of motion and neck supple.   Skin:     General: Skin is warm and dry.      Capillary Refill: Capillary refill takes less than 2 seconds.   Neurological:      Mental Status: He is alert and oriented to person, place, and time.      Deep Tendon Reflexes: Reflexes are normal and symmetric.   Psychiatric:         Speech: Speech normal.         Behavior: Behavior normal.         Thought Content: Thought content normal.         Judgment: Judgment normal.        Significant Labs:   Results for orders placed or performed during the hospital encounter of 06/07/22   CBC auto differential   Result Value Ref Range    WBC 6.19 3.90 - 12.70 K/uL    RBC 4.16 (L) 4.60  - 6.20 M/uL    Hemoglobin 13.1 (L) 14.0 - 18.0 g/dL    Hematocrit 38.7 (L) 40.0 - 54.0 %    MCV 93 82 - 98 fL    MCH 31.5 (H) 27.0 - 31.0 pg    MCHC 33.9 32.0 - 36.0 g/dL    RDW 12.3 11.5 - 14.5 %    Platelets 141 (L) 150 - 450 K/uL    MPV 8.1 (L) 9.2 - 12.9 fL    Immature Granulocytes 1.5 (H) 0.0 - 0.5 %    Gran # (ANC) 4.6 1.8 - 7.7 K/uL    Immature Grans (Abs) 0.09 (H) 0.00 - 0.04 K/uL    Lymph # 0.9 (L) 1.0 - 4.8 K/uL    Mono # 0.5 0.3 - 1.0 K/uL    Eos # 0.1 0.0 - 0.5 K/uL    Baso # 0.05 0.00 - 0.20 K/uL    nRBC 0 0 /100 WBC    Gran % 74.2 (H) 38.0 - 73.0 %    Lymph % 14.5 (L) 18.0 - 48.0 %    Mono % 8.2 4.0 - 15.0 %    Eosinophil % 0.8 0.0 - 8.0 %    Basophil % 0.8 0.0 - 1.9 %    Differential Method Automated    Comprehensive metabolic panel   Result Value Ref Range    Sodium 136 136 - 145 mmol/L    Potassium 4.3 3.5 - 5.1 mmol/L    Chloride 103 95 - 110 mmol/L    CO2 21 (L) 23 - 29 mmol/L    Glucose 97 70 - 110 mg/dL    BUN 17 8 - 23 mg/dL    Creatinine 1.2 0.5 - 1.4 mg/dL    Calcium 8.8 8.7 - 10.5 mg/dL    Total Protein 6.6 6.0 - 8.4 g/dL    Albumin 4.1 3.5 - 5.2 g/dL    Total Bilirubin 0.8 0.1 - 1.0 mg/dL    Alkaline Phosphatase 55 55 - 135 U/L    AST 12 10 - 40 U/L    ALT 8 (L) 10 - 44 U/L    Anion Gap 12 8 - 16 mmol/L    eGFR if African American >60 >60 mL/min/1.73 m^2    eGFR if non African American 55 (A) >60 mL/min/1.73 m^2   Troponin I   Result Value Ref Range    Troponin I 0.006 0.000 - 0.026 ng/mL         Significant Imaging:   Imaging Results              X-Ray Chest PA And Lateral (Final result)  Result time 06/07/22 17:34:15      Final result by Britt Baeza MD (06/07/22 17:34:15)                   Impression:      No acute process seen      Electronically signed by: Aryan De La Torre  Date:    06/07/2022  Time:    17:34               Narrative:    EXAMINATION:  XR CHEST PA AND LATERAL    CLINICAL HISTORY:  Chest pain, unspecified    TECHNIQUE:  PA and lateral views of the chest were  performed.    COMPARISON:  None    FINDINGS:  Lungs are clear.  No acute osseous injury.  Cardiac silhouette is mildly prominent

## 2022-06-08 NOTE — ASSESSMENT & PLAN NOTE
Troponin 0.006. EKG unrevealing.    - ASA, BB, and statin.    - Trend serial cardiac enzymes and EKG.  - Check FLP.  - Will obtain 2D echo.  - Cardiology consult pending clinical course

## 2022-06-08 NOTE — NURSING
Discharge instructions received and reviewed with pt and family at bedside.  Pt voiced understanding and all questions answered to satisfaction.  Stressed importance to making and keeping all follow up appointments.  Medications sent to pt pharmacy and reviewed with pt.  Tele monitor removed and brought to monitor tech.  IV d/c'd with tip intact, pressure dressing applied.  Pt awaiting med delivery and dressing.

## 2022-06-08 NOTE — CONSULTS
[facility]  Cardiology  Consult Note    Patient Name: Ezequiel Hughes  MRN: [id number]  Admission Date: 6/7/2022  Hospital Length of Stay: 0 days  Code Status: Full Code   Attending Provider: Raysa Velarde MD   Consulting Provider: Tomas Ramos MD  Primary Care Physician: Valery Ozuna MD  Principal Problem:Chest pain    Patient information was obtained from patient and ER records.     Inpatient consult to Cardiology  Consult performed by: Tomas Ramos MD  Consult ordered by: Maury Nur NP        Subjective:     Chief Complaint:  CP     HPI:   82 y.o. male patient with a PMHx of cancer, TIA, CKD, HLD, and HTN presents with atypical CP. EKG wnl. Cardiac enzymes normal.  Sx at rest. Pt walks on treadmill 1 mile without sx.      No new subjective & objective note has been filed under this hospital service since the last note was generated.    Assessment and Plan:     * Chest pain  82 y.o. male patient with a PMHx of cancer, TIA, CKD, HLD, and HTN presents with atypical CP. EKG wnl. Cardiac enzymes normal.  Sx at rest. Pt walks on treadmill 1 mile without sx.    Recommend nuclear stress test, we can schedule as outpatient.  F/u Dr. Thompson 2 weeks        VTE Risk Mitigation (From admission, onward)         Ordered     IP VTE HIGH RISK PATIENT  Once         06/07/22 2107     Place sequential compression device  Until discontinued         06/07/22 2107                Thank you for your consult. I will sign off. Please contact us if you have any additional questions.    Tomas Ramos MD  Cardiology   [facility])

## 2022-06-08 NOTE — PLAN OF CARE
[facility] - Telemetry (Hospital)  Initial Discharge Assessment       Primary Care Provider: Valery Ozuna MD    Admission Diagnosis: Chest pain [R07.9]  Chest pain, unspecified type [R07.9]    Admission Date: 6/7/2022  Expected Discharge Date:     Discharge Barriers Identified: None    Payor: [employer] MANAGED MEDICARE / Plan: [employer] MEDICARE HMO / Product Type: Capitation /     Extended Emergency Contact Information  Primary Emergency Contact: SaulGerda   [place]  Mobile Phone: [phone]  Relation: Significant other  Secondary Emergency Contact: Christie Petersen   [place]  Mobile Phone: [phone]  Relation: Daughter    Discharge Plan A: Home         [facility] - [place], [place] - [address]  [address] [facility] [address]  Phone: [phone] Fax: [phone]    [facility] - [place] - [address]  [address]  [facility] [place] [facility] [address]  Phone: [phone] Fax: [phone]      Initial Assessment (most recent)     Adult Discharge Assessment - 06/08/22 1319        Discharge Assessment    Assessment Type Discharge Planning Assessment     Confirmed/corrected address, phone number and insurance Yes     Confirmed Demographics Correct on Facesheet     Source of Information patient;family     Communicated AUDIE with patient/caregiver Date not available/Unable to determine     Reason For Admission Chest pain     Lives With alone;other (see comments)     Do you expect to return to your current living situation? Yes     Do you have help at home or someone to help you manage your care at home? Yes     Who are your caregiver(s) and their phone number(s)? S/o Candance and children     Prior to hospitilization cognitive status: Alert/Oriented     Current cognitive status: Alert/Oriented     Walking or Climbing Stairs Difficulty none     Dressing/Bathing Difficulty none     Equipment Currently Used at Home  none     Readmission within 30 days? No     Patient currently being followed by outpatient case management? No     Do you currently have service(s) that help you manage your care at home? No     Do you take prescription medications? Yes     Do you have prescription coverage? Yes     Do you have any problems affording any of your prescribed medications? No     Is the patient taking medications as prescribed? yes     Who is going to help you get home at discharge? Pt's s/o or children     How do you get to doctors appointments? car, drives self;family or friend will provide     Are you on dialysis? No     Discharge Plan A Home     DME Needed Upon Discharge  none     Discharge Plan discussed with: Patient;Spouse/sig other     Discharge Barriers Identified None               Swer met with patient and significant other, Gerda, at the bedside to complete discharge assessment. Patient reports living at home independently. Patient's help at home will be Gerda. Gerda will also provide discharge transportation.     No CM needs expressed or identified at this time. Swer updated pt's whiteboard to reflect discharge disposition and SW contact information.

## 2022-06-08 NOTE — PLAN OF CARE
[facility] - Telemetry (Hospital)  Discharge Final Note    Primary Care Provider: Valery Ozuna MD    Expected Discharge Date: 6/8/2022    Final Discharge Note (most recent)     Final Note - 06/08/22 1506        Final Note    Assessment Type Final Discharge Note     Anticipated Discharge Disposition Home or Self Care     What phone number can be called within the next 1-3 days to see how you are doing after discharge? --   [phone]    Hospital Resources/Appts/Education Provided Appointments scheduled and added to AVS        Post-Acute Status    Discharge Delays None known at this time               Patient to discharge home. No CM needs for discharge.     Important Message from Medicare             Contact Info     Valery Ozuna MD   Specialty: Family Medicine   Relationship: PCP - General  Hypertension Digital Medicine Responsible Provider    [address] [facility] [address]  [place] [address]   Phone: [phone]       Next Steps: Schedule an appointment as soon as possible for a visit in 1 week(s)    Instructions: Hospital follow up    Lamar Thompson MD   Specialty: Interventional Cardiology, Cardiology   Relationship: Consulting Physician    [address] [place] [address]   Phone: [phone]       Next Steps: Schedule an appointment as soon as possible for a visit in 2 week(s)    Instructions: Hospital follow up/NM stress test

## 2022-06-08 NOTE — HPI
Ezequiel Hughes is a 82 y.o. male patient with a PMHx of cancer, TIA, CKD, HLD, and HTN who presents to the Emergency Department for evaluation of left anterior CP with no radiation which onset gradually a week ago. Pt states that he has had 2 MRI the last couple of weeks, which showed that there was no damage to the brain. He also states that this CP is similar to one he had years ago, where he was told it was gas. Pt. follows with Dr. Thompson. Dr. Thompson performed a stress test last year which showed some concerns, but a nuclear stress test was performed after which did not show any concerns. Associated sxs include weakness and light-headedness. Patient denies any leg swelling, palpitations, SOB, wheezing, cough, fever, chills, and all other sxs at this time.    In the ED, CBC and CMP unremarkable; Troponin 0.006; CXR negative.     Patient placed in observation for ACS r/o under the care of hospital medicine.

## 2022-06-08 NOTE — SUBJECTIVE & OBJECTIVE
Past Medical History:   Diagnosis Date    Allergic rhinitis     Anemia     Back pain     BPH (benign prostatic hyperplasia)     Cancer     skin cancer to neck, Dr. Graves    Cataract     Disorder of kidney and ureter     ED (erectile dysfunction)     Hiatal hernia     small    History of colon polyps     colonoscopy 11/2016    HLD (hyperlipidemia)     Hyperlipidemia     Hypertension     Lateral epicondylitis     Lumbar radiculopathy 1/26/2022    OA (osteoarthritis)     GUIDO (obstructive sleep apnea)     Prostate cancer     Dr. Wong    TIA (transient ischemic attack)     Trouble in sleeping     Urge incontinence 3/29/2022       Past Surgical History:   Procedure Laterality Date    CATARACT EXTRACTION W/  INTRAOCULAR LENS IMPLANT Right 02/21/2018    I-Stent    CATARACT EXTRACTION W/  INTRAOCULAR LENS IMPLANT Left 03/21/2018    I - Stent    COLONOSCOPY N/A 11/14/2016    Procedure: COLONOSCOPY;  Surgeon: Karuna Rodriguez MD;  Location: [facility];  Service: Endoscopy;  Laterality: N/A;    COLONOSCOPY N/A 9/22/2020    Procedure: COLONOSCOPY;  Surgeon: Chuy Fish MD;  Location: [facility];  Service: Endoscopy;  Laterality: N/A;    EYE SURGERY      HEMORRHOID SURGERY      I-STENT Right 02/21/2018    DR. REECE    KNEE ARTHROSCOPY W/ MENISCAL REPAIR Right 2015    Dr. Jain    PCIOL Right 02/21/2018    DR. REECE    PLANTAR FASCIA RELEASE      right    ROTATOR CUFF REPAIR Bilateral     bilateral    SELECTIVE INJECTION OF ANESTHETIC AGENT AROUND LUMBAR SPINAL NERVE ROOT BY TRANSFORAMINAL APPROACH Bilateral 1/26/2022    Procedure: Bilateral L4/5 TF IAN with RN IV sedation;  Surgeon: Mak Young MD;  Location: [facility];  Service: Pain Management;  Laterality: Bilateral;    SELECTIVE INJECTION OF ANESTHETIC AGENT AROUND LUMBAR SPINAL NERVE ROOT BY TRANSFORAMINAL APPROACH Bilateral 3/14/2022    Procedure: Bilateral L4/5 TF IAN with RN IV sedation;  Surgeon: Mak Young MD;  Location: [facility];   Service: Pain Management;  Laterality: Bilateral;    SELECTIVE INJECTION OF ANESTHETIC AGENT AROUND LUMBAR SPINAL NERVE ROOT BY TRANSFORAMINAL APPROACH Bilateral 6/2/2022    Procedure: Bilateral L4/5 TF IAN - D2P Dr. Matthew CABRERA;  Surgeon: Abel Haywood MD;  Location: [facility];  Service: Pain Management;  Laterality: Bilateral;    SHOULDER SURGERY Bilateral around 2000    Dr. Pepper.  rotator cuff surgeries    VASECTOMY         Review of patient's allergies indicates:   Allergen Reactions    Atarax [hydroxyzine hcl] Other (See Comments)     Raised blood pressure     Zyrtec [cetirizine] Other (See Comments)     Raised blood pressure     Gold sodium thiosulfate      Patch test positive    Meloxicam Rash     Other reaction(s): hypertension       Current Facility-Administered Medications on File Prior to Encounter   Medication    ondansetron injection 4 mg     Current Outpatient Medications on File Prior to Encounter   Medication Sig    acetaminophen (TYLENOL ARTHRITIS PAIN ORAL) Take by mouth. Patient states that he takes 2 tablets in the morning and 2 tablets in the evening    acetaminophen (TYLENOL ORAL) 1000  .    albuterol (PROVENTIL/VENTOLIN HFA) 90 mcg/actuation inhaler Inhale 2 puffs into the lungs every 6 (six) hours as needed.    albuterol (PROVENTIL/VENTOLIN HFA) 90 mcg/actuation inhaler     allopurinoL (ZYLOPRIM) 100 MG tablet Take 100 mg by mouth.    amLODIPine (NORVASC) 2.5 MG tablet 2 tablets in am and 2 tablets in pm for blood pressure    atorvastatin (LIPITOR) 40 MG tablet TAKE 1 TABLET EVERY DAY    cholecalciferol, vitamin D3, (VITAMIN D3) 50 mcg (2,000 unit) Cap Take 1 capsule by mouth once daily.    clopidogreL (PLAVIX) 75 mg tablet TAKE 1 TABLET EVERY DAY    famotidine (PEPCID) 20 MG tablet Take 1 tablet (20 mg total) by mouth 2 (two) times daily.    finasteride (PROSCAR) 5 mg tablet Take 1 tablet (5 mg total) by mouth once daily.    FLUARIX QUAD 0996-3629, PF, 60 mcg (15 mcg x 4)/0.5 mL  Syrg     fluticasone propionate (FLONASE) 50 mcg/actuation nasal spray USE 2 SPRAYS IN EACH NOSTRIL ONE TIME DAILY  (SUBSTITUTED FOR FLONASE)    furosemide (LASIX) 20 MG tablet Take 1 tablet (20 mg total) by mouth daily as needed (leg swelling).    losartan (COZAAR) 50 MG tablet TAKE 1 TABLET TWICE DAILY    meclizine (ANTIVERT) 25 mg tablet Take 1 tablet (25 mg total) by mouth 3 (three) times daily as needed for Dizziness.    methocarbamoL (ROBAXIN) 750 MG Tab Take 1 tablet (750 mg total) by mouth 3 (three) times daily as needed (muscle spasms).    pantoprazole (PROTONIX) 40 MG tablet TAKE 1 TABLET EVERY DAY    sildenafiL (VIAGRA) 100 MG tablet Take 1 po 45 minutes before intercourse    tamsulosin (FLOMAX) 0.4 mg Cap Take 1 capsule by mouth once daily.    traMADoL (ULTRAM) 50 mg tablet Take 1 tablet (50 mg total) by mouth every 6 (six) hours as needed for Pain.     Family History       Problem Relation (Age of Onset)    Arthritis Mother    Cancer Father, Brother    Cataracts Sister, Brother, Sister    Lung cancer Father    Stroke Sister          Tobacco Use    Smoking status: Former Smoker     Packs/day: 3.00     Years: 35.00     Pack years: 105.00     Types: Cigarettes     Start date: 1960     Quit date: 1985     Years since quittin.9    Smokeless tobacco: Never Used   Substance and Sexual Activity    Alcohol use: Yes     Alcohol/week: 3.0 standard drinks     Types: 3 Cans of beer per week     Comment: socially    Drug use: No    Sexual activity: Yes     Partners: Female     Birth control/protection: None     ROS  Objective:     Vital Signs (Most Recent):  Temp: 97.8 °F (36.6 °C) (22 1115)  Pulse: (Abnormal) 57 (22 1300)  Resp: 18 (22 1115)  BP: 126/69 (22 1115)  SpO2: 97 % (22 1115)   Vital Signs (24h Range):  Temp:  [96.7 °F (35.9 °C)-98.2 °F (36.8 °C)] 97.8 °F (36.6 °C)  Pulse:  [55-78] 57  Resp:  [16-18] 18  SpO2:  [96 %-99 %] 97 %  BP: (116-141)/(57-70) 126/69      Weight: 89.4 kg (197 lb)  Body mass index is 28.27 kg/m².    SpO2: 97 %  O2 Device (Oxygen Therapy): room air      Intake/Output Summary (Last 24 hours) at 6/8/2022 1401  Last data filed at 6/8/2022 1000  Gross per 24 hour   Intake 300 ml   Output no documentation   Net 300 ml       Lines/Drains/Airways       Peripheral Intravenous Line       Name Duration         Peripheral IV - Single Lumen 06/07/22 1620 20 G Left Antecubital <1 day                    Physical Exam    Significant Labs: All pertinent lab results from the last 24 hours have been reviewed.    Significant Imaging: X-Ray: CXR: X-Ray Chest PA and Lateral (CXR):   Results for orders placed or performed during the hospital encounter of 06/07/22   X-Ray Chest PA And Lateral    Narrative    EXAMINATION:  XR CHEST PA AND LATERAL    CLINICAL HISTORY:  Chest pain, unspecified    TECHNIQUE:  PA and lateral views of the chest were performed.    COMPARISON:  None    FINDINGS:  Lungs are clear.  No acute osseous injury.  Cardiac silhouette is mildly prominent      Impression    No acute process seen      Electronically signed by: Aryan De La Torre  Date:    06/07/2022  Time:    17:34

## 2022-06-08 NOTE — H&P
[facility] Medicine  History & Physical    Patient Name: Ezequiel Hughes  MRN: [id number]  Patient Class: OP- Observation  Admission Date: 6/7/2022  Attending Physician: Home Maldonado MD   Primary Care Provider: Valery Ozuna MD         Patient information was obtained from patient and ER records.     Subjective:     Principal Problem:Chest pain    Chief Complaint:   Chief Complaint   Patient presents with    Chest Pain     And lightheadedness, onset a few days ago. L anterior, no radiation.         HPI: Ezequiel Hughes is a 82 y.o. male patient with a PMHx of cancer, TIA, CKD, HLD, and HTN who presents to the Emergency Department for evaluation of left anterior CP with no radiation which onset gradually a week ago. Pt states that he has had 2 MRI the last couple of weeks, which showed that there was no damage to the brain. He also states that this CP is similar to one he had years ago, where he was told it was gas. Pt. follows with Dr. Thompson. Dr. Thompson performed a stress test last year which showed some concerns, but a nuclear stress test was performed after which did not show any concerns. Associated sxs include weakness and light-headedness. Patient denies any leg swelling, palpitations, SOB, wheezing, cough, fever, chills, and all other sxs at this time.    In the ED, CBC and CMP unremarkable; Troponin 0.006; CXR negative.     Patient placed in observation for ACS r/o under the care of hospital medicine.         Past Medical History:   Diagnosis Date    Allergic rhinitis     Anemia     Back pain     BPH (benign prostatic hyperplasia)     Cancer     skin cancer to neck, Dr. Graves    Cataract     Disorder of kidney and ureter     ED (erectile dysfunction)     Hiatal hernia     small    History of colon polyps     colonoscopy 11/2016    HLD (hyperlipidemia)     Hyperlipidemia     Hypertension     Lateral epicondylitis     Lumbar radiculopathy 1/26/2022    OA  (osteoarthritis)     GUIDO (obstructive sleep apnea)     Prostate cancer     Dr. Wong    TIA (transient ischemic attack)     Trouble in sleeping     Urge incontinence 3/29/2022       Past Surgical History:   Procedure Laterality Date    CATARACT EXTRACTION W/  INTRAOCULAR LENS IMPLANT Right 02/21/2018    I-Stent    CATARACT EXTRACTION W/  INTRAOCULAR LENS IMPLANT Left 03/21/2018    I - Stent    COLONOSCOPY N/A 11/14/2016    Procedure: COLONOSCOPY;  Surgeon: Karuna Rodriguez MD;  Location: [facility];  Service: Endoscopy;  Laterality: N/A;    COLONOSCOPY N/A 9/22/2020    Procedure: COLONOSCOPY;  Surgeon: Chuy Fish MD;  Location: [facility];  Service: Endoscopy;  Laterality: N/A;    EYE SURGERY      HEMORRHOID SURGERY      I-STENT Right 02/21/2018    DR. REECE    KNEE ARTHROSCOPY W/ MENISCAL REPAIR Right 2015    Dr. Jain    PCIOL Right 02/21/2018    DR. REECE    PLANTAR FASCIA RELEASE      right    ROTATOR CUFF REPAIR Bilateral     bilateral    SELECTIVE INJECTION OF ANESTHETIC AGENT AROUND LUMBAR SPINAL NERVE ROOT BY TRANSFORAMINAL APPROACH Bilateral 1/26/2022    Procedure: Bilateral L4/5 TF IAN with RN IV sedation;  Surgeon: Mak Young MD;  Location: [facility];  Service: Pain Management;  Laterality: Bilateral;    SELECTIVE INJECTION OF ANESTHETIC AGENT AROUND LUMBAR SPINAL NERVE ROOT BY TRANSFORAMINAL APPROACH Bilateral 3/14/2022    Procedure: Bilateral L4/5 TF IAN with RN IV sedation;  Surgeon: Mak Young MD;  Location: [facility];  Service: Pain Management;  Laterality: Bilateral;    SELECTIVE INJECTION OF ANESTHETIC AGENT AROUND LUMBAR SPINAL NERVE ROOT BY TRANSFORAMINAL APPROACH Bilateral 6/2/2022    Procedure: Bilateral L4/5 TF IAN [place] [facility] [place]  Surgeon: Abel Haywood MD;  Location: [facility];  Service: Pain Management;  Laterality: Bilateral;    SHOULDER SURGERY Bilateral around 2000    Dr. Pepper.  rotator cuff surgeries    VASECTOMY          Review of patient's allergies indicates:   Allergen Reactions    Atarax [hydroxyzine hcl] Other (See Comments)     Raised blood pressure     Zyrtec [cetirizine] Other (See Comments)     Raised blood pressure     Gold sodium thiosulfate      Patch test positive    Meloxicam Rash     Other reaction(s): hypertension       Current Facility-Administered Medications on File Prior to Encounter   Medication    ondansetron injection 4 mg     Current Outpatient Medications on File Prior to Encounter   Medication Sig    acetaminophen (TYLENOL ARTHRITIS PAIN ORAL) Take by mouth. Patient states that he takes 2 tablets in the morning and 2 tablets in the evening    acetaminophen (TYLENOL ORAL) 1000  .    albuterol (PROVENTIL/VENTOLIN HFA) 90 mcg/actuation inhaler Inhale 2 puffs into the lungs every 6 (six) hours as needed.    albuterol (PROVENTIL/VENTOLIN HFA) 90 mcg/actuation inhaler     allopurinoL (ZYLOPRIM) 100 MG tablet Take 100 mg by mouth.    amLODIPine (NORVASC) 2.5 MG tablet 2 tablets in am and 2 tablets in pm for blood pressure    atorvastatin (LIPITOR) 40 MG tablet TAKE 1 TABLET EVERY DAY    cholecalciferol, vitamin D3, (VITAMIN D3) 50 mcg (2,000 unit) Cap Take 1 capsule by mouth once daily.    clopidogreL (PLAVIX) 75 mg tablet TAKE 1 TABLET EVERY DAY    famotidine (PEPCID) 20 MG tablet Take 1 tablet (20 mg total) by mouth 2 (two) times daily.    finasteride (PROSCAR) 5 mg tablet Take 1 tablet (5 mg total) by mouth once daily.    FLUARIX QUAD 7814-0700, PF, 60 mcg (15 mcg x 4)/0.5 mL Syrg     fluticasone propionate (FLONASE) 50 mcg/actuation nasal spray USE 2 SPRAYS IN EACH NOSTRIL ONE TIME DAILY  (SUBSTITUTED FOR FLONASE)    furosemide (LASIX) 20 MG tablet Take 1 tablet (20 mg total) by mouth daily as needed (leg swelling).    losartan (COZAAR) 50 MG tablet TAKE 1 TABLET TWICE DAILY    meclizine (ANTIVERT) 25 mg tablet Take 1 tablet (25 mg total) by mouth 3 (three) times daily as needed for  Dizziness.    methocarbamoL (ROBAXIN) 750 MG Tab Take 1 tablet (750 mg total) by mouth 3 (three) times daily as needed (muscle spasms).    pantoprazole (PROTONIX) 40 MG tablet TAKE 1 TABLET EVERY DAY    sildenafiL (VIAGRA) 100 MG tablet Take 1 po 45 minutes before intercourse    tamsulosin (FLOMAX) 0.4 mg Cap Take 1 capsule by mouth once daily.    traMADoL (ULTRAM) 50 mg tablet Take 1 tablet (50 mg total) by mouth every 6 (six) hours as needed for Pain.     Family History       Problem Relation (Age of Onset)    Arthritis Mother    Cancer Father, Brother    Cataracts Sister, Brother, Sister    Lung cancer Father    Stroke Sister          Tobacco Use    Smoking status: Former Smoker     Packs/day: 3.00     Years: 35.00     Pack years: 105.00     Types: Cigarettes     Start date: 1960     Quit date: 1985     Years since quittin.8    Smokeless tobacco: Never Used   Substance and Sexual Activity    Alcohol use: Yes     Alcohol/week: 3.0 standard drinks     Types: 3 Cans of beer per week     Comment: socially    Drug use: No    Sexual activity: Yes     Partners: Female     Birth control/protection: None     Review of Systems   Constitutional: Negative.    HENT: Negative.     Eyes: Negative.    Respiratory: Negative.     Cardiovascular:  Positive for chest pain.   Gastrointestinal: Negative.    Endocrine: Negative.    Genitourinary: Negative.    Musculoskeletal: Negative.    Skin: Negative.    Allergic/Immunologic: Negative.    Neurological:  Positive for weakness and light-headedness.   Hematological: Negative.    Psychiatric/Behavioral: Negative.     Objective:     Vital Signs (Most Recent):  Temp: 97.6 °F (36.4 °C) (22)  Pulse: 62 (22)  Resp: 18 (22)  BP: 116/69 (22)  SpO2: 99 % (22) Vital Signs (24h Range):  Temp:  [97.6 °F (36.4 °C)-98.2 °F (36.8 °C)] 97.6 °F (36.4 °C)  Pulse:  [62-78] 62  Resp:  [16-18] 18  SpO2:  [96 %-99 %] 99 %  BP:  (116-141)/(67-70) 116/69     Weight: 89.8 kg (197 lb 13.8 oz)  Body mass index is 28.39 kg/m².    Physical Exam  Vitals and nursing note reviewed.   Constitutional:       Appearance: Normal appearance. He is well-developed.   HENT:      Head: Normocephalic and atraumatic.   Eyes:      Pupils: Pupils are equal, round, and reactive to light.   Cardiovascular:      Rate and Rhythm: Normal rate and regular rhythm.      Pulses: Normal pulses.      Heart sounds: Normal heart sounds.   Pulmonary:      Effort: Pulmonary effort is normal. No tachypnea, accessory muscle usage or respiratory distress.      Breath sounds: Normal breath sounds.   Abdominal:      General: Bowel sounds are normal.      Palpations: Abdomen is soft.      Tenderness: There is no abdominal tenderness.   Musculoskeletal:         General: Normal range of motion.      Cervical back: Normal range of motion and neck supple.   Skin:     General: Skin is warm and dry.      Capillary Refill: Capillary refill takes less than 2 seconds.   Neurological:      Mental Status: He is alert and oriented to person, place, and time.      Deep Tendon Reflexes: Reflexes are normal and symmetric.   Psychiatric:         Speech: Speech normal.         Behavior: Behavior normal.         Thought Content: Thought content normal.         Judgment: Judgment normal.        Significant Labs:   Results for orders placed or performed during the hospital encounter of 06/07/22   CBC auto differential   Result Value Ref Range    WBC 6.19 3.90 - 12.70 K/uL    RBC 4.16 (L) 4.60 - 6.20 M/uL    Hemoglobin 13.1 (L) 14.0 - 18.0 g/dL    Hematocrit 38.7 (L) 40.0 - 54.0 %    MCV 93 82 - 98 fL    MCH 31.5 (H) 27.0 - 31.0 pg    MCHC 33.9 32.0 - 36.0 g/dL    RDW 12.3 11.5 - 14.5 %    Platelets 141 (L) 150 - 450 K/uL    MPV 8.1 (L) 9.2 - 12.9 fL    Immature Granulocytes 1.5 (H) 0.0 - 0.5 %    Gran # (ANC) 4.6 1.8 - 7.7 K/uL    Immature Grans (Abs) 0.09 (H) 0.00 - 0.04 K/uL    Lymph # 0.9 (L) 1.0 -  4.8 K/uL    Mono # 0.5 0.3 - 1.0 K/uL    Eos # 0.1 0.0 - 0.5 K/uL    Baso # 0.05 0.00 - 0.20 K/uL    nRBC 0 0 /100 WBC    Gran % 74.2 (H) 38.0 - 73.0 %    Lymph % 14.5 (L) 18.0 - 48.0 %    Mono % 8.2 4.0 - 15.0 %    Eosinophil % 0.8 0.0 - 8.0 %    Basophil % 0.8 0.0 - 1.9 %    Differential Method Automated    Comprehensive metabolic panel   Result Value Ref Range    Sodium 136 136 - 145 mmol/L    Potassium 4.3 3.5 - 5.1 mmol/L    Chloride 103 95 - 110 mmol/L    CO2 21 (L) 23 - 29 mmol/L    Glucose 97 70 - 110 mg/dL    BUN 17 8 - 23 mg/dL    Creatinine 1.2 0.5 - 1.4 mg/dL    Calcium 8.8 8.7 - 10.5 mg/dL    Total Protein 6.6 6.0 - 8.4 g/dL    Albumin 4.1 3.5 - 5.2 g/dL    Total Bilirubin 0.8 0.1 - 1.0 mg/dL    Alkaline Phosphatase 55 55 - 135 U/L    AST 12 10 - 40 U/L    ALT 8 (L) 10 - 44 U/L    Anion Gap 12 8 - 16 mmol/L    eGFR if African American >60 >60 mL/min/1.73 m^2    eGFR if non African American 55 (A) >60 mL/min/1.73 m^2   Troponin I   Result Value Ref Range    Troponin I 0.006 0.000 - 0.026 ng/mL         Significant Imaging:   Imaging Results              X-Ray Chest PA And Lateral (Final result)  Result time 06/07/22 17:34:15      Final result by Britt Baeza MD (06/07/22 17:34:15)                   Impression:      No acute process seen      Electronically signed by: Aryan De La Torre  Date:    06/07/2022  Time:    17:34               Narrative:    EXAMINATION:  XR CHEST PA AND LATERAL    CLINICAL HISTORY:  Chest pain, unspecified    TECHNIQUE:  PA and lateral views of the chest were performed.    COMPARISON:  None    FINDINGS:  Lungs are clear.  No acute osseous injury.  Cardiac silhouette is mildly prominent                                       Assessment/Plan:     * Chest pain  Troponin 0.006. EKG unrevealing.    - ASA, BB, and statin.    - Trend serial cardiac enzymes and EKG.  - Check FLP.  - Will obtain 2D echo.  - Cardiology consult pending clinical course         Mixed hyperlipidemia  Resume statin        Essential hypertension  Resume home meds  Bp stable       BPH (benign prostatic hyperplasia)  Continue proscar         VTE Risk Mitigation (From admission, onward)         Ordered     IP VTE HIGH RISK PATIENT  Once         06/07/22 2107     Place sequential compression device  Until discontinued         06/07/22 2107                   Debra Abreu NP  Department of Hospital Medicine   '[facility] [place] - Telemetry ([facility])

## 2022-06-08 NOTE — HOSPITAL COURSE
6/8/22 No acute events overnight. The patient denies any further chest pain. Troponins were negative. ECHO was also negative and showed an EF of 60%. Cardiology was consulted and recommended a NM stress test and follow up with Dr. Thompson in 2 weeks. The patient was seen and examined today and deemed stable for discharge. The patient is discharging home and will follow up with his PCP and with Cardiology.

## 2022-06-09 ENCOUNTER — OFFICE VISIT (OUTPATIENT)
Dept: PAIN MEDICINE | Facility: CLINIC | Age: 82
End: 2022-06-09
Payer: MEDICARE

## 2022-06-09 VITALS
SYSTOLIC BLOOD PRESSURE: 119 MMHG | DIASTOLIC BLOOD PRESSURE: 63 MMHG | BODY MASS INDEX: 28.37 KG/M2 | WEIGHT: 198.19 LBS | HEIGHT: 70 IN | HEART RATE: 67 BPM | RESPIRATION RATE: 17 BRPM

## 2022-06-09 DIAGNOSIS — M25.561 CHRONIC PAIN OF RIGHT KNEE: ICD-10-CM

## 2022-06-09 DIAGNOSIS — M51.36 DDD (DEGENERATIVE DISC DISEASE), LUMBAR: ICD-10-CM

## 2022-06-09 DIAGNOSIS — G89.29 CHRONIC PAIN OF RIGHT KNEE: ICD-10-CM

## 2022-06-09 DIAGNOSIS — M54.16 LUMBAR RADICULOPATHY, CHRONIC: Primary | ICD-10-CM

## 2022-06-09 PROCEDURE — 3074F SYST BP LT 130 MM HG: CPT | Mod: CPTII,S$GLB,, | Performed by: PHYSICAL MEDICINE & REHABILITATION

## 2022-06-09 PROCEDURE — 1159F MED LIST DOCD IN RCRD: CPT | Mod: CPTII,S$GLB,, | Performed by: PHYSICAL MEDICINE & REHABILITATION

## 2022-06-09 PROCEDURE — 1101F PR PT FALLS ASSESS DOC 0-1 FALLS W/OUT INJ PAST YR: ICD-10-PCS | Mod: CPTII,S$GLB,, | Performed by: PHYSICAL MEDICINE & REHABILITATION

## 2022-06-09 PROCEDURE — 1101F PT FALLS ASSESS-DOCD LE1/YR: CPT | Mod: CPTII,S$GLB,, | Performed by: PHYSICAL MEDICINE & REHABILITATION

## 2022-06-09 PROCEDURE — 99213 PR OFFICE/OUTPT VISIT, EST, LEVL III, 20-29 MIN: ICD-10-PCS | Mod: S$GLB,,, | Performed by: PHYSICAL MEDICINE & REHABILITATION

## 2022-06-09 PROCEDURE — 3078F PR MOST RECENT DIASTOLIC BLOOD PRESSURE < 80 MM HG: ICD-10-PCS | Mod: CPTII,S$GLB,, | Performed by: PHYSICAL MEDICINE & REHABILITATION

## 2022-06-09 PROCEDURE — 99999 PR PBB SHADOW E&M-EST. PATIENT-LVL V: ICD-10-PCS | Mod: PBBFAC,,, | Performed by: PHYSICAL MEDICINE & REHABILITATION

## 2022-06-09 PROCEDURE — 99213 OFFICE O/P EST LOW 20 MIN: CPT | Mod: S$GLB,,, | Performed by: PHYSICAL MEDICINE & REHABILITATION

## 2022-06-09 PROCEDURE — 1126F PR PAIN SEVERITY QUANTIFIED, NO PAIN PRESENT: ICD-10-PCS | Mod: CPTII,S$GLB,, | Performed by: PHYSICAL MEDICINE & REHABILITATION

## 2022-06-09 PROCEDURE — 3288F PR FALLS RISK ASSESSMENT DOCUMENTED: ICD-10-PCS | Mod: CPTII,S$GLB,, | Performed by: PHYSICAL MEDICINE & REHABILITATION

## 2022-06-09 PROCEDURE — 1160F PR REVIEW ALL MEDS BY PRESCRIBER/CLIN PHARMACIST DOCUMENTED: ICD-10-PCS | Mod: CPTII,S$GLB,, | Performed by: PHYSICAL MEDICINE & REHABILITATION

## 2022-06-09 PROCEDURE — 3074F PR MOST RECENT SYSTOLIC BLOOD PRESSURE < 130 MM HG: ICD-10-PCS | Mod: CPTII,S$GLB,, | Performed by: PHYSICAL MEDICINE & REHABILITATION

## 2022-06-09 PROCEDURE — 1159F PR MEDICATION LIST DOCUMENTED IN MEDICAL RECORD: ICD-10-PCS | Mod: CPTII,S$GLB,, | Performed by: PHYSICAL MEDICINE & REHABILITATION

## 2022-06-09 PROCEDURE — 3288F FALL RISK ASSESSMENT DOCD: CPT | Mod: CPTII,S$GLB,, | Performed by: PHYSICAL MEDICINE & REHABILITATION

## 2022-06-09 PROCEDURE — 1126F AMNT PAIN NOTED NONE PRSNT: CPT | Mod: CPTII,S$GLB,, | Performed by: PHYSICAL MEDICINE & REHABILITATION

## 2022-06-09 PROCEDURE — 99999 PR PBB SHADOW E&M-EST. PATIENT-LVL V: CPT | Mod: PBBFAC,,, | Performed by: PHYSICAL MEDICINE & REHABILITATION

## 2022-06-09 PROCEDURE — 1160F RVW MEDS BY RX/DR IN RCRD: CPT | Mod: CPTII,S$GLB,, | Performed by: PHYSICAL MEDICINE & REHABILITATION

## 2022-06-09 PROCEDURE — 3078F DIAST BP <80 MM HG: CPT | Mod: CPTII,S$GLB,, | Performed by: PHYSICAL MEDICINE & REHABILITATION

## 2022-06-09 NOTE — PROGRESS NOTES
Established Patient Chronic Pain Note (Follow up visit)    Chief Complaint:   Chief Complaint   Patient presents with    Low-back Pain    Leg Pain     Right        SUBJECTIVE:    Ezequiel Hughes is a 82 y.o. male who presents to the clinic for a follow-up appointment for back and leg pain.  He presents today after undergoing a 3rd lumbar TFESI bilaterally at L4-5 on 06/02/2022.  He reports that this resulted in 100% relief.  Since the last visit, Ezequiel Hughes states the pain has been resolved. Current pain intensity is 0/10.    Patient denies night fever/night sweats, urinary incontinence, bowel incontinence, significant weight loss, significant motor weakness and loss of sensations.    Pain Disability Index Review:  Last 3 PDI Scores 6/9/2022 1/13/2022 12/9/2021   Pain Disability Index (PDI) 24 24 35     Interval Hx: 2/24/22  Presents status post bilateral L4/5 transforaminal epidural steroid injection January 26, 2022. Patient reports having 100% relief in lower extremity radicular symptoms following epidural steroid injection.  This pain level varies based upon his activity throughout the day.  Patient reports as he is more active he does notice radicular symptoms down the lateral aspects of bilateral lower extremities to the feet.  Patient reports discontinuing Lyrica the day following his injection which he believes caused paresthesias to insidiously return.  Patient does report improvement in functionality including range of motion and strength following his injection.  Patient is interested in repeat intervention.  Patient denies any side effects at the Lyrica dose of 225 mg twice a day.     Interval Hx: 1/13/22  Patient presents for MRI cervical and lumbar review.  Today patient again reports lower back pain which radiates down the anterior aspects of bilateral lower extremities in L4 distribution to the foot.  Today pain is rated as a 4/10.  Pain is exacerbated with prolonged standing and with ambulation the  patient reports he is able to ambulate at least 1 mi on the treadmill before requiring rest.  He also reports neck pain which radiates in bilateral trapezius distributions in C5-6 distribution.  Patient has continued Lyrica and is currently taking 150 mg twice daily.  He is anticipated to increase this dosage next week.  He denies any side effects from this medication.     HPI 12/9/21  Ezequiel Hughes is a 81 y.o. male with past medical history significant for transient ischemic attack, hyperlipidemia, hypertension, right bundle branch block, stage 3 chronic kidney disease, thrombocytopenia and anemia , prostate cancer, gout, obstructive sleep apnea, periodically limb movement disorder who presents to the clinic for the evaluation of neck, lower back and leg pain.  Today patient reports pain began approximately 1 year prior without inciting accident, injury or trauma.  Today patient reports lower back pain which is constant and today is rated as a 3/10.  Patient reports radicular symptoms beginning along the anterior aspects of bilateral lower extremities below the knee to the foot in L4 distribution.  Patient is having difficulty describing the character but believes it is burning in nature.  Pain is exacerbated with prolonged standing and with ambulation.  Patient reports he is quite active, goes to the gym and walks on his treadmill and is able to ambulate approximately half to 3/4 of a mi before requiring rest.  Pain is improved with sitting.He denies any bowel or bladder incontinence or saddle anesthesia. Patient has performed physical therapy for the lower back without any improvement in his symptoms.      Patient also reports constant neck pain.  Pain presents in a bandlike distribution in bilateral cervical paraspinous territory and radiates into bilateral trapezius distribution.  Patient also reports numbness in bilateral thumbs.  Pain is exacerbated with cervical flexion, extension and lateral flexion.   "Patient denies upper extremity weakness, compromise in hand  strength or dexterity.  Patient has been trialed on gabapentin for what his wife describes as "scalp itching"at a lower dose of 100 mg 3 times daily without any improvement in his neck or in his back pain.           Non-Pharmacologic Treatments:  Physical Therapy/Home Exercise: yes  Ice/Heat:yes  TENS: no  Acupuncture: no  Massage: no  Chiropractic: no    Other: no     Pain Medications:  - Adjuvant Medications: Neurontin (Gabapentin) and Tylenol (Acetaminophen)  - Anti-Coagulants: Plavix ( Clopidogrel)     Pain Procedures:   -01/26/2022:  Bilateral L4/5 TFESI  -03/14/2022: Bilateral L4-5 TFESI  -06/02/2022:  Bilateral L4-5 TFESI      Imaging:   MRI brain 05/12/2022:        MRI lumbar spine 12/22/2021:      MRI cervical spine 12/22/2021:          PMHx,PSHx, Social history, and Family history:  I have reviewed the patient's medical, surgical, social, and family history in detail and updated the computerized patient record.    Review of patient's allergies indicates:   Allergen Reactions    Atarax [hydroxyzine hcl] Other (See Comments)     Raised blood pressure     Zyrtec [cetirizine] Other (See Comments)     Raised blood pressure     Gold sodium thiosulfate      Patch test positive    Meloxicam Rash     Other reaction(s): hypertension       Current Outpatient Medications   Medication Sig    acetaminophen (TYLENOL ARTHRITIS PAIN ORAL) Take by mouth. Patient states that he takes 2 tablets in the morning and 2 tablets in the evening    acetaminophen (TYLENOL ORAL) 1000  .    albuterol (PROVENTIL/VENTOLIN HFA) 90 mcg/actuation inhaler Inhale 2 puffs into the lungs every 6 (six) hours as needed.    albuterol (PROVENTIL/VENTOLIN HFA) 90 mcg/actuation inhaler     allopurinoL (ZYLOPRIM) 100 MG tablet Take 100 mg by mouth.    amLODIPine (NORVASC) 5 MG tablet Take 1 tablet (5 mg total) by mouth 2 (two) times daily.    atorvastatin (LIPITOR) 40 MG tablet " TAKE 1 TABLET EVERY DAY    cholecalciferol, vitamin D3, (VITAMIN D3) 50 mcg (2,000 unit) Cap Take 1 capsule by mouth once daily.    clopidogreL (PLAVIX) 75 mg tablet TAKE 1 TABLET EVERY DAY    famotidine (PEPCID) 20 MG tablet Take 1 tablet (20 mg total) by mouth 2 (two) times daily.    finasteride (PROSCAR) 5 mg tablet Take 1 tablet (5 mg total) by mouth once daily.    FLUARIX QUAD 3482-2237, PF, 60 mcg (15 mcg x 4)/0.5 mL Syrg     fluticasone propionate (FLONASE) 50 mcg/actuation nasal spray USE 2 SPRAYS IN EACH NOSTRIL ONE TIME DAILY  (SUBSTITUTED FOR FLONASE)    furosemide (LASIX) 20 MG tablet Take 1 tablet (20 mg total) by mouth daily as needed (leg swelling).    losartan (COZAAR) 50 MG tablet TAKE 1 TABLET TWICE DAILY    meclizine (ANTIVERT) 25 mg tablet Take 1 tablet (25 mg total) by mouth 3 (three) times daily as needed for Dizziness.    methocarbamoL (ROBAXIN) 750 MG Tab Take 1 tablet (750 mg total) by mouth 3 (three) times daily as needed (muscle spasms).    pantoprazole (PROTONIX) 40 MG tablet TAKE 1 TABLET EVERY DAY    sildenafiL (VIAGRA) 100 MG tablet Take 1 po 45 minutes before intercourse    tamsulosin (FLOMAX) 0.4 mg Cap Take 1 capsule by mouth once daily.    traMADoL (ULTRAM) 50 mg tablet Take 1 tablet (50 mg total) by mouth every 6 (six) hours as needed for Pain.     No current facility-administered medications for this visit.     Facility-Administered Medications Ordered in Other Visits   Medication    ondansetron injection 4 mg         REVIEW OF SYSTEMS:    GENERAL:  No weight loss, malaise or fevers.  HEENT:   No recent changes in vision or hearing  NECK:  Negative for lumps, no difficulty with swallowing.  RESPIRATORY:  Negative for cough, wheezing or shortness of breath, patient denies any recent URI.  CARDIOVASCULAR:  Negative for chest pain, leg swelling or palpitations.  GI:  Negative for abdominal discomfort, blood in stools or black stools or change in bowel  "habits.  MUSCULOSKELETAL:  See HPI.  SKIN:  Negative for lesions, rash, and itching.  PSYCH:  No mood disorder or recent psychosocial stressors.  Patients sleep is not disturbed secondary to pain.  HEMATOLOGY/LYMPHOLOGY:  Negative for prolonged bleeding, bruising easily or swollen nodes.  Patient is currently taking anti-coagulants - plavix  NEURO:   No history of headaches, syncope, paralysis, seizures or tremors.  All other reviewed and negative other than HPI.    OBJECTIVE:    /63   Pulse 67   Resp 17   Ht 5' 10" (1.778 m)   Wt 89.9 kg (198 lb 3.1 oz)   BMI 28.44 kg/m²     PHYSICAL EXAMINATION:    GENERAL: Well appearing, in no acute distress, alert and oriented x3.  PSYCH:  Mood and affect appropriate.  SKIN: Skin color, texture, turgor normal, no rashes or lesions.  HEAD/FACE:  Normocephalic, atraumatic. Cranial nerves grossly intact.  CV: RRR with palpation of the radial artery.  PULM: No evidence of respiratory difficulty, symmetric chest rise.  GI:  Soft and non-tender.  BACK: Straight leg raising in the sitting and supine positions is negative to radicular pain. No pain to palpation over the facet joints of the lumbar spine or spinous processes. Normal range of motion without pain reproduction.  EXTREMITIES: Peripheral joint ROM is full and pain free without obvious instability or laxity in all four extremities. No deformities, edema, or skin discoloration. Good capillary refill.  MUSCULOSKELETAL:  Hip and knee provocative maneuvers are negative.  There is no pain with palpation over the sacroiliac joints bilaterally.  FABERs test is negative.  FADIRs test is negative.   Bilateral upper and lower extremity strength is normal and symmetric.  No atrophy or tone abnormalities are noted.  NEURO: Bilateral upper and lower extremity coordination and muscle stretch reflexes are physiologic and symmetric.  Plantar response are downgoing. No clonus.  No loss of sensation is noted.  GAIT: " normal.      LABS:  Lab Results   Component Value Date    WBC 6.19 06/07/2022    HGB 13.1 (L) 06/07/2022    HCT 38.7 (L) 06/07/2022    MCV 93 06/07/2022     (L) 06/07/2022       CMP  Sodium   Date Value Ref Range Status   06/08/2022 141 136 - 145 mmol/L Final     Potassium   Date Value Ref Range Status   06/08/2022 4.4 3.5 - 5.1 mmol/L Final     Chloride   Date Value Ref Range Status   06/08/2022 108 95 - 110 mmol/L Final     CO2   Date Value Ref Range Status   06/08/2022 24 23 - 29 mmol/L Final     Glucose   Date Value Ref Range Status   06/08/2022 93 70 - 110 mg/dL Final     BUN   Date Value Ref Range Status   06/08/2022 19 8 - 23 mg/dL Final     Creatinine   Date Value Ref Range Status   06/08/2022 1.2 0.5 - 1.4 mg/dL Final     Calcium   Date Value Ref Range Status   06/08/2022 9.0 8.7 - 10.5 mg/dL Final     Total Protein   Date Value Ref Range Status   06/07/2022 6.6 6.0 - 8.4 g/dL Final     Albumin   Date Value Ref Range Status   06/07/2022 4.1 3.5 - 5.2 g/dL Final     Total Bilirubin   Date Value Ref Range Status   06/07/2022 0.8 0.1 - 1.0 mg/dL Final     Comment:     For infants and newborns, interpretation of results should be based  on gestational age, weight and in agreement with clinical  observations.    Premature Infant recommended reference ranges:  Up to 24 hours.............<8.0 mg/dL  Up to 48 hours............<12.0 mg/dL  3-5 days..................<15.0 mg/dL  6-29 days.................<15.0 mg/dL       Alkaline Phosphatase   Date Value Ref Range Status   06/07/2022 55 55 - 135 U/L Final     AST   Date Value Ref Range Status   06/07/2022 12 10 - 40 U/L Final     ALT   Date Value Ref Range Status   06/07/2022 8 (L) 10 - 44 U/L Final     Anion Gap   Date Value Ref Range Status   06/08/2022 9 8 - 16 mmol/L Final     eGFR if    Date Value Ref Range Status   06/08/2022 >60 >60 mL/min/1.73 m^2 Final     eGFR if non    Date Value Ref Range Status   06/08/2022 55 (A)  >60 mL/min/1.73 m^2 Final     Comment:     Calculation used to obtain the estimated glomerular filtration  rate (eGFR) is the CKD-EPI equation.          Lab Results   Component Value Date    HGBA1C 5.2 07/22/2020             ASSESSMENT: 82 y.o. year old male with lower back and leg pain, consistent with     1. Lumbar radiculopathy, chronic     2. Chronic pain of right knee     3. DDD (degenerative disc disease), lumbar           PLAN:   - Interventions: None at this time, may consider repeat lumbar IAN in the future.   - Anticoagulation: yes, Plavix  - Medications: I have stressed the importance of physical activity and a home exercise plan to help with pain and improve health. and Patient can continue with medications for now since they are providing benefits, using them appropriately, and without side effects.  - Therapy:  Advised patient to continue with activities as tolerated  - Labs:  Reviewed  - Imaging:  Reviewed  - Consults/Referrals:  None at this time  - Records:  Reviewed/Obtain old records from outside physicians and imaging  - Follow up visit: return to clinic as needed  - Counseled patient regarding the importance of activity modification and physical therapy  - This condition does not require this patient to take time off of work, and the primary goal of our Pain Management services is to improve the patient's functional capacity.  - Patient Questions: Answered all of the patient's questions regarding diagnosis, therapy, and treatment    The above plan and management options were discussed at length with patient. Patient is in agreement with the above and verbalized understanding.      Abel Haywood MD  Interventional Pain Management  [facility]    Disclaimer:  This note was prepared using voice recognition system and is likely to have sound alike errors that may have been overlooked even after proof reading.  Please call me with any questions

## 2022-06-10 ENCOUNTER — PATIENT MESSAGE (OUTPATIENT)
Dept: PRIMARY CARE CLINIC | Facility: CLINIC | Age: 82
End: 2022-06-10
Payer: MEDICARE

## 2022-06-13 ENCOUNTER — PATIENT MESSAGE (OUTPATIENT)
Dept: PRIMARY CARE CLINIC | Facility: CLINIC | Age: 82
End: 2022-06-13
Payer: MEDICARE

## 2022-06-14 ENCOUNTER — TELEPHONE (OUTPATIENT)
Dept: PRIMARY CARE CLINIC | Facility: CLINIC | Age: 82
End: 2022-06-14
Payer: MEDICARE

## 2022-06-14 NOTE — TELEPHONE ENCOUNTER
----- Message from Harriet Davis sent at 6/13/2022  4:35 PM CDT -----  Contact: [phone]  Patient would like to consult with a nurse in regards to his current symptoms. Please call back at [phone]. Thanks r/s

## 2022-06-16 ENCOUNTER — LAB VISIT (OUTPATIENT)
Dept: LAB | Facility: HOSPITAL | Age: 82
End: 2022-06-16
Attending: INTERNAL MEDICINE
Payer: MEDICARE

## 2022-06-16 ENCOUNTER — PATIENT MESSAGE (OUTPATIENT)
Dept: PRIMARY CARE CLINIC | Facility: CLINIC | Age: 82
End: 2022-06-16

## 2022-06-16 ENCOUNTER — OFFICE VISIT (OUTPATIENT)
Dept: PRIMARY CARE CLINIC | Facility: CLINIC | Age: 82
End: 2022-06-16
Payer: MEDICARE

## 2022-06-16 ENCOUNTER — TELEPHONE (OUTPATIENT)
Dept: PRIMARY CARE CLINIC | Facility: CLINIC | Age: 82
End: 2022-06-16

## 2022-06-16 VITALS
BODY MASS INDEX: 27.96 KG/M2 | DIASTOLIC BLOOD PRESSURE: 70 MMHG | SYSTOLIC BLOOD PRESSURE: 126 MMHG | WEIGHT: 195.31 LBS | HEIGHT: 70 IN | HEART RATE: 61 BPM

## 2022-06-16 DIAGNOSIS — R42 DIZZINESS: Primary | ICD-10-CM

## 2022-06-16 DIAGNOSIS — C61 PROSTATE CANCER: ICD-10-CM

## 2022-06-16 DIAGNOSIS — R42 DIZZINESS: ICD-10-CM

## 2022-06-16 DIAGNOSIS — G91.9 HYDROCEPHALUS, UNSPECIFIED TYPE: ICD-10-CM

## 2022-06-16 DIAGNOSIS — R26.9 GAIT ABNORMALITY: ICD-10-CM

## 2022-06-16 DIAGNOSIS — M54.12 CERVICAL RADICULOPATHY: ICD-10-CM

## 2022-06-16 DIAGNOSIS — M54.16 LUMBAR RADICULOPATHY, CHRONIC: ICD-10-CM

## 2022-06-16 LAB
COMPLEXED PSA SERPL-MCNC: 1.4 NG/ML (ref 0–4)
CRP SERPL-MCNC: 0.8 MG/L (ref 0–8.2)
ERYTHROCYTE [SEDIMENTATION RATE] IN BLOOD BY WESTERGREN METHOD: <2 MM/HR (ref 0–23)

## 2022-06-16 PROCEDURE — 3078F PR MOST RECENT DIASTOLIC BLOOD PRESSURE < 80 MM HG: ICD-10-PCS | Mod: CPTII,S$GLB,, | Performed by: FAMILY MEDICINE

## 2022-06-16 PROCEDURE — 3078F DIAST BP <80 MM HG: CPT | Mod: CPTII,S$GLB,, | Performed by: FAMILY MEDICINE

## 2022-06-16 PROCEDURE — 36415 COLL VENOUS BLD VENIPUNCTURE: CPT | Mod: PN | Performed by: UROLOGY

## 2022-06-16 PROCEDURE — 3074F SYST BP LT 130 MM HG: CPT | Mod: CPTII,S$GLB,, | Performed by: FAMILY MEDICINE

## 2022-06-16 PROCEDURE — 1159F MED LIST DOCD IN RCRD: CPT | Mod: CPTII,S$GLB,, | Performed by: FAMILY MEDICINE

## 2022-06-16 PROCEDURE — 85652 RBC SED RATE AUTOMATED: CPT | Performed by: FAMILY MEDICINE

## 2022-06-16 PROCEDURE — 1101F PT FALLS ASSESS-DOCD LE1/YR: CPT | Mod: CPTII,S$GLB,, | Performed by: FAMILY MEDICINE

## 2022-06-16 PROCEDURE — 1126F AMNT PAIN NOTED NONE PRSNT: CPT | Mod: CPTII,S$GLB,, | Performed by: FAMILY MEDICINE

## 2022-06-16 PROCEDURE — 84153 ASSAY OF PSA TOTAL: CPT | Performed by: UROLOGY

## 2022-06-16 PROCEDURE — 86140 C-REACTIVE PROTEIN: CPT | Performed by: FAMILY MEDICINE

## 2022-06-16 PROCEDURE — 99999 PR PBB SHADOW E&M-EST. PATIENT-LVL V: ICD-10-PCS | Mod: PBBFAC,,, | Performed by: FAMILY MEDICINE

## 2022-06-16 PROCEDURE — 3074F PR MOST RECENT SYSTOLIC BLOOD PRESSURE < 130 MM HG: ICD-10-PCS | Mod: CPTII,S$GLB,, | Performed by: FAMILY MEDICINE

## 2022-06-16 PROCEDURE — 99214 PR OFFICE/OUTPT VISIT, EST, LEVL IV, 30-39 MIN: ICD-10-PCS | Mod: S$GLB,,, | Performed by: FAMILY MEDICINE

## 2022-06-16 PROCEDURE — 3288F PR FALLS RISK ASSESSMENT DOCUMENTED: ICD-10-PCS | Mod: CPTII,S$GLB,, | Performed by: FAMILY MEDICINE

## 2022-06-16 PROCEDURE — 99999 PR PBB SHADOW E&M-EST. PATIENT-LVL V: CPT | Mod: PBBFAC,,, | Performed by: FAMILY MEDICINE

## 2022-06-16 PROCEDURE — 1101F PR PT FALLS ASSESS DOC 0-1 FALLS W/OUT INJ PAST YR: ICD-10-PCS | Mod: CPTII,S$GLB,, | Performed by: FAMILY MEDICINE

## 2022-06-16 PROCEDURE — 1159F PR MEDICATION LIST DOCUMENTED IN MEDICAL RECORD: ICD-10-PCS | Mod: CPTII,S$GLB,, | Performed by: FAMILY MEDICINE

## 2022-06-16 PROCEDURE — 99214 OFFICE O/P EST MOD 30 MIN: CPT | Mod: S$GLB,,, | Performed by: FAMILY MEDICINE

## 2022-06-16 PROCEDURE — 3288F FALL RISK ASSESSMENT DOCD: CPT | Mod: CPTII,S$GLB,, | Performed by: FAMILY MEDICINE

## 2022-06-16 PROCEDURE — 1126F PR PAIN SEVERITY QUANTIFIED, NO PAIN PRESENT: ICD-10-PCS | Mod: CPTII,S$GLB,, | Performed by: FAMILY MEDICINE

## 2022-06-16 NOTE — PROGRESS NOTES
Subjective:      Patient ID: Ezequiel Hughes is a 82 y.o. male.    Chief Complaint: Dizziness (Was in the hospital last week for lightheaded/dizzy, head still feels lightheaded. After several test everything seemed to come out normal. )    Disclaimer:  This note is prepared using voice recognition software and as such is likely to have errors and has not been proof read. Please contact me for questions.     Here for dizziness and recent hospital followup.   Feels low on energy. Hurts in the head at times. Seeing cardiology soon.   Is aggrevated about.   Went to dentist and with driving will come on with these symptoms yesterday. Feels off balance at times.today drove to the [place] and did ok.   Not doing as much exercise lately. Is scared about doing the exercise.   Dizziness (Was in the hospital last week for lightheaded/dizzy, head still feels lightheaded. After several test everything seemed to come out normal. Per his regards. Did brain MRI at outside facility and has been reviewed in [facility] tab.          Lab Results   Component Value Date    HGBA1C 5.2 07/22/2020    HGBA1C 5.2 03/07/2018      Lab Results   Component Value Date    CHOL 132 06/08/2022    CHOL 139 09/24/2021    CHOL 162 03/03/2021     Lab Results   Component Value Date    LDLCALC 58.4 (L) 06/08/2022    LDLCALC 64.6 09/24/2021    LDLCALC 72.2 03/03/2021       Wt Readings from Last 10 Encounters:   06/09/22 89.9 kg (198 lb 3.1 oz)   06/08/22 89.4 kg (197 lb)   06/02/22 89.3 kg (196 lb 12.2 oz)   05/03/22 90.9 kg (200 lb 6.4 oz)   04/26/22 90.9 kg (200 lb 6.4 oz)   04/21/22 89.1 kg (196 lb 6.9 oz)   04/18/22 89.4 kg (197 lb 1.5 oz)   04/08/22 96.2 kg (212 lb 1.3 oz)   03/29/22 94.7 kg (208 lb 12.4 oz)   03/23/22 94.8 kg (209 lb 1.7 oz)       The ASCVD Risk score ([employer] AVIS Patterson., et al., 2013) failed to calculate for the following reasons:    The 2013 ASCVD risk score is only valid for ages 40 to 79        Lab Results   Component Value Date    WBC 6.19  06/07/2022    HGB 13.1 (L) 06/07/2022    HCT 38.7 (L) 06/07/2022     (L) 06/07/2022    CHOL 132 06/08/2022    TRIG 88 06/08/2022    HDL 56 06/08/2022    ALT 8 (L) 06/07/2022    AST 12 06/07/2022     06/08/2022    K 4.4 06/08/2022     06/08/2022    CREATININE 1.2 06/08/2022    BUN 19 06/08/2022    CO2 24 06/08/2022    TSH 0.989 03/21/2022    PSA 1.3 11/10/2021    INR 1.1 02/24/2021    HGBA1C 5.2 07/22/2020       Echo  · Concentric hypertrophy and normal systolic function.  · The estimated ejection fraction is 60%.  · Indeterminate left ventricular diastolic function.  · With normal right ventricular systolic function.     US Carotid Bilateral  Narrative: EXAMINATION:  US CAROTID BILATERAL    CLINICAL HISTORY:  Near syncope;    TECHNIQUE:  Grayscale and color Doppler ultrasound examination of the carotid and vertebral artery systems bilaterally.  Stenosis estimates are per the NASCET measurement criteria.    COMPARISON:  None.    FINDINGS:  Right:    Internal Carotid Artery (ICA) peak systolic velocity 79 cm/sec    ICA/CCA peak systolic velocity ratio: 1.45    Plaque formation: No significant    Vertebral artery: Antegrade flow and normal waveform.    Left:    Internal Carotid Artery (ICA)  peak systolic velocity 43 cm/sec    ICA/CCA peak systolic velocity ratio: 0.78    Plaque formation: No significant    Vertebral artery: Antegrade flow and normal waveform.  Impression: No evidence of a hemodynamically significant carotid bifurcation stenosis.    Electronically signed by: Kayden Maria  Date:    06/08/2022  Time:    11:29        Review of Systems   Constitutional: Positive for activity change and fatigue. Negative for chills, diaphoresis and fever.   HENT: Negative for congestion and sinus pressure (slight ).    Respiratory: Negative for cough, shortness of breath and wheezing.    Gastrointestinal: Negative for abdominal pain, diarrhea, nausea and vomiting.   Musculoskeletal: Positive for  "arthralgias, back pain and gait problem.   Neurological: Positive for dizziness, weakness (generalized ), light-headedness and numbness. Negative for syncope, speech difficulty and headaches.   Psychiatric/Behavioral: Negative for sleep disturbance. The patient is nervous/anxious.      Objective:     Vitals:    06/16/22 0857   BP: 126/70   Pulse: 61   Weight: 88.6 kg (195 lb 5.2 oz)   Height: 5' 10" (1.778 m)     Physical Exam  Vitals reviewed.   Constitutional:       General: He is not in acute distress.     Appearance: Normal appearance. He is well-developed and normal weight.   HENT:      Head: Normocephalic and atraumatic.      Right Ear: Tympanic membrane and external ear normal.      Left Ear: Tympanic membrane and external ear normal.      Nose: Nose normal.      Mouth/Throat:      Mouth: Mucous membranes are moist.      Pharynx: Oropharynx is clear.   Eyes:      Conjunctiva/sclera: Conjunctivae normal.      Pupils: Pupils are equal, round, and reactive to light.   Neck:      Thyroid: No thyromegaly.   Cardiovascular:      Rate and Rhythm: Normal rate and regular rhythm.      Heart sounds: No murmur heard.    No friction rub. No gallop.   Pulmonary:      Effort: Pulmonary effort is normal. No respiratory distress.      Breath sounds: Normal breath sounds.   Abdominal:      General: Bowel sounds are normal. There is no distension.      Palpations: Abdomen is soft.      Tenderness: There is no abdominal tenderness. There is no rebound.   Musculoskeletal:         General: Normal range of motion.      Cervical back: Normal range of motion and neck supple.   Lymphadenopathy:      Cervical: No cervical adenopathy.   Skin:     General: Skin is warm and dry.   Neurological:      General: No focal deficit present.      Mental Status: He is alert. Mental status is at baseline.      Coordination: Coordination normal.   Psychiatric:         Attention and Perception: Attention normal.         Mood and Affect: Mood and " "affect normal.         Speech: Speech normal.         Behavior: Behavior normal.         Thought Content: Thought content normal.         Cognition and Memory: Cognition normal.         Judgment: Judgment normal.       Assessment:     1. Dizziness    2. Hydrocephalus, unspecified type    3. Gait abnormality    4. Cervical radiculopathy    5. Lumbar radiculopathy, chronic      Plan:   Ezequiel was seen today for dizziness.    Diagnoses and all orders for this visit:    Dizziness- reviewed prior workup, no found etiologies, reviewed Brain MRI, concern for possible "mild hydrocephalus" needing evaluation by NSG that will be willing to review Brain mri. Per patient, Dr. Castro reviewed while seeing him prior for his back and was told he needed to see NSG with Brain speciality. Placed referral however none at this time in [place] [facility] rotation per dept. Will refer to NSG at [facility]. Very concerning for patient. Has completed ENT evaluation and PT>   -     Ambulatory referral/consult to Neurosurgery; Future  -     Sedimentation rate; Future  -     C-Reactive Protein; Future    Hydrocephalus, unspecified type- - New Problem, discusses symptoms, work up, suspected diagnosis. Will place order for  labs.  Continue with current medications and interventions until labs are reviewed to make adjustments if already on treatment .     -     Ambulatory referral/consult to Neurosurgery; Future  -     Sedimentation rate; Future  -     C-Reactive Protein; Future    Gait abnormality - New Problem, discusses symptoms, work up, suspected diagnosis. Will place order for  labs.  Continue with current medications and interventions until labs are reviewed to make adjustments if already on treatment .     -     Ambulatory referral/consult to Neurosurgery; Future  -     Sedimentation rate; Future  -     C-Reactive Protein; Future    Cervical radiculopathy- noted prior, seeing pain management    Lumbar radiculopathy, chronic- " noted prior, seeing pain management and NSG                Follow up in about 6 weeks (around 7/28/2022) for f/u office visit Dr. Ozuna/ padmini .    There are no Patient Instructions on file for this visit.

## 2022-06-16 NOTE — TELEPHONE ENCOUNTER
Dr. Ozuna is trying to get this patient into Neurosurgery, but the provider that deals with the brain. We know that is not Dr. Castro. However can you tell us who this is and are they coming to the [facility] still?

## 2022-06-16 NOTE — Clinical Note
I have placed new external referral to [facility] for patient to be seen by brain NSG. Please attach copy of brain MRI report, my note( this one), Valery Phillips's last note, and [place] [facility] [place] inform this to patient as well. Thanks. Valery Ozuna MD

## 2022-06-17 NOTE — TELEPHONE ENCOUNTER
"Dr. Castro, This mutual patient of ours saw you for his back issues recently, however he has also been having dizziness and lightheadedness at times. He did an outside MRI of his brain with mentions possible mild Hydrocephalus. Can you take a look at the report and assist me with what you would recommend for him since we don't have a designated "brain" NSG here with [facility]. If you feel that he needs to see NSG with Brain specialization can you recommend one of your partners within [facility] or locally? Thanks.   Inez Ozuna MD.   "

## 2022-06-26 ENCOUNTER — PATIENT MESSAGE (OUTPATIENT)
Dept: PRIMARY CARE CLINIC | Facility: CLINIC | Age: 82
End: 2022-06-26
Payer: MEDICARE

## 2022-06-26 PROBLEM — M10.9 GOUT: Status: ACTIVE | Noted: 2019-01-16

## 2022-06-26 PROBLEM — M54.16 LUMBAR RADICULOPATHY: Status: RESOLVED | Noted: 2022-01-26 | Resolved: 2022-06-26

## 2022-06-27 NOTE — TELEPHONE ENCOUNTER
I have placed new external referral to [facility] for patient to be seen by brain NSG. Please attach copy of brain MRI report, my note, Valery Phillips's last note, and then [place] [facility] [place] Please inform this to patient as well. Thanks. Valery Ozuna MD

## 2022-06-29 ENCOUNTER — OFFICE VISIT (OUTPATIENT)
Dept: CARDIOLOGY | Facility: CLINIC | Age: 82
End: 2022-06-29
Payer: MEDICARE

## 2022-06-29 ENCOUNTER — TELEPHONE (OUTPATIENT)
Dept: CARDIOLOGY | Facility: CLINIC | Age: 82
End: 2022-06-29

## 2022-06-29 ENCOUNTER — LAB VISIT (OUTPATIENT)
Dept: LAB | Facility: HOSPITAL | Age: 82
End: 2022-06-29
Attending: INTERNAL MEDICINE
Payer: MEDICARE

## 2022-06-29 VITALS
HEART RATE: 57 BPM | OXYGEN SATURATION: 100 % | HEIGHT: 70 IN | BODY MASS INDEX: 28.35 KG/M2 | DIASTOLIC BLOOD PRESSURE: 70 MMHG | WEIGHT: 198 LBS | SYSTOLIC BLOOD PRESSURE: 136 MMHG | RESPIRATION RATE: 16 BRPM

## 2022-06-29 DIAGNOSIS — R06.02 SHORTNESS OF BREATH: ICD-10-CM

## 2022-06-29 DIAGNOSIS — E78.2 MIXED HYPERLIPIDEMIA: ICD-10-CM

## 2022-06-29 DIAGNOSIS — R07.9 CHEST PAIN, UNSPECIFIED TYPE: ICD-10-CM

## 2022-06-29 DIAGNOSIS — D64.9 ANEMIA, UNSPECIFIED TYPE: ICD-10-CM

## 2022-06-29 DIAGNOSIS — I10 PRIMARY HYPERTENSION: ICD-10-CM

## 2022-06-29 DIAGNOSIS — I45.10 INCOMPLETE RIGHT BUNDLE BRANCH BLOCK: ICD-10-CM

## 2022-06-29 DIAGNOSIS — I77.1 TORTUOUS AORTA: ICD-10-CM

## 2022-06-29 DIAGNOSIS — N18.31 STAGE 3A CHRONIC KIDNEY DISEASE: ICD-10-CM

## 2022-06-29 DIAGNOSIS — R94.31 ABNORMAL EKG: Primary | ICD-10-CM

## 2022-06-29 DIAGNOSIS — I70.203 ATHEROSCLEROSIS OF ARTERY OF BOTH LOWER EXTREMITIES: Primary | ICD-10-CM

## 2022-06-29 DIAGNOSIS — G47.30 SLEEP APNEA, UNSPECIFIED TYPE: ICD-10-CM

## 2022-06-29 DIAGNOSIS — D72.19 OTHER EOSINOPHILIA: ICD-10-CM

## 2022-06-29 DIAGNOSIS — I70.203 ATHEROSCLEROSIS OF ARTERY OF BOTH LOWER EXTREMITIES: ICD-10-CM

## 2022-06-29 DIAGNOSIS — R53.1 GENERALIZED WEAKNESS: ICD-10-CM

## 2022-06-29 LAB
ANION GAP SERPL CALC-SCNC: 6 MMOL/L (ref 8–16)
APTT BLDCRRT: 26.7 SEC (ref 21–32)
BASOPHILS # BLD AUTO: 0.04 K/UL (ref 0–0.2)
BASOPHILS NFR BLD: 0.8 % (ref 0–1.9)
BUN SERPL-MCNC: 19 MG/DL (ref 8–23)
CALCIUM SERPL-MCNC: 9 MG/DL (ref 8.7–10.5)
CHLORIDE SERPL-SCNC: 106 MMOL/L (ref 95–110)
CO2 SERPL-SCNC: 28 MMOL/L (ref 23–29)
CREAT SERPL-MCNC: 1.3 MG/DL (ref 0.5–1.4)
DIFFERENTIAL METHOD: ABNORMAL
EOSINOPHIL # BLD AUTO: 0.2 K/UL (ref 0–0.5)
EOSINOPHIL NFR BLD: 4.6 % (ref 0–8)
ERYTHROCYTE [DISTWIDTH] IN BLOOD BY AUTOMATED COUNT: 12.7 % (ref 11.5–14.5)
EST. GFR  (AFRICAN AMERICAN): 57.9 ML/MIN/1.73 M^2
EST. GFR  (NON AFRICAN AMERICAN): 50.1 ML/MIN/1.73 M^2
GLUCOSE SERPL-MCNC: 90 MG/DL (ref 70–110)
HCT VFR BLD AUTO: 40.4 % (ref 40–54)
HGB BLD-MCNC: 13 G/DL (ref 14–18)
IMM GRANULOCYTES # BLD AUTO: 0.04 K/UL (ref 0–0.04)
IMM GRANULOCYTES NFR BLD AUTO: 0.8 % (ref 0–0.5)
INR PPP: 1.2 (ref 0.8–1.2)
LYMPHOCYTES # BLD AUTO: 0.6 K/UL (ref 1–4.8)
LYMPHOCYTES NFR BLD: 12.6 % (ref 18–48)
MCH RBC QN AUTO: 31.4 PG (ref 27–31)
MCHC RBC AUTO-ENTMCNC: 32.2 G/DL (ref 32–36)
MCV RBC AUTO: 98 FL (ref 82–98)
MONOCYTES # BLD AUTO: 0.5 K/UL (ref 0.3–1)
MONOCYTES NFR BLD: 9.6 % (ref 4–15)
NEUTROPHILS # BLD AUTO: 3.4 K/UL (ref 1.8–7.7)
NEUTROPHILS NFR BLD: 71.6 % (ref 38–73)
NRBC BLD-RTO: 0 /100 WBC
PLATELET # BLD AUTO: 118 K/UL (ref 150–450)
PMV BLD AUTO: 8.5 FL (ref 9.2–12.9)
POTASSIUM SERPL-SCNC: 4.6 MMOL/L (ref 3.5–5.1)
PROTHROMBIN TIME: 12.4 SEC (ref 9–12.5)
RBC # BLD AUTO: 4.14 M/UL (ref 4.6–6.2)
SODIUM SERPL-SCNC: 140 MMOL/L (ref 136–145)
WBC # BLD AUTO: 4.77 K/UL (ref 3.9–12.7)

## 2022-06-29 PROCEDURE — 1126F PR PAIN SEVERITY QUANTIFIED, NO PAIN PRESENT: ICD-10-PCS | Mod: CPTII,S$GLB,, | Performed by: INTERNAL MEDICINE

## 2022-06-29 PROCEDURE — 3288F PR FALLS RISK ASSESSMENT DOCUMENTED: ICD-10-PCS | Mod: CPTII,S$GLB,, | Performed by: INTERNAL MEDICINE

## 2022-06-29 PROCEDURE — 1101F PR PT FALLS ASSESS DOC 0-1 FALLS W/OUT INJ PAST YR: ICD-10-PCS | Mod: CPTII,S$GLB,, | Performed by: INTERNAL MEDICINE

## 2022-06-29 PROCEDURE — 1159F MED LIST DOCD IN RCRD: CPT | Mod: CPTII,S$GLB,, | Performed by: INTERNAL MEDICINE

## 2022-06-29 PROCEDURE — 1101F PT FALLS ASSESS-DOCD LE1/YR: CPT | Mod: CPTII,S$GLB,, | Performed by: INTERNAL MEDICINE

## 2022-06-29 PROCEDURE — 99214 PR OFFICE/OUTPT VISIT, EST, LEVL IV, 30-39 MIN: ICD-10-PCS | Mod: S$GLB,,, | Performed by: INTERNAL MEDICINE

## 2022-06-29 PROCEDURE — 99999 PR PBB SHADOW E&M-EST. PATIENT-LVL V: ICD-10-PCS | Mod: PBBFAC,,, | Performed by: INTERNAL MEDICINE

## 2022-06-29 PROCEDURE — 1160F PR REVIEW ALL MEDS BY PRESCRIBER/CLIN PHARMACIST DOCUMENTED: ICD-10-PCS | Mod: CPTII,S$GLB,, | Performed by: INTERNAL MEDICINE

## 2022-06-29 PROCEDURE — 3078F PR MOST RECENT DIASTOLIC BLOOD PRESSURE < 80 MM HG: ICD-10-PCS | Mod: CPTII,S$GLB,, | Performed by: INTERNAL MEDICINE

## 2022-06-29 PROCEDURE — 85610 PROTHROMBIN TIME: CPT | Performed by: INTERNAL MEDICINE

## 2022-06-29 PROCEDURE — 99214 OFFICE O/P EST MOD 30 MIN: CPT | Mod: S$GLB,,, | Performed by: INTERNAL MEDICINE

## 2022-06-29 PROCEDURE — 85730 THROMBOPLASTIN TIME PARTIAL: CPT | Performed by: INTERNAL MEDICINE

## 2022-06-29 PROCEDURE — 3075F PR MOST RECENT SYSTOLIC BLOOD PRESS GE 130-139MM HG: ICD-10-PCS | Mod: CPTII,S$GLB,, | Performed by: INTERNAL MEDICINE

## 2022-06-29 PROCEDURE — 3075F SYST BP GE 130 - 139MM HG: CPT | Mod: CPTII,S$GLB,, | Performed by: INTERNAL MEDICINE

## 2022-06-29 PROCEDURE — 3288F FALL RISK ASSESSMENT DOCD: CPT | Mod: CPTII,S$GLB,, | Performed by: INTERNAL MEDICINE

## 2022-06-29 PROCEDURE — 36415 COLL VENOUS BLD VENIPUNCTURE: CPT | Performed by: INTERNAL MEDICINE

## 2022-06-29 PROCEDURE — 1160F RVW MEDS BY RX/DR IN RCRD: CPT | Mod: CPTII,S$GLB,, | Performed by: INTERNAL MEDICINE

## 2022-06-29 PROCEDURE — 3078F DIAST BP <80 MM HG: CPT | Mod: CPTII,S$GLB,, | Performed by: INTERNAL MEDICINE

## 2022-06-29 PROCEDURE — 1159F PR MEDICATION LIST DOCUMENTED IN MEDICAL RECORD: ICD-10-PCS | Mod: CPTII,S$GLB,, | Performed by: INTERNAL MEDICINE

## 2022-06-29 PROCEDURE — 80048 BASIC METABOLIC PNL TOTAL CA: CPT | Performed by: INTERNAL MEDICINE

## 2022-06-29 PROCEDURE — 99999 PR PBB SHADOW E&M-EST. PATIENT-LVL V: CPT | Mod: PBBFAC,,, | Performed by: INTERNAL MEDICINE

## 2022-06-29 PROCEDURE — 85025 COMPLETE CBC W/AUTO DIFF WBC: CPT | Performed by: INTERNAL MEDICINE

## 2022-06-29 PROCEDURE — 1126F AMNT PAIN NOTED NONE PRSNT: CPT | Mod: CPTII,S$GLB,, | Performed by: INTERNAL MEDICINE

## 2022-06-29 NOTE — TELEPHONE ENCOUNTER
Called patient and gave him the date, time , and location of the procedure.----- Message from Renetta Valadez sent at 6/29/2022  2:15 PM CDT -----  Contact: Pt [phone]  Patient is returning a phone call.  Who left a message for the patient: Not sure   Does patient know what this is regarding:  Yes   Would you like a call back, or a response through your [facility] portal?:call  Comments:

## 2022-06-29 NOTE — PROGRESS NOTES
Subjective:   Patient ID:  Ezequiel Hughes is a 82 y.o. male who presents for follow up of No chief complaint on file.      HPI  An 79 yo male with hlp htn tia jonelle is here for f/u he has a prostate nodule . He is in digital hypertension has no angina no shortness of breath . Has no other issues clinically of dizziness lightheadedness chest pain shortness of breath. He is not exercising due to covid. Has no signs of uti . Has prostatism.      Today he comes complaining of neck pain and and complaining of bilateral lower extremity numbness and weakness he ahs no falls or claudication. He uses a pad in his foot he thinks one is shorter than the other. He ahs not been exercising. He had an joceline with exercise that does not suggest pad. The ahs lumbar arthropathy this is all suggestive of neurogenic claudication he also ha srt toe pain. Has no orthopnea pnd syncope palpitation shortness of breath he started doing upper body exercise and take b12. Has been compliant with diet his digital htn suggest that he is compliant.      5/28/2021  HERE COMPLAINING OF FEELING FATIGUED TIRED GETS LIGHTHEADED HAS NO ENERGY HE IS SHORT OF BREATH WITH MINIMAL EXERTION HE HAS NOT EXERCISED HE IS AFRAID BECAUSE OF HIS SYMPTOMS. HE IS NOT SLEEPING WELL AT NITE FRAGMENTED SLEEP . HE CLAIMS COMPLIANCE WITH SALT BP HAS BEEN FLUCTUATING.     8/10/2021  HERE FOR  F/U HAS NO NEW COMPLAINTS HAS NUMBNESS IN LEGS WHEN HE STANDS UP HAS NO CHEST PAIN NO PALPITATION NO SYNCOPE NEAR SYNCOPE NO TIA NO ANGINA. HE EXCERCISED ON HIS TREADMILL THIS MORNING HE DID NOT EXERCISE A LOT HE DID NOT FEEL LIKE HAS NO SYMPTOMS.      11/10/2021   [facility] he is doing ett exercise he has significant back pain leg numbness has at times difficulty with walking . Has numbness leg pain suggestive neurogenic claudication he cannot stand long he is getting weakness and numbness in legs. He has failed physical therapy he has no cardiac symptoms since he has been  back on treadmill.          6/29/2022   here for f/u his major concern is decrease in exercise tolerance exertional shortness of breath fatigue. He is not able to do his treadmill he is afraid now . Has no leg swelling has  A new cpap he is not using. He was admitted to [facility] with chest pain on 6/7/2022. He still has some dizziness he is referred to neurosurgery he has some hydrocephalus on mri echo showed normal lvf cards saw in hospital recommended repeat cardiolite  Past Medical History:   Diagnosis Date    Allergic rhinitis     Anemia     Back pain     BPH (benign prostatic hyperplasia)     Cancer     skin cancer to neck, Dr. Graves    Cataract     Disorder of kidney and ureter     ED (erectile dysfunction)     Hiatal hernia     small    History of colon polyps     colonoscopy 11/2016    HLD (hyperlipidemia)     Hyperlipidemia     Hypertension     Lateral epicondylitis     Lumbar radiculopathy 1/26/2022    OA (osteoarthritis)     GUIDO (obstructive sleep apnea)     Prostate cancer     Dr. Wong    TIA (transient ischemic attack)     Trouble in sleeping     Urge incontinence 3/29/2022       Past Surgical History:   Procedure Laterality Date    CATARACT EXTRACTION W/  INTRAOCULAR LENS IMPLANT Right 02/21/2018    I-Stent    CATARACT EXTRACTION W/  INTRAOCULAR LENS IMPLANT Left 03/21/2018    I - Stent    COLONOSCOPY N/A 11/14/2016    Procedure: COLONOSCOPY;  Surgeon: Karuna Rodriguez MD;  Location: [facility];  Service: Endoscopy;  Laterality: N/A;    COLONOSCOPY N/A 9/22/2020    Procedure: COLONOSCOPY;  Surgeon: Chuy Fish MD;  Location: [facility];  Service: Endoscopy;  Laterality: N/A;    EYE SURGERY      HEMORRHOID SURGERY      I-STENT Right 02/21/2018    DR. REECE    KNEE ARTHROSCOPY W/ MENISCAL REPAIR Right 2015    Dr. Jain    PCIOL Right 02/21/2018    DR. REECE    PLANTAR FASCIA RELEASE      right    ROTATOR CUFF REPAIR Bilateral     bilateral    SELECTIVE  INJECTION OF ANESTHETIC AGENT AROUND LUMBAR SPINAL NERVE ROOT BY TRANSFORAMINAL APPROACH Bilateral 2022    Procedure: Bilateral L4/5 TF IAN with RN IV sedation;  Surgeon: Mak Young MD;  Location: [facility];  Service: Pain Management;  Laterality: Bilateral;    SELECTIVE INJECTION OF ANESTHETIC AGENT AROUND LUMBAR SPINAL NERVE ROOT BY TRANSFORAMINAL APPROACH Bilateral 3/14/2022    Procedure: Bilateral L4/5 TF IAN with RN IV sedation;  Surgeon: Mak Young MD;  Location: [facility];  Service: Pain Management;  Laterality: Bilateral;    SELECTIVE INJECTION OF ANESTHETIC AGENT AROUND LUMBAR SPINAL NERVE ROOT BY TRANSFORAMINAL APPROACH Bilateral 2022    Procedure: Bilateral L4/5 TF IAN - D2P Dr. Matthew CABRERA;  Surgeon: Abel Haywood MD;  Location: [facility];  Service: Pain Management;  Laterality: Bilateral;    SHOULDER SURGERY Bilateral around     Dr. Pepper.  rotator cuff surgeries    VASECTOMY         Social History     Tobacco Use    Smoking status: Former Smoker     Packs/day: 3.00     Years: 35.00     Pack years: 105.00     Types: Cigarettes     Start date: 1960     Quit date: 1985     Years since quittin.9    Smokeless tobacco: Never Used   Substance Use Topics    Alcohol use: Yes     Alcohol/week: 3.0 standard drinks     Types: 3 Cans of beer per week     Comment: socially    Drug use: No       Family History   Problem Relation Age of Onset    Lung cancer Father         life long smoker    Cancer Father         prostate, lung    Arthritis Mother     Stroke Sister         TIA    Cataracts Sister     Cancer Brother         prostate    Cataracts Brother     Cataracts Sister     Melanoma Neg Hx     Psoriasis Neg Hx     Lupus Neg Hx     Eczema Neg Hx     Diabetes Neg Hx     Heart disease Neg Hx     Kidney disease Neg Hx     Colon cancer Neg Hx        Current Outpatient Medications   Medication Sig    acetaminophen (TYLENOL ARTHRITIS PAIN ORAL)  Take by mouth. Patient states that he takes 2 tablets in the morning and 2 tablets in the evening    amLODIPine (NORVASC) 5 MG tablet Take 1 tablet (5 mg total) by mouth 2 (two) times daily.    atorvastatin (LIPITOR) 40 MG tablet TAKE 1 TABLET EVERY DAY    clopidogreL (PLAVIX) 75 mg tablet TAKE 1 TABLET EVERY DAY    finasteride (PROSCAR) 5 mg tablet Take 1 tablet (5 mg total) by mouth once daily.    fluticasone propionate (FLONASE) 50 mcg/actuation nasal spray USE 2 SPRAYS IN EACH NOSTRIL ONE TIME DAILY  (SUBSTITUTED FOR FLONASE)    losartan (COZAAR) 50 MG tablet TAKE 1 TABLET TWICE DAILY    pantoprazole (PROTONIX) 40 MG tablet TAKE 1 TABLET EVERY DAY    albuterol (PROVENTIL/VENTOLIN HFA) 90 mcg/actuation inhaler Inhale 2 puffs into the lungs every 6 (six) hours as needed.    albuterol (PROVENTIL/VENTOLIN HFA) 90 mcg/actuation inhaler     cholecalciferol, vitamin D3, (VITAMIN D3) 50 mcg (2,000 unit) Cap Take 1 capsule by mouth once daily.     No current facility-administered medications for this visit.     Facility-Administered Medications Ordered in Other Visits   Medication    ondansetron injection 4 mg     Current Outpatient Medications on File Prior to Visit   Medication Sig    acetaminophen (TYLENOL ARTHRITIS PAIN ORAL) Take by mouth. Patient states that he takes 2 tablets in the morning and 2 tablets in the evening    amLODIPine (NORVASC) 5 MG tablet Take 1 tablet (5 mg total) by mouth 2 (two) times daily.    atorvastatin (LIPITOR) 40 MG tablet TAKE 1 TABLET EVERY DAY    clopidogreL (PLAVIX) 75 mg tablet TAKE 1 TABLET EVERY DAY    finasteride (PROSCAR) 5 mg tablet Take 1 tablet (5 mg total) by mouth once daily.    fluticasone propionate (FLONASE) 50 mcg/actuation nasal spray USE 2 SPRAYS IN EACH NOSTRIL ONE TIME DAILY  (SUBSTITUTED FOR FLONASE)    losartan (COZAAR) 50 MG tablet TAKE 1 TABLET TWICE DAILY    pantoprazole (PROTONIX) 40 MG tablet TAKE 1 TABLET EVERY DAY    albuterol  (PROVENTIL/VENTOLIN HFA) 90 mcg/actuation inhaler Inhale 2 puffs into the lungs every 6 (six) hours as needed.    albuterol (PROVENTIL/VENTOLIN HFA) 90 mcg/actuation inhaler     cholecalciferol, vitamin D3, (VITAMIN D3) 50 mcg (2,000 unit) Cap Take 1 capsule by mouth once daily.     Current Facility-Administered Medications on File Prior to Visit   Medication    ondansetron injection 4 mg       Review of Systems   Constitutional: Positive for malaise/fatigue.   Eyes: Negative for blurred vision.   Cardiovascular: Positive for dyspnea on exertion. Negative for chest pain, claudication, cyanosis, irregular heartbeat, leg swelling, near-syncope, orthopnea, palpitations and paroxysmal nocturnal dyspnea.   Respiratory: Positive for shortness of breath. Negative for cough and hemoptysis.    Hematologic/Lymphatic: Negative for bleeding problem. Does not bruise/bleed easily.   Skin: Negative for dry skin and itching.   Musculoskeletal: Negative for falls, muscle weakness and myalgias.   Gastrointestinal: Negative for abdominal pain, diarrhea, heartburn, hematemesis, hematochezia and melena.   Genitourinary: Negative for flank pain and hematuria.   Neurological: Positive for light-headedness and weakness. Negative for dizziness, focal weakness, headaches, numbness, paresthesias and seizures.   Psychiatric/Behavioral: Negative for altered mental status and memory loss. The patient is not nervous/anxious.    Allergic/Immunologic: Negative for hives.       Objective:   Physical Exam  Vitals and nursing note reviewed.   Constitutional:       General: He is not in acute distress.     Appearance: He is well-developed. He is not diaphoretic.   HENT:      Head: Normocephalic and atraumatic.   Eyes:      General:         Right eye: No discharge.         Left eye: No discharge.      Pupils: Pupils are equal, round, and reactive to light.   Neck:      Thyroid: No thyromegaly.      Vascular: No JVD.   Cardiovascular:      Rate and  "Rhythm: Regular rhythm. Bradycardia present.      Pulses: Normal pulses and intact distal pulses.           Carotid pulses are 2+ on the right side and 2+ on the left side.       Radial pulses are 2+ on the right side.      Heart sounds: Murmur heard.   High-pitched blowing holosystolic murmur is present with a grade of 1/6 at the apex.    No friction rub. No gallop.   Pulmonary:      Effort: Pulmonary effort is normal. No respiratory distress.      Breath sounds: Normal breath sounds. No wheezing or rales.   Chest:      Chest wall: No tenderness.   Abdominal:      General: Bowel sounds are normal. There is no distension.      Palpations: Abdomen is soft.      Tenderness: There is no abdominal tenderness.   Musculoskeletal:         General: Normal range of motion.      Cervical back: Neck supple.      Right lower leg: No edema.      Left lower leg: No edema.   Skin:     General: Skin is warm and dry.      Findings: No erythema or rash.   Neurological:      Mental Status: He is alert and oriented to person, place, and time.      Cranial Nerves: No cranial nerve deficit.   Psychiatric:         Behavior: Behavior normal.         Judgment: Judgment normal.       Vitals:    06/29/22 0804 06/29/22 0807   BP: 134/72 136/70   BP Location: Left arm Right arm   Patient Position: Sitting Sitting   BP Method: Medium (Manual) Medium (Manual)   Pulse: (!) 57    Resp: 16    SpO2: 100%    Weight: 89.8 kg (197 lb 15.6 oz)    Height: 5' 10" (1.778 m)      Lab Results   Component Value Date    CHOL 132 06/08/2022    CHOL 139 09/24/2021    CHOL 162 03/03/2021     Lab Results   Component Value Date    HDL 56 06/08/2022    HDL 58 09/24/2021    HDL 78 (H) 03/03/2021     Lab Results   Component Value Date    LDLCALC 58.4 (L) 06/08/2022    LDLCALC 64.6 09/24/2021    LDLCALC 72.2 03/03/2021     Lab Results   Component Value Date    TRIG 88 06/08/2022    TRIG 82 09/24/2021    TRIG 59 03/03/2021     Lab Results   Component Value Date    " CHOLHDL 42.4 06/08/2022    CHOLHDL 41.7 09/24/2021    CHOLHDL 48.1 03/03/2021       Chemistry        Component Value Date/Time     06/08/2022 0419    K 4.4 06/08/2022 0419     06/08/2022 0419    CO2 24 06/08/2022 0419    BUN 19 06/08/2022 0419    CREATININE 1.2 06/08/2022 0419    GLU 93 06/08/2022 0419        Component Value Date/Time    CALCIUM 9.0 06/08/2022 0419    ALKPHOS 55 06/07/2022 1619    AST 12 06/07/2022 1619    ALT 8 (L) 06/07/2022 1619    BILITOT 0.8 06/07/2022 1619    ESTGFRAFRICA >60 06/08/2022 0419    EGFRNONAA 55 (A) 06/08/2022 0419          Lab Results   Component Value Date    TSH 0.989 03/21/2022     Lab Results   Component Value Date    INR 1.1 02/24/2021    INR 1.2 07/07/2020    INR 1.2 09/26/2017     Lab Results   Component Value Date    WBC 6.19 06/07/2022    HGB 13.1 (L) 06/07/2022    HCT 38.7 (L) 06/07/2022    MCV 93 06/07/2022     (L) 06/07/2022     BMP  Sodium   Date Value Ref Range Status   06/08/2022 141 136 - 145 mmol/L Final     Potassium   Date Value Ref Range Status   06/08/2022 4.4 3.5 - 5.1 mmol/L Final     Chloride   Date Value Ref Range Status   06/08/2022 108 95 - 110 mmol/L Final     CO2   Date Value Ref Range Status   06/08/2022 24 23 - 29 mmol/L Final     BUN   Date Value Ref Range Status   06/08/2022 19 8 - 23 mg/dL Final     Creatinine   Date Value Ref Range Status   06/08/2022 1.2 0.5 - 1.4 mg/dL Final     Calcium   Date Value Ref Range Status   06/08/2022 9.0 8.7 - 10.5 mg/dL Final     Anion Gap   Date Value Ref Range Status   06/08/2022 9 8 - 16 mmol/L Final     eGFR if    Date Value Ref Range Status   06/08/2022 >60 >60 mL/min/1.73 m^2 Final     eGFR if non    Date Value Ref Range Status   06/08/2022 55 (A) >60 mL/min/1.73 m^2 Final     Comment:     Calculation used to obtain the estimated glomerular filtration  rate (eGFR) is the CKD-EPI equation.        CrCl cannot be calculated (Patient's most recent lab result is  older than the maximum 7 days allowed.).    Assessment:     1. Shortness of breath    2. Atherosclerosis of artery of both lower extremities    3. Mixed hyperlipidemia    4. Primary hypertension    5. Incomplete right bundle branch block    6. Tortuous aorta    7. Stage 3a chronic kidney disease    8. Anemia, unspecified type    9. Other eosinophilia    10. Generalized weakness    11. Sleep apnea, unspecified type      The patient has multitude of symptoms including shortness of breath exertional chest pain low energy he has decreased his exercise because he is not able to do it has a fear he ahs a bl;[place] preventing him from being active he is not happy with lifestyle has sleep apnea ? Not well treated. He has not seen pulmonary. I think he had multiple stress test that are negative normal lvf by echo. Has mild abnormality on mri getting evaluated by neurosurgery. I think he deserves a pulmonary evaluation.in addition I think due to his decrease in exercise tolerance exertional symptoms he deserves a r/lhc make sure no pulmonary htn as well as no cad. He has on his ct chest a significant calcification in lad.and his ekg is abnormal.   AGREE WITH NEUROSURGERY EVAL.  ? COMPONENT ANXIETY AFFECTING HIS FUNCTIONAL CAPACITY HE IS AFRAID HE HAS SOMETHING WRONG WITH HIS HEART AND HE IS VERY WORRIED .  Plan:   PULMONARY EVAL   R/LHC RADIAL BRACHIAL APPROACH  I have explained the risks, benefits , and alternatives of the procedure in detail.the patient voices understanding and all questions have been answered.the patient agrees to proceed as planned.

## 2022-06-29 NOTE — H&P (VIEW-ONLY)
Subjective:   Patient ID:  Ezequiel uHghes is a 82 y.o. male who presents for follow up of No chief complaint on file.      HPI  An 81 yo male with hlp htn tia jonelle is here for f/u he has a prostate nodule . He is in digital hypertension has no angina no shortness of breath . Has no other issues clinically of dizziness lightheadedness chest pain shortness of breath. He is not exercising due to covid. Has no signs of uti . Has prostatism.      Today he comes complaining of neck pain and and complaining of bilateral lower extremity numbness and weakness he ahs no falls or claudication. He uses a pad in his foot he thinks one is shorter than the other. He ahs not been exercising. He had an joceline with exercise that does not suggest pad. The ahs lumbar arthropathy this is all suggestive of neurogenic claudication he also ha srt toe pain. Has no orthopnea pnd syncope palpitation shortness of breath he started doing upper body exercise and take b12. Has been compliant with diet his digital htn suggest that he is compliant.      5/28/2021  HERE COMPLAINING OF FEELING FATIGUED TIRED GETS LIGHTHEADED HAS NO ENERGY HE IS SHORT OF BREATH WITH MINIMAL EXERTION HE HAS NOT EXERCISED HE IS AFRAID BECAUSE OF HIS SYMPTOMS. HE IS NOT SLEEPING WELL AT [facility]AGMENT[facility] SLEEP . HE CLAIMS COMPLIANCE WITH SALT BP HAS BEEN FLUCTUATING.     8/10/2021  HERE FOR  F/U HAS NO NEW COMPLAINTS HAS NUMBNESS IN LEGS WHEN HE STANDS UP HAS NO CHEST PAIN NO PALPITATION NO SYNCOPE NEAR SYNCOPE NO TIA NO ANGINA. HE EXCERCISED ON HIS TREADMILL THIS MORNING HE DID NOT EXERCISE A LOT HE DID NOT FEEL LIKE HAS NO SYMPTOMS.      11/10/2021   [facility] he is doing ett exercise he has significant back pain leg numbness has at times difficulty with walking . Has numbness leg pain suggestive neurogenic claudication he cannot stand long he is getting weakness and numbness in legs. He has failed physical therapy he has no cardiac symptoms since he has been  back on treadmill.          6/29/2022   here for f/u his major concern is decrease in exercise tolerance exertional shortness of breath fatigue. He is not able to do his treadmill he is afraid now . Has no leg swelling has  A new cpap he is not using. He was admitted to [facility] with chest pain on 6/7/2022. He still has some dizziness he is referred to neurosurgery he has some hydrocephalus on mri echo showed normal lvf cards saw in hospital recommended repeat cardiolite  Past Medical History:   Diagnosis Date    Allergic rhinitis     Anemia     Back pain     BPH (benign prostatic hyperplasia)     Cancer     skin cancer to neck, Dr. Graves    Cataract     Disorder of kidney and ureter     ED (erectile dysfunction)     Hiatal hernia     small    History of colon polyps     colonoscopy 11/2016    HLD (hyperlipidemia)     Hyperlipidemia     Hypertension     Lateral epicondylitis     Lumbar radiculopathy 1/26/2022    OA (osteoarthritis)     GUIDO (obstructive sleep apnea)     Prostate cancer     Dr. Wong    TIA (transient ischemic attack)     Trouble in sleeping     Urge incontinence 3/29/2022       Past Surgical History:   Procedure Laterality Date    CATARACT EXTRACTION W/  INTRAOCULAR LENS IMPLANT Right 02/21/2018    I-Stent    CATARACT EXTRACTION W/  INTRAOCULAR LENS IMPLANT Left 03/21/2018    I - Stent    COLONOSCOPY N/A 11/14/2016    Procedure: COLONOSCOPY;  Surgeon: Karuna Rodriguez MD;  Location: [facility];  Service: Endoscopy;  Laterality: N/A;    COLONOSCOPY N/A 9/22/2020    Procedure: COLONOSCOPY;  Surgeon: Chuy Fish MD;  Location: [facility];  Service: Endoscopy;  Laterality: N/A;    EYE SURGERY      HEMORRHOID SURGERY      I-STENT Right 02/21/2018    DR. REECE    KNEE ARTHROSCOPY W/ MENISCAL REPAIR Right 2015    Dr. Jani    PCIOL Right 02/21/2018    DR. REECE    PLANTAR FASCIA RELEASE      right    ROTATOR CUFF REPAIR Bilateral     bilateral    SELECTIVE  INJECTION OF ANESTHETIC AGENT AROUND LUMBAR SPINAL NERVE ROOT BY TRANSFORAMINAL APPROACH Bilateral 2022    Procedure: Bilateral L4/5 TF IAN with RN IV sedation;  Surgeon: Mak Young MD;  Location: [facility];  Service: Pain Management;  Laterality: Bilateral;    SELECTIVE INJECTION OF ANESTHETIC AGENT AROUND LUMBAR SPINAL NERVE ROOT BY TRANSFORAMINAL APPROACH Bilateral 3/14/2022    Procedure: Bilateral L4/5 TF IAN with RN IV sedation;  Surgeon: Mak Young MD;  Location: [facility];  Service: Pain Management;  Laterality: Bilateral;    SELECTIVE INJECTION OF ANESTHETIC AGENT AROUND LUMBAR SPINAL NERVE ROOT BY TRANSFORAMINAL APPROACH Bilateral 2022    Procedure: Bilateral L4/5 TF IAN - D2P Dr. Matthew CABRERA;  Surgeon: Abel Haywood MD;  Location: [facility];  Service: Pain Management;  Laterality: Bilateral;    SHOULDER SURGERY Bilateral around     Dr. Pepper.  rotator cuff surgeries    VASECTOMY         Social History     Tobacco Use    Smoking status: Former Smoker     Packs/day: 3.00     Years: 35.00     Pack years: 105.00     Types: Cigarettes     Start date: 1960     Quit date: 1985     Years since quittin.9    Smokeless tobacco: Never Used   Substance Use Topics    Alcohol use: Yes     Alcohol/week: 3.0 standard drinks     Types: 3 Cans of beer per week     Comment: socially    Drug use: No       Family History   Problem Relation Age of Onset    Lung cancer Father         life long smoker    Cancer Father         prostate, lung    Arthritis Mother     Stroke Sister         TIA    Cataracts Sister     Cancer Brother         prostate    Cataracts Brother     Cataracts Sister     Melanoma Neg Hx     Psoriasis Neg Hx     Lupus Neg Hx     Eczema Neg Hx     Diabetes Neg Hx     Heart disease Neg Hx     Kidney disease Neg Hx     Colon cancer Neg Hx        Current Outpatient Medications   Medication Sig    acetaminophen (TYLENOL ARTHRITIS PAIN ORAL)  Take by mouth. Patient states that he takes 2 tablets in the morning and 2 tablets in the evening    amLODIPine (NORVASC) 5 MG tablet Take 1 tablet (5 mg total) by mouth 2 (two) times daily.    atorvastatin (LIPITOR) 40 MG tablet TAKE 1 TABLET EVERY DAY    clopidogreL (PLAVIX) 75 mg tablet TAKE 1 TABLET EVERY DAY    finasteride (PROSCAR) 5 mg tablet Take 1 tablet (5 mg total) by mouth once daily.    fluticasone propionate (FLONASE) 50 mcg/actuation nasal spray USE 2 SPRAYS IN EACH NOSTRIL ONE TIME DAILY  (SUBSTITUTED FOR FLONASE)    losartan (COZAAR) 50 MG tablet TAKE 1 TABLET TWICE DAILY    pantoprazole (PROTONIX) 40 MG tablet TAKE 1 TABLET EVERY DAY    albuterol (PROVENTIL/VENTOLIN HFA) 90 mcg/actuation inhaler Inhale 2 puffs into the lungs every 6 (six) hours as needed.    albuterol (PROVENTIL/VENTOLIN HFA) 90 mcg/actuation inhaler     cholecalciferol, vitamin D3, (VITAMIN D3) 50 mcg (2,000 unit) Cap Take 1 capsule by mouth once daily.     No current facility-administered medications for this visit.     Facility-Administered Medications Ordered in Other Visits   Medication    ondansetron injection 4 mg     Current Outpatient Medications on File Prior to Visit   Medication Sig    acetaminophen (TYLENOL ARTHRITIS PAIN ORAL) Take by mouth. Patient states that he takes 2 tablets in the morning and 2 tablets in the evening    amLODIPine (NORVASC) 5 MG tablet Take 1 tablet (5 mg total) by mouth 2 (two) times daily.    atorvastatin (LIPITOR) 40 MG tablet TAKE 1 TABLET EVERY DAY    clopidogreL (PLAVIX) 75 mg tablet TAKE 1 TABLET EVERY DAY    finasteride (PROSCAR) 5 mg tablet Take 1 tablet (5 mg total) by mouth once daily.    fluticasone propionate (FLONASE) 50 mcg/actuation nasal spray USE 2 SPRAYS IN EACH NOSTRIL ONE TIME DAILY  (SUBSTITUTED FOR FLONASE)    losartan (COZAAR) 50 MG tablet TAKE 1 TABLET TWICE DAILY    pantoprazole (PROTONIX) 40 MG tablet TAKE 1 TABLET EVERY DAY    albuterol  (PROVENTIL/VENTOLIN HFA) 90 mcg/actuation inhaler Inhale 2 puffs into the lungs every 6 (six) hours as needed.    albuterol (PROVENTIL/VENTOLIN HFA) 90 mcg/actuation inhaler     cholecalciferol, vitamin D3, (VITAMIN D3) 50 mcg (2,000 unit) Cap Take 1 capsule by mouth once daily.     Current Facility-Administered Medications on File Prior to Visit   Medication    ondansetron injection 4 mg       Review of Systems   Constitutional: Positive for malaise/fatigue.   Eyes: Negative for blurred vision.   Cardiovascular: Positive for dyspnea on exertion. Negative for chest pain, claudication, cyanosis, irregular heartbeat, leg swelling, near-syncope, orthopnea, palpitations and paroxysmal nocturnal dyspnea.   Respiratory: Positive for shortness of breath. Negative for cough and hemoptysis.    Hematologic/Lymphatic: Negative for bleeding problem. Does not bruise/bleed easily.   Skin: Negative for dry skin and itching.   Musculoskeletal: Negative for falls, muscle weakness and myalgias.   Gastrointestinal: Negative for abdominal pain, diarrhea, heartburn, hematemesis, hematochezia and melena.   Genitourinary: Negative for flank pain and hematuria.   Neurological: Positive for light-headedness and weakness. Negative for dizziness, focal weakness, headaches, numbness, paresthesias and seizures.   Psychiatric/Behavioral: Negative for altered mental status and memory loss. The patient is not nervous/anxious.    Allergic/Immunologic: Negative for hives.       Objective:   Physical Exam  Vitals and nursing note reviewed.   Constitutional:       General: He is not in acute distress.     Appearance: He is well-developed. He is not diaphoretic.   HENT:      Head: Normocephalic and atraumatic.   Eyes:      General:         Right eye: No discharge.         Left eye: No discharge.      Pupils: Pupils are equal, round, and reactive to light.   Neck:      Thyroid: No thyromegaly.      Vascular: No JVD.   Cardiovascular:      Rate and  "Rhythm: Regular rhythm. Bradycardia present.      Pulses: Normal pulses and intact distal pulses.           Carotid pulses are 2+ on the right side and 2+ on the left side.       Radial pulses are 2+ on the right side.      Heart sounds: Murmur heard.   High-pitched blowing holosystolic murmur is present with a grade of 1/6 at the apex.    No friction rub. No gallop.   Pulmonary:      Effort: Pulmonary effort is normal. No respiratory distress.      Breath sounds: Normal breath sounds. No wheezing or rales.   Chest:      Chest wall: No tenderness.   Abdominal:      General: Bowel sounds are normal. There is no distension.      Palpations: Abdomen is soft.      Tenderness: There is no abdominal tenderness.   Musculoskeletal:         General: Normal range of motion.      Cervical back: Neck supple.      Right lower leg: No edema.      Left lower leg: No edema.   Skin:     General: Skin is warm and dry.      Findings: No erythema or rash.   Neurological:      Mental Status: He is alert and oriented to person, place, and time.      Cranial Nerves: No cranial nerve deficit.   Psychiatric:         Behavior: Behavior normal.         Judgment: Judgment normal.       Vitals:    06/29/22 0804 06/29/22 0807   BP: 134/72 136/70   BP Location: Left arm Right arm   Patient Position: Sitting Sitting   BP Method: Medium (Manual) Medium (Manual)   Pulse: (!) 57    Resp: 16    SpO2: 100%    Weight: 89.8 kg (197 lb 15.6 oz)    Height: 5' 10" (1.778 m)      Lab Results   Component Value Date    CHOL 132 06/08/2022    CHOL 139 09/24/2021    CHOL 162 03/03/2021     Lab Results   Component Value Date    HDL 56 06/08/2022    HDL 58 09/24/2021    HDL 78 (H) 03/03/2021     Lab Results   Component Value Date    LDLCALC 58.4 (L) 06/08/2022    LDLCALC 64.6 09/24/2021    LDLCALC 72.2 03/03/2021     Lab Results   Component Value Date    TRIG 88 06/08/2022    TRIG 82 09/24/2021    TRIG 59 03/03/2021     Lab Results   Component Value Date    " CHOLHDL 42.4 06/08/2022    CHOLHDL 41.7 09/24/2021    CHOLHDL 48.1 03/03/2021       Chemistry        Component Value Date/Time     06/08/2022 0419    K 4.4 06/08/2022 0419     06/08/2022 0419    CO2 24 06/08/2022 0419    BUN 19 06/08/2022 0419    CREATININE 1.2 06/08/2022 0419    GLU 93 06/08/2022 0419        Component Value Date/Time    CALCIUM 9.0 06/08/2022 0419    ALKPHOS 55 06/07/2022 1619    AST 12 06/07/2022 1619    ALT 8 (L) 06/07/2022 1619    BILITOT 0.8 06/07/2022 1619    ESTGFRAFRICA >60 06/08/2022 0419    EGFRNONAA 55 (A) 06/08/2022 0419          Lab Results   Component Value Date    TSH 0.989 03/21/2022     Lab Results   Component Value Date    INR 1.1 02/24/2021    INR 1.2 07/07/2020    INR 1.2 09/26/2017     Lab Results   Component Value Date    WBC 6.19 06/07/2022    HGB 13.1 (L) 06/07/2022    HCT 38.7 (L) 06/07/2022    MCV 93 06/07/2022     (L) 06/07/2022     BMP  Sodium   Date Value Ref Range Status   06/08/2022 141 136 - 145 mmol/L Final     Potassium   Date Value Ref Range Status   06/08/2022 4.4 3.5 - 5.1 mmol/L Final     Chloride   Date Value Ref Range Status   06/08/2022 108 95 - 110 mmol/L Final     CO2   Date Value Ref Range Status   06/08/2022 24 23 - 29 mmol/L Final     BUN   Date Value Ref Range Status   06/08/2022 19 8 - 23 mg/dL Final     Creatinine   Date Value Ref Range Status   06/08/2022 1.2 0.5 - 1.4 mg/dL Final     Calcium   Date Value Ref Range Status   06/08/2022 9.0 8.7 - 10.5 mg/dL Final     Anion Gap   Date Value Ref Range Status   06/08/2022 9 8 - 16 mmol/L Final     eGFR if    Date Value Ref Range Status   06/08/2022 >60 >60 mL/min/1.73 m^2 Final     eGFR if non    Date Value Ref Range Status   06/08/2022 55 (A) >60 mL/min/1.73 m^2 Final     Comment:     Calculation used to obtain the estimated glomerular filtration  rate (eGFR) is the CKD-EPI equation.        CrCl cannot be calculated (Patient's most recent lab result is  older than the maximum 7 days allowed.).    Assessment:     1. Shortness of breath    2. Atherosclerosis of artery of both lower extremities    3. Mixed hyperlipidemia    4. Primary hypertension    5. Incomplete right bundle branch block    6. Tortuous aorta    7. Stage 3a chronic kidney disease    8. Anemia, unspecified type    9. Other eosinophilia    10. Generalized weakness    11. Sleep apnea, unspecified type      The patient has multitude of symptoms including shortness of breath exertional chest pain low energy he has decreased his exercise because he is not able to do it has a fear he ahs a bl;[place] preventing him from being active he is not happy with lifestyle has sleep apnea ? Not well treated. He has not seen pulmonary. I think he had multiple stress test that are negative normal lvf by echo. Has mild abnormality on mri getting evaluated by neurosurgery. I think he deserves a pulmonary evaluation.in addition I think due to his decrease in exercise tolerance exertional symptoms he deserves a r/lhc make sure no pulmonary htn as well as no cad. He has on his ct chest a significant calcification in lad.and his ekg is abnormal.   AGREE WITH NEUROSURGERY EVAL.  ? COMPONENT ANXIETY AFFECTING HIS FUNCTIONAL CAPACITY HE IS AFRAID HE HAS SOMETHING WRONG WITH HIS HEART AND HE IS VERY WORRIED .  Plan:   PULMONARY EVAL   R/LHC RADIAL BRACHIAL APPROACH  I have explained the risks, benefits , and alternatives of the procedure in detail.the patient voices understanding and all questions have been answered.the patient agrees to proceed as planned.

## 2022-06-30 ENCOUNTER — OFFICE VISIT (OUTPATIENT)
Dept: UROLOGY | Facility: CLINIC | Age: 82
End: 2022-06-30
Payer: MEDICARE

## 2022-06-30 VITALS
WEIGHT: 197.56 LBS | SYSTOLIC BLOOD PRESSURE: 122 MMHG | BODY MASS INDEX: 28.34 KG/M2 | DIASTOLIC BLOOD PRESSURE: 86 MMHG

## 2022-06-30 DIAGNOSIS — C61 PROSTATE CANCER: Primary | ICD-10-CM

## 2022-06-30 DIAGNOSIS — R10.31 RIGHT LOWER QUADRANT PAIN: ICD-10-CM

## 2022-06-30 DIAGNOSIS — N13.8 BPH WITH OBSTRUCTION/LOWER URINARY TRACT SYMPTOMS: ICD-10-CM

## 2022-06-30 DIAGNOSIS — N40.1 BPH WITH OBSTRUCTION/LOWER URINARY TRACT SYMPTOMS: ICD-10-CM

## 2022-06-30 PROCEDURE — 3288F FALL RISK ASSESSMENT DOCD: CPT | Mod: CPTII,S$GLB,, | Performed by: UROLOGY

## 2022-06-30 PROCEDURE — 3074F SYST BP LT 130 MM HG: CPT | Mod: CPTII,S$GLB,, | Performed by: UROLOGY

## 2022-06-30 PROCEDURE — 3079F DIAST BP 80-89 MM HG: CPT | Mod: CPTII,S$GLB,, | Performed by: UROLOGY

## 2022-06-30 PROCEDURE — 1126F AMNT PAIN NOTED NONE PRSNT: CPT | Mod: CPTII,S$GLB,, | Performed by: UROLOGY

## 2022-06-30 PROCEDURE — 3288F PR FALLS RISK ASSESSMENT DOCUMENTED: ICD-10-PCS | Mod: CPTII,S$GLB,, | Performed by: UROLOGY

## 2022-06-30 PROCEDURE — 3079F PR MOST RECENT DIASTOLIC BLOOD PRESSURE 80-89 MM HG: ICD-10-PCS | Mod: CPTII,S$GLB,, | Performed by: UROLOGY

## 2022-06-30 PROCEDURE — 1101F PR PT FALLS ASSESS DOC 0-1 FALLS W/OUT INJ PAST YR: ICD-10-PCS | Mod: CPTII,S$GLB,, | Performed by: UROLOGY

## 2022-06-30 PROCEDURE — 1101F PT FALLS ASSESS-DOCD LE1/YR: CPT | Mod: CPTII,S$GLB,, | Performed by: UROLOGY

## 2022-06-30 PROCEDURE — 99999 PR PBB SHADOW E&M-EST. PATIENT-LVL III: CPT | Mod: PBBFAC,,, | Performed by: UROLOGY

## 2022-06-30 PROCEDURE — 3074F PR MOST RECENT SYSTOLIC BLOOD PRESSURE < 130 MM HG: ICD-10-PCS | Mod: CPTII,S$GLB,, | Performed by: UROLOGY

## 2022-06-30 PROCEDURE — 99999 PR PBB SHADOW E&M-EST. PATIENT-LVL III: ICD-10-PCS | Mod: PBBFAC,,, | Performed by: UROLOGY

## 2022-06-30 PROCEDURE — 1126F PR PAIN SEVERITY QUANTIFIED, NO PAIN PRESENT: ICD-10-PCS | Mod: CPTII,S$GLB,, | Performed by: UROLOGY

## 2022-06-30 PROCEDURE — 99214 PR OFFICE/OUTPT VISIT, EST, LEVL IV, 30-39 MIN: ICD-10-PCS | Mod: S$GLB,,, | Performed by: UROLOGY

## 2022-06-30 PROCEDURE — 99214 OFFICE O/P EST MOD 30 MIN: CPT | Mod: S$GLB,,, | Performed by: UROLOGY

## 2022-06-30 NOTE — PROGRESS NOTES
CC: follow up prostate cancer    History of Present Illness:   Ezequiel Hughes is a 82 y.o. male here for evaluation of No chief complaint on file.    6/30/22-Nocturia x 1 lately, but prior to the last 2 nights, it has been 4 times. Still on finasteride. He does have urgency and occasional UUI. If he goes out, he wears a pad. Stream is weak. No hesitancy. Recently, visits have gone to every 6 months. Pt reports occasional RLQ pain. He does have come constipation, but pain doesn't change with BM. Persistent for a month.    Prior records indicate last MRI and TRUS biopsy in 2020.   3/29/22- 83yo male with h/o Prostate cancer, here to establish care. He has previously seen Dr. Wong and was undergoing active surveillance. He reports that he was diagnosed with prostate cancer in 2014. He hasn't had any treatment. He is currently managed on finasteride. He does report some UUI, small amounts. He had a repeat TRUS biopsy in 2021, and pt reports path was unchanged. Also had MRIs around that time as well and states that he had a targetable lesion.         Review of Systems   Constitutional: Positive for fatigue.   Respiratory: Positive for shortness of breath (planning to see pulmonology).    Cardiovascular: Negative for chest pain and palpitations.   Neurological: Positive for dizziness and light-headedness.   All other systems reviewed and are negative.        Past Medical History:   Diagnosis Date    Allergic rhinitis     Anemia     Back pain     BPH (benign prostatic hyperplasia)     Cancer     skin cancer to neck, Dr. Graves    Cataract     Disorder of kidney and ureter     ED (erectile dysfunction)     Hiatal hernia     small    History of colon polyps     colonoscopy 11/2016    HLD (hyperlipidemia)     Hyperlipidemia     Hypertension     Lateral epicondylitis     Lumbar radiculopathy 1/26/2022    OA (osteoarthritis)     GUIDO (obstructive sleep apnea)     Prostate cancer     Dr. Wong     TIA (transient ischemic attack)     Trouble in sleeping     Urge incontinence 3/29/2022       Past Surgical History:   Procedure Laterality Date    CATARACT EXTRACTION W/  INTRAOCULAR LENS IMPLANT Right 02/21/2018    I-Stent    CATARACT EXTRACTION W/  INTRAOCULAR LENS IMPLANT Left 03/21/2018    I - Stent    COLONOSCOPY N/A 11/14/2016    Procedure: COLONOSCOPY;  Surgeon: Karuna Rodriguez MD;  Location: [facility];  Service: Endoscopy;  Laterality: N/A;    COLONOSCOPY N/A 9/22/2020    Procedure: COLONOSCOPY;  Surgeon: Chuy Fish MD;  Location: [facility];  Service: Endoscopy;  Laterality: N/A;    EYE SURGERY      HEMORRHOID SURGERY      I-STENT Right 02/21/2018    DR. REECE    KNEE ARTHROSCOPY W/ MENISCAL REPAIR Right 2015    Dr. Jain    PCIOL Right 02/21/2018    DR. REECE    PLANTAR FASCIA RELEASE      right    ROTATOR CUFF REPAIR Bilateral     bilateral    SELECTIVE INJECTION OF ANESTHETIC AGENT AROUND LUMBAR SPINAL NERVE ROOT BY TRANSFORAMINAL APPROACH Bilateral 1/26/2022    Procedure: Bilateral L4/5 TF IAN with RN IV sedation;  Surgeon: Mak Young MD;  Location: [facility] [place] [facility];  Service: Pain Management;  Laterality: Bilateral;    SELECTIVE INJECTION OF ANESTHETIC AGENT AROUND LUMBAR SPINAL NERVE ROOT BY TRANSFORAMINAL APPROACH Bilateral 3/14/2022    Procedure: Bilateral L4/5 TF IAN with RN IV sedation;  Surgeon: Mak Young MD;  Location: [facility] [place] [facility];  Service: Pain Management;  Laterality: Bilateral;    SELECTIVE INJECTION OF ANESTHETIC AGENT AROUND LUMBAR SPINAL NERVE ROOT BY TRANSFORAMINAL APPROACH Bilateral 6/2/2022    Procedure: Bilateral L4/5 TF IAN - D2P Dr. Castro [facility];  Surgeon: Abel Haywood MD;  Location: [facility] [place] [facility];  Service: Pain Management;  Laterality: Bilateral;    SHOULDER SURGERY Bilateral around 2000    Dr. Pepper.  rotator cuff surgeries    VASECTOMY         Family History   Problem Relation Age of Onset    Lung cancer Father         life long  smoker    Cancer Father         prostate, lung    Arthritis Mother     Stroke Sister         TIA    Cataracts Sister     Cancer Brother         prostate    Cataracts Brother     Cataracts Sister     Melanoma Neg Hx     Psoriasis Neg Hx     Lupus Neg Hx     Eczema Neg Hx     Diabetes Neg Hx     Heart disease Neg Hx     Kidney disease Neg Hx     Colon cancer Neg Hx        Social History     Tobacco Use    Smoking status: Former Smoker     Packs/day: 3.00     Years: 35.00     Pack years: 105.00     Types: Cigarettes     Start date: 1960     Quit date: 1985     Years since quittin.9    Smokeless tobacco: Never Used   Substance Use Topics    Alcohol use: Yes     Alcohol/week: 3.0 standard drinks     Types: 3 Cans of beer per week     Comment: socially    Drug use: No       Current Outpatient Medications   Medication Sig Dispense Refill    acetaminophen (TYLENOL ARTHRITIS PAIN ORAL) Take by mouth. Patient states that he takes 2 tablets in the morning and 2 tablets in the evening      albuterol (PROVENTIL/VENTOLIN HFA) 90 mcg/actuation inhaler Inhale 2 puffs into the lungs every 6 (six) hours as needed.      albuterol (PROVENTIL/VENTOLIN HFA) 90 mcg/actuation inhaler       amLODIPine (NORVASC) 5 MG tablet Take 1 tablet (5 mg total) by mouth 2 (two) times daily. 60 tablet 11    atorvastatin (LIPITOR) 40 MG tablet TAKE 1 TABLET EVERY DAY 90 tablet 1    cholecalciferol, vitamin D3, (VITAMIN D3) 50 mcg (2,000 unit) Cap Take 1 capsule by mouth once daily.      clopidogreL (PLAVIX) 75 mg tablet TAKE 1 TABLET EVERY DAY 90 tablet 3    finasteride (PROSCAR) 5 mg tablet Take 1 tablet (5 mg total) by mouth once daily. 90 tablet 4    fluticasone propionate (FLONASE) 50 mcg/actuation nasal spray USE 2 SPRAYS IN EACH NOSTRIL ONE TIME DAILY  (SUBSTITUTED FOR FLONASE) 48 g 3    losartan (COZAAR) 50 MG tablet TAKE 1 TABLET TWICE DAILY 180 tablet 3    pantoprazole (PROTONIX) 40 MG tablet TAKE 1  TABLET EVERY DAY 90 tablet 3     No current facility-administered medications for this visit.     Facility-Administered Medications Ordered in Other Visits   Medication Dose Route Frequency Provider Last Rate Last Admin    ondansetron injection 4 mg  4 mg Intravenous Once PRN Abel Haywood MD           Review of patient's allergies indicates:   Allergen Reactions    Atarax [hydroxyzine hcl] Other (See Comments)     Raised blood pressure     Zyrtec [cetirizine] Other (See Comments)     Raised blood pressure     Gold sodium thiosulfate      Patch test positive    Meloxicam Rash     Other reaction(s): hypertension       Physical Exam  Vitals:    06/30/22 1018   BP: 122/86       General: Well-developed, well-nourished in no acute distress  HEENT: Normocephalic, atraumatic, Extraocular movements intact  Neck: supple, trachea midline, no cervical or supraclavicular lymphadenopathy  Respirations: even and unlabored  Back: midline spine, no CVA tenderness  Abdomen: soft, Non-tender, non-distended, no organomegaly or palpable masses, no rebound or guarding  Rectal: 3/29/22->40g prostate, no nodules or tenderness. No gross blood  Extremities: atraumatic, moves all equally, no clubbing, cyanosis or edema  Psych: normal affect  Skin: warm and dry, no lesions  Neuro: Alert and oriented, Cranial nerves II-XII grossly intact    PVR: 102cc 3/29/22    Urinalysis  Negative for blood, LE, nit    Lab Results   Component Value Date    PSA 1.3 11/10/2021    PSA 1.1 01/23/2020    PSA 1.5 09/01/2016       Assessment:   1. Prostate cancer  Prostate Specific Antigen, Diagnostic   2. Right lower quadrant pain  CT Abdomen Pelvis  Without Contrast   3. BPH with obstruction/lower urinary tract symptoms         Plan:  Prostate cancer  -     Prostate Specific Antigen, Diagnostic; Future; Expected date: 06/30/2022    Right lower quadrant pain  -     CT Abdomen Pelvis  Without Contrast; Future; Expected date: 06/30/2022    BPH with  obstruction/lower urinary tract symptoms         Discussed flomax. Pt would like to complete upcoming workup for dizziness prior to considering.   Continue finasteride for now  Discussed repeat MRI and/or biopsy. Pt would like to consider in 3 months once workup of current issues is complete.   Follow up in about 3 months (around 9/30/2022).

## 2022-07-05 ENCOUNTER — HOSPITAL ENCOUNTER (OUTPATIENT)
Facility: HOSPITAL | Age: 82
Discharge: HOME | End: 2022-07-05
Attending: INTERNAL MEDICINE | Admitting: INTERNAL MEDICINE
Payer: MEDICARE

## 2022-07-05 DIAGNOSIS — I27.20 PULMONARY HYPERTENSION: ICD-10-CM

## 2022-07-05 DIAGNOSIS — R07.9 EXERTIONAL CHEST PAIN: ICD-10-CM

## 2022-07-05 DIAGNOSIS — R06.02 SHORTNESS OF BREATH: Primary | ICD-10-CM

## 2022-07-05 DIAGNOSIS — R94.31 ABNORMAL EKG: ICD-10-CM

## 2022-07-05 DIAGNOSIS — R06.02 SOB (SHORTNESS OF BREATH): ICD-10-CM

## 2022-07-05 LAB — CATH EF QUANTITATIVE: 65 %

## 2022-07-05 PROCEDURE — 93460 R&L HRT ART/VENTRICLE ANGIO: CPT | Mod: 26,,, | Performed by: INTERNAL MEDICINE

## 2022-07-05 PROCEDURE — C1894 INTRO/SHEATH, NON-LASER: HCPCS | Performed by: INTERNAL MEDICINE

## 2022-07-05 PROCEDURE — 99152 MOD SED SAME PHYS/QHP 5/>YRS: CPT | Performed by: INTERNAL MEDICINE

## 2022-07-05 PROCEDURE — 25000003 PHARM REV CODE 250: Performed by: INTERNAL MEDICINE

## 2022-07-05 PROCEDURE — C1769 GUIDE WIRE: HCPCS | Performed by: INTERNAL MEDICINE

## 2022-07-05 PROCEDURE — 93460 R&L HRT ART/VENTRICLE ANGIO: CPT | Performed by: INTERNAL MEDICINE

## 2022-07-05 PROCEDURE — 99153 MOD SED SAME PHYS/QHP EA: CPT | Performed by: INTERNAL MEDICINE

## 2022-07-05 PROCEDURE — 99152 PR MOD CONSCIOUS SEDATION, SAME PHYS, 5+ YRS, FIRST 15 MIN: ICD-10-PCS | Mod: ,,, | Performed by: INTERNAL MEDICINE

## 2022-07-05 PROCEDURE — C1751 CATH, INF, PER/CENT/MIDLINE: HCPCS | Performed by: INTERNAL MEDICINE

## 2022-07-05 PROCEDURE — 25500020 PHARM REV CODE 255: Performed by: INTERNAL MEDICINE

## 2022-07-05 PROCEDURE — 63600175 PHARM REV CODE 636 W HCPCS: Performed by: INTERNAL MEDICINE

## 2022-07-05 PROCEDURE — 27201423 OPTIME MED/SURG SUP & DEVICES STERILE SUPPLY: Performed by: INTERNAL MEDICINE

## 2022-07-05 PROCEDURE — 99152 MOD SED SAME PHYS/QHP 5/>YRS: CPT | Mod: ,,, | Performed by: INTERNAL MEDICINE

## 2022-07-05 PROCEDURE — 93460 PR CATH PLACE/CORON ANGIO, IMG SUPER/INTERP,R&L HRT CATH, L HRT VENTRIC: ICD-10-PCS | Mod: 26,,, | Performed by: INTERNAL MEDICINE

## 2022-07-05 RX ORDER — VERAPAMIL HYDROCHLORIDE 2.5 MG/ML
INJECTION, SOLUTION INTRAVENOUS
Status: DISCONTINUED | OUTPATIENT
Start: 2022-07-05 | End: 2022-07-05 | Stop reason: HOSPADM

## 2022-07-05 RX ORDER — IODIXANOL 320 MG/ML
INJECTION, SOLUTION INTRAVASCULAR
Status: DISCONTINUED | OUTPATIENT
Start: 2022-07-05 | End: 2022-07-05 | Stop reason: HOSPADM

## 2022-07-05 RX ORDER — ACETAMINOPHEN 325 MG/1
650 TABLET ORAL EVERY 4 HOURS PRN
Status: DISCONTINUED | OUTPATIENT
Start: 2022-07-05 | End: 2022-07-05 | Stop reason: HOSPADM

## 2022-07-05 RX ORDER — SODIUM CHLORIDE 9 MG/ML
INJECTION, SOLUTION INTRAVENOUS CONTINUOUS
Status: ACTIVE | OUTPATIENT
Start: 2022-07-05 | End: 2022-07-05

## 2022-07-05 RX ORDER — FENTANYL CITRATE 50 UG/ML
INJECTION, SOLUTION INTRAMUSCULAR; INTRAVENOUS
Status: DISCONTINUED | OUTPATIENT
Start: 2022-07-05 | End: 2022-07-05 | Stop reason: HOSPADM

## 2022-07-05 RX ORDER — DIPHENHYDRAMINE HCL 50 MG
50 CAPSULE ORAL ONCE
Status: COMPLETED | OUTPATIENT
Start: 2022-07-05 | End: 2022-07-05

## 2022-07-05 RX ORDER — NAPROXEN SODIUM 220 MG/1
81 TABLET, FILM COATED ORAL ONCE
Status: COMPLETED | OUTPATIENT
Start: 2022-07-05 | End: 2022-07-05

## 2022-07-05 RX ORDER — MIDAZOLAM HYDROCHLORIDE 1 MG/ML
INJECTION, SOLUTION INTRAMUSCULAR; INTRAVENOUS
Status: DISCONTINUED | OUTPATIENT
Start: 2022-07-05 | End: 2022-07-05 | Stop reason: HOSPADM

## 2022-07-05 RX ORDER — HEPARIN SODIUM 1000 [USP'U]/ML
INJECTION INTRAVENOUS; SUBCUTANEOUS
Status: DISCONTINUED | OUTPATIENT
Start: 2022-07-05 | End: 2022-07-05 | Stop reason: HOSPADM

## 2022-07-05 RX ORDER — ONDANSETRON 8 MG/1
8 TABLET, ORALLY DISINTEGRATING ORAL EVERY 8 HOURS PRN
Status: DISCONTINUED | OUTPATIENT
Start: 2022-07-05 | End: 2022-07-05 | Stop reason: HOSPADM

## 2022-07-05 RX ORDER — LIDOCAINE HYDROCHLORIDE 20 MG/ML
INJECTION, SOLUTION INFILTRATION; PERINEURAL
Status: DISCONTINUED | OUTPATIENT
Start: 2022-07-05 | End: 2022-07-05 | Stop reason: HOSPADM

## 2022-07-05 RX ADMIN — SODIUM CHLORIDE: 0.9 INJECTION, SOLUTION INTRAVENOUS at 08:07

## 2022-07-05 RX ADMIN — ASPIRIN 81 MG CHEWABLE TABLET 81 MG: 81 TABLET CHEWABLE at 08:07

## 2022-07-05 RX ADMIN — DIPHENHYDRAMINE HYDROCHLORIDE 50 MG: 50 CAPSULE ORAL at 08:07

## 2022-07-05 NOTE — Clinical Note
The catheter was inserted into the ostium   right coronary artery. The catheter was unable to engage the area..Exchanged of AL2 over guidewire

## 2022-07-05 NOTE — Clinical Note
The DP pulses were 1+ bilaterally. The PT pulses were 2+ bilaterally. The radial pulses were +2 bilaterally.

## 2022-07-05 NOTE — Clinical Note
230 ml of contrast were injected throughout the case. 0 mL of contrast was the total wasted during the case. 230 mL was the total amount used during the case.

## 2022-07-05 NOTE — Clinical Note
The left radial and left brachial was prepped. The site was prepped with ChloraPrep. The site was clipped. The patient was draped. The patient was positioned supine.

## 2022-07-05 NOTE — Clinical Note
The catheter was inserted into the ostial  left coronary artery. Hemodynamics were performed.  An angiography was performed of the left coronary arteries. Multiple views were taken. Over guidewire

## 2022-07-05 NOTE — PLAN OF CARE
1315 STANDING AT BEDSIDE.  DENIES DIZZINESS OR NAUSEA.  DISCHARGE INST. REVIEWED WITH PT AND S.O. AND COPY GIVEN. L RADIAL AND BRACHIAL SITES C/D/I.  IV REMOVED.  1330 DISCHARGED HOME.  TO EXIT VIA W/C.

## 2022-07-05 NOTE — DISCHARGE SUMMARY
[facility] - Cath Lab (Hospital)  Discharge Note  Short Stay    Procedure(s) (LRB):  CATHETERIZATION, HEART, BOTH LEFT AND RIGHT (N/A)  Angiogram Extremity Bilateral (Left)  ARTERIOGRAM, AORTIC ROOT (N/A)    OUTCOME: Patient tolerated treatment/procedure well without complication and is now ready for discharge.    DISPOSITION: Home or Self Care    FINAL DIAGNOSIS:  Shortness of breath    FOLLOWUP: In clinic    DISCHARGE INSTRUCTIONS:    Discharge Procedure Orders   Diet general     Call MD for:  temperature >100.4     Call MD for:  persistent nausea and vomiting     Call MD for:  severe uncontrolled pain     Call MD for:  difficulty breathing, headache or visual disturbances     Call MD for:  redness, tenderness, or signs of infection (pain, swelling, redness, odor or green/yellow discharge around incision site)     Call MD for:  hives     Call MD for:  persistent dizziness or light-headedness     Call MD for:  extreme fatigue        TIME SPENT ON DISCHARGE: 20  minutes

## 2022-07-05 NOTE — Clinical Note
The catheter was inserted into the left ventricle. Hemodynamics were performed.  and Pullback was recorded.  The angiography was performed via power injection. The injected amount was 24 mL contrast at 12 mL/s.  Over guidewire

## 2022-07-05 NOTE — Clinical Note
An angiography was performed of the aortic arch via power injection. Injection was performed with 30 mL contrast at 15 mL/s.  Root

## 2022-07-05 NOTE — OP NOTE
INPATIENT Operative Note         SUMMARY     Surgery Date: 7/5/2022     Surgeon(s) and Role:     * Aparna Thompson MD - Primary    ASSISTANT:none    Pre-op Diagnosis:  SOB (shortness of breath) [R06.02]Exertional chest pain [R07.9]Abnormal EKG [R94.31]Pulmonary hypertension [I27.20]      Post-op Diagnosis:  SOB (shortness of breath) [R06.02]Exertional chest pain [R07.9]Abnormal EKG [R94.31]Pulmonary hypertension [I27.20]    Procedure(s) (LRB):  CATHETERIZATION, HEART, BOTH LEFT AND RIGHT (N/A)  Angiogram Extremity Bilateral (Left)  ARTERIOGRAM, AORTIC ROOT (N/A)    COMPLICATION:none    Anesthesia: RN IV Sedation    Findings/Key Components:  Non obs cad of lad   rca anterior take off ? course intramyocardial non obs disease.  Mildly elevated rt sided pressure  Borderline pulmonary htn   Normal cardiac output.    Estimated Blood Loss: < 50 ML.         SPECIMEN: NONE    Devices/Prostetics: None    PLAN:  Routine post cath care  Obtain a cta of coronaries to define the course of rca.

## 2022-07-05 NOTE — Clinical Note
The catheter was inserted into the ostium   right coronary artery. Hemodynamics were performed.  An angiography was performed of the right coronary arteries. Multiple views were taken. The catheter was unable to engage the area..Over guidewire

## 2022-07-05 NOTE — INTERVAL H&P NOTE
The patient has been examined and the H&P has been reviewed:    I concur with the findings and no changes have occurred since H&P was written.    Procedure risks, benefits and alternative options discussed and understood by patient/family.          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 Valery Ozuna IM OV of 3/14/17 diagnosis:    Tortuous aorta    ICD-10-CM: I77.1  ICD-9-CM: 447.1   noted on cxr in 2017. not doing asa due to low platelets. control blood pressure.              Hypertensive disorder [I10]  Yes     Hypertensive disorder        Hyperlipidemia [E78.5]  Yes     Hyperlipidemia          Resolved Hospital Problems   No resolved problems to display.

## 2022-07-05 NOTE — Clinical Note
The catheter was inserted into the ostium   right coronary artery. The catheter was unable to engage the area..Inserted Over guidewire

## 2022-07-05 NOTE — Clinical Note
A percutaneous stick to the left brachial vein was performed. Ultrasound guidance was used to obtain access.

## 2022-07-05 NOTE — Clinical Note
The radial band was applied to the left radial artery. 12 cc's of air were inserted into the closure device.

## 2022-07-07 VITALS
WEIGHT: 197.56 LBS | SYSTOLIC BLOOD PRESSURE: 116 MMHG | OXYGEN SATURATION: 100 % | HEART RATE: 53 BPM | HEIGHT: 70 IN | BODY MASS INDEX: 28.28 KG/M2 | RESPIRATION RATE: 17 BRPM | TEMPERATURE: 98 F | DIASTOLIC BLOOD PRESSURE: 54 MMHG

## 2022-07-14 ENCOUNTER — HOSPITAL ENCOUNTER (OUTPATIENT)
Dept: RADIOLOGY | Facility: HOSPITAL | Age: 82
Discharge: HOME | End: 2022-07-14
Attending: UROLOGY
Payer: MEDICARE

## 2022-07-14 DIAGNOSIS — R10.31 RIGHT LOWER QUADRANT PAIN: ICD-10-CM

## 2022-07-14 PROCEDURE — 25500020 PHARM REV CODE 255: Mod: PN | Performed by: UROLOGY

## 2022-07-14 PROCEDURE — 74176 CT ABD & PELVIS W/O CONTRAST: CPT | Mod: TC,PN

## 2022-07-14 PROCEDURE — A9698 NON-RAD CONTRAST MATERIALNOC: HCPCS | Mod: PN | Performed by: UROLOGY

## 2022-07-14 RX ADMIN — IOHEXOL 1000 ML: 12 SOLUTION ORAL at 07:07

## 2022-07-15 ENCOUNTER — TELEPHONE (OUTPATIENT)
Dept: UROLOGY | Facility: CLINIC | Age: 82
End: 2022-07-15
Payer: MEDICARE

## 2022-07-15 NOTE — TELEPHONE ENCOUNTER
Called pt regarding his CT scan findings. He is currently asymptomatic and hasn't had any abdominal pain for 2 weeks now. No LUTS. Will hold off on any antibiotics at this point, given normal UA 2 weeks ago and no symptoms. Instructed pt to seek care should symptoms change. Also discussed cirrhosis and avoidance of EtOH.

## 2022-07-17 ENCOUNTER — PATIENT MESSAGE (OUTPATIENT)
Dept: PRIMARY CARE CLINIC | Facility: CLINIC | Age: 82
End: 2022-07-17
Payer: MEDICARE

## 2022-07-27 ENCOUNTER — OFFICE VISIT (OUTPATIENT)
Dept: CARDIOLOGY | Facility: CLINIC | Age: 82
End: 2022-07-27
Payer: MEDICARE

## 2022-07-27 ENCOUNTER — OFFICE VISIT (OUTPATIENT)
Dept: PRIMARY CARE CLINIC | Facility: CLINIC | Age: 82
End: 2022-07-27
Payer: MEDICARE

## 2022-07-27 VITALS
HEIGHT: 70 IN | WEIGHT: 198 LBS | HEART RATE: 73 BPM | SYSTOLIC BLOOD PRESSURE: 124 MMHG | BODY MASS INDEX: 28.35 KG/M2 | OXYGEN SATURATION: 97 % | DIASTOLIC BLOOD PRESSURE: 68 MMHG

## 2022-07-27 DIAGNOSIS — I70.203 ATHEROSCLEROSIS OF ARTERY OF BOTH LOWER EXTREMITIES: ICD-10-CM

## 2022-07-27 DIAGNOSIS — E78.2 MIXED HYPERLIPIDEMIA: ICD-10-CM

## 2022-07-27 DIAGNOSIS — I45.10 INCOMPLETE RIGHT BUNDLE BRANCH BLOCK: ICD-10-CM

## 2022-07-27 DIAGNOSIS — N18.31 STAGE 3A CHRONIC KIDNEY DISEASE: ICD-10-CM

## 2022-07-27 DIAGNOSIS — R53.1 GENERALIZED WEAKNESS: ICD-10-CM

## 2022-07-27 DIAGNOSIS — G47.30 SLEEP APNEA, UNSPECIFIED TYPE: ICD-10-CM

## 2022-07-27 DIAGNOSIS — R94.31 ABNORMAL EKG: ICD-10-CM

## 2022-07-27 DIAGNOSIS — I77.1 TORTUOUS AORTA: ICD-10-CM

## 2022-07-27 DIAGNOSIS — D64.9 ANEMIA, UNSPECIFIED TYPE: ICD-10-CM

## 2022-07-27 DIAGNOSIS — R26.9 GAIT ABNORMALITY: ICD-10-CM

## 2022-07-27 DIAGNOSIS — I10 PRIMARY HYPERTENSION: Primary | ICD-10-CM

## 2022-07-27 DIAGNOSIS — G91.9 HYDROCEPHALUS, UNSPECIFIED TYPE: ICD-10-CM

## 2022-07-27 DIAGNOSIS — R42 DIZZINESS: Primary | ICD-10-CM

## 2022-07-27 DIAGNOSIS — Z72.821 INADEQUATE SLEEP HYGIENE: ICD-10-CM

## 2022-07-27 PROCEDURE — 1101F PT FALLS ASSESS-DOCD LE1/YR: CPT | Mod: CPTII,95,, | Performed by: FAMILY MEDICINE

## 2022-07-27 PROCEDURE — 99999 PR PBB SHADOW E&M-EST. PATIENT-LVL III: ICD-10-PCS | Mod: PBBFAC,,, | Performed by: INTERNAL MEDICINE

## 2022-07-27 PROCEDURE — 3078F DIAST BP <80 MM HG: CPT | Mod: CPTII,S$GLB,, | Performed by: INTERNAL MEDICINE

## 2022-07-27 PROCEDURE — 99499 RISK ADDL DX/OHS AUDIT: ICD-10-PCS | Mod: 95,,, | Performed by: FAMILY MEDICINE

## 2022-07-27 PROCEDURE — 3288F PR FALLS RISK ASSESSMENT DOCUMENTED: ICD-10-PCS | Mod: CPTII,95,, | Performed by: FAMILY MEDICINE

## 2022-07-27 PROCEDURE — 3074F PR MOST RECENT SYSTOLIC BLOOD PRESSURE < 130 MM HG: ICD-10-PCS | Mod: CPTII,S$GLB,, | Performed by: INTERNAL MEDICINE

## 2022-07-27 PROCEDURE — 3288F FALL RISK ASSESSMENT DOCD: CPT | Mod: CPTII,95,, | Performed by: FAMILY MEDICINE

## 2022-07-27 PROCEDURE — 1160F PR REVIEW ALL MEDS BY PRESCRIBER/CLIN PHARMACIST DOCUMENTED: ICD-10-PCS | Mod: CPTII,S$GLB,, | Performed by: INTERNAL MEDICINE

## 2022-07-27 PROCEDURE — 1126F PR PAIN SEVERITY QUANTIFIED, NO PAIN PRESENT: ICD-10-PCS | Mod: CPTII,95,, | Performed by: FAMILY MEDICINE

## 2022-07-27 PROCEDURE — 1101F PR PT FALLS ASSESS DOC 0-1 FALLS W/OUT INJ PAST YR: ICD-10-PCS | Mod: CPTII,S$GLB,, | Performed by: INTERNAL MEDICINE

## 2022-07-27 PROCEDURE — 1159F MED LIST DOCD IN RCRD: CPT | Mod: CPTII,95,, | Performed by: FAMILY MEDICINE

## 2022-07-27 PROCEDURE — 1159F PR MEDICATION LIST DOCUMENTED IN MEDICAL RECORD: ICD-10-PCS | Mod: CPTII,95,, | Performed by: FAMILY MEDICINE

## 2022-07-27 PROCEDURE — 1126F AMNT PAIN NOTED NONE PRSNT: CPT | Mod: CPTII,95,, | Performed by: FAMILY MEDICINE

## 2022-07-27 PROCEDURE — 99214 PR OFFICE/OUTPT VISIT, EST, LEVL IV, 30-39 MIN: ICD-10-PCS | Mod: 95,,, | Performed by: FAMILY MEDICINE

## 2022-07-27 PROCEDURE — 3074F SYST BP LT 130 MM HG: CPT | Mod: CPTII,S$GLB,, | Performed by: INTERNAL MEDICINE

## 2022-07-27 PROCEDURE — 1126F AMNT PAIN NOTED NONE PRSNT: CPT | Mod: CPTII,S$GLB,, | Performed by: INTERNAL MEDICINE

## 2022-07-27 PROCEDURE — 3288F PR FALLS RISK ASSESSMENT DOCUMENTED: ICD-10-PCS | Mod: CPTII,S$GLB,, | Performed by: INTERNAL MEDICINE

## 2022-07-27 PROCEDURE — 99999 PR PBB SHADOW E&M-EST. PATIENT-LVL III: CPT | Mod: PBBFAC,,, | Performed by: INTERNAL MEDICINE

## 2022-07-27 PROCEDURE — 1101F PR PT FALLS ASSESS DOC 0-1 FALLS W/OUT INJ PAST YR: ICD-10-PCS | Mod: CPTII,95,, | Performed by: FAMILY MEDICINE

## 2022-07-27 PROCEDURE — 1126F PR PAIN SEVERITY QUANTIFIED, NO PAIN PRESENT: ICD-10-PCS | Mod: CPTII,S$GLB,, | Performed by: INTERNAL MEDICINE

## 2022-07-27 PROCEDURE — 99499 UNLISTED E&M SERVICE: CPT | Mod: 95,,, | Performed by: FAMILY MEDICINE

## 2022-07-27 PROCEDURE — 1101F PT FALLS ASSESS-DOCD LE1/YR: CPT | Mod: CPTII,S$GLB,, | Performed by: INTERNAL MEDICINE

## 2022-07-27 PROCEDURE — 99214 OFFICE O/P EST MOD 30 MIN: CPT | Mod: 95,,, | Performed by: FAMILY MEDICINE

## 2022-07-27 PROCEDURE — 99213 PR OFFICE/OUTPT VISIT, EST, LEVL III, 20-29 MIN: ICD-10-PCS | Mod: S$GLB,,, | Performed by: INTERNAL MEDICINE

## 2022-07-27 PROCEDURE — 99213 OFFICE O/P EST LOW 20 MIN: CPT | Mod: S$GLB,,, | Performed by: INTERNAL MEDICINE

## 2022-07-27 PROCEDURE — 1159F MED LIST DOCD IN RCRD: CPT | Mod: CPTII,S$GLB,, | Performed by: INTERNAL MEDICINE

## 2022-07-27 PROCEDURE — 3288F FALL RISK ASSESSMENT DOCD: CPT | Mod: CPTII,S$GLB,, | Performed by: INTERNAL MEDICINE

## 2022-07-27 PROCEDURE — 3078F PR MOST RECENT DIASTOLIC BLOOD PRESSURE < 80 MM HG: ICD-10-PCS | Mod: CPTII,S$GLB,, | Performed by: INTERNAL MEDICINE

## 2022-07-27 PROCEDURE — 1159F PR MEDICATION LIST DOCUMENTED IN MEDICAL RECORD: ICD-10-PCS | Mod: CPTII,S$GLB,, | Performed by: INTERNAL MEDICINE

## 2022-07-27 PROCEDURE — 1160F RVW MEDS BY RX/DR IN RCRD: CPT | Mod: CPTII,S$GLB,, | Performed by: INTERNAL MEDICINE

## 2022-07-27 NOTE — PROGRESS NOTES
Subjective:   Patient ID:  Ezequiel Hughes is a 82 y.o. male who presents for follow up of Hospital Follow Up      HPI  An 79 yo male with hlp htn tia jonelle is here for f/u he has a prostate nodule . He is in digital hypertension has no angina no shortness of breath . Has no other issues clinically of dizziness lightheadedness chest pain shortness of breath. He is not exercising due to covid. Has no signs of uti . Has prostatism.      Today he comes complaining of neck pain and and complaining of bilateral lower extremity numbness and weakness he ahs no falls or claudication. He uses a pad in his foot he thinks one is shorter than the other. He ahs not been exercising. He had an joceline with exercise that does not suggest pad. The ahs lumbar arthropathy this is all suggestive of neurogenic claudication he also ha srt toe pain. Has no orthopnea pnd syncope palpitation shortness of breath he started doing upper body exercise and take b12. Has been compliant with diet his digital htn suggest that he is compliant.      5/28/2021  HERE COMPLAINING OF FEELING FATIGUED TIRED GETS LIGHTHEADED HAS NO ENERGY HE IS SHORT OF BREATH WITH MINIMAL EXERTION HE HAS NOT EXERCISED HE IS AFRAID BECAUSE OF HIS SYMPTOMS. HE IS NOT SLEEPING WELL AT NITE FRAGMENTED SLEEP . HE CLAIMS COMPLIANCE WITH SALT BP HAS BEEN FLUCTUATING.     8/10/2021  HERE FOR  F/U HAS NO NEW COMPLAINTS HAS NUMBNESS IN LEGS WHEN HE STANDS UP HAS NO CHEST PAIN NO PALPITATION NO SYNCOPE NEAR SYNCOPE NO TIA NO ANGINA. HE EXCERCISED ON HIS TREADMILL THIS MORNING HE DID NOT EXERCISE A LOT HE DID NOT FEEL LIKE HAS NO SYMPTOMS.      11/10/2021   Back to the health club he is doing ett exercise he has significant back pain leg numbness has at times difficulty with walking . Has numbness leg pain suggestive neurogenic claudication he cannot stand long he is getting weakness and numbness in legs. He has failed physical therapy he has no cardiac symptoms since he has been back on  treadmill.           6/29/2022   here for f/u his major concern is decrease in exercise tolerance exertional shortness of breath fatigue. He is not able to do his treadmill he is afraid now . Has no leg swelling has  A new cpap he is not using. He was admitted to [facility] with chest pain on 6/7/2022. He still has some dizziness he is referred to neurosurgery he has some hydrocephalus on mri echo showed normal lvf cards saw in hospital recommended repeat cardiolite    7/27/2022   here for f /u after heart cath  . Has non obs disease. He is back in the gym he still feels no energy.has no new complaints otherwise I think he has issues with anxiety he is lighthedead he is getting worked up with neurology and may be later ent. He exercises on the treadmill he is asymptomatic.     Past Medical History:   Diagnosis Date    Allergic rhinitis     Anemia     Back pain     BPH (benign prostatic hyperplasia)     Cancer     skin cancer to neck, Dr. Graves    Cataract     Disorder of kidney and ureter     ED (erectile dysfunction)     Hiatal hernia     small    History of colon polyps     colonoscopy 11/2016    HLD (hyperlipidemia)     Hyperlipidemia     Hypertension     Lateral epicondylitis     Lumbar radiculopathy 1/26/2022    OA (osteoarthritis)     GUIDO (obstructive sleep apnea)     Prostate cancer     Dr. Wong    TIA (transient ischemic attack)     Trouble in sleeping     Urge incontinence 3/29/2022       Past Surgical History:   Procedure Laterality Date    ANGIOGRAPHY OF UPPER EXTREMITY Left 7/5/2022    Procedure: Angiogram Extremity Bilateral;  Surgeon: Aparna Thompson MD;  Location: [facility] CATH LAB;  Service: Cardiology;  Laterality: Left;    ARTERIOGRAPHY OF AORTIC ROOT N/A 7/5/2022    Procedure: ARTERIOGRAM, AORTIC ROOT;  Surgeon: Aparna Thompson MD;  Location: [facility] CATH LAB;  Service: Cardiology;  Laterality: N/A;    CATARACT EXTRACTION W/  INTRAOCULAR LENS IMPLANT Right 02/21/2018     I-Stent    CATARACT EXTRACTION W/  INTRAOCULAR LENS IMPLANT Left 03/21/2018    I - Stent    CATHETERIZATION OF BOTH LEFT AND RIGHT HEART N/A 7/5/2022    Procedure: CATHETERIZATION, HEART, BOTH LEFT AND RIGHT;  Surgeon: Aparna Thompson MD;  Location: [facility] CATH LAB;  Service: Cardiology;  Laterality: N/A;  radial approach    COLONOSCOPY N/A 11/14/2016    Procedure: COLONOSCOPY;  Surgeon: Karuna Rodriguez MD;  Location: [facility] ENDO;  Service: Endoscopy;  Laterality: N/A;    COLONOSCOPY N/A 9/22/2020    Procedure: COLONOSCOPY;  Surgeon: Chuy Fish MD;  Location: [facility] ENDO;  Service: Endoscopy;  Laterality: N/A;    EYE SURGERY      HEMORRHOID SURGERY      I-STENT Right 02/21/2018    DR. REECE    KNEE ARTHROSCOPY W/ MENISCAL REPAIR Right 2015    Dr. Jain    PCIOL Right 02/21/2018    DR. REECE    PLANTAR FASCIA RELEASE      right    ROTATOR CUFF REPAIR Bilateral     bilateral    SELECTIVE INJECTION OF ANESTHETIC AGENT AROUND LUMBAR SPINAL NERVE ROOT BY TRANSFORAMINAL APPROACH Bilateral 1/26/2022    Procedure: Bilateral L4/5 TF IAN with RN IV sedation;  Surgeon: Mak Young MD;  Location: [facility] PAIN MGT;  Service: Pain Management;  Laterality: Bilateral;    SELECTIVE INJECTION OF ANESTHETIC AGENT AROUND LUMBAR SPINAL NERVE ROOT BY TRANSFORAMINAL APPROACH Bilateral 3/14/2022    Procedure: Bilateral L4/5 TF IAN with RN IV sedation;  Surgeon: Mak Young MD;  Location: [facility] PAIN MGT;  Service: Pain Management;  Laterality: Bilateral;    SELECTIVE INJECTION OF ANESTHETIC AGENT AROUND LUMBAR SPINAL NERVE ROOT BY TRANSFORAMINAL APPROACH Bilateral 6/2/2022    Procedure: Bilateral L4/5 TF IAN [place]. [place] [facility] [place]  [place]geon: Abel Haywood MD;  Location: [facility] PAIN MGT;  Service: Pain Management;  Laterality: Bilateral;    SHOULDER SURGERY Bilateral around 2000    Dr. Pepper.  rotator cuff surgeries    VASECTOMY         Social History     Tobacco Use    Smoking status: Former Smoker      Packs/day: 3.00     Years: 35.00     Pack years: 105.00     Types: Cigarettes     Start date: 1960     Quit date: 1985     Years since quittin.0    Smokeless tobacco: Never Used   Substance Use Topics    Alcohol use: Yes     Alcohol/week: 3.0 standard drinks     Types: 3 Cans of beer per week     Comment: socially    Drug use: No       Family History   Problem Relation Age of Onset    Lung cancer Father         life long smoker    Cancer Father         prostate, lung    Arthritis Mother     Stroke Sister         TIA    Cataracts Sister     Cancer Brother         prostate    Cataracts Brother     Cataracts Sister     Melanoma Neg Hx     Psoriasis Neg Hx     Lupus Neg Hx     Eczema Neg Hx     Diabetes Neg Hx     Heart disease Neg Hx     Kidney disease Neg Hx     Colon cancer Neg Hx        Current Outpatient Medications   Medication Sig    acetaminophen (TYLENOL ARTHRITIS PAIN ORAL) Take by mouth. Patient states that he takes 2 tablets in the morning and 2 tablets in the evening    amLODIPine (NORVASC) 5 MG tablet Take 1 tablet (5 mg total) by mouth 2 (two) times daily.    atorvastatin (LIPITOR) 40 MG tablet TAKE 1 TABLET EVERY DAY    clopidogreL (PLAVIX) 75 mg tablet TAKE 1 TABLET EVERY DAY    finasteride (PROSCAR) 5 mg tablet Take 1 tablet (5 mg total) by mouth once daily.    fluticasone propionate (FLONASE) 50 mcg/actuation nasal spray USE 2 SPRAYS IN EACH NOSTRIL ONE TIME DAILY  (SUBSTITUTED FOR FLONASE)    losartan (COZAAR) 50 MG tablet TAKE 1 TABLET TWICE DAILY    pantoprazole (PROTONIX) 40 MG tablet TAKE 1 TABLET EVERY DAY     No current facility-administered medications for this visit.     Current Outpatient Medications on File Prior to Visit   Medication Sig    acetaminophen (TYLENOL ARTHRITIS PAIN ORAL) Take by mouth. Patient states that he takes 2 tablets in the morning and 2 tablets in the evening    amLODIPine (NORVASC) 5 MG tablet Take 1 tablet (5 mg total) by  mouth 2 (two) times daily.    atorvastatin (LIPITOR) 40 MG tablet TAKE 1 TABLET EVERY DAY    clopidogreL (PLAVIX) 75 mg tablet TAKE 1 TABLET EVERY DAY    finasteride (PROSCAR) 5 mg tablet Take 1 tablet (5 mg total) by mouth once daily.    fluticasone propionate (FLONASE) 50 mcg/actuation nasal spray USE 2 SPRAYS IN EACH NOSTRIL ONE TIME DAILY  (SUBSTITUTED FOR FLONASE)    losartan (COZAAR) 50 MG tablet TAKE 1 TABLET TWICE DAILY    pantoprazole (PROTONIX) 40 MG tablet TAKE 1 TABLET EVERY DAY    [DISCONTINUED] albuterol (PROVENTIL/VENTOLIN HFA) 90 mcg/actuation inhaler Inhale 2 puffs into the lungs every 6 (six) hours as needed.    [DISCONTINUED] albuterol (PROVENTIL/VENTOLIN HFA) 90 mcg/actuation inhaler     [DISCONTINUED] cholecalciferol, vitamin D3, (VITAMIN D3) 50 mcg (2,000 unit) Cap Take 1 capsule by mouth once daily.     Current Facility-Administered Medications on File Prior to Visit   Medication    [DISCONTINUED] ondansetron injection 4 mg     Review of patient's allergies indicates:   Allergen Reactions    Atarax [hydroxyzine hcl] Other (See Comments)     Raised blood pressure     Zyrtec [cetirizine] Other (See Comments)     Raised blood pressure     Gold sodium thiosulfate      Patch test positive    Meloxicam Rash     Other reaction(s): hypertension     Review of Systems   Constitutional: Negative for malaise/fatigue.   Eyes: Negative for blurred vision.   Cardiovascular: Negative for chest pain, claudication, cyanosis, dyspnea on exertion, irregular heartbeat, leg swelling, near-syncope, orthopnea, palpitations and paroxysmal nocturnal dyspnea.   Respiratory: Negative for cough, hemoptysis and shortness of breath.    Hematologic/Lymphatic: Negative for bleeding problem. Does not bruise/bleed easily.   Skin: Negative for dry skin and itching.   Musculoskeletal: Negative for falls, muscle weakness and myalgias.   Gastrointestinal: Negative for abdominal pain, diarrhea, heartburn, hematemesis,  hematochezia and melena.   Genitourinary: Negative for flank pain and hematuria.   Neurological: Positive for light-headedness. Negative for dizziness, focal weakness, headaches, numbness, paresthesias, seizures and weakness.   Psychiatric/Behavioral: Negative for altered mental status and memory loss. The patient is not nervous/anxious.    Allergic/Immunologic: Negative for hives.       Objective:   Physical Exam  Vitals and nursing note reviewed.   Constitutional:       General: He is not in acute distress.     Appearance: He is well-developed. He is not diaphoretic.   HENT:      Head: Normocephalic and atraumatic.   Eyes:      General:         Right eye: No discharge.         Left eye: No discharge.      Pupils: Pupils are equal, round, and reactive to light.   Neck:      Thyroid: No thyromegaly.      Vascular: No JVD.   Cardiovascular:      Rate and Rhythm: Normal rate and regular rhythm.      Pulses: Normal pulses and intact distal pulses.      Heart sounds: Normal heart sounds. No murmur heard.    No friction rub. No gallop.   Pulmonary:      Effort: Pulmonary effort is normal. No respiratory distress.      Breath sounds: Normal breath sounds. No wheezing or rales.   Chest:      Chest wall: No tenderness.   Abdominal:      General: Bowel sounds are normal. There is no distension.      Palpations: Abdomen is soft.      Tenderness: There is no abdominal tenderness.   Musculoskeletal:         General: Normal range of motion.      Cervical back: Neck supple.      Right lower leg: No edema.      Left lower leg: No edema.      Comments: Spider veins in leg   Skin:     General: Skin is warm and dry.      Findings: No erythema or rash.   Neurological:      Mental Status: He is alert and oriented to person, place, and time.      Cranial Nerves: No cranial nerve deficit.   Psychiatric:         Behavior: Behavior normal.         Judgment: Judgment normal.       Vitals:    07/27/22 1605 07/27/22 1609   BP: 121/68 124/68  "  Pulse: 73    SpO2: 97%    Weight: 89.8 kg (197 lb 15.6 oz)    Height: 5' 10" (1.778 m)      Lab Results   Component Value Date    CHOL 132 06/08/2022    CHOL 139 09/24/2021    CHOL 162 03/03/2021     Lab Results   Component Value Date    HDL 56 06/08/2022    HDL 58 09/24/2021    HDL 78 (H) 03/03/2021     Lab Results   Component Value Date    LDLCALC 58.4 (L) 06/08/2022    LDLCALC 64.6 09/24/2021    LDLCALC 72.2 03/03/2021     Lab Results   Component Value Date    TRIG 88 06/08/2022    TRIG 82 09/24/2021    TRIG 59 03/03/2021     Lab Results   Component Value Date    CHOLHDL 42.4 06/08/2022    CHOLHDL 41.7 09/24/2021    CHOLHDL 48.1 03/03/2021       Chemistry        Component Value Date/Time     06/29/2022 0917    K 4.6 06/29/2022 0917     06/29/2022 0917    CO2 28 06/29/2022 0917    BUN 19 06/29/2022 0917    CREATININE 1.3 06/29/2022 0917    GLU 90 06/29/2022 0917        Component Value Date/Time    CALCIUM 9.0 06/29/2022 0917    ALKPHOS 55 06/07/2022 1619    AST 12 06/07/2022 1619    ALT 8 (L) 06/07/2022 1619    BILITOT 0.8 06/07/2022 1619    ESTGFRAFRICA 57.9 (A) 06/29/2022 0917    EGFRNONAA 50.1 (A) 06/29/2022 0917          Lab Results   Component Value Date    TSH 0.989 03/21/2022     Lab Results   Component Value Date    INR 1.2 06/29/2022    INR 1.1 02/24/2021    INR 1.2 07/07/2020     Lab Results   Component Value Date    WBC 4.77 06/29/2022    HGB 13.0 (L) 06/29/2022    HCT 40.4 06/29/2022    MCV 98 06/29/2022     (L) 06/29/2022     BMP  Sodium   Date Value Ref Range Status   06/29/2022 140 136 - 145 mmol/L Final     Potassium   Date Value Ref Range Status   06/29/2022 4.6 3.5 - 5.1 mmol/L Final     Chloride   Date Value Ref Range Status   06/29/2022 106 95 - 110 mmol/L Final     CO2   Date Value Ref Range Status   06/29/2022 28 23 - 29 mmol/L Final     BUN   Date Value Ref Range Status   06/29/2022 19 8 - 23 mg/dL Final     Creatinine   Date Value Ref Range Status   06/29/2022 1.3 0.5 - " 1.4 mg/dL Final     Calcium   Date Value Ref Range Status   06/29/2022 9.0 8.7 - 10.5 mg/dL Final     Anion Gap   Date Value Ref Range Status   06/29/2022 6 (L) 8 - 16 mmol/L Final     eGFR if    Date Value Ref Range Status   06/29/2022 57.9 (A) >60 mL/min/1.73 m^2 Final     eGFR if non    Date Value Ref Range Status   06/29/2022 50.1 (A) >60 mL/min/1.73 m^2 Final     Comment:     Calculation used to obtain the estimated glomerular filtration  rate (eGFR) is the CKD-EPI equation.        CrCl cannot be calculated (Patient's most recent lab result is older than the maximum 7 days allowed.).    Assessment:     1. Primary hypertension    2. Abnormal EKG    3. Atherosclerosis of artery of both lower extremities    4. Mixed hyperlipidemia    5. Incomplete right bundle branch block    6. Tortuous aorta    7. Stage 3a chronic kidney disease    8. Anemia, unspecified type    9. Sleep apnea, unspecified type    10. Inadequate sleep hygiene    11. Generalized weakness      htn controlled   Cardiac w/u negative   Has lightheadedness ? 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       Plan:   Continue current therapy  Cardiac low salt diet.  Risk factor modification and excercise program.  F/u in 6 months .

## 2022-07-27 NOTE — PROGRESS NOTES
Subjective:      Patient ID: Ezequiel Hughes is a 82 y.o. male.    Chief Complaint: Follow-up (6 week, dizziness)    Disclaimer:  This note is prepared using voice recognition software and as such is likely to have errors and has not been proof read. Please contact me for questions.     The patient location is: home  The chief complaint leading to consultation is: mychart request     Visit type: video and audio simultaneous    Face to Face time with patient:328pm  -343pm      25  minutes of total time spent on the encounter, which includes face to face time and non-face to face time preparing to see the patient (eg, review of tests), Obtaining and/or reviewing separately obtained history, Documenting clinical information in the electronic or other health record, Independently interpreting results (not separately reported) and communicating results to the patient/family/caregiver, or Care coordination (not separately reported).     Each patient to whom he or she provides medical services by telemedicine is:  (1) informed of the relationship between the physician and patient and the respective role of any other health care provider with respect to management of the patient; and (2) notified that he or she may decline to receive medical services by telemedicine and may withdraw from such care at any time.      Here for f/u multiple issues.   Seeing Dr Thompson today. Did heart cath recently.   Going for results.   Non obstructive CAD.   Will see Dr. Orozco next week at [facility]. Aug 8th, for dizziness/ lightheadedness. Seems to come and go. Today was a good day. After going outside will come back and sit down to rest. Saw ENT Dr. Mojica who ordered the MRI brain to look for central causes of vertigo.   Did start exercising again 3-4 times a week. Doing treadmill at the house.   Feels that the energy is about the same.   Head is still feeling lightheaded at times.   Has been doing stretches 2 times a day.     Feels low on energy.  Hurts in the head at times.         Lab Results   Component Value Date    HGBA1C 5.2 07/22/2020    HGBA1C 5.2 03/07/2018      Lab Results   Component Value Date    CHOL 132 06/08/2022    CHOL 139 09/24/2021    CHOL 162 03/03/2021     Lab Results   Component Value Date    LDLCALC 58.4 (L) 06/08/2022    LDLCALC 64.6 09/24/2021    LDLCALC 72.2 03/03/2021       Wt Readings from Last 10 Encounters:   07/05/22 89.6 kg (197 lb 8.5 oz)   06/30/22 89.6 kg (197 lb 8.5 oz)   06/29/22 89.8 kg (197 lb 15.6 oz)   06/09/22 89.9 kg (198 lb 3.1 oz)   06/08/22 89.4 kg (197 lb)   06/02/22 89.3 kg (196 lb 12.2 oz)   05/03/22 90.9 kg (200 lb 6.4 oz)   04/26/22 90.9 kg (200 lb 6.4 oz)   04/21/22 89.1 kg (196 lb 6.9 oz)   04/18/22 89.4 kg (197 lb 1.5 oz)       The ASCVD Risk score ([place] AVIS Jr., et al., 2013) failed to calculate for the following reasons:    The 2013 ASCVD risk score is only valid for ages 40 to 79        Lab Results   Component Value Date    WBC 4.77 06/29/2022    HGB 13.0 (L) 06/29/2022    HCT 40.4 06/29/2022     (L) 06/29/2022    CHOL 132 06/08/2022    TRIG 88 06/08/2022    HDL 56 06/08/2022    ALT 8 (L) 06/07/2022    AST 12 06/07/2022     06/29/2022    K 4.6 06/29/2022     06/29/2022    CREATININE 1.3 06/29/2022    BUN 19 06/29/2022    CO2 28 06/29/2022    TSH 0.989 03/21/2022    PSA 1.3 11/10/2021    INR 1.2 06/29/2022    HGBA1C 5.2 07/22/2020       CT Abdomen Pelvis  Without Contrast  Narrative: EXAMINATION:  CT ABDOMEN PELVIS WITHOUT CONTRAST    CLINICAL HISTORY:  Right lower quadrant painRLQ abdominal pain (Age >= 14y);    TECHNIQUE:  Axial CT images performed through the abdomen and pelvis without intravenous contrast. Multiplanar reformats were performed and interpreted.    COMPARISON:  03/22/2021    FINDINGS:  Lung bases are clear.    No urolithiasis, hydronephrosis, or perinephric stranding.    Cirrhotic morphology of the liver.  The gallbladder, adrenal glands, spleen, and pancreas are  within normal limits.    No biliary ductal dilatation peer    No free fluid or free air.    The small bowel is within normal limits.  The appendix is normal.  Mild circumferential wall thickening of the distal sigmoid colon where there is diverticulosis.  No significant mesenteric fat stranding.  No bowel obstruction.  No abscess.    Aortic atherosclerosis without evidence of aneurysm.    Circumferential urinary bladder wall thickening with perivesicular fat stranding.  Prostate enlargement.  No free pelvic fluid or adenopathy.    Multilevel thoracolumbar spondylosis.  Impression: Cystitis.  Prostate enlargement.  Correlate with urinalysis.    Low-grade sigmoid colitis.  No perforation or obstruction.    Normal appendix.    Cirrhosis.    All CT scans at this facility are performed  using dose modulation techniques as appropriate to performed exam including the following:  automated exposure control; adjustment of mA and/or kV according to the patients size (this includes techniques or standardized protocols for targeted exams where dose is matched to indication/reason for exam: i.e. extremities or head);  iterative reconstruction technique.    Electronically signed by: Wiley Wong MD  Date:    07/14/2022  Time:    08:47        Review of Systems   Constitutional: Negative for activity change and unexpected weight change.   HENT: Positive for hearing loss. Negative for rhinorrhea and trouble swallowing.    Eyes: Negative for discharge and visual disturbance.   Respiratory: Negative for chest tightness and wheezing.    Cardiovascular: Negative for chest pain and palpitations.   Gastrointestinal: Negative for blood in stool, constipation, diarrhea and vomiting.   Endocrine: Negative for polydipsia and polyuria.   Genitourinary: Negative for difficulty urinating, hematuria and urgency.   Musculoskeletal: Positive for arthralgias and neck pain. Negative for joint swelling.   Neurological: Positive for weakness. Negative  for headaches.   Psychiatric/Behavioral: Negative for confusion and dysphoric mood.     Objective:   There were no vitals filed for this visit.  Physical Exam  Vitals reviewed.   Constitutional:       General: He is awake. He is not in acute distress.     Appearance: Normal appearance. He is well-developed, well-groomed and normal weight. He is not ill-appearing.   HENT:      Head: Normocephalic and atraumatic.      Right Ear: External ear normal.      Left Ear: External ear normal.      Nose: Nose normal.      Mouth/Throat:      Lips: Pink.   Eyes:      Conjunctiva/sclera: Conjunctivae normal.   Pulmonary:      Effort: Pulmonary effort is normal.   Neurological:      Mental Status: He is alert.   Psychiatric:         Attention and Perception: Attention and perception normal. He is attentive.         Mood and Affect: Mood and affect normal. Mood is not anxious or depressed. Affect is not labile, blunt, angry or inappropriate.         Speech: Speech normal. He is communicative. Speech is not rapid and pressured, delayed, slurred or tangential.         Behavior: Behavior normal. Behavior is not agitated, slowed, aggressive, withdrawn, hyperactive or combative. Behavior is cooperative.         Thought Content: Thought content normal. Thought content is not paranoid or delusional. Thought content does not include homicidal or suicidal ideation. Thought content does not include homicidal or suicidal plan.         Cognition and Memory: Cognition and memory normal. Memory is not impaired. He does not exhibit impaired recent memory or impaired remote memory.         Judgment: Judgment normal. Judgment is not impulsive or inappropriate.       Assessment:     1. Dizziness    2. Hydrocephalus, unspecified type    3. Stage 3a chronic kidney disease    4. Atherosclerosis of artery of both lower extremities    5. Gait abnormality      Plan:   Ezequiel was seen today for follow-up.    Diagnoses and all orders for this  visit:    Dizziness- not improved.  patient now has appointment with Neurology on August 8th with [facility] to follow-up for the potential for hydrocephalus mild on the brain MRI for looking for central causes of this tubular dysfunction.  If no significant etiologies found then would recommend return back to ENT for extensive vestibular audiology testing.    Hydrocephalus, unspecified type- not improved.  patient now has appointment with Neurology on August 8th with [facility] to follow-up for the potential for hydrocephalus mild on the brain MRI for looking for central causes of this tubular dysfunction.  If no significant etiologies found then would recommend return back to ENT for extensive vestibular audiology testing.      Stage 3a chronic kidney disease- noted on last labs.     Atherosclerosis of artery of both lower extremities- seeing cards today     Gait abnormality- not improved.  patient now has appointment with Neurology on August 8th with [facility] to follow-up for the potential for hydrocephalus mild on the brain MRI for looking for central causes of this tubular dysfunction.  If no significant etiologies found then would recommend return back to ENT for extensive vestibular audiology testing.            Health Maintenance Due   Topic Date Due    COVID-19 Vaccine (4 - Booster for Pfizer series) 02/21/2022    Shingles Vaccine (2 of 2) 03/04/2022    TETANUS VACCINE  07/24/2022       No follow-ups on file.    There are no Patient Instructions on file for this visit.

## 2022-08-04 ENCOUNTER — OFFICE VISIT (OUTPATIENT)
Dept: PULMONOLOGY | Facility: CLINIC | Age: 82
End: 2022-08-04
Payer: MEDICARE

## 2022-08-04 VITALS
OXYGEN SATURATION: 97 % | RESPIRATION RATE: 18 BRPM | HEART RATE: 74 BPM | BODY MASS INDEX: 28.06 KG/M2 | SYSTOLIC BLOOD PRESSURE: 124 MMHG | DIASTOLIC BLOOD PRESSURE: 74 MMHG | HEIGHT: 70 IN | WEIGHT: 196 LBS

## 2022-08-04 DIAGNOSIS — I10 PRIMARY HYPERTENSION: Primary | ICD-10-CM

## 2022-08-04 DIAGNOSIS — R06.02 SHORTNESS OF BREATH: ICD-10-CM

## 2022-08-04 DIAGNOSIS — G47.01 INSOMNIA DUE TO MEDICAL CONDITION: ICD-10-CM

## 2022-08-04 DIAGNOSIS — G47.30 SLEEP APNEA, UNSPECIFIED TYPE: ICD-10-CM

## 2022-08-04 PROCEDURE — 99214 OFFICE O/P EST MOD 30 MIN: CPT | Mod: S$GLB,,, | Performed by: NURSE PRACTITIONER

## 2022-08-04 PROCEDURE — 3078F PR MOST RECENT DIASTOLIC BLOOD PRESSURE < 80 MM HG: ICD-10-PCS | Mod: CPTII,S$GLB,, | Performed by: NURSE PRACTITIONER

## 2022-08-04 PROCEDURE — 3074F PR MOST RECENT SYSTOLIC BLOOD PRESSURE < 130 MM HG: ICD-10-PCS | Mod: CPTII,S$GLB,, | Performed by: NURSE PRACTITIONER

## 2022-08-04 PROCEDURE — 1101F PT FALLS ASSESS-DOCD LE1/YR: CPT | Mod: CPTII,S$GLB,, | Performed by: NURSE PRACTITIONER

## 2022-08-04 PROCEDURE — 1160F RVW MEDS BY RX/DR IN RCRD: CPT | Mod: CPTII,S$GLB,, | Performed by: NURSE PRACTITIONER

## 2022-08-04 PROCEDURE — 3288F PR FALLS RISK ASSESSMENT DOCUMENTED: ICD-10-PCS | Mod: CPTII,S$GLB,, | Performed by: NURSE PRACTITIONER

## 2022-08-04 PROCEDURE — 99999 PR PBB SHADOW E&M-EST. PATIENT-LVL V: CPT | Mod: PBBFAC,,, | Performed by: NURSE PRACTITIONER

## 2022-08-04 PROCEDURE — 1159F PR MEDICATION LIST DOCUMENTED IN MEDICAL RECORD: ICD-10-PCS | Mod: CPTII,S$GLB,, | Performed by: NURSE PRACTITIONER

## 2022-08-04 PROCEDURE — 1101F PR PT FALLS ASSESS DOC 0-1 FALLS W/OUT INJ PAST YR: ICD-10-PCS | Mod: CPTII,S$GLB,, | Performed by: NURSE PRACTITIONER

## 2022-08-04 PROCEDURE — 1160F PR REVIEW ALL MEDS BY PRESCRIBER/CLIN PHARMACIST DOCUMENTED: ICD-10-PCS | Mod: CPTII,S$GLB,, | Performed by: NURSE PRACTITIONER

## 2022-08-04 PROCEDURE — 3074F SYST BP LT 130 MM HG: CPT | Mod: CPTII,S$GLB,, | Performed by: NURSE PRACTITIONER

## 2022-08-04 PROCEDURE — 3078F DIAST BP <80 MM HG: CPT | Mod: CPTII,S$GLB,, | Performed by: NURSE PRACTITIONER

## 2022-08-04 PROCEDURE — 99999 PR PBB SHADOW E&M-EST. PATIENT-LVL V: ICD-10-PCS | Mod: PBBFAC,,, | Performed by: NURSE PRACTITIONER

## 2022-08-04 PROCEDURE — 99214 PR OFFICE/OUTPT VISIT, EST, LEVL IV, 30-39 MIN: ICD-10-PCS | Mod: S$GLB,,, | Performed by: NURSE PRACTITIONER

## 2022-08-04 PROCEDURE — 3288F FALL RISK ASSESSMENT DOCD: CPT | Mod: CPTII,S$GLB,, | Performed by: NURSE PRACTITIONER

## 2022-08-04 PROCEDURE — 1159F MED LIST DOCD IN RCRD: CPT | Mod: CPTII,S$GLB,, | Performed by: NURSE PRACTITIONER

## 2022-08-04 RX ORDER — AMLODIPINE BESYLATE 5 MG/1
5 TABLET ORAL 2 TIMES DAILY
Qty: 180 TABLET | Refills: 3 | Status: SHIPPED | OUTPATIENT
Start: 2022-08-04 | End: 2023-01-11

## 2022-08-04 NOTE — PROGRESS NOTES
"Subjective:      Patient ID: Ezequiel Hughes is a 82 y.o. male.    Chief Complaint: Apnea    HPI  Follow up for autopap. He is having a hard time wearing nightly. Mouth dry. waking up with dry mouth, sore throat and mouth open with nasal mask.   He will need to change to FFM. He benefits from use when he is able to wear.   Recent ED visit for CP. He has had a few ED visits for complaint. Heart cath performed. Patient denies shortness of breath with activity. CXR normal.   No fever, chills, or hemoptysis. No pleuritic type chest pain. Breathing is stable as compared to 6 months ago.      Patient Active Problem List   Diagnosis    Lumbosacral spondylosis without myelopathy    Benign prostatic hyperplasia    Hypertensive disorder    Hyperlipidemia    Cataract    Osteoarthritis    Thrombocytopenia    Anemia    Chronic kidney disease, stage 3    Incomplete right bundle branch block    Colon cancer screening    Prostate cancer    Tortuous aorta    Lung granuloma    Atherosclerosis of artery of both lower extremities    Leg swelling    Sleep apnea    Periodic limb movement disorder (PLMD)    Inadequate sleep hygiene    Basal cell carcinoma (BCC) of anterior chest    Gout    History of colon polyps    Colon polyps    Diverticulosis of colon    Internal hemorrhoids    Generalized weakness    Other eosinophilia    Unspecified inflammatory spondylopathy, thoracic region    Cervical radiculopathy    Bilateral carpal tunnel syndrome    Lumbar radiculopathy, chronic    Urge incontinence    Shortness of breath    Abnormal EKG       /74   Pulse 74   Resp 18   Ht 5' 10" (1.778 m)   Wt 88.9 kg (195 lb 15.8 oz)   SpO2 97%   BMI 28.12 kg/m²   Body mass index is 28.12 kg/m².    Review of Systems   Constitutional: Negative.    HENT: Negative.    Respiratory: Negative.    Cardiovascular: Negative.    Musculoskeletal: Negative.    Gastrointestinal: Negative.    Neurological: Positive for " dizziness.   Psychiatric/Behavioral: Positive for sleep disturbance.     Objective:      Physical Exam  Constitutional:       Appearance: Normal appearance. He is well-developed.   HENT:      Head: Normocephalic and atraumatic.      Nose: Nose normal.   Neck:      Thyroid: No thyroid mass or thyromegaly.      Trachea: Trachea normal.   Cardiovascular:      Rate and Rhythm: Normal rate and regular rhythm.      Heart sounds: Normal heart sounds.   Pulmonary:      Effort: Pulmonary effort is normal.      Breath sounds: Normal breath sounds. No wheezing, rhonchi or rales.   Chest:      Chest wall: There is no dullness to percussion.   Abdominal:      Palpations: Abdomen is soft. There is no splenomegaly or mass.      Tenderness: There is no abdominal tenderness.   Musculoskeletal:         General: Normal range of motion.      Cervical back: Normal range of motion and neck supple.   Skin:     General: Skin is warm and dry.   Neurological:      Mental Status: He is alert and oriented to person, place, and time.   Psychiatric:         Mood and Affect: Mood normal.         Behavior: Behavior normal.       Personal Diagnostic Review      Results for orders placed during the hospital encounter of 06/07/22    X-Ray Chest PA And Lateral    Narrative  EXAMINATION:  XR CHEST PA AND LATERAL    CLINICAL HISTORY:  Chest pain, unspecified    TECHNIQUE:  PA and lateral views of the chest were performed.    COMPARISON:  None    FINDINGS:  Lungs are clear.  No acute osseous injury.  Cardiac silhouette is mildly prominent    Impression  No acute process seen      Electronically signed by: Aryan De La Torre  Date:    06/07/2022  Time:    17:34    Usage 07/02/2022 - 07/31/2022  Usage days 16/30 days (53%)  >= 4 hours 14 days (47%)  < 4 hours 2 days (7%)  Usage hours 96 hours 50 minutes  Average usage (total days) 3 hours 14 minutes  Average usage (days used) 6 hours 3 minutes  Median usage (days used) 6 hours 30 minutes  Total used hours  (value since last reset - 07/31/2022) 822 hours  AirSense 11 AutoSet  Serial number [id number]  Mode AutoSet  Min Pressure 5 cmH2O  Max Pressure 9 cmH2O  EPR Fulltime  EPR level 3  Response Standard  Therapy  Pressure - cmH2O Median: 7.7 95th percentile: 8.8 Maximum: 8.9  Leaks - L/min Median: 2.6 95th percentile: 24.4 Maximum: 41.9  Events per hour AI: 1.0 HI: 0.4 AHI: 1.4  Apnea Index Central: 0.5 Obstructive: 0.4 Unknown: 0.0  RERA Index 0.2  Cheyne-Calderon respiration (average duration per night) 0 minutes (0%)  Usage - hours  Printed on    Assessment:       1. Primary hypertension    2. Sleep apnea, unspecified type    3. Shortness of breath    4. Insomnia due to medical condition        Outpatient Encounter Medications as of 8/4/2022   Medication Sig Dispense Refill    acetaminophen (TYLENOL ARTHRITIS PAIN ORAL) Take by mouth. Patient states that he takes 2 tablets in the morning and 2 tablets in the evening      atorvastatin (LIPITOR) 40 MG tablet TAKE 1 TABLET EVERY DAY 90 tablet 1    clopidogreL (PLAVIX) 75 mg tablet TAKE 1 TABLET EVERY DAY 90 tablet 3    finasteride (PROSCAR) 5 mg tablet Take 1 tablet (5 mg total) by mouth once daily. 90 tablet 4    fluticasone propionate (FLONASE) 50 mcg/actuation nasal spray USE 2 SPRAYS IN EACH NOSTRIL ONE TIME DAILY  (SUBSTITUTED FOR FLONASE) 48 g 3    losartan (COZAAR) 50 MG tablet TAKE 1 TABLET TWICE DAILY 180 tablet 3    pantoprazole (PROTONIX) 40 MG tablet TAKE 1 TABLET EVERY DAY 90 tablet 3    [DISCONTINUED] amLODIPine (NORVASC) 5 MG tablet Take 1 tablet (5 mg total) by mouth 2 (two) times daily. 60 tablet 11    amLODIPine (NORVASC) 5 MG tablet Take 1 tablet (5 mg total) by mouth 2 (two) times daily. 180 tablet 3     No facility-administered encounter medications on file as of 8/4/2022.     Orders Placed This Encounter   Procedures    CPAP/BIPAP SUPPLIES     Order Specific Question:   Length of need (1-99 months):     Answer:   99     Order Specific  Question:   Choose ONE mask type and its corresponding cushions and/or pillows:     Answer:    Full Face Mask, 1 per 90 days:  Full Face Cushion, (3 per 90 days)     Order Specific Question:   Choose EITHER Heated or Non-Heated Tubjing     Answer:    Non-Heated Tubing, 1 per 90 days     Order Specific Question:   All other supplies as needed as listed below:     Answer:    Headgear, 1 per 180 days     Order Specific Question:   All other supplies as needed as listed below:     Answer:    Disposable Filter, 6 per 90 days     Order Specific Question:   All other supplies as needed as listed below:     Answer:    Non-Disposable Filter, 1 per 180 days     Order Specific Question:   All other supplies as needed as listed below:     Answer:    Humidifier Chamber, 1 per 180 days     Plan:        Problem List Items Addressed This Visit        Pulmonary    Shortness of breath       Cardiac/Vascular    Hypertensive disorder - Primary    Overview     Hypertensive disorder           Relevant Medications    amLODIPine (NORVASC) 5 MG tablet       Other    Sleep apnea    Overview     Sleep apnea           Relevant Orders    CPAP/BIPAP SUPPLIES      Other Visit Diagnoses     Insomnia due to medical condition

## 2022-08-11 ENCOUNTER — PATIENT MESSAGE (OUTPATIENT)
Dept: PRIMARY CARE CLINIC | Facility: CLINIC | Age: 82
End: 2022-08-11
Payer: MEDICARE

## 2022-08-15 ENCOUNTER — TELEPHONE (OUTPATIENT)
Dept: PAIN MEDICINE | Facility: CLINIC | Age: 82
End: 2022-08-15
Payer: MEDICARE

## 2022-08-15 DIAGNOSIS — M54.16 LUMBAR RADICULOPATHY, CHRONIC: Primary | ICD-10-CM

## 2022-08-16 ENCOUNTER — TELEPHONE (OUTPATIENT)
Dept: PAIN MEDICINE | Facility: CLINIC | Age: 82
End: 2022-08-16
Payer: MEDICARE

## 2022-08-16 NOTE — TELEPHONE ENCOUNTER
----- Message from Aparna Thompson MD sent at 8/16/2022  8:19 AM CDT -----  Regarding: RE: Cardiac Clearance  Ok no problem proceed .  ----- Message -----  From: Taqueria Hicks MA  Sent: 8/16/2022   7:18 AM CDT  To: Aparna Thompson MD  Subject: Cardiac Clearance                                Good Afternoon Ezequiel Perez was seen in office  with complaints of severe back pain. Dr. Haywood would like to perform  lumbar IAN , and Ezequiel Hughes would like to proceed. Dr. Haywood is requesting for clearance to hold clopidogrel (Plavix) 7 days prior to procedure. Patient Ezequiel Hughes can resume medication following the procedure.Please advise so we may proceed.     Sincerely,     Taqueria Hicks   Medical Assistant

## 2022-08-16 NOTE — TELEPHONE ENCOUNTER
----- Message from Valery Ozuna MD sent at 8/15/2022  5:03 PM CDT -----  Please forward to Dr. Thompson his cardiologist. Thanks.   Valery Ozuna MD    ----- Message -----  From: Taqueria Hicks MA  Sent: 8/15/2022   2:41 PM CDT  To: Valery Ozuna MD    Good Afternoon Ezequiel Perez was seen in office  with complaints of severe back pain. Dr. Haywood would like to perform  lumbar IAN , and Ezequiel Hughes would like to proceed. Dr. Haywood is requesting for clearance to hold clopidogrel (Plavix) 7 days prior to procedure. Patient Ezequiel Hughes can resume medication following the procedure.Please advise so we may proceed.     Sincerely,    Taqueria Hicks   Medical Assistant

## 2022-08-17 ENCOUNTER — TELEPHONE (OUTPATIENT)
Dept: PAIN MEDICINE | Facility: CLINIC | Age: 82
End: 2022-08-17
Payer: MEDICARE

## 2022-08-17 NOTE — TELEPHONE ENCOUNTER
Called pt to set up their procedure. Pt answered and procedure has been made. Inform pt on the procedure instruction. Pt  does need to stop his Plavix per . I also ask  on 8/17/22 and he said 7 days as well.  Pt understood and all question answered.     Proc: 8/25/22  Stop: 8/18/22  Start: 8/26/22    Taqueria Hicks   Medical Assistant

## 2022-08-18 NOTE — PRE-PROCEDURE INSTRUCTIONS
Spoke with patient regarding procedure scheduled on 8.25    Arrival time 0920    Has patient been sick with fever or on antibiotics within the last 7 days? No    Does the patient have any open wounds, sores or rashes? No    Does the patient have any recent fractures? no    Has patient received a vaccination within the last 7 days? No    Received the COVID vaccination? yes    Has the patient stopped all medications as directed? Hold plavix 7 days prior to procedure. Cardiac clearance obtained from dr monroy on 8.17.    Does patient have a pacemaker and or defibrillator? no    Does the patient have a ride to and from procedure and someone reliable to remain with patient? brother    Is the patient diabetic? no    Does the patient have sleep apnea? Or use O2 at home? jonelle on cpap    Is the patient receiving sedation? yes    Is the patient instructed to remain NPO beginning at midnight the night before their procedure? yes    Procedure location confirmed with patient? Yes    Covid- Denies signs/symptoms. Instructed to notify PAT/MD if any changes.

## 2022-08-25 ENCOUNTER — HOSPITAL ENCOUNTER (OUTPATIENT)
Facility: HOSPITAL | Age: 82
Discharge: HOME | End: 2022-08-25
Attending: PHYSICAL MEDICINE & REHABILITATION | Admitting: PHYSICAL MEDICINE & REHABILITATION
Payer: MEDICARE

## 2022-08-25 VITALS
WEIGHT: 197.44 LBS | OXYGEN SATURATION: 98 % | DIASTOLIC BLOOD PRESSURE: 57 MMHG | HEIGHT: 70 IN | HEART RATE: 56 BPM | BODY MASS INDEX: 28.27 KG/M2 | TEMPERATURE: 98 F | RESPIRATION RATE: 15 BRPM | SYSTOLIC BLOOD PRESSURE: 115 MMHG

## 2022-08-25 DIAGNOSIS — M54.16 LUMBAR RADICULOPATHY, CHRONIC: Primary | ICD-10-CM

## 2022-08-25 DIAGNOSIS — M54.16 LUMBAR RADICULOPATHY: ICD-10-CM

## 2022-08-25 PROCEDURE — 64483 NJX AA&/STRD TFRM EPI L/S 1: CPT | Mod: 50 | Performed by: PHYSICAL MEDICINE & REHABILITATION

## 2022-08-25 PROCEDURE — 25000003 PHARM REV CODE 250: Performed by: PHYSICAL MEDICINE & REHABILITATION

## 2022-08-25 PROCEDURE — 64483 NJX AA&/STRD TFRM EPI L/S 1: CPT | Mod: 50,,, | Performed by: PHYSICAL MEDICINE & REHABILITATION

## 2022-08-25 PROCEDURE — 25500020 PHARM REV CODE 255: Performed by: PHYSICAL MEDICINE & REHABILITATION

## 2022-08-25 PROCEDURE — 63600175 PHARM REV CODE 636 W HCPCS: Performed by: PHYSICAL MEDICINE & REHABILITATION

## 2022-08-25 PROCEDURE — 64483 PR EPIDURAL INJ, ANES/STEROID, TRANSFORAMINAL, LUMB/SACR, SNGL LEVL: ICD-10-PCS | Mod: 50,,, | Performed by: PHYSICAL MEDICINE & REHABILITATION

## 2022-08-25 RX ORDER — BUPIVACAINE HYDROCHLORIDE 2.5 MG/ML
INJECTION, SOLUTION EPIDURAL; INFILTRATION; INTRACAUDAL
Status: DISCONTINUED | OUTPATIENT
Start: 2022-08-25 | End: 2022-08-25 | Stop reason: HOSPADM

## 2022-08-25 RX ORDER — METHYLPREDNISOLONE ACETATE 40 MG/ML
INJECTION, SUSPENSION INTRA-ARTICULAR; INTRALESIONAL; INTRAMUSCULAR; SOFT TISSUE
Status: DISCONTINUED | OUTPATIENT
Start: 2022-08-25 | End: 2022-08-25 | Stop reason: HOSPADM

## 2022-08-25 RX ORDER — MIDAZOLAM HYDROCHLORIDE 1 MG/ML
INJECTION, SOLUTION INTRAMUSCULAR; INTRAVENOUS
Status: DISCONTINUED | OUTPATIENT
Start: 2022-08-25 | End: 2022-08-25 | Stop reason: HOSPADM

## 2022-08-25 RX ORDER — ONDANSETRON 2 MG/ML
4 INJECTION INTRAMUSCULAR; INTRAVENOUS ONCE AS NEEDED
Status: DISCONTINUED | OUTPATIENT
Start: 2022-08-25 | End: 2022-12-28

## 2022-08-25 RX ORDER — FENTANYL CITRATE 50 UG/ML
INJECTION, SOLUTION INTRAMUSCULAR; INTRAVENOUS
Status: DISCONTINUED | OUTPATIENT
Start: 2022-08-25 | End: 2022-08-25 | Stop reason: HOSPADM

## 2022-08-25 NOTE — DISCHARGE SUMMARY
Discharge Note  Short Stay      SUMMARY     Admit Date: 8/25/2022    Attending Physician: Abel Haywood MD        Discharge Physician: Abel Haywood MD        Discharge Date: 8/25/2022 10:37 AM    Procedure(s) (LRB):  Bilateral L4/5 TF IAN (Bilateral)    Final Diagnosis: Lumbar radiculopathy, chronic [M54.16]    Disposition: Home or self care    Patient Instructions:   Current Discharge Medication List      CONTINUE these medications which have NOT CHANGED    Details   acetaminophen (TYLENOL ARTHRITIS PAIN ORAL) Take by mouth. Patient states that he takes 2 tablets in the morning and 2 tablets in the evening      amLODIPine (NORVASC) 5 MG tablet Take 1 tablet (5 mg total) by mouth 2 (two) times daily.  Qty: 180 tablet, Refills: 3    Comments: .  Associated Diagnoses: Primary hypertension      atorvastatin (LIPITOR) 40 MG tablet TAKE 1 TABLET EVERY DAY  Qty: 90 tablet, Refills: 1    Associated Diagnoses: Mixed hyperlipidemia      finasteride (PROSCAR) 5 mg tablet Take 1 tablet (5 mg total) by mouth once daily.  Qty: 90 tablet, Refills: 4      losartan (COZAAR) 50 MG tablet TAKE 1 TABLET TWICE DAILY  Qty: 180 tablet, Refills: 3    Associated Diagnoses: Essential hypertension      pantoprazole (PROTONIX) 40 MG tablet TAKE 1 TABLET EVERY DAY  Qty: 90 tablet, Refills: 3      clopidogreL (PLAVIX) 75 mg tablet TAKE 1 TABLET EVERY DAY  Qty: 90 tablet, Refills: 3      fluticasone propionate (FLONASE) 50 mcg/actuation nasal spray USE 2 SPRAYS IN EACH NOSTRIL ONE TIME DAILY  (SUBSTITUTED FOR FLONASE)  Qty: 48 g, Refills: 3                 Discharge Diagnosis: Lumbar radiculopathy, chronic [M54.16]  Condition on Discharge: Stable with no complications to procedure   Diet on Discharge: Same as before.  Activity: as per instruction sheet.  Discharge to: Home with a responsible adult.  Follow up: 2-4 weeks       Please call the office at [phone] if you experience any weakness or loss of sensation, fever > 101.5,  pain uncontrolled with oral medications, persistent nausea/vomiting/or diarrhea, redness or drainage from the incisions, or any other worrisome concerns. If physician on call was not reached or could not communicate with our office for any reason please go to the nearest emergency department

## 2022-08-25 NOTE — OP NOTE
INFORMED CONSENT: The procedure, risks, benefits and options were discussed with patient. There are no contraindications to the procedure. The patient expressed understanding and agreed to proceed. The personnel performing the procedure was discussed.    08/25/2022    Surgeon: Abel Haywood MD    Assistants: None    SEDATION: Conscious sedation provided by M.D    The patient was monitored with continuous pulse oximetry, EKG, and intermittent blood pressure monitors, immediately prior to administration of sedation.  The patient was hemodynamically stable throughout the entire process was responsive to voice, and breathing spontaneously.  Supplemental O2 was provided at 2L/min via nasal cannula.  Patient was comfortable for the duration of the procedure.     There was a total of 2mg IV Midazolam and 50mcg Fentanyl titrated for the procedure    Total sedation time was >10minutes and <20minutes      PROCEDURE:  Bilateral  L4/5  1) Left  L4/5 TRANSFORAMINAL EPIDURAL STEROID INJECTION  2) Right  L4/5 TRANSFORAMINAL EPIDURAL STEROID INJECTION      Pre Procedure diagnosis:  Bilateral L4/5 Lumbar radiculopathy, chronic [M54.16]    Post-Procedure diagnosis:   same    Complications: None    Specimens: None      DESCRIPTION OF PROCEDURE: The patient was brought to the procedure room. IV access was obtained prior to the procedure. The patient was positioned prone on the fluoroscopy table. Continuous hemodynamic monitoring was initiated including blood pressure, EKG, and pulse oximetry. . The skin was prepped with chlorhexidine and draped in a sterile fashion. Skin anesthesia was achieved using a total of 10mL of lidocaine, 5mL over each respective injection site.     The  L4/5 transforaminal spaces were identified with fluoroscopy in the  AP, oblique, and lateral views.  A 22 gauge spinal quinke needle was then advanced into the area of the trans foraminal spaces bilaterally with confirmation of proper needle position using  AP, oblique, and lateral fluoroscopic views. Once the needle tip was in the area of the transforaminal space, and there was no blood, CSF or paraesthesias,  1.5 mL of Omnipaque 300mg/ml was injected on each side for a total of 3mL.  Fluoroscopic imaging in the AP and lateral views revealed a clear outline of the spinal nerve with proximal spread of agent through the neural foramen into the epidural space. A total combination of 1 mL of Bupivicaine 0.25% and 40 mg depo medrol was injected on each side for a total of 4mL of injected medications with displacement of the contrast dye confirming that the medication went into the area of the transforaminal spaces bilaterally. A sterile dressing was applied.   Patient tolerated the procedure well.    Patient was taken back to the recovery room for further observation.     The patient was discharged to home in stable condition

## 2022-08-25 NOTE — H&P
HPI  Patient presenting for Procedure(s) (LRB):  Bilateral L4/5 TF IAN (Bilateral)     Patient on Anti-coagulation Yes    No health changes since previous encounter    Past Medical History:   Diagnosis Date    Allergic rhinitis     Anemia     Back pain     BPH (benign prostatic hyperplasia)     Cancer     skin cancer to neck, Dr. Graves    Cataract     Disorder of kidney and ureter     ED (erectile dysfunction)     Hiatal hernia     small    History of colon polyps     colonoscopy 11/2016    HLD (hyperlipidemia)     Hyperlipidemia     Hypertension     Lateral epicondylitis     Lumbar radiculopathy 1/26/2022    OA (osteoarthritis)     GUIDO (obstructive sleep apnea)     Prostate cancer     Dr. Wong    TIA (transient ischemic attack)     Trouble in sleeping     Urge incontinence 3/29/2022     Past Surgical History:   Procedure Laterality Date    ANGIOGRAPHY OF UPPER EXTREMITY Left 7/5/2022    Procedure: Angiogram Extremity Bilateral;  Surgeon: Aparna Thompson MD;  Location: [facility] CATH LAB;  Service: Cardiology;  Laterality: Left;    ARTERIOGRAPHY OF AORTIC ROOT N/A 7/5/2022    Procedure: ARTERIOGRAM, AORTIC ROOT;  Surgeon: Aparna Thompson MD;  Location: [facility] CATH LAB;  Service: Cardiology;  Laterality: N/A;    CATARACT EXTRACTION W/  INTRAOCULAR LENS IMPLANT Right 02/21/2018    I-Stent    CATARACT EXTRACTION W/  INTRAOCULAR LENS IMPLANT Left 03/21/2018    I - Stent    CATHETERIZATION OF BOTH LEFT AND RIGHT HEART N/A 7/5/2022    Procedure: CATHETERIZATION, HEART, BOTH LEFT AND RIGHT;  Surgeon: Aparna Thompson MD;  Location: [facility] CATH LAB;  Service: Cardiology;  Laterality: N/A;  radial approach    COLONOSCOPY N/A 11/14/2016    Procedure: COLONOSCOPY;  Surgeon: Karuna Rodriguez MD;  Location: [facility] ENDO;  Service: Endoscopy;  Laterality: N/A;    COLONOSCOPY N/A 9/22/2020    Procedure: COLONOSCOPY;  Surgeon: Chuy Fish MD;  Location: [facility] ENDO;  Service: Endoscopy;  Laterality:  N/A;    EYE SURGERY      HEMORRHOID SURGERY      I-STENT Right 02/21/2018    DR. REECE    KNEE ARTHROSCOPY W/ MENISCAL REPAIR Right 2015    Dr. Jain    PCIOL Right 02/21/2018    DR. REECE    PLANTAR FASCIA RELEASE      right    ROTATOR CUFF REPAIR Bilateral     bilateral    SELECTIVE INJECTION OF ANESTHETIC AGENT AROUND LUMBAR SPINAL NERVE ROOT BY TRANSFORAMINAL APPROACH Bilateral 1/26/2022    Procedure: Bilateral L4/5 TF IAN with RN IV sedation;  Surgeon: Mak Young MD;  Location: [facility];  Service: Pain Management;  Laterality: Bilateral;    SELECTIVE INJECTION OF ANESTHETIC AGENT AROUND LUMBAR SPINAL NERVE ROOT BY TRANSFORAMINAL APPROACH Bilateral 3/14/2022    Procedure: Bilateral L4/5 TF IAN with RN IV sedation;  Surgeon: Mak Young MD;  Location: [facility];  Service: Pain Management;  Laterality: Bilateral;    SELECTIVE INJECTION OF ANESTHETIC AGENT AROUND LUMBAR SPINAL NERVE ROOT BY TRANSFORAMINAL APPROACH Bilateral 6/2/2022    Procedure: Bilateral L4/5 TF IAN - D2P Dr. Matthew CABRERA;  Surgeon: Abel Haywood MD;  Location: [facility];  Service: Pain Management;  Laterality: Bilateral;    SHOULDER SURGERY Bilateral around 2000    Dr. Pepper.  rotator cuff surgeries    VASECTOMY       Review of patient's allergies indicates:   Allergen Reactions    Atarax [hydroxyzine hcl] Other (See Comments)     Raised blood pressure     Zyrtec [cetirizine] Other (See Comments)     Raised blood pressure     Gold sodium thiosulfate      Patch test positive    Meloxicam Rash     Other reaction(s): hypertension        No current facility-administered medications on file prior to encounter.     Current Outpatient Medications on File Prior to Encounter   Medication Sig Dispense Refill    acetaminophen (TYLENOL ARTHRITIS PAIN ORAL) Take by mouth. Patient states that he takes 2 tablets in the morning and 2 tablets in the evening      amLODIPine (NORVASC) 5 MG tablet Take 1 tablet (5 mg  "total) by mouth 2 (two) times daily. 180 tablet 3    atorvastatin (LIPITOR) 40 MG tablet TAKE 1 TABLET EVERY DAY 90 tablet 1    finasteride (PROSCAR) 5 mg tablet Take 1 tablet (5 mg total) by mouth once daily. 90 tablet 4    losartan (COZAAR) 50 MG tablet TAKE 1 TABLET TWICE DAILY 180 tablet 3    pantoprazole (PROTONIX) 40 MG tablet TAKE 1 TABLET EVERY DAY 90 tablet 3    clopidogreL (PLAVIX) 75 mg tablet TAKE 1 TABLET EVERY DAY 90 tablet 3    fluticasone propionate (FLONASE) 50 mcg/actuation nasal spray USE 2 SPRAYS IN EACH NOSTRIL ONE TIME DAILY  (SUBSTITUTED FOR FLONASE) 48 g 3        PMHx, PSHx, Allergies, Medications reviewed in epic    ROS negative except pain complaints in HPI    OBJECTIVE:    BP (!) 154/69 (BP Location: Right arm, Patient Position: Sitting)   Pulse 60   Temp 97.2 °F (36.2 °C) (Temporal)   Resp 18   Ht 5' 10" (1.778 m)   Wt 89.5 kg (197 lb 6.8 oz)   SpO2 99%   BMI 28.33 kg/m²     PHYSICAL EXAMINATION:    GENERAL: Well appearing, in no acute distress, alert and oriented x3.  PSYCH:  Mood and affect appropriate.  SKIN: Skin color, texture, turgor normal, no rashes or lesions which will impact the procedure.  CV: RRR with palpation of the radial artery.  PULM: No evidence of respiratory difficulty, symmetric chest rise. Clear to auscultation.  NEURO: Cranial nerves grossly intact.    Plan:    Proceed with procedure as planned Procedure(s) (LRB):  Bilateral L4/5 TF IAN (Bilateral)    Abel Haywood MD  08/25/2022            "

## 2022-08-25 NOTE — DISCHARGE INSTRUCTIONS

## 2022-09-08 ENCOUNTER — OFFICE VISIT (OUTPATIENT)
Dept: OPHTHALMOLOGY | Facility: CLINIC | Age: 82
End: 2022-09-08
Payer: MEDICARE

## 2022-09-08 DIAGNOSIS — H40.1111 PRIMARY OPEN ANGLE GLAUCOMA (POAG) OF RIGHT EYE, MILD STAGE: Primary | ICD-10-CM

## 2022-09-08 DIAGNOSIS — H40.1122 PRIMARY OPEN ANGLE GLAUCOMA (POAG) OF LEFT EYE, MODERATE STAGE: ICD-10-CM

## 2022-09-08 DIAGNOSIS — Z96.1 PSEUDOPHAKIA: ICD-10-CM

## 2022-09-08 PROCEDURE — 1159F MED LIST DOCD IN RCRD: CPT | Mod: CPTII,S$GLB,, | Performed by: OPHTHALMOLOGY

## 2022-09-08 PROCEDURE — 1159F PR MEDICATION LIST DOCUMENTED IN MEDICAL RECORD: ICD-10-PCS | Mod: CPTII,S$GLB,, | Performed by: OPHTHALMOLOGY

## 2022-09-08 PROCEDURE — 92133 CPTRZD OPH DX IMG PST SGM ON: CPT | Mod: S$GLB,,, | Performed by: OPHTHALMOLOGY

## 2022-09-08 PROCEDURE — 92014 COMPRE OPH EXAM EST PT 1/>: CPT | Mod: S$GLB,,, | Performed by: OPHTHALMOLOGY

## 2022-09-08 PROCEDURE — 1160F PR REVIEW ALL MEDS BY PRESCRIBER/CLIN PHARMACIST DOCUMENTED: ICD-10-PCS | Mod: CPTII,S$GLB,, | Performed by: OPHTHALMOLOGY

## 2022-09-08 PROCEDURE — 92133 POSTERIOR SEGMENT OCT OPTIC NERVE(OCULAR COHERENCE TOMOGRAPHY) - OU - BOTH EYES: ICD-10-PCS | Mod: S$GLB,,, | Performed by: OPHTHALMOLOGY

## 2022-09-08 PROCEDURE — 99999 PR PBB SHADOW E&M-EST. PATIENT-LVL III: CPT | Mod: PBBFAC,,, | Performed by: OPHTHALMOLOGY

## 2022-09-08 PROCEDURE — 99999 PR PBB SHADOW E&M-EST. PATIENT-LVL III: ICD-10-PCS | Mod: PBBFAC,,, | Performed by: OPHTHALMOLOGY

## 2022-09-08 PROCEDURE — 1160F RVW MEDS BY RX/DR IN RCRD: CPT | Mod: CPTII,S$GLB,, | Performed by: OPHTHALMOLOGY

## 2022-09-08 PROCEDURE — 92014 PR EYE EXAM, EST PATIENT,COMPREHESV: ICD-10-PCS | Mod: S$GLB,,, | Performed by: OPHTHALMOLOGY

## 2022-09-08 NOTE — PROGRESS NOTES
SUBJECTIVE  Ezequiel Hughes is 82 y.o. male  Uncorrected distance visual acuity was 20/20 -2 in the right eye and 20/30 -2 in the left eye.   Chief Complaint   Patient presents with    Annual Exam     Pt reports for yearly exam. Denies any pain or irritation. Va stable. No pain or discomfort  Patient is not on any gtts.          HPI     Annual Exam     Additional comments: Pt reports for yearly exam. Denies any pain or   irritation. Va stable. No pain or discomfort  Patient is not on any gtts.           Comments    1. Mod COAG OS + Mild COAG OD (init 22/26 post dilation IOP ) goal = 17  SLT OS 12/2/20 (19.5-15)  2. PCIOL / I-Stent OD +18.5 [id number] (distance) 2-21-18  PCIOL /I-Stent OS +21.0 (-1.75) 3/21/18 near  3. ERM OU   4. Brow Lift 9/18   *intolerant of travatan*      No eyedrops            Last edited by Yovani Leach on 9/8/2022  1:46 PM.         Assessment /Plan :  1. Primary open angle glaucoma (POAG) of right eye, mild stage Doing well, intraocular pressure (IOP) within acceptable range relative to target IOP and no evidence of progression. Continue current treatment. Reviewed importance of continued compliance with treatment and follow up.      Return to clinic in 4 months  or as needed.  With IOP Check and HVF 24-2     2. Primary open angle glaucoma (POAG) of left eye, moderate stage    3. Pseudophakia  -- Condition stable, no therapeutic change required. Monitoring routinely.

## 2022-09-12 ENCOUNTER — PATIENT MESSAGE (OUTPATIENT)
Dept: PRIMARY CARE CLINIC | Facility: CLINIC | Age: 82
End: 2022-09-12
Payer: MEDICARE

## 2022-09-26 ENCOUNTER — OFFICE VISIT (OUTPATIENT)
Dept: PRIMARY CARE CLINIC | Facility: CLINIC | Age: 82
End: 2022-09-26
Payer: MEDICARE

## 2022-09-26 ENCOUNTER — LAB VISIT (OUTPATIENT)
Dept: LAB | Facility: HOSPITAL | Age: 82
End: 2022-09-26
Attending: UROLOGY
Payer: MEDICARE

## 2022-09-26 VITALS
BODY MASS INDEX: 27.88 KG/M2 | SYSTOLIC BLOOD PRESSURE: 118 MMHG | TEMPERATURE: 98 F | DIASTOLIC BLOOD PRESSURE: 75 MMHG | HEIGHT: 70 IN | WEIGHT: 194.75 LBS | HEART RATE: 57 BPM | OXYGEN SATURATION: 98 %

## 2022-09-26 DIAGNOSIS — L72.3 SEBACEOUS CYST: ICD-10-CM

## 2022-09-26 DIAGNOSIS — I10 ESSENTIAL HYPERTENSION: ICD-10-CM

## 2022-09-26 DIAGNOSIS — N40.0 BENIGN PROSTATIC HYPERPLASIA WITHOUT LOWER URINARY TRACT SYMPTOMS: ICD-10-CM

## 2022-09-26 DIAGNOSIS — N18.31 STAGE 3A CHRONIC KIDNEY DISEASE: ICD-10-CM

## 2022-09-26 DIAGNOSIS — C61 PROSTATE CANCER: ICD-10-CM

## 2022-09-26 DIAGNOSIS — M54.40 CHRONIC BILATERAL LOW BACK PAIN WITH SCIATICA, SCIATICA LATERALITY UNSPECIFIED: ICD-10-CM

## 2022-09-26 DIAGNOSIS — M54.16 LUMBAR RADICULOPATHY, CHRONIC: Primary | ICD-10-CM

## 2022-09-26 DIAGNOSIS — I80.9 PHLEBITIS: ICD-10-CM

## 2022-09-26 DIAGNOSIS — R42 DIZZINESS: ICD-10-CM

## 2022-09-26 DIAGNOSIS — G89.29 CHRONIC BILATERAL LOW BACK PAIN WITH SCIATICA, SCIATICA LATERALITY UNSPECIFIED: ICD-10-CM

## 2022-09-26 DIAGNOSIS — M46.94 UNSPECIFIED INFLAMMATORY SPONDYLOPATHY, THORACIC REGION: ICD-10-CM

## 2022-09-26 DIAGNOSIS — I70.203 ATHEROSCLEROSIS OF ARTERY OF BOTH LOWER EXTREMITIES: ICD-10-CM

## 2022-09-26 LAB — COMPLEXED PSA SERPL-MCNC: 1.5 NG/ML (ref 0–4)

## 2022-09-26 PROCEDURE — 99215 PR OFFICE/OUTPT VISIT, EST, LEVL V, 40-54 MIN: ICD-10-PCS | Mod: 25,S$GLB,, | Performed by: FAMILY MEDICINE

## 2022-09-26 PROCEDURE — 3078F PR MOST RECENT DIASTOLIC BLOOD PRESSURE < 80 MM HG: ICD-10-PCS | Mod: CPTII,S$GLB,, | Performed by: FAMILY MEDICINE

## 2022-09-26 PROCEDURE — 1159F MED LIST DOCD IN RCRD: CPT | Mod: CPTII,S$GLB,, | Performed by: FAMILY MEDICINE

## 2022-09-26 PROCEDURE — 99215 OFFICE O/P EST HI 40 MIN: CPT | Mod: 25,S$GLB,, | Performed by: FAMILY MEDICINE

## 2022-09-26 PROCEDURE — 1160F PR REVIEW ALL MEDS BY PRESCRIBER/CLIN PHARMACIST DOCUMENTED: ICD-10-PCS | Mod: CPTII,S$GLB,, | Performed by: FAMILY MEDICINE

## 2022-09-26 PROCEDURE — 1126F PR PAIN SEVERITY QUANTIFIED, NO PAIN PRESENT: ICD-10-PCS | Mod: CPTII,S$GLB,, | Performed by: FAMILY MEDICINE

## 2022-09-26 PROCEDURE — 1126F AMNT PAIN NOTED NONE PRSNT: CPT | Mod: CPTII,S$GLB,, | Performed by: FAMILY MEDICINE

## 2022-09-26 PROCEDURE — 99999 PR PBB SHADOW E&M-EST. PATIENT-LVL V: ICD-10-PCS | Mod: PBBFAC,,, | Performed by: FAMILY MEDICINE

## 2022-09-26 PROCEDURE — 1159F PR MEDICATION LIST DOCUMENTED IN MEDICAL RECORD: ICD-10-PCS | Mod: CPTII,S$GLB,, | Performed by: FAMILY MEDICINE

## 2022-09-26 PROCEDURE — 3288F PR FALLS RISK ASSESSMENT DOCUMENTED: ICD-10-PCS | Mod: CPTII,S$GLB,, | Performed by: FAMILY MEDICINE

## 2022-09-26 PROCEDURE — 3078F DIAST BP <80 MM HG: CPT | Mod: CPTII,S$GLB,, | Performed by: FAMILY MEDICINE

## 2022-09-26 PROCEDURE — G0008 ADMIN INFLUENZA VIRUS VAC: HCPCS | Mod: S$GLB,,, | Performed by: FAMILY MEDICINE

## 2022-09-26 PROCEDURE — 3074F PR MOST RECENT SYSTOLIC BLOOD PRESSURE < 130 MM HG: ICD-10-PCS | Mod: CPTII,S$GLB,, | Performed by: FAMILY MEDICINE

## 2022-09-26 PROCEDURE — 99999 PR PBB SHADOW E&M-EST. PATIENT-LVL V: CPT | Mod: PBBFAC,,, | Performed by: FAMILY MEDICINE

## 2022-09-26 PROCEDURE — 99499 RISK ADDL DX/OHS AUDIT: ICD-10-PCS | Mod: HCNC,S$GLB,, | Performed by: FAMILY MEDICINE

## 2022-09-26 PROCEDURE — 90662 IIV NO PRSV INCREASED AG IM: CPT | Mod: S$GLB,,, | Performed by: FAMILY MEDICINE

## 2022-09-26 PROCEDURE — 84153 ASSAY OF PSA TOTAL: CPT | Performed by: UROLOGY

## 2022-09-26 PROCEDURE — 90662 FLU VACCINE - QUADRIVALENT - HIGH DOSE (65+) PRESERVATIVE FREE IM: ICD-10-PCS | Mod: S$GLB,,, | Performed by: FAMILY MEDICINE

## 2022-09-26 PROCEDURE — 3074F SYST BP LT 130 MM HG: CPT | Mod: CPTII,S$GLB,, | Performed by: FAMILY MEDICINE

## 2022-09-26 PROCEDURE — 1101F PT FALLS ASSESS-DOCD LE1/YR: CPT | Mod: CPTII,S$GLB,, | Performed by: FAMILY MEDICINE

## 2022-09-26 PROCEDURE — 1101F PR PT FALLS ASSESS DOC 0-1 FALLS W/OUT INJ PAST YR: ICD-10-PCS | Mod: CPTII,S$GLB,, | Performed by: FAMILY MEDICINE

## 2022-09-26 PROCEDURE — G0008 FLU VACCINE - QUADRIVALENT - HIGH DOSE (65+) PRESERVATIVE FREE IM: ICD-10-PCS | Mod: S$GLB,,, | Performed by: FAMILY MEDICINE

## 2022-09-26 PROCEDURE — 36415 COLL VENOUS BLD VENIPUNCTURE: CPT | Mod: PN | Performed by: UROLOGY

## 2022-09-26 PROCEDURE — 99499 UNLISTED E&M SERVICE: CPT | Mod: HCNC,S$GLB,, | Performed by: FAMILY MEDICINE

## 2022-09-26 PROCEDURE — 1160F RVW MEDS BY RX/DR IN RCRD: CPT | Mod: CPTII,S$GLB,, | Performed by: FAMILY MEDICINE

## 2022-09-26 PROCEDURE — 3288F FALL RISK ASSESSMENT DOCD: CPT | Mod: CPTII,S$GLB,, | Performed by: FAMILY MEDICINE

## 2022-09-26 RX ORDER — IODIXANOL 320 MG/ML
INJECTION, SOLUTION INTRAVASCULAR
COMMUNITY
Start: 2022-07-05 | End: 2022-12-28 | Stop reason: ALTCHOICE

## 2022-09-26 RX ORDER — HEPARIN SODIUM 1000 [USP'U]/ML
INJECTION INTRAVENOUS; SUBCUTANEOUS
COMMUNITY
Start: 2022-07-05 | End: 2022-12-28 | Stop reason: ALTCHOICE

## 2022-09-26 RX ORDER — IOHEXOL 12 MG/ML
SOLUTION ORAL
COMMUNITY
Start: 2022-07-14 | End: 2022-12-28 | Stop reason: ALTCHOICE

## 2022-09-26 RX ORDER — FENTANYL CITRATE 50 UG/ML
INJECTION, SOLUTION INTRAMUSCULAR; INTRAVENOUS
COMMUNITY
Start: 2022-07-05 | End: 2022-12-28 | Stop reason: ALTCHOICE

## 2022-09-26 RX ORDER — MIDAZOLAM HYDROCHLORIDE 1 MG/ML
INJECTION, SOLUTION INTRAMUSCULAR; INTRAVENOUS
COMMUNITY
Start: 2022-07-05 | End: 2022-12-28 | Stop reason: ALTCHOICE

## 2022-09-26 NOTE — PROGRESS NOTES
Established Patient Chronic Pain Note (Follow up visit)    Chief Complaint:   Chief Complaint   Patient presents with    Back Pain    Low-back Pain     Muscle spasms in the lower back          SUBJECTIVE:  Interval History (9/27/2022): Ezequiel Hughes presents today for follow-up visit.  he underwent Bilateral L4/5 TF IAN on 8/25/22.  The patient reports that he is/was better following the procedure.  he reports 80-90% pain relief. He reports this IAN was the best so far.  The changes lasted 4 weeks so far.  The changes have continued through this visit.  Patient reports pain as 2/10 today. He does report muscle spasms in his lower right back for the past 3-4 days after prolonged stooping working on a car. He has used heat and massage with some improvement.       Interval HPI  Ezequiel Hughes is a 82 y.o. male who presents to the clinic for a follow-up appointment for back and leg pain.  He presents today after undergoing a 3rd lumbar TFESI bilaterally at L4-5 on 06/02/2022.  He reports that this resulted in 100% relief.  Since the last visit, Ezequiel Hughes states the pain has been resolved. Current pain intensity is 0/10.    Patient denies night fever/night sweats, urinary incontinence, bowel incontinence, significant weight loss, significant motor weakness and loss of sensations.    Pain Disability Index Review:  Last 3 PDI Scores 6/9/2022 1/13/2022 12/9/2021   Pain Disability Index (PDI) 24 24 35     Interval Hx: 2/24/22  Presents status post bilateral L4/5 transforaminal epidural steroid injection January 26, 2022. Patient reports having 100% relief in lower extremity radicular symptoms following epidural steroid injection.  This pain level varies based upon his activity throughout the day.  Patient reports as he is more active he does notice radicular symptoms down the lateral aspects of bilateral lower extremities to the feet.  Patient reports discontinuing Lyrica the day following his injection which he believes  caused paresthesias to insidiously return.  Patient does report improvement in functionality including range of motion and strength following his injection.  Patient is interested in repeat intervention.  Patient denies any side effects at the Lyrica dose of 225 mg twice a day.     Interval Hx: 1/13/22  Patient presents for MRI cervical and lumbar review.  Today patient again reports lower back pain which radiates down the anterior aspects of bilateral lower extremities in L4 distribution to the foot.  Today pain is rated as a 4/10.  Pain is exacerbated with prolonged standing and with ambulation the patient reports he is able to ambulate at least 1 mi on the treadmill before requiring rest.  He also reports neck pain which radiates in bilateral trapezius distributions in C5-6 distribution.  Patient has continued Lyrica and is currently taking 150 mg twice daily.  He is anticipated to increase this dosage next week.  He denies any side effects from this medication.     HPI 12/9/21  Ezequiel Hughes is a 81 y.o. male with past medical history significant for transient ischemic attack, hyperlipidemia, hypertension, right bundle branch block, stage 3 chronic kidney disease, thrombocytopenia and anemia , prostate cancer, gout, obstructive sleep apnea, periodically limb movement disorder who presents to the clinic for the evaluation of neck, lower back and leg pain.  Today patient reports pain began approximately 1 year prior without inciting accident, injury or trauma.  Today patient reports lower back pain which is constant and today is rated as a 3/10.  Patient reports radicular symptoms beginning along the anterior aspects of bilateral lower extremities below the knee to the foot in L4 distribution.  Patient is having difficulty describing the character but believes it is burning in nature.  Pain is exacerbated with prolonged standing and with ambulation.  Patient reports he is quite active, goes to the gym and walks on  "his treadmill and is able to ambulate approximately half to 3/4 of a mi before requiring rest.  Pain is improved with sitting.He denies any bowel or bladder incontinence or saddle anesthesia. Patient has performed physical therapy for the lower back without any improvement in his symptoms.      Patient also reports constant neck pain.  Pain presents in a bandlike distribution in bilateral cervical paraspinous territory and radiates into bilateral trapezius distribution.  Patient also reports numbness in bilateral thumbs.  Pain is exacerbated with cervical flexion, extension and lateral flexion.  Patient denies upper extremity weakness, compromise in hand  strength or dexterity.  Patient has been trialed on gabapentin for what his wife describes as "scalp itching"at a lower dose of 100 mg 3 times daily without any improvement in his neck or in his back pain.           Non-Pharmacologic Treatments:  Physical Therapy/Home Exercise: yes  Ice/Heat:yes  TENS: no  Acupuncture: no  Massage: no  Chiropractic: no    Other: no     Pain Medications:  - Adjuvant Medications: Neurontin (Gabapentin) and Tylenol (Acetaminophen)  - Anti-Coagulants: Plavix ( Clopidogrel)     Pain Procedures:   -01/26/2022:  Bilateral L4/5 TFESI  -03/14/2022: Bilateral L4-5 TFESI  -06/02/2022:  Bilateral L4-5 TFESI D2P per Haydel  -08/25/2022: Bilatearl L4/5 TF IAN      Imaging:   MRI brain 05/12/2022:        MRI lumbar spine 12/22/2021:      MRI cervical spine 12/22/2021:          PMHx,PSHx, Social history, and Family history:  I have reviewed the patient's medical, surgical, social, and family history in detail and updated the computerized patient record.    Review of patient's allergies indicates:   Allergen Reactions    Atarax [hydroxyzine hcl] Other (See Comments)     Raised blood pressure     Zyrtec [cetirizine] Other (See Comments)     Raised blood pressure     Gold sodium thiosulfate      Patch test positive    Meloxicam Rash     Other " reaction(s): hypertension       Current Outpatient Medications   Medication Sig    acetaminophen (TYLENOL ARTHRITIS PAIN ORAL) Take by mouth. Patient states that he takes 2 tablets in the morning and 2 tablets in the evening    amLODIPine (NORVASC) 5 MG tablet Take 1 tablet (5 mg total) by mouth 2 (two) times daily.    atorvastatin (LIPITOR) 40 MG tablet TAKE 1 TABLET EVERY DAY    clopidogreL (PLAVIX) 75 mg tablet TAKE 1 TABLET EVERY DAY    fentaNYL (SUBLIMAZE) 50 mcg/mL injection     finasteride (PROSCAR) 5 mg tablet Take 1 tablet (5 mg total) by mouth once daily.    fluticasone propionate (FLONASE) 50 mcg/actuation nasal spray USE 2 SPRAYS IN EACH NOSTRIL ONE TIME DAILY  (SUBSTITUTED FOR FLONASE)    heparin sodium,porcine/PF (HEPARIN, PORCINE, PF,) 1,000 unit/mL Soln     losartan (COZAAR) 50 MG tablet TAKE 1 TABLET TWICE DAILY    midazolam (VERSED) 1 mg/mL injection     OMNIPAQUE 12 mg iodine/mL oral solution     pantoprazole (PROTONIX) 40 MG tablet TAKE 1 TABLET EVERY DAY    VISIPAQUE 320 mg iodine/mL injection     methocarbamoL (ROBAXIN) 500 MG Tab Take 1 tablet (500 mg total) by mouth 3 (three) times daily as needed (muscle spasms). May cause drowsiness.     No current facility-administered medications for this visit.     Facility-Administered Medications Ordered in Other Visits   Medication    ondansetron injection 4 mg         REVIEW OF SYSTEMS:    GENERAL:  No weight loss, malaise or fevers.  HEENT:   No recent changes in vision or hearing  NECK:  Negative for lumps, no difficulty with swallowing.  RESPIRATORY:  Negative for cough, wheezing or shortness of breath, patient denies any recent URI.  CARDIOVASCULAR:  Negative for chest pain, leg swelling or palpitations.  GI:  Negative for abdominal discomfort, blood in stools or black stools or change in bowel habits.  MUSCULOSKELETAL:  See HPI.  SKIN:  Negative for lesions, rash, and itching.  PSYCH:  No mood disorder or recent psychosocial stressors.  Patients  "sleep is not disturbed secondary to pain.  HEMATOLOGY/LYMPHOLOGY:  Negative for prolonged bleeding, bruising easily or swollen nodes.  Patient is currently taking anti-coagulants - plavix  NEURO:   No history of headaches, syncope, paralysis, seizures or tremors.  All other reviewed and negative other than HPI.    OBJECTIVE:    /75   Pulse (!) 55   Ht 5' 10" (1.778 m)   Wt 89.1 kg (196 lb 6.9 oz)   BMI 28.18 kg/m²     PHYSICAL EXAMINATION:    GENERAL: Well appearing, in no acute distress, alert and oriented x3.  PSYCH:  Mood and affect appropriate.  SKIN: Skin color, texture, turgor normal, no rashes or lesions.  HEAD/FACE:  Normocephalic, atraumatic. Cranial nerves grossly intact.  CV: RRR with palpation of the radial artery.  PULM: No evidence of respiratory difficulty, symmetric chest rise.  GI:  Soft and non-tender.  BACK: Straight leg raising in the sitting and supine positions is negative to radicular pain. No pain to palpation over the facet joints of the lumbar spine or spinous processes. Normal range of motion without pain reproduction.  EXTREMITIES: Peripheral joint ROM is full and pain free without obvious instability or laxity in all four extremities. No deformities, edema, or skin discoloration. Good capillary refill.  MUSCULOSKELETAL: mild/moderate  TTP along right quadratus lumborum. Hip and knee provocative maneuvers are negative.  There is no pain with palpation over the sacroiliac joints bilaterally.  FABERs test is negative.  FADIRs test is negative.   Bilateral upper and lower extremity strength is normal and symmetric.  No atrophy or tone abnormalities are noted.  NEURO: Bilateral upper and lower extremity coordination and muscle stretch reflexes are physiologic and symmetric.  Plantar response are downgoing. No clonus.  No loss of sensation is noted.  GAIT: normal.      LABS:  Lab Results   Component Value Date    WBC 4.77 06/29/2022    HGB 13.0 (L) 06/29/2022    HCT 40.4 06/29/2022 "    MCV 98 06/29/2022     (L) 06/29/2022       CMP  Sodium   Date Value Ref Range Status   06/29/2022 140 136 - 145 mmol/L Final     Potassium   Date Value Ref Range Status   06/29/2022 4.6 3.5 - 5.1 mmol/L Final     Chloride   Date Value Ref Range Status   06/29/2022 106 95 - 110 mmol/L Final     CO2   Date Value Ref Range Status   06/29/2022 28 23 - 29 mmol/L Final     Glucose   Date Value Ref Range Status   06/29/2022 90 70 - 110 mg/dL Final     BUN   Date Value Ref Range Status   06/29/2022 19 8 - 23 mg/dL Final     Creatinine   Date Value Ref Range Status   06/29/2022 1.3 0.5 - 1.4 mg/dL Final     Calcium   Date Value Ref Range Status   06/29/2022 9.0 8.7 - 10.5 mg/dL Final     Total Protein   Date Value Ref Range Status   06/07/2022 6.6 6.0 - 8.4 g/dL Final     Albumin   Date Value Ref Range Status   06/07/2022 4.1 3.5 - 5.2 g/dL Final     Total Bilirubin   Date Value Ref Range Status   06/07/2022 0.8 0.1 - 1.0 mg/dL Final     Comment:     For infants and newborns, interpretation of results should be based  on gestational age, weight and in agreement with clinical  observations.    Premature Infant recommended reference ranges:  Up to 24 hours.............<8.0 mg/dL  Up to 48 hours............<12.0 mg/dL  3-5 days..................<15.0 mg/dL  6-29 days.................<15.0 mg/dL       Alkaline Phosphatase   Date Value Ref Range Status   06/07/2022 55 55 - 135 U/L Final     AST   Date Value Ref Range Status   06/07/2022 12 10 - 40 U/L Final     ALT   Date Value Ref Range Status   06/07/2022 8 (L) 10 - 44 U/L Final     Anion Gap   Date Value Ref Range Status   06/29/2022 6 (L) 8 - 16 mmol/L Final     eGFR if    Date Value Ref Range Status   06/29/2022 57.9 (A) >60 mL/min/1.73 m^2 Final     eGFR if non    Date Value Ref Range Status   06/29/2022 50.1 (A) >60 mL/min/1.73 m^2 Final     Comment:     Calculation used to obtain the estimated glomerular filtration  rate (eGFR) is  the CKD-EPI equation.          Lab Results   Component Value Date    HGBA1C 5.2 07/22/2020             ASSESSMENT: 82 y.o. year old male with lower back and leg pain, consistent with     1. Dorsalgia, unspecified  methocarbamoL (ROBAXIN) 500 MG Tab              PLAN:   - Interventions:  None at this time, may consider repeat lumbar IAN in the future .   - Anticoagulation: yes, Plavix  - Medications: I have stressed the importance of physical activity and a home exercise plan to help with pain and improve health. and Patient can continue with medications for now since they are providing benefits, using them appropriately, and without side effects.  Start Robaxin (methocarbamol) 500mg to take 1/2 to 1 tab BID PRN muscle spasms.  he understands this could cause drowsiness.     - Therapy:  Advised patient to continue with activities as tolerated  - Labs:  Reviewed  - Imaging:  Reviewed  - Consults/Referrals:  None at this time  - Records:  Reviewed/Obtain old records from outside physicians and imaging  - Follow up visit: return to clinic as needed, may call when ready to schedule repeat injection  - Counseled patient regarding the importance of activity modification and physical therapy  - This condition does not require this patient to take time off of work, and the primary goal of our Pain Management services is to improve the patient's functional capacity.  - Patient Questions: Answered all of the patient's questions regarding diagnosis, therapy, and treatment    The above plan and management options were discussed at length with patient. Patient is in agreement with the above and verbalized understanding.      Kathia Adan PA-C  Interventional Pain Management  [facility]    Disclaimer:  This note was prepared using voice recognition system and is likely to have sound alike errors that may have been overlooked even after proof reading.  Please call me with any questions

## 2022-09-26 NOTE — PROGRESS NOTES
Subjective:      Patient ID: Ezequiel Hughes is a 82 y.o. male.    Chief Complaint: Follow-up    Disclaimer:  This note is prepared using voice recognition software and as such is likely to have errors and has not been proof read. Please contact me for questions.     Here for f/u multiple issues.   Battling back issues still. Seeing Dr. Haywood now. Did procedures. On meds. On robaxin for last 3-4 days. Hx of significant arthritis in lumbar levels. Xrays reviewed.    Is still having urinary frequency. Will be seeing Dr. Guy Wednesday.     Seeing Dr Thompson today. Did heart cath recently. No obstructive CAD. Hx of PAD and also with varicose veins.   Trying to work on exercise more regularly.   Saw Dr. Orozco at [facility] [place] 8th, for dizziness/ lightheadedness. No interventions other than labs performed. Hasn't heard back from lab draws. Seems to come and go. Today was a good day. After going outside will come back and sit down to rest. Saw ENT Dr. Mojica who ordered the MRI brain to look for central causes of vertigo.   Did start exercising again 3-4 times a week. Doing treadmill at the house.   Feels that the energy is about the same.   Sebacous cyst draining at times on back.       Lab Results   Component Value Date    HGBA1C 5.2 07/22/2020    HGBA1C 5.2 03/07/2018      Lab Results   Component Value Date    CHOL 132 06/08/2022    CHOL 139 09/24/2021    CHOL 162 03/03/2021     Lab Results   Component Value Date    LDLCALC 58.4 (L) 06/08/2022    LDLCALC 64.6 09/24/2021    LDLCALC 72.2 03/03/2021       Wt Readings from Last 10 Encounters:   09/26/22 88.3 kg (194 lb 12.4 oz)   08/25/22 89.5 kg (197 lb 6.8 oz)   08/04/22 88.9 kg (195 lb 15.8 oz)   07/27/22 89.8 kg (197 lb 15.6 oz)   07/05/22 89.6 kg (197 lb 8.5 oz)   06/30/22 89.6 kg (197 lb 8.5 oz)   06/29/22 89.8 kg (197 lb 15.6 oz)   06/09/22 89.9 kg (198 lb 3.1 oz)   06/08/22 89.4 kg (197 lb)   06/02/22 89.3 kg (196 lb 12.2 oz)       The ASCVD Risk score ([place] DK, et  "al., 2019) failed to calculate for the following reasons:    The 2019 ASCVD risk score is only valid for ages 40 to 79        Lab Results   Component Value Date    WBC 4.77 06/29/2022    HGB 13.0 (L) 06/29/2022    HCT 40.4 06/29/2022     (L) 06/29/2022    CHOL 132 06/08/2022    TRIG 88 06/08/2022    HDL 56 06/08/2022    ALT 8 (L) 06/07/2022    AST 12 06/07/2022     06/29/2022    K 4.6 06/29/2022     06/29/2022    CREATININE 1.3 06/29/2022    BUN 19 06/29/2022    CO2 28 06/29/2022    TSH 0.989 03/21/2022    PSA 1.3 11/10/2021    INR 1.2 06/29/2022    HGBA1C 5.2 07/22/2020       Posterior Segment OCT Optic Nerve- Both eyes  Findings  Right Eye  Progression has been stable.     Left Eye  Progression has been stable.     Notes  RNFL Thinning OU but no significant progression of either RNFL compared   with previous studies.        Review of Systems   Constitutional:  Positive for fatigue. Negative for activity change, appetite change and unexpected weight change.   HENT:  Positive for hearing loss. Negative for rhinorrhea and trouble swallowing.    Eyes:  Negative for discharge and visual disturbance.   Respiratory:  Negative for chest tightness and wheezing.    Cardiovascular:  Negative for chest pain and palpitations.   Gastrointestinal:  Negative for blood in stool, constipation, diarrhea and vomiting.   Endocrine: Negative for polydipsia and polyuria.   Genitourinary:  Negative for difficulty urinating, hematuria and urgency.   Musculoskeletal:  Positive for arthralgias, back pain and neck pain. Negative for joint swelling.   Neurological:  Positive for weakness. Negative for headaches.   Psychiatric/Behavioral:  Negative for confusion and dysphoric mood.    Objective:     Vitals:    09/26/22 0847   BP: 118/75   Pulse: (!) 57   Temp: 97.9 °F (36.6 °C)   SpO2: 98%   Weight: 88.3 kg (194 lb 12.4 oz)   Height: 5' 10" (1.778 m)     Physical Exam  Vitals reviewed.   Constitutional:       General: He is " awake. He is not in acute distress.     Appearance: Normal appearance. He is well-developed, well-groomed and normal weight. He is not ill-appearing.   HENT:      Head: Normocephalic and atraumatic.      Right Ear: External ear normal.      Left Ear: External ear normal.      Nose: Nose normal.      Mouth/Throat:      Lips: Pink.   Eyes:      Conjunctiva/sclera: Conjunctivae normal.   Pulmonary:      Effort: Pulmonary effort is normal.   Musculoskeletal:      Right lower leg: Tenderness present.      Left lower leg: Tenderness present.        Legs:    Skin:     Findings: Lesion present.          Neurological:      Mental Status: He is alert.   Psychiatric:         Attention and Perception: Attention and perception normal. He is attentive.         Mood and Affect: Mood and affect normal. Mood is not anxious or depressed. Affect is not labile, blunt, angry or inappropriate.         Speech: Speech normal. He is communicative. Speech is not rapid and pressured, delayed, slurred or tangential.         Behavior: Behavior normal. Behavior is not agitated, slowed, aggressive, withdrawn, hyperactive or combative. Behavior is cooperative.         Thought Content: Thought content normal. Thought content is not paranoid or delusional. Thought content does not include homicidal or suicidal ideation. Thought content does not include homicidal or suicidal plan.         Cognition and Memory: Cognition and memory normal. Memory is not impaired. He does not exhibit impaired recent memory or impaired remote memory.         Judgment: Judgment normal. Judgment is not impulsive or inappropriate.     Assessment:     1. Lumbar radiculopathy, chronic    2. Chronic bilateral low back pain with sciatica, sciatica laterality unspecified    3. Unspecified inflammatory spondylopathy, thoracic region    4. Stage 3a chronic kidney disease    5. Atherosclerosis of artery of both lower extremities    6. Phlebitis    7. Sebaceous cyst    8. Essential  hypertension    9. Dizziness    10. Benign prostatic hyperplasia without lower urinary tract symptoms      Plan:   Ezequiel was seen today for follow-up.    Diagnoses and all orders for this visit:  Ezequiel was seen today for follow-up.    Diagnoses and all orders for this visit:    Lumbar radiculopathy, chronic  -     Ambulatory referral/consult to [facility]; Future    Chronic bilateral low back pain with sciatica, sciatica laterality unspecified  -     Ambulatory referral/consult to [facility]; Future    Unspecified inflammatory spondylopathy, thoracic region  -     Ambulatory referral/consult to [facility]; Future    Stage 3a chronic kidney disease    Atherosclerosis of artery of both lower extremities    Phlebitis    Sebaceous cyst    Essential hypertension    Dizziness    Benign prostatic hyperplasia without lower urinary tract symptoms    Other orders  -     Influenza - Quadrivalent - High Dose (65+) (PF) (IM)      Dizziness- saw Dr. Orozco at [facility] [place], no surgical interventions recommended. Did labs. Awaiting results. Has seen ent.  Advised on continuing to work on exercise.      Leg pain does have multiple varicosities noted.  Advised to work on trial of compression socks during the daytime has a history of peripheral artery disease however has been seeing Cardiology.  Recent heart catheterization showed no evidence of obstructive coronary artery disease.      Will be seen urologist this Wednesday for urinary frequency and history of BPH.    As for the sebaceous cyst he can follow up with Dermatology if it becomes more problematic for him or infected.    BP is controlled.       Stage 3a chronic kidney disease- noted on last labs.           Health Maintenance Due   Topic Date Due    COVID-19 Vaccine (4 - Booster for Pfizer series) 12/16/2021    Shingles Vaccine (2 of 2) 03/04/2022    TETANUS VACCINE  07/24/2022       Follow up in about 3 months (around 12/26/2022) for f/u office visit   Love.    Patient Instructions   Magnesium spray for legs before bedtime, from [employer]   Compression socks during daytime.       [facility] [phone]        45 minutes of total time spent on the encounter, which includes face to face time and non-face to face time preparing to see the patient (eg, review of tests), Obtaining and/or reviewing separately obtained history, Documenting clinical information in the electronic or other health record, Independently interpreting results (not separately reported) and communicating results to the patient/family/caregiver, or Care coordination (not separately reported).

## 2022-09-26 NOTE — PATIENT INSTRUCTIONS
Magnesium spray for legs before bedtime, from [employer]   Compression socks during daytime.       [facility] [phone]

## 2022-09-27 ENCOUNTER — OFFICE VISIT (OUTPATIENT)
Dept: PAIN MEDICINE | Facility: CLINIC | Age: 82
End: 2022-09-27
Payer: MEDICARE

## 2022-09-27 VITALS
WEIGHT: 196.44 LBS | DIASTOLIC BLOOD PRESSURE: 75 MMHG | HEART RATE: 55 BPM | BODY MASS INDEX: 28.12 KG/M2 | HEIGHT: 70 IN | SYSTOLIC BLOOD PRESSURE: 138 MMHG

## 2022-09-27 DIAGNOSIS — M54.9 DORSALGIA, UNSPECIFIED: Primary | ICD-10-CM

## 2022-09-27 PROCEDURE — 1160F RVW MEDS BY RX/DR IN RCRD: CPT | Mod: CPTII,S$GLB,, | Performed by: PHYSICIAN ASSISTANT

## 2022-09-27 PROCEDURE — 3075F SYST BP GE 130 - 139MM HG: CPT | Mod: CPTII,S$GLB,, | Performed by: PHYSICIAN ASSISTANT

## 2022-09-27 PROCEDURE — 3075F PR MOST RECENT SYSTOLIC BLOOD PRESS GE 130-139MM HG: ICD-10-PCS | Mod: CPTII,S$GLB,, | Performed by: PHYSICIAN ASSISTANT

## 2022-09-27 PROCEDURE — 3288F PR FALLS RISK ASSESSMENT DOCUMENTED: ICD-10-PCS | Mod: CPTII,S$GLB,, | Performed by: PHYSICIAN ASSISTANT

## 2022-09-27 PROCEDURE — 1125F AMNT PAIN NOTED PAIN PRSNT: CPT | Mod: CPTII,S$GLB,, | Performed by: PHYSICIAN ASSISTANT

## 2022-09-27 PROCEDURE — 99999 PR PBB SHADOW E&M-EST. PATIENT-LVL III: ICD-10-PCS | Mod: PBBFAC,,, | Performed by: PHYSICIAN ASSISTANT

## 2022-09-27 PROCEDURE — 1159F MED LIST DOCD IN RCRD: CPT | Mod: CPTII,S$GLB,, | Performed by: PHYSICIAN ASSISTANT

## 2022-09-27 PROCEDURE — 1101F PT FALLS ASSESS-DOCD LE1/YR: CPT | Mod: CPTII,S$GLB,, | Performed by: PHYSICIAN ASSISTANT

## 2022-09-27 PROCEDURE — 3078F PR MOST RECENT DIASTOLIC BLOOD PRESSURE < 80 MM HG: ICD-10-PCS | Mod: CPTII,S$GLB,, | Performed by: PHYSICIAN ASSISTANT

## 2022-09-27 PROCEDURE — 1125F PR PAIN SEVERITY QUANTIFIED, PAIN PRESENT: ICD-10-PCS | Mod: CPTII,S$GLB,, | Performed by: PHYSICIAN ASSISTANT

## 2022-09-27 PROCEDURE — 1159F PR MEDICATION LIST DOCUMENTED IN MEDICAL RECORD: ICD-10-PCS | Mod: CPTII,S$GLB,, | Performed by: PHYSICIAN ASSISTANT

## 2022-09-27 PROCEDURE — 1101F PR PT FALLS ASSESS DOC 0-1 FALLS W/OUT INJ PAST YR: ICD-10-PCS | Mod: CPTII,S$GLB,, | Performed by: PHYSICIAN ASSISTANT

## 2022-09-27 PROCEDURE — 1160F PR REVIEW ALL MEDS BY PRESCRIBER/CLIN PHARMACIST DOCUMENTED: ICD-10-PCS | Mod: CPTII,S$GLB,, | Performed by: PHYSICIAN ASSISTANT

## 2022-09-27 PROCEDURE — 99214 OFFICE O/P EST MOD 30 MIN: CPT | Mod: S$GLB,,, | Performed by: PHYSICIAN ASSISTANT

## 2022-09-27 PROCEDURE — 99214 PR OFFICE/OUTPT VISIT, EST, LEVL IV, 30-39 MIN: ICD-10-PCS | Mod: S$GLB,,, | Performed by: PHYSICIAN ASSISTANT

## 2022-09-27 PROCEDURE — 3288F FALL RISK ASSESSMENT DOCD: CPT | Mod: CPTII,S$GLB,, | Performed by: PHYSICIAN ASSISTANT

## 2022-09-27 PROCEDURE — 99999 PR PBB SHADOW E&M-EST. PATIENT-LVL III: CPT | Mod: PBBFAC,,, | Performed by: PHYSICIAN ASSISTANT

## 2022-09-27 PROCEDURE — 3078F DIAST BP <80 MM HG: CPT | Mod: CPTII,S$GLB,, | Performed by: PHYSICIAN ASSISTANT

## 2022-09-27 RX ORDER — METHOCARBAMOL 500 MG/1
500 TABLET, FILM COATED ORAL 3 TIMES DAILY PRN
Qty: 90 TABLET | Refills: 1 | Status: SHIPPED | OUTPATIENT
Start: 2022-09-27 | End: 2023-08-23

## 2022-09-28 ENCOUNTER — OFFICE VISIT (OUTPATIENT)
Dept: UROLOGY | Facility: CLINIC | Age: 82
End: 2022-09-28
Payer: MEDICARE

## 2022-09-28 VITALS
BODY MASS INDEX: 27.74 KG/M2 | SYSTOLIC BLOOD PRESSURE: 118 MMHG | WEIGHT: 193.31 LBS | DIASTOLIC BLOOD PRESSURE: 80 MMHG

## 2022-09-28 DIAGNOSIS — C61 PROSTATE CANCER: ICD-10-CM

## 2022-09-28 DIAGNOSIS — R35.1 NOCTURIA: ICD-10-CM

## 2022-09-28 DIAGNOSIS — R31.29 MICROHEMATURIA: Primary | ICD-10-CM

## 2022-09-28 LAB
MICROSCOPIC COMMENT: NORMAL
RBC #/AREA URNS AUTO: 1 /HPF (ref 0–4)

## 2022-09-28 PROCEDURE — 3288F FALL RISK ASSESSMENT DOCD: CPT | Mod: CPTII,S$GLB,, | Performed by: UROLOGY

## 2022-09-28 PROCEDURE — 81001 URINALYSIS AUTO W/SCOPE: CPT | Performed by: UROLOGY

## 2022-09-28 PROCEDURE — 99999 PR PBB SHADOW E&M-EST. PATIENT-LVL III: CPT | Mod: PBBFAC,,, | Performed by: UROLOGY

## 2022-09-28 PROCEDURE — 3079F DIAST BP 80-89 MM HG: CPT | Mod: CPTII,S$GLB,, | Performed by: UROLOGY

## 2022-09-28 PROCEDURE — 1159F PR MEDICATION LIST DOCUMENTED IN MEDICAL RECORD: ICD-10-PCS | Mod: CPTII,S$GLB,, | Performed by: UROLOGY

## 2022-09-28 PROCEDURE — 3074F SYST BP LT 130 MM HG: CPT | Mod: CPTII,S$GLB,, | Performed by: UROLOGY

## 2022-09-28 PROCEDURE — 99214 PR OFFICE/OUTPT VISIT, EST, LEVL IV, 30-39 MIN: ICD-10-PCS | Mod: S$GLB,,, | Performed by: UROLOGY

## 2022-09-28 PROCEDURE — 87086 URINE CULTURE/COLONY COUNT: CPT | Performed by: UROLOGY

## 2022-09-28 PROCEDURE — 3074F PR MOST RECENT SYSTOLIC BLOOD PRESSURE < 130 MM HG: ICD-10-PCS | Mod: CPTII,S$GLB,, | Performed by: UROLOGY

## 2022-09-28 PROCEDURE — 1101F PR PT FALLS ASSESS DOC 0-1 FALLS W/OUT INJ PAST YR: ICD-10-PCS | Mod: CPTII,S$GLB,, | Performed by: UROLOGY

## 2022-09-28 PROCEDURE — 1159F MED LIST DOCD IN RCRD: CPT | Mod: CPTII,S$GLB,, | Performed by: UROLOGY

## 2022-09-28 PROCEDURE — 99214 OFFICE O/P EST MOD 30 MIN: CPT | Mod: S$GLB,,, | Performed by: UROLOGY

## 2022-09-28 PROCEDURE — 1125F PR PAIN SEVERITY QUANTIFIED, PAIN PRESENT: ICD-10-PCS | Mod: CPTII,S$GLB,, | Performed by: UROLOGY

## 2022-09-28 PROCEDURE — 1101F PT FALLS ASSESS-DOCD LE1/YR: CPT | Mod: CPTII,S$GLB,, | Performed by: UROLOGY

## 2022-09-28 PROCEDURE — 3288F PR FALLS RISK ASSESSMENT DOCUMENTED: ICD-10-PCS | Mod: CPTII,S$GLB,, | Performed by: UROLOGY

## 2022-09-28 PROCEDURE — 99999 PR PBB SHADOW E&M-EST. PATIENT-LVL III: ICD-10-PCS | Mod: PBBFAC,,, | Performed by: UROLOGY

## 2022-09-28 PROCEDURE — 3079F PR MOST RECENT DIASTOLIC BLOOD PRESSURE 80-89 MM HG: ICD-10-PCS | Mod: CPTII,S$GLB,, | Performed by: UROLOGY

## 2022-09-28 PROCEDURE — 1125F AMNT PAIN NOTED PAIN PRSNT: CPT | Mod: CPTII,S$GLB,, | Performed by: UROLOGY

## 2022-09-28 RX ORDER — ALFUZOSIN HYDROCHLORIDE 10 MG/1
10 TABLET, EXTENDED RELEASE ORAL
Qty: 90 TABLET | Refills: 4 | Status: SHIPPED | OUTPATIENT
Start: 2022-09-28 | End: 2023-08-11

## 2022-09-28 NOTE — PROGRESS NOTES
CC: follow up prostate cancer    History of Present Illness:   Ezequiel Hughes is a 82 y.o. male here for evaluation of No chief complaint on file.    9/28/22-On finasteride. Nocturia x 4-5. Tried flomax a long time ago and it didn't help. Drinks 2 cups of coffee in the am. Not drinking about an hour before bed. No gross hematuria.   6/30/22-Nocturia x 1 lately, but prior to the last 2 nights, it has been 4 times. Still on finasteride. He does have urgency and occasional UUI. If he goes out, he wears a pad. Stream is weak. No hesitancy. Recently, visits have gone to every 6 months. Pt reports occasional RLQ pain. He does have come constipation, but pain doesn't change with BM. Persistent for a month.    Prior records indicate last MRI and TRUS biopsy in 2020.   3/29/22- 83yo male with h/o Prostate cancer, here to establish care. He has previously seen Dr. Wong and was undergoing active surveillance. He reports that he was diagnosed with prostate cancer in 2014. He hasn't had any treatment. He is currently managed on finasteride. He does report some UUI, small amounts. He had a repeat TRUS biopsy in 2021, and pt reports path was unchanged. Also had MRIs around that time as well and states that he had a targetable lesion.         Review of Systems   Respiratory:  Negative for shortness of breath.    Cardiovascular:  Negative for chest pain and palpitations.   Musculoskeletal:  Positive for back pain.   Neurological:  Negative for dizziness.   All other systems reviewed and are negative.      Past Medical History:   Diagnosis Date    Allergic rhinitis     Anemia     Back pain     BPH (benign prostatic hyperplasia)     Cancer     skin cancer to neck, Dr. Graves    Cataract     Disorder of kidney and ureter     ED (erectile dysfunction)     Hiatal hernia     small    History of colon polyps     colonoscopy 11/2016    HLD (hyperlipidemia)     Hyperlipidemia     Hypertension     Lateral epicondylitis     Lumbar  radiculopathy 1/26/2022    OA (osteoarthritis)     GUIDO (obstructive sleep apnea)     Prostate cancer     Dr. Wong    TIA (transient ischemic attack)     Trouble in sleeping     Urge incontinence 3/29/2022       Past Surgical History:   Procedure Laterality Date    ANGIOGRAPHY OF UPPER EXTREMITY Left 07/05/2022    Procedure: Angiogram Extremity Bilateral;  Surgeon: Aparna Thompson MD;  Location: [facility] CATH LAB;  Service: Cardiology;  Laterality: Left;    ARTERIOGRAPHY OF AORTIC ROOT N/A 07/05/2022    Procedure: ARTERIOGRAM, AORTIC ROOT;  Surgeon: Aparna Thompson MD;  Location: [facility] CATH LAB;  Service: Cardiology;  Laterality: N/A;    CATARACT EXTRACTION W/  INTRAOCULAR LENS IMPLANT Right 02/21/2018    I-Stent    CATARACT EXTRACTION W/  INTRAOCULAR LENS IMPLANT Left 03/21/2018    I - Stent    CATHETERIZATION OF BOTH LEFT AND RIGHT HEART N/A 07/05/2022    Procedure: CATHETERIZATION, HEART, BOTH LEFT AND RIGHT;  Surgeon: Aparna Thompson MD;  Location: [facility] CATH LAB;  Service: Cardiology;  Laterality: N/A;  radial approach    COLONOSCOPY N/A 11/14/2016    Procedure: COLONOSCOPY;  Surgeon: Karuna Rodriguez MD;  Location: [facility] ENDO;  Service: Endoscopy;  Laterality: N/A;    COLONOSCOPY N/A 09/22/2020    Procedure: COLONOSCOPY;  Surgeon: Chuy Fish MD;  Location: [facility] ENDO;  Service: Endoscopy;  Laterality: N/A;    EYE SURGERY      HEMORRHOID SURGERY      I-STENT Right 02/21/2018    DR. REECE    KNEE ARTHROSCOPY W/ MENISCAL REPAIR Right 2015    Dr. Jain    PCIOL Right 02/21/2018    DR. REECE    PLANTAR FASCIA RELEASE      right    ROTATOR CUFF REPAIR Bilateral     bilateral    SELECTIVE INJECTION OF ANESTHETIC AGENT AROUND LUMBAR SPINAL NERVE ROOT BY TRANSFORAMINAL APPROACH Bilateral 01/26/2022    Procedure: Bilateral L4/5 TF IAN with RN IV sedation;  Surgeon: Mak Young MD;  Location: [facility] PAIN MGT;  Service: Pain Management;  Laterality: Bilateral;    SELECTIVE INJECTION OF ANESTHETIC AGENT AROUND  LUMBAR SPINAL NERVE ROOT BY TRANSFORAMINAL APPROACH Bilateral 2022    Procedure: Bilateral L4/5 TF IAN with RN IV sedation;  Surgeon: Mak Young MD;  Location: [facility] PAIN MGT;  Service: Pain Management;  Laterality: Bilateral;    SELECTIVE INJECTION OF ANESTHETIC AGENT AROUND LUMBAR SPINAL NERVE ROOT BY TRANSFORAMINAL APPROACH Bilateral 2022    Procedure: Bilateral L4/5 TF IAN - D2P Dr. Castro [facility];  Surgeon: Abel Haywood MD;  Location: [facility] PAIN MGT;  Service: Pain Management;  Laterality: Bilateral;    SELECTIVE INJECTION OF ANESTHETIC AGENT AROUND LUMBAR SPINAL NERVE ROOT BY TRANSFORAMINAL APPROACH Bilateral 2022    Procedure: Bilateral L4/5 TF IAN;  Surgeon: Abel Haywood MD;  Location: [facility] PAIN MGT;  Service: Pain Management;  Laterality: Bilateral;    SHOULDER SURGERY Bilateral around     Dr. Pepper.  rotator cuff surgeries    VASECTOMY         Family History   Problem Relation Age of Onset    Lung cancer Father         life long smoker    Cancer Father         prostate, lung    Arthritis Mother     Stroke Sister         TIA    Cataracts Sister     Cancer Brother         prostate    Cataracts Brother     Cataracts Sister     Melanoma Neg Hx     Psoriasis Neg Hx     Lupus Neg Hx     Eczema Neg Hx     Diabetes Neg Hx     Heart disease Neg Hx     Kidney disease Neg Hx     Colon cancer Neg Hx        Social History     Tobacco Use    Smoking status: Former     Packs/day: 3.00     Years: 35.00     Pack years: 105.00     Types: Cigarettes     Start date: 1960     Quit date: 1985     Years since quittin.2    Smokeless tobacco: Never   Substance Use Topics    Alcohol use: Yes     Alcohol/week: 3.0 standard drinks     Types: 3 Cans of beer per week     Comment: socially    Drug use: No       Current Outpatient Medications   Medication Sig Dispense Refill    acetaminophen (TYLENOL ARTHRITIS PAIN ORAL) Take by mouth. Patient states that he takes 2 tablets in the morning  and 2 tablets in the evening      alfuzosin (UROXATRAL) 10 mg Tb24 Take 1 tablet (10 mg total) by mouth daily with breakfast. 90 tablet 4    amLODIPine (NORVASC) 5 MG tablet Take 1 tablet (5 mg total) by mouth 2 (two) times daily. 180 tablet 3    atorvastatin (LIPITOR) 40 MG tablet TAKE 1 TABLET EVERY DAY 90 tablet 1    clopidogreL (PLAVIX) 75 mg tablet TAKE 1 TABLET EVERY DAY 90 tablet 3    fentaNYL (SUBLIMAZE) 50 mcg/mL injection       finasteride (PROSCAR) 5 mg tablet Take 1 tablet (5 mg total) by mouth once daily. 90 tablet 4    fluticasone propionate (FLONASE) 50 mcg/actuation nasal spray USE 2 SPRAYS IN EACH NOSTRIL ONE TIME DAILY  (SUBSTITUTED FOR FLONASE) 48 g 3    heparin sodium,porcine/PF (HEPARIN, PORCINE, PF,) 1,000 unit/mL Soln       losartan (COZAAR) 50 MG tablet TAKE 1 TABLET TWICE DAILY 180 tablet 3    methocarbamoL (ROBAXIN) 500 MG Tab Take 1 tablet (500 mg total) by mouth 3 (three) times daily as needed (muscle spasms). May cause drowsiness. 90 tablet 1    midazolam (VERSED) 1 mg/mL injection       OMNIPAQUE 12 mg iodine/mL oral solution       pantoprazole (PROTONIX) 40 MG tablet TAKE 1 TABLET EVERY DAY 90 tablet 3    VISIPAQUE 320 mg iodine/mL injection        No current facility-administered medications for this visit.     Facility-Administered Medications Ordered in Other Visits   Medication Dose Route Frequency Provider Last Rate Last Admin    ondansetron injection 4 mg  4 mg Intravenous Once PRN Abel Haywood MD           Review of patient's allergies indicates:   Allergen Reactions    Atarax [hydroxyzine hcl] Other (See Comments)     Raised blood pressure     Zyrtec [cetirizine] Other (See Comments)     Raised blood pressure     Gold sodium thiosulfate      Patch test positive    Meloxicam Rash     Other reaction(s): hypertension       Physical Exam  Vitals:    09/28/22 0815   BP: 118/80         General: Well-developed, well-nourished in no acute distress  HEENT: Normocephalic,  atraumatic, Extraocular movements intact  Neck: supple, trachea midline, no cervical or supraclavicular lymphadenopathy  Respirations: even and unlabored  Back: midline spine, no CVA tenderness  Abdomen: soft, Non-tender, non-distended, no organomegaly or palpable masses, no rebound or guarding  Rectal: 9/29/22->40g prostate, no nodules or tenderness. No gross blood  Extremities: atraumatic, moves all equally, no clubbing, cyanosis or edema  Psych: normal affect  Skin: warm and dry, no lesions  Neuro: Alert and oriented, Cranial nerves II-XII grossly intact    PVR: 102cc 3/29/22    Urinalysis  +50 blood, otherwise negative    PSA  7/7/21: 1.3  3/23/22: 1.5  6/16/22: 1.4  9/26/22: 1.5    Assessment:   1. Microhematuria  Urine culture    Urinalysis Microscopic      2. Prostate cancer  MRI Prostate W W/O Contrast    BUN    Creatinine, Serum    Prostate Specific Antigen, Diagnostic      3. Nocturia              Plan:  Microhematuria  -     Urine culture  -     Urinalysis Microscopic    Prostate cancer  -     MRI Prostate W W/O Contrast; Future; Expected date: 09/28/2022  -     BUN; Future; Expected date: 09/28/2022  -     Creatinine, Serum; Future; Expected date: 09/28/2022  -     Prostate Specific Antigen, Diagnostic; Future; Expected date: 12/28/2022    Nocturia    Other orders  -     alfuzosin (UROXATRAL) 10 mg Tb24; Take 1 tablet (10 mg total) by mouth daily with breakfast.  Dispense: 90 tablet; Refill: 4           Follow up in about 3 months (around 12/28/2022) for labs before visit.

## 2022-09-30 LAB — BACTERIA UR CULT: NO GROWTH

## 2022-10-09 ENCOUNTER — PATIENT MESSAGE (OUTPATIENT)
Dept: UROLOGY | Facility: CLINIC | Age: 82
End: 2022-10-09
Payer: MEDICARE

## 2022-10-10 ENCOUNTER — LAB VISIT (OUTPATIENT)
Dept: LAB | Facility: HOSPITAL | Age: 82
End: 2022-10-10
Attending: UROLOGY
Payer: MEDICARE

## 2022-10-10 DIAGNOSIS — C61 PROSTATE CANCER: ICD-10-CM

## 2022-10-10 LAB
BUN SERPL-MCNC: 18 MG/DL (ref 8–23)
CREAT SERPL-MCNC: 1.1 MG/DL (ref 0.5–1.4)
EST. GFR  (NO RACE VARIABLE): >60 ML/MIN/1.73 M^2

## 2022-10-10 PROCEDURE — 84520 ASSAY OF UREA NITROGEN: CPT | Performed by: UROLOGY

## 2022-10-10 PROCEDURE — 36415 COLL VENOUS BLD VENIPUNCTURE: CPT | Mod: PN | Performed by: UROLOGY

## 2022-10-10 PROCEDURE — 82565 ASSAY OF CREATININE: CPT | Performed by: UROLOGY

## 2022-10-11 ENCOUNTER — HOSPITAL ENCOUNTER (OUTPATIENT)
Dept: RADIOLOGY | Facility: HOSPITAL | Age: 82
Discharge: HOME | End: 2022-10-11
Attending: UROLOGY
Payer: MEDICARE

## 2022-10-11 DIAGNOSIS — C61 PROSTATE CANCER: ICD-10-CM

## 2022-10-11 PROCEDURE — A9585 GADOBUTROL INJECTION: HCPCS | Mod: PN | Performed by: UROLOGY

## 2022-10-11 PROCEDURE — 25500020 PHARM REV CODE 255: Mod: PN | Performed by: UROLOGY

## 2022-10-11 PROCEDURE — 72197 MRI PELVIS W/O & W/DYE: CPT | Mod: TC,PN

## 2022-10-11 RX ORDER — GADOBUTROL 604.72 MG/ML
9 INJECTION INTRAVENOUS
Status: COMPLETED | OUTPATIENT
Start: 2022-10-11 | End: 2022-10-11

## 2022-10-11 RX ADMIN — GADOBUTROL 9 ML: 604.72 INJECTION INTRAVENOUS at 10:10

## 2022-10-14 ENCOUNTER — PATIENT MESSAGE (OUTPATIENT)
Dept: UROLOGY | Facility: CLINIC | Age: 82
End: 2022-10-14
Payer: MEDICARE

## 2022-10-20 ENCOUNTER — CLINICAL SUPPORT (OUTPATIENT)
Dept: REHABILITATION | Facility: HOSPITAL | Age: 82
End: 2022-10-20
Payer: MEDICARE

## 2022-10-20 DIAGNOSIS — M46.94 UNSPECIFIED INFLAMMATORY SPONDYLOPATHY, THORACIC REGION: ICD-10-CM

## 2022-10-20 DIAGNOSIS — M54.16 LUMBAR RADICULOPATHY, CHRONIC: ICD-10-CM

## 2022-10-20 DIAGNOSIS — G89.29 CHRONIC BILATERAL LOW BACK PAIN WITH SCIATICA, SCIATICA LATERALITY UNSPECIFIED: ICD-10-CM

## 2022-10-20 DIAGNOSIS — M54.40 CHRONIC BILATERAL LOW BACK PAIN WITH SCIATICA, SCIATICA LATERALITY UNSPECIFIED: ICD-10-CM

## 2022-10-20 PROCEDURE — 97161 PT EVAL LOW COMPLEX 20 MIN: CPT

## 2022-10-20 PROCEDURE — 97110 THERAPEUTIC EXERCISES: CPT

## 2022-10-20 NOTE — PLAN OF CARE
[facility] - PHYSICAL THERAPY LUMBAR EVALUATION     Name: Ezequiel Hughes  Clinic Number: [id number]    Therapy Diagnosis:   Encounter Diagnoses   Name Primary?    Lumbar radiculopathy, chronic     Chronic bilateral low back pain with sciatica, sciatica laterality unspecified     Unspecified inflammatory spondylopathy, thoracic region      Physician: Valery Ozuna MD    Physician Orders: PT Eval and Treat   Medical Diagnosis from Referral:   M54.16 (ICD-10-CM) - Radiculopathy, lumbar region   M54.40 (ICD-10-CM) - Lumbago with sciatica, unspecified side   G89.29 (ICD-10-CM) - Other chronic pain   M46.94 (ICD-10-CM) - Unspecified inflammatory spondylopathy, thoracic region     Evaluation Date: 10/20/2022  Authorization Period Expiration: 10/12/23  Plan of Care Expiration: 12/6/22  Reassessment Due: Every 10 visit or 30 days  Visit # / Visits authorized: 1/ 20    Time In: 1:00  Time Out: 2:15  Total Billable Time: 38 minutes  Insurance:Fee for service Insurance Patient      Precautions: Gout, Cataract, PLMD, Cardiovascular disease, BPH    Pattern of pain determined: PEN 1      Subjective   Date of onset: 2 years  History of current condition - Ezequiel reports: insidious onset of back pain 2 years ago.  Pt. Reports going to the gym frequently but not lately secondary fear of harming his back.  Pt. Reports pain across LB greater on R side.  Pt. Reports increased pain with standing with numbness and edema occurring in R foot.      Medical History:   Past Medical History:   Diagnosis Date    Allergic rhinitis     Anemia     Back pain     BPH (benign prostatic hyperplasia)     Cancer     skin cancer to neck, Dr. Graves    Cataract     Disorder of kidney and ureter     ED (erectile dysfunction)     Hiatal hernia     small    History of colon polyps     colonoscopy 11/2016    HLD (hyperlipidemia)     Hyperlipidemia     Hypertension     Lateral epicondylitis     Lumbar radiculopathy 1/26/2022    OA (osteoarthritis)      GUIDO (obstructive sleep apnea)     Prostate cancer     Dr. Wong    TIA (transient ischemic attack)     Trouble in sleeping     Urge incontinence 3/29/2022       Surgical History:   Ezequiel Hughes  has a past surgical history that includes Rotator cuff repair (Bilateral); Plantar fascia release; Hemorrhoid surgery; Shoulder surgery (Bilateral, around 2000); Knee arthroscopy w/ meniscal repair (Right, 2015); Colonoscopy (N/A, 11/14/2016); PCIOL (Right, 02/21/2018); I-STENT (Right, 02/21/2018); Cataract extraction w/  intraocular lens implant (Right, 02/21/2018); Cataract extraction w/  intraocular lens implant (Left, 03/21/2018); Colonoscopy (N/A, 09/22/2020); Eye surgery; Vasectomy; Selective injection of anesthetic agent around lumbar spinal nerve root by transforaminal approach (Bilateral, 01/26/2022); Selective injection of anesthetic agent around lumbar spinal nerve root by transforaminal approach (Bilateral, 03/14/2022); Selective injection of anesthetic agent around lumbar spinal nerve root by transforaminal approach (Bilateral, 06/02/2022); Catheterization of both left and right heart (N/A, 07/05/2022); Angiography of upper extremity (Left, 07/05/2022); Arteriography of aortic root (N/A, 07/05/2022); and Selective injection of anesthetic agent around lumbar spinal nerve root by transforaminal approach (Bilateral, 08/25/2022).    Medications:   Ezequiel has a current medication list which includes the following prescription(s): acetaminophen, alfuzosin, amlodipine, atorvastatin, clopidogrel, fentanyl, finasteride, fluticasone propionate, heparin, porcine (pf), losartan, methocarbamol, midazolam, omnipaque, pantoprazole, and visipaque, and the following Facility-Administered Medications: ondansetron.    Allergies:   Review of patient's allergies indicates:   Allergen Reactions    Atarax [hydroxyzine hcl] Other (See Comments)     Raised blood pressure     Zyrtec [cetirizine] Other (See Comments)     Raised  blood pressure     Gold sodium thiosulfate      Patch test positive    Meloxicam Rash     Other reaction(s): hypertension        Imaging:    Lumbar X-Ray 5/3/22  FINDINGS:  There is grade 1 spondylolisthesis of L4 on L5 and to a lesser degree L5 on S1.  There is decreased anterolisthesis on the extension view at the L4-5 level when compared to the flexion view consistent with motion/instability.  Prominent facet arthropathy seen at the L5-S1 level and to a lesser degree the L4-5 level.  Mild-to-moderate facet arthropathy also seen at the L2-3 and L3-4 levels.     Impression:     As above    Prior Therapy: yes  Prior Treatment: yes  Social History: 5 steps to get into home   Occupation: Retired  Leisure: Works on old cars, dancing      Prior Level of Function: Independent  Current Level of Function: limited with working on cars, limited with dancing, traveling, and fishing  DME owned/used: none        Pain:  Current 2/10, worst 8/10, best 1/10   Location: across Low back   Description: weakness and achy in low back, numbness in L foot  Aggravating Factors: standing prolonged times  Easing Factors: sitting  Disturbed Sleep: has CPAP, gets up about 3x at night to got to restroom        Pattern of pain questions:  1.  Where is your pain the worst? Back   2.  Is your pain constant or intermittent? intermittent  3.  Does bending forward make your typical pain worse? yes  4.  Since the start of your back pain, has there been a change in your bowel or bladder? no  5.  What can't you do now that you use to be able to do? limited with working on cars, limited with dancing, traveling, and fishing      Pts goals: to tolerate daily activities without pain      Red Flag Screening:   Cough  Sneeze  Strain: (--)  Bladder/ bowel: (--)  Falls: (--)  Night pain: (--)  Unexplained weight loss: (--)  General health: good    OBJECTIVE     Postural examination/scapula alignment: Head forward  Joint integrity: Firm end feeling  Skin  integrity: intact  Edema: none noted  Sitting: mild forward head  Standing: mild forward head  Correction of posture: better with lumbar roll    MOVEMENT LOSS    ROM Loss   Flexion within functional limits   Extension major loss   Side glide Right within functional limits   Side glide Left within functional limits   Rotation Right within functional limits   Rotation Left within functional limits     Lower Extremity Strength  Right LE  Left LE    Hip flexion: NT Hip flexion: NT   Hip extension:  2-/5 Hip extension: 2-/5   Hip abduction: 4+/5 Hip abduction: 4+/5   Hip adduction:  4-/5 Hip adduction:  4-/5   Hip External Rotation 4+/5 Hip External Rotation 4+/5   Hip Internal rotation   4+/5 Hip Internal rotation 4+/5   Knee Flexion 5/5 Knee Flexion 5/5   Knee Extension 5/5 Knee Extension 5/5   Ankle dorsiflexion: NT Ankle dorsiflexion: NT   Ankle plantarflexion: NT Ankle plantarflexion: NT       GAIT:  Assistive Device used: none  Level of Assistance: independent  Patient displays the following gait deviations:  no gait deviations observed.     Special Tests:   Test Name  Test Result   Prone Instability Test NT   SI Joint Provocation Test NT   Straight Leg Raise (--)   Neural Tension Test (--)   Crossed Straight Leg Raise (--)   Walking on toes (--)   Walking on heels  (--)       NEUROLOGICAL SCREENING     Sensory deficit: NT    Reflexes:    Left Right   Patella Tendon NT NT   Achilles Tendon NT NT   Babinski  (--) (--)   Clonus (--) (--)     REPEATED TEST MOVEMENTS:    Baseline symptoms:  Repeated Flexion in Standing no effect   Repeated Extension in Standing worse   Repeated Flexion in lying NT   Repeated Extension in lying  NT             Baseline Isometric Testing on Med X equipment: Testing administered by PT  Date of testing: 10/2022  ROM 0-42 deg   Max Peak Torque 182    Min Peak Torque 108    Flex/Ext Ratio 1.69   % below normative data 12         Limitation/Restriction for FOTO Lumbar Survey    Therapist  reviewed FOTO scores for Ezequiel Hughes on 10/20/2022.   FOTO documents entered into [employer] - see Media section.    Limitation Score: NT%         [facility] 10/20/2022   Visit Number 1   VAS Pain Rating 0   Lumbar Extension Seat Pad 0   Femur Restraint 5   Top Dead Center 24   Counterweight 142   Lumbar Flexion 42   Lumbar Extension 0   Lumbar Peak Torque 182   Min Torque 108   Test Percent Below Normative Data 12          Treatment   Treatment Time In: 1:30  Treatment Time Out: 2:15  Total Treatment time separate from Evaluation: 38 minutes      Ezequiel received therapeutic exercises to develop/improve posture, lumbar/cervical ROM, strength and muscular endurance for 38 minutes including the following exercises: hand heel rocks, forward planks, B eccentric HS lengthening    Lumbar Med X Testing    Written Home Exercises Provided: yes.  Exercises were reviewed and Ezequiel was able to demonstrate them prior to the end of the session.  Ezequiel demonstrated good  understanding of the education provided.     See EMR under Patient Instructions for exercises provided 10/20/2022.      Education provided:   - Patient received education regarding proper posture and body mechanics.  Patient was given top [facility] Visit 1 handouts which discuss what to expect in therapy, the purpose and opportunity for health coaching, the program,  wellness when discharged from therapy, back education and care specifically for posture seated, standing, lifting correctly, components of exercise, importance of nutrition and hydration, and importance of sleep.   Information on lumbar rolls provided.  - Sergey roll tried, recommended, and purchase information was provided.    - Patient received a handout regarding anticipated muscular soreness following the isometric test and strategies for management were reviewed with patient including stretching, using ice and scheduled rest.   - Patient received education on the [facility]  program, purpose of the isometric test, progression of back strengthening as well as wellness approach and systemic strengthening.  Details of the program were discussed.  Reviewed that patient should feel support/pressure from med ex restraints but no pain or discomfort and patient expressed understanding.  Med x dynamic exercise and baseline IM test performed with instructions to guide the patient safely through the isometric testing.  Patient informed to perform isometric test correctly, and safely, building best force they safely can and not pushing through pain.  Patient demonstrated understanding of information        Assessment   Ezequiel is a 82 y.o. male referred to [facility] with a medical diagnosis of   M54.16 (ICD-10-CM) - Radiculopathy, lumbar region   M54.40 (ICD-10-CM) - Lumbago with sciatica, unspecified side   G89.29 (ICD-10-CM) - Other chronic pain   M46.94 (ICD-10-CM) - Unspecified inflammatory spondylopathy, thoracic region   . Pt presents with significant limitation into extension with pain, mild weakness TrA, and tightness B HS, significant weakness in hip extensors.    Pain Pattern: PEN 1       Pt prognosis is Excellent.   Pt will benefit from skilled outpatient Physical Therapy to address the deficits stated above and in the chart below, provide pt/family education, and to maximize pt's level of independence. Based on the above history and physical examination an active physical therapy program is recommended.  Pt will continue to benefit from skilled outpatient physical therapy to address the deficits listed below in the chart, provide pt/family education and to maximize pt's level of independence in the home and community environment. .       Plan of care discussed with patient: Yes  Pt's spiritual, cultural and educational needs considered and patient is agreeable to the plan of care and goals as stated below:     Anticipated Barriers for therapy: none    PT Evaluation Completed?  Yes    Medical necessity is demonstrated by the following problem list.    Pt presents with the following impairments:     History  Co-morbidities and personal factors that may impact the plan of care Co-morbidities:   none    Personal Factors:   no deficits     low   Examination  Body Structures and Functions, activity limitations and participation restrictions that may impact the plan of care Body Regions:   back  lower extremities    Body Systems:    ROM  strength  motor control    Participation Restrictions:   Limited with hobbies and recreational activities    Activity limitations:   Learning and applying knowledge  no deficits    General Tasks and Commands  no deficits    Communication  no deficits    Mobility  lifting and carrying objects  walking    Self care  no deficits    Domestic Life  shopping  cooking  doing house work (cleaning house, washing dishes, laundry)    Interactions/Relationships  no deficits    Life Areas  no deficits    Community and Social Life  community life  recreation and leisure         low   Clinical Presentation stable and uncomplicated low   Decision Making/ Complexity Score: low       GOALS: Pt is in agreement with the following goals.    Short term goals:  6 weeks or 10 visits   1.  Pt will demonstrate increased lumbar ROM by at least 10 degrees from the initial ROM value with improvements noted in functional ROM and ability to perform ADLs.  (approp and ongoing)  2.  Pt will demonstrate increased MedX average isometric strength value  by 20% from initial test resulting in improved ability to perform bending, lifting, and carrying activities safely, confidently.  (approp and ongoing)  3.  Patient report a reduction in worst pain score by 1-2 points for improved tolerance for standing.  (approp and ongoing)  4.  Pt able to perform HEP correctly with minimal cueing or supervision from therapist to encourage independent management of symptoms. (approp and ongoing)      Long term  "goals: 10 weeks or 20 visits   1. Pt will demonstrate increased lumbar ROM by at least 20 degrees from initial ROM value, resulting in improved ability to perform functional fwd bending while standing and sitting. (approp and ongoing)  2. Pt will demonstrate increased MedX average isometric strength value  by 50% from initial test resulting in improved ability to perform bending, lifting, and carrying activities safely, confidently.  (approp and ongoing)  3. Pt to demonstrate ability to independently control and reduce their pain through posture positioning and mechanical movements throughout a typical day.  (approp and ongoing)  4.  Pt will demonstrate reduced pain and improved functional outcomes as reported on the FOTO by reaching a limitation score of < or = 20% or less in order to demonstrate subjective improvement in pt's condition.    (approp and ongoing)  5. Pt will demonstrate independence with the HEP at discharge  (approp and ongoing)  6.  Pt. To amb long distances without discomfort.  (approp and ongoing)      Plan   Outpatient physical therapy 2x week for 10 weeks or 20 visits to include the following:   - Patient education  - Therapeutic exercise  - Manual therapy  - Performance testing   - Neuromuscular Re-education  - Therapeutic activity   - Modalities    Pt may be seen by PTA as part of the rehabilitation team.     Therapist: Lovely Lomas, PT  10/20/2022    "I certify the need for these services furnished under this plan of treatment and while under my care."    ____________________________________  Physician/Referring Practitioner    _______________  Date of Signature          "

## 2022-10-21 ENCOUNTER — CLINICAL SUPPORT (OUTPATIENT)
Dept: REHABILITATION | Facility: HOSPITAL | Age: 82
End: 2022-10-21
Payer: MEDICARE

## 2022-10-21 DIAGNOSIS — M62.81 WEAKNESS OF TRUNK MUSCULATURE: ICD-10-CM

## 2022-10-21 PROCEDURE — 97110 THERAPEUTIC EXERCISES: CPT

## 2022-10-21 NOTE — PROGRESS NOTES
[facility]      Name: Ezequiel Hughes  Clinic Number: [id number]    Therapy Diagnosis:   Encounter Diagnosis   Name Primary?    Weakness of trunk musculature      Physician: Valery Ozuna MD    Visit Date: 10/21/2022    Physician Orders: PT Eval and Treat     Medical Diagnosis:   M54.16 (ICD-10-CM) - Radiculopathy, lumbar region   M54.40 (ICD-10-CM) - Lumbago with sciatica, unspecified side   G89.29 (ICD-10-CM) - Other chronic pain   M46.94 (ICD-10-CM) - Unspecified inflammatory spondylopathy, thoracic region     Evaluation Date: 10/20/2022  Authorization Period Expiration: 10/12/23  Plan of Care Expiration: 12/6/22  Reassessment Due: Every 10 visit or 30 days  Visit # / Visits authorized: 1/ 20    Time In: 10:30  Time Out: 11:15  Total Billable Time: 40 minutes  Insurance type:  Fee for service Insurance Patient    Precautions:  Gout, Cataract, PLMD, Cardiovascular disease, BPH    Pattern of pain determined: PEN 1    Subjective   Ezequiel reports improvement of symptoms.      Patient reports tolerating previous visit well  Patient reports their pain to be 0/10 on a 0-10 scale with 0 being no pain and 10 being the worst pain imaginable.  Pain Location: none currently     Work and leisure: recreational activities,  Pt goals: to tolerate daily activities without pain    Objective     Baseline IM Testing Results:   Date of testing: 10/20/22  ROM 0-42 deg   Max Peak Torque 182    Min Peak Torque 108    Flex/Ext Ratio 1.69   % below normative data 12     Outcomes:  Initial score:  Visit 5 score:  Goal:    [facility] 10/25/2022   Visit Number 3   VAS Pain Rating 0   Lumbar Extension Seat Pad -   Femur Restraint -   Top Dead Center -   Counterweight -   Lumbar Flexion -   Lumbar Extension -   Lumbar Peak Torque -   Min Torque -   Test Percent Below Normative Data -   Lumbar Weight 52   Repetitions 20   Rating of Perceived Exertion 1      Treatment    Pt was instructed in and  performed the following:     Ezequiel received therapeutic exercises to develop/improved posture, cardiovascular endurance, muscular endurance, lumbar/cervical ROM, strength and muscular endurance for 40 minutes including the following exercises:     Recumbent bike x 9 min. For joint mobility   Forward plank - 60 sec.  Hand heel rock  B SL open books  B supine eccentric HS lentghening    Lumbar Med X Dynamic Testing    Peripheral muscle strengthening which included 1 set of 15-20 repetitions at a slow, controlled 10-13 second per rep pace focused on strengthening supporting musculature for improved body mechanics and functional mobility.  Pt and therapist focused on proper form during treatment to ensure optimal strengthening of each targeted muscle group.  Machines were utilized including torso rotation.            Home Exercises Provided and Patient Education Provided   Home exercises include: HEP 10/21/22  Cardio program:  Lifting education date:  Posture/Lumbar roll: 10/21/22    Education provided:   - HEP, condition, activity    Written Home Exercises Provided: yes.  Exercises were reviewed and Ezequiel was able to demonstrate them prior to the end of the session.  Ezequiel demonstrated good  understanding of the education provided.     See EMR under Patient Instructions for exercises provided prior visit.          Assessment   Pt. Progressing well with no pain today.  Pt. Educated on further spinal flexibility exercises.  Pt. Tolerated increased weight with Med X Dynamic Exercise.  Pt. Began torso rotation with no discomfort.    Patient is making good progress towards established goals.  Pt will continue to benefit from skilled outpatient physical therapy to address the deficits stated in the impairment chart, provide pt/family education and to maximize pt's level of independence in the home and community environment.     Anticipated Barriers for therapy: none  Pt's spiritual, cultural and educational needs considered  and pt agreeable to plan of care and goals as stated below:             Goals:    Pt is in agreement with the following goals.     Short term goals:  6 weeks or 10 visits   1.  Pt will demonstrate increased lumbar ROM by at least 10 degrees from the initial ROM value with improvements noted in functional ROM and ability to perform ADLs.  (approp and ongoing)  2.  Pt will demonstrate increased MedX average isometric strength value  by 20% from initial test resulting in improved ability to perform bending, lifting, and carrying activities safely, confidently.  (approp and ongoing)  3.  Patient report a reduction in worst pain score by 1-2 points for improved tolerance for standing.  (approp and ongoing)  4.  Pt able to perform HEP correctly with minimal cueing or supervision from therapist to encourage independent management of symptoms. (approp and ongoing)        Long term goals: 10 weeks or 20 visits   1. Pt will demonstrate increased lumbar ROM by at least 20 degrees from initial ROM value, resulting in improved ability to perform functional fwd bending while standing and sitting. (approp and ongoing)  2. Pt will demonstrate increased MedX average isometric strength value  by 50% from initial test resulting in improved ability to perform bending, lifting, and carrying activities safely, confidently.  (approp and ongoing)  3. Pt to demonstrate ability to independently control and reduce their pain through posture positioning and mechanical movements throughout a typical day.  (approp and ongoing)  4.  Pt will demonstrate reduced pain and improved functional outcomes as reported on the FOTO by reaching a limitation score of < or = 20% or less in order to demonstrate subjective improvement in pt's condition.    (approp and ongoing)  5. Pt will demonstrate independence with the HEP at discharge  (approp and ongoing)  6.  Pt. To amb long distances without discomfort.  (approp and ongoing)        Plan   Continue with  established Plan of Care towards established PT goals.       Therapist: Lovely Lomas, PT  10/21/2022

## 2022-10-25 ENCOUNTER — CLINICAL SUPPORT (OUTPATIENT)
Dept: REHABILITATION | Facility: HOSPITAL | Age: 82
End: 2022-10-25
Payer: MEDICARE

## 2022-10-25 DIAGNOSIS — M62.81 WEAKNESS OF TRUNK MUSCULATURE: Primary | ICD-10-CM

## 2022-10-25 PROCEDURE — 97110 THERAPEUTIC EXERCISES: CPT

## 2022-10-25 NOTE — PROGRESS NOTES
[facility]      Name: Ezequiel Hughes  Clinic Number: [id number]    Therapy Diagnosis:   Encounter Diagnosis   Name Primary?    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  Pt. Reports not having any pain today however.        Patient reports tolerating previous visit well  Patient reports their pain to be 0/10 on a 0-10 scale with 0 being no pain and 10 being the worst pain imaginable.  Pain Location: none currently     Work and leisure: recreational activities,  Pt goals: to tolerate daily activities without pain    Objective     Baseline IM Testing Results:   Date of testing: 10/20/22  ROM 0-42 deg   Max Peak Torque 182    Min Peak Torque 108    Flex/Ext Ratio 1.69   % below normative data 12     Outcomes:  Initial score: 50%  Visit 5 score:  Goal:       [facility] 10/25/2022   Visit Number 3   VAS Pain Rating 0   Lumbar Extension Seat Pad -   Femur Restraint -   Top Dead Center -   Counterweight -   Lumbar Flexion -   Lumbar Extension -   Lumbar Peak Torque -   Min Torque -   Test Percent Below Normative Data -   Lumbar  Weight 52   Repetitions 20   Rating of Perceived Exertion 1        Treatment    Pt was instructed in and performed the following:     Ezequiel received therapeutic exercises to develop/improved posture, cardiovascular endurance, muscular endurance, lumbar/cervical ROM, strength and muscular endurance for 53 minutes including the following exercises:     Recumbent bike x 7 min. For joint mobility   Forward plank - 60 sec.  Side plank- 30 sec. Each side  Quadruped Alt UE  Quadruped Alt. LE  Bird Dog  Hand heel rock  B SL open books  B supine eccentric HS lengthening    Lumbar Med X Dynamic Testing    Peripheral muscle strengthening which included 1 set of 15-20 repetitions at a slow, controlled 10-13 second per rep pace focused on strengthening supporting musculature for improved body mechanics and functional mobility.  Pt and therapist focused on proper form during treatment to ensure optimal strengthening of each targeted muscle group.  Machines were utilized including: Chest press, Seated row, tricep ext., bicep curl.             Home Exercises Provided and Patient Education Provided   Home exercises include: HEP 10/21/22  Cardio program:  Lifting education date:  Posture/Lumbar roll: 10/21/22    Education provided:   - HEP, condition, activity    Written Home Exercises Provided: yes.  Exercises were reviewed and Ezequiel was able to demonstrate them prior to the end of the session.  Ezequiel demonstrated good  understanding of the education provided.     See EMR under Patient Instructions for exercises provided prior visit.          Assessment   Pt. Educated on DOMS and informed to continue observing symptoms this week.  Pt. Reports numbness in feet with standing.  Pt. Noted to have pitting edema in B LE.  Consulted with Lymphedema Specialist Joceline Knowles, PT who recommended 20-30mm compression and educated pt. On importance on compression socks giving extra support to blood vessels.   Worked on mat exercises and upper  body strengthening until pt. Obtains compression socks later this week.      Patient is making good progress towards established goals.  Pt will continue to benefit from skilled outpatient physical therapy to address the deficits stated in the impairment chart, provide pt/family education and to maximize pt's level of independence in the home and community environment.     Anticipated Barriers for therapy: none  Pt's spiritual, cultural and educational needs considered and pt agreeable to plan of care and goals as stated below:             Goals:    Pt is in agreement with the following goals.     Short term goals:  6 weeks or 10 visits   1.  Pt will demonstrate increased lumbar ROM by at least 10 degrees from the initial ROM value with improvements noted in functional ROM and ability to perform ADLs.  (approp and ongoing)  2.  Pt will demonstrate increased MedX average isometric strength value  by 20% from initial test resulting in improved ability to perform bending, lifting, and carrying activities safely, confidently.  (approp and ongoing)  3.  Patient report a reduction in worst pain score by 1-2 points for improved tolerance for standing.  (approp and ongoing)  4.  Pt able to perform HEP correctly with minimal cueing or supervision from therapist to encourage independent management of symptoms. (approp and ongoing)        Long term goals: 10 weeks or 20 visits   1. Pt will demonstrate increased lumbar ROM by at least 20 degrees from initial ROM value, resulting in improved ability to perform functional fwd bending while standing and sitting. (approp and ongoing)  2. Pt will demonstrate increased MedX average isometric strength value  by 50% from initial test resulting in improved ability to perform bending, lifting, and carrying activities safely, confidently.  (approp and ongoing)  3. Pt to demonstrate ability to independently control and reduce their pain through posture positioning and mechanical movements  throughout a typical day.  (approp and ongoing)  4.  Pt will demonstrate reduced pain and improved functional outcomes as reported on the FOTO by reaching a limitation score of < or = 20% or less in order to demonstrate subjective improvement in pt's condition.    (approp and ongoing)  5. Pt will demonstrate independence with the HEP at discharge  (approp and ongoing)  6.  Pt. To amb long distances without discomfort.  (approp and ongoing)        Plan   Continue with established Plan of Care towards established PT goals.       Therapist: Lovely Lomas, PT  10/25/2022

## 2022-10-27 ENCOUNTER — CLINICAL SUPPORT (OUTPATIENT)
Dept: REHABILITATION | Facility: HOSPITAL | Age: 82
End: 2022-10-27
Payer: MEDICARE

## 2022-10-27 DIAGNOSIS — M62.81 WEAKNESS OF TRUNK MUSCULATURE: Primary | ICD-10-CM

## 2022-10-27 PROCEDURE — 97110 THERAPEUTIC EXERCISES: CPT

## 2022-10-27 NOTE — PROGRESS NOTES
[facility]      Name: Ezequiel Hughes  Clinic Number: [id number]    Therapy Diagnosis:   No diagnosis found.      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, thoracic region     Evaluation Date: 10/20/2022  Authorization Period Expiration: 10/12/23  Plan of Care Expiration: 12/6/22  Reassessment Due: Every 10 visit or 30 days  Visit # / Visits authorized: 4/20    Time In: 7:55  Time Out: 8:40  Total Billable Time: 40 minutes  Insurance type:  Fee for service Insurance Patient    Precautions:  Gout, Cataract, PLMD, Cardiovascular disease, BPH    Pattern of pain determined: PEN 1    Subjective   Ezequiel reports he had back pain the other night and was able to reduce with heel rocking       Patient reports tolerating previous visit well  Patient reports their pain to be 0/10 on a 0-10 scale with 0 being no pain and 10 being the worst pain imaginable.  Pain Location: none currently     Work and leisure: recreational activities,  Pt goals: to tolerate daily activities without pain    Objective     Baseline IM Testing Results:   Date of testing: 10/20/22  ROM 0-42 deg   Max Peak Torque 182    Min Peak Torque 108    Flex/Ext Ratio 1.69   % below normative data 12     Outcomes:  Initial score: 50%  Visit 5 score:  Goal:       [facility] 10/27/2022   Visit Number 4   VAS Pain Rating 0   Lumbar Weight 62   Repetitions 20   Rating of Perceived Exertion 4           Treatment    Pt was instructed in and performed the following:     Ezequiel received therapeutic exercises to develop/improved posture, cardiovascular endurance, muscular endurance, lumbar/cervical ROM, strength and muscular endurance for 40  minutes including the following exercises:     Recumbent bike x 7 min. For joint mobility   Forward plank - 60 sec.  Side plank- 30 sec. Each side  Quadruped Alt UE  Quadruped Alt. LE  Bird Dog  Hand heel rock  B SL open books  B supine eccentric HS lengthening    Lumbar Med X Dynamic Testing    Peripheral muscle strengthening which included 1 set of 15-20 repetitions at a slow, controlled 10-13 second per rep pace focused on strengthening supporting musculature for improved body mechanics and functional mobility.  Pt and therapist focused on proper form during treatment to ensure optimal strengthening of each targeted muscle group.  Machines were utilized including: Chest press, Seated row, tricep ext., bicep curl.     Home Exercises Provided and Patient Education Provided   Home exercises include: HEP 10/21/22  Cardio program:  Lifting education date:  Posture/Lumbar roll: 10/21/22    Education provided:   - HEP, condition, activity    Written Home Exercises Provided: yes.  Exercises were reviewed and Ezequiel was able to demonstrate them prior to the end of the session.  Ezequiel demonstrated good  understanding of the education provided.     See EMR under Patient Instructions for exercises provided prior visit.      Assessment   The patient is progressing strengthening and stretching to improve pain free mobility    Patient is making good progress towards established goals.  Pt will continue to benefit from skilled outpatient physical therapy to address the deficits stated in the impairment chart, provide pt/family education and to maximize pt's level of independence in the home and community environment.     Anticipated Barriers for therapy: none  Pt's spiritual, cultural and educational needs considered and pt agreeable to plan of care and goals as stated below:     Goals:    Pt is in agreement with the following goals.     Short term goals:  6 weeks or 10 visits   1.  Pt will demonstrate increased lumbar ROM by at  least 10 degrees from the initial ROM value with improvements noted in functional ROM and ability to perform ADLs.  (approp and ongoing)  2.  Pt will demonstrate increased MedX average isometric strength value  by 20% from initial test resulting in improved ability to perform bending, lifting, and carrying activities safely, confidently.  (approp and ongoing)  3.  Patient report a reduction in worst pain score by 1-2 points for improved tolerance for standing.  (approp and ongoing)  4.  Pt able to perform HEP correctly with minimal cueing or supervision from therapist to encourage independent management of symptoms. (approp and ongoing)        Long term goals: 10 weeks or 20 visits   1. Pt will demonstrate increased lumbar ROM by at least 20 degrees from initial ROM value, resulting in improved ability to perform functional fwd bending while standing and sitting. (approp and ongoing)  2. Pt will demonstrate increased MedX average isometric strength value  by 50% from initial test resulting in improved ability to perform bending, lifting, and carrying activities safely, confidently.  (approp and ongoing)  3. Pt to demonstrate ability to independently control and reduce their pain through posture positioning and mechanical movements throughout a typical day.  (approp and ongoing)  4.  Pt will demonstrate reduced pain and improved functional outcomes as reported on the FOTO by reaching a limitation score of < or = 20% or less in order to demonstrate subjective improvement in pt's condition.    (approp and ongoing)  5. Pt will demonstrate independence with the HEP at discharge  (approp and ongoing)  6.  Pt. To amb long distances without discomfort.  (approp and ongoing)        Plan   Continue with established Plan of Care towards established PT goals.       Therapist: Home Anders, PT  10/27/2022

## 2022-11-01 ENCOUNTER — CLINICAL SUPPORT (OUTPATIENT)
Dept: REHABILITATION | Facility: HOSPITAL | Age: 82
End: 2022-11-01
Payer: MEDICARE

## 2022-11-01 DIAGNOSIS — M62.81 WEAKNESS OF TRUNK MUSCULATURE: Primary | ICD-10-CM

## 2022-11-01 PROCEDURE — 97110 THERAPEUTIC EXERCISES: CPT

## 2022-11-01 PROCEDURE — 97140 MANUAL THERAPY 1/> REGIONS: CPT

## 2022-11-01 NOTE — PROGRESS NOTES
[facility]      Name: Ezequiel Hughes  Clinic Number: [id number]    Therapy Diagnosis:   Encounter Diagnosis   Name Primary?    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  Pt. Reports discomfort in his neck.    Patient reports tolerating previous visit well  Patient reports their pain to be 0/10 on a 0-10 scale with 0 being no pain and 10 being the worst pain imaginable.  Pain Location: none currently     Work and leisure: recreational activities,  Pt goals: to tolerate daily activities without pain    Objective     Baseline IM Testing Results:   Date of testing: 10/20/22  ROM 0-42 deg   Max Peak Torque 182    Min Peak Torque 108    Flex/Ext Ratio 1.69   % below normative data 12     Outcomes:  Initial score: 50%  Visit 5 score: 37%  Goal:          [facility] 11/1/2022   Visit Number 5   VAS Pain Rating 0   Lumbar Extension Seat Pad -   Femur Restraint -   Top Dead Center -   Counterweight -   Lumbar Flexion -   Lumbar Extension -   Lumbar Peak Torque -   Min Torque -   Test Percent Below Normative Data -   Lumbar  Weight 64   Repetitions 20   Rating of Perceived Exertion 3           Treatment    Pt was instructed in and performed the following:     Ezequiel received therapeutic exercises to develop/improved posture, cardiovascular endurance, muscular endurance, lumbar/cervical ROM, strength and muscular endurance for 36 minutes including the following exercises:     Recumbent bike x 10 min. For joint mobility   Forward plank - 60 sec.  Hand heel rocks  B Thread the Needle  B supine eccentric HS lengthening    Lumbar Med X Dynamic Testing    Peripheral muscle strengthening which included 1 set of 15-20 repetitions at a slow, controlled 10-13 second per rep pace focused on strengthening supporting musculature for improved body mechanics and functional mobility.  Pt and therapist focused on proper form during treatment to ensure optimal strengthening of each targeted muscle group.  Machines were utilized including: Chest press, Seated row     Manual Therapy:  manual lumbar traction in hooklying x 8 min.    Home Exercises Provided and Patient Education Provided   Home exercises include: HEP 10/21/22  Cardio program: 11/1/22  Lifting education date:  Posture/Lumbar roll: 10/21/22    Education provided:   - HEP, condition, activity    Written Home Exercises Provided: yes.  Exercises were reviewed and Ezequiel was able to demonstrate them prior to the end of the session.  Ezequiel demonstrated good  understanding of the education provided.     See EMR under Patient Instructions for exercises provided prior visit.      Assessment   Patient educated on probable spinal neural compression secondary increased pain with standing.  Pt. Educated on discontinuing treadmill or standing if pain level greater than 4-5/10.  Pt. Educated to start using bike at gym for cardio.  Pt. Advanced to single leg dead lift to further improve HS length and sciatic nerve extensibility.      Patient is making good progress towards established goals.  Pt will continue  to benefit from skilled outpatient physical therapy to address the deficits stated in the impairment chart, provide pt/family education and to maximize pt's level of independence in the home and community environment.     Anticipated Barriers for therapy: none  Pt's spiritual, cultural and educational needs considered and pt agreeable to plan of care and goals as stated below:     Goals:    Pt is in agreement with the following goals.     Short term goals:  6 weeks or 10 visits   1.  Pt will demonstrate increased lumbar ROM by at least 10 degrees from the initial ROM value with improvements noted in functional ROM and ability to perform ADLs.  (approp and ongoing)  2.  Pt will demonstrate increased MedX average isometric strength value  by 20% from initial test resulting in improved ability to perform bending, lifting, and carrying activities safely, confidently.  (approp and ongoing)  3.  Patient report a reduction in worst pain score by 1-2 points for improved tolerance for standing.  (approp and ongoing)  4.  Pt able to perform HEP correctly with minimal cueing or supervision from therapist to encourage independent management of symptoms. (approp and ongoing)        Long term goals: 10 weeks or 20 visits   1. Pt will demonstrate increased lumbar ROM by at least 20 degrees from initial ROM value, resulting in improved ability to perform functional fwd bending while standing and sitting. (approp and ongoing)  2. Pt will demonstrate increased MedX average isometric strength value  by 50% from initial test resulting in improved ability to perform bending, lifting, and carrying activities safely, confidently.  (approp and ongoing)  3. Pt to demonstrate ability to independently control and reduce their pain through posture positioning and mechanical movements throughout a typical day.  (approp and ongoing)  4.  Pt will demonstrate reduced pain and improved functional outcomes as reported on the FOTO by reaching a  limitation score of < or = 20% or less in order to demonstrate subjective improvement in pt's condition.    (approp and ongoing)  5. Pt will demonstrate independence with the HEP at discharge  (approp and ongoing)  6.  Pt. To amb long distances without discomfort.  (approp and ongoing)        Plan   Continue with established Plan of Care towards established PT goals.       Therapist: Lovely Lomas, PT  11/1/2022

## 2022-11-04 ENCOUNTER — CLINICAL SUPPORT (OUTPATIENT)
Dept: REHABILITATION | Facility: HOSPITAL | Age: 82
End: 2022-11-04
Payer: MEDICARE

## 2022-11-04 DIAGNOSIS — M62.81 WEAKNESS OF TRUNK MUSCULATURE: Primary | ICD-10-CM

## 2022-11-04 PROCEDURE — 97110 THERAPEUTIC EXERCISES: CPT

## 2022-11-06 NOTE — PROGRESS NOTES
[facility]      Name: Ezequiel Hughes  Clinic Number: [id number]    Therapy Diagnosis:   Encounter Diagnosis   Name Primary?    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  Pt. Reports discomfort in his neck.    Patient reports tolerating previous visit well  Patient reports their pain to be 3/10 on a 0-10 scale with 0 being no pain and 10 being the worst pain imaginable.  Pain Location: none currently     Work and leisure: recreational activities,  Pt goals: to tolerate daily activities without pain    Objective     Baseline IM Testing Results:   Date of testing: 10/20/22  ROM 0-42 deg   Max Peak Torque 182    Min Peak Torque 108    Flex/Ext Ratio 1.69   % below normative data 12     Outcomes:  Initial score: 50%  Visit 5 score: 37%  Goal:       [facility] 11/4/2022   Visit Number 9   VAS Pain Rating 3   Lumbar Weight 70   Repetitions 20   Rating of Perceived Exertion 3      Treatment    Pt was instructed in and performed the following:     Ezequiel received therapeutic exercises to develop/improved posture,  cardiovascular endurance, muscular endurance, lumbar/cervical ROM, strength and muscular endurance for 36 minutes including the following exercises:     Recumbent bike x 10 min. For joint mobility   Forward plank - 60 sec.  Hand heel rocks  B Thread the Needle  B supine eccentric HS lengthening    Lumbar Med X Dynamic Testing    Peripheral muscle strengthening which included 1 set of 15-20 repetitions at a slow, controlled 10-13 second per rep pace focused on strengthening supporting musculature for improved body mechanics and functional mobility.  Pt and therapist focused on proper form during treatment to ensure optimal strengthening of each targeted muscle group.  Machines were utilized including: Chest press, Seated row     Manual Therapy:  manual lumbar traction in hooklying x 8 min.    Home Exercises Provided and Patient Education Provided   Home exercises include: HEP 10/21/22  Cardio program: 11/1/22  Lifting education date:  Posture/Lumbar roll: 10/21/22    Education provided:   - HEP, condition, activity    Written Home Exercises Provided: yes.  Exercises were reviewed and Ezequiel was able to demonstrate them prior to the end of the session.  Ezequiel demonstrated good  understanding of the education provided.     See EMR under Patient Instructions for exercises provided prior visit.      Assessment   Patient was progressed with medex back extension.  He tolerated increased wt without discomfort.  He is performing core strengthening    Patient is making good progress towards established goals.  Pt will continue to benefit from skilled outpatient physical therapy to address the deficits stated in the impairment chart, provide pt/family education and to maximize pt's level of independence in the home and community environment.     Anticipated Barriers for therapy: none  Pt's spiritual, cultural and educational needs considered and pt agreeable to plan of care and goals as stated below:     Goals:    Pt is in  agreement with the following goals.     Short term goals:  6 weeks or 10 visits   1.  Pt will demonstrate increased lumbar ROM by at least 10 degrees from the initial ROM value with improvements noted in functional ROM and ability to perform ADLs.  (approp and ongoing)  2.  Pt will demonstrate increased MedX average isometric strength value  by 20% from initial test resulting in improved ability to perform bending, lifting, and carrying activities safely, confidently.  (approp and ongoing)  3.  Patient report a reduction in worst pain score by 1-2 points for improved tolerance for standing.  (approp and ongoing)  4.  Pt able to perform HEP correctly with minimal cueing or supervision from therapist to encourage independent management of symptoms. (approp and ongoing)        Long term goals: 10 weeks or 20 visits   1. Pt will demonstrate increased lumbar ROM by at least 20 degrees from initial ROM value, resulting in improved ability to perform functional fwd bending while standing and sitting. (approp and ongoing)  2. Pt will demonstrate increased MedX average isometric strength value  by 50% from initial test resulting in improved ability to perform bending, lifting, and carrying activities safely, confidently.  (approp and ongoing)  3. Pt to demonstrate ability to independently control and reduce their pain through posture positioning and mechanical movements throughout a typical day.  (approp and ongoing)  4.  Pt will demonstrate reduced pain and improved functional outcomes as reported on the FOTO by reaching a limitation score of < or = 20% or less in order to demonstrate subjective improvement in pt's condition.    (approp and ongoing)  5. Pt will demonstrate independence with the HEP at discharge  (approp and ongoing)  6.  Pt. To amb long distances without discomfort.  (approp and ongoing)        Plan   Continue with established Plan of Care towards established PT goals.       Therapist: Home Anders,  PT  11/6/2022

## 2022-11-08 ENCOUNTER — CLINICAL SUPPORT (OUTPATIENT)
Dept: REHABILITATION | Facility: HOSPITAL | Age: 82
End: 2022-11-08
Payer: MEDICARE

## 2022-11-08 DIAGNOSIS — M62.81 WEAKNESS OF TRUNK MUSCULATURE: Primary | ICD-10-CM

## 2022-11-08 PROCEDURE — 97110 THERAPEUTIC EXERCISES: CPT

## 2022-11-08 NOTE — PROGRESS NOTES
[facility]      Name: Ezequiel Hughes  Clinic Number: [id number]    Therapy Diagnosis:   Encounter Diagnosis   Name Primary?    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   Patient reports tolerating previous visit well  Patient reports their pain to be 0/10 on a 0-10 scale with 0 being no pain and 10 being the worst pain imaginable.  Pain Location: none currently     Work and leisure: recreational activities,  Pt goals: to tolerate daily activities without pain    Objective     Baseline IM Testing Results:   Date of testing: 10/20/22  ROM 0-42 deg   Max Peak Torque 182    Min Peak Torque 108    Flex/Ext Ratio 1.69   % below normative data 12     Outcomes:  Initial score: 50%  Visit 5 score: 37%  Goal:    [facility] 11/8/2022   Visit Number 7   VAS Pain Rating 0   Lumbar Extension Seat Pad -   Femur Restraint -   Top Dead Center -   Counterweight -   Lumbar Flexion -   Lumbar Extension -   Lumbar Peak Torque -   Min Torque -   Test Percent Below Normative Data -   Lumbar Weight 72    Repetitions 20   Rating of Perceived Exertion 2        Treatment    Pt was instructed in and performed the following:     Ezequiel received therapeutic exercises to develop/improved posture, cardiovascular endurance, muscular endurance, lumbar/cervical ROM, strength and muscular endurance for 36 minutes including the following exercises:     Recumbent bike x 10 min. For joint mobility   Standing spinal rotation  Standing spinal flexion    Lumbar Med X Dynamic Testing    Peripheral muscle strengthening which included 1 set of 15-20 repetitions at a slow, controlled 10-13 second per rep pace focused on strengthening supporting musculature for improved body mechanics and functional mobility.  Pt and therapist focused on proper form during treatment to ensure optimal strengthening of each targeted muscle group.  Machines were utilized including: Torso Rotation, leg ext, leg curl, hip abd, hip add        Home Exercises Provided and Patient Education Provided   Home exercises include: HEP 10/21/22  Cardio program: 11/1/22  Lifting education date:  Posture/Lumbar roll: 10/21/22    Education provided:   - HEP, condition, activity    Written Home Exercises Provided: yes.  Exercises were reviewed and Ezequiel was able to demonstrate them prior to the end of the session.  Ezequiel demonstrated good  understanding of the education provided.     See EMR under Patient Instructions for exercises provided prior visit.      Assessment   Patient was progressed with medex back extension resistance.  He tolerated increased wt without discomfort.  Pt. Is consistent with HEP so able to focus tx on advanced spinal flexibility exercises and weight machines.    Patient is making good progress towards established goals.  Pt will continue to benefit from skilled outpatient physical therapy to address the deficits stated in the impairment chart, provide pt/family education and to maximize pt's level of independence in the home and community  environment.     Anticipated Barriers for therapy: none  Pt's spiritual, cultural and educational needs considered and pt agreeable to plan of care and goals as stated below:     Goals:    Pt is in agreement with the following goals.     Short term goals:  6 weeks or 10 visits   1.  Pt will demonstrate increased lumbar ROM by at least 10 degrees from the initial ROM value with improvements noted in functional ROM and ability to perform ADLs.  (approp and ongoing)  2.  Pt will demonstrate increased MedX average isometric strength value  by 20% from initial test resulting in improved ability to perform bending, lifting, and carrying activities safely, confidently.  (approp and ongoing)  3.  Patient report a reduction in worst pain score by 1-2 points for improved tolerance for standing.  (approp and ongoing)  4.  Pt able to perform HEP correctly with minimal cueing or supervision from therapist to encourage independent management of symptoms. (approp and ongoing)        Long term goals: 10 weeks or 20 visits   1. Pt will demonstrate increased lumbar ROM by at least 20 degrees from initial ROM value, resulting in improved ability to perform functional fwd bending while standing and sitting. (approp and ongoing)  2. Pt will demonstrate increased MedX average isometric strength value  by 50% from initial test resulting in improved ability to perform bending, lifting, and carrying activities safely, confidently.  (approp and ongoing)  3. Pt to demonstrate ability to independently control and reduce their pain through posture positioning and mechanical movements throughout a typical day.  (approp and ongoing)  4.  Pt will demonstrate reduced pain and improved functional outcomes as reported on the FOTO by reaching a limitation score of < or = 20% or less in order to demonstrate subjective improvement in pt's condition.    (approp and ongoing)  5. Pt will demonstrate independence with the HEP at discharge  (approp and  ongoing)  6.  Pt. To amb long distances without discomfort.  (approp and ongoing)        Plan   Continue with established Plan of Care towards established PT goals.       Therapist: Lovely Lomas, PT  11/8/2022

## 2022-11-10 ENCOUNTER — CLINICAL SUPPORT (OUTPATIENT)
Dept: REHABILITATION | Facility: HOSPITAL | Age: 82
End: 2022-11-10
Payer: MEDICARE

## 2022-11-10 DIAGNOSIS — M62.81 WEAKNESS OF TRUNK MUSCULATURE: Primary | ICD-10-CM

## 2022-11-10 PROCEDURE — 97110 THERAPEUTIC EXERCISES: CPT

## 2022-11-10 NOTE — PROGRESS NOTES
[facility]      Name: Ezequiel Hughes  Clinic Number: [id number]    Therapy Diagnosis:   Encounter Diagnosis   Name Primary?    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 To have pain usually in afternoons.  Patient reports tolerating previous visit well  Patient reports their pain to be 0/10 on a 0-10 scale with 0 being no pain and 10 being the worst pain imaginable.  Pain Location: none currently     Work and leisure: recreational activities,  Pt goals: to tolerate daily activities without pain    Objective     Baseline IM Testing Results:   Date of testing: 10/20/22  ROM 0-42 deg   Max Peak Torque 182    Min Peak Torque 108    Flex/Ext Ratio 1.69   % below normative data 12     Outcomes:  Initial score: 50%  Visit 5 score: 37%  Goal:       [facility] 11/10/2022   Visit Number 8   VAS Pain Rating 0   Lumbar Extension Seat Pad -   Femur Restraint -   Top Dead Center -   Counterweight -   Lumbar Flexion -   Lumbar Extension -   Lumbar Peak Torque -   Min Torque -   Test Percent Below Normative Data -   Lumbar Weight 74   Repetitions 20   Rating of Perceived  Exertion 2        Treatment    Pt was instructed in and performed the following:     Ezequiel received therapeutic exercises to develop/improved posture, cardiovascular endurance, muscular endurance, lumbar/cervical ROM, strength and muscular endurance for 36 minutes including the following exercises:     Recumbent bike x 10 min. For joint mobility     Lumbar Med X Dynamic Testing    Peripheral muscle strengthening which included 1 set of 15-20 repetitions at a slow, controlled 10-13 second per rep pace focused on strengthening supporting musculature for improved body mechanics and functional mobility.  Pt and therapist focused on proper form during treatment to ensure optimal strengthening of each targeted muscle group.  Machines were utilized including: Torso Rotation, chest press, seated row, biceps, triceps, leg press      Home Exercises Provided and Patient Education Provided   Home exercises include: HEP 10/21/22  Cardio program: 11/1/22  Lifting education date:  Posture/Lumbar roll: 10/21/22    Education provided:   - HEP, condition, activity    Written Home Exercises Provided: yes.  Exercises were reviewed and Ezequiel was able to demonstrate them prior to the end of the session.  Ezequiel demonstrated good  understanding of the education provided.     See EMR under Patient Instructions for exercises provided prior visit.      Assessment   Patient was progressed with medex back extension resistance.  He tolerated increased wt without discomfort. Focused on strengthening with  UE weight machines today which pt. Tolerated well.   Patient is making good progress towards established goals.  Pt will continue to benefit from skilled outpatient physical therapy to address the deficits stated in the impairment chart, provide pt/family education and to maximize pt's level of independence in the home and community environment.     Anticipated Barriers for therapy: none  Pt's spiritual, cultural and educational needs considered  and pt agreeable to plan of care and goals as stated below:     Goals:    Pt is in agreement with the following goals.     Short term goals:  6 weeks or 10 visits   1.  Pt will demonstrate increased lumbar ROM by at least 10 degrees from the initial ROM value with improvements noted in functional ROM and ability to perform ADLs.  (approp and ongoing)  2.  Pt will demonstrate increased MedX average isometric strength value  by 20% from initial test resulting in improved ability to perform bending, lifting, and carrying activities safely, confidently.  (approp and ongoing)  3.  Patient report a reduction in worst pain score by 1-2 points for improved tolerance for standing.  (approp and ongoing)  4.  Pt able to perform HEP correctly with minimal cueing or supervision from therapist to encourage independent management of symptoms. (approp and ongoing)        Long term goals: 10 weeks or 20 visits   1. Pt will demonstrate increased lumbar ROM by at least 20 degrees from initial ROM value, resulting in improved ability to perform functional fwd bending while standing and sitting. (approp and ongoing)  2. Pt will demonstrate increased MedX average isometric strength value  by 50% from initial test resulting in improved ability to perform bending, lifting, and carrying activities safely, confidently.  (approp and ongoing)  3. Pt to demonstrate ability to independently control and reduce their pain through posture positioning and mechanical movements throughout a typical day.  (approp and ongoing)  4.  Pt will demonstrate reduced pain and improved functional outcomes as reported on the FOTO by reaching a limitation score of < or = 20% or less in order to demonstrate subjective improvement in pt's condition.    (approp and ongoing)  5. Pt will demonstrate independence with the HEP at discharge  (approp and ongoing)  6.  Pt. To amb long distances without discomfort.  (approp and ongoing)        Plan   Continue with  established Plan of Care towards established PT goals.       Therapist: Lovely Lomas, PT  11/10/2022

## 2022-11-14 ENCOUNTER — CLINICAL SUPPORT (OUTPATIENT)
Dept: REHABILITATION | Facility: HOSPITAL | Age: 82
End: 2022-11-14
Payer: MEDICARE

## 2022-11-14 DIAGNOSIS — M62.81 WEAKNESS OF TRUNK MUSCULATURE: Primary | ICD-10-CM

## 2022-11-14 PROCEDURE — 97110 THERAPEUTIC EXERCISES: CPT | Mod: PN

## 2022-11-14 PROCEDURE — 97140 MANUAL THERAPY 1/> REGIONS: CPT | Mod: PN

## 2022-11-14 NOTE — PROGRESS NOTES
[facility]      Name: Ezequiel Hughes  Clinic Number: [id number]    Therapy Diagnosis:   Encounter Diagnosis   Name Primary?    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  Pt. Reports doing his HEP which relieved his pain.  Patient reports tolerating previous visit well  Patient reports their pain to be 0/10 on a 0-10 scale with 0 being no pain and 10 being the worst pain imaginable.  Pain Location: none currently     Work and leisure: recreational activities,  Pt goals: to tolerate daily activities without pain    Objective     Baseline IM Testing Results:   Date of testing: 10/20/22  ROM 0-42 deg   Max Peak Torque 182    Min Peak Torque 108    Flex/Ext Ratio 1.69   % below normative data 12     Outcomes:  Initial score: 50%  Visit 5 score: 37%  Goal:          [facility] 11/14/2022   Visit Number 9   VAS Pain Rating 0   Lumbar Extension Seat Pad -   Femur Restraint -   Top Dead Center -   Counterweight -   Lumbar Flexion -   Lumbar Extension -   Lumbar Peak Torque -    Min Torque -   Test Percent Below Normative Data -   Lumbar Weight 76   Repetitions 20   Rating of Perceived Exertion 1        Treatment    Pt was instructed in and performed the following:     Ezequiel received therapeutic exercises to develop/improved posture, cardiovascular endurance, muscular endurance, lumbar/cervical ROM, strength and muscular endurance for 36 minutes including the following exercises:     Recumbent bike x 10 min. For joint mobility   B SL Open Books  B side plank R:L 32:35 sec.    Lumbar Med X Dynamic Testing    Peripheral muscle strengthening which included 1 set of 15-20 repetitions at a slow, controlled 10-13 second per rep pace focused on strengthening supporting musculature for improved body mechanics and functional mobility.  Pt and therapist focused on proper form during treatment to ensure optimal strengthening of each targeted muscle group.  Machines were utilized including: Torso Rotation, chest press, seated row, biceps, triceps, leg press      Ezequiel received manual therapy: x 9 min.: cupping to R lower lumbar paraspinals x 6 min - no erythema noted.  R lumbar gapping x 3 min.     Moist heat to lumbar region x 10 min.    Home Exercises Provided and Patient Education Provided   Home exercises include: HEP 10/21/22  Cardio program: 11/1/22  Lifting education date:  Posture/Lumbar roll: 10/21/22    Education provided:   - HEP, condition, activity    Written Home Exercises Provided: yes.  Exercises were reviewed and Ezequiel was able to demonstrate them prior to the end of the session.  Ezequiel demonstrated good  understanding of the education provided.     See EMR under Patient Instructions for exercises provided prior visit.      Assessment   Patient was progressed with medex back extension resistance.  He tolerated increased wt without discomfort. Will increase flexion ROM to 48 degrees for next visit as more flexibility noted today. With palpation, pt. Noted to have tenderness at R L5  paraspinals- pt. Agreeable to cupping to reduce muscle tension. Cont. Spinal flexibility exercises.  Focused on strengthening with  UE weight machines today which pt. Tolerated well.   Patient is making good progress towards established goals.  Pt will continue to benefit from skilled outpatient physical therapy to address the deficits stated in the impairment chart, provide pt/family education and to maximize pt's level of independence in the home and community environment.     Anticipated Barriers for therapy: none  Pt's spiritual, cultural and educational needs considered and pt agreeable to plan of care and goals as stated below:     Goals:    Pt is in agreement with the following goals.     Short term goals:  6 weeks or 10 visits   1.  Pt will demonstrate increased lumbar ROM by at least 10 degrees from the initial ROM value with improvements noted in functional ROM and ability to perform ADLs.  (approp and ongoing)  2.  Pt will demonstrate increased MedX average isometric strength value  by 20% from initial test resulting in improved ability to perform bending, lifting, and carrying activities safely, confidently.  (approp and ongoing)  3.  Patient report a reduction in worst pain score by 1-2 points for improved tolerance for standing.  (approp and ongoing)  4.  Pt able to perform HEP correctly with minimal cueing or supervision from therapist to encourage independent management of symptoms. (approp and ongoing)        Long term goals: 10 weeks or 20 visits   1. Pt will demonstrate increased lumbar ROM by at least 20 degrees from initial ROM value, resulting in improved ability to perform functional fwd bending while standing and sitting. (approp and ongoing)  2. Pt will demonstrate increased MedX average isometric strength value  by 50% from initial test resulting in improved ability to perform bending, lifting, and carrying activities safely, confidently.  (approp and ongoing)  3. Pt to demonstrate ability  to independently control and reduce their pain through posture positioning and mechanical movements throughout a typical day.  (approp and ongoing)  4.  Pt will demonstrate reduced pain and improved functional outcomes as reported on the FOTO by reaching a limitation score of < or = 20% or less in order to demonstrate subjective improvement in pt's condition.    (approp and ongoing)  5. Pt will demonstrate independence with the HEP at discharge  (approp and ongoing)  6.  Pt. To amb long distances without discomfort.  (approp and ongoing)        Plan   Continue with established Plan of Care towards established PT goals.       Therapist: Lovely Lomas, PT  11/14/2022

## 2022-11-17 ENCOUNTER — CLINICAL SUPPORT (OUTPATIENT)
Dept: REHABILITATION | Facility: HOSPITAL | Age: 82
End: 2022-11-17
Payer: MEDICARE

## 2022-11-17 DIAGNOSIS — M62.81 WEAKNESS OF TRUNK MUSCULATURE: Primary | ICD-10-CM

## 2022-11-17 PROCEDURE — 97110 THERAPEUTIC EXERCISES: CPT | Mod: PN

## 2022-11-17 NOTE — PROGRESS NOTES
[facility]      Name: Ezequiel Hughes  Clinic Number: [id number]    Therapy Diagnosis:   Encounter Diagnosis   Name Primary?    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 Reports cont. To do his HEP when he has discomfort and exercises relieve his pain.  Patient reports tolerating previous visit well  Patient reports their pain to be 0/10 on a 0-10 scale with 0 being no pain and 10 being the worst pain imaginable.  Pain Location: none currently     Work and leisure: recreational activities  Pt goals: to tolerate daily activities without pain    Objective     Baseline IM Testing Results:   Date of testing: 10/20/22  ROM 0-42 deg   Max Peak Torque 182    Min Peak Torque 108    Flex/Ext Ratio 1.69   % below normative data 12     Outcomes:  Initial score: 50%  Visit 5 score: 37%  Visit 10 score: 44%       [facility] 11/17/2022   Visit Number 10   VAS Pain Rating 0   Lumbar Extension Seat Pad 0   Femur Restraint 5   Top Dead Center 24   Counterweight 142   Lumbar Flexion 48   Lumbar Extension 0   Lumbar Peak Torque 182   Min Torque 145   Test Percent Below Normative  Data 5   Test Percent Gain in Strength from Initial  11   Lumbar Weight 80   Repetitions 20   Rating of Perceived Exertion 2           Treatment    Pt was instructed in and performed the following:     Ezequiel received therapeutic exercises to develop/improved posture, cardiovascular endurance, muscular endurance, lumbar/cervical ROM, strength and muscular endurance for 56 minutes including the following exercises:     Recumbent bike x 10 min. For joint mobility   B SL heel raises  B standing gastroc stretch  B supine dynamic Rory stretch  B seated HS stretch    Lumbar Med X Dynamic Testing    Peripheral muscle strengthening which included 1 set of 15-20 repetitions at a slow, controlled 10-13 second per rep pace focused on strengthening supporting musculature for improved body mechanics and functional mobility.  Pt and therapist focused on proper form during treatment to ensure optimal strengthening of each targeted muscle group.  Machines were utilized including: Torso Rotation, leg press, leg ext., leg curl, hip abd, hip add        Home Exercises Provided and Patient Education Provided   Home exercises include: HEP 10/21/22  Cardio program: 11/1/22  Lifting education date:  Posture/Lumbar roll: 10/21/22    Education provided:   - HEP, condition, activity    Written Home Exercises Provided: yes.  Exercises were reviewed and Ezequiel was able to demonstrate them prior to the end of the session.  Ezequiel demonstrated good  understanding of the education provided.     See EMR under Patient Instructions for exercises provided prior visit.      Assessment   Patient cont. To increase medex back extension resistance with good tolerance.  Lumbar Med X testing performed today.  Focused on strengthening with  LE weight machines today which pt. Tolerated well.     Patient has attended 10 visits at [facility] which included MD evaluation, PT evaluation with isometric testing, and physical therapy treatment including HEP  instruction, education, aerobic work, dynamic strengthening on med ex equipment for the spine, and whole body strengthening on med ex equipment with increasing weight loads.  Patient  is demonstrating increased ability to reduce symptoms, improved posture, improved   ROM, and improved   strength as follows:    -Improved posture,   better using lumbar roll  -Improved 6 degrees ROM,  initially on med ex test 0-42 and   currently 0-48  -Improved strength at each test point on lumbar med ex IM test with   11% average improvement noted with Reduced pain  Noted by patient  -Initial outcome tool score 50% and current outcome tool score  44% indicating reduced pain and improved function            Patient is making good progress towards established goals.  Pt will continue to benefit from skilled outpatient physical therapy to address the deficits stated in the impairment chart, provide pt/family education and to maximize pt's level of independence in the home and community environment.     Anticipated Barriers for therapy: none  Pt's spiritual, cultural and educational needs considered and pt agreeable to plan of care and goals as stated below:     Goals:    Pt is in agreement with the following goals.     Short term goals:  6 weeks or 10 visits   1.  Pt will demonstrate increased lumbar ROM by at least 10 degrees from the initial ROM value with improvements noted in functional ROM and ability to perform ADLs.  (approp and ongoing)  2.  Pt will demonstrate increased MedX average isometric strength value  by 20% from initial test resulting in improved ability to perform bending, lifting, and carrying activities safely, confidently.  (approp and ongoing)  3.  Patient report a reduction in worst pain score by 1-2 points for improved tolerance for standing.  (approp and ongoing)  4.  Pt able to perform HEP correctly with minimal cueing or supervision from therapist to encourage independent management of symptoms. (approp and  ongoing)        Long term goals: 10 weeks or 20 visits   1. Pt will demonstrate increased lumbar ROM by at least 20 degrees from initial ROM value, resulting in improved ability to perform functional fwd bending while standing and sitting. (approp and ongoing)  2. Pt will demonstrate increased MedX average isometric strength value  by 50% from initial test resulting in improved ability to perform bending, lifting, and carrying activities safely, confidently.  (approp and ongoing)  3. Pt to demonstrate ability to independently control and reduce their pain through posture positioning and mechanical movements throughout a typical day.  (approp and ongoing)  4.  Pt will demonstrate reduced pain and improved functional outcomes as reported on the FOTO by reaching a limitation score of < or = 20% or less in order to demonstrate subjective improvement in pt's condition.    (approp and ongoing)  5. Pt will demonstrate independence with the HEP at discharge  (approp and ongoing)  6.  Pt. To amb long distances without discomfort.  (approp and ongoing)        Plan   Continue with established Plan of Care towards established PT goals.       Therapist: Lovely Lomas, PT  11/17/2022

## 2022-11-22 ENCOUNTER — TELEPHONE (OUTPATIENT)
Dept: ADMINISTRATIVE | Facility: HOSPITAL | Age: 82
End: 2022-11-22
Payer: MEDICARE

## 2022-11-22 ENCOUNTER — CLINICAL SUPPORT (OUTPATIENT)
Dept: REHABILITATION | Facility: HOSPITAL | Age: 82
End: 2022-11-22
Payer: MEDICARE

## 2022-11-22 DIAGNOSIS — M62.81 WEAKNESS OF TRUNK MUSCULATURE: Primary | ICD-10-CM

## 2022-11-22 PROCEDURE — 97110 THERAPEUTIC EXERCISES: CPT | Mod: PN

## 2022-11-22 NOTE — PROGRESS NOTES
[facility]      Name: Ezequiel Hughes  Clinic Number: [id number]    Therapy Diagnosis:   Encounter Diagnosis   Name Primary?    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 Reports cont. To have pain in the evenings.  Pt. Reports performing HEP when pain occurs and exercises from HEP reduce his pain.    Patient reports tolerating previous visit well  Patient reports their pain to be 0/10 on a 0-10 scale with 0 being no pain and 10 being the worst pain imaginable.  Pain Location: none currently     Work and leisure: recreational activities  Pt goals: to tolerate daily activities without pain    Objective     Baseline IM Testing Results:   Date of testin22  ROM 0-42 deg   Max Peak Torque 182    Min Peak Torque 108    Flex/Ext Ratio 1.69   % below normative data 12     Outcomes:  Initial score: 50%  Visit 5 score: 37%  Visit 10 score: 44%          [facility] 2022   Visit Number 11   VAS Pain Rating 0   Lumbar Extension Seat Pad -   Femur Restraint -   Top Dead Center -   Counterweight -   Lumbar Flexion -   Lumbar Extension -   Lumbar Peak Torque -    Min Torque -   Test Percent Below Normative Data -   Test Percent Gain in Strength from Initial  -   Lumbar Weight 84   Repetitions 20   Rating of Perceived Exertion 2           Treatment    Pt was instructed in and performed the following:     Ezequiel received therapeutic exercises to develop/improved posture, cardiovascular endurance, muscular endurance, lumbar/cervical ROM, strength and muscular endurance for 56 minutes including the following exercises:     Recumbent bike x 10 min. For joint mobility   Standing lumbar segmental flexion  B standing open books    Lifting Techniques  Golfer's Lift  Lunges  Modified Dead Lift- 30#  Dead lift - 20#  Waist to Shoulder Lift- 30#      Lumbar Med X Dynamic Exercise    Deferred- Peripheral muscle strengthening which included 1 set of 15-20 repetitions at a slow, controlled 10-13 second per rep pace focused on strengthening supporting musculature for improved body mechanics and functional mobility.  Pt and therapist focused on proper form during treatment to ensure optimal strengthening of each targeted muscle group.  Machines were utilized including: Torso Rotation, leg press, leg ext., leg curl, hip abd, hip add    Manual Therapy x 3 min.: R lumbar gapping x 3 min.    Home Exercises Provided and Patient Education Provided   Home exercises include: HEP 10/21/22  Cardio program: 11/1/22  Lifting education date: 11/22/22  Posture/Lumbar roll: 10/21/22    Education provided:   - HEP, condition, activity    Written Home Exercises Provided: yes.  Exercises were reviewed and Ezequiel was able to demonstrate them prior to the end of the session.  Ezequiel demonstrated good  understanding of the education provided.     See EMR under Patient Instructions for exercises provided prior visit.      Assessment   Pt. Had discomfort with unilateral P-A mobs at R L5 level- lumbar gapping performed.  Pt. Noted to still have HS tightness with HS 90/90 at 40 degrees.  Pt. Educated on advanced  single leg dead lifts to improve HS lengthening and sciatic nerve extensibility.  Pt. Educated to avoid static supine HS stretch to prevent injury.  Patient cont. To increase medex back extension resistance with good tolerance.   Focused on proper lifting techniques for light to heavy objects at low and high surfaces.          Patient is making good progress towards established goals.  Pt will continue to benefit from skilled outpatient physical therapy to address the deficits stated in the impairment chart, provide pt/family education and to maximize pt's level of independence in the home and community environment.     Anticipated Barriers for therapy: none  Pt's spiritual, cultural and educational needs considered and pt agreeable to plan of care and goals as stated below:     Goals:    Pt is in agreement with the following goals.     Short term goals:  6 weeks or 10 visits   1.  Pt will demonstrate increased lumbar ROM by at least 10 degrees from the initial ROM value with improvements noted in functional ROM and ability to perform ADLs.  (approp and ongoing)  2.  Pt will demonstrate increased MedX average isometric strength value  by 20% from initial test resulting in improved ability to perform bending, lifting, and carrying activities safely, confidently.  (approp and ongoing)  3.  Patient report a reduction in worst pain score by 1-2 points for improved tolerance for standing.  (approp and ongoing)  4.  Pt able to perform HEP correctly with minimal cueing or supervision from therapist to encourage independent management of symptoms. (approp and ongoing)        Long term goals: 10 weeks or 20 visits   1. Pt will demonstrate increased lumbar ROM by at least 20 degrees from initial ROM value, resulting in improved ability to perform functional fwd bending while standing and sitting. (approp and ongoing)  2. Pt will demonstrate increased MedX average isometric strength value  by 50% from initial test resulting in  improved ability to perform bending, lifting, and carrying activities safely, confidently.  (approp and ongoing)  3. Pt to demonstrate ability to independently control and reduce their pain through posture positioning and mechanical movements throughout a typical day.  (approp and ongoing)  4.  Pt will demonstrate reduced pain and improved functional outcomes as reported on the FOTO by reaching a limitation score of < or = 20% or less in order to demonstrate subjective improvement in pt's condition.    (approp and ongoing)  5. Pt will demonstrate independence with the HEP at discharge  (approp and ongoing)  6.  Pt. To amb long distances without discomfort.  (approp and ongoing)        Plan   Continue with established Plan of Care towards established PT goals.       Therapist: Lovely Lomas, PT  11/22/2022

## 2022-11-28 ENCOUNTER — TELEPHONE (OUTPATIENT)
Dept: PRIMARY CARE CLINIC | Facility: CLINIC | Age: 82
End: 2022-11-28
Payer: MEDICARE

## 2022-11-28 ENCOUNTER — CLINICAL SUPPORT (OUTPATIENT)
Dept: REHABILITATION | Facility: HOSPITAL | Age: 82
End: 2022-11-28
Payer: MEDICARE

## 2022-11-28 VITALS — SYSTOLIC BLOOD PRESSURE: 139 MMHG | DIASTOLIC BLOOD PRESSURE: 70 MMHG

## 2022-11-28 DIAGNOSIS — M62.81 WEAKNESS OF TRUNK MUSCULATURE: Primary | ICD-10-CM

## 2022-11-28 PROCEDURE — 97110 THERAPEUTIC EXERCISES: CPT | Mod: PN

## 2022-11-28 PROCEDURE — 97140 MANUAL THERAPY 1/> REGIONS: CPT | Mod: PN

## 2022-11-28 NOTE — PROGRESS NOTES
[facility]      Name: Ezequiel Hughes  Clinic Number: [id number]    Therapy Diagnosis:   Encounter Diagnosis   Name Primary?    Weakness of trunk musculature Yes           Physician: Valery Ozuna MD    Visit Date: 2022    Physician Orders: PT Eval and Treat     Medical Diagnosis:   M54.16 (ICD-10-CM) - Radiculopathy, lumbar region   M54.40 (ICD-10-CM) - Lumbago with sciatica, unspecified side   G89.29 (ICD-10-CM) - Other chronic pain   M46.94 (ICD-10-CM) - Unspecified inflammatory spondylopathy, thoracic region     Evaluation Date: 10/20/2022  Authorization Period Expiration: 10/12/23  Plan of Care Expiration: 22  Reassessment Due: Every 10 visit or 30 days  Visit # / Visits authorized:  visits authorized,      Time In: 8:00  Time Out: 9:10  Total Billable Time: 39 minutes , 20 min. With Health   Insurance type:  Fee for service Insurance Patient    Precautions:  Gout, Cataract, PLMD, Cardiovascular disease, BPH    Pattern of pain determined: PEN 1    Subjective   Ezequiel reports  Pt. Reports cont. To have pain in the afternoon/evenings.  Pt. Reports performing HEP when pain occurs and exercises from HEP reduce his pain. Pt. Reports having some R sciatic nerve pain.    Patient reports tolerating previous visit well  Patient reports their pain to be 0/10 on a 0-10 scale with 0 being no pain and 10 being the worst pain imaginable.  Pain Location: none currently     Work and leisure: recreational activities  Pt goals: to tolerate daily activities without pain    Objective     Baseline IM Testing Results:   Date of testin22  ROM 0-42 deg   Max Peak Torque 182    Min Peak Torque 108    Flex/Ext Ratio 1.69   % below normative data 12     Outcomes:  Initial score: 50%  Visit 5 score: 37%  Visit 10 score: 44%          [facility] 2022   Visit Number 12   VAS Pain Rating 0   Lumbar Extension Seat Pad -   Femur Restraint -   Top Dead Center -    Counterweight -   Lumbar Flexion -   Lumbar Extension -   Lumbar Peak Torque -   Min Torque -   Test Percent Below Normative Data -   Test Percent Gain in Strength from Initial  -   Lumbar Weight 88   Repetitions 20   Rating of Perceived Exertion 2              Treatment    Pt was instructed in and performed the following:     Ezequiel received therapeutic exercises to develop/improved posture, cardiovascular endurance, muscular endurance, lumbar/cervical ROM, strength and muscular endurance for 31 minutes including the following exercises:     Recumbent bike x 8 min. For joint mobility   R supine eccentric HS lengthening  R supine sciatic nerve glides  B SL dead lifts        Lumbar Med X Dynamic Exercise    Deferred- Peripheral muscle strengthening which included 1 set of 15-20 repetitions at a slow, controlled 10-13 second per rep pace focused on strengthening supporting musculature for improved body mechanics and functional mobility.  Pt and therapist focused on proper form during treatment to ensure optimal strengthening of each targeted muscle group.  Machines were utilized including: Torso Rotation, leg press, leg ext., leg curl, hip abd, hip add    Manual Therapy x 3 min.: manual lumbar traction x 8 min.      Home Exercises Provided and Patient Education Provided   Home exercises include: HEP 10/21/22  Cardio program: 11/1/22  Lifting education date: 11/22/22  Posture/Lumbar roll: 10/21/22    Education provided:   - HEP, condition, activity    Written Home Exercises Provided: yes.  Exercises were reviewed and Ezequiel was able to demonstrate them prior to the end of the session.  Ezequiel demonstrated good  understanding of the education provided.     See EMR under Patient Instructions for exercises provided prior visit.      Assessment   Pt. Had no discomfort with lumbar P-A mobs. Pt. Noted to still have R HS tightness with HS 90/90 at 40 degrees.  Pt. Cont. To have R sciatic nerve bias.  Pt. Educated on advanced  continuing single leg dead lifts to improve HS lengthening and sciatic nerve extensibility. Patient cont. To increase medex back extension resistance with good tolerance.             Patient is making good progress towards established goals.  Pt will continue to benefit from skilled outpatient physical therapy to address the deficits stated in the impairment chart, provide pt/family education and to maximize pt's level of independence in the home and community environment.     Anticipated Barriers for therapy: none  Pt's spiritual, cultural and educational needs considered and pt agreeable to plan of care and goals as stated below:     Goals:    Pt is in agreement with the following goals.     Short term goals:  6 weeks or 10 visits   1.  Pt will demonstrate increased lumbar ROM by at least 10 degrees from the initial ROM value with improvements noted in functional ROM and ability to perform ADLs.  (approp and ongoing)  2.  Pt will demonstrate increased MedX average isometric strength value  by 20% from initial test resulting in improved ability to perform bending, lifting, and carrying activities safely, confidently.  (approp and ongoing)  3.  Patient report a reduction in worst pain score by 1-2 points for improved tolerance for standing.  (approp and ongoing)  4.  Pt able to perform HEP correctly with minimal cueing or supervision from therapist to encourage independent management of symptoms. (approp and ongoing)        Long term goals: 10 weeks or 20 visits   1. Pt will demonstrate increased lumbar ROM by at least 20 degrees from initial ROM value, resulting in improved ability to perform functional fwd bending while standing and sitting. (approp and ongoing)  2. Pt will demonstrate increased MedX average isometric strength value  by 50% from initial test resulting in improved ability to perform bending, lifting, and carrying activities safely, confidently.  (approp and ongoing)  3. Pt to demonstrate ability to  independently control and reduce their pain through posture positioning and mechanical movements throughout a typical day.  (approp and ongoing)  4.  Pt will demonstrate reduced pain and improved functional outcomes as reported on the FOTO by reaching a limitation score of < or = 20% or less in order to demonstrate subjective improvement in pt's condition.    (approp and ongoing)  5. Pt will demonstrate independence with the HEP at discharge  (approp and ongoing)  6.  Pt. To amb long distances without discomfort.  (approp and ongoing)        Plan   Continue with established Plan of Care towards established PT goals.       Therapist: Loevly Lomas, PT  11/28/2022

## 2022-12-02 ENCOUNTER — OFFICE VISIT (OUTPATIENT)
Dept: INTERNAL MEDICINE | Facility: CLINIC | Age: 82
End: 2022-12-02
Payer: MEDICARE

## 2022-12-02 VITALS
BODY MASS INDEX: 29.48 KG/M2 | SYSTOLIC BLOOD PRESSURE: 132 MMHG | RESPIRATION RATE: 18 BRPM | TEMPERATURE: 98 F | WEIGHT: 205.94 LBS | OXYGEN SATURATION: 99 % | HEART RATE: 60 BPM | DIASTOLIC BLOOD PRESSURE: 80 MMHG | HEIGHT: 70 IN

## 2022-12-02 DIAGNOSIS — I45.10 INCOMPLETE RIGHT BUNDLE BRANCH BLOCK: ICD-10-CM

## 2022-12-02 DIAGNOSIS — G47.33 OBSTRUCTIVE SLEEP APNEA SYNDROME: ICD-10-CM

## 2022-12-02 DIAGNOSIS — J84.10 LUNG GRANULOMA: ICD-10-CM

## 2022-12-02 DIAGNOSIS — Z86.010 HISTORY OF COLON POLYPS: ICD-10-CM

## 2022-12-02 DIAGNOSIS — M54.16 LUMBAR RADICULOPATHY, CHRONIC: ICD-10-CM

## 2022-12-02 DIAGNOSIS — N18.31 STAGE 3A CHRONIC KIDNEY DISEASE: ICD-10-CM

## 2022-12-02 DIAGNOSIS — G91.8 OTHER HYDROCEPHALUS: ICD-10-CM

## 2022-12-02 DIAGNOSIS — I70.0 AORTIC ATHEROSCLEROSIS: ICD-10-CM

## 2022-12-02 DIAGNOSIS — M46.94 UNSPECIFIED INFLAMMATORY SPONDYLOPATHY, THORACIC REGION: ICD-10-CM

## 2022-12-02 DIAGNOSIS — I10 PRIMARY HYPERTENSION: ICD-10-CM

## 2022-12-02 DIAGNOSIS — I70.203 ATHEROSCLEROSIS OF ARTERY OF BOTH LOWER EXTREMITIES: ICD-10-CM

## 2022-12-02 DIAGNOSIS — D64.9 ANEMIA, UNSPECIFIED TYPE: ICD-10-CM

## 2022-12-02 DIAGNOSIS — N40.0 BENIGN PROSTATIC HYPERPLASIA WITHOUT LOWER URINARY TRACT SYMPTOMS: ICD-10-CM

## 2022-12-02 DIAGNOSIS — E78.2 MIXED HYPERLIPIDEMIA: ICD-10-CM

## 2022-12-02 DIAGNOSIS — Z00.00 ENCOUNTER FOR PREVENTIVE HEALTH EXAMINATION: Primary | ICD-10-CM

## 2022-12-02 DIAGNOSIS — D69.6 THROMBOCYTOPENIA: ICD-10-CM

## 2022-12-02 DIAGNOSIS — C61 PROSTATE CANCER: ICD-10-CM

## 2022-12-02 DIAGNOSIS — M10.9 GOUT, UNSPECIFIED CAUSE, UNSPECIFIED CHRONICITY, UNSPECIFIED SITE: ICD-10-CM

## 2022-12-02 DIAGNOSIS — I27.20 PULMONARY HYPERTENSION: ICD-10-CM

## 2022-12-02 PROCEDURE — 3079F DIAST BP 80-89 MM HG: CPT | Mod: CPTII,S$GLB,, | Performed by: NURSE PRACTITIONER

## 2022-12-02 PROCEDURE — 3288F PR FALLS RISK ASSESSMENT DOCUMENTED: ICD-10-PCS | Mod: CPTII,S$GLB,, | Performed by: NURSE PRACTITIONER

## 2022-12-02 PROCEDURE — G0439 PPPS, SUBSEQ VISIT: HCPCS | Mod: S$GLB,,, | Performed by: NURSE PRACTITIONER

## 2022-12-02 PROCEDURE — 3075F SYST BP GE 130 - 139MM HG: CPT | Mod: CPTII,S$GLB,, | Performed by: NURSE PRACTITIONER

## 2022-12-02 PROCEDURE — 1160F RVW MEDS BY RX/DR IN RCRD: CPT | Mod: CPTII,S$GLB,, | Performed by: NURSE PRACTITIONER

## 2022-12-02 PROCEDURE — 1170F PR FUNCTIONAL STATUS ASSESSED: ICD-10-PCS | Mod: CPTII,S$GLB,, | Performed by: NURSE PRACTITIONER

## 2022-12-02 PROCEDURE — 1170F FXNL STATUS ASSESSED: CPT | Mod: CPTII,S$GLB,, | Performed by: NURSE PRACTITIONER

## 2022-12-02 PROCEDURE — 3288F FALL RISK ASSESSMENT DOCD: CPT | Mod: CPTII,S$GLB,, | Performed by: NURSE PRACTITIONER

## 2022-12-02 PROCEDURE — 1159F PR MEDICATION LIST DOCUMENTED IN MEDICAL RECORD: ICD-10-PCS | Mod: CPTII,S$GLB,, | Performed by: NURSE PRACTITIONER

## 2022-12-02 PROCEDURE — 99999 PR PBB SHADOW E&M-EST. PATIENT-LVL V: ICD-10-PCS | Mod: PBBFAC,,, | Performed by: NURSE PRACTITIONER

## 2022-12-02 PROCEDURE — 1160F PR REVIEW ALL MEDS BY PRESCRIBER/CLIN PHARMACIST DOCUMENTED: ICD-10-PCS | Mod: CPTII,S$GLB,, | Performed by: NURSE PRACTITIONER

## 2022-12-02 PROCEDURE — 1101F PT FALLS ASSESS-DOCD LE1/YR: CPT | Mod: CPTII,S$GLB,, | Performed by: NURSE PRACTITIONER

## 2022-12-02 PROCEDURE — 1101F PR PT FALLS ASSESS DOC 0-1 FALLS W/OUT INJ PAST YR: ICD-10-PCS | Mod: CPTII,S$GLB,, | Performed by: NURSE PRACTITIONER

## 2022-12-02 PROCEDURE — 1159F MED LIST DOCD IN RCRD: CPT | Mod: CPTII,S$GLB,, | Performed by: NURSE PRACTITIONER

## 2022-12-02 PROCEDURE — G0439 PR MEDICARE ANNUAL WELLNESS SUBSEQUENT VISIT: ICD-10-PCS | Mod: S$GLB,,, | Performed by: NURSE PRACTITIONER

## 2022-12-02 PROCEDURE — 99999 PR PBB SHADOW E&M-EST. PATIENT-LVL V: CPT | Mod: PBBFAC,,, | Performed by: NURSE PRACTITIONER

## 2022-12-02 PROCEDURE — 3079F PR MOST RECENT DIASTOLIC BLOOD PRESSURE 80-89 MM HG: ICD-10-PCS | Mod: CPTII,S$GLB,, | Performed by: NURSE PRACTITIONER

## 2022-12-02 PROCEDURE — 3075F PR MOST RECENT SYSTOLIC BLOOD PRESS GE 130-139MM HG: ICD-10-PCS | Mod: CPTII,S$GLB,, | Performed by: NURSE PRACTITIONER

## 2022-12-02 NOTE — PROGRESS NOTES
"  Ezequiel Hughes presented for a follow-up Medicare AWV today. The following components were reviewed and updated:    Medical history  Family History  Social history  Allergies and Current Medications  Health Risk Assessment  Health Maintenance  Care Team    **See Completed Assessments for Annual Wellness visit with in the encounter summary    The following assessments were completed:  Depression Screening  Cognitive function Screening  Timed Get Up Test  Whisper Test          Vitals:    12/02/22 0702   BP: 132/80   BP Location: Left arm   Patient Position: Sitting   Pulse: 60   Resp: 18   Temp: 97.9 °F (36.6 °C)   TempSrc: Temporal   SpO2: 99%   Weight: 93.4 kg (205 lb 14.6 oz)   Height: 5' 10" (1.778 m)     Body mass index is 29.54 kg/m².   ]    Physical Exam  Vitals reviewed.   Constitutional:       Appearance: Normal appearance.   HENT:      Head: Normocephalic and atraumatic.   Cardiovascular:      Rate and Rhythm: Normal rate and regular rhythm.      Pulses: Normal pulses.      Heart sounds: Murmur heard.   Pulmonary:      Effort: Pulmonary effort is normal.      Breath sounds: Normal breath sounds.   Abdominal:      General: Bowel sounds are normal.      Palpations: Abdomen is soft.   Musculoskeletal:         General: Normal range of motion.      Right lower leg: Edema (trace) present.      Left lower leg: Edema (trace) present.   Skin:     General: Skin is warm and dry.      Capillary Refill: Capillary refill takes less than 2 seconds.   Neurological:      General: No focal deficit present.      Mental Status: He is alert and oriented to person, place, and time.   Psychiatric:         Mood and Affect: Mood normal.         Behavior: Behavior normal.         Thought Content: Thought content normal.         Judgment: Judgment normal.       Diagnoses and health risks identified today and associated recommendations/orders:  Encounter for preventive health examination  Reviewed and discussed preventive health " screenings and vaccinations with the patient.     Lumbar radiculopathy, chronic  S/P lumbar IAN with short-term improvement, now undergoing PT. Takes Tylenol arthritis and Robaxin PRN. Followed by pain management and PCP.     Primary hypertension  Stable. Continue current treatment plan as previously prescribed with your PCP    @[email or web address]@   Last 5 Patient Entered Readings                Current 30 Day Average: 130/62  Recent Readings 11/24/2022 11/11/2022 11/6/2022 11/4/2022 11/3/2022   SBP (mmHg) 139 129 130 133 143   DBP (mmHg) 70 62 64 54 65   Pulse 66 74 78 69 75               Pulse Readings from Last 3 Encounters:   12/02/22 60   09/27/22 (!) 55   09/26/22 (!) 57       Lung granuloma  Stable. Continue current treatment plan as previously prescribed with your pulmonologist and PCP       Mixed hyperlipidemia  Stable. Continue current treatment plan as previously prescribed with your PCP    Lab Results   Component Value Date    CHOL 132 06/08/2022    CHOL 139 09/24/2021    CHOL 162 03/03/2021     Lab Results   Component Value Date    HDL 56 06/08/2022    HDL 58 09/24/2021    HDL 78 (H) 03/03/2021     Lab Results   Component Value Date    LDLCALC 58.4 (L) 06/08/2022    LDLCALC 64.6 09/24/2021    LDLCALC 72.2 03/03/2021     Lab Results   Component Value Date    TRIG 88 06/08/2022    TRIG 82 09/24/2021    TRIG 59 03/03/2021     Lab Results   Component Value Date    CHOLHDL 42.4 06/08/2022    CHOLHDL 41.7 09/24/2021    CHOLHDL 48.1 03/03/2021       Atherosclerosis of artery of both lower extremities  Stable on plavix and statin. Denies claudication. Continue current treatment plan as previously prescribed with your cardiologist    Pulmonary hypertension   Stable. Continue current treatment plan as previously prescribed with your pulmonologist and cardiologist     Echo 6/2022: The estimated PA systolic pressure is greater than 45 mmHg.    Other hydrocephalus  Noted on previous MRI of brain; evaluated by neurology  without plans for surgical intervention. Evaluated by ENT. Patient reports he has intermittent dizziness/feeling that head is in a barrel but this has overall improved and occurs less often.   Advised patient to continue plan per PCP.     Stage 3a chronic kidney disease  Stable. Continue current treatment plan as previously prescribed with your PCP    Lab Results   Component Value Date    CREATININE 1.1 10/10/2022    BUN 18 10/10/2022     06/29/2022    K 4.6 06/29/2022     06/29/2022    CO2 28 06/29/2022     Prostate cancer  Stable, followed by urology on Alfuzosin and Finasteride. Continue current treatment plan as previously prescribed with your urologist     MRI Prostate 10/22:   Impression:  1. No focal suspicious lesion in the prostate gland to localize a clinically significant prostate cancer.  2. No pelvic lymphadenopathy.  3. Total prostate volume 64.63 cc.  PIRADS 2 - Low (clinically significant cancer is unlikely to be present)    Gout, unspecified cause, unspecified chronicity, unspecified site  Stable. Continue current treatment plan as previously prescribed with your PCP    Lab Results   Component Value Date    URICACID 6.1 07/22/2020     Obstructive sleep apnea syndrome  Endorses issues with CPAP fit - patient states he will call pulmonology/sleep clinic for evaluation. Continue current treatment plan as previously prescribed with your pulmonologist     Benign prostatic hyperplasia without lower urinary tract symptoms  Stable, followed by urology on Alfuzosin and Finasteride. Continue current treatment plan as previously prescribed with your urologist     History of colon polyps  Stable. Continue current treatment plan as previously prescribed with your PCP    Incomplete right bundle branch block  Stable. Continue current treatment plan as previously prescribed with your cardiologist     Thrombocytopenia  Stable, monitored by PCP. Continue current treatment plan as previously prescribed  with your PCP    Lab Results   Component Value Date    WBC 4.77 06/29/2022    HGB 13.0 (L) 06/29/2022    HCT 40.4 06/29/2022    MCV 98 06/29/2022     (L) 06/29/2022     Anemia, unspecified type  Stable. Continue current treatment plan as previously prescribed with your PCP    Lab Results   Component Value Date    WBC 4.77 06/29/2022    HGB 13.0 (L) 06/29/2022    HCT 40.4 06/29/2022    MCV 98 06/29/2022     (L) 06/29/2022      Aortic atherosclerosis  Stable on plavix and statin. Continue current treatment plan as previously prescribed with your cardiologist     CT A/P 7/2022:   Aortic atherosclerosis without evidence of aneurysm    Unspecified inflammatory spondylopathy, thoracic region  Chronic, ongoing, currently enrolled in PT. Continue current treatment plan as previously prescribed with your PCP and pain management specialist     Opioid screening: patient is not using opioids     I offered to discuss advanced care planning, including how to pick a person who would make decisions for you if you were unable to make them for yourself, called a health care power of , and what kind of decisions you might make such as use of life sustaining treatments such as ventilators and tube feeding when faced with a life limiting illness recorded on a living will that they will need to know. (How you want to be cared for as you near the end of your natural life)      X  Patient has advanced directives written and agrees to provide copies to the institution.    Provided Ezequiel with a 5-10 year written screening schedule and personal prevention plan. Recommendations were developed using the USPSTF age appropriate recommendations. Education, counseling, and referrals were provided as needed.  After Visit Summary printed and given to patient which includes a list of additional screenings\tests needed.    RTC in 1 year for STORMY Paula NP

## 2022-12-02 NOTE — PATIENT INSTRUCTIONS
Counseling and Referral of Other Preventative  (Italic type indicates deductible and co-insurance are waived)    Patient Name: Ezequiel Hughes  Today's Date: 12/2/2022    Health Maintenance       Date Due Completion Date    COVID-19 Vaccine (4 - Booster for Pfizer series) 12/16/2021 10/21/2021    Shingles Vaccine (2 of 2) 03/04/2022 1/7/2022    TETANUS VACCINE 07/24/2022 7/24/2012    Lipid Panel 06/08/2023 6/8/2022        No orders of the defined types were placed in this encounter.      The following information is provided to all patients.  This information is to help you find resources for any of the problems found today that may be affecting your health:                Living healthy guide: www.[facility].[place].gov      Understanding Diabetes: www.diabetes.org      Eating healthy: www.cdc.gov/healthyweight      [facility] home safety checklist: www.cdc.gov/steadi/patient.html      Agency on Aging: www.goea.[place].gov      Alcoholics anonymous (AA): www.aa.org      Physical Activity: www.maureen.nih.gov/vw0cucq      Tobacco use: www.quitwithusla.org

## 2022-12-02 NOTE — Clinical Note
Good morning,   Can your team please schedule Mr. Hughes for a follow up with Dr. Ozuna? She saw him in September and wanted to see him back sometime in December 2022.   Thank you,   DONNELL ZambranoC

## 2022-12-05 ENCOUNTER — CLINICAL SUPPORT (OUTPATIENT)
Dept: REHABILITATION | Facility: HOSPITAL | Age: 82
End: 2022-12-05
Payer: MEDICARE

## 2022-12-05 DIAGNOSIS — M62.81 WEAKNESS OF TRUNK MUSCULATURE: Primary | ICD-10-CM

## 2022-12-05 PROCEDURE — 97110 THERAPEUTIC EXERCISES: CPT | Mod: PN

## 2022-12-05 PROCEDURE — 97140 MANUAL THERAPY 1/> REGIONS: CPT | Mod: PN

## 2022-12-05 NOTE — PROGRESS NOTES
[facility]      Name: Ezequiel Hughes  Clinic Number: [id number]    Therapy Diagnosis:   Encounter Diagnosis   Name Primary?    Weakness of trunk musculature Yes             Physician: Valery Ozuna MD    Visit Date: 2022    Physician Orders: PT Eval and Treat     Medical Diagnosis:   M54.16 (ICD-10-CM) - Radiculopathy, lumbar region   M54.40 (ICD-10-CM) - Lumbago with sciatica, unspecified side   G89.29 (ICD-10-CM) - Other chronic pain   M46.94 (ICD-10-CM) - Unspecified inflammatory spondylopathy, thoracic region     Evaluation Date: 10/20/2022  Authorization Period Expiration: 10/12/23  Plan of Care Expiration: 22  Reassessment Due: Every 10 visit or 30 days  Visit # / Visits authorized:  visits authorized, HB     Time In: 8:00  Time Out: 9:10  Total Billable Time: 39 minutes, Insurance type:  Fee for service Insurance Patient    Precautions:  Gout, Cataract, PLMD, Cardiovascular disease, BPH    Pattern of pain determined: PEN 1    Subjective   Ezequiel reports  Pt. Reports cont. To have pain in the afternoon/evenings.  Pt. Reports performing HEP when pain occurs and exercises from HEP reduce his pain. Pt. Reports having some R sciatic nerve pain.    Patient reports tolerating previous visit well  Patient reports their pain to be 0/10 on a 0-10 scale with 0 being no pain and 10 being the worst pain imaginable.  Pain Location: none currently     Work and leisure: recreational activities  Pt goals: to tolerate daily activities without pain    Objective     Baseline IM Testing Results:   Date of testin22  ROM 0-42 deg   Max Peak Torque 182    Min Peak Torque 108    Flex/Ext Ratio 1.69   % below normative data 12     Outcomes:  Initial score: 50%  Visit 5 score: 37%  Visit 10 score: 44%       [facility] 2022   Visit Number 13   VAS Pain Rating 0   Lumbar Extension Seat Pad -   Femur Restraint -   Top Dead Center -   Counterweight -   Lumbar  Flexion -   Lumbar Extension -   Lumbar Peak Torque -   Min Torque -   Test Percent Below Normative Data -   Test Percent Gain in Strength from Initial  -   Lumbar Weight 95   Repetitions 20   Rating of Perceived Exertion 2                  Treatment    Pt was instructed in and performed the following:     Ezequiel received therapeutic exercises to develop/improved posture, cardiovascular endurance, muscular endurance, lumbar/cervical ROM, strength and muscular endurance for 23 minutes including the following exercises:     Recumbent bike x 5 min. For joint mobility   Hand heel rocks      Lumbar Med X Dynamic Exercise    Deferred- Peripheral muscle strengthening which included 1 set of 15-20 repetitions at a slow, controlled 10-13 second per rep pace focused on strengthening supporting musculature for improved body mechanics and functional mobility.  Pt and therapist focused on proper form during treatment to ensure optimal strengthening of each targeted muscle group.  Machines were utilized including: Torso Rotation, leg press, leg ext., leg curl, hip abd, hip add    Manual Therapy x 15 min.:     Palpation Assessment to determine the necessity for Functional Dry Needling  Yes.     Patient provided written and verbal consent to Functional Dry Needling Yes    Written Handout on response to FDN provided: no    Ezequiel demonstrated good  understanding of the education provided.     Patient demonstrated appropriate response to Functional Dry Needling. Fair  rhythmical contractions observed with estim to treated muscle groups: R L4-S1 Multifidi     Home Exercises Provided and Patient Education Provided   Home exercises include: HEP 10/21/22  Cardio program: 11/1/22  Lifting education date: 11/22/22  Posture/Lumbar roll: 10/21/22    Education provided:   - HEP, condition, activity, Dry needling    Written Home Exercises Provided: yes.  Exercises were reviewed and Ezequiel was able to demonstrate them prior to the end of the  derrek.  Ezequiel demonstrated good  understanding of the education provided.     See EMR under Patient Instructions for exercises provided prior visit.      Assessment   Pt. Had no discomfort with lumbar P-A mobs. Pt. Noted to still have R HS tightness with HS 90/90 at 20 degrees.  Pt. Cont. To have R sciatic nerve bias.  Patient cont. To increase medex back extension resistance with good tolerance.   Pt. Educated on benefits of dry needling and pt. Agreeable.  Pt. Had FDN at R sciatic nerve root levels.            Patient is making good progress towards established goals.  Pt will continue to benefit from skilled outpatient physical therapy to address the deficits stated in the impairment chart, provide pt/family education and to maximize pt's level of independence in the home and community environment.     Anticipated Barriers for therapy: none  Pt's spiritual, cultural and educational needs considered and pt agreeable to plan of care and goals as stated below:     Goals:    Pt is in agreement with the following goals.     Short term goals:  6 weeks or 10 visits   1.  Pt will demonstrate increased lumbar ROM by at least 10 degrees from the initial ROM value with improvements noted in functional ROM and ability to perform ADLs.  (approp and ongoing)  2.  Pt will demonstrate increased MedX average isometric strength value  by 20% from initial test resulting in improved ability to perform bending, lifting, and carrying activities safely, confidently.  (approp and ongoing)  3.  Patient report a reduction in worst pain score by 1-2 points for improved tolerance for standing.  (approp and ongoing)  4.  Pt able to perform HEP correctly with minimal cueing or supervision from therapist to encourage independent management of symptoms. (approp and ongoing)        Long term goals: 10 weeks or 20 visits   1. Pt will demonstrate increased lumbar ROM by at least 20 degrees from initial ROM value, resulting in improved ability to  perform functional fwd bending while standing and sitting. (approp and ongoing)  2. Pt will demonstrate increased MedX average isometric strength value  by 50% from initial test resulting in improved ability to perform bending, lifting, and carrying activities safely, confidently.  (approp and ongoing)  3. Pt to demonstrate ability to independently control and reduce their pain through posture positioning and mechanical movements throughout a typical day.  (approp and ongoing)  4.  Pt will demonstrate reduced pain and improved functional outcomes as reported on the FOTO by reaching a limitation score of < or = 20% or less in order to demonstrate subjective improvement in pt's condition.    (approp and ongoing)  5. Pt will demonstrate independence with the HEP at discharge  (approp and ongoing)  6.  Pt. To amb long distances without discomfort.  (approp and ongoing)        Plan   Continue with established Plan of Care towards established PT goals.       Therapist: Lovely Lomas, PT  12/5/2022

## 2022-12-12 ENCOUNTER — CLINICAL SUPPORT (OUTPATIENT)
Dept: REHABILITATION | Facility: HOSPITAL | Age: 82
End: 2022-12-12
Payer: MEDICARE

## 2022-12-12 DIAGNOSIS — M62.81 WEAKNESS OF TRUNK MUSCULATURE: Primary | ICD-10-CM

## 2022-12-12 PROCEDURE — 97140 MANUAL THERAPY 1/> REGIONS: CPT | Mod: PN

## 2022-12-12 PROCEDURE — 97110 THERAPEUTIC EXERCISES: CPT | Mod: PN

## 2022-12-12 NOTE — PROGRESS NOTES
[facility]      Name: Ezequiel Hughes  Clinic Number: [id number]    Therapy Diagnosis:   Encounter Diagnosis   Name Primary?    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 He is still having pain that increases throughout the day.    Patient reports tolerating previous visit well  Patient reports their pain to be 1/10 on a 0-10 scale with 0 being no pain and 10 being the worst pain imaginable.  Pain Location: none currently     Work and leisure: recreational activities  Pt goals: to tolerate daily activities without pain    Objective     Baseline IM Testing Results:   Date of testin22  ROM 0-42 deg   Max Peak Torque 182    Min Peak Torque 108    Flex/Ext Ratio 1.69   % below normative data 12     Outcomes:  Initial score: 50%  Visit 5 score: 37%  Visit 10 score: 44%       [facility] 2022   Visit Number 14   VAS Pain Rating 1   Lumbar Extension Seat Pad -   Femur Restraint -   Top Dead Center -   Counterweight -   Lumbar Flexion -   Lumbar Extension -   Lumbar Peak Torque -   Min  Torque -   Test Percent Below Normative Data -   Test Percent Gain in Strength from Initial  -   Lumbar Weight -   Repetitions -   Rating of Perceived Exertion -                 Treatment    Pt was instructed in and performed the following:     Ezequiel received therapeutic exercises to develop/improved posture, cardiovascular endurance, muscular endurance, lumbar/cervical ROM, strength and muscular endurance for 34 minutes including the following exercises:     Recumbent bike x 5 min. For joint mobility   Hand Heel Rocks   Cat camel mobility  B SL open books   Forward Plank- 60 sec.  Side Planks- R:L 15:15 sec.    Lumbar Med X Dynamic Exercise    Deferred- Peripheral muscle strengthening which included 1 set of 15-20 repetitions at a slow, controlled 10-13 second per rep pace focused on strengthening supporting musculature for improved body mechanics and functional mobility.  Pt and therapist focused on proper form during treatment to ensure optimal strengthening of each targeted muscle group.  Machines were utilized including: Torso Rotation, leg press, leg ext., leg curl, hip abd, hip add    Manual Therapy x 15 min.:     Palpation Assessment to determine the necessity for Functional Dry Needling  Yes.     Patient provided written and verbal consent to Functional Dry Needling Yes    Written Handout on response to FDN provided: no    Ezequiel demonstrated good  understanding of the education provided.     Patient demonstrated appropriate response to Functional Dry Needling. Fair  rhythmical contractions observed with estim to treated muscle groups: R L4-L5 Multifidi and R S1-3     Home Exercises Provided and Patient Education Provided   Home exercises include: HEP 10/21/22  Cardio program: 11/1/22  Lifting education date: 11/22/22  Posture/Lumbar roll: 10/21/22    Education provided:   - HEP, condition, activity, Dry needling    Written Home Exercises Provided: yes.  Exercises were reviewed and Ezequiel was able to  demonstrate them prior to the end of the session.  Ezequiel demonstrated good  understanding of the education provided.     See EMR under Patient Instructions for exercises provided prior visit.      Assessment   Pt. Had no discomfort with lumbar P-A mobs.   Pt. Reported no noticeable changes following FDN last session - pt still Agreeable to try more FDN.  Pt. Had FDN at R sciatic nerve root levels.  Pt. Educated on continuing spinal mobility exercises.  Pt. Cont. To have significant weakness with side planks indicative of weakness in lateral trunk musculature.            Patient is making good progress towards established goals.  Pt will continue to benefit from skilled outpatient physical therapy to address the deficits stated in the impairment chart, provide pt/family education and to maximize pt's level of independence in the home and community environment.     Anticipated Barriers for therapy: none  Pt's spiritual, cultural and educational needs considered and pt agreeable to plan of care and goals as stated below:     Goals:    Pt is in agreement with the following goals.     Short term goals:  6 weeks or 10 visits   1.  Pt will demonstrate increased lumbar ROM by at least 10 degrees from the initial ROM value with improvements noted in functional ROM and ability to perform ADLs.  (approp and ongoing)  2.  Pt will demonstrate increased MedX average isometric strength value  by 20% from initial test resulting in improved ability to perform bending, lifting, and carrying activities safely, confidently.  (approp and ongoing)  3.  Patient report a reduction in worst pain score by 1-2 points for improved tolerance for standing.  (approp and ongoing)  4.  Pt able to perform HEP correctly with minimal cueing or supervision from therapist to encourage independent management of symptoms. (approp and ongoing)        Long term goals: 10 weeks or 20 visits   1. Pt will demonstrate increased lumbar ROM by at least 20 degrees  from initial ROM value, resulting in improved ability to perform functional fwd bending while standing and sitting. (approp and ongoing)  2. Pt will demonstrate increased MedX average isometric strength value  by 50% from initial test resulting in improved ability to perform bending, lifting, and carrying activities safely, confidently.  (approp and ongoing)  3. Pt to demonstrate ability to independently control and reduce their pain through posture positioning and mechanical movements throughout a typical day.  (approp and ongoing)  4.  Pt will demonstrate reduced pain and improved functional outcomes as reported on the FOTO by reaching a limitation score of < or = 20% or less in order to demonstrate subjective improvement in pt's condition.    (approp and ongoing)  5. Pt will demonstrate independence with the HEP at discharge  (approp and ongoing)  6.  Pt. To amb long distances without discomfort.  (approp and ongoing)        Plan   Continue with established Plan of Care towards established PT goals.       Therapist: Loveyl Lomas, PT  12/12/2022

## 2022-12-15 ENCOUNTER — CLINICAL SUPPORT (OUTPATIENT)
Dept: REHABILITATION | Facility: HOSPITAL | Age: 82
End: 2022-12-15
Payer: MEDICARE

## 2022-12-15 DIAGNOSIS — M62.81 WEAKNESS OF TRUNK MUSCULATURE: Primary | ICD-10-CM

## 2022-12-15 PROCEDURE — 97110 THERAPEUTIC EXERCISES: CPT | Mod: PN

## 2022-12-15 PROCEDURE — 97140 MANUAL THERAPY 1/> REGIONS: CPT | Mod: PN

## 2022-12-15 NOTE — PROGRESS NOTES
[facility]      Name: Ezequiel Hughes  Clinic Number: [id number]    Therapy Diagnosis:   Encounter Diagnosis   Name Primary?    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  Pt. Reports having  pain in lumbar region and R LE with reaching forward to pick out a card in the store.  Pt. Reports still having pain with walking for long distances especially in the evening.      Patient reports tolerating previous visit well  Patient reports their pain to be 0/10 on a 0-10 scale with 0 being no pain and 10 being the worst pain imaginable.  Pain Location: none currently     Work and leisure: recreational activities  Pt goals: to tolerate daily activities without pain    Objective     Baseline IM Testing Results:   Date of testin22  ROM 0-42 deg   Max Peak Torque 182    Min Peak Torque 108    Flex/Ext Ratio 1.69   % below normative data 12     Outcomes:  Initial score: 50%  Visit 5 score: 37%  Visit 10 score: 44%          [facility] 12/15/2022   Visit Number 15   VAS Pain Rating 0   Lumbar Extension Seat  Pad -   Femur Restraint -   Top Dead Center -   Counterweight -   Lumbar Flexion -   Lumbar Extension -   Lumbar Peak Torque -   Min Torque -   Test Percent Below Normative Data -   Test Percent Gain in Strength from Initial  -   Lumbar Weight 95   Repetitions 20   Rating of Perceived Exertion 2           Treatment    Pt was instructed in and performed the following:     Ezequiel received therapeutic exercises to develop/improved posture, cardiovascular endurance, muscular endurance, lumbar/cervical ROM, strength and muscular endurance for 22 minutes including the following exercises:     Recumbent bike x 5 min. For joint mobility   Prone press ups    Lumbar Med X Dynamic Exercise    Deferred- Peripheral muscle strengthening which included 1 set of 15-20 repetitions at a slow, controlled 10-13 second per rep pace focused on strengthening supporting musculature for improved body mechanics and functional mobility.  Pt and therapist focused on proper form during treatment to ensure optimal strengthening of each targeted muscle group.  Machines were utilized including: Torso Rotation, leg press, leg ext., leg curl, hip abd, hip add    Manual Therapy x 23 min.: FDN x 10 min., manual lumbar gapping with extension bias x 3 min., manual lumbar traction x 8 min., R LAD x 2 min.    Palpation Assessment to determine the necessity for Functional Dry Needling  Yes.     Patient provided written and verbal consent to Functional Dry Needling Yes    Moist heat to lumbosacral spine x 10 min.    Written Handout on response to FDN provided: no    Ezequiel demonstrated good  understanding of the education provided.     Patient demonstrated appropriate response to Functional Dry Needling. Fair  rhythmical contractions observed with estim to treated muscle groups: B L4-L5 Multifidi and B S1     Home Exercises Provided and Patient Education Provided   Home exercises include: HEP 10/21/22  Cardio program: 11/1/22  Lifting education date:  11/22/22  Posture/Lumbar roll: 10/21/22    Education provided:   - HEP, condition, activity, Dry needling    Written Home Exercises Provided: yes.  Exercises were reviewed and Ezequiel was able to demonstrate them prior to the end of the session.  Ezequiel demonstrated good  understanding of the education provided.     See EMR under Patient Instructions for exercises provided prior visit.      Assessment   Pt. Had no discomfort with lumbar P-A mobs.   Pt. Reported no noticeable changes following FDN last session - pt still Agreeable to try more FDN.  Pt. Had FDN at B sciatic nerve root levels.  Pt. Noted to have more of a extension bias now. Appropriate mobilizations performed. Educated pt. on prone press ups for HEP.        Patient is making good progress towards established goals.  Pt will continue to benefit from skilled outpatient physical therapy to address the deficits stated in the impairment chart, provide pt/family education and to maximize pt's level of independence in the home and community environment.     Anticipated Barriers for therapy: none  Pt's spiritual, cultural and educational needs considered and pt agreeable to plan of care and goals as stated below:     Goals:    Pt is in agreement with the following goals.     Short term goals:  6 weeks or 10 visits   1.  Pt will demonstrate increased lumbar ROM by at least 10 degrees from the initial ROM value with improvements noted in functional ROM and ability to perform ADLs.  (approp and ongoing)  2.  Pt will demonstrate increased MedX average isometric strength value  by 20% from initial test resulting in improved ability to perform bending, lifting, and carrying activities safely, confidently.  (approp and ongoing)  3.  Patient report a reduction in worst pain score by 1-2 points for improved tolerance for standing.  (approp and ongoing)  4.  Pt able to perform HEP correctly with minimal cueing or supervision from therapist to encourage independent  management of symptoms. (approp and ongoing)        Long term goals: 10 weeks or 20 visits   1. Pt will demonstrate increased lumbar ROM by at least 20 degrees from initial ROM value, resulting in improved ability to perform functional fwd bending while standing and sitting. (approp and ongoing)  2. Pt will demonstrate increased MedX average isometric strength value  by 50% from initial test resulting in improved ability to perform bending, lifting, and carrying activities safely, confidently.  (approp and ongoing)  3. Pt to demonstrate ability to independently control and reduce their pain through posture positioning and mechanical movements throughout a typical day.  (approp and ongoing)  4.  Pt will demonstrate reduced pain and improved functional outcomes as reported on the FOTO by reaching a limitation score of < or = 20% or less in order to demonstrate subjective improvement in pt's condition.    (approp and ongoing)  5. Pt will demonstrate independence with the HEP at discharge  (approp and ongoing)  6.  Pt. To amb long distances without discomfort.  (approp and ongoing)        Plan   Continue with established Plan of Care towards established PT goals.       Therapist: Lovely Lomas, PT  12/15/2022

## 2022-12-19 ENCOUNTER — CLINICAL SUPPORT (OUTPATIENT)
Dept: REHABILITATION | Facility: HOSPITAL | Age: 82
End: 2022-12-19
Payer: MEDICARE

## 2022-12-19 DIAGNOSIS — M62.81 WEAKNESS OF TRUNK MUSCULATURE: Primary | ICD-10-CM

## 2022-12-19 PROCEDURE — 97110 THERAPEUTIC EXERCISES: CPT | Mod: PN

## 2022-12-19 NOTE — PROGRESS NOTES
[facility]      Name: Ezequiel Hughes  Clinic Number: [id number]    Therapy Diagnosis:   Encounter Diagnosis   Name Primary?    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, thoracic region     Evaluation Date: 10/20/2022  Authorization Period Expiration: 10/12/23  Plan of Care Expiration: 22  Reassessment Due: Every 10 visit or 30 days  Visit # / Visits authorized:  visits authorized,  15/20    Time In: 8:00  Time Out: 0845  Total Billable Time: 45 minutes,   Insurance type:  Fee for service Insurance Patient    Precautions:  Gout, Cataract, PLMD, Cardiovascular disease, BPH    Pattern of pain determined: PEN 1    Subjective   Ezequiel reports  increased pain in evening    Patient reports tolerating previous visit well  Patient reports their pain to be 2/10 on a 0-10 scale with 0 being no pain and 10 being the worst pain imaginable.  Pain Location: none currently     Work and leisure: recreational activities  Pt goals: to tolerate daily activities without pain    Objective     Baseline IM Testing Results:   Date of testin22  ROM 0-42 deg   Max Peak Torque 182    Min Peak Torque 108    Flex/Ext Ratio 1.69   % below normative data 12     Outcomes:  Initial score: 50%  Visit 5 score: 37%  Visit 10 score: 44%          [facility] 2022   Visit Number 16   VAS Pain Rating 0   Test Percent Gain in Strength from Initial  -   Lumbar Weight 95   Repetitions 20   Rating of Perceived Exertion 2            Treatment    Pt was instructed in and performed the following:     Ezequiel received therapeutic exercises to develop/improved posture,  cardiovascular endurance, muscular endurance, lumbar/cervical ROM, strength and muscular endurance for 22 minutes including the following exercises:     Recumbent bike x 5 min. For joint mobility   Prone press ups  Full planks 60 seconds x 2  Matrix knee extension  Matrix knee flexion  15# KB deadlift 20 inch      Lumbar Med X Dynamic Exercise    Deferred- Peripheral muscle strengthening which included 1 set of 15-20 repetitions at a slow, controlled 10-13 second per rep pace focused on strengthening supporting musculature for improved body mechanics and functional mobility.  Pt and therapist focused on proper form during treatment to ensure optimal strengthening of each targeted muscle group.  Machines were utilized including: Torso Rotation, leg press, leg ext., leg curl, hip abd, hip add    Ezequiel demonstrated good  understanding of the education provided.     Patient demonstrated appropriate response to Functional Dry Needling. Fair  rhythmical contractions observed with estim to treated muscle groups: B L4-L5 Multifidi and B S1     Home Exercises Provided and Patient Education Provided   Home exercises include: HEP 10/21/22  Cardio program: 11/1/22  Lifting education date: 11/22/22  Posture/Lumbar roll: 10/21/22    Education provided:   - HEP, condition, activity, Dry needling    Written Home Exercises Provided: yes.  Exercises were reviewed and Ezequiel was able to demonstrate them prior to the end of the session.  Ezequiel demonstrated good  understanding of the education provided.     See EMR under Patient Instructions for exercises provided prior visit.      Assessment   Patient pain progresses as day progresses.  Fearful of lifting and things that may increase back pain..      Patient is making good progress towards established goals.  Pt will continue to benefit from skilled outpatient physical therapy to address the deficits stated in the impairment chart, provide pt/family education and to maximize pt's level  of independence in the home and community environment.     Anticipated Barriers for therapy: none  Pt's spiritual, cultural and educational needs considered and pt agreeable to plan of care and goals as stated below:     Goals:    Pt is in agreement with the following goals.     Short term goals:  6 weeks or 10 visits   1.  Pt will demonstrate increased lumbar ROM by at least 10 degrees from the initial ROM value with improvements noted in functional ROM and ability to perform ADLs.  (approp and ongoing)  2.  Pt will demonstrate increased MedX average isometric strength value  by 20% from initial test resulting in improved ability to perform bending, lifting, and carrying activities safely, confidently.  (approp and ongoing)  3.  Patient report a reduction in worst pain score by 1-2 points for improved tolerance for standing.  (approp and ongoing)  4.  Pt able to perform HEP correctly with minimal cueing or supervision from therapist to encourage independent management of symptoms. (approp and ongoing)        Long term goals: 10 weeks or 20 visits   1. Pt will demonstrate increased lumbar ROM by at least 20 degrees from initial ROM value, resulting in improved ability to perform functional fwd bending while standing and sitting. (approp and ongoing)  2. Pt will demonstrate increased MedX average isometric strength value  by 50% from initial test resulting in improved ability to perform bending, lifting, and carrying activities safely, confidently.  (approp and ongoing)  3. Pt to demonstrate ability to independently control and reduce their pain through posture positioning and mechanical movements throughout a typical day.  (approp and ongoing)  4.  Pt will demonstrate reduced pain and improved functional outcomes as reported on the FOTO by reaching a limitation score of < or = 20% or less in order to demonstrate subjective improvement in pt's condition.    (approp and ongoing)  5. Pt will demonstrate independence  with the HEP at discharge  (approp and ongoing)  6.  Pt. To amb long distances without discomfort.  (approp and ongoing)        Plan   Continue with established Plan of Care towards established PT goals.       Therapist: Home Anders, PT  12/20/2022

## 2022-12-22 ENCOUNTER — CLINICAL SUPPORT (OUTPATIENT)
Dept: REHABILITATION | Facility: HOSPITAL | Age: 82
End: 2022-12-22
Payer: MEDICARE

## 2022-12-22 DIAGNOSIS — M62.81 WEAKNESS OF TRUNK MUSCULATURE: Primary | ICD-10-CM

## 2022-12-22 PROCEDURE — 97110 THERAPEUTIC EXERCISES: CPT | Mod: PN

## 2022-12-22 NOTE — PROGRESS NOTES
[facility]      Name: Ezequiel Hughes  Clinic Number: [id number]    Therapy Diagnosis:   Encounter Diagnosis   Name Primary?    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  Pt. Had no reports of pain in lumbar region lately.  Pt. Reports feeling that his BP has been elevated lately.    Patient reports tolerating previous visit well  Patient reports their pain to be 0/10 on a 0-10 scale with 0 being no pain and 10 being the worst pain imaginable.  Pain Location: none currently     Work and leisure: recreational activities  Pt goals: to tolerate daily activities without pain    Objective     Baseline IM Testing Results:   Date of testin22  ROM 0-42 deg   Max Peak Torque 182    Min Peak Torque 108    Flex/Ext Ratio 1.69   % below normative data 12     Outcomes:  Initial score: 50%  Visit 5 score: 37%  Visit 10 score: 44%          [facility] 2022   Visit Number 16   VAS Pain Rating 0   Lumbar Extension Seat Pad -   Femur Restraint -   Top Dead Center -   Counterweight -   Lumbar Flexion -   Lumbar Extension  -   Lumbar Peak Torque -   Min Torque -   Test Percent Below Normative Data -   Test Percent Gain in Strength from Initial  -   Lumbar Weight 95   Repetitions 20   Rating of Perceived Exertion 2           BP after treatment 130/70    Treatment    Pt was instructed in and performed the following:     Ezequiel received therapeutic exercises to develop/improved posture, cardiovascular endurance, muscular endurance, lumbar/cervical ROM, strength and muscular endurance for 55 minutes including the following exercises:     Recumbent bike x 5 min. For joint mobility   Hand  heel rocks  Prone press ups  Full planks 60 seconds x 2  B Side planks - 50 sec. each  Air Squats  Goblet Squats 20#  Lateral Squats  Modified dead lifts 20#  Dead lifts 20#, 30#  B SL dead lift      Lumbar Med X Dynamic Exercise    Deferred- Peripheral muscle strengthening which included 1 set of 15-20 repetitions at a slow, controlled 10-13 second per rep pace focused on strengthening supporting musculature for improved body mechanics and functional mobility.  Pt and therapist focused on proper form during treatment to ensure optimal strengthening of each targeted muscle group.  Machines were utilized including: Torso Rotation, leg press, leg ext., leg curl, hip abd, hip add    Ezequiel demonstrated good  understanding of the education provided.     Patient demonstrated appropriate response to Functional Dry Needling. Fair  rhythmical contractions observed with estim to treated muscle groups: B L4-L5 Multifidi and B S1     Home Exercises Provided and Patient Education Provided   Home exercises include: HEP 10/21/22  Cardio program: 11/1/22  Lifting education date: 11/22/22  Posture/Lumbar roll: 10/21/22    Education provided:   - HEP, condition, activity, Dry needling    Written Home Exercises Provided: yes.  Exercises were reviewed and Ezequiel was able to demonstrate them prior to the end of the session.  Ezequiel demonstrated good  understanding of the  education provided.     See EMR under Patient Instructions for exercises provided prior visit.      Assessment   Pt. Had no discomfort with lumbar P-A mobs.  Pt. Cont. To have greater tension R HS than L HS.  Pt. Did not have increased discomfort with R SLR with adduction today.  Pt. Cont. Functional strengthening to improve tolerance for dynamic standing activities with resistance.      Patient is making good progress towards established goals.  Pt will continue to benefit from skilled outpatient physical therapy to address the deficits stated in the impairment chart, provide pt/family education and to maximize pt's level of independence in the home and community environment.     Anticipated Barriers for therapy: none  Pt's spiritual, cultural and educational needs considered and pt agreeable to plan of care and goals as stated below:     Goals:    Pt is in agreement with the following goals.     Short term goals:  6 weeks or 10 visits   1.  Pt will demonstrate increased lumbar ROM by at least 10 degrees from the initial ROM value with improvements noted in functional ROM and ability to perform ADLs.  (approp and ongoing)  2.  Pt will demonstrate increased MedX average isometric strength value  by 20% from initial test resulting in improved ability to perform bending, lifting, and carrying activities safely, confidently.  (approp and ongoing)  3.  Patient report a reduction in worst pain score by 1-2 points for improved tolerance for standing.  (approp and ongoing)  4.  Pt able to perform HEP correctly with minimal cueing or supervision from therapist to encourage independent management of symptoms. (approp and ongoing)        Long term goals: 10 weeks or 20 visits   1. Pt will demonstrate increased lumbar ROM by at least 20 degrees from initial ROM value, resulting in improved ability to perform functional fwd bending while standing and sitting. (approp and ongoing)  2. Pt will demonstrate increased MedX average  isometric strength value  by 50% from initial test resulting in improved ability to perform bending, lifting, and carrying activities safely, confidently.  (approp and ongoing)  3. Pt to demonstrate ability to independently control and reduce their pain through posture positioning and mechanical movements throughout a typical day.  (approp and ongoing)  4.  Pt will demonstrate reduced pain and improved functional outcomes as reported on the FOTO by reaching a limitation score of < or = 20% or less in order to demonstrate subjective improvement in pt's condition.    (approp and ongoing)  5. Pt will demonstrate independence with the HEP at discharge  (approp and ongoing)  6.  Pt. To amb long distances without discomfort.  (approp and ongoing)        Plan   Continue with established Plan of Care towards established PT goals.       Therapist: Lovely Lomas, PT  12/22/2022

## 2022-12-27 ENCOUNTER — LAB VISIT (OUTPATIENT)
Dept: LAB | Facility: HOSPITAL | Age: 82
End: 2022-12-27
Attending: UROLOGY
Payer: MEDICARE

## 2022-12-27 ENCOUNTER — CLINICAL SUPPORT (OUTPATIENT)
Dept: REHABILITATION | Facility: HOSPITAL | Age: 82
End: 2022-12-27
Payer: MEDICARE

## 2022-12-27 DIAGNOSIS — M62.81 WEAKNESS OF TRUNK MUSCULATURE: Primary | ICD-10-CM

## 2022-12-27 DIAGNOSIS — C61 PROSTATE CANCER: ICD-10-CM

## 2022-12-27 LAB — COMPLEXED PSA SERPL-MCNC: 1.9 NG/ML (ref 0–4)

## 2022-12-27 PROCEDURE — 84153 ASSAY OF PSA TOTAL: CPT | Mod: HCNC | Performed by: UROLOGY

## 2022-12-27 PROCEDURE — 36415 COLL VENOUS BLD VENIPUNCTURE: CPT | Mod: HCNC,PO | Performed by: UROLOGY

## 2022-12-27 PROCEDURE — 97110 THERAPEUTIC EXERCISES: CPT | Mod: HCNC,PN

## 2022-12-27 NOTE — PROGRESS NOTES
[facility]      Name: Ezequiel Hughes  Clinic Number: [id number]    Therapy Diagnosis:   Encounter Diagnosis   Name Primary?    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  Pt. Reports performing HEP consistently.  Pt. Reports having dizziness while driving to clinic this morning.  Pt. Reports no change in medications.  Pt. Reports eating a varela sandwich before leaving home.      Patient reports tolerating previous visit well  Patient reports their pain to be 0/10 on a 0-10 scale with 0 being no pain and 10 being the worst pain imaginable.  Pain Location: none currently     Work and leisure: recreational activities  Pt goals: to tolerate daily activities without pain    Objective     Baseline IM Testing Results:   Date of testin22  ROM 0-42 deg   Max Peak Torque 182    Min Peak Torque 108    Flex/Ext Ratio 1.69   % below normative data 12     Outcomes:  Initial score: 50%  Visit 5 score: 37%  Visit 10 score: 44%          [facility] 2022   Visit  Number 18   VAS Pain Rating 0   Lumbar Extension Seat Pad -   Femur Restraint -   Top Dead Center -   Counterweight -   Lumbar Flexion -   Lumbar Extension -   Lumbar Peak Torque -   Min Torque -   Test Percent Below Normative Data -   Test Percent Gain in Strength from Initial  -   Lumbar Weight 104   Repetitions 20   Rating of Perceived Exertion 2              BP before treatment 130/70    Treatment    Pt was instructed in and performed the following:     Ezequiel received therapeutic exercises to develop/improved posture, cardiovascular endurance, muscular endurance, lumbar/cervical ROM, strength and muscular endurance for 45 minutes including the following exercises:     Recumbent bike x 5 min. For joint mobility   Standing lumbar segmental flexion  B Standing open books  Air Squats  Goblet Squats 20#  Weighted Carry 20#  Farmer's Carry 20 2 laps  each hand  Dead lifts 20#  B SL dead lift 15#      Lumbar Med X Dynamic Exercise    Deferred- Peripheral muscle strengthening which included 1 set of 15-20 repetitions at a slow, controlled 10-13 second per rep pace focused on strengthening supporting musculature for improved body mechanics and functional mobility.  Pt and therapist focused on proper form during treatment to ensure optimal strengthening of each targeted muscle group.  Machines were utilized including: Torso Rotation, leg press, leg ext., leg curl, hip abd, hip add    Ezequiel demonstrated good  understanding of the education provided.      Manual Therapy: cupping to R lumbar region x 6 min.- mild erythema, dissipated well.       Home Exercises Provided and Patient Education Provided   Home exercises include: HEP 10/21/22  Cardio program: 11/1/22  Lifting education date: 11/22/22  Posture/Lumbar roll: 10/21/22    Education provided:   - HEP, condition, activity, Dry needling    Written Home Exercises Provided: yes.  Exercises were reviewed and Ezequiel was able to demonstrate them prior to the end of the  derrek.  Ezequiel demonstrated good  understanding of the education provided.     See EMR under Patient Instructions for exercises provided prior visit.      Assessment   Pt.'s BP taken and was in normal range, so PT began.  Pt. Had no symptoms of dizziness during PT tx.  Pt. Tolerated functional strengthening well.  Pt. Had discomfort in R lumbar region with increased resistance with carries- cupping performed to R lumbar region to reduce discomfort at end of session.  Pt. Cont. Functional strengthening to improve tolerance for dynamic standing activities with resistance.      Patient is making good progress towards established goals.  Pt will continue to benefit from skilled outpatient physical therapy to address the deficits stated in the impairment chart, provide pt/family education and to maximize pt's level of independence in the home and community environment.     Anticipated Barriers for therapy: none  Pt's spiritual, cultural and educational needs considered and pt agreeable to plan of care and goals as stated below:     Goals:    Pt is in agreement with the following goals.     Short term goals:  6 weeks or 10 visits   1.  Pt will demonstrate increased lumbar ROM by at least 10 degrees from the initial ROM value with improvements noted in functional ROM and ability to perform ADLs.  (approp and ongoing)  2.  Pt will demonstrate increased MedX average isometric strength value  by 20% from initial test resulting in improved ability to perform bending, lifting, and carrying activities safely, confidently.  (approp and ongoing)  3.  Patient report a reduction in worst pain score by 1-2 points for improved tolerance for standing.  (approp and ongoing)  4.  Pt able to perform HEP correctly with minimal cueing or supervision from therapist to encourage independent management of symptoms. (approp and ongoing)        Long term goals: 10 weeks or 20 visits   1. Pt will demonstrate increased lumbar ROM by at least 20  degrees from initial ROM value, resulting in improved ability to perform functional fwd bending while standing and sitting. (approp and ongoing)  2. Pt will demonstrate increased MedX average isometric strength value  by 50% from initial test resulting in improved ability to perform bending, lifting, and carrying activities safely, confidently.  (approp and ongoing)  3. Pt to demonstrate ability to independently control and reduce their pain through posture positioning and mechanical movements throughout a typical day.  (approp and ongoing)  4.  Pt will demonstrate reduced pain and improved functional outcomes as reported on the FOTO by reaching a limitation score of < or = 20% or less in order to demonstrate subjective improvement in pt's condition.    (approp and ongoing)  5. Pt will demonstrate independence with the HEP at discharge  (approp and ongoing)  6.  Pt. To amb long distances without discomfort.  (approp and ongoing)        Plan   Continue with established Plan of Care towards established PT goals.       Therapist: Lovely Lomas, PT  12/27/2022

## 2022-12-28 ENCOUNTER — OFFICE VISIT (OUTPATIENT)
Dept: PRIMARY CARE CLINIC | Facility: CLINIC | Age: 82
End: 2022-12-28
Payer: MEDICARE

## 2022-12-28 VITALS
WEIGHT: 200.63 LBS | BODY MASS INDEX: 28.72 KG/M2 | TEMPERATURE: 98 F | HEIGHT: 70 IN | DIASTOLIC BLOOD PRESSURE: 80 MMHG | SYSTOLIC BLOOD PRESSURE: 135 MMHG | HEART RATE: 72 BPM

## 2022-12-28 DIAGNOSIS — M10.9 GOUT, UNSPECIFIED CAUSE, UNSPECIFIED CHRONICITY, UNSPECIFIED SITE: ICD-10-CM

## 2022-12-28 DIAGNOSIS — D69.6 THROMBOCYTOPENIA: ICD-10-CM

## 2022-12-28 DIAGNOSIS — I70.203 ATHEROSCLEROSIS OF ARTERY OF BOTH LOWER EXTREMITIES: ICD-10-CM

## 2022-12-28 DIAGNOSIS — D64.9 ANEMIA, UNSPECIFIED TYPE: ICD-10-CM

## 2022-12-28 DIAGNOSIS — M54.12 CERVICAL RADICULOPATHY: ICD-10-CM

## 2022-12-28 DIAGNOSIS — I70.0 AORTIC ATHEROSCLEROSIS: ICD-10-CM

## 2022-12-28 DIAGNOSIS — Z00.00 ROUTINE GENERAL MEDICAL EXAMINATION AT A HEALTH CARE FACILITY: Primary | ICD-10-CM

## 2022-12-28 DIAGNOSIS — M47.817 LUMBOSACRAL SPONDYLOSIS WITHOUT MYELOPATHY: ICD-10-CM

## 2022-12-28 DIAGNOSIS — G47.33 OBSTRUCTIVE SLEEP APNEA SYNDROME: ICD-10-CM

## 2022-12-28 DIAGNOSIS — R73.09 ABNORMAL GLUCOSE: ICD-10-CM

## 2022-12-28 DIAGNOSIS — E78.2 MIXED HYPERLIPIDEMIA: ICD-10-CM

## 2022-12-28 DIAGNOSIS — M54.2 CHRONIC NECK PAIN: ICD-10-CM

## 2022-12-28 DIAGNOSIS — M54.16 LUMBAR RADICULOPATHY, CHRONIC: ICD-10-CM

## 2022-12-28 DIAGNOSIS — I27.20 PULMONARY HYPERTENSION: ICD-10-CM

## 2022-12-28 DIAGNOSIS — G89.29 CHRONIC NECK PAIN: ICD-10-CM

## 2022-12-28 DIAGNOSIS — M46.94 UNSPECIFIED INFLAMMATORY SPONDYLOPATHY, THORACIC REGION: ICD-10-CM

## 2022-12-28 DIAGNOSIS — N18.31 STAGE 3A CHRONIC KIDNEY DISEASE: ICD-10-CM

## 2022-12-28 PROCEDURE — 3075F SYST BP GE 130 - 139MM HG: CPT | Mod: HCNC,CPTII,S$GLB, | Performed by: FAMILY MEDICINE

## 2022-12-28 PROCEDURE — 3075F PR MOST RECENT SYSTOLIC BLOOD PRESS GE 130-139MM HG: ICD-10-PCS | Mod: HCNC,CPTII,S$GLB, | Performed by: FAMILY MEDICINE

## 2022-12-28 PROCEDURE — 99397 PER PM REEVAL EST PAT 65+ YR: CPT | Mod: HCNC,S$GLB,, | Performed by: FAMILY MEDICINE

## 2022-12-28 PROCEDURE — 3079F PR MOST RECENT DIASTOLIC BLOOD PRESSURE 80-89 MM HG: ICD-10-PCS | Mod: HCNC,CPTII,S$GLB, | Performed by: FAMILY MEDICINE

## 2022-12-28 PROCEDURE — 99499 RISK ADDL DX/OHS AUDIT: ICD-10-PCS | Mod: HCNC,S$GLB,, | Performed by: FAMILY MEDICINE

## 2022-12-28 PROCEDURE — 3079F DIAST BP 80-89 MM HG: CPT | Mod: HCNC,CPTII,S$GLB, | Performed by: FAMILY MEDICINE

## 2022-12-28 PROCEDURE — 99999 PR PBB SHADOW E&M-EST. PATIENT-LVL III: ICD-10-PCS | Mod: PBBFAC,HCNC,, | Performed by: FAMILY MEDICINE

## 2022-12-28 PROCEDURE — 99499 UNLISTED E&M SERVICE: CPT | Mod: HCNC,S$GLB,, | Performed by: FAMILY MEDICINE

## 2022-12-28 PROCEDURE — 1159F PR MEDICATION LIST DOCUMENTED IN MEDICAL RECORD: ICD-10-PCS | Mod: HCNC,CPTII,S$GLB, | Performed by: FAMILY MEDICINE

## 2022-12-28 PROCEDURE — 1159F MED LIST DOCD IN RCRD: CPT | Mod: HCNC,CPTII,S$GLB, | Performed by: FAMILY MEDICINE

## 2022-12-28 PROCEDURE — 99397 PR PREVENTIVE VISIT,EST,65 & OVER: ICD-10-PCS | Mod: HCNC,S$GLB,, | Performed by: FAMILY MEDICINE

## 2022-12-28 PROCEDURE — 99999 PR PBB SHADOW E&M-EST. PATIENT-LVL III: CPT | Mod: PBBFAC,HCNC,, | Performed by: FAMILY MEDICINE

## 2022-12-28 NOTE — PROGRESS NOTES
Subjective:      Patient ID: Ezequiel Hughes is a 82 y.o. male.    Chief Complaint: Follow-up (3 month follow up)      Patient is a 82 y.o. male coming in today  has a past medical history of Allergic rhinitis, Anemia, Back pain, BPH (benign prostatic hyperplasia), Cancer, Cataract, Disorder of kidney and ureter, ED (erectile dysfunction), Hiatal hernia, History of colon polyps, HLD (hyperlipidemia), Hyperlipidemia, Hypertension, Lateral epicondylitis, Lumbar radiculopathy, OA (osteoarthritis), GUIDO (obstructive sleep apnea), Prostate cancer, TIA (transient ischemic attack), Trouble in sleeping, and Urge incontinence.    Pt presents to clinic today for f/u.  Currently on Atorvastatin, Alfuzosin, Robaxin, Losartan, Amlodipine, Plavix, Protonix, and Proscar. Pt overall feeling well since last visit. He has been f/u with wellness clinic with significant improvement. He also f/u with Dr. Thompson, cardiology; has upcoming appointment next month. Hx of heart cath. Pt states he has had intermittent neck pain onset a few days ago. Pt is requesting referral for physical therapy for neck pain treatment. Pt reports he continues to use compression stockings, but he is still experiencing pain and swelling to lower leg for the past few days. States pain goes away when he raises his leg when exercising. Pt continues to use CPAP.  No other complaint at this time.     No questionnaires on file.    Pmh, Psh, Family Hx, Social Hx, HM updated in Epic Tabs today.   Review of Systems   Constitutional:  Negative for activity change, appetite change, chills, fatigue and unexpected weight change.   HENT:  Negative for congestion, ear pain, postnasal drip, sneezing, sore throat and trouble swallowing.    Eyes:  Negative for pain and visual disturbance.   Respiratory:  Negative for cough and shortness of breath.    Cardiovascular:  Positive for leg swelling. Negative for chest pain.   Gastrointestinal:  Negative for abdominal pain, constipation,  "diarrhea, nausea and vomiting.   Endocrine: Negative for cold intolerance and heat intolerance.   Genitourinary:  Negative for difficulty urinating, dysuria and flank pain.   Musculoskeletal:  Positive for neck pain and neck stiffness. Negative for arthralgias, back pain and joint swelling.        + leg pain   Skin:  Negative for color change and rash.   Neurological:  Negative for dizziness, seizures and headaches.   Psychiatric/Behavioral:  Negative for behavioral problems, dysphoric mood and sleep disturbance. The patient is not nervous/anxious.    Objective:     Vitals:    12/28/22 0831   BP: 135/80   Pulse: 72   Temp: 97.8 °F (36.6 °C)   Weight: 91 kg (200 lb 9.9 oz)   Height: 5' 10" (1.778 m)     Wt Readings from Last 10 Encounters:   12/28/22 91 kg (200 lb 9.9 oz)   12/02/22 93.4 kg (205 lb 14.6 oz)   09/28/22 87.7 kg (193 lb 5.5 oz)   09/27/22 89.1 kg (196 lb 6.9 oz)   09/26/22 88.3 kg (194 lb 12.4 oz)   08/25/22 89.5 kg (197 lb 6.8 oz)   08/04/22 88.9 kg (195 lb 15.8 oz)   07/27/22 89.8 kg (197 lb 15.6 oz)   07/05/22 89.6 kg (197 lb 8.5 oz)   06/30/22 89.6 kg (197 lb 8.5 oz)     Physical Exam  Vitals reviewed.   Constitutional:       General: He is not in acute distress.     Appearance: Normal appearance. He is well-developed and overweight.   HENT:      Head: Normocephalic and atraumatic.      Right Ear: Tympanic membrane and external ear normal.      Left Ear: Tympanic membrane and external ear normal.      Nose: Nose normal.      Mouth/Throat:      Mouth: Mucous membranes are moist.      Pharynx: Oropharynx is clear.   Eyes:      Conjunctiva/sclera: Conjunctivae normal.      Pupils: Pupils are equal, round, and reactive to light.   Neck:      Thyroid: No thyromegaly.   Cardiovascular:      Rate and Rhythm: Normal rate and regular rhythm.      Heart sounds: No murmur heard.    No friction rub. No gallop.   Pulmonary:      Effort: Pulmonary effort is normal. No respiratory distress.      Breath sounds: " Normal breath sounds.   Abdominal:      General: Bowel sounds are normal. There is no distension.      Palpations: Abdomen is soft.      Tenderness: There is no abdominal tenderness. There is no rebound.   Musculoskeletal:      Cervical back: Neck supple. Pain with movement and muscular tenderness present. No spinous process tenderness. Decreased range of motion.   Lymphadenopathy:      Cervical: No cervical adenopathy.   Skin:     General: Skin is warm and dry.   Neurological:      General: No focal deficit present.      Mental Status: He is alert and oriented to person, place, and time.      Coordination: Coordination normal.   Psychiatric:         Attention and Perception: Attention normal.         Mood and Affect: Mood and affect normal.         Speech: Speech normal.         Behavior: Behavior normal.         Thought Content: Thought content normal.         Cognition and Memory: Cognition normal.         Judgment: Judgment normal.     Assessment:     1. Routine general medical examination at a health care facility    2. Stage 3a chronic kidney disease    3. Thrombocytopenia    4. Anemia, unspecified type    5. Unspecified inflammatory spondylopathy, thoracic region    6. Gout, unspecified cause, unspecified chronicity, unspecified site    7. Atherosclerosis of artery of both lower extremities    8. Aortic atherosclerosis    9. Mixed hyperlipidemia    10. Pulmonary hypertension    11. Lumbosacral spondylosis without myelopathy    12. Cervical radiculopathy    13. Lumbar radiculopathy, chronic    14. Chronic neck pain    15. Abnormal glucose    16. Obstructive sleep apnea syndrome        LABS:   Lab Results   Component Value Date    HGBA1C 5.2 07/22/2020    HGBA1C 5.2 03/07/2018      Lab Results   Component Value Date    CHOL 132 06/08/2022    CHOL 139 09/24/2021    CHOL 162 03/03/2021     Lab Results   Component Value Date    LDLCALC 58.4 (L) 06/08/2022    LDLCALC 64.6 09/24/2021    LDLCALC 72.2 03/03/2021      Lab Results   Component Value Date    WBC 4.77 06/29/2022    HGB 13.0 (L) 06/29/2022    HCT 40.4 06/29/2022     (L) 06/29/2022    CHOL 132 06/08/2022    TRIG 88 06/08/2022    HDL 56 06/08/2022    ALT 8 (L) 06/07/2022    AST 12 06/07/2022     06/29/2022    K 4.6 06/29/2022     06/29/2022    CREATININE 1.1 10/10/2022    BUN 18 10/10/2022    CO2 28 06/29/2022    TSH 0.989 03/21/2022    PSA 1.3 11/10/2021    INR 1.2 06/29/2022    HGBA1C 5.2 07/22/2020       The ASCVD Risk score (Perez RODRIGUEZ, et al., 2019) failed to calculate for the following reasons:    The 2019 ASCVD risk score is only valid for ages 40 to 79    MRI Prostate W W/O Contrast  Narrative: EXAMINATION:  MRI PROSTATE W W/O CONTRAST    CLINICAL HISTORY:  Prostate cancer.  Active surveillance.    TECHNIQUE:  Multiparametric MRI of the prostate gland was performed with and without the intravenous administration 9 cc of Gadavist.    3D reconstructions: 3D reconstructions were ordered by the urologist to generate a 3D model of the prostate gland with target lesion mapping as needed for subsequent fusion prostate biopsy.  I, the interpreting radiologist, performed the reconstructions on [employer], an independent workstation, with reports and key images saved to PACS.    COMPARISON:  None    FINDINGS:  The prostate gland measures 5.7 x 4.4 x 6.1 cm for total volume of 64.63 cc.    Transitional zone contains hyperplastic nodules.  No lenticular or non circumscribed T2 hypointense mass in the transitional zone.    The peripheral zone shows no T2 hypointense nodule or restricted diffusion.    T2 hypointense prostate capsule intact.  Rectoprostatic angle preserved and the neurovascular bundles have a normal appearance.  Normal seminal vesicles.  No pelvic lymphadenopathy.  No focal bone marrow replacing lesion in the bony pelvis.  Impression: 1. No focal suspicious lesion in the prostate gland to localize a clinically significant prostate  cancer.  2. No pelvic lymphadenopathy.  3. Total prostate volume 64.63 cc.  PIRADS 2 - Low (clinically significant cancer is unlikely to be present)    Electronically signed by: Sander Nazario MD  Date:    10/11/2022  Time:    11:43      Plan:   Ezequiel was seen today for follow-up.    Diagnoses and all orders for this visit:    Routine general medical examination at a [facility] facility    Stage 3a chronic kidney disease  -     TSH; Future  -     Hemoglobin A1C; Future  -     Lipid Panel; Future  -     Comprehensive Metabolic Panel; Future  -     CBC Auto Differential; Future  -     T4, Free; Future  -     Microalbumin/Creatinine Ratio, Urine; Future    Thrombocytopenia  -     TSH; Future  -     Hemoglobin A1C; Future  -     Lipid Panel; Future  -     Comprehensive Metabolic Panel; Future  -     CBC Auto Differential; Future  -     T4, Free; Future  -     Microalbumin/Creatinine Ratio, Urine; Future    Anemia, unspecified type  -     TSH; Future  -     Hemoglobin A1C; Future  -     Lipid Panel; Future  -     Comprehensive Metabolic Panel; Future  -     CBC Auto Differential; Future  -     T4, Free; Future  -     Microalbumin/Creatinine Ratio, Urine; Future    Unspecified inflammatory spondylopathy, thoracic region    Gout, unspecified cause, unspecified chronicity, unspecified site  -     TSH; Future  -     Hemoglobin A1C; Future  -     Lipid Panel; Future  -     Comprehensive Metabolic Panel; Future  -     CBC Auto Differential; Future  -     T4, Free; Future  -     Microalbumin/Creatinine Ratio, Urine; Future  -     Uric Acid; Future    Atherosclerosis of artery of both lower extremities  -     TSH; Future  -     Hemoglobin A1C; Future  -     Lipid Panel; Future  -     Comprehensive Metabolic Panel; Future  -     CBC Auto Differential; Future  -     T4, Free; Future  -     Microalbumin/Creatinine Ratio, Urine; Future    Aortic atherosclerosis    Mixed hyperlipidemia  -     TSH; Future  -     Hemoglobin A1C;  Future  -     Lipid Panel; Future  -     Comprehensive Metabolic Panel; Future  -     CBC Auto Differential; Future  -     T4, Free; Future  -     Microalbumin/Creatinine Ratio, Urine; Future    Pulmonary hypertension  -     TSH; Future  -     Hemoglobin A1C; Future  -     Lipid Panel; Future  -     Comprehensive Metabolic Panel; Future  -     CBC Auto Differential; Future  -     T4, Free; Future  -     Microalbumin/Creatinine Ratio, Urine; Future    Lumbosacral spondylosis without myelopathy  -     TSH; Future  -     Hemoglobin A1C; Future  -     Lipid Panel; Future  -     Comprehensive Metabolic Panel; Future  -     CBC Auto Differential; Future  -     T4, Free; Future  -     Microalbumin/Creatinine Ratio, Urine; Future    Cervical radiculopathy  -     Ambulatory referral/consult to Physical/Occupational Therapy; Future  -     TSH; Future  -     Hemoglobin A1C; Future  -     Lipid Panel; Future  -     Comprehensive Metabolic Panel; Future  -     CBC Auto Differential; Future  -     T4, Free; Future  -     Microalbumin/Creatinine Ratio, Urine; Future    Lumbar radiculopathy, chronic    Chronic neck pain  -     Ambulatory referral/consult to Physical/Occupational Therapy; Future    Abnormal glucose  -     Hemoglobin A1C; Future    Obstructive sleep apnea syndrome    Lab work ordered to be completed next month prior to cardiology appointment.  Referral given to physical therapy for neck pain treatment.   Instructed to get COVID booster, shingles, and TDAP shots at local pharmacy.   F/u with urology on psa.   Cont with meds as prescribed. Stable conditions.   Instructed to f/u in 6 months.     Patient Instructions     Health Maintenance Due   Topic Date Due    COVID-19 Vaccine (4 - Booster for Pfizer series) 12/16/2021    Shingles Vaccine (2 of 2) 03/04/2022    TETANUS VACCINE  07/24/2022     All to get through local pharmacy   PT for neck  Legs and numbness talk to Dr. Thompson about     Follow up in about 6 months  (around 6/28/2023) for f/u office visit Dr. Ozuna.  Scribe Attestation:   I, Cong Marquez, am scribing for, and in the presence of, Dr. Valery Ozuna MD. I performed the above scribed service and the documentation accurately describes the services I performed. I attest to the accuracy of the note.    I, Dr. Valery Ozuna MD, reviewed documentation as scribed above. I personally performed the services described in this documentation.  I agree that the record reflects my personal performance and is accurate and complete. Valery Ozuna MD.    12/28/2022

## 2022-12-28 NOTE — PATIENT INSTRUCTIONS
Health Maintenance Due   Topic Date Due    COVID-19 Vaccine (4 - Booster for Pfizer series) 12/16/2021    Shingles Vaccine (2 of 2) 03/04/2022    TETANUS VACCINE  07/24/2022     All to get through local pharmacy   PT for neck  Legs and numbness talk to Dr. Thompson about

## 2022-12-29 ENCOUNTER — CLINICAL SUPPORT (OUTPATIENT)
Dept: REHABILITATION | Facility: HOSPITAL | Age: 82
End: 2022-12-29
Payer: MEDICARE

## 2022-12-29 DIAGNOSIS — M62.81 WEAKNESS OF TRUNK MUSCULATURE: Primary | ICD-10-CM

## 2022-12-29 PROCEDURE — 97110 THERAPEUTIC EXERCISES: CPT | Mod: HCNC,PN

## 2022-12-29 NOTE — PROGRESS NOTES
Patient has attended 20 visits of the [facility] program for aerobic work, med ex isometric testing with dynamic strengthening on med ex equipment for spine, whole body strengthening on med ex equipment, HEP, education.  Patient has completed [facility] Program and is ready to be transitioned into wellness program.  Importance of wellness program, and attending 1/week to maintain strength stressed.  Importance of continuing there ex and body mech and ergonomics stressed.   Patient demonstrates improvement in ability to reduce symptoms, improved posture, improved ROM and improved strength.   Reviewed HEP, and importance of maintaining a healthy spine through continued stretching and performance of HEP, good posture, good ergonomics, good body mech with ADL, leisure, and work.  Discharge handout with HEP given, and discussed aspects of exercise program, cardio, strengthening, and stretching.    Patient expressed understanding information given.  Patient plans to attend wellness and is ready to transition to wellness.      -Improved posture,   *** using lumbar roll  -Improved *** ROM,  initially on med ex test *** and   currently ***  -Improved strength at each test point on lumbar med ex IM test with   *** average improvement noted with Reduced pain  Noted by patient  -Initial outcome tool score *** and current outcome tool score  *** indicating reduced pain and improved function

## 2022-12-29 NOTE — PATIENT INSTRUCTIONS
"HEALTHY BACK TOOLS        KEEP YOUR SPINE FEELING FINE   HEALTHY HABITS   Do your "GO TO" stretches 2/day   Get a good night's REST   Watch your POSTURE in sitting/standing Drink PLENTY of water   Use a lumbar roll Eat LOTS of fruits & vegetables   GET UP often (walk and/or stretch) Manage your STRESS   Make your workplace IDEAL FOR YOU  Don't smoke   Lift correctly EXERCISE                           WHAT TO DO WHEN SYMPTOMS FLARE UP  Back and neck pain may occasionally flare up.  If you experience a flare   up, remember your tools. Be encouraged, by remembering that flare-ups will   usually pass.   My Tools:    ~Use your "Go To" Stretches/Positions   ~Keep Moving-pain usually gets better if you move  ~Z lie (with or without ice)  10 min several times a day until symptoms reduce  ~Slowly resume normal activities   ~Practice Deep Breathing and Relaxation techniques                                                 MY EXERCISE PLAN  GO TO STRETCHES  1/day (like brushing your teeth) STRENGTHENING  2-3 times/week CARDIO PROGRAM  150 min/week   Hand Heel rocks   Forward plank Walking   Open books Modified Side Planks Recumbent bike   Hamstring lengthening Heel raises Treadmill   Sciatic nerve glide     Single Leg Dead lift         "

## 2022-12-29 NOTE — PROGRESS NOTES
[facility] Treatment /Discharge     Name: Ezequiel Hughes  Clinic Number: [id number]    Therapy Diagnosis:   Encounter Diagnosis   Name Primary?    Weakness of trunk musculature Yes         Physician: Valery Ozuna MD    Visit Date: 2022    Physician Orders: PT Eval and Treat     Medical Diagnosis:   M54.16 (ICD-10-CM) - Radiculopathy, lumbar region   M54.40 (ICD-10-CM) - Lumbago with sciatica, unspecified side   G89.29 (ICD-10-CM) - Other chronic pain   M46.94 (ICD-10-CM) - Unspecified inflammatory spondylopathy, thoracic region     Evaluation Date: 10/20/2022  Authorization Period Expiration: 10/12/23  Plan of Care Expiration: 22  Reassessment Due: Every 10 visit or 30 days  Visit # / Visits authorized:  visits authorized, HB     Time In: 8:05  Time Out: 8:45  Total Billable Time: 45 minutes  Insurance type:  Fee for service Insurance Patient    Precautions:  Gout, Cataract, PLMD, Cardiovascular disease, BPH    Pattern of pain determined: PEN 1    Subjective   Ezequiel reports that he had soreness today secondary receiving 2 vaccines in arms yesterday.   Pt. Reports no pain in lumbar region.     Patient reports tolerating previous visit well  Patient reports their pain to be 0/10 on a 0-10 scale with 0 being no pain and 10 being the worst pain imaginable.  Pain Location: none currently     Work and leisure: recreational activities  Pt goals: to tolerate daily activities without pain    Objective     Baseline IM Testing Results:   Date of testin22  ROM 0-42 deg   Max Peak Torque 182    Min Peak Torque 108    Flex/Ext Ratio 1.69   % below normative data 12     Outcomes:  Initial score: 50%  Visit 5 score: 37%  Visit 10 score: 44%    [facility] 2022   Visit Number 19   VAS Pain Rating 0   Lumbar Extension Seat Pad 0   Femur Restraint 5   Top Dead Center 24   Counterweight 142   Lumbar Flexion 48   Lumbar Extension 0   Lumbar Peak Torque 271   Min  Torque 162   Test Percent Below Normative Data -   Test Percent Gain in Strength from Initial  56   Lumbar Weight -   Repetitions -   Rating of Perceived Exertion -            Treatment    Pt was instructed in and performed the following:     Ezequiel received therapeutic exercises to develop/improved posture, cardiovascular endurance, muscular endurance, lumbar/cervical ROM, strength and muscular endurance for 45 minutes including the following exercises:     Recumbent bike x 5 min. For joint mobility   Hand heel rocks  B SL open books  B eccentric HS lengthening  R sciatic nerve glide  Forward Planks- 60 sec.  B Side planks- 30 sec. Bilat.        Lumbar Med X Dynamic Exercise    Deferred- Peripheral muscle strengthening which included 1 set of 15-20 repetitions at a slow, controlled 10-13 second per rep pace focused on strengthening supporting musculature for improved body mechanics and functional mobility.  Pt and therapist focused on proper form during treatment to ensure optimal strengthening of each targeted muscle group.  Machines were utilized including: Torso Rotation, leg press, leg ext., leg curl, hip abd, hip add    Ezqeuiel demonstrated good  understanding of the education provided.        Home Exercises Provided and Patient Education Provided   Home exercises include: HEP 10/21/22  Cardio program: 11/1/22  Lifting education date: 11/22/22  Posture/Lumbar roll: 10/21/22    Education provided:   - HEP, condition, activity, Dry needling    Written Home Exercises Provided: yes.  Exercises were reviewed and Ezequiel was able to demonstrate them prior to the end of the session.  Ezequiel demonstrated good  understanding of the education provided.     See EMR under Patient Instructions for exercises provided prior visit.      Assessment   Lumbar Med X testing completed. Reviewed HEP to continue to manage his pain.      Patient has attended 19 visits of the [employer] program for aerobic work, med ex isometric testing  with dynamic strengthening on med ex equipment for spine, whole body strengthening on med ex equipment, HEP, education.  Patient has completed [facility] Program and is ready to be transitioned into wellness program.  Importance of wellness program, and attending 1/week to maintain strength stressed.  Importance of continuing there ex and body mech and ergonomics stressed.   Patient demonstrates improvement in ability to reduce symptoms, improved posture, improved ROM and improved strength.   Reviewed HEP, and importance of maintaining a healthy spine through continued stretching and performance of HEP, good posture, good ergonomics, good body mech with ADL, leisure, and work.  Discharge handout with HEP given, and discussed aspects of exercise program, cardio, strengthening, and stretching.    Patient expressed understanding information given.  Patient plans to attend wellness and is ready to transition to wellness.      -Improved posture,   better using lumbar roll  -Improved 6 degrees ROM,  initially on med ex test 0-42 and   currently 0-48  -Improved strength at each test point on lumbar med ex IM test with   56% average improvement noted with Reduced pain  Noted by patient  -Initial outcome tool score 37% and current outcome tool score  30% indicating reduced pain and improved function      Anticipated Barriers for therapy: none  Pt's spiritual, cultural and educational needs considered and pt agreeable to plan of care and goals as stated below:     Goals:    Pt is in agreement with the following goals.     Short term goals:  6 weeks or 10 visits   1.  Pt will demonstrate increased lumbar ROM by at least 10 degrees from the initial ROM value with improvements noted in functional ROM and ability to perform ADLs. Progressed  2.  Pt will demonstrate increased MedX average isometric strength value  by 20% from initial test resulting in improved ability to perform bending, lifting, and carrying activities safely,  confidently. Met  3.  Patient report a reduction in worst pain score by 1-2 points for improved tolerance for standing.  Met  4.  Pt able to perform HEP correctly with minimal cueing or supervision from therapist to encourage independent management of symptoms. Met        Long term goals: 10 weeks or 20 visits   1. Pt will demonstrate increased lumbar ROM by at least 20 degrees from initial ROM value, resulting in improved ability to perform functional fwd bending while standing and sitting. Progressed  2. Pt will demonstrate increased MedX average isometric strength value  by 50% from initial test resulting in improved ability to perform bending, lifting, and carrying activities safely, confidently.  Met  3. Pt to demonstrate ability to independently control and reduce their pain through posture positioning and mechanical movements throughout a typical day.  Met  4.  Pt will demonstrate reduced pain and improved functional outcomes as reported on the FOTO by reaching a limitation score of < or = 20% or less in order to demonstrate subjective improvement in pt's condition.    Progressed  5. Pt will demonstrate independence with the HEP at discharge Met  6.  Pt. To amb long distances without discomfort. Progressed        Plan   Continue with established Plan of Care towards established PT goals.       Therapist: Lovely Lomas, PT  12/29/2022

## 2023-01-03 ENCOUNTER — CLINICAL SUPPORT (OUTPATIENT)
Dept: REHABILITATION | Facility: HOSPITAL | Age: 83
End: 2023-01-03
Payer: MEDICARE

## 2023-01-03 DIAGNOSIS — G89.29 CHRONIC NECK PAIN: ICD-10-CM

## 2023-01-03 DIAGNOSIS — M54.2 CHRONIC NECK PAIN: ICD-10-CM

## 2023-01-03 DIAGNOSIS — M54.12 CERVICAL RADICULOPATHY: ICD-10-CM

## 2023-01-03 PROCEDURE — 97161 PT EVAL LOW COMPLEX 20 MIN: CPT | Mod: HCNC,PN

## 2023-01-03 PROCEDURE — 97110 THERAPEUTIC EXERCISES: CPT | Mod: HCNC,PN

## 2023-01-03 NOTE — PLAN OF CARE
[facility]  Physical Therapy Initial Evaluation    Name: Ezequiel Hughes  Clinic Number: [id number]    Therapy Diagnosis:   Encounter Diagnoses   Name Primary?    Cervical radiculopathy     Chronic neck pain      Physician: Valery Ozuna MD    Physician Orders: PT Eval and Treat   Medical Diagnosis from Referral:   M54.12 (ICD-10-CM) - Cervical radiculopathy   M54.2,G89.29 (ICD-10-CM) - Chronic neck pain     Evaluation Date: 1/3/2023  Authorization Period Expiration: 12/28/23  Plan of Care Expiration: 3/19/23  Visit # / Visits authorized: 1/ 1    Time In: 8:05  Time Out: 8:45  Total Billable Time: 10 minutes    Precautions: HTN, CKD III, Thrombocytopenia, Pulmonary HTN, OA, Gout, Unspecified Inflammatory Spondylopathy, thoracic region, PLMD    Subjective   Date of onset: couple years  History of current condition - Ezequiel reports: neck pain coming and going for a couple years.  Pt. Reports that pain in neck has been more consistent lately.  Pt. Reports that in certain positions he gets numbness in L UE.       Pain:  Current 3/10, worst 6/10, best 0/10   Location:  neck  Description: tight and achy  Aggravating Factors: extension and rotation  Easing Factors: no relief    Prior Therapy: yes  Social History: lives with   Occupation: Retired  Prior Level of Function: Independent   Current Level of Function: limited with standing and walking for long periods of time    Imaging:  None noted for cervical region    Medical History:   Past Medical History:   Diagnosis Date    Allergic rhinitis     Anemia     Back pain     BPH (benign prostatic hyperplasia)     Cancer     skin cancer to neck, Dr. Graves    Cataract     Disorder of kidney and ureter     ED (erectile dysfunction)     Hiatal hernia     small    History of colon polyps     colonoscopy 11/2016    HLD (hyperlipidemia)     Hyperlipidemia     Hypertension     Lateral epicondylitis     Lumbar radiculopathy 1/26/2022    OA  (osteoarthritis)     GUIDO (obstructive sleep apnea)     Prostate cancer     Dr. Wong    TIA (transient ischemic attack)     Trouble in sleeping     Urge incontinence 3/29/2022       Surgical History:   Ezequiel Hughes  has a past surgical history that includes Rotator cuff repair (Bilateral); Plantar fascia release; Hemorrhoid surgery; Shoulder surgery (Bilateral, around 2000); Knee arthroscopy w/ meniscal repair (Right, 2015); Colonoscopy (N/A, 11/14/2016); PCIOL (Right, 02/21/2018); I-STENT (Right, 02/21/2018); Cataract extraction w/  intraocular lens implant (Right, 02/21/2018); Cataract extraction w/  intraocular lens implant (Left, 03/21/2018); Colonoscopy (N/A, 09/22/2020); Eye surgery; Vasectomy; Selective injection of anesthetic agent around lumbar spinal nerve root by transforaminal approach (Bilateral, 01/26/2022); Selective injection of anesthetic agent around lumbar spinal nerve root by transforaminal approach (Bilateral, 03/14/2022); Selective injection of anesthetic agent around lumbar spinal nerve root by transforaminal approach (Bilateral, 06/02/2022); Catheterization of both left and right heart (N/A, 07/05/2022); Angiography of upper extremity (Left, 07/05/2022); Arteriography of aortic root (N/A, 07/05/2022); and Selective injection of anesthetic agent around lumbar spinal nerve root by transforaminal approach (Bilateral, 08/25/2022).    Medications:   Ezequiel has a current medication list which includes the following prescription(s): acetaminophen, alfuzosin, amlodipine, atorvastatin, clopidogrel, finasteride, fluticasone propionate, losartan, methocarbamol, and pantoprazole.    Allergies:   Review of patient's allergies indicates:   Allergen Reactions    Atarax [hydroxyzine hcl] Other (See Comments)     Raised blood pressure     Zyrtec [cetirizine] Other (See Comments)     Raised blood pressure     Gold sodium thiosulfate      Patch test positive    Meloxicam Rash     Other reaction(s):  hypertension        Pts goals: to improve neck movement and decrease neck pain    Objective       CMS Impairment/Limitation/Restriction for FOTO Neck Survey    Therapist reviewed FOTO scores for Ezequiel Hughes on 1/3/2023.   FOTO documents entered into [employer] - see Media section.    Limitation Score: 51%       Posture: Pt noted to present with forward head/rounded shoulder posture.    C Spine AROM:   Degrees Pain   FB 55 Stretch L side neck    BB 30 Slight discomfort   RSB 20 discomfort   LSB 25 discomfort   RR 62 discomfort   LR 52 discomfort     Cervical Strength Grossly 4+/5    Strength:  Muscle Right Left   Middle Trapezius 4/5 4/5   Lower Trapezius 4/5 1+/5   Rhomboids 4/5 4/5                         UE ROM: B shoulder WNL except decreased B shoulder flexion 75%  WNL     Sensation: NT  Reflexes: NT    Special Test: Distraction:    +   Neck Flexor Endurance Test:   8 sec      Joint Mobility: discomfort with OA side bend, significant hypomobility with B AA joints side bend and rotation, mild hypomobility with B C2-3 rotation    1st Rib Mobility: NT    Upper Limb Tension Test: Neg. L ULTT      Tenderness to palpation:  Patient tender to palpate along suboccipitals and B cervical paraspinals with moderate tension palpable in B cervical paraspinals         TREATMENT   Treatment Time In: 8:30  Treatment Time Out: 8:45  Total Treatment time separate from Evaluation:  minutes    Ezequiel received therapeutic exercises to develop strength for 10 minutes including:   DNF  Prone chin tucks  Standing Ys  Ext. Rot with Scap Retraction  Shoulder shrugs      Ezequiel received the following manual therapy techniques: Joint mobilizations and Manual traction were applied to the:  6 minutes, including:    Manual cervical traction x 3 min.  OA flex mob x 3 min.        Home Exercises and Patient Education Provided    Education provided:   -Education on condition, HEP, and activity    Written Home Exercises Provided: yes.  Exercises were  reviewed and Ezequiel was able to demonstrate them prior to the end of the session.  Ezequiel demonstrated good  understanding of the education provided.     See EMR under Patient Instructions for exercises provided 1/3/2023.    Assessment   Ezequiel is a 82 y.o. male referred to outpatient Physical Therapy with a medical diagnosis of   M54.12 (ICD-10-CM) - Cervical radiculopathy   M54.2,G89.29 (ICD-10-CM) - Chronic neck pain   . Pt presents with moderate cervical ROM restrictions in all planes of motion except flexion.  Pt. Has significant weakness in deep neck flexors.  Pt. Has tension and tenderness in suboccipitals and B cervical paraspinals.  Pt. Is a great candidate for [facility]- will begin cervical testing next visit with [facility] PT.    Pt prognosis is Excellent.   Pt will benefit from skilled outpatient Physical Therapy to address the deficits stated above and in the chart below, provide pt/family education, and to maximize pt's level of independence.     Plan of care discussed with patient: Yes  Pt's spiritual, cultural and educational needs considered and patient is agreeable to the plan of care and goals as stated below:     Anticipated Barriers for therapy: none    Medical Necessity is demonstrated by the following  History  Co-morbidities and personal factors that may impact the plan of care Co-morbidities:   HTN, CKD III, Thrombocytopenia, Pulmonary HTN, OA, Gout, Unspecified Inflammatory Spondylopathy, thoracic region, PLMD    Personal Factors:   no deficits     low   Examination  Body Structures and Functions, activity limitations and participation restrictions that may impact the plan of care Body Regions:   neck  upper extremities    Body Systems:    ROM  strength  motor control  motor learning    Participation Restrictions:   Limited with scanning environment and extension rotation movements    Activity limitations:   Learning and applying knowledge  no deficits    General Tasks and Commands  no  deficits    Communication  no deficits    Mobility  lifting and carrying objects  driving (bike, car, motorcycle)    Self care  no deficits    Domestic Life  no deficits    Interactions/Relationships  no deficits    Life Areas  no deficits    Community and Social Life  community life  recreation and leisure         low   Clinical Presentation stable and uncomplicated low   Decision Making/ Complexity Score: low     Goals:  Short Term Goals: In 5 weeks   1.I with HEP  2.Patient to increase cervical ROM by 25% with B side bending and B rotation.   3.Patient to increase DNF endurance to 28 sec. Or greater.  4.Patient to have pain less than 3/10 at all times.  5.Patient to score less than 25% impaired on the FOTO.    Long Term Goals: In 10 weeks  1. Patient to score less than 15% impaired on the FOTO.  2. Patient to demo increase in B Scapular strength to 5/5 gross MMT to support upright upper body posture for sitting and standing activities.  3. Patient to have decreased pain to 0/10 at all times.  4. Patient to demo increase cervical ROM to WNL to scan environment to drive safely.      Plan   Plan of care Certification: 1/3/2023 to 3/19/23.    Outpatient Physical Therapy 2 times weekly for 10 weeks to include the following interventions: Manual Therapy, Moist Heat/ Ice, Neuromuscular Re-ed, Patient Education, Self Care, Therapeutic Activities, and Therapeutic Exercise, e-stim, FDN.    Lovely Lomas, PT    Thank you for this referral.

## 2023-01-04 ENCOUNTER — TELEPHONE (OUTPATIENT)
Dept: PRIMARY CARE CLINIC | Facility: CLINIC | Age: 83
End: 2023-01-04
Payer: MEDICARE

## 2023-01-04 NOTE — TELEPHONE ENCOUNTER
----- Message from Angy Abreu sent at 1/4/2023  3:07 PM CST -----  Contact: Ezequiel Nieves is calling to speak to the nurse regarding him feeling dizzy and light headed, and legs going numb. He stated its been happening but now its more frequent to be concerned about. Please give patient a call back at [phone]    Thanks  LJ

## 2023-01-09 ENCOUNTER — OFFICE VISIT (OUTPATIENT)
Dept: OPHTHALMOLOGY | Facility: CLINIC | Age: 83
End: 2023-01-09
Payer: MEDICARE

## 2023-01-09 DIAGNOSIS — Z96.1 PSEUDOPHAKIA: ICD-10-CM

## 2023-01-09 DIAGNOSIS — H40.1111 PRIMARY OPEN ANGLE GLAUCOMA (POAG) OF RIGHT EYE, MILD STAGE: Primary | ICD-10-CM

## 2023-01-09 DIAGNOSIS — H40.1122 PRIMARY OPEN ANGLE GLAUCOMA (POAG) OF LEFT EYE, MODERATE STAGE: ICD-10-CM

## 2023-01-09 PROCEDURE — 1160F PR REVIEW ALL MEDS BY PRESCRIBER/CLIN PHARMACIST DOCUMENTED: ICD-10-PCS | Mod: HCNC,CPTII,S$GLB, | Performed by: OPHTHALMOLOGY

## 2023-01-09 PROCEDURE — 1159F MED LIST DOCD IN RCRD: CPT | Mod: HCNC,CPTII,S$GLB, | Performed by: OPHTHALMOLOGY

## 2023-01-09 PROCEDURE — 1159F PR MEDICATION LIST DOCUMENTED IN MEDICAL RECORD: ICD-10-PCS | Mod: HCNC,CPTII,S$GLB, | Performed by: OPHTHALMOLOGY

## 2023-01-09 PROCEDURE — 99999 PR PBB SHADOW E&M-EST. PATIENT-LVL III: ICD-10-PCS | Mod: PBBFAC,HCNC,, | Performed by: OPHTHALMOLOGY

## 2023-01-09 PROCEDURE — 1160F RVW MEDS BY RX/DR IN RCRD: CPT | Mod: HCNC,CPTII,S$GLB, | Performed by: OPHTHALMOLOGY

## 2023-01-09 PROCEDURE — 99999 PR PBB SHADOW E&M-EST. PATIENT-LVL III: CPT | Mod: PBBFAC,HCNC,, | Performed by: OPHTHALMOLOGY

## 2023-01-09 PROCEDURE — 92083 HUMPHREY VISUAL FIELD - OU - BOTH EYES: ICD-10-PCS | Mod: HCNC,S$GLB,, | Performed by: OPHTHALMOLOGY

## 2023-01-09 PROCEDURE — 92083 EXTENDED VISUAL FIELD XM: CPT | Mod: HCNC,S$GLB,, | Performed by: OPHTHALMOLOGY

## 2023-01-09 PROCEDURE — 99214 OFFICE O/P EST MOD 30 MIN: CPT | Mod: HCNC,S$GLB,, | Performed by: OPHTHALMOLOGY

## 2023-01-09 PROCEDURE — 99214 PR OFFICE/OUTPT VISIT, EST, LEVL IV, 30-39 MIN: ICD-10-PCS | Mod: HCNC,S$GLB,, | Performed by: OPHTHALMOLOGY

## 2023-01-09 RX ORDER — KETOROLAC TROMETHAMINE 5 MG/ML
SOLUTION OPHTHALMIC
Qty: 5 ML | Refills: 0 | Status: SHIPPED | OUTPATIENT
Start: 2023-01-09 | End: 2023-08-23

## 2023-01-09 NOTE — PROGRESS NOTES
SUBJECTIVE  Ezequiel Hughes is 82 y.o. male  Uncorrected distance visual acuity was 20/20 -2 in the right eye and 20/30 +2 in the left eye.   Chief Complaint   Patient presents with    Glaucoma     Pt here for 4m IOP HVF chk. No pain or discomfort. VA stable.           HPI     Glaucoma     Additional comments: Pt here for 4m IOP HVF chk. No pain or discomfort.   VA stable.            Comments    1. Mod COAG OS + Mild COAG OD (init 22/26 post dilation IOP ) goal = 17  SLT OS 12/2/20 (19.5-15)  2. PCIOL / I-Stent OD +18.5 [id number] (distance) 2-21-18  PCIOL /I-Stent OS +21.0 (-1.75) 3/21/18 near  3. ERM OU   4. Brow Lift 9/18   *intolerant of travatan*          Last edited by Govind Luu MA on 1/9/2023  8:19 AM.         Assessment /Plan :  1. Primary open angle glaucoma (POAG) of right eye, mild stage Intraocular pressure (IOP) not within acceptable range relative to target IOP with risk of irreversible visual loss. Better IOP control is recommended. Treatment options may include change or additional medications, Selective Laser Trabeculoplasty (SLT laser), and/or incisional glaucoma surgery. Reviewed importance of continued compliance with treatment and follow up.     Patient chooses schedule SLT  OD       2. Primary open angle glaucoma (POAG) of left eye, moderate stage Doing well, intraocular pressure (IOP) within acceptable range relative to target IOP and no evidence of progression. Continue current treatment. Reviewed importance of continued compliance with treatment and follow up.       3. Pseudophakia  -- Condition stable, no therapeutic change required. Monitoring routinely.       Return for the SLT OD

## 2023-01-11 ENCOUNTER — CLINICAL SUPPORT (OUTPATIENT)
Dept: REHABILITATION | Facility: HOSPITAL | Age: 83
End: 2023-01-11
Payer: MEDICARE

## 2023-01-11 ENCOUNTER — OFFICE VISIT (OUTPATIENT)
Dept: CARDIOLOGY | Facility: CLINIC | Age: 83
End: 2023-01-11
Payer: MEDICARE

## 2023-01-11 VITALS
HEIGHT: 70 IN | OXYGEN SATURATION: 98 % | WEIGHT: 199.31 LBS | HEART RATE: 67 BPM | BODY MASS INDEX: 28.53 KG/M2 | SYSTOLIC BLOOD PRESSURE: 110 MMHG | DIASTOLIC BLOOD PRESSURE: 60 MMHG

## 2023-01-11 DIAGNOSIS — I27.20 PULMONARY HYPERTENSION: ICD-10-CM

## 2023-01-11 DIAGNOSIS — I70.203 ATHEROSCLEROSIS OF ARTERY OF BOTH LOWER EXTREMITIES: ICD-10-CM

## 2023-01-11 DIAGNOSIS — R07.9 EXERTIONAL CHEST PAIN: ICD-10-CM

## 2023-01-11 DIAGNOSIS — R42 LIGHTHEADED: ICD-10-CM

## 2023-01-11 DIAGNOSIS — R06.02 SHORTNESS OF BREATH: ICD-10-CM

## 2023-01-11 DIAGNOSIS — I10 PRIMARY HYPERTENSION: ICD-10-CM

## 2023-01-11 DIAGNOSIS — M54.2 CHRONIC NECK PAIN: Primary | ICD-10-CM

## 2023-01-11 DIAGNOSIS — R07.9 CHEST PAIN, UNSPECIFIED TYPE: ICD-10-CM

## 2023-01-11 DIAGNOSIS — I45.10 INCOMPLETE RIGHT BUNDLE BRANCH BLOCK: Primary | ICD-10-CM

## 2023-01-11 DIAGNOSIS — E78.2 MIXED HYPERLIPIDEMIA: ICD-10-CM

## 2023-01-11 DIAGNOSIS — N18.31 STAGE 3A CHRONIC KIDNEY DISEASE: ICD-10-CM

## 2023-01-11 DIAGNOSIS — I10 ESSENTIAL HYPERTENSION: ICD-10-CM

## 2023-01-11 DIAGNOSIS — G89.29 CHRONIC NECK PAIN: Primary | ICD-10-CM

## 2023-01-11 DIAGNOSIS — D72.19 OTHER EOSINOPHILIA: ICD-10-CM

## 2023-01-11 PROCEDURE — 1126F AMNT PAIN NOTED NONE PRSNT: CPT | Mod: HCNC,CPTII,S$GLB, | Performed by: INTERNAL MEDICINE

## 2023-01-11 PROCEDURE — 99214 OFFICE O/P EST MOD 30 MIN: CPT | Mod: HCNC,S$GLB,, | Performed by: INTERNAL MEDICINE

## 2023-01-11 PROCEDURE — 3288F FALL RISK ASSESSMENT DOCD: CPT | Mod: HCNC,CPTII,S$GLB, | Performed by: INTERNAL MEDICINE

## 2023-01-11 PROCEDURE — 1159F MED LIST DOCD IN RCRD: CPT | Mod: HCNC,CPTII,S$GLB, | Performed by: INTERNAL MEDICINE

## 2023-01-11 PROCEDURE — 3288F PR FALLS RISK ASSESSMENT DOCUMENTED: ICD-10-PCS | Mod: HCNC,CPTII,S$GLB, | Performed by: INTERNAL MEDICINE

## 2023-01-11 PROCEDURE — 1126F PR PAIN SEVERITY QUANTIFIED, NO PAIN PRESENT: ICD-10-PCS | Mod: HCNC,CPTII,S$GLB, | Performed by: INTERNAL MEDICINE

## 2023-01-11 PROCEDURE — 3074F SYST BP LT 130 MM HG: CPT | Mod: HCNC,CPTII,S$GLB, | Performed by: INTERNAL MEDICINE

## 2023-01-11 PROCEDURE — 99999 PR PBB SHADOW E&M-EST. PATIENT-LVL IV: CPT | Mod: PBBFAC,HCNC,, | Performed by: INTERNAL MEDICINE

## 2023-01-11 PROCEDURE — 99214 PR OFFICE/OUTPT VISIT, EST, LEVL IV, 30-39 MIN: ICD-10-PCS | Mod: HCNC,S$GLB,, | Performed by: INTERNAL MEDICINE

## 2023-01-11 PROCEDURE — 3078F DIAST BP <80 MM HG: CPT | Mod: HCNC,CPTII,S$GLB, | Performed by: INTERNAL MEDICINE

## 2023-01-11 PROCEDURE — 1159F PR MEDICATION LIST DOCUMENTED IN MEDICAL RECORD: ICD-10-PCS | Mod: HCNC,CPTII,S$GLB, | Performed by: INTERNAL MEDICINE

## 2023-01-11 PROCEDURE — 99999 PR PBB SHADOW E&M-EST. PATIENT-LVL IV: ICD-10-PCS | Mod: PBBFAC,HCNC,, | Performed by: INTERNAL MEDICINE

## 2023-01-11 PROCEDURE — 1101F PR PT FALLS ASSESS DOC 0-1 FALLS W/OUT INJ PAST YR: ICD-10-PCS | Mod: HCNC,CPTII,S$GLB, | Performed by: INTERNAL MEDICINE

## 2023-01-11 PROCEDURE — 3074F PR MOST RECENT SYSTOLIC BLOOD PRESSURE < 130 MM HG: ICD-10-PCS | Mod: HCNC,CPTII,S$GLB, | Performed by: INTERNAL MEDICINE

## 2023-01-11 PROCEDURE — 97110 THERAPEUTIC EXERCISES: CPT | Mod: HCNC,PN

## 2023-01-11 PROCEDURE — 1101F PT FALLS ASSESS-DOCD LE1/YR: CPT | Mod: HCNC,CPTII,S$GLB, | Performed by: INTERNAL MEDICINE

## 2023-01-11 PROCEDURE — 3078F PR MOST RECENT DIASTOLIC BLOOD PRESSURE < 80 MM HG: ICD-10-PCS | Mod: HCNC,CPTII,S$GLB, | Performed by: INTERNAL MEDICINE

## 2023-01-11 RX ORDER — AMLODIPINE BESYLATE 5 MG/1
5 TABLET ORAL DAILY
Qty: 90 TABLET | Refills: 3 | Status: SHIPPED | OUTPATIENT
Start: 2023-01-11 | End: 2023-10-26

## 2023-01-11 NOTE — PROGRESS NOTES
[facility]   Physical Therapy Treatment Note     Name: Ezequiel Hughes  Clinic Number: [id number]    Therapy Diagnosis:   Encounter Diagnosis   Name Primary?    Chronic neck pain Yes     Physician: Valery Ozuna MD    Visit Date: 1/11/2023    Physician Orders: PT Eval and Treat   Medical Diagnosis from Referral:   M54.12 (ICD-10-CM) - Cervical radiculopathy   M54.2,G89.29 (ICD-10-CM) - Chronic neck pain      Evaluation Date: 1/3/2023  Authorization Period Expiration: 12/28/23  Plan of Care Expiration: 3/19/23  Visit # / Visits authorized: 1/ 1; 1/20    PTA Visit #: 0/5      Precautions: HTN, CKD III, Thrombocytopenia, Pulmonary HTN, OA, Gout, Unspecified Inflammatory Spondylopathy, thoracic region, PLMD    Time In: 0745  Time Out: 0825  Total Billable Time: 40 minutes  SUBJECTIVE     Pt reports: Neck pain.  He was compliant with home exercise program.  Response to previous treatment: progress neck strength  Functional change: In progress    Pain: 5/10  Location: bilateral neck      OBJECTIVE     Objective Measures updated at progress report unless specified.     Treatment     Ezequiel received the treatments listed below:      therapeutic exercises to develop strength, ROM, and posture for 40 minutes including:  UBE   Shoulder shrugs 15# 2x10  Rows 45# 2x10  Supine DNF 2x10  Prone T  Prone chin tucks    Patient Education and Home Exercises     Home Exercises Provided and Patient Education Provided     Education provided:   - Home program    Written Home Exercises Provided: Patient instructed to cont prior HEP. Exercises were reviewed and Ezequiel was able to demonstrate them prior to the end of the session.  Ezequiel demonstrated good  understanding of the education provided. See EMR under Patient Instructions for exercises provided during therapy sessions    ASSESSMENT     The patient was instructed in and performed upper extremity and cervical range of motion and strengthening.  The patient  performed shoulder stability exercises with difficulty in the quadriped position.    Ezequiel Is progressing well towards his goals.   Pt prognosis is Good.     Pt will continue to benefit from skilled outpatient physical therapy to address the deficits listed in the problem list box on initial evaluation, provide pt/family education and to maximize pt's level of independence in the home and community environment.     Pt's spiritual, cultural and educational needs considered and pt agreeable to plan of care and goals.     Anticipated barriers to physical therapy: None    Goals:   Short Term Goals: In 5 weeks   1.I with HEP  2.Patient to increase cervical ROM by 25% with B side bending and B rotation.   3.Patient to increase DNF endurance to 28 sec. Or greater.  4.Patient to have pain less than 3/10 at all times.  5.Patient to score less than 25% impaired on the FOTO.     Long Term Goals: In 10 weeks  1. Patient to score less than 15% impaired on the FOTO.  2. Patient to demo increase in B Scapular strength to 5/5 gross MMT to support upright upper body posture for sitting and standing activities.  3. Patient to have decreased pain to 0/10 at all times.  4. Patient to demo increase cervical ROM to WNL to scan environment to drive safely.    PLAN     Plan of care Certification: 1/3/2023 to 3/19/23.     Outpatient Physical Therapy 2 times weekly for 10 weeks to include the following interventions: Manual Therapy, Moist Heat/ Ice, Neuromuscular Re-ed, Patient Education, Self Care, Therapeutic Activities, and Therapeutic Exercise, e-stim, FDN.    Home Anders, PT

## 2023-01-11 NOTE — PROGRESS NOTES
Subjective:   Patient ID:  Ezequiel Hughes is a 82 y.o. male who presents for follow-up of No chief complaint on file.  [facility] 7/5/22  The pre-procedure left ventricular end diastolic pressure was 13.  The post-procedure left ventricular end diastolic pressure was 16.  The ejection fraction was calculated to be 65%.  There was no aortic valve stenosis.  The estimated blood loss was none.  There was non-obstructive coronary artery disease..  The filling pressures on the right were mildly elevated.  ANOMALOUS TAKE OFF RCA.     The procedure log was documented by Documenter: Jeancarols Bates RN and verified by Lamar Thompson MD.   Pleasant patient in follow-up history hypertension hyperlipidemia followed by cardiology heart catheterization early in year showed nonobstructive coronary disease anomalous takeoff of the RCA otherwise good LV function.    01/11/2023 overall this patient is stable but lightheadedness blood pressure a little low today and has been low for the last several days will cut back on amlodipine to 5 mg once daily instead of twice daily continue with losartan 50 mg daily.  Mild coronary disease he continues exercise he has sciatica otherwise stable other complaints include numbness of his feet but otherwise stable and improved.      Review of Systems   Constitutional: Negative for chills, diaphoresis, night sweats, weight gain and weight loss.   HENT:  Negative for congestion, hoarse voice, sore throat and stridor.    Eyes:  Negative for double vision and pain.   Cardiovascular:  Negative for chest pain, claudication, cyanosis, dyspnea on exertion, irregular heartbeat, leg swelling, near-syncope, orthopnea, palpitations, paroxysmal nocturnal dyspnea and syncope.   Respiratory:  Negative for cough, hemoptysis, shortness of breath, sleep disturbances due to breathing, snoring, sputum production and wheezing.    Endocrine: Negative for cold intolerance, heat intolerance and polydipsia.   Hematologic/Lymphatic:  Negative for bleeding problem. Does not bruise/bleed easily.   Skin:  Negative for color change, dry skin and rash.   Musculoskeletal:  Negative for joint swelling and muscle cramps.   Gastrointestinal:  Negative for bloating, abdominal pain, constipation, diarrhea, dysphagia, melena, nausea and vomiting.   Genitourinary:  Negative for flank pain and urgency.   Neurological:  Negative for dizziness, focal weakness, headaches, light-headedness, loss of balance, seizures and weakness.   Psychiatric/Behavioral:  Negative for altered mental status and memory loss. The patient is not nervous/anxious.    Family History   Problem Relation Age of Onset    Lung cancer Father         life long smoker    Cancer Father         prostate, lung    Arthritis Mother     Stroke Sister         TIA    Cataracts Sister     Cancer Brother         prostate    Cataracts Brother     Cataracts Sister     Melanoma Neg Hx     Psoriasis Neg Hx     Lupus Neg Hx     Eczema Neg Hx     Diabetes Neg Hx     Heart disease Neg Hx     Kidney disease Neg Hx     Colon cancer Neg Hx      Past Medical History:   Diagnosis Date    Allergic rhinitis     Anemia     Back pain     BPH (benign prostatic hyperplasia)     Cancer     skin cancer to neck, Dr. Graves    Cataract     Disorder of kidney and ureter     ED (erectile dysfunction)     Hiatal hernia     small    History of colon polyps     colonoscopy 11/2016    HLD (hyperlipidemia)     Hyperlipidemia     Hypertension     Lateral epicondylitis     Lumbar radiculopathy 1/26/2022    OA (osteoarthritis)     GUIDO (obstructive sleep apnea)     Prostate cancer     Dr. Wong    TIA (transient ischemic attack)     Trouble in sleeping     Urge incontinence 3/29/2022     Social History     Socioeconomic History    Marital status:    Tobacco Use    Smoking status: Former     Packs/day: 3.00     Years: 35.00     Pack years: 105.00     Types: Cigarettes     Start date: 1/1/1960     Quit date: 7/23/1985      Years since quittin.4    Smokeless tobacco: Never   Substance and Sexual Activity    Alcohol use: Yes     Alcohol/week: 6.0 standard drinks     Types: 6 Drinks containing 0.5 oz of alcohol per week     Comment: socially    Drug use: No    Sexual activity: Yes     Partners: Female     Birth control/protection: None   Social History Narrative         Social Determinants of Health     Financial Resource Strain: Low Risk     Difficulty of Paying Living Expenses: Not hard at all   Food Insecurity: No Food Insecurity    Worried About Running Out of Food in the Last Year: Never true    Ran Out of Food in the Last Year: Never true   Transportation Needs: No Transportation Needs    Lack of Transportation (Medical): No    Lack of Transportation (Non-Medical): No   Physical Activity: Insufficiently Active    Days of Exercise per Week: 2 days    Minutes of Exercise per Session: 30 min   Stress: No Stress Concern Present    Feeling of Stress : Not at all   Social Connections: Socially Isolated    Frequency of Communication with Friends and Family: More than three times a week    Frequency of Social Gatherings with Friends and Family: More than three times a week    Attends Yazidi Services: Never    Active Member of Clubs or Organizations: No    Attends Club or Organization Meetings: Never    Marital Status:    Housing Stability: Low Risk     Unable to Pay for Housing in the Last Year: No    Number of Places Lived in the Last Year: 1    Unstable Housing in the Last Year: No     Current Outpatient Medications on File Prior to Visit   Medication Sig Dispense Refill    acetaminophen (TYLENOL ARTHRITIS PAIN ORAL) Take by mouth. Patient states that he takes 2 tablets in the morning and 2 tablets in the evening      alfuzosin (UROXATRAL) 10 mg Tb24 Take 1 tablet (10 mg total) by mouth daily with breakfast. 90 tablet 4    amLODIPine (NORVASC) 5 MG tablet Take 1 tablet (5 mg total) by mouth 2 (two) times daily.  180 tablet 3    atorvastatin (LIPITOR) 40 MG tablet TAKE 1 TABLET EVERY DAY 90 tablet 1    clopidogreL (PLAVIX) 75 mg tablet TAKE 1 TABLET EVERY DAY 90 tablet 3    finasteride (PROSCAR) 5 mg tablet Take 1 tablet (5 mg total) by mouth once daily. 90 tablet 4    fluticasone propionate (FLONASE) 50 mcg/actuation nasal spray USE 2 SPRAYS IN EACH NOSTRIL ONE TIME DAILY  (SUBSTITUTED FOR FLONASE) 48 g 3    ketorolac 0.5% (ACULAR) 0.5 % Drop Put one eyedrop in right eye four times a day. Start first drop morning of laser and stop after five days. 5 mL 0    losartan (COZAAR) 50 MG tablet TAKE 1 TABLET TWICE DAILY 180 tablet 3    methocarbamoL (ROBAXIN) 500 MG Tab Take 1 tablet (500 mg total) by mouth 3 (three) times daily as needed (muscle spasms). May cause drowsiness. 90 tablet 1    pantoprazole (PROTONIX) 40 MG tablet TAKE 1 TABLET EVERY DAY 90 tablet 3     No current facility-administered medications on file prior to visit.     Review of patient's allergies indicates:   Allergen Reactions    Atarax [hydroxyzine hcl] Other (See Comments)     Raised blood pressure     Zyrtec [cetirizine] Other (See Comments)     Raised blood pressure     Gold sodium thiosulfate      Patch test positive    Meloxicam Rash     Other reaction(s): hypertension       Objective:     Physical Exam  Eyes:      Pupils: Pupils are equal, round, and reactive to light.   Neck:      Trachea: No tracheal deviation.   Cardiovascular:      Rate and Rhythm: Normal rate and regular rhythm.      Pulses: Intact distal pulses.           Carotid pulses are 2+ on the right side and 2+ on the left side.       Radial pulses are 2+ on the right side and 2+ on the left side.        Femoral pulses are 2+ on the right side and 2+ on the left side.       Popliteal pulses are 2+ on the right side and 2+ on the left side.        Dorsalis pedis pulses are 2+ on the right side and 2+ on the left side.        Posterior tibial pulses are 2+ on the right side and 2+ on the  left side.      Heart sounds: Normal heart sounds. No murmur heard.    No friction rub. No gallop.   Pulmonary:      Effort: Pulmonary effort is normal. No respiratory distress.      Breath sounds: Normal breath sounds. No stridor. No wheezing or rales.   Chest:      Chest wall: No tenderness.   Abdominal:      General: There is no distension.      Tenderness: There is no abdominal tenderness. There is no rebound.   Musculoskeletal:         General: No tenderness.      Cervical back: Normal range of motion.   Skin:     General: Skin is warm and dry.   Neurological:      Mental Status: He is alert and oriented to person, place, and time.       Range & Units 7 mo ago   (6/8/22) 1 yr ago   (9/24/21) 1 yr ago   (3/3/21) 2 yr ago   (7/22/20) 2 yr ago   (1/23/20) 3 yr ago   (8/29/19) 3 yr ago   (7/16/19)    Cholesterol 120 - 199 mg/dL 132  139 CM  162 CM  144 CM  142 CM  146 CM  145 CM    Comment: The National Cholesterol Education Program (NCEP) has set the   following guidelines (reference ranges) for Cholesterol:   Optimal.....................<200 mg/dL   Borderline High.............200-239 mg/dL   High........................> or = 240 mg/dL    Triglycerides 30 - 150 mg/dL 88  82 CM  59 CM  104 CM  58 CM  73 CM  122 CM    Comment: The National Cholesterol Education Program (NCEP) has set the   following guidelines (reference values) for triglycerides:   Normal......................<150 mg/dL   Borderline High.............150-199 mg/dL   High........................200-499 mg/dL    HDL 40 - 75 mg/dL 56  58 CM  78 High  CM  55 CM  57 CM  60 CM  50 CM    Comment: The National Cholesterol Education Program (NCEP) has set the   following guidelines (reference values) for HDL Cholesterol:   Low...............<40 mg/dL   Optimal...........>60 mg/dL    LDL Cholesterol 63.0 - 159.0 mg/dL 58.4 Low   64.6 CM  72.2 CM  68.2 CM  73.4 CM  71.4 CM  70.6 CM    Comment: The National Cholesterol Education Program (NCEP) has set the    following guidelines (reference values) for LDL Cholesterol:   Optimal.......................<130 mg/dL   Borderline High...............130-159 mg/dL   High..........................160-189 mg/dL   Very High.....................>190 mg/dL    HDL/Cholesterol Ratio 20.0 - 50.0 % 42.4  41.7  48.1  38.2  40.1  41.1  34.5    Total Cholesterol/HDL Ratio 2.0 - 5.0 2.4  2.4  2.1  2.6  2.5  2.4  2.9    Non-HDL Cholesterol mg/dL 76  81 CM  84 CM  89 CM  85 CM  86 CM  95 CM    Comment: Risk category and Non-HDL cholesterol goals:   Coronary heart disease (CHD)or equivalent (10-year risk of CHD >20%):   Non-HDL cholesterol goal     <130 mg/dL      Assessment:     1. Incomplete right bundle branch block    2. Exertional chest pain    3. Other eosinophilia    4. Atherosclerosis of artery of both lower extremities    5. Pulmonary hypertension    6. Chest pain, unspecified type    7. Stage 3a chronic kidney disease    8. Mixed hyperlipidemia    9. Shortness of breath    10. Essential hypertension        Plan:     Incomplete right bundle branch block    Exertional chest pain    Other eosinophilia    Atherosclerosis of artery of both lower extremities    Pulmonary hypertension    Chest pain, unspecified type    Stage 3a chronic kidney disease    Mixed hyperlipidemia    Shortness of breath    Essential hypertension    Impression 1 atherosclerosis stable continues on statin medications and aspirin  2. Chest pain resolved   3. Lightheadedness most likely secondary to medications and will drop back on amlodipine to 5 mg daily follow-up blood pressure checks in next month blood pressure at home and see if this improves his overall symptoms.  No evidence of valvular heart disease LV function is in the normal range in mild coronary disease by recent catheterization.

## 2023-01-13 ENCOUNTER — CLINICAL SUPPORT (OUTPATIENT)
Dept: REHABILITATION | Facility: HOSPITAL | Age: 83
End: 2023-01-13
Payer: MEDICARE

## 2023-01-13 DIAGNOSIS — G89.29 CHRONIC NECK PAIN: Primary | ICD-10-CM

## 2023-01-13 DIAGNOSIS — M54.2 CHRONIC NECK PAIN: Primary | ICD-10-CM

## 2023-01-13 PROCEDURE — 97140 MANUAL THERAPY 1/> REGIONS: CPT | Mod: HCNC,PN

## 2023-01-13 PROCEDURE — 97110 THERAPEUTIC EXERCISES: CPT | Mod: HCNC,PN

## 2023-01-13 PROCEDURE — 97112 NEUROMUSCULAR REEDUCATION: CPT | Mod: HCNC,PN

## 2023-01-13 NOTE — PROGRESS NOTES
"[facility]   Physical Therapy Treatment Note     Name: Ezequiel Hughes  Clinic Number: [id number]    Therapy Diagnosis:   Encounter Diagnosis   Name Primary?    Chronic neck pain Yes     Physician: Valery Ozuna MD    Visit Date: 1/13/2023    Physician Orders: PT Eval and Treat   Medical Diagnosis from Referral:   M54.12 (ICD-10-CM) - Cervical radiculopathy   M54.2,G89.29 (ICD-10-CM) - Chronic neck pain      Evaluation Date: 1/3/2023  Authorization Period Expiration: 12/28/23  Plan of Care Expiration: 3/19/23  Visit # / Visits authorized: 1/ 1; 3/20    PTA Visit #: 0/5      Precautions: HTN, CKD III, Thrombocytopenia, Pulmonary HTN, OA, Gout, Unspecified Inflammatory Spondylopathy, thoracic region, PLMD    Time In: 8:10  Time Out: 9:00  Total Billable Time: 50 minutes  SUBJECTIVE     Pt reports: neck pain with motion and occasional dizziness.   He was compliant with home exercise program.  Response to previous treatment: progress neck strength  Functional change: In progress    Pain: 5/10  Location: bilateral neck      OBJECTIVE     Objective Measures updated at progress report unless specified.     Treatment     Ezequiel received the treatments listed below:      therapeutic exercises to develop strength, ROM, and posture for 10 minutes including:  Prone cervical retraction 1x10 3" hold  Open books 1x10 B  Cat/cow 6x  SNAGS cervical extension and rotation 1x8 eas    Ezequiel received the following manual therapy techniques: Joint mobilizations, Manual traction, and Soft tissue Mobilization were applied to the: cervical spine for 15 minutes, including:  Cervical distraction with retraction  Post occ glide  PA mobs mod cervical to upper thoracic grade II to III  Subocc release  Lateral glide  PA cervical mob with AA rotation   OA mobs sup  STM to UT, SCM, LS, cervical extensors    Ezequiel participated in neuromuscular re-education activities to improve: Balance, Coordination, Kinesthetic, Sense, " Proprioception, Posture, and canalith repositioning for 25 minutes. The following activities were included:  Seated VOR 1min 2x v/h  Standing NBOS: horiz head motion 1x10 eyes open and eyes closed 1x10  NBOS vertical head motion eyes open 1x10 and 1x10 eyes closed  STS from low mat with gaze stabilization 1x10  Floor taps with looking at floor and standing to find target at neutral 1x10  Amb with head turns quick horiz motion at every third step  Amb prolonged head turn for full 3 steps      Patient Education and Home Exercises     Home Exercises Provided and Patient Education Provided     Education provided:   - Home program    Written Home Exercises Provided: Patient instructed to cont prior HEP. Exercises were reviewed and Ezequiel was able to demonstrate them prior to the end of the session.  Ezequiel demonstrated good  understanding of the education provided. See EMR under Patient Instructions for exercises provided during therapy sessions    ASSESSMENT     Pt reports occasional dizziness with cervical motion, most likely due to severe cervical mobility restrictions as well as recent BP medication change. 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 Pt instructed in cervical SNAGS and adaptation/habituation training, handout provided. Pt did demonstrate unsteadiness with dynamic balance tasks and ambulation with head turns. Min cues provided for postural awareness and control. Pt would benefit from cont PT to address remaining deficits and promote functional independence with minimal fall risk.    Ezequiel Is progressing well towards his goals.   Pt prognosis is Good.     Pt will continue to benefit from skilled outpatient physical therapy to address the deficits listed in the problem list box on initial evaluation, provide pt/family education and to maximize pt's level of independence in the home and community environment.     Pt's spiritual,  cultural and educational needs considered and pt agreeable to plan of care and goals.     Anticipated barriers to physical therapy: None    Goals:   Short Term Goals: In 5 weeks   1.I with HEP  2.Patient to increase cervical ROM by 25% with B side bending and B rotation.   3.Patient to increase DNF endurance to 28 sec. Or greater.  4.Patient to have pain less than 3/10 at all times.  5.Patient to score less than 25% impaired on the FOTO.     Long Term Goals: In 10 weeks  1. Patient to score less than 15% impaired on the FOTO.  2. Patient to demo increase in B Scapular strength to 5/5 gross MMT to support upright upper body posture for sitting and standing activities.  3. Patient to have decreased pain to 0/10 at all times.  4. Patient to demo increase cervical ROM to WNL to scan environment to drive safely.    PLAN     Plan of care Certification: 1/3/2023 to 3/19/23.     Outpatient Physical Therapy 2 times weekly for 10 weeks to include the following interventions: Manual Therapy, Moist Heat/ Ice, Neuromuscular Re-ed, Patient Education, Self Care, Therapeutic Activities, and Therapeutic Exercise, e-stim, FDN.    Kaleigh Hopson, PT

## 2023-01-17 ENCOUNTER — LAB VISIT (OUTPATIENT)
Dept: LAB | Facility: HOSPITAL | Age: 83
End: 2023-01-17
Attending: FAMILY MEDICINE
Payer: MEDICARE

## 2023-01-17 DIAGNOSIS — D69.6 THROMBOCYTOPENIA: ICD-10-CM

## 2023-01-17 DIAGNOSIS — R73.09 ABNORMAL GLUCOSE: ICD-10-CM

## 2023-01-17 DIAGNOSIS — E78.2 MIXED HYPERLIPIDEMIA: ICD-10-CM

## 2023-01-17 DIAGNOSIS — I70.203 ATHEROSCLEROSIS OF ARTERY OF BOTH LOWER EXTREMITIES: ICD-10-CM

## 2023-01-17 DIAGNOSIS — N18.31 STAGE 3A CHRONIC KIDNEY DISEASE: ICD-10-CM

## 2023-01-17 DIAGNOSIS — D64.9 ANEMIA, UNSPECIFIED TYPE: ICD-10-CM

## 2023-01-17 DIAGNOSIS — I27.20 PULMONARY HYPERTENSION: ICD-10-CM

## 2023-01-17 DIAGNOSIS — M10.9 GOUT, UNSPECIFIED CAUSE, UNSPECIFIED CHRONICITY, UNSPECIFIED SITE: ICD-10-CM

## 2023-01-17 DIAGNOSIS — M54.12 CERVICAL RADICULOPATHY: ICD-10-CM

## 2023-01-17 DIAGNOSIS — M47.817 LUMBOSACRAL SPONDYLOSIS WITHOUT MYELOPATHY: ICD-10-CM

## 2023-01-17 LAB
ALBUMIN SERPL BCP-MCNC: 4 G/DL (ref 3.5–5.2)
ALBUMIN/CREAT UR: 17.3 UG/MG (ref 0–30)
ALP SERPL-CCNC: 57 U/L (ref 55–135)
ALT SERPL W/O P-5'-P-CCNC: 10 U/L (ref 10–44)
ANION GAP SERPL CALC-SCNC: 5 MMOL/L (ref 8–16)
AST SERPL-CCNC: 13 U/L (ref 10–40)
BASOPHILS # BLD AUTO: 0.06 K/UL (ref 0–0.2)
BASOPHILS NFR BLD: 1.4 % (ref 0–1.9)
BILIRUB SERPL-MCNC: 1 MG/DL (ref 0.1–1)
BUN SERPL-MCNC: 19 MG/DL (ref 8–23)
CALCIUM SERPL-MCNC: 9.1 MG/DL (ref 8.7–10.5)
CHLORIDE SERPL-SCNC: 108 MMOL/L (ref 95–110)
CHOLEST SERPL-MCNC: 134 MG/DL (ref 120–199)
CHOLEST/HDLC SERPL: 2.5 {RATIO} (ref 2–5)
CO2 SERPL-SCNC: 25 MMOL/L (ref 23–29)
CREAT SERPL-MCNC: 1.2 MG/DL (ref 0.5–1.4)
CREAT UR-MCNC: 81 MG/DL (ref 23–375)
DIFFERENTIAL METHOD: ABNORMAL
EOSINOPHIL # BLD AUTO: 0.2 K/UL (ref 0–0.5)
EOSINOPHIL NFR BLD: 4.7 % (ref 0–8)
ERYTHROCYTE [DISTWIDTH] IN BLOOD BY AUTOMATED COUNT: 12.1 % (ref 11.5–14.5)
EST. GFR  (NO RACE VARIABLE): 59.6 ML/MIN/1.73 M^2
ESTIMATED AVG GLUCOSE: 105 MG/DL (ref 68–131)
GLUCOSE SERPL-MCNC: 89 MG/DL (ref 70–110)
HBA1C MFR BLD: 5.3 % (ref 4–5.6)
HCT VFR BLD AUTO: 39.3 % (ref 40–54)
HDLC SERPL-MCNC: 53 MG/DL (ref 40–75)
HDLC SERPL: 39.6 % (ref 20–50)
HGB BLD-MCNC: 12.6 G/DL (ref 14–18)
IMM GRANULOCYTES # BLD AUTO: 0.01 K/UL (ref 0–0.04)
IMM GRANULOCYTES NFR BLD AUTO: 0.2 % (ref 0–0.5)
LDLC SERPL CALC-MCNC: 67.6 MG/DL (ref 63–159)
LYMPHOCYTES # BLD AUTO: 0.8 K/UL (ref 1–4.8)
LYMPHOCYTES NFR BLD: 17.2 % (ref 18–48)
MCH RBC QN AUTO: 31.6 PG (ref 27–31)
MCHC RBC AUTO-ENTMCNC: 32.1 G/DL (ref 32–36)
MCV RBC AUTO: 99 FL (ref 82–98)
MICROALBUMIN UR DL<=1MG/L-MCNC: 14 UG/ML
MONOCYTES # BLD AUTO: 0.4 K/UL (ref 0.3–1)
MONOCYTES NFR BLD: 9.3 % (ref 4–15)
NEUTROPHILS # BLD AUTO: 3 K/UL (ref 1.8–7.7)
NEUTROPHILS NFR BLD: 67.2 % (ref 38–73)
NONHDLC SERPL-MCNC: 81 MG/DL
NRBC BLD-RTO: 0 /100 WBC
PLATELET # BLD AUTO: 128 K/UL (ref 150–450)
PMV BLD AUTO: 8.6 FL (ref 9.2–12.9)
POTASSIUM SERPL-SCNC: 4.3 MMOL/L (ref 3.5–5.1)
PROT SERPL-MCNC: 6.6 G/DL (ref 6–8.4)
RBC # BLD AUTO: 3.99 M/UL (ref 4.6–6.2)
SODIUM SERPL-SCNC: 138 MMOL/L (ref 136–145)
T4 FREE SERPL-MCNC: 0.9 NG/DL (ref 0.71–1.51)
TRIGL SERPL-MCNC: 67 MG/DL (ref 30–150)
TSH SERPL DL<=0.005 MIU/L-ACNC: 1.58 UIU/ML (ref 0.4–4)
URATE SERPL-MCNC: 5.1 MG/DL (ref 3.4–7)
WBC # BLD AUTO: 4.43 K/UL (ref 3.9–12.7)

## 2023-01-17 PROCEDURE — 83036 HEMOGLOBIN GLYCOSYLATED A1C: CPT | Mod: HCNC | Performed by: FAMILY MEDICINE

## 2023-01-17 PROCEDURE — 80053 COMPREHEN METABOLIC PANEL: CPT | Mod: HCNC | Performed by: FAMILY MEDICINE

## 2023-01-17 PROCEDURE — 80061 LIPID PANEL: CPT | Mod: HCNC | Performed by: FAMILY MEDICINE

## 2023-01-17 PROCEDURE — 84443 ASSAY THYROID STIM HORMONE: CPT | Mod: HCNC | Performed by: FAMILY MEDICINE

## 2023-01-17 PROCEDURE — 36415 COLL VENOUS BLD VENIPUNCTURE: CPT | Mod: HCNC,PO | Performed by: FAMILY MEDICINE

## 2023-01-17 PROCEDURE — 82570 ASSAY OF URINE CREATININE: CPT | Mod: HCNC | Performed by: FAMILY MEDICINE

## 2023-01-17 PROCEDURE — 85025 COMPLETE CBC W/AUTO DIFF WBC: CPT | Mod: HCNC | Performed by: FAMILY MEDICINE

## 2023-01-17 PROCEDURE — 84550 ASSAY OF BLOOD/URIC ACID: CPT | Mod: HCNC | Performed by: FAMILY MEDICINE

## 2023-01-17 PROCEDURE — 84439 ASSAY OF FREE THYROXINE: CPT | Mod: HCNC | Performed by: FAMILY MEDICINE

## 2023-01-18 ENCOUNTER — OUTSIDE PLACE OF SERVICE (OUTPATIENT)
Dept: OPHTHALMOLOGY | Facility: CLINIC | Age: 83
End: 2023-01-18
Payer: MEDICARE

## 2023-01-18 PROCEDURE — 65855 PR TRABECULOPLASTY LASER SURGERY: ICD-10-PCS | Mod: RT,,, | Performed by: OPHTHALMOLOGY

## 2023-01-18 PROCEDURE — 65855 TRABECULOPLASTY LASER SURG: CPT | Mod: RT,,, | Performed by: OPHTHALMOLOGY

## 2023-01-18 NOTE — PROGRESS NOTES
Ezequiel,     Your lab results are within recommended goals for your age and conditions. No change in therapy is needed. Please let me know if you have further questions.    Sincerely,   Valery Ozuna MD

## 2023-01-19 ENCOUNTER — DOCUMENTATION ONLY (OUTPATIENT)
Dept: REHABILITATION | Facility: HOSPITAL | Age: 83
End: 2023-01-19
Payer: MEDICARE

## 2023-01-19 ENCOUNTER — CLINICAL SUPPORT (OUTPATIENT)
Dept: REHABILITATION | Facility: HOSPITAL | Age: 83
End: 2023-01-19
Payer: MEDICARE

## 2023-01-19 DIAGNOSIS — G89.29 CHRONIC NECK PAIN: Primary | ICD-10-CM

## 2023-01-19 DIAGNOSIS — M54.2 CHRONIC NECK PAIN: Primary | ICD-10-CM

## 2023-01-19 PROCEDURE — 97140 MANUAL THERAPY 1/> REGIONS: CPT | Mod: HCNC,PN

## 2023-01-19 PROCEDURE — 97110 THERAPEUTIC EXERCISES: CPT | Mod: HCNC,PN

## 2023-01-19 NOTE — PROGRESS NOTES
Health  Consult Note    Name: Ezequiel Hughes  Clinic Number: [id number]  Physician: No ref. provider found  Past Medical History:   Diagnosis Date    Allergic rhinitis     Anemia     Back pain     BPH (benign prostatic hyperplasia)     Cancer     skin cancer to neck, Dr. Graves    Cataract     Disorder of kidney and ureter     ED (erectile dysfunction)     Hiatal hernia     small    History of colon polyps     colonoscopy 11/2016    HLD (hyperlipidemia)     Hyperlipidemia     Hypertension     Lateral epicondylitis     Lumbar radiculopathy 1/26/2022    OA (osteoarthritis)     GUIDO (obstructive sleep apnea)     Prostate cancer     Dr. Wong    TIA (transient ischemic attack)     Trouble in sleeping     Urge incontinence 3/29/2022     Time In:   Time Out:    Health  Agreement signed: y    Coaching performed performed: yes    Subjective:   Patient reports main goal of increasing energy and decreasing neck pain.     Vision:  overall happy with health would just like to improve energy level and decrease pain    Values: energy increase    Strengths:  low stress, positive     Challenges:  neck pain, low energy    Support:  family    Hobbies:  working on cars, yard work    Objective:  Ezequiel was instructed to continue healthy neck.       INITIAL date 1/19/23  One a scale of 1-10, with 10 being 100% happy, how would you rate your happiness in each of the wellness areas below?    Happiness:         1     2     3     4     5     6     7     8     9     10    Initial Date: DC Date: +/- Total Change   Exercise/Movement 8     Physical Health 6     Stress Level 1     Nutrition 8     Sleep 9     Play 10     Body Image 10     Relationships 10     Energy/Vitality 3       Assessment:   Patient was instructed to maintain healthy neck program and will check in with me in 2 weeks.    Plan:  Patient goals for next consult include maintaining healthy neck program    Health : Maritza Barry MA  1/19/2023

## 2023-01-22 NOTE — PROGRESS NOTES
[facility]   Physical Therapy Treatment Note     Name: Ezequiel Hughes  Clinic Number: [id number]    Therapy Diagnosis:   Encounter Diagnosis   Name Primary?    Chronic neck pain Yes     Physician: Valery Ozuna MD    Visit Date: 1/19/2023    Physician Orders: PT Eval and Treat   Medical Diagnosis from Referral:   M54.12 (ICD-10-CM) - Cervical radiculopathy   M54.2,G89.29 (ICD-10-CM) - Chronic neck pain      Evaluation Date: 1/3/2023  Authorization Period Expiration: 12/28/23  Plan of Care Expiration: 3/19/23  Visit # / Visits authorized: 1/ 1; 3/20    PTA Visit #: 0/5      Precautions: HTN, CKD III, Thrombocytopenia, Pulmonary HTN, OA, Gout, Unspecified Inflammatory Spondylopathy, thoracic region, PLMD    Time In: 0800  Time Out: 0845  Total Billable Time: 40 minutes    SUBJECTIVE     Pt reports: neck pain and stiffness  He was compliant with home exercise program.  Response to previous treatment: progress neck strength  Functional change: In progress    Pain: 5/10  Location: bilateral neck      OBJECTIVE     Objective Measures updated at progress report unless specified.     Treatment     Ezequiel received the treatments listed below:      therapeutic exercises to develop strength, ROM, and posture for 37 minutes including:  UBE   Shoulder shrugs 15# 2x10  Rows 45# 2x10  Supine DNF 2x10  Quadriped birdogs  Quadriped thread the needle  Prone T  Prone chin tucks    MANUAL THERAPY TECHNIQUES including Joint mobilizations, Manual traction, and Soft tissue Mobilization were applied to the cervical spine for 8 minutes.     Patient Education and Home Exercises     Home Exercises Provided and Patient Education Provided     Education provided:   - Home program    Written Home Exercises Provided: Patient instructed to cont prior HEP. Exercises were reviewed and Ezequiel was able to demonstrate them prior to the end of the session.  Ezequiel demonstrated good  understanding of the education provided. See EMR  under Patient Instructions for exercises provided during therapy sessions    ASSESSMENT     The patient was instructed in and performed upper extremity and cervical range of motion and strengthening.  He was cervical stiffness and tightness of the shoulder musculature.    Ezequiel Is progressing well towards his goals.   Pt prognosis is Good.     Pt will continue to benefit from skilled outpatient physical therapy to address the deficits listed in the problem list box on initial evaluation, provide pt/family education and to maximize pt's level of independence in the home and community environment.     Pt's spiritual, cultural and educational needs considered and pt agreeable to plan of care and goals.     Anticipated barriers to physical therapy: None    Goals:   Short Term Goals: In 5 weeks   1.I with HEP  2.Patient to increase cervical ROM by 25% with B side bending and B rotation.   3.Patient to increase DNF endurance to 28 sec. Or greater.  4.Patient to have pain less than 3/10 at all times.  5.Patient to score less than 25% impaired on the FOTO.     Long Term Goals: In 10 weeks  1. Patient to score less than 15% impaired on the FOTO.  2. Patient to demo increase in B Scapular strength to 5/5 gross MMT to support upright upper body posture for sitting and standing activities.  3. Patient to have decreased pain to 0/10 at all times.  4. Patient to demo increase cervical ROM to WNL to scan environment to drive safely.    PLAN     Plan of care Certification: 1/3/2023 to 3/19/23.     Outpatient Physical Therapy 2 times weekly for 10 weeks to include the following interventions: Manual Therapy, Moist Heat/ Ice, Neuromuscular Re-ed, Patient Education, Self Care, Therapeutic Activities, and Therapeutic Exercise, e-stim, FDN.    Home Anders, PT

## 2023-01-23 ENCOUNTER — CLINICAL SUPPORT (OUTPATIENT)
Dept: REHABILITATION | Facility: HOSPITAL | Age: 83
End: 2023-01-23
Payer: MEDICARE

## 2023-01-23 DIAGNOSIS — G89.29 CHRONIC NECK PAIN: Primary | ICD-10-CM

## 2023-01-23 DIAGNOSIS — I27.20 PULMONARY HYPERTENSION: Primary | ICD-10-CM

## 2023-01-23 DIAGNOSIS — M54.2 CHRONIC NECK PAIN: Primary | ICD-10-CM

## 2023-01-23 PROCEDURE — 97110 THERAPEUTIC EXERCISES: CPT | Mod: HCNC,PN

## 2023-01-23 PROCEDURE — 97140 MANUAL THERAPY 1/> REGIONS: CPT | Mod: HCNC,PN

## 2023-01-23 NOTE — PROGRESS NOTES
[facility]   Physical Therapy Treatment Note     Name: Ezequiel Hughes  Clinic Number: [id number]    Therapy Diagnosis:   Encounter Diagnosis   Name Primary?    Chronic neck pain Yes       Physician: Valery Ozuna MD    Visit Date: 1/23/2023    Physician Orders: PT Eval and Treat   Medical Diagnosis from Referral:   M54.12 (ICD-10-CM) - Cervical radiculopathy   M54.2,G89.29 (ICD-10-CM) - Chronic neck pain      Evaluation Date: 1/3/2023  Authorization Period Expiration: 12/28/23  Plan of Care Expiration: 3/19/23  Visit # / Visits authorized: 1/ 1; 4/20    PTA Visit #: 0/5      Precautions: HTN, CKD III, Thrombocytopenia, Pulmonary HTN, OA, Gout, Unspecified Inflammatory Spondylopathy, thoracic region, PLMD    Time In: 0800  Time Out: 0845  Total Billable Time: 40 minutes    SUBJECTIVE     Pt reports: fewer episodes of dizziness and states he is stiff  He was compliant with home exercise program.  Response to previous treatment: progress neck strength  Functional change: In progress    Pain: 5/10  Location: bilateral neck      OBJECTIVE     Objective Measures updated at progress report unless specified.     Treatment     Ezequiel received the treatments listed below:      therapeutic exercises to develop strength, ROM, and posture for 35 minutes including:  UBE   Shoulder shrugs 15# 2x10  Rows 45# 2x10  Supine DNF 2x10  Quadriped birdogs  Quadriped thread the needle  Prone T  Prone chin tucks    MANUAL THERAPY TECHNIQUES including Joint mobilizations, Manual traction, and Soft tissue Mobilization were applied to the cervical spine for 10 minutes.   Shoulder range of motion with joint mobilization and scapular mobs  Cervical traction with glides    Patient Education and Home Exercises     Home Exercises Provided and Patient Education Provided     Education provided:   - Home program    Written Home Exercises Provided: Patient instructed to cont prior HEP. Exercises were reviewed and Ezequiel was  able to demonstrate them prior to the end of the session.  Ezequiel demonstrated good  understanding of the education provided. See EMR under Patient Instructions for exercises provided during therapy sessions    ASSESSMENT     The patient was instructed in and performed upper extremity and cervical range of motion and strengthening.  He has reduced shoulder range of motion bilaterally.  He has rigidity in the cervical spine.    Ezequiel Is progressing well towards his goals.   Pt prognosis is Good.     Pt will continue to benefit from skilled outpatient physical therapy to address the deficits listed in the problem list box on initial evaluation, provide pt/family education and to maximize pt's level of independence in the home and community environment.     Pt's spiritual, cultural and educational needs considered and pt agreeable to plan of care and goals.     Anticipated barriers to physical therapy: None    Goals:   Short Term Goals: In 5 weeks   1.I with HEP  2.Patient to increase cervical ROM by 25% with B side bending and B rotation.   3.Patient to increase DNF endurance to 28 sec. Or greater.  4.Patient to have pain less than 3/10 at all times.  5.Patient to score less than 25% impaired on the FOTO.     Long Term Goals: In 10 weeks  1. Patient to score less than 15% impaired on the FOTO.  2. Patient to demo increase in B Scapular strength to 5/5 gross MMT to support upright upper body posture for sitting and standing activities.  3. Patient to have decreased pain to 0/10 at all times.  4. Patient to demo increase cervical ROM to WNL to scan environment to drive safely.    PLAN     Plan of care Certification: 1/3/2023 to 3/19/23.     Outpatient Physical Therapy 2 times weekly for 10 weeks to include the following interventions: Manual Therapy, Moist Heat/ Ice, Neuromuscular Re-ed, Patient Education, Self Care, Therapeutic Activities, and Therapeutic Exercise, e-stim, FDN.    Home Anders, PT

## 2023-01-27 ENCOUNTER — HOSPITAL ENCOUNTER (OUTPATIENT)
Dept: CARDIOLOGY | Facility: HOSPITAL | Age: 83
Discharge: HOME | End: 2023-01-27
Attending: INTERNAL MEDICINE
Payer: MEDICARE

## 2023-01-27 ENCOUNTER — OFFICE VISIT (OUTPATIENT)
Dept: CARDIOLOGY | Facility: CLINIC | Age: 83
End: 2023-01-27
Payer: MEDICARE

## 2023-01-27 VITALS
HEIGHT: 70 IN | SYSTOLIC BLOOD PRESSURE: 90 MMHG | WEIGHT: 200.38 LBS | DIASTOLIC BLOOD PRESSURE: 60 MMHG | BODY MASS INDEX: 28.69 KG/M2 | HEART RATE: 60 BPM | OXYGEN SATURATION: 99 %

## 2023-01-27 DIAGNOSIS — J84.10 LUNG GRANULOMA: ICD-10-CM

## 2023-01-27 DIAGNOSIS — I27.20 PULMONARY HYPERTENSION: ICD-10-CM

## 2023-01-27 DIAGNOSIS — M54.16 LUMBAR RADICULOPATHY, CHRONIC: ICD-10-CM

## 2023-01-27 DIAGNOSIS — C61 PROSTATE CANCER: ICD-10-CM

## 2023-01-27 DIAGNOSIS — I45.10 INCOMPLETE RIGHT BUNDLE BRANCH BLOCK: ICD-10-CM

## 2023-01-27 DIAGNOSIS — R06.02 SHORTNESS OF BREATH: Primary | ICD-10-CM

## 2023-01-27 DIAGNOSIS — M79.89 LEG SWELLING: ICD-10-CM

## 2023-01-27 DIAGNOSIS — I70.0 AORTIC ATHEROSCLEROSIS: ICD-10-CM

## 2023-01-27 DIAGNOSIS — R94.31 ABNORMAL EKG: ICD-10-CM

## 2023-01-27 DIAGNOSIS — N18.31 STAGE 3A CHRONIC KIDNEY DISEASE: ICD-10-CM

## 2023-01-27 DIAGNOSIS — I10 PRIMARY HYPERTENSION: ICD-10-CM

## 2023-01-27 DIAGNOSIS — I70.203 ATHEROSCLEROSIS OF ARTERY OF BOTH LOWER EXTREMITIES: ICD-10-CM

## 2023-01-27 DIAGNOSIS — G47.33 OBSTRUCTIVE SLEEP APNEA SYNDROME: ICD-10-CM

## 2023-01-27 DIAGNOSIS — M54.12 CERVICAL RADICULOPATHY: ICD-10-CM

## 2023-01-27 DIAGNOSIS — E78.2 MIXED HYPERLIPIDEMIA: ICD-10-CM

## 2023-01-27 PROCEDURE — 3078F DIAST BP <80 MM HG: CPT | Mod: HCNC,CPTII,S$GLB, | Performed by: INTERNAL MEDICINE

## 2023-01-27 PROCEDURE — 1126F PR PAIN SEVERITY QUANTIFIED, NO PAIN PRESENT: ICD-10-PCS | Mod: HCNC,CPTII,S$GLB, | Performed by: INTERNAL MEDICINE

## 2023-01-27 PROCEDURE — 3078F PR MOST RECENT DIASTOLIC BLOOD PRESSURE < 80 MM HG: ICD-10-PCS | Mod: HCNC,CPTII,S$GLB, | Performed by: INTERNAL MEDICINE

## 2023-01-27 PROCEDURE — 3074F PR MOST RECENT SYSTOLIC BLOOD PRESSURE < 130 MM HG: ICD-10-PCS | Mod: HCNC,CPTII,S$GLB, | Performed by: INTERNAL MEDICINE

## 2023-01-27 PROCEDURE — 1126F AMNT PAIN NOTED NONE PRSNT: CPT | Mod: HCNC,CPTII,S$GLB, | Performed by: INTERNAL MEDICINE

## 2023-01-27 PROCEDURE — 1101F PT FALLS ASSESS-DOCD LE1/YR: CPT | Mod: HCNC,CPTII,S$GLB, | Performed by: INTERNAL MEDICINE

## 2023-01-27 PROCEDURE — 3074F SYST BP LT 130 MM HG: CPT | Mod: HCNC,CPTII,S$GLB, | Performed by: INTERNAL MEDICINE

## 2023-01-27 PROCEDURE — 93010 ELECTROCARDIOGRAM REPORT: CPT | Mod: HCNC,,, | Performed by: INTERNAL MEDICINE

## 2023-01-27 PROCEDURE — 3288F FALL RISK ASSESSMENT DOCD: CPT | Mod: HCNC,CPTII,S$GLB, | Performed by: INTERNAL MEDICINE

## 2023-01-27 PROCEDURE — 93010 EKG 12-LEAD: ICD-10-PCS | Mod: HCNC,,, | Performed by: INTERNAL MEDICINE

## 2023-01-27 PROCEDURE — 1159F MED LIST DOCD IN RCRD: CPT | Mod: HCNC,CPTII,S$GLB, | Performed by: INTERNAL MEDICINE

## 2023-01-27 PROCEDURE — 1101F PR PT FALLS ASSESS DOC 0-1 FALLS W/OUT INJ PAST YR: ICD-10-PCS | Mod: HCNC,CPTII,S$GLB, | Performed by: INTERNAL MEDICINE

## 2023-01-27 PROCEDURE — 99999 PR PBB SHADOW E&M-EST. PATIENT-LVL IV: CPT | Mod: PBBFAC,HCNC,, | Performed by: INTERNAL MEDICINE

## 2023-01-27 PROCEDURE — 3288F PR FALLS RISK ASSESSMENT DOCUMENTED: ICD-10-PCS | Mod: HCNC,CPTII,S$GLB, | Performed by: INTERNAL MEDICINE

## 2023-01-27 PROCEDURE — 99999 PR PBB SHADOW E&M-EST. PATIENT-LVL IV: ICD-10-PCS | Mod: PBBFAC,HCNC,, | Performed by: INTERNAL MEDICINE

## 2023-01-27 PROCEDURE — 1159F PR MEDICATION LIST DOCUMENTED IN MEDICAL RECORD: ICD-10-PCS | Mod: HCNC,CPTII,S$GLB, | Performed by: INTERNAL MEDICINE

## 2023-01-27 PROCEDURE — 99213 PR OFFICE/OUTPT VISIT, EST, LEVL III, 20-29 MIN: ICD-10-PCS | Mod: HCNC,S$GLB,, | Performed by: INTERNAL MEDICINE

## 2023-01-27 PROCEDURE — 99213 OFFICE O/P EST LOW 20 MIN: CPT | Mod: HCNC,S$GLB,, | Performed by: INTERNAL MEDICINE

## 2023-01-27 PROCEDURE — 93005 ELECTROCARDIOGRAM TRACING: CPT | Mod: HCNC

## 2023-01-27 NOTE — PROGRESS NOTES
Subjective:   Patient ID:  Ezequiel Hughes is a 82 y.o. male who presents for follow up of No chief complaint on file.      HPI  An 81 yo male with hlp htn tia jonelle is here for f/u he has a prostate nodule . He is in digital hypertension has no angina no shortness of breath . Has no other issues clinically of dizziness lightheadedness chest pain shortness of breath. He is not exercising due to covid. Has no signs of uti . Has prostatism.      Today he comes complaining of neck pain and and complaining of bilateral lower extremity numbness and weakness he ahs no falls or claudication. He uses a pad in his foot he thinks one is shorter than the other. He ahs not been exercising. He had an joceline with exercise that does not suggest pad. The ahs lumbar arthropathy this is all suggestive of neurogenic claudication he also ha srt toe pain. Has no orthopnea pnd syncope palpitation shortness of breath he started doing upper body exercise and take b12. Has been compliant with diet his digital htn suggest that he is compliant.      5/28/2021  HERE COMPLAINING OF FEELING FATIGUED TIRED GETS LIGHTHEADED HAS NO ENERGY HE IS SHORT OF BREATH WITH MINIMAL EXERTION HE HAS NOT EXERCISED HE IS AFRAID BECAUSE OF HIS SYMPTOMS. HE IS NOT SLEEPING WELL AT NITE FRAGMENTED SLEEP . HE CLAIMS COMPLIANCE WITH SALT BP HAS BEEN FLUCTUATING.     8/10/2021  HERE FOR  F/U HAS NO NEW COMPLAINTS HAS NUMBNESS IN LEGS WHEN HE STANDS UP HAS NO CHEST PAIN NO PALPITATION NO SYNCOPE NEAR SYNCOPE NO TIA NO ANGINA. HE EXCERCISED ON HIS TREADMILL THIS MORNING HE DID NOT EXERCISE A LOT HE DID NOT FEEL LIKE HAS NO SYMPTOMS.      11/10/2021   Back to the health club he is doing ett exercise he has significant back pain leg numbness has at times difficulty with walking . Has numbness leg pain suggestive neurogenic claudication he cannot stand long he is getting weakness and numbness in legs. He has failed physical therapy he has no cardiac symptoms since he has been  back on treadmill.           6/29/2022   here for f/u his major concern is decrease in exercise tolerance exertional shortness of breath fatigue. He is not able to do his treadmill he is afraid now . Has no leg swelling has  A new cpap he is not using. He was admitted to [facility] with chest pain on 6/7/2022. He still has some dizziness he is referred to neurosurgery he has some hydrocephalus on mri echo showed normal lvf cards saw in hospital recommended repeat cardiolite     7/27/2022   here for f /u after heart cath  . Has non obs disease. He is back in the gym he still feels no energy.has no new complaints otherwise I think he has issues with anxiety he is lighthedead he is getting worked up with neurology and may be later ent. He exercises on the treadmill he is asymptomatic.     1/27/2023   Has labile htn he is on amlodipine 5 m,g po bid and losartan 50 mg po bid. He is not drinking regularily had 4 beers yesterday. H   He has significant c spine issues has been getting physical; therapy with improvement as no other issues clinically  ekg shows sinus rhythm with pac. Has no dizziness lightheadedness. Back to to the gym    Past Medical History:   Diagnosis Date    Allergic rhinitis     Anemia     Back pain     BPH (benign prostatic hyperplasia)     Cancer     skin cancer to neck, Dr. Graves    Cataract     Disorder of kidney and ureter     ED (erectile dysfunction)     Hiatal hernia     small    History of colon polyps     colonoscopy 11/2016    HLD (hyperlipidemia)     Hyperlipidemia     Hypertension     Lateral epicondylitis     Lumbar radiculopathy 1/26/2022    OA (osteoarthritis)     GUIDO (obstructive sleep apnea)     Prostate cancer     Dr. Wong    TIA (transient ischemic attack)     Trouble in sleeping     Urge incontinence 3/29/2022       Past Surgical History:   Procedure Laterality Date    ANGIOGRAPHY OF UPPER EXTREMITY Left 07/05/2022    Procedure: Angiogram Extremity Bilateral;  Surgeon: Aparna  ENIO Thompson MD;  Location: [facility] CATH LAB;  Service: Cardiology;  Laterality: Left;    ARTERIOGRAPHY OF AORTIC ROOT N/A 07/05/2022    Procedure: ARTERIOGRAM, AORTIC ROOT;  Surgeon: Aparna Thompson MD;  Location: [facility] CATH LAB;  Service: Cardiology;  Laterality: N/A;    CATARACT EXTRACTION W/  INTRAOCULAR LENS IMPLANT Right 02/21/2018    I-Stent    CATARACT EXTRACTION W/  INTRAOCULAR LENS IMPLANT Left 03/21/2018    I - Stent    CATHETERIZATION OF BOTH LEFT AND RIGHT HEART N/A 07/05/2022    Procedure: CATHETERIZATION, HEART, BOTH LEFT AND RIGHT;  Surgeon: Aparna Thompson MD;  Location: [facility] CATH LAB;  Service: Cardiology;  Laterality: N/A;  radial approach    COLONOSCOPY N/A 11/14/2016    Procedure: COLONOSCOPY;  Surgeon: Karuna Rodriguez MD;  Location: [facility] ENDO;  Service: Endoscopy;  Laterality: N/A;    COLONOSCOPY N/A 09/22/2020    Procedure: COLONOSCOPY;  Surgeon: Chuy Fish MD;  Location: [facility] ENDO;  Service: Endoscopy;  Laterality: N/A;    EYE SURGERY      HEMORRHOID SURGERY      I-STENT Right 02/21/2018    DR. REECE    KNEE ARTHROSCOPY W/ MENISCAL REPAIR Right 2015    Dr. Jain    PCIOL Right 02/21/2018    DR. REECE    PLANTAR FASCIA RELEASE      right    ROTATOR CUFF REPAIR Bilateral     bilateral    SELECTIVE INJECTION OF ANESTHETIC AGENT AROUND LUMBAR SPINAL NERVE ROOT BY TRANSFORAMINAL APPROACH Bilateral 01/26/2022    Procedure: Bilateral L4/5 TF IAN with RN IV sedation;  Surgeon: Mak Young MD;  Location: [facility] PAIN MGT;  Service: Pain Management;  Laterality: Bilateral;    SELECTIVE INJECTION OF ANESTHETIC AGENT AROUND LUMBAR SPINAL NERVE ROOT BY TRANSFORAMINAL APPROACH Bilateral 03/14/2022    Procedure: Bilateral L4/5 TF IAN with RN IV sedation;  Surgeon: Mak Young MD;  Location: [facility] PAIN [facility]GT;  Service: Pain Management;  Laterality: Bilateral;    SELECTIVE INJECTION OF ANESTHETIC AGENT AROUND LUMBAR SPINAL NERVE ROOT BY TRANSFORAMINAL APPROACH Bilateral 06/02/2022    Procedure:  Bilateral L4/5 TF IAN - D2P Dr. Castro [facility];  Surgeon: Abel Haywood MD;  Location: [facility] PAIN MGT;  Service: Pain Management;  Laterality: Bilateral;    SELECTIVE INJECTION OF ANESTHETIC AGENT AROUND LUMBAR SPINAL NERVE ROOT BY TRANSFORAMINAL APPROACH Bilateral 2022    Procedure: Bilateral L4/5 TF IAN;  Surgeon: Abel Haywood MD;  Location: [facility] PAIN MGT;  Service: Pain Management;  Laterality: Bilateral;    SHOULDER SURGERY Bilateral around     Dr. Pepper.  rotator cuff surgeries    VASECTOMY         Social History     Tobacco Use    Smoking status: Former     Packs/day: 3.00     Years: 35.00     Pack years: 105.00     Types: Cigarettes     Start date: 1960     Quit date: 1985     Years since quittin.5    Smokeless tobacco: Never   Substance Use Topics    Alcohol use: Yes     Alcohol/week: 6.0 standard drinks     Types: 6 Drinks containing 0.5 oz of alcohol per week     Comment: socially    Drug use: No       Family History   Problem Relation Age of Onset    Lung cancer Father         life long smoker    Cancer Father         prostate, lung    Arthritis Mother     Stroke Sister         TIA    Cataracts Sister     Cancer Brother         prostate    Cataracts Brother     Cataracts Sister     Melanoma Neg Hx     Psoriasis Neg Hx     Lupus Neg Hx     Eczema Neg Hx     Diabetes Neg Hx     Heart disease Neg Hx     Kidney disease Neg Hx     Colon cancer Neg Hx        Current Outpatient Medications   Medication Sig    acetaminophen (TYLENOL ARTHRITIS PAIN ORAL) Take by mouth. Patient states that he takes 2 tablets in the morning and 2 tablets in the evening    alfuzosin (UROXATRAL) 10 mg Tb24 Take 1 tablet (10 mg total) by mouth daily with breakfast.    amLODIPine (NORVASC) 5 MG tablet Take 1 tablet (5 mg total) by mouth once daily.    atorvastatin (LIPITOR) 40 MG tablet TAKE 1 TABLET EVERY DAY    clopidogreL (PLAVIX) 75 mg tablet TAKE 1 TABLET EVERY DAY    finasteride (PROSCAR) 5 mg  tablet Take 1 tablet (5 mg total) by mouth once daily.    fluticasone propionate (FLONASE) 50 mcg/actuation nasal spray USE 2 SPRAYS IN EACH NOSTRIL ONE TIME DAILY  (SUBSTITUTED FOR FLONASE)    ketorolac 0.5% (ACULAR) 0.5 % Drop Put one eyedrop in right eye four times a day. Start first drop morning of laser and stop after five days.    losartan (COZAAR) 50 MG tablet TAKE 1 TABLET TWICE DAILY    methocarbamoL (ROBAXIN) 500 MG Tab Take 1 tablet (500 mg total) by mouth 3 (three) times daily as needed (muscle spasms). May cause drowsiness.    pantoprazole (PROTONIX) 40 MG tablet TAKE 1 TABLET EVERY DAY     No current facility-administered medications for this visit.     Current Outpatient Medications on File Prior to Visit   Medication Sig    acetaminophen (TYLENOL ARTHRITIS PAIN ORAL) Take by mouth. Patient states that he takes 2 tablets in the morning and 2 tablets in the evening    alfuzosin (UROXATRAL) 10 mg Tb24 Take 1 tablet (10 mg total) by mouth daily with breakfast.    amLODIPine (NORVASC) 5 MG tablet Take 1 tablet (5 mg total) by mouth once daily.    atorvastatin (LIPITOR) 40 MG tablet TAKE 1 TABLET EVERY DAY    clopidogreL (PLAVIX) 75 mg tablet TAKE 1 TABLET EVERY DAY    finasteride (PROSCAR) 5 mg tablet Take 1 tablet (5 mg total) by mouth once daily.    fluticasone propionate (FLONASE) 50 mcg/actuation nasal spray USE 2 SPRAYS IN EACH NOSTRIL ONE TIME DAILY  (SUBSTITUTED FOR FLONASE)    ketorolac 0.5% (ACULAR) 0.5 % Drop Put one eyedrop in right eye four times a day. Start first drop morning of laser and stop after five days.    losartan (COZAAR) 50 MG tablet TAKE 1 TABLET TWICE DAILY    methocarbamoL (ROBAXIN) 500 MG Tab Take 1 tablet (500 mg total) by mouth 3 (three) times daily as needed (muscle spasms). May cause drowsiness.    pantoprazole (PROTONIX) 40 MG tablet TAKE 1 TABLET EVERY DAY     No current facility-administered medications on file prior to visit.     Review of patient's allergies indicates:    Allergen Reactions    Atarax [hydroxyzine hcl] Other (See Comments)     Raised blood pressure     Zyrtec [cetirizine] Other (See Comments)     Raised blood pressure     Gold sodium thiosulfate      Patch test positive    Meloxicam Rash     Other reaction(s): hypertension      Review of Systems   Constitutional: Negative for malaise/fatigue.   Eyes:  Negative for blurred vision.   Cardiovascular:  Negative for chest pain, claudication, cyanosis, dyspnea on exertion, irregular heartbeat, leg swelling, near-syncope, orthopnea, palpitations and paroxysmal nocturnal dyspnea.   Respiratory:  Negative for cough, hemoptysis and shortness of breath.    Hematologic/Lymphatic: Negative for bleeding problem. Does not bruise/bleed easily.   Skin:  Negative for dry skin and itching.   Musculoskeletal:  Negative for falls, muscle weakness and myalgias.   Gastrointestinal:  Negative for abdominal pain, diarrhea, heartburn, hematemesis, hematochezia and melena.   Genitourinary:  Negative for flank pain and hematuria.   Neurological:  Positive for dizziness. Negative for focal weakness, headaches, light-headedness, numbness, paresthesias, seizures and weakness.   Psychiatric/Behavioral:  Negative for altered mental status and memory loss. The patient is not nervous/anxious.    Allergic/Immunologic: Negative for hives.     Objective:   Physical Exam  Vitals and nursing note reviewed.   Constitutional:       General: He is not in acute distress.     Appearance: He is well-developed. He is not diaphoretic.   HENT:      Head: Normocephalic and atraumatic.   Eyes:      General:         Right eye: No discharge.         Left eye: No discharge.      Pupils: Pupils are equal, round, and reactive to light.   Neck:      Thyroid: No thyromegaly.      Vascular: No JVD.   Cardiovascular:      Rate and Rhythm: Normal rate and regular rhythm.      Pulses: Normal pulses and intact distal pulses.           Carotid pulses are 2+ on the right side and  "2+ on the left side.       Radial pulses are 2+ on the right side and 2+ on the left side.        Femoral pulses are 2+ on the right side and 2+ on the left side.       Popliteal pulses are 2+ on the right side and 2+ on the left side.        Dorsalis pedis pulses are 2+ on the right side and 2+ on the left side.        Posterior tibial pulses are 2+ on the right side and 2+ on the left side.      Heart sounds: Murmur heard.   Harsh midsystolic murmur is present with a grade of 1/6 at the upper right sternal border radiating to the neck.     No friction rub. No gallop.   Pulmonary:      Effort: Pulmonary effort is normal. No respiratory distress.      Breath sounds: Normal breath sounds. No wheezing or rales.   Chest:      Chest wall: No tenderness.   Abdominal:      General: Bowel sounds are normal. There is no distension.      Palpations: Abdomen is soft.      Tenderness: There is no abdominal tenderness.   Musculoskeletal:         General: Normal range of motion.      Cervical back: Neck supple.      Right lower leg: No edema.      Left lower leg: No edema.   Skin:     General: Skin is warm and dry.      Findings: No erythema or rash.   Neurological:      Mental Status: He is alert and oriented to person, place, and time.      Cranial Nerves: No cranial nerve deficit.   Psychiatric:         Mood and Affect: Mood normal.         Behavior: Behavior normal.     Vitals:    01/27/23 1450 01/27/23 1457   BP: 110/68 90/60   Pulse: 60 60   SpO2: 99%    Weight: 90.9 kg (200 lb 6.4 oz)    Height: 5' 10" (1.778 m)      Lab Results   Component Value Date    CHOL 134 01/17/2023    CHOL 132 06/08/2022    CHOL 139 09/24/2021     Lab Results   Component Value Date    HDL 53 01/17/2023    HDL 56 06/08/2022    HDL 58 09/24/2021     Lab Results   Component Value Date    LDLCALC 67.6 01/17/2023    LDLCALC 58.4 (L) 06/08/2022    LDLCALC 64.6 09/24/2021     Lab Results   Component Value Date    TRIG 67 01/17/2023    TRIG 88 06/08/2022 "    TRIG 82 09/24/2021     Lab Results   Component Value Date    CHOLHDL 39.6 01/17/2023    CHOLHDL 42.4 06/08/2022    CHOLHDL 41.7 09/24/2021       Chemistry        Component Value Date/Time     01/17/2023 0824    K 4.3 01/17/2023 0824     01/17/2023 0824    CO2 25 01/17/2023 0824    BUN 19 01/17/2023 0824    CREATININE 1.2 01/17/2023 0824    GLU 89 01/17/2023 0824        Component Value Date/Time    CALCIUM 9.1 01/17/2023 0824    ALKPHOS 57 01/17/2023 0824    AST 13 01/17/2023 0824    ALT 10 01/17/2023 0824    BILITOT 1.0 01/17/2023 0824    ESTGFRAFRICA 57.9 (A) 06/29/2022 0917    EGFRNONAA 50.1 (A) 06/29/2022 0917          Lab Results   Component Value Date    TSH 1.584 01/17/2023     Lab Results   Component Value Date    INR 1.2 06/29/2022    INR 1.1 02/24/2021    INR 1.2 07/07/2020     Lab Results   Component Value Date    WBC 4.43 01/17/2023    HGB 12.6 (L) 01/17/2023    HCT 39.3 (L) 01/17/2023    MCV 99 (H) 01/17/2023     (L) 01/17/2023     BMP  Sodium   Date Value Ref Range Status   01/17/2023 138 136 - 145 mmol/L Final     Potassium   Date Value Ref Range Status   01/17/2023 4.3 3.5 - 5.1 mmol/L Final     Chloride   Date Value Ref Range Status   01/17/2023 108 95 - 110 mmol/L Final     CO2   Date Value Ref Range Status   01/17/2023 25 23 - 29 mmol/L Final     BUN   Date Value Ref Range Status   01/17/2023 19 8 - 23 mg/dL Final     Creatinine   Date Value Ref Range Status   01/17/2023 1.2 0.5 - 1.4 mg/dL Final     Calcium   Date Value Ref Range Status   01/17/2023 9.1 8.7 - 10.5 mg/dL Final     Anion Gap   Date Value Ref Range Status   01/17/2023 5 (L) 8 - 16 mmol/L Final     eGFR if    Date Value Ref Range Status   06/29/2022 57.9 (A) >60 mL/min/1.73 m^2 Final     eGFR if non    Date Value Ref Range Status   06/29/2022 50.1 (A) >60 mL/min/1.73 m^2 Final     Comment:     Calculation used to obtain the estimated glomerular filtration  rate (eGFR) is the  CKD-EPI equation.        CrCl cannot be calculated (Patient's most recent lab result is older than the maximum 7 days allowed.).    Assessment:     1. Shortness of breath    2. Cervical radiculopathy    3. Lumbar radiculopathy, chronic    4. Lung granuloma    5. Pulmonary hypertension    6. Abnormal EKG    7. Aortic atherosclerosis    8. Atherosclerosis of artery of both lower extremities    9. Mixed hyperlipidemia    10. Primary hypertension    11. Incomplete right bundle branch block    12. Stage 3a chronic kidney disease    13. Prostate cancer    14. Obstructive sleep apnea syndrome    15. Leg swelling    Overall stable clinically will continue current regimen and follow with digital htn. He is compliant with meds  and counseled about salt compliance  Lipids on target continue same meds. And counseled about compliance  Kleber compliant continue same    Counseled about hidden sources of salt.  Plan:     Continue current therapy  Cardiac low salt diet.  Risk factor modification and excercise program.  F/u in 6 months with lipid cmp .

## 2023-01-28 ENCOUNTER — PATIENT MESSAGE (OUTPATIENT)
Dept: CARDIOLOGY | Facility: CLINIC | Age: 83
End: 2023-01-28
Payer: MEDICARE

## 2023-01-30 ENCOUNTER — PATIENT MESSAGE (OUTPATIENT)
Dept: CARDIOLOGY | Facility: CLINIC | Age: 83
End: 2023-01-30
Payer: MEDICARE

## 2023-02-01 ENCOUNTER — CLINICAL SUPPORT (OUTPATIENT)
Dept: REHABILITATION | Facility: HOSPITAL | Age: 83
End: 2023-02-01
Payer: MEDICARE

## 2023-02-01 DIAGNOSIS — G89.29 CHRONIC NECK PAIN: Primary | ICD-10-CM

## 2023-02-01 DIAGNOSIS — M54.2 CHRONIC NECK PAIN: Primary | ICD-10-CM

## 2023-02-01 PROCEDURE — 97140 MANUAL THERAPY 1/> REGIONS: CPT | Mod: HCNC,PN

## 2023-02-01 PROCEDURE — 97530 THERAPEUTIC ACTIVITIES: CPT | Mod: HCNC,PN

## 2023-02-01 PROCEDURE — 97110 THERAPEUTIC EXERCISES: CPT | Mod: HCNC,PN

## 2023-02-01 PROCEDURE — 97112 NEUROMUSCULAR REEDUCATION: CPT | Mod: HCNC,PN

## 2023-02-01 NOTE — PROGRESS NOTES
"[facility]   Physical Therapy Treatment Note     Name: Ezequiel Hughes  Clinic Number: [id number]    Therapy Diagnosis:   Encounter Diagnosis   Name Primary?    Chronic neck pain Yes       Physician: Valery Ozuna MD    Visit Date: 2/1/2023    Physician Orders: PT Eval and Treat   Medical Diagnosis from Referral:   M54.12 (ICD-10-CM) - Cervical radiculopathy   M54.2,G89.29 (ICD-10-CM) - Chronic neck pain      Evaluation Date: 1/3/2023  Authorization Period Expiration: 12/28/23  Plan of Care Expiration: 3/19/23  Visit # / Visits authorized: 1/ 1; 4/20    PTA Visit #: 0/5      Precautions: HTN, CKD III, Thrombocytopenia, Pulmonary HTN, OA, Gout, Unspecified Inflammatory Spondylopathy, thoracic region, PLMD    Time In: 7:30  Time Out: 0830  Total Billable Time: 60 minutes    SUBJECTIVE     Pt reports: fewer episodes of dizziness and states he is stiff  He was compliant with home exercise program.  Response to previous treatment: progress neck strength  Functional change: In progress    Pain: 5/10  Location: bilateral neck      OBJECTIVE     Objective Measures updated at progress report unless specified.     Treatment     Ezequiel received the treatments listed below:      therapeutic exercises to develop strength, ROM, and posture for 10 minutes including:  Quadriped cat cow 8x  Quadriped thread the needle 6x B  Prone cervical retraction 1x10 5" hold   Prone chin tucks    MANUAL THERAPY TECHNIQUES including Joint mobilizations, Manual traction, and Soft tissue Mobilization were applied to the cervical spine for 15 minutes.   Shoulder range of motion with joint mobilization and scapular mobs  Cervical traction with glides  OA mobs  Subocc release  Lateral glide cervical mobs  PA mobs mid cervical to upper thoracic  Mobs with passive rotation   STM to UT, STM, LS, cervical extensors    Ezequiel participated in neuromuscular re-education activities to improve: Balance, Coordination, Kinesthetic, Sense, " Proprioception, and Posture for 25 minutes. The following activities were included:  NBOS: head horiz motion 10x eyes open and 10x eyes closed    Head vertical motion 1x10 eyes open and closed  Amb drills 3 step sequence quick head turn 2 laps  Amb drill 3 step seq prolonged horix hold 2 laps  Reverse stepping to seek target at back 1x10 B  Standing wall presses for postural control and awareness emphasis scap retraction and depression  Standing cat cow emphasis on thoracic ext for posture      Pt participated in dynamic functional therapeutic activities to improve functional performance for 10  minutes, including:  Seated alt unilateral 5# KB lift  1/2 kneel to stand transfer  1/2 kneel with thoracic rotation for mobility and carrying/lifting         Patient Education and Home Exercises     Home Exercises Provided and Patient Education Provided     Education provided:   - Home program    Written Home Exercises Provided: Patient instructed to cont prior HEP. Exercises were reviewed and Ezequiel was able to demonstrate them prior to the end of the session.  Ezequiel demonstrated good  understanding of the education provided. See EMR under Patient Instructions for exercises provided during therapy sessions    ASSESSMENT      Emphasis placed on manual therapy to improve cervical and thoracic mobility and vestibular rehab to improve dynamic balance. Pt instructed to cont cervical SNAGS and adaptation/habituation training. Min cues provided for postural awareness and control especially into thoracic extension. Pt would benefit from cont PT to address remaining deficits and promote functional independence with minimal fall risk.     Ezequiel Is progressing well towards his goals.   Pt prognosis is Good.     Pt will continue to benefit from skilled outpatient physical therapy to address the deficits listed in the problem list box on initial evaluation, provide pt/family education and to maximize pt's level of independence in the  home and community environment.     Pt's spiritual, cultural and educational needs considered and pt agreeable to plan of care and goals.     Anticipated barriers to physical therapy: None    Goals:   Short Term Goals: In 5 weeks   1.I with HEP  2.Patient to increase cervical ROM by 25% with B side bending and B rotation.   3.Patient to increase DNF endurance to 28 sec. Or greater.  4.Patient to have pain less than 3/10 at all times.  5.Patient to score less than 25% impaired on the FOTO.     Long Term Goals: In 10 weeks  1. Patient to score less than 15% impaired on the FOTO.  2. Patient to demo increase in B Scapular strength to 5/5 gross MMT to support upright upper body posture for sitting and standing activities.  3. Patient to have decreased pain to 0/10 at all times.  4. Patient to demo increase cervical ROM to WNL to scan environment to drive safely.    PLAN     Plan of care Certification: 1/3/2023 to 3/19/23.     Outpatient Physical Therapy 2 times weekly for 10 weeks to include the following interventions: Manual Therapy, Moist Heat/ Ice, Neuromuscular Re-ed, Patient Education, Self Care, Therapeutic Activities, and Therapeutic Exercise, e-stim, FDN.    Kaleigh Hopson, PT

## 2023-02-03 ENCOUNTER — PATIENT MESSAGE (OUTPATIENT)
Dept: PULMONOLOGY | Facility: CLINIC | Age: 83
End: 2023-02-03
Payer: MEDICARE

## 2023-02-03 ENCOUNTER — CLINICAL SUPPORT (OUTPATIENT)
Dept: REHABILITATION | Facility: HOSPITAL | Age: 83
End: 2023-02-03
Payer: MEDICARE

## 2023-02-03 DIAGNOSIS — M54.2 CHRONIC NECK PAIN: Primary | ICD-10-CM

## 2023-02-03 DIAGNOSIS — G89.29 CHRONIC NECK PAIN: Primary | ICD-10-CM

## 2023-02-03 PROCEDURE — 97112 NEUROMUSCULAR REEDUCATION: CPT | Mod: HCNC,PN

## 2023-02-03 PROCEDURE — 97110 THERAPEUTIC EXERCISES: CPT | Mod: HCNC,PN

## 2023-02-03 PROCEDURE — 97140 MANUAL THERAPY 1/> REGIONS: CPT | Mod: HCNC,PN

## 2023-02-03 NOTE — PROGRESS NOTES
"[facility]   Physical Therapy Treatment Note     Name: Ezequiel Hughes  Clinic Number: [id number]    Therapy Diagnosis:   Encounter Diagnosis   Name Primary?    Chronic neck pain Yes       Physician: Valery Ozuna MD    Visit Date: 2/3/2023    Physician Orders: PT Eval and Treat   Medical Diagnosis from Referral:   M54.12 (ICD-10-CM) - Cervical radiculopathy   M54.2,G89.29 (ICD-10-CM) - Chronic neck pain      Evaluation Date: 1/3/2023  Authorization Period Expiration: 12/28/23  Plan of Care Expiration: 3/19/23  Visit # / Visits authorized: 1/ 1; 5/20    PTA Visit #: 0/5      Precautions: HTN, CKD III, Thrombocytopenia, Pulmonary HTN, OA, Gout, Unspecified Inflammatory Spondylopathy, thoracic region, PLMD    Time In: 7:30  Time Out: 0830  Total Billable Time: 60 minutes    SUBJECTIVE     Pt reports: no dizziness but is very stiff this morning.   He was compliant with home exercise program.  Response to previous treatment: progress neck strength  Functional change: In progress    Pain: 3/10  Location: bilateral neck      OBJECTIVE     Objective Measures updated at progress report unless specified.     Treatment     Ezequiel received the treatments listed below:      therapeutic exercises to develop strength, ROM, and posture for 10 minutes including:  Quadriped cat cow 8x  Quadriped thread the needle 6x B  Prone cervical retraction 1x10 5" hold   Prone chin tucks  Cable row 40# 2x10    MANUAL THERAPY TECHNIQUES including Joint mobilizations, Manual traction, and Soft tissue Mobilization were applied to the cervical spine for 15 minutes.   Shoulder range of motion with joint mobilization and scapular mobs  Cervical traction with glides  OA mobs  Subocc release  Lateral glide cervical mobs  PA mobs mid cervical to upper thoracic  Mobs with passive rotation   STM to UT, STM, LS, cervical extensors    Ezequiel participated in neuromuscular re-education activities to improve: Balance, Coordination, " Kinesthetic, Sense, Proprioception, and Posture for 25 minutes. The following activities were included:  NBOS: head horiz motion 10x eyes open and 10x eyes closed    Head vertical motion 1x10 eyes open and closed  Amb drills 3 step sequence quick head turn 2 laps  Amb drill 3 step seq prolonged horix hold 2 laps  Reverse stepping to seek target at back 1x10 B  Mod SL palloff with trunk rot and head/eye motion 1x10 ea LE ea side   High marches with head rotation 1 lap  Resisted cervical retraction and flexion rtb with Ts and Vs 3# 1x15 ea position min to mod cues for postural awareness and sustained contraction for cervical extensors      Pt participated in dynamic functional therapeutic activities to improve functional performance for 0  minutes, including:  Seated alt unilateral 5# KB lift  1/2 kneel to stand transfer  1/2 kneel with thoracic rotation for mobility and carrying/lifting         Patient Education and Home Exercises     Home Exercises Provided and Patient Education Provided     Education provided:   - Home program    Written Home Exercises Provided: Patient instructed to cont prior HEP. Exercises were reviewed and Ezequiel was able to demonstrate them prior to the end of the session.  Ezequiel demonstrated good  understanding of the education provided. See EMR under Patient Instructions for exercises provided during therapy sessions    ASSESSMENT      Emphasis placed on manual therapy to improve cervical and thoracic mobility and vestibular rehab to improve dynamic balance. Pt instructed to cont cervical SNAGS and resisted cervical retraction/thoracic extension for postural awareness. Min to mod cues provided for postural awareness and encourage head rotation with motion during dynamic motions. Pt would benefit from cont PT to address remaining deficits and promote functional independence with minimal fall risk.     Ezequiel Is progressing well towards his goals.   Pt prognosis is Good.     Pt will continue to  benefit from skilled outpatient physical therapy to address the deficits listed in the problem list box on initial evaluation, provide pt/family education and to maximize pt's level of independence in the home and community environment.     Pt's spiritual, cultural and educational needs considered and pt agreeable to plan of care and goals.     Anticipated barriers to physical therapy: None    Goals:   Short Term Goals: In 5 weeks   1.I with HEP  2.Patient to increase cervical ROM by 25% with B side bending and B rotation.   3.Patient to increase DNF endurance to 28 sec. Or greater.  4.Patient to have pain less than 3/10 at all times.  5.Patient to score less than 25% impaired on the FOTO.     Long Term Goals: In 10 weeks  1. Patient to score less than 15% impaired on the FOTO.  2. Patient to demo increase in B Scapular strength to 5/5 gross MMT to support upright upper body posture for sitting and standing activities.  3. Patient to have decreased pain to 0/10 at all times.  4. Patient to demo increase cervical ROM to WNL to scan environment to drive safely.    PLAN     Plan of care Certification: 1/3/2023 to 3/19/23.     Outpatient Physical Therapy 2 times weekly for 10 weeks to include the following interventions: Manual Therapy, Moist Heat/ Ice, Neuromuscular Re-ed, Patient Education, Self Care, Therapeutic Activities, and Therapeutic Exercise, e-stim, FDN.    Kaleigh Hopson, PT

## 2023-02-06 ENCOUNTER — CLINICAL SUPPORT (OUTPATIENT)
Dept: REHABILITATION | Facility: HOSPITAL | Age: 83
End: 2023-02-06
Payer: MEDICARE

## 2023-02-06 DIAGNOSIS — M54.2 CHRONIC NECK PAIN: Primary | ICD-10-CM

## 2023-02-06 DIAGNOSIS — G89.29 CHRONIC NECK PAIN: Primary | ICD-10-CM

## 2023-02-06 PROCEDURE — 97110 THERAPEUTIC EXERCISES: CPT | Mod: HCNC,PN

## 2023-02-06 PROCEDURE — 97140 MANUAL THERAPY 1/> REGIONS: CPT | Mod: HCNC,PN

## 2023-02-06 PROCEDURE — 97112 NEUROMUSCULAR REEDUCATION: CPT | Mod: HCNC,PN

## 2023-02-06 PROCEDURE — 97530 THERAPEUTIC ACTIVITIES: CPT | Mod: HCNC,PN

## 2023-02-07 ENCOUNTER — OFFICE VISIT (OUTPATIENT)
Dept: UROLOGY | Facility: CLINIC | Age: 83
End: 2023-02-07
Payer: MEDICARE

## 2023-02-07 ENCOUNTER — PATIENT MESSAGE (OUTPATIENT)
Dept: UROLOGY | Facility: CLINIC | Age: 83
End: 2023-02-07

## 2023-02-07 VITALS
WEIGHT: 199.31 LBS | BODY MASS INDEX: 28.6 KG/M2 | HEART RATE: 85 BPM | SYSTOLIC BLOOD PRESSURE: 110 MMHG | DIASTOLIC BLOOD PRESSURE: 70 MMHG

## 2023-02-07 DIAGNOSIS — C61 PROSTATE CANCER: Primary | ICD-10-CM

## 2023-02-07 PROCEDURE — 1159F PR MEDICATION LIST DOCUMENTED IN MEDICAL RECORD: ICD-10-PCS | Mod: HCNC,CPTII,S$GLB, | Performed by: UROLOGY

## 2023-02-07 PROCEDURE — 99213 PR OFFICE/OUTPT VISIT, EST, LEVL III, 20-29 MIN: ICD-10-PCS | Mod: HCNC,S$GLB,, | Performed by: UROLOGY

## 2023-02-07 PROCEDURE — 1159F MED LIST DOCD IN RCRD: CPT | Mod: HCNC,CPTII,S$GLB, | Performed by: UROLOGY

## 2023-02-07 PROCEDURE — 3078F DIAST BP <80 MM HG: CPT | Mod: HCNC,CPTII,S$GLB, | Performed by: UROLOGY

## 2023-02-07 PROCEDURE — 1160F PR REVIEW ALL MEDS BY PRESCRIBER/CLIN PHARMACIST DOCUMENTED: ICD-10-PCS | Mod: HCNC,CPTII,S$GLB, | Performed by: UROLOGY

## 2023-02-07 PROCEDURE — 3288F FALL RISK ASSESSMENT DOCD: CPT | Mod: HCNC,CPTII,S$GLB, | Performed by: UROLOGY

## 2023-02-07 PROCEDURE — 3074F SYST BP LT 130 MM HG: CPT | Mod: HCNC,CPTII,S$GLB, | Performed by: UROLOGY

## 2023-02-07 PROCEDURE — 99213 OFFICE O/P EST LOW 20 MIN: CPT | Mod: HCNC,S$GLB,, | Performed by: UROLOGY

## 2023-02-07 PROCEDURE — 1126F AMNT PAIN NOTED NONE PRSNT: CPT | Mod: HCNC,CPTII,S$GLB, | Performed by: UROLOGY

## 2023-02-07 PROCEDURE — 3078F PR MOST RECENT DIASTOLIC BLOOD PRESSURE < 80 MM HG: ICD-10-PCS | Mod: HCNC,CPTII,S$GLB, | Performed by: UROLOGY

## 2023-02-07 PROCEDURE — 99999 PR PBB SHADOW E&M-EST. PATIENT-LVL III: ICD-10-PCS | Mod: PBBFAC,HCNC,, | Performed by: UROLOGY

## 2023-02-07 PROCEDURE — 1101F PT FALLS ASSESS-DOCD LE1/YR: CPT | Mod: HCNC,CPTII,S$GLB, | Performed by: UROLOGY

## 2023-02-07 PROCEDURE — 3288F PR FALLS RISK ASSESSMENT DOCUMENTED: ICD-10-PCS | Mod: HCNC,CPTII,S$GLB, | Performed by: UROLOGY

## 2023-02-07 PROCEDURE — 1160F RVW MEDS BY RX/DR IN RCRD: CPT | Mod: HCNC,CPTII,S$GLB, | Performed by: UROLOGY

## 2023-02-07 PROCEDURE — 1126F PR PAIN SEVERITY QUANTIFIED, NO PAIN PRESENT: ICD-10-PCS | Mod: HCNC,CPTII,S$GLB, | Performed by: UROLOGY

## 2023-02-07 PROCEDURE — 99999 PR PBB SHADOW E&M-EST. PATIENT-LVL III: CPT | Mod: PBBFAC,HCNC,, | Performed by: UROLOGY

## 2023-02-07 PROCEDURE — 1101F PR PT FALLS ASSESS DOC 0-1 FALLS W/OUT INJ PAST YR: ICD-10-PCS | Mod: HCNC,CPTII,S$GLB, | Performed by: UROLOGY

## 2023-02-07 PROCEDURE — 3074F PR MOST RECENT SYSTOLIC BLOOD PRESSURE < 130 MM HG: ICD-10-PCS | Mod: HCNC,CPTII,S$GLB, | Performed by: UROLOGY

## 2023-02-07 NOTE — PROGRESS NOTES
"[facility]   Physical Therapy Treatment Note     Name: Ezequiel Hughes  Clinic Number: [id number]    Therapy Diagnosis:   Encounter Diagnosis   Name Primary?    Chronic neck pain Yes       Physician: Valery Ozuna MD    Visit Date: 2/6/2023    Physician Orders: PT Eval and Treat   Medical Diagnosis from Referral:   M54.12 (ICD-10-CM) - Cervical radiculopathy   M54.2,G89.29 (ICD-10-CM) - Chronic neck pain      Evaluation Date: 1/3/2023  Authorization Period Expiration: 12/28/23  Plan of Care Expiration: 3/19/23  Visit # / Visits authorized: 1/ 1; 6/20    PTA Visit #: 0/5      Precautions: HTN, CKD III, Thrombocytopenia, Pulmonary HTN, OA, Gout, Unspecified Inflammatory Spondylopathy, thoracic region, PLMD    Time In: 11:15  Time Out: 12:10  Total Billable Time: 55 minutes    SUBJECTIVE     Pt reports: feeling sore especially on right UT region  He was compliant with home exercise program.  Response to previous treatment: progress neck strength  Functional change: In progress    Pain: 3/10  Location: bilateral neck      OBJECTIVE     Objective Measures updated at progress report unless specified.     Treatment     Ezequiel received the treatments listed below:      therapeutic exercises to develop strength, ROM, and posture for 10 minutes including:  Quadriped cat cow 8x  Quadriped thread the needle 6x B  Prone cervical retraction 1x10 5" hold   Prone chin tucks      MANUAL THERAPY TECHNIQUES including Joint mobilizations, Manual traction, and Soft tissue Mobilization were applied to the cervical spine for 15 minutes.   Shoulder range of motion with joint mobilization and scapular mobs  Cervical traction with glides  OA mobs  Subocc release  Lateral glide cervical mobs  PA mobs mid cervical to upper thoracic  Mobs with passive rotation   STM to UT, STM, LS, cervical extensors    Ezequiel participated in neuromuscular re-education activities to improve: Balance, Coordination, Kinesthetic, Sense, " "Proprioception, and Posture for 15 minutes. The following activities were included:  Resisted cervical retraction and flexion rtb with Ts and Vs 3# 1x15 ea position min to mod cues for postural awareness and sustained contraction for cervical extensors  Standing wall presses for postural control and awareness emphasis scap retraction and depression  Seated scap retraction with band for postural facilitation and awareness min cues for head px  Mod side plank on elbow and knee 4x5"        Pt participated in dynamic functional therapeutic activities to improve functional performance for 15 minutes, including:  Seated alt unilateral 5# KB lift  Seated OH lift 8# for lifting in home  Hughes carry with cues for shoulder and head px   Elevated on shoulder with thread the needle for trunk rot and head rot for rolling and reaching         Patient Education and Home Exercises     Home Exercises Provided and Patient Education Provided     Education provided:   - Home program    Written Home Exercises Provided: Patient instructed to cont prior HEP. Exercises were reviewed and Ezequiel was able to demonstrate them prior to the end of the session.  Ezequiel demonstrated good  understanding of the education provided. See EMR under Patient Instructions for exercises provided during therapy sessions    ASSESSMENT      Emphasis placed on manual therapy to improve cervical and thoracic mobility and reduce pain and soreness. Pt instructed to cont cervical SNAGS and resisted cervical retraction/thoracic extension for postural awareness. Min to mod cues provided for postural awareness and encourage head rotation with motion during dynamic motions. Pt would benefit from cont PT to address remaining deficits and promote functional independence with minimal fall risk.     Ezequiel Is progressing well towards his goals.   Pt prognosis is Good.     Pt will continue to benefit from skilled outpatient physical therapy to address the deficits listed in " the problem list box on initial evaluation, provide pt/family education and to maximize pt's level of independence in the home and community environment.     Pt's spiritual, cultural and educational needs considered and pt agreeable to plan of care and goals.     Anticipated barriers to physical therapy: None    Goals:   Short Term Goals: In 5 weeks   1.I with HEP  2.Patient to increase cervical ROM by 25% with B side bending and B rotation.   3.Patient to increase DNF endurance to 28 sec. Or greater.  4.Patient to have pain less than 3/10 at all times.  5.Patient to score less than 25% impaired on the FOTO.     Long Term Goals: In 10 weeks  1. Patient to score less than 15% impaired on the FOTO.  2. Patient to demo increase in B Scapular strength to 5/5 gross MMT to support upright upper body posture for sitting and standing activities.  3. Patient to have decreased pain to 0/10 at all times.  4. Patient to demo increase cervical ROM to WNL to scan environment to drive safely.    PLAN     Plan of care Certification: 1/3/2023 to 3/19/23.     Outpatient Physical Therapy 2 times weekly for 10 weeks to include the following interventions: Manual Therapy, Moist Heat/ Ice, Neuromuscular Re-ed, Patient Education, Self Care, Therapeutic Activities, and Therapeutic Exercise, e-stim, FDN.    Kaleigh Hopson, PT

## 2023-02-07 NOTE — PROGRESS NOTES
CC: follow up prostate cancer    History of Present Illness:   Ezequiel Hughes is a 82 y.o. male here for evaluation of Other (Check up)    02/07/2023 - returns today to see me as his last scheduled appointment with Dr. Guy was canceled because she was out with COVID and patient did not want to wait three months to discuss his PSA, PSA 1.9, on review of his PSA this is on the upper level of where it has been but still well within his range, has been off of the alfuzosin for about 3-4 weeks because he forgot to reorder it, notes some incomplete emptying because of this, denies any gross hematuria or dysuria    9/28/22-On finasteride. Nocturia x 4-5. Tried flomax a long time ago and it didn't help. Drinks 2 cups of coffee in the am. Not drinking about an hour before bed. No gross hematuria.   6/30/22-Nocturia x 1 lately, but prior to the last 2 nights, it has been 4 times. Still on finasteride. He does have urgency and occasional UUI. If he goes out, he wears a pad. Stream is weak. No hesitancy. Recently, visits have gone to every 6 months. Pt reports occasional RLQ pain. He does have come constipation, but pain doesn't change with BM. Persistent for a month.    Prior records indicate last MRI and TRUS biopsy in 2020.   3/29/22- 81yo male with h/o Prostate cancer, here to establish care. He has previously seen Dr. Wong and was undergoing active surveillance. He reports that he was diagnosed with prostate cancer in 2014. He hasn't had any treatment. He is currently managed on finasteride. He does report some UUI, small amounts. He had a repeat TRUS biopsy in 2021, and pt reports path was unchanged. Also had MRIs around that time as well and states that he had a targetable lesion.         Review of Systems   Respiratory:  Negative for shortness of breath.    Cardiovascular:  Negative for chest pain and palpitations.   Musculoskeletal:  Positive for back pain.   Neurological:  Negative for dizziness.   All  other systems reviewed and are negative.      Past Medical History:   Diagnosis Date    Allergic rhinitis     Anemia     Back pain     BPH (benign prostatic hyperplasia)     Cancer     skin cancer to neck, Dr. Graves    Cataract     Disorder of kidney and ureter     ED (erectile dysfunction)     Hiatal hernia     small    History of colon polyps     colonoscopy 11/2016    HLD (hyperlipidemia)     Hyperlipidemia     Hypertension     Lateral epicondylitis     Lumbar radiculopathy 1/26/2022    OA (osteoarthritis)     GUIDO (obstructive sleep apnea)     Prostate cancer     Dr. Wong    TIA (transient ischemic attack)     Trouble in sleeping     Urge incontinence 3/29/2022       Past Surgical History:   Procedure Laterality Date    ANGIOGRAPHY OF UPPER EXTREMITY Left 07/05/2022    Procedure: Angiogram Extremity Bilateral;  Surgeon: Aparna Thompson MD;  Location: [facility] CATH LAB;  Service: Cardiology;  Laterality: Left;    ARTERIOGRAPHY OF AORTIC ROOT N/A 07/05/2022    Procedure: ARTERIOGRAM, AORTIC ROOT;  Surgeon: Aparna Thompson MD;  Location: [facility] CATH LAB;  Service: Cardiology;  Laterality: N/A;    CATARACT EXTRACTION W/  INTRAOCULAR LENS IMPLANT Right 02/21/2018    I-Stent    CATARACT EXTRACTION W/  INTRAOCULAR LENS IMPLANT Left 03/21/2018    I - Stent    CATHETERIZATION OF BOTH LEFT AND RIGHT HEART N/A 07/05/2022    Procedure: CATHETERIZATION, HEART, BOTH LEFT AND RIGHT;  Surgeon: Aparna Thompson MD;  Location: [facility] CATH LAB;  Service: Cardiology;  Laterality: N/A;  radial approach    COLONOSCOPY N/A 11/14/2016    Procedure: COLONOSCOPY;  Surgeon: Karuna Rodriguez MD;  Location: [facility] ENDO;  Service: Endoscopy;  Laterality: N/A;    COLONOSCOPY N/A 09/22/2020    Procedure: COLONOSCOPY;  Surgeon: Chuy Fish MD;  Location: [facility] ENDO;  Service: Endoscopy;  Laterality: N/A;    EYE SURGERY      HEMORRHOID SURGERY      I-STENT Right 02/21/2018    DR. REECE    KNEE ARTHROSCOPY W/ MENISCAL REPAIR Right 2015     Dr. Jain    PCIOL Right 02/21/2018    DR. REECE    PLANTAR FASCIA RELEASE      right    ROTATOR CUFF REPAIR Bilateral     bilateral    SELECTIVE INJECTION OF ANESTHETIC AGENT AROUND LUMBAR SPINAL NERVE ROOT BY TRANSFORAMINAL APPROACH Bilateral 01/26/2022    Procedure: Bilateral L4/5 TF IAN with RN IV sedation;  Surgeon: Mak Young MD;  Location: [facility];  Service: Pain Management;  Laterality: Bilateral;    SELECTIVE INJECTION OF ANESTHETIC AGENT AROUND LUMBAR SPINAL NERVE ROOT BY TRANSFORAMINAL APPROACH Bilateral 03/14/2022    Procedure: Bilateral L4/5 TF IAN with RN IV sedation;  Surgeon: Mak Young MD;  Location: [facility];  Service: Pain Management;  Laterality: Bilateral;    SELECTIVE INJECTION OF ANESTHETIC AGENT AROUND LUMBAR SPINAL NERVE ROOT BY TRANSFORAMINAL APPROACH Bilateral 06/02/2022    Procedure: Bilateral L4/5 TF IAN - [place]. [place] [facility] [place]  [place]geon: Abel Haywood MD;  Location: [facility];  Service: Pain Management;  Laterality: Bilateral;    SELECTIVE INJECTION OF ANESTHETIC AGENT AROUND LUMBAR SPINAL NERVE ROOT BY TRANSFORAMINAL APPROACH Bilateral 08/25/2022    Procedure: Bilateral L4/5 TF IAN;  Surgeon: Abel Haywood MD;  Location: [facility];  Service: Pain Management;  Laterality: Bilateral;    SHOULDER SURGERY Bilateral around 2000    Dr. Pepper.  rotator cuff surgeries    VASECTOMY         Family History   Problem Relation Age of Onset    Lung cancer Father         life long smoker    Cancer Father         prostate, lung    Arthritis Mother     Stroke Sister         TIA    Cataracts Sister     Cancer Brother         prostate    Cataracts Brother     Cataracts Sister     Melanoma Neg Hx     Psoriasis Neg Hx     Lupus Neg Hx     Eczema Neg Hx     Diabetes Neg Hx     Heart disease Neg Hx     Kidney disease Neg Hx     Colon cancer Neg Hx        Social History     Tobacco Use    Smoking status: Former     Packs/day: 3.00     Years: 35.00     Pack years: 105.00      Types: Cigarettes     Start date: 1960     Quit date: 1985     Years since quittin.5    Smokeless tobacco: Never   Substance Use Topics    Alcohol use: Yes     Alcohol/week: 6.0 standard drinks     Types: 6 Drinks containing 0.5 oz of alcohol per week     Comment: socially    Drug use: No       Current Outpatient Medications   Medication Sig Dispense Refill    acetaminophen (TYLENOL ARTHRITIS PAIN ORAL) Take by mouth. Patient states that he takes 2 tablets in the morning and 2 tablets in the evening      alfuzosin (UROXATRAL) 10 mg Tb24 Take 1 tablet (10 mg total) by mouth daily with breakfast. 90 tablet 4    amLODIPine (NORVASC) 5 MG tablet Take 1 tablet (5 mg total) by mouth once daily. 90 tablet 3    atorvastatin (LIPITOR) 40 MG tablet TAKE 1 TABLET EVERY DAY 90 tablet 1    clopidogreL (PLAVIX) 75 mg tablet TAKE 1 TABLET EVERY DAY 90 tablet 3    finasteride (PROSCAR) 5 mg tablet Take 1 tablet (5 mg total) by mouth once daily. 90 tablet 4    fluticasone propionate (FLONASE) 50 mcg/actuation nasal spray USE 2 SPRAYS IN EACH NOSTRIL ONE TIME DAILY  (SUBSTITUTED FOR FLONASE) 48 g 3    ketorolac 0.5% (ACULAR) 0.5 % Drop Put one eyedrop in right eye four times a day. Start first drop morning of laser and stop after five days. 5 mL 0    losartan (COZAAR) 50 MG tablet TAKE 1 TABLET TWICE DAILY 180 tablet 3    methocarbamoL (ROBAXIN) 500 MG Tab Take 1 tablet (500 mg total) by mouth 3 (three) times daily as needed (muscle spasms). May cause drowsiness. 90 tablet 1    pantoprazole (PROTONIX) 40 MG tablet TAKE 1 TABLET EVERY DAY 90 tablet 3     No current facility-administered medications for this visit.       Review of patient's allergies indicates:   Allergen Reactions    Atarax [hydroxyzine hcl] Other (See Comments)     Raised blood pressure     Zyrtec [cetirizine] Other (See Comments)     Raised blood pressure     Gold sodium thiosulfate      Patch test positive    Meloxicam Rash     Other reaction(s):  hypertension       Physical Exam  Vitals:    02/07/23 1508   BP: 110/70   Pulse: 85     General: Well-developed, well-nourished in no acute distress  HEENT: Normocephalic, atraumatic, Extraocular movements intact  Neck: supple, trachea midline, no cervical or supraclavicular lymphadenopathy  Respirations: even and unlabored  Back: midline spine, no CVA tenderness  Abdomen: soft, Non-tender, non-distended, no organomegaly or palpable masses, no rebound or guarding  Rectal: 9/29/22->40g prostate, no nodules or tenderness. No gross blood  Extremities: atraumatic, moves all equally, no clubbing, cyanosis or edema  Psych: normal affect  Skin: warm and dry, no lesions  Neuro: Alert and oriented, Cranial nerves II-XII grossly intact    PVR: 102cc 3/29/22    Urinalysis  +50 blood, otherwise negative    PSA  3/31/21: 1.9  7/7/21: 1.3  3/23/22: 1.5  6/16/22: 1.4  9/26/22: 1.5  12/27/22: 1.9    Assessment:   Prostate cancer: on AS, PSA today per patient request, f/u Dr Guy as scheduled    Home Talley MD

## 2023-02-09 ENCOUNTER — LAB VISIT (OUTPATIENT)
Dept: LAB | Facility: HOSPITAL | Age: 83
End: 2023-02-09
Attending: UROLOGY
Payer: MEDICARE

## 2023-02-09 ENCOUNTER — OFFICE VISIT (OUTPATIENT)
Dept: PULMONOLOGY | Facility: CLINIC | Age: 83
End: 2023-02-09
Payer: MEDICARE

## 2023-02-09 ENCOUNTER — CLINICAL SUPPORT (OUTPATIENT)
Dept: REHABILITATION | Facility: HOSPITAL | Age: 83
End: 2023-02-09
Payer: MEDICARE

## 2023-02-09 VITALS
HEART RATE: 74 BPM | HEIGHT: 70 IN | RESPIRATION RATE: 20 BRPM | SYSTOLIC BLOOD PRESSURE: 120 MMHG | BODY MASS INDEX: 28.25 KG/M2 | OXYGEN SATURATION: 96 % | WEIGHT: 197.31 LBS | DIASTOLIC BLOOD PRESSURE: 60 MMHG

## 2023-02-09 DIAGNOSIS — M54.12 CERVICAL RADICULOPATHY: ICD-10-CM

## 2023-02-09 DIAGNOSIS — G47.33 OSA (OBSTRUCTIVE SLEEP APNEA): Primary | ICD-10-CM

## 2023-02-09 DIAGNOSIS — Z00.00 ENCOUNTER FOR MEDICARE ANNUAL WELLNESS EXAM: ICD-10-CM

## 2023-02-09 DIAGNOSIS — G47.00 INSOMNIA, UNSPECIFIED TYPE: ICD-10-CM

## 2023-02-09 DIAGNOSIS — G89.29 CHRONIC NECK PAIN: Primary | ICD-10-CM

## 2023-02-09 DIAGNOSIS — M54.2 CHRONIC NECK PAIN: Primary | ICD-10-CM

## 2023-02-09 DIAGNOSIS — C61 PROSTATE CANCER: ICD-10-CM

## 2023-02-09 LAB — COMPLEXED PSA SERPL-MCNC: 1.3 NG/ML (ref 0–4)

## 2023-02-09 PROCEDURE — 3078F PR MOST RECENT DIASTOLIC BLOOD PRESSURE < 80 MM HG: ICD-10-PCS | Mod: HCNC,CPTII,S$GLB, | Performed by: NURSE PRACTITIONER

## 2023-02-09 PROCEDURE — 3288F PR FALLS RISK ASSESSMENT DOCUMENTED: ICD-10-PCS | Mod: HCNC,CPTII,S$GLB, | Performed by: NURSE PRACTITIONER

## 2023-02-09 PROCEDURE — 3078F DIAST BP <80 MM HG: CPT | Mod: HCNC,CPTII,S$GLB, | Performed by: NURSE PRACTITIONER

## 2023-02-09 PROCEDURE — 97140 MANUAL THERAPY 1/> REGIONS: CPT | Mod: HCNC,PN

## 2023-02-09 PROCEDURE — 1101F PR PT FALLS ASSESS DOC 0-1 FALLS W/OUT INJ PAST YR: ICD-10-PCS | Mod: HCNC,CPTII,S$GLB, | Performed by: NURSE PRACTITIONER

## 2023-02-09 PROCEDURE — 1101F PT FALLS ASSESS-DOCD LE1/YR: CPT | Mod: HCNC,CPTII,S$GLB, | Performed by: NURSE PRACTITIONER

## 2023-02-09 PROCEDURE — 3074F SYST BP LT 130 MM HG: CPT | Mod: HCNC,CPTII,S$GLB, | Performed by: NURSE PRACTITIONER

## 2023-02-09 PROCEDURE — 84153 ASSAY OF PSA TOTAL: CPT | Mod: HCNC | Performed by: UROLOGY

## 2023-02-09 PROCEDURE — 1159F MED LIST DOCD IN RCRD: CPT | Mod: HCNC,CPTII,S$GLB, | Performed by: NURSE PRACTITIONER

## 2023-02-09 PROCEDURE — 99999 PR PBB SHADOW E&M-EST. PATIENT-LVL IV: ICD-10-PCS | Mod: PBBFAC,HCNC,, | Performed by: NURSE PRACTITIONER

## 2023-02-09 PROCEDURE — 99214 PR OFFICE/OUTPT VISIT, EST, LEVL IV, 30-39 MIN: ICD-10-PCS | Mod: HCNC,S$GLB,, | Performed by: NURSE PRACTITIONER

## 2023-02-09 PROCEDURE — 3074F PR MOST RECENT SYSTOLIC BLOOD PRESSURE < 130 MM HG: ICD-10-PCS | Mod: HCNC,CPTII,S$GLB, | Performed by: NURSE PRACTITIONER

## 2023-02-09 PROCEDURE — 36415 COLL VENOUS BLD VENIPUNCTURE: CPT | Mod: HCNC,PO | Performed by: UROLOGY

## 2023-02-09 PROCEDURE — 99214 OFFICE O/P EST MOD 30 MIN: CPT | Mod: HCNC,S$GLB,, | Performed by: NURSE PRACTITIONER

## 2023-02-09 PROCEDURE — 97110 THERAPEUTIC EXERCISES: CPT | Mod: HCNC,PN

## 2023-02-09 PROCEDURE — 1159F PR MEDICATION LIST DOCUMENTED IN MEDICAL RECORD: ICD-10-PCS | Mod: HCNC,CPTII,S$GLB, | Performed by: NURSE PRACTITIONER

## 2023-02-09 PROCEDURE — 3288F FALL RISK ASSESSMENT DOCD: CPT | Mod: HCNC,CPTII,S$GLB, | Performed by: NURSE PRACTITIONER

## 2023-02-09 PROCEDURE — 1160F PR REVIEW ALL MEDS BY PRESCRIBER/CLIN PHARMACIST DOCUMENTED: ICD-10-PCS | Mod: HCNC,CPTII,S$GLB, | Performed by: NURSE PRACTITIONER

## 2023-02-09 PROCEDURE — 1160F RVW MEDS BY RX/DR IN RCRD: CPT | Mod: HCNC,CPTII,S$GLB, | Performed by: NURSE PRACTITIONER

## 2023-02-09 PROCEDURE — 97112 NEUROMUSCULAR REEDUCATION: CPT | Mod: HCNC,PN

## 2023-02-09 PROCEDURE — 99999 PR PBB SHADOW E&M-EST. PATIENT-LVL IV: CPT | Mod: PBBFAC,HCNC,, | Performed by: NURSE PRACTITIONER

## 2023-02-09 RX ORDER — INFLUENZA A VIRUS A/VICTORIA/2570/2019 IVR-215 (H1N1) ANTIGEN (FORMALDEHYDE INACTIVATED), INFLUENZA A VIRUS A/DARWIN/6/2021 IVR-227 (H3N2) ANTIGEN (FORMALDEHYDE INACTIVATED), INFLUENZA B VIRUS B/AUSTRIA/1359417/2021 BVR-26 ANTIGEN (FORMALDEHYDE INACTIVATED), INFLUENZA B VIRUS B/PHUKET/3073/2013 BVR-1B ANTIGEN (FORMALDEHYDE INACTIVATED) 15; 15; 15; 15 UG/.5ML; UG/.5ML; UG/.5ML; UG/.5ML
INJECTION, SUSPENSION INTRAMUSCULAR
COMMUNITY
Start: 2022-09-26 | End: 2023-08-07

## 2023-02-09 NOTE — PROGRESS NOTES
"[facility]   Physical Therapy Treatment Note     Name: Ezequiel Hughes  Clinic Number: [id number]    Therapy Diagnosis:   Encounter Diagnosis   Name Primary?    Chronic neck pain Yes       Physician: Valery Ozuna MD    Visit Date: 2/9/2023    Physician Orders: PT Eval and Treat   Medical Diagnosis from Referral:   M54.12 (ICD-10-CM) - Cervical radiculopathy   M54.2,G89.29 (ICD-10-CM) - Chronic neck pain      Evaluation Date: 1/3/2023  Authorization Period Expiration: 12/28/23  Plan of Care Expiration: 3/19/23  Visit # / Visits authorized: 1/ 1; 7/20    PTA Visit #: 0/5      Precautions: HTN, CKD III, Thrombocytopenia, Pulmonary HTN, OA, Gout, Unspecified Inflammatory Spondylopathy, thoracic region, PLMD    Time In: 11:40  Time Out: 12:35  Total Billable Time: 55 minutes    SUBJECTIVE     Pt reports: feeling sore especially on right UT region  He was compliant with home exercise program.  Response to previous treatment: progress neck strength  Functional change: In progress    Pain: 3/10  Location: bilateral neck      OBJECTIVE     Objective Measures updated at progress report unless specified.     Treatment     Ezequiel received the treatments listed below:      therapeutic exercises to develop strength, ROM, and posture for 10 minutes including:  Quadriped cat cow 8x  Quadriped thread the needle 6x B  Prone cervical retraction 1x10 5" hold   Shrugs 20# 2x10      MANUAL THERAPY TECHNIQUES including Joint mobilizations, Manual traction, and Soft tissue Mobilization were applied to the cervical spine for 25 minutes.   Shoulder range of motion with joint mobilization and scapular mobs  Cervical traction with glides  OA mobs  Subocc release  Lateral glide cervical mobs  PA mobs mid cervical to upper thoracic  Mobs with passive rotation   STM to UT, STM, LS, cervical extensors  FDN: right UT piston sup, and estim in UT leaning over mat 5min  ART with Trp release in right UT,LS  SNAGS performed by " PT active assisted jami    Ezequiel participated in neuromuscular re-education activities to improve: Balance, Coordination, Kinesthetic, Sense, Proprioception, and Posture for 15 minutes. The following activities were included:  Resisted cervical retraction and flexion rtb with Ts and Vs 3# 1x15 ea position min to mod cues for postural awareness and sustained contraction for cervical extensors  Standing wall presses for postural control and awareness emphasis scap retraction and depression  Seated scap retraction with band for postural facilitation and awareness min cues for head px  [place] carry mod cues for proper scap and head px for postural control        Pt participated in dynamic functional therapeutic activities to improve functional performance for 00 minutes, including:  Seated alt unilateral 5# KB lift  Seated OH lift 8# for lifting in home  [place] carry with cues for shoulder and head px   Elevated on shoulder with thread the needle for trunk rot and head rot for rolling and reaching         Patient Education and Home Exercises     Home Exercises Provided and Patient Education Provided     Education provided:   - Home program    Written Home Exercises Provided: Patient instructed to cont prior HEP. Exercises were reviewed and Ezequiel was able to demonstrate them prior to the end of the session.  Ezequiel demonstrated good  understanding of the education provided. See EMR under Patient Instructions for exercises provided during therapy sessions    ASSESSMENT      Emphasis placed on manual therapy to improve cervical and thoracic mobility and reduce pain and soreness. Main complaint was Trp in right UT today. Introduced dry needling to reduce tissue tension along with ART. Pt instructed to cont cervical SNAGS and resisted cervical retraction/thoracic extension for postural awareness. Min to mod cues provided for postural awareness and encourage head rotation with carrying and lifting. Pt would benefit from cont  PT to address remaining deficits and promote functional independence with minimal fall risk.     Ezequiel Is progressing well towards his goals.   Pt prognosis is Good.     Pt will continue to benefit from skilled outpatient physical therapy to address the deficits listed in the problem list box on initial evaluation, provide pt/family education and to maximize pt's level of independence in the home and community environment.     Pt's spiritual, cultural and educational needs considered and pt agreeable to plan of care and goals.     Anticipated barriers to physical therapy: None    Goals:   Short Term Goals: In 5 weeks   1.I with HEP  2.Patient to increase cervical ROM by 25% with B side bending and B rotation.   3.Patient to increase DNF endurance to 28 sec. Or greater.  4.Patient to have pain less than 3/10 at all times.  5.Patient to score less than 25% impaired on the FOTO.     Long Term Goals: In 10 weeks  1. Patient to score less than 15% impaired on the FOTO.  2. Patient to demo increase in B Scapular strength to 5/5 gross MMT to support upright upper body posture for sitting and standing activities.  3. Patient to have decreased pain to 0/10 at all times.  4. Patient to demo increase cervical ROM to WNL to scan environment to drive safely.    PLAN     Plan of care Certification: 1/3/2023 to 3/19/23.     Outpatient Physical Therapy 2 times weekly for 10 weeks to include the following interventions: Manual Therapy, Moist Heat/ Ice, Neuromuscular Re-ed, Patient Education, Self Care, Therapeutic Activities, and Therapeutic Exercise, e-stim, FDN.    Kaleigh Hopson, PT

## 2023-02-09 NOTE — PROGRESS NOTES
"Subjective:      Patient ID: Ezequiel Hughes is a 82 y.o. male.    Chief Complaint: Sleep Apnea    HPI  Follow up for autopap. He is having a hard time wearing nightly. Mouth dry. waking up with dry mouth, sore throat and mouth open with nasal mask.   He will need to change to FFM. We discussed this last visit, but he did not try. Encouraged to make appointment with medical equipment company for a mask change to ffm and messaged medical equipment company today. He benefits from use when he is able to wear.   Settings are 5-9cm H20  Neck pain making it difficulty to sleep at times.  Patient Active Problem List   Diagnosis    Lumbosacral spondylosis without myelopathy    Benign prostatic hyperplasia    Hypertensive disorder    Hyperlipidemia    Cataract    Osteoarthritis    Thrombocytopenia    Anemia    Chronic kidney disease, stage 3    Incomplete right bundle branch block    Colon cancer screening    Prostate cancer    Aortic atherosclerosis    Lung granuloma    Atherosclerosis of artery of both lower extremities    Leg swelling    Sleep apnea    Periodic limb movement disorder (PLMD)    Inadequate sleep hygiene    Basal cell carcinoma (BCC) of anterior chest    Gout    History of colon polyps    Colon polyps    Diverticulosis of colon    Internal hemorrhoids    Generalized weakness    Other eosinophilia    Unspecified inflammatory spondylopathy, thoracic region    Chronic neck pain    Cervical radiculopathy    Bilateral carpal tunnel syndrome    Lumbar radiculopathy, chronic    Urge incontinence    Shortness of breath    Abnormal EKG    Weakness of trunk musculature    Pulmonary hypertension    Other hydrocephalus     /60   Pulse 74   Resp 20   Ht 5' 10" (1.778 m)   Wt 89.5 kg (197 lb 5 oz)   SpO2 96%   BMI 28.31 kg/m²   Body mass index is 28.31 kg/m².    Review of Systems   Constitutional: Negative.    HENT: Negative.     Respiratory: Negative.     Cardiovascular: Negative.    Musculoskeletal:  Positive " for arthralgias.   Gastrointestinal: Negative.    Neurological: Negative.    Psychiatric/Behavioral: Negative.     Objective:      Physical Exam  Constitutional:       Appearance: Normal appearance. He is well-developed.   HENT:      Head: Normocephalic and atraumatic.      Nose: Nose normal.      Mouth/Throat:      Pharynx: Oropharynx is clear.   Neck:      Thyroid: No thyroid mass or thyromegaly.      Trachea: Trachea normal.   Cardiovascular:      Rate and Rhythm: Normal rate and regular rhythm.      Heart sounds: Murmur heard.   Pulmonary:      Effort: Pulmonary effort is normal.      Breath sounds: Normal breath sounds. No wheezing, rhonchi or rales.   Abdominal:      Palpations: Abdomen is soft. There is no splenomegaly or mass.      Tenderness: There is no abdominal tenderness.   Musculoskeletal:         General: Normal range of motion.      Cervical back: Normal range of motion and neck supple.   Skin:     General: Skin is warm and dry.   Neurological:      Mental Status: He is alert and oriented to person, place, and time.   Psychiatric:         Mood and Affect: Mood normal.         Behavior: Behavior normal.     Personal Diagnostic Review  Compliance  Payor Standard  Usage 11/10/2022 - 02/07/2023  Usage days 21/90 days (23%)  >= 4 hours 19 days (21%)  < 4 hours 2 days (2%)  Usage hours 134 hours 5 minutes  Average usage (total days) 1 hours 29 minutes  Average usage (days used) 6 hours 23 minutes  Median usage (days used) 6 hours 45 minutes  Total used hours (value since last reset - 02/07/2023) 975 hours  AirSense 11 AutoSet  Serial number [id number]  Mode AutoSet  Min Pressure 5 cmH2O  Max Pressure 9 cmH2O  EPR Fulltime  EPR level 3  Response Standard  Therapy  Pressure - cmH2O Median: 7.5 95th percentile: 8.6 Maximum: 8.7  Leaks - L/min Median: 4.1 95th percentile: 23.7 Maximum: 43.8  Events per hour AI: 0.7 HI: 0.5 AHI: 1.2  Apnea Index Central: 0.4 Obstructive: 0.2 Unknown: 0.0  RERA Index  0.2      Assessment:       1. GUIDO (obstructive sleep apnea)    2. Cervical radiculopathy    3. Insomnia, unspecified type        Outpatient Encounter Medications as of 2/9/2023   Medication Sig Dispense Refill    acetaminophen (TYLENOL ARTHRITIS PAIN ORAL) Take by mouth. Patient states that he takes 2 tablets in the morning and 2 tablets in the evening      alfuzosin (UROXATRAL) 10 mg Tb24 Take 1 tablet (10 mg total) by mouth daily with breakfast. 90 tablet 4    amLODIPine (NORVASC) 5 MG tablet Take 1 tablet (5 mg total) by mouth once daily. 90 tablet 3    atorvastatin (LIPITOR) 40 MG tablet TAKE 1 TABLET EVERY DAY 90 tablet 1    clopidogreL (PLAVIX) 75 mg tablet TAKE 1 TABLET EVERY DAY 90 tablet 3    finasteride (PROSCAR) 5 mg tablet Take 1 tablet (5 mg total) by mouth once daily. 90 tablet 4    FLUAD QUAD 2022-23,65Y UP,,PF, 60 mcg (15 mcg x 4)/0.5 mL Syrg       fluticasone propionate (FLONASE) 50 mcg/actuation nasal spray USE 2 SPRAYS IN EACH NOSTRIL ONE TIME DAILY  (SUBSTITUTED FOR FLONASE) 48 g 3    ketorolac 0.5% (ACULAR) 0.5 % Drop Put one eyedrop in right eye four times a day. Start first drop morning of laser and stop after five days. 5 mL 0    losartan (COZAAR) 50 MG tablet TAKE 1 TABLET TWICE DAILY 180 tablet 3    methocarbamoL (ROBAXIN) 500 MG Tab Take 1 tablet (500 mg total) by mouth 3 (three) times daily as needed (muscle spasms). May cause drowsiness. 90 tablet 1    pantoprazole (PROTONIX) 40 MG tablet TAKE 1 TABLET EVERY DAY 90 tablet 3    [DISCONTINUED] pantoprazole (PROTONIX) 40 MG tablet TAKE 1 TABLET EVERY DAY 90 tablet 3     No facility-administered encounter medications on file as of 2/9/2023.     Orders Placed This Encounter   Procedures    CPAP/BIPAP SUPPLIES     Order Specific Question:   Length of need (1-99 months):     Answer:   99     Order Specific Question:   Choose ONE mask type and its corresponding cushions and/or pillows:     Answer:    Full Face Mask, 1 per 90 days:   Full Face Cushion, (3 per 90 days)     Order Specific Question:   Choose EITHER Heated or Non-Heated Tubjing     Answer:    Non-Heated Tubing, 1 per 90 days     Order Specific Question:   All other supplies as needed as listed below:     Answer:    Headgear, 1 per 180 days     Order Specific Question:   All other supplies as needed as listed below:     Answer:    Disposable Filter, 6 per 90 days     Order Specific Question:   All other supplies as needed as listed below:     Answer:    Non-Disposable Filter, 1 per 180 days     Order Specific Question:   All other supplies as needed as listed below:     Answer:    Humidifier Chamber, 1 per 180 days     Plan:      He needs full face mask. -not able to sleep with mouth closed with nasal mask.    Problem List Items Addressed This Visit          Neuro    Cervical radiculopathy    Overview     acute on chronic at left C8, T1, subacute on chronic at left C7, chronic at right C6,C8, T1          Other Visit Diagnoses       GUIDO (obstructive sleep apnea)    -  Primary    Relevant Orders    CPAP/BIPAP SUPPLIES    Insomnia, unspecified type              Sleep scheduling.   PT for neck is helping.

## 2023-02-14 ENCOUNTER — CLINICAL SUPPORT (OUTPATIENT)
Dept: REHABILITATION | Facility: HOSPITAL | Age: 83
End: 2023-02-14
Payer: MEDICARE

## 2023-02-14 DIAGNOSIS — G89.29 CHRONIC NECK PAIN: Primary | ICD-10-CM

## 2023-02-14 DIAGNOSIS — M54.2 CHRONIC NECK PAIN: Primary | ICD-10-CM

## 2023-02-14 PROCEDURE — 97112 NEUROMUSCULAR REEDUCATION: CPT | Mod: HCNC,PN

## 2023-02-14 PROCEDURE — 97012 MECHANICAL TRACTION THERAPY: CPT | Mod: HCNC,PN

## 2023-02-14 PROCEDURE — 97140 MANUAL THERAPY 1/> REGIONS: CPT | Mod: HCNC,PN

## 2023-02-14 NOTE — PROGRESS NOTES
"[facility]   Physical Therapy Treatment Note     Name: Ezequiel Hughes  Clinic Number: [id number]    Therapy Diagnosis:   Encounter Diagnosis   Name Primary?    Chronic neck pain Yes       Physician: Valery Ozuna MD    Visit Date: 2/14/2023    Physician Orders: PT Eval and Treat   Medical Diagnosis from Referral:   M54.12 (ICD-10-CM) - Cervical radiculopathy   M54.2,G89.29 (ICD-10-CM) - Chronic neck pain      Evaluation Date: 1/3/2023  Authorization Period Expiration: 12/28/23  Plan of Care Expiration: 3/19/23  Visit # / Visits authorized: 1/ 1; 7/20    PTA Visit #: 0/5      Precautions: HTN, CKD III, Thrombocytopenia, Pulmonary HTN, OA, Gout, Unspecified Inflammatory Spondylopathy, thoracic region, PLMD    Time In: 8:45  Time Out: 9:45  Total Billable Time: 60 minutes    SUBJECTIVE     Pt reports: cont pain in right UT region with cervical rotation   He was compliant with home exercise program.  Response to previous treatment: progress neck strength  Functional change: In progress    Pain: 3/10  Location: bilateral neck      OBJECTIVE     Objective Measures updated at progress report unless specified.     Treatment     Ezequiel received the treatments listed below:      therapeutic exercises to develop strength, ROM, and posture for 00 minutes including:  Quadriped cat cow 8x  Quadriped thread the needle 6x B  Prone cervical retraction 1x10 5" hold   Shrugs 20# 2x10      MANUAL THERAPY TECHNIQUES including Joint mobilizations, Manual traction, and Soft tissue Mobilization were applied to the cervical spine for 15 minutes.   Shoulder range of motion with joint mobilization and scapular mobs  Cervical traction with glides  OA mobs  Subocc release  Lateral glide cervical mobs  PA mobs mid cervical to upper thoracic  Mobs with passive rotation   STM to UT, STM, LS, cervical extensors  FDN: right UT piston sup, and estim in UT leaning over mat 5min  ART with Trp release in right UT,LS  SNAGS " performed by PT active assisted rot    Ezequiel participated in neuromuscular re-education activities to improve: Balance, Coordination, Kinesthetic, Sense, Proprioception, and Posture for 30 minutes. The following activities were included:  Resisted cervical retraction and flexion rtb with Ts and Vs 3# 1x15 ea position min to mod cues for postural awareness and sustained contraction for cervical extensors  Standing wall presses for postural control and awareness emphasis scap retraction and depression  Seated scap retraction with band for postural facilitation and awareness min cues for head px  [place] carry mod cues for proper scap and head px for postural control        Pt participated in dynamic functional therapeutic activities to improve functional performance for 00 minutes, including:  Seated alt unilateral 5# KB lift  Seated OH lift 8# for lifting in home  [place] carry with cues for shoulder and head px   Elevated on shoulder with thread the needle for trunk rot and head rot for rolling and reaching     Ezequiel received the following supervised modalities after being cleared for contradictions: Mechanical Traction:  Ezequiel received intermittent mechanical traction to the cervical spine at a force of 29 pounds for a total of 15 minutes. Hold time of 30 sec and rest time for 10 sec. Patient tolerated treatment well without any adverse effects.      Patient Education and Home Exercises     Home Exercises Provided and Patient Education Provided     Education provided:   - Home program    Written Home Exercises Provided: Patient instructed to cont prior HEP. Exercises were reviewed and Ezequiel was able to demonstrate them prior to the end of the session.  Ezequiel demonstrated good  understanding of the education provided. See EMR under Patient Instructions for exercises provided during therapy sessions    ASSESSMENT      Emphasis placed on manual therapy to improve cervical and thoracic mobility and reduce pain and  soreness. Main complaint was Trp in right UT today. Cont dry needling to reduce tissue tension along with ART. Pt instructed to cont cervical SNAGS and resisted cervical retraction/thoracic extension for postural awareness. Min to mod cues provided for postural awareness and encourage head rotation with carrying and lifting. Initiated mechanical cervical traction for joint elasticity. Pt would benefit from cont PT to address remaining deficits and promote functional independence with minimal fall risk.     Ezequiel Is progressing well towards his goals.   Pt prognosis is Good.     Pt will continue to benefit from skilled outpatient physical therapy to address the deficits listed in the problem list box on initial evaluation, provide pt/family education and to maximize pt's level of independence in the home and community environment.     Pt's spiritual, cultural and educational needs considered and pt agreeable to plan of care and goals.     Anticipated barriers to physical therapy: None    Goals:   Short Term Goals: In 5 weeks   1.I with HEP  2.Patient to increase cervical ROM by 25% with B side bending and B rotation.   3.Patient to increase DNF endurance to 28 sec. Or greater.  4.Patient to have pain less than 3/10 at all times.  5.Patient to score less than 25% impaired on the FOTO.     Long Term Goals: In 10 weeks  1. Patient to score less than 15% impaired on the FOTO.  2. Patient to demo increase in B Scapular strength to 5/5 gross MMT to support upright upper body posture for sitting and standing activities.  3. Patient to have decreased pain to 0/10 at all times.  4. Patient to demo increase cervical ROM to WNL to scan environment to drive safely.    PLAN     Plan of care Certification: 1/3/2023 to 3/19/23.     Outpatient Physical Therapy 2 times weekly for 10 weeks to include the following interventions: Manual Therapy, Moist Heat/ Ice, Neuromuscular Re-ed, Patient Education, Self Care, Therapeutic Activities,  and Therapeutic Exercise, e-stim, FDN.    Kaleigh Hopson, PT

## 2023-02-16 ENCOUNTER — CLINICAL SUPPORT (OUTPATIENT)
Dept: REHABILITATION | Facility: HOSPITAL | Age: 83
End: 2023-02-16
Payer: MEDICARE

## 2023-02-16 DIAGNOSIS — G89.29 CHRONIC NECK PAIN: Primary | ICD-10-CM

## 2023-02-16 DIAGNOSIS — M54.2 CHRONIC NECK PAIN: Primary | ICD-10-CM

## 2023-02-16 PROCEDURE — 97140 MANUAL THERAPY 1/> REGIONS: CPT | Mod: HCNC,PN

## 2023-02-16 PROCEDURE — 97010 HOT OR COLD PACKS THERAPY: CPT | Mod: HCNC,PN

## 2023-02-16 PROCEDURE — 97014 ELECTRIC STIMULATION THERAPY: CPT | Mod: HCNC,PN

## 2023-02-16 PROCEDURE — 97110 THERAPEUTIC EXERCISES: CPT | Mod: HCNC,PN

## 2023-02-16 NOTE — PROGRESS NOTES
"[facility]   Physical Therapy Treatment Note     Name: Ezequiel Hughes  Clinic Number: [id number]    Therapy Diagnosis:   Encounter Diagnosis   Name Primary?    Chronic neck pain Yes       Physician: Valery Ozuna MD    Visit Date: 2/16/2023    Physician Orders: PT Eval and Treat   Medical Diagnosis from Referral:   M54.12 (ICD-10-CM) - Cervical radiculopathy   M54.2,G89.29 (ICD-10-CM) - Chronic neck pain      Evaluation Date: 1/3/2023  Authorization Period Expiration: 12/28/23  Plan of Care Expiration: 3/19/23  Visit # / Visits authorized: 1/ 1; 8/20    PTA Visit #: 0/5      Precautions: HTN, CKD III, Thrombocytopenia, Pulmonary HTN, OA, Gout, Unspecified Inflammatory Spondylopathy, thoracic region, PLMD    Time In: 8:00  Time Out: 8:45  Total Billable Time: 60 minutes    SUBJECTIVE     Pt reports: cont pain and severe soreness in right UT region especially with cervical rotation   He was compliant with home exercise program.  Response to previous treatment: progress neck strength  Functional change: In progress    Pain: 6/10  Location: bilateral neck      OBJECTIVE     Objective Measures updated at progress report unless specified.     Treatment     Ezequiel received the treatments listed below:      therapeutic exercises to develop strength, ROM, and posture for 8 minutes including:  Quadriped cat cow 8x  Prone cervical retraction 1x10 5" hold   Scap depression  Scap retraction  Seated cervical rotation with gentle oerpressure      MANUAL THERAPY TECHNIQUES including Joint mobilizations, Manual traction, and Soft tissue Mobilization were applied to the cervical spine for 25 minutes.   Shoulder range of motion with joint mobilization and scapular mobs  Cervical traction with glides  OA mobs  Subocc release  Lateral glide cervical mobs  PA mobs mid cervical to upper thoracic  Mobs with passive rotation   STM to UT, STM, LS, cervical extensors  FDN: right UT piston sup, and estim in UT leaning " over mat 5min  ART with Trp release in right UT,LS with cupping technique    Ezequiel participated in neuromuscular re-education activities to improve: Balance, Coordination, Kinesthetic, Sense, Proprioception, and Posture for 00 minutes. The following activities were included:  Resisted cervical retraction and flexion rtb with Ts and Vs 3# 1x15 ea position min to mod cues for postural awareness and sustained contraction for cervical extensors  Standing wall presses for postural control and awareness emphasis scap retraction and depression  Seated scap retraction with band for postural facilitation and awareness min cues for head px  [place] carry mod cues for proper scap and head px for postural control        Pt participated in dynamic functional therapeutic activities to improve functional performance for 00 minutes, including:  Seated alt unilateral 5# KB lift  Seated OH lift 8# for lifting in home  [place] carry with cues for shoulder and head px   Elevated on shoulder with thread the needle for trunk rot and head rot for rolling and reaching     Ezequiel received the following supervised modalities after being cleared for contradictions: Mechanical Traction:  Ezequiel received intermittent mechanical traction to the cervical spine at a force of 29 pounds for a total of 00 minutes. Hold time of 30 sec and rest time for 10 sec. Patient tolerated treatment well without any adverse effects.  Ezequiel received the following supervised modalities after being cleared for contradictions: IFC Electrical Stimulation:  Ezequiel received IFC Electrical Stimulation for pain control applied to the right UT region. Pt received stimulation at 150 % scan at a frequency of 3HZ for 15 minutes. Ezequiel tolderated treatment well without any adverse effects.  Moist heat applied on right neck and shoulder       Patient Education and Home Exercises     Home Exercises Provided and Patient Education Provided     Education provided:   - Home  program    Written Home Exercises Provided: Patient instructed to cont prior HEP. Exercises were reviewed and Ezequiel was able to demonstrate them prior to the end of the session.  Ezequiel demonstrated good  understanding of the education provided. See EMR under Patient Instructions for exercises provided during therapy sessions    ASSESSMENT      Pt cont to report soreness and pain in right UT region. Pt reports pain is worse this morning and hindered sleep quality last night. More gentle approach attempted today with PA mobs, cupping with active cervical motion, and modalities utilized to reduce pain. Pt reports feeling better at end of session and instructed to only perform postural control exercises  (cervical retraction and scap depression) along with more gentle cervical and upper thoracic stretching.  Pt would benefit from cont PT to address remaining deficits and promote functional independence with minimal fall risk.     Ezequiel Is progressing well towards his goals.   Pt prognosis is Good.     Pt will continue to benefit from skilled outpatient physical therapy to address the deficits listed in the problem list box on initial evaluation, provide pt/family education and to maximize pt's level of independence in the home and community environment.     Pt's spiritual, cultural and educational needs considered and pt agreeable to plan of care and goals.     Anticipated barriers to physical therapy: None    Goals:   Short Term Goals: In 5 weeks   1.I with HEP  2.Patient to increase cervical ROM by 25% with B side bending and B rotation.   3.Patient to increase DNF endurance to 28 sec. Or greater.  4.Patient to have pain less than 3/10 at all times.  5.Patient to score less than 25% impaired on the FOTO.     Long Term Goals: In 10 weeks  1. Patient to score less than 15% impaired on the FOTO.  2. Patient to demo increase in B Scapular strength to 5/5 gross MMT to support upright upper body posture for sitting and  standing activities.  3. Patient to have decreased pain to 0/10 at all times.  4. Patient to demo increase cervical ROM to WNL to scan environment to drive safely.    PLAN     Plan of care Certification: 1/3/2023 to 3/19/23.     Outpatient Physical Therapy 2 times weekly for 10 weeks to include the following interventions: Manual Therapy, Moist Heat/ Ice, Neuromuscular Re-ed, Patient Education, Self Care, Therapeutic Activities, and Therapeutic Exercise, e-stim, FDN.    Kaleigh Hopson, PT

## 2023-02-22 ENCOUNTER — CLINICAL SUPPORT (OUTPATIENT)
Dept: REHABILITATION | Facility: HOSPITAL | Age: 83
End: 2023-02-22
Payer: MEDICARE

## 2023-02-22 DIAGNOSIS — M54.2 CHRONIC NECK PAIN: Primary | ICD-10-CM

## 2023-02-22 DIAGNOSIS — G89.29 CHRONIC NECK PAIN: Primary | ICD-10-CM

## 2023-02-22 PROCEDURE — 97530 THERAPEUTIC ACTIVITIES: CPT | Mod: HCNC,PN

## 2023-02-22 PROCEDURE — 97112 NEUROMUSCULAR REEDUCATION: CPT | Mod: HCNC,PN

## 2023-02-22 PROCEDURE — 97140 MANUAL THERAPY 1/> REGIONS: CPT | Mod: HCNC,PN

## 2023-02-22 PROCEDURE — 97110 THERAPEUTIC EXERCISES: CPT | Mod: HCNC,PN

## 2023-02-22 NOTE — PROGRESS NOTES
"[facility]   Physical Therapy Treatment Note     Name: Ezequiel Hughes  Clinic Number: [id number]    Therapy Diagnosis:   Encounter Diagnosis   Name Primary?    Chronic neck pain Yes       Physician: Valery Ozuna MD    Visit Date: 2/22/2023    Physician Orders: PT Eval and Treat   Medical Diagnosis from Referral:   M54.12 (ICD-10-CM) - Cervical radiculopathy   M54.2,G89.29 (ICD-10-CM) - Chronic neck pain      Evaluation Date: 1/3/2023  Authorization Period Expiration: 12/28/23  Plan of Care Expiration: 3/19/23  Visit # / Visits authorized: 1/ 1; 920    PTA Visit #: 0/5      Precautions: HTN, CKD III, Thrombocytopenia, Pulmonary HTN, OA, Gout, Unspecified Inflammatory Spondylopathy, thoracic region, PLMD    Time In: 11:00  Time Out: 11:55  Total Billable Time: 55 minutes    SUBJECTIVE     Pt reports: cont pain and soreness in right UT region especially with cervical rotation as well as right sided sciatic nerve pain in posterior thigh with standing postures  He was compliant with home exercise program.  Response to previous treatment: progress neck strength  Functional change: In progress    Pain: 5/10  Location: bilateral neck      OBJECTIVE     Objective Measures updated at progress report unless specified.     Treatment     Ezequiel received the treatments listed below:      therapeutic exercises to develop strength, ROM, and posture for 10 minutes including:  Quadriped cat cow 8x  Prone cervical retraction 1x10 5" hold   Ws rtb 2x10  Foam rolling t-sp ext and rolling    MANUAL THERAPY TECHNIQUES including Joint mobilizations, Manual traction, and Soft tissue Mobilization were applied to the cervical spine for 15 minutes.   Shoulder range of motion with joint mobilization and scapular mobs  Cervical traction with glides  OA mobs  Subocc release  Lateral glide cervical mobs  PA mobs mid cervical to upper thoracic  Mobs with passive rotation   STM to UT, STM, LS, cervical extensors  ART with " Trp release in right UT,LS with cupping technique  Lumbar non thrust mobs in SL with hip rot  Hip mobs inf, lat in sup  LAD    Ezequiel participated in neuromuscular re-education activities to improve: Balance, Coordination, Kinesthetic, Sense, Proprioception, and Posture for 12 minutes. The following activities were included:  Resisted cervical retraction and flexion rtb with Ts and Vs 3# 1x15 ea position min to mod cues for postural awareness and sustained contraction for cervical extensors  Standing wall presses for postural control and awareness emphasis scap retraction and depression  [place] carry mod cues for proper scap and head px for postural control  MET for right HS facilitation       Pt participated in dynamic functional therapeutic activities to improve functional performance for 10 minutes, including:  Seated alt unilateral 5# KB lift  Seated OH lift 8# for lifting in home  [place] carry with cues for shoulder and head px   Elevated on shoulder with thread the needle for trunk rot and head rot for rolling and reaching     Ezequiel received the following supervised modalities after being cleared for contradictions: Mechanical Traction:  Ezequiel received intermittent mechanical traction to the cervical spine at a force of 29 pounds for a total of 00 minutes. Hold time of 30 sec and rest time for 10 sec. Patient tolerated treatment well without any adverse effects.  Ezequiel received the following supervised modalities after being cleared for contradictions: IFC Electrical Stimulation:  Ezequiel received IFC Electrical Stimulation for pain control applied to the right UT region. Pt received stimulation at 150 % scan at a frequency of 3HZ for 00 minutes. Ezequile tolderated treatment well without any adverse effects.  Moist heat applied on right neck and shoulder       Patient Education and Home Exercises     Home Exercises Provided and Patient Education Provided     Education provided:   - Home program    Written Home  Exercises Provided: Patient instructed to cont prior HEP. Exercises were reviewed and Ezequiel was able to demonstrate them prior to the end of the session.  Ezequiel demonstrated good  understanding of the education provided. See EMR under Patient Instructions for exercises provided during therapy sessions    ASSESSMENT      Pt cont to report soreness and pain in right UT region.He did have less pain at night for improved sleep quality. He also c/o pain in right posterior thigh with standing postures. Pt instructed in MET and sciatic nerve glides with good results. Emphasis placed on proper cervical and shoulder positioning with carrying and lifting tasks, mod cues provided. Pt reports no cervical pain at end of session.  Pt would benefit from cont PT to address remaining deficits and promote functional independence with minimal fall risk.     Ezequiel Is progressing well towards his goals.   Pt prognosis is Good.     Pt will continue to benefit from skilled outpatient physical therapy to address the deficits listed in the problem list box on initial evaluation, provide pt/family education and to maximize pt's level of independence in the home and community environment.     Pt's spiritual, cultural and educational needs considered and pt agreeable to plan of care and goals.     Anticipated barriers to physical therapy: None    Goals:   Short Term Goals: In 5 weeks   1.I with HEP  2.Patient to increase cervical ROM by 25% with B side bending and B rotation.   3.Patient to increase DNF endurance to 28 sec. Or greater.  4.Patient to have pain less than 3/10 at all times.  5.Patient to score less than 25% impaired on the FOTO.     Long Term Goals: In 10 weeks  1. Patient to score less than 15% impaired on the FOTO.  2. Patient to demo increase in B Scapular strength to 5/5 gross MMT to support upright upper body posture for sitting and standing activities.  3. Patient to have decreased pain to 0/10 at all times.  4. Patient to  demo increase cervical ROM to WNL to scan environment to drive safely.    PLAN     Plan of care Certification: 1/3/2023 to 3/19/23.     Outpatient Physical Therapy 2 times weekly for 10 weeks to include the following interventions: Manual Therapy, Moist Heat/ Ice, Neuromuscular Re-ed, Patient Education, Self Care, Therapeutic Activities, and Therapeutic Exercise, e-stim, FDN.    Kaleigh Hopson, PT

## 2023-02-24 ENCOUNTER — CLINICAL SUPPORT (OUTPATIENT)
Dept: REHABILITATION | Facility: HOSPITAL | Age: 83
End: 2023-02-24
Payer: MEDICARE

## 2023-02-24 DIAGNOSIS — G89.29 CHRONIC NECK PAIN: Primary | ICD-10-CM

## 2023-02-24 DIAGNOSIS — M54.2 CHRONIC NECK PAIN: Primary | ICD-10-CM

## 2023-02-24 PROCEDURE — 97112 NEUROMUSCULAR REEDUCATION: CPT | Mod: HCNC,PN

## 2023-02-24 PROCEDURE — 97010 HOT OR COLD PACKS THERAPY: CPT | Mod: HCNC,PN

## 2023-02-24 PROCEDURE — 97014 ELECTRIC STIMULATION THERAPY: CPT | Mod: HCNC,PN

## 2023-02-24 PROCEDURE — 97110 THERAPEUTIC EXERCISES: CPT | Mod: HCNC,PN

## 2023-02-24 PROCEDURE — 97140 MANUAL THERAPY 1/> REGIONS: CPT | Mod: HCNC,PN

## 2023-02-24 NOTE — PROGRESS NOTES
"[facility]   Physical Therapy Treatment Note     Name: Ezequiel Hughes  Clinic Number: [id number]    Therapy Diagnosis:   Encounter Diagnosis   Name Primary?    Chronic neck pain Yes       Physician: Valery Ozuna MD    Visit Date: 2/24/2023    Physician Orders: PT Eval and Treat   Medical Diagnosis from Referral:   M54.12 (ICD-10-CM) - Cervical radiculopathy   M54.2,G89.29 (ICD-10-CM) - Chronic neck pain      Evaluation Date: 1/3/2023  Authorization Period Expiration: 12/28/23  Plan of Care Expiration: 3/19/23  Visit # / Visits authorized: 1/ 1; 9/20    PTA Visit #: 0/5      Precautions: HTN, CKD III, Thrombocytopenia, Pulmonary HTN, OA, Gout, Unspecified Inflammatory Spondylopathy, thoracic region, PLMD    Time In: 7:30  Time Out: 8:30  Total Billable Time: 60 minutes    SUBJECTIVE     Pt reports: soreness in right UT region that is slowly improving   He was compliant with home exercise program.  Response to previous treatment: progress neck strength  Functional change: In progress    Pain: 3/10  Location: bilateral neck      OBJECTIVE     Objective Measures updated at progress report unless specified.     Treatment     Ezequiel received the treatments listed below:      therapeutic exercises to develop strength, ROM, and posture for 15 minutes including:  Quadriped cat cow 8x  Prone cervical retraction 1x10 5" hold   Ws rtb 2x10  Scap depression  Open books 1x10 B      MANUAL THERAPY TECHNIQUES including Joint mobilizations, Manual traction, and Soft tissue Mobilization were applied to the cervical spine for 15 minutes.   Shoulder range of motion with joint mobilization and scapular mobs  Cervical traction with glides  OA mobs  Subocc release  Lateral glide cervical mobs  PA mobs mid cervical to upper thoracic  Mobs with passive rotation   STM to UT, STM, LS, cervical extensors  ART with Trp release in right UT,LS with cupping technique  Lumbar non thrust mobs in SL with hip rot  Hip mobs " inf, lat in sup  FDN: right UT, scalene, C2/3 multifidi with estim 4 min    Ezequiel participated in neuromuscular re-education activities to improve: Balance, Coordination, Kinesthetic, Sense, Proprioception, and Posture for 10 minutes. The following activities were included:  Resisted cervical retraction and flexion rtb with Ts and Vs 3# 1x15 ea position min to mod cues for postural awareness and sustained contraction for cervical extensors  Standing wall presses for postural control and awareness emphasis scap retraction and depression  [place] carry mod cues for proper scap and head px for postural control      Pt participated in dynamic functional therapeutic activities to improve functional performance for 10 minutes, including:  Seated alt unilateral 5# KB lift  Seated OH lift 8# for lifting in home  [place] carry with cues for shoulder and head px   Elevated on shoulder with thread the needle for trunk rot and head rot for rolling and reaching     Ezequiel received the following supervised modalities after being cleared for contradictions: Mechanical Traction:  Ezequiel received intermittent mechanical traction to the cervical spine at a force of 29 pounds for a total of  minutes. Hold time of 30 sec and rest time for 10 sec. Patient tolerated treatment well without any adverse effects.  Ezequiel received the following supervised modalities after being cleared for contradictions: IFC Electrical Stimulation:  Ezequiel received IFC Electrical Stimulation for pain control applied to the right UT region. Pt received stimulation at 150 % scan at a frequency of 3HZ for 12 minutes. Ezequiel tolderated treatment well without any adverse effects.  Moist heat applied on right neck and shoulder       Patient Education and Home Exercises     Home Exercises Provided and Patient Education Provided     Education provided:   - Home program    Written Home Exercises Provided: Patient instructed to cont prior HEP. Exercises were reviewed and  Ezequiel was able to demonstrate them prior to the end of the session.  Ezequiel demonstrated good  understanding of the education provided. See EMR under Patient Instructions for exercises provided during therapy sessions    ASSESSMENT      Pt met 3/5 stg and cont tp progress toward LTG. Pt cont to report soreness and pain in right UT region.He did have less pain at night for improved sleep quality. Emphasis placed on proper cervical and shoulder positioning with carrying and lifting tasks, mod cues provided. Pt reports no cervical pain at end of session.  Pt would benefit from cont PT to address remaining deficits and promote functional independence with minimal fall risk.     Pt did purchase TENS unit for home use. PT instructed pt in proper use.     Ezequiel Is progressing well towards his goals.   Pt prognosis is Good.     Pt will continue to benefit from skilled outpatient physical therapy to address the deficits listed in the problem list box on initial evaluation, provide pt/family education and to maximize pt's level of independence in the home and community environment.     Pt's spiritual, cultural and educational needs considered and pt agreeable to plan of care and goals.     Anticipated barriers to physical therapy: None    Goals:   Short Term Goals: In 5 weeks. Reviewed 2/24/2023  1.I with HEP. MET  2.Patient to increase cervical ROM by 25% with B side bending and B rotation. MET but pain ful at right UT region  3.Patient to increase DNF endurance to 28 sec. Or greater. PROGRESSING  4.Patient to have pain less than 3/10 at all times. NOT MET  5.Patient to score less than 25% impaired on the FOTO. MET     Long Term Goals: In 10 weeks  1. Patient to score less than 15% impaired on the FOTO.  2. Patient to demo increase in B Scapular strength to 5/5 gross MMT to support upright upper body posture for sitting and standing activities.  3. Patient to have decreased pain to 0/10 at all times.  4. Patient to demo  increase cervical ROM to WNL to scan environment to drive safely.    PLAN     Plan of care Certification: 1/3/2023 to 3/19/23.     Outpatient Physical Therapy 2 times weekly for 10 weeks to include the following interventions: Manual Therapy, Moist Heat/ Ice, Neuromuscular Re-ed, Patient Education, Self Care, Therapeutic Activities, and Therapeutic Exercise, e-stim, FDN.    Kaleigh Hopson, PT

## 2023-02-27 ENCOUNTER — CLINICAL SUPPORT (OUTPATIENT)
Dept: REHABILITATION | Facility: HOSPITAL | Age: 83
End: 2023-02-27
Payer: MEDICARE

## 2023-02-27 DIAGNOSIS — M54.2 CHRONIC NECK PAIN: Primary | ICD-10-CM

## 2023-02-27 DIAGNOSIS — G89.29 CHRONIC NECK PAIN: Primary | ICD-10-CM

## 2023-02-27 DIAGNOSIS — M62.81 WEAKNESS OF TRUNK MUSCULATURE: ICD-10-CM

## 2023-02-27 PROCEDURE — 97110 THERAPEUTIC EXERCISES: CPT | Mod: HCNC,PN

## 2023-02-27 NOTE — PROGRESS NOTES
"[facility]   Physical Therapy Treatment Note     Name: Ezequiel Hughes  Clinic Number: [id number]    Therapy Diagnosis:   Encounter Diagnoses   Name Primary?    Chronic neck pain Yes    Weakness of trunk musculature      Physician: Valery Ozuna MD    Visit Date: 2/27/2023    Physician Orders: PT Eval and Treat   Medical Diagnosis from Referral:   M54.12 (ICD-10-CM) - Cervical radiculopathy   M54.2,G89.29 (ICD-10-CM) - Chronic neck pain      Evaluation Date: 1/3/2023  Authorization Period Expiration: 12/28/23  Plan of Care Expiration: 3/19/23  Visit # / Visits authorized: 1/ 1; 13/20    PTA Visit #: 0/5      Precautions: HTN, CKD III, Thrombocytopenia, Pulmonary HTN, OA, Gout, Unspecified Inflammatory Spondylopathy, thoracic region, PLMD    Time In: 0720  Time Out: 0813  Total Billable Time: 53 minutes    SUBJECTIVE     Pt reports: He is doing better and has improved motion  He was compliant with home exercise program.  Response to previous treatment: progress neck strength  Functional change: In progress    Pain: 3/10  Location: bilateral neck      OBJECTIVE     Objective Measures updated at progress report unless specified.     Treatment     Ezequiel received the treatments listed below:      therapeutic exercises to develop strength, ROM, and posture for 23 minutes including:  Quadriped cat cow 8x  Prone cervical retraction 1x10 5" hold   Ws rtb 2x10  Scap depression  Open books 1x10 B  Bilateral shoulder external with green therapy band    MANUAL THERAPY TECHNIQUES including Joint mobilizations, Manual traction, and Soft tissue Mobilization were applied to the cervical spine for 20 minutes.   Shoulder range of motion with joint mobilization and scapular mobs  Cervical traction with glides  OA mobs  Subocc release  Lateral glide cervical mobs  PA mobs mid cervical to upper thoracic  Mobs with passive rotation   STM to UT, STM, LS, cervical extensors  ART with Trp release in right UT,LS with " cupping technique  Lumbar non thrust mobs in SL with hip rot  Hip mobs inf, lat in sup  FDN: right UT, scalene, C2/3 multifidi with estim 4 min    Ezequiel participated in neuromuscular re-education activities to improve: Balance, Coordination, Kinesthetic, Sense, Proprioception, and Posture for 10 minutes. The following activities were included:  Resisted cervical retraction and flexion rtb with Ts and Vs 3# 1x15 ea position min to mod cues for postural awareness and sustained contraction for cervical extensors      Pt participated in dynamic functional therapeutic activities to improve functional performance for 10 minutes, including:  Seated alt unilateral 5# KB lift  Seated OH lift 8# for lifting in home  [place] carry with cues for shoulder and head px   Elevated on shoulder with thread the needle for trunk rot and head rot for rolling and reaching         Patient Education and Home Exercises     Home Exercises Provided and Patient Education Provided     Education provided:   - Home program    Written Home Exercises Provided: Patient instructed to cont prior HEP. Exercises were reviewed and Ezequiel was able to demonstrate them prior to the end of the session.  Ezequiel demonstrated good  understanding of the education provided. See EMR under Patient Instructions for exercises provided during therapy sessions    ASSESSMENT     The patient is progressing with cervical motion and strengthening.  He is performing self stretching at home when pain increases.  He responds to mobilizations and STM to improve symptoms    Pt did purchase TENS unit for home use. PT instructed pt in proper use.     Ezequiel Is progressing well towards his goals.   Pt prognosis is Good.     Pt will continue to benefit from skilled outpatient physical therapy to address the deficits listed in the problem list box on initial evaluation, provide pt/family education and to maximize pt's level of independence in the home and community environment.      Pt's spiritual, cultural and educational needs considered and pt agreeable to plan of care and goals.     Anticipated barriers to physical therapy: None    Goals:   Short Term Goals: In 5 weeks. Reviewed 2/24/2023  1.I with HEP. MET  2.Patient to increase cervical ROM by 25% with B side bending and B rotation. MET but pain ful at right UT region  3.Patient to increase DNF endurance to 28 sec. Or greater. PROGRESSING  4.Patient to have pain less than 3/10 at all times. NOT MET  5.Patient to score less than 25% impaired on the FOTO. MET     Long Term Goals: In 10 weeks  1. Patient to score less than 15% impaired on the FOTO.  2. Patient to demo increase in B Scapular strength to 5/5 gross MMT to support upright upper body posture for sitting and standing activities.  3. Patient to have decreased pain to 0/10 at all times.  4. Patient to demo increase cervical ROM to WNL to scan environment to drive safely.    PLAN     Plan of care Certification: 1/3/2023 to 3/19/23.     Outpatient Physical Therapy 2 times weekly for 10 weeks to include the following interventions: Manual Therapy, Moist Heat/ Ice, Neuromuscular Re-ed, Patient Education, Self Care, Therapeutic Activities, and Therapeutic Exercise, e-stim, FDN.    Home Anders, PT

## 2023-03-01 ENCOUNTER — CLINICAL SUPPORT (OUTPATIENT)
Dept: REHABILITATION | Facility: HOSPITAL | Age: 83
End: 2023-03-01
Payer: MEDICARE

## 2023-03-01 DIAGNOSIS — M54.2 CHRONIC NECK PAIN: Primary | ICD-10-CM

## 2023-03-01 DIAGNOSIS — G89.29 CHRONIC NECK PAIN: Primary | ICD-10-CM

## 2023-03-01 PROCEDURE — 97110 THERAPEUTIC EXERCISES: CPT | Mod: HCNC,PN

## 2023-03-01 NOTE — PROGRESS NOTES
"[facility]   Physical Therapy Treatment Note     Name: Ezequiel Hughes  Clinic Number: [id number]    Therapy Diagnosis:   Encounter Diagnosis   Name Primary?    Chronic neck pain Yes     Physician: Valery Ozuna MD    Visit Date: 3/1/2023    Physician Orders: PT Eval and Treat   Medical Diagnosis from Referral:   M54.12 (ICD-10-CM) - Cervical radiculopathy   M54.2,G89.29 (ICD-10-CM) - Chronic neck pain      Evaluation Date: 1/3/2023  Authorization Period Expiration: 12/28/23  Plan of Care Expiration: 3/19/23  Visit # / Visits authorized: 1/ 1; 14/20    PTA Visit #: 0/5      Precautions: HTN, CKD III, Thrombocytopenia, Pulmonary HTN, OA, Gout, Unspecified Inflammatory Spondylopathy, thoracic region, PLMD    Time In: 0840  Time Out: 0925  Total Billable Time: 45 minutes    SUBJECTIVE     Pt reports: Frustration with continued pain despite improved motion  He was compliant with home exercise program.  Response to previous treatment: progress neck strength  Functional change: In progress    Pain: 3/10  Location: bilateral neck      OBJECTIVE     Objective Measures updated at progress report unless specified.     Treatment     Ezequiel received the treatments listed below:      therapeutic exercises to develop strength, ROM, and posture for 10 minutes including:  Quadriped cat cow 8x  Prone cervical retraction 1x10 5" hold   Ws rtb 2x10  Scap depression  Bilateral shoulder external with green therapy band    MANUAL THERAPY TECHNIQUES including Joint mobilizations, Manual traction, and Soft tissue Mobilization were applied to the cervical spine for 25 minutes.   Shoulder range of motion with joint mobilization and scapular mobs  Cervical traction with glides  OA mobs  Subocc release  Lateral glide cervical mobs  PA mobs mid cervical to upper thoracic  Mobs with passive rotation   STM to UT, STM, LS, cervical extensors    Ezequiel participated in neuromuscular re-education activities to improve: " Balance, Coordination, Kinesthetic, Sense, Proprioception, and Posture for 00 minutes. The following activities were included:  Resisted cervical retraction and flexion rtb with Ts and Vs 3# 1x15 ea position min to mod cues for postural awareness and sustained contraction for cervical extensors      Pt participated in dynamic functional therapeutic activities to improve functional performance for 10 minutes, including:  Seated alt unilateral 5# KB lift  Seated OH lift 8# for lifting in home  [place] carry with cues for shoulder and head px   Elevated on shoulder with thread the needle for trunk rot and head rot for rolling and reaching         Patient Education and Home Exercises     Home Exercises Provided and Patient Education Provided     Education provided:   - Home program    Written Home Exercises Provided: Patient instructed to cont prior HEP. Exercises were reviewed and Ezequiel was able to demonstrate them prior to the end of the session.  Ezequiel demonstrated good  understanding of the education provided. See EMR under Patient Instructions for exercises provided during therapy sessions    ASSESSMENT     The patient is progressing with cervical motion and strengthening.  He is performing self stretching at home when pain increases.  He responds to mobilizations and STM to improve symptoms    Pt did purchase TENS unit for home use. PT instructed pt in proper use.     Ezequiel Is progressing well towards his goals.   Pt prognosis is Good.     Pt will continue to benefit from skilled outpatient physical therapy to address the deficits listed in the problem list box on initial evaluation, provide pt/family education and to maximize pt's level of independence in the home and community environment.     Pt's spiritual, cultural and educational needs considered and pt agreeable to plan of care and goals.     Anticipated barriers to physical therapy: None    Goals:   Short Term Goals: In 5 weeks. Reviewed 2/24/2023  1.I  with HEP. MET  2.Patient to increase cervical ROM by 25% with B side bending and B rotation. MET but pain ful at right UT region  3.Patient to increase DNF endurance to 28 sec. Or greater. PROGRESSING  4.Patient to have pain less than 3/10 at all times. NOT MET  5.Patient to score less than 25% impaired on the FOTO. MET     Long Term Goals: In 10 weeks  1. Patient to score less than 15% impaired on the FOTO.  2. Patient to demo increase in B Scapular strength to 5/5 gross MMT to support upright upper body posture for sitting and standing activities.  3. Patient to have decreased pain to 0/10 at all times.  4. Patient to demo increase cervical ROM to WNL to scan environment to drive safely.    PLAN     Plan of care Certification: 1/3/2023 to 3/19/23.     Outpatient Physical Therapy 2 times weekly for 10 weeks to include the following interventions: Manual Therapy, Moist Heat/ Ice, Neuromuscular Re-ed, Patient Education, Self Care, Therapeutic Activities, and Therapeutic Exercise, e-stim, FDN.    Home Anders, PT

## 2023-03-08 ENCOUNTER — CLINICAL SUPPORT (OUTPATIENT)
Dept: REHABILITATION | Facility: HOSPITAL | Age: 83
End: 2023-03-08
Payer: MEDICARE

## 2023-03-08 DIAGNOSIS — M54.2 CHRONIC NECK PAIN: Primary | ICD-10-CM

## 2023-03-08 DIAGNOSIS — G89.29 CHRONIC NECK PAIN: Primary | ICD-10-CM

## 2023-03-08 PROCEDURE — 97140 MANUAL THERAPY 1/> REGIONS: CPT | Mod: HCNC,PN

## 2023-03-08 PROCEDURE — 97110 THERAPEUTIC EXERCISES: CPT | Mod: HCNC,PN

## 2023-03-08 NOTE — PROGRESS NOTES
"[facility]   Physical Therapy Treatment Note     Name: Ezequiel Hughes  Clinic Number: [id number]    Therapy Diagnosis:   Encounter Diagnosis   Name Primary?    Chronic neck pain Yes     Physician: Valery Ozuna MD    Visit Date: 3/8/2023    Physician Orders: PT Eval and Treat   Medical Diagnosis from Referral:   M54.12 (ICD-10-CM) - Cervical radiculopathy   M54.2,G89.29 (ICD-10-CM) - Chronic neck pain     Evaluation Date: 1/3/2023  Authorization Period Expiration: 12/28/23  Plan of Care Expiration: 3/19/23  Visit # / Visits authorized: 1/ 1; 14/20  Goals reviewed: 2/24/2023    PTA Visit #: 0/5      Precautions: HTN, CKD III, Thrombocytopenia, Pulmonary HTN, OA, Gout, Unspecified Inflammatory Spondylopathy, thoracic region, PLMD    Time In: 7:30  Time Out: 8:15  Total Billable Time: 45 minutes    SUBJECTIVE     Pt reports: feeling under the weather, but has been tested for all viral infections. Currently being treated for sinus cold. Pt feels weak and fatigued but decreased pain and greater ROM noted.   He was compliant with home exercise program.  Response to previous treatment: progress neck strength  Functional change: In progress    Pain: 3/10  Location: bilateral neck      OBJECTIVE     Objective Measures updated at progress report unless specified.     Treatment     Ezequiel received the treatments listed below:      therapeutic exercises to develop strength, ROM, and posture for 30 minutes including:  Prone cervical retraction 1x10 5" hold  Row 50# 2x10  Wall presses 1x10  Ws rtb 2x10  Scap depression  Bilateral shoulder external with green therapy band    MANUAL THERAPY TECHNIQUES including Joint mobilizations, Manual traction, and Soft tissue Mobilization were applied to the cervical spine for 15 minutes.   Shoulder range of motion with joint mobilization and scapular mobs  Cervical traction with glides  OA mobs  Subocc release  Lateral glide cervical mobs  PA mobs mid cervical to " upper thoracic  Mobs with passive rotation   STM to UT, STM, LS, cervical extensors    Ezequiel participated in neuromuscular re-education activities to improve: Balance, Coordination, Kinesthetic, Sense, Proprioception, and Posture for 00 minutes. The following activities were included:  Resisted cervical retraction and flexion rtb with Ts and Vs 3# 1x15 ea position min to mod cues for postural awareness and sustained contraction for cervical extensors      Pt participated in dynamic functional therapeutic activities to improve functional performance for 0 minutes, including:  Seated alt unilateral 5# KB lift  Seated OH lift 8# for lifting in home  [place] carry with cues for shoulder and head px  Elevated on shoulder with thread the needle for trunk rot and head rot for rolling and reaching         Patient Education and Home Exercises     Home Exercises Provided and Patient Education Provided     Education provided:   - Home program    Written Home Exercises Provided: Patient instructed to cont prior HEP. Exercises were reviewed and Ezequiel was able to demonstrate them prior to the end of the session.  Ezequiel demonstrated good  understanding of the education provided. See EMR under Patient Instructions for exercises provided during therapy sessions    ASSESSMENT     The patient is progressing with cervical motion and strengthening.  He is performing self stretching at home when pain increases.  He responds well to mobilizations and STM to improve symptoms. Limited strengthening and activities completed as pt did not feel well today secondary to congestion/sinus pressure.    Pt did purchase TENS unit for home use. PT instructed pt in proper use.     Ezequiel Is progressing well towards his goals.   Pt prognosis is Good.     Pt will continue to benefit from skilled outpatient physical therapy to address the deficits listed in the problem list box on initial evaluation, provide pt/family education and to maximize pt's level  of independence in the home and community environment.     Pt's spiritual, cultural and educational needs considered and pt agreeable to plan of care and goals.     Anticipated barriers to physical therapy: None    Goals:   Short Term Goals: In 5 weeks. Reviewed 2/24/2023  1.I with HEP. MET  2.Patient to increase cervical ROM by 25% with B side bending and B rotation. MET but pain ful at right UT region  3.Patient to increase DNF endurance to 28 sec. Or greater. PROGRESSING  4.Patient to have pain less than 3/10 at all times. NOT MET  5.Patient to score less than 25% impaired on the FOTO. MET     Long Term Goals: In 10 weeks  1. Patient to score less than 15% impaired on the FOTO.  2. Patient to demo increase in B Scapular strength to 5/5 gross MMT to support upright upper body posture for sitting and standing activities.  3. Patient to have decreased pain to 0/10 at all times.  4. Patient to demo increase cervical ROM to WNL to scan environment to drive safely.    PLAN     Plan of care Certification: 1/3/2023 to 3/19/23.     Outpatient Physical Therapy 2 times weekly for 10 weeks to include the following interventions: Manual Therapy, Moist Heat/ Ice, Neuromuscular Re-ed, Patient Education, Self Care, Therapeutic Activities, and Therapeutic Exercise, e-stim, FDN.    Kaleigh Hopson, PT

## 2023-03-09 ENCOUNTER — OFFICE VISIT (OUTPATIENT)
Dept: OPHTHALMOLOGY | Facility: CLINIC | Age: 83
End: 2023-03-09
Payer: MEDICARE

## 2023-03-09 DIAGNOSIS — H40.1122 PRIMARY OPEN ANGLE GLAUCOMA (POAG) OF LEFT EYE, MODERATE STAGE: ICD-10-CM

## 2023-03-09 DIAGNOSIS — H40.1111 PRIMARY OPEN ANGLE GLAUCOMA (POAG) OF RIGHT EYE, MILD STAGE: Primary | ICD-10-CM

## 2023-03-09 PROCEDURE — 99999 PR PBB SHADOW E&M-EST. PATIENT-LVL II: ICD-10-PCS | Mod: PBBFAC,HCNC,, | Performed by: OPHTHALMOLOGY

## 2023-03-09 PROCEDURE — 1159F PR MEDICATION LIST DOCUMENTED IN MEDICAL RECORD: ICD-10-PCS | Mod: HCNC,CPTII,S$GLB, | Performed by: OPHTHALMOLOGY

## 2023-03-09 PROCEDURE — 1160F PR REVIEW ALL MEDS BY PRESCRIBER/CLIN PHARMACIST DOCUMENTED: ICD-10-PCS | Mod: HCNC,CPTII,S$GLB, | Performed by: OPHTHALMOLOGY

## 2023-03-09 PROCEDURE — 1159F MED LIST DOCD IN RCRD: CPT | Mod: HCNC,CPTII,S$GLB, | Performed by: OPHTHALMOLOGY

## 2023-03-09 PROCEDURE — 1160F RVW MEDS BY RX/DR IN RCRD: CPT | Mod: HCNC,CPTII,S$GLB, | Performed by: OPHTHALMOLOGY

## 2023-03-09 PROCEDURE — 99214 PR OFFICE/OUTPT VISIT, EST, LEVL IV, 30-39 MIN: ICD-10-PCS | Mod: HCNC,S$GLB,, | Performed by: OPHTHALMOLOGY

## 2023-03-09 PROCEDURE — 99214 OFFICE O/P EST MOD 30 MIN: CPT | Mod: HCNC,S$GLB,, | Performed by: OPHTHALMOLOGY

## 2023-03-09 PROCEDURE — 99999 PR PBB SHADOW E&M-EST. PATIENT-LVL II: CPT | Mod: PBBFAC,HCNC,, | Performed by: OPHTHALMOLOGY

## 2023-03-09 NOTE — PROGRESS NOTES
SUBJECTIVE  Ezequiel Hughes is 82 y.o. male  Uncorrected distance visual acuity was 20/20 in the right eye and 20/20 in the left eye.   Chief Complaint   Patient presents with    Glaucoma     SLT OD 01/18/23          HPI     Glaucoma     Additional comments: SLT OD 01/18/23           Comments    States that his vision is and that he has been     1. Mod COAG OS + Mild COAG OD (init 22/26 post dilation IOP ) goal = 17  SLT OD 1/18/23 (19.5-)  SLT OS 12/2/20 (19.5-15)  2. PCIOL / I-Stent OD +18.5 [id number] (distance) 2-21-18  PCIOL /I-Stent OS +21.0 (-1.75) 3/21/18 near  3. ERM OU   4. Brow Lift 9/18   *intolerant of travatan*      No eyedrops            Last edited by Lisa Tejada on 3/9/2023  8:57 AM.         Assessment /Plan :  1. Primary open angle glaucoma (POAG) of right eye, mild stage 5 Weeks S/P SLT OD well tolerated.    excellent response to SLT and continue current regimen.    Return to clinic in 4 months  or as needed.  With GOCT, 24-2 HVF, and Dilation     2. Primary open angle glaucoma (POAG) of left eye, moderate stage

## 2023-03-10 ENCOUNTER — CLINICAL SUPPORT (OUTPATIENT)
Dept: REHABILITATION | Facility: HOSPITAL | Age: 83
End: 2023-03-10
Payer: MEDICARE

## 2023-03-10 DIAGNOSIS — G89.29 CHRONIC NECK PAIN: Primary | ICD-10-CM

## 2023-03-10 DIAGNOSIS — M54.2 CHRONIC NECK PAIN: Primary | ICD-10-CM

## 2023-03-10 PROCEDURE — 97112 NEUROMUSCULAR REEDUCATION: CPT | Mod: HCNC,PN

## 2023-03-10 PROCEDURE — 97110 THERAPEUTIC EXERCISES: CPT | Mod: HCNC,PN

## 2023-03-10 PROCEDURE — 97140 MANUAL THERAPY 1/> REGIONS: CPT | Mod: HCNC,PN

## 2023-03-10 NOTE — PROGRESS NOTES
"[facility]   Physical Therapy Treatment Note     Name: Ezequiel Hughes  Clinic Number: [id number]    Therapy Diagnosis:   Encounter Diagnosis   Name Primary?    Chronic neck pain Yes     Physician: Valery Ozuna MD    Visit Date: 3/10/2023    Physician Orders: PT Eval and Treat   Medical Diagnosis from Referral:   M54.12 (ICD-10-CM) - Cervical radiculopathy   M54.2,G89.29 (ICD-10-CM) - Chronic neck pain     Evaluation Date: 1/3/2023  Authorization Period Expiration: 12/28/23  Plan of Care Expiration: 3/19/23  Visit # / Visits authorized: 1/ 1; 15/20  Goals reviewed: 2/24/2023    PTA Visit #: 0/5      Precautions: HTN, CKD III, Thrombocytopenia, Pulmonary HTN, OA, Gout, Unspecified Inflammatory Spondylopathy, thoracic region, PLMD    Time In: 7:30  Time Out: 8:25  Total Billable Time: 55 minutes    SUBJECTIVE     Pt reports: feeling a little better, no neck pain at this time.   He was compliant with home exercise program.  Response to previous treatment: progress neck strength  Functional change: In progress    Pain: 1/10  Location: bilateral neck      OBJECTIVE     Objective Measures updated at progress report unless specified.     Treatment     Ezequiel received the treatments listed below:      therapeutic exercises to develop strength, ROM, and posture for 10  minutes including:  Quadriped cat cow 8x  Prone cervical retraction 1x10 5" hold  Row 50# 2x10  Ws rtb 2x10  Shrug 15# 2x10  Scap depression  Open books 1x10 B  UBE 3/3    MANUAL THERAPY TECHNIQUES including Joint mobilizations, Manual traction, and Soft tissue Mobilization were applied to the cervical spine for 15 minutes.   Shoulder range of motion with joint mobilization and scapular mobs  Cervical traction with glides  OA mobs  Subocc release  Lateral glide cervical mobs  PA mobs mid cervical to upper thoracic  Mobs with passive rotation   STM to UT, STM, LS, cervical extensors    Ezequiel participated in neuromuscular re-education " "activities to improve: Balance, Coordination, Kinesthetic, Sense, Proprioception, and Posture for 30 minutes. The following activities were included:  NBOS: head horiz motion 10x eyes open and 10x eyes closed    Head vertical motion 1x10 eyes open and closed  Amb drills 3 step sequence quick head turn 2 laps  Amb drill 3 step seq prolonged horix hold 2 laps  Reverse stepping loaded with 10# plate 2 laps  Lateral stepping loaded with 10# plate and ipsi head turn 2x10 B  So-Hi carry holding 10# plate at middle with emphasis on scap px and cervical retraction min cues 2 laps ea UE  Resisted cervical retraction and flexion rtb with Ts and Vs 3# 1x15 ea position min to mod cues for postural awareness and sustained contraction for cervical extensors  Thoracic ext posture stretch over 1/2 foam roll horix 1x8 2" hold  1/2 foam long ways with alternating flexion shoulders with cervical retraction and core stab to reduce rib flare      Pt participated in dynamic functional therapeutic activities to improve functional performance for 0 minutes, including:  Seated alt unilateral 5# KB lift  Seated OH lift 8# for lifting in home  So-Hi carry with cues for shoulder and head px  Elevated on shoulder with thread the needle for trunk rot and head rot for rolling and reaching         Patient Education and Home Exercises     Home Exercises Provided and Patient Education Provided     Education provided:   - Home program    Written Home Exercises Provided: Patient instructed to cont prior HEP. Exercises were reviewed and Ezequiel was able to demonstrate them prior to the end of the session.  Ezequiel demonstrated good  understanding of the education provided. See EMR under Patient Instructions for exercises provided during therapy sessions    ASSESSMENT     The patient is progressing with cervical motion and strengthening.  He is performing self stretching at home when pain increases.  He responds well to mobilizations and STM to improve " symptoms. Able to re-initiate balance training and resistive functional mobility, including loaded reverse and alteral stepping. Min cues needed during farmers carry to reduce rounded shoulder and FHP. Pt progressing well and will d/c in upcoming visits.     Pt did purchase TENS unit for home use. PT instructed pt in proper use.     Ezequiel Is progressing well towards his goals.   Pt prognosis is Good.     Pt will continue to benefit from skilled outpatient physical therapy to address the deficits listed in the problem list box on initial evaluation, provide pt/family education and to maximize pt's level of independence in the home and community environment.     Pt's spiritual, cultural and educational needs considered and pt agreeable to plan of care and goals.     Anticipated barriers to physical therapy: None    Goals:   Short Term Goals: In 5 weeks. Reviewed 2/24/2023  1.I with HEP. MET  2.Patient to increase cervical ROM by 25% with B side bending and B rotation. MET but pain ful at right UT region  3.Patient to increase DNF endurance to 28 sec. Or greater. PROGRESSING  4.Patient to have pain less than 3/10 at all times. NOT MET  5.Patient to score less than 25% impaired on the FOTO. MET     Long Term Goals: In 10 weeks  1. Patient to score less than 15% impaired on the FOTO.  2. Patient to demo increase in B Scapular strength to 5/5 gross MMT to support upright upper body posture for sitting and standing activities.  3. Patient to have decreased pain to 0/10 at all times.  4. Patient to demo increase cervical ROM to WNL to scan environment to drive safely.    PLAN     Plan of care Certification: 1/3/2023 to 3/19/23.     Outpatient Physical Therapy 2 times weekly for 10 weeks to include the following interventions: Manual Therapy, Moist Heat/ Ice, Neuromuscular Re-ed, Patient Education, Self Care, Therapeutic Activities, and Therapeutic Exercise, e-stim, FDN.    Kaleigh Hopson, PT

## 2023-03-14 ENCOUNTER — CLINICAL SUPPORT (OUTPATIENT)
Dept: REHABILITATION | Facility: HOSPITAL | Age: 83
End: 2023-03-14
Payer: MEDICARE

## 2023-03-14 DIAGNOSIS — G89.29 CHRONIC NECK PAIN: Primary | ICD-10-CM

## 2023-03-14 DIAGNOSIS — M54.2 CHRONIC NECK PAIN: Primary | ICD-10-CM

## 2023-03-14 PROCEDURE — 97530 THERAPEUTIC ACTIVITIES: CPT | Mod: HCNC,PN

## 2023-03-14 PROCEDURE — 97010 HOT OR COLD PACKS THERAPY: CPT | Mod: HCNC,PN

## 2023-03-14 PROCEDURE — 97014 ELECTRIC STIMULATION THERAPY: CPT | Mod: HCNC,PN

## 2023-03-14 PROCEDURE — 97140 MANUAL THERAPY 1/> REGIONS: CPT | Mod: HCNC,PN

## 2023-03-14 NOTE — PROGRESS NOTES
[facility]   Physical Therapy Treatment Note     Name: Ezequiel Hughes  Clinic Number: [id number]    Therapy Diagnosis:   Encounter Diagnosis   Name Primary?    Chronic neck pain Yes     Physician: Valery Ozuna MD    Visit Date: 3/14/2023    Physician Orders: PT Eval and Treat   Medical Diagnosis from Referral:   M54.12 (ICD-10-CM) - Cervical radiculopathy   M54.2,G89.29 (ICD-10-CM) - Chronic neck pain     Evaluation Date: 1/3/2023  Authorization Period Expiration: 12/28/23  Plan of Care Expiration: 3/19/23  Visit # / Visits authorized: 1/ 1; 15/20  Goals reviewed: 2/24/2023    PTA Visit #: 0/5      Precautions: HTN, CKD III, Thrombocytopenia, Pulmonary HTN, OA, Gout, Unspecified Inflammatory Spondylopathy, thoracic region, PLMD    Time In: 8:45  Time Out: 9:30  Total Billable Time: 45 minutes    SUBJECTIVE     Pt reports: feeling a little better, no neck pain at this time.   He was compliant with home exercise program.  Response to previous treatment: progress neck strength  Functional change: In progress    Pain: 1/10  Location: bilateral neck      OBJECTIVE     Objective Measures updated at progress report unless specified.     Treatment     Ezequiel received the treatments listed below:      therapeutic exercises to develop strength, ROM, and posture for 0  minutes including:  Shrug 2x10  Scap depression  Shoulder rolls   Seated cervical retraction for proper posture      MANUAL THERAPY TECHNIQUES including Joint mobilizations, Manual traction, and Soft tissue Mobilization were applied to the cervical spine for 25 minutes.   Shoulder range of motion with joint mobilization and scapular mobs  Cervical traction with glides  OA mobs  Subocc release  Lateral glide cervical mobs  PA mobs mid cervical to upper thoracic  Mobs with passive rotation   STM to UT, STM, LS, cervical extensors  FDN: right UT, scalene, C2/3 multifidi with estim 4 min      Ezequiel participated in neuromuscular  "re-education activities to improve: Balance, Coordination, Kinesthetic, Sense, Proprioception, and Posture for 0 minutes. The following activities were included:  NBOS: head horiz motion 10x eyes open and 10x eyes closed    Head vertical motion 1x10 eyes open and closed  Amb drills 3 step sequence quick head turn 2 laps  Amb drill 3 step seq prolonged horix hold 2 laps  Reverse stepping loaded with 10# plate 2 laps  Lateral stepping loaded with 10# plate and ipsi head turn 2x10 B  Hugo carry holding 10# plate at middle with emphasis on scap px and cervical retraction min cues 2 laps ea UE  Resisted cervical retraction and flexion rtb with Ts and Vs 3# 1x15 ea position min to mod cues for postural awareness and sustained contraction for cervical extensors  Thoracic ext posture stretch over 1/2 foam roll horix 1x8 2" hold  1/2 foam long ways with alternating flexion shoulders with cervical retraction and core stab to reduce rib flare      Pt participated in dynamic functional therapeutic activities to improve functional performance for 10 minutes, including:  Postural education provided: seated postural control and awareness while seated in car, demo and tactile cue for proper retraction  Cervical positioning while OH lifting   Scapular stabilization and positioning while lifting, carrying, and squatting       Ezequiel received the following supervised modalities after being cleared for contradictions: IFC Electrical Stimulation:  Ezequiel received IFC Electrical Stimulation for pain control applied to the right UT region. Pt received stimulation at 150 % scan at a frequency of 3HZ for 15 minutes. Ezequiel tolderated treatment well without any adverse effects.  Moist heat applied on right neck and shoulder       Patient Education and Home Exercises     Home Exercises Provided and Patient Education Provided     Education provided:   - Home program    Written Home Exercises Provided: Patient instructed to cont prior HEP. " Exercises were reviewed and Ezequiel was able to demonstrate them prior to the end of the session.  Ezequiel demonstrated good  understanding of the education provided. See EMR under Patient Instructions for exercises provided during therapy sessions    ASSESSMENT     The progressed well with cervical motion and strengthening.  He is performing self stretching at home when pain increases.  He responds well to mobilizations and STM to improve symptoms. Min cues provided to reduce rounded shoulder and FHP. Pt in agreement with d/c today.   Pt did purchase TENS unit for home use. PT instructed pt in proper use.     Ezequiel Is progressing well towards his goals.   Pt prognosis is Good.     Pt will continue to benefit from skilled outpatient physical therapy to address the deficits listed in the problem list box on initial evaluation, provide pt/family education and to maximize pt's level of independence in the home and community environment.     Pt's spiritual, cultural and educational needs considered and pt agreeable to plan of care and goals.     Anticipated barriers to physical therapy: None    Goals:   Short Term Goals: In 5 weeks. Reviewed 3/14/2023  1.I with HEP. MET  2.Patient to increase cervical ROM by 25% with B side bending and B rotation. MET but pain ful at right UT region  3.Patient to increase DNF endurance to 28 sec. Or greater. MET  4.Patient to have pain less than 3/10 at all times. NOT MET  5.Patient to score less than 25% impaired on the FOTO. MET     Long Term Goals: In 10 weeks  1. Patient to score less than 15% impaired on the FOTO.  2. Patient to demo increase in B Scapular strength to 5/5 gross MMT to support upright upper body posture for sitting and standing activities.  3. Patient to have decreased pain to 0/10 at all times.  4. Patient to demo increase cervical ROM to WNL to scan environment to drive safely.    PLAN     Plan of care Certification: 1/3/2023 to 3/19/23.     Outpatient Physical  Therapy 2 times weekly for 10 weeks to include the following interventions: Manual Therapy, Moist Heat/ Ice, Neuromuscular Re-ed, Patient Education, Self Care, Therapeutic Activities, and Therapeutic Exercise, e-stim, FDN.    Kaleigh Hopson, PT

## 2023-03-14 NOTE — PLAN OF CARE
[facility]  PT Discharge Note    Name: Ezequiel Hughes  Clinic Number: [id number]    Therapy Diagnosis:   Encounter Diagnosis   Name Primary?    Chronic neck pain Yes     Physician: Valery Ozuna MD    Evaluation Date: 1/3/2023  Authorization Period Expiration: 12/28/23  Plan of Care Expiration: 3/19/23    Date of Last visit: 3/14/2023  Total Visits Received: 17    ASSESSMENT      The progressed well with cervical motion and strengthening.  He is performing self stretching at home when pain increases.  He responds well to mobilizations and STM to improve symptoms. Min cues provided to reduce rounded shoulder and FHP. Pt in agreement with d/c today.     Discharge reason: Patient has met all of his/her goals    Discharge FOTO Score: 4% limitation     Goals: Short Term Goals: In 5 weeks. Reviewed 3/14/2023  1.I with HEP. MET  2.Patient to increase cervical ROM by 25% with B side bending and B rotation. MET but pain ful at right UT region  3.Patient to increase DNF endurance to 28 sec. Or greater. MET  4.Patient to have pain less than 3/10 at all times. NOT MET  5.Patient to score less than 25% impaired on the FOTO. MET       PLAN   This patient is discharged from Physical Therapy      Kaleigh Hopson, PT

## 2023-04-19 ENCOUNTER — PATIENT MESSAGE (OUTPATIENT)
Dept: PRIMARY CARE CLINIC | Facility: CLINIC | Age: 83
End: 2023-04-19
Payer: MEDICARE

## 2023-04-19 ENCOUNTER — TELEPHONE (OUTPATIENT)
Dept: PRIMARY CARE CLINIC | Facility: CLINIC | Age: 83
End: 2023-04-19
Payer: MEDICARE

## 2023-04-19 NOTE — TELEPHONE ENCOUNTER
----- Message from Nelli Elena sent at 4/19/2023  3:00 PM CDT -----  Contact: [phone]  Patient requesting a call back at [phone] in regards to testing positive for Covid. Thanks KD

## 2023-04-20 ENCOUNTER — OFFICE VISIT (OUTPATIENT)
Dept: URGENT CARE | Facility: CLINIC | Age: 83
End: 2023-04-20
Payer: MEDICARE

## 2023-04-20 ENCOUNTER — TELEPHONE (OUTPATIENT)
Dept: URGENT CARE | Facility: CLINIC | Age: 83
End: 2023-04-20

## 2023-04-20 VITALS
SYSTOLIC BLOOD PRESSURE: 125 MMHG | RESPIRATION RATE: 18 BRPM | TEMPERATURE: 98 F | OXYGEN SATURATION: 96 % | DIASTOLIC BLOOD PRESSURE: 65 MMHG | BODY MASS INDEX: 28.2 KG/M2 | HEIGHT: 70 IN | HEART RATE: 70 BPM | WEIGHT: 197 LBS

## 2023-04-20 DIAGNOSIS — J02.9 ACUTE SORE THROAT: ICD-10-CM

## 2023-04-20 DIAGNOSIS — R52 GENERALIZED BODY ACHES: ICD-10-CM

## 2023-04-20 DIAGNOSIS — R09.81 COUGH WITH CONGESTION OF PARANASAL SINUS: ICD-10-CM

## 2023-04-20 DIAGNOSIS — R05.8 COUGH WITH CONGESTION OF PARANASAL SINUS: ICD-10-CM

## 2023-04-20 DIAGNOSIS — U07.1 COVID-19: Primary | ICD-10-CM

## 2023-04-20 LAB
CTP QC/QA: YES
SARS-COV-2 AG RESP QL IA.RAPID: POSITIVE

## 2023-04-20 PROCEDURE — 87811 SARS-COV-2 COVID19 W/OPTIC: CPT | Mod: QW,S$GLB,, | Performed by: PHYSICIAN ASSISTANT

## 2023-04-20 PROCEDURE — 99214 PR OFFICE/OUTPT VISIT, EST, LEVL IV, 30-39 MIN: ICD-10-PCS | Mod: S$GLB,CS,, | Performed by: PHYSICIAN ASSISTANT

## 2023-04-20 PROCEDURE — 87811 SARS CORONAVIRUS 2 ANTIGEN POCT, MANUAL READ: ICD-10-PCS | Mod: QW,S$GLB,, | Performed by: PHYSICIAN ASSISTANT

## 2023-04-20 PROCEDURE — 99214 OFFICE O/P EST MOD 30 MIN: CPT | Mod: S$GLB,CS,, | Performed by: PHYSICIAN ASSISTANT

## 2023-04-20 NOTE — PATIENT INSTRUCTIONS
YOU HAVE TESTED POSITIVE FOR COVID-19 TODAY!  ISOLATION:  you must isolate for 5 days starting on the day of the first symptoms,  not the day of the positive test.    This is the most important part, both the CDC and the [facility] emphasize that you do not test out of isolation.    After 5 days, if your symptoms have improved and you have not had fever on day 5, you can return to the community on day 6- NO TESTING REQUIRED!      In fact, we do not retest if you were positive in the last 90 days.     After your 5 days of isolation are completed, the CDC recommends strict mask use for the first 5 days that you come out of isolation.    2. DISCUSSION/INSTRUCTIONS:        --you have been and given a prescription for Molnupirivir with fact sheet.  *You should treat any symptoms as we discussed.    *If you have difficulty breathing, shortness of breath, chest pain, high fevers that are not controlled with Tylenol and Ibuprofen, or any further emergency concerns, go to the ER.     VIRAL URI: OVER THE COUNTER RECOMMENDATIONS/SUGGESTIONS--if needed  Covid is a virus and does NOT respond to antibiotics!      Make sure to stay well hydrated.    Use Nasal Saline to mechanically move any post nasal drip from your ear tube or from the back of your throat. OR You may insert a whole garlic cloves into your nostrils and leave for 10-15 minutes. When you remove them, mucus will be pulled down. This may burn as garlic is strong.  Repeat as often as needed and able to tolerate.    Please do not use garlic if you have an allergy to garlic.    SORE THROAT:    You may gargle with hot salt water 4 times a day for the next 2 days and then you may also gargle diluted hydrogen peroxide once to twice daily to alleviate some of your throat discomfort.  Drink plenty of fluids, recommend warm tea with honey.     YOU MAY USE OVER-THE-COUNTER CEPACOL FOR SOOTHING OF YOUR THROAT.  You may wish to avoid spicy food, citrus fruits, and red sauces- as this  may irritate the throat more.    You can also take a daily anti-histamine such as Zyrtec, Claritin, Xyzal, OR Allegra-IN DAYTIME; NON DROWSY) AND/OR Benadryl- AT NIGHT; DROWSY) to help with runny nose/sneezing/sore throat/cough.    If your symptoms do not improve, you should return to this clinic. If your symptoms worsen, go to the emergency room.     COUGH:      Make sure you are getting rest and drinking lots of fluids.    You can use cough drops (recommend ricola lemon mint honey) or Cepacol to soothe your sore throat.     You can also take Elderberry and/or Emergen-C (vitamin C) to help boost your immune system.     You may use any of the over-the-counter cough suppressant combination medications such as: NyQuil, DayQuil, Mucinex (guaifenesin), Robitussin, Delsym (Bromfed), TheraFlu  Note:   -pseudoephedrine (behind the counter) is a decongestant. Pseudoephedrine 30 mg up to 240 mg/day. *BE AWARE- It can raise your blood pressure and give you palpitations.  -Mucinex (guaifenisin) is to break up mucus up to 2400mg/day to loosen any mucous.   -Mucinex DM pill has a cough suppressant that can be sedating. It can be used at night to stop the tickle at the back of your throat.  -Mucinex D (it has guaifenesin and a high dose of pseudoephedrine) which could be helpful in the mornings to help decongest.  -Nyquil at night is beneficial to help you get some rest, however it is sedating and it does have an antihistamine and tylenol.  - you may use over-the-counter Coricidin HBP in the event that you have a history of high blood pressure    Honey is a natural cough suppressant that can be used.          Tylenol up to 4,000 mg a day is safe for short periods and can be used for headache, body aches, pain, and fever. However in high doses and prolonged use it can cause liver irritation.    Ibuprofen is a non-steroidal anti-inflammatory that can be used for headache, body aches, pain, and fever.However it can also cause  stomach irritation if over used.    Flonase-How do you use a Nasal Spray?    Make sure you understand your dosing instructions. Spray only the number of prescribed sprays in each nostril. Read the package instructions before using your spray the first time.    Most sprays suggest the following steps:    Wash your hands well.    Gently blow your nose to clear the passageway.    Shake the container several times.    Tilt your head slightly downward.  Use the opposite hand from the nostril you will be spraying to hold the spray bottle.    Block one nostril with your finger.  Insert the nasal applicator into the other nostril.    Aim the spray toward the outer wall of the nostril.  Inhale slowly through the nose and press the .    Breathe out and repeat to apply the prescribed number of sprays.  Repeat these steps for the other nostril.     Avoid sneezing or blowing your nose right after spraying.             *WHAT DO I TELL MY KNOWN EXPOSURES/CLOSE CONTACTS?:     CDC Testing and Quarantine Guidelines for patients with exposure to a known-positive COVID-19 person:    *A 'close exposure' is defined as anyone who has had an exposure (masked or unmasked) to a known COVID -19 positive person within 6 feet of someone for a cumulative total of 15 minutes or more over a 24-hour period.    - Vaccinated/Have been boosted or completed the primary series of Pfizer or Moderna vaccine within the last 6 months or completed the primary series of J&J vaccine within the last 2 months and/or had a positive test within 90 days-- do NOT need to quarantine after contact with someone who had COVID-19 unless they have symptoms.    - Fully vaccinated people who have not had a positive test within 90 days, should get tested 3-5 days after their exposure, even if they don't have symptoms and wear a mask indoors in public for 10 days following exposure or until their test result is negative on day 5. If you develop symptoms test  and quarantine.    - Unvaccinated, or are more than six months out from their second mRNA dose (or more than 2 months after the J&J vaccine) and not yet boosted,  and/or NOT had a positive test within 90 days and meet 'close exposure-- you are required by CDC guidelines to quarantine for at least 5 days from time of exposure followed by 5 days of strict masking. It is recommended, but not required to test after 5 days, unless you develop symptoms, in which case you should test at that time.    *If you do decide to test at 5 days and are asymptomatic, the risk is that if you test without symptoms on Day 5 for example) and you are positive, your 5 day isolation begins on that day, and you turned your 5 day quarantine into 10 days.    *If your exposure does not meet the above definition, you can return to your normal daily activities to include social distancing, wearing a mask and frequent handwashing.    *Alternatively, if a 5-day quarantine is not feasible, it is imperative that an exposed person wear a well-fitting mask at all times when around others for 10 days after exposure!       - You must understand that you have received an Urgent Care treatment only and that you may be released before all of your medical problems are known or treated.   - You, the patient, will arrange for follow up care as instructed with your primary care provider or recommended specialist.   - If your condition worsens or fails to improve we recommend that you receive another evaluation at the ER immediately or contact your PCP to discuss your concerns, or return here.   - Please do not drive or make any important decisions for 24 hours if you have received any pain medications, sedatives or mood altering drugs during your visit.    Disclaimer: This document was drafted with the use of a voice recognition device and is likely to have sound alike errors.         Patient Education        COVID-19 Overview     The Basics   Written by the  "doctors and editors at [facility]     What is COVID-19?   COVID-19 stands for "coronavirus disease 2019." It is caused by a virus called SARS-CoV-2. The virus first appeared in late 2019 and quickly spread around the world.    What are the symptoms of COVID-19?   Symptoms usually start 4 or 5 days after a person is infected with the virus. But in some people, it can take up to 2 weeks for symptoms to appear. Some people never show symptoms at all.  When symptoms do happen, they can include:  Fever  Cough  Trouble breathing  Feeling tired  Shaking chills  Muscle aches  Headache  Sore throat  Problems with sense of smell or taste  Some people have digestive problems like nausea or diarrhea. There have also been some reports of rashes or other skin symptoms. For example, some people with COVID-19 get reddish-purple spots on their fingers or toes. But it's not clear why or how often this happens.  For most people, symptoms will get better within a few weeks. But a small number of people get very sick and stop being able to breathe on their own. In severe cases, their organs stop working, which can lead to death.  Some people with COVID-19 continue to have some symptoms for weeks or months. This seems to be more likely in people who are sick enough to need to stay in the hospital. But this can also happen in people who did not get very sick. Doctors are still learning about the long-term effects of COVID-19.  While children can get COVID-19, they are less likely than adults to have severe symptoms. More information about COVID-19 and children is available separately. (See "Patient education: COVID-19 and children (The Basics)".)    Am I at risk for getting seriously ill?   It depends on your age and health. In some people, COVID-19 leads to serious problems like pneumonia, not getting enough oxygen, heart problems, or even death. This risk gets higher as people get older. It is also higher in people who have other health " "problems like serious heart disease, chronic kidney disease, type 2 diabetes, chronic obstructive pulmonary disease (COPD), sickle cell disease, or obesity. People who have a weak immune system for other reasons (for example, HIV infection or certain medicines), asthma, cystic fibrosis, type 1 diabetes, or high blood pressure might also be at higher risk for serious problems.    How is COVID-19 spread?   The virus that causes COVID-19 mainly spreads from person to person. This usually happens when an infected person coughs, sneezes, or talks near other people. The virus is passed through tiny particles from the infected person's lungs and airway. These particles can easily travel through the air to other people who are nearby. In some cases, like in indoor spaces where the same air keeps being blown around, virus in the particles might be able to spread to other people who are farther away.  The virus can be passed easily between people who live together. But it can also spread at gatherings where people are talking close together, shaking hands, hugging, sharing food, or even singing together. Eating at restaurants raises the risk of infection, since people tend to be close to each other and not covering their faces. Doctors also think it is possible to get infected if you touch a surface that has the virus on it and then touch your mouth, nose, or eyes. However, this is probably not very common.  A person can be infected, and spread the virus to others, even without having any symptoms.    Are there different variants of the virus that causes COVID-19?   Yes. Viruses constantly change or "mutate." When this happens, a new strain or "variant" can form. Most of the time, new variants do not change the way a virus works. But when a variant has changes in important parts of the virus, it can act differently.  Experts have discovered several new variants of the virus that causes COVID-19. Certain variants seem to " "spread more easily than the original virus. They might also make people sicker.  Experts are studying the different variants. This will help them better understand how far they have spread, whether they affect people differently, and how well different vaccines protect against them.  The more people who get vaccinated against COVID-19, the harder it will be for the virus to form new variants.    Is there a test for the virus that causes COVID-19?   Yes. If your doctor or nurse suspects you have COVID-19, they might take a swab from inside your nose or mouth for testing. In some cases, they might take a sample of your saliva. These tests can help your doctor figure out if you have COVID-19 or another illness.  There are 2 types of tests used to diagnose COVID-19:  Molecular tests - These look for the genetic material from the virus. They are also called "nucleic acid tests." You can get a molecular test at a doctor's office, clinic, or pharmacy. There are also places that make these tests available for lots of people, often at drive-through locations. Depending on the lab, it can take up to several days to get test results back.  Molecular tests are the best way to know if a person has COVID-19. That's because they can detect even very low levels of virus in the body.  Antigen tests - These look for proteins from the virus. They can give results faster than most molecular tests. You can do an antigen test at a doctor's office, clinic, pharmacy, or through some organizations that make testing available in other places. You can also do an antigen test at home.  Antigen tests are not as accurate as molecular tests. They are more likely to give "false negative" results. This is when the test comes back negative even though the person actually is infected. But antigen tests can still be useful in some situations, when results are needed quickly or a molecular test is not available. For example, if a person has early " "symptoms of COVID-19, an antigen test can be accurate enough to detect virus in their body. If a person gets an antigen test and the result is negative, a molecular test might be needed to confirm they do not have the virus in their body. This might be done if the person has symptoms or knows they were exposed the virus.  There is also a blood test that can show if a person has had COVID-19 in the past. This is called an "antibody" test. Antibody tests are generally not used on their own to diagnose COVID-19 or make decisions about care. But experts can use them to learn how many people in a certain area were infected without knowing it.    Can COVID-19 be prevented?   The best way to prevent COVID-19 is to get vaccinated. In the [place], the first vaccines became available in late 2020. People age 12 and older can get a vaccine.  If enough people get the vaccine, the virus will stop spreading so quickly. More information about COVID-19 vaccines, including what you can do after being vaccinated, is available separately. (See "Patient education: COVID-19 vaccines (The Basics)".)  Experts believe that vaccines will be one of the most important ways to control the COVID-19 pandemic. People who are fully vaccinated are at much lower risk of getting the virus.  If you are not yet vaccinated, there are other ways to help protect yourself and others:  Practice "social distancing." It's most important to avoid contact with people who are sick. But social distancing also means staying at least 6 feet (about 2 meters) from anyone outside your household. That's because the virus can spread easily through close contact, and it's not always possible to know who is infected.  Wear a face mask when you need to go be in public around other people. This is mostly so that if you are infected, even if you don't have any symptoms, you are less likely to spread the infection to other people. It might also help protect you from " others who could be infected. Make sure your mask covers your mouth and nose.  You can buy cloth masks and disposable (non-medical) masks in stores or online. Cloth masks work best if they have several layers of fabric. Your mask should fit snugly over your face with no gaps. You can improve the fit by using a mask with an adjustable nose wire, adjusting or knotting the ear loops to make it tighter, or wearing a cloth mask on top of a disposable mask.  When you take your mask off, make sure you do not touch your eyes, nose, or mouth. And wash your hands after you touch the mask. You can wash cloth masks with the rest of your laundry.  When you are outdoors and not around other people, you might not need to wear a mask. But it's important to know what the rules are in your area. The [place] Centers for Disease Control and Prevention (CDC) has more information about how to wear a face mask: www.cdc.gov/coronavirus/2019-ncov/prevent-getting-sick/about-face-coverings.html.  Wash your hands with soap and water often. This is especially important after being out in public or touching surfaces that many other people also touch, like door handles or railings. The risk of getting infected by touching items like this is probably not very high. Still, it's a good idea to wash your hands often. This also helps protect you from other illnesses, like the flu or the common cold.  Make sure to rub your hands with soap for at least 20 seconds, cleaning your wrists, fingernails, and in between your fingers. Then rinse your hands and dry them with a paper towel you can throw away. If you are not near a sink, you can use a hand sanitizing gel to clean your hands. The gels with at least 60 percent alcohol work the best. But it is better to wash with soap and water if you can.  Avoid touching your face, especially your mouth, nose, and eyes.  Avoid or limit traveling if you can. Any form of travel, especially if you spend time in  "crowded places like airports, increases your risk of getting and spreading infection.  If you do need to travel, be sure to check whether there are any rules about COVID-19 in the area you are visiting. In the [place], some places require people to "self-quarantine" for some length of time if they are visiting (or returning) from another state. This means not going out in public or being around other people. The [place] also requires a negative COVID-19 test for anyone who enters, or returns to, the country. Many other countries have testing requirements for visiting, too. All of these rules are meant to help slow the spread of COVID-19.  Once you are fully vaccinated, you are much less likely to get the virus. "Fully vaccinated" means you have had all doses of the vaccine and it has been at least 2 weeks since the last dose. (If you had a single-dose vaccine, you are fully vaccinated 2 weeks after you get the shot.)    What should I do if I have symptoms?   If you have a fever, cough, trouble breathing, or other symptoms of COVID-19, call your doctor or nurse. They will ask about your symptoms. They might also ask about any recent travel and whether you have been around anyone who might have been infected. Then they can tell you if you should come in or go somewhere else to be tested.  If your symptoms are not severe, it is best to call before you go in. The staff can tell you what to do and whether you need to be seen in person. Many people with only mild symptoms should stay home and avoid other people until they get better. If you do need to go to the clinic or hospital, be sure to wear a mask. This helps protect other people. The staff might also have you wait someplace away from other people.  If you are severely ill and need to go to the clinic or hospital right away, you should still call ahead if possible. This way the staff can care for you while taking steps to protect others. If you think you " are having a medical emergency, call for an ambulance (in the [place] and [place], dial 9-1-1).  What if I feel fine but think I was exposed?   If you think you were in close contact with someone with COVID-19, what to do next depends on whether you have already had COVID-19 or gotten the vaccine:  If you have not had COVID-19 or gotten the vaccine - You should get tested after a possible exposure, even if you don't have any symptoms. Call your doctor or nurse if you aren't sure where to get a test. Then self-quarantine at home and monitor yourself for symptoms. This means staying home as much as possible, and staying at least 6 feet (2 meters) away from other people in your home.  The safest thing to do after a possible exposure is to self-quarantine for 14 days. This can be challenging with work, school, or other responsibilities. Because of this, some public health departments might allow people to stop quarantining sooner, especially if they get a negative test. If you're not sure how long to quarantine for, contact your local public health office or ask your doctor or nurse.  If you have had COVID-19 or gotten the vaccine - If you had COVID-19 within the last 3 months, you do not need to self-quarantine. If you had COVID-19 but it was more than 3 months ago, follow the steps above.  If you are fully vaccinated, you do not need to self-quarantine. But you should still get tested 3 to 5 days after you were in contact with the person who had COVID-19. Even though you are much less likely to get the infection after being vaccinated, it is still possible.  If you self-quarantine for less than 14 days, or if you do not need to self-quarantine, you should still monitor yourself for symptoms for the full 14 days. If you start to have any symptoms, call your doctor or nurse right away. You should also be extra careful about wearing a mask and social distancing during this time.  How is COVID-19 treated?   Many people will be  "able to stay home while they get better. But people with serious symptoms or other health problems might need to go to the hospital.  Mild illness - Mild illness means you might have symptoms like fever and cough, but you do not have trouble breathing. Most people with COVID-19 have mild illness and can rest at home until they get better. This usually takes about 2 weeks, but it's not the same for everyone.  If you are recovering from COVID-19, it's important to stay home and "self-isolate" until your doctor or nurse tells you it's safe to stop. Self-isolation means staying apart from other people, even the people you live with. When you can stop self-isolation will depend on how long it has been since you had symptoms, and in some cases, whether you have had a negative test (showing that the virus is no longer in your body).  Severe illness - If you have more severe illness with trouble breathing, you might need to stay in the hospital, possibly in the intensive care unit (also called the "ICU"). While you are there, you will most likely be in a special isolation room. Only medical staff will be allowed in the room, and they will have to wear special gowns, gloves, masks, and eye protection.  The doctors and nurses can monitor and support your breathing and other body functions and make you as comfortable as possible. You might need extra oxygen to help you breathe easily. If you are having a very hard time breathing, you might need a breathing tube. The tube goes down your throat and into your lungs. It is connected to a machine to help you breathe, called a "ventilator."  Doctors are studying several possible treatments for COVID-19. In certain cases, they might recommend treatments called "monoclonal antibodies." These treatments seem to help some people who are at risk of getting severely ill.  Doctors also might recommend being part of a clinical trial. A clinical trial is a scientific study that tests new " "medicines to see how well they work. Do not try any new medicines or treatments without talking to a doctor.    What should I do if someone in my home has COVID-19?   If someone in your home has COVID-19, there are additional things you can do to protect yourself and others:  Keep the sick person away from others - The sick person should stay in a separate room, and use a different bathroom if possible. They should also eat in their own room.  Experts also recommend that the person stay away from pets in the house until they are better.  Have them wear a mask - The sick person should wear a mask when they are in the same room as other people. If they can't wear a mask, you can help protect yourself by covering your face when you are in the room with them.  Wash hands - Wash your hands with soap and water often.  Clean often - Here are some specific things that can help:  Wear disposable gloves when you clean. It's also a good idea to wear gloves when you have to touch the sick person's laundry, dishes, utensils, or trash. Wash your hands after removing your gloves.  Regularly clean things that are touched a lot. This includes counters, bedside tables, doorknobs, computers, phones, and bathroom surfaces.  Clean things in your home with soap and water, but also use disinfectants on appropriate surfaces. Some cleaning products work well to kill bacteria, but not viruses, so it's important to check labels. The [place] Environmental Protection Agency (EPA) has a list of products here: www.epa.gov/pesticide-registration/list-n-disinfectants-use-against-sars-cov-2.    What if I am pregnant?   More information about COVID-19 and pregnancy is available separately. (See "Patient education: COVID-19 and pregnancy (The Basics)".)  If you are pregnant and you have questions about COVID-19, talk to your doctor, nurse, or midwife. They can help.    What can I do to cope with stress and anxiety?   It's normal to feel anxious " "or worried about COVID-19. It's also normal to feel stressed, lonely, or tired of not being able to do your usual activities. You can take care of yourself by trying to:  Take breaks from the news  Get regular exercise and eat healthy foods  Find activities that you enjoy and can do at home  Stay in touch with your friends and family members  It might help to remember that by doing things like getting vaccinated and following local guidelines, you are helping to protect other people in your community.    Where can I go to learn more?   As we learn more about this virus, expert recommendations will continue to change. Check with your doctor or public health official to get the most updated information about how to protect yourself and others.  For information about COVID-19 in your area, you can call your local public health office. In the [place], this usually means your city or town's Board of Health. Many states also have a "hotline" phone number you can call.  You can find more information about COVID-19 at the following websites:  [place] Centers for Disease Control and Prevention (CDC): www.cdc.gov/COVID19  World Health Organization (WHO): www.who.int/emergencies/diseases/novel-coronavirus-2019  All topics are updated as new evidence becomes available and our peer review process is complete.    This topic retrieved from [employer] on: Oct 28, 2021.  Topic 357075 Version 67.0  Release: 29.4.2 - C29.263  © 2021 [employer] and/or its affiliates. All rights reserved.  Consumer Information Use and Disclaimer   This information is not specific medical advice and does not replace information you receive from your health care provider. This is only a brief summary of general information. It does NOT include all information about conditions, illnesses, injuries, tests, procedures, treatments, therapies, discharge instructions or life-style choices that may apply to you. You must talk with your health care " provider for complete information about your health and treatment options. This information should not be used to decide whether or not to accept your health care provider's advice, instructions or recommendations. Only your health care provider has the knowledge and training to provide advice that is right for you. The use of this information is governed by the [employer] End User License Agreement, available at [email or web address].The use of [employer] content is governed by the [employer] Terms of Use. ©2021 [employer] All rights reserved.  Copyright   © 2021 [employer] and/or its affiliates. All rights reserved.

## 2023-04-20 NOTE — TELEPHONE ENCOUNTER
- patient called in regards to questions about COVID prescription medication.  All questions answered and addressed.

## 2023-04-20 NOTE — PROGRESS NOTES
"Subjective:      Patient ID: Ezequiel Hughes is a 83 y.o. male.    Vitals:  height is 5' 10" (1.778 m) and weight is 89.4 kg (197 lb). His tympanic temperature is 98 °F (36.7 °C). His blood pressure is 125/65 and his pulse is 70. His respiration is 18 and oxygen saturation is 96%.     Chief Complaint: Cough    Patient present with cough, sore throat  body aches x3 days patient tried OTC tylenol     Cough  This is a new problem. The current episode started in the past 7 days. The problem has been unchanged. The problem occurs constantly. Associated symptoms include chills, myalgias and a sore throat. Pertinent negatives include no chest pain, ear congestion, ear pain, fever, headaches, heartburn, hemoptysis, nasal congestion, postnasal drip, rash, rhinorrhea, shortness of breath, sweats, weight loss or wheezing. Nothing aggravates the symptoms. Treatments tried: tylenol. The treatment provided mild relief. There is no history of asthma, bronchiectasis, bronchitis, COPD, emphysema, environmental allergies or pneumonia.     Constitution: Positive for chills. Negative for fever.   HENT:  Positive for sore throat. Negative for ear pain and postnasal drip.    Cardiovascular:  Negative for chest pain.   Respiratory:  Positive for cough. Negative for bloody sputum, shortness of breath and wheezing.    Gastrointestinal:  Negative for heartburn.   Musculoskeletal:  Positive for muscle ache.   Skin:  Negative for rash.   Allergic/Immunologic: Negative for environmental allergies.   Neurological:  Negative for headaches.    Objective:     Vitals:    04/20/23 1017   BP: 125/65   BP Location: Right arm   Patient Position: Sitting   Pulse: 70   Resp: 18   Temp: 98 °F (36.7 °C)   TempSrc: Tympanic   SpO2: 96%   Weight: 89.4 kg (197 lb)   Height: 5' 10" (1.778 m)       Physical Exam   Constitutional: He is oriented to person, place, and time. He appears well-developed. He is cooperative.  Non-toxic appearance. He appears ill. No " distress.   HENT:   Head: Normocephalic and atraumatic.   Ears:   Right Ear: Hearing, tympanic membrane, external ear and ear canal normal.   Left Ear: Hearing, tympanic membrane, external ear and ear canal normal.   Nose: Congestion present. No mucosal edema, rhinorrhea or nasal deformity. No epistaxis. Right sinus exhibits no maxillary sinus tenderness and no frontal sinus tenderness. Left sinus exhibits no maxillary sinus tenderness and no frontal sinus tenderness.   Mouth/Throat: Uvula is midline and mucous membranes are normal. Mucous membranes are moist. No trismus in the jaw. Normal dentition. No uvula swelling. Posterior oropharyngeal erythema present. No oropharyngeal exudate. Oropharynx is clear.   Eyes: Conjunctivae and lids are normal. Pupils are equal, round, and reactive to light. Right eye exhibits no discharge. Left eye exhibits no discharge. No scleral icterus. Extraocular movement intact   Neck: Trachea normal and phonation normal. Neck supple. No neck rigidity present.   Cardiovascular: Normal rate, regular rhythm, normal heart sounds and normal pulses.   Pulmonary/Chest: Effort normal and breath sounds normal. No stridor. No respiratory distress. He has no wheezes. He has no rhonchi. He has no rales. He exhibits no tenderness.   Abdominal: Normal appearance and bowel sounds are normal. He exhibits no distension and no mass. Soft. There is no abdominal tenderness. There is no rebound and no guarding.   Musculoskeletal: Normal range of motion.         General: No deformity. Normal range of motion.      Right lower leg: No edema.      Left lower leg: No edema.   Lymphadenopathy:     He has no cervical adenopathy.   Neurological: no focal deficit. He is alert, oriented to person, place, and time and at baseline. He exhibits normal muscle tone. Coordination normal.   Skin: Skin is warm, dry, intact, not diaphoretic, not pale and no rash. Capillary refill takes less than 2 seconds.   Psychiatric: His  speech is normal and behavior is normal. Judgment and thought content normal.   Nursing note and vitals reviewed.    Assessment:     1. COVID-19    2. Generalized body aches    3. Acute sore throat    4. Cough with congestion of paranasal sinus      Results for orders placed or performed in visit on 04/20/23   SARS Coronavirus 2 Antigen, POCT Manual Read   Result Value Ref Range    SARS Coronavirus 2 Antigen Positive (A) Negative     Acceptable Yes      *Note: Due to a large number of results and/or encounters for the requested time period, some results have not been displayed. A complete set of results can be found in Results Review.       Plan:       COVID-19  -     molnupiravir 200 mg capsule (EUA); Take 4 capsules (800 mg total) by mouth every 12 (twelve) hours. for 5 days  Dispense: 40 capsule; Refill: 0    Generalized body aches  -     SARS Coronavirus 2 Antigen, POCT Manual Read  -     Cancel: POCT Influenza A/B Molecular    Acute sore throat    Cough with congestion of paranasal sinus          Medical Decision Making:   Clinical Tests:   Lab Tests: Ordered and Reviewed  Urgent Care Management:  COVID DISCUSSIONS/INSTRUCTIONS:   - Advised patient to stay home and self quarantine per the latest CDC guidelines   - Educated patient regarding medications for symptomatic relief (outlined below).  - Strict ED precautions given for any emergent symptoms.    I have discussed the diagnosis, treatment plan and recommendations for follow-up with primary care, and patient verbalized understanding and is agreeable to the plan.   AVS printed and given to patient upon discharge with information regarding this visit. All questions were addressed prior to discharge.       Patient Instructions   YOU HAVE TESTED POSITIVE FOR COVID-19 TODAY!  ISOLATION:  you must isolate for 5 days starting on the day of the first symptoms,  not the day of the positive test.    This is the most important part, both the CDC and the  [facility] emphasize that you do not test out of isolation.    After 5 days, if your symptoms have improved and you have not had fever on day 5, you can return to the community on day 6- NO TESTING REQUIRED!      In fact, we do not retest if you were positive in the last 90 days.     After your 5 days of isolation are completed, the CDC recommends strict mask use for the first 5 days that you come out of isolation.    2. DISCUSSION/INSTRUCTIONS:        --you have been and given a prescription for Molnupirivir with fact sheet.  *You should treat any symptoms as we discussed.    *If you have difficulty breathing, shortness of breath, chest pain, high fevers that are not controlled with Tylenol and Ibuprofen, or any further emergency concerns, go to the ER.     VIRAL URI: OVER THE COUNTER RECOMMENDATIONS/SUGGESTIONS--if needed  Covid is a virus and does NOT respond to antibiotics!      Make sure to stay well hydrated.    Use Nasal Saline to mechanically move any post nasal drip from your ear tube or from the back of your throat. OR You may insert a whole garlic cloves into your nostrils and leave for 10-15 minutes. When you remove them, mucus will be pulled down. This may burn as garlic is strong.  Repeat as often as needed and able to tolerate.    Please do not use garlic if you have an allergy to garlic.    SORE THROAT:    You may gargle with hot salt water 4 times a day for the next 2 days and then you may also gargle diluted hydrogen peroxide once to twice daily to alleviate some of your throat discomfort.  Drink plenty of fluids, recommend warm tea with honey.     YOU MAY USE OVER-THE-COUNTER CEPACOL FOR SOOTHING OF YOUR THROAT.  You may wish to avoid spicy food, citrus fruits, and red sauces- as this may irritate the throat more.    You can also take a daily anti-histamine such as Zyrtec, Claritin, Xyzal, OR Allegra-IN DAYTIME; NON DROWSY) AND/OR Benadryl- AT NIGHT; DROWSY) to help with runny nose/sneezing/sore  throat/cough.    If your symptoms do not improve, you should return to this clinic. If your symptoms worsen, go to the emergency room.     COUGH:      Make sure you are getting rest and drinking lots of fluids.    You can use cough drops (recommend ricola lemon mint honey) or Cepacol to soothe your sore throat.     You can also take Elderberry and/or Emergen-C (vitamin C) to help boost your immune system.     You may use any of the over-the-counter cough suppressant combination medications such as: NyQuil, DayQuil, Mucinex (guaifenesin), Robitussin, Delsym (Bromfed), TheraFlu  Note:   -pseudoephedrine (behind the counter) is a decongestant. Pseudoephedrine 30 mg up to 240 mg/day. *BE AWARE- It can raise your blood pressure and give you palpitations.  -Mucinex (guaifenisin) is to break up mucus up to 2400mg/day to loosen any mucous.   -Mucinex DM pill has a cough suppressant that can be sedating. It can be used at night to stop the tickle at the back of your throat.  -Mucinex D (it has guaifenesin and a high dose of pseudoephedrine) which could be helpful in the mornings to help decongest.  -Nyquil at night is beneficial to help you get some rest, however it is sedating and it does have an antihistamine and tylenol.  - you may use over-the-counter Coricidin HBP in the event that you have a history of high blood pressure    Honey is a natural cough suppressant that can be used.          Tylenol up to 4,000 mg a day is safe for short periods and can be used for headache, body aches, pain, and fever. However in high doses and prolonged use it can cause liver irritation.    Ibuprofen is a non-steroidal anti-inflammatory that can be used for headache, body aches, pain, and fever.However it can also cause stomach irritation if over used.    Flonase-How do you use a Nasal Spray?    Make sure you understand your dosing instructions. Spray only the number of prescribed sprays in each nostril. Read the package instructions  before using your spray the first time.    Most sprays suggest the following steps:    Wash your hands well.    Gently blow your nose to clear the passageway.    Shake the container several times.    Tilt your head slightly downward.  Use the opposite hand from the nostril you will be spraying to hold the spray bottle.    Block one nostril with your finger.  Insert the nasal applicator into the other nostril.    Aim the spray toward the outer wall of the nostril.  Inhale slowly through the nose and press the .    Breathe out and repeat to apply the prescribed number of sprays.  Repeat these steps for the other nostril.     Avoid sneezing or blowing your nose right after spraying.             *WHAT DO I TELL MY KNOWN EXPOSURES/CLOSE CONTACTS?:     CDC Testing and Quarantine Guidelines for patients with exposure to a known-positive COVID-19 person:    *A 'close exposure' is defined as anyone who has had an exposure (masked or unmasked) to a known COVID -19 positive person within 6 feet of someone for a cumulative total of 15 minutes or more over a 24-hour period.    - Vaccinated/Have been boosted or completed the primary series of Pfizer or Moderna vaccine within the last 6 months or completed the primary series of J&J vaccine within the last 2 months and/or had a positive test within 90 days-- do NOT need to quarantine after contact with someone who had COVID-19 unless they have symptoms.    - Fully vaccinated people who have not had a positive test within 90 days, should get tested 3-5 days after their exposure, even if they don't have symptoms and wear a mask indoors in public for 10 days following exposure or until their test result is negative on day 5. If you develop symptoms test and quarantine.    - Unvaccinated, or are more than six months out from their second mRNA dose (or more than 2 months after the J&J vaccine) and not yet boosted,  and/or NOT had a positive test within 90 days and meet  "'close exposure-- you are required by CDC guidelines to quarantine for at least 5 days from time of exposure followed by 5 days of strict masking. It is recommended, but not required to test after 5 days, unless you develop symptoms, in which case you should test at that time.    *If you do decide to test at 5 days and are asymptomatic, the risk is that if you test without symptoms on Day 5 for example) and you are positive, your 5 day isolation begins on that day, and you turned your 5 day quarantine into 10 days.    *If your exposure does not meet the above definition, you can return to your normal daily activities to include social distancing, wearing a mask and frequent handwashing.    *Alternatively, if a 5-day quarantine is not feasible, it is imperative that an exposed person wear a well-fitting mask at all times when around others for 10 days after exposure!       - You must understand that you have received an Urgent Care treatment only and that you may be released before all of your medical problems are known or treated.   - You, the patient, will arrange for follow up care as instructed with your primary care provider or recommended specialist.   - If your condition worsens or fails to improve we recommend that you receive another evaluation at the ER immediately or contact your PCP to discuss your concerns, or return here.   - Please do not drive or make any important decisions for 24 hours if you have received any pain medications, sedatives or mood altering drugs during your visit.    Disclaimer: This document was drafted with the use of a voice recognition device and is likely to have sound alike errors.         Patient Education        COVID-19 Overview     The Basics   Written by the doctors and editors at [facility]     What is COVID-19?   COVID-19 stands for "coronavirus disease 2019." It is caused by a virus called SARS-CoV-2. The virus first appeared in late 2019 and quickly spread around the " "world.    What are the symptoms of COVID-19?   Symptoms usually start 4 or 5 days after a person is infected with the virus. But in some people, it can take up to 2 weeks for symptoms to appear. Some people never show symptoms at all.  When symptoms do happen, they can include:  Fever  Cough  Trouble breathing  Feeling tired  Shaking chills  Muscle aches  Headache  Sore throat  Problems with sense of smell or taste  Some people have digestive problems like nausea or diarrhea. There have also been some reports of rashes or other skin symptoms. For example, some people with COVID-19 get reddish-purple spots on their fingers or toes. But it's not clear why or how often this happens.  For most people, symptoms will get better within a few weeks. But a small number of people get very sick and stop being able to breathe on their own. In severe cases, their organs stop working, which can lead to death.  Some people with COVID-19 continue to have some symptoms for weeks or months. This seems to be more likely in people who are sick enough to need to stay in the hospital. But this can also happen in people who did not get very sick. Doctors are still learning about the long-term effects of COVID-19.  While children can get COVID-19, they are less likely than adults to have severe symptoms. More information about COVID-19 and children is available separately. (See "Patient education: COVID-19 and children (The Basics)".)    Am I at risk for getting seriously ill?   It depends on your age and health. In some people, COVID-19 leads to serious problems like pneumonia, not getting enough oxygen, heart problems, or even death. This risk gets higher as people get older. It is also higher in people who have other health problems like serious heart disease, chronic kidney disease, type 2 diabetes, chronic obstructive pulmonary disease (COPD), sickle cell disease, or obesity. People who have a weak immune system for other reasons (for " "example, HIV infection or certain medicines), asthma, cystic fibrosis, type 1 diabetes, or high blood pressure might also be at higher risk for serious problems.    How is COVID-19 spread?   The virus that causes COVID-19 mainly spreads from person to person. This usually happens when an infected person coughs, sneezes, or talks near other people. The virus is passed through tiny particles from the infected person's lungs and airway. These particles can easily travel through the air to other people who are nearby. In some cases, like in indoor spaces where the same air keeps being blown around, virus in the particles might be able to spread to other people who are farther away.  The virus can be passed easily between people who live together. But it can also spread at gatherings where people are talking close together, shaking hands, hugging, sharing food, or even singing together. Eating at restaurants raises the risk of infection, since people tend to be close to each other and not covering their faces. Doctors also think it is possible to get infected if you touch a surface that has the virus on it and then touch your mouth, nose, or eyes. However, this is probably not very common.  A person can be infected, and spread the virus to others, even without having any symptoms.    Are there different variants of the virus that causes COVID-19?   Yes. Viruses constantly change or "mutate." When this happens, a new strain or "variant" can form. Most of the time, new variants do not change the way a virus works. But when a variant has changes in important parts of the virus, it can act differently.  Experts have discovered several new variants of the virus that causes COVID-19. Certain variants seem to spread more easily than the original virus. They might also make people sicker.  Experts are studying the different variants. This will help them better understand how far they have spread, whether they affect people " "differently, and how well different vaccines protect against them.  The more people who get vaccinated against COVID-19, the harder it will be for the virus to form new variants.    Is there a test for the virus that causes COVID-19?   Yes. If your doctor or nurse suspects you have COVID-19, they might take a swab from inside your nose or mouth for testing. In some cases, they might take a sample of your saliva. These tests can help your doctor figure out if you have COVID-19 or another illness.  There are 2 types of tests used to diagnose COVID-19:  Molecular tests - These look for the genetic material from the virus. They are also called "nucleic acid tests." You can get a molecular test at a doctor's office, clinic, or pharmacy. There are also places that make these tests available for lots of people, often at drive-through locations. Depending on the lab, it can take up to several days to get test results back.  Molecular tests are the best way to know if a person has COVID-19. That's because they can detect even very low levels of virus in the body.  Antigen tests - These look for proteins from the virus. They can give results faster than most molecular tests. You can do an antigen test at a doctor's office, clinic, pharmacy, or through some organizations that make testing available in other places. You can also do an antigen test at home.  Antigen tests are not as accurate as molecular tests. They are more likely to give "false negative" results. This is when the test comes back negative even though the person actually is infected. But antigen tests can still be useful in some situations, when results are needed quickly or a molecular test is not available. For example, if a person has early symptoms of COVID-19, an antigen test can be accurate enough to detect virus in their body. If a person gets an antigen test and the result is negative, a molecular test might be needed to confirm they do not have the " "virus in their body. This might be done if the person has symptoms or knows they were exposed the virus.  There is also a blood test that can show if a person has had COVID-19 in the past. This is called an "antibody" test. Antibody tests are generally not used on their own to diagnose COVID-19 or make decisions about care. But experts can use them to learn how many people in a certain area were infected without knowing it.    Can COVID-19 be prevented?   The best way to prevent COVID-19 is to get vaccinated. In the [place], the first vaccines became available in late 2020. People age 12 and older can get a vaccine.  If enough people get the vaccine, the virus will stop spreading so quickly. More information about COVID-19 vaccines, including what you can do after being vaccinated, is available separately. (See "Patient education: COVID-19 vaccines (The Basics)".)  Experts believe that vaccines will be one of the most important ways to control the COVID-19 pandemic. People who are fully vaccinated are at much lower risk of getting the virus.  If you are not yet vaccinated, there are other ways to help protect yourself and others:  Practice "social distancing." It's most important to avoid contact with people who are sick. But social distancing also means staying at least 6 feet (about 2 meters) from anyone outside your household. That's because the virus can spread easily through close contact, and it's not always possible to know who is infected.  Wear a face mask when you need to go be in public around other people. This is mostly so that if you are infected, even if you don't have any symptoms, you are less likely to spread the infection to other people. It might also help protect you from others who could be infected. Make sure your mask covers your mouth and nose.  You can buy cloth masks and disposable (non-medical) masks in stores or online. Cloth masks work best if they have several layers of fabric. " Your mask should fit snugly over your face with no gaps. You can improve the fit by using a mask with an adjustable nose wire, adjusting or knotting the ear loops to make it tighter, or wearing a cloth mask on top of a disposable mask.  When you take your mask off, make sure you do not touch your eyes, nose, or mouth. And wash your hands after you touch the mask. You can wash cloth masks with the rest of your laundry.  When you are outdoors and not around other people, you might not need to wear a mask. But it's important to know what the rules are in your area. The [place] Centers for Disease Control and Prevention (CDC) has more information about how to wear a face mask: www.cdc.gov/coronavirus/2019-ncov/prevent-getting-sick/about-face-coverings.html.  Wash your hands with soap and water often. This is especially important after being out in public or touching surfaces that many other people also touch, like door handles or railings. The risk of getting infected by touching items like this is probably not very high. Still, it's a good idea to wash your hands often. This also helps protect you from other illnesses, like the flu or the common cold.  Make sure to rub your hands with soap for at least 20 seconds, cleaning your wrists, fingernails, and in between your fingers. Then rinse your hands and dry them with a paper towel you can throw away. If you are not near a sink, you can use a hand sanitizing gel to clean your hands. The gels with at least 60 percent alcohol work the best. But it is better to wash with soap and water if you can.  Avoid touching your face, especially your mouth, nose, and eyes.  Avoid or limit traveling if you can. Any form of travel, especially if you spend time in crowded places like airports, increases your risk of getting and spreading infection.  If you do need to travel, be sure to check whether there are any rules about COVID-19 in the area you are visiting. In [place], some places require people to "self-quarantine" for some length of time if they are visiting (or returning) from another state. This means not going out in public or being around other people. The [place] also requires a negative COVID-19 test for anyone who enters, or returns to, the country. Many other countries have testing requirements for visiting, too. All of these rules are meant to help slow the spread of COVID-19.  Once you are fully vaccinated, you are much less likely to get the virus. "Fully vaccinated" means you have had all doses of the vaccine and it has been at least 2 weeks since the last dose. (If you had a single-dose vaccine, you are fully vaccinated 2 weeks after you get the shot.)    What should I do if I have symptoms?   If you have a fever, cough, trouble breathing, or other symptoms of COVID-19, call your doctor or nurse. They will ask about your symptoms. They might also ask about any recent travel and whether you have been around anyone who might have been infected. Then they can tell you if you should come in or go somewhere else to be tested.  If your symptoms are not severe, it is best to call before you go in. The staff can tell you what to do and whether you need to be seen in person. Many people with only mild symptoms should stay home and avoid other people until they get better. If you do need to go to the clinic or hospital, be sure to wear a mask. This helps protect other people. The staff might also have you wait someplace away from other people.  If you are severely ill and need to go to the clinic or hospital right away, you should still call ahead if possible. This way the staff can care for you while taking steps to protect others. If you think you are having a medical emergency, call for an ambulance (in the [place] and [place], dial 9-1-1).  What if I feel fine but think I was exposed?   If you think you were in close contact with someone with COVID-19, what to do " next depends on whether you have already had COVID-19 or gotten the vaccine:  If you have not had COVID-19 or gotten the vaccine - You should get tested after a possible exposure, even if you don't have any symptoms. Call your doctor or nurse if you aren't sure where to get a test. Then self-quarantine at home and monitor yourself for symptoms. This means staying home as much as possible, and staying at least 6 feet (2 meters) away from other people in your home.  The safest thing to do after a possible exposure is to self-quarantine for 14 days. This can be challenging with work, school, or other responsibilities. Because of this, some public health departments might allow people to stop quarantining sooner, especially if they get a negative test. If you're not sure how long to quarantine for, contact your local public health office or ask your doctor or nurse.  If you have had COVID-19 or gotten the vaccine - If you had COVID-19 within the last 3 months, you do not need to self-quarantine. If you had COVID-19 but it was more than 3 months ago, follow the steps above.  If you are fully vaccinated, you do not need to self-quarantine. But you should still get tested 3 to 5 days after you were in contact with the person who had COVID-19. Even though you are much less likely to get the infection after being vaccinated, it is still possible.  If you self-quarantine for less than 14 days, or if you do not need to self-quarantine, you should still monitor yourself for symptoms for the full 14 days. If you start to have any symptoms, call your doctor or nurse right away. You should also be extra careful about wearing a mask and social distancing during this time.  How is COVID-19 treated?   Many people will be able to stay home while they get better. But people with serious symptoms or other health problems might need to go to the hospital.  Mild illness - Mild illness means you might have symptoms like fever and cough,  "but you do not have trouble breathing. Most people with COVID-19 have mild illness and can rest at home until they get better. This usually takes about 2 weeks, but it's not the same for everyone.  If you are recovering from COVID-19, it's important to stay home and "self-isolate" until your doctor or nurse tells you it's safe to stop. Self-isolation means staying apart from other people, even the people you live with. When you can stop self-isolation will depend on how long it has been since you had symptoms, and in some cases, whether you have had a negative test (showing that the virus is no longer in your body).  Severe illness - If you have more severe illness with trouble breathing, you might need to stay in the hospital, possibly in the intensive care unit (also called the "ICU"). While you are there, you will most likely be in a special isolation room. Only medical staff will be allowed in the room, and they will have to wear special gowns, gloves, masks, and eye protection.  The doctors and nurses can monitor and support your breathing and other body functions and make you as comfortable as possible. You might need extra oxygen to help you breathe easily. If you are having a very hard time breathing, you might need a breathing tube. The tube goes down your throat and into your lungs. It is connected to a machine to help you breathe, called a "ventilator."  Doctors are studying several possible treatments for COVID-19. In certain cases, they might recommend treatments called "monoclonal antibodies." These treatments seem to help some people who are at risk of getting severely ill.  Doctors also might recommend being part of a clinical trial. A clinical trial is a scientific study that tests new medicines to see how well they work. Do not try any new medicines or treatments without talking to a doctor.    What should I do if someone in my home has COVID-19?   If someone in your home has COVID-19, there are " "additional things you can do to protect yourself and others:  Keep the sick person away from others - The sick person should stay in a separate room, and use a different bathroom if possible. They should also eat in their own room.  Experts also recommend that the person stay away from pets in the house until they are better.  Have them wear a mask - The sick person should wear a mask when they are in the same room as other people. If they can't wear a mask, you can help protect yourself by covering your face when you are in the room with them.  Wash hands - Wash your hands with soap and water often.  Clean often - Here are some specific things that can help:  Wear disposable gloves when you clean. It's also a good idea to wear gloves when you have to touch the sick person's laundry, dishes, utensils, or trash. Wash your hands after removing your gloves.  Regularly clean things that are touched a lot. This includes counters, bedside tables, doorknobs, computers, phones, and bathroom surfaces.  Clean things in your home with soap and water, but also use disinfectants on appropriate surfaces. Some cleaning products work well to kill bacteria, but not viruses, so it's important to check labels. The [place] Environmental Protection Agency (EPA) has a list of products here: www.epa.gov/pesticide-registration/list-n-disinfectants-use-against-sars-cov-2.    What if I am pregnant?   More information about COVID-19 and pregnancy is available separately. (See "Patient education: COVID-19 and pregnancy (The Basics)".)  If you are pregnant and you have questions about COVID-19, talk to your doctor, nurse, or midwife. They can help.    What can I do to cope with stress and anxiety?   It's normal to feel anxious or worried about COVID-19. It's also normal to feel stressed, lonely, or tired of not being able to do your usual activities. You can take care of yourself by trying to:  Take breaks from the news  Get regular " "exercise and eat healthy foods  Find activities that you enjoy and can do at home  Stay in touch with your friends and family members  It might help to remember that by doing things like getting vaccinated and following local guidelines, you are helping to protect other people in your community.    Where can I go to learn more?   As we learn more about this virus, expert recommendations will continue to change. Check with your doctor or public health official to get the most updated information about how to protect yourself and others.  For information about COVID-19 in your area, you can call your local public health office. In the [place], this usually means your city or town's Board of Health. Many states also have a "hotline" phone number you can call.  You can find more information about COVID-19 at the following websites:  [place] Centers for Disease Control and Prevention (CDC): www.cdc.gov/COVID19  World Health Organization (WHO): www.who.int/emergencies/diseases/novel-coronavirus-2019  All topics are updated as new evidence becomes available and our peer review process is complete.    This topic retrieved from [employer] on: Oct 28, 2021.  Topic 340892 Version 67.0  Release: 29.4.2 - C29.263  © 2021 UpToDate, Inc. and/or its affiliates. All rights reserved.  Consumer Information Use and Disclaimer   This information is not specific medical advice and does not replace information you receive from your health care provider. This is only a brief summary of general information. It does NOT include all information about conditions, illnesses, injuries, tests, procedures, treatments, therapies, discharge instructions or life-style choices that may apply to you. You must talk with your health care provider for complete information about your health and treatment options. This information should not be used to decide whether or not to accept your health care provider's advice, instructions or " recommendations. Only your health care provider has the knowledge and training to provide advice that is right for you. The use of this information is governed by the [employer] End User License Agreement, available at https://www.[email or web address]/en/solutions/PRX/about/jeanna.The use of [employer] content is governed by the [employer] Terms of Use. ©2021 UpToDate, Inc. All rights reserved.  Copyright   © 2021 UpToDate, Inc. and/or its affiliates. All rights reserved.

## 2023-05-05 ENCOUNTER — LAB VISIT (OUTPATIENT)
Dept: LAB | Facility: HOSPITAL | Age: 83
End: 2023-05-05
Attending: UROLOGY
Payer: MEDICARE

## 2023-05-05 ENCOUNTER — OFFICE VISIT (OUTPATIENT)
Dept: UROLOGY | Facility: CLINIC | Age: 83
End: 2023-05-05
Payer: MEDICARE

## 2023-05-05 VITALS
DIASTOLIC BLOOD PRESSURE: 65 MMHG | WEIGHT: 195.56 LBS | SYSTOLIC BLOOD PRESSURE: 150 MMHG | HEIGHT: 70 IN | RESPIRATION RATE: 18 BRPM | HEART RATE: 66 BPM | BODY MASS INDEX: 28 KG/M2

## 2023-05-05 DIAGNOSIS — C61 PROSTATE CANCER: ICD-10-CM

## 2023-05-05 DIAGNOSIS — N40.1 BPH WITH OBSTRUCTION/LOWER URINARY TRACT SYMPTOMS: ICD-10-CM

## 2023-05-05 DIAGNOSIS — N52.9 VASCULOGENIC ERECTILE DYSFUNCTION, UNSPECIFIED VASCULOGENIC ERECTILE DYSFUNCTION TYPE: ICD-10-CM

## 2023-05-05 DIAGNOSIS — C61 PROSTATE CANCER: Primary | ICD-10-CM

## 2023-05-05 DIAGNOSIS — N13.8 BPH WITH OBSTRUCTION/LOWER URINARY TRACT SYMPTOMS: ICD-10-CM

## 2023-05-05 LAB — COMPLEXED PSA SERPL-MCNC: 1.6 NG/ML (ref 0–4)

## 2023-05-05 PROCEDURE — 1159F MED LIST DOCD IN RCRD: CPT | Mod: CPTII,S$GLB,, | Performed by: UROLOGY

## 2023-05-05 PROCEDURE — 84153 ASSAY OF PSA TOTAL: CPT | Performed by: UROLOGY

## 2023-05-05 PROCEDURE — 99999 PR PBB SHADOW E&M-EST. PATIENT-LVL III: ICD-10-PCS | Mod: PBBFAC,,, | Performed by: UROLOGY

## 2023-05-05 PROCEDURE — 1126F PR PAIN SEVERITY QUANTIFIED, NO PAIN PRESENT: ICD-10-PCS | Mod: CPTII,S$GLB,, | Performed by: UROLOGY

## 2023-05-05 PROCEDURE — 1126F AMNT PAIN NOTED NONE PRSNT: CPT | Mod: CPTII,S$GLB,, | Performed by: UROLOGY

## 2023-05-05 PROCEDURE — 1159F PR MEDICATION LIST DOCUMENTED IN MEDICAL RECORD: ICD-10-PCS | Mod: CPTII,S$GLB,, | Performed by: UROLOGY

## 2023-05-05 PROCEDURE — 99999 PR PBB SHADOW E&M-EST. PATIENT-LVL III: CPT | Mod: PBBFAC,,, | Performed by: UROLOGY

## 2023-05-05 PROCEDURE — 3078F PR MOST RECENT DIASTOLIC BLOOD PRESSURE < 80 MM HG: ICD-10-PCS | Mod: CPTII,S$GLB,, | Performed by: UROLOGY

## 2023-05-05 PROCEDURE — 99214 OFFICE O/P EST MOD 30 MIN: CPT | Mod: S$GLB,,, | Performed by: UROLOGY

## 2023-05-05 PROCEDURE — 3078F DIAST BP <80 MM HG: CPT | Mod: CPTII,S$GLB,, | Performed by: UROLOGY

## 2023-05-05 PROCEDURE — 3077F PR MOST RECENT SYSTOLIC BLOOD PRESSURE >= 140 MM HG: ICD-10-PCS | Mod: CPTII,S$GLB,, | Performed by: UROLOGY

## 2023-05-05 PROCEDURE — 3077F SYST BP >= 140 MM HG: CPT | Mod: CPTII,S$GLB,, | Performed by: UROLOGY

## 2023-05-05 PROCEDURE — 3288F FALL RISK ASSESSMENT DOCD: CPT | Mod: CPTII,S$GLB,, | Performed by: UROLOGY

## 2023-05-05 PROCEDURE — 1101F PR PT FALLS ASSESS DOC 0-1 FALLS W/OUT INJ PAST YR: ICD-10-PCS | Mod: CPTII,S$GLB,, | Performed by: UROLOGY

## 2023-05-05 PROCEDURE — 3288F PR FALLS RISK ASSESSMENT DOCUMENTED: ICD-10-PCS | Mod: CPTII,S$GLB,, | Performed by: UROLOGY

## 2023-05-05 PROCEDURE — 99214 PR OFFICE/OUTPT VISIT, EST, LEVL IV, 30-39 MIN: ICD-10-PCS | Mod: S$GLB,,, | Performed by: UROLOGY

## 2023-05-05 PROCEDURE — 1101F PT FALLS ASSESS-DOCD LE1/YR: CPT | Mod: CPTII,S$GLB,, | Performed by: UROLOGY

## 2023-05-05 PROCEDURE — 36415 COLL VENOUS BLD VENIPUNCTURE: CPT | Mod: PN | Performed by: UROLOGY

## 2023-05-05 RX ORDER — TOLTERODINE TARTRATE 1 MG/1
1 TABLET, EXTENDED RELEASE ORAL NIGHTLY
Qty: 90 TABLET | Refills: 0 | Status: SHIPPED | OUTPATIENT
Start: 2023-05-05 | End: 2023-08-11

## 2023-05-05 RX ORDER — SILDENAFIL 100 MG/1
TABLET, FILM COATED ORAL
Qty: 30 TABLET | Refills: 7 | Status: SHIPPED | OUTPATIENT
Start: 2023-05-05

## 2023-05-05 NOTE — PROGRESS NOTES
"    CC: follow up prostate cancer    History of Present Illness:   Ezequiel Hughes is a 83 y.o. male here for evaluation of Other (F/u)    5/5/23-Pt reports that he is on finasteride, but isn't taking alfuzosin. Nocturia x 5-6. He has a little slight "leakage" throughout the day. He does have urgency. Didn't have any improvement with alfuzosin.   9/28/22-On finasteride. Nocturia x 4-5. Tried flomax a long time ago and it didn't help. Drinks 2 cups of coffee in the am. Not drinking about an hour before bed. No gross hematuria.   6/30/22-Nocturia x 1 lately, but prior to the last 2 nights, it has been 4 times. Still on finasteride. He does have urgency and occasional UUI. If he goes out, he wears a pad. Stream is weak. No hesitancy. Recently, visits have gone to every 6 months. Pt reports occasional RLQ pain. He does have come constipation, but pain doesn't change with BM. Persistent for a month.    Prior records indicate last MRI and TRUS biopsy in 2020.   3/29/22- 83yo male with h/o Prostate cancer, here to establish care. He has previously seen Dr. Wong and was undergoing active surveillance. He reports that he was diagnosed with prostate cancer in 2014. He hasn't had any treatment. He is currently managed on finasteride. He does report some UUI, small amounts. He had a repeat TRUS biopsy in 2021, and pt reports path was unchanged. Also had MRIs around that time as well and states that he had a targetable lesion.         Review of Systems   Respiratory:  Negative for shortness of breath.    Cardiovascular:  Negative for chest pain and palpitations.   Musculoskeletal:  Positive for back pain.   Neurological:  Negative for dizziness.   All other systems reviewed and are negative.      Past Medical History:   Diagnosis Date    Allergic rhinitis     Anemia     Back pain     BPH (benign prostatic hyperplasia)     Cancer     skin cancer to neck, Dr. Graves    Cataract     Disorder of kidney and ureter     ED " (erectile dysfunction)     Hiatal hernia     small    History of colon polyps     colonoscopy 11/2016    HLD (hyperlipidemia)     Hyperlipidemia     Hypertension     Lateral epicondylitis     Lumbar radiculopathy 1/26/2022    OA (osteoarthritis)     GUIDO (obstructive sleep apnea)     Prostate cancer     Dr. Wong    TIA (transient ischemic attack)     Trouble in sleeping     Urge incontinence 3/29/2022       Past Surgical History:   Procedure Laterality Date    ANGIOGRAPHY OF UPPER EXTREMITY Left 07/05/2022    Procedure: Angiogram Extremity Bilateral;  Surgeon: Aparna Thompson MD;  Location: [facility] CATH LAB;  Service: Cardiology;  Laterality: Left;    ARTERIOGRAPHY OF AORTIC ROOT N/A 07/05/2022    Procedure: ARTERIOGRAM, AORTIC ROOT;  Surgeon: Aparna Thompson MD;  Location: [facility] CATH LAB;  Service: Cardiology;  Laterality: N/A;    CATARACT EXTRACTION W/  INTRAOCULAR LENS IMPLANT Right 02/21/2018    I-Stent    CATARACT EXTRACTION W/  INTRAOCULAR LENS IMPLANT Left 03/21/2018    I - Stent    CATHETERIZATION OF BOTH LEFT AND RIGHT HEART N/A 07/05/2022    Procedure: CATHETERIZATION, HEART, BOTH LEFT AND RIGHT;  Surgeon: Aparna Thomspon MD;  Location: [facility] CATH LAB;  Service: Cardiology;  Laterality: N/A;  radial approach    COLONOSCOPY N/A 11/14/2016    Procedure: COLONOSCOPY;  Surgeon: Karuna Rodriguez MD;  Location: [facility];  Service: Endoscopy;  Laterality: N/A;    COLONOSCOPY N/A 09/22/2020    Procedure: COLONOSCOPY;  Surgeon: Chuy Fish MD;  Location: [facility] ENDO;  Service: Endoscopy;  Laterality: N/A;    EYE SURGERY      HEMORRHOID SURGERY      I-STENT Right 02/21/2018    DR. REECE    KNEE ARTHROSCOPY W/ MENISCAL REPAIR Right 2015    Dr. Jain    PCIOL Right 02/21/2018    DR. REECE    PLANTAR FASCIA RELEASE      right    ROTATOR CUFF REPAIR Bilateral     bilateral    SELECTIVE INJECTION OF ANESTHETIC AGENT AROUND LUMBAR SPINAL NERVE ROOT BY TRANSFORAMINAL APPROACH Bilateral 01/26/2022    Procedure:  Bilateral L4/5 TF IAN with RN IV sedation;  Surgeon: Mak Young MD;  Location: [facility]IN [facility];  Service: Pain Management;  Laterality: Bilateral;    SELECTIVE INJECTION OF ANESTHETIC AGENT AROUND LUMBAR SPINAL NERVE ROOT BY TRANSFORAMINAL APPROACH Bilateral 2022    Procedure: Bilateral L4/5 TF IAN with RN IV sedation;  Surgeon: Mak Young MD;  Location: [facility];  Service: Pain Management;  Laterality: Bilateral;    SELECTIVE INJECTION OF ANESTHETIC AGENT AROUND LUMBAR SPINAL NERVE ROOT BY TRANSFORAMINAL APPROACH Bilateral 2022    Procedure: Bilateral L4/5 TF IAN - D2P Dr. [facility];  Surgeon: Abel Haywood MD;  Location: [facility];  Service: Pain Management;  Laterality: Bilateral;    SELECTIVE INJECTION OF ANESTHETIC AGENT AROUND LUMBAR SPINAL NERVE ROOT BY TRANSFORAMINAL APPROACH Bilateral 2022    Procedure: Bilateral L4/5 TF IAN;  Surgeon: Abel Haywood MD;  Location: [facility];  Service: Pain Management;  Laterality: Bilateral;    SHOULDER SURGERY Bilateral around     Dr. Pepper.  rotator cuff surgeries    VASECTOMY         Family History   Problem Relation Age of Onset    Lung cancer Father         life long smoker    Cancer Father         prostate, lung    Arthritis Mother     Stroke Sister         TIA    Cataracts Sister     Cancer Brother         prostate    Cataracts Brother     Cataracts Sister     Melanoma Neg Hx     Psoriasis Neg Hx     Lupus Neg Hx     Eczema Neg Hx     Diabetes Neg Hx     Heart disease Neg Hx     Kidney disease Neg Hx     Colon cancer Neg Hx        Social History     Tobacco Use    Smoking status: Former     Packs/day: 3.00     Years: 35.00     Pack years: 105.00     Types: Cigarettes     Start date: 1960     Quit date: 1985     Years since quittin.8     Passive exposure: Past    Smokeless tobacco: Never   Substance Use Topics    Alcohol use: Yes     Alcohol/week: 6.0 standard drinks     Types: 6 Drinks containing 0.5  oz of alcohol per week     Comment: socially    Drug use: No       Current Outpatient Medications   Medication Sig Dispense Refill    acetaminophen (TYLENOL ARTHRITIS PAIN ORAL) Take by mouth. Patient states that he takes 2 tablets in the morning and 2 tablets in the evening      alfuzosin (UROXATRAL) 10 mg Tb24 Take 1 tablet (10 mg total) by mouth daily with breakfast. 90 tablet 4    amLODIPine (NORVASC) 5 MG tablet Take 1 tablet (5 mg total) by mouth once daily. 90 tablet 3    atorvastatin (LIPITOR) 40 MG tablet TAKE 1 TABLET EVERY DAY 90 tablet 1    clopidogreL (PLAVIX) 75 mg tablet TAKE 1 TABLET EVERY DAY 90 tablet 2    finasteride (PROSCAR) 5 mg tablet TAKE 1 TABLET (5 MG TOTAL) BY MOUTH ONCE DAILY. 90 tablet 4    FLUAD QUAD 2022-23,65Y UP,,PF, 60 mcg (15 mcg x 4)/0.5 mL Syrg       fluticasone propionate (FLONASE) 50 mcg/actuation nasal spray USE 2 SPRAYS IN EACH NOSTRIL ONE TIME DAILY  (SUBSTITUTED FOR FLONASE) 48 g 3    ketorolac 0.5% (ACULAR) 0.5 % Drop Put one eyedrop in right eye four times a day. Start first drop morning of laser and stop after five days. 5 mL 0    losartan (COZAAR) 50 MG tablet TAKE 1 TABLET TWICE DAILY 180 tablet 3    methocarbamoL (ROBAXIN) 500 MG Tab Take 1 tablet (500 mg total) by mouth 3 (three) times daily as needed (muscle spasms). May cause drowsiness. 90 tablet 1    pantoprazole (PROTONIX) 40 MG tablet TAKE 1 TABLET EVERY DAY 90 tablet 3    sildenafiL (VIAGRA) 100 MG tablet Take 1 po 45 minutes before intercourse 30 tablet 7    tolterodine (DETROL) 1 MG Tab Take 1 tablet (1 mg total) by mouth every evening. 90 tablet 0     No current facility-administered medications for this visit.       Review of patient's allergies indicates:   Allergen Reactions    Atarax [hydroxyzine hcl] Other (See Comments)     Raised blood pressure     Zyrtec [cetirizine] Other (See Comments)     Raised blood pressure     Gold sodium thiosulfate      Patch test positive    Meloxicam Rash     Other  reaction(s): hypertension       Physical Exam  Vitals:    05/05/23 0838   BP: (!) 150/65   Pulse: 66   Resp: 18         General: Well-developed, well-nourished in no acute distress  HEENT: Normocephalic, atraumatic, Extraocular movements intact  Neck: supple, trachea midline, no cervical or supraclavicular lymphadenopathy  Respirations: even and unlabored  Back: midline spine, no CVA tenderness  Abdomen: soft, Non-tender, non-distended, no organomegaly or palpable masses, no rebound or guarding  Rectal: 9/29/22->40g prostate, no nodules or tenderness. No gross blood  Extremities: atraumatic, moves all equally, no clubbing, cyanosis or edema  Psych: normal affect  Skin: warm and dry, no lesions  Neuro: Alert and oriented, Cranial nerves II-XII grossly intact    PVR: 29cc 5/3/23    Urinalysis  trace blood, otherwise negative    PSA  7/7/21: 1.3  3/23/22: 1.5  6/16/22: 1.4  9/26/22: 1.5  12/27/22: 1.9  2/9/23: 1.3    Assessment:   1. Prostate cancer  Prostate Specific Antigen, Diagnostic    Prostate Specific Antigen, Diagnostic      2. BPH with obstruction/lower urinary tract symptoms        3. Vasculogenic erectile dysfunction, unspecified vasculogenic erectile dysfunction type                Plan:  Prostate cancer  -     Prostate Specific Antigen, Diagnostic; Future; Expected date: 05/05/2023  -     Prostate Specific Antigen, Diagnostic; Future; Expected date: 08/05/2023    BPH with obstruction/lower urinary tract symptoms    Vasculogenic erectile dysfunction, unspecified vasculogenic erectile dysfunction type    Other orders  -     tolterodine (DETROL) 1 MG Tab; Take 1 tablet (1 mg total) by mouth every evening.  Dispense: 90 tablet; Refill: 0  -     sildenafiL (VIAGRA) 100 MG tablet; Take 1 po 45 minutes before intercourse  Dispense: 30 tablet; Refill: 7               Follow up in about 3 months (around 8/5/2023) for labs before visit.

## 2023-05-31 ENCOUNTER — OFFICE VISIT (OUTPATIENT)
Dept: OPHTHALMOLOGY | Facility: CLINIC | Age: 83
End: 2023-05-31
Payer: MEDICARE

## 2023-05-31 ENCOUNTER — TELEPHONE (OUTPATIENT)
Dept: OPHTHALMOLOGY | Facility: CLINIC | Age: 83
End: 2023-05-31
Payer: MEDICARE

## 2023-05-31 DIAGNOSIS — H40.1111 PRIMARY OPEN ANGLE GLAUCOMA (POAG) OF RIGHT EYE, MILD STAGE: ICD-10-CM

## 2023-05-31 DIAGNOSIS — H35.373 EPIRETINAL MEMBRANE (ERM), BILATERAL: ICD-10-CM

## 2023-05-31 DIAGNOSIS — G43.109 OCULAR MIGRAINE: Primary | ICD-10-CM

## 2023-05-31 DIAGNOSIS — Z96.1 PSEUDOPHAKIA: ICD-10-CM

## 2023-05-31 PROCEDURE — 92014 COMPRE OPH EXAM EST PT 1/>: CPT | Mod: S$GLB,,, | Performed by: OPHTHALMOLOGY

## 2023-05-31 PROCEDURE — 1160F RVW MEDS BY RX/DR IN RCRD: CPT | Mod: CPTII,S$GLB,, | Performed by: OPHTHALMOLOGY

## 2023-05-31 PROCEDURE — 92014 PR EYE EXAM, EST PATIENT,COMPREHESV: ICD-10-PCS | Mod: S$GLB,,, | Performed by: OPHTHALMOLOGY

## 2023-05-31 PROCEDURE — 99999 PR PBB SHADOW E&M-EST. PATIENT-LVL III: ICD-10-PCS | Mod: PBBFAC,,, | Performed by: OPHTHALMOLOGY

## 2023-05-31 PROCEDURE — 99999 PR PBB SHADOW E&M-EST. PATIENT-LVL III: CPT | Mod: PBBFAC,,, | Performed by: OPHTHALMOLOGY

## 2023-05-31 PROCEDURE — 1126F AMNT PAIN NOTED NONE PRSNT: CPT | Mod: CPTII,S$GLB,, | Performed by: OPHTHALMOLOGY

## 2023-05-31 PROCEDURE — 1160F PR REVIEW ALL MEDS BY PRESCRIBER/CLIN PHARMACIST DOCUMENTED: ICD-10-PCS | Mod: CPTII,S$GLB,, | Performed by: OPHTHALMOLOGY

## 2023-05-31 PROCEDURE — 1126F PR PAIN SEVERITY QUANTIFIED, NO PAIN PRESENT: ICD-10-PCS | Mod: CPTII,S$GLB,, | Performed by: OPHTHALMOLOGY

## 2023-05-31 PROCEDURE — 1159F PR MEDICATION LIST DOCUMENTED IN MEDICAL RECORD: ICD-10-PCS | Mod: CPTII,S$GLB,, | Performed by: OPHTHALMOLOGY

## 2023-05-31 PROCEDURE — 1159F MED LIST DOCD IN RCRD: CPT | Mod: CPTII,S$GLB,, | Performed by: OPHTHALMOLOGY

## 2023-05-31 NOTE — TELEPHONE ENCOUNTER
----- Message from Trinidad Blackwell sent at 5/31/2023 10:05 AM CDT -----  Contact: pt  Pt is calling in regard to having bright lights every once in a while and would like the nurse to call him back.  [phone] sherron/shanna

## 2023-05-31 NOTE — PROGRESS NOTES
===============================  Date today is 5/31/2023  Ezequiel Hughes is a 83 y.o. male  Last visit [facility]: :Visit date not found   Last visit eye dept. 3/9/2023    Uncorrected distance visual acuity was 20/25 in the right eye and not recorded in the left eye. Uncorrected near visual acuity was not recorded in the right eye and J2 in the left eye.  Tonometry       Tonometry (Applanation, 1:29 PM)         Right Left    Pressure 15 15              Target Pressure         Right Left    Target 17 17                  Not recorded       Not recorded       Not recorded       Chief Complaint   Patient presents with    flashes of colored lights     Pt states he sees a scintillating colored light in od, lasts several mins and has happened around 6 tomes in the past 6 mos     HPI     flashes of colored lights     Additional comments: Pt states he sees a scintillating colored light in   od, lasts several mins and has happened around 6 tomes in the past 6 mos           Comments    1. Mod COAG OS + Mild COAG OD (init 22/26 post dilation IOP ) goal = 17  SLT OD 1/18/23 (19.5-12)  SLT OS 12/2/20 (19.5-15)  2. PCIOL / I-Stent OD +18.5 [id number] (distance) 2-21-18  PCIOL /I-Stent OS +21.0 (-1.75) 3/21/18 near  3. ERM OU   4. Brow Lift 9/18   *intolerant of travatan*      No eyedrops          Last edited by YOAV Blood on 5/31/2023  1:25 PM.      Problem List Items Addressed This Visit    None  Visit Diagnoses       Ocular migraine    -  Primary    Primary open angle glaucoma (POAG) of right eye, mild stage        Pseudophakia        Epiretinal membrane (ERM), bilateral              Instructed to call 24/7 for any worsening of vision, visual distortion or pain.  Check OU independently daily.    Gave my office and personal cell phone number.  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 Javier as scheduled  Instructed to call 24/7 for any worsening of vision or symptoms. Check OU daily.   Gave my office and cell phone number.      =============================

## 2023-06-02 ENCOUNTER — TELEPHONE (OUTPATIENT)
Dept: PAIN MEDICINE | Facility: CLINIC | Age: 83
End: 2023-06-02
Payer: MEDICARE

## 2023-06-02 NOTE — TELEPHONE ENCOUNTER
Reached out to patient to schedule appointment from messages. Apt has been made.   Pt understand. All questions answered.     Taqueria Hicks  Medical Assistant

## 2023-06-02 NOTE — TELEPHONE ENCOUNTER
----- Message from Rebecca Manning sent at 6/2/2023  8:57 AM CDT -----  Contact: Ezequiel Canales called to schedule an appt with Dr. Haywood or a PA, would like to be seen before 7/10. First available was [facility] on 7/17. Please call him back at [phone].     Thanks  TS

## 2023-06-09 ENCOUNTER — OFFICE VISIT (OUTPATIENT)
Dept: PAIN MEDICINE | Facility: CLINIC | Age: 83
End: 2023-06-09
Payer: MEDICARE

## 2023-06-09 VITALS
WEIGHT: 198.44 LBS | BODY MASS INDEX: 28.41 KG/M2 | HEART RATE: 58 BPM | SYSTOLIC BLOOD PRESSURE: 144 MMHG | RESPIRATION RATE: 16 BRPM | HEIGHT: 70 IN | DIASTOLIC BLOOD PRESSURE: 71 MMHG

## 2023-06-09 DIAGNOSIS — M54.16 LUMBAR RADICULOPATHY, CHRONIC: Primary | ICD-10-CM

## 2023-06-09 PROCEDURE — 1125F PR PAIN SEVERITY QUANTIFIED, PAIN PRESENT: ICD-10-PCS | Mod: CPTII,S$GLB,, | Performed by: PHYSICIAN ASSISTANT

## 2023-06-09 PROCEDURE — 1101F PT FALLS ASSESS-DOCD LE1/YR: CPT | Mod: CPTII,S$GLB,, | Performed by: PHYSICIAN ASSISTANT

## 2023-06-09 PROCEDURE — 1159F MED LIST DOCD IN RCRD: CPT | Mod: CPTII,S$GLB,, | Performed by: PHYSICIAN ASSISTANT

## 2023-06-09 PROCEDURE — 1160F RVW MEDS BY RX/DR IN RCRD: CPT | Mod: CPTII,S$GLB,, | Performed by: PHYSICIAN ASSISTANT

## 2023-06-09 PROCEDURE — 99214 PR OFFICE/OUTPT VISIT, EST, LEVL IV, 30-39 MIN: ICD-10-PCS | Mod: S$GLB,,, | Performed by: PHYSICIAN ASSISTANT

## 2023-06-09 PROCEDURE — 99214 OFFICE O/P EST MOD 30 MIN: CPT | Mod: S$GLB,,, | Performed by: PHYSICIAN ASSISTANT

## 2023-06-09 PROCEDURE — 1160F PR REVIEW ALL MEDS BY PRESCRIBER/CLIN PHARMACIST DOCUMENTED: ICD-10-PCS | Mod: CPTII,S$GLB,, | Performed by: PHYSICIAN ASSISTANT

## 2023-06-09 PROCEDURE — 3078F PR MOST RECENT DIASTOLIC BLOOD PRESSURE < 80 MM HG: ICD-10-PCS | Mod: CPTII,S$GLB,, | Performed by: PHYSICIAN ASSISTANT

## 2023-06-09 PROCEDURE — 3078F DIAST BP <80 MM HG: CPT | Mod: CPTII,S$GLB,, | Performed by: PHYSICIAN ASSISTANT

## 2023-06-09 PROCEDURE — 3288F FALL RISK ASSESSMENT DOCD: CPT | Mod: CPTII,S$GLB,, | Performed by: PHYSICIAN ASSISTANT

## 2023-06-09 PROCEDURE — 99999 PR PBB SHADOW E&M-EST. PATIENT-LVL III: CPT | Mod: PBBFAC,,, | Performed by: PHYSICIAN ASSISTANT

## 2023-06-09 PROCEDURE — 1125F AMNT PAIN NOTED PAIN PRSNT: CPT | Mod: CPTII,S$GLB,, | Performed by: PHYSICIAN ASSISTANT

## 2023-06-09 PROCEDURE — 3288F PR FALLS RISK ASSESSMENT DOCUMENTED: ICD-10-PCS | Mod: CPTII,S$GLB,, | Performed by: PHYSICIAN ASSISTANT

## 2023-06-09 PROCEDURE — 3077F SYST BP >= 140 MM HG: CPT | Mod: CPTII,S$GLB,, | Performed by: PHYSICIAN ASSISTANT

## 2023-06-09 PROCEDURE — 3077F PR MOST RECENT SYSTOLIC BLOOD PRESSURE >= 140 MM HG: ICD-10-PCS | Mod: CPTII,S$GLB,, | Performed by: PHYSICIAN ASSISTANT

## 2023-06-09 PROCEDURE — 99999 PR PBB SHADOW E&M-EST. PATIENT-LVL III: ICD-10-PCS | Mod: PBBFAC,,, | Performed by: PHYSICIAN ASSISTANT

## 2023-06-09 PROCEDURE — 1159F PR MEDICATION LIST DOCUMENTED IN MEDICAL RECORD: ICD-10-PCS | Mod: CPTII,S$GLB,, | Performed by: PHYSICIAN ASSISTANT

## 2023-06-09 PROCEDURE — 1101F PR PT FALLS ASSESS DOC 0-1 FALLS W/OUT INJ PAST YR: ICD-10-PCS | Mod: CPTII,S$GLB,, | Performed by: PHYSICIAN ASSISTANT

## 2023-06-09 NOTE — PROGRESS NOTES
Established Patient Chronic Pain Note (Follow up visit)    Chief Complaint:   No chief complaint on file.        SUBJECTIVE:  Interval History (6/9/2023):  Ezequiel Hughes presents today for follow-up visit.  Patient was last seen on 09/27/2022. Last injection Bilateral L4/5 TF IAN on 8/25/22with 80-90% pain relief x more than 6 months before pain insidiously returned. He reports same pain as previous, located in his lower back with radiation into both legs, though his right leg is worse than left. He reports his right leg feels weak and swells after prolonged walking, improved with rest. He has completed 37 sessions of physical therapy for his neck and back from 10/20/2022 to 03/14/2022 without relief. Patient reports pain as 4/10 today, but 6/10 on his worst days.      Interval History (9/27/2022): Ezequiel Hughes presents today for follow-up visit.  he underwent Bilateral L4/5 TF IAN on 8/25/22.  The patient reports that he is/was better following the procedure.  he reports 80-90% pain relief. He reports this IAN was the best so far.  The changes lasted 4 weeks so far.  The changes have continued through this visit.  Patient reports pain as 2/10 today. He does report muscle spasms in his lower right back for the past 3-4 days after prolonged stooping working on a car. He has used heat and massage with some improvement.       Interval HPI  Ezequiel Hughes is a 83 y.o. male who presents to the clinic for a follow-up appointment for back and leg pain.  He presents today after undergoing a 3rd lumbar TFESI bilaterally at L4-5 on 06/02/2022.  He reports that this resulted in 100% relief.  Since the last visit, Ezequiel Hughes states the pain has been resolved. Current pain intensity is 0/10.    Patient denies night fever/night sweats, urinary incontinence, bowel incontinence, significant weight loss, significant motor weakness and loss of sensations.    Pain Disability Index Review:  Last 3 PDI Scores 6/9/2022 1/13/2022 12/9/2021    Pain Disability Index (PDI) 24 24 35     Interval Hx: 2/24/22  Presents status post bilateral L4/5 transforaminal epidural steroid injection January 26, 2022. Patient reports having 100% relief in lower extremity radicular symptoms following epidural steroid injection.  This pain level varies based upon his activity throughout the day.  Patient reports as he is more active he does notice radicular symptoms down the lateral aspects of bilateral lower extremities to the feet.  Patient reports discontinuing Lyrica the day following his injection which he believes caused paresthesias to insidiously return.  Patient does report improvement in functionality including range of motion and strength following his injection.  Patient is interested in repeat intervention.  Patient denies any side effects at the Lyrica dose of 225 mg twice a day.     Interval Hx: 1/13/22  Patient presents for MRI cervical and lumbar review.  Today patient again reports lower back pain which radiates down the anterior aspects of bilateral lower extremities in L4 distribution to the foot.  Today pain is rated as a 4/10.  Pain is exacerbated with prolonged standing and with ambulation the patient reports he is able to ambulate at least 1 mi on the treadmill before requiring rest.  He also reports neck pain which radiates in bilateral trapezius distributions in C5-6 distribution.  Patient has continued Lyrica and is currently taking 150 mg twice daily.  He is anticipated to increase this dosage next week.  He denies any side effects from this medication.     HPI 12/9/21  Ezequiel Hughes is a 81 y.o. male with past medical history significant for transient ischemic attack, hyperlipidemia, hypertension, right bundle branch block, stage 3 chronic kidney disease, thrombocytopenia and anemia , prostate cancer, gout, obstructive sleep apnea, periodically limb movement disorder who presents to the clinic for the evaluation of neck, lower back and leg pain.   "Today patient reports pain began approximately 1 year prior without inciting accident, injury or trauma.  Today patient reports lower back pain which is constant and today is rated as a 3/10.  Patient reports radicular symptoms beginning along the anterior aspects of bilateral lower extremities below the knee to the foot in L4 distribution.  Patient is having difficulty describing the character but believes it is burning in nature.  Pain is exacerbated with prolonged standing and with ambulation.  Patient reports he is quite active, goes to the gym and walks on his treadmill and is able to ambulate approximately half to 3/4 of a mi before requiring rest.  Pain is improved with sitting.He denies any bowel or bladder incontinence or saddle anesthesia. Patient has performed physical therapy for the lower back without any improvement in his symptoms.      Patient also reports constant neck pain.  Pain presents in a bandlike distribution in bilateral cervical paraspinous territory and radiates into bilateral trapezius distribution.  Patient also reports numbness in bilateral thumbs.  Pain is exacerbated with cervical flexion, extension and lateral flexion.  Patient denies upper extremity weakness, compromise in hand  strength or dexterity.  Patient has been trialed on gabapentin for what his wife describes as "scalp itching"at a lower dose of 100 mg 3 times daily without any improvement in his neck or in his back pain.           Non-Pharmacologic Treatments:  Physical Therapy/Home Exercise: yes  Ice/Heat:yes  TENS: no  Acupuncture: no  Massage: no  Chiropractic: no    Other: no     Pain Medications:  - Adjuvant Medications: Neurontin (Gabapentin) and Tylenol (Acetaminophen)  - Anti-Coagulants: Plavix ( Clopidogrel)     Pain Procedures:   -01/26/2022:  Bilateral L4/5 TFESI  -03/14/2022: Bilateral L4-5 TFESI  -06/02/2022:  Bilateral L4-5 TFESI D2P per Matthew  -08/25/2022: Bilatearl L4/5 TF IAN      Imaging:   MRI brain " 05/12/2022:        MRI lumbar spine 12/22/2021:      MRI cervical spine 12/22/2021:          PMHx,PSHx, Social history, and Family history:  I have reviewed the patient's medical, surgical, social, and family history in detail and updated the computerized patient record.    Review of patient's allergies indicates:   Allergen Reactions    Atarax [hydroxyzine hcl] Other (See Comments)     Raised blood pressure     Zyrtec [cetirizine] Other (See Comments)     Raised blood pressure     Gold sodium thiosulfate      Patch test positive    Meloxicam Rash     Other reaction(s): hypertension       Current Outpatient Medications   Medication Sig    acetaminophen (TYLENOL ARTHRITIS PAIN ORAL) Take by mouth. Patient states that he takes 2 tablets in the morning and 2 tablets in the evening    alfuzosin (UROXATRAL) 10 mg Tb24 Take 1 tablet (10 mg total) by mouth daily with breakfast.    amLODIPine (NORVASC) 5 MG tablet Take 1 tablet (5 mg total) by mouth once daily.    atorvastatin (LIPITOR) 40 MG tablet TAKE 1 TABLET EVERY DAY    clopidogreL (PLAVIX) 75 mg tablet TAKE 1 TABLET EVERY DAY    finasteride (PROSCAR) 5 mg tablet TAKE 1 TABLET (5 MG TOTAL) BY MOUTH ONCE DAILY.    FLUAD QUAD 2022-23,65Y UP,,PF, 60 mcg (15 mcg x 4)/0.5 mL Syrg     fluticasone propionate (FLONASE) 50 mcg/actuation nasal spray USE 2 SPRAYS IN EACH NOSTRIL ONE TIME DAILY  (SUBSTITUTED FOR FLONASE)    ketorolac 0.5% (ACULAR) 0.5 % Drop Put one eyedrop in right eye four times a day. Start first drop morning of laser and stop after five days.    losartan (COZAAR) 50 MG tablet TAKE 1 TABLET TWICE DAILY    methocarbamoL (ROBAXIN) 500 MG Tab Take 1 tablet (500 mg total) by mouth 3 (three) times daily as needed (muscle spasms). May cause drowsiness.    pantoprazole (PROTONIX) 40 MG tablet TAKE 1 TABLET EVERY DAY    sildenafiL (VIAGRA) 100 MG tablet Take 1 po 45 minutes before intercourse    tolterodine (DETROL) 1 MG Tab Take 1 tablet (1 mg total) by mouth every  "evening.     No current facility-administered medications for this visit.         REVIEW OF SYSTEMS:    GENERAL:  No weight loss, malaise or fevers.  HEENT:   No recent changes in vision or hearing  NECK:  Negative for lumps, no difficulty with swallowing.  RESPIRATORY:  Negative for cough, wheezing or shortness of breath, patient denies any recent URI.  CARDIOVASCULAR:  Negative for chest pain, leg swelling or palpitations.  GI:  Negative for abdominal discomfort, blood in stools or black stools or change in bowel habits.  MUSCULOSKELETAL:  See HPI.  SKIN:  Negative for lesions, rash, and itching.  PSYCH:  No mood disorder or recent psychosocial stressors.  Patients sleep is not disturbed secondary to pain.  HEMATOLOGY/LYMPHOLOGY:  Negative for prolonged bleeding, bruising easily or swollen nodes.  Patient is currently taking anti-coagulants - plavix  NEURO:   No history of headaches, syncope, paralysis, seizures or tremors.  All other reviewed and negative other than HPI.    OBJECTIVE:    BP (!) 144/71   Pulse (!) 58   Resp 16   Ht 5' 10" (1.778 m)   Wt 90 kg (198 lb 6.6 oz)   BMI 28.47 kg/m²     PHYSICAL EXAMINATION:    GENERAL: Well appearing, in no acute distress, alert and oriented x3.  PSYCH:  Mood and affect appropriate.  SKIN: Skin color, texture, turgor normal, no rashes or lesions.  HEAD/FACE:  Normocephalic, atraumatic. Cranial nerves grossly intact.  CV: RRR with palpation of the radial artery.  PULM: No evidence of respiratory difficulty, symmetric chest rise.  GI:  Soft and non-tender.  BACK: Straight leg raising in the sitting and supine positions is negative to radicular pain. No pain to palpation over the facet joints of the lumbar spine or spinous processes. Normal range of motion without pain reproduction.  EXTREMITIES: Peripheral joint ROM is full and pain free without obvious instability or laxity in all four extremities. No deformities, edema, or skin discoloration. Good capillary " refill.  MUSCULOSKELETAL: mild/moderate  TTP along right quadratus lumborum. Hip and knee provocative maneuvers are negative.  There is no pain with palpation over the sacroiliac joints bilaterally.  FABERs test is negative.  FADIRs test is negative.   Bilateral upper and lower extremity strength is normal and symmetric.  No atrophy or tone abnormalities are noted.  NEURO: Bilateral upper and lower extremity coordination and muscle stretch reflexes are physiologic and symmetric.  Plantar response are downgoing. No clonus.  No loss of sensation is noted.  GAIT: normal.      LABS:  Lab Results   Component Value Date    WBC 4.43 01/17/2023    HGB 12.6 (L) 01/17/2023    HCT 39.3 (L) 01/17/2023    MCV 99 (H) 01/17/2023     (L) 01/17/2023       CMP  Sodium   Date Value Ref Range Status   01/17/2023 138 136 - 145 mmol/L Final     Potassium   Date Value Ref Range Status   01/17/2023 4.3 3.5 - 5.1 mmol/L Final     Chloride   Date Value Ref Range Status   01/17/2023 108 95 - 110 mmol/L Final     CO2   Date Value Ref Range Status   01/17/2023 25 23 - 29 mmol/L Final     Glucose   Date Value Ref Range Status   01/17/2023 89 70 - 110 mg/dL Final     BUN   Date Value Ref Range Status   01/17/2023 19 8 - 23 mg/dL Final     Creatinine   Date Value Ref Range Status   01/17/2023 1.2 0.5 - 1.4 mg/dL Final     Calcium   Date Value Ref Range Status   01/17/2023 9.1 8.7 - 10.5 mg/dL Final     Total Protein   Date Value Ref Range Status   01/17/2023 6.6 6.0 - 8.4 g/dL Final     Albumin   Date Value Ref Range Status   01/17/2023 4.0 3.5 - 5.2 g/dL Final     Total Bilirubin   Date Value Ref Range Status   01/17/2023 1.0 0.1 - 1.0 mg/dL Final     Comment:     For infants and newborns, interpretation of results should be based  on gestational age, weight and in agreement with clinical  observations.    Premature Infant recommended reference ranges:  Up to 24 hours.............<8.0 mg/dL  Up to 48 hours............<12.0 mg/dL  3-5  days..................<15.0 mg/dL  6-29 days.................<15.0 mg/dL       Alkaline Phosphatase   Date Value Ref Range Status   01/17/2023 57 55 - 135 U/L Final     AST   Date Value Ref Range Status   01/17/2023 13 10 - 40 U/L Final     ALT   Date Value Ref Range Status   01/17/2023 10 10 - 44 U/L Final     Anion Gap   Date Value Ref Range Status   01/17/2023 5 (L) 8 - 16 mmol/L Final     eGFR if    Date Value Ref Range Status   06/29/2022 57.9 (A) >60 mL/min/1.73 m^2 Final     eGFR if non    Date Value Ref Range Status   06/29/2022 50.1 (A) >60 mL/min/1.73 m^2 Final     Comment:     Calculation used to obtain the estimated glomerular filtration  rate (eGFR) is the CKD-EPI equation.          Lab Results   Component Value Date    HGBA1C 5.3 01/17/2023             ASSESSMENT: 83 y.o. year old male with lower back and leg pain, consistent with     1. Lumbar radiculopathy, chronic  IR Epidural Transfor 1st Vert Lumbar Dov    Case Request-RAD/Other Procedure Area: Bilateral L4/5 TF IAN RN IV Sedation                PLAN:   - Interventions: Schedule repeat L4/5 TF IAN for radicular symptoms as previous offered excellent relief x 6 months   - Anticoagulation: yes, Plavix - hold 7 days prior, clearance from Dr. Ozuna  - Medications: I have stressed the importance of physical activity and a home exercise plan to help with pain and improve health. and Patient can continue with medications for now since they are providing benefits, using them appropriately, and without side effects.    - Therapy:  Advised patient to continue with activities as tolerated  - Labs:  Reviewed  - Imaging:  Reviewed  - Consults/Referrals:  None at this time  - Records:  Reviewed/Obtain old records from outside physicians and imaging  - Follow up visit:4 weeks after injection  - Counseled patient regarding the importance of activity modification and physical therapy  - This condition does not require this patient to  take time off of work, and the primary goal of our Pain Management services is to improve the patient's functional capacity.  - Patient Questions: Answered all of the patient's questions regarding diagnosis, therapy, and treatment    The above plan and management options were discussed at length with patient. Patient is in agreement with the above and verbalized understanding.      Kathia Adan PA-C  Interventional Pain Management  [facility] [place] [facility]    Disclaimer:  This note was prepared using voice recognition system and is likely to have sound alike errors that may have been overlooked even after proof reading.  Please call me with any questions

## 2023-06-18 ENCOUNTER — PATIENT MESSAGE (OUTPATIENT)
Dept: PAIN MEDICINE | Facility: CLINIC | Age: 83
End: 2023-06-18
Payer: MEDICARE

## 2023-06-22 ENCOUNTER — PATIENT MESSAGE (OUTPATIENT)
Dept: PAIN MEDICINE | Facility: CLINIC | Age: 83
End: 2023-06-22
Payer: MEDICARE

## 2023-06-22 ENCOUNTER — PATIENT MESSAGE (OUTPATIENT)
Dept: PRIMARY CARE CLINIC | Facility: CLINIC | Age: 83
End: 2023-06-22
Payer: MEDICARE

## 2023-06-22 ENCOUNTER — TELEPHONE (OUTPATIENT)
Dept: PAIN MEDICINE | Facility: CLINIC | Age: 83
End: 2023-06-22
Payer: MEDICARE

## 2023-06-23 ENCOUNTER — PATIENT MESSAGE (OUTPATIENT)
Dept: PRIMARY CARE CLINIC | Facility: CLINIC | Age: 83
End: 2023-06-23
Payer: MEDICARE

## 2023-06-23 DIAGNOSIS — R73.09 ABNORMAL GLUCOSE: ICD-10-CM

## 2023-06-23 DIAGNOSIS — M10.9 GOUT, UNSPECIFIED CAUSE, UNSPECIFIED CHRONICITY, UNSPECIFIED SITE: Primary | ICD-10-CM

## 2023-06-23 DIAGNOSIS — I70.203 ATHEROSCLEROSIS OF ARTERY OF BOTH LOWER EXTREMITIES: ICD-10-CM

## 2023-06-23 DIAGNOSIS — D64.9 ANEMIA, UNSPECIFIED TYPE: ICD-10-CM

## 2023-06-23 DIAGNOSIS — D69.6 THROMBOCYTOPENIA: ICD-10-CM

## 2023-06-23 DIAGNOSIS — I27.20 PULMONARY HYPERTENSION: ICD-10-CM

## 2023-06-23 DIAGNOSIS — M47.817 LUMBOSACRAL SPONDYLOSIS WITHOUT MYELOPATHY: ICD-10-CM

## 2023-06-23 DIAGNOSIS — M54.12 CERVICAL RADICULOPATHY: ICD-10-CM

## 2023-06-27 ENCOUNTER — LAB VISIT (OUTPATIENT)
Dept: LAB | Facility: HOSPITAL | Age: 83
End: 2023-06-27
Attending: FAMILY MEDICINE
Payer: MEDICARE

## 2023-06-27 DIAGNOSIS — R73.09 ABNORMAL GLUCOSE: ICD-10-CM

## 2023-06-27 DIAGNOSIS — D64.9 ANEMIA, UNSPECIFIED TYPE: ICD-10-CM

## 2023-06-27 DIAGNOSIS — M54.12 CERVICAL RADICULOPATHY: ICD-10-CM

## 2023-06-27 DIAGNOSIS — M10.9 GOUT, UNSPECIFIED CAUSE, UNSPECIFIED CHRONICITY, UNSPECIFIED SITE: ICD-10-CM

## 2023-06-27 DIAGNOSIS — M47.817 LUMBOSACRAL SPONDYLOSIS WITHOUT MYELOPATHY: ICD-10-CM

## 2023-06-27 DIAGNOSIS — D69.6 THROMBOCYTOPENIA: ICD-10-CM

## 2023-06-27 DIAGNOSIS — I70.203 ATHEROSCLEROSIS OF ARTERY OF BOTH LOWER EXTREMITIES: ICD-10-CM

## 2023-06-27 DIAGNOSIS — I27.20 PULMONARY HYPERTENSION: ICD-10-CM

## 2023-06-27 LAB
ALBUMIN SERPL BCP-MCNC: 4.1 G/DL (ref 3.5–5.2)
ALP SERPL-CCNC: 52 U/L (ref 55–135)
ALT SERPL W/O P-5'-P-CCNC: 12 U/L (ref 10–44)
ANION GAP SERPL CALC-SCNC: 10 MMOL/L (ref 8–16)
AST SERPL-CCNC: 15 U/L (ref 10–40)
BASOPHILS # BLD AUTO: 0.04 K/UL (ref 0–0.2)
BASOPHILS NFR BLD: 0.9 % (ref 0–1.9)
BILIRUB SERPL-MCNC: 0.7 MG/DL (ref 0.1–1)
BUN SERPL-MCNC: 26 MG/DL (ref 8–23)
CALCIUM SERPL-MCNC: 9.3 MG/DL (ref 8.7–10.5)
CHLORIDE SERPL-SCNC: 107 MMOL/L (ref 95–110)
CHOLEST SERPL-MCNC: 138 MG/DL (ref 120–199)
CHOLEST/HDLC SERPL: 2.5 {RATIO} (ref 2–5)
CO2 SERPL-SCNC: 23 MMOL/L (ref 23–29)
CREAT SERPL-MCNC: 1.3 MG/DL (ref 0.5–1.4)
DIFFERENTIAL METHOD: ABNORMAL
EOSINOPHIL # BLD AUTO: 0.2 K/UL (ref 0–0.5)
EOSINOPHIL NFR BLD: 5.4 % (ref 0–8)
ERYTHROCYTE [DISTWIDTH] IN BLOOD BY AUTOMATED COUNT: 12.9 % (ref 11.5–14.5)
EST. GFR  (NO RACE VARIABLE): 53.8 ML/MIN/1.73 M^2
ESTIMATED AVG GLUCOSE: 100 MG/DL (ref 68–131)
GLUCOSE SERPL-MCNC: 86 MG/DL (ref 70–110)
HBA1C MFR BLD: 5.1 % (ref 4–5.6)
HCT VFR BLD AUTO: 42.9 % (ref 40–54)
HDLC SERPL-MCNC: 56 MG/DL (ref 40–75)
HDLC SERPL: 40.6 % (ref 20–50)
HGB BLD-MCNC: 13.4 G/DL (ref 14–18)
IMM GRANULOCYTES # BLD AUTO: 0.01 K/UL (ref 0–0.04)
IMM GRANULOCYTES NFR BLD AUTO: 0.2 % (ref 0–0.5)
LDLC SERPL CALC-MCNC: 61.4 MG/DL (ref 63–159)
LYMPHOCYTES # BLD AUTO: 0.8 K/UL (ref 1–4.8)
LYMPHOCYTES NFR BLD: 17.9 % (ref 18–48)
MCH RBC QN AUTO: 31.7 PG (ref 27–31)
MCHC RBC AUTO-ENTMCNC: 31.2 G/DL (ref 32–36)
MCV RBC AUTO: 101 FL (ref 82–98)
MONOCYTES # BLD AUTO: 0.4 K/UL (ref 0.3–1)
MONOCYTES NFR BLD: 8.7 % (ref 4–15)
NEUTROPHILS # BLD AUTO: 3 K/UL (ref 1.8–7.7)
NEUTROPHILS NFR BLD: 66.9 % (ref 38–73)
NONHDLC SERPL-MCNC: 82 MG/DL
NRBC BLD-RTO: 0 /100 WBC
PLATELET # BLD AUTO: 134 K/UL (ref 150–450)
PMV BLD AUTO: 8.8 FL (ref 9.2–12.9)
POTASSIUM SERPL-SCNC: 4.3 MMOL/L (ref 3.5–5.1)
PROT SERPL-MCNC: 6.9 G/DL (ref 6–8.4)
RBC # BLD AUTO: 4.23 M/UL (ref 4.6–6.2)
SODIUM SERPL-SCNC: 140 MMOL/L (ref 136–145)
T4 FREE SERPL-MCNC: 0.96 NG/DL (ref 0.71–1.51)
TRIGL SERPL-MCNC: 103 MG/DL (ref 30–150)
TSH SERPL DL<=0.005 MIU/L-ACNC: 2.24 UIU/ML (ref 0.4–4)
URATE SERPL-MCNC: 6.8 MG/DL (ref 3.4–7)
WBC # BLD AUTO: 4.48 K/UL (ref 3.9–12.7)

## 2023-06-27 PROCEDURE — 84439 ASSAY OF FREE THYROXINE: CPT | Mod: HCNC | Performed by: FAMILY MEDICINE

## 2023-06-27 PROCEDURE — 80061 LIPID PANEL: CPT | Mod: HCNC | Performed by: FAMILY MEDICINE

## 2023-06-27 PROCEDURE — 84443 ASSAY THYROID STIM HORMONE: CPT | Mod: HCNC | Performed by: FAMILY MEDICINE

## 2023-06-27 PROCEDURE — 85025 COMPLETE CBC W/AUTO DIFF WBC: CPT | Mod: HCNC | Performed by: FAMILY MEDICINE

## 2023-06-27 PROCEDURE — 36415 COLL VENOUS BLD VENIPUNCTURE: CPT | Mod: HCNC,PO | Performed by: FAMILY MEDICINE

## 2023-06-27 PROCEDURE — 83036 HEMOGLOBIN GLYCOSYLATED A1C: CPT | Mod: HCNC | Performed by: FAMILY MEDICINE

## 2023-06-27 PROCEDURE — 80053 COMPREHEN METABOLIC PANEL: CPT | Mod: HCNC | Performed by: FAMILY MEDICINE

## 2023-06-27 PROCEDURE — 84550 ASSAY OF BLOOD/URIC ACID: CPT | Mod: HCNC | Performed by: FAMILY MEDICINE

## 2023-06-27 NOTE — PRE-PROCEDURE INSTRUCTIONS
Spoke with patient regarding procedure scheduled on 6.29     Arrival time 0815     Has patient been sick with fever or on antibiotics within the last 7 days? No     Does the patient have any open wounds, sores or rashes? No     Does the patient have any recent fractures? no     Has patient received a vaccination within the last 7 days? No     Received the COVID vaccination? yes     Has the patient stopped all medications as directed? HOLD PLAVIX 7 DAYS PRIOR TO PROCEDURE. CARDIAC CLEARANCE OBTAINED FROM DR REYES ON 6.22.     Does patient have a pacemaker and or defibrillator? no     Does the patient have a ride to and from procedure and someone reliable to remain with patient? WING     Is the patient diabetic? no     Does the patient have sleep apnea? Or use O2 at home? GUIDO     Is the patient receiving sedation? yes     Is the patient instructed to remain NPO beginning at midnight the night before their procedure? yes     Procedure location confirmed with patient? Yes     Covid- Denies signs/symptoms. Instructed to notify PAT/MD if any changes.

## 2023-06-28 ENCOUNTER — OFFICE VISIT (OUTPATIENT)
Dept: PRIMARY CARE CLINIC | Facility: CLINIC | Age: 83
End: 2023-06-28
Payer: MEDICARE

## 2023-06-28 VITALS
TEMPERATURE: 97 F | BODY MASS INDEX: 28.28 KG/M2 | DIASTOLIC BLOOD PRESSURE: 64 MMHG | HEART RATE: 68 BPM | SYSTOLIC BLOOD PRESSURE: 138 MMHG | WEIGHT: 197.06 LBS | OXYGEN SATURATION: 97 %

## 2023-06-28 DIAGNOSIS — N18.31 STAGE 3A CHRONIC KIDNEY DISEASE: ICD-10-CM

## 2023-06-28 DIAGNOSIS — G89.29 CHRONIC NECK PAIN: ICD-10-CM

## 2023-06-28 DIAGNOSIS — R68.89 HEAT INTOLERANCE: ICD-10-CM

## 2023-06-28 DIAGNOSIS — M54.2 CHRONIC NECK PAIN: ICD-10-CM

## 2023-06-28 DIAGNOSIS — M47.817 LUMBOSACRAL SPONDYLOSIS WITHOUT MYELOPATHY: Primary | ICD-10-CM

## 2023-06-28 DIAGNOSIS — D64.9 ANEMIA, UNSPECIFIED TYPE: ICD-10-CM

## 2023-06-28 DIAGNOSIS — M54.16 LUMBAR RADICULOPATHY, CHRONIC: ICD-10-CM

## 2023-06-28 PROCEDURE — 99214 OFFICE O/P EST MOD 30 MIN: CPT | Mod: HCNC,S$GLB,, | Performed by: FAMILY MEDICINE

## 2023-06-28 PROCEDURE — 1160F PR REVIEW ALL MEDS BY PRESCRIBER/CLIN PHARMACIST DOCUMENTED: ICD-10-PCS | Mod: HCNC,CPTII,S$GLB, | Performed by: FAMILY MEDICINE

## 2023-06-28 PROCEDURE — 1160F RVW MEDS BY RX/DR IN RCRD: CPT | Mod: HCNC,CPTII,S$GLB, | Performed by: FAMILY MEDICINE

## 2023-06-28 PROCEDURE — 3075F PR MOST RECENT SYSTOLIC BLOOD PRESS GE 130-139MM HG: ICD-10-PCS | Mod: HCNC,CPTII,S$GLB, | Performed by: FAMILY MEDICINE

## 2023-06-28 PROCEDURE — 1159F MED LIST DOCD IN RCRD: CPT | Mod: HCNC,CPTII,S$GLB, | Performed by: FAMILY MEDICINE

## 2023-06-28 PROCEDURE — 1126F PR PAIN SEVERITY QUANTIFIED, NO PAIN PRESENT: ICD-10-PCS | Mod: HCNC,CPTII,S$GLB, | Performed by: FAMILY MEDICINE

## 2023-06-28 PROCEDURE — 99214 PR OFFICE/OUTPT VISIT, EST, LEVL IV, 30-39 MIN: ICD-10-PCS | Mod: HCNC,S$GLB,, | Performed by: FAMILY MEDICINE

## 2023-06-28 PROCEDURE — 3075F SYST BP GE 130 - 139MM HG: CPT | Mod: HCNC,CPTII,S$GLB, | Performed by: FAMILY MEDICINE

## 2023-06-28 PROCEDURE — 99999 PR PBB SHADOW E&M-EST. PATIENT-LVL V: CPT | Mod: PBBFAC,HCNC,, | Performed by: FAMILY MEDICINE

## 2023-06-28 PROCEDURE — 1159F PR MEDICATION LIST DOCUMENTED IN MEDICAL RECORD: ICD-10-PCS | Mod: HCNC,CPTII,S$GLB, | Performed by: FAMILY MEDICINE

## 2023-06-28 PROCEDURE — 99999 PR PBB SHADOW E&M-EST. PATIENT-LVL V: ICD-10-PCS | Mod: PBBFAC,HCNC,, | Performed by: FAMILY MEDICINE

## 2023-06-28 PROCEDURE — 1126F AMNT PAIN NOTED NONE PRSNT: CPT | Mod: HCNC,CPTII,S$GLB, | Performed by: FAMILY MEDICINE

## 2023-06-28 PROCEDURE — 3078F PR MOST RECENT DIASTOLIC BLOOD PRESSURE < 80 MM HG: ICD-10-PCS | Mod: HCNC,CPTII,S$GLB, | Performed by: FAMILY MEDICINE

## 2023-06-28 PROCEDURE — 3078F DIAST BP <80 MM HG: CPT | Mod: HCNC,CPTII,S$GLB, | Performed by: FAMILY MEDICINE

## 2023-06-28 NOTE — PROGRESS NOTES
Subjective:      Patient ID: Ezequiel Hughes is a 83 y.o. male.    Chief Complaint: Follow-up      Patient is a 83 y.o. male coming in today for 6 month f/u. He is accompanied by family today.   Has procedure of spondylosis with Dr. Haywood, pain management, tomorrow. He was cleared by his cardiologist, Dr. Thompson; pt was advised to hold his Plavix until after procedure. He also reports mild neck pain and lightheadedness. Has been on PT for neck pain treatment. BP is slightly elevated in clinic today; states BP has been within normal limits at home. Latest lab work results reviewed with pt. He also reports recent increase in fatigue. Reports fatigue exacerbates after walking outside. States he does not feel as fatigued when he uses the treadmill.     I reviewed the patient's past medical, surgical, social and family history and with  physical exam findings and the proposed surgery and I make the following recommendations:     From a cardiac standpoint the patient is low risk for procedure of IAN.         1. Lumbosacral spondylosis without myelopathy    2. Lumbar radiculopathy, chronic    3. Chronic neck pain    4. Anemia, unspecified type    5. Stage 3a chronic kidney disease    6. Heat intolerance       [facility] Peq Documents    6/23/2023  8:25 AM CDT - Filed by Patient   Would you like a copy of [facility]'s Financial Assistance Policy Summary? No, I would not like a copy.     [facility] Peq Sdoh    6/23/2023  8:29 AM CDT - Filed by Patient   On average, how many days per week do you engage in moderate to strenuous exercise (like a brisk walk)? 5 days   On average, how many minutes do you engage in exercise at this level? 30 min   Do you feel stress - tense, restless, nervous, or anxious, or unable to sleep at night because your mind is troubled all the time - these days? Not at all   Do you belong to any clubs or organizations such as Confucianist groups, unions, fraternal or athletic groups, or school groups? No   How often do you attend  meetings of the clubs or organizations you belong to? Never   In a typical week, how many times do you talk on the phone with family, friends, or neighbors? More than three times a week   How often do you get together with friends or relatives? More than three times a week   Are you , , , , never , or living with a partner?    How hard is it for you to pay for the very basics like food, housing, medical care, and heating? Not hard at all   Within the past 12 months, you worried that your food would run out before you got the money to buy more. Never true   Within the past 12 months, the food you bought just didnt last and you didnt have money to get more. Never true   In the past 12 months, has lack of transportation kept you from medical appointments or from getting medications? No   In the past 12 months, has lack of transportation kept you from meetings, work, or from getting things needed for daily living? No   How often do you have a drink containing alcohol? 4 or more times a week   How many drinks containing alcohol do you have on a typical day when you are drinking? 1 or 2   How often do you have six or more drinks on one occasion? Never   In the last 12 months, was there a time when you were not able to pay the mortgage or rent on time? No   In the last 12 months, how many places have you lived? (range: at least 0) 1   In the last 12 months, was there a time when you did not have a steady place to sleep or slept in a shelter (including now)? No         Pmh, Psh, Family Hx, Social Hx, HM updated in Epic Tabs today.   Review of Systems   Constitutional:  Positive for fatigue. Negative for activity change, appetite change, chills and unexpected weight change.   HENT:  Negative for congestion, ear pain, postnasal drip, sneezing, sore throat and trouble swallowing.    Eyes:  Negative for pain and visual disturbance.   Respiratory:  Negative for cough and shortness  of breath.    Cardiovascular:  Negative for chest pain and leg swelling.   Gastrointestinal:  Negative for abdominal pain, constipation, diarrhea, nausea and vomiting.   Endocrine: Negative for cold intolerance and heat intolerance.   Genitourinary:  Negative for difficulty urinating, dysuria and flank pain.   Musculoskeletal:  Positive for arthralgias, back pain and neck pain. Negative for joint swelling.   Skin:  Negative for color change and rash.   Neurological:  Positive for weakness, light-headedness and numbness. Negative for dizziness, seizures and headaches.   Psychiatric/Behavioral:  Negative for behavioral problems, dysphoric mood and sleep disturbance. The patient is not nervous/anxious.    Objective:     Vitals:    06/28/23 0849   BP: 138/64   Pulse: 68   Temp: 97.3 °F (36.3 °C)   SpO2: 97%   Weight: 89.4 kg (197 lb 1.5 oz)     Wt Readings from Last 10 Encounters:   06/28/23 89.4 kg (197 lb 1.5 oz)   06/09/23 90 kg (198 lb 6.6 oz)   05/05/23 88.7 kg (195 lb 8.8 oz)   04/20/23 89.4 kg (197 lb)   02/09/23 89.5 kg (197 lb 5 oz)   02/07/23 90.4 kg (199 lb 4.7 oz)   01/27/23 90.9 kg (200 lb 6.4 oz)   01/11/23 90.4 kg (199 lb 4.7 oz)   12/28/22 91 kg (200 lb 9.9 oz)   12/02/22 93.4 kg (205 lb 14.6 oz)     Physical Exam  Vitals and nursing note reviewed.   Constitutional:       General: He is awake.      Appearance: Normal appearance. He is well-developed, well-groomed and overweight.   HENT:      Head: Normocephalic and atraumatic.      Right Ear: Tympanic membrane and external ear normal.      Left Ear: Tympanic membrane and external ear normal.      Nose: Nose normal.   Eyes:      Conjunctiva/sclera: Conjunctivae normal.   Neck:      Thyroid: No thyromegaly or thyroid tenderness.   Cardiovascular:      Rate and Rhythm: Normal rate and regular rhythm.      Heart sounds: Murmur heard.   Systolic murmur is present with a grade of 2/6.   Pulmonary:      Effort: Pulmonary effort is normal. No accessory muscle  usage.      Breath sounds: Normal breath sounds.   Musculoskeletal:      Cervical back: Normal range of motion and neck supple.   Neurological:      General: No focal deficit present.      Mental Status: He is alert. Mental status is at baseline.   Psychiatric:         Attention and Perception: Attention normal.         Mood and Affect: Mood normal.         Speech: Speech normal.         Behavior: Behavior normal. Behavior is cooperative.         Thought Content: Thought content normal.         Judgment: Judgment normal.       Assessment:     1. Lumbosacral spondylosis without myelopathy    2. Lumbar radiculopathy, chronic    3. Chronic neck pain    4. Anemia, unspecified type    5. Stage 3a chronic kidney disease    6. Heat intolerance        Plan:   Ezequiel was seen today for follow-up.    Diagnoses and all orders for this visit:    Lumbosacral spondylosis without myelopathy    Lumbar radiculopathy, chronic    Chronic neck pain    Anemia, unspecified type    Stage 3a chronic kidney disease    Heat intolerance      - above diagnoses were discussed and reviewed today during visit. The conditions are stable.  Continue with current medications and interventions. Labs reviewed.   Advised to monitor activity outside vs inside with AC available.   Reviewed labs.   Htn stable. Cont with meds.     Instructed to f/u in 4 months.       Patient Instructions       Follow up in about 4 months (around 10/28/2023) for f/u office visit Dr. Ozuna.      LABS:   Lab Results   Component Value Date    HGBA1C 5.1 06/27/2023    HGBA1C 5.3 01/17/2023    HGBA1C 5.2 07/22/2020      Lab Results   Component Value Date    CHOL 138 06/27/2023    CHOL 134 01/17/2023    CHOL 132 06/08/2022     Lab Results   Component Value Date    LDLCALC 61.4 (L) 06/27/2023    LDLCALC 67.6 01/17/2023    LDLCALC 58.4 (L) 06/08/2022     Lab Results   Component Value Date    WBC 4.48 06/27/2023    HGB 13.4 (L) 06/27/2023    HCT 42.9 06/27/2023     (L)  06/27/2023    CHOL 138 06/27/2023    TRIG 103 06/27/2023    HDL 56 06/27/2023    ALT 12 06/27/2023    AST 15 06/27/2023     06/27/2023    K 4.3 06/27/2023     06/27/2023    CREATININE 1.3 06/27/2023    BUN 26 (H) 06/27/2023    CO2 23 06/27/2023    TSH 2.242 06/27/2023    PSA 1.3 11/10/2021    INR 1.2 06/29/2022    HGBA1C 5.1 06/27/2023       The ASCVD Risk score (Perez DK, et al., 2019) failed to calculate for the following reasons:    The 2019 ASCVD risk score is only valid for ages 40 to 79  C[place]allos Visual Field - OU - Extended - Both Eyes  Right Eye  Reliability was borderline. Findings include inferior arcuate defect.   Stable.     Left Eye  Reliability was borderline. Findings include superior nasal step defect.   Stable.     Scribe Attestation:   I, Cong Marquez, am scribing for, and in the presence of, Dr. Valery Ozuna MD. I performed the above scribed service and the documentation accurately describes the services I performed. I attest to the accuracy of the note.    I, Dr. Valery Ozuna MD, reviewed documentation as scribed above. I personally performed the services described in this documentation.  I agree that the record reflects my personal performance and is accurate and complete. Valery Ozuna MD.    06/28/2023

## 2023-06-29 ENCOUNTER — HOSPITAL ENCOUNTER (OUTPATIENT)
Facility: HOSPITAL | Age: 83
Discharge: HOME | End: 2023-06-29
Attending: PHYSICAL MEDICINE & REHABILITATION | Admitting: PHYSICAL MEDICINE & REHABILITATION
Payer: MEDICARE

## 2023-06-29 VITALS
DIASTOLIC BLOOD PRESSURE: 70 MMHG | WEIGHT: 195.56 LBS | BODY MASS INDEX: 28 KG/M2 | HEART RATE: 59 BPM | SYSTOLIC BLOOD PRESSURE: 121 MMHG | HEIGHT: 70 IN | TEMPERATURE: 97 F | RESPIRATION RATE: 15 BRPM | OXYGEN SATURATION: 97 %

## 2023-06-29 DIAGNOSIS — M54.16 LUMBAR RADICULOPATHY: ICD-10-CM

## 2023-06-29 DIAGNOSIS — M54.16 LUMBAR RADICULOPATHY, CHRONIC: Primary | ICD-10-CM

## 2023-06-29 PROCEDURE — 25500020 PHARM REV CODE 255: Mod: HCNC | Performed by: PHYSICAL MEDICINE & REHABILITATION

## 2023-06-29 PROCEDURE — 63600175 PHARM REV CODE 636 W HCPCS: Mod: HCNC | Performed by: PHYSICAL MEDICINE & REHABILITATION

## 2023-06-29 PROCEDURE — 64483 PR EPIDURAL INJ, ANES/STEROID, TRANSFORAMINAL, LUMB/SACR, SNGL LEVL: ICD-10-PCS | Mod: 50,HCNC,S$GLB, | Performed by: PHYSICAL MEDICINE & REHABILITATION

## 2023-06-29 PROCEDURE — 25000003 PHARM REV CODE 250: Mod: HCNC | Performed by: PHYSICAL MEDICINE & REHABILITATION

## 2023-06-29 PROCEDURE — 64483 NJX AA&/STRD TFRM EPI L/S 1: CPT | Mod: 50,HCNC,S$GLB, | Performed by: PHYSICAL MEDICINE & REHABILITATION

## 2023-06-29 PROCEDURE — 64483 NJX AA&/STRD TFRM EPI L/S 1: CPT | Mod: 50,HCNC | Performed by: PHYSICAL MEDICINE & REHABILITATION

## 2023-06-29 RX ORDER — METHYLPREDNISOLONE ACETATE 40 MG/ML
INJECTION, SUSPENSION INTRA-ARTICULAR; INTRALESIONAL; INTRAMUSCULAR; SOFT TISSUE
Status: DISCONTINUED | OUTPATIENT
Start: 2023-06-29 | End: 2023-06-29 | Stop reason: HOSPADM

## 2023-06-29 RX ORDER — BUPIVACAINE HYDROCHLORIDE 2.5 MG/ML
INJECTION, SOLUTION EPIDURAL; INFILTRATION; INTRACAUDAL
Status: DISCONTINUED | OUTPATIENT
Start: 2023-06-29 | End: 2023-06-29 | Stop reason: HOSPADM

## 2023-06-29 RX ORDER — FENTANYL CITRATE 50 UG/ML
INJECTION, SOLUTION INTRAMUSCULAR; INTRAVENOUS
Status: DISCONTINUED | OUTPATIENT
Start: 2023-06-29 | End: 2023-06-29 | Stop reason: HOSPADM

## 2023-06-29 RX ORDER — MIDAZOLAM HYDROCHLORIDE 1 MG/ML
INJECTION, SOLUTION INTRAMUSCULAR; INTRAVENOUS
Status: DISCONTINUED | OUTPATIENT
Start: 2023-06-29 | End: 2023-06-29 | Stop reason: HOSPADM

## 2023-06-29 RX ORDER — ONDANSETRON 2 MG/ML
4 INJECTION INTRAMUSCULAR; INTRAVENOUS ONCE AS NEEDED
Status: DISCONTINUED | OUTPATIENT
Start: 2023-06-29 | End: 2023-06-29 | Stop reason: HOSPADM

## 2023-06-29 NOTE — DISCHARGE SUMMARY
Discharge Note  Short Stay      SUMMARY     Admit Date: 6/29/2023    Attending Physician: Abel Haywood MD        Discharge Physician: Abel Haywood MD        Discharge Date: 6/29/2023 9:40 AM    Procedure(s) (LRB):  Bilateral L4/5 TF IAN RN IV Sedation (Bilateral)    Final Diagnosis: Lumbar radiculopathy, chronic [M54.16]    Disposition: Home or self care    Patient Instructions:   Current Discharge Medication List        CONTINUE these medications which have NOT CHANGED    Details   acetaminophen (TYLENOL ARTHRITIS PAIN ORAL) Take by mouth. Patient states that he takes 2 tablets in the morning and 2 tablets in the evening      alfuzosin (UROXATRAL) 10 mg Tb24 Take 1 tablet (10 mg total) by mouth daily with breakfast.  Qty: 90 tablet, Refills: 4      amLODIPine (NORVASC) 5 MG tablet Take 1 tablet (5 mg total) by mouth once daily.  Qty: 90 tablet, Refills: 3    Comments: .  Associated Diagnoses: Primary hypertension      atorvastatin (LIPITOR) 40 MG tablet TAKE 1 TABLET EVERY DAY  Qty: 90 tablet, Refills: 1    Associated Diagnoses: Mixed hyperlipidemia      clopidogreL (PLAVIX) 75 mg tablet TAKE 1 TABLET EVERY DAY  Qty: 90 tablet, Refills: 2      finasteride (PROSCAR) 5 mg tablet TAKE 1 TABLET (5 MG TOTAL) BY MOUTH ONCE DAILY.  Qty: 90 tablet, Refills: 4      FLUAD QUAD 2022-23,65Y UP,,PF, 60 mcg (15 mcg x 4)/0.5 mL Syrg       fluticasone propionate (FLONASE) 50 mcg/actuation nasal spray USE 2 SPRAYS IN EACH NOSTRIL ONE TIME DAILY  (SUBSTITUTED FOR FLONASE)  Qty: 48 g, Refills: 3      ketorolac 0.5% (ACULAR) 0.5 % Drop Put one eyedrop in right eye four times a day. Start first drop morning of laser and stop after five days.  Qty: 5 mL, Refills: 0    Associated Diagnoses: Primary open angle glaucoma (POAG) of right eye, mild stage      losartan (COZAAR) 50 MG tablet TAKE 1 TABLET TWICE DAILY  Qty: 180 tablet, Refills: 3    Associated Diagnoses: Essential hypertension      methocarbamoL (ROBAXIN) 500 MG Tab  Take 1 tablet (500 mg total) by mouth 3 (three) times daily as needed (muscle spasms). May cause drowsiness.  Qty: 90 tablet, Refills: 1    Associated Diagnoses: Dorsalgia, unspecified      pantoprazole (PROTONIX) 40 MG tablet TAKE 1 TABLET EVERY DAY  Qty: 90 tablet, Refills: 3      sildenafiL (VIAGRA) 100 MG tablet Take 1 po 45 minutes before intercourse  Qty: 30 tablet, Refills: 7      tolterodine (DETROL) 1 MG Tab Take 1 tablet (1 mg total) by mouth every evening.  Qty: 90 tablet, Refills: 0                 Discharge Diagnosis: Lumbar radiculopathy, chronic [M54.16]  Condition on Discharge: Stable with no complications to procedure   Diet on Discharge: Same as before.  Activity: as per instruction sheet.  Discharge to: Home with a responsible adult.  Follow up: 2-4 weeks       Please call the office at [phone] if you experience any weakness or loss of sensation, fever > 101.5, pain uncontrolled with oral medications, persistent nausea/vomiting/or diarrhea, redness or drainage from the incisions, or any other worrisome concerns. If physician on call was not reached or could not communicate with our office for any reason please go to the nearest emergency department

## 2023-06-29 NOTE — DISCHARGE INSTRUCTIONS

## 2023-06-29 NOTE — H&P
HPI  Patient presenting for Procedure(s) (LRB):  Bilateral L4/5 TF IAN RN IV Sedation (Bilateral)     Patient on Anti-coagulation Yes    No health changes since previous encounter    Past Medical History:   Diagnosis Date    Allergic rhinitis     Anemia     Back pain     BPH (benign prostatic hyperplasia)     Cancer     skin cancer to neck, Dr. Graves    Cataract     Disorder of kidney and ureter     ED (erectile dysfunction)     Hiatal hernia     small    History of colon polyps     colonoscopy 11/2016    HLD (hyperlipidemia)     Hyperlipidemia     Hypertension     Lateral epicondylitis     Lumbar radiculopathy 1/26/2022    OA (osteoarthritis)     GUIDO (obstructive sleep apnea)     Prostate cancer     Dr. Wong    TIA (transient ischemic attack)     Trouble in sleeping     Urge incontinence 3/29/2022     Past Surgical History:   Procedure Laterality Date    ANGIOGRAPHY OF UPPER EXTREMITY Left 07/05/2022    Procedure: Angiogram Extremity Bilateral;  Surgeon: Aparna Thompson MD;  Location: [facility] CATH LAB;  Service: Cardiology;  Laterality: Left;    ARTERIOGRAPHY OF AORTIC ROOT N/A 07/05/2022    Procedure: ARTERIOGRAM, AORTIC ROOT;  Surgeon: Aparna Thompson MD;  Location: [facility] CATH LAB;  Service: Cardiology;  Laterality: N/A;    CATARACT EXTRACTION W/  INTRAOCULAR LENS IMPLANT Right 02/21/2018    I-Stent    CATARACT EXTRACTION W/  INTRAOCULAR LENS IMPLANT Left 03/21/2018    I - Stent    CATHETERIZATION OF BOTH LEFT AND RIGHT HEART N/A 07/05/2022    Procedure: CATHETERIZATION, HEART, BOTH LEFT AND RIGHT;  Surgeon: Aparna Thompson MD;  Location: [facility] CATH LAB;  Service: Cardiology;  Laterality: N/A;  radial approach    COLONOSCOPY N/A 11/14/2016    Procedure: COLONOSCOPY;  Surgeon: Karuna Rodriguez MD;  Location: [facility];  Service: Endoscopy;  Laterality: N/A;    COLONOSCOPY N/A 09/22/2020    Procedure: COLONOSCOPY;  Surgeon: Chuy Fish MD;  Location: [facility] ENDO;  Service: Endoscopy;  Laterality: N/A;     EYE SURGERY      HEMORRHOID SURGERY      I-STENT Right 02/21/2018    DR. REECE    KNEE ARTHROSCOPY W/ MENISCAL REPAIR Right 2015    Dr. Jain    PCIOL Right 02/21/2018    DR. REECE    PLANTAR FASCIA RELEASE      right    ROTATOR CUFF REPAIR Bilateral     bilateral    SELECTIVE INJECTION OF ANESTHETIC AGENT AROUND LUMBAR SPINAL NERVE ROOT BY TRANSFORAMINAL APPROACH Bilateral 01/26/2022    Procedure: Bilateral L4/5 TF IAN with RN IV sedation;  Surgeon: Mak Young MD;  Location: [facility];  Service: Pain Management;  Laterality: Bilateral;    SELECTIVE INJECTION OF ANESTHETIC AGENT AROUND LUMBAR SPINAL NERVE ROOT BY TRANSFORAMINAL APPROACH Bilateral 03/14/2022    Procedure: Bilateral L4/5 TF IAN with RN IV sedation;  Surgeon: Mak Young MD;  Location: [facility];  Service: Pain Management;  Laterality: Bilateral;    SELECTIVE INJECTION OF ANESTHETIC AGENT AROUND LUMBAR SPINAL NERVE ROOT BY TRANSFORAMINAL APPROACH Bilateral 06/02/2022    Procedure: Bilateral L4/5 TF IAN - D2P Dr. Matthew CABRERAG;  Surgeon: Abel Haywood MD;  Location: [facility];  Service: Pain Management;  Laterality: Bilateral;    SELECTIVE INJECTION OF ANESTHETIC AGENT AROUND LUMBAR SPINAL NERVE ROOT BY TRANSFORAMINAL APPROACH Bilateral 08/25/2022    Procedure: Bilateral L4/5 TF IAN;  Surgeon: Abel Haywood MD;  Location: [facility];  Service: Pain Management;  Laterality: Bilateral;    SHOULDER SURGERY Bilateral around 2000    Dr. Pepper.  rotator cuff surgeries    VASECTOMY       Review of patient's allergies indicates:   Allergen Reactions    Atarax [hydroxyzine hcl] Other (See Comments)     Raised blood pressure     Zyrtec [cetirizine] Other (See Comments)     Raised blood pressure     Gold sodium thiosulfate      Patch test positive    Meloxicam Rash     Other reaction(s): hypertension        No current facility-administered medications on file prior to encounter.     Current Outpatient Medications on File Prior  "to Encounter   Medication Sig Dispense Refill    acetaminophen (TYLENOL ARTHRITIS PAIN ORAL) Take by mouth. Patient states that he takes 2 tablets in the morning and 2 tablets in the evening      alfuzosin (UROXATRAL) 10 mg Tb24 Take 1 tablet (10 mg total) by mouth daily with breakfast. 90 tablet 4    amLODIPine (NORVASC) 5 MG tablet Take 1 tablet (5 mg total) by mouth once daily. 90 tablet 3    atorvastatin (LIPITOR) 40 MG tablet TAKE 1 TABLET EVERY DAY 90 tablet 1    clopidogreL (PLAVIX) 75 mg tablet TAKE 1 TABLET EVERY DAY 90 tablet 2    finasteride (PROSCAR) 5 mg tablet TAKE 1 TABLET (5 MG TOTAL) BY MOUTH ONCE DAILY. 90 tablet 4    FLUAD QUAD 2022-23,65Y UP,,PF, 60 mcg (15 mcg x 4)/0.5 mL Syrg       fluticasone propionate (FLONASE) 50 mcg/actuation nasal spray USE 2 SPRAYS IN EACH NOSTRIL ONE TIME DAILY  (SUBSTITUTED FOR FLONASE) 48 g 3    ketorolac 0.5% (ACULAR) 0.5 % Drop Put one eyedrop in right eye four times a day. Start first drop morning of laser and stop after five days. 5 mL 0    losartan (COZAAR) 50 MG tablet TAKE 1 TABLET TWICE DAILY 180 tablet 3    methocarbamoL (ROBAXIN) 500 MG Tab Take 1 tablet (500 mg total) by mouth 3 (three) times daily as needed (muscle spasms). May cause drowsiness. 90 tablet 1    pantoprazole (PROTONIX) 40 MG tablet TAKE 1 TABLET EVERY DAY 90 tablet 3    sildenafiL (VIAGRA) 100 MG tablet Take 1 po 45 minutes before intercourse 30 tablet 7    tolterodine (DETROL) 1 MG Tab Take 1 tablet (1 mg total) by mouth every evening. 90 tablet 0        PMHx, PSHx, Allergies, Medications reviewed in epic    ROS negative except pain complaints in HPI    OBJECTIVE:    /62 (BP Location: Right arm, Patient Position: Sitting)   Pulse (!) 59   Temp 97.4 °F (36.3 °C) (Temporal)   Resp 17   Ht 5' 10" (1.778 m)   Wt 88.7 kg (195 lb 8.8 oz)   SpO2 98%   BMI 28.06 kg/m²     PHYSICAL EXAMINATION:    GENERAL: Well appearing, in no acute distress, alert and oriented x3.  PSYCH:  Mood and " affect appropriate.  SKIN: Skin color, texture, turgor normal, no rashes or lesions which will impact the procedure.  CV: RRR with palpation of the radial artery.  PULM: No evidence of respiratory difficulty, symmetric chest rise. Clear to auscultation.  NEURO: Cranial nerves grossly intact.    Plan:    Proceed with procedure as planned Procedure(s) (LRB):  Bilateral L4/5 TF IAN RN IV Sedation (Bilateral)    Abel Haywood MD  06/29/2023

## 2023-06-29 NOTE — OP NOTE
INFORMED CONSENT: The procedure, risks, benefits and options were discussed with patient. There are no contraindications to the procedure. The patient expressed understanding and agreed to proceed. The personnel performing the procedure was discussed.    06/29/2023    Surgeon: Abel Haywood MD    Assistants: None    Sedation: Conscious sedation provided by M.D    The patient was monitored with continuous pulse oximetry, EKG, and intermittent blood pressure monitors, immediately prior to administration of sedation.  The patient was hemodynamically stable throughout the entire process was responsive to voice, and breathing spontaneously.  Supplemental O2 was provided at 2L/min via nasal cannula.  Patient was comfortable for the duration of the procedure.     There was a total of 2mg IV Midazolam and 50mcg Fentanyl titrated for the procedure    Total sedation time was >10minutes and <20minutes      PROCEDURE:  Bilateral  L4/5  1) Left  L4/5 TRANSFORAMINAL EPIDURAL STEROID INJECTION  2) Right  L4/5 TRANSFORAMINAL EPIDURAL STEROID INJECTION      Pre Procedure diagnosis:  Bilateral L4/5 Lumbar radiculopathy, chronic [M54.16]    Post-Procedure diagnosis:   same    Complications: None    Specimens: None      DESCRIPTION OF PROCEDURE: The patient was brought to the procedure room. IV access was obtained prior to the procedure. The patient was positioned prone on the fluoroscopy table. Continuous hemodynamic monitoring was initiated including blood pressure, EKG, and pulse oximetry. . The skin was prepped with chlorhexidine and draped in a sterile fashion. Skin anesthesia was achieved using a total of 10mL of lidocaine, 5mL over each respective injection site.     The  L4/5 transforaminal spaces were identified with fluoroscopy in the  AP, oblique, and lateral views.  A 22 gauge spinal quinke needle was then advanced into the area of the trans foraminal spaces bilaterally with confirmation of proper needle position using  AP, oblique, and lateral fluoroscopic views. Once the needle tip was in the area of the transforaminal space, and there was no blood, CSF or paraesthesias,  1.5 mL of Omnipaque 300mg/ml was injected on each side for a total of 3mL.  Fluoroscopic imaging in the AP and lateral views revealed a clear outline of the spinal nerve with proximal spread of agent through the neural foramen into the epidural space. A total combination of 1 mL of Bupivicaine 0.25% and 40 mg depo medrol was injected on each side for a total of 4mL of injected medications with displacement of the contrast dye confirming that the medication went into the area of the transforaminal spaces bilaterally. A sterile dressing was applied.   Patient tolerated the procedure well.    Patient was taken back to the recovery room for further observation.     The patient was discharged to home in stable condition

## 2023-07-02 ENCOUNTER — PATIENT MESSAGE (OUTPATIENT)
Dept: PAIN MEDICINE | Facility: CLINIC | Age: 83
End: 2023-07-02
Payer: MEDICARE

## 2023-07-03 DIAGNOSIS — M47.812 CERVICAL SPONDYLOSIS: Primary | ICD-10-CM

## 2023-07-06 ENCOUNTER — PATIENT MESSAGE (OUTPATIENT)
Dept: DERMATOLOGY | Facility: CLINIC | Age: 83
End: 2023-07-06

## 2023-07-06 ENCOUNTER — OFFICE VISIT (OUTPATIENT)
Dept: DERMATOLOGY | Facility: CLINIC | Age: 83
End: 2023-07-06
Payer: MEDICARE

## 2023-07-06 DIAGNOSIS — L82.1 SEBORRHEIC KERATOSES: ICD-10-CM

## 2023-07-06 DIAGNOSIS — D18.00 HEMANGIOMA, UNSPECIFIED SITE: ICD-10-CM

## 2023-07-06 DIAGNOSIS — L57.0 ACTINIC KERATOSES: Primary | ICD-10-CM

## 2023-07-06 DIAGNOSIS — Z85.828 HISTORY OF NONMELANOMA SKIN CANCER: ICD-10-CM

## 2023-07-06 DIAGNOSIS — L81.4 SOLAR LENTIGO: ICD-10-CM

## 2023-07-06 PROCEDURE — 3288F PR FALLS RISK ASSESSMENT DOCUMENTED: ICD-10-PCS | Mod: CPTII,S$GLB,, | Performed by: STUDENT IN AN ORGANIZED HEALTH CARE EDUCATION/TRAINING PROGRAM

## 2023-07-06 PROCEDURE — 17003 DESTRUCTION, PREMALIGNANT LESIONS; SECOND THROUGH 14 LESIONS: ICD-10-PCS | Mod: S$GLB,,, | Performed by: STUDENT IN AN ORGANIZED HEALTH CARE EDUCATION/TRAINING PROGRAM

## 2023-07-06 PROCEDURE — 99999 PR PBB SHADOW E&M-EST. PATIENT-LVL III: ICD-10-PCS | Mod: PBBFAC,,, | Performed by: STUDENT IN AN ORGANIZED HEALTH CARE EDUCATION/TRAINING PROGRAM

## 2023-07-06 PROCEDURE — 1101F PR PT FALLS ASSESS DOC 0-1 FALLS W/OUT INJ PAST YR: ICD-10-PCS | Mod: CPTII,S$GLB,, | Performed by: STUDENT IN AN ORGANIZED HEALTH CARE EDUCATION/TRAINING PROGRAM

## 2023-07-06 PROCEDURE — 1160F RVW MEDS BY RX/DR IN RCRD: CPT | Mod: CPTII,S$GLB,, | Performed by: STUDENT IN AN ORGANIZED HEALTH CARE EDUCATION/TRAINING PROGRAM

## 2023-07-06 PROCEDURE — 1101F PT FALLS ASSESS-DOCD LE1/YR: CPT | Mod: CPTII,S$GLB,, | Performed by: STUDENT IN AN ORGANIZED HEALTH CARE EDUCATION/TRAINING PROGRAM

## 2023-07-06 PROCEDURE — 17003 DESTRUCT PREMALG LES 2-14: CPT | Mod: S$GLB,,, | Performed by: STUDENT IN AN ORGANIZED HEALTH CARE EDUCATION/TRAINING PROGRAM

## 2023-07-06 PROCEDURE — 1160F PR REVIEW ALL MEDS BY PRESCRIBER/CLIN PHARMACIST DOCUMENTED: ICD-10-PCS | Mod: CPTII,S$GLB,, | Performed by: STUDENT IN AN ORGANIZED HEALTH CARE EDUCATION/TRAINING PROGRAM

## 2023-07-06 PROCEDURE — 99999 PR PBB SHADOW E&M-EST. PATIENT-LVL III: CPT | Mod: PBBFAC,,, | Performed by: STUDENT IN AN ORGANIZED HEALTH CARE EDUCATION/TRAINING PROGRAM

## 2023-07-06 PROCEDURE — 99213 PR OFFICE/OUTPT VISIT, EST, LEVL III, 20-29 MIN: ICD-10-PCS | Mod: 25,S$GLB,, | Performed by: STUDENT IN AN ORGANIZED HEALTH CARE EDUCATION/TRAINING PROGRAM

## 2023-07-06 PROCEDURE — 1126F PR PAIN SEVERITY QUANTIFIED, NO PAIN PRESENT: ICD-10-PCS | Mod: CPTII,S$GLB,, | Performed by: STUDENT IN AN ORGANIZED HEALTH CARE EDUCATION/TRAINING PROGRAM

## 2023-07-06 PROCEDURE — 99213 OFFICE O/P EST LOW 20 MIN: CPT | Mod: 25,S$GLB,, | Performed by: STUDENT IN AN ORGANIZED HEALTH CARE EDUCATION/TRAINING PROGRAM

## 2023-07-06 PROCEDURE — 17000 DESTRUCT PREMALG LESION: CPT | Mod: S$GLB,,, | Performed by: STUDENT IN AN ORGANIZED HEALTH CARE EDUCATION/TRAINING PROGRAM

## 2023-07-06 PROCEDURE — 3288F FALL RISK ASSESSMENT DOCD: CPT | Mod: CPTII,S$GLB,, | Performed by: STUDENT IN AN ORGANIZED HEALTH CARE EDUCATION/TRAINING PROGRAM

## 2023-07-06 PROCEDURE — 1126F AMNT PAIN NOTED NONE PRSNT: CPT | Mod: CPTII,S$GLB,, | Performed by: STUDENT IN AN ORGANIZED HEALTH CARE EDUCATION/TRAINING PROGRAM

## 2023-07-06 PROCEDURE — 1159F MED LIST DOCD IN RCRD: CPT | Mod: CPTII,S$GLB,, | Performed by: STUDENT IN AN ORGANIZED HEALTH CARE EDUCATION/TRAINING PROGRAM

## 2023-07-06 PROCEDURE — 1159F PR MEDICATION LIST DOCUMENTED IN MEDICAL RECORD: ICD-10-PCS | Mod: CPTII,S$GLB,, | Performed by: STUDENT IN AN ORGANIZED HEALTH CARE EDUCATION/TRAINING PROGRAM

## 2023-07-06 PROCEDURE — 17000 PR DESTRUCTION(LASER SURGERY,CRYOSURGERY,CHEMOSURGERY),PREMALIGNANT LESIONS,FIRST LESION: ICD-10-PCS | Mod: S$GLB,,, | Performed by: STUDENT IN AN ORGANIZED HEALTH CARE EDUCATION/TRAINING PROGRAM

## 2023-07-06 NOTE — PATIENT INSTRUCTIONS

## 2023-07-06 NOTE — PROGRESS NOTES
Patient Information  Name: Ezequiel Hughes  : 1940  MRN: [id number]     Referring Physician:  Dr. Stevens ref. provider found   Primary Care Physician:  Dr. Valery Ozuna MD   Date of Visit: 2023      Subjective:       Ezequiel Hughes is a 83 y.o. male who presents for   Chief Complaint   Patient presents with    Skin Check     Full body. Skin exam.      HPI  Patient here for skin check.     Does patient have a personal hx of skin cancers? no  Does patient have family hx of melanoma?  no  Does patient have hx of strong sun exposure or tanning bed use in the past? yes    Patient was last seen:Visit date not found     Prior notes by myself reviewed.   Clinical documentation obtained by nursing staff reviewed.    Review of Systems   Skin:  Negative for itching and rash.      Objective:    Physical Exam   Constitutional: He appears well-developed and well-nourished. No distress.   Neurological: He is alert and oriented to person, place, and time. He is not disoriented.   Psychiatric: He has a normal mood and affect.   Skin:   Areas Examined (abnormalities noted in diagram):   Head / Face Inspection Performed  Neck Inspection Performed  Chest / Axilla Inspection Performed  Back Inspection Performed  RUE Inspected  LUE Inspection Performed                 Diagram Legend     Erythematous scaling macule/papule c/w actinic keratosis       Vascular papule c/w angioma      Pigmented verrucoid papule/plaque c/w seborrheic keratosis      Yellow umbilicated papule c/w sebaceous hyperplasia      Irregularly shaped tan macule c/w lentigo     1-2 mm smooth white papules consistent with Milia      Movable subcutaneous cyst with punctum c/w epidermal inclusion cyst      Subcutaneous movable cyst c/w pilar cyst      Firm pink to brown papule c/w dermatofibroma      Pedunculated fleshy papule(s) c/w skin tag(s)      Evenly pigmented macule c/w junctional nevus     Mildly variegated pigmented, slightly irregular-bordered macule c/w  mildly atypical nevus      Flesh colored to evenly pigmented papule c/w intradermal nevus       Pink pearly papule/plaque c/w basal cell carcinoma      Erythematous hyperkeratotic cursted plaque c/w SCC      Surgical scar with no sign of skin cancer recurrence      Open and closed comedones      Inflammatory papules and pustules      Verrucoid papule consistent consistent with wart     Erythematous eczematous patches and plaques     Dystrophic onycholytic nail with subungual debris c/w onychomycosis     Umbilicated papule    Erythematous-base heme-crusted tan verrucoid plaque consistent with inflamed seborrheic keratosis     Erythematous Silvery Scaling Plaque c/w Psoriasis     See annotation      No images are attached to the encounter or orders placed in the encounter.    [] Data reviewed  [] Independent review of test  [] Management discussed with another provider    Assessment / Plan:        Actinic keratoses  Cryosurgery Procedure Note    Verbal consent from the patient is obtained including, but not limited to, risk of hypopigmentation/hyperpigmentation, scar, recurrence of lesion. The patient is aware of the precancerous quality and need for treatment of these lesions. Liquid nitrogen cryosurgery is applied to the 9 actinic keratoses, as detailed in the physical exam, to produce a freeze injury. The patient is aware that blisters may form and is instructed on wound care with gentle cleansing and use of vaseline ointment to keep moist until healed. The patient is supplied a handout on cryosurgery and is instructed to call if lesions do not completely resolve.    Seborrheic keratoses  These are benign inherited growths without a malignant potential. Reassurance given to patient. No treatment is necessary.     Hemangioma, unspecified site  This is a benign vascular lesion. Reassurance given. No treatment required.     History of nonmelanoma skin cancer  Area(s) of previous NMSC evaluated with no signs of  recurrence.    Upper body skin examination performed today including at least 6 points as noted in physical examination. No lesions suspicious for malignancy noted.    Recommend daily sun protection/avoidance and use of at least SPF 30, broad spectrum sunscreen (OTC drug).     Solar lentigo  This is a benign hyperpigmented sun induced lesion. Recommend daily sun protection/avoidance and use of at least SPF 30, broad spectrum sunscreen (OTC drug) will reduce the number of new lesions. Treatment of these benign lesions are considered cosmetic.             LOS NUMBER AND COMPLEXITY OF PROBLEMS    COMPLEXITY OF DATA RISK TOTAL TIME (m)   74456  59209 [] 1 self-limited or minor problem [x] Minimal to none [] No treatment recommended or patient to monitor 15-29  10-19   02840  10167 Low  [] 2 or > self limited or minor problems  [] 1 stable chronic illness  [] 1 acute, uncomplicated illness or injury Limited (2)  [] Prior external notes from each unique source  [] Review result of each unique test  [] Order each unique test [x]  Low  OTC medications, minor skin biopsy 30-44  20-29   07683  93741 Moderate  []  1 or > chronic illness with progression, exacerbation or SE of treatment  [x]  2 or more stable chronic illnesses  []  1 acute illness with systemic symptoms  []  1 acute complicated injury  []  1 undiagnosed new problem with uncertain prognosis Moderate (1/3 below)  []  3 or more data items        *Now includes assessment requiring independent historian  []  Independent interpretation of a test  []  Discuss management/test with another provider Moderate  []  Prescription drug mgmt  []  Minor surgery with risk discussed  []  Mgmt limited by social determinates 45-59  30-39   17656  56053 High  []  1 or more chronic illness with severe exacerbation, progression or SE of treatment  []  1 acute or chronic illness/injury that poses a threat to life or bodily function Extensive (2/3 below)  []  3 or more data items         *Now includes assessment requiring independent historian.  []  Independent interpretation of a test  []  Discuss management/test with another provider High  []  Major surgery with risk discussed  []  Drug therapy requiring intensive monitoring for toxicity  []  Hospitalization  []  Decision for DNR 60-74  40-54      No follow-ups on file.    Deb Burgos MD, FAAD  [facility]

## 2023-07-10 NOTE — PRE-PROCEDURE INSTRUCTIONS
Spoke with patient regarding procedure scheduled on 7.18     Arrival time 0715     Has patient been sick with fever or on antibiotics within the last 7 days? No     Does the patient have any open wounds, sores or rashes? No     Does the patient have any recent fractures? no     Has patient received a vaccination within the last 7 days? No     Received the COVID vaccination? yes     Has the patient stopped all medications as directed? CONTINUE PLAVIX PER MD     Does patient have a pacemaker and or defibrillator? no     Does the patient have a ride to and from procedure and someone reliable to remain with patient? WING     Is the patient diabetic? no     Does the patient have sleep apnea? Or use O2 at home? GUIDO     Is the patient receiving sedation? yes     Is the patient instructed to remain NPO beginning at midnight the night before their procedure? yes     Procedure location confirmed with patient? Yes     Covid- Denies signs/symptoms. Instructed to notify PAT/MD if any changes.

## 2023-07-17 ENCOUNTER — OFFICE VISIT (OUTPATIENT)
Dept: OPHTHALMOLOGY | Facility: CLINIC | Age: 83
End: 2023-07-17
Payer: MEDICARE

## 2023-07-17 DIAGNOSIS — Z96.1 PSEUDOPHAKIA: ICD-10-CM

## 2023-07-17 DIAGNOSIS — H40.1122 PRIMARY OPEN ANGLE GLAUCOMA (POAG) OF LEFT EYE, MODERATE STAGE: ICD-10-CM

## 2023-07-17 DIAGNOSIS — H40.1111 PRIMARY OPEN ANGLE GLAUCOMA (POAG) OF RIGHT EYE, MILD STAGE: Primary | ICD-10-CM

## 2023-07-17 PROCEDURE — 92014 COMPRE OPH EXAM EST PT 1/>: CPT | Mod: S$GLB,,, | Performed by: OPHTHALMOLOGY

## 2023-07-17 PROCEDURE — 1159F MED LIST DOCD IN RCRD: CPT | Mod: CPTII,S$GLB,, | Performed by: OPHTHALMOLOGY

## 2023-07-17 PROCEDURE — 1160F PR REVIEW ALL MEDS BY PRESCRIBER/CLIN PHARMACIST DOCUMENTED: ICD-10-PCS | Mod: CPTII,S$GLB,, | Performed by: OPHTHALMOLOGY

## 2023-07-17 PROCEDURE — 99999 PR PBB SHADOW E&M-EST. PATIENT-LVL III: CPT | Mod: PBBFAC,,, | Performed by: OPHTHALMOLOGY

## 2023-07-17 PROCEDURE — 99999 PR PBB SHADOW E&M-EST. PATIENT-LVL III: ICD-10-PCS | Mod: PBBFAC,,, | Performed by: OPHTHALMOLOGY

## 2023-07-17 PROCEDURE — 92083 EXTENDED VISUAL FIELD XM: CPT | Mod: S$GLB,,, | Performed by: OPHTHALMOLOGY

## 2023-07-17 PROCEDURE — 92083 HUMPHREY VISUAL FIELD - OU - BOTH EYES: ICD-10-PCS | Mod: S$GLB,,, | Performed by: OPHTHALMOLOGY

## 2023-07-17 PROCEDURE — 1159F PR MEDICATION LIST DOCUMENTED IN MEDICAL RECORD: ICD-10-PCS | Mod: CPTII,S$GLB,, | Performed by: OPHTHALMOLOGY

## 2023-07-17 PROCEDURE — 92014 PR EYE EXAM, EST PATIENT,COMPREHESV: ICD-10-PCS | Mod: S$GLB,,, | Performed by: OPHTHALMOLOGY

## 2023-07-17 PROCEDURE — 1160F RVW MEDS BY RX/DR IN RCRD: CPT | Mod: CPTII,S$GLB,, | Performed by: OPHTHALMOLOGY

## 2023-07-17 NOTE — PROGRESS NOTES
SUBJECTIVE  Ezequiel Hughes is 83 y.o. male  Uncorrected distance visual acuity was 20/25 in the right eye and 20/25 in the left eye.   Chief Complaint   Patient presents with    Glaucoma     Pt states va is stable. Pt states he is using his eye drops as directed. Pt denies any pain or irritation.          HPI     Glaucoma     Additional comments: Pt states va is stable. Pt states he is using his   eye drops as directed. Pt denies any pain or irritation.           Comments    1. Mod COAG OS + Mild COAG OD (init 22/26 post dilation IOP ) goal = 17  SLT OD 1/18/23 (19.5-12)  SLT OS 12/2/20 (19.5-15)  2. PCIOL / I-Stent OD +18.5 [id number] (distance) 2-21-18  PCIOL /I-Stent OS +21.0 (-1.75) 3/21/18 near  3. ERM OU   4. Brow Lift 9/18   *intolerant of travatan*      No eyedrops            Last edited by Amie Parrish on 7/17/2023  9:45 AM.         Assessment /Plan :  1. Primary open angle glaucoma (POAG) of right eye, mild stage Doing well, intraocular pressure (IOP) within acceptable range relative to target IOP and no evidence of progression. Continue current treatment. Reviewed importance of continued compliance with treatment and follow up.        Return to clinic in 4 months  or as needed.  With IOP Check and GOCT     2. Primary open angle glaucoma (POAG) of left eye, moderate stage Doing well, intraocular pressure (IOP) within acceptable range relative to target IOP and no evidence of progression. Continue current treatment. Reviewed importance of continued compliance with treatment and follow up.     3. Pseudophakia  -- Condition stable, no therapeutic change required. Monitoring routinely.

## 2023-07-18 ENCOUNTER — TELEPHONE (OUTPATIENT)
Dept: PAIN MEDICINE | Facility: CLINIC | Age: 83
End: 2023-07-18
Payer: MEDICARE

## 2023-07-18 ENCOUNTER — HOSPITAL ENCOUNTER (OUTPATIENT)
Facility: HOSPITAL | Age: 83
Discharge: HOME | End: 2023-07-18
Attending: PHYSICAL MEDICINE & REHABILITATION | Admitting: PHYSICAL MEDICINE & REHABILITATION
Payer: MEDICARE

## 2023-07-18 VITALS
OXYGEN SATURATION: 98 % | DIASTOLIC BLOOD PRESSURE: 66 MMHG | TEMPERATURE: 97 F | RESPIRATION RATE: 16 BRPM | WEIGHT: 198.06 LBS | SYSTOLIC BLOOD PRESSURE: 158 MMHG | BODY MASS INDEX: 28.36 KG/M2 | HEIGHT: 70 IN | HEART RATE: 60 BPM

## 2023-07-18 DIAGNOSIS — M43.02 CERVICAL SPONDYLOLYSIS: Primary | ICD-10-CM

## 2023-07-18 DIAGNOSIS — M47.812 CERVICAL SPONDYLOSIS: ICD-10-CM

## 2023-07-18 PROCEDURE — 64491 INJ PARAVERT F JNT C/T 2 LEV: CPT | Mod: 50 | Performed by: PHYSICAL MEDICINE & REHABILITATION

## 2023-07-18 PROCEDURE — 64490 PR INJ DX/THER AGNT PARAVERT FACET JOINT,IMG GUIDE,CERV/THORAC, 1ST LEVEL: ICD-10-PCS | Mod: 50,,, | Performed by: PHYSICAL MEDICINE & REHABILITATION

## 2023-07-18 PROCEDURE — 63600175 PHARM REV CODE 636 W HCPCS: Performed by: PHYSICAL MEDICINE & REHABILITATION

## 2023-07-18 PROCEDURE — 64491 INJ PARAVERT F JNT C/T 2 LEV: CPT | Mod: 50,,, | Performed by: PHYSICAL MEDICINE & REHABILITATION

## 2023-07-18 PROCEDURE — 25000003 PHARM REV CODE 250: Performed by: PHYSICAL MEDICINE & REHABILITATION

## 2023-07-18 PROCEDURE — 64490 INJ PARAVERT F JNT C/T 1 LEV: CPT | Mod: 50,,, | Performed by: PHYSICAL MEDICINE & REHABILITATION

## 2023-07-18 PROCEDURE — 64491 PR INJ DX/THER AGNT PARAVERT FACET JOINT,IMG GUIDE,CERV/THORAC, 2ND LEVEL: ICD-10-PCS | Mod: 50,,, | Performed by: PHYSICAL MEDICINE & REHABILITATION

## 2023-07-18 PROCEDURE — 64490 INJ PARAVERT F JNT C/T 1 LEV: CPT | Mod: 50 | Performed by: PHYSICAL MEDICINE & REHABILITATION

## 2023-07-18 RX ORDER — BUPIVACAINE HYDROCHLORIDE 5 MG/ML
INJECTION, SOLUTION EPIDURAL; INTRACAUDAL
Status: DISCONTINUED | OUTPATIENT
Start: 2023-07-18 | End: 2023-07-18 | Stop reason: HOSPADM

## 2023-07-18 RX ORDER — INDOMETHACIN 25 MG/1
CAPSULE ORAL
Status: DISCONTINUED | OUTPATIENT
Start: 2023-07-18 | End: 2023-07-18 | Stop reason: HOSPADM

## 2023-07-18 RX ORDER — ONDANSETRON 2 MG/ML
4 INJECTION INTRAMUSCULAR; INTRAVENOUS ONCE AS NEEDED
Status: DISCONTINUED | OUTPATIENT
Start: 2023-07-18 | End: 2023-07-18 | Stop reason: HOSPADM

## 2023-07-18 NOTE — TELEPHONE ENCOUNTER
Called pt to schedule follow up. Spoke with pt significant other. She will call back later with him to schedule. All questions answered.

## 2023-07-18 NOTE — TELEPHONE ENCOUNTER
----- Message from Abel Haywood MD sent at 7/18/2023  8:14 AM CDT -----  Regarding: f/u  Please set up f/u with me 4-6 weeks for continued lower back pain

## 2023-07-18 NOTE — OP NOTE
CERVICAL Medial Branch Block (DIAGNOSTIC) Under Fluoroscopy  Time-out taken to identify patient and procedure side prior to starting the procedure.        Date of Procedure: 07/18/2023                                                             Patient has clinical and imaging findings suggestive of facet mediated pain.    For supporting clinical documentation, please see most recent clinic and telephone notes.      PROCEDURE:  Bilateral medial branch block at the transverse processes of C4, C5, C6    REASON FOR PROCEDURE:  Cervical spondylosis [M47.812]    PHYSICIAN: Abel Haywood MD    ASSISTANTS: None    MEDICATIONS INJECTED:  0.5% Bupivicaine total 5mL    LOCAL ANESTHETIC USED:   Xylocaine 1% 1mL / site    SEDATION MEDICATIONS: None    ESTIMATED BLOOD LOSS:  None.    COMPLICATIONS:  None.    TECHNIQUE:   Laying in a prone position, the patient was prepped and draped in the usual sterile fashion using ChloraPrep and fenestrated drape.  The level was determined under fluoroscopic guidance.  Local anesthetic was given by going down to the hub of the 27-gauge 1.25in needle and raising a wheel.  A 22-gauge 3.5inch needle was introduced to the anatomic local of the medial branch at each of the stated above levels using fluoroscopy. Then after negative aspiration, the medication was injected slowly. The patient tolerated the procedure well.       The patient was monitored after the procedure.  Patient was given post procedure and discharge instructions to follow at home.  We will see the patient back in two weeks or the patient may call to inform of status. The patient was discharged in a stable condition

## 2023-07-18 NOTE — DISCHARGE SUMMARY
Discharge Note  Short Stay      SUMMARY     Admit Date: 7/18/2023    Attending Physician: Abel Haywood MD        Discharge Physician: Abel Haywood MD        Discharge Date: 7/18/2023 8:39 AM    Procedure(s) (LRB):  Bilateral C4-6 MBB with RN IV sedation (diagnostic, #1) (Bilateral)    Final Diagnosis: Cervical spondylosis [M47.812]    Disposition: Home or self care    Patient Instructions:   Current Discharge Medication List        CONTINUE these medications which have NOT CHANGED    Details   acetaminophen (TYLENOL ARTHRITIS PAIN ORAL) Take by mouth. Patient states that he takes 2 tablets in the morning and 2 tablets in the evening      alfuzosin (UROXATRAL) 10 mg Tb24 Take 1 tablet (10 mg total) by mouth daily with breakfast.  Qty: 90 tablet, Refills: 4      amLODIPine (NORVASC) 5 MG tablet Take 1 tablet (5 mg total) by mouth once daily.  Qty: 90 tablet, Refills: 3    Comments: .  Associated Diagnoses: Primary hypertension      atorvastatin (LIPITOR) 40 MG tablet TAKE 1 TABLET EVERY DAY  Qty: 90 tablet, Refills: 1    Associated Diagnoses: Mixed hyperlipidemia      clopidogreL (PLAVIX) 75 mg tablet TAKE 1 TABLET EVERY DAY  Qty: 90 tablet, Refills: 2      finasteride (PROSCAR) 5 mg tablet TAKE 1 TABLET (5 MG TOTAL) BY MOUTH ONCE DAILY.  Qty: 90 tablet, Refills: 4      losartan (COZAAR) 50 MG tablet TAKE 1 TABLET TWICE DAILY  Qty: 180 tablet, Refills: 3    Associated Diagnoses: Essential hypertension      methocarbamoL (ROBAXIN) 500 MG Tab Take 1 tablet (500 mg total) by mouth 3 (three) times daily as needed (muscle spasms). May cause drowsiness.  Qty: 90 tablet, Refills: 1    Associated Diagnoses: Dorsalgia, unspecified      pantoprazole (PROTONIX) 40 MG tablet TAKE 1 TABLET EVERY DAY  Qty: 90 tablet, Refills: 3      sildenafiL (VIAGRA) 100 MG tablet Take 1 po 45 minutes before intercourse  Qty: 30 tablet, Refills: 7      tolterodine (DETROL) 1 MG Tab Take 1 tablet (1 mg total) by mouth every  evening.  Qty: 90 tablet, Refills: 0      FLUAD QUAD 2022-23,65Y UP,,PF, 60 mcg (15 mcg x 4)/0.5 mL Syrg       fluticasone propionate (FLONASE) 50 mcg/actuation nasal spray USE 2 SPRAYS IN EACH NOSTRIL ONE TIME DAILY  (SUBSTITUTED FOR FLONASE)  Qty: 48 g, Refills: 3      ketorolac 0.5% (ACULAR) 0.5 % Drop Put one eyedrop in right eye four times a day. Start first drop morning of laser and stop after five days.  Qty: 5 mL, Refills: 0    Associated Diagnoses: Primary open angle glaucoma (POAG) of right eye, mild stage                 Discharge Diagnosis: Cervical spondylosis [M47.812]  Condition on Discharge: Stable with no complications to procedure   Diet on Discharge: Same as before.  Activity: as per instruction sheet.  Discharge to: Home with a responsible adult.  Follow up: 2-4 weeks       Please call the office at [phone] if you experience any weakness or loss of sensation, fever > 101.5, pain uncontrolled with oral medications, persistent nausea/vomiting/or diarrhea, redness or drainage from the incisions, or any other worrisome concerns. If physician on call was not reached or could not communicate with our office for any reason please go to the nearest emergency department

## 2023-07-18 NOTE — DISCHARGE INSTRUCTIONS

## 2023-07-18 NOTE — H&P
HPI  Patient presenting for Procedure(s) (LRB):  Bilateral C4-6 MBB with RN IV sedation (diagnostic, #1) (Bilateral)       No health changes since previous encounter    Past Medical History:   Diagnosis Date    Allergic rhinitis     Anemia     Back pain     BPH (benign prostatic hyperplasia)     Cancer     skin cancer to neck, Dr. Graves    Cataract     Disorder of kidney and ureter     ED (erectile dysfunction)     Hiatal hernia     small    History of colon polyps     colonoscopy 11/2016    HLD (hyperlipidemia)     Hyperlipidemia     Hypertension     Lateral epicondylitis     Lumbar radiculopathy 1/26/2022    OA (osteoarthritis)     GUIDO (obstructive sleep apnea)     Prostate cancer     Dr. Wong    TIA (transient ischemic attack)     Trouble in sleeping     Urge incontinence 3/29/2022     Past Surgical History:   Procedure Laterality Date    ANGIOGRAPHY OF UPPER EXTREMITY Left 07/05/2022    Procedure: Angiogram Extremity Bilateral;  Surgeon: Aparna Thompson MD;  Location: [facility] CATH LAB;  Service: Cardiology;  Laterality: Left;    ARTERIOGRAPHY OF AORTIC ROOT N/A 07/05/2022    Procedure: ARTERIOGRAM, AORTIC ROOT;  Surgeon: Aparna Thompson MD;  Location: [facility] CATH LAB;  Service: Cardiology;  Laterality: N/A;    CATARACT EXTRACTION W/  INTRAOCULAR LENS IMPLANT Right 02/21/2018    I-Stent    CATARACT EXTRACTION W/  INTRAOCULAR LENS IMPLANT Left 03/21/2018    I - Stent    CATHETERIZATION OF BOTH LEFT AND RIGHT HEART N/A 07/05/2022    Procedure: CATHETERIZATION, HEART, BOTH LEFT AND RIGHT;  Surgeon: Aparna Thompson MD;  Location: [facility] CATH LAB;  Service: Cardiology;  Laterality: N/A;  radial approach    COLONOSCOPY N/A 11/14/2016    Procedure: COLONOSCOPY;  Surgeon: Karuna Rodriguez MD;  Location: [facility];  Service: Endoscopy;  Laterality: N/A;    COLONOSCOPY N/A 09/22/2020    Procedure: COLONOSCOPY;  Surgeon: Chuy Fish MD;  Location: [facility] ENDO;  Service: Endoscopy;  Laterality: N/A;    EYE SURGERY       HEMORRHOID SURGERY      I-STENT Right 02/21/2018    DR. REECE    KNEE ARTHROSCOPY W/ MENISCAL REPAIR Right 2015    Dr. Jain    PCIOL Right 02/21/2018    DR. REECE    PLANTAR FASCIA RELEASE      right    ROTATOR CUFF REPAIR Bilateral     bilateral    SELECTIVE INJECTION OF ANESTHETIC AGENT AROUND LUMBAR SPINAL NERVE ROOT BY TRANSFORAMINAL APPROACH Bilateral 01/26/2022    Procedure: Bilateral L4/5 TF IAN with RN IV sedation;  Surgeon: Mak Young MD;  Location: [facility];  Service: Pain Management;  Laterality: Bilateral;    SELECTIVE INJECTION OF ANESTHETIC AGENT AROUND LUMBAR SPINAL NERVE ROOT BY TRANSFORAMINAL APPROACH Bilateral 03/14/2022    Procedure: Bilateral L4/5 TF IAN with RN IV sedation;  Surgeon: Mak Young MD;  Location: [facility];  Service: Pain Management;  Laterality: Bilateral;    SELECTIVE INJECTION OF ANESTHETIC AGENT AROUND LUMBAR SPINAL NERVE ROOT BY TRANSFORAMINAL APPROACH Bilateral 06/02/2022    Procedure: Bilateral L4/5 TF IAN - D2P Dr. Matthew CABRERAG;  Surgeon: Abel Haywood MD;  Location: [facility];  Service: Pain Management;  Laterality: Bilateral;    SELECTIVE INJECTION OF ANESTHETIC AGENT AROUND LUMBAR SPINAL NERVE ROOT BY TRANSFORAMINAL APPROACH Bilateral 08/25/2022    Procedure: Bilateral L4/5 TF IAN;  Surgeon: Abel Haywood MD;  Location: [facility];  Service: Pain Management;  Laterality: Bilateral;    SELECTIVE INJECTION OF ANESTHETIC AGENT AROUND LUMBAR SPINAL NERVE ROOT BY TRANSFORAMINAL APPROACH Bilateral 6/29/2023    Procedure: Bilateral L4/5 TF IAN RN IV Sedation;  Surgeon: Abel Haywood MD;  Location: [facility];  Service: Pain Management;  Laterality: Bilateral;    SHOULDER SURGERY Bilateral around 2000    Dr. Pepper.  rotator cuff surgeries    VASECTOMY       Review of patient's allergies indicates:   Allergen Reactions    Atarax [hydroxyzine hcl] Other (See Comments)     Raised blood pressure     Zyrtec [cetirizine] Other (See  Comments)     Raised blood pressure     Gold sodium thiosulfate      Patch test positive    Meloxicam Rash     Other reaction(s): hypertension        No current facility-administered medications on file prior to encounter.     Current Outpatient Medications on File Prior to Encounter   Medication Sig Dispense Refill    acetaminophen (TYLENOL ARTHRITIS PAIN ORAL) Take by mouth. Patient states that he takes 2 tablets in the morning and 2 tablets in the evening      alfuzosin (UROXATRAL) 10 mg Tb24 Take 1 tablet (10 mg total) by mouth daily with breakfast. 90 tablet 4    amLODIPine (NORVASC) 5 MG tablet Take 1 tablet (5 mg total) by mouth once daily. 90 tablet 3    atorvastatin (LIPITOR) 40 MG tablet TAKE 1 TABLET EVERY DAY 90 tablet 1    clopidogreL (PLAVIX) 75 mg tablet TAKE 1 TABLET EVERY DAY 90 tablet 2    finasteride (PROSCAR) 5 mg tablet TAKE 1 TABLET (5 MG TOTAL) BY MOUTH ONCE DAILY. 90 tablet 4    losartan (COZAAR) 50 MG tablet TAKE 1 TABLET TWICE DAILY 180 tablet 3    methocarbamoL (ROBAXIN) 500 MG Tab Take 1 tablet (500 mg total) by mouth 3 (three) times daily as needed (muscle spasms). May cause drowsiness. 90 tablet 1    pantoprazole (PROTONIX) 40 MG tablet TAKE 1 TABLET EVERY DAY 90 tablet 3    sildenafiL (VIAGRA) 100 MG tablet Take 1 po 45 minutes before intercourse 30 tablet 7    tolterodine (DETROL) 1 MG Tab Take 1 tablet (1 mg total) by mouth every evening. 90 tablet 0    FLUAD QUAD 2022-23,65Y UP,,PF, 60 mcg (15 mcg x 4)/0.5 mL Syrg       fluticasone propionate (FLONASE) 50 mcg/actuation nasal spray USE 2 SPRAYS IN EACH NOSTRIL ONE TIME DAILY  (SUBSTITUTED FOR FLONASE) 48 g 3    ketorolac 0.5% (ACULAR) 0.5 % Drop Put one eyedrop in right eye four times a day. Start first drop morning of laser and stop after five days. 5 mL 0        PMHx, PSHx, Allergies, Medications reviewed in epic    ROS negative except pain complaints in HPI    OBJECTIVE:    BP (!) 174/81 (BP Location: Right arm, Patient Position:  "Sitting)   Pulse (!) 57   Temp 97.3 °F (36.3 °C) (Temporal)   Resp 18   Ht 5' 10" (1.778 m)   Wt 89.8 kg (198 lb 1.3 oz)   SpO2 99%   BMI 28.42 kg/m²     PHYSICAL EXAMINATION:    GENERAL: Well appearing, in no acute distress, alert and oriented x3.  PSYCH:  Mood and affect appropriate.  SKIN: Skin color, texture, turgor normal, no rashes or lesions which will impact the procedure.  CV: RRR with palpation of the radial artery.  PULM: No evidence of respiratory difficulty, symmetric chest rise. Clear to auscultation.  NEURO: Cranial nerves grossly intact.    Plan:    Proceed with procedure as planned Procedure(s) (LRB):  Bilateral C4-6 MBB with RN IV sedation (diagnostic, #1) (Bilateral)    Abel Haywood MD  07/18/2023            "

## 2023-07-25 ENCOUNTER — TELEPHONE (OUTPATIENT)
Dept: CARDIOLOGY | Facility: CLINIC | Age: 83
End: 2023-07-25
Payer: MEDICARE

## 2023-07-25 NOTE — TELEPHONE ENCOUNTER
Spoke to patient and r/s to 8/11 with Bindu Carey----- Message from Anne-Marie Martin sent at 7/25/2023  2:32 PM CDT -----  States he would like to speak to Dr Thompson's nurse regarding his appt. Please call pt [phone]. Thank you

## 2023-07-26 ENCOUNTER — LAB VISIT (OUTPATIENT)
Dept: LAB | Facility: HOSPITAL | Age: 83
End: 2023-07-26
Attending: INTERNAL MEDICINE
Payer: MEDICARE

## 2023-07-26 DIAGNOSIS — E78.2 MIXED HYPERLIPIDEMIA: ICD-10-CM

## 2023-07-26 LAB
ALBUMIN SERPL BCP-MCNC: 3.7 G/DL (ref 3.5–5.2)
ALP SERPL-CCNC: 51 U/L (ref 55–135)
ALT SERPL W/O P-5'-P-CCNC: 11 U/L (ref 10–44)
ANION GAP SERPL CALC-SCNC: 8 MMOL/L (ref 8–16)
AST SERPL-CCNC: 15 U/L (ref 10–40)
BILIRUB SERPL-MCNC: 0.8 MG/DL (ref 0.1–1)
BUN SERPL-MCNC: 28 MG/DL (ref 8–23)
CALCIUM SERPL-MCNC: 8.7 MG/DL (ref 8.7–10.5)
CHLORIDE SERPL-SCNC: 110 MMOL/L (ref 95–110)
CHOLEST SERPL-MCNC: 127 MG/DL (ref 120–199)
CHOLEST/HDLC SERPL: 2.3 {RATIO} (ref 2–5)
CO2 SERPL-SCNC: 23 MMOL/L (ref 23–29)
CREAT SERPL-MCNC: 1.2 MG/DL (ref 0.5–1.4)
EST. GFR  (NO RACE VARIABLE): 59.3 ML/MIN/1.73 M^2
GLUCOSE SERPL-MCNC: 85 MG/DL (ref 70–110)
HDLC SERPL-MCNC: 55 MG/DL (ref 40–75)
HDLC SERPL: 43.3 % (ref 20–50)
LDLC SERPL CALC-MCNC: 60.4 MG/DL (ref 63–159)
NONHDLC SERPL-MCNC: 72 MG/DL
POTASSIUM SERPL-SCNC: 4.3 MMOL/L (ref 3.5–5.1)
PROT SERPL-MCNC: 6.1 G/DL (ref 6–8.4)
SODIUM SERPL-SCNC: 141 MMOL/L (ref 136–145)
TRIGL SERPL-MCNC: 58 MG/DL (ref 30–150)

## 2023-07-26 PROCEDURE — 36415 COLL VENOUS BLD VENIPUNCTURE: CPT | Mod: HCNC,PO | Performed by: INTERNAL MEDICINE

## 2023-07-26 PROCEDURE — 80053 COMPREHEN METABOLIC PANEL: CPT | Mod: HCNC | Performed by: INTERNAL MEDICINE

## 2023-07-26 PROCEDURE — 80061 LIPID PANEL: CPT | Mod: HCNC | Performed by: INTERNAL MEDICINE

## 2023-08-02 ENCOUNTER — OFFICE VISIT (OUTPATIENT)
Dept: PAIN MEDICINE | Facility: CLINIC | Age: 83
End: 2023-08-02
Payer: MEDICARE

## 2023-08-02 ENCOUNTER — HOSPITAL ENCOUNTER (EMERGENCY)
Facility: HOSPITAL | Age: 83
Discharge: HOME | End: 2023-08-02
Attending: EMERGENCY MEDICINE
Payer: MEDICARE

## 2023-08-02 VITALS
BODY MASS INDEX: 28.41 KG/M2 | OXYGEN SATURATION: 99 % | DIASTOLIC BLOOD PRESSURE: 73 MMHG | TEMPERATURE: 98 F | HEART RATE: 61 BPM | WEIGHT: 198 LBS | SYSTOLIC BLOOD PRESSURE: 144 MMHG | RESPIRATION RATE: 18 BRPM

## 2023-08-02 VITALS
DIASTOLIC BLOOD PRESSURE: 79 MMHG | SYSTOLIC BLOOD PRESSURE: 192 MMHG | HEART RATE: 58 BPM | WEIGHT: 198.44 LBS | BODY MASS INDEX: 28.41 KG/M2 | HEIGHT: 70 IN

## 2023-08-02 DIAGNOSIS — M54.16 LUMBAR RADICULOPATHY, CHRONIC: ICD-10-CM

## 2023-08-02 DIAGNOSIS — M48.062 SPINAL STENOSIS OF LUMBAR REGION WITH NEUROGENIC CLAUDICATION: Primary | ICD-10-CM

## 2023-08-02 DIAGNOSIS — M51.36 DDD (DEGENERATIVE DISC DISEASE), LUMBAR: ICD-10-CM

## 2023-08-02 DIAGNOSIS — M43.02 CERVICAL SPONDYLOLYSIS: ICD-10-CM

## 2023-08-02 DIAGNOSIS — M50.30 DDD (DEGENERATIVE DISC DISEASE), CERVICAL: ICD-10-CM

## 2023-08-02 DIAGNOSIS — R07.9 CHEST PAIN: Primary | ICD-10-CM

## 2023-08-02 LAB
ALBUMIN SERPL BCP-MCNC: 4.3 G/DL (ref 3.5–5.2)
ALP SERPL-CCNC: 58 U/L (ref 55–135)
ALT SERPL W/O P-5'-P-CCNC: 14 U/L (ref 10–44)
ANION GAP SERPL CALC-SCNC: 13 MMOL/L (ref 8–16)
AST SERPL-CCNC: 17 U/L (ref 10–40)
BASOPHILS # BLD AUTO: 0.05 K/UL (ref 0–0.2)
BASOPHILS NFR BLD: 0.8 % (ref 0–1.9)
BILIRUB SERPL-MCNC: 0.9 MG/DL (ref 0.1–1)
BILIRUB UR QL STRIP: NEGATIVE
BNP SERPL-MCNC: 109 PG/ML (ref 0–99)
BUN SERPL-MCNC: 21 MG/DL (ref 8–23)
CALCIUM SERPL-MCNC: 9.6 MG/DL (ref 8.7–10.5)
CHLORIDE SERPL-SCNC: 107 MMOL/L (ref 95–110)
CK SERPL-CCNC: 140 U/L (ref 20–200)
CLARITY UR: CLEAR
CO2 SERPL-SCNC: 20 MMOL/L (ref 23–29)
COLOR UR: COLORLESS
CREAT SERPL-MCNC: 1.2 MG/DL (ref 0.5–1.4)
DIFFERENTIAL METHOD: ABNORMAL
EOSINOPHIL # BLD AUTO: 0.1 K/UL (ref 0–0.5)
EOSINOPHIL NFR BLD: 1.2 % (ref 0–8)
ERYTHROCYTE [DISTWIDTH] IN BLOOD BY AUTOMATED COUNT: 12.1 % (ref 11.5–14.5)
EST. GFR  (NO RACE VARIABLE): 59 ML/MIN/1.73 M^2
GLUCOSE SERPL-MCNC: 102 MG/DL (ref 70–110)
GLUCOSE UR QL STRIP: NEGATIVE
HCT VFR BLD AUTO: 40.6 % (ref 40–54)
HGB BLD-MCNC: 13.5 G/DL (ref 14–18)
HGB UR QL STRIP: ABNORMAL
IMM GRANULOCYTES # BLD AUTO: 0.02 K/UL (ref 0–0.04)
IMM GRANULOCYTES NFR BLD AUTO: 0.3 % (ref 0–0.5)
KETONES UR QL STRIP: NEGATIVE
LEUKOCYTE ESTERASE UR QL STRIP: NEGATIVE
LYMPHOCYTES # BLD AUTO: 0.7 K/UL (ref 1–4.8)
LYMPHOCYTES NFR BLD: 11.6 % (ref 18–48)
MCH RBC QN AUTO: 31.4 PG (ref 27–31)
MCHC RBC AUTO-ENTMCNC: 33.3 G/DL (ref 32–36)
MCV RBC AUTO: 94 FL (ref 82–98)
MICROSCOPIC COMMENT: NORMAL
MONOCYTES # BLD AUTO: 0.5 K/UL (ref 0.3–1)
MONOCYTES NFR BLD: 7.8 % (ref 4–15)
NEUTROPHILS # BLD AUTO: 4.8 K/UL (ref 1.8–7.7)
NEUTROPHILS NFR BLD: 78.3 % (ref 38–73)
NITRITE UR QL STRIP: NEGATIVE
NRBC BLD-RTO: 0 /100 WBC
PH UR STRIP: 5 [PH] (ref 5–8)
PLATELET # BLD AUTO: 119 K/UL (ref 150–450)
PMV BLD AUTO: 8.3 FL (ref 9.2–12.9)
POTASSIUM SERPL-SCNC: 4.4 MMOL/L (ref 3.5–5.1)
PROT SERPL-MCNC: 7.1 G/DL (ref 6–8.4)
PROT UR QL STRIP: NEGATIVE
RBC # BLD AUTO: 4.3 M/UL (ref 4.6–6.2)
RBC #/AREA URNS HPF: 0 /HPF (ref 0–4)
SODIUM SERPL-SCNC: 140 MMOL/L (ref 136–145)
SP GR UR STRIP: 1.01 (ref 1–1.03)
TROPONIN I SERPL DL<=0.01 NG/ML-MCNC: 0.02 NG/ML (ref 0–0.03)
URN SPEC COLLECT METH UR: ABNORMAL
UROBILINOGEN UR STRIP-ACNC: NEGATIVE EU/DL
WBC # BLD AUTO: 6.06 K/UL (ref 3.9–12.7)

## 2023-08-02 PROCEDURE — 99999 PR PBB SHADOW E&M-EST. PATIENT-LVL III: ICD-10-PCS | Mod: PBBFAC,HCNC,, | Performed by: PHYSICAL MEDICINE & REHABILITATION

## 2023-08-02 PROCEDURE — 93010 ELECTROCARDIOGRAM REPORT: CPT | Mod: HCNC,,, | Performed by: INTERNAL MEDICINE

## 2023-08-02 PROCEDURE — 1101F PT FALLS ASSESS-DOCD LE1/YR: CPT | Mod: HCNC,CPTII,S$GLB, | Performed by: PHYSICAL MEDICINE & REHABILITATION

## 2023-08-02 PROCEDURE — 81000 URINALYSIS NONAUTO W/SCOPE: CPT | Mod: HCNC | Performed by: EMERGENCY MEDICINE

## 2023-08-02 PROCEDURE — 99452 NTRPROF PH1/NTRNET/EHR RFRL: CPT | Mod: HCNC,S$GLB,, | Performed by: PHYSICAL MEDICINE & REHABILITATION

## 2023-08-02 PROCEDURE — 99452 E-CONSULT TO NEUROSURGERY: ICD-10-PCS | Mod: HCNC,S$GLB,, | Performed by: PHYSICAL MEDICINE & REHABILITATION

## 2023-08-02 PROCEDURE — 1125F PR PAIN SEVERITY QUANTIFIED, PAIN PRESENT: ICD-10-PCS | Mod: HCNC,CPTII,S$GLB, | Performed by: PHYSICAL MEDICINE & REHABILITATION

## 2023-08-02 PROCEDURE — 82550 ASSAY OF CK (CPK): CPT | Mod: HCNC | Performed by: EMERGENCY MEDICINE

## 2023-08-02 PROCEDURE — 84484 ASSAY OF TROPONIN QUANT: CPT | Mod: HCNC | Performed by: EMERGENCY MEDICINE

## 2023-08-02 PROCEDURE — 93010 EKG 12-LEAD: ICD-10-PCS | Mod: HCNC,,, | Performed by: INTERNAL MEDICINE

## 2023-08-02 PROCEDURE — 3077F SYST BP >= 140 MM HG: CPT | Mod: HCNC,CPTII,S$GLB, | Performed by: PHYSICAL MEDICINE & REHABILITATION

## 2023-08-02 PROCEDURE — 99213 PR OFFICE/OUTPT VISIT, EST, LEVL III, 20-29 MIN: ICD-10-PCS | Mod: HCNC,S$GLB,, | Performed by: PHYSICAL MEDICINE & REHABILITATION

## 2023-08-02 PROCEDURE — 1125F AMNT PAIN NOTED PAIN PRSNT: CPT | Mod: HCNC,CPTII,S$GLB, | Performed by: PHYSICAL MEDICINE & REHABILITATION

## 2023-08-02 PROCEDURE — 99213 OFFICE O/P EST LOW 20 MIN: CPT | Mod: HCNC,S$GLB,, | Performed by: PHYSICAL MEDICINE & REHABILITATION

## 2023-08-02 PROCEDURE — 83880 ASSAY OF NATRIURETIC PEPTIDE: CPT | Mod: HCNC | Performed by: EMERGENCY MEDICINE

## 2023-08-02 PROCEDURE — 80053 COMPREHEN METABOLIC PANEL: CPT | Mod: HCNC | Performed by: EMERGENCY MEDICINE

## 2023-08-02 PROCEDURE — 3288F FALL RISK ASSESSMENT DOCD: CPT | Mod: HCNC,CPTII,S$GLB, | Performed by: PHYSICAL MEDICINE & REHABILITATION

## 2023-08-02 PROCEDURE — 93005 ELECTROCARDIOGRAM TRACING: CPT | Mod: HCNC

## 2023-08-02 PROCEDURE — 1101F PR PT FALLS ASSESS DOC 0-1 FALLS W/OUT INJ PAST YR: ICD-10-PCS | Mod: HCNC,CPTII,S$GLB, | Performed by: PHYSICAL MEDICINE & REHABILITATION

## 2023-08-02 PROCEDURE — 85025 COMPLETE CBC W/AUTO DIFF WBC: CPT | Mod: HCNC | Performed by: EMERGENCY MEDICINE

## 2023-08-02 PROCEDURE — 99999 PR PBB SHADOW E&M-EST. PATIENT-LVL III: CPT | Mod: PBBFAC,HCNC,, | Performed by: PHYSICAL MEDICINE & REHABILITATION

## 2023-08-02 PROCEDURE — 99285 EMERGENCY DEPT VISIT HI MDM: CPT | Mod: 25,HCNC

## 2023-08-02 PROCEDURE — 3078F PR MOST RECENT DIASTOLIC BLOOD PRESSURE < 80 MM HG: ICD-10-PCS | Mod: HCNC,CPTII,S$GLB, | Performed by: PHYSICAL MEDICINE & REHABILITATION

## 2023-08-02 PROCEDURE — 3078F DIAST BP <80 MM HG: CPT | Mod: HCNC,CPTII,S$GLB, | Performed by: PHYSICAL MEDICINE & REHABILITATION

## 2023-08-02 PROCEDURE — 3077F PR MOST RECENT SYSTOLIC BLOOD PRESSURE >= 140 MM HG: ICD-10-PCS | Mod: HCNC,CPTII,S$GLB, | Performed by: PHYSICAL MEDICINE & REHABILITATION

## 2023-08-02 PROCEDURE — 3288F PR FALLS RISK ASSESSMENT DOCUMENTED: ICD-10-PCS | Mod: HCNC,CPTII,S$GLB, | Performed by: PHYSICAL MEDICINE & REHABILITATION

## 2023-08-02 NOTE — ED PROVIDER NOTES
SCRIBE #1 NOTE: I, Felisha Merino, am scribing for, and in the presence of, Sincere Márquez MD. I have scribed the entire note.       History     Chief Complaint   Patient presents with    Chest Pain     Left CP reported as soreness, began 3 days ago. Alos reports weakness     Review of patient's allergies indicates:   Allergen Reactions    Atarax [hydroxyzine hcl] Other (See Comments)     Raised blood pressure     Zyrtec [cetirizine] Other (See Comments)     Raised blood pressure     Gold sodium thiosulfate      Patch test positive    Meloxicam Rash     Other reaction(s): hypertension         History of Present Illness     HPI    8/2/2023, 11:30 AM  History obtained from the patient      History of Present Illness: Ezequiel Hughes is a 83 y.o. male patient with a PMHx of HTN, HLD, GUIDO, TIA, anemia, and cancer who presents to the Emergency Department for evaluation of CP which onset 3 days PTA. Pt describes CP as a soreness. Symptoms are constant and moderate in severity. No mitigating or exacerbating factors reported. Associated sxs include generalized weakness. Patient denies any fever, chills, SOB, arm pain, nausea, and all other sxs at this time. No further complaints or concerns at this time.       Arrival mode: Personal vehicle      PCP: Valery Ozuna MD        Past Medical History:  Past Medical History:   Diagnosis Date    Allergic rhinitis     Anemia     Back pain     BPH (benign prostatic hyperplasia)     Cancer     skin cancer to neck, Dr. Graves    Cataract     Disorder of kidney and ureter     ED (erectile dysfunction)     Hiatal hernia     small    History of colon polyps     colonoscopy 11/2016    HLD (hyperlipidemia)     Hyperlipidemia     Hypertension     Lateral epicondylitis     Lumbar radiculopathy 1/26/2022    OA (osteoarthritis)     GUIDO (obstructive sleep apnea)     Prostate cancer     Dr. Wong    TIA (transient ischemic attack)     Trouble in sleeping     Urge incontinence 3/29/2022        Past Surgical History:  Past Surgical History:   Procedure Laterality Date    ANGIOGRAPHY OF UPPER EXTREMITY Left 07/05/2022    Procedure: Angiogram Extremity Bilateral;  Surgeon: Aparna Thompson MD;  Location: [facility] CATH LAB;  Service: Cardiology;  Laterality: Left;    ARTERIOGRAPHY OF AORTIC ROOT N/A 07/05/2022    Procedure: ARTERIOGRAM, AORTIC ROOT;  Surgeon: Aparna Thompson MD;  Location: [facility] CATH LAB;  Service: Cardiology;  Laterality: N/A;    CATARACT EXTRACTION W/  INTRAOCULAR LENS IMPLANT Right 02/21/2018    I-Stent    CATARACT EXTRACTION W/  INTRAOCULAR LENS IMPLANT Left 03/21/2018    I - Stent    CATHETERIZATION OF BOTH LEFT AND RIGHT HEART N/A 07/05/2022    Procedure: CATHETERIZATION, HEART, BOTH LEFT AND RIGHT;  Surgeon: Aparna Thompson MD;  Location: [facility] CATH LAB;  Service: Cardiology;  Laterality: N/A;  radial approach    COLONOSCOPY N/A 11/14/2016    Procedure: COLONOSCOPY;  Surgeon: Karuna Rodriguez MD;  Location: [facility] ENDO;  Service: Endoscopy;  Laterality: N/A;    COLONOSCOPY N/A 09/22/2020    Procedure: COLONOSCOPY;  Surgeon: Chuy Fish MD;  Location: [facility] ENDO;  Service: Endoscopy;  Laterality: N/A;    EYE SURGERY      HEMORRHOID SURGERY      I-STENT Right 02/21/2018    DR. REECE    INJECTION OF ANESTHETIC AGENT AROUND MEDIAL BRANCH NERVES INNERVATING CERVICAL FACET JOINT Bilateral 7/18/2023    Procedure: Bilateral C4-6 MBB with RN IV sedation (diagnostic, #1);  Surgeon: Abel Haywood MD;  Location: [facility] PAIN MGT;  Service: Pain Management;  Laterality: Bilateral;    KNEE ARTHROSCOPY W/ MENISCAL REPAIR Right 2015    Dr. Jain    PCIOL Right 02/21/2018    DR. REECE    PLANTAR FASCIA RELEASE      right    ROTATOR CUFF REPAIR Bilateral     bilateral    SELECTIVE INJECTION OF ANESTHETIC AGENT AROUND LUMBAR SPINAL NERVE ROOT BY TRANSFORAMINAL APPROACH Bilateral 01/26/2022    Procedure: Bilateral L4/5 TF IAN with RN IV sedation;  Surgeon: Mak Young MD;  Location: [facility] PAIN  MGT;  Service: Pain Management;  Laterality: Bilateral;    SELECTIVE INJECTION OF ANESTHETIC AGENT AROUND LUMBAR SPINAL NERVE ROOT BY TRANSFORAMINAL APPROACH Bilateral 03/14/2022    Procedure: Bilateral L4/5 TF IAN with RN IV sedation;  Surgeon: Mak Young MD;  Location: [facility] PAIN MGT;  Service: Pain Management;  Laterality: Bilateral;    SELECTIVE INJECTION OF ANESTHETIC AGENT AROUND LUMBAR SPINAL NERVE ROOT BY TRANSFORAMINAL APPROACH Bilateral 06/02/2022    Procedure: Bilateral L4/5 TF IAN - D2P Dr. Castro [facility] [place];  Surgeon: Abel Haywood MD;  Location: [facility] PAIN MGT;  Service: Pain Management;  Laterality: Bilateral;    SELECTIVE INJECTION OF ANESTHETIC AGENT AROUND LUMBAR SPINAL NERVE ROOT BY TRANSFORAMINAL APPROACH Bilateral 08/25/2022    Procedure: Bilateral L4/5 TF IAN;  Surgeon: Abel Haywood MD;  Location: [facility] PAIN MGT;  Service: Pain Management;  Laterality: Bilateral;    SELECTIVE INJECTION OF ANESTHETIC AGENT AROUND LUMBAR SPINAL NERVE ROOT BY TRANSFORAMINAL APPROACH Bilateral 6/29/2023    Procedure: Bilateral L4/5 TF IAN RN IV Sedation;  Surgeon: Abel Haywood MD;  Location: [facility] PAIN MGT;  Service: Pain Management;  Laterality: Bilateral;    SHOULDER SURGERY Bilateral around 2000    Dr. Pepper.  rotator cuff surgeries    VASECTOMY           Family History:  Family History   Problem Relation Age of Onset    Lung cancer Father         life long smoker    Cancer Father         prostate, lung    Arthritis Mother     Stroke Sister         TIA    Cataracts Sister     Cancer Brother         prostate    Cataracts Brother     Cataracts Sister     Melanoma Neg Hx     Psoriasis Neg Hx     Lupus Neg Hx     Eczema Neg Hx     Diabetes Neg Hx     Heart disease Neg Hx     Kidney disease Neg Hx     Colon cancer Neg Hx        Social History:  Social History     Tobacco Use    Smoking status: Former     Current packs/day: 0.00     Average packs/day: 3.0 packs/day for 35.0 years (104.9 ttl pk-yrs)      Types: Cigarettes     Start date: 1960     Quit date: 1985     Years since quittin.0     Passive exposure: Past    Smokeless tobacco: Never   Substance and Sexual Activity    Alcohol use: Yes     Alcohol/week: 6.0 standard drinks of alcohol     Types: 6 Drinks containing 0.5 oz of alcohol per week     Comment: socially    Drug use: No    Sexual activity: Yes     Partners: Female     Birth control/protection: None        Review of Systems     Review of Systems   Constitutional:  Negative for chills and fever.   HENT:  Negative for sore throat.    Respiratory:  Negative for shortness of breath.    Cardiovascular:  Positive for chest pain.   Gastrointestinal:  Negative for nausea.   Genitourinary:  Negative for dysuria.   Musculoskeletal:  Negative for back pain.        (-) arm pain   Skin:  Negative for rash.   Neurological:  Positive for weakness (generalized).   Hematological:  Does not bruise/bleed easily.   All other systems reviewed and are negative.       Physical Exam     Initial Vitals [23 1120]   BP Pulse Resp Temp SpO2   131/71 62 18 98 °F (36.7 °C) 100 %      MAP       --          Physical Exam  Nursing Notes and Vital Signs Reviewed.  Constitutional: Patient is in no acute distress. Well-developed and well-nourished.  Head: Atraumatic. Normocephalic.  Eyes: PERRL. EOM intact. Conjunctivae are not pale. No scleral icterus.  ENT: Mucous membranes are moist. Oropharynx is clear and symmetric.    Neck: Supple. Full ROM. No lymphadenopathy.  Cardiovascular: Regular rate. Regular rhythm. No murmurs, rubs, or gallops. Distal pulses are 2+ and symmetric.  Pulmonary/Chest: No respiratory distress. Clear to auscultation bilaterally. No wheezing or rales.  Abdominal: Soft and non-distended.  There is no tenderness.  No rebound, guarding, or rigidity. Good bowel sounds.  Genitourinary: No CVA tenderness  Musculoskeletal: Moves all extremities. No obvious deformities. No edema. No calf  tenderness.  Skin: Warm and dry.  Neurological:  Alert, awake, and appropriate.  Normal speech.  No acute focal neurological deficits are appreciated.  Psychiatric: Normal affect. Good eye contact. Appropriate in content.     ED Course   Procedures  ED Vital Signs:  Vitals:    08/02/23 1120 08/02/23 1158 08/02/23 1202 08/02/23 1233   BP: 131/71  (!) 147/70 (!) 144/73   Pulse: 62 (!) 57 (!) 57 61   Resp: 18   18   Temp: 98 °F (36.7 °C)      TempSrc: Oral      SpO2: 100%  98% 99%   Weight: 89.8 kg (197 lb 15.6 oz)          Abnormal Lab Results:  Labs Reviewed   CBC W/ AUTO DIFFERENTIAL - Abnormal; Notable for the following components:       Result Value    RBC 4.30 (*)     Hemoglobin 13.5 (*)     MCH 31.4 (*)     Platelets 119 (*)     MPV 8.3 (*)     Lymph # 0.7 (*)     Gran % 78.3 (*)     Lymph % 11.6 (*)     All other components within normal limits   COMPREHENSIVE METABOLIC PANEL - Abnormal; Notable for the following components:    CO2 20 (*)     eGFR 59 (*)     All other components within normal limits   URINALYSIS, REFLEX TO URINE CULTURE - Abnormal; Notable for the following components:    Color, UA Colorless (*)     Occult Blood UA 1+ (*)     All other components within normal limits    Narrative:     Specimen Source->Urine   B-TYPE NATRIURETIC PEPTIDE - Abnormal; Notable for the following components:     (*)     All other components within normal limits   CK   TROPONIN I   URINALYSIS MICROSCOPIC    Narrative:     Specimen Source->Urine        All Lab Results:  Results for orders placed or performed during the hospital encounter of 08/02/23   CBC Auto Differential   Result Value Ref Range    WBC 6.06 3.90 - 12.70 K/uL    RBC 4.30 (L) 4.60 - 6.20 M/uL    Hemoglobin 13.5 (L) 14.0 - 18.0 g/dL    Hematocrit 40.6 40.0 - 54.0 %    MCV 94 82 - 98 fL    MCH 31.4 (H) 27.0 - 31.0 pg    MCHC 33.3 32.0 - 36.0 g/dL    RDW 12.1 11.5 - 14.5 %    Platelets 119 (L) 150 - 450 K/uL    MPV 8.3 (L) 9.2 - 12.9 fL    Immature  Granulocytes 0.3 0.0 - 0.5 %    Gran # (ANC) 4.8 1.8 - 7.7 K/uL    Immature Grans (Abs) 0.02 0.00 - 0.04 K/uL    Lymph # 0.7 (L) 1.0 - 4.8 K/uL    Mono # 0.5 0.3 - 1.0 K/uL    Eos # 0.1 0.0 - 0.5 K/uL    Baso # 0.05 0.00 - 0.20 K/uL    nRBC 0 0 /100 WBC    Gran % 78.3 (H) 38.0 - 73.0 %    Lymph % 11.6 (L) 18.0 - 48.0 %    Mono % 7.8 4.0 - 15.0 %    Eosinophil % 1.2 0.0 - 8.0 %    Basophil % 0.8 0.0 - 1.9 %    Differential Method Automated    Comprehensive Metabolic Panel   Result Value Ref Range    Sodium 140 136 - 145 mmol/L    Potassium 4.4 3.5 - 5.1 mmol/L    Chloride 107 95 - 110 mmol/L    CO2 20 (L) 23 - 29 mmol/L    Glucose 102 70 - 110 mg/dL    BUN 21 8 - 23 mg/dL    Creatinine 1.2 0.5 - 1.4 mg/dL    Calcium 9.6 8.7 - 10.5 mg/dL    Total Protein 7.1 6.0 - 8.4 g/dL    Albumin 4.3 3.5 - 5.2 g/dL    Total Bilirubin 0.9 0.1 - 1.0 mg/dL    Alkaline Phosphatase 58 55 - 135 U/L    AST 17 10 - 40 U/L    ALT 14 10 - 44 U/L    eGFR 59 (A) >60 mL/min/1.73 m^2    Anion Gap 13 8 - 16 mmol/L   Urinalysis, Reflex to Urine Culture Urine, Clean Catch    Specimen: Urine   Result Value Ref Range    Specimen UA Urine, Clean Catch     Color, UA Colorless (A) Yellow, Straw, Sonia    Appearance, UA Clear Clear    pH, UA 5.0 5.0 - 8.0    Specific Gravity, UA 1.010 1.005 - 1.030    Protein, UA Negative Negative    Glucose, UA Negative Negative    Ketones, UA Negative Negative    Bilirubin (UA) Negative Negative    Occult Blood UA 1+ (A) Negative    Nitrite, UA Negative Negative    Urobilinogen, UA Negative <2.0 EU/dL    Leukocytes, UA Negative Negative   BNP   Result Value Ref Range     (H) 0 - 99 pg/mL   CK   Result Value Ref Range     20 - 200 U/L   Troponin I   Result Value Ref Range    Troponin I 0.019 0.000 - 0.026 ng/mL   Urinalysis Microscopic   Result Value Ref Range    RBC, UA 0 0 - 4 /hpf    Microscopic Comment SEE COMMENT      *Note: Due to a large number of results and/or encounters for the requested time  period, some results have not been displayed. A complete set of results can be found in Results Review.         Imaging Results:  Imaging Results              X-Ray Chest AP Portable (Final result)  Result time 08/02/23 11:50:46      Final result by Wiley Wong MD (08/02/23 11:50:46)                   Impression:      No acute findings.      Electronically signed by: Wiley Wong MD  Date:    08/02/2023  Time:    11:50               Narrative:    EXAMINATION:  XR CHEST AP PORTABLE    CLINICAL HISTORY:  chest pain;    TECHNIQUE:  AP view of the chest was performed.    COMPARISON:  06/07/2022    FINDINGS:  The cardiac and mediastinal silhouettes appear within normal limits.   The lungs are clear bilaterally.  No acute osseous findings demonstrated.                                       The EKG was ordered, reviewed, and independently interpreted by the ED provider.  Interpretation time: 11:20  Rate: 61 BPM  Rhythm: normal sinus rhythm  Interpretation: Pulmonary disease pattern. Left anterior fascicular block. Abnormal ECG. No STEMI.           The Emergency Provider reviewed the vital signs and test results, which are outlined above.     ED Discussion       12:30 PM: Reassessed pt at this time. Discussed with pt all pertinent ED information and results. Discussed pt dx and plan of tx. Gave pt all f/u and return to the ED instructions. All questions and concerns were addressed at this time. Pt expresses understanding of information and instructions, and is comfortable with plan to discharge. Pt is stable for discharge.    I discussed with patient and/or family/caretaker that evaluation in the ED does not suggest any emergent or life threatening medical conditions requiring immediate intervention beyond what was provided in the ED, and I believe patient is safe for discharge.  Regardless, an unremarkable evaluation in the ED does not preclude the development or presence of a serious of life threatening condition. As  such, patient was instructed to return immediately for any worsening or change in current symptoms.       Medical Decision Making:   Initial Assessment:   Chest pain for the last few days.  Described as a soreness  Differential Diagnosis:   Chest pain, nstemi, CHF  Clinical Tests:   Lab Tests: Ordered and Reviewed  Radiological Study: Ordered and Reviewed  Medical Tests: Ordered and Reviewed  ED Management:  Labs and imaging reviewed by me.  No acute findings.  Considered admission, but patient is negative for ischemia, and is ready to go home.             ED Medication(s):  Medications - No data to display    Discharge Medication List as of 8/2/2023 12:27 PM           Follow-up Information       Valery Ozuna MD.    Specialty: Family Medicine  Contact information:  [address]  [address]  [facility] [address] [phone]                                 Scribe Attestation:   Scribe #1: I performed the above scribed service and the documentation accurately describes the services I performed. I attest to the accuracy of the note.     Attending:   Physician Attestation Statement for Scribe #1: I, Sincere Márquez MD, personally performed the services described in this documentation, as scribed by Felisha Merino, in my presence, and it is both accurate and complete.           Clinical Impression       ICD-10-CM ICD-9-CM   1. Chest pain  R07.9 786.50       Disposition:   Disposition: Discharged  Condition: Stable         Sincere Márquez MD  08/02/23 [id number]

## 2023-08-02 NOTE — PROGRESS NOTES
Established Patient Chronic Pain Note (Follow up visit)    Chief Complaint:   Chief Complaint   Patient presents with    Low-back Pain     Right side         SUBJECTIVE:  Ezequiel Hughes is a 83 y.o. male presents today for follow-up chronic neck and lower back pain.  He was last seen for procedure on 07/18/2023 where he underwent bilateral C4-6 diagnostic medial branch blocks that did not provide significant relief unfortunately.  He continues with lower back pain with radicular symptoms into the both lower extremities.  He reports that this tends to occur mostly with ambulatory activities.        Patient denies night fever/night sweats, urinary incontinence, bowel incontinence, significant weight loss, significant motor weakness and loss of sensations.    Pain Disability Index Review:  Last 3 PDI Scores 8/2/2023 6/9/2022 1/13/2022   Pain Disability Index (PDI) 7 24 24     Interval History (6/9/2023):    Ezequiel Hughes presents today for follow-up visit.  Patient was last seen on 09/27/2022. Last injection Bilateral L4/5 TF IAN on 8/25/22with 80-90% pain relief x more than 6 months before pain insidiously returned. He reports same pain as previous, located in his lower back with radiation into both legs, though his right leg is worse than left. He reports his right leg feels weak and swells after prolonged walking, improved with rest. He has completed 37 sessions of physical therapy for his neck and back from 10/20/2022 to 03/14/2022 without relief. Patient reports pain as 4/10 today, but 6/10 on his worst days.      Interval History (9/27/2022):   Ezequiel Hughes presents today for follow-up visit.  he underwent Bilateral L4/5 TF IAN on 8/25/22.  The patient reports that he is/was better following the procedure.  he reports 80-90% pain relief. He reports this IAN was the best so far.  The changes lasted 4 weeks so far.  The changes have continued through this visit.  Patient reports pain as 2/10 today. He does report  muscle spasms in his lower right back for the past 3-4 days after prolonged stooping working on a car. He has used heat and massage with some improvement.       Interval HPI  Ezequiel Hughes is a 83 y.o. male who presents to the clinic for a follow-up appointment for back and leg pain.  He presents today after undergoing a 3rd lumbar TFESI bilaterally at L4-5 on 06/02/2022.  He reports that this resulted in 100% relief.  Since the last visit, Ezequiel Hughes states the pain has been resolved. Current pain intensity is 0/10.      Interval Hx: 2/24/22  Presents status post bilateral L4/5 transforaminal epidural steroid injection January 26, 2022. Patient reports having 100% relief in lower extremity radicular symptoms following epidural steroid injection.  This pain level varies based upon his activity throughout the day.  Patient reports as he is more active he does notice radicular symptoms down the lateral aspects of bilateral lower extremities to the feet.  Patient reports discontinuing Lyrica the day following his injection which he believes caused paresthesias to insidiously return.  Patient does report improvement in functionality including range of motion and strength following his injection.  Patient is interested in repeat intervention.  Patient denies any side effects at the Lyrica dose of 225 mg twice a day.     Interval Hx: 1/13/22  Patient presents for MRI cervical and lumbar review.  Today patient again reports lower back pain which radiates down the anterior aspects of bilateral lower extremities in L4 distribution to the foot.  Today pain is rated as a 4/10.  Pain is exacerbated with prolonged standing and with ambulation the patient reports he is able to ambulate at least 1 mi on the treadmill before requiring rest.  He also reports neck pain which radiates in bilateral trapezius distributions in C5-6 distribution.  Patient has continued Lyrica and is currently taking 150 mg twice daily.  He is anticipated  "to increase this dosage next week.  He denies any side effects from this medication.     HPI 12/9/21  Ezequiel Hughes is a 81 y.o. male with past medical history significant for transient ischemic attack, hyperlipidemia, hypertension, right bundle branch block, stage 3 chronic kidney disease, thrombocytopenia and anemia , prostate cancer, gout, obstructive sleep apnea, periodically limb movement disorder who presents to the clinic for the evaluation of neck, lower back and leg pain.  Today patient reports pain began approximately 1 year prior without inciting accident, injury or trauma.  Today patient reports lower back pain which is constant and today is rated as a 3/10.  Patient reports radicular symptoms beginning along the anterior aspects of bilateral lower extremities below the knee to the foot in L4 distribution.  Patient is having difficulty describing the character but believes it is burning in nature.  Pain is exacerbated with prolonged standing and with ambulation.  Patient reports he is quite active, goes to the gym and walks on his treadmill and is able to ambulate approximately half to 3/4 of a mi before requiring rest.  Pain is improved with sitting.He denies any bowel or bladder incontinence or saddle anesthesia. Patient has performed physical therapy for the lower back without any improvement in his symptoms.      Patient also reports constant neck pain.  Pain presents in a bandlike distribution in bilateral cervical paraspinous territory and radiates into bilateral trapezius distribution.  Patient also reports numbness in bilateral thumbs.  Pain is exacerbated with cervical flexion, extension and lateral flexion.  Patient denies upper extremity weakness, compromise in hand  strength or dexterity.  Patient has been trialed on gabapentin for what his wife describes as "scalp itching"at a lower dose of 100 mg 3 times daily without any improvement in his neck or in his back pain.         "   Non-Pharmacologic Treatments:  Physical Therapy/Home Exercise: yes  Ice/Heat:yes  TENS: no  Acupuncture: no  Massage: no  Chiropractic: no    Other: no     Pain Medications:  - Adjuvant Medications: Neurontin (Gabapentin) and Tylenol (Acetaminophen)  - Anti-Coagulants: Plavix ( Clopidogrel)     Pain Procedures:   -01/26/2022:  Bilateral L4/5 TFESI  -03/14/2022: Bilateral L4-5 TFESI  -06/02/2022:  Bilateral L4-5 TFESI D2P per Haydel  -08/25/2022: Bilatearl L4/5 TF IAN  -06/29/2023:  bilateral L4-5 TFESI, limited relief  -07/18/2023:  diagnostic C4-6 medial branch blocks, limited relief        Imaging:   MRI brain 05/12/2022:        MRI lumbar spine 12/22/2021:      MRI cervical spine 12/22/2021:          PMHx,PSHx, Social history, and Family history:  I have reviewed the patient's medical, surgical, social, and family history in detail and updated the computerized patient record.    Review of patient's allergies indicates:   Allergen Reactions    Atarax [hydroxyzine hcl] Other (See Comments)     Raised blood pressure     Zyrtec [cetirizine] Other (See Comments)     Raised blood pressure     Gold sodium thiosulfate      Patch test positive    Meloxicam Rash     Other reaction(s): hypertension       Current Outpatient Medications   Medication Sig    acetaminophen (TYLENOL ARTHRITIS PAIN ORAL) Take by mouth. Patient states that he takes 2 tablets in the morning and 2 tablets in the evening    amLODIPine (NORVASC) 5 MG tablet Take 1 tablet (5 mg total) by mouth once daily.    atorvastatin (LIPITOR) 40 MG tablet TAKE 1 TABLET EVERY DAY    clopidogreL (PLAVIX) 75 mg tablet TAKE 1 TABLET EVERY DAY    finasteride (PROSCAR) 5 mg tablet TAKE 1 TABLET (5 MG TOTAL) BY MOUTH ONCE DAILY.    fluticasone propionate (FLONASE) 50 mcg/actuation nasal spray USE 2 SPRAYS IN EACH NOSTRIL ONE TIME DAILY  (SUBSTITUTED FOR FLONASE)    ketorolac 0.5% (ACULAR) 0.5 % Drop Put one eyedrop in right eye four times a day. Start first drop morning  "of laser and stop after five days.    losartan (COZAAR) 50 MG tablet TAKE 1 TABLET TWICE DAILY    methocarbamoL (ROBAXIN) 500 MG Tab Take 1 tablet (500 mg total) by mouth 3 (three) times daily as needed (muscle spasms). May cause drowsiness.    pantoprazole (PROTONIX) 40 MG tablet TAKE 1 TABLET EVERY DAY    sildenafiL (VIAGRA) 100 MG tablet Take 1 po 45 minutes before intercourse    citalopram (CELEXA) 10 MG tablet Take 1 tablet (10 mg total) by mouth once daily. For anxiety    solifenacin (VESICARE) 5 MG tablet Take 1 tablet (5 mg total) by mouth once daily.     No current facility-administered medications for this visit.         REVIEW OF SYSTEMS:    GENERAL:  No weight loss, malaise or fevers.  HEENT:   No recent changes in vision or hearing  NECK:  Negative for lumps, no difficulty with swallowing.  RESPIRATORY:  Negative for cough, wheezing or shortness of breath, patient denies any recent URI.  CARDIOVASCULAR:  Negative for chest pain, leg swelling or palpitations.  GI:  Negative for abdominal discomfort, blood in stools or black stools or change in bowel habits.  MUSCULOSKELETAL:  See HPI.  SKIN:  Negative for lesions, rash, and itching.  PSYCH:  No mood disorder or recent psychosocial stressors.  Patients sleep is not disturbed secondary to pain.  HEMATOLOGY/LYMPHOLOGY:  Negative for prolonged bleeding, bruising easily or swollen nodes.  Patient is currently taking anti-coagulants - plavix  NEURO:   No history of headaches, syncope, paralysis, seizures or tremors.  All other reviewed and negative other than HPI.    OBJECTIVE:    BP (!) 192/79 (BP Location: Right arm, Patient Position: Sitting)   Pulse (!) 58   Ht 5' 10" (1.778 m)   Wt 90 kg (198 lb 6.6 oz)   BMI 28.47 kg/m²     PHYSICAL EXAMINATION:    GENERAL: Well appearing, in no acute distress, alert and oriented x3.  PSYCH:  Mood and affect appropriate.  SKIN: Skin color, texture, turgor normal, no rashes or lesions.  HEAD/FACE:  Normocephalic, " atraumatic. Cranial nerves grossly intact.  NECK:  Axial loading positive bilaterally.  Spurling's equivocal.  Range of motion fair secondary to pain.  CV: RRR with palpation of the radial artery.  PULM: No evidence of respiratory difficulty, symmetric chest rise.  GI:  Soft and non-tender.  BACK:  Forward flexed posture.  Straight leg raising in the sitting and supine positions is negative to radicular pain. No pain to palpation over the facet joints of the lumbar spine or spinous processes.  Fair range of motion with mild pain reproduction.  EXTREMITIES: No deformities, edema, or skin discoloration. Good capillary refill.  MUSCULOSKELETAL: mild/moderate  TTP along right quadratus lumborum. Hip and knee provocative maneuvers are negative.  There is no pain with palpation over the sacroiliac joints bilaterally.  FABERs test is negative.  FADIRs test is negative.   Bilateral upper and lower extremity strength is normal and symmetric.  No atrophy or tone abnormalities are noted.  NEURO: Bilateral upper and lower extremity coordination and muscle stretch reflexes are physiologic and symmetric.  Plantar response are downgoing. No clonus.  No loss of sensation is noted.  GAIT: normal.      LABS:  Lab Results   Component Value Date    WBC 6.06 08/02/2023    HGB 13.5 (L) 08/02/2023    HCT 40.6 08/02/2023    MCV 94 08/02/2023     (L) 08/02/2023       CMP  Sodium   Date Value Ref Range Status   08/02/2023 140 136 - 145 mmol/L Final     Potassium   Date Value Ref Range Status   08/02/2023 4.4 3.5 - 5.1 mmol/L Final     Chloride   Date Value Ref Range Status   08/02/2023 107 95 - 110 mmol/L Final     CO2   Date Value Ref Range Status   08/02/2023 20 (L) 23 - 29 mmol/L Final     Glucose   Date Value Ref Range Status   08/02/2023 102 70 - 110 mg/dL Final     BUN   Date Value Ref Range Status   08/02/2023 21 8 - 23 mg/dL Final     Creatinine   Date Value Ref Range Status   08/02/2023 1.2 0.5 - 1.4 mg/dL Final     Calcium    Date Value Ref Range Status   08/02/2023 9.6 8.7 - 10.5 mg/dL Final     Total Protein   Date Value Ref Range Status   08/02/2023 7.1 6.0 - 8.4 g/dL Final     Albumin   Date Value Ref Range Status   08/02/2023 4.3 3.5 - 5.2 g/dL Final     Total Bilirubin   Date Value Ref Range Status   08/02/2023 0.9 0.1 - 1.0 mg/dL Final     Comment:     For infants and newborns, interpretation of results should be based  on gestational age, weight and in agreement with clinical  observations.    Premature Infant recommended reference ranges:  Up to 24 hours.............<8.0 mg/dL  Up to 48 hours............<12.0 mg/dL  3-5 days..................<15.0 mg/dL  6-29 days.................<15.0 mg/dL       Alkaline Phosphatase   Date Value Ref Range Status   08/02/2023 58 55 - 135 U/L Final     AST   Date Value Ref Range Status   08/02/2023 17 10 - 40 U/L Final     ALT   Date Value Ref Range Status   08/02/2023 14 10 - 44 U/L Final     Anion Gap   Date Value Ref Range Status   08/02/2023 13 8 - 16 mmol/L Final     eGFR if    Date Value Ref Range Status   06/29/2022 57.9 (A) >60 mL/min/1.73 m^2 Final     eGFR if non    Date Value Ref Range Status   06/29/2022 50.1 (A) >60 mL/min/1.73 m^2 Final     Comment:     Calculation used to obtain the estimated glomerular filtration  rate (eGFR) is the CKD-EPI equation.          Lab Results   Component Value Date    HGBA1C 5.1 06/27/2023             ASSESSMENT: 83 y.o. year old male with lower back and leg pain, consistent with     1. Spinal stenosis of lumbar region with neurogenic claudication  E-Consult to Neurosurgery      2. Cervical spondylolysis        3. Lumbar radiculopathy, chronic        4. DDD (degenerative disc disease), lumbar        5. DDD (degenerative disc disease), cervical                    PLAN:   - Interventions:   S/p diagnostic C4-6 medial branch blocks on 07/18/2023, limited relief  S/p repeat bilateral L4-5 TFESI on 06/29/2023, limited  relief  - Anticoagulation: yes, Plavix   - Medications: I have stressed the importance of physical activity and a home exercise plan to help with pain and improve health. and Patient can continue with medications for now since they are providing benefits, using them appropriately, and without side effects.    - Therapy:  Advised patient to continue with activities as tolerated  - Labs:  Reviewed  - Imaging:  Reviewed  - Consults/Referrals:  EConsult to Neurosurgery for consideration of vertiflex procedure for lumbar spinal stenosis  - Records:  Reviewed/Obtain old records from outside physicians and imaging  - Follow up visit:  As needed, will reach out to patient once we hear back from neurosurgery  - Counseled patient regarding the importance of activity modification and physical therapy  - This condition does not require this patient to take time off of work, and the primary goal of our Pain Management services is to improve the patient's functional capacity.  - Patient Questions: Answered all of the patient's questions regarding diagnosis, therapy, and treatment    The above plan and management options were discussed at length with patient. Patient is in agreement with the above and verbalized understanding.      Abel Haywood MD  Interventional Pain Management  [facility]    Disclaimer:  This note was prepared using voice recognition system and is likely to have sound alike errors that may have been overlooked even after proof reading.  Please call me with any questions

## 2023-08-03 ENCOUNTER — PATIENT OUTREACH (OUTPATIENT)
Dept: EMERGENCY MEDICINE | Facility: HOSPITAL | Age: 83
End: 2023-08-03
Payer: MEDICARE

## 2023-08-03 ENCOUNTER — LAB VISIT (OUTPATIENT)
Dept: LAB | Facility: HOSPITAL | Age: 83
End: 2023-08-03
Attending: UROLOGY
Payer: MEDICARE

## 2023-08-03 DIAGNOSIS — C61 PROSTATE CANCER: ICD-10-CM

## 2023-08-03 LAB — COMPLEXED PSA SERPL-MCNC: 2 NG/ML (ref 0–4)

## 2023-08-03 PROCEDURE — 36415 COLL VENOUS BLD VENIPUNCTURE: CPT | Mod: HCNC,PN | Performed by: UROLOGY

## 2023-08-03 PROCEDURE — 84153 ASSAY OF PSA TOTAL: CPT | Mod: HCNC | Performed by: UROLOGY

## 2023-08-03 NOTE — PROGRESS NOTES
Mr Nieves agreed to have post ED 7-day follow up scheduled with Dr Ozuna for 8/7/23 at 7:20 am. Patient will receive an appointment reminder.      Yes...

## 2023-08-04 ENCOUNTER — PATIENT OUTREACH (OUTPATIENT)
Dept: EMERGENCY MEDICINE | Facility: HOSPITAL | Age: 83
End: 2023-08-04
Payer: MEDICARE

## 2023-08-04 NOTE — PROGRESS NOTES
ED navigator reminded patient about his appointment for Monday 8/7/23 at 7:20 am with Dr Ozuna through voicemail, as he did not answer the call. ED navigator to close encounter at this time.

## 2023-08-07 ENCOUNTER — OFFICE VISIT (OUTPATIENT)
Dept: PRIMARY CARE CLINIC | Facility: CLINIC | Age: 83
End: 2023-08-07
Payer: MEDICARE

## 2023-08-07 VITALS
OXYGEN SATURATION: 98 % | SYSTOLIC BLOOD PRESSURE: 136 MMHG | WEIGHT: 202.5 LBS | DIASTOLIC BLOOD PRESSURE: 80 MMHG | HEIGHT: 70 IN | HEART RATE: 70 BPM | BODY MASS INDEX: 28.99 KG/M2

## 2023-08-07 DIAGNOSIS — K59.00 CONSTIPATION, UNSPECIFIED CONSTIPATION TYPE: ICD-10-CM

## 2023-08-07 DIAGNOSIS — D69.6 THROMBOCYTOPENIA: ICD-10-CM

## 2023-08-07 DIAGNOSIS — F41.1 GAD (GENERALIZED ANXIETY DISORDER): ICD-10-CM

## 2023-08-07 DIAGNOSIS — C61 PROSTATE CANCER: ICD-10-CM

## 2023-08-07 DIAGNOSIS — R07.89 OTHER CHEST PAIN: Primary | ICD-10-CM

## 2023-08-07 PROCEDURE — 3075F PR MOST RECENT SYSTOLIC BLOOD PRESS GE 130-139MM HG: ICD-10-PCS | Mod: HCNC,CPTII,S$GLB, | Performed by: FAMILY MEDICINE

## 2023-08-07 PROCEDURE — 1159F MED LIST DOCD IN RCRD: CPT | Mod: HCNC,CPTII,S$GLB, | Performed by: FAMILY MEDICINE

## 2023-08-07 PROCEDURE — 99214 OFFICE O/P EST MOD 30 MIN: CPT | Mod: HCNC,S$GLB,, | Performed by: FAMILY MEDICINE

## 2023-08-07 PROCEDURE — 3075F SYST BP GE 130 - 139MM HG: CPT | Mod: HCNC,CPTII,S$GLB, | Performed by: FAMILY MEDICINE

## 2023-08-07 PROCEDURE — 1159F PR MEDICATION LIST DOCUMENTED IN MEDICAL RECORD: ICD-10-PCS | Mod: HCNC,CPTII,S$GLB, | Performed by: FAMILY MEDICINE

## 2023-08-07 PROCEDURE — 3079F PR MOST RECENT DIASTOLIC BLOOD PRESSURE 80-89 MM HG: ICD-10-PCS | Mod: HCNC,CPTII,S$GLB, | Performed by: FAMILY MEDICINE

## 2023-08-07 PROCEDURE — 3079F DIAST BP 80-89 MM HG: CPT | Mod: HCNC,CPTII,S$GLB, | Performed by: FAMILY MEDICINE

## 2023-08-07 PROCEDURE — 99214 PR OFFICE/OUTPT VISIT, EST, LEVL IV, 30-39 MIN: ICD-10-PCS | Mod: HCNC,S$GLB,, | Performed by: FAMILY MEDICINE

## 2023-08-07 PROCEDURE — 1160F RVW MEDS BY RX/DR IN RCRD: CPT | Mod: HCNC,CPTII,S$GLB, | Performed by: FAMILY MEDICINE

## 2023-08-07 PROCEDURE — 1160F PR REVIEW ALL MEDS BY PRESCRIBER/CLIN PHARMACIST DOCUMENTED: ICD-10-PCS | Mod: HCNC,CPTII,S$GLB, | Performed by: FAMILY MEDICINE

## 2023-08-07 PROCEDURE — 99999 PR PBB SHADOW E&M-EST. PATIENT-LVL IV: CPT | Mod: PBBFAC,HCNC,, | Performed by: FAMILY MEDICINE

## 2023-08-07 PROCEDURE — 99999 PR PBB SHADOW E&M-EST. PATIENT-LVL IV: ICD-10-PCS | Mod: PBBFAC,HCNC,, | Performed by: FAMILY MEDICINE

## 2023-08-07 RX ORDER — CITALOPRAM 10 MG/1
10 TABLET ORAL DAILY
Qty: 90 TABLET | Refills: 3 | Status: SHIPPED | OUTPATIENT
Start: 2023-08-07 | End: 2023-10-26 | Stop reason: SDUPTHER

## 2023-08-07 NOTE — PATIENT INSTRUCTIONS
Constipation- Miralax  Treatment   Mix 1 cap full of MiraLax in 1-2 oz of juice in the a.m. (OJ, apple, cranberry, etc.) to dissolve and drink to get it down.  Then drink water throughout the day . Can be in form of water, coffee, unsweetened tea or crystal light to reach goal of minimum 64 oz a day.

## 2023-08-07 NOTE — PROGRESS NOTES
Subjective:      Patient ID: Ezequiel Hughes is a 83 y.o. male.    Chief Complaint: Hospital Follow Up (Chest pains)      Patient is a 83 y.o. male coming in today for ER visit f/u.  Pt was seen in ER 5 days ago for persisting chest pain. Workup in ER was unremarkable and pt was discharged with instructions to f/u with cardiology and PCP. Pt today reports chest pain has completely resolved. BP has been stable in the high end of 130s. He has upcoming appointment with Bindu, cardiology MIGDALIA, and with Dr. Guy, urology. Pt is requesting medication for anxiety and constipation. He is f/u with urology for concerns for his PSA levels and ED supplement. States he has been exercising on treadmill as well as continuing to f/u with PT.       1. Other chest pain    2. OMARI (generalized anxiety disorder)    3. Constipation, unspecified constipation type    4. Prostate cancer    5. Thrombocytopenia       No questionnaires on file.    Pmh, Psh, Family Hx, Social Hx, HM updated in Epic Tabs today.   Review of Systems   Constitutional:  Negative for activity change, appetite change, chills, fatigue and unexpected weight change.   HENT:  Negative for congestion, ear pain, postnasal drip, sneezing, sore throat and trouble swallowing.    Eyes:  Negative for pain and visual disturbance.   Respiratory:  Negative for cough and shortness of breath.    Cardiovascular:  Negative for chest pain and leg swelling.   Gastrointestinal:  Negative for abdominal pain, constipation, diarrhea, nausea and vomiting.   Endocrine: Negative for cold intolerance and heat intolerance.   Genitourinary:  Negative for difficulty urinating, dysuria and flank pain.   Musculoskeletal:  Negative for arthralgias, back pain, joint swelling and neck pain.   Skin:  Negative for color change and rash.   Neurological:  Negative for dizziness, seizures and headaches.   Psychiatric/Behavioral:  Negative for behavioral problems, dysphoric mood and sleep disturbance. The  "patient is not nervous/anxious.      Objective:     Vitals:    08/07/23 0717   BP: 136/80   Pulse: 70   SpO2: 98%   Weight: 91.8 kg (202 lb 7.9 oz)   Height: 5' 10" (1.778 m)     Wt Readings from Last 10 Encounters:   08/07/23 91.8 kg (202 lb 7.9 oz)   08/02/23 89.8 kg (197 lb 15.6 oz)   08/02/23 90 kg (198 lb 6.6 oz)   07/18/23 89.8 kg (198 lb 1.3 oz)   06/29/23 88.7 kg (195 lb 8.8 oz)   06/28/23 89.4 kg (197 lb 1.5 oz)   06/09/23 90 kg (198 lb 6.6 oz)   05/05/23 88.7 kg (195 lb 8.8 oz)   04/20/23 89.4 kg (197 lb)   02/09/23 89.5 kg (197 lb 5 oz)     Physical Exam  Vitals and nursing note reviewed.   Constitutional:       General: He is awake.      Appearance: Normal appearance. He is well-developed, well-groomed and overweight.   HENT:      Head: Normocephalic and atraumatic.      Right Ear: External ear normal.      Left Ear: External ear normal.      Nose: Nose normal.      Mouth/Throat:      Mouth: Mucous membranes are moist.   Eyes:      Extraocular Movements: Extraocular movements intact.      Conjunctiva/sclera: Conjunctivae normal.   Neck:      Thyroid: No thyromegaly or thyroid tenderness.      Vascular: No carotid bruit.   Cardiovascular:      Rate and Rhythm: Normal rate and regular rhythm.      Heart sounds: Normal heart sounds. No murmur heard.  Pulmonary:      Effort: Pulmonary effort is normal. No accessory muscle usage.      Breath sounds: Normal breath sounds.   Musculoskeletal:      Cervical back: Normal range of motion and neck supple.   Neurological:      General: No focal deficit present.      Mental Status: He is alert. Mental status is at baseline.   Psychiatric:         Attention and Perception: Attention normal.         Mood and Affect: Mood is anxious.         Speech: Speech normal.         Behavior: Behavior normal. Behavior is cooperative.         Thought Content: Thought content normal.         Judgment: Judgment normal.         Assessment:     1. Other chest pain    2. OMARI (generalized " anxiety disorder)    3. Constipation, unspecified constipation type    4. Prostate cancer    5. Thrombocytopenia        Plan:   Ezequiel was seen today for hospital follow up.    Diagnoses and all orders for this visit:    Other chest pain    OMARI (generalized anxiety disorder)  -     citalopram (CELEXA) 10 MG tablet; Take 1 tablet (10 mg total) by mouth once daily. For anxiety    Constipation, unspecified constipation type    Prostate cancer    Thrombocytopenia      OMARI and constipation are - New Problem, discussed symptoms, work up, suspected diagnosis.   New Rx Celexa 10 mg/day for OMARI treatment.   Instructed to start OTC Miralax for constipation treatment.   Advised to continue with exercising regularly.   Chest pain has resolved at this time.   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 Pt is on Plavix and statin high dose lipitor 40mg at this time.   Advised to keep appointments with cardiology and urology for further cardiac f/u and to discuss herbal supplement for ED.   Instructed to f/u in 2 months as scheduled or sooner if new sx present.     Patient Instructions   Constipation- Miralax  Treatment   Mix 1 cap full of MiraLax in 1-2 oz of juice in the a.m. (OJ, apple, cranberry, etc.) to dissolve and drink to get it down.  Then drink water throughout the day . Can be in form of water, coffee, unsweetened tea or crystal light to reach goal of minimum 64 oz a day.      Follow up if symptoms worsen or fail to improve.      LABS:   Lab Results   Component Value Date    HGBA1C 5.1 06/27/2023    HGBA1C 5.3 01/17/2023    HGBA1C 5.2 07/22/2020      Lab Results   Component Value Date    CHOL 127 07/26/2023    CHOL 138 06/27/2023    CHOL 134 01/17/2023     Lab Results   Component Value Date    LDLCALC 60.4 (L) 07/26/2023    LDLCALC 61.4 (L) 06/27/2023    LDLCALC 67.6 01/17/2023     Lab Results   Component Value Date    WBC 6.06 08/02/2023    HGB 13.5 (L) 08/02/2023    HCT 40.6 08/02/2023    PLT  119 (L) 08/02/2023    CHOL 127 07/26/2023    TRIG 58 07/26/2023    HDL 55 07/26/2023    ALT 14 08/02/2023    AST 17 08/02/2023     08/02/2023    K 4.4 08/02/2023     08/02/2023    CREATININE 1.2 08/02/2023    BUN 21 08/02/2023    CO2 20 (L) 08/02/2023    TSH 2.242 06/27/2023    PSA 1.3 11/10/2021    INR 1.2 06/29/2022    HGBA1C 5.1 06/27/2023       The ASCVD Risk score (Perez DK, et al., 2019) failed to calculate for the following reasons:    The 2019 ASCVD risk score is only valid for ages 40 to 79  X-Ray Chest AP Portable  Narrative: EXAMINATION:  XR CHEST AP PORTABLE    CLINICAL HISTORY:  chest pain;    TECHNIQUE:  AP view of the chest was performed.    COMPARISON:  06/07/2022    FINDINGS:  The cardiac and mediastinal silhouettes appear within normal limits.   The lungs are clear bilaterally.  No acute osseous findings demonstrated.  Impression: No acute findings.    Electronically signed by: Wiley Wong MD  Date:    08/02/2023  Time:    11:50    Scribe Attestation:   I, Cong Marquez, am scribing for, and in the presence of, Dr. Valery Ozuna MD. I performed the above scribed service and the documentation accurately describes the services I performed. I attest to the accuracy of the note.    I, Dr. Valery Ozuna MD, reviewed documentation as scribed above. I personally performed the services described in this documentation.  I agree that the record reflects my personal performance and is accurate and complete. Valery Ozuna MD.    08/07/2023

## 2023-08-08 ENCOUNTER — TELEPHONE (OUTPATIENT)
Dept: UROLOGY | Facility: CLINIC | Age: 83
End: 2023-08-08
Payer: MEDICARE

## 2023-08-11 ENCOUNTER — OFFICE VISIT (OUTPATIENT)
Dept: UROLOGY | Facility: CLINIC | Age: 83
End: 2023-08-11
Payer: MEDICARE

## 2023-08-11 VITALS
RESPIRATION RATE: 18 BRPM | DIASTOLIC BLOOD PRESSURE: 65 MMHG | WEIGHT: 197.75 LBS | HEIGHT: 70 IN | TEMPERATURE: 98 F | SYSTOLIC BLOOD PRESSURE: 128 MMHG | BODY MASS INDEX: 28.31 KG/M2 | HEART RATE: 67 BPM

## 2023-08-11 DIAGNOSIS — N32.81 OAB (OVERACTIVE BLADDER): ICD-10-CM

## 2023-08-11 DIAGNOSIS — C61 PROSTATE CANCER: ICD-10-CM

## 2023-08-11 DIAGNOSIS — N52.01 ERECTILE DYSFUNCTION DUE TO ARTERIAL INSUFFICIENCY: ICD-10-CM

## 2023-08-11 DIAGNOSIS — N13.8 BPH WITH OBSTRUCTION/LOWER URINARY TRACT SYMPTOMS: Primary | ICD-10-CM

## 2023-08-11 DIAGNOSIS — N40.1 BPH WITH OBSTRUCTION/LOWER URINARY TRACT SYMPTOMS: Primary | ICD-10-CM

## 2023-08-11 LAB
BILIRUB SERPL-MCNC: NORMAL MG/DL
BLOOD URINE, POC: NORMAL
CLARITY, POC UA: CLEAR
COLOR, POC UA: YELLOW
GLUCOSE UR QL STRIP: NORMAL
KETONES UR QL STRIP: NORMAL
LEUKOCYTE ESTERASE URINE, POC: NORMAL
NITRITE, POC UA: NORMAL
PH, POC UA: 5.5
PROTEIN, POC: NORMAL
SPECIFIC GRAVITY, POC UA: 1.01
UROBILINOGEN, POC UA: 0.2

## 2023-08-11 PROCEDURE — 99999 PR PBB SHADOW E&M-EST. PATIENT-LVL IV: ICD-10-PCS | Mod: PBBFAC,HCNC,, | Performed by: UROLOGY

## 2023-08-11 PROCEDURE — 3288F FALL RISK ASSESSMENT DOCD: CPT | Mod: HCNC,CPTII,S$GLB, | Performed by: UROLOGY

## 2023-08-11 PROCEDURE — 1159F PR MEDICATION LIST DOCUMENTED IN MEDICAL RECORD: ICD-10-PCS | Mod: HCNC,CPTII,S$GLB, | Performed by: UROLOGY

## 2023-08-11 PROCEDURE — 81002 POCT URINE DIPSTICK WITHOUT MICROSCOPE: ICD-10-PCS | Mod: HCNC,S$GLB,, | Performed by: UROLOGY

## 2023-08-11 PROCEDURE — 1159F MED LIST DOCD IN RCRD: CPT | Mod: HCNC,CPTII,S$GLB, | Performed by: UROLOGY

## 2023-08-11 PROCEDURE — 3074F PR MOST RECENT SYSTOLIC BLOOD PRESSURE < 130 MM HG: ICD-10-PCS | Mod: HCNC,CPTII,S$GLB, | Performed by: UROLOGY

## 2023-08-11 PROCEDURE — 3288F PR FALLS RISK ASSESSMENT DOCUMENTED: ICD-10-PCS | Mod: HCNC,CPTII,S$GLB, | Performed by: UROLOGY

## 2023-08-11 PROCEDURE — 3078F PR MOST RECENT DIASTOLIC BLOOD PRESSURE < 80 MM HG: ICD-10-PCS | Mod: HCNC,CPTII,S$GLB, | Performed by: UROLOGY

## 2023-08-11 PROCEDURE — 1126F PR PAIN SEVERITY QUANTIFIED, NO PAIN PRESENT: ICD-10-PCS | Mod: HCNC,CPTII,S$GLB, | Performed by: UROLOGY

## 2023-08-11 PROCEDURE — 99214 PR OFFICE/OUTPT VISIT, EST, LEVL IV, 30-39 MIN: ICD-10-PCS | Mod: HCNC,S$GLB,, | Performed by: UROLOGY

## 2023-08-11 PROCEDURE — 1126F AMNT PAIN NOTED NONE PRSNT: CPT | Mod: HCNC,CPTII,S$GLB, | Performed by: UROLOGY

## 2023-08-11 PROCEDURE — 99999 PR PBB SHADOW E&M-EST. PATIENT-LVL IV: CPT | Mod: PBBFAC,HCNC,, | Performed by: UROLOGY

## 2023-08-11 PROCEDURE — 81002 URINALYSIS NONAUTO W/O SCOPE: CPT | Mod: HCNC,S$GLB,, | Performed by: UROLOGY

## 2023-08-11 PROCEDURE — 1101F PT FALLS ASSESS-DOCD LE1/YR: CPT | Mod: HCNC,CPTII,S$GLB, | Performed by: UROLOGY

## 2023-08-11 PROCEDURE — 99214 OFFICE O/P EST MOD 30 MIN: CPT | Mod: HCNC,S$GLB,, | Performed by: UROLOGY

## 2023-08-11 PROCEDURE — 1101F PR PT FALLS ASSESS DOC 0-1 FALLS W/OUT INJ PAST YR: ICD-10-PCS | Mod: HCNC,CPTII,S$GLB, | Performed by: UROLOGY

## 2023-08-11 PROCEDURE — 3078F DIAST BP <80 MM HG: CPT | Mod: HCNC,CPTII,S$GLB, | Performed by: UROLOGY

## 2023-08-11 PROCEDURE — 3074F SYST BP LT 130 MM HG: CPT | Mod: HCNC,CPTII,S$GLB, | Performed by: UROLOGY

## 2023-08-11 RX ORDER — SOLIFENACIN SUCCINATE 5 MG/1
5 TABLET, FILM COATED ORAL DAILY
Qty: 30 TABLET | Refills: 3 | Status: SHIPPED | OUTPATIENT
Start: 2023-08-11 | End: 2023-11-21 | Stop reason: SDUPTHER

## 2023-08-11 NOTE — PROGRESS NOTES
"    CC: follow up prostate cancer    History of Present Illness:   Ezequiel Hughes is a 83 y.o. male here for evaluation of Other (F/u)    8/11/23-Pt on proscar. He quit detrol because he wet the bed. Nocturia x 2-6. Stream is not always good. Hasn't tried Viagra yet. Was in the ED the other day with chest pain and had a UA, which showed 1+ blood, but no RBCs. Wearing his C-pap.   5/5/23-Pt reports that he is on finasteride, but isn't taking alfuzosin. Nocturia x 5-6. He has a little slight "leakage" throughout the day. He does have urgency. Didn't have any improvement with alfuzosin.   9/28/22-On finasteride. Nocturia x 4-5. Tried flomax a long time ago and it didn't help. Drinks 2 cups of coffee in the am. Not drinking about an hour before bed. No gross hematuria.   6/30/22-Nocturia x 1 lately, but prior to the last 2 nights, it has been 4 times. Still on finasteride. He does have urgency and occasional UUI. If he goes out, he wears a pad. Stream is weak. No hesitancy. Recently, visits have gone to every 6 months. Pt reports occasional RLQ pain. He does have come constipation, but pain doesn't change with BM. Persistent for a month.    Prior records indicate last MRI and TRUS biopsy in 2020.   3/29/22- 83yo male with h/o Prostate cancer, here to establish care. He has previously seen Dr. Wong and was undergoing active surveillance. He reports that he was diagnosed with prostate cancer in 2014. He hasn't had any treatment. He is currently managed on finasteride. He does report some UUI, small amounts. He had a repeat TRUS biopsy in 2021, and pt reports path was unchanged. Also had MRIs around that time as well and states that he had a targetable lesion.         Review of Systems   Respiratory:  Negative for shortness of breath.    Cardiovascular:  Negative for chest pain and palpitations.   Genitourinary:  Negative for hematuria.   Musculoskeletal:  Positive for back pain and neck pain.   Neurological:  Negative " for dizziness.   All other systems reviewed and are negative.        Past Medical History:   Diagnosis Date    Allergic rhinitis     Anemia     Back pain     BPH (benign prostatic hyperplasia)     Cancer     skin cancer to neck, Dr. Graves    Cataract     Disorder of kidney and ureter     ED (erectile dysfunction)     OMARI (generalized anxiety disorder) 8/7/2023    Hiatal hernia     small    History of colon polyps     colonoscopy 11/2016    HLD (hyperlipidemia)     Hyperlipidemia     Hypertension     Lateral epicondylitis     Lumbar radiculopathy 1/26/2022    OA (osteoarthritis)     GUIDO (obstructive sleep apnea)     Prostate cancer     Dr. Wong    TIA (transient ischemic attack)     Trouble in sleeping     Urge incontinence 3/29/2022       Past Surgical History:   Procedure Laterality Date    ANGIOGRAPHY OF UPPER EXTREMITY Left 07/05/2022    Procedure: Angiogram Extremity Bilateral;  Surgeon: Aparna Thompson MD;  Location: [facility] CATH LAB;  Service: Cardiology;  Laterality: Left;    ARTERIOGRAPHY OF AORTIC ROOT N/A 07/05/2022    Procedure: ARTERIOGRAM, AORTIC ROOT;  Surgeon: Aparna Thompson MD;  Location: [facility] CATH LAB;  Service: Cardiology;  Laterality: N/A;    CATARACT EXTRACTION W/  INTRAOCULAR LENS IMPLANT Right 02/21/2018    I-Stent    CATARACT EXTRACTION W/  INTRAOCULAR LENS IMPLANT Left 03/21/2018    I - Stent    CATHETERIZATION OF BOTH LEFT AND RIGHT HEART N/A 07/05/2022    Procedure: CATHETERIZATION, HEART, BOTH LEFT AND RIGHT;  Surgeon: Aparna Thompson MD;  Location: [facility] CATH LAB;  Service: Cardiology;  Laterality: N/A;  radial approach    COLONOSCOPY N/A 11/14/2016    Procedure: COLONOSCOPY;  Surgeon: Karuna Rodriguez MD;  Location: [facility];  Service: Endoscopy;  Laterality: N/A;    COLONOSCOPY N/A 09/22/2020    Procedure: COLONOSCOPY;  Surgeon: Chuy Fish MD;  Location: [facility];  Service: Endoscopy;  Laterality: N/A;    EYE SURGERY      HEMORRHOID SURGERY      I-STENT Right  02/21/2018    DR. REECE    INJECTION OF ANESTHETIC AGENT AROUND MEDIAL BRANCH NERVES INNERVATING CERVICAL FACET JOINT Bilateral 7/18/2023    Procedure: Bilateral C4-6 MBB with RN IV sedation (diagnostic, #1);  Surgeon: Abel Haywood MD;  Location: [facility];  Service: Pain Management;  Laterality: Bilateral;    KNEE ARTHROSCOPY W/ MENISCAL REPAIR Right 2015    Dr. Jain    PCIOL Right 02/21/2018    DR. REECE    PLANTAR FASCIA RELEASE      right    ROTATOR CUFF REPAIR Bilateral     bilateral    SELECTIVE INJECTION OF ANESTHETIC AGENT AROUND LUMBAR SPINAL NERVE ROOT BY TRANSFORAMINAL APPROACH Bilateral 01/26/2022    Procedure: Bilateral L4/5 TF IAN with RN IV sedation;  Surgeon: Mak Young MD;  Location: [facility];  Service: Pain Management;  Laterality: Bilateral;    SELECTIVE INJECTION OF ANESTHETIC AGENT AROUND LUMBAR SPINAL NERVE ROOT BY TRANSFORAMINAL APPROACH Bilateral 03/14/2022    Procedure: Bilateral L4/5 TF IAN with RN IV sedation;  Surgeon: Mak Young MD;  Location: [facility];  Service: Pain Management;  Laterality: Bilateral;    SELECTIVE INJECTION OF ANESTHETIC AGENT AROUND LUMBAR SPINAL NERVE ROOT BY TRANSFORAMINAL APPROACH Bilateral 06/02/2022    Procedure: Bilateral L4/5 TF IAN [place] [facility] [place]  Surgeon: Abel Haywood MD;  Location: [facility];  Service: Pain Management;  Laterality: Bilateral;    SELECTIVE INJECTION OF ANESTHETIC AGENT AROUND LUMBAR SPINAL NERVE ROOT BY TRANSFORAMINAL APPROACH Bilateral 08/25/2022    Procedure: Bilateral L4/5 TF IAN;  Surgeon: Abel Haywood MD;  Location: [facility];  Service: Pain Management;  Laterality: Bilateral;    SELECTIVE INJECTION OF ANESTHETIC AGENT AROUND LUMBAR SPINAL NERVE ROOT BY TRANSFORAMINAL APPROACH Bilateral 6/29/2023    Procedure: Bilateral L4/5 TF IAN RN IV Sedation;  Surgeon: Abel Haywood MD;  Location: [facility];  Service: Pain Management;  Laterality: Bilateral;    SHOULDER SURGERY  Bilateral around     Dr. Pepper.  rotator cuff surgeries    VASECTOMY         Family History   Problem Relation Age of Onset    Lung cancer Father         life long smoker    Cancer Father         prostate, lung    Arthritis Mother     Stroke Sister         TIA    Cataracts Sister     Cancer Brother         prostate    Cataracts Brother     Cataracts Sister     Melanoma Neg Hx     Psoriasis Neg Hx     Lupus Neg Hx     Eczema Neg Hx     Diabetes Neg Hx     Heart disease Neg Hx     Kidney disease Neg Hx     Colon cancer Neg Hx        Social History     Tobacco Use    Smoking status: Former     Current packs/day: 0.00     Average packs/day: 3.0 packs/day for 35.0 years (104.9 ttl pk-yrs)     Types: Cigarettes     Start date: 1960     Quit date: 1985     Years since quittin.0     Passive exposure: Past    Smokeless tobacco: Never   Substance Use Topics    Alcohol use: Yes     Alcohol/week: 6.0 standard drinks of alcohol     Types: 6 Drinks containing 0.5 oz of alcohol per week     Comment: socially    Drug use: No       Current Outpatient Medications   Medication Sig Dispense Refill    acetaminophen (TYLENOL ARTHRITIS PAIN ORAL) Take by mouth. Patient states that he takes 2 tablets in the morning and 2 tablets in the evening      amLODIPine (NORVASC) 5 MG tablet Take 1 tablet (5 mg total) by mouth once daily. 90 tablet 3    atorvastatin (LIPITOR) 40 MG tablet TAKE 1 TABLET EVERY DAY 90 tablet 1    citalopram (CELEXA) 10 MG tablet Take 1 tablet (10 mg total) by mouth once daily. For anxiety 90 tablet 3    clopidogreL (PLAVIX) 75 mg tablet TAKE 1 TABLET EVERY DAY 90 tablet 2    finasteride (PROSCAR) 5 mg tablet TAKE 1 TABLET (5 MG TOTAL) BY MOUTH ONCE DAILY. 90 tablet 4    fluticasone propionate (FLONASE) 50 mcg/actuation nasal spray USE 2 SPRAYS IN EACH NOSTRIL ONE TIME DAILY  (SUBSTITUTED FOR FLONASE) 48 g 3    ketorolac 0.5% (ACULAR) 0.5 % Drop Put one eyedrop in right eye four times a day. Start  first drop morning of laser and stop after five days. 5 mL 0    losartan (COZAAR) 50 MG tablet TAKE 1 TABLET TWICE DAILY 180 tablet 3    methocarbamoL (ROBAXIN) 500 MG Tab Take 1 tablet (500 mg total) by mouth 3 (three) times daily as needed (muscle spasms). May cause drowsiness. 90 tablet 1    pantoprazole (PROTONIX) 40 MG tablet TAKE 1 TABLET EVERY DAY 90 tablet 3    sildenafiL (VIAGRA) 100 MG tablet Take 1 po 45 minutes before intercourse 30 tablet 7    solifenacin (VESICARE) 5 MG tablet Take 1 tablet (5 mg total) by mouth once daily. 30 tablet 3     No current facility-administered medications for this visit.       Review of patient's allergies indicates:   Allergen Reactions    Atarax [hydroxyzine hcl] Other (See Comments)     Raised blood pressure     Zyrtec [cetirizine] Other (See Comments)     Raised blood pressure     Gold sodium thiosulfate      Patch test positive    Meloxicam Rash     Other reaction(s): hypertension       Physical Exam  Vitals:    08/11/23 1226   BP: 128/65   Pulse: 67   Resp: 18   Temp: 97.7 °F (36.5 °C)         General: Well-developed, well-nourished in no acute distress  HEENT: Normocephalic, atraumatic, Extraocular movements intact  Neck: supple, trachea midline, no cervical or supraclavicular lymphadenopathy  Respirations: even and unlabored  Back: midline spine, no CVA tenderness  Abdomen: soft, Non-tender, non-distended, no organomegaly or palpable masses, no rebound or guarding  Rectal: 9/29/22->40g prostate, no nodules or tenderness. No gross blood  Extremities: atraumatic, moves all equally, no clubbing, cyanosis or edema  Psych: normal affect  Skin: warm and dry, no lesions  Neuro: Alert and oriented, Cranial nerves II-XII grossly intact    PVR: 29cc 5/3/23    Urinalysis  Trace blood    PSA  7/7/21: 1.3  3/23/22: 1.5  6/16/22: 1.4  9/26/22: 1.5  12/27/22: 1.9  2/9/23: 1.3  8/3/23: 2.0    Assessment:   1. BPH with obstruction/lower urinary tract symptoms  POCT URINE DIPSTICK  WITHOUT MICROSCOPE      2. Prostate cancer  Prostate Specific Antigen, Diagnostic      3. OAB (overactive bladder)        4. Erectile dysfunction due to arterial insufficiency                  Plan:  BPH with obstruction/lower urinary tract symptoms  -     POCT URINE DIPSTICK WITHOUT MICROSCOPE    Prostate cancer  -     Prostate Specific Antigen, Diagnostic; Future; Expected date: 11/11/2023    OAB (overactive bladder)    Erectile dysfunction due to arterial insufficiency    Other orders  -     solifenacin (VESICARE) 5 MG tablet; Take 1 tablet (5 mg total) by mouth once daily.  Dispense: 30 tablet; Refill: 3        Follow up in about 3 months (around 11/11/2023) for labs before visit.

## 2023-08-19 ENCOUNTER — PATIENT MESSAGE (OUTPATIENT)
Dept: PAIN MEDICINE | Facility: CLINIC | Age: 83
End: 2023-08-19
Payer: MEDICARE

## 2023-08-21 ENCOUNTER — E-CONSULT (OUTPATIENT)
Dept: NEUROSURGERY | Facility: HOSPITAL | Age: 83
End: 2023-08-21
Payer: MEDICARE

## 2023-08-21 DIAGNOSIS — M48.062 LUMBAR STENOSIS WITH NEUROGENIC CLAUDICATION: ICD-10-CM

## 2023-08-21 DIAGNOSIS — M54.40 CHRONIC BILATERAL LOW BACK PAIN WITH SCIATICA, SCIATICA LATERALITY UNSPECIFIED: Primary | ICD-10-CM

## 2023-08-21 DIAGNOSIS — G89.29 CHRONIC BILATERAL LOW BACK PAIN WITH SCIATICA, SCIATICA LATERALITY UNSPECIFIED: Primary | ICD-10-CM

## 2023-08-21 DIAGNOSIS — M48.062 SPINAL STENOSIS OF LUMBAR REGION WITH NEUROGENIC CLAUDICATION: Primary | ICD-10-CM

## 2023-08-21 DIAGNOSIS — M51.36 DEGENERATIVE DISC DISEASE, LUMBAR: ICD-10-CM

## 2023-08-21 PROCEDURE — 99451 PR INTERPROF, PHONE/INTERNET/EHR, CONSULT, >= 5MINS: ICD-10-PCS | Mod: ,,, | Performed by: NEUROLOGICAL SURGERY

## 2023-08-21 PROCEDURE — 99452 E-CONSULT TO NEUROSURGERY: ICD-10-PCS | Mod: S$GLB,,, | Performed by: PHYSICAL MEDICINE & REHABILITATION

## 2023-08-21 PROCEDURE — 99451 NTRPROF PH1/NTRNET/EHR 5/>: CPT | Mod: ,,, | Performed by: NEUROLOGICAL SURGERY

## 2023-08-21 PROCEDURE — 99452 NTRPROF PH1/NTRNET/EHR RFRL: CPT | Mod: S$GLB,,, | Performed by: PHYSICAL MEDICINE & REHABILITATION

## 2023-08-21 NOTE — CONSULTS
[facility] NEUROSURGERY  Response for E-Consult     Patient Name: Ezequiel Hughes  MRN: [id number]  Primary Care Provider: Valery Ozuna MD   Requesting Provider: Abel Haywood MD  Consults    Recommendation: Simple decompression vs Vertiflex    Contingency: None    Total time of Consultation: 10 minute    I did not speak to the requesting provider verbally about this.     *This eConsult is based on the clinical data available to me and is furnished without benefit of a physical examination. The eConsult will need to be interpreted in light of any clinical issues or changes in patient status not available to me at the time of filing this eConsults. Significant changes in patient condition or level of acuity should result in immediate formal consultation and reevaluation. Please alert me if you have further questions.    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  He has had treatment for this which include several epidural steroid injections have worked initially but now seems to not give him much relief.  I have reviewed his imaging which shows that he does have stenosis at this level.  Decompression.  Per the request, the patient is appears to be interested in Vertiflex.  I do not perform procedures.   Patients with stenosis will benefit from surgical decompression.  This can be done minimally invasively and on an outpatient.  I do not perform Vertiflex procedures or recommend them for patients.    Thank you for this eConsult referral.     Justo Velazquez MD  [facility] NEUROSURGERY

## 2023-08-22 DIAGNOSIS — M48.062 SPINAL STENOSIS OF LUMBAR REGION WITH NEUROGENIC CLAUDICATION: Primary | ICD-10-CM

## 2023-08-23 ENCOUNTER — OFFICE VISIT (OUTPATIENT)
Dept: CARDIOLOGY | Facility: CLINIC | Age: 83
End: 2023-08-23
Payer: MEDICARE

## 2023-08-23 ENCOUNTER — TELEPHONE (OUTPATIENT)
Dept: NEUROSURGERY | Facility: CLINIC | Age: 83
End: 2023-08-23
Payer: MEDICARE

## 2023-08-23 VITALS
DIASTOLIC BLOOD PRESSURE: 68 MMHG | SYSTOLIC BLOOD PRESSURE: 100 MMHG | WEIGHT: 199.06 LBS | BODY MASS INDEX: 28.5 KG/M2 | HEART RATE: 61 BPM | OXYGEN SATURATION: 97 % | HEIGHT: 70 IN

## 2023-08-23 DIAGNOSIS — I70.0 AORTIC ATHEROSCLEROSIS: ICD-10-CM

## 2023-08-23 DIAGNOSIS — E78.2 MIXED HYPERLIPIDEMIA: ICD-10-CM

## 2023-08-23 DIAGNOSIS — G47.33 OBSTRUCTIVE SLEEP APNEA SYNDROME: ICD-10-CM

## 2023-08-23 DIAGNOSIS — I70.203 ATHEROSCLEROSIS OF ARTERY OF BOTH LOWER EXTREMITIES: ICD-10-CM

## 2023-08-23 DIAGNOSIS — N18.31 STAGE 3A CHRONIC KIDNEY DISEASE: ICD-10-CM

## 2023-08-23 DIAGNOSIS — R06.02 SHORTNESS OF BREATH: ICD-10-CM

## 2023-08-23 DIAGNOSIS — I27.20 PULMONARY HYPERTENSION: ICD-10-CM

## 2023-08-23 DIAGNOSIS — I45.10 INCOMPLETE RIGHT BUNDLE BRANCH BLOCK: ICD-10-CM

## 2023-08-23 DIAGNOSIS — R94.31 ABNORMAL EKG: Primary | ICD-10-CM

## 2023-08-23 PROCEDURE — 99999 PR PBB SHADOW E&M-EST. PATIENT-LVL IV: ICD-10-PCS | Mod: PBBFAC,HCNC,, | Performed by: PHYSICIAN ASSISTANT

## 2023-08-23 PROCEDURE — 1101F PR PT FALLS ASSESS DOC 0-1 FALLS W/OUT INJ PAST YR: ICD-10-PCS | Mod: HCNC,CPTII,S$GLB, | Performed by: PHYSICIAN ASSISTANT

## 2023-08-23 PROCEDURE — 99999 PR PBB SHADOW E&M-EST. PATIENT-LVL IV: CPT | Mod: PBBFAC,HCNC,, | Performed by: PHYSICIAN ASSISTANT

## 2023-08-23 PROCEDURE — 1159F MED LIST DOCD IN RCRD: CPT | Mod: HCNC,CPTII,S$GLB, | Performed by: PHYSICIAN ASSISTANT

## 2023-08-23 PROCEDURE — 1101F PT FALLS ASSESS-DOCD LE1/YR: CPT | Mod: HCNC,CPTII,S$GLB, | Performed by: PHYSICIAN ASSISTANT

## 2023-08-23 PROCEDURE — 3074F PR MOST RECENT SYSTOLIC BLOOD PRESSURE < 130 MM HG: ICD-10-PCS | Mod: HCNC,CPTII,S$GLB, | Performed by: PHYSICIAN ASSISTANT

## 2023-08-23 PROCEDURE — 3288F FALL RISK ASSESSMENT DOCD: CPT | Mod: HCNC,CPTII,S$GLB, | Performed by: PHYSICIAN ASSISTANT

## 2023-08-23 PROCEDURE — 1160F RVW MEDS BY RX/DR IN RCRD: CPT | Mod: HCNC,CPTII,S$GLB, | Performed by: PHYSICIAN ASSISTANT

## 2023-08-23 PROCEDURE — 1125F PR PAIN SEVERITY QUANTIFIED, PAIN PRESENT: ICD-10-PCS | Mod: HCNC,CPTII,S$GLB, | Performed by: PHYSICIAN ASSISTANT

## 2023-08-23 PROCEDURE — 99214 OFFICE O/P EST MOD 30 MIN: CPT | Mod: HCNC,S$GLB,, | Performed by: PHYSICIAN ASSISTANT

## 2023-08-23 PROCEDURE — 1159F PR MEDICATION LIST DOCUMENTED IN MEDICAL RECORD: ICD-10-PCS | Mod: HCNC,CPTII,S$GLB, | Performed by: PHYSICIAN ASSISTANT

## 2023-08-23 PROCEDURE — 3288F PR FALLS RISK ASSESSMENT DOCUMENTED: ICD-10-PCS | Mod: HCNC,CPTII,S$GLB, | Performed by: PHYSICIAN ASSISTANT

## 2023-08-23 PROCEDURE — 3074F SYST BP LT 130 MM HG: CPT | Mod: HCNC,CPTII,S$GLB, | Performed by: PHYSICIAN ASSISTANT

## 2023-08-23 PROCEDURE — 1160F PR REVIEW ALL MEDS BY PRESCRIBER/CLIN PHARMACIST DOCUMENTED: ICD-10-PCS | Mod: HCNC,CPTII,S$GLB, | Performed by: PHYSICIAN ASSISTANT

## 2023-08-23 PROCEDURE — 1125F AMNT PAIN NOTED PAIN PRSNT: CPT | Mod: HCNC,CPTII,S$GLB, | Performed by: PHYSICIAN ASSISTANT

## 2023-08-23 PROCEDURE — 3078F PR MOST RECENT DIASTOLIC BLOOD PRESSURE < 80 MM HG: ICD-10-PCS | Mod: HCNC,CPTII,S$GLB, | Performed by: PHYSICIAN ASSISTANT

## 2023-08-23 PROCEDURE — 99214 PR OFFICE/OUTPT VISIT, EST, LEVL IV, 30-39 MIN: ICD-10-PCS | Mod: HCNC,S$GLB,, | Performed by: PHYSICIAN ASSISTANT

## 2023-08-23 PROCEDURE — 3078F DIAST BP <80 MM HG: CPT | Mod: HCNC,CPTII,S$GLB, | Performed by: PHYSICIAN ASSISTANT

## 2023-08-23 RX ORDER — ALFUZOSIN HYDROCHLORIDE 10 MG/1
10 TABLET, EXTENDED RELEASE ORAL
COMMUNITY

## 2023-08-23 NOTE — PROGRESS NOTES
Subjective:   Patient ID:  Ezequiel Hughes is a 83 y.o. male who presents for follow-up of HTN      HPI  Mr. Hughes is an 83 year old male patient whose current medical conditions include TIA, HTN, and GUIDO who presents today for routine follow-up. Patient returns today and states he is doing well. Main issue is lower back and neck pain. Followed by pain mgmt, scheduled to see neurosurgery soon. CV wise, remains stable. He had an ED visit on 8/2/23 due to CP symptoms. Troponin was negative and he was d/c'd home. No recurrence since that time. No exertional symptoms. He feels it was related to gas. No unusual SOB/MCKEON. No LH, dizziness, palpitations, near syncope, or syncope. No s/s suggestive of CHF. Very physically active, typically walks on treadmill, maintains his lawn, etc. BP stable and controlled. Repeat by me today 120/62. Patient is compliant with his medications. Recently started on Celexa by PCP.    Prior LHC 7/22 very minimal/non-obstructive CAD. Echo 6/22 with normal EF.     Labs from 7/23 reviewed. CMP stable. Lipids at goal.    Review of Systems   Constitutional: Negative for chills, decreased appetite, fever and malaise/fatigue.   HENT:  Negative for congestion, hoarse voice and sore throat.    Eyes:  Negative for blurred vision and discharge.   Cardiovascular:  Negative for chest pain, claudication, cyanosis, dyspnea on exertion, irregular heartbeat, leg swelling, near-syncope, orthopnea, palpitations and paroxysmal nocturnal dyspnea.   Respiratory:  Negative for cough, hemoptysis, shortness of breath, snoring, sputum production and wheezing.    Endocrine: Negative for cold intolerance and heat intolerance.   Hematologic/Lymphatic: Negative for bleeding problem. Does not bruise/bleed easily.   Skin:  Negative for rash.   Musculoskeletal:  Positive for arthritis, back pain and joint pain. Negative for joint swelling, muscle cramps, muscle weakness and myalgias.   Gastrointestinal:  Negative for abdominal  "pain, constipation, diarrhea, heartburn, melena and nausea.   Genitourinary:  Negative for hematuria.   Neurological:  Negative for dizziness, focal weakness, headaches, light-headedness, loss of balance, numbness, paresthesias, seizures and weakness.   Psychiatric/Behavioral:  Negative for memory loss. The patient does not have insomnia.    Allergic/Immunologic: Negative for hives.     /68 (BP Location: Left arm, Patient Position: Sitting, BP Method: Large (Manual))   Pulse 61   Ht 5' 10" (1.778 m)   Wt 90.3 kg (199 lb 1.2 oz)   SpO2 97%   BMI 28.56 kg/m²     Objective:   Physical Exam  Vitals and nursing note reviewed.   Constitutional:       General: He is not in acute distress.     Appearance: Normal appearance. He is well-developed. He is not diaphoretic.   HENT:      Head: Normocephalic and atraumatic.   Eyes:      General:         Right eye: No discharge.         Left eye: No discharge.      Pupils: Pupils are equal, round, and reactive to light.   Neck:      Thyroid: No thyromegaly.      Vascular: No JVD.      Trachea: No tracheal deviation.   Cardiovascular:      Rate and Rhythm: Normal rate and regular rhythm.      Heart sounds: Normal heart sounds, S1 normal and S2 normal. No murmur heard.  Pulmonary:      Effort: Pulmonary effort is normal. No respiratory distress.      Breath sounds: Normal breath sounds. No wheezing or rales.   Abdominal:      General: There is no distension.      Palpations: Abdomen is soft.      Tenderness: There is no rebound.   Musculoskeletal:      Cervical back: Neck supple.      Right lower leg: No edema.      Left lower leg: No edema.   Skin:     General: Skin is warm and dry.      Findings: No erythema.   Neurological:      General: No focal deficit present.      Mental Status: He is alert and oriented to person, place, and time.   Psychiatric:         Mood and Affect: Mood normal.         Behavior: Behavior normal.         Thought Content: Thought content normal. " "      Echo Results  Summary    Concentric hypertrophy and normal systolic function.  The estimated ejection fraction is 60%.  Indeterminate left ventricular diastolic function.  With normal right ventricular systolic function.    LHC Results 7/22  Summary    Concentric hypertrophy and normal systolic function.  The estimated ejection fraction is 60%.  Indeterminate left ventricular diastolic function.  With normal right ventricular systolic function.      Chemistry        Component Value Date/Time     08/02/2023 1148    K 4.4 08/02/2023 1148     08/02/2023 1148    CO2 20 (L) 08/02/2023 1148    BUN 21 08/02/2023 1148    CREATININE 1.2 08/02/2023 1148     08/02/2023 1148        Component Value Date/Time    CALCIUM 9.6 08/02/2023 1148    ALKPHOS 58 08/02/2023 1148    AST 17 08/02/2023 1148    ALT 14 08/02/2023 1148    BILITOT 0.9 08/02/2023 1148    ESTGFRAFRICA 57.9 (A) 06/29/2022 0917    EGFRNONAA 50.1 (A) 06/29/2022 0917        Lab Results   Component Value Date    CHOL 127 07/26/2023    CHOL 138 06/27/2023    CHOL 134 01/17/2023     Lab Results   Component Value Date    HDL 55 07/26/2023    HDL 56 06/27/2023    HDL 53 01/17/2023     Lab Results   Component Value Date    LDLCALC 60.4 (L) 07/26/2023    LDLCALC 61.4 (L) 06/27/2023    LDLCALC 67.6 01/17/2023     No results found for: "DLDL"  Lab Results   Component Value Date    TRIG 58 07/26/2023    TRIG 103 06/27/2023    TRIG 67 01/17/2023       f1   Lab Results   Component Value Date    CHOLHDL 43.3 07/26/2023    CHOLHDL 40.6 06/27/2023    CHOLHDL 39.6 01/17/2023     Lab Results   Component Value Date    HGBA1C 5.1 06/27/2023       Assessment:      1. Abnormal EKG    2. Shortness of breath    3. Aortic atherosclerosis    4. Atherosclerosis of artery of both lower extremities    5. Mixed hyperlipidemia    6. Incomplete right bundle branch block    7. Pulmonary hypertension    8. Stage 3a chronic kidney disease    9. Obstructive sleep apnea syndrome  "     Patient presents for f/u. Doing well. He had ED visit due to atypical CP, workup negative. No recurrence/no exertional symptoms. LHC 7/22 with very minimal/non-obs CAD. Continue same CV meds/mgmt. Labs reviewed/discussed.   Plan:   -Continue current medical management and risk factor modification  -Cardiac, low salt diet  -Continue active lifestyle  -RTC 6 months with lipid, cmp with Dr. Thompson

## 2023-08-23 NOTE — TELEPHONE ENCOUNTER
Patient has been scheduled for NP appointment with Dr. Castro on 09/29 @ 11 AM at [facility]. Patient confirmed appointment date and time. Patient verbalized understanding.

## 2023-08-25 ENCOUNTER — PATIENT MESSAGE (OUTPATIENT)
Dept: PAIN MEDICINE | Facility: CLINIC | Age: 83
End: 2023-08-25
Payer: MEDICARE

## 2023-09-01 ENCOUNTER — OFFICE VISIT (OUTPATIENT)
Dept: NEUROSURGERY | Facility: CLINIC | Age: 83
End: 2023-09-01
Payer: MEDICARE

## 2023-09-01 VITALS
WEIGHT: 200.19 LBS | RESPIRATION RATE: 16 BRPM | HEIGHT: 70 IN | SYSTOLIC BLOOD PRESSURE: 123 MMHG | DIASTOLIC BLOOD PRESSURE: 68 MMHG | BODY MASS INDEX: 28.66 KG/M2 | HEART RATE: 64 BPM

## 2023-09-01 DIAGNOSIS — M48.062 LUMBAR STENOSIS WITH NEUROGENIC CLAUDICATION: ICD-10-CM

## 2023-09-01 DIAGNOSIS — M51.36 DEGENERATIVE DISC DISEASE, LUMBAR: Primary | ICD-10-CM

## 2023-09-01 DIAGNOSIS — M48.062 SPINAL STENOSIS OF LUMBAR REGION WITH NEUROGENIC CLAUDICATION: ICD-10-CM

## 2023-09-01 DIAGNOSIS — M43.16 SPONDYLOLISTHESIS, LUMBAR REGION: ICD-10-CM

## 2023-09-01 PROCEDURE — 3288F PR FALLS RISK ASSESSMENT DOCUMENTED: ICD-10-PCS | Mod: HCNC,CPTII,S$GLB, | Performed by: NEUROLOGICAL SURGERY

## 2023-09-01 PROCEDURE — 99999 PR PBB SHADOW E&M-EST. PATIENT-LVL IV: CPT | Mod: PBBFAC,HCNC,, | Performed by: NEUROLOGICAL SURGERY

## 2023-09-01 PROCEDURE — 3074F SYST BP LT 130 MM HG: CPT | Mod: HCNC,CPTII,S$GLB, | Performed by: NEUROLOGICAL SURGERY

## 2023-09-01 PROCEDURE — 3074F PR MOST RECENT SYSTOLIC BLOOD PRESSURE < 130 MM HG: ICD-10-PCS | Mod: HCNC,CPTII,S$GLB, | Performed by: NEUROLOGICAL SURGERY

## 2023-09-01 PROCEDURE — 99214 PR OFFICE/OUTPT VISIT, EST, LEVL IV, 30-39 MIN: ICD-10-PCS | Mod: HCNC,S$GLB,, | Performed by: NEUROLOGICAL SURGERY

## 2023-09-01 PROCEDURE — 99999 PR PBB SHADOW E&M-EST. PATIENT-LVL IV: ICD-10-PCS | Mod: PBBFAC,HCNC,, | Performed by: NEUROLOGICAL SURGERY

## 2023-09-01 PROCEDURE — 3078F DIAST BP <80 MM HG: CPT | Mod: HCNC,CPTII,S$GLB, | Performed by: NEUROLOGICAL SURGERY

## 2023-09-01 PROCEDURE — 1101F PR PT FALLS ASSESS DOC 0-1 FALLS W/OUT INJ PAST YR: ICD-10-PCS | Mod: HCNC,CPTII,S$GLB, | Performed by: NEUROLOGICAL SURGERY

## 2023-09-01 PROCEDURE — 1126F PR PAIN SEVERITY QUANTIFIED, NO PAIN PRESENT: ICD-10-PCS | Mod: HCNC,CPTII,S$GLB, | Performed by: NEUROLOGICAL SURGERY

## 2023-09-01 PROCEDURE — 1126F AMNT PAIN NOTED NONE PRSNT: CPT | Mod: HCNC,CPTII,S$GLB, | Performed by: NEUROLOGICAL SURGERY

## 2023-09-01 PROCEDURE — 1159F PR MEDICATION LIST DOCUMENTED IN MEDICAL RECORD: ICD-10-PCS | Mod: HCNC,CPTII,S$GLB, | Performed by: NEUROLOGICAL SURGERY

## 2023-09-01 PROCEDURE — 3078F PR MOST RECENT DIASTOLIC BLOOD PRESSURE < 80 MM HG: ICD-10-PCS | Mod: HCNC,CPTII,S$GLB, | Performed by: NEUROLOGICAL SURGERY

## 2023-09-01 PROCEDURE — 1159F MED LIST DOCD IN RCRD: CPT | Mod: HCNC,CPTII,S$GLB, | Performed by: NEUROLOGICAL SURGERY

## 2023-09-01 PROCEDURE — 1101F PT FALLS ASSESS-DOCD LE1/YR: CPT | Mod: HCNC,CPTII,S$GLB, | Performed by: NEUROLOGICAL SURGERY

## 2023-09-01 PROCEDURE — 3288F FALL RISK ASSESSMENT DOCD: CPT | Mod: HCNC,CPTII,S$GLB, | Performed by: NEUROLOGICAL SURGERY

## 2023-09-01 PROCEDURE — 99214 OFFICE O/P EST MOD 30 MIN: CPT | Mod: HCNC,S$GLB,, | Performed by: NEUROLOGICAL SURGERY

## 2023-09-01 NOTE — PROGRESS NOTES
Subjective:      Patient ID: Ezequiel Hughes is a 83 y.o. male.    Chief Complaint: Follow-up (Patient states he is no pain. )    Patient is here today for follow-up  Previously saw him several years ago with lower back and lower extremity symptoms  At that time he has been worked up for spondylolisthesis L4-5 with facet disease and stenosis  At the time he was having lower back pain with ambulation bilateral lower extremity symptoms rated 4 to 6/10 in severity  He has tried physical therapy and pain management without any lasting relief  Initially he was hesitant to consider any surgical option however he recently had been in discussion with the pain department for possible interspinous process device for indirect decompression as the symptoms have worsened  He is here today to discuss further treatment options  Denies any weakness  Denies any bowel bladder symptoms  Otherwise without complaints        Review of Systems   Constitutional:  Negative for activity change, appetite change and chills.   HENT:  Negative for hearing loss, sore throat and tinnitus.    Eyes:  Negative for pain, discharge and itching.   Cardiovascular:  Negative for chest pain.   Gastrointestinal:  Negative for abdominal pain.   Endocrine: Negative for cold intolerance and heat intolerance.   Genitourinary:  Negative for difficulty urinating and dysuria.   Musculoskeletal:  Positive for back pain and gait problem.   Allergic/Immunologic: Negative for environmental allergies.   Neurological:  Positive for weakness. Negative for dizziness, tremors, light-headedness and headaches.   Hematological:  Negative for adenopathy.   Psychiatric/Behavioral:  Negative for agitation, behavioral problems and confusion.          Objective:       Physical Exam:  Nursing note and vitals reviewed.    Constitutional: He appears well-nourished. He is not diaphoretic. No distress.     Eyes: Pupils are equal, round, and reactive to light. EOM are normal.      Cardiovascular: Normal rate and regular rhythm.     Psych/Behavior: He is alert. He is oriented to person, place, and time. He has a normal mood and affect.     Musculoskeletal:        Back: Range of motion is limited. There is tenderness. Muscle strength is 5/5.       Right Lower Extremities: Range of motion is full. There is no tenderness. Muscle strength is 5/5. Tone is normal.        Left Lower Extremities: There is no tenderness. Muscle strength is 5/5. Tone is normal.     Neurological:        Sensory: There is no sensory deficit in the trunk. There is no sensory deficit in the extremities.        Cranial nerves: Cranial nerve(s) II, III, IV, V, VI, VII, VIII, IX, X, XI and XII are intact.     General    Nursing note and vitals reviewed.  Constitutional: He is oriented to person, place, and time. He appears well-nourished. No distress.   Eyes: EOM are normal. Pupils are equal, round, and reactive to light.   Cardiovascular:  Normal rate and regular rhythm.            Neurological: He is alert and oriented to person, place, and time.   Psychiatric: He has a normal mood and affect.             Ortho Exam    Flexion-extension plain films from May 2022 show listhesis at L4-5 however I do not appreciate any significant change on flexion-extension    The radiology report mentions some increase in the amount of listhesis however on my evaluation this is very slight        MRI lumbar spine  There is degenerative facet disease throughout the lumbar spine worse at L4-5 where there is a grade 1 spondylolisthesis hypertrophic facets causing severe stenosis as well as foraminal stenosis bilaterally at this level.  To a lesser degree there is some mild stenosis at L5-S1 secondary to mild anterolisthesis at this level      X-Ray Lumbar Spine AP And Lateral    Narrative  Findings: 2 views. Comparison March 25, 2013. There is mild anterolisthesis of L4 on L5 and L5 on S1. Mild narrowing of the disc spaces and early marginal  osteophyte formation is noted. No compression fractures or acute osseous findings. Facet arthropathy at L4-5 and L5-S1 and degenerative change in the SI joints.  IMPRESSION:  As above.      Assessment:      1. Spinal stenosis of lumbar region with neurogenic claudication      Plan:     1. Chronic bilateral low back pain with sciatica, sciatica laterality unspecified    2. Degenerative disc disease, lumbar    3. Lumbar stenosis with neurogenic claudication    4. Lumbar radiculopathy    5. Spondylolisthesis, lumbar region      Patient is back today with similar symptoms as he was previously seen for with lower back and lower extremity symptoms  He has agreed 1 spondylolisthesis at L4-5 as not interested in a large stabilization procedures  In the past we discussed potential decompression as a way to alleviate some of his symptoms and he was not wanting to pursue surgery at that time.    His symptoms have her rest and he wants to possibly discuss a minimally invasive option   I discussed the simple decompression at L4-5 to remove the lamina and partial medial facets bilaterally at this level  He understands that there is spondylolisthesis and his symptoms of axial  back pain may worsen and require stabilization in the future  He understands risk and wishes to proceed with decompression    The patient was informed of all benefits and potential risk of the operation including but not limited to:  Pain, infection, bleeding, coma, paralysis, death.  Cerebrospinal fluid leak, failure of any instrumentation, the need for additional procedures in the future. No guarantee was given that this procedure would alleviate all of the symptoms.    Consents signed in the office today  Patient is wishing to possibly both procedure for towards the end of September.    Thank you for the referral   Please call with any questions    Jessee Castro MD  Neurosurgery     Disclaimer: This note was prepared using a voice recognition system and  is likely to have sound alike errors within the text.

## 2023-09-12 ENCOUNTER — PATIENT MESSAGE (OUTPATIENT)
Dept: ADMINISTRATIVE | Facility: OTHER | Age: 83
End: 2023-09-12
Payer: MEDICARE

## 2023-09-14 DIAGNOSIS — Z01.818 PRE-OPERATIVE CLEARANCE: Primary | ICD-10-CM

## 2023-09-15 DIAGNOSIS — I10 ESSENTIAL HYPERTENSION: ICD-10-CM

## 2023-09-15 RX ORDER — LOSARTAN POTASSIUM 50 MG/1
TABLET ORAL
Qty: 180 TABLET | Refills: 3 | Status: SHIPPED | OUTPATIENT
Start: 2023-09-15

## 2023-09-15 NOTE — TELEPHONE ENCOUNTER
No care due was identified.  [facility] Embedded Care Due Messages. Reference number: [id number].   9/15/2023 6:20:18 AM CDT

## 2023-09-16 NOTE — TELEPHONE ENCOUNTER
Refill Decision Note   Ezequiel Hughes  is requesting a refill authorization.  Brief Assessment and Rationale for Refill:  Approve     Medication Therapy Plan:         Comments:     Note composed:9:52 PM 09/15/2023             Appointments     Last Visit   8/7/2023 Valery Ozuna MD   Next Visit   10/26/2023 Valery Ozuna MD

## 2023-09-17 ENCOUNTER — PATIENT MESSAGE (OUTPATIENT)
Dept: NEUROSURGERY | Facility: CLINIC | Age: 83
End: 2023-09-17
Payer: MEDICARE

## 2023-09-26 ENCOUNTER — LAB VISIT (OUTPATIENT)
Dept: LAB | Facility: HOSPITAL | Age: 83
End: 2023-09-26
Attending: FAMILY MEDICINE
Payer: MEDICARE

## 2023-09-26 DIAGNOSIS — Z01.818 PRE-OPERATIVE CLEARANCE: ICD-10-CM

## 2023-09-26 LAB
ALBUMIN SERPL BCP-MCNC: 3.9 G/DL (ref 3.5–5.2)
ALP SERPL-CCNC: 53 U/L (ref 55–135)
ALT SERPL W/O P-5'-P-CCNC: 10 U/L (ref 10–44)
ANION GAP SERPL CALC-SCNC: 8 MMOL/L (ref 8–16)
AST SERPL-CCNC: 14 U/L (ref 10–40)
BASOPHILS # BLD AUTO: 0.03 K/UL (ref 0–0.2)
BASOPHILS NFR BLD: 0.7 % (ref 0–1.9)
BILIRUB SERPL-MCNC: 0.9 MG/DL (ref 0.1–1)
BUN SERPL-MCNC: 24 MG/DL (ref 8–23)
CALCIUM SERPL-MCNC: 8.7 MG/DL (ref 8.7–10.5)
CHLORIDE SERPL-SCNC: 107 MMOL/L (ref 95–110)
CO2 SERPL-SCNC: 24 MMOL/L (ref 23–29)
CREAT SERPL-MCNC: 1.4 MG/DL (ref 0.5–1.4)
DIFFERENTIAL METHOD: ABNORMAL
EOSINOPHIL # BLD AUTO: 0.1 K/UL (ref 0–0.5)
EOSINOPHIL NFR BLD: 3.2 % (ref 0–8)
ERYTHROCYTE [DISTWIDTH] IN BLOOD BY AUTOMATED COUNT: 12.4 % (ref 11.5–14.5)
EST. GFR  (NO RACE VARIABLE): 49.3 ML/MIN/1.73 M^2
GLUCOSE SERPL-MCNC: 88 MG/DL (ref 70–110)
HCT VFR BLD AUTO: 40.1 % (ref 40–54)
HGB BLD-MCNC: 13 G/DL (ref 14–18)
IMM GRANULOCYTES # BLD AUTO: 0.03 K/UL (ref 0–0.04)
IMM GRANULOCYTES NFR BLD AUTO: 0.7 % (ref 0–0.5)
LYMPHOCYTES # BLD AUTO: 0.7 K/UL (ref 1–4.8)
LYMPHOCYTES NFR BLD: 17.1 % (ref 18–48)
MCH RBC QN AUTO: 32.3 PG (ref 27–31)
MCHC RBC AUTO-ENTMCNC: 32.4 G/DL (ref 32–36)
MCV RBC AUTO: 100 FL (ref 82–98)
MONOCYTES # BLD AUTO: 0.4 K/UL (ref 0.3–1)
MONOCYTES NFR BLD: 9.8 % (ref 4–15)
NEUTROPHILS # BLD AUTO: 2.8 K/UL (ref 1.8–7.7)
NEUTROPHILS NFR BLD: 68.5 % (ref 38–73)
NRBC BLD-RTO: 0 /100 WBC
PLATELET # BLD AUTO: 116 K/UL (ref 150–450)
PMV BLD AUTO: 8.6 FL (ref 9.2–12.9)
POTASSIUM SERPL-SCNC: 4.3 MMOL/L (ref 3.5–5.1)
PROT SERPL-MCNC: 6.6 G/DL (ref 6–8.4)
RBC # BLD AUTO: 4.02 M/UL (ref 4.6–6.2)
SODIUM SERPL-SCNC: 139 MMOL/L (ref 136–145)
WBC # BLD AUTO: 4.1 K/UL (ref 3.9–12.7)

## 2023-09-26 PROCEDURE — 36415 COLL VENOUS BLD VENIPUNCTURE: CPT | Mod: HCNC,PO | Performed by: NEUROLOGICAL SURGERY

## 2023-09-26 PROCEDURE — 85025 COMPLETE CBC W/AUTO DIFF WBC: CPT | Mod: HCNC | Performed by: NEUROLOGICAL SURGERY

## 2023-09-26 PROCEDURE — 80053 COMPREHEN METABOLIC PANEL: CPT | Mod: HCNC | Performed by: NEUROLOGICAL SURGERY

## 2023-10-06 DIAGNOSIS — M51.36 DDD (DEGENERATIVE DISC DISEASE), LUMBAR: ICD-10-CM

## 2023-10-06 DIAGNOSIS — M48.062 SPINAL STENOSIS OF LUMBAR REGION WITH NEUROGENIC CLAUDICATION: Primary | ICD-10-CM

## 2023-10-06 DIAGNOSIS — M54.16 LUMBAR RADICULOPATHY, CHRONIC: ICD-10-CM

## 2023-10-26 ENCOUNTER — OFFICE VISIT (OUTPATIENT)
Dept: PRIMARY CARE CLINIC | Facility: CLINIC | Age: 83
End: 2023-10-26
Payer: MEDICARE

## 2023-10-26 ENCOUNTER — PATIENT MESSAGE (OUTPATIENT)
Dept: SURGERY | Facility: HOSPITAL | Age: 83
End: 2023-10-26
Payer: MEDICARE

## 2023-10-26 ENCOUNTER — OFFICE VISIT (OUTPATIENT)
Dept: INTERNAL MEDICINE | Facility: CLINIC | Age: 83
End: 2023-10-26
Payer: MEDICARE

## 2023-10-26 VITALS
HEART RATE: 70 BPM | WEIGHT: 198.44 LBS | BODY MASS INDEX: 28.47 KG/M2 | OXYGEN SATURATION: 98 % | SYSTOLIC BLOOD PRESSURE: 122 MMHG | DIASTOLIC BLOOD PRESSURE: 76 MMHG | TEMPERATURE: 96 F

## 2023-10-26 VITALS
HEART RATE: 63 BPM | TEMPERATURE: 98 F | SYSTOLIC BLOOD PRESSURE: 149 MMHG | DIASTOLIC BLOOD PRESSURE: 70 MMHG | OXYGEN SATURATION: 97 %

## 2023-10-26 DIAGNOSIS — M47.817 LUMBOSACRAL SPONDYLOSIS WITHOUT MYELOPATHY: ICD-10-CM

## 2023-10-26 DIAGNOSIS — I70.0 AORTIC ATHEROSCLEROSIS: ICD-10-CM

## 2023-10-26 DIAGNOSIS — I10 PRIMARY HYPERTENSION: ICD-10-CM

## 2023-10-26 DIAGNOSIS — N40.0 BENIGN PROSTATIC HYPERPLASIA WITHOUT LOWER URINARY TRACT SYMPTOMS: ICD-10-CM

## 2023-10-26 DIAGNOSIS — M48.062 LUMBAR STENOSIS WITH NEUROGENIC CLAUDICATION: ICD-10-CM

## 2023-10-26 DIAGNOSIS — D69.6 THROMBOCYTOPENIA: ICD-10-CM

## 2023-10-26 DIAGNOSIS — F41.1 GAD (GENERALIZED ANXIETY DISORDER): ICD-10-CM

## 2023-10-26 DIAGNOSIS — M54.16 LUMBAR RADICULOPATHY, CHRONIC: ICD-10-CM

## 2023-10-26 DIAGNOSIS — E78.2 MIXED HYPERLIPIDEMIA: ICD-10-CM

## 2023-10-26 DIAGNOSIS — C44.519 BASAL CELL CARCINOMA (BCC) OF ANTERIOR CHEST: ICD-10-CM

## 2023-10-26 DIAGNOSIS — G47.33 OBSTRUCTIVE SLEEP APNEA SYNDROME: ICD-10-CM

## 2023-10-26 DIAGNOSIS — Z01.818 PREOP EXAM FOR INTERNAL MEDICINE: ICD-10-CM

## 2023-10-26 DIAGNOSIS — I70.203 ATHEROSCLEROSIS OF ARTERY OF BOTH LOWER EXTREMITIES: ICD-10-CM

## 2023-10-26 DIAGNOSIS — N39.41 URGE INCONTINENCE: ICD-10-CM

## 2023-10-26 DIAGNOSIS — K21.9 GASTROESOPHAGEAL REFLUX DISEASE, UNSPECIFIED WHETHER ESOPHAGITIS PRESENT: ICD-10-CM

## 2023-10-26 DIAGNOSIS — N18.31 STAGE 3A CHRONIC KIDNEY DISEASE: ICD-10-CM

## 2023-10-26 DIAGNOSIS — Z00.01 ENCOUNTER FOR PREVENTATIVE ADULT HEALTH CARE EXAM WITH ABNORMAL FINDINGS: Primary | ICD-10-CM

## 2023-10-26 DIAGNOSIS — C61 PROSTATE CANCER: ICD-10-CM

## 2023-10-26 PROBLEM — K63.5 COLON POLYPS: Status: RESOLVED | Noted: 2020-09-22 | Resolved: 2023-10-26

## 2023-10-26 PROBLEM — R06.02 SHORTNESS OF BREATH: Status: RESOLVED | Noted: 2022-06-29 | Resolved: 2023-10-26

## 2023-10-26 PROCEDURE — 1101F PT FALLS ASSESS-DOCD LE1/YR: CPT | Mod: HCNC,CPTII,S$GLB, | Performed by: FAMILY MEDICINE

## 2023-10-26 PROCEDURE — 99999 PR PBB SHADOW E&M-EST. PATIENT-LVL III: ICD-10-PCS | Mod: PBBFAC,HCNC,,

## 2023-10-26 PROCEDURE — G0008 FLU VACCINE - QUADRIVALENT - ADJUVANTED: ICD-10-PCS | Mod: HCNC,S$GLB,, | Performed by: FAMILY MEDICINE

## 2023-10-26 PROCEDURE — 99397 PER PM REEVAL EST PAT 65+ YR: CPT | Mod: HCNC,S$GLB,, | Performed by: FAMILY MEDICINE

## 2023-10-26 PROCEDURE — 90694 VACC AIIV4 NO PRSRV 0.5ML IM: CPT | Mod: HCNC,S$GLB,, | Performed by: FAMILY MEDICINE

## 2023-10-26 PROCEDURE — 3288F PR FALLS RISK ASSESSMENT DOCUMENTED: ICD-10-PCS | Mod: HCNC,CPTII,S$GLB, | Performed by: FAMILY MEDICINE

## 2023-10-26 PROCEDURE — 99397 PR PREVENTIVE VISIT,EST,65 & OVER: ICD-10-PCS | Mod: HCNC,S$GLB,, | Performed by: FAMILY MEDICINE

## 2023-10-26 PROCEDURE — 3074F SYST BP LT 130 MM HG: CPT | Mod: HCNC,CPTII,S$GLB, | Performed by: FAMILY MEDICINE

## 2023-10-26 PROCEDURE — 3078F DIAST BP <80 MM HG: CPT | Mod: HCNC,CPTII,S$GLB, | Performed by: FAMILY MEDICINE

## 2023-10-26 PROCEDURE — 3288F FALL RISK ASSESSMENT DOCD: CPT | Mod: HCNC,CPTII,S$GLB, | Performed by: FAMILY MEDICINE

## 2023-10-26 PROCEDURE — 90694 FLU VACCINE - QUADRIVALENT - ADJUVANTED: ICD-10-PCS | Mod: HCNC,S$GLB,, | Performed by: FAMILY MEDICINE

## 2023-10-26 PROCEDURE — 1159F PR MEDICATION LIST DOCUMENTED IN MEDICAL RECORD: ICD-10-PCS | Mod: HCNC,CPTII,S$GLB, | Performed by: FAMILY MEDICINE

## 2023-10-26 PROCEDURE — 1126F PR PAIN SEVERITY QUANTIFIED, NO PAIN PRESENT: ICD-10-PCS | Mod: HCNC,CPTII,S$GLB, | Performed by: FAMILY MEDICINE

## 2023-10-26 PROCEDURE — 3078F PR MOST RECENT DIASTOLIC BLOOD PRESSURE < 80 MM HG: ICD-10-PCS | Mod: HCNC,CPTII,S$GLB, | Performed by: FAMILY MEDICINE

## 2023-10-26 PROCEDURE — G0008 ADMIN INFLUENZA VIRUS VAC: HCPCS | Mod: HCNC,S$GLB,, | Performed by: FAMILY MEDICINE

## 2023-10-26 PROCEDURE — 99999 PR PBB SHADOW E&M-EST. PATIENT-LVL III: ICD-10-PCS | Mod: PBBFAC,HCNC,, | Performed by: FAMILY MEDICINE

## 2023-10-26 PROCEDURE — 1159F MED LIST DOCD IN RCRD: CPT | Mod: HCNC,CPTII,S$GLB, | Performed by: FAMILY MEDICINE

## 2023-10-26 PROCEDURE — 99999 PR PBB SHADOW E&M-EST. PATIENT-LVL III: CPT | Mod: PBBFAC,HCNC,, | Performed by: FAMILY MEDICINE

## 2023-10-26 PROCEDURE — 3074F PR MOST RECENT SYSTOLIC BLOOD PRESSURE < 130 MM HG: ICD-10-PCS | Mod: HCNC,CPTII,S$GLB, | Performed by: FAMILY MEDICINE

## 2023-10-26 PROCEDURE — 1101F PR PT FALLS ASSESS DOC 0-1 FALLS W/OUT INJ PAST YR: ICD-10-PCS | Mod: HCNC,CPTII,S$GLB, | Performed by: FAMILY MEDICINE

## 2023-10-26 PROCEDURE — 99999 PR PBB SHADOW E&M-EST. PATIENT-LVL III: CPT | Mod: PBBFAC,HCNC,,

## 2023-10-26 PROCEDURE — 1126F AMNT PAIN NOTED NONE PRSNT: CPT | Mod: HCNC,CPTII,S$GLB, | Performed by: FAMILY MEDICINE

## 2023-10-26 RX ORDER — CITALOPRAM 10 MG/1
10 TABLET ORAL DAILY
Qty: 90 TABLET | Refills: 3 | Status: SHIPPED | OUTPATIENT
Start: 2023-10-26

## 2023-10-26 RX ORDER — AMLODIPINE BESYLATE 5 MG/1
5 TABLET ORAL 2 TIMES DAILY
Qty: 180 TABLET | Refills: 3 | Status: SHIPPED | OUTPATIENT
Start: 2023-10-26

## 2023-10-26 NOTE — ASSESSMENT & PLAN NOTE
- creatinine baseline 1.1-1.4, currently at baseline  - continue to avoid nephrotoxic agents and renal dose meds as estephania  - avoid intraoperative hypotension

## 2023-10-26 NOTE — DISCHARGE INSTRUCTIONS
Pre op instructions reviewed with patient and daughter during Clinic Visit with Provider, Maia Carrington NP, verbalized understanding.    To confirm, Surgery is scheduled on 10/30/23. We will call you late afternoon the business day prior to surgery with your arrival time.    *Please report to the [facility] (1st Floor) located off of [facility] (2nd Entrance/Building on the left, in front of the flag pole).  Address: [address] [place] [address] [place] [id number]    INSTRUCTIONS IMPORTANT!!!  DO NOT Eat, Drink, or Smoke after 12 midnight unless instructed otherwise by your Surgeon. OK to brush teeth, no gum, candy or mints!    MORNING OF SURGERY, drink small sip of water with the following medications instructed by Pre-Admit Provider:  - Amlodipine  - Celexa  - Protonix  -Vesicare  - Finasteride    *CONTINUE TO HOLD YOUR PLAVIX PRIOR TO SURGERY    Diabetic Patients: If you take diabetic or weight loss medication, Do NOT take morning of surgery unless instructed by Doctor. Metformin to be stopped 24 hrs prior to surgery. Ozempic/ Mounjaro/ Wegovy/ Trulicity/ Semaglutide or any weight loss injections to be stopped 7 days prior to surgery. DO NOT take long-acting insulin the evening before surgery. Blood sugars will be checked in pre-op by Nurse.    *Patients should HOLD all vitamins, herbal supplements, weight loss medication, aspirin products & NSAIDS 7 days prior to surgery, as these can thin the blood. Ok to take Tylenol.    ____  Avoid Alcoholic beverages 3 days prior to surgery, as it can thin the blood.  ____  NO Acrylic/fake nails or nail polish worn day of surgery (specifically hand/arm & foot surgeries).  ____  NO powder, lotions, deodorants, oils or cream on body.  ____  Remove all jewelry, piercings, & foreign objects prior to arrival and leave at home.  ____  Remove Dentures, Hearing Aids & Contact Lens prior to surgery.  ____  Bring photo ID and insurance information to hospital (Leave  Valuables at Home).  ____  If going home the same day, arrange for a ride home. You will not be able to drive for 24 hrs if Anesthesia was used.   ____  Males: Stop ED medications (Viagra, Cialis) 24 hrs prior to surgery.  ____  Wear clean, loose fitting clothing to allow for dressings/ bandages.      Bathing Instructions:    -Shower with anti-bacterial Soap (Hibiclens or Dial) the night before surgery and the morning of.   -Do not use Hibiclens on your face or genitals.   -Apply clean clothes after shower.  -Do not shave your face or body 2 days prior to surgery unless instructed otherwise by your Surgeon.  -Do not shave pubic hair 7 days prior to surgery (gyn pt's).    [facility]/Ride Policy:  Only 2 adults allowed in pre op/recovery area during your procedure. You MUST HAVE A RIDE HOME from a responsible adult that you know and trust. Medical Transport, Uber or Lyft can ONLY be used if patient has a responsible adult to accompany them during ride home.    Discharge Instructions: You will receive Post-op/Discharge instructions by your Discharge Nurse prior to going home.   *Prevention of surgical site infections:   -Keep incisions clean and dry.   -Do not soak/submerge incisions in water until completely healed.   -Do not apply lotions, powders, creams, or deodorants to site.   -Always make sure hands are cleaned with antibacterial soap/ alcohol-based  prior to touching the surgical site.        *Signs and symptoms of Infection Before or After Surgery:               !!!If you experience any fever, chills, nausea/ vomiting, foul odor/ excessive drainage from surgical site, flu-like symptoms, new wounds or cuts, PLEASE CALL THE SURGEON OFFICE at [phone] or SEND MESSAGE THROUGH [employer] PORTAL!!!       *If you are running late day of surgery, please call the Surgery Dept @ [phone].    *Billing question, please call  [phone]     Thank you,  -[facility]  Dept.  [phone]   or [phone]  M-F 7:30 am-4:00 pm (Closed Major Holidays)

## 2023-10-26 NOTE — ASSESSMENT & PLAN NOTE
- follows with Dr. Thompson  - bp controled  - home regimen includes losartan and amlodipine, will take home amlodipine am of surgery

## 2023-10-26 NOTE — PROGRESS NOTES
Subjective:      Patient ID: Ezequiel Hughes is a 83 y.o. male.    Chief Complaint: Annual Exam      Patient is a 83 y.o. male coming in today for annual exam. He is accompanied by wife today.   He has upcoming laminectomy with neurosurgery next week. Performing surgeon is Dr. Castro, neurosurgery. He is inquiring about substitute for Tylenol arthritis. Pt reports occasional intermittent bilateral leg cramping. Reports not hydrating properly. Has been holding his Plavix for pre-op. He has pre-op assessment and lab work today. States anxiety is stable with Celexa. Has been f/u with Dr. uGy, urology. Pt has scheduled appointment with urology post-op.       1. Primary hypertension    2. Preop exam for internal medicine    3. Lumbosacral spondylosis without myelopathy    4. Mixed hyperlipidemia    5. Atherosclerosis of artery of both lower extremities    6. Stage 3a chronic kidney disease    7. OMARI (generalized anxiety disorder)    8. Lumbar radiculopathy, chronic    9. Aortic atherosclerosis    10. Thrombocytopenia    11. Prostate cancer    12. Basal cell carcinoma (BCC) of anterior chest       No questionnaires on file.    Pmh, Psh, Family Hx, Social Hx, HM updated in Epic Tabs today.   Review of Systems   Constitutional:  Negative for activity change, appetite change, chills, fatigue and unexpected weight change.   HENT:  Negative for congestion, ear pain, postnasal drip, sneezing, sore throat and trouble swallowing.    Eyes:  Negative for pain and visual disturbance.   Respiratory:  Negative for cough and shortness of breath.    Cardiovascular:  Negative for chest pain and leg swelling.   Gastrointestinal:  Negative for abdominal pain, constipation, diarrhea, nausea and vomiting.   Endocrine: Negative for cold intolerance and heat intolerance.   Genitourinary:  Negative for difficulty urinating, dysuria and flank pain.   Musculoskeletal:  Negative for arthralgias, back pain, joint swelling and neck pain.   Skin:   Negative for color change and rash.   Neurological:  Negative for dizziness, seizures and headaches.   Psychiatric/Behavioral:  Negative for behavioral problems, dysphoric mood and sleep disturbance. The patient is not nervous/anxious.      Objective:     Vitals:    10/26/23 0811   BP: 122/76   Pulse: 70   Temp: 96.4 °F (35.8 °C)   SpO2: 98%   Weight: 90 kg (198 lb 6.6 oz)     Wt Readings from Last 10 Encounters:   10/26/23 90 kg (198 lb 6.6 oz)   09/01/23 90.8 kg (200 lb 2.8 oz)   08/23/23 90.3 kg (199 lb 1.2 oz)   08/11/23 89.7 kg (197 lb 12 oz)   08/07/23 91.8 kg (202 lb 7.9 oz)   08/02/23 89.8 kg (197 lb 15.6 oz)   08/02/23 90 kg (198 lb 6.6 oz)   07/18/23 89.8 kg (198 lb 1.3 oz)   06/29/23 88.7 kg (195 lb 8.8 oz)   06/28/23 89.4 kg (197 lb 1.5 oz)     Physical Exam  Vitals reviewed.   Constitutional:       General: He is not in acute distress.     Appearance: Normal appearance. He is well-developed and overweight.   HENT:      Head: Normocephalic and atraumatic.      Right Ear: Tympanic membrane and external ear normal.      Left Ear: Tympanic membrane and external ear normal.      Nose: Nose normal.      Mouth/Throat:      Mouth: Mucous membranes are moist.      Pharynx: Oropharynx is clear.   Eyes:      Conjunctiva/sclera: Conjunctivae normal.      Pupils: Pupils are equal, round, and reactive to light.   Neck:      Thyroid: No thyromegaly.   Cardiovascular:      Rate and Rhythm: Normal rate and regular rhythm.      Heart sounds: No murmur heard.     No friction rub. No gallop.   Pulmonary:      Effort: Pulmonary effort is normal. No respiratory distress.      Breath sounds: Normal breath sounds.   Abdominal:      General: Bowel sounds are normal. There is no distension.      Palpations: Abdomen is soft.      Tenderness: There is no abdominal tenderness. There is no rebound.   Musculoskeletal:         General: Normal range of motion.      Cervical back: Normal range of motion and neck supple.    Lymphadenopathy:      Cervical: No cervical adenopathy.   Skin:     General: Skin is warm and dry.   Neurological:      General: No focal deficit present.      Mental Status: He is alert and oriented to person, place, and time.      Coordination: Coordination normal.   Psychiatric:         Attention and Perception: Attention normal.         Mood and Affect: Mood and affect normal.         Speech: Speech normal.         Behavior: Behavior normal.         Thought Content: Thought content normal.         Cognition and Memory: Cognition normal.         Judgment: Judgment normal.         Assessment:     1. Primary hypertension    2. Preop exam for internal medicine    3. Lumbosacral spondylosis without myelopathy    4. Mixed hyperlipidemia    5. Atherosclerosis of artery of both lower extremities    6. Stage 3a chronic kidney disease    7. OMARI (generalized anxiety disorder)    8. Lumbar radiculopathy, chronic    9. Aortic atherosclerosis    10. Thrombocytopenia    11. Prostate cancer    12. Basal cell carcinoma (BCC) of anterior chest        Plan:   Ezequiel was seen today for annual exam.    Diagnoses and all orders for this visit:    Primary hypertension  -     amLODIPine (NORVASC) 5 MG tablet; Take 1 tablet (5 mg total) by mouth 2 (two) times daily.    Preop exam for internal medicine    Lumbosacral spondylosis without myelopathy    Mixed hyperlipidemia    Atherosclerosis of artery of both lower extremities    Stage 3a chronic kidney disease    OMARI (generalized anxiety disorder)  -     citalopram (CELEXA) 10 MG tablet; Take 1 tablet (10 mg total) by mouth once daily. For anxiety    Lumbar radiculopathy, chronic    Aortic atherosclerosis    Thrombocytopenia    Prostate cancer    Basal cell carcinoma (BCC) of anterior chest      Pre-op assessment will be completed today. Discussed HM issues.   HTN, HLD, and anxiety are stable; continue with medications as prescribed.   Refilled Rx Amlodipine 5 mg BID for HTN treatment.    Refilled Rx Celexa 10 mg/day for anxiety treatment.  Advised to continue f/u with cardiology and urology as needed.   Hold plavix from now through surgery.  Advised to get tetanus vaccine at local pharmacy.   Influenza vaccine administered in clinic today.   Instructed to f/u in 3 months.     Patient Instructions   Tetanus booster at pharmacy     Follow up in about 3 months (around 1/26/2024) for f/u office visit Dr. Ozuna.      LABS:   Lab Results   Component Value Date    HGBA1C 5.1 06/27/2023    HGBA1C 5.3 01/17/2023    HGBA1C 5.2 07/22/2020      Lab Results   Component Value Date    CHOL 127 07/26/2023    CHOL 138 06/27/2023    CHOL 134 01/17/2023     Lab Results   Component Value Date    LDLCALC 60.4 (L) 07/26/2023    LDLCALC 61.4 (L) 06/27/2023    LDLCALC 67.6 01/17/2023     Lab Results   Component Value Date    WBC 4.10 09/26/2023    HGB 13.0 (L) 09/26/2023    HCT 40.1 09/26/2023     (L) 09/26/2023    CHOL 127 07/26/2023    TRIG 58 07/26/2023    HDL 55 07/26/2023    ALT 10 09/26/2023    AST 14 09/26/2023     09/26/2023    K 4.3 09/26/2023     09/26/2023    CREATININE 1.4 09/26/2023    BUN 24 (H) 09/26/2023    CO2 24 09/26/2023    TSH 2.242 06/27/2023    PSA 1.3 11/10/2021    INR 1.2 06/29/2022    HGBA1C 5.1 06/27/2023       The ASCVD Risk score ([place] DK, et al., 2019) failed to calculate for the following reasons:    The 2019 ASCVD risk score is only valid for ages 40 to 79  X-Ray Chest AP Portable  Narrative: EXAMINATION:  XR CHEST AP PORTABLE    CLINICAL HISTORY:  chest pain;    TECHNIQUE:  AP view of the chest was performed.    COMPARISON:  06/07/2022    FINDINGS:  The cardiac and mediastinal silhouettes appear within normal limits.   The lungs are clear bilaterally.  No acute osseous findings demonstrated.  Impression: No acute findings.    Electronically signed by: Wiley Wong MD  Date:    08/02/2023  Time:    11:50    I reviewed the patient's past medical, surgical, social and  family history and with  physical exam findings and the proposed surgery and I make the following recommendations:     From a cardiac standpoint the patient will be cleared by cardiology. Pt has been instructed to hold Plavix from now until day of surgery.     From a pulmonary standpoint the patient presents as a good candidate as well. The patient has no history of lung disease or pulmonary symptoms. Good pulmonary toilet, incentive spirometry, early ambulation are all recommended to improve the pulmonary outcome. No further pulmonary workup as noted prior to surgery.   DVT prophylaxis should be per standard. Venous compression devices are recommended. Early ambulation. Patient has been educated on signs and symptoms of both DVT and pulmonary embolus and instructed on what to do if there are symptoms postop.     The patient has been instructed to take  blood pressure medication the morning of surgery with a sip of water. Avoid any aspirin or anti-inflammatories between now and surgery.     If there is any further I can do to assist in the care of this patient please not hesitate to contact me. I will forward the lab results upon my receipt.  Scribe Attestation:   I, Cong Marquez, am scribing for, and in the presence of, Dr. Valery Ozuna MD. I performed the above scribed service and the documentation accurately describes the services I performed. I attest to the accuracy of the note.    I, Dr. Valery Ozuna MD, reviewed documentation as scribed above. I personally performed the services described in this documentation.  I agree that the record reflects my personal performance and is accurate and complete. Valery Ozuna MD.    10/26/2023

## 2023-10-26 NOTE — PROGRESS NOTES
Preoperative History and Physical    Chief Complaint: Preoperative evaluation     History of Present Illness:      Ezequiel Hughes is a 83 y.o. male with a history TIA, HLD, HTN, CKD stage 3, thrombocytopenia, GUIDO, BPH who presents to the office today for a preoperative consultation at the request of Dr. Castro who plans on performing laminectomy on October 30.     Functional Status:      The patient is able to climb a flight of stairs. The patient is able to ambulate without difficulty. Typically goes to the gym or walks 1 mile per day on treadmill.  Has been limited over the last week d/t back pain.  The patient's functional status is affected by the surgical problem. The patient's functional status is not affected by shortness of breath, chest pain, dyspnea on exertion and fatigue.    MET score greater than 4    Past Medical History:      Past Medical History:   Diagnosis Date    Allergic rhinitis     Anemia     Back pain     BPH (benign prostatic hyperplasia)     Cancer     skin cancer to neck, Dr. Graves    Cataract     Disorder of kidney and ureter     ED (erectile dysfunction)     OMARI (generalized anxiety disorder) 8/7/2023    Hiatal hernia     small    History of colon polyps     colonoscopy 11/2016    HLD (hyperlipidemia)     Hyperlipidemia     Hypertension     Lateral epicondylitis     Lumbar radiculopathy 1/26/2022    OA (osteoarthritis)     GUIDO (obstructive sleep apnea)     Prostate cancer     Dr. Wong    TIA (transient ischemic attack)     Trouble in sleeping     Urge incontinence 3/29/2022        Past Surgical History:      Past Surgical History:   Procedure Laterality Date    ANGIOGRAPHY OF UPPER EXTREMITY Left 07/05/2022    Procedure: Angiogram Extremity Bilateral;  Surgeon: Aparna Thompson MD;  Location: [facility] CATH LAB;  Service: Cardiology;  Laterality: Left;    ARTERIOGRAPHY OF AORTIC ROOT N/A 07/05/2022    Procedure: ARTERIOGRAM, AORTIC ROOT;   Surgeon: Aparna Thompson MD;  Location: [facility] CATH LAB;  Service: Cardiology;  Laterality: N/A;    CATARACT EXTRACTION W/  INTRAOCULAR LENS IMPLANT Right 02/21/2018    I-Stent    CATARACT EXTRACTION W/  INTRAOCULAR LENS IMPLANT Left 03/21/2018    I - Stent    CATHETERIZATION OF BOTH LEFT AND RIGHT HEART N/A 07/05/2022    Procedure: CATHETERIZATION, HEART, BOTH LEFT AND RIGHT;  Surgeon: Aparna Thompson MD;  Location: [facility] CATH LAB;  Service: Cardiology;  Laterality: N/A;  radial approach    COLONOSCOPY N/A 11/14/2016    Procedure: COLONOSCOPY;  Surgeon: Karuna Rodriguez MD;  Location: [facility] ENDO;  Service: Endoscopy;  Laterality: N/A;    COLONOSCOPY N/A 09/22/2020    Procedure: COLONOSCOPY;  Surgeon: Chuy Fish MD;  Location: [facility] ENDO;  Service: Endoscopy;  Laterality: N/A;    EYE SURGERY      HEMORRHOID SURGERY      I-STENT Right 02/21/2018    DR. REECE    INJECTION OF ANESTHETIC AGENT AROUND MEDIAL BRANCH NERVES INNERVATING CERVICAL FACET JOINT Bilateral 7/18/2023    Procedure: Bilateral C4-6 MBB with RN IV sedation (diagnostic, #1);  Surgeon: Abel Haywood MD;  Location: [facility] PAIN MGT;  Service: Pain Management;  Laterality: Bilateral;    KNEE ARTHROSCOPY W/ MENISCAL REPAIR Right 2015    Dr. Jain    PCIOL Right 02/21/2018    DR. REECE    PLANTAR FASCIA RELEASE      right    ROTATOR CUFF REPAIR Bilateral     bilateral    SELECTIVE INJECTION OF ANESTHETIC AGENT AROUND LUMBAR SPINAL NERVE ROOT BY TRANSFORAMINAL APPROACH Bilateral 01/26/2022    Procedure: Bilateral L4/5 TF IAN with RN IV sedation;  Surgeon: Mak Young MD;  Location: [facility] PAIN MGT;  Service: Pain Management;  Laterality: Bilateral;    SELECTIVE INJECTION OF ANESTHETIC AGENT AROUND LUMBAR SPINAL NERVE ROOT BY TRANSFORAMINAL APPROACH Bilateral 03/14/2022    Procedure: Bilateral L4/5 TF IAN with RN IV sedation;  Surgeon: Mak Young MD;  Location: [facility] PAIN MGT;  Service: Pain Management;  Laterality: Bilateral;    SELECTIVE  INJECTION OF ANESTHETIC AGENT AROUND LUMBAR SPINAL NERVE ROOT BY TRANSFORAMINAL APPROACH Bilateral 2022    Procedure: Bilateral L4/5 TF IAN - D2P Dr. Matthew CABRERA;  Surgeon: Abel Haywood MD;  Location: [facility] PAIN MGT;  Service: Pain Management;  Laterality: Bilateral;    SELECTIVE INJECTION OF ANESTHETIC AGENT AROUND LUMBAR SPINAL NERVE ROOT BY TRANSFORAMINAL APPROACH Bilateral 2022    Procedure: Bilateral L4/5 TF IAN;  Surgeon: Abel Haywood MD;  Location: [facility] PAIN MGT;  Service: Pain Management;  Laterality: Bilateral;    SELECTIVE INJECTION OF ANESTHETIC AGENT AROUND LUMBAR SPINAL NERVE ROOT BY TRANSFORAMINAL APPROACH Bilateral 2023    Procedure: Bilateral L4/5 TF IAN RN IV Sedation;  Surgeon: Abel Haywood MD;  Location: [facility] PAIN MGT;  Service: Pain Management;  Laterality: Bilateral;    SHOULDER SURGERY Bilateral around     Dr. Pepper.  rotator cuff surgeries    VASECTOMY          Social History:      Social History     Socioeconomic History    Marital status:    Tobacco Use    Smoking status: Former     Current packs/day: 0.00     Average packs/day: 3.0 packs/day for 35.0 years (104.9 ttl pk-yrs)     Types: Cigarettes     Start date: 1960     Quit date: 1985     Years since quittin.2     Passive exposure: Past    Smokeless tobacco: Never   Substance and Sexual Activity    Alcohol use: Yes     Alcohol/week: 6.0 standard drinks of alcohol     Types: 6 Drinks containing 0.5 oz of alcohol per week     Comment: socially    Drug use: No    Sexual activity: Yes     Partners: Female     Birth control/protection: None   Social History Narrative         Social Determinants of Health     Financial Resource Strain: Low Risk  (2023)    Overall Financial Resource Strain (CARDIA)     Difficulty of Paying Living Expenses: Not hard at all   Food Insecurity: No Food Insecurity (2023)    Hunger Vital Sign     Worried About Running Out of Food in the Last  Year: Never true     Ran Out of Food in the Last Year: Never true   Transportation Needs: No Transportation Needs (6/23/2023)    PRAPARE - Transportation     Lack of Transportation (Medical): No     Lack of Transportation (Non-Medical): No   Physical Activity: Sufficiently Active (6/23/2023)    Exercise Vital Sign     Days of Exercise per Week: 5 days     Minutes of Exercise per Session: 30 min   Stress: No Stress Concern Present (6/23/2023)    [employer] [place] [employer] - Occupational Stress Questionnaire     Feeling of Stress : Not at all   Social Connections: Socially Isolated (6/23/2023)    Social Connection and Isolation Panel [NHANES]     Frequency of Communication with Friends and Family: More than three times a week     Frequency of Social Gatherings with Friends and Family: More than three times a week     Attends Synagogue Services: Never     Active Member of Clubs or Organizations: No     Attends Club or Organization Meetings: Never     Marital Status:    Housing Stability: Low Risk  (6/23/2023)    Housing Stability Vital Sign     Unable to Pay for Housing in the Last Year: No     Number of Places Lived in the Last Year: 1     Unstable Housing in the Last Year: No        Family History:      Family History   Problem Relation Age of Onset    Lung cancer Father         life long smoker    Cancer Father         prostate, lung    Arthritis Mother     Stroke Sister         TIA    Cataracts Sister     Cancer Brother         prostate    Cataracts Brother     Cataracts Sister     Melanoma Neg Hx     Psoriasis Neg Hx     Lupus Neg Hx     Eczema Neg Hx     Diabetes Neg Hx     Heart disease Neg Hx     Kidney disease Neg Hx     Colon cancer Neg Hx        Allergies:      Review of patient's allergies indicates:   Allergen Reactions    Atarax [hydroxyzine hcl] Other (See Comments)     Raised blood pressure     Zyrtec [cetirizine] Other (See Comments)     Raised blood pressure     Gold sodium  thiosulfate      Patch test positive    Meloxicam Rash     Other reaction(s): hypertension       Medications:      Current Outpatient Medications   Medication Sig    acetaminophen (TYLENOL ARTHRITIS PAIN ORAL) Take by mouth. Patient states that he takes 2 tablets in the morning and 2 tablets in the evening    alfuzosin (UROXATRAL) 10 mg Tb24 Take 10 mg by mouth daily with breakfast.    amLODIPine (NORVASC) 5 MG tablet Take 1 tablet (5 mg total) by mouth once daily. (Patient taking differently: Take 5 mg by mouth 2 (two) times daily.)    atorvastatin (LIPITOR) 40 MG tablet TAKE 1 TABLET EVERY DAY    citalopram (CELEXA) 10 MG tablet Take 1 tablet (10 mg total) by mouth once daily. For anxiety    clopidogreL (PLAVIX) 75 mg tablet TAKE 1 TABLET EVERY DAY    finasteride (PROSCAR) 5 mg tablet TAKE 1 TABLET (5 MG TOTAL) BY MOUTH ONCE DAILY.    fluticasone propionate (FLONASE) 50 mcg/actuation nasal spray USE 2 SPRAYS IN EACH NOSTRIL ONE TIME DAILY  (SUBSTITUTED FOR FLONASE)    losartan (COZAAR) 50 MG tablet TAKE 1 TABLET TWICE DAILY    pantoprazole (PROTONIX) 40 MG tablet TAKE 1 TABLET EVERY DAY    sildenafiL (VIAGRA) 100 MG tablet Take 1 po 45 minutes before intercourse    solifenacin (VESICARE) 5 MG tablet Take 1 tablet (5 mg total) by mouth once daily.     No current facility-administered medications for this visit.       Vitals:      Vitals:    10/26/23 0956   BP: (!) 149/70   Pulse: 63   Temp: 97.5 °F (36.4 °C)       Review of Systems:        Constitutional: Negative for fever, chills, weight loss, malaise/fatigue and diaphoresis.   HENT: Negative for hearing loss, ear pain, nosebleeds, congestion, sore throat, neck pain, tinnitus and ear discharge.    Eyes: Negative for blurred vision, double vision, photophobia, pain, discharge and redness.   Respiratory: Negative for cough, hemoptysis, sputum production, shortness of breath, wheezing and stridor.    Cardiovascular: Negative for chest pain, palpitations, orthopnea,  claudication, leg swelling and PND.   Gastrointestinal: Negative for heartburn, nausea, vomiting, abdominal pain, diarrhea, constipation, blood in stool and melena.   Genitourinary: Negative for dysuria, urgency, frequency, hematuria and flank pain.   Musculoskeletal: chronic back and neck pain Negative for myalgias, back pain, joint pain and falls.   Skin: Negative for itching and rash.   Neurological: Negative for dizziness, tingling, tremors, sensory change, speech change, focal weakness, seizures, loss of consciousness, weakness and headaches.   Endo/Heme/Allergies: Negative for environmental allergies and polydipsia. Does not bruise/bleed easily.   Psychiatric/Behavioral: Negative for depression, suicidal ideas, hallucinations, memory loss and substance abuse. The patient is not nervous/anxious and does not have insomnia.    All 14 systems reviewed and negative except as noted above.    Physical Exam:      Constitutional: Appears well-developed, well-nourished and in no acute distress.  Patient is oriented to person, place, and time.   Head: Normocephalic and atraumatic. Mucous membranes moist.  Neck: Neck supple no mass.   Cardiovascular: Normal rate and regular rhythm.  S1 S2 appreciated by ascultation.  Pulmonary/Chest: Effort normal and clear to auscultation bilaterally. No respiratory distress.   Abdomen: Soft. Non-tender and non-distended. Bowel sounds are normal.   Neurological: Patient is alert and oriented to person, place and time. Moves all extremities.  Skin: Warm and dry. No lesions.  Extremities: No clubbing, cyanosis or edema.    Laboratory data:      Reviewed and noted in plan where applicable. Please see chart for full laboratory data.  Lab Results   Component Value Date    WBC 4.10 09/26/2023    HGB 13.0 (L) 09/26/2023    HCT 40.1 09/26/2023     (H) 09/26/2023     (L) 09/26/2023     Sodium   Date Value Ref Range Status   09/26/2023 139 136 - 145 mmol/L Final     Chloride   Date  Value Ref Range Status   09/26/2023 107 95 - 110 mmol/L Final     CO2   Date Value Ref Range Status   09/26/2023 24 23 - 29 mmol/L Final     Glucose   Date Value Ref Range Status   09/26/2023 88 70 - 110 mg/dL Final     BUN   Date Value Ref Range Status   09/26/2023 24 (H) 8 - 23 mg/dL Final     Creatinine   Date Value Ref Range Status   09/26/2023 1.4 0.5 - 1.4 mg/dL Final     Calcium   Date Value Ref Range Status   09/26/2023 8.7 8.7 - 10.5 mg/dL Final     Total Protein   Date Value Ref Range Status   09/26/2023 6.6 6.0 - 8.4 g/dL Final     Albumin   Date Value Ref Range Status   09/26/2023 3.9 3.5 - 5.2 g/dL Final     Total Bilirubin   Date Value Ref Range Status   09/26/2023 0.9 0.1 - 1.0 mg/dL Final     Comment:     For infants and newborns, interpretation of results should be based  on gestational age, weight and in agreement with clinical  observations.    Premature Infant recommended reference ranges:  Up to 24 hours.............<8.0 mg/dL  Up to 48 hours............<12.0 mg/dL  3-5 days..................<15.0 mg/dL  6-29 days.................<15.0 mg/dL       Alkaline Phosphatase   Date Value Ref Range Status   09/26/2023 53 (L) 55 - 135 U/L Final     AST   Date Value Ref Range Status   09/26/2023 14 10 - 40 U/L Final     ALT   Date Value Ref Range Status   09/26/2023 10 10 - 44 U/L Final     Anion Gap   Date Value Ref Range Status   09/26/2023 8 8 - 16 mmol/L Final     eGFR   Date Value Ref Range Status   09/26/2023 49.3 (A) >60 mL/min/1.73 m^2 Final           Predictors of intubation difficulty:       Morbid obesity? no   Anatomically abnormal facies? no   Prominent incisors? no   Receding mandible? no   Short, thick neck? no   Neck range of motion: normal   Dentition: No chipped, loose, or missing teeth.  Based on the Modified Mallampati, patient is a mallampati score: I (soft palate, uvula, fauces, and tonsillar pillars visible)    Cardiographics:      ECG: Normal sinus rhythm. Left fascicular block      Echocardiogram: 6/2022  Concentric hypertrophy and normal systolic function.  The estimated ejection fraction is 60%.  Indeterminate left ventricular diastolic function.  With normal right ventricular systolic function.    Imaging:      Chest x-ray: not indicated    Assessment and Plan:      Lumbosacral spondylosis without myelopathy  - follows with Dr. Castro, planning on laminectomy on 10/30    Known risk factors for perioperative complications: CKD, thrombocytopenia, HTN, advanced age      Difficulty with intubation is not anticipated.    Cardiac Risk Estimation: Based on the Revised Cardiac Risk index, patient is a Class I risk with a 3.9% risk of a major cardiac event in a moderate risk procedure.    1.) Preoperative workup as follows: ECG, hemoglobin, hematocrit, electrolytes, creatinine, glucose.  2.) Change in medication regimen before surgery: per patient last dose of plavix 10/25, hold sildenafil 24 hours prior to sx, hold home losartan am of surgery   3.) Prophylaxis for cardiac events with perioperative beta-blockers: not indicated.  4.) Invasive hemodynamic monitoring perioperatively: at the discretion of anesthesiologist.  5.) Deep vein thrombosis prophylaxis postoperatively: intermittent pneumatic compression boots and regimen to be chosen by surgical team.  6.) Current medications which may produce withdrawal symptoms if withheld perioperatively: none  8.) Other measures: Postoperative hypertension management with IV hydralazine until able to take oral medications.  Postoperative incentive spirometry to prevent pneumonia.      Hypertensive disorder  - follows with Dr. Thompson  - bp controled  - home regimen includes losartan and amlodipine, will take home amlodipine am of surgery    Hyperlipidemia  - continue home statin nightly     Benign prostatic hyperplasia  - continue home uroxatral and Proscar     Urge incontinence  - continue home vesicare    Chronic kidney disease, stage 3  - creatinine  baseline 1.1-1.4, currently at baseline  - continue to avoid nephrotoxic agents and renal dose meds as estephania  - avoid intraoperative hypotension     Thrombocytopenia  - low platelets since 2008, has followed oncology in the past  - platelets ranging from 120's-140's per chart review, remains at baseline  - monitor closely for s/sx of bleeding intraoperatively/post-operatively     Sleep apnea  - wears cpap intermittently     OMARI (generalized anxiety disorder)  - continue home celexa daily     GERD (gastroesophageal reflux disease)  - continue PPI daily, may take am of surgery     Atherosclerosis of artery of both lower extremities  - follows with Dr. Thompson  - home regimen, plavix, per patient last dose on 10/25      10/27: Addendum: No cardiac contraindications to proceed; hold Plavix 5-7 days pre-op, resume post-op ASAP      Electronically signed by Maia Carrington MSN, FNP-C on 10/26/2023 at 8:30 AM.

## 2023-10-26 NOTE — PRE ADMISSION SCREENING
re op instructions reviewed with patient with daughter at side during Clinic Visit with Provider, Maia Carrington NP, verbalized understanding.    To confirm, Surgery is scheduled on 10/30/23. We will call you late afternoon the business day prior to surgery with your arrival time.    *Please report to the [facility] (1st Floor) located off of [facility] (2nd Entrance/Building on the left, in front of the flag pole).  Address: [address] [place] [address] [place]. [id number]    INSTRUCTIONS IMPORTANT!!!  DO NOT Eat, Drink, or Smoke after 12 midnight unless instructed otherwise by your Surgeon. OK to brush teeth, no gum, candy or mints!    MORNING OF SURGERY, drink small sip of water with the following medications instructed by Pre-Admit Provider:  - Amlodipine  - Celexa  - Protonix  -Vesicare  - Finasteride    *CONTINUE TO HOLD YOUR PLAVIX PRIOR TO SURGERY    Diabetic Patients: If you take diabetic or weight loss medication, Do NOT take morning of surgery unless instructed by Doctor. Metformin to be stopped 24 hrs prior to surgery. Ozempic/ Mounjaro/ Wegovy/ Trulicity/ Semaglutide or any weight loss injections to be stopped 7 days prior to surgery. DO NOT take long-acting insulin the evening before surgery. Blood sugars will be checked in pre-op by Nurse.    *Patients should HOLD all vitamins, herbal supplements, weight loss medication, aspirin products & NSAIDS 7 days prior to surgery, as these can thin the blood. Ok to take Tylenol.    ____  Avoid Alcoholic beverages 3 days prior to surgery, as it can thin the blood.  ____  NO Acrylic/fake nails or nail polish worn day of surgery (specifically hand/arm & foot surgeries).  ____  NO powder, lotions, deodorants, oils or cream on body.  ____  Remove all jewelry, piercings, & foreign objects prior to arrival and leave at home.  ____  Remove Dentures, Hearing Aids & Contact Lens prior to surgery.  ____  Bring photo ID and insurance information to \[facility]\""  (Leave Valuables at Home).  ____  If going home the same day, arrange for a ride home. You will not be able to drive for 24 hrs if Anesthesia was used.   ____  Males: Stop ED medications (Viagra, Cialis) 24 hrs prior to surgery.  ____  Wear clean, loose fitting clothing to allow for dressings/ bandages.      Bathing Instructions:    -Shower with anti-bacterial Soap (Hibiclens or Dial) the night before surgery and the morning of.   -Do not use Hibiclens on your face or genitals.   -Apply clean clothes after shower.  -Do not shave your face or body 2 days prior to surgery unless instructed otherwise by your Surgeon.  -Do not shave pubic hair 7 days prior to surgery (gyn pt's).    [facility]/Ride Policy:  Only 2 adults allowed in pre op/recovery area during your procedure. You MUST HAVE A RIDE HOME from a responsible adult that you know and trust. Medical Transport, Uber or Lyft can ONLY be used if patient has a responsible adult to accompany them during ride home.    Discharge Instructions: You will receive Post-op/Discharge instructions by your Discharge Nurse prior to going home.   *Prevention of surgical site infections:   -Keep incisions clean and dry.   -Do not soak/submerge incisions in water until completely healed.   -Do not apply lotions, powders, creams, or deodorants to site.   -Always make sure hands are cleaned with antibacterial soap/ alcohol-based  prior to touching the surgical site.        *Signs and symptoms of Infection Before or After Surgery:               !!!If you experience any fever, chills, nausea/ vomiting, foul odor/ excessive drainage from surgical site, flu-like symptoms, new wounds or cuts, PLEASE CALL THE SURGEON OFFICE at [phone] or SEND MESSAGE THROUGH [facility] PORTAL!!!       *If you are running late day of surgery, please call the Surgery Dept @ [phone].    *Billing question, please call  [phone]     Thank you,  -[facility]  Dept.  [phone]   or [phone]  M-F 7:30 am-4:00 pm (Closed Major Holidays)

## 2023-10-26 NOTE — ASSESSMENT & PLAN NOTE
- follows with Dr. Castro, planning on laminectomy on 10/30    Known risk factors for perioperative complications: CKD, thrombocytopenia, HTN, advanced age      Difficulty with intubation is not anticipated.    Cardiac Risk Estimation: Based on the Revised Cardiac Risk index, patient is a Class I risk with a 3.9% risk of a major cardiac event in a moderate risk procedure.    1.) Preoperative workup as follows: ECG, hemoglobin, hematocrit, electrolytes, creatinine, glucose.  2.) Change in medication regimen before surgery: per patient last dose of plavix 10/25, hold sildenafil 24 hours prior to sx, hold home losartan am of surgery   3.) Prophylaxis for cardiac events with perioperative beta-blockers: not indicated.  4.) Invasive hemodynamic monitoring perioperatively: at the discretion of anesthesiologist.  5.) Deep vein thrombosis prophylaxis postoperatively: intermittent pneumatic compression boots and regimen to be chosen by surgical team.  6.) Current medications which may produce withdrawal symptoms if withheld perioperatively: none  8.) Other measures: Postoperative hypertension management with IV hydralazine until able to take oral medications.  Postoperative incentive spirometry to prevent pneumonia.

## 2023-10-26 NOTE — ASSESSMENT & PLAN NOTE
- low platelets since 2008, has followed oncology in the past  - platelets ranging from 120's-140's per chart review, remains at baseline  - monitor closely for s/sx of bleeding intraoperatively/post-operatively

## 2023-10-27 ENCOUNTER — TELEPHONE (OUTPATIENT)
Dept: PREADMISSION TESTING | Facility: HOSPITAL | Age: 83
End: 2023-10-27
Payer: MEDICARE

## 2023-10-27 NOTE — TELEPHONE ENCOUNTER
----- Message from Shannon Carrion RN sent at 10/26/2023  5:26 PM CDT -----  Regarding: FW: CARDIAC CLEARANCE  See below- thanks   ----- Message -----  From: Bindu Carey PA-C  Sent: 10/26/2023   1:25 PM CDT  To: Shannon Carrion RN  Subject: RE: CARDIAC CLEARANCE                            No cardiac contraindications to proceed; hold Plavix 5-7 days pre-op, resume post-op ASAP  ----- Message -----  From: Shannon Carrion RN  Sent: 10/26/2023  12:48 PM CDT  To: Bindu Carey PA-C  Subject: FW: CARDIAC CLEARANCE                            Last seen 8/2023- with 6 mnth follow up  Please advise    Thanks   ----- Message -----  From: Azalea Barr RN  Sent: 10/26/2023   8:11 AM CDT  To: Neha Tatum RN; Shawanda Conde RN; #  Subject: CARDIAC CLEARANCE                                Hello,   This pt will be having a Lumbar Laminectomy surgery on 10/30/23 with Dr Castro. We are requesting a Cardiac clearance note. Please advise on whether this patient can be cleared to proceed from your standpoint or will need any further testing to ensure they are optimized to proceed.     Patient is currently taking Plavix and we would like your recommendations on how long they should hold this medication prior to their procedure.       -Thanks in advance,  [facility] Pre [facility] Dept.  [phone] or [phone]  Mon-Fri 7:30 am- 4 pm (Closed Major Holidays)

## 2023-10-27 NOTE — TELEPHONE ENCOUNTER
Called and spoke with pt about the following:     Please arrive to [facility] ([facility] at 10:00 am on 10/30/23 for your scheduled procedure.  Address: [address] [place] [address] [place] [id number] (2nd Building on left, 1st Floor Lobby)  >>>NO eating or drinking after midnight unless instructed otherwise by your Surgeon<<<

## 2023-10-30 ENCOUNTER — HOSPITAL ENCOUNTER (OUTPATIENT)
Facility: HOSPITAL | Age: 83
Discharge: HOME | End: 2023-10-30
Attending: NEUROLOGICAL SURGERY | Admitting: NEUROLOGICAL SURGERY
Payer: MEDICARE

## 2023-10-30 ENCOUNTER — PATIENT MESSAGE (OUTPATIENT)
Dept: SURGERY | Facility: HOSPITAL | Age: 83
End: 2023-10-30

## 2023-10-30 VITALS
BODY MASS INDEX: 27.94 KG/M2 | SYSTOLIC BLOOD PRESSURE: 164 MMHG | OXYGEN SATURATION: 98 % | TEMPERATURE: 98 F | HEART RATE: 61 BPM | WEIGHT: 195.13 LBS | RESPIRATION RATE: 20 BRPM | HEIGHT: 70 IN | DIASTOLIC BLOOD PRESSURE: 78 MMHG

## 2023-10-30 DIAGNOSIS — M48.062 LUMBAR STENOSIS WITH NEUROGENIC CLAUDICATION: Primary | ICD-10-CM

## 2023-10-30 NOTE — H&P
Interval History:   Patient here for MIS laminectomy L4-5   Hx of BL LE symptoms and back pain with G1 spondylolisthesis L4-5   \I offered both MIS decompression and MIS decompression nad fusion   He understands that his instability may worsen after laminectomy and require fusion in the future     The patient was informed of all benefits and potential risk of the operation including but not limited to:  Pain, infection, bleeding, coma, paralysis, death.  Cerebrospinal fluid leak, failure of any instrumentation, the need for additional procedures in the future. No guarantee was given that this procedure would alleviate all of the symptoms.    Consents signed     Jessee Castro MD  Neurosurgery     Disclaimer: This note was prepared using a voice recognition system and is likely to have sound alike errors within the text.      Medications:  Continuous Infusions:  Scheduled Meds:   vancomycin (VANCOCIN) IV (PEDS and ADULTS)  1,500 mg Intravenous Once     PRN Meds:     Review of Systems  Objective:     Weight: 88.5 kg (195 lb 1.7 oz)  Body mass index is 27.99 kg/m².  Vital Signs (Most Recent):  Temp: 97.9 °F (36.6 °C) (10/30/23 0948)  Pulse: 61 (10/30/23 0948)  Resp: 20 (10/30/23 0948)  BP: (!) 164/78 (10/30/23 0948)  SpO2: 98 % (10/30/23 0948) Vital Signs (24h Range):  Temp:  [97.9 °F (36.6 °C)] 97.9 °F (36.6 °C)  Pulse:  [61] 61  Resp:  [20] 20  SpO2:  [98 %] 98 %  BP: (164)/(78) 164/78                                 Physical Exam  Vitals and nursing note reviewed.   Constitutional:       General: He is not in acute distress.     Appearance: He is well-nourished. He is not diaphoretic.   Eyes:      Extraocular Movements: EOM normal.      Pupils: Pupils are equal, round, and reactive to light.   Cardiovascular:      Rate and Rhythm: Normal rate and regular rhythm.   Neurological:      Mental Status: He is alert and oriented to person, place, and time.   Psychiatric:         Mood and Affect: Mood and affect normal.  "             Physical Exam:  Nursing note and vitals reviewed.    Constitutional: He appears well-nourished. He is not diaphoretic. No distress.     Eyes: Pupils are equal, round, and reactive to light. EOM are normal.     Cardiovascular: Normal rate and regular rhythm.     Psych/Behavior: He is alert. He is oriented to person, place, and time. He has a normal mood and affect.     Musculoskeletal:        Back: Range of motion is limited. There is tenderness. Muscle strength is 5/5.       Right Lower Extremities: Range of motion is full. There is no tenderness. Muscle strength is 5/5. Tone is normal.        Left Lower Extremities: There is no tenderness. Muscle strength is 5/5. Tone is normal.     Neurological:        Sensory: There is no sensory deficit in the trunk. There is no sensory deficit in the extremities.        Cranial nerves: Cranial nerve(s) II, III, IV, V, VI, VII, VIII, IX, X, XI and XII are intact.       Significant Labs:  No results for input(s): "GLU", "NA", "K", "CL", "CO2", "BUN", "CREATININE", "CALCIUM", "MG" in the last 48 hours.  No results for input(s): "WBC", "HGB", "HCT", "PLT" in the last 48 hours.  No results for input(s): "LABPT", "INR", "APTT" in the last 48 hours.  Microbiology Results (last 7 days)       ** No results found for the last 168 hours. **          All pertinent labs from the last 24 hours have been reviewed.    "

## 2023-10-31 ENCOUNTER — TELEPHONE (OUTPATIENT)
Dept: NEUROSURGERY | Facility: CLINIC | Age: 83
End: 2023-10-31
Payer: MEDICARE

## 2023-10-31 NOTE — TELEPHONE ENCOUNTER
Returned patient call. Patient states he is nervous regarding surgery and wish to push surgery back. Patient states he would like to schedule a follow up appointment with Dr. Castro to discuss his concerns regarding surgery . Surgery canceled per patient's request. Assisted patient with scheduling a follow up appointment with Dr. Castro patient verbalized understanding of appointment time and location.

## 2023-10-31 NOTE — TELEPHONE ENCOUNTER
----- Message from Angy Abreu sent at 10/30/2023  1:25 PM CDT -----  Contact: gerda/ spouse  Gerda is calling to speak to the nurse regarding the patient  scheduled surgery, she have a few questions and she is asking for a call back asap at  [phone]    Thanks  JAMES

## 2023-11-14 ENCOUNTER — LAB VISIT (OUTPATIENT)
Dept: LAB | Facility: HOSPITAL | Age: 83
End: 2023-11-14
Attending: UROLOGY
Payer: MEDICARE

## 2023-11-14 DIAGNOSIS — C61 PROSTATE CANCER: ICD-10-CM

## 2023-11-14 PROCEDURE — 84153 ASSAY OF PSA TOTAL: CPT | Mod: HCNC | Performed by: UROLOGY

## 2023-11-14 PROCEDURE — 36415 COLL VENOUS BLD VENIPUNCTURE: CPT | Mod: HCNC,PN | Performed by: UROLOGY

## 2023-11-15 LAB — COMPLEXED PSA SERPL-MCNC: 1.4 NG/ML (ref 0–4)

## 2023-11-20 ENCOUNTER — OFFICE VISIT (OUTPATIENT)
Dept: OPHTHALMOLOGY | Facility: CLINIC | Age: 83
End: 2023-11-20
Payer: MEDICARE

## 2023-11-20 DIAGNOSIS — H40.1111 PRIMARY OPEN ANGLE GLAUCOMA (POAG) OF RIGHT EYE, MILD STAGE: Primary | ICD-10-CM

## 2023-11-20 DIAGNOSIS — Z96.1 PSEUDOPHAKIA: ICD-10-CM

## 2023-11-20 DIAGNOSIS — H40.1122 PRIMARY OPEN ANGLE GLAUCOMA (POAG) OF LEFT EYE, MODERATE STAGE: ICD-10-CM

## 2023-11-20 PROCEDURE — 1160F RVW MEDS BY RX/DR IN RCRD: CPT | Mod: HCNC,CPTII,S$GLB, | Performed by: OPHTHALMOLOGY

## 2023-11-20 PROCEDURE — 1126F AMNT PAIN NOTED NONE PRSNT: CPT | Mod: HCNC,CPTII,S$GLB, | Performed by: OPHTHALMOLOGY

## 2023-11-20 PROCEDURE — 1126F PR PAIN SEVERITY QUANTIFIED, NO PAIN PRESENT: ICD-10-PCS | Mod: HCNC,CPTII,S$GLB, | Performed by: OPHTHALMOLOGY

## 2023-11-20 PROCEDURE — 99213 PR OFFICE/OUTPT VISIT, EST, LEVL III, 20-29 MIN: ICD-10-PCS | Mod: HCNC,S$GLB,, | Performed by: OPHTHALMOLOGY

## 2023-11-20 PROCEDURE — 92133 POSTERIOR SEGMENT OCT OPTIC NERVE(OCULAR COHERENCE TOMOGRAPHY) - OU - BOTH EYES: ICD-10-PCS | Mod: HCNC,S$GLB,, | Performed by: OPHTHALMOLOGY

## 2023-11-20 PROCEDURE — 99213 OFFICE O/P EST LOW 20 MIN: CPT | Mod: HCNC,S$GLB,, | Performed by: OPHTHALMOLOGY

## 2023-11-20 PROCEDURE — 1159F PR MEDICATION LIST DOCUMENTED IN MEDICAL RECORD: ICD-10-PCS | Mod: HCNC,CPTII,S$GLB, | Performed by: OPHTHALMOLOGY

## 2023-11-20 PROCEDURE — 1159F MED LIST DOCD IN RCRD: CPT | Mod: HCNC,CPTII,S$GLB, | Performed by: OPHTHALMOLOGY

## 2023-11-20 PROCEDURE — 99999 PR PBB SHADOW E&M-EST. PATIENT-LVL III: CPT | Mod: PBBFAC,HCNC,, | Performed by: OPHTHALMOLOGY

## 2023-11-20 PROCEDURE — 1160F PR REVIEW ALL MEDS BY PRESCRIBER/CLIN PHARMACIST DOCUMENTED: ICD-10-PCS | Mod: HCNC,CPTII,S$GLB, | Performed by: OPHTHALMOLOGY

## 2023-11-20 PROCEDURE — 92133 CPTRZD OPH DX IMG PST SGM ON: CPT | Mod: HCNC,S$GLB,, | Performed by: OPHTHALMOLOGY

## 2023-11-20 PROCEDURE — 99999 PR PBB SHADOW E&M-EST. PATIENT-LVL III: ICD-10-PCS | Mod: PBBFAC,HCNC,, | Performed by: OPHTHALMOLOGY

## 2023-11-20 NOTE — PROGRESS NOTES
SUBJECTIVE  Ezequiel Hughes is 83 y.o. male  Uncorrected distance visual acuity was 20/40 +1 in the right eye and 20/30 in the left eye.   Chief Complaint   Patient presents with    Glaucoma     4 Mo COAG recheck with GOCT: Pt states no vision changes and no pain or irritation today.          HPI     Glaucoma     Additional comments: 4 Mo COAG recheck with GOCT: Pt states no vision   changes and no pain or irritation today.           Comments    1. Mod COAG OS + Mild COAG OD (init 22/26 post dilation IOP ) goal = 17  SLT OD 1/18/23 (19.5-12)  SLT OS 12/2/20 (19.5-15)  2. PCIOL / I-Stent OD +18.5 [id number] (distance) 2-21-18  PCIOL /I-Stent OS +21.0 (-1.75) 3/21/18 near  3. ERM OU   4. Brow Lift 9/18   *intolerant of travatan*      No eyedrops             Last edited by Tonie Garland on 11/20/2023  8:09 AM.         Assessment /Plan :  1. Primary open angle glaucoma (POAG) of right eye, mild stage Doing well, intraocular pressure (IOP) within acceptable range relative to target IOP and no evidence of progression. Continue current treatment. Reviewed importance of continued compliance with treatment and follow up.      Return to clinic in 4 months  or as needed.  With IOP Check     2. Primary open angle glaucoma (POAG) of left eye, moderate stage    3. Pseudophakia  -- Condition stable, no therapeutic change required. Monitoring routinely.

## 2023-11-21 ENCOUNTER — OFFICE VISIT (OUTPATIENT)
Dept: UROLOGY | Facility: CLINIC | Age: 83
End: 2023-11-21
Payer: MEDICARE

## 2023-11-21 VITALS
TEMPERATURE: 98 F | DIASTOLIC BLOOD PRESSURE: 70 MMHG | RESPIRATION RATE: 16 BRPM | WEIGHT: 200.38 LBS | HEIGHT: 70 IN | HEART RATE: 76 BPM | SYSTOLIC BLOOD PRESSURE: 143 MMHG | BODY MASS INDEX: 28.69 KG/M2

## 2023-11-21 DIAGNOSIS — N40.1 BPH WITH OBSTRUCTION/LOWER URINARY TRACT SYMPTOMS: ICD-10-CM

## 2023-11-21 DIAGNOSIS — R39.15 URINARY URGENCY: ICD-10-CM

## 2023-11-21 DIAGNOSIS — N13.8 BPH WITH OBSTRUCTION/LOWER URINARY TRACT SYMPTOMS: ICD-10-CM

## 2023-11-21 DIAGNOSIS — C61 PROSTATE CANCER: Primary | ICD-10-CM

## 2023-11-21 PROCEDURE — 3078F PR MOST RECENT DIASTOLIC BLOOD PRESSURE < 80 MM HG: ICD-10-PCS | Mod: HCNC,CPTII,S$GLB, | Performed by: UROLOGY

## 2023-11-21 PROCEDURE — 1159F PR MEDICATION LIST DOCUMENTED IN MEDICAL RECORD: ICD-10-PCS | Mod: HCNC,CPTII,S$GLB, | Performed by: UROLOGY

## 2023-11-21 PROCEDURE — 1101F PR PT FALLS ASSESS DOC 0-1 FALLS W/OUT INJ PAST YR: ICD-10-PCS | Mod: HCNC,CPTII,S$GLB, | Performed by: UROLOGY

## 2023-11-21 PROCEDURE — 3078F DIAST BP <80 MM HG: CPT | Mod: HCNC,CPTII,S$GLB, | Performed by: UROLOGY

## 2023-11-21 PROCEDURE — 3077F SYST BP >= 140 MM HG: CPT | Mod: HCNC,CPTII,S$GLB, | Performed by: UROLOGY

## 2023-11-21 PROCEDURE — 3077F PR MOST RECENT SYSTOLIC BLOOD PRESSURE >= 140 MM HG: ICD-10-PCS | Mod: HCNC,CPTII,S$GLB, | Performed by: UROLOGY

## 2023-11-21 PROCEDURE — 99214 PR OFFICE/OUTPT VISIT, EST, LEVL IV, 30-39 MIN: ICD-10-PCS | Mod: HCNC,S$GLB,, | Performed by: UROLOGY

## 2023-11-21 PROCEDURE — 1126F AMNT PAIN NOTED NONE PRSNT: CPT | Mod: HCNC,CPTII,S$GLB, | Performed by: UROLOGY

## 2023-11-21 PROCEDURE — 99999 PR PBB SHADOW E&M-EST. PATIENT-LVL IV: CPT | Mod: PBBFAC,HCNC,, | Performed by: UROLOGY

## 2023-11-21 PROCEDURE — 3288F PR FALLS RISK ASSESSMENT DOCUMENTED: ICD-10-PCS | Mod: HCNC,CPTII,S$GLB, | Performed by: UROLOGY

## 2023-11-21 PROCEDURE — 1101F PT FALLS ASSESS-DOCD LE1/YR: CPT | Mod: HCNC,CPTII,S$GLB, | Performed by: UROLOGY

## 2023-11-21 PROCEDURE — 99214 OFFICE O/P EST MOD 30 MIN: CPT | Mod: HCNC,S$GLB,, | Performed by: UROLOGY

## 2023-11-21 PROCEDURE — 3288F FALL RISK ASSESSMENT DOCD: CPT | Mod: HCNC,CPTII,S$GLB, | Performed by: UROLOGY

## 2023-11-21 PROCEDURE — 99999 PR PBB SHADOW E&M-EST. PATIENT-LVL IV: ICD-10-PCS | Mod: PBBFAC,HCNC,, | Performed by: UROLOGY

## 2023-11-21 PROCEDURE — 1159F MED LIST DOCD IN RCRD: CPT | Mod: HCNC,CPTII,S$GLB, | Performed by: UROLOGY

## 2023-11-21 PROCEDURE — 1126F PR PAIN SEVERITY QUANTIFIED, NO PAIN PRESENT: ICD-10-PCS | Mod: HCNC,CPTII,S$GLB, | Performed by: UROLOGY

## 2023-11-21 RX ORDER — SOLIFENACIN SUCCINATE 5 MG/1
10 TABLET, FILM COATED ORAL DAILY
Qty: 90 TABLET | Refills: 0 | Status: SHIPPED | OUTPATIENT
Start: 2023-11-21 | End: 2024-11-20

## 2023-11-21 NOTE — PROGRESS NOTES
"    CC: follow up prostate cancer    History of Present Illness:   Ezequiel Hughes is a 83 y.o. male here for evaluation of Benign Prostatic Hypertrophy    11/21/23-IPSS 24, QoL 5 (unhappy). Nocutria x 4. He is currently on alfuzosin and finasteride but is out of the solifenacin. Took it for 1 month and it didn't help. Would like to try something stronger. Primary bother is urgency. No stranguria or difficulty emptying. Nocturia x 4.   8/11/23-Pt on proscar. He quit detrol because he wet the bed. Nocturia x 2-6. Stream is not always good. Hasn't tried Viagra yet. Was in the ED the other day with chest pain and had a UA, which showed 1+ blood, but no RBCs. Wearing his C-pap.   5/5/23-Pt reports that he is on finasteride, but isn't taking alfuzosin. Nocturia x 5-6. He has a little slight "leakage" throughout the day. He does have urgency. Didn't have any improvement with alfuzosin.   9/28/22-On finasteride. Nocturia x 4-5. Tried flomax a long time ago and it didn't help. Drinks 2 cups of coffee in the am. Not drinking about an hour before bed. No gross hematuria.   6/30/22-Nocturia x 1 lately, but prior to the last 2 nights, it has been 4 times. Still on finasteride. He does have urgency and occasional UUI. If he goes out, he wears a pad. Stream is weak. No hesitancy. Recently, visits have gone to every 6 months. Pt reports occasional RLQ pain. He does have come constipation, but pain doesn't change with BM. Persistent for a month.    Prior records indicate last MRI and TRUS biopsy in 2020.   3/29/22- 83yo male with h/o Prostate cancer, here to establish care. He has previously seen Dr. Wong and was undergoing active surveillance. He reports that he was diagnosed with prostate cancer in 2014. He hasn't had any treatment. He is currently managed on finasteride. He does report some UUI, small amounts. He had a repeat TRUS biopsy in 2021, and pt reports path was unchanged. Also had MRIs around that time as well and " states that he had a targetable lesion.         Review of Systems   Respiratory:  Negative for shortness of breath.    Cardiovascular:  Negative for chest pain and palpitations.   Genitourinary:  Negative for hematuria.   Musculoskeletal:  Positive for back pain and neck pain.   Neurological:  Negative for dizziness.   All other systems reviewed and are negative.        Past Medical History:   Diagnosis Date    Allergic rhinitis     Anemia     Back pain     BPH (benign prostatic hyperplasia)     Cancer     skin cancer to neck, Dr. Graves    Cataract     Disorder of kidney and ureter     ED (erectile dysfunction)     OMARI (generalized anxiety disorder) 8/7/2023    Hiatal hernia     small    History of colon polyps     colonoscopy 11/2016    HLD (hyperlipidemia)     Hyperlipidemia     Hypertension     Lateral epicondylitis     Lumbar radiculopathy 1/26/2022    OA (osteoarthritis)     GUIDO (obstructive sleep apnea)     Prostate cancer     Dr. Wong    TIA (transient ischemic attack)     Trouble in sleeping     Urge incontinence 3/29/2022       Past Surgical History:   Procedure Laterality Date    ANGIOGRAPHY OF UPPER EXTREMITY Left 07/05/2022    Procedure: Angiogram Extremity Bilateral;  Surgeon: Aparna Thompson MD;  Location: [facility] CATH LAB;  Service: Cardiology;  Laterality: Left;    ARTERIOGRAPHY OF AORTIC ROOT N/A 07/05/2022    Procedure: ARTERIOGRAM, AORTIC ROOT;  Surgeon: Aparna Thompson MD;  Location: [facility] CATH LAB;  Service: Cardiology;  Laterality: N/A;    CATARACT EXTRACTION W/  INTRAOCULAR LENS IMPLANT Right 02/21/2018    I-Stent    CATARACT EXTRACTION W/  INTRAOCULAR LENS IMPLANT Left 03/21/2018    I - Stent    CATHETERIZATION OF BOTH LEFT AND RIGHT HEART N/A 07/05/2022    Procedure: CATHETERIZATION, HEART, BOTH LEFT AND RIGHT;  Surgeon: Aparna Thompson MD;  Location: [facility] CATH LAB;  Service: Cardiology;  Laterality: N/A;  radial approach    COLONOSCOPY N/A 11/14/2016    Procedure: COLONOSCOPY;   Surgeon: Karuna Rodriguez MD;  Location: [facility];  Service: Endoscopy;  Laterality: N/A;    COLONOSCOPY N/A 09/22/2020    Procedure: COLONOSCOPY;  Surgeon: Chuy Fish MD;  Location: [facility]O;  Service: Endoscopy;  Laterality: N/A;    EYE SURGERY      HEMORRHOID SURGERY      I-STENT Right 02/21/2018    DR. REECE    INJECTION OF ANESTHETIC AGENT AROUND MEDIAL BRANCH NERVES INNERVATING CERVICAL FACET JOINT Bilateral 7/18/2023    Procedure: Bilateral C4-6 MBB with RN IV sedation (diagnostic, #1);  Surgeon: Abel Haywood MD;  Location: [facility] PAIN MGT;  Service: Pain Management;  Laterality: Bilateral;    KNEE ARTHROSCOPY W/ MENISCAL REPAIR Right 2015    Dr. Jain    PCIOL Right 02/21/2018    DR. REECE    PLANTAR FASCIA RELEASE      right    ROTATOR CUFF REPAIR Bilateral     bilateral    SELECTIVE INJECTION OF ANESTHETIC AGENT AROUND LUMBAR SPINAL NERVE ROOT BY TRANSFORAMINAL APPROACH Bilateral 01/26/2022    Procedure: Bilateral L4/5 TF IAN with RN IV sedation;  Surgeon: Mak Young MD;  Location: [facility] PAIN MGT;  Service: Pain Management;  Laterality: Bilateral;    SELECTIVE INJECTION OF ANESTHETIC AGENT AROUND LUMBAR SPINAL NERVE ROOT BY TRANSFORAMINAL APPROACH Bilateral 03/14/2022    Procedure: Bilateral L4/5 TF IAN with RN IV sedation;  Surgeon: Mak Young MD;  Location: [facility] PAIN [facility]GT;  Service: Pain Management;  Laterality: Bilateral;    SELECTIVE INJECTION OF ANESTHETIC AGENT AROUND LUMBAR SPINAL NERVE ROOT BY TRANSFORAMINAL APPROACH Bilateral 06/02/2022    Procedure: Bilateral L4/5 TF IAN [place]. [place] [facility]  Surgeon: Abel Haywood MD;  Location: [facility];  Service: Pain Management;  Laterality: Bilateral;    SELECTIVE INJECTION OF ANESTHETIC AGENT AROUND LUMBAR SPINAL NERVE ROOT BY TRANSFORAMINAL APPROACH Bilateral 08/25/2022    Procedure: Bilateral L4/5 TF IAN;  Surgeon: Abel Haywood MD;  Location: [facility];  Service: Pain Management;  Laterality: Bilateral;     SELECTIVE INJECTION OF ANESTHETIC AGENT AROUND LUMBAR SPINAL NERVE ROOT BY TRANSFORAMINAL APPROACH Bilateral 2023    Procedure: Bilateral L4/5 TF IAN RN IV Sedation;  Surgeon: Abel Haywood MD;  Location: [facility] PAIN MGT;  Service: Pain Management;  Laterality: Bilateral;    SHOULDER SURGERY Bilateral around     Dr. Pepper.  rotator cuff surgeries    VASECTOMY         Family History   Problem Relation Age of Onset    Lung cancer Father         life long smoker    Cancer Father         prostate, lung    Arthritis Mother     Stroke Sister         TIA    Cataracts Sister     Cancer Brother         prostate    Cataracts Brother     Cataracts Sister     Melanoma Neg Hx     Psoriasis Neg Hx     Lupus Neg Hx     Eczema Neg Hx     Diabetes Neg Hx     Heart disease Neg Hx     Kidney disease Neg Hx     Colon cancer Neg Hx        Social History     Tobacco Use    Smoking status: Former     Current packs/day: 0.00     Average packs/day: 3.0 packs/day for 35.0 years (104.9 ttl pk-yrs)     Types: Cigarettes     Start date: 1960     Quit date: 1985     Years since quittin.3     Passive exposure: Past    Smokeless tobacco: Never   Substance Use Topics    Alcohol use: Yes     Alcohol/week: 6.0 standard drinks of alcohol     Types: 6 Drinks containing 0.5 oz of alcohol per week     Comment: socially    Drug use: No       Current Outpatient Medications   Medication Sig Dispense Refill    acetaminophen (TYLENOL ARTHRITIS PAIN ORAL) Take by mouth. Patient states that he takes 2 tablets in the morning and 2 tablets in the evening      alfuzosin (UROXATRAL) 10 mg Tb24 Take 10 mg by mouth daily with breakfast.      amLODIPine (NORVASC) 5 MG tablet Take 1 tablet (5 mg total) by mouth 2 (two) times daily. 180 tablet 3    atorvastatin (LIPITOR) 40 MG tablet TAKE 1 TABLET EVERY DAY 90 tablet 3    citalopram (CELEXA) 10 MG tablet Take 1 tablet (10 mg total) by mouth once daily. For anxiety 90 tablet 3    clopidogreL  (PLAVIX) 75 mg tablet TAKE 1 TABLET EVERY DAY 90 tablet 2    finasteride (PROSCAR) 5 mg tablet TAKE 1 TABLET (5 MG TOTAL) BY MOUTH ONCE DAILY. 90 tablet 4    fluticasone propionate (FLONASE) 50 mcg/actuation nasal spray USE 2 SPRAYS IN EACH NOSTRIL ONE TIME DAILY  (SUBSTITUTED FOR FLONASE) 48 g 3    losartan (COZAAR) 50 MG tablet TAKE 1 TABLET TWICE DAILY 180 tablet 3    pantoprazole (PROTONIX) 40 MG tablet TAKE 1 TABLET EVERY DAY 90 tablet 3    sildenafiL (VIAGRA) 100 MG tablet Take 1 po 45 minutes before intercourse 30 tablet 7    solifenacin (VESICARE) 5 MG tablet Take 2 tablets (10 mg total) by mouth once daily. 90 tablet 0     No current facility-administered medications for this visit.       Review of patient's allergies indicates:   Allergen Reactions    Atarax [hydroxyzine hcl] Other (See Comments)     Raised blood pressure     Zyrtec [cetirizine] Other (See Comments)     Raised blood pressure     Gold sodium thiosulfate      Patch test positive    Meloxicam Rash     Other reaction(s): hypertension       Physical Exam  Vitals:    11/21/23 0827   BP: (!) 143/70   Pulse: 76   Resp: 16   Temp: 98.3 °F (36.8 °C)         General: Well-developed, well-nourished in no acute distress  HEENT: Normocephalic, atraumatic, Extraocular movements intact  Neck: supple, trachea midline, no cervical or supraclavicular lymphadenopathy  Respirations: even and unlabored  Back: midline spine, no CVA tenderness  Abdomen: soft, Non-tender, non-distended, no organomegaly or palpable masses, no rebound or guarding  Rectal: 11/21/23->40g prostate, no nodules or tenderness. No gross blood  Extremities: atraumatic, moves all equally, no clubbing, cyanosis or edema  Psych: normal affect  Skin: warm and dry, no lesions  Neuro: Alert and oriented, Cranial nerves II-XII grossly intact    PVR: 29cc 5/3/23      PSA  7/7/21: 1.3  3/23/22: 1.5  6/16/22: 1.4  9/26/22: 1.5  12/27/22: 1.9  2/9/23: 1.3  8/3/23: 2.0  11/14/23: 1.4    Assessment:    1. Prostate cancer  Prostate Specific Antigen, Diagnostic      2. Urinary urgency        3. BPH with obstruction/lower urinary tract symptoms                    Plan:  Prostate cancer  -     Prostate Specific Antigen, Diagnostic; Future; Expected date: 02/21/2024    Urinary urgency    BPH with obstruction/lower urinary tract symptoms    Other orders  -     solifenacin (VESICARE) 5 MG tablet; Take 2 tablets (10 mg total) by mouth once daily.  Dispense: 90 tablet; Refill: 0    Continue alfuzosin and finasteride      Follow up in about 3 months (around 2/21/2024) for labs before visit.

## 2024-01-05 ENCOUNTER — TELEPHONE (OUTPATIENT)
Dept: PRIMARY CARE CLINIC | Facility: CLINIC | Age: 84
End: 2024-01-05
Payer: MEDICARE

## 2024-01-05 ENCOUNTER — OFFICE VISIT (OUTPATIENT)
Dept: URGENT CARE | Facility: CLINIC | Age: 84
End: 2024-01-05
Payer: MEDICARE

## 2024-01-05 VITALS
BODY MASS INDEX: 28 KG/M2 | WEIGHT: 200 LBS | OXYGEN SATURATION: 98 % | HEART RATE: 61 BPM | RESPIRATION RATE: 16 BRPM | SYSTOLIC BLOOD PRESSURE: 168 MMHG | TEMPERATURE: 98 F | HEIGHT: 71 IN | DIASTOLIC BLOOD PRESSURE: 70 MMHG

## 2024-01-05 DIAGNOSIS — R03.0 ELEVATED BLOOD PRESSURE READING: ICD-10-CM

## 2024-01-05 DIAGNOSIS — G89.29 CHRONIC RIGHT SHOULDER PAIN: Primary | ICD-10-CM

## 2024-01-05 DIAGNOSIS — M25.511 CHRONIC RIGHT SHOULDER PAIN: Primary | ICD-10-CM

## 2024-01-05 PROCEDURE — 96372 THER/PROPH/DIAG INJ SC/IM: CPT | Mod: S$GLB,,, | Performed by: PHYSICIAN ASSISTANT

## 2024-01-05 PROCEDURE — 99214 OFFICE O/P EST MOD 30 MIN: CPT | Mod: 25,S$GLB,, | Performed by: PHYSICIAN ASSISTANT

## 2024-01-05 RX ORDER — KETOROLAC TROMETHAMINE 30 MG/ML
30 INJECTION, SOLUTION INTRAMUSCULAR; INTRAVENOUS ONCE
Status: COMPLETED | OUTPATIENT
Start: 2024-01-05 | End: 2024-01-05

## 2024-01-05 RX ADMIN — KETOROLAC TROMETHAMINE 30 MG: 30 INJECTION, SOLUTION INTRAMUSCULAR; INTRAVENOUS at 10:01

## 2024-01-05 NOTE — PATIENT INSTRUCTIONS
R.I.C.E:    Rest, apply ice intermittently, compress with ace wrap, and elevate above heart level as much as possible.  Do not apply ice directly to skin. Wrap ice in cloth before applying.      OTC Tylenol (acetaminophen) up to 4,000 mg a day is safe for short periods and can be used for pain, and fever. However in high doses and prolonged use it can cause liver irritation.    OTC Ibuprofen (NSAID) is a non-steroidal anti-inflammatory that can be used for pain. However it can also cause stomach irritation if over used.    Of note: Aleve, Motrin, Advil, Mobic, meloxicam, and indomethacin are also other names of NSAIDs.    OTC Voltaren topical cream may be applied for relief, as long as you do not have any allergy to the ingredients.    You will need to follow-up with orthopedics if your symptoms persist, as we discussed.    ELEVATED BLOOD PRESSURE READING:    - Your blood pressure was noted to be elevated in clinic today.-- please keep an eye on blood pressures.  - Record blood pressures and follow up with primary care doctor if blood pressures continue to be elevated.  - Here are a few generalized recommended lifestyle modifications proven to lower BPs--DASH diet, exercise, weight loss, reducing stress.  *Of note: above recommendations are generalized conservative lifestyle modifications that include but are not limited to above list.   Please do not attempt any of the above recommendations if they do not pertain to you or should cause overall detriment to your health.   Please call the clinic or your PCP with any questions you may have in regards to BP and lifestyle modifications.        If you have been discharged from the clinic prior to your point of care test results being completed, please make sure to check your [facility] account.  If there is a change in treatment, we will communicate with you through here.  If your test is positive, and medications are ordered, these will be sent to your preferred pharmacy.    If your test is negative, no further steps needed. If you do not hear from us or have questions, please call the clinic.      - You must understand that you have received an Urgent Care treatment only and that you may be released before all of your medical problems are known or treated.   - You, the patient, will arrange for follow up care as instructed with your primary care provider or recommended specialist.   - If your condition worsens or fails to improve we recommend that you receive another evaluation at the ER immediately or contact your PCP to discuss your concerns, or return here.   - Please do not drive or make any important decisions for 24 hours if you have received any pain medications, sedatives or mood altering drugs during your visit.    Disclaimer: This document was drafted with the use of a voice recognition device and is likely to have sound alike errors.

## 2024-01-05 NOTE — TELEPHONE ENCOUNTER
----- Message from Rebecca Manning sent at 1/5/2024  9:23 AM CST -----  Contact: Ezequiel Canales called to see if he could be fit in to have an injection in his neck for neck/shoulder pain. Please call him back urgently at [phone].    Thanks  TS

## 2024-01-05 NOTE — PROGRESS NOTES
"Subjective:      Patient ID: Ezequiel Hughes is a 83 y.o. male.    Vitals:  height is 5' 10.5" (1.791 m) and weight is 90.7 kg (200 lb). His oral temperature is 97.7 °F (36.5 °C). His blood pressure is 168/70 (abnormal) and his pulse is 61. His respiration is 16 and oxygen saturation is 98%.     Chief Complaint: Shoulder Pain    Patient presents with pain in the right shoulder that began 3-4 weeks ago with no known trauma. The pain is described as a throbbing pain that is greatest in the superior, proximal shoulder. He has a Hx of rotator cuff surgery about 30 years ago. He has been taking OTC ibuprofen. He has an upcomming appt with his PCP. He is concerned about a pinched nerve.    Shoulder Pain   The pain is present in the right shoulder. This is a new problem. The current episode started 1 to 4 weeks ago. There has been no history of extremity trauma. The problem has been unchanged. Quality: throbbing. The pain is at a severity of 7/10. Associated symptoms include tingling (in the shoulder). Pertinent negatives include no fever, headaches, inability to bear weight, itching, joint locking, joint swelling, limited range of motion, numbness, stiffness or visual symptoms. He has tried heat and cold for the symptoms. Family history includes arthritis. His past medical history is significant for Injuries to Extremity. There is no history of diabetes, gout or migraines.       Constitution: Negative for fever.   Musculoskeletal:  Positive for pain, joint pain and arthritis. Negative for trauma, joint swelling, abnormal ROM of joint, gout, back pain, pain with walking, muscle cramps and muscle ache.   Skin:  Negative for rash and erythema.   Neurological:  Negative for headaches and numbness.      Objective:     Vitals:    01/05/24 0952   BP: (!) 168/70   BP Location: Left arm   Patient Position: Sitting   BP Method: X-Large (Automatic)   Pulse: 61   Resp: 16   Temp: 97.7 °F (36.5 °C)   TempSrc: Oral   SpO2: 98%   Weight: " "90.7 kg (200 lb)   Height: 5' 10.5" (1.791 m)       Physical Exam   Constitutional: He is oriented to person, place, and time. He appears well-developed.   HENT:   Head: Normocephalic and atraumatic. Head is without abrasion, without contusion and without laceration.   Ears:   Right Ear: External ear normal.   Left Ear: External ear normal.   Nose: Nose normal.   Mouth/Throat: Oropharynx is clear and moist and mucous membranes are normal.   Eyes: Conjunctivae, EOM and lids are normal. Pupils are equal, round, and reactive to light.   Neck: Trachea normal and phonation normal. Neck supple. There are no signs of injury. No torticollis present. No decreased range of motion present. No muscular tenderness present.   Cardiovascular: Normal rate, regular rhythm and normal heart sounds.   Pulmonary/Chest: Effort normal and breath sounds normal. No stridor. No respiratory distress.   Musculoskeletal: Normal range of motion.         General: Normal range of motion.      Right shoulder: He exhibits tenderness. He exhibits normal range of motion, no bony tenderness, no swelling, no effusion, no crepitus, no deformity, no laceration, normal pulse and normal strength (soreness).      Left shoulder: Normal.      Right elbow: Normal.     Right wrist: Normal.      Right upper arm: Normal.      Right forearm: Normal.      Right hand: Normal.   Neurological: He is alert and oriented to person, place, and time.   Skin: Skin is warm, dry, intact and no rash. Capillary refill takes less than 2 seconds. No abrasion, No burn, No bruising, No erythema and No ecchymosis   Psychiatric: His speech is normal and behavior is normal. Judgment and thought content normal.   Nursing note and vitals reviewed.      Assessment:     1. Chronic right shoulder pain    2. Elevated blood pressure reading        Plan:       Chronic right shoulder pain  -     ketorolac injection 30 mg    Elevated blood pressure reading          Medical Decision Making: "   Initial Assessment:   States that he has been seeing chiropractor  Urgent Care Management:  IM TORADOL:   30 mg Toradol injection given in clinic today.  Patient denied any kidney, liver, or GI issues.    Patient monitored for 10-15 minutes afterwards with no immediate complications.             - Educated patient regarding medications for symptomatic relief (outlined below).  - Strict ED precautions given for any emergent symptoms.      I have discussed the diagnosis, treatment plan and recommendations for follow-up with primary care, and patient/guardian verbalized understanding and is agreeable to the plan.   AVS printed and given to patient/guardian upon discharge with information regarding this visit. All questions were addressed prior to discharge.      Patient Instructions   R.I.C.E:    Rest, apply ice intermittently, compress with ace wrap, and elevate above heart level as much as possible.  Do not apply ice directly to skin. Wrap ice in cloth before applying.      OTC Tylenol (acetaminophen) up to 4,000 mg a day is safe for short periods and can be used for pain, and fever. However in high doses and prolonged use it can cause liver irritation.    OTC Ibuprofen (NSAID) is a non-steroidal anti-inflammatory that can be used for pain. However it can also cause stomach irritation if over used.    Of note: Aleve, Motrin, Advil, Mobic, meloxicam, and indomethacin are also other names of NSAIDs.    OTC Voltaren topical cream may be applied for relief, as long as you do not have any allergy to the ingredients.    You will need to follow-up with orthopedics if your symptoms persist, as we discussed.    ELEVATED BLOOD PRESSURE READING:    - Your blood pressure was noted to be elevated in clinic today.-- please keep an eye on blood pressures.  - Record blood pressures and follow up with primary care doctor if blood pressures continue to be elevated.  - Here are a few generalized recommended lifestyle modifications  proven to lower BPs--DASH diet, exercise, weight loss, reducing stress.  *Of note: above recommendations are generalized conservative lifestyle modifications that include but are not limited to above list.   Please do not attempt any of the above recommendations if they do not pertain to you or should cause overall detriment to your health.   Please call the clinic or your PCP with any questions you may have in regards to BP and lifestyle modifications.        If you have been discharged from the clinic prior to your point of care test results being completed, please make sure to check your MyChart account.  If there is a change in treatment, we will communicate with you through here.  If your test is positive, and medications are ordered, these will be sent to your preferred pharmacy.   If your test is negative, no further steps needed. If you do not hear from us or have questions, please call the clinic.      - You must understand that you have received an Urgent Care treatment only and that you may be released before all of your medical problems are known or treated.   - You, the patient, will arrange for follow up care as instructed with your primary care provider or recommended specialist.   - If your condition worsens or fails to improve we recommend that you receive another evaluation at the ER immediately or contact your PCP to discuss your concerns, or return here.   - Please do not drive or make any important decisions for 24 hours if you have received any pain medications, sedatives or mood altering drugs during your visit.    Disclaimer: This document was drafted with the use of a voice recognition device and is likely to have sound alike errors.

## 2024-01-19 ENCOUNTER — TELEPHONE (OUTPATIENT)
Dept: PAIN MEDICINE | Facility: CLINIC | Age: 84
End: 2024-01-19
Payer: MEDICARE

## 2024-01-19 NOTE — TELEPHONE ENCOUNTER
----- Message from Leeann Haywood sent at 1/19/2024  1:45 PM CST -----  Regarding: pt called  Name of Who is Calling: GOVIND PAYAN [[id number]]      What is the request in detail: pt is requesting to be seen for shoulder and neck pain. First availability was not until may. Please advise       Can the clinic reply by [facility]: No       What Number to Call Back if not in [facility]: Telephone Information:        [phone]

## 2024-01-21 NOTE — TELEPHONE ENCOUNTER
No care due was identified.  [facility] Embedded Care Due Messages. Reference number: [id number].   1/21/2024 4:10:44 AM CST

## 2024-01-22 ENCOUNTER — OFFICE VISIT (OUTPATIENT)
Dept: PAIN MEDICINE | Facility: CLINIC | Age: 84
End: 2024-01-22
Payer: MEDICARE

## 2024-01-22 VITALS
DIASTOLIC BLOOD PRESSURE: 77 MMHG | HEIGHT: 71 IN | SYSTOLIC BLOOD PRESSURE: 152 MMHG | HEART RATE: 75 BPM | BODY MASS INDEX: 28.94 KG/M2 | WEIGHT: 206.69 LBS | RESPIRATION RATE: 18 BRPM

## 2024-01-22 DIAGNOSIS — M47.812 FACET HYPERTROPHY OF CERVICAL REGION: ICD-10-CM

## 2024-01-22 DIAGNOSIS — M50.30 DDD (DEGENERATIVE DISC DISEASE), CERVICAL: ICD-10-CM

## 2024-01-22 DIAGNOSIS — M54.12 CERVICAL RADICULOPATHY: Primary | ICD-10-CM

## 2024-01-22 PROCEDURE — 99214 OFFICE O/P EST MOD 30 MIN: CPT | Mod: HCNC,S$GLB,, | Performed by: NURSE PRACTITIONER

## 2024-01-22 PROCEDURE — 3077F SYST BP >= 140 MM HG: CPT | Mod: HCNC,CPTII,S$GLB, | Performed by: NURSE PRACTITIONER

## 2024-01-22 PROCEDURE — 1125F AMNT PAIN NOTED PAIN PRSNT: CPT | Mod: HCNC,CPTII,S$GLB, | Performed by: NURSE PRACTITIONER

## 2024-01-22 PROCEDURE — 99999 PR PBB SHADOW E&M-EST. PATIENT-LVL IV: CPT | Mod: PBBFAC,HCNC,, | Performed by: NURSE PRACTITIONER

## 2024-01-22 PROCEDURE — 3078F DIAST BP <80 MM HG: CPT | Mod: HCNC,CPTII,S$GLB, | Performed by: NURSE PRACTITIONER

## 2024-01-22 PROCEDURE — 1159F MED LIST DOCD IN RCRD: CPT | Mod: HCNC,CPTII,S$GLB, | Performed by: NURSE PRACTITIONER

## 2024-01-22 PROCEDURE — 1101F PT FALLS ASSESS-DOCD LE1/YR: CPT | Mod: HCNC,CPTII,S$GLB, | Performed by: NURSE PRACTITIONER

## 2024-01-22 PROCEDURE — 3288F FALL RISK ASSESSMENT DOCD: CPT | Mod: HCNC,CPTII,S$GLB, | Performed by: NURSE PRACTITIONER

## 2024-01-22 RX ORDER — PREGABALIN 75 MG/1
CAPSULE ORAL
Qty: 60 CAPSULE | Refills: 2 | Status: SHIPPED | OUTPATIENT
Start: 2024-01-22 | End: 2024-05-13 | Stop reason: SDUPTHER

## 2024-01-22 RX ORDER — PANTOPRAZOLE SODIUM 40 MG/1
TABLET, DELAYED RELEASE ORAL
Qty: 90 TABLET | Refills: 3 | Status: SHIPPED | OUTPATIENT
Start: 2024-01-22

## 2024-01-22 NOTE — TELEPHONE ENCOUNTER
Refill Decision Note   Ezequiel Hughes  is requesting a refill authorization.  Brief Assessment and Rationale for Refill:  Approve     Medication Therapy Plan:         Comments:     Note composed:1:19 PM 01/22/2024             Appointments     Last Visit   10/26/2023 Valery Ozuna MD   Next Visit   1/31/2024 Valery Ozuna MD

## 2024-01-22 NOTE — PROGRESS NOTES
Established Patient Chronic Pain Note (Follow up visit)    Chief Complaint:   Chief Complaint   Patient presents with    Shoulder Pain    Neck Pain     Patient has pain in nec/shoulder on right side, denies pain in arm.  Pain level 5/10         SUBJECTIVE:  Interval History (1/22/2024):  Patient Ezequiel Hughes presents today for follow-up visit.  Patient was last seen on 8/2/2023. At that time he had a C4-5 MBB which did not provide significant relief. Today his pain is in the base of the neck and radiates to the R shoulder. Pain started a few weeks ago. Pain feels like pins and needles. Today pain is a 5/10 but at it's worst it will be 7/10.   No fall or injury.   He has been to urgent care twice this month for the pain and has received a toradol injection and steroids. He got some temporarily relief from these.  He has chronic lower back pain, has been followed by Dr. Castro. He has been going to the chiropractor  which is providing good relief.  He has been on lyrica in the past but stopped medication when he got relief of radicular symptoms from a previous IAN.    Interval visit 8/2/2023  Ezequiel Hughes is a 83 y.o. male presents today for follow-up chronic neck and lower back pain.  He was last seen for procedure on 07/18/2023 where he underwent bilateral C4-6 diagnostic medial branch blocks that did not provide significant relief unfortunately.  He continues with lower back pain with radicular symptoms into the both lower extremities.  He reports that this tends to occur mostly with ambulatory activities.        Patient denies night fever/night sweats, urinary incontinence, bowel incontinence, significant weight loss, significant motor weakness and loss of sensations.    Pain Disability Index Review:      1/22/2024     1:06 PM 8/2/2023    10:14 AM 6/9/2022     4:08 PM   Last 3 PDI Scores   Pain Disability Index (PDI) 49 7 24     Interval History (6/9/2023):    Ezequiel Hughes presents today for follow-up visit.   Patient was last seen on 09/27/2022. Last injection Bilateral L4/5 TF IAN on 8/25/22with 80-90% pain relief x more than 6 months before pain insidiously returned. He reports same pain as previous, located in his lower back with radiation into both legs, though his right leg is worse than left. He reports his right leg feels weak and swells after prolonged walking, improved with rest. He has completed 37 sessions of physical therapy for his neck and back from 10/20/2022 to 03/14/2022 without relief. Patient reports pain as 4/10 today, but 6/10 on his worst days.      Interval History (9/27/2022):   Ezequiel Hughes presents today for follow-up visit.  he underwent Bilateral L4/5 TF IAN on 8/25/22.  The patient reports that he is/was better following the procedure.  he reports 80-90% pain relief. He reports this IAN was the best so far.  The changes lasted 4 weeks so far.  The changes have continued through this visit.  Patient reports pain as 2/10 today. He does report muscle spasms in his lower right back for the past 3-4 days after prolonged stooping working on a car. He has used heat and massage with some improvement.       Interval HPI  Ezequiel Hughes is a 83 y.o. male who presents to the clinic for a follow-up appointment for back and leg pain.  He presents today after undergoing a 3rd lumbar TFESI bilaterally at L4-5 on 06/02/2022.  He reports that this resulted in 100% relief.  Since the last visit, Ezequiel Hughes states the pain has been resolved. Current pain intensity is 0/10.      Interval Hx: 2/24/22  Presents status post bilateral L4/5 transforaminal epidural steroid injection January 26, 2022. Patient reports having 100% relief in lower extremity radicular symptoms following epidural steroid injection.  This pain level varies based upon his activity throughout the day.  Patient reports as he is more active he does notice radicular symptoms down the lateral aspects of bilateral lower extremities to the feet.   Patient reports discontinuing Lyrica the day following his injection which he believes caused paresthesias to insidiously return.  Patient does report improvement in functionality including range of motion and strength following his injection.  Patient is interested in repeat intervention.  Patient denies any side effects at the Lyrica dose of 225 mg twice a day.     Interval Hx: 1/13/22  Patient presents for MRI cervical and lumbar review.  Today patient again reports lower back pain which radiates down the anterior aspects of bilateral lower extremities in L4 distribution to the foot.  Today pain is rated as a 4/10.  Pain is exacerbated with prolonged standing and with ambulation the patient reports he is able to ambulate at least 1 mi on the treadmill before requiring rest.  He also reports neck pain which radiates in bilateral trapezius distributions in C5-6 distribution.  Patient has continued Lyrica and is currently taking 150 mg twice daily.  He is anticipated to increase this dosage next week.  He denies any side effects from this medication.     HPI 12/9/21  Ezequiel Hughes is a 81 y.o. male with past medical history significant for transient ischemic attack, hyperlipidemia, hypertension, right bundle branch block, stage 3 chronic kidney disease, thrombocytopenia and anemia , prostate cancer, gout, obstructive sleep apnea, periodically limb movement disorder who presents to the clinic for the evaluation of neck, lower back and leg pain.  Today patient reports pain began approximately 1 year prior without inciting accident, injury or trauma.  Today patient reports lower back pain which is constant and today is rated as a 3/10.  Patient reports radicular symptoms beginning along the anterior aspects of bilateral lower extremities below the knee to the foot in L4 distribution.  Patient is having difficulty describing the character but believes it is burning in nature.  Pain is exacerbated with prolonged standing  "and with ambulation.  Patient reports he is quite active, goes to the gym and walks on his treadmill and is able to ambulate approximately half to 3/4 of a mi before requiring rest.  Pain is improved with sitting.He denies any bowel or bladder incontinence or saddle anesthesia. Patient has performed physical therapy for the lower back without any improvement in his symptoms.      Patient also reports constant neck pain.  Pain presents in a bandlike distribution in bilateral cervical paraspinous territory and radiates into bilateral trapezius distribution.  Patient also reports numbness in bilateral thumbs.  Pain is exacerbated with cervical flexion, extension and lateral flexion.  Patient denies upper extremity weakness, compromise in hand  strength or dexterity.  Patient has been trialed on gabapentin for what his wife describes as "scalp itching"at a lower dose of 100 mg 3 times daily without any improvement in his neck or in his back pain.           Non-Pharmacologic Treatments:  Physical Therapy/Home Exercise: yes  Ice/Heat:yes  TENS: no  Acupuncture: no  Massage: no  Chiropractic: no    Other: no     Pain Medications:  - Adjuvant Medications: Neurontin (Gabapentin) and Tylenol (Acetaminophen)  - Anti-Coagulants: Plavix ( Clopidogrel)     Pain Procedures:   -01/26/2022:  Bilateral L4/5 TFESI  -03/14/2022: Bilateral L4-5 TFESI  -06/02/2022:  Bilateral L4-5 TFESI D2P per Matthew  -08/25/2022: Bilatearl L4/5 TF IAN  -06/29/2023:  bilateral L4-5 TFESI, limited relief  -07/18/2023:  diagnostic C4-6 medial branch blocks, limited relief        Imaging:   MRI brain 05/12/2022:        MRI lumbar spine 12/22/2021:      MRI cervical spine 12/22/2021:          PMHx,PSHx, Social history, and Family history:  I have reviewed the patient's medical, surgical, social, and family history in detail and updated the computerized patient record.    Review of patient's allergies indicates:   Allergen Reactions    Atarax " [hydroxyzine hcl] Other (See Comments)     Raised blood pressure     Zyrtec [cetirizine] Other (See Comments)     Raised blood pressure     Gold sodium thiosulfate      Patch test positive    Meloxicam Rash     Other reaction(s): hypertension       Current Outpatient Medications   Medication Sig    acetaminophen (TYLENOL ARTHRITIS PAIN ORAL) Take by mouth. Patient states that he takes 2 tablets in the morning and 2 tablets in the evening    alfuzosin (UROXATRAL) 10 mg Tb24 Take 10 mg by mouth daily with breakfast.    amLODIPine (NORVASC) 5 MG tablet Take 1 tablet (5 mg total) by mouth 2 (two) times daily.    atorvastatin (LIPITOR) 40 MG tablet TAKE 1 TABLET EVERY DAY    citalopram (CELEXA) 10 MG tablet Take 1 tablet (10 mg total) by mouth once daily. For anxiety    clopidogreL (PLAVIX) 75 mg tablet TAKE 1 TABLET EVERY DAY    finasteride (PROSCAR) 5 mg tablet TAKE 1 TABLET (5 MG TOTAL) BY MOUTH ONCE DAILY.    fluticasone propionate (FLONASE) 50 mcg/actuation nasal spray USE 2 SPRAYS IN EACH NOSTRIL ONE TIME DAILY  (SUBSTITUTED FOR FLONASE)    losartan (COZAAR) 50 MG tablet TAKE 1 TABLET TWICE DAILY    sildenafiL (VIAGRA) 100 MG tablet Take 1 po 45 minutes before intercourse    solifenacin (VESICARE) 5 MG tablet Take 2 tablets (10 mg total) by mouth once daily.    pantoprazole (PROTONIX) 40 MG tablet TAKE 1 TABLET EVERY DAY    pregabalin (LYRICA) 75 MG capsule Take 1 tablet by mouth daily at bedtime x 1 week, then take 1 tablet twice a day by mouth     No current facility-administered medications for this visit.         REVIEW OF SYSTEMS:    GENERAL:  No weight loss, malaise or fevers.  HEENT:   No recent changes in vision or hearing  NECK:  Negative for lumps, no difficulty with swallowing.  RESPIRATORY:  Negative for cough, wheezing or shortness of breath, patient denies any recent URI.  CARDIOVASCULAR:  Negative for chest pain, leg swelling or palpitations.  GI:  Negative for abdominal discomfort, blood in stools  "or black stools or change in bowel habits.  MUSCULOSKELETAL:  See HPI.  SKIN:  Negative for lesions, rash, and itching.  PSYCH:  No mood disorder or recent psychosocial stressors.  Patients sleep is not disturbed secondary to pain.  HEMATOLOGY/LYMPHOLOGY:  Negative for prolonged bleeding, bruising easily or swollen nodes.  Patient is currently taking anti-coagulants - plavix  NEURO:   No history of headaches, syncope, paralysis, seizures or tremors.  All other reviewed and negative other than HPI.    OBJECTIVE:    BP (!) 152/77   Pulse 75   Resp 18   Ht 5' 10.5" (1.791 m)   Wt 93.8 kg (206 lb 10.9 oz)   BMI 29.24 kg/m²     PHYSICAL EXAMINATION:    GENERAL: Well appearing, in no acute distress, alert and oriented x3.  PSYCH:  Mood and affect appropriate.  SKIN: Skin color, texture, turgor normal, no rashes or lesions.  HEAD/FACE:  Normocephalic, atraumatic. Cranial nerves grossly intact.  NECK:  Axial loading positive bilaterally.  Spurling's equivocal.  Range of motion fair secondary to pain.  CV: RRR with palpation of the radial artery.  PULM: No evidence of respiratory difficulty, symmetric chest rise.  GI:  Soft and non-tender.  BACK:  Forward flexed posture.  Straight leg raising in the sitting and supine positions is negative to radicular pain. No pain to palpation over the facet joints of the lumbar spine or spinous processes.  Fair range of motion with mild pain reproduction.  EXTREMITIES: No deformities, edema, or skin discoloration. Good capillary refill.  MUSCULOSKELETAL: Bilateral upper and lower extremity strength is normal and symmetric.  No atrophy or tone abnormalities are noted.  NEURO: Bilateral upper and lower extremity coordination and muscle stretch reflexes are physiologic and symmetric.  Plantar response are downgoing. No clonus.  No loss of sensation is noted.  GAIT: normal.  General: alert and oriented, in no apparent distress  Gait: normal gait.  Skin: no rashes, no discoloration, no " obvious lesions  HEENT: normocephalic, atraumatic. Pupils equal and round.  Cardiovascular: no significant peripheral edema present.  Respiratory: without use of accessory muscles of respiration.        Neuro - Upper Extremities:  - BUE Strength:R/L: D: 5/5; B: 5/5; T: 5/5; WF: 5/5; WE: 5/5; IO: 5/5  - Extremity Reflexes: Brisk and symmetric throughout  - Sensory: Sensation to light touch intact bilaterally  Positive spurling  FROM cervical spine and upper extremities  No shoulder tenderness  No cervical tenderness      Psych:  Mood and affect is appropriate      LABS:  Lab Results   Component Value Date    WBC 4.10 09/26/2023    HGB 13.0 (L) 09/26/2023    HCT 40.1 09/26/2023     (H) 09/26/2023     (L) 09/26/2023       CMP  Sodium   Date Value Ref Range Status   09/26/2023 139 136 - 145 mmol/L Final     Potassium   Date Value Ref Range Status   09/26/2023 4.3 3.5 - 5.1 mmol/L Final     Chloride   Date Value Ref Range Status   09/26/2023 107 95 - 110 mmol/L Final     CO2   Date Value Ref Range Status   09/26/2023 24 23 - 29 mmol/L Final     Glucose   Date Value Ref Range Status   09/26/2023 88 70 - 110 mg/dL Final     BUN   Date Value Ref Range Status   09/26/2023 24 (H) 8 - 23 mg/dL Final     Creatinine   Date Value Ref Range Status   09/26/2023 1.4 0.5 - 1.4 mg/dL Final     Calcium   Date Value Ref Range Status   09/26/2023 8.7 8.7 - 10.5 mg/dL Final     Total Protein   Date Value Ref Range Status   09/26/2023 6.6 6.0 - 8.4 g/dL Final     Albumin   Date Value Ref Range Status   09/26/2023 3.9 3.5 - 5.2 g/dL Final     Total Bilirubin   Date Value Ref Range Status   09/26/2023 0.9 0.1 - 1.0 mg/dL Final     Comment:     For infants and newborns, interpretation of results should be based  on gestational age, weight and in agreement with clinical  observations.    Premature Infant recommended reference ranges:  Up to 24 hours.............<8.0 mg/dL  Up to 48 hours............<12.0 mg/dL  3-5  days..................<15.0 mg/dL  6-29 days.................<15.0 mg/dL       Alkaline Phosphatase   Date Value Ref Range Status   09/26/2023 53 (L) 55 - 135 U/L Final     AST   Date Value Ref Range Status   09/26/2023 14 10 - 40 U/L Final     ALT   Date Value Ref Range Status   09/26/2023 10 10 - 44 U/L Final     Anion Gap   Date Value Ref Range Status   09/26/2023 8 8 - 16 mmol/L Final     eGFR if    Date Value Ref Range Status   06/29/2022 57.9 (A) >60 mL/min/1.73 m^2 Final     eGFR if non    Date Value Ref Range Status   06/29/2022 50.1 (A) >60 mL/min/1.73 m^2 Final     Comment:     Calculation used to obtain the estimated glomerular filtration  rate (eGFR) is the CKD-EPI equation.          Lab Results   Component Value Date    HGBA1C 5.1 06/27/2023             ASSESSMENT: 83 y.o. year old male with lower back and leg pain, consistent with     1. Cervical radiculopathy  Case Request-RAD/Other Procedure Area: C5/6 IL Right paramedian approach IAN with RN IV sedation      2. Facet hypertrophy of cervical region        3. DDD (degenerative disc disease), cervical                      PLAN:   - Interventions: Schedule C5-6 IL IAN Right paramedian approach  Explained the risks and benefits of the procedure in detail with the patient today in clinic along with alternative treatment options, and the patient elected to pursue the intervention.    Consider repeat MRI cervical spine if IAN does not provide adequate relief  Pt will need to hold plavix x 7 days, will consult Dr. Ozuna  S/p diagnostic C4-6 medial branch blocks on 07/18/2023, limited relief  S/p repeat bilateral L4-5 TFESI on 06/29/2023, limited relief  - Anticoagulation: yes, Plavix   - Medications: I have stressed the importance of physical activity and a home exercise plan to help with pain and improve health. and Patient can continue with medications for now since they are providing benefits, using them appropriately, and  without side effects.  -I will start the patient on a Lyrica titration to see if this helps with neuropathic pain.  We discussed increasing the dose gradually to reach a therapeutic goal according to the following algorithm.  We discussed potential side effects of this medication which may include drowsiness,dizziness, dry mouth, constipation or peripheral edema.  Will start back on Lyrica 75mg daily at bedtime x 1 week  Then  Lyrica 75mg twice a day    - Therapy:  Advised patient to continue with activities as tolerated  - Labs:  Reviewed  - Imaging:  Reviewed MRI cervical spine  - Consults/Referrals:  None today  EConsult to Neurosurgery for consideration of vertiflex procedure for lumbar spinal stenosis  - Records:  Reviewed/Obtain old records from outside physicians and imaging  - Follow up visit:  4 weeks after procedure    - Counseled patient regarding the importance of activity modification and physical therapy  - This condition does not require this patient to take time off of work, and the primary goal of our Pain Management services is to improve the patient's functional capacity.  - Patient Questions: Answered all of the patient's questions regarding diagnosis, therapy, and treatment    The above plan and management options were discussed at length with patient. Patient is in agreement with the above and verbalized understanding.      Maia Lamas NP  Interventional Pain Management  [facility]    Disclaimer:  This note was prepared using voice recognition system and is likely to have sound alike errors that may have been overlooked even after proof reading.  Please call me with any questions

## 2024-01-23 ENCOUNTER — TELEPHONE (OUTPATIENT)
Dept: PAIN MEDICINE | Facility: CLINIC | Age: 84
End: 2024-01-23
Payer: MEDICARE

## 2024-01-23 NOTE — TELEPHONE ENCOUNTER
Cardiac Clearance sent to Dr Ozuna for pt to hold Plavix for 7 days for JASMIN with Dr. Chastity Abarca, FROILANA

## 2024-01-23 NOTE — TELEPHONE ENCOUNTER
----- Message from Maia Lamas NP sent at 1/23/2024 12:15 PM CST -----  Pain Provider:Chastity  Patient Name: Ezequiel Hughes  MRN: [id number]  Case: C5-6 IL IAN Right paramedian approach  Level:C5-6  Laterality:IL  Anticoagulation: plavix  Length to hold: 7 days  Rx Provider: Dr. Ozuna

## 2024-01-24 ENCOUNTER — TELEPHONE (OUTPATIENT)
Dept: PAIN MEDICINE | Facility: CLINIC | Age: 84
End: 2024-01-24
Payer: MEDICARE

## 2024-01-24 NOTE — TELEPHONE ENCOUNTER
----- Message from Valery Ozuna MD sent at 1/23/2024  5:03 PM CST -----  Regarding: RE: Cardiac Clearance  Ok to hold plavix for 7 days prior to cervical epidural procedure.   Valery Ozuna MD    ----- Message -----  From: Joao Abarca MA  Sent: 1/23/2024  12:56 PM CST  To: Valery Ozuna MD  Subject: Cardiac Clearance                                Dr. Ozuna:    Ezequiel Hughes was seen in office with complaints of severe neck pain. Dr. Haywood would like to perform cervical epidural, and Ezequiel Hughes would like to proceed. Dr. Haywood is requesting for clearance to hold clopidogrel (Plavix) 7 days prior to procedure. Patient Ezequiel Hughes can resume medication following the procedure. Please let us know if it is ok to proceed with procedure.    Joao Abarca, [facility] Surgery Scheduler

## 2024-01-24 NOTE — TELEPHONE ENCOUNTER
Cardiac Clearance pt cleared to hold Plavix for 7 days for JASMIN with Dr Haywood. Pt scheduled for Feb 8.    ANA Richey

## 2024-01-31 ENCOUNTER — OFFICE VISIT (OUTPATIENT)
Dept: PRIMARY CARE CLINIC | Facility: CLINIC | Age: 84
End: 2024-01-31
Payer: MEDICARE

## 2024-01-31 ENCOUNTER — LAB VISIT (OUTPATIENT)
Dept: LAB | Facility: HOSPITAL | Age: 84
End: 2024-01-31
Attending: FAMILY MEDICINE
Payer: MEDICARE

## 2024-01-31 VITALS
OXYGEN SATURATION: 98 % | SYSTOLIC BLOOD PRESSURE: 128 MMHG | BODY MASS INDEX: 29.67 KG/M2 | TEMPERATURE: 97 F | WEIGHT: 207.25 LBS | HEIGHT: 70 IN | DIASTOLIC BLOOD PRESSURE: 78 MMHG | HEART RATE: 70 BPM

## 2024-01-31 DIAGNOSIS — M46.94 UNSPECIFIED INFLAMMATORY SPONDYLOPATHY, THORACIC REGION: ICD-10-CM

## 2024-01-31 DIAGNOSIS — I27.20 PULMONARY HYPERTENSION: ICD-10-CM

## 2024-01-31 DIAGNOSIS — M54.16 LUMBAR RADICULOPATHY, CHRONIC: ICD-10-CM

## 2024-01-31 DIAGNOSIS — C61 PROSTATE CANCER: ICD-10-CM

## 2024-01-31 DIAGNOSIS — I10 PRIMARY HYPERTENSION: ICD-10-CM

## 2024-01-31 DIAGNOSIS — N18.32 STAGE 3B CHRONIC KIDNEY DISEASE: ICD-10-CM

## 2024-01-31 DIAGNOSIS — J84.10 LUNG GRANULOMA: ICD-10-CM

## 2024-01-31 DIAGNOSIS — I70.203 ATHEROSCLEROSIS OF ARTERY OF BOTH LOWER EXTREMITIES: ICD-10-CM

## 2024-01-31 DIAGNOSIS — E78.2 MIXED HYPERLIPIDEMIA: ICD-10-CM

## 2024-01-31 DIAGNOSIS — G91.8 OTHER HYDROCEPHALUS: ICD-10-CM

## 2024-01-31 DIAGNOSIS — M54.12 CERVICAL RADICULOPATHY: Primary | ICD-10-CM

## 2024-01-31 DIAGNOSIS — M47.817 LUMBOSACRAL SPONDYLOSIS WITHOUT MYELOPATHY: ICD-10-CM

## 2024-01-31 DIAGNOSIS — D69.6 THROMBOCYTOPENIA: ICD-10-CM

## 2024-01-31 PROCEDURE — 3074F SYST BP LT 130 MM HG: CPT | Mod: HCNC,CPTII,S$GLB, | Performed by: FAMILY MEDICINE

## 2024-01-31 PROCEDURE — 99215 OFFICE O/P EST HI 40 MIN: CPT | Mod: HCNC,S$GLB,, | Performed by: FAMILY MEDICINE

## 2024-01-31 PROCEDURE — 3078F DIAST BP <80 MM HG: CPT | Mod: HCNC,CPTII,S$GLB, | Performed by: FAMILY MEDICINE

## 2024-01-31 PROCEDURE — 1160F RVW MEDS BY RX/DR IN RCRD: CPT | Mod: HCNC,CPTII,S$GLB, | Performed by: FAMILY MEDICINE

## 2024-01-31 PROCEDURE — 99999 PR PBB SHADOW E&M-EST. PATIENT-LVL V: CPT | Mod: PBBFAC,,, | Performed by: FAMILY MEDICINE

## 2024-01-31 PROCEDURE — 36415 COLL VENOUS BLD VENIPUNCTURE: CPT | Mod: PN | Performed by: FAMILY MEDICINE

## 2024-01-31 PROCEDURE — 80053 COMPREHEN METABOLIC PANEL: CPT | Mod: HCNC | Performed by: FAMILY MEDICINE

## 2024-01-31 PROCEDURE — 1159F MED LIST DOCD IN RCRD: CPT | Mod: HCNC,CPTII,S$GLB, | Performed by: FAMILY MEDICINE

## 2024-01-31 RX ORDER — CYCLOBENZAPRINE HCL 5 MG
TABLET ORAL
COMMUNITY
Start: 2024-01-06

## 2024-01-31 NOTE — PROGRESS NOTES
Subjective:      Patient ID: Ezequiel Hughes is a 83 y.o. male.    Chief Complaint: Follow-up (3 month f/u)      Patient is a 83 y.o. male coming in today for 3 month f/u.  Has been f/u with pain management. Pt received neck injection for pain treatment. Has upcoming IAN for C5-6 pain treatment on 2/8/2. He was advised to hold Plavix for 7 days prior to injection. Currently f/u with chiropractor for back pain treatment. Has been taking Ibuprofen for back pain. He continues to have BLE pain almost every day that likely extends from the back. Has been f/u with cardiology; pt has appointment with cardiology next month. He is wanting to lose weight at this time. Discussed exercise and healthy eating habits for weight loss. No other health concern at this time.       1. Cervical radiculopathy    2. Atherosclerosis of artery of both lower extremities    3. Primary hypertension    4. Mixed hyperlipidemia    5. Thrombocytopenia    6. Lung granuloma    7. Prostate cancer    8. Other hydrocephalus    9. 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 Lumbar radiculopathy, chronic    12. Lumbosacral spondylosis without myelopathy    13. Stage 3b chronic kidney disease       [facility]    6/23/2023  8:29 AM CDT - Filed by Patient   On average, how many days per week do you engage in moderate to strenuous exercise (like a brisk walk)? 5 days   On average, how many minutes do you engage in exercise at this level? 30 min   Do you feel stress - tense, restless, nervous, or anxious, or unable to sleep at night because your mind is troubled all the time - these days? Not at all   Do you belong to any clubs or organizations such as [employer] groups, unions, fraternal or athletic groups, or school groups? No   How often do you attend meetings of the clubs or organizations you belong to? Never   In a typical week, how many times do you talk on the phone with family, friends, or neighbors? More  than three times a week   How often do you get together with friends or relatives? More than three times a week   Are you , , , , never , or living with a partner?    How hard is it for you to pay for the very basics like food, housing, medical care, and heating? Not hard at all   Within the past 12 months, you worried that your food would run out before you got the money to buy more. Never true   Within the past 12 months, the food you bought just didnt last and you didnt have money to get more. Never true   In the past 12 months, has lack of transportation kept you from medical appointments or from getting medications? No   In the past 12 months, has lack of transportation kept you from meetings, work, or from getting things needed for daily living? No   How often do you have a drink containing alcohol? 4 or more times a week   How many drinks containing alcohol do you have on a typical day when you are drinking? 1 or 2   How often do you have six or more drinks on one occasion? Never   In the last 12 months, was there a time when you were not able to pay the mortgage or rent on time? No   In the last 12 months, how many places have you lived? (range: at least 0) 1   In the last 12 months, was there a time when you did not have a steady place to sleep or slept in a shelter (including now)? No         Pmh, Psh, Family Hx, Social Hx, HM updated in Epic Tabs today.   Review of Systems   Constitutional:  Negative for activity change, appetite change, chills, fatigue and unexpected weight change.   HENT:  Negative for congestion, ear pain, postnasal drip, sneezing, sore throat and trouble swallowing.    Eyes:  Negative for pain and visual disturbance.   Respiratory:  Negative for cough and shortness of breath.    Cardiovascular:  Negative for chest pain and leg swelling.   Gastrointestinal:  Negative for abdominal pain, constipation, diarrhea, nausea and vomiting.  "  Endocrine: Negative for cold intolerance and heat intolerance.   Genitourinary:  Negative for difficulty urinating, dysuria and flank pain.   Musculoskeletal:  Positive for back pain. Negative for arthralgias, joint swelling and neck pain.        + BLE pain   Skin:  Negative for color change and rash.   Neurological:  Negative for dizziness, seizures and headaches.   Psychiatric/Behavioral:  Negative for behavioral problems, dysphoric mood and sleep disturbance. The patient is not nervous/anxious.      Objective:     Vitals:    01/31/24 0731   BP: 128/78   Pulse: 70   Temp: 96.5 °F (35.8 °C)   SpO2: 98%   Weight: 94 kg (207 lb 3.7 oz)   Height: 5' 10" (1.778 m)     Wt Readings from Last 10 Encounters:   01/31/24 94 kg (207 lb 3.7 oz)   01/22/24 93.8 kg (206 lb 10.9 oz)   01/05/24 90.7 kg (200 lb)   11/21/23 90.9 kg (200 lb 6.4 oz)   10/30/23 88.5 kg (195 lb 1.7 oz)   10/26/23 90 kg (198 lb 6.6 oz)   09/01/23 90.8 kg (200 lb 2.8 oz)   08/23/23 90.3 kg (199 lb 1.2 oz)   08/11/23 89.7 kg (197 lb 12 oz)   08/07/23 91.8 kg (202 lb 7.9 oz)     Physical Exam  Vitals and nursing note reviewed.   Constitutional:       General: He is awake.      Appearance: Normal appearance. He is well-developed, well-groomed and overweight.   HENT:      Head: Normocephalic and atraumatic.      Right Ear: Tympanic membrane and external ear normal.      Left Ear: Tympanic membrane and external ear normal.      Nose: Nose normal.   Eyes:      Conjunctiva/sclera: Conjunctivae normal.   Neck:      Thyroid: No thyromegaly or thyroid tenderness.   Cardiovascular:      Rate and Rhythm: Normal rate and regular rhythm.      Heart sounds: Normal heart sounds.   Pulmonary:      Effort: Pulmonary effort is normal. No accessory muscle usage.      Breath sounds: Normal breath sounds.   Musculoskeletal:      Cervical back: Neck supple. Muscular tenderness (extending to the shoulder) present. Decreased range of motion.   Neurological:      General: No " focal deficit present.      Mental Status: He is alert. Mental status is at baseline.   Psychiatric:         Attention and Perception: Attention normal.         Mood and Affect: Mood normal.         Speech: Speech normal.         Behavior: Behavior normal. Behavior is cooperative.         Thought Content: Thought content normal.         Judgment: Judgment normal.         Assessment:     1. Cervical radiculopathy    2. Atherosclerosis of artery of both lower extremities    3. Primary hypertension    4. Mixed hyperlipidemia    5. Thrombocytopenia    6. Lung granuloma    7. Prostate cancer    8. Other hydrocephalus    9. 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 Lumbar radiculopathy, chronic    12. Lumbosacral spondylosis without myelopathy    13. Stage 3b chronic kidney disease        Plan:   Ezequiel was seen today for follow-up.    Diagnoses and all orders for this visit:    Cervical radiculopathy    Atherosclerosis of artery of both lower extremities    Primary hypertension    Mixed hyperlipidemia  -     Comprehensive Metabolic Panel; Future    Thrombocytopenia    Lung granuloma    Prostate cancer    Other hydrocephalus    Pulmonary hypertension    Unspecified inflammatory spondylopathy, thoracic region  -     Ambulatory referral/consult to Chiropractic; Future    Lumbar radiculopathy, chronic  -     Ambulatory referral/consult to Chiropractic; Future    Lumbosacral spondylosis without myelopathy  -     Ambulatory referral/consult to Chiropractic; Future    Stage 3b chronic kidney disease  -     Comprehensive Metabolic Panel; Future      Lumbar radiculopathy is persisting with lumbar spondylopathy.   Referral given to chiropractor for continued lumbar radiculopathy assessment and examination at pt's preference at external facility.     Ok to hold plavix for 7 days prior to cervical babak procedure.      Other chronic diagnoses were reassessed during today's visit. The  associated diagnoses are linked to their chronic conditions. These conditions are currently stable, and will be monitored through associated labs, imaging, and treated with the associated medications as listed per EPIC in the patient's Medication List. I will refill medications to continue ongoing care as requested per the patient or pharmacy when needed.   Lab work ordered to be completed today to assess kidney function since pt had been using ibuprofen more regularly over the last few months. Has now stopped and has ckd stage 3b. Discussed use of tylenol and lyrica instead.     Advised to continue f/u with pain management as needed.     Advised on regular exercise and healthy eating habits to assist for weight loss. Has significant lumbar back disc and spinal stenosis issues affecting his lower leg strength. Discussed shorter durations of exercise more regularly throughout the day.     Platelet count with thrombocytopenia reviewed with labs and stable. Avoid nsaids.   Instructed to stop Ibuprofen at this time.   Instructed to f/u in 3 months for f/u.     There are no Patient Instructions on file for this visit.    Follow up in about 3 months (around 4/30/2024) for f/u office visit Dr. Ozuna / cierra f/u .      LABS:   Lab Results   Component Value Date    HGBA1C 5.1 06/27/2023    HGBA1C 5.3 01/17/2023    HGBA1C 5.2 07/22/2020      Lab Results   Component Value Date    CHOL 127 07/26/2023    CHOL 138 06/27/2023    CHOL 134 01/17/2023     Lab Results   Component Value Date    LDLCALC 60.4 (L) 07/26/2023    LDLCALC 61.4 (L) 06/27/2023    LDLCALC 67.6 01/17/2023     Lab Results   Component Value Date    WBC 4.10 09/26/2023    HGB 13.0 (L) 09/26/2023    HCT 40.1 09/26/2023     (L) 09/26/2023    CHOL 127 07/26/2023    TRIG 58 07/26/2023    HDL 55 07/26/2023    ALT 10 09/26/2023    AST 14 09/26/2023     09/26/2023    K 4.3 09/26/2023     09/26/2023    CREATININE 1.4 09/26/2023    BUN 24 (H) 09/26/2023     CO2 24 09/26/2023    TSH 2.242 06/27/2023    PSA 1.3 11/10/2021    INR 1.2 06/29/2022    HGBA1C 5.1 06/27/2023         45 minutes of total time spent on the encounter, which includes face to face time and non-face to face time preparing to see the patient (eg, review of tests), Obtaining and/or reviewing separately obtained history, Documenting clinical information in the electronic or other health record, Independently interpreting results (not separately reported) and communicating results to the patient/family/caregiver, or Care coordination (not separately reported).    Scribe Attestation:   I, Cong Marquez, am scribing for, and in the presence of, Dr. Valery Ozuna MD. I performed the above scribed service and the documentation accurately describes the services I performed. I attest to the accuracy of the note.    I, Dr. Valery Ozuna MD, reviewed documentation as scribed above. I personally performed the services described in this documentation.  I agree that the record reflects my personal performance and is accurate and complete. Valery Ozuna MD.    01/31/2024

## 2024-01-31 NOTE — PRE-PROCEDURE INSTRUCTIONS
Spoke with patient regarding procedure scheduled on 2.8     Arrival time 0830     Has patient been sick with fever or on antibiotics within the last 7 days? No     Does the patient have any open wounds, sores or rashes? No     Does the patient have any recent fractures? no     Has patient received a vaccination within the last 7 days? No     Received the COVID vaccination?      Has the patient stopped all medications as directed? plavix 7 days     Does patient have a pacemaker, defibrillator, or implantable stimulator? No     Does the patient have a ride to and from procedure and someone reliable to remain with patient?  estefanía     Is the patient diabetic? no     Does the patient have sleep apnea? Or use O2 at home? jonelle cpap     Is the patient receiving sedation?      Is the patient instructed to remain NPO beginning at midnight the night before their procedure? yes     Procedure location confirmed with patient? Yes     Covid- Denies signs/symptoms. Instructed to notify PAT/MD if any changes.

## 2024-02-01 LAB
ALBUMIN SERPL BCP-MCNC: 3.9 G/DL (ref 3.5–5.2)
ALP SERPL-CCNC: 56 U/L (ref 55–135)
ALT SERPL W/O P-5'-P-CCNC: 14 U/L (ref 10–44)
ANION GAP SERPL CALC-SCNC: 8 MMOL/L (ref 8–16)
AST SERPL-CCNC: 18 U/L (ref 10–40)
BILIRUB SERPL-MCNC: 0.9 MG/DL (ref 0.1–1)
BUN SERPL-MCNC: 29 MG/DL (ref 8–23)
CALCIUM SERPL-MCNC: 9.1 MG/DL (ref 8.7–10.5)
CHLORIDE SERPL-SCNC: 109 MMOL/L (ref 95–110)
CO2 SERPL-SCNC: 24 MMOL/L (ref 23–29)
CREAT SERPL-MCNC: 1.3 MG/DL (ref 0.5–1.4)
EST. GFR  (NO RACE VARIABLE): 53.8 ML/MIN/1.73 M^2
GLUCOSE SERPL-MCNC: 88 MG/DL (ref 70–110)
POTASSIUM SERPL-SCNC: 4.7 MMOL/L (ref 3.5–5.1)
PROT SERPL-MCNC: 6.7 G/DL (ref 6–8.4)
SODIUM SERPL-SCNC: 141 MMOL/L (ref 136–145)

## 2024-02-06 ENCOUNTER — LAB VISIT (OUTPATIENT)
Dept: LAB | Facility: HOSPITAL | Age: 84
End: 2024-02-06
Attending: PHYSICIAN ASSISTANT
Payer: MEDICARE

## 2024-02-06 DIAGNOSIS — C61 PROSTATE CANCER: ICD-10-CM

## 2024-02-06 DIAGNOSIS — I70.0 AORTIC ATHEROSCLEROSIS: ICD-10-CM

## 2024-02-06 DIAGNOSIS — E78.2 MIXED HYPERLIPIDEMIA: ICD-10-CM

## 2024-02-06 DIAGNOSIS — I70.203 ATHEROSCLEROSIS OF ARTERY OF BOTH LOWER EXTREMITIES: ICD-10-CM

## 2024-02-06 PROCEDURE — 80061 LIPID PANEL: CPT | Mod: HCNC | Performed by: PHYSICIAN ASSISTANT

## 2024-02-06 PROCEDURE — 80053 COMPREHEN METABOLIC PANEL: CPT | Mod: HCNC | Performed by: PHYSICIAN ASSISTANT

## 2024-02-06 PROCEDURE — 36415 COLL VENOUS BLD VENIPUNCTURE: CPT | Mod: HCNC,PO | Performed by: UROLOGY

## 2024-02-06 PROCEDURE — 84153 ASSAY OF PSA TOTAL: CPT | Mod: HCNC | Performed by: UROLOGY

## 2024-02-07 ENCOUNTER — OFFICE VISIT (OUTPATIENT)
Dept: UROLOGY | Facility: CLINIC | Age: 84
End: 2024-02-07
Payer: MEDICARE

## 2024-02-07 ENCOUNTER — TELEPHONE (OUTPATIENT)
Dept: CARDIOLOGY | Facility: CLINIC | Age: 84
End: 2024-02-07
Payer: MEDICARE

## 2024-02-07 VITALS — RESPIRATION RATE: 18 BRPM | HEIGHT: 70 IN | BODY MASS INDEX: 29.45 KG/M2 | WEIGHT: 205.69 LBS | TEMPERATURE: 98 F

## 2024-02-07 DIAGNOSIS — R39.15 URINARY URGENCY: ICD-10-CM

## 2024-02-07 DIAGNOSIS — C61 PROSTATE CANCER: Primary | ICD-10-CM

## 2024-02-07 DIAGNOSIS — N40.1 BPH WITH OBSTRUCTION/LOWER URINARY TRACT SYMPTOMS: ICD-10-CM

## 2024-02-07 DIAGNOSIS — N13.8 BPH WITH OBSTRUCTION/LOWER URINARY TRACT SYMPTOMS: ICD-10-CM

## 2024-02-07 LAB
ALBUMIN SERPL BCP-MCNC: 4 G/DL (ref 3.5–5.2)
ALP SERPL-CCNC: 60 U/L (ref 55–135)
ALT SERPL W/O P-5'-P-CCNC: 13 U/L (ref 10–44)
ANION GAP SERPL CALC-SCNC: 12 MMOL/L (ref 8–16)
AST SERPL-CCNC: 17 U/L (ref 10–40)
BILIRUB SERPL-MCNC: 0.9 MG/DL (ref 0.1–1)
BUN SERPL-MCNC: 25 MG/DL (ref 8–23)
CALCIUM SERPL-MCNC: 8.9 MG/DL (ref 8.7–10.5)
CHLORIDE SERPL-SCNC: 109 MMOL/L (ref 95–110)
CHOLEST SERPL-MCNC: 151 MG/DL (ref 120–199)
CHOLEST/HDLC SERPL: 2.4 {RATIO} (ref 2–5)
CO2 SERPL-SCNC: 19 MMOL/L (ref 23–29)
COMPLEXED PSA SERPL-MCNC: 1.6 NG/ML (ref 0–4)
CREAT SERPL-MCNC: 1.3 MG/DL (ref 0.5–1.4)
EST. GFR  (NO RACE VARIABLE): 53.8 ML/MIN/1.73 M^2
GLUCOSE SERPL-MCNC: 82 MG/DL (ref 70–110)
HDLC SERPL-MCNC: 63 MG/DL (ref 40–75)
HDLC SERPL: 41.7 % (ref 20–50)
LDLC SERPL CALC-MCNC: 70.2 MG/DL (ref 63–159)
NONHDLC SERPL-MCNC: 88 MG/DL
POTASSIUM SERPL-SCNC: 4.4 MMOL/L (ref 3.5–5.1)
PROT SERPL-MCNC: 6.4 G/DL (ref 6–8.4)
SODIUM SERPL-SCNC: 140 MMOL/L (ref 136–145)
TRIGL SERPL-MCNC: 89 MG/DL (ref 30–150)

## 2024-02-07 PROCEDURE — 1101F PT FALLS ASSESS-DOCD LE1/YR: CPT | Mod: HCNC,CPTII,S$GLB, | Performed by: UROLOGY

## 2024-02-07 PROCEDURE — 99999 PR PBB SHADOW E&M-EST. PATIENT-LVL IV: CPT | Mod: PBBFAC,HCNC,, | Performed by: UROLOGY

## 2024-02-07 PROCEDURE — 1126F AMNT PAIN NOTED NONE PRSNT: CPT | Mod: HCNC,CPTII,S$GLB, | Performed by: UROLOGY

## 2024-02-07 PROCEDURE — 1159F MED LIST DOCD IN RCRD: CPT | Mod: HCNC,CPTII,S$GLB, | Performed by: UROLOGY

## 2024-02-07 PROCEDURE — 3288F FALL RISK ASSESSMENT DOCD: CPT | Mod: HCNC,CPTII,S$GLB, | Performed by: UROLOGY

## 2024-02-07 PROCEDURE — 99214 OFFICE O/P EST MOD 30 MIN: CPT | Mod: HCNC,S$GLB,, | Performed by: UROLOGY

## 2024-02-07 RX ORDER — CLOPIDOGREL BISULFATE 75 MG/1
TABLET ORAL
Qty: 90 TABLET | Refills: 2 | Status: SHIPPED | OUTPATIENT
Start: 2024-02-07

## 2024-02-07 RX ORDER — FINASTERIDE 5 MG/1
5 TABLET, FILM COATED ORAL DAILY
Qty: 90 TABLET | Refills: 4 | Status: SHIPPED | OUTPATIENT
Start: 2024-02-07

## 2024-02-07 NOTE — TELEPHONE ENCOUNTER
No care due was identified.  [facility] Embedded Care Due Messages. Reference number: [id number].   2/07/2024 2:40:19 AM CST

## 2024-02-07 NOTE — PROGRESS NOTES
"    CC: follow up prostate cancer    History of Present Illness:   Ezequiel Hughes is a 83 y.o. male here for evaluation of Other (3 month f/u)    2/7/24-Pt currently in PT for his back. LUTS have been better. Nocturia x 2 last night. Emptying better. No hesitancy or stranguria. No gross hematuria. Slight UUI at times. He is not taking vesicare yet, but wants to start. States that he does have it.     11/21/23-IPSS 24, QoL 5 (unhappy). Nocutria x 4. He is currently on alfuzosin and finasteride but is out of the solifenacin. Took it for 1 month and it didn't help. Would like to try something stronger. Primary bother is urgency. No stranguria or difficulty emptying. Nocturia x 4.   8/11/23-Pt on proscar. He quit detrol because he wet the bed. Nocturia x 2-6. Stream is not always good. Hasn't tried Viagra yet. Was in the ED the other day with chest pain and had a UA, which showed 1+ blood, but no RBCs. Wearing his C-pap.   5/5/23-Pt reports that he is on finasteride, but isn't taking alfuzosin. Nocturia x 5-6. He has a little slight "leakage" throughout the day. He does have urgency. Didn't have any improvement with alfuzosin.   9/28/22-On finasteride. Nocturia x 4-5. Tried flomax a long time ago and it didn't help. Drinks 2 cups of coffee in the am. Not drinking about an hour before bed. No gross hematuria.   6/30/22-Nocturia x 1 lately, but prior to the last 2 nights, it has been 4 times. Still on finasteride. He does have urgency and occasional UUI. If he goes out, he wears a pad. Stream is weak. No hesitancy. Recently, visits have gone to every 6 months. Pt reports occasional RLQ pain. He does have come constipation, but pain doesn't change with BM. Persistent for a month.    Prior records indicate last MRI and TRUS biopsy in 2020.   3/29/22- 83yo male with h/o Prostate cancer, here to establish care. He has previously seen Dr. Wong and was undergoing active surveillance. He reports that he was diagnosed with " prostate cancer in 2014. He hasn't had any treatment. He is currently managed on finasteride. He does report some UUI, small amounts. He had a repeat TRUS biopsy in 2021, and pt reports path was unchanged. Also had MRIs around that time as well and states that he had a targetable lesion.         Review of Systems   Respiratory:  Negative for shortness of breath.    Cardiovascular:  Negative for chest pain and palpitations.   Genitourinary:  Negative for hematuria.   Musculoskeletal:  Positive for back pain and neck pain.   Neurological:  Negative for dizziness.   All other systems reviewed and are negative.        Past Medical History:   Diagnosis Date    Allergic rhinitis     Anemia     Back pain     BPH (benign prostatic hyperplasia)     Cancer     skin cancer to neck, Dr. Graves    Cataract     Disorder of kidney and ureter     ED (erectile dysfunction)     OMARI (generalized anxiety disorder) 8/7/2023    Hiatal hernia     small    History of colon polyps     colonoscopy 11/2016    HLD (hyperlipidemia)     Hyperlipidemia     Hypertension     Lateral epicondylitis     Lumbar radiculopathy 1/26/2022    OA (osteoarthritis)     GUIDO (obstructive sleep apnea)     Prostate cancer     Dr. Wong    TIA (transient ischemic attack)     Trouble in sleeping     Urge incontinence 3/29/2022       Past Surgical History:   Procedure Laterality Date    ANGIOGRAPHY OF UPPER EXTREMITY Left 07/05/2022    Procedure: Angiogram Extremity Bilateral;  Surgeon: Aparna Thompson MD;  Location: [facility] CATH LAB;  Service: Cardiology;  Laterality: Left;    ARTERIOGRAPHY OF AORTIC ROOT N/A 07/05/2022    Procedure: ARTERIOGRAM, AORTIC ROOT;  Surgeon: Aparna Thompson MD;  Location: [facility] CATH LAB;  Service: Cardiology;  Laterality: N/A;    CATARACT EXTRACTION W/  INTRAOCULAR LENS IMPLANT Right 02/21/2018    I-Stent    CATARACT EXTRACTION W/  INTRAOCULAR LENS IMPLANT Left 03/21/2018    I - Stent    CATHETERIZATION OF BOTH LEFT AND RIGHT HEART  N/A 07/05/2022    Procedure: CATHETERIZATION, HEART, BOTH LEFT AND RIGHT;  Surgeon: Aparna Thompson MD;  Location: [facility] CATH LAB;  Service: Cardiology;  Laterality: N/A;  radial approach    COLONOSCOPY N/A 11/14/2016    Procedure: COLONOSCOPY;  Surgeon: Karuna Rodriguez MD;  Location: [facility] ENDO;  Service: Endoscopy;  Laterality: N/A;    COLONOSCOPY N/A 09/22/2020    Procedure: COLONOSCOPY;  Surgeon: Chuy Fish MD;  Location: [facility] ENDO;  Service: Endoscopy;  Laterality: N/A;    EYE SURGERY      HEMORRHOID SURGERY      I-STENT Right 02/21/2018    DR. REECE    INJECTION OF ANESTHETIC AGENT AROUND MEDIAL BRANCH NERVES INNERVATING CERVICAL FACET JOINT Bilateral 7/18/2023    Procedure: Bilateral C4-6 MBB with RN IV sedation (diagnostic, #1);  Surgeon: Abel Haywood MD;  Location: [facility] PAIN MGT;  Service: Pain Management;  Laterality: Bilateral;    KNEE ARTHROSCOPY W/ MENISCAL REPAIR Right 2015    Dr. Jain    PCIOL Right 02/21/2018    DR. REECE    PLANTAR FASCIA RELEASE      right    ROTATOR CUFF REPAIR Bilateral     bilateral    SELECTIVE INJECTION OF ANESTHETIC AGENT AROUND LUMBAR SPINAL NERVE ROOT BY TRANSFORAMINAL APPROACH Bilateral 01/26/2022    Procedure: Bilateral L4/5 TF IAN with RN IV sedation;  Surgeon: Mak Young MD;  Location: [facility] PAIN MGT;  Service: Pain Management;  Laterality: Bilateral;    SELECTIVE INJECTION OF ANESTHETIC AGENT AROUND LUMBAR SPINAL NERVE ROOT BY TRANSFORAMINAL APPROACH Bilateral 03/14/2022    Procedure: Bilateral L4/5 TF IAN with RN IV sedation;  Surgeon: Mak Young MD;  Location: [facility] PAIN MGT;  Service: Pain Management;  Laterality: Bilateral;    SELECTIVE INJECTION OF ANESTHETIC AGENT AROUND LUMBAR SPINAL NERVE ROOT BY TRANSFORAMINAL APPROACH Bilateral 06/02/2022    Procedure: Bilateral L4/5 TF IAN - D2P Dr. Matthew CABRERA;  Surgeon: Abel Haywood MD;  Location: [facility] PAIN MGT;  Service: Pain Management;  Laterality: Bilateral;    SELECTIVE INJECTION OF  ANESTHETIC AGENT AROUND LUMBAR SPINAL NERVE ROOT BY TRANSFORAMINAL APPROACH Bilateral 2022    Procedure: Bilateral L4/5 TF IAN;  Surgeon: Abel Haywood MD;  Location: [facility] PAIN MGT;  Service: Pain Management;  Laterality: Bilateral;    SELECTIVE INJECTION OF ANESTHETIC AGENT AROUND LUMBAR SPINAL NERVE ROOT BY TRANSFORAMINAL APPROACH Bilateral 2023    Procedure: Bilateral L4/5 TF IAN RN IV Sedation;  Surgeon: Abel Haywood MD;  Location: [facility] PAIN MGT;  Service: Pain Management;  Laterality: Bilateral;    SHOULDER SURGERY Bilateral around     Dr. Pepper.  rotator cuff surgeries    VASECTOMY         Family History   Problem Relation Age of Onset    Lung cancer Father         life long smoker    Cancer Father         prostate, lung    Arthritis Mother     Stroke Sister         TIA    Cataracts Sister     Cancer Brother         prostate    Cataracts Brother     Cataracts Sister     Melanoma Neg Hx     Psoriasis Neg Hx     Lupus Neg Hx     Eczema Neg Hx     Diabetes Neg Hx     Heart disease Neg Hx     Kidney disease Neg Hx     Colon cancer Neg Hx        Social History     Tobacco Use    Smoking status: Former     Current packs/day: 0.00     Average packs/day: 3.0 packs/day for 35.0 years (104.9 ttl pk-yrs)     Types: Cigarettes     Start date: 1960     Quit date: 1985     Years since quittin.5     Passive exposure: Past    Smokeless tobacco: Never   Substance Use Topics    Alcohol use: Yes     Alcohol/week: 6.0 standard drinks of alcohol     Types: 6 Drinks containing 0.5 oz of alcohol per week     Comment: socially    Drug use: No       Current Outpatient Medications   Medication Sig Dispense Refill    acetaminophen (TYLENOL ARTHRITIS PAIN ORAL) Take by mouth. Patient states that he takes 2 tablets in the morning and 2 tablets in the evening      alfuzosin (UROXATRAL) 10 mg Tb24 Take 10 mg by mouth daily with breakfast.      amLODIPine (NORVASC) 5 MG tablet Take 1 tablet (5 mg  total) by mouth 2 (two) times daily. 180 tablet 3    atorvastatin (LIPITOR) 40 MG tablet TAKE 1 TABLET EVERY DAY 90 tablet 3    citalopram (CELEXA) 10 MG tablet Take 1 tablet (10 mg total) by mouth once daily. For anxiety 90 tablet 3    clopidogreL (PLAVIX) 75 mg tablet TAKE 1 TABLET EVERY DAY 90 tablet 2    cyclobenzaprine (FLEXERIL) 5 MG tablet 1 tablet ever 8 hours PRN muscle stiffness/spasms Orally Three times a day for 5 days      fluticasone propionate (FLONASE) 50 mcg/actuation nasal spray USE 2 SPRAYS IN EACH NOSTRIL ONE TIME DAILY  (SUBSTITUTED FOR FLONASE) 48 g 3    losartan (COZAAR) 50 MG tablet TAKE 1 TABLET TWICE DAILY 180 tablet 3    pantoprazole (PROTONIX) 40 MG tablet TAKE 1 TABLET EVERY DAY 90 tablet 3    pregabalin (LYRICA) 75 MG capsule Take 1 tablet by mouth daily at bedtime x 1 week, then take 1 tablet twice a day by mouth 60 capsule 2    sildenafiL (VIAGRA) 100 MG tablet Take 1 po 45 minutes before intercourse 30 tablet 7    solifenacin (VESICARE) 5 MG tablet Take 2 tablets (10 mg total) by mouth once daily. 90 tablet 0    finasteride (PROSCAR) 5 mg tablet Take 1 tablet (5 mg total) by mouth once daily. 90 tablet 4     No current facility-administered medications for this visit.       Review of patient's allergies indicates:   Allergen Reactions    Atarax [hydroxyzine hcl] Other (See Comments)     Raised blood pressure     Zyrtec [cetirizine] Other (See Comments)     Raised blood pressure     Gold sodium thiosulfate      Patch test positive    Meloxicam Rash     Other reaction(s): hypertension       Physical Exam  Vitals:    02/07/24 0827   Resp: 18   Temp: 97.7 °F (36.5 °C)         General: Well-developed, well-nourished in no acute distress  HEENT: Normocephalic, atraumatic, Extraocular movements intact  Neck: supple, trachea midline, no cervical or supraclavicular lymphadenopathy  Respirations: even and unlabored  Back: midline spine, no CVA tenderness  Abdomen: soft, Non-tender,  non-distended, no organomegaly or palpable masses, no rebound or guarding  Rectal: 11/21/23->40g prostate, no nodules or tenderness. No gross blood  Extremities: atraumatic, moves all equally, no clubbing, cyanosis or edema  Psych: normal affect  Skin: warm and dry, no lesions  Neuro: Alert and oriented, Cranial nerves II-XII grossly intact    PVR: 29cc 5/3/23      PSA  7/7/21: 1.3  3/23/22: 1.5  6/16/22: 1.4  9/26/22: 1.5  12/27/22: 1.9  2/9/23: 1.3  8/3/23: 2.0  11/14/23: 1.4  2/6/24: 1.6    Assessment:   1. Prostate cancer  Prostate Specific Antigen, Diagnostic      2. BPH with obstruction/lower urinary tract symptoms        3. Urinary urgency                      Plan:  Prostate cancer  -     Prostate Specific Antigen, Diagnostic; Future; Expected date: 05/07/2024    BPH with obstruction/lower urinary tract symptoms    Urinary urgency    Other orders  -     finasteride (PROSCAR) 5 mg tablet; Take 1 tablet (5 mg total) by mouth once daily.  Dispense: 90 tablet; Refill: 4      Continue alfuzosin and finasteride  Discussed that it is okay to take vesicare if urgency if bothersome, but he doesn't have to take it if not.     Follow up in about 3 months (around 5/7/2024) for labs before visit.

## 2024-02-07 NOTE — TELEPHONE ENCOUNTER
Refill Decision Note   Ezequiel Saul  is requesting a refill authorization.  Brief Assessment and Rationale for Refill:  Approve     Medication Therapy Plan:        Pharmacist review requested: Yes   Extended chart review required: Yes   Comments:     Note composed:1:40 PM 02/07/2024

## 2024-02-07 NOTE — TELEPHONE ENCOUNTER
Contacted pt and informed him Labs stable. Follow-up with Dr. Thompson as scheduled. Pt vu w/o q/c    ----- Message from Bindu Carey PA-C sent at 2/7/2024  1:39 PM CST -----  Labs stable. Follow-up with Dr. Thompson as scheduled.

## 2024-02-07 NOTE — TELEPHONE ENCOUNTER
Refill Routing Note   Medication(s) are not appropriate for processing by [facility] for the following reason(s):      Drug-disease interaction    [facility] action(s):  Defer Care Due:  None identified     Medication Therapy Plan: Drug-Disease: clopidogreL and Thrombocytopenia    Pharmacist review requested: Yes     Appointments  past 12m or future 3m with PCP    Date Provider   Last Visit   1/31/2024 Valery Ozuna MD   Next Visit   4/30/2024 Valery Ozuna MD   ED visits in past 90 days: 0        Note composed:8:53 AM 02/07/2024

## 2024-02-08 ENCOUNTER — HOSPITAL ENCOUNTER (OUTPATIENT)
Facility: HOSPITAL | Age: 84
Discharge: HOME | End: 2024-02-08
Attending: PHYSICAL MEDICINE & REHABILITATION | Admitting: PHYSICAL MEDICINE & REHABILITATION
Payer: MEDICARE

## 2024-02-08 VITALS
HEART RATE: 56 BPM | RESPIRATION RATE: 16 BRPM | TEMPERATURE: 98 F | OXYGEN SATURATION: 97 % | DIASTOLIC BLOOD PRESSURE: 64 MMHG | SYSTOLIC BLOOD PRESSURE: 119 MMHG

## 2024-02-08 DIAGNOSIS — M54.12 CERVICAL RADICULOPATHY: Primary | ICD-10-CM

## 2024-02-08 PROCEDURE — 25500020 PHARM REV CODE 255: Mod: HCNC | Performed by: PHYSICAL MEDICINE & REHABILITATION

## 2024-02-08 PROCEDURE — 62321 NJX INTERLAMINAR CRV/THRC: CPT | Mod: HCNC | Performed by: PHYSICAL MEDICINE & REHABILITATION

## 2024-02-08 PROCEDURE — 63600175 PHARM REV CODE 636 W HCPCS: Mod: HCNC | Performed by: PHYSICAL MEDICINE & REHABILITATION

## 2024-02-08 PROCEDURE — 62321 NJX INTERLAMINAR CRV/THRC: CPT | Mod: HCNC,,, | Performed by: PHYSICAL MEDICINE & REHABILITATION

## 2024-02-08 RX ORDER — ONDANSETRON HYDROCHLORIDE 2 MG/ML
4 INJECTION, SOLUTION INTRAVENOUS ONCE AS NEEDED
Status: DISCONTINUED | OUTPATIENT
Start: 2024-02-08 | End: 2024-02-08 | Stop reason: HOSPADM

## 2024-02-08 RX ORDER — BUPIVACAINE HYDROCHLORIDE 2.5 MG/ML
INJECTION, SOLUTION EPIDURAL; INFILTRATION; INTRACAUDAL
Status: DISCONTINUED | OUTPATIENT
Start: 2024-02-08 | End: 2024-02-08 | Stop reason: HOSPADM

## 2024-02-08 RX ORDER — DEXAMETHASONE SODIUM PHOSPHATE 10 MG/ML
INJECTION INTRAMUSCULAR; INTRAVENOUS
Status: DISCONTINUED | OUTPATIENT
Start: 2024-02-08 | End: 2024-02-08 | Stop reason: HOSPADM

## 2024-02-08 RX ORDER — MIDAZOLAM HYDROCHLORIDE 1 MG/ML
INJECTION, SOLUTION INTRAMUSCULAR; INTRAVENOUS
Status: DISCONTINUED | OUTPATIENT
Start: 2024-02-08 | End: 2024-02-08 | Stop reason: HOSPADM

## 2024-02-08 RX ORDER — FENTANYL CITRATE 50 UG/ML
INJECTION, SOLUTION INTRAMUSCULAR; INTRAVENOUS
Status: DISCONTINUED | OUTPATIENT
Start: 2024-02-08 | End: 2024-02-08 | Stop reason: HOSPADM

## 2024-02-08 NOTE — H&P
HPI  Patient presenting for Procedure(s) (LRB):  C5/6 IL Right paramedian approach IAN with RN IV sedation (N/A)       No health changes since previous encounter    Past Medical History:   Diagnosis Date    Allergic rhinitis     Anemia     Back pain     BPH (benign prostatic hyperplasia)     Cancer     skin cancer to neck, Dr. Graves    Cataract     Disorder of kidney and ureter     ED (erectile dysfunction)     OMARI (generalized anxiety disorder) 8/7/2023    Hiatal hernia     small    History of colon polyps     colonoscopy 11/2016    HLD (hyperlipidemia)     Hyperlipidemia     Hypertension     Lateral epicondylitis     Lumbar radiculopathy 1/26/2022    OA (osteoarthritis)     GUIDO (obstructive sleep apnea)     Prostate cancer     Dr. Wong    TIA (transient ischemic attack)     Trouble in sleeping     Urge incontinence 3/29/2022     Past Surgical History:   Procedure Laterality Date    ANGIOGRAPHY OF UPPER EXTREMITY Left 07/05/2022    Procedure: Angiogram Extremity Bilateral;  Surgeon: Aparna Thompson MD;  Location: [facility] CATH LAB;  Service: Cardiology;  Laterality: Left;    ARTERIOGRAPHY OF AORTIC ROOT N/A 07/05/2022    Procedure: ARTERIOGRAM, AORTIC ROOT;  Surgeon: Aparna Thompson MD;  Location: [facility] CATH LAB;  Service: Cardiology;  Laterality: N/A;    CATARACT EXTRACTION W/  INTRAOCULAR LENS IMPLANT Right 02/21/2018    I-Stent    CATARACT EXTRACTION W/  INTRAOCULAR LENS IMPLANT Left 03/21/2018    I - Stent    CATHETERIZATION OF BOTH LEFT AND RIGHT HEART N/A 07/05/2022    Procedure: CATHETERIZATION, HEART, BOTH LEFT AND RIGHT;  Surgeon: Aparna Thompson MD;  Location: [facility] CATH LAB;  Service: Cardiology;  Laterality: N/A;  radial approach    COLONOSCOPY N/A 11/14/2016    Procedure: COLONOSCOPY;  Surgeon: Karuna Rodriguez MD;  Location: [facility];  Service: Endoscopy;  Laterality: N/A;    COLONOSCOPY N/A 09/22/2020    Procedure: COLONOSCOPY;  Surgeon: Chuy Fish MD;  Location: [facility];  Service:  Endoscopy;  Laterality: N/A;    EYE SURGERY      HEMORRHOID SURGERY      I-STENT Right 02/21/2018    DR. REECE    INJECTION OF ANESTHETIC AGENT AROUND MEDIAL BRANCH NERVES INNERVATING CERVICAL FACET JOINT Bilateral 7/18/2023    Procedure: Bilateral C4-6 MBB with RN IV sedation (diagnostic, #1);  Surgeon: Abel Haywood MD;  Location: [facility] PAIN MGT;  Service: Pain Management;  Laterality: Bilateral;    KNEE ARTHROSCOPY W/ MENISCAL REPAIR Right 2015    Dr. Jain    PCIOL Right 02/21/2018    DR. REECE    PLANTAR FASCIA RELEASE      right    ROTATOR CUFF REPAIR Bilateral     bilateral    SELECTIVE INJECTION OF ANESTHETIC AGENT AROUND LUMBAR SPINAL NERVE ROOT BY TRANSFORAMINAL APPROACH Bilateral 01/26/2022    Procedure: Bilateral L4/5 TF IAN with RN IV sedation;  Surgeon: Mak Young MD;  Location: [facility] PAIN MGT;  Service: Pain Management;  Laterality: Bilateral;    SELECTIVE INJECTION OF ANESTHETIC AGENT AROUND LUMBAR SPINAL NERVE ROOT BY TRANSFORAMINAL APPROACH Bilateral 03/14/2022    Procedure: Bilateral L4/5 TF IAN with RN IV sedation;  Surgeon: Mak Young MD;  Location: [facility] PAIN MGT;  Service: Pain Management;  Laterality: Bilateral;    SELECTIVE INJECTION OF ANESTHETIC AGENT AROUND LUMBAR SPINAL NERVE ROOT BY TRANSFORAMINAL APPROACH Bilateral 06/02/2022    Procedure: Bilateral L4/5 TF IAN - D2P Dr. Castro [facility];  Surgeon: Abel Haywood MD;  Location: [facility] PAIN MGT;  Service: Pain Management;  Laterality: Bilateral;    SELECTIVE INJECTION OF ANESTHETIC AGENT AROUND LUMBAR SPINAL NERVE ROOT BY TRANSFORAMINAL APPROACH Bilateral 08/25/2022    Procedure: Bilateral L4/5 TF IAN;  Surgeon: Abel Haywood MD;  Location: [facility] PAIN MGT;  Service: Pain Management;  Laterality: Bilateral;    SELECTIVE INJECTION OF ANESTHETIC AGENT AROUND LUMBAR SPINAL NERVE ROOT BY TRANSFORAMINAL APPROACH Bilateral 6/29/2023    Procedure: Bilateral L4/5 TF IAN RN IV Sedation;  Surgeon: Abel Haywood MD;  Location: [facility]  PAIN MGT;  Service: Pain Management;  Laterality: Bilateral;    SHOULDER SURGERY Bilateral around 2000    Dr. Pepper.  rotator cuff surgeries    VASECTOMY       Review of patient's allergies indicates:   Allergen Reactions    Atarax [hydroxyzine hcl] Other (See Comments)     Raised blood pressure     Zyrtec [cetirizine] Other (See Comments)     Raised blood pressure     Gold sodium thiosulfate      Patch test positive    Meloxicam Rash     Other reaction(s): hypertension        No current facility-administered medications on file prior to encounter.     Current Outpatient Medications on File Prior to Encounter   Medication Sig Dispense Refill    alfuzosin (UROXATRAL) 10 mg Tb24 Take 10 mg by mouth daily with breakfast.      amLODIPine (NORVASC) 5 MG tablet Take 1 tablet (5 mg total) by mouth 2 (two) times daily. 180 tablet 3    atorvastatin (LIPITOR) 40 MG tablet TAKE 1 TABLET EVERY DAY 90 tablet 3    citalopram (CELEXA) 10 MG tablet Take 1 tablet (10 mg total) by mouth once daily. For anxiety 90 tablet 3    losartan (COZAAR) 50 MG tablet TAKE 1 TABLET TWICE DAILY 180 tablet 3    pantoprazole (PROTONIX) 40 MG tablet TAKE 1 TABLET EVERY DAY 90 tablet 3    solifenacin (VESICARE) 5 MG tablet Take 2 tablets (10 mg total) by mouth once daily. 90 tablet 0    acetaminophen (TYLENOL ARTHRITIS PAIN ORAL) Take by mouth. Patient states that he takes 2 tablets in the morning and 2 tablets in the evening      fluticasone propionate (FLONASE) 50 mcg/actuation nasal spray USE 2 SPRAYS IN EACH NOSTRIL ONE TIME DAILY  (SUBSTITUTED FOR FLONASE) 48 g 3    sildenafiL (VIAGRA) 100 MG tablet Take 1 po 45 minutes before intercourse 30 tablet 7        PMHx, PSHx, Allergies, Medications reviewed in epic    ROS negative except pain complaints in HPI    OBJECTIVE:    BP (!) 151/71 (BP Location: Right arm, Patient Position: Sitting)   Pulse 62   Temp 97.8 °F (36.6 °C) (Temporal)   Resp 18   SpO2 97%     PHYSICAL EXAMINATION:    GENERAL:  Well appearing, in no acute distress, alert and oriented x3.  PSYCH:  Mood and affect appropriate.  SKIN: Skin color, texture, turgor normal, no rashes or lesions which will impact the procedure.  CV: RRR with palpation of the radial artery.  PULM: No evidence of respiratory difficulty, symmetric chest rise. Clear to auscultation.  NEURO: Cranial nerves grossly intact.    Plan:    Proceed with procedure as planned Procedure(s) (LRB):  C5/6 IL Right paramedian approach IAN with RN IV sedation (N/A)    Abel Haywood MD  02/08/2024

## 2024-02-08 NOTE — DISCHARGE INSTRUCTIONS

## 2024-02-08 NOTE — DISCHARGE SUMMARY
Discharge Note  Short Stay      SUMMARY     Admit Date: 2/8/2024    Attending Physician: Abel Haywood MD        Discharge Physician: Abel Haywood MD        Discharge Date: 2/8/2024 10:05 AM    Procedure(s) (LRB):  C5/6 IL Right paramedian approach IAN with RN IV sedation (N/A)    Final Diagnosis: Cervical radiculopathy [M54.12]    Disposition: Home or self care    Patient Instructions:   Current Discharge Medication List        CONTINUE these medications which have NOT CHANGED    Details   alfuzosin (UROXATRAL) 10 mg Tb24 Take 10 mg by mouth daily with breakfast.      amLODIPine (NORVASC) 5 MG tablet Take 1 tablet (5 mg total) by mouth 2 (two) times daily.  Qty: 180 tablet, Refills: 3    Comments: .  Associated Diagnoses: Primary hypertension      atorvastatin (LIPITOR) 40 MG tablet TAKE 1 TABLET EVERY DAY  Qty: 90 tablet, Refills: 3    Associated Diagnoses: Mixed hyperlipidemia      citalopram (CELEXA) 10 MG tablet Take 1 tablet (10 mg total) by mouth once daily. For anxiety  Qty: 90 tablet, Refills: 3    Associated Diagnoses: OMARI (generalized anxiety disorder)      cyclobenzaprine (FLEXERIL) 5 MG tablet 1 tablet ever 8 hours PRN muscle stiffness/spasms Orally Three times a day for 5 days      finasteride (PROSCAR) 5 mg tablet Take 1 tablet (5 mg total) by mouth once daily.  Qty: 90 tablet, Refills: 4      losartan (COZAAR) 50 MG tablet TAKE 1 TABLET TWICE DAILY  Qty: 180 tablet, Refills: 3    Comments: .  Associated Diagnoses: Essential hypertension      pantoprazole (PROTONIX) 40 MG tablet TAKE 1 TABLET EVERY DAY  Qty: 90 tablet, Refills: 3      pregabalin (LYRICA) 75 MG capsule Take 1 tablet by mouth daily at bedtime x 1 week, then take 1 tablet twice a day by mouth  Qty: 60 capsule, Refills: 2      solifenacin (VESICARE) 5 MG tablet Take 2 tablets (10 mg total) by mouth once daily.  Qty: 90 tablet, Refills: 0      acetaminophen (TYLENOL ARTHRITIS PAIN ORAL) Take by mouth. Patient states that he takes  2 tablets in the morning and 2 tablets in the evening      clopidogreL (PLAVIX) 75 mg tablet TAKE 1 TABLET EVERY DAY  Qty: 90 tablet, Refills: 2      fluticasone propionate (FLONASE) 50 mcg/actuation nasal spray USE 2 SPRAYS IN EACH NOSTRIL ONE TIME DAILY  (SUBSTITUTED FOR FLONASE)  Qty: 48 g, Refills: 3      sildenafiL (VIAGRA) 100 MG tablet Take 1 po 45 minutes before intercourse  Qty: 30 tablet, Refills: 7                 Discharge Diagnosis: Cervical radiculopathy [M54.12]  Condition on Discharge: Stable with no complications to procedure   Diet on Discharge: Same as before.  Activity: as per instruction sheet.  Discharge to: Home with a responsible adult.  Follow up: 2-4 weeks       Please call the office at [phone] if you experience any weakness or loss of sensation, fever > 101.5, pain uncontrolled with oral medications, persistent nausea/vomiting/or diarrhea, redness or drainage from the incisions, or any other worrisome concerns. If physician on call was not reached or could not communicate with our office for any reason please go to the nearest emergency department

## 2024-02-08 NOTE — OP NOTE
Cervical Interlaminar Epidural Steroid Injection under Fluoroscopic Guidance.   Time-out taken to identify patient and procedure prior to starting the procedure.     Date of Procedure: 02/08/2024    PROCEDURE: Cervical Interlaminar epidural steroid injection C5-6 under fluoroscopic guidance.     Pre-Op diagnosis: Cervical radiculopathy [M54.12]    Post-Op diagnosis: Cervical radiculopathy [M54.12]    PHYSICIAN: Abel Haywood MD    ASSISTANTS: None     SEDATION: Conscious sedation provided by M.D    The patient was monitored with continuous pulse oximetry, EKG, and intermittent blood pressure monitors, immediately prior to administration of sedation.  The patient was hemodynamically stable throughout the entire process was responsive to voice, and breathing spontaneously.  Supplemental O2 was provided at 2L/min via nasal cannula.  Patient was comfortable for the duration of the procedure.     There was a total of 2mg IV Midazolam and 50mcg Fentanyl titrated for the procedure    Total sedation time was <10 minutes      MEDICATIONS INJECTED: Preservative-free Decadron 10 mg with 1mL of sterile 0.25%Bupivicaine and 3ml of preservative free normal saline.     LOCAL ANESTHETIC INJECTED: Xylocaine 1% 3mL    ESTIMATED BLOOD LOSS: none.     COMPLICATIONS: none.     TECHNIQUE: With the patient laying in a prone position, the area was prepped and draped in the usual sterile fashion using ChloraPrep and a fenestrated drape. Local anesthetic was given using a 27-gauge needle by raising a wheal and going down to the hub of the needle over the C5-6 interlaminar space.  The interlaminar space was then approached with a 3.5 inch 18-gauge Touhy needle was introduced under fluoroscopic guidance in the AP and Lateral view. Once the Ligamentum flavum was encountered loss of resistance to saline was used to enter the epidural space. With positive loss of resistance and negative CSF or Blood, 2mL contrast dye Omnipaque (300mg/ml) was  injected to confirm placement and there was no vascular runoff. The medication was then injected slowly. The patient tolerated the procedure well.       The patient was monitored after the procedure.   They were given post-procedure and discharge instructions to follow at home.  The patient was discharged in a stable condition.

## 2024-02-15 ENCOUNTER — OFFICE VISIT (OUTPATIENT)
Dept: PULMONOLOGY | Facility: CLINIC | Age: 84
End: 2024-02-15
Payer: MEDICARE

## 2024-02-15 VITALS
SYSTOLIC BLOOD PRESSURE: 122 MMHG | RESPIRATION RATE: 17 BRPM | HEART RATE: 75 BPM | DIASTOLIC BLOOD PRESSURE: 70 MMHG | BODY MASS INDEX: 30.17 KG/M2 | HEIGHT: 70 IN | OXYGEN SATURATION: 96 % | WEIGHT: 210.75 LBS

## 2024-02-15 DIAGNOSIS — G47.33 OBSTRUCTIVE SLEEP APNEA SYNDROME: ICD-10-CM

## 2024-02-15 DIAGNOSIS — R06.2 WHEEZING: Primary | ICD-10-CM

## 2024-02-15 PROCEDURE — 99999 PR PBB SHADOW E&M-EST. PATIENT-LVL IV: CPT | Mod: PBBFAC,HCNC,, | Performed by: NURSE PRACTITIONER

## 2024-02-15 PROCEDURE — 1101F PT FALLS ASSESS-DOCD LE1/YR: CPT | Mod: HCNC,CPTII,S$GLB, | Performed by: NURSE PRACTITIONER

## 2024-02-15 PROCEDURE — 3074F SYST BP LT 130 MM HG: CPT | Mod: HCNC,CPTII,S$GLB, | Performed by: NURSE PRACTITIONER

## 2024-02-15 PROCEDURE — 1126F AMNT PAIN NOTED NONE PRSNT: CPT | Mod: HCNC,CPTII,S$GLB, | Performed by: NURSE PRACTITIONER

## 2024-02-15 PROCEDURE — 1160F RVW MEDS BY RX/DR IN RCRD: CPT | Mod: HCNC,CPTII,S$GLB, | Performed by: NURSE PRACTITIONER

## 2024-02-15 PROCEDURE — 3288F FALL RISK ASSESSMENT DOCD: CPT | Mod: HCNC,CPTII,S$GLB, | Performed by: NURSE PRACTITIONER

## 2024-02-15 PROCEDURE — 1159F MED LIST DOCD IN RCRD: CPT | Mod: HCNC,CPTII,S$GLB, | Performed by: NURSE PRACTITIONER

## 2024-02-15 PROCEDURE — 99214 OFFICE O/P EST MOD 30 MIN: CPT | Mod: HCNC,S$GLB,, | Performed by: NURSE PRACTITIONER

## 2024-02-15 PROCEDURE — 3078F DIAST BP <80 MM HG: CPT | Mod: HCNC,CPTII,S$GLB, | Performed by: NURSE PRACTITIONER

## 2024-02-15 RX ORDER — ALBUTEROL SULFATE 90 UG/1
2 AEROSOL, METERED RESPIRATORY (INHALATION) EVERY 4 HOURS PRN
Qty: 8.7 G | Refills: 1 | Status: SHIPPED | OUTPATIENT
Start: 2024-02-15

## 2024-02-15 NOTE — PROGRESS NOTES
"Subjective:      Patient ID: Ezequiel Hughes is a 83 y.o. male.    Chief Complaint: Sleep Apnea    HPI  Presents for sleep apnea. He benefits from use. He may want another machine to have at his girlfriends to wear more often. He received supplies and will start back using.  He states he has wheezing. No sign of infection or sinus issues.    Patient Active Problem List   Diagnosis    Lumbosacral spondylosis without myelopathy    Benign prostatic hyperplasia    Hypertensive disorder    Hyperlipidemia    Cataract    Osteoarthritis    Thrombocytopenia    Anemia    Chronic kidney disease, stage 3    Incomplete right bundle branch block    Prostate cancer    Aortic atherosclerosis    Lung granuloma    Atherosclerosis of artery of both lower extremities    Leg swelling    Sleep apnea    Periodic limb movement disorder (PLMD)    Inadequate sleep hygiene    Basal cell carcinoma (BCC) of anterior chest    Gout    History of colon polyps    Diverticulosis of colon    Internal hemorrhoids    Generalized weakness    Other eosinophilia    Unspecified inflammatory spondylopathy, thoracic region    Chronic neck pain    Cervical radiculopathy    Bilateral carpal tunnel syndrome    Lumbar radiculopathy, chronic    Urge incontinence    Abnormal EKG    Weakness of trunk musculature    Pulmonary hypertension    Other hydrocephalus    OMARI (generalized anxiety disorder)    Constipation    GERD (gastroesophageal reflux disease)     /70   Pulse 75   Resp 17   Ht 5' 10" (1.778 m)   Wt 95.6 kg (210 lb 12.2 oz)   SpO2 96%   BMI 30.24 kg/m²   Body mass index is 30.24 kg/m².    Review of Systems   Constitutional: Negative.    HENT: Negative.     Respiratory:  Positive for wheezing.    Cardiovascular: Negative.    Musculoskeletal: Negative.    Gastrointestinal: Negative.    Neurological: Negative.    Psychiatric/Behavioral:  Positive for sleep disturbance.      Objective:      Physical Exam  Constitutional:       Appearance: Normal " appearance.   HENT:      Head: Normocephalic and atraumatic.      Nose: Nose normal.   Cardiovascular:      Rate and Rhythm: Normal rate and regular rhythm.   Pulmonary:      Effort: Pulmonary effort is normal.      Breath sounds: Normal breath sounds.   Abdominal:      Palpations: Abdomen is soft.      Tenderness: There is no abdominal tenderness.   Musculoskeletal:         General: Normal range of motion.      Cervical back: Normal range of motion and neck supple.   Skin:     General: Skin is warm and dry.   Neurological:      General: No focal deficit present.      Mental Status: He is alert and oriented to person, place, and time.   Psychiatric:         Mood and Affect: Mood normal.         Behavior: Behavior normal.       Personal Diagnostic Review      Assessment:       1. Wheezing    2. Obstructive sleep apnea syndrome        Outpatient Encounter Medications as of 2/15/2024   Medication Sig Dispense Refill    acetaminophen (TYLENOL ARTHRITIS PAIN ORAL) Take by mouth. Patient states that he takes 2 tablets in the morning and 2 tablets in the evening      alfuzosin (UROXATRAL) 10 mg Tb24 Take 10 mg by mouth daily with breakfast.      amLODIPine (NORVASC) 5 MG tablet Take 1 tablet (5 mg total) by mouth 2 (two) times daily. 180 tablet 3    atorvastatin (LIPITOR) 40 MG tablet TAKE 1 TABLET EVERY DAY 90 tablet 3    citalopram (CELEXA) 10 MG tablet Take 1 tablet (10 mg total) by mouth once daily. For anxiety 90 tablet 3    clopidogreL (PLAVIX) 75 mg tablet TAKE 1 TABLET EVERY DAY 90 tablet 2    cyclobenzaprine (FLEXERIL) 5 MG tablet 1 tablet ever 8 hours PRN muscle stiffness/spasms Orally Three times a day for 5 days      finasteride (PROSCAR) 5 mg tablet Take 1 tablet (5 mg total) by mouth once daily. 90 tablet 4    fluticasone propionate (FLONASE) 50 mcg/actuation nasal spray USE 2 SPRAYS IN EACH NOSTRIL ONE TIME DAILY  (SUBSTITUTED FOR FLONASE) 48 g 3    losartan (COZAAR) 50 MG tablet TAKE 1 TABLET TWICE DAILY  180 tablet 3    pantoprazole (PROTONIX) 40 MG tablet TAKE 1 TABLET EVERY DAY 90 tablet 3    pregabalin (LYRICA) 75 MG capsule Take 1 tablet by mouth daily at bedtime x 1 week, then take 1 tablet twice a day by mouth 60 capsule 2    sildenafiL (VIAGRA) 100 MG tablet Take 1 po 45 minutes before intercourse 30 tablet 7    solifenacin (VESICARE) 5 MG tablet Take 2 tablets (10 mg total) by mouth once daily. 90 tablet 0    albuterol (PROVENTIL/VENTOLIN HFA) 90 mcg/actuation inhaler Inhale 2 puffs into the lungs every 4 (four) hours as needed for Wheezing or Shortness of Breath. 8.7 g 1    [DISCONTINUED] clopidogreL (PLAVIX) 75 mg tablet TAKE 1 TABLET EVERY DAY 90 tablet 2    [DISCONTINUED] finasteride (PROSCAR) 5 mg tablet TAKE 1 TABLET (5 MG TOTAL) BY MOUTH ONCE DAILY. 90 tablet 4     No facility-administered encounter medications on file as of 2/15/2024.     Orders Placed This Encounter   Procedures    CPAP/BIPAP SUPPLIES     Order Specific Question:   Length of need (1-99 months):     Answer:   99     Order Specific Question:   Choose ONE mask type and its corresponding cushions and/or pillows:     Answer:    Nasal Mask, 1 per 90 days:  Nasal Cushions, (6 per 90 days):  Nasal Pillows, (6 per 90 days)     Order Specific Question:   Choose EITHER Heated or Non-Heated Tubjing     Answer:    Non-Heated Tubing, 1 per 90 days     Order Specific Question:   All other supplies as needed as listed below:     Answer:    Headgear, 1 per 180 days     Order Specific Question:   All other supplies as needed as listed below:     Answer:    Disposable Filter, 6 per 90 days     Order Specific Question:   All other supplies as needed as listed below:     Answer:    Non-Disposable Filter, 1 per 180 days     Order Specific Question:   All other supplies as needed as listed below:     Answer:    Humidifier Chamber, 1 per 180 days     Plan:   Albuterol   Start back cpap and follow up as needed or in  one year.    Problem List Items Addressed This Visit          Other    Sleep apnea    Overview     Sleep apnea         Relevant Orders    CPAP/BIPAP SUPPLIES     Other Visit Diagnoses       Wheezing    -  Primary    Relevant Medications    albuterol (PROVENTIL/VENTOLIN HFA) 90 mcg/actuation inhaler                         Elizabeth LeJeune, DERICP, ANP

## 2024-02-19 ENCOUNTER — PATIENT MESSAGE (OUTPATIENT)
Dept: CARDIOLOGY | Facility: CLINIC | Age: 84
End: 2024-02-19
Payer: MEDICARE

## 2024-02-20 ENCOUNTER — PATIENT MESSAGE (OUTPATIENT)
Dept: PAIN MEDICINE | Facility: CLINIC | Age: 84
End: 2024-02-20
Payer: MEDICARE

## 2024-02-27 ENCOUNTER — PATIENT MESSAGE (OUTPATIENT)
Dept: ADMINISTRATIVE | Facility: OTHER | Age: 84
End: 2024-02-27
Payer: MEDICARE

## 2024-02-27 DIAGNOSIS — R06.02 SHORTNESS OF BREATH: Primary | ICD-10-CM

## 2024-02-27 DIAGNOSIS — R94.31 ABNORMAL EKG: ICD-10-CM

## 2024-02-28 ENCOUNTER — OFFICE VISIT (OUTPATIENT)
Dept: CARDIOLOGY | Facility: CLINIC | Age: 84
End: 2024-02-28
Payer: MEDICARE

## 2024-02-28 ENCOUNTER — HOSPITAL ENCOUNTER (OUTPATIENT)
Dept: CARDIOLOGY | Facility: HOSPITAL | Age: 84
Discharge: HOME | End: 2024-02-28
Attending: INTERNAL MEDICINE
Payer: MEDICARE

## 2024-02-28 ENCOUNTER — PATIENT MESSAGE (OUTPATIENT)
Dept: PRIMARY CARE CLINIC | Facility: CLINIC | Age: 84
End: 2024-02-28
Payer: MEDICARE

## 2024-02-28 ENCOUNTER — PATIENT MESSAGE (OUTPATIENT)
Dept: CARDIOLOGY | Facility: CLINIC | Age: 84
End: 2024-02-28

## 2024-02-28 VITALS
HEART RATE: 65 BPM | WEIGHT: 211.88 LBS | OXYGEN SATURATION: 99 % | BODY MASS INDEX: 30.4 KG/M2 | SYSTOLIC BLOOD PRESSURE: 127 MMHG | BODY MASS INDEX: 30.4 KG/M2 | DIASTOLIC BLOOD PRESSURE: 69 MMHG | WEIGHT: 211.88 LBS

## 2024-02-28 DIAGNOSIS — I45.10 INCOMPLETE RIGHT BUNDLE BRANCH BLOCK: ICD-10-CM

## 2024-02-28 DIAGNOSIS — N18.31 STAGE 3A CHRONIC KIDNEY DISEASE: ICD-10-CM

## 2024-02-28 DIAGNOSIS — R94.31 ABNORMAL EKG: ICD-10-CM

## 2024-02-28 DIAGNOSIS — C61 PROSTATE CANCER: ICD-10-CM

## 2024-02-28 DIAGNOSIS — G47.33 OBSTRUCTIVE SLEEP APNEA SYNDROME: ICD-10-CM

## 2024-02-28 DIAGNOSIS — I70.203 ATHEROSCLEROSIS OF ARTERY OF BOTH LOWER EXTREMITIES: ICD-10-CM

## 2024-02-28 DIAGNOSIS — M54.16 LUMBAR RADICULOPATHY, CHRONIC: ICD-10-CM

## 2024-02-28 DIAGNOSIS — Z72.821 INADEQUATE SLEEP HYGIENE: ICD-10-CM

## 2024-02-28 DIAGNOSIS — M62.81 WEAKNESS OF TRUNK MUSCULATURE: ICD-10-CM

## 2024-02-28 DIAGNOSIS — G47.61 PERIODIC LIMB MOVEMENT DISORDER (PLMD): ICD-10-CM

## 2024-02-28 DIAGNOSIS — I70.0 AORTIC ATHEROSCLEROSIS: ICD-10-CM

## 2024-02-28 DIAGNOSIS — M47.817 LUMBOSACRAL SPONDYLOSIS WITHOUT MYELOPATHY: ICD-10-CM

## 2024-02-28 DIAGNOSIS — I10 PRIMARY HYPERTENSION: Primary | ICD-10-CM

## 2024-02-28 DIAGNOSIS — E78.2 MIXED HYPERLIPIDEMIA: ICD-10-CM

## 2024-02-28 DIAGNOSIS — R06.02 SHORTNESS OF BREATH: ICD-10-CM

## 2024-02-28 LAB
OHS QRS DURATION: 92 MS
OHS QTC CALCULATION: 416 MS

## 2024-02-28 PROCEDURE — 99214 OFFICE O/P EST MOD 30 MIN: CPT | Mod: HCNC,S$GLB,, | Performed by: INTERNAL MEDICINE

## 2024-02-28 PROCEDURE — 99999 PR PBB SHADOW E&M-EST. PATIENT-LVL IV: CPT | Mod: PBBFAC,HCNC,, | Performed by: INTERNAL MEDICINE

## 2024-02-28 PROCEDURE — 3078F DIAST BP <80 MM HG: CPT | Mod: HCNC,CPTII,S$GLB, | Performed by: INTERNAL MEDICINE

## 2024-02-28 PROCEDURE — 93010 ELECTROCARDIOGRAM REPORT: CPT | Mod: HCNC,,, | Performed by: INTERNAL MEDICINE

## 2024-02-28 PROCEDURE — 3074F SYST BP LT 130 MM HG: CPT | Mod: HCNC,CPTII,S$GLB, | Performed by: INTERNAL MEDICINE

## 2024-02-28 PROCEDURE — 1160F RVW MEDS BY RX/DR IN RCRD: CPT | Mod: HCNC,CPTII,S$GLB, | Performed by: INTERNAL MEDICINE

## 2024-02-28 PROCEDURE — 3288F FALL RISK ASSESSMENT DOCD: CPT | Mod: HCNC,CPTII,S$GLB, | Performed by: INTERNAL MEDICINE

## 2024-02-28 PROCEDURE — 1159F MED LIST DOCD IN RCRD: CPT | Mod: HCNC,CPTII,S$GLB, | Performed by: INTERNAL MEDICINE

## 2024-02-28 PROCEDURE — 1126F AMNT PAIN NOTED NONE PRSNT: CPT | Mod: HCNC,CPTII,S$GLB, | Performed by: INTERNAL MEDICINE

## 2024-02-28 PROCEDURE — 1101F PT FALLS ASSESS-DOCD LE1/YR: CPT | Mod: HCNC,CPTII,S$GLB, | Performed by: INTERNAL MEDICINE

## 2024-02-28 PROCEDURE — 93005 ELECTROCARDIOGRAM TRACING: CPT | Mod: HCNC

## 2024-03-03 ENCOUNTER — PATIENT MESSAGE (OUTPATIENT)
Dept: PRIMARY CARE CLINIC | Facility: CLINIC | Age: 84
End: 2024-03-03
Payer: MEDICARE

## 2024-03-04 RX ORDER — FUROSEMIDE 20 MG/1
20 TABLET ORAL DAILY PRN
Qty: 30 TABLET | Refills: 0 | Status: SHIPPED | OUTPATIENT
Start: 2024-03-04 | End: 2025-03-04

## 2024-03-04 NOTE — TELEPHONE ENCOUNTER
Ezequiel Hughes,     I have sent the following medications to your preferred pharmacy on file. Please let me know if you have further questions.     Medications Ordered This Encounter   Medications    furosemide (LASIX) 20 MG tablet     Sig: Take 1 tablet (20 mg total) by mouth daily as needed (edema).     Dispense:  30 tablet     Refill:  0          [facility] - [place] - [address] [address]  [address] [place] [id number]  Phone: [phone] Fax: [phone]       Sincerely,   Valery Ozuna MD

## 2024-03-05 NOTE — PROGRESS NOTES
Established Patient - TeleHealth Visit    The patient location is: [place]  The chief complaint leading to consultation is: chronic pain     Visit type: audiovisual    Face to Face time with patient: 10-15 minutes  20 minutes of total time spent on the encounter, which includes face to face time and non-face to face time preparing to see the patient (eg, review of tests), Obtaining and/or reviewing separately obtained history, Documenting clinical information in the electronic or other health record, Independently interpreting results (not separately reported) and communicating results to the patient/family/caregiver, or Care coordination (not separately reported).     Each patient to whom he or she provides medical services by telemedicine is:  (1) informed of the relationship between the physician and patient and the respective role of any other health care provider with respect to management of the patient; and (2) notified that he or she may decline to receive medical services by telemedicine and may withdraw from such care at any time.    Established Patient Chronic Pain Note (Follow up visit)    Chief Complaint:   No chief complaint on file.        SUBJECTIVE:  Interval History (3/13/2024):  Patient Ezequiel Hughes presents today for follow-up visit.  Patient was last seen on 2/8/2024 for C5/6 IL IAN which he reports provided 80% relief. Neck pain is improved with no current radiculopathy.  He reports he stopped the Lyrica because he did not feel like he needed it.  He does still complain of lower back pain which radiates into the bilateral legs with some numbness.  He has had bilateral L4/5 IAN in the past with great relief and is wanting to repeat these injections.  He rates his pain today as 0/10 and states later in the day the pain will go up to a 4/10.  He has been seeing the chiropractor and doing home physical therapy exercises to try to get relief.  Patient denies night fever/night sweats, urinary  incontinence, bowel incontinence, significant weight loss and significant motor weakness.   Patient denies any other complaints or concerns at this time.        Interval History (1/22/2024):  Patient Ezequiel Hughes presents today for follow-up visit.  Patient was last seen on 8/2/2023. At that time he had a C4-5 MBB which did not provide significant relief. Today his pain is in the base of the neck and radiates to the R shoulder. Pain started a few weeks ago. Pain feels like pins and needles. Today pain is a 5/10 but at it's worst it will be 7/10.   No fall or injury.   He has been to urgent care twice this month for the pain and has received a toradol injection and steroids. He got some temporarily relief from these.  He has chronic lower back pain, has been followed by Dr. Castro. He has been going to the chiropractor  which is providing good relief.  He has been on lyrica in the past but stopped medication when he got relief of radicular symptoms from a previous IAN.    Interval visit 8/2/2023  Ezequiel Hughes is a 83 y.o. male presents today for follow-up chronic neck and lower back pain.  He was last seen for procedure on 07/18/2023 where he underwent bilateral C4-6 diagnostic medial branch blocks that did not provide significant relief unfortunately.  He continues with lower back pain with radicular symptoms into the both lower extremities.  He reports that this tends to occur mostly with ambulatory activities.        Patient denies night fever/night sweats, urinary incontinence, bowel incontinence, significant weight loss, significant motor weakness and loss of sensations.    Pain Disability Index Review:      1/22/2024     1:06 PM 8/2/2023    10:14 AM 6/9/2022     4:08 PM   Last 3 PDI Scores   Pain Disability Index (PDI) 49 7 24     Interval History (6/9/2023):    Ezequiel Hughes presents today for follow-up visit.  Patient was last seen on 09/27/2022. Last injection Bilateral L4/5 TF IAN on 8/25/22with 80-90% pain  relief x more than 6 months before pain insidiously returned. He reports same pain as previous, located in his lower back with radiation into both legs, though his right leg is worse than left. He reports his right leg feels weak and swells after prolonged walking, improved with rest. He has completed 37 sessions of physical therapy for his neck and back from 10/20/2022 to 03/14/2022 without relief. Patient reports pain as 4/10 today, but 6/10 on his worst days.      Interval History (9/27/2022):   Ezequiel Hughes presents today for follow-up visit.  he underwent Bilateral L4/5 TF IAN on 8/25/22.  The patient reports that he is/was better following the procedure.  he reports 80-90% pain relief. He reports this IAN was the best so far.  The changes lasted 4 weeks so far.  The changes have continued through this visit.  Patient reports pain as 2/10 today. He does report muscle spasms in his lower right back for the past 3-4 days after prolonged stooping working on a car. He has used heat and massage with some improvement.       Interval HPI  Ezequiel Hughes is a 83 y.o. male who presents to the clinic for a follow-up appointment for back and leg pain.  He presents today after undergoing a 3rd lumbar TFESI bilaterally at L4-5 on 06/02/2022.  He reports that this resulted in 100% relief.  Since the last visit, Ezequiel Hughes states the pain has been resolved. Current pain intensity is 0/10.      Interval Hx: 2/24/22  Presents status post bilateral L4/5 transforaminal epidural steroid injection January 26, 2022. Patient reports having 100% relief in lower extremity radicular symptoms following epidural steroid injection.  This pain level varies based upon his activity throughout the day.  Patient reports as he is more active he does notice radicular symptoms down the lateral aspects of bilateral lower extremities to the feet.  Patient reports discontinuing Lyrica the day following his injection which he believes caused  paresthesias to insidiously return.  Patient does report improvement in functionality including range of motion and strength following his injection.  Patient is interested in repeat intervention.  Patient denies any side effects at the Lyrica dose of 225 mg twice a day.     Interval Hx: 1/13/22  Patient presents for MRI cervical and lumbar review.  Today patient again reports lower back pain which radiates down the anterior aspects of bilateral lower extremities in L4 distribution to the foot.  Today pain is rated as a 4/10.  Pain is exacerbated with prolonged standing and with ambulation the patient reports he is able to ambulate at least 1 mi on the treadmill before requiring rest.  He also reports neck pain which radiates in bilateral trapezius distributions in C5-6 distribution.  Patient has continued Lyrica and is currently taking 150 mg twice daily.  He is anticipated to increase this dosage next week.  He denies any side effects from this medication.     HPI 12/9/21  Ezequiel Hughes is a 81 y.o. male with past medical history significant for transient ischemic attack, hyperlipidemia, hypertension, right bundle branch block, stage 3 chronic kidney disease, thrombocytopenia and anemia , prostate cancer, gout, obstructive sleep apnea, periodically limb movement disorder who presents to the clinic for the evaluation of neck, lower back and leg pain.  Today patient reports pain began approximately 1 year prior without inciting accident, injury or trauma.  Today patient reports lower back pain which is constant and today is rated as a 3/10.  Patient reports radicular symptoms beginning along the anterior aspects of bilateral lower extremities below the knee to the foot in L4 distribution.  Patient is having difficulty describing the character but believes it is burning in nature.  Pain is exacerbated with prolonged standing and with ambulation.  Patient reports he is quite active, goes to the gym and walks on his  "treadmill and is able to ambulate approximately half to 3/4 of a mi before requiring rest.  Pain is improved with sitting.He denies any bowel or bladder incontinence or saddle anesthesia. Patient has performed physical therapy for the lower back without any improvement in his symptoms.      Patient also reports constant neck pain.  Pain presents in a bandlike distribution in bilateral cervical paraspinous territory and radiates into bilateral trapezius distribution.  Patient also reports numbness in bilateral thumbs.  Pain is exacerbated with cervical flexion, extension and lateral flexion.  Patient denies upper extremity weakness, compromise in hand  strength or dexterity.  Patient has been trialed on gabapentin for what his wife describes as "scalp itching"at a lower dose of 100 mg 3 times daily without any improvement in his neck or in his back pain.           Non-Pharmacologic Treatments:  Physical Therapy/Home Exercise: yes  Ice/Heat:yes  TENS: no  Acupuncture: no  Massage: no  Chiropractic: no    Other: no     Pain Medications:  - Adjuvant Medications: Neurontin (Gabapentin) and Tylenol (Acetaminophen)  - Anti-Coagulants: Plavix ( Clopidogrel)     Pain Procedures:   -01/26/2022:  Bilateral L4/5 TFESI  -03/14/2022: Bilateral L4-5 TFESI  -06/02/2022:  Bilateral L4-5 TFESI D2P per [place]  -08/25/2022: Bilatearl L4/5 TF IAN  -06/29/2023:  bilateral L4-5 TFESI, limited relief  -07/18/2023:  diagnostic C4-6 medial branch blocks, limited relief  2/8/2024 C5/6 IL IAN 80% relief      Imaging:   MRI brain 05/12/2022:        MRI lumbar spine 12/22/2021:      MRI cervical spine 12/22/2021:          PMHx,PSHx, Social history, and Family history:  I have reviewed the patient's medical, surgical, social, and family history in detail and updated the computerized patient record.    Review of patient's allergies indicates:   Allergen Reactions    Atarax [hydroxyzine hcl] Other (See Comments)     Raised blood pressure     " Zyrtec [cetirizine] Other (See Comments)     Raised blood pressure     Gold sodium thiosulfate      Patch test positive    Meloxicam Rash     Other reaction(s): hypertension       Current Outpatient Medications   Medication Sig    acetaminophen (TYLENOL ARTHRITIS PAIN ORAL) Take by mouth. Patient states that he takes 2 tablets in the morning and 2 tablets in the evening    albuterol (PROVENTIL/VENTOLIN HFA) 90 mcg/actuation inhaler Inhale 2 puffs into the lungs every 4 (four) hours as needed for Wheezing or Shortness of Breath.    alfuzosin (UROXATRAL) 10 mg Tb24 Take 10 mg by mouth daily with breakfast.    amLODIPine (NORVASC) 5 MG tablet Take 1 tablet (5 mg total) by mouth 2 (two) times daily.    atorvastatin (LIPITOR) 40 MG tablet TAKE 1 TABLET EVERY DAY    citalopram (CELEXA) 10 MG tablet Take 1 tablet (10 mg total) by mouth once daily. For anxiety    clopidogreL (PLAVIX) 75 mg tablet TAKE 1 TABLET EVERY DAY    cyclobenzaprine (FLEXERIL) 5 MG tablet 1 tablet ever 8 hours PRN muscle stiffness/spasms Orally Three times a day for 5 days    finasteride (PROSCAR) 5 mg tablet Take 1 tablet (5 mg total) by mouth once daily.    fluticasone propionate (FLONASE) 50 mcg/actuation nasal spray USE 2 SPRAYS IN EACH NOSTRIL ONE TIME DAILY  (SUBSTITUTED FOR FLONASE)    furosemide (LASIX) 20 MG tablet Take 1 tablet (20 mg total) by mouth daily as needed (edema).    losartan (COZAAR) 50 MG tablet TAKE 1 TABLET TWICE DAILY    pantoprazole (PROTONIX) 40 MG tablet TAKE 1 TABLET EVERY DAY    pregabalin (LYRICA) 75 MG capsule Take 1 tablet by mouth daily at bedtime x 1 week, then take 1 tablet twice a day by mouth    sildenafiL (VIAGRA) 100 MG tablet Take 1 po 45 minutes before intercourse    solifenacin (VESICARE) 5 MG tablet Take 2 tablets (10 mg total) by mouth once daily.     No current facility-administered medications for this visit.         REVIEW OF SYSTEMS:    GENERAL:  No weight loss, malaise or fevers.  HEENT:   No recent  changes in vision or hearing  NECK:  Negative for lumps, no difficulty with swallowing.  RESPIRATORY:  Negative for cough, wheezing or shortness of breath, patient denies any recent URI.  CARDIOVASCULAR:  Negative for chest pain, leg swelling or palpitations.  GI:  Negative for abdominal discomfort, blood in stools or black stools or change in bowel habits.  MUSCULOSKELETAL:  See HPI.  SKIN:  Negative for lesions, rash, and itching.  PSYCH:  No mood disorder or recent psychosocial stressors.  Patients sleep is not disturbed secondary to pain.  HEMATOLOGY/LYMPHOLOGY:  Negative for prolonged bleeding, bruising easily or swollen nodes.  Patient is currently taking anti-coagulants - plavix  NEURO:   No history of headaches, syncope, paralysis, seizures or tremors.  All other reviewed and negative other than HPI.    OBJECTIVE:    There were no vitals taken for this visit.    PHYSICAL EXAMINATION:  Telemedicine Exam  There were no vitals filed for this visit.  There is no height or weight on file to calculate BMI.   (reviewed on 3/13/2024)      Physical Exam: last in clinic visit:    GENERAL: Well appearing, in no acute distress, alert and oriented x3.  PSYCH:  Mood and affect appropriate.  SKIN: Skin color, texture, turgor normal, no rashes or lesions.  HEAD/FACE:  Normocephalic, atraumatic. Cranial nerves grossly intact.  NECK:  Axial loading positive bilaterally.  Spurling's equivocal.  Range of motion fair secondary to pain.  CV: RRR with palpation of the radial artery.  PULM: No evidence of respiratory difficulty, symmetric chest rise.  GI:  Soft and non-tender.  BACK:  Forward flexed posture.  Straight leg raising in the sitting and supine positions is negative to radicular pain. No pain to palpation over the facet joints of the lumbar spine or spinous processes.  Fair range of motion with mild pain reproduction.  EXTREMITIES: No deformities, edema, or skin discoloration. Good capillary refill.  MUSCULOSKELETAL:  Bilateral upper and lower extremity strength is normal and symmetric.  No atrophy or tone abnormalities are noted.  NEURO: Bilateral upper and lower extremity coordination and muscle stretch reflexes are physiologic and symmetric.  Plantar response are downgoing. No clonus.  No loss of sensation is noted.  GAIT: normal.  General: alert and oriented, in no apparent distress  Gait: normal gait.  Skin: no rashes, no discoloration, no obvious lesions  HEENT: normocephalic, atraumatic. Pupils equal and round.  Cardiovascular: no significant peripheral edema present.  Respiratory: without use of accessory muscles of respiration.        Neuro - Upper Extremities:  - BUE Strength:R/L: D: 5/5; B: 5/5; T: 5/5; WF: 5/5; WE: 5/5; IO: 5/5  - Extremity Reflexes: Brisk and symmetric throughout  - Sensory: Sensation to light touch intact bilaterally  Positive spurling  FROM cervical spine and upper extremities  No shoulder tenderness  No cervical tenderness      Psych:  Mood and affect is appropriate      LABS:  Lab Results   Component Value Date    WBC 4.10 09/26/2023    HGB 13.0 (L) 09/26/2023    HCT 40.1 09/26/2023     (H) 09/26/2023     (L) 09/26/2023       CMP  Sodium   Date Value Ref Range Status   02/06/2024 140 136 - 145 mmol/L Final     Potassium   Date Value Ref Range Status   02/06/2024 4.4 3.5 - 5.1 mmol/L Final     Chloride   Date Value Ref Range Status   02/06/2024 109 95 - 110 mmol/L Final     CO2   Date Value Ref Range Status   02/06/2024 19 (L) 23 - 29 mmol/L Final     Glucose   Date Value Ref Range Status   02/06/2024 82 70 - 110 mg/dL Final     BUN   Date Value Ref Range Status   02/06/2024 25 (H) 8 - 23 mg/dL Final     Creatinine   Date Value Ref Range Status   02/06/2024 1.3 0.5 - 1.4 mg/dL Final     Calcium   Date Value Ref Range Status   02/06/2024 8.9 8.7 - 10.5 mg/dL Final     Total Protein   Date Value Ref Range Status   02/06/2024 6.4 6.0 - 8.4 g/dL Final     Albumin   Date Value Ref Range  Status   02/06/2024 4.0 3.5 - 5.2 g/dL Final     Total Bilirubin   Date Value Ref Range Status   02/06/2024 0.9 0.1 - 1.0 mg/dL Final     Comment:     For infants and newborns, interpretation of results should be based  on gestational age, weight and in agreement with clinical  observations.    Premature Infant recommended reference ranges:  Up to 24 hours.............<8.0 mg/dL  Up to 48 hours............<12.0 mg/dL  3-5 days..................<15.0 mg/dL  6-29 days.................<15.0 mg/dL       Alkaline Phosphatase   Date Value Ref Range Status   02/06/2024 60 55 - 135 U/L Final     AST   Date Value Ref Range Status   02/06/2024 17 10 - 40 U/L Final     ALT   Date Value Ref Range Status   02/06/2024 13 10 - 44 U/L Final     Anion Gap   Date Value Ref Range Status   02/06/2024 12 8 - 16 mmol/L Final     eGFR if    Date Value Ref Range Status   06/29/2022 57.9 (A) >60 mL/min/1.73 m^2 Final     eGFR if non    Date Value Ref Range Status   06/29/2022 50.1 (A) >60 mL/min/1.73 m^2 Final     Comment:     Calculation used to obtain the estimated glomerular filtration  rate (eGFR) is the CKD-EPI equation.          Lab Results   Component Value Date    HGBA1C 5.1 06/27/2023             ASSESSMENT: 83 y.o. year old male with lower back and leg pain, consistent with     1. Lumbar radiculopathy  Case Request-RAD/Other Procedure Area: Bilateral L4/5 TF IAN      2. Cervical spondylosis        3. Cervical radiculopathy        4. DDD (degenerative disc disease), lumbar                        PLAN:   - Interventions: Schedule bilateral L4/5 TF IAN for lumbar radiulopathy  Explained the risks and benefits of the procedure in detail with the patient today in clinic along with alternative treatment options, and the patient elected to pursue the intervention.      S/p 2/8/2024 C5/6 IL IAN with 80% relief  S/p diagnostic C4-6 medial branch blocks on 07/18/2023, limited relief  S/p repeat bilateral L4-5  SHU on 06/29/2023, limited relief  - Anticoagulation: yes, Plavix - will get clearance from cardiologist to hold x 5 days  - Medications: I have stressed the importance of physical activity and a home exercise plan to help with pain and improve health. and Patient can continue with medications for now since they are providing benefits, using them appropriately, and without side effects.  -I will start the patient on a Lyrica titration to see if this helps with neuropathic pain.  We discussed increasing the dose gradually to reach a therapeutic goal according to the following algorithm.  We discussed potential side effects of this medication which may include drowsiness,dizziness, dry mouth, constipation or peripheral edema.    Pt Dced lyrica- didn't feel he needed it. No side effects    - Therapy:  Advised patient to continue with activities as tolerated  - Labs:  Reviewed  - Imaging:  Reviewed MRI cervical spine and lumbar MRI  - Consults/Referrals: EConsult to Neurosurgery in the past for consideration of vertiflex procedure for lumbar spinal stenosis  - Records:  Reviewed/Obtain old records from outside physicians and imaging  - Follow up visit:  4 weeks after procedure    - Counseled patient regarding the importance of activity modification and physical therapy  - This condition does not require this patient to take time off of work, and the primary goal of our Pain Management services is to improve the patient's functional capacity.  - Patient Questions: Answered all of the patient's questions regarding diagnosis, therapy, and treatment    The above plan and management options were discussed at length with patient. Patient is in agreement with the above and verbalized understanding.      Maia Lamas NP  Interventional Pain Management  [facility]    Disclaimer:  This note was prepared using voice recognition system and is likely to have sound alike errors that may have been overlooked even  after proof reading.  Please call me with any questions

## 2024-03-11 ENCOUNTER — HOSPITAL ENCOUNTER (OUTPATIENT)
Dept: CARDIOLOGY | Facility: HOSPITAL | Age: 84
Discharge: HOME | End: 2024-03-11
Attending: INTERNAL MEDICINE
Payer: MEDICARE

## 2024-03-11 VITALS
BODY MASS INDEX: 30.21 KG/M2 | DIASTOLIC BLOOD PRESSURE: 69 MMHG | SYSTOLIC BLOOD PRESSURE: 127 MMHG | WEIGHT: 211 LBS | HEIGHT: 70 IN

## 2024-03-11 DIAGNOSIS — E78.2 MIXED HYPERLIPIDEMIA: ICD-10-CM

## 2024-03-11 DIAGNOSIS — I70.0 AORTIC ATHEROSCLEROSIS: ICD-10-CM

## 2024-03-11 DIAGNOSIS — G47.33 OBSTRUCTIVE SLEEP APNEA SYNDROME: ICD-10-CM

## 2024-03-11 DIAGNOSIS — I70.203 ATHEROSCLEROSIS OF ARTERY OF BOTH LOWER EXTREMITIES: ICD-10-CM

## 2024-03-11 LAB
AORTIC ROOT ANNULUS: 3.37 CM
AV INDEX (PROSTH): 0.69
AV MEAN GRADIENT: 8 MMHG
AV PEAK GRADIENT: 16 MMHG
AV REGURGITATION PRESSURE HALF TIME: 955.73 MS
AV VALVE AREA BY VELOCITY RATIO: 2.09 CM²
AV VALVE AREA: 2.25 CM²
AV VELOCITY RATIO: 0.65
BSA FOR ECHO PROCEDURE: 2.17 M2
CV ECHO LV RWT: 0.49 CM
DOP CALC AO PEAK VEL: 2.01 M/S
DOP CALC AO VTI: 49.7 CM
DOP CALC LVOT AREA: 3.2 CM2
DOP CALC LVOT DIAMETER: 2.03 CM
DOP CALC LVOT PEAK VEL: 1.3 M/S
DOP CALC LVOT STROKE VOLUME: 111.6 CM3
DOP CALCLVOT PEAK VEL VTI: 34.5 CM
E WAVE DECELERATION TIME: 194.6 MSEC
E/A RATIO: 0.82
E/E' RATIO: 9.3 M/S
ECHO LV POSTERIOR WALL: 1.11 CM (ref 0.6–1.1)
EJECTION FRACTION: 65 %
FRACTIONAL SHORTENING: 28 % (ref 28–44)
INTERVENTRICULAR SEPTUM: 1.15 CM (ref 0.6–1.1)
IVRT: 94.2 MSEC
LA MAJOR: 5.54 CM
LA MINOR: 5.44 CM
LA WIDTH: 3.1 CM
LEFT ATRIUM SIZE: 3.32 CM
LEFT ATRIUM VOLUME INDEX MOD: 24.4 ML/M2
LEFT ATRIUM VOLUME INDEX: 22.4 ML/M2
LEFT ATRIUM VOLUME MOD: 52.16 CM3
LEFT ATRIUM VOLUME: 48.02 CM3
LEFT INTERNAL DIMENSION IN SYSTOLE: 3.25 CM (ref 2.1–4)
LEFT VENTRICLE DIASTOLIC VOLUME INDEX: 43.13 ML/M2
LEFT VENTRICLE DIASTOLIC VOLUME: 92.29 ML
LEFT VENTRICLE MASS INDEX: 85 G/M2
LEFT VENTRICLE SYSTOLIC VOLUME INDEX: 19.9 ML/M2
LEFT VENTRICLE SYSTOLIC VOLUME: 42.65 ML
LEFT VENTRICULAR INTERNAL DIMENSION IN DIASTOLE: 4.5 CM (ref 3.5–6)
LEFT VENTRICULAR MASS: 181.8 G
LV LATERAL E/E' RATIO: 9.3 M/S
LV SEPTAL E/E' RATIO: 9.3 M/S
LVOT MG: 4.19 MMHG
LVOT MV: 0.99 CM/S
MV PEAK A VEL: 1.13 M/S
MV PEAK E VEL: 0.93 M/S
PISA AR MAX VEL: 4.3 M/S
PISA TR MAX VEL: 3.25 M/S
PULM VEIN S/D RATIO: 1.49
PV PEAK D VEL: 0.43 M/S
PV PEAK S VEL: 0.64 M/S
RA MAJOR: 5.16 CM
RA PRESSURE ESTIMATED: 8 MMHG
RA WIDTH: 2.95 CM
RIGHT VENTRICULAR END-DIASTOLIC DIMENSION: 2.49 CM
RV TB RVSP: 11 MMHG
SINUS: 2.99 CM
STJ: 3.08 CM
TDI LATERAL: 0.1 M/S
TDI SEPTAL: 0.1 M/S
TDI: 0.1 M/S
TR MAX PG: 42 MMHG
TRICUSPID ANNULAR PLANE SYSTOLIC EXCURSION: 2.89 CM
TV REST PULMONARY ARTERY PRESSURE: 50 MMHG
Z-SCORE OF LEFT VENTRICULAR DIMENSION IN END DIASTOLE: -4.25
Z-SCORE OF LEFT VENTRICULAR DIMENSION IN END SYSTOLE: -2.02

## 2024-03-11 PROCEDURE — 93306 TTE W/DOPPLER COMPLETE: CPT | Mod: HCNC,PO

## 2024-03-11 PROCEDURE — 93306 TTE W/DOPPLER COMPLETE: CPT | Mod: 26,HCNC,, | Performed by: STUDENT IN AN ORGANIZED HEALTH CARE EDUCATION/TRAINING PROGRAM

## 2024-03-13 ENCOUNTER — TELEPHONE (OUTPATIENT)
Dept: CARDIOLOGY | Facility: CLINIC | Age: 84
End: 2024-03-13
Payer: MEDICARE

## 2024-03-13 ENCOUNTER — TELEPHONE (OUTPATIENT)
Dept: CARDIOLOGY | Facility: HOSPITAL | Age: 84
End: 2024-03-13
Payer: MEDICARE

## 2024-03-13 ENCOUNTER — OFFICE VISIT (OUTPATIENT)
Dept: PAIN MEDICINE | Facility: CLINIC | Age: 84
End: 2024-03-13
Payer: MEDICARE

## 2024-03-13 DIAGNOSIS — M47.812 CERVICAL SPONDYLOSIS: ICD-10-CM

## 2024-03-13 DIAGNOSIS — M54.12 CERVICAL RADICULOPATHY: ICD-10-CM

## 2024-03-13 DIAGNOSIS — M54.16 LUMBAR RADICULOPATHY: Primary | ICD-10-CM

## 2024-03-13 DIAGNOSIS — M51.36 DDD (DEGENERATIVE DISC DISEASE), LUMBAR: ICD-10-CM

## 2024-03-13 PROCEDURE — 99214 OFFICE O/P EST MOD 30 MIN: CPT | Mod: HCNC,95,, | Performed by: NURSE PRACTITIONER

## 2024-03-13 NOTE — TELEPHONE ENCOUNTER
The patient has been notified of this information and all questions answered.      ----- Message from Aparna Thompson MD sent at 3/13/2024  8:21 AM CDT -----  Echo looks good

## 2024-03-23 ENCOUNTER — PATIENT MESSAGE (OUTPATIENT)
Dept: PAIN MEDICINE | Facility: CLINIC | Age: 84
End: 2024-03-23
Payer: MEDICARE

## 2024-03-25 ENCOUNTER — TELEPHONE (OUTPATIENT)
Dept: CARDIOLOGY | Facility: HOSPITAL | Age: 84
End: 2024-03-25
Payer: MEDICARE

## 2024-03-25 NOTE — TELEPHONE ENCOUNTER
Called pt and informed him that the procedure schedulers will call him once we get the clearance back from cardiologist. Pt verbalized understanding.    Zeferino COULTER

## 2024-03-28 ENCOUNTER — PATIENT MESSAGE (OUTPATIENT)
Dept: PAIN MEDICINE | Facility: CLINIC | Age: 84
End: 2024-03-28
Payer: MEDICARE

## 2024-04-10 ENCOUNTER — TELEPHONE (OUTPATIENT)
Dept: PAIN MEDICINE | Facility: CLINIC | Age: 84
End: 2024-04-10
Payer: MEDICARE

## 2024-04-10 NOTE — PRE-PROCEDURE INSTRUCTIONS
Spoke with patient regarding procedure scheduled on 4.11    Arrival time 0800     Has patient been sick with fever or on antibiotics within the last 7 days? No     Does the patient have any open wounds, sores or rashes? No     Does the patient have any recent fractures? no     Has patient received a vaccination within the last 7 days? No     Received the COVID vaccination? yes     Has the patient stopped all medications as directed? hold plaivix 5 days prior to procedure. Cardiac clearance obtained from dr monroy on 3.25     Does patient have a pacemaker, defibrillator, or implantable stimulator? No     Does the patient have a ride to and from procedure and someone reliable to remain with patient?  briseida      Is the patient diabetic? no     Does the patient have sleep apnea? Or use O2 at home? jonelle on cpap     Is the patient receiving sedation? yes     Is the patient instructed to remain NPO beginning at midnight the night before their procedure? yes     Procedure location confirmed with patient? Yes     Covid- Denies signs/symptoms. Instructed to notify PAT/MD if any changes.

## 2024-04-11 ENCOUNTER — HOSPITAL ENCOUNTER (OUTPATIENT)
Facility: HOSPITAL | Age: 84
Discharge: HOME | End: 2024-04-11
Attending: PHYSICAL MEDICINE & REHABILITATION | Admitting: PHYSICAL MEDICINE & REHABILITATION
Payer: MEDICARE

## 2024-04-11 VITALS
DIASTOLIC BLOOD PRESSURE: 71 MMHG | HEART RATE: 59 BPM | TEMPERATURE: 98 F | SYSTOLIC BLOOD PRESSURE: 133 MMHG | OXYGEN SATURATION: 98 % | WEIGHT: 202.19 LBS | RESPIRATION RATE: 18 BRPM | BODY MASS INDEX: 28.95 KG/M2 | HEIGHT: 70 IN

## 2024-04-11 DIAGNOSIS — M54.16 LUMBAR RADICULOPATHY: ICD-10-CM

## 2024-04-11 DIAGNOSIS — M54.16 LUMBAR RADICULOPATHY, CHRONIC: Primary | ICD-10-CM

## 2024-04-11 PROCEDURE — 63600175 PHARM REV CODE 636 W HCPCS: Mod: HCNC | Performed by: PHYSICAL MEDICINE & REHABILITATION

## 2024-04-11 PROCEDURE — 64483 NJX AA&/STRD TFRM EPI L/S 1: CPT | Mod: 50,HCNC,, | Performed by: PHYSICAL MEDICINE & REHABILITATION

## 2024-04-11 PROCEDURE — 64483 NJX AA&/STRD TFRM EPI L/S 1: CPT | Mod: 50,HCNC | Performed by: PHYSICAL MEDICINE & REHABILITATION

## 2024-04-11 PROCEDURE — 25500020 PHARM REV CODE 255: Mod: HCNC | Performed by: PHYSICAL MEDICINE & REHABILITATION

## 2024-04-11 RX ORDER — MIDAZOLAM HYDROCHLORIDE 1 MG/ML
INJECTION, SOLUTION INTRAMUSCULAR; INTRAVENOUS
Status: DISCONTINUED | OUTPATIENT
Start: 2024-04-11 | End: 2024-04-11 | Stop reason: HOSPADM

## 2024-04-11 RX ORDER — ONDANSETRON HYDROCHLORIDE 2 MG/ML
4 INJECTION, SOLUTION INTRAVENOUS ONCE AS NEEDED
Status: DISCONTINUED | OUTPATIENT
Start: 2024-04-11 | End: 2024-04-11 | Stop reason: HOSPADM

## 2024-04-11 RX ORDER — BUPIVACAINE HYDROCHLORIDE 2.5 MG/ML
INJECTION, SOLUTION EPIDURAL; INFILTRATION; INTRACAUDAL
Status: DISCONTINUED | OUTPATIENT
Start: 2024-04-11 | End: 2024-04-11 | Stop reason: HOSPADM

## 2024-04-11 RX ORDER — FENTANYL CITRATE 50 UG/ML
INJECTION, SOLUTION INTRAMUSCULAR; INTRAVENOUS
Status: DISCONTINUED | OUTPATIENT
Start: 2024-04-11 | End: 2024-04-11 | Stop reason: HOSPADM

## 2024-04-11 RX ORDER — METHYLPREDNISOLONE ACETATE 40 MG/ML
INJECTION, SUSPENSION INTRA-ARTICULAR; INTRALESIONAL; INTRAMUSCULAR; SOFT TISSUE
Status: DISCONTINUED | OUTPATIENT
Start: 2024-04-11 | End: 2024-04-11 | Stop reason: HOSPADM

## 2024-04-11 NOTE — OP NOTE
INFORMED CONSENT: The procedure, risks, benefits and options were discussed with patient. There are no contraindications to the procedure. The patient expressed understanding and agreed to proceed. The personnel performing the procedure was discussed.    04/11/2024    Surgeon: Abel Haywood MD    Assistants: None    Sedation: Conscious sedation provided by M.D    The patient was monitored with continuous pulse oximetry, EKG, and intermittent blood pressure monitors, immediately prior to administration of sedation.  The patient was hemodynamically stable throughout the entire process was responsive to voice, and breathing spontaneously.  Supplemental O2 was provided at 2L/min via nasal cannula.  Patient was comfortable for the duration of the procedure.     There was a total of 2mg IV Midazolam and 50mcg Fentanyl titrated for the procedure    Total sedation time was >10minutes and <20minutes      PROCEDURE:  Bilateral  L4/5  1) Left  L4/5 TRANSFORAMINAL EPIDURAL STEROID INJECTION  2) Right  L4/5 TRANSFORAMINAL EPIDURAL STEROID INJECTION      Pre Procedure diagnosis:  Bilateral L4/5 Lumbar radiculopathy [M54.16]    Post-Procedure diagnosis:   same    Complications: None    Specimens: None      DESCRIPTION OF PROCEDURE: The patient was brought to the procedure room. IV access was obtained prior to the procedure. The patient was positioned prone on the fluoroscopy table. Continuous hemodynamic monitoring was initiated including blood pressure, EKG, and pulse oximetry. . The skin was prepped with chlorhexidine and draped in a sterile fashion. Skin anesthesia was achieved using a total of 10mL of lidocaine, 5mL over each respective injection site.     The  L4/5 transforaminal spaces were identified with fluoroscopy in the  AP, oblique, and lateral views.  A 22 gauge spinal quinke needle was then advanced into the area of the trans foraminal spaces bilaterally with confirmation of proper needle position using AP,  oblique, and lateral fluoroscopic views. Once the needle tip was in the area of the transforaminal space, and there was no blood, CSF or paraesthesias,  1.5 mL of Omnipaque 300mg/ml was injected on each side for a total of 3mL.  Fluoroscopic imaging in the AP and lateral views revealed a clear outline of the spinal nerve with proximal spread of agent through the neural foramen into the epidural space. A total combination of 1 mL of Bupivicaine 0.25% and 40 mg depo medrol was injected on each side for a total of 4mL of injected medications with displacement of the contrast dye confirming that the medication went into the area of the transforaminal spaces bilaterally. A sterile dressing was applied.   Patient tolerated the procedure well.    Patient was taken back to the recovery room for further observation.     The patient was discharged to home in stable condition

## 2024-04-11 NOTE — H&P
HPI  Patient presenting for Procedure(s) (LRB):  Bilateral L4/5 TF IAN (Bilateral)     No health changes since previous encounter    Past Medical History:   Diagnosis Date    Allergic rhinitis     Anemia     Back pain     BPH (benign prostatic hyperplasia)     Cancer     skin cancer to neck, Dr. Graves    Cataract     Disorder of kidney and ureter     ED (erectile dysfunction)     OMARI (generalized anxiety disorder) 8/7/2023    Hiatal hernia     small    History of colon polyps     colonoscopy 11/2016    HLD (hyperlipidemia)     Hyperlipidemia     Hypertension     Lateral epicondylitis     Lumbar radiculopathy 1/26/2022    OA (osteoarthritis)     GUIDO (obstructive sleep apnea)     Prostate cancer     Dr. Wong    TIA (transient ischemic attack)     Trouble in sleeping     Urge incontinence 3/29/2022     Past Surgical History:   Procedure Laterality Date    ANGIOGRAPHY OF UPPER EXTREMITY Left 07/05/2022    Procedure: Angiogram Extremity Bilateral;  Surgeon: Aparna Thompson MD;  Location: [facility] CATH LAB;  Service: Cardiology;  Laterality: Left;    ARTERIOGRAPHY OF AORTIC ROOT N/A 07/05/2022    Procedure: ARTERIOGRAM, AORTIC ROOT;  Surgeon: Aparna Thompson MD;  Location: [facility] CATH LAB;  Service: Cardiology;  Laterality: N/A;    CATARACT EXTRACTION W/  INTRAOCULAR LENS IMPLANT Right 02/21/2018    I-Stent    CATARACT EXTRACTION W/  INTRAOCULAR LENS IMPLANT Left 03/21/2018    I - Stent    CATHETERIZATION OF BOTH LEFT AND RIGHT HEART N/A 07/05/2022    Procedure: CATHETERIZATION, HEART, BOTH LEFT AND RIGHT;  Surgeon: Aparna Thompson MD;  Location: [facility] CATH LAB;  Service: Cardiology;  Laterality: N/A;  radial approach    COLONOSCOPY N/A 11/14/2016    Procedure: COLONOSCOPY;  Surgeon: Karuna Rodriguez MD;  Location: [facility] ENDO;  Service: Endoscopy;  Laterality: N/A;    COLONOSCOPY N/A 09/22/2020    Procedure: COLONOSCOPY;  Surgeon: Chuy Fish MD;  Location: [facility] ENDO;  Service: Endoscopy;  Laterality: N/A;     EPIDURAL STEROID INJECTION INTO CERVICAL SPINE N/A 2/8/2024    Procedure: C5/6 IL Right paramedian approach IAN with RN IV sedation;  Surgeon: Abel Haywood MD;  Location: [facility];  Service: Pain Management;  Laterality: N/A;    EYE SURGERY      HEMORRHOID SURGERY      I-STENT Right 02/21/2018    DR. REECE    INJECTION OF ANESTHETIC AGENT AROUND MEDIAL BRANCH NERVES INNERVATING CERVICAL FACET JOINT Bilateral 7/18/2023    Procedure: Bilateral C4-6 MBB with RN IV sedation (diagnostic, #1);  Surgeon: Abel Haywood MD;  Location: [facility];  Service: Pain Management;  Laterality: Bilateral;    KNEE ARTHROSCOPY W/ MENISCAL REPAIR Right 2015    Dr. Jain    PCIOL Right 02/21/2018    DR. REECE    PLANTAR FASCIA RELEASE      right    ROTATOR CUFF REPAIR Bilateral     bilateral    SELECTIVE INJECTION OF ANESTHETIC AGENT AROUND LUMBAR SPINAL NERVE ROOT BY TRANSFORAMINAL APPROACH Bilateral 01/26/2022    Procedure: Bilateral L4/5 TF IAN with RN IV sedation;  Surgeon: Mak Young MD;  Location: [facility];  Service: Pain Management;  Laterality: Bilateral;    SELECTIVE INJECTION OF ANESTHETIC AGENT AROUND LUMBAR SPINAL NERVE ROOT BY TRANSFORAMINAL APPROACH Bilateral 03/14/2022    Procedure: Bilateral L4/5 TF IAN with RN IV sedation;  Surgeon: Mak Young MD;  Location: [facility];  Service: Pain Management;  Laterality: Bilateral;    SELECTIVE INJECTION OF ANESTHETIC AGENT AROUND LUMBAR SPINAL NERVE ROOT BY TRANSFORAMINAL APPROACH Bilateral 06/02/2022    Procedure: Bilateral L4/5 TF IAN - D2P Dr. Matthew CABRERA;  Surgeon: Abel Haywood MD;  Location: [facility];  Service: Pain Management;  Laterality: Bilateral;    SELECTIVE INJECTION OF ANESTHETIC AGENT AROUND LUMBAR SPINAL NERVE ROOT BY TRANSFORAMINAL APPROACH Bilateral 08/25/2022    Procedure: Bilateral L4/5 TF IAN;  Surgeon: Abel Haywood MD;  Location: [facility];  Service: Pain Management;  Laterality: Bilateral;    SELECTIVE  INJECTION OF ANESTHETIC AGENT AROUND LUMBAR SPINAL NERVE ROOT BY TRANSFORAMINAL APPROACH Bilateral 6/29/2023    Procedure: Bilateral L4/5 TF IAN RN IV Sedation;  Surgeon: Abel Haywood MD;  Location: [facility];  Service: Pain Management;  Laterality: Bilateral;    SHOULDER SURGERY Bilateral around 2000    Dr. Pepper.  rotator cuff surgeries    VASECTOMY       Review of patient's allergies indicates:   Allergen Reactions    Atarax [hydroxyzine hcl] Other (See Comments)     Raised blood pressure     Zyrtec [cetirizine] Other (See Comments)     Raised blood pressure     Gold sodium thiosulfate      Patch test positive    Meloxicam Rash     Other reaction(s): hypertension        No current facility-administered medications on file prior to encounter.     Current Outpatient Medications on File Prior to Encounter   Medication Sig Dispense Refill    albuterol (PROVENTIL/VENTOLIN HFA) 90 mcg/actuation inhaler Inhale 2 puffs into the lungs every 4 (four) hours as needed for Wheezing or Shortness of Breath. 8.7 g 1    alfuzosin (UROXATRAL) 10 mg Tb24 Take 10 mg by mouth daily with breakfast.      amLODIPine (NORVASC) 5 MG tablet Take 1 tablet (5 mg total) by mouth 2 (two) times daily. 180 tablet 3    atorvastatin (LIPITOR) 40 MG tablet TAKE 1 TABLET EVERY DAY 90 tablet 3    citalopram (CELEXA) 10 MG tablet Take 1 tablet (10 mg total) by mouth once daily. For anxiety 90 tablet 3    cyclobenzaprine (FLEXERIL) 5 MG tablet 1 tablet ever 8 hours PRN muscle stiffness/spasms Orally Three times a day for 5 days      finasteride (PROSCAR) 5 mg tablet Take 1 tablet (5 mg total) by mouth once daily. 90 tablet 4    fluticasone propionate (FLONASE) 50 mcg/actuation nasal spray USE 2 SPRAYS IN EACH NOSTRIL ONE TIME DAILY  (SUBSTITUTED FOR FLONASE) 48 g 3    furosemide (LASIX) 20 MG tablet Take 1 tablet (20 mg total) by mouth daily as needed (edema). 30 tablet 0    losartan (COZAAR) 50 MG tablet TAKE 1 TABLET TWICE DAILY 180  "tablet 3    pantoprazole (PROTONIX) 40 MG tablet TAKE 1 TABLET EVERY DAY 90 tablet 3    pregabalin (LYRICA) 75 MG capsule Take 1 tablet by mouth daily at bedtime x 1 week, then take 1 tablet twice a day by mouth 60 capsule 2    sildenafiL (VIAGRA) 100 MG tablet Take 1 po 45 minutes before intercourse 30 tablet 7    solifenacin (VESICARE) 5 MG tablet Take 2 tablets (10 mg total) by mouth once daily. 90 tablet 0    acetaminophen (TYLENOL ARTHRITIS PAIN ORAL) Take by mouth. Patient states that he takes 2 tablets in the morning and 2 tablets in the evening      clopidogreL (PLAVIX) 75 mg tablet TAKE 1 TABLET EVERY DAY 90 tablet 2        PMHx, PSHx, Allergies, Medications reviewed in epic    ROS negative except pain complaints in HPI    OBJECTIVE:    BP (!) 156/76 (BP Location: Right arm, Patient Position: Sitting)   Pulse 60   Temp 97.4 °F (36.3 °C) (Temporal)   Resp 18   Ht 5' 10" (1.778 m)   Wt 91.7 kg (202 lb 2.6 oz)   SpO2 99%   BMI 29.01 kg/m²     PHYSICAL EXAMINATION:    GENERAL: Well appearing, in no acute distress, alert and oriented x3.  PSYCH:  Mood and affect appropriate.  SKIN: Skin color, texture, turgor normal, no rashes or lesions which will impact the procedure.  CV: RRR with palpation of the radial artery.  PULM: No evidence of respiratory difficulty, symmetric chest rise. Clear to auscultation.  NEURO: Cranial nerves grossly intact.    Plan:    Proceed with procedure as planned Procedure(s) (LRB):  Bilateral L4/5 TF IAN (Bilateral)    Abel Haywood MD  04/11/2024            "

## 2024-04-11 NOTE — DISCHARGE SUMMARY
Discharge Note  Short Stay      SUMMARY     Admit Date: 4/11/2024    Attending Physician: Abel Haywood MD        Discharge Physician: Abel Haywood MD        Discharge Date: 4/11/2024 9:27 AM    Procedure(s) (LRB):  Bilateral L4/5 TF IAN (Bilateral)    Final Diagnosis: Lumbar radiculopathy [M54.16]    Disposition: Home or self care    Patient Instructions:   Current Discharge Medication List        CONTINUE these medications which have NOT CHANGED    Details   albuterol (PROVENTIL/VENTOLIN HFA) 90 mcg/actuation inhaler Inhale 2 puffs into the lungs every 4 (four) hours as needed for Wheezing or Shortness of Breath.  Qty: 8.7 g, Refills: 1    Associated Diagnoses: Wheezing      alfuzosin (UROXATRAL) 10 mg Tb24 Take 10 mg by mouth daily with breakfast.      amLODIPine (NORVASC) 5 MG tablet Take 1 tablet (5 mg total) by mouth 2 (two) times daily.  Qty: 180 tablet, Refills: 3    Comments: .  Associated Diagnoses: Primary hypertension      atorvastatin (LIPITOR) 40 MG tablet TAKE 1 TABLET EVERY DAY  Qty: 90 tablet, Refills: 3    Associated Diagnoses: Mixed hyperlipidemia      citalopram (CELEXA) 10 MG tablet Take 1 tablet (10 mg total) by mouth once daily. For anxiety  Qty: 90 tablet, Refills: 3    Associated Diagnoses: OMARI (generalized anxiety disorder)      cyclobenzaprine (FLEXERIL) 5 MG tablet 1 tablet ever 8 hours PRN muscle stiffness/spasms Orally Three times a day for 5 days      finasteride (PROSCAR) 5 mg tablet Take 1 tablet (5 mg total) by mouth once daily.  Qty: 90 tablet, Refills: 4      fluticasone propionate (FLONASE) 50 mcg/actuation nasal spray USE 2 SPRAYS IN EACH NOSTRIL ONE TIME DAILY  (SUBSTITUTED FOR FLONASE)  Qty: 48 g, Refills: 3      furosemide (LASIX) 20 MG tablet Take 1 tablet (20 mg total) by mouth daily as needed (edema).  Qty: 30 tablet, Refills: 0      losartan (COZAAR) 50 MG tablet TAKE 1 TABLET TWICE DAILY  Qty: 180 tablet, Refills: 3    Comments: .  Associated Diagnoses:  Essential hypertension      pantoprazole (PROTONIX) 40 MG tablet TAKE 1 TABLET EVERY DAY  Qty: 90 tablet, Refills: 3      pregabalin (LYRICA) 75 MG capsule Take 1 tablet by mouth daily at bedtime x 1 week, then take 1 tablet twice a day by mouth  Qty: 60 capsule, Refills: 2      sildenafiL (VIAGRA) 100 MG tablet Take 1 po 45 minutes before intercourse  Qty: 30 tablet, Refills: 7      solifenacin (VESICARE) 5 MG tablet Take 2 tablets (10 mg total) by mouth once daily.  Qty: 90 tablet, Refills: 0      acetaminophen (TYLENOL ARTHRITIS PAIN ORAL) Take by mouth. Patient states that he takes 2 tablets in the morning and 2 tablets in the evening      clopidogreL (PLAVIX) 75 mg tablet TAKE 1 TABLET EVERY DAY  Qty: 90 tablet, Refills: 2                 Discharge Diagnosis: Lumbar radiculopathy [M54.16]  Condition on Discharge: Stable with no complications to procedure   Diet on Discharge: Same as before.  Activity: as per instruction sheet.  Discharge to: Home with a responsible adult.  Follow up: 2-4 weeks       Please call the office at [phone] if you experience any weakness or loss of sensation, fever > 101.5, pain uncontrolled with oral medications, persistent nausea/vomiting/or diarrhea, redness or drainage from the incisions, or any other worrisome concerns. If physician on call was not reached or could not communicate with our office for any reason please go to the nearest emergency department

## 2024-04-11 NOTE — DISCHARGE INSTRUCTIONS

## 2024-04-25 ENCOUNTER — TELEPHONE (OUTPATIENT)
Dept: PRIMARY CARE CLINIC | Facility: CLINIC | Age: 84
End: 2024-04-25
Payer: MEDICARE

## 2024-04-25 NOTE — TELEPHONE ENCOUNTER
----- Message from Naren Cisneros sent at 4/25/2024  2:56 PM CDT -----  Contact: Ezequiel Nieves would like a call back at [phone] in regards to needing to see if any lab work needs to be done before his appointment on 4/30/24.  Thanks   Am

## 2024-04-25 NOTE — TELEPHONE ENCOUNTER
----- Message from Naren Cisneros sent at 4/25/2024  2:56 PM CDT -----  Contact: Ezequiel Nieves would like a call back at [phone] in regards to needing to see if any lab work needs to be done before his appointment on 4/30/24.  Thanks   Am

## 2024-04-27 ENCOUNTER — PATIENT MESSAGE (OUTPATIENT)
Dept: PRIMARY CARE CLINIC | Facility: CLINIC | Age: 84
End: 2024-04-27
Payer: MEDICARE

## 2024-04-29 RX ORDER — FLUTICASONE PROPIONATE 50 MCG
2 SPRAY, SUSPENSION (ML) NASAL DAILY
Qty: 48 G | Refills: 3 | Status: SHIPPED | OUTPATIENT
Start: 2024-04-29

## 2024-04-29 NOTE — TELEPHONE ENCOUNTER
No care due was identified.  [facility] Embedded Care Due Messages. Reference number: [id number].   4/29/2024 7:57:26 AM CDT

## 2024-05-03 ENCOUNTER — LAB VISIT (OUTPATIENT)
Dept: LAB | Facility: HOSPITAL | Age: 84
End: 2024-05-03
Attending: UROLOGY
Payer: MEDICARE

## 2024-05-03 DIAGNOSIS — C61 PROSTATE CANCER: ICD-10-CM

## 2024-05-03 LAB — COMPLEXED PSA SERPL-MCNC: 2.8 NG/ML (ref 0–4)

## 2024-05-03 PROCEDURE — 36415 COLL VENOUS BLD VENIPUNCTURE: CPT | Mod: HCNC,PO | Performed by: UROLOGY

## 2024-05-03 PROCEDURE — 84153 ASSAY OF PSA TOTAL: CPT | Mod: HCNC | Performed by: UROLOGY

## 2024-05-06 ENCOUNTER — OFFICE VISIT (OUTPATIENT)
Dept: OPHTHALMOLOGY | Facility: CLINIC | Age: 84
End: 2024-05-06
Payer: MEDICARE

## 2024-05-06 DIAGNOSIS — Z96.1 PSEUDOPHAKIA: ICD-10-CM

## 2024-05-06 DIAGNOSIS — H40.1122 PRIMARY OPEN ANGLE GLAUCOMA (POAG) OF LEFT EYE, MODERATE STAGE: ICD-10-CM

## 2024-05-06 DIAGNOSIS — H40.1111 PRIMARY OPEN ANGLE GLAUCOMA (POAG) OF RIGHT EYE, MILD STAGE: Primary | ICD-10-CM

## 2024-05-06 PROCEDURE — 99213 OFFICE O/P EST LOW 20 MIN: CPT | Mod: HCNC,S$GLB,, | Performed by: OPHTHALMOLOGY

## 2024-05-06 PROCEDURE — 99999 PR PBB SHADOW E&M-EST. PATIENT-LVL III: CPT | Mod: PBBFAC,HCNC,, | Performed by: OPHTHALMOLOGY

## 2024-05-06 PROCEDURE — 1160F RVW MEDS BY RX/DR IN RCRD: CPT | Mod: HCNC,CPTII,S$GLB, | Performed by: OPHTHALMOLOGY

## 2024-05-06 PROCEDURE — 1159F MED LIST DOCD IN RCRD: CPT | Mod: HCNC,CPTII,S$GLB, | Performed by: OPHTHALMOLOGY

## 2024-05-06 NOTE — PROGRESS NOTES
Established Patient - TeleHealth Visit    The patient location is: [place]  The chief complaint leading to consultation is: chronic pain     Visit type: audiovisual    Face to Face time with patient: 10-15 minutes  20 minutes of total time spent on the encounter, which includes face to face time and non-face to face time preparing to see the patient (eg, review of tests), Obtaining and/or reviewing separately obtained history, Documenting clinical information in the electronic or other health record, Independently interpreting results (not separately reported) and communicating results to the patient/family/caregiver, or Care coordination (not separately reported).     Each patient to whom he or she provides medical services by telemedicine is:  (1) informed of the relationship between the physician and patient and the respective role of any other health care provider with respect to management of the patient; and (2) notified that he or she may decline to receive medical services by telemedicine and may withdraw from such care at any time.      Established Patient Chronic Pain Note (Follow up visit)    Chief Complaint:   No chief complaint on file.        SUBJECTIVE:  Interval History (5/13/2024):  Patient Ezequiel Hughes presents today for follow-up visit.  Patient was last seen on 4/11/2024 bilateral L4/5 TF IAN with 80% relief x 1 day.  He continues to have lower back pain which radiates down the side and back of the bilateral lower extremities.  He states the most frustrating part is the numbness that he has in the lower extremities.  Numbness gets worse with prolonged walking and standing.  He is wanting to go back on the Lyrica to see if this can help with his symptoms.  He is also requesting a nerve conduction study just to help figure out where his pain is coming from.  He is seen  in the past to discuss vertiflex and is not interested in surgery at this time.  Last lumbar MRI was 2021.  He rates his pain  today a 4/10 but states it will go higher depending on activity.  Patient denies night fever/night sweats, urinary incontinence, bowel incontinence, significant weight loss and significant motor weakness.   Neck pain is currently stable  Patient denies any other complaints or concerns at this time.      Interval History (3/13/2024):  Patient Ezequiel Hughes presents today for follow-up visit.  Patient was last seen on 2/8/2024 for C5/6 IL IAN which he reports provided 80% relief. Neck pain is improved with no current radiculopathy.  He reports he stopped the Lyrica because he did not feel like he needed it.  He does still complain of lower back pain which radiates into the bilateral legs with some numbness.  He has had bilateral L4/5 IAN in the past with great relief and is wanting to repeat these injections.  He rates his pain today as 0/10 and states later in the day the pain will go up to a 4/10.  He has been seeing the chiropractor and doing home physical therapy exercises to try to get relief.  Patient denies night fever/night sweats, urinary incontinence, bowel incontinence, significant weight loss and significant motor weakness.   Patient denies any other complaints or concerns at this time.        Interval History (1/22/2024):  Patient Ezequiel Hughes presents today for follow-up visit.  Patient was last seen on 8/2/2023. At that time he had a C4-5 MBB which did not provide significant relief. Today his pain is in the base of the neck and radiates to the R shoulder. Pain started a few weeks ago. Pain feels like pins and needles. Today pain is a 5/10 but at it's worst it will be 7/10.   No fall or injury.   He has been to urgent care twice this month for the pain and has received a toradol injection and steroids. He got some temporarily relief from these.  He has chronic lower back pain, has been followed by Dr. Castro. He has been going to the chiropractor  which is providing good relief.  He has been on lyrica in  the past but stopped medication when he got relief of radicular symptoms from a previous IAN.    Interval visit 8/2/2023  Ezequiel Hughes is a 84 y.o. male presents today for follow-up chronic neck and lower back pain.  He was last seen for procedure on 07/18/2023 where he underwent bilateral C4-6 diagnostic medial branch blocks that did not provide significant relief unfortunately.  He continues with lower back pain with radicular symptoms into the both lower extremities.  He reports that this tends to occur mostly with ambulatory activities.        Patient denies night fever/night sweats, urinary incontinence, bowel incontinence, significant weight loss, significant motor weakness and loss of sensations.    Pain Disability Index Review:      1/22/2024     1:06 PM 8/2/2023    10:14 AM 6/9/2022     4:08 PM   Last 3 PDI Scores   Pain Disability Index (PDI) 49 7 24     Interval History (6/9/2023):    Ezequiel Hughes presents today for follow-up visit.  Patient was last seen on 09/27/2022. Last injection Bilateral L4/5 TF IAN on 8/25/22with 80-90% pain relief x more than 6 months before pain insidiously returned. He reports same pain as previous, located in his lower back with radiation into both legs, though his right leg is worse than left. He reports his right leg feels weak and swells after prolonged walking, improved with rest. He has completed 37 sessions of physical therapy for his neck and back from 10/20/2022 to 03/14/2022 without relief. Patient reports pain as 4/10 today, but 6/10 on his worst days.      Interval History (9/27/2022):   Ezequiel Hughes presents today for follow-up visit.  he underwent Bilateral L4/5 TF IAN on 8/25/22.  The patient reports that he is/was better following the procedure.  he reports 80-90% pain relief. He reports this IAN was the best so far.  The changes lasted 4 weeks so far.  The changes have continued through this visit.  Patient reports pain as 2/10 today. He does report muscle  spasms in his lower right back for the past 3-4 days after prolonged stooping working on a car. He has used heat and massage with some improvement.       Interval HPI  Ezequiel Hughes is a 83 y.o. male who presents to the clinic for a follow-up appointment for back and leg pain.  He presents today after undergoing a 3rd lumbar TFESI bilaterally at L4-5 on 06/02/2022.  He reports that this resulted in 100% relief.  Since the last visit, Ezequiel Hughes states the pain has been resolved. Current pain intensity is 0/10.      Interval Hx: 2/24/22  Presents status post bilateral L4/5 transforaminal epidural steroid injection January 26, 2022. Patient reports having 100% relief in lower extremity radicular symptoms following epidural steroid injection.  This pain level varies based upon his activity throughout the day.  Patient reports as he is more active he does notice radicular symptoms down the lateral aspects of bilateral lower extremities to the feet.  Patient reports discontinuing Lyrica the day following his injection which he believes caused paresthesias to insidiously return.  Patient does report improvement in functionality including range of motion and strength following his injection.  Patient is interested in repeat intervention.  Patient denies any side effects at the Lyrica dose of 225 mg twice a day.     Interval Hx: 1/13/22  Patient presents for MRI cervical and lumbar review.  Today patient again reports lower back pain which radiates down the anterior aspects of bilateral lower extremities in L4 distribution to the foot.  Today pain is rated as a 4/10.  Pain is exacerbated with prolonged standing and with ambulation the patient reports he is able to ambulate at least 1 mi on the treadmill before requiring rest.  He also reports neck pain which radiates in bilateral trapezius distributions in C5-6 distribution.  Patient has continued Lyrica and is currently taking 150 mg twice daily.  He is anticipated to  "increase this dosage next week.  He denies any side effects from this medication.     HPI 12/9/21  Ezequiel Hughes is a 81 y.o. male with past medical history significant for transient ischemic attack, hyperlipidemia, hypertension, right bundle branch block, stage 3 chronic kidney disease, thrombocytopenia and anemia , prostate cancer, gout, obstructive sleep apnea, periodically limb movement disorder who presents to the clinic for the evaluation of neck, lower back and leg pain.  Today patient reports pain began approximately 1 year prior without inciting accident, injury or trauma.  Today patient reports lower back pain which is constant and today is rated as a 3/10.  Patient reports radicular symptoms beginning along the anterior aspects of bilateral lower extremities below the knee to the foot in L4 distribution.  Patient is having difficulty describing the character but believes it is burning in nature.  Pain is exacerbated with prolonged standing and with ambulation.  Patient reports he is quite active, goes to the gym and walks on his treadmill and is able to ambulate approximately half to 3/4 of a mi before requiring rest.  Pain is improved with sitting.He denies any bowel or bladder incontinence or saddle anesthesia. Patient has performed physical therapy for the lower back without any improvement in his symptoms.      Patient also reports constant neck pain.  Pain presents in a bandlike distribution in bilateral cervical paraspinous territory and radiates into bilateral trapezius distribution.  Patient also reports numbness in bilateral thumbs.  Pain is exacerbated with cervical flexion, extension and lateral flexion.  Patient denies upper extremity weakness, compromise in hand  strength or dexterity.  Patient has been trialed on gabapentin for what his wife describes as "scalp itching"at a lower dose of 100 mg 3 times daily without any improvement in his neck or in his back pain.         "   Non-Pharmacologic Treatments:  Physical Therapy/Home Exercise: yes  Ice/Heat:yes  TENS: no  Acupuncture: no  Massage: no  Chiropractic: no    Other: no     Pain Medications:  - Adjuvant Medications: Neurontin (Gabapentin) and Tylenol (Acetaminophen)  - Anti-Coagulants: Plavix ( Clopidogrel)     Pain Procedures:   -01/26/2022:  Bilateral L4/5 TFESI  -03/14/2022: Bilateral L4-5 TFESI  -06/02/2022:  Bilateral L4-5 TFESI D2P per Haydel  -08/25/2022: Bilatearl L4/5 TF IAN  -06/29/2023:  bilateral L4-5 TFESI, limited relief  -07/18/2023:  diagnostic C4-6 medial branch blocks, limited relief  2/8/2024 C5/6 IL IAN 80% relief  4/11/2024 bilateral L4/5 TF IAN with 80% relief x 1 day    Imaging:   MRI brain 05/12/2022:        MRI lumbar spine 12/22/2021:      MRI cervical spine 12/22/2021:          PMHx,PSHx, Social history, and Family history:  I have reviewed the patient's medical, surgical, social, and family history in detail and updated the computerized patient record.    Review of patient's allergies indicates:   Allergen Reactions    Atarax [hydroxyzine hcl] Other (See Comments)     Raised blood pressure     Zyrtec [cetirizine] Other (See Comments)     Raised blood pressure     Gold sodium thiosulfate      Patch test positive    Meloxicam Rash     Other reaction(s): hypertension       Current Outpatient Medications   Medication Sig    acetaminophen (TYLENOL ARTHRITIS PAIN ORAL) Take by mouth. Patient states that he takes 2 tablets in the morning and 2 tablets in the evening    albuterol (PROVENTIL/VENTOLIN HFA) 90 mcg/actuation inhaler Inhale 2 puffs into the lungs every 4 (four) hours as needed for Wheezing or Shortness of Breath.    alfuzosin (UROXATRAL) 10 mg Tb24 Take 10 mg by mouth daily with breakfast.    amLODIPine (NORVASC) 5 MG tablet Take 1 tablet (5 mg total) by mouth 2 (two) times daily.    atorvastatin (LIPITOR) 40 MG tablet TAKE 1 TABLET EVERY DAY    citalopram (CELEXA) 10 MG tablet Take 1 tablet (10 mg  total) by mouth once daily. For anxiety    clopidogreL (PLAVIX) 75 mg tablet TAKE 1 TABLET EVERY DAY    cyclobenzaprine (FLEXERIL) 5 MG tablet 1 tablet ever 8 hours PRN muscle stiffness/spasms Orally Three times a day for 5 days    finasteride (PROSCAR) 5 mg tablet Take 1 tablet (5 mg total) by mouth once daily.    fluticasone propionate (FLONASE) 50 mcg/actuation nasal spray 2 sprays (100 mcg total) by Each Nostril route Daily.    furosemide (LASIX) 20 MG tablet Take 1 tablet (20 mg total) by mouth daily as needed (edema).    losartan (COZAAR) 50 MG tablet TAKE 1 TABLET TWICE DAILY    pantoprazole (PROTONIX) 40 MG tablet TAKE 1 TABLET EVERY DAY    pregabalin (LYRICA) 75 MG capsule Take 1 tablet by mouth daily at bedtime x 1 week, then take 1 tablet twice a day by mouth    sildenafiL (VIAGRA) 100 MG tablet Take 1 po 45 minutes before intercourse    solifenacin (VESICARE) 5 MG tablet Take 2 tablets (10 mg total) by mouth once daily.     No current facility-administered medications for this visit.         REVIEW OF SYSTEMS:    GENERAL:  No weight loss, malaise or fevers.  HEENT:   No recent changes in vision or hearing  NECK:  Negative for lumps, no difficulty with swallowing.  RESPIRATORY:  Negative for cough, wheezing or shortness of breath, patient denies any recent URI.  CARDIOVASCULAR:  Negative for chest pain, leg swelling or palpitations.  GI:  Negative for abdominal discomfort, blood in stools or black stools or change in bowel habits.  MUSCULOSKELETAL:  See HPI.  SKIN:  Negative for lesions, rash, and itching.  PSYCH:  No mood disorder or recent psychosocial stressors.  Patients sleep is not disturbed secondary to pain.  HEMATOLOGY/LYMPHOLOGY:  Negative for prolonged bleeding, bruising easily or swollen nodes.  Patient is currently taking anti-coagulants - plavix  NEURO:   No history of headaches, syncope, paralysis, seizures or tremors.  All other reviewed and negative other than HPI.    OBJECTIVE:    There  were no vitals taken for this visit.    PHYSICAL EXAMINATION:  Telemedicine Exam  There were no vitals filed for this visit.  There is no height or weight on file to calculate BMI.   (reviewed on 5/13/2024)      Physical Exam: last in clinic visit:    GENERAL: Well appearing, in no acute distress, alert and oriented x3.  PSYCH:  Mood and affect appropriate.  SKIN: Skin color, texture, turgor normal, no rashes or lesions.  HEAD/FACE:  Normocephalic, atraumatic. Cranial nerves grossly intact.  NECK:  Axial loading positive bilaterally.  Spurling's equivocal.  Range of motion fair secondary to pain.  CV: RRR with palpation of the radial artery.  PULM: No evidence of respiratory difficulty, symmetric chest rise.  GI:  Soft and non-tender.  BACK:  Forward flexed posture.  Straight leg raising in the sitting and supine positions is negative to radicular pain. No pain to palpation over the facet joints of the lumbar spine or spinous processes.  Fair range of motion with mild pain reproduction.  EXTREMITIES: No deformities, edema, or skin discoloration. Good capillary refill.  MUSCULOSKELETAL: Bilateral upper and lower extremity strength is normal and symmetric.  No atrophy or tone abnormalities are noted.  NEURO: Bilateral upper and lower extremity coordination and muscle stretch reflexes are physiologic and symmetric.  Plantar response are downgoing. No clonus.  No loss of sensation is noted.  GAIT: normal.  General: alert and oriented, in no apparent distress  Gait: normal gait.  Skin: no rashes, no discoloration, no obvious lesions  HEENT: normocephalic, atraumatic. Pupils equal and round.  Cardiovascular: no significant peripheral edema present.  Respiratory: without use of accessory muscles of respiration.        Neuro - Upper Extremities:  - BUE Strength:R/L: D: 5/5; B: 5/5; T: 5/5; WF: 5/5; WE: 5/5; IO: 5/5  - Extremity Reflexes: Brisk and symmetric throughout  - Sensory: Sensation to light touch intact  bilaterally  Positive spurling  FROM cervical spine and upper extremities  No shoulder tenderness  No cervical tenderness      Psych:  Mood and affect is appropriate      LABS:  Lab Results   Component Value Date    WBC 4.28 05/09/2024    HGB 13.7 (L) 05/09/2024    HCT 42.9 05/09/2024    MCV 98 05/09/2024     (L) 05/09/2024       CMP  Sodium   Date Value Ref Range Status   05/09/2024 139 136 - 145 mmol/L Final     Potassium   Date Value Ref Range Status   05/09/2024 4.6 3.5 - 5.1 mmol/L Final     Chloride   Date Value Ref Range Status   05/09/2024 106 95 - 110 mmol/L Final     CO2   Date Value Ref Range Status   05/09/2024 22 (L) 23 - 29 mmol/L Final     Glucose   Date Value Ref Range Status   05/09/2024 90 70 - 110 mg/dL Final     BUN   Date Value Ref Range Status   05/09/2024 19 8 - 23 mg/dL Final     Creatinine   Date Value Ref Range Status   05/09/2024 1.3 0.5 - 1.4 mg/dL Final     Calcium   Date Value Ref Range Status   05/09/2024 9.4 8.7 - 10.5 mg/dL Final     Total Protein   Date Value Ref Range Status   05/09/2024 6.9 6.0 - 8.4 g/dL Final     Albumin   Date Value Ref Range Status   05/09/2024 4.0 3.5 - 5.2 g/dL Final     Total Bilirubin   Date Value Ref Range Status   05/09/2024 0.7 0.1 - 1.0 mg/dL Final     Comment:     For infants and newborns, interpretation of results should be based  on gestational age, weight and in agreement with clinical  observations.    Premature Infant recommended reference ranges:  Up to 24 hours.............<8.0 mg/dL  Up to 48 hours............<12.0 mg/dL  3-5 days..................<15.0 mg/dL  6-29 days.................<15.0 mg/dL       Alkaline Phosphatase   Date Value Ref Range Status   05/09/2024 51 (L) 55 - 135 U/L Final     AST   Date Value Ref Range Status   05/09/2024 15 10 - 40 U/L Final     ALT   Date Value Ref Range Status   05/09/2024 12 10 - 44 U/L Final     Anion Gap   Date Value Ref Range Status   05/09/2024 11 8 - 16 mmol/L Final     eGFR if African  American   Date Value Ref Range Status   06/29/2022 57.9 (A) >60 mL/min/1.73 m^2 Final     eGFR if non    Date Value Ref Range Status   06/29/2022 50.1 (A) >60 mL/min/1.73 m^2 Final     Comment:     Calculation used to obtain the estimated glomerular filtration  rate (eGFR) is the CKD-EPI equation.          Lab Results   Component Value Date    HGBA1C 5.1 06/27/2023             ASSESSMENT: 84 y.o. year old male with lower back and leg pain, consistent with     1. Lumbar radiculopathy  EMG W/ ULTRASOUND AND NERVE CONDUCTION TEST 2 Extremities      2. DDD (degenerative disc disease), lumbar        3. Cervical spondylosis                          PLAN:   - Interventions: Addendum: Pt would like to move forward with IAN. Will schedule L5/S1 IL IAN for July. If not relief, consider SCS   He has seen Dr. Castro for vertiflex but states he is not wanting to pursue any surgery    Explained the risks and benefits of the procedure in detail with the patient today in clinic along with alternative treatment options, and the patient elected to pursue the intervention.      S/p 2/8/2024 C5/6 IL IAN with 80% relief  S/p diagnostic C4-6 medial branch blocks on 07/18/2023, limited relief  S/p repeat bilateral L4-5 TFESI on 06/29/2023, limited relief  - Anticoagulation: yes, Plavix - will get clearance from cardiologist to hold x 5 days  - Medications: I have stressed the importance of physical activity and a home exercise plan to help with pain and improve health. and Patient can continue with medications for now since they are providing benefits, using them appropriately, and without side effects.    Pt would like to resume lyrica 75mg BID. We discussed potential side effects of this medication which may include drowsiness,dizziness, dry mouth, constipation or peripheral edema.      - Therapy:  Advised patient to continue with activities as tolerated  - Labs:  Reviewed  - Imaging:  Reviewed MRI cervical spine and  lumbar MRI  - Consults/Referrals: EConsult to Neurosurgery in the past for consideration of vertiflex procedure for lumbar spinal stenosis  - Records:  Reviewed/Obtain old records from outside physicians and imaging  - Follow up visit: will message after nerve conduction study- consider IAN    - Counseled patient regarding the importance of activity modification and physical therapy  - This condition does not require this patient to take time off of work, and the primary goal of our Pain Management services is to improve the patient's functional capacity.  - Patient Questions: Answered all of the patient's questions regarding diagnosis, therapy, and treatment    The above plan and management options were discussed at length with patient. Patient is in agreement with the above and verbalized understanding.      Maia Lamas NP  Interventional Pain Management  [facility]    Disclaimer:  This note was prepared using voice recognition system and is likely to have sound alike errors that may have been overlooked even after proof reading.  Please call me with any questions

## 2024-05-06 NOTE — PROGRESS NOTES
SUBJECTIVE  Ezequiel Hughes is 84 y.o. male  Uncorrected distance visual acuity was 20/30 -2 in the right eye and 20/20 -1 in the left eye.   Chief Complaint   Patient presents with    Glaucoma     IOP check    Patient states OU is doing well, no changes in VA .          HPI     Glaucoma     Additional comments: IOP check    Patient states OU is doing well, no changes in VA .           Comments    1. Mod COAG OS + Mild COAG OD (init 22/26 post dilation IOP ) goal = 17  SLT OD 1/18/23 (19.5-12)  SLT OS 12/2/20 (19.5-15)  2. PCIOL / I-Stent OD +18.5 [id number] (distance) 2-21-18  PCIOL /I-Stent OS +21.0 (-1.75) 3/21/18 near  3. ERM OU   4. Brow Lift 9/18   *intolerant of travatan*      No eyedrops             Last edited by Jessie Gifford, Patient Care Assistant on 5/6/2024 10:37   AM.         Assessment /Plan :  1. Primary open angle glaucoma (POAG) of right eye, mild stage Doing well, intraocular pressure (IOP) within acceptable range relative to target IOP and no evidence of progression. Continue current treatment. Reviewed importance of continued compliance with treatment and follow up.      Return to clinic in 4-5 months  or as needed.  With GOCT, Dilation, and HVF 24-2     2. Primary open angle glaucoma (POAG) of left eye, moderate stage    3. Pseudophakia  -- Condition stable, no therapeutic change required. Monitoring routinely.                 
5

## 2024-05-08 PROBLEM — H40.1111 PRIMARY OPEN ANGLE GLAUCOMA (POAG) OF RIGHT EYE, MILD STAGE: Status: ACTIVE | Noted: 2024-05-08

## 2024-05-08 PROBLEM — H40.1122 PRIMARY OPEN ANGLE GLAUCOMA (POAG) OF LEFT EYE, MODERATE STAGE: Status: ACTIVE | Noted: 2024-05-08

## 2024-05-09 ENCOUNTER — LAB VISIT (OUTPATIENT)
Dept: LAB | Facility: HOSPITAL | Age: 84
End: 2024-05-09
Attending: FAMILY MEDICINE
Payer: MEDICARE

## 2024-05-09 ENCOUNTER — OFFICE VISIT (OUTPATIENT)
Dept: PRIMARY CARE CLINIC | Facility: CLINIC | Age: 84
End: 2024-05-09
Payer: MEDICARE

## 2024-05-09 DIAGNOSIS — M21.70 ACQUIRED LEG LENGTH DISCREPANCY: ICD-10-CM

## 2024-05-09 DIAGNOSIS — I70.203 ATHEROSCLEROSIS OF ARTERY OF BOTH LOWER EXTREMITIES: ICD-10-CM

## 2024-05-09 DIAGNOSIS — R20.0 BILATERAL LEG NUMBNESS: ICD-10-CM

## 2024-05-09 DIAGNOSIS — M47.817 LUMBOSACRAL SPONDYLOSIS WITHOUT MYELOPATHY: ICD-10-CM

## 2024-05-09 DIAGNOSIS — N18.32 STAGE 3B CHRONIC KIDNEY DISEASE: ICD-10-CM

## 2024-05-09 DIAGNOSIS — D64.9 ANEMIA, UNSPECIFIED TYPE: ICD-10-CM

## 2024-05-09 DIAGNOSIS — R25.2 BILATERAL LEG CRAMPS: ICD-10-CM

## 2024-05-09 DIAGNOSIS — M47.817 LUMBOSACRAL SPONDYLOSIS WITHOUT MYELOPATHY: Primary | ICD-10-CM

## 2024-05-09 DIAGNOSIS — M81.6 LOCALIZED OSTEOPOROSIS (LEQUESNE): ICD-10-CM

## 2024-05-09 DIAGNOSIS — Z91.81 AT HIGH RISK FOR FALLS: ICD-10-CM

## 2024-05-09 LAB
ALBUMIN SERPL BCP-MCNC: 4 G/DL (ref 3.5–5.2)
ALP SERPL-CCNC: 51 U/L (ref 55–135)
ALT SERPL W/O P-5'-P-CCNC: 12 U/L (ref 10–44)
ANION GAP SERPL CALC-SCNC: 11 MMOL/L (ref 8–16)
AST SERPL-CCNC: 15 U/L (ref 10–40)
BASOPHILS # BLD AUTO: 0.03 K/UL (ref 0–0.2)
BASOPHILS NFR BLD: 0.7 % (ref 0–1.9)
BILIRUB SERPL-MCNC: 0.7 MG/DL (ref 0.1–1)
BUN SERPL-MCNC: 19 MG/DL (ref 8–23)
CALCIUM SERPL-MCNC: 9.4 MG/DL (ref 8.7–10.5)
CHLORIDE SERPL-SCNC: 106 MMOL/L (ref 95–110)
CK SERPL-CCNC: 113 U/L (ref 20–200)
CO2 SERPL-SCNC: 22 MMOL/L (ref 23–29)
CREAT SERPL-MCNC: 1.3 MG/DL (ref 0.5–1.4)
DIFFERENTIAL METHOD BLD: ABNORMAL
EOSINOPHIL # BLD AUTO: 0.2 K/UL (ref 0–0.5)
EOSINOPHIL NFR BLD: 4.2 % (ref 0–8)
ERYTHROCYTE [DISTWIDTH] IN BLOOD BY AUTOMATED COUNT: 12.6 % (ref 11.5–14.5)
EST. GFR  (NO RACE VARIABLE): 53.5 ML/MIN/1.73 M^2
FERRITIN SERPL-MCNC: 133 NG/ML (ref 20–300)
FOLATE SERPL-MCNC: 10.7 NG/ML (ref 4–24)
GLUCOSE SERPL-MCNC: 90 MG/DL (ref 70–110)
HCT VFR BLD AUTO: 42.9 % (ref 40–54)
HGB BLD-MCNC: 13.7 G/DL (ref 14–18)
IMM GRANULOCYTES # BLD AUTO: 0.05 K/UL (ref 0–0.04)
IMM GRANULOCYTES NFR BLD AUTO: 1.2 % (ref 0–0.5)
IRON SERPL-MCNC: 76 UG/DL (ref 45–160)
LYMPHOCYTES # BLD AUTO: 0.7 K/UL (ref 1–4.8)
LYMPHOCYTES NFR BLD: 16.6 % (ref 18–48)
MAGNESIUM SERPL-MCNC: 2.2 MG/DL (ref 1.6–2.6)
MCH RBC QN AUTO: 31.4 PG (ref 27–31)
MCHC RBC AUTO-ENTMCNC: 31.9 G/DL (ref 32–36)
MCV RBC AUTO: 98 FL (ref 82–98)
MONOCYTES # BLD AUTO: 0.4 K/UL (ref 0.3–1)
MONOCYTES NFR BLD: 9.6 % (ref 4–15)
NEUTROPHILS # BLD AUTO: 2.9 K/UL (ref 1.8–7.7)
NEUTROPHILS NFR BLD: 67.7 % (ref 38–73)
NRBC BLD-RTO: 0 /100 WBC
PLATELET # BLD AUTO: 144 K/UL (ref 150–450)
PMV BLD AUTO: 8.5 FL (ref 9.2–12.9)
POTASSIUM SERPL-SCNC: 4.6 MMOL/L (ref 3.5–5.1)
PROT SERPL-MCNC: 6.9 G/DL (ref 6–8.4)
RBC # BLD AUTO: 4.36 M/UL (ref 4.6–6.2)
SATURATED IRON: 23 % (ref 20–50)
SODIUM SERPL-SCNC: 139 MMOL/L (ref 136–145)
TOTAL IRON BINDING CAPACITY: 333 UG/DL (ref 250–450)
TRANSFERRIN SERPL-MCNC: 225 MG/DL (ref 200–375)
VIT B12 SERPL-MCNC: 461 PG/ML (ref 210–950)
WBC # BLD AUTO: 4.28 K/UL (ref 3.9–12.7)

## 2024-05-09 PROCEDURE — 85025 COMPLETE CBC W/AUTO DIFF WBC: CPT | Mod: HCNC | Performed by: FAMILY MEDICINE

## 2024-05-09 PROCEDURE — 82550 ASSAY OF CK (CPK): CPT | Mod: HCNC | Performed by: FAMILY MEDICINE

## 2024-05-09 PROCEDURE — 1160F RVW MEDS BY RX/DR IN RCRD: CPT | Mod: HCNC,CPTII,95, | Performed by: FAMILY MEDICINE

## 2024-05-09 PROCEDURE — 80053 COMPREHEN METABOLIC PANEL: CPT | Mod: HCNC | Performed by: FAMILY MEDICINE

## 2024-05-09 PROCEDURE — 83540 ASSAY OF IRON: CPT | Mod: HCNC | Performed by: FAMILY MEDICINE

## 2024-05-09 PROCEDURE — 99214 OFFICE O/P EST MOD 30 MIN: CPT | Mod: HCNC,95,, | Performed by: FAMILY MEDICINE

## 2024-05-09 PROCEDURE — 82728 ASSAY OF FERRITIN: CPT | Mod: HCNC | Performed by: FAMILY MEDICINE

## 2024-05-09 PROCEDURE — 82746 ASSAY OF FOLIC ACID SERUM: CPT | Mod: HCNC | Performed by: FAMILY MEDICINE

## 2024-05-09 PROCEDURE — 82607 VITAMIN B-12: CPT | Mod: HCNC | Performed by: FAMILY MEDICINE

## 2024-05-09 PROCEDURE — 83735 ASSAY OF MAGNESIUM: CPT | Mod: HCNC | Performed by: FAMILY MEDICINE

## 2024-05-09 PROCEDURE — G2211 COMPLEX E/M VISIT ADD ON: HCPCS | Mod: HCNC,95,, | Performed by: FAMILY MEDICINE

## 2024-05-09 PROCEDURE — 36415 COLL VENOUS BLD VENIPUNCTURE: CPT | Mod: HCNC,PO | Performed by: FAMILY MEDICINE

## 2024-05-09 PROCEDURE — 1159F MED LIST DOCD IN RCRD: CPT | Mod: HCNC,CPTII,95, | Performed by: FAMILY MEDICINE

## 2024-05-09 NOTE — PROGRESS NOTES
Subjective:      Patient ID: Ezequiel Hughes is a 84 y.o. male.    Chief Complaint: Follow-up (3 month follow up)    The patient location is: home  Visit type: video and audio simultaneous    Patient is a 84 y.o. male coming in today for 3 month f/u. Wife is present during visit.   He is requesting a temporary handicap tag due to recent increase in bilateral leg cramping. Wife states pt has been drinking electrolyte rich drinks and using compression socks for cramping treatment with minimal improvement. Wife is concerned pt may be a highg risk for falls. Has been f/u with Dr. Haywood, spine specialist. Pt is not currently taking Lyrica at this time; he stopped taking it after getting back injections with Dr. Haywood. Wife is inquiring about completing bone density test to assess back bones. They are also requesting referral to podiatry.  No other health concern at this time.     Back Pain  This is a chronic problem. The current episode started more than 1 year ago. The problem occurs daily. The problem has been waxing and waning since onset. The pain is present in the lumbar spine and sacro-iliac. The quality of the pain is described as aching. The pain radiates to the right knee. The pain is at a severity of 5/10. The pain is moderate. The pain is Worse during the day. The symptoms are aggravated by standing. Stiffness is present All day. Associated symptoms include bladder incontinence, leg pain, numbness and weakness. Pertinent negatives include no abdominal pain, bowel incontinence, chest pain, dysuria, fever, headaches, paresis, paresthesias, pelvic pain, perianal numbness, tingling or weight loss. Risk factors include poor posture. The treatment provided mild relief.   Follow-up  Associated symptoms include numbness and weakness. Pertinent negatives include no abdominal pain, chest pain, fever or headaches.     [facility] Reason For Visit    5/9/2024  7:01 AM CDT - Filed by Patient   What is your primary reason for  visit? Back Pain   Your back pain Has lasted for 3 months or more   When did you first notice your back pain? More than 1 year ago   How often do you feel back pain? Daily   Since you first noticed your back pain, how has it changed? Always present, but gets better and worse   Where is your back pain located? Lower back - above the waist;  Lower back - below the waist   How would you describe your back pain? Aching   Where does your back pain spread? Right knee   On a scale of 0 to 10 (10 being the worst), how strong is your back pain? 5   Your back pain is... worse during the day   What makes your back pain worse? Standing   When do you feel stiffness in your back? All day   Are you experiencing any of the following symptoms with your back pain?   Abdominal pain No   Bladder incontinence Yes   Bowel incontinence No   Chest pain No   Painful urination No   Fever No   Headaches No   Leg pain Yes   Numbness Yes   Paralysis No   Pins and needles No   Pelvic pain No   Numbness around the anus No   Tingling No   Weakness Yes   Weight loss No   Genital pain No   Bloody Urine No   Do any of the following apply to you? Poor posture   How severe is your pain? Moderate   Any improvement on treatment? Mild     [facility]    5/9/2024  6:58 AM CDT - Filed by Patient   This questionnaire should take approximately 5 to 10 minutes to complete.  To begin, press Let's Begin and then press Continue. Let's Begin   On average, how many days per week do you engage in moderate to strenuous exercise (like a brisk walk)? 2 days   On average, how many minutes do you engage in exercise at this level? 30 min   Do you feel stress - tense, restless, nervous, or anxious, or unable to sleep at night because your mind is troubled all the time - these days? To some extent   How hard is it for you to pay for the very basics like food, housing, medical care, and heating? Not hard at all   Within the past 12 months, you worried that your food would  run out before you got the money to buy more. Never true   Within the past 12 months, the food you bought just didn't last and you didn't have money to get more. Never true   In the past 12 months, has lack of transportation kept you from medical appointments or from getting medications? No   In the past 12 months, has lack of transportation kept you from meetings, work, or from getting things needed for daily living? No   How often do you have a drink containing alcohol? 2-4 times a month   How many drinks containing alcohol do you have on a typical day when you are drinking? 1 or 2   How often do you have six or more drinks on one occasion? Never   In the last 12 months, was there a time when you were not able to pay the mortgage or rent on time? No   In the last 12 months, how many places have you lived? (range: at least 0) 1   In the last 12 months, was there a time when you did not have a steady place to sleep or slept in a shelter (including now)? No         Pmh, Psh, Family Hx, Social Hx updated in Epic Tabs today.     LABS:   Lab Results   Component Value Date    WBC 4.10 09/26/2023    HGB 13.0 (L) 09/26/2023    HCT 40.1 09/26/2023     (L) 09/26/2023    CHOL 151 02/06/2024    TRIG 89 02/06/2024    HDL 63 02/06/2024    ALT 13 02/06/2024    AST 17 02/06/2024     02/06/2024    K 4.4 02/06/2024     02/06/2024    CREATININE 1.3 02/06/2024    BUN 25 (H) 02/06/2024    CO2 19 (L) 02/06/2024    TSH 2.242 06/27/2023    PSA 1.3 11/10/2021    INR 1.2 06/29/2022    HGBA1C 5.1 06/27/2023       FL Fluoro for Pain Management  See OP Notes for results.     IMPRESSION: See OP Notes for results.     This procedure was auto-finalized by: Virtual Radiologist        Review of Systems   Constitutional:  Negative for fever and weight loss.   Cardiovascular:  Negative for chest pain.   Gastrointestinal:  Negative for abdominal pain and bowel incontinence.   Genitourinary:  Positive for bladder incontinence.  Negative for dysuria, hematuria and pelvic pain.   Musculoskeletal:  Positive for back pain.   Neurological:  Positive for weakness and numbness. Negative for tingling, headaches and paresthesias.     Objective:   There were no vitals filed for this visit.  Wt Readings from Last 10 Encounters:   04/11/24 91.7 kg (202 lb 2.6 oz)   03/11/24 95.7 kg (211 lb)   02/28/24 96.1 kg (211 lb 13.8 oz)   02/28/24 96.1 kg (211 lb 13.8 oz)   02/15/24 95.6 kg (210 lb 12.2 oz)   02/07/24 93.3 kg (205 lb 11 oz)   01/31/24 94 kg (207 lb 3.7 oz)   01/22/24 93.8 kg (206 lb 10.9 oz)   01/05/24 90.7 kg (200 lb)   11/21/23 90.9 kg (200 lb 6.4 oz)     Physical Exam  Vitals reviewed.   Constitutional:       General: He is awake. He is not in acute distress.     Appearance: Normal appearance. He is well-developed, well-groomed and normal weight. He is not ill-appearing.   HENT:      Head: Normocephalic and atraumatic.      Right Ear: External ear normal.      Left Ear: External ear normal.      Nose: Nose normal.      Mouth/Throat:      Lips: Pink.   Eyes:      Conjunctiva/sclera: Conjunctivae normal.   Pulmonary:      Effort: Pulmonary effort is normal.   Neurological:      Mental Status: He is alert.   Psychiatric:         Attention and Perception: Attention and perception normal. He is attentive.         Mood and Affect: Mood and affect normal. Mood is not anxious or depressed. Affect is not labile, blunt, angry or inappropriate.         Speech: Speech normal. He is communicative. Speech is not rapid and pressured, delayed, slurred or tangential.         Behavior: Behavior normal. Behavior is not agitated, slowed, aggressive, withdrawn, hyperactive or combative. Behavior is cooperative.         Thought Content: Thought content normal. Thought content is not paranoid or delusional. Thought content does not include homicidal or suicidal ideation. Thought content does not include homicidal or suicidal plan.         Cognition and Memory:  Cognition and memory normal. Memory is not impaired. He does not exhibit impaired recent memory or impaired remote memory.         Judgment: Judgment normal. Judgment is not impulsive or inappropriate.       Assessment:     1. Lumbosacral spondylosis without myelopathy    2. Bilateral leg numbness    3. Bilateral leg cramps    4. Atherosclerosis of artery of both lower extremities    5. At high risk for falls    6. Stage 3b chronic kidney disease    7. Anemia, unspecified type    8. Acquired leg length discrepancy    9. Localized osteoporosis ([place])      Plan:   Ezequiel was seen today for follow-up.    Diagnoses and all orders for this visit:    Lumbosacral spondylosis without myelopathy  -     Comprehensive Metabolic Panel; Future  -     Magnesium; Future  -     CK; Future  -     Vitamin B12; Future  -     Folate; Future  -     Iron and TIBC; Future  -     Ferritin; Future  -     CBC Auto Differential; Future    Bilateral leg numbness  -     Comprehensive Metabolic Panel; Future  -     Magnesium; Future  -     CK; Future  -     Vitamin B12; Future  -     Folate; Future  -     Iron and TIBC; Future  -     Ferritin; Future  -     CBC Auto Differential; Future  -     Ambulatory referral/consult to Podiatry; Future    Bilateral leg cramps  -     Comprehensive Metabolic Panel; Future  -     Magnesium; Future  -     CK; Future  -     Vitamin B12; Future  -     Folate; Future  -     Iron and TIBC; Future  -     Ferritin; Future  -     CBC Auto Differential; Future    Atherosclerosis of artery of both lower extremities  -     Comprehensive Metabolic Panel; Future  -     Magnesium; Future  -     CK; Future  -     Vitamin B12; Future  -     Folate; Future  -     Iron and TIBC; Future  -     Ferritin; Future  -     CBC Auto Differential; Future    At high risk for falls  -     Comprehensive Metabolic Panel; Future  -     Magnesium; Future  -     CK; Future  -     Vitamin B12; Future  -     Folate; Future  -     Iron and  TIBC; Future  -     Ferritin; Future  -     CBC Auto Differential; Future  -     DXA Bone Density Axial Skeleton 1 or more sites; Future  -     Ambulatory referral/consult to Podiatry; Future    Stage 3b chronic kidney disease  -     Comprehensive Metabolic Panel; Future  -     Magnesium; Future  -     CK; Future  -     Vitamin B12; Future  -     Folate; Future  -     Iron and TIBC; Future  -     Ferritin; Future  -     CBC Auto Differential; Future    Anemia, unspecified type  -     Comprehensive Metabolic Panel; Future  -     Magnesium; Future  -     CK; Future  -     Vitamin B12; Future  -     Folate; Future  -     Iron and TIBC; Future  -     Ferritin; Future  -     CBC Auto Differential; Future    Acquired leg length discrepancy  -     Ambulatory referral/consult to Podiatry; Future    Localized osteoporosis ([place])  -     DXA Bone Density Axial Skeleton 1 or more sites; Future      Bilateral leg cramping is exacerbated at this time.   Lab work ordered to be completed today.   Instructed to restart Lyrica again for pain treatment.   DXA scan ordered to be completed this month to assess back bones.   Referral given to podiatry for further leg discrepancy assessment.   Instructed to f/u PRN.     Face to Face time with patient: 7:07 AM-7:27 AM         30  minutes of total time spent on the encounter, which includes face to face time and non-face to face time preparing to see the patient (eg, review of tests), Obtaining and/or reviewing separately obtained history, Documenting clinical information in the electronic or other health record, Independently interpreting results (not separately reported) and communicating results to the patient/family/caregiver, or Care coordination (not separately reported).     Each patient to whom he or she provides medical services by telemedicine is:  (1) informed of the relationship between the physician and patient and the respective role of any other health care provider with  respect to management of the patient; and (2) notified that he or she may decline to receive medical services by telemedicine and may withdraw from such care at any time.      Follow up if symptoms worsen or fail to improve.    There are no Patient Instructions on file for this visit.    Visit today included increased complexity associated with the care of the episodic problem : leg cramping  and managing the longitudinal care of the patient due to the serious and/or complex managed problem(s) noted in the diagnosis section.     Scribe Attestation:   I, Cong Marquez, am scribing for, and in the presence of, Dr. Valery Ozuna MD. I performed the above scribed service and the documentation accurately describes the services I performed. I attest to the accuracy of the note.    I, Dr. Valery Ozuna MD, reviewed documentation as scribed above. I personally performed the services described in this documentation.  I agree that the record reflects my personal performance and is accurate and complete. Valery Ozuna MD.    05/09/2024

## 2024-05-10 ENCOUNTER — OFFICE VISIT (OUTPATIENT)
Dept: UROLOGY | Facility: CLINIC | Age: 84
End: 2024-05-10
Payer: MEDICARE

## 2024-05-10 VITALS
BODY MASS INDEX: 28.06 KG/M2 | SYSTOLIC BLOOD PRESSURE: 152 MMHG | HEIGHT: 70 IN | DIASTOLIC BLOOD PRESSURE: 64 MMHG | WEIGHT: 196 LBS | HEART RATE: 75 BPM | RESPIRATION RATE: 18 BRPM

## 2024-05-10 DIAGNOSIS — N40.1 BPH WITH OBSTRUCTION/LOWER URINARY TRACT SYMPTOMS: ICD-10-CM

## 2024-05-10 DIAGNOSIS — G47.33 OSA (OBSTRUCTIVE SLEEP APNEA): ICD-10-CM

## 2024-05-10 DIAGNOSIS — C61 PROSTATE CANCER: Primary | ICD-10-CM

## 2024-05-10 DIAGNOSIS — R35.1 NOCTURIA: ICD-10-CM

## 2024-05-10 DIAGNOSIS — N13.8 BPH WITH OBSTRUCTION/LOWER URINARY TRACT SYMPTOMS: ICD-10-CM

## 2024-05-10 LAB
BILIRUB UR QL STRIP: NEGATIVE
GLUCOSE UR QL STRIP: NEGATIVE
KETONES UR QL STRIP: NEGATIVE
LEUKOCYTE ESTERASE UR QL STRIP: NEGATIVE
PH, POC UA: 5.5
POC BLOOD, URINE: NEGATIVE
POC NITRATES, URINE: NEGATIVE
PROT UR QL STRIP: NEGATIVE
SP GR UR STRIP: 1.01 (ref 1–1.03)
UROBILINOGEN UR STRIP-ACNC: 0.2 (ref 0.3–2.2)

## 2024-05-10 PROCEDURE — 81003 URINALYSIS AUTO W/O SCOPE: CPT | Mod: QW,HCNC,S$GLB, | Performed by: UROLOGY

## 2024-05-10 PROCEDURE — 99999 PR PBB SHADOW E&M-EST. PATIENT-LVL V: CPT | Mod: PBBFAC,HCNC,, | Performed by: UROLOGY

## 2024-05-10 PROCEDURE — 1159F MED LIST DOCD IN RCRD: CPT | Mod: HCNC,CPTII,S$GLB, | Performed by: UROLOGY

## 2024-05-10 PROCEDURE — 99214 OFFICE O/P EST MOD 30 MIN: CPT | Mod: HCNC,S$GLB,, | Performed by: UROLOGY

## 2024-05-10 PROCEDURE — 1101F PT FALLS ASSESS-DOCD LE1/YR: CPT | Mod: HCNC,CPTII,S$GLB, | Performed by: UROLOGY

## 2024-05-10 PROCEDURE — 1126F AMNT PAIN NOTED NONE PRSNT: CPT | Mod: HCNC,CPTII,S$GLB, | Performed by: UROLOGY

## 2024-05-10 PROCEDURE — 3288F FALL RISK ASSESSMENT DOCD: CPT | Mod: HCNC,CPTII,S$GLB, | Performed by: UROLOGY

## 2024-05-13 ENCOUNTER — OFFICE VISIT (OUTPATIENT)
Dept: PAIN MEDICINE | Facility: CLINIC | Age: 84
End: 2024-05-13
Payer: MEDICARE

## 2024-05-13 DIAGNOSIS — M47.812 CERVICAL SPONDYLOSIS: ICD-10-CM

## 2024-05-13 DIAGNOSIS — M51.36 DDD (DEGENERATIVE DISC DISEASE), LUMBAR: ICD-10-CM

## 2024-05-13 DIAGNOSIS — M54.16 LUMBAR RADICULOPATHY: Primary | ICD-10-CM

## 2024-05-13 PROCEDURE — 99214 OFFICE O/P EST MOD 30 MIN: CPT | Mod: HCNC,95,, | Performed by: NURSE PRACTITIONER

## 2024-05-13 RX ORDER — PREGABALIN 75 MG/1
CAPSULE ORAL
Qty: 60 CAPSULE | Refills: 1 | Status: SHIPPED | OUTPATIENT
Start: 2024-05-13

## 2024-05-14 ENCOUNTER — TELEPHONE (OUTPATIENT)
Dept: PAIN MEDICINE | Facility: CLINIC | Age: 84
End: 2024-05-14
Payer: MEDICARE

## 2024-05-14 DIAGNOSIS — M54.16 LUMBAR RADICULOPATHY: Primary | ICD-10-CM

## 2024-05-14 NOTE — PROGRESS NOTES
Ezequiel Hughes,     Your labs have been reviewed. Your vitamin levels ( b12, magnesium, iron, folate) and muscle enzyme number are within acceptable ranges for your age and conditions. Mild anemia on the blood counts ( CBC) is improving.     Any other abnormalities that you see at this time, have been reviewed, and are not indicative of needing additional workup or concern.  Please continue on your current treatment plans.     Please let me know if you have further questions.     Sincerely,   Valery Ozuna MD

## 2024-05-14 NOTE — TELEPHONE ENCOUNTER
----- Message from Deb Siddiqui sent at 5/14/2024  9:44 AM CDT -----  .Type:  Needs Medical Advice    Who Called: pt    Would the patient rather a call back or a response via [facility]? Call back  Best Call Back Number: [phone]  Additional Information:     Pt stated he missed a call and would like a call back please

## 2024-05-14 NOTE — TELEPHONE ENCOUNTER
Called patient in regards to a call back. Patient did not answer phone, LVM to give a call back .    Amparo Amato MA

## 2024-05-14 NOTE — TELEPHONE ENCOUNTER
Called patient in regards to a call back, patient wants to be scheduled for an IAN. Patient stated he spoke with LIZBETH Lamas on 05/13/24 about a nerve conduction test and an injection being done and no longer wants the nerve conduction to just go ahead and schedule the injection. Stated to patient that I will send over a message to LIZBETH Lamas to see about placing orders for injection. Patient verbalized understanding.    Amparo Amato MA

## 2024-05-14 NOTE — TELEPHONE ENCOUNTER
----- Message from Sugar Wilkinson sent at 5/14/2024  7:58 AM CDT -----  Contact: Ezequiel Mcdermott is calling to receive a call back at .[phone]. Reports instead of doing nerve test, wanting to go straight to getting the epidural.

## 2024-05-15 ENCOUNTER — APPOINTMENT (OUTPATIENT)
Dept: RADIOLOGY | Facility: HOSPITAL | Age: 84
End: 2024-05-15
Attending: FAMILY MEDICINE
Payer: MEDICARE

## 2024-05-15 ENCOUNTER — TELEPHONE (OUTPATIENT)
Dept: PAIN MEDICINE | Facility: CLINIC | Age: 84
End: 2024-05-15
Payer: MEDICARE

## 2024-05-15 ENCOUNTER — E-CONSULT (OUTPATIENT)
Dept: CARDIOLOGY | Facility: CLINIC | Age: 84
End: 2024-05-15
Payer: MEDICARE

## 2024-05-15 DIAGNOSIS — Z91.81 AT HIGH RISK FOR FALLS: ICD-10-CM

## 2024-05-15 DIAGNOSIS — Z01.810 PREOP CARDIOVASCULAR EXAM: Primary | ICD-10-CM

## 2024-05-15 DIAGNOSIS — M81.6 LOCALIZED OSTEOPOROSIS (LEQUESNE): ICD-10-CM

## 2024-05-15 DIAGNOSIS — Z79.01 ANTICOAGULATED: Primary | ICD-10-CM

## 2024-05-15 PROCEDURE — 77080 DXA BONE DENSITY AXIAL: CPT | Mod: 26,HCNC,, | Performed by: RADIOLOGY

## 2024-05-15 PROCEDURE — 77080 DXA BONE DENSITY AXIAL: CPT | Mod: TC,HCNC

## 2024-05-15 PROCEDURE — 99451 NTRPROF PH1/NTRNET/EHR 5/>: CPT | Mod: S$GLB,,, | Performed by: INTERNAL MEDICINE

## 2024-05-15 NOTE — CONSULTS
[facility]  Response for E-Consult     Patient Name: Ezequiel Hughes  MRN: [id number]  Primary Care Provider: Valery Ozuna MD   Requesting Provider: Maia Lamas NP  Consults    Recommendation: no cardiac contraindication for proposed procedure ok to top plavix as needed for 5 days pre procedure and resume asap post procedure    Contingency: none    Total time of Consultation: 5 minute    I did not speak to the requesting provider verbally about this.     *This eConsult is based on the clinical data available to me and is furnished without benefit of a physical examination. The eConsult will need to be interpreted in light of any clinical issues or changes in patient status not available to me at the time of filing this eConsults. Significant changes in patient condition or level of acuity should result in immediate formal consultation and reevaluation. Please alert me if you have further questions.    Thank you for this eConsult referral.     Lamar Thompson MD  [facility]

## 2024-05-15 NOTE — TELEPHONE ENCOUNTER
E consult has been sent to Dr Thompson, waiting to response before scheduling procedure.Will schedule at the beginning on July per provider.    .Joao PEREZ

## 2024-05-15 NOTE — PROGRESS NOTES
No evidence of significant bone density loss. Normal bone density study which is great news. Valery Ozuna MD

## 2024-05-15 NOTE — TELEPHONE ENCOUNTER
----- Message from Maia Lamas NP sent at 5/14/2024 12:16 PM CDT -----  Pain Provider:leonor  Patient Name: rissa love  MRN: [id number]  Case:L5/S1 [place] IAN  Level:L5/S1  Laterality: [place]  Anticoagulation:plavix  Length to hold:5 days  Rx Provider: Donna    Follow up 4 weeks after injection

## 2024-05-16 ENCOUNTER — OFFICE VISIT (OUTPATIENT)
Dept: INTERNAL MEDICINE | Facility: CLINIC | Age: 84
End: 2024-05-16
Payer: MEDICARE

## 2024-05-16 VITALS
BODY MASS INDEX: 28.78 KG/M2 | OXYGEN SATURATION: 97 % | DIASTOLIC BLOOD PRESSURE: 62 MMHG | HEIGHT: 70 IN | SYSTOLIC BLOOD PRESSURE: 114 MMHG | HEART RATE: 73 BPM | WEIGHT: 201.06 LBS

## 2024-05-16 DIAGNOSIS — Z00.00 ENCOUNTER FOR PREVENTIVE HEALTH EXAMINATION: ICD-10-CM

## 2024-05-16 DIAGNOSIS — M54.16 LUMBAR RADICULOPATHY, CHRONIC: ICD-10-CM

## 2024-05-16 DIAGNOSIS — N40.1 BENIGN PROSTATIC HYPERPLASIA WITH LOWER URINARY TRACT SYMPTOMS, SYMPTOM DETAILS UNSPECIFIED: ICD-10-CM

## 2024-05-16 DIAGNOSIS — I27.20 PULMONARY HYPERTENSION: ICD-10-CM

## 2024-05-16 DIAGNOSIS — H40.1111 PRIMARY OPEN ANGLE GLAUCOMA (POAG) OF RIGHT EYE, MILD STAGE: ICD-10-CM

## 2024-05-16 DIAGNOSIS — D64.9 ANEMIA, UNSPECIFIED TYPE: ICD-10-CM

## 2024-05-16 DIAGNOSIS — I70.203 ATHEROSCLEROSIS OF ARTERY OF BOTH LOWER EXTREMITIES: ICD-10-CM

## 2024-05-16 DIAGNOSIS — C61 PROSTATE CANCER: ICD-10-CM

## 2024-05-16 DIAGNOSIS — M10.00 IDIOPATHIC GOUT, UNSPECIFIED CHRONICITY, UNSPECIFIED SITE: ICD-10-CM

## 2024-05-16 DIAGNOSIS — H40.1122 PRIMARY OPEN ANGLE GLAUCOMA (POAG) OF LEFT EYE, MODERATE STAGE: ICD-10-CM

## 2024-05-16 DIAGNOSIS — I70.0 AORTIC ATHEROSCLEROSIS: ICD-10-CM

## 2024-05-16 DIAGNOSIS — G47.33 OBSTRUCTIVE SLEEP APNEA SYNDROME: ICD-10-CM

## 2024-05-16 DIAGNOSIS — G91.8 OTHER HYDROCEPHALUS: ICD-10-CM

## 2024-05-16 DIAGNOSIS — J84.10 LUNG GRANULOMA: ICD-10-CM

## 2024-05-16 DIAGNOSIS — N18.31 STAGE 3A CHRONIC KIDNEY DISEASE: ICD-10-CM

## 2024-05-16 DIAGNOSIS — Z00.00 ENCOUNTER FOR MEDICARE ANNUAL WELLNESS EXAM: Primary | ICD-10-CM

## 2024-05-16 DIAGNOSIS — D69.6 THROMBOCYTOPENIA: ICD-10-CM

## 2024-05-16 DIAGNOSIS — K21.9 GASTROESOPHAGEAL REFLUX DISEASE, UNSPECIFIED WHETHER ESOPHAGITIS PRESENT: ICD-10-CM

## 2024-05-16 DIAGNOSIS — K59.00 CONSTIPATION, UNSPECIFIED CONSTIPATION TYPE: ICD-10-CM

## 2024-05-16 DIAGNOSIS — E78.2 MIXED HYPERLIPIDEMIA: ICD-10-CM

## 2024-05-16 DIAGNOSIS — I45.10 INCOMPLETE RIGHT BUNDLE BRANCH BLOCK: ICD-10-CM

## 2024-05-16 DIAGNOSIS — F41.1 GAD (GENERALIZED ANXIETY DISORDER): ICD-10-CM

## 2024-05-16 DIAGNOSIS — M54.12 CERVICAL RADICULOPATHY: ICD-10-CM

## 2024-05-16 DIAGNOSIS — I10 PRIMARY HYPERTENSION: ICD-10-CM

## 2024-05-16 PROCEDURE — 1125F AMNT PAIN NOTED PAIN PRSNT: CPT | Mod: HCNC,CPTII,S$GLB, | Performed by: NURSE PRACTITIONER

## 2024-05-16 PROCEDURE — 1158F ADVNC CARE PLAN TLK DOCD: CPT | Mod: HCNC,CPTII,S$GLB, | Performed by: NURSE PRACTITIONER

## 2024-05-16 PROCEDURE — 1160F RVW MEDS BY RX/DR IN RCRD: CPT | Mod: HCNC,CPTII,S$GLB, | Performed by: NURSE PRACTITIONER

## 2024-05-16 PROCEDURE — 1170F FXNL STATUS ASSESSED: CPT | Mod: HCNC,CPTII,S$GLB, | Performed by: NURSE PRACTITIONER

## 2024-05-16 PROCEDURE — 3288F FALL RISK ASSESSMENT DOCD: CPT | Mod: HCNC,CPTII,S$GLB, | Performed by: NURSE PRACTITIONER

## 2024-05-16 PROCEDURE — 1101F PT FALLS ASSESS-DOCD LE1/YR: CPT | Mod: HCNC,CPTII,S$GLB, | Performed by: NURSE PRACTITIONER

## 2024-05-16 PROCEDURE — 1159F MED LIST DOCD IN RCRD: CPT | Mod: HCNC,CPTII,S$GLB, | Performed by: NURSE PRACTITIONER

## 2024-05-16 PROCEDURE — G0439 PPPS, SUBSEQ VISIT: HCPCS | Mod: HCNC,S$GLB,, | Performed by: NURSE PRACTITIONER

## 2024-05-16 PROCEDURE — 99999 PR PBB SHADOW E&M-EST. PATIENT-LVL V: CPT | Mod: PBBFAC,HCNC,, | Performed by: NURSE PRACTITIONER

## 2024-05-16 RX ORDER — CHOLECALCIFEROL (VITAMIN D3) 25 MCG
1000 TABLET ORAL DAILY
COMMUNITY

## 2024-05-16 NOTE — PATIENT INSTRUCTIONS
Vitamin D3 800-1000 iu per day     Counseling and Referral of Other Preventative  (Italic type indicates deductible and co-insurance are waived)    Patient Name: Ezequiel Hughes  Today's Date: 5/16/2024    Health Maintenance       Date Due Completion Date    RSV Vaccine (Age 60+ and Pregnant patients) (1 - 1-dose 60+ series) Never done ---    TETANUS VACCINE 07/24/2022 7/24/2012    COVID-19 Vaccine (5 - 2023-24 season) 09/01/2023 12/28/2022    Lipid Panel 02/06/2025 2/6/2024    Aspirin/Antiplatelet Therapy 05/16/2025 5/16/2024        No orders of the defined types were placed in this encounter.      The following information is provided to all patients.  This information is to help you find resources for any of the problems found today that may be affecting your health:                  Living healthy guide: www.[facility].[place]      Understanding Diabetes: www.diabetes.org      Eating healthy: www.cdc.gov/healthyweight      [facility] home safety checklist: www.cdc.gov/steadi/patient.html      Agency on Aging: www.goea.[place]      Alcoholics anonymous (AA): www.aa.org      Physical Activity: www.maureen.nih.gov/qe0olpt      Tobacco use: www.quitwithusla.org         Counseling and Referral of Other Preventative  (Italic type indicates deductible and co-insurance are waived)    Patient Name: Ezequiel Hughes  Today's Date: 5/16/2024    Health Maintenance       Date Due Completion Date    RSV Vaccine (Age 60+ and Pregnant patients) (1 - 1-dose 60+ series) Never done ---    TETANUS VACCINE 07/24/2022 7/24/2012    COVID-19 Vaccine (5 - 2023-24 season) 09/01/2023 12/28/2022    Lipid Panel 02/06/2025 2/6/2024    Aspirin/Antiplatelet Therapy 05/16/2025 5/16/2024        No orders of the defined types were placed in this encounter.      The following information is provided to all patients.  This information is to help you find resources for any of the problems found today that may be affecting your health:                  Living healthy  guide: www.[facility].[place].[facility]      Understanding Diabetes: www.diabetes.org      Eating healthy: www.cdc.gov/healthyweight      CDC home safety checklist: www.cdc.gov/steadi/patient.html      Agency on Aging: www.goea.[place]      Alcoholics anonymous (AA): www.aa.org      Physical Activity: www.maureen.nih.gov/tn6swth      Tobacco use: www.quitwithusla.org

## 2024-05-16 NOTE — PROGRESS NOTES
"  Ezequiel Hughes presented for a follow-up Medicare AWV today. The following components were reviewed and updated:    Medical history  Family History  Social history  Allergies and Current Medications  Health Risk Assessment  Health Maintenance  Care Team    **See Completed Assessments for Annual Wellness visit with in the encounter summary    The following assessments were completed:  Depression Screening  Cognitive function Screening  Timed Get Up Test  Whisper Test        Opioid documentation:      Patient does not have a current opioid prescription.   ```````````````````````````````````````````````````````````````````````````````````````````````````````````````````````````````````````````````````````````````````````````````````````````````````````````````````````````````````````````````````````````````````````````````````````````````````````````````````````````````````````````````````````````````````````````````````````````````````````````````````````````````````````````````````````````````````````````````````````````````````````````````````````````````````````````````````````````````````````````````````````````````````````````````````````````````````````````````````````````````````````````````````````````````````````````````````````       Vitals:    05/16/24 0732 05/16/24 0830   BP: 110/64 114/62   Pulse: 73    SpO2: 97%    Weight: 91.2 kg (201 lb 1 oz)    Height: 5' 10" (1.778 m)      Body mass index is 28.85 kg/m².       Physical Exam  Vitals reviewed.   Constitutional:       Appearance: Normal appearance.   HENT:      Head: Normocephalic and atraumatic.   Cardiovascular:      Rate and Rhythm: Normal rate and regular rhythm.      Pulses: Normal pulses.      Heart sounds: Murmur heard.   Pulmonary:      Effort: Pulmonary effort is normal.      Breath sounds: Normal breath sounds.   Musculoskeletal:         General: Normal range of motion.      Cervical back: Normal range of motion.   Skin:     General: Skin is " warm and dry.   Neurological:      General: No focal deficit present.      Mental Status: He is alert and oriented to person, place, and time.   Psychiatric:         Mood and Affect: Mood normal.         Behavior: Behavior normal.         Thought Content: Thought content normal.         Judgment: Judgment normal.       Diagnoses and health risks identified today and associated recommendations/orders:  1. Encounter for Medicare annual wellness exam  Reviewed and discussed preventive health screenings and vaccinations with the patient.     2. Other hydrocephalus  Noted on previous MRI of brain; evaluated by neurology without plans for surgical intervention. Evaluated by ENT.   Advised patient to continue plan per PCP.     3. Pulmonary hypertension  Stable. Continue current treatment plan as previously prescribed with your Cardiologist.     4. Lung granuloma  Stable. Continue current treatment plan as previously prescribed with your Pulmonologist and PCP     5. Atherosclerosis of artery of both lower extremities  On statin and plavix / Stable. Continue current treatment plan as previously prescribed with your Cardiologist.     NOEMI 2019  Right: Pre- and post-exercise NOEMI's are both within the normal range; however there was a drop after exercise, which possibly indicates mild occult PAD in the right lower extremity.   Left: Pre- and post-exercise NOEMI's are both within the normal range; however there was a drop after exercise, which possibly indicates mild occult PAD in the left lower extremity.     6. Aortic atherosclerosis  Stable on plavix and statin. Continue current treatment plan as previously prescribed with your Cardiologist.     7. Prostate cancer  On Vesicae and finasteride/ recent increase in PSA to 2.8 and planning for MRI prostate. Continue current treatment plan as previously prescribed with your urologist      8. Thrombocytopenia  Stable. Continue current treatment plan as previously prescribed with your  PCP     Lab Results   Component Value Date     (L) 05/09/2024     9. Stage 3a chronic kidney disease  Stable. Continue current treatment plan as previously prescribed with your PCP     10. Mixed hyperlipidemia  Stable. Continue current treatment plan as previously prescribed with your PCP     11. Primary hypertension  Stable. Continue current treatment plan as previously prescribed with your PCP and digital med     @[email or web address]@   Last 5 Patient Entered Readings                Current 30 Day Average: 136/68  Recent Readings 5/7/2024 4/24/2024 4/24/2024 4/24/2024 4/22/2024   SBP (mmHg) 136 134 143 129 148   DBP (mmHg) 66 82 67 63 73   Pulse 59 68 62 63 58               Pulse Readings from Last 3 Encounters:   05/16/24 73   05/10/24 75   04/11/24 (!) 59         12. Anemia, unspecified type  Stable. Continue current treatment plan as previously prescribed with your PCP     Lab Results   Component Value Date    WBC 4.28 05/09/2024    HGB 13.7 (L) 05/09/2024    HCT 42.9 05/09/2024    MCV 98 05/09/2024     (L) 05/09/2024       13. Lumbar radiculopathy, chronic  14. Cervical radiculopathy  Previous IAN of lumbar spine with short-term improvement. Completed PT and chiropractor treatment. Planning on repeat IAN in near future. He is noticing improvement with Lyrica over the past couple of days.   Follow up with IPM as advised.     15. OMARI (generalized anxiety disorder)  Celexa PRN / Stable. Continue current treatment plan as previously prescribed with your PCP     16. Incomplete right bundle branch block  Stable. Continue current treatment plan as previously prescribed with your Cardiologist     17. Benign prostatic hyperplasia with lower urinary tract symptoms, symptom details unspecified  Chronic / following with urologist. Upcoming prostate MRI planned as noted above.     18. Gastroesophageal reflux disease, unspecified whether esophagitis present  PPI PRN / Stable. Continue current treatment plan as  previously prescribed with your PCP     19. Constipation, unspecified constipation type  Stable. Continue current treatment plan as previously prescribed with your PCP     20. Obstructive sleep apnea syndrome  Discussed CPAP use - He is willing to try CPAP more consistently. Continue current treatment plan as previously prescribed with your PCP and Pulm     21. Primary open angle glaucoma (POAG) of right eye, mild stage  22. Primary open angle glaucoma (POAG) of left eye, moderate stage  Stable. Continue current treatment plan as previously prescribed with your ophthalmologist     23. Idiopathic gout, unspecified chronicity, unspecified site  Stable. Continue current treatment plan as previously prescribed with your PCP       Provided Ezequiel with a 5-10 year written screening schedule and personal prevention plan. Recommendations were developed using the USPSTF age appropriate recommendations. Education, counseling, and referrals were provided as needed.  After Visit Summary printed and given to patient which includes a list of additional screenings\tests needed.    Follow up in about 1 year (around 5/16/2025).      Kathia Paula NP    I offered to discuss advanced care planning, including how to pick a person who would make decisions for you if you were unable to make them for yourself, called a health care power of , and what kind of decisions you might make such as use of life sustaining treatments such as ventilators and tube feeding when faced with a life limiting illness recorded on a living will that they will need to know. (How you want to be cared for as you near the end of your natural life)      X  Patient has advanced directives written and agrees to provide copies to the institution.

## 2024-05-20 ENCOUNTER — PATIENT MESSAGE (OUTPATIENT)
Dept: PAIN MEDICINE | Facility: CLINIC | Age: 84
End: 2024-05-20
Payer: MEDICARE

## 2024-05-21 ENCOUNTER — PATIENT MESSAGE (OUTPATIENT)
Dept: PRIMARY CARE CLINIC | Facility: CLINIC | Age: 84
End: 2024-05-21
Payer: MEDICARE

## 2024-05-26 ENCOUNTER — PATIENT MESSAGE (OUTPATIENT)
Dept: UROLOGY | Facility: CLINIC | Age: 84
End: 2024-05-26
Payer: MEDICARE

## 2024-05-29 ENCOUNTER — TELEPHONE (OUTPATIENT)
Dept: UROLOGY | Facility: CLINIC | Age: 84
End: 2024-05-29
Payer: MEDICARE

## 2024-05-29 NOTE — TELEPHONE ENCOUNTER
.Outgoing call placed to patient, patient verified name and date of birth, patient notified that his request to have his MRI order sent to Imaging center was completed and they should be contacting him to schedule it, patient verbalized understanding and no further assistance needed.

## 2024-05-29 NOTE — PRE-PROCEDURE INSTRUCTIONS
Spoke with patient regarding procedure scheduled on 6/6     Arrival time 0615     Has patient been sick with fever or on antibiotics within the last 7 days? No     Does the patient have any open wounds, sores or rashes? No     Does the patient have any recent fractures? no     Has patient received a vaccination within the last 7 days? No     Received the COVID vaccination?      Has the patient stopped all medications as directed? plavix 5 days     Does patient have a pacemaker, defibrillator, or implantable stimulator? No     Does the patient have a ride to and from procedure and someone reliable to remain with patient?  brother     Is the patient diabetic? no     Does the patient have sleep apnea? Or use O2 at home? jonelle cpap     Is the patient receiving sedation?      Is the patient instructed to remain NPO beginning at midnight the night before their procedure? yes     Procedure location confirmed with patient? Yes     Covid- Denies signs/symptoms. Instructed to notify PAT/MD if any changes.

## 2024-06-06 ENCOUNTER — HOSPITAL ENCOUNTER (OUTPATIENT)
Facility: HOSPITAL | Age: 84
Discharge: HOME | End: 2024-06-06
Attending: PHYSICAL MEDICINE & REHABILITATION | Admitting: PHYSICAL MEDICINE & REHABILITATION
Payer: MEDICARE

## 2024-06-06 VITALS
HEIGHT: 70 IN | WEIGHT: 206.38 LBS | RESPIRATION RATE: 13 BRPM | BODY MASS INDEX: 29.54 KG/M2 | HEART RATE: 63 BPM | SYSTOLIC BLOOD PRESSURE: 128 MMHG | TEMPERATURE: 97 F | OXYGEN SATURATION: 97 % | DIASTOLIC BLOOD PRESSURE: 63 MMHG

## 2024-06-06 DIAGNOSIS — M54.16 LUMBAR RADICULOPATHY: ICD-10-CM

## 2024-06-06 DIAGNOSIS — M54.16 LUMBAR RADICULOPATHY, CHRONIC: Primary | ICD-10-CM

## 2024-06-06 PROCEDURE — 62323 NJX INTERLAMINAR LMBR/SAC: CPT | Mod: HCNC | Performed by: PHYSICAL MEDICINE & REHABILITATION

## 2024-06-06 PROCEDURE — 25500020 PHARM REV CODE 255: Mod: HCNC | Performed by: PHYSICAL MEDICINE & REHABILITATION

## 2024-06-06 PROCEDURE — 62323 NJX INTERLAMINAR LMBR/SAC: CPT | Mod: HCNC,,, | Performed by: PHYSICAL MEDICINE & REHABILITATION

## 2024-06-06 PROCEDURE — 63600175 PHARM REV CODE 636 W HCPCS: Mod: HCNC | Performed by: PHYSICAL MEDICINE & REHABILITATION

## 2024-06-06 RX ORDER — FENTANYL CITRATE 50 UG/ML
INJECTION, SOLUTION INTRAMUSCULAR; INTRAVENOUS
Status: DISCONTINUED | OUTPATIENT
Start: 2024-06-06 | End: 2024-06-06 | Stop reason: HOSPADM

## 2024-06-06 RX ORDER — BUPIVACAINE HYDROCHLORIDE 2.5 MG/ML
INJECTION, SOLUTION EPIDURAL; INFILTRATION; INTRACAUDAL
Status: DISCONTINUED | OUTPATIENT
Start: 2024-06-06 | End: 2024-06-06 | Stop reason: HOSPADM

## 2024-06-06 RX ORDER — MIDAZOLAM HYDROCHLORIDE 1 MG/ML
INJECTION, SOLUTION INTRAMUSCULAR; INTRAVENOUS
Status: DISCONTINUED | OUTPATIENT
Start: 2024-06-06 | End: 2024-06-06 | Stop reason: HOSPADM

## 2024-06-06 RX ORDER — METHYLPREDNISOLONE ACETATE 80 MG/ML
INJECTION, SUSPENSION INTRA-ARTICULAR; INTRALESIONAL; INTRAMUSCULAR; SOFT TISSUE
Status: DISCONTINUED | OUTPATIENT
Start: 2024-06-06 | End: 2024-06-06 | Stop reason: HOSPADM

## 2024-06-06 RX ORDER — ONDANSETRON HYDROCHLORIDE 2 MG/ML
4 INJECTION, SOLUTION INTRAVENOUS ONCE AS NEEDED
Status: DISCONTINUED | OUTPATIENT
Start: 2024-06-06 | End: 2024-06-06 | Stop reason: HOSPADM

## 2024-06-06 NOTE — OP NOTE
Lumbar Interlaminar Epidural Steroid Injection under Fluoroscopic Guidance.   Time-out taken to identify patient and procedure prior to starting the procedure.     06/06/2024    PROCEDURE: Interlaminar epidural steroid injection under fluoroscopic guidance.     Pre-Op diagnosis: Lumbar radiculopathy [M54.16]    Post-Op diagnosis: Lumbar radiculopathy [M54.16]    PHYSICIAN: Abel Haywood MD    ASSISTANTS: None     SEDATION: Conscious sedation provided by M.D    The patient was monitored with continuous pulse oximetry, EKG, and intermittent blood pressure monitors, immediately prior to administration of sedation.  The patient was hemodynamically stable throughout the entire process was responsive to voice, and breathing spontaneously.  Supplemental O2 was provided at 2L/min via nasal cannula.  Patient was comfortable for the duration of the procedure.     There was a total of 2mg IV Midazolam and 50mcg Fentanyl titrated for the procedure    Total sedation time was >10minutes and <20minutes      ESTIMATED BLOOD LOSS: none.     COMPLICATIONS: none.     SPECIMENS: none    TECHNIQUE: With the patient laying in a prone position, the area was prepped and draped in the usual sterile fashion using ChloraPrep and a fenestrated drape. 1% lidocaine was given using a 27-gauge needle by raising a wheal and going down to the hub of the needle over the L5/S1 interlaminar space.  The interlaminar space was then approached with a 3.5 inch 18-gauge Touhy needle was introduced under fluoroscopic guidance in the AP and Lateral view. Once the Ligamentum flavum was encountered loss of resistance to saline was used to enter the epidural space. With positive loss of resistance and negative CSF or Blood, 3mL contrast dye Omnipaque (300mg/ml) was injected to confirm placement and there was no vascular runoff. Then 1ml 80mg/ml Depomedrol + 1mL 0.25% Bupivicaine + 8mL preservative free normal saline was injected slowly. Displacement of the  radio opaque contrast after injection of the medication confirmed that the medication went into the area of the epidural space.  The patient tolerated the procedure well.       The patient was monitored after the procedure.   They were given post-procedure and discharge instructions to follow at home.  The patient was discharged in a stable condition.

## 2024-06-06 NOTE — H&P
HPI  Patient presenting for Procedure(s) (LRB):  Lumbar L5/S1 IL IAN (N/A)     No health changes since previous encounter    Past Medical History:   Diagnosis Date    Allergic rhinitis     Anemia     Back pain     BPH (benign prostatic hyperplasia)     Cancer     skin cancer to neck, Dr. Graves    Cataract     Disorder of kidney and ureter     ED (erectile dysfunction)     OMARI (generalized anxiety disorder) 08/07/2023    Hiatal hernia     small    History of colon polyps     colonoscopy 11/2016    HLD (hyperlipidemia)     Hyperlipidemia     Hypertension     Lateral epicondylitis     Lumbar radiculopathy 01/26/2022    OA (osteoarthritis)     GUIDO (obstructive sleep apnea)     Polyneuropathy     Prostate cancer     Dr. Wong    TIA (transient ischemic attack)     Trouble in sleeping     Urge incontinence 03/29/2022     Past Surgical History:   Procedure Laterality Date    ANGIOGRAPHY OF UPPER EXTREMITY Left 07/05/2022    Procedure: Angiogram Extremity Bilateral;  Surgeon: Aparna Thompson MD;  Location: [facility] CATH LAB;  Service: Cardiology;  Laterality: Left;    ARTERIOGRAPHY OF AORTIC ROOT N/A 07/05/2022    Procedure: ARTERIOGRAM, AORTIC ROOT;  Surgeon: Aparna Thompson MD;  Location: [facility] CATH LAB;  Service: Cardiology;  Laterality: N/A;    CATARACT EXTRACTION W/  INTRAOCULAR LENS IMPLANT Right 02/21/2018    I-Stent    CATARACT EXTRACTION W/  INTRAOCULAR LENS IMPLANT Left 03/21/2018    I - Stent    CATHETERIZATION OF BOTH LEFT AND RIGHT HEART N/A 07/05/2022    Procedure: CATHETERIZATION, HEART, BOTH LEFT AND RIGHT;  Surgeon: Aparna Thompson MD;  Location: [facility] CATH LAB;  Service: Cardiology;  Laterality: N/A;  radial approach    COLONOSCOPY N/A 11/14/2016    Procedure: COLONOSCOPY;  Surgeon: Karuna Rodriguez MD;  Location: [facility];  Service: Endoscopy;  Laterality: N/A;    COLONOSCOPY N/A 09/22/2020    Procedure: COLONOSCOPY;  Surgeon: Chuy Fish MD;  Location: [facility] ENDO;  Service: Endoscopy;   Laterality: N/A;    EPIDURAL STEROID INJECTION INTO CERVICAL SPINE N/A 2/8/2024    Procedure: C5/6 IL Right paramedian approach IAN with RN IV sedation;  Surgeon: Abel Haywood MD;  Location: [facility];  Service: Pain Management;  Laterality: N/A;    EYE SURGERY      HEMORRHOID SURGERY      I-STENT Right 02/21/2018    DR. REECE    INJECTION OF ANESTHETIC AGENT AROUND MEDIAL BRANCH NERVES INNERVATING CERVICAL FACET JOINT Bilateral 7/18/2023    Procedure: Bilateral C4-6 MBB with RN IV sedation (diagnostic, #1);  Surgeon: Abel Haywood MD;  Location: [facility];  Service: Pain Management;  Laterality: Bilateral;    KNEE ARTHROSCOPY W/ MENISCAL REPAIR Right 2015    Dr. Jain    PCIOL Right 02/21/2018    DR. REECE    PLANTAR FASCIA RELEASE      right    ROTATOR CUFF REPAIR Bilateral     bilateral    SELECTIVE INJECTION OF ANESTHETIC AGENT AROUND LUMBAR SPINAL NERVE ROOT BY TRANSFORAMINAL APPROACH Bilateral 01/26/2022    Procedure: Bilateral L4/5 TF IAN with RN IV sedation;  Surgeon: Mak Young MD;  Location: [facility];  Service: Pain Management;  Laterality: Bilateral;    SELECTIVE INJECTION OF ANESTHETIC AGENT AROUND LUMBAR SPINAL NERVE ROOT BY TRANSFORAMINAL APPROACH Bilateral 03/14/2022    Procedure: Bilateral L4/5 TF IAN with RN IV sedation;  Surgeon: Mak Young MD;  Location: [facility];  Service: Pain Management;  Laterality: Bilateral;    SELECTIVE INJECTION OF ANESTHETIC AGENT AROUND LUMBAR SPINAL NERVE ROOT BY TRANSFORAMINAL APPROACH Bilateral 06/02/2022    Procedure: Bilateral L4/5 TF IAN - D2P Dr. Matthew CABRERA;  Surgeon: Abel Haywood MD;  Location: [facility];  Service: Pain Management;  Laterality: Bilateral;    SELECTIVE INJECTION OF ANESTHETIC AGENT AROUND LUMBAR SPINAL NERVE ROOT BY TRANSFORAMINAL APPROACH Bilateral 08/25/2022    Procedure: Bilateral L4/5 TF IAN;  Surgeon: Abel Haywood MD;  Location: [facility];  Service: Pain Management;  Laterality:  Bilateral;    SELECTIVE INJECTION OF ANESTHETIC AGENT AROUND LUMBAR SPINAL NERVE ROOT BY TRANSFORAMINAL APPROACH Bilateral 6/29/2023    Procedure: Bilateral L4/5 TF IAN RN IV Sedation;  Surgeon: Abel Haywood MD;  Location: [facility];  Service: Pain Management;  Laterality: Bilateral;    SELECTIVE INJECTION OF ANESTHETIC AGENT AROUND LUMBAR SPINAL NERVE ROOT BY TRANSFORAMINAL APPROACH Bilateral 4/11/2024    Procedure: Bilateral L4/5 TF IAN;  Surgeon: Abel Haywood MD;  Location: [facility];  Service: Pain Management;  Laterality: Bilateral;    SHOULDER SURGERY Bilateral around 2000    Dr. Pepper.  rotator cuff surgeries    VASECTOMY       Review of patient's allergies indicates:   Allergen Reactions    Atarax [hydroxyzine hcl] Other (See Comments)     Raised blood pressure     Zyrtec [cetirizine] Other (See Comments)     Raised blood pressure     Gold sodium thiosulfate      Patch test positive    Meloxicam Rash     Other reaction(s): hypertension        No current facility-administered medications on file prior to encounter.     Current Outpatient Medications on File Prior to Encounter   Medication Sig Dispense Refill    amLODIPine (NORVASC) 5 MG tablet Take 1 tablet (5 mg total) by mouth 2 (two) times daily. 180 tablet 3    losartan (COZAAR) 50 MG tablet TAKE 1 TABLET TWICE DAILY 180 tablet 3    acetaminophen (TYLENOL ARTHRITIS PAIN ORAL) Take by mouth. Patient states that he takes 2 tablets in the morning and 2 tablets in the evening      albuterol (PROVENTIL/VENTOLIN HFA) 90 mcg/actuation inhaler Inhale 2 puffs into the lungs every 4 (four) hours as needed for Wheezing or Shortness of Breath. 8.7 g 1    alfuzosin (UROXATRAL) 10 mg Tb24 Take 10 mg by mouth daily with breakfast. (Patient not taking: Reported on 5/16/2024)      atorvastatin (LIPITOR) 40 MG tablet TAKE 1 TABLET EVERY DAY 90 tablet 3    citalopram (CELEXA) 10 MG tablet Take 1 tablet (10 mg total) by mouth once daily. For anxiety 90  "tablet 3    clopidogreL (PLAVIX) 75 mg tablet TAKE 1 TABLET EVERY DAY 90 tablet 2    cyclobenzaprine (FLEXERIL) 5 MG tablet 1 tablet ever 8 hours PRN muscle stiffness/spasms Orally Three times a day for 5 days (Patient not taking: Reported on 5/16/2024)      finasteride (PROSCAR) 5 mg tablet Take 1 tablet (5 mg total) by mouth once daily. 90 tablet 4    fluticasone propionate (FLONASE) 50 mcg/actuation nasal spray 2 sprays (100 mcg total) by Each Nostril route Daily. 48 g 3    furosemide (LASIX) 20 MG tablet Take 1 tablet (20 mg total) by mouth daily as needed (edema). 30 tablet 0    pantoprazole (PROTONIX) 40 MG tablet TAKE 1 TABLET EVERY DAY 90 tablet 3    pregabalin (LYRICA) 75 MG capsule Take 1 tablet by mouth daily at bedtime x 1 week, then take 1 tablet twice a day by mouth 60 capsule 1    sildenafiL (VIAGRA) 100 MG tablet Take 1 po 45 minutes before intercourse (Patient not taking: Reported on 5/16/2024) 30 tablet 7    solifenacin (VESICARE) 5 MG tablet Take 2 tablets (10 mg total) by mouth once daily. 90 tablet 0        PMHx, PSHx, Allergies, Medications reviewed in epic    ROS negative except pain complaints in HPI    OBJECTIVE:    BP (!) 147/67 (BP Location: Right arm, Patient Position: Sitting)   Pulse (!) 58   Temp 96.9 °F (36.1 °C) (Temporal)   Resp 15   Ht 5' 10" (1.778 m)   Wt 93.6 kg (206 lb 5.6 oz)   SpO2 98%   BMI 29.61 kg/m²     PHYSICAL EXAMINATION:    GENERAL: Well appearing, in no acute distress, alert and oriented x3.  PSYCH:  Mood and affect appropriate.  SKIN: Skin color, texture, turgor normal, no rashes or lesions which will impact the procedure.  CV: RRR with palpation of the radial artery.  PULM: No evidence of respiratory difficulty, symmetric chest rise. Clear to auscultation.  NEURO: Cranial nerves grossly intact.    Plan:    Proceed with procedure as planned Procedure(s) (LRB):  Lumbar L5/S1 IL IAN (N/A)    Abel Haywood MD  06/06/2024            "

## 2024-06-06 NOTE — DISCHARGE SUMMARY
Discharge Note  Short Stay      SUMMARY     Admit Date: 6/6/2024    Attending Physician: Abel Haywood MD        Discharge Physician: Abel Haywood MD        Discharge Date: 6/6/2024 7:14 AM    Procedure(s) (LRB):  Lumbar L5/S1 IL IAN (N/A)    Final Diagnosis: Lumbar radiculopathy [M54.16]    Disposition: Home or self care    Patient Instructions:   Current Discharge Medication List        CONTINUE these medications which have NOT CHANGED    Details   amLODIPine (NORVASC) 5 MG tablet Take 1 tablet (5 mg total) by mouth 2 (two) times daily.  Qty: 180 tablet, Refills: 3    Comments: .  Associated Diagnoses: Primary hypertension      losartan (COZAAR) 50 MG tablet TAKE 1 TABLET TWICE DAILY  Qty: 180 tablet, Refills: 3    Comments: .  Associated Diagnoses: Essential hypertension      acetaminophen (TYLENOL ARTHRITIS PAIN ORAL) Take by mouth. Patient states that he takes 2 tablets in the morning and 2 tablets in the evening      albuterol (PROVENTIL/VENTOLIN HFA) 90 mcg/actuation inhaler Inhale 2 puffs into the lungs every 4 (four) hours as needed for Wheezing or Shortness of Breath.  Qty: 8.7 g, Refills: 1    Associated Diagnoses: Wheezing      alfuzosin (UROXATRAL) 10 mg Tb24 Take 10 mg by mouth daily with breakfast.      atorvastatin (LIPITOR) 40 MG tablet TAKE 1 TABLET EVERY DAY  Qty: 90 tablet, Refills: 3    Associated Diagnoses: Mixed hyperlipidemia      citalopram (CELEXA) 10 MG tablet Take 1 tablet (10 mg total) by mouth once daily. For anxiety  Qty: 90 tablet, Refills: 3    Associated Diagnoses: OMARI (generalized anxiety disorder)      clopidogreL (PLAVIX) 75 mg tablet TAKE 1 TABLET EVERY DAY  Qty: 90 tablet, Refills: 2      cyclobenzaprine (FLEXERIL) 5 MG tablet 1 tablet ever 8 hours PRN muscle stiffness/spasms Orally Three times a day for 5 days      finasteride (PROSCAR) 5 mg tablet Take 1 tablet (5 mg total) by mouth once daily.  Qty: 90 tablet, Refills: 4      fluticasone propionate (FLONASE) 50  mcg/actuation nasal spray 2 sprays (100 mcg total) by Each Nostril route Daily.  Qty: 48 g, Refills: 3      furosemide (LASIX) 20 MG tablet Take 1 tablet (20 mg total) by mouth daily as needed (edema).  Qty: 30 tablet, Refills: 0      pantoprazole (PROTONIX) 40 MG tablet TAKE 1 TABLET EVERY DAY  Qty: 90 tablet, Refills: 3      pregabalin (LYRICA) 75 MG capsule Take 1 tablet by mouth daily at bedtime x 1 week, then take 1 tablet twice a day by mouth  Qty: 60 capsule, Refills: 1      sildenafiL (VIAGRA) 100 MG tablet Take 1 po 45 minutes before intercourse  Qty: 30 tablet, Refills: 7      solifenacin (VESICARE) 5 MG tablet Take 2 tablets (10 mg total) by mouth once daily.  Qty: 90 tablet, Refills: 0      vitamin D (VITAMIN D3) 1000 units Tab Take 1,000 Units by mouth once daily.                 Discharge Diagnosis: Lumbar radiculopathy [M54.16]  Condition on Discharge: Stable with no complications to procedure   Diet on Discharge: Same as before.  Activity: as per instruction sheet.  Discharge to: Home with a responsible adult.  Follow up: 2-4 weeks       Please call the office at [phone] if you experience any weakness or loss of sensation, fever > 101.5, pain uncontrolled with oral medications, persistent nausea/vomiting/or diarrhea, redness or drainage from the incisions, or any other worrisome concerns. If physician on call was not reached or could not communicate with our office for any reason please go to the nearest emergency department

## 2024-06-06 NOTE — DISCHARGE INSTRUCTIONS

## 2024-06-10 ENCOUNTER — PATIENT MESSAGE (OUTPATIENT)
Dept: UROLOGY | Facility: CLINIC | Age: 84
End: 2024-06-10
Payer: MEDICARE

## 2024-06-10 DIAGNOSIS — C61 PROSTATE CANCER: Primary | ICD-10-CM

## 2024-06-11 ENCOUNTER — TELEPHONE (OUTPATIENT)
Dept: UROLOGY | Facility: CLINIC | Age: 84
End: 2024-06-11
Payer: MEDICARE

## 2024-06-11 NOTE — TELEPHONE ENCOUNTER
.Outgoing call placed to patient, patient verified name and date of birth, patient stated that he is going to wait to have his PSA results before determining if MRI is needed and if so he would like to find a location that his a open MRI machine r/t him not liking the enclosed MRI machines. This nurse verbalized understanding and patient will contact us when he is ready.

## 2024-06-12 ENCOUNTER — LAB VISIT (OUTPATIENT)
Dept: LAB | Facility: HOSPITAL | Age: 84
End: 2024-06-12
Attending: UROLOGY
Payer: MEDICARE

## 2024-06-12 DIAGNOSIS — C61 PROSTATE CANCER: ICD-10-CM

## 2024-06-12 LAB — COMPLEXED PSA SERPL-MCNC: 1.2 NG/ML (ref 0–4)

## 2024-06-12 PROCEDURE — 36415 COLL VENOUS BLD VENIPUNCTURE: CPT | Mod: HCNC,PO | Performed by: UROLOGY

## 2024-06-12 PROCEDURE — 84153 ASSAY OF PSA TOTAL: CPT | Mod: HCNC | Performed by: UROLOGY

## 2024-06-20 ENCOUNTER — OFFICE VISIT (OUTPATIENT)
Dept: PHYSICAL MEDICINE AND REHAB | Facility: CLINIC | Age: 84
End: 2024-06-20
Payer: MEDICARE

## 2024-06-20 ENCOUNTER — HOSPITAL ENCOUNTER (OUTPATIENT)
Dept: RADIOLOGY | Facility: HOSPITAL | Age: 84
Discharge: HOME | End: 2024-06-20
Attending: PHYSICAL MEDICINE & REHABILITATION
Payer: MEDICARE

## 2024-06-20 VITALS — RESPIRATION RATE: 13 BRPM | BODY MASS INDEX: 29.54 KG/M2 | HEIGHT: 70 IN | WEIGHT: 206.38 LBS

## 2024-06-20 DIAGNOSIS — M16.11 PRIMARY OSTEOARTHRITIS OF RIGHT HIP: ICD-10-CM

## 2024-06-20 DIAGNOSIS — M16.11 PRIMARY OSTEOARTHRITIS OF RIGHT HIP: Primary | ICD-10-CM

## 2024-06-20 DIAGNOSIS — M54.16 LUMBAR RADICULOPATHY: ICD-10-CM

## 2024-06-20 PROCEDURE — 73521 X-RAY EXAM HIPS BI 2 VIEWS: CPT | Mod: TC,HCNC

## 2024-06-20 PROCEDURE — 99999 PR PBB SHADOW E&M-EST. PATIENT-LVL III: CPT | Mod: PBBFAC,HCNC,, | Performed by: PHYSICAL MEDICINE & REHABILITATION

## 2024-06-20 PROCEDURE — 73521 X-RAY EXAM HIPS BI 2 VIEWS: CPT | Mod: 26,HCNC,, | Performed by: RADIOLOGY

## 2024-06-20 NOTE — PROGRESS NOTES
[facility]   [address] [facility]  [place] [address]  Phone: [phone]        Full Name: Ezequiel Hughes YOB: 1940  Patient ID: [id number]      Visit Date: 6/20/2024 08:47  Age: 84 Years 3 Months Old  Examining Physician: Emilia Paez M.D.  Referring Physician: YANDY Gallagher  Reason for Referral: lower extr            Chief Complaint   Patient presents with    Back Pain     Into legs       HPI: This is a 84 y.o.  male being seen in clinic today for evaluation of chronic back pain with radiation into his legs.  He has a known lumbar DJD/DDD.  He complains of increased pain at his right hip into his groin.  He has limited ROM at the hip/leg with walking and certain movements.  Rest provides some relief.     History obtained from patient    Past family, medical, social, and surgical history reviewed in chart    Review of Systems:     General- denies lethargy, weight change, fever, chills  Head/neck- denies swallowing difficulties  ENT- denies hearing changes  Cardiovascular-denies chest pain  Pulmonary- denies shortness of breath  GI- denies constipation or bowel incontinence  - denies bladder incontinence +CKD  Skin- denies wounds or rashes  Musculoskeletal- denies weakness, +pain  Neurologic- +numbness and tingling  Psychiatric- denies depressive or psychotic features, denies anxiety  Lymphatic-denies swelling  Endocrine- denies hypoglycemic symptoms/DM history  All other pertinent systems negative     Physical Examination:  General: Well developed, well nourished male, NAD  HEENT:NCAT EOMI bilaterally   Pulmonary:Normal respirations    Spinal Examination: CERVICAL  Active ROM is within normal limits.  Inspection: No deformity of spinal alignment.    Spinal Examination: LUMBAR or THORACIC  Active ROM is within normal limits.  Inspection: No deformity of spinal alignment.    Slr neg    Bilateral Upper and Lower Extremities:  Pulses are 2+ at radial  bilaterally.  Shoulder/Elbow/Wrist/Hand ROM   Hip/Knee/Ankle ROM wnl except limited ER/IR at right hip  Bilateral Extremities show normal capillary refill.  No signs of cyanosis, rubor, edema, skin changes, or dysvascular changes of appendages.  Nails appear intact.    Neurological Exam:  Cranial Nerves:  II-XII grossly intact    Manual Muscle Testing: (Motor 5=normal)  5/5 strength bilateral lumbar extremities    No focal atrophy is noted of either lower extremity.    Bilateral Reflexes:  Caicedo's response is absent bilaterally.    Sensation: tested to light touch  - intact in legs    Gait: Narrow base and good arm swing.      Entire procedure explained to patient prior to proceeding.  Verbal consent obtained    SNC      Nerve / Sites Rec. Site Onset Lat Peak Lat Amp Segments Distance Velocity     ms ms µV  mm m/s   R Sural - Ankle (Calf)      Calf Ankle NR NR NR Calf - Ankle 140 NR   R Superficial peroneal - Ankle      Lat leg Ankle NR NR NR Lat leg - Ankle 140 NR       MNC      Nerve / Sites Muscle Latency Amplitude Duration Rel Amp Segments Distance Lat Diff Velocity     ms mV ms %  mm ms m/s   R Peroneal - EDB      Ankle EDB 9.7 0.1 8.6 100 Ankle - EDB 80        Fibular head EDB 16.1 0.1  93.3 Fibular head - Ankle 330 6.4 52      Pop fossa EDB 17.8 0.1  107 Pop fossa - Fibular head 100 1.7 58   R Tibial - AH      Ankle AH 6.1 0.1 3.6 100 Ankle - Ankle 80        Pop fossa AH 16.4 0.1  60.9 Pop fossa - Ankle 440 10.3 43       EMG         EMG Summary Table     Spontaneous MUAP Recruitment   Muscle IA Fib PSW Fasc Other Dur. Dur Amp Dur Polys Pattern Effort   R. Rectus femoris N None None None . _NFT_ _NFT_ N N N N Max   R. Tibialis anterior Incr 1+ 1+ None . _NFT_ _NFT_ N Sl Incr 1+ Reduced Max   R. Extensor digitorum brevis Decr None None None . _NFT_ _NFT_ Decr N N Discrete Max   R. Gastrocnemius (Medial head) Decr 1+ 1+ None . _NFT_ _NFT_ N N 1+ Reduced Max   L. Tibialis anterior Incr None 2+ None . _NFT_  _NFT_ N N 1+ Reduced Max   L. Gastrocnemius (Medial head) Incr 1+ 1+ None . _NFT_ _NFT_ N N 1+ Reduced Max       INTERPRETATION  -Right superficial peroneal sensory nerve conduction study showed no response  -Right sural sensory nerve conduction study showed no response  -Right peroneal motor nerve conduction study showed prolonged latency, dec amplitude, and conduction velocity  -Right tibial motor nerve conduction study showed prolonged latency,dec amplitude, and conduction velocity  -Needle EMG examination performed to above mentioned muscles     IMPRESSION  1. ABNORMAL study  2. There is electrodiagnostic evidence of an acute on chronic radiculopathy of the bilateral L5 and S1 nerve roots  3. 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 Follow up with referring provider: Maia Lamas  2. Handouts on lumbar radic, exercises provided. Xray hips   3. This study is good for one year. If symptoms worsen or do not improve, please re-consult.    Emilia Paez M.D.  Physical Medicine and Rehab

## 2024-06-21 ENCOUNTER — PATIENT MESSAGE (OUTPATIENT)
Dept: PHYSICAL MEDICINE AND REHAB | Facility: CLINIC | Age: 84
End: 2024-06-21
Payer: MEDICARE

## 2024-06-24 ENCOUNTER — OFFICE VISIT (OUTPATIENT)
Dept: ORTHOPEDICS | Facility: CLINIC | Age: 84
End: 2024-06-24
Payer: MEDICARE

## 2024-06-24 VITALS
WEIGHT: 206.38 LBS | BODY MASS INDEX: 29.54 KG/M2 | HEART RATE: 61 BPM | HEIGHT: 70 IN | DIASTOLIC BLOOD PRESSURE: 69 MMHG | SYSTOLIC BLOOD PRESSURE: 124 MMHG

## 2024-06-24 DIAGNOSIS — M70.61 TROCHANTERIC BURSITIS OF RIGHT HIP: ICD-10-CM

## 2024-06-24 DIAGNOSIS — M16.11 PRIMARY OSTEOARTHRITIS OF RIGHT HIP: Primary | ICD-10-CM

## 2024-06-24 PROCEDURE — 1159F MED LIST DOCD IN RCRD: CPT | Mod: CPTII,S$GLB,, | Performed by: PHYSICIAN ASSISTANT

## 2024-06-24 PROCEDURE — 1101F PT FALLS ASSESS-DOCD LE1/YR: CPT | Mod: CPTII,S$GLB,, | Performed by: PHYSICIAN ASSISTANT

## 2024-06-24 PROCEDURE — 1125F AMNT PAIN NOTED PAIN PRSNT: CPT | Mod: CPTII,S$GLB,, | Performed by: PHYSICIAN ASSISTANT

## 2024-06-24 PROCEDURE — 1160F RVW MEDS BY RX/DR IN RCRD: CPT | Mod: CPTII,S$GLB,, | Performed by: PHYSICIAN ASSISTANT

## 2024-06-24 PROCEDURE — 99213 OFFICE O/P EST LOW 20 MIN: CPT | Mod: S$GLB,,, | Performed by: PHYSICIAN ASSISTANT

## 2024-06-24 PROCEDURE — 3288F FALL RISK ASSESSMENT DOCD: CPT | Mod: CPTII,S$GLB,, | Performed by: PHYSICIAN ASSISTANT

## 2024-06-24 PROCEDURE — 99999 PR PBB SHADOW E&M-EST. PATIENT-LVL IV: CPT | Mod: PBBFAC,,, | Performed by: PHYSICIAN ASSISTANT

## 2024-06-24 NOTE — PROGRESS NOTES
Patient ID: Ezequiel Hughes is a 84 y.o. male.    Chief Complaint: Pain of the Right Hip      HPI: Ezequiel Hughes  is a 84 y.o. male who c/o Pain of the Right Hip       Patient presents as a new patient to me today with chief complaint of right hip pain.  Patient notes pain is at worst a 2/10 affecting activity of daily living and thus his quality of life.  Patient states pain has been ongoing for roughly 2 months affecting walking long distances, going from a sitting to standing or standing to seated position, unlevel terrain or stairs.  It is worst in the morning and can eases throughout the day.  Additionally the patient recently underwent EMG study showing several layers of pinched nerves in his back for which he is received multiple IAN eyes.  Patient states additionally he was to have spinal surgery which was canceled and he has not followed back up.  He notes associated numbness and tingling to the lower extremities that was getting better thus he stopped taking the Lyrica but this has slowly returned.  He notes lately he has been doing less activity and seems to be doing better.  He has started the exercises that were provided by Dr. Chang any office and this is helping some; he has missing the gym.  He is taking Tylenol twice a day as needed to help with the pain.  He is unable to take NSAIDs secondary to Plavix.  He has additionally stopped the muscle relaxer although states he has been getting leg cramps.  He has not using any devices to assist with ambulation nor is he wearing any braces   He has not presently in a formal PT program but does have exercises that he is doing at home  He has not received any previous injections into the hip      Patient is presently denying any shortness of breath, chest pain, fever/chills, nausea/vomiting, loss of taste or smell, numbness/tingling or sensation changes, loss of bladder or bowel function.    Past Medical History:   Diagnosis Date    Allergic rhinitis     Anemia      Back pain     BPH (benign prostatic hyperplasia)     Cancer     skin cancer to neck, Dr. Graves    Cataract     Disorder of kidney and ureter     ED (erectile dysfunction)     OMARI (generalized anxiety disorder) 08/07/2023    Hiatal hernia     small    History of colon polyps     colonoscopy 11/2016    HLD (hyperlipidemia)     Hyperlipidemia     Hypertension     Lateral epicondylitis     Lumbar radiculopathy 01/26/2022    OA (osteoarthritis)     GUIDO (obstructive sleep apnea)     Polyneuropathy     Prostate cancer     Dr. Wong    TIA (transient ischemic attack)     Trouble in sleeping     Urge incontinence 03/29/2022       Past Surgical History:   Procedure Laterality Date    ANGIOGRAPHY OF UPPER EXTREMITY Left 07/05/2022    Procedure: Angiogram Extremity Bilateral;  Surgeon: Aparna Thompson MD;  Location: [facility] CATH LAB;  Service: Cardiology;  Laterality: Left;    ARTERIOGRAPHY OF AORTIC ROOT N/A 07/05/2022    Procedure: ARTERIOGRAM, AORTIC ROOT;  Surgeon: Aparna Thompson MD;  Location: [facility] CATH LAB;  Service: Cardiology;  Laterality: N/A;    CATARACT EXTRACTION W/  INTRAOCULAR LENS IMPLANT Right 02/21/2018    I-Stent    CATARACT EXTRACTION W/  INTRAOCULAR LENS IMPLANT Left 03/21/2018    I - Stent    CATHETERIZATION OF BOTH LEFT AND RIGHT HEART N/A 07/05/2022    Procedure: CATHETERIZATION, HEART, BOTH LEFT AND RIGHT;  Surgeon: Aparna Thompson MD;  Location: [facility] CATH LAB;  Service: Cardiology;  Laterality: N/A;  radial approach    COLONOSCOPY N/A 11/14/2016    Procedure: COLONOSCOPY;  Surgeon: Karuna Rodriguez MD;  Location: [facility] ENDO;  Service: Endoscopy;  Laterality: N/A;    COLONOSCOPY N/A 09/22/2020    Procedure: COLONOSCOPY;  Surgeon: Chuy Fish MD;  Location: [facility] ENDO;  Service: Endoscopy;  Laterality: N/A;    EPIDURAL STEROID INJECTION N/A 6/6/2024    Procedure: Lumbar L5/S1 IL IAN;  Surgeon: Abel Haywood MD;  Location: [facility] PAIN MGT;  Service: Pain Management;  Laterality: N/A;     EPIDURAL STEROID INJECTION INTO CERVICAL SPINE N/A 2/8/2024    Procedure: C5/6 IL Right paramedian approach IAN with RN IV sedation;  Surgeon: Abel Haywood MD;  Location: [facility] PAIN MGT;  Service: Pain Management;  Laterality: N/A;    EYE SURGERY      HEMORRHOID SURGERY      I-STENT Right 02/21/2018    DR. REECE    INJECTION OF ANESTHETIC AGENT AROUND MEDIAL BRANCH NERVES INNERVATING CERVICAL FACET JOINT Bilateral 7/18/2023    Procedure: Bilateral C4-6 MBB with RN IV sedation (diagnostic, #1);  Surgeon: Abel Haywood MD;  Location: [facility] PAIN MGT;  Service: Pain Management;  Laterality: Bilateral;    KNEE ARTHROSCOPY W/ MENISCAL REPAIR Right 2015    Dr. Jain    PCIOL Right 02/21/2018    DR. REECE    PLANTAR FASCIA RELEASE      right    ROTATOR CUFF REPAIR Bilateral     bilateral    SELECTIVE INJECTION OF ANESTHETIC AGENT AROUND LUMBAR SPINAL NERVE ROOT BY TRANSFORAMINAL APPROACH Bilateral 01/26/2022    Procedure: Bilateral L4/5 TF IAN with RN IV sedation;  Surgeon: Mak Young MD;  Location: [facility] PAIN MGT;  Service: Pain Management;  Laterality: Bilateral;    SELECTIVE INJECTION OF ANESTHETIC AGENT AROUND LUMBAR SPINAL NERVE ROOT BY TRANSFORAMINAL APPROACH Bilateral 03/14/2022    Procedure: Bilateral L4/5 TF IAN with RN IV sedation;  Surgeon: Mak Young MD;  Location: [facility] PAIN MGT;  Service: Pain Management;  Laterality: Bilateral;    SELECTIVE INJECTION OF ANESTHETIC AGENT AROUND LUMBAR SPINAL NERVE ROOT BY TRANSFORAMINAL APPROACH Bilateral 06/02/2022    Procedure: Bilateral L4/5 TF IAN - D2P Dr. Matthew CABRERA;  Surgeon: Abel Haywood MD;  Location: [facility] PAIN MGT;  Service: Pain Management;  Laterality: Bilateral;    SELECTIVE INJECTION OF ANESTHETIC AGENT AROUND LUMBAR SPINAL NERVE ROOT BY TRANSFORAMINAL APPROACH Bilateral 08/25/2022    Procedure: Bilateral L4/5 TF IAN;  Surgeon: Abel Haywood MD;  Location: [facility] PAIN MGT;  Service: Pain Management;  Laterality: Bilateral;    SELECTIVE  INJECTION OF ANESTHETIC AGENT AROUND LUMBAR SPINAL NERVE ROOT BY TRANSFORAMINAL APPROACH Bilateral 2023    Procedure: Bilateral L4/5 TF IAN RN IV Sedation;  Surgeon: Abel Haywood MD;  Location: [facility] PAIN MGT;  Service: Pain Management;  Laterality: Bilateral;    SELECTIVE INJECTION OF ANESTHETIC AGENT AROUND LUMBAR SPINAL NERVE ROOT BY TRANSFORAMINAL APPROACH Bilateral 2024    Procedure: Bilateral L4/5 TF IAN;  Surgeon: Abel Haywood MD;  Location: [facility] PAIN MGT;  Service: Pain Management;  Laterality: Bilateral;    SHOULDER SURGERY Bilateral around     Dr. Pepper.  rotator cuff surgeries    VASECTOMY         Family History   Problem Relation Name Age of Onset    Lung cancer Father Isaiah Hughes         life long smoker    Cancer Father Isaiah Hughes         prostate, lung    Arthritis Mother Emely Hughes     Stroke Sister          TIA    Cataracts Sister      Cancer Brother          prostate    Cataracts Brother      Cataracts Sister      Melanoma Neg Hx      Psoriasis Neg Hx      Lupus Neg Hx      Eczema Neg Hx      Diabetes Neg Hx      Heart disease Neg Hx      Kidney disease Neg Hx      Colon cancer Neg Hx         Social History     Socioeconomic History    Marital status:    Tobacco Use    Smoking status: Former     Current packs/day: 0.00     Average packs/day: 3.0 packs/day for 35.0 years (104.9 ttl pk-yrs)     Types: Cigarettes     Start date: 1960     Quit date: 1985     Years since quittin.9     Passive exposure: Past    Smokeless tobacco: Never   Substance and Sexual Activity    Alcohol use: Yes     Alcohol/week: 3.0 standard drinks of alcohol     Types: 3 Cans of beer per week     Comment: socially    Drug use: No    Sexual activity: Yes     Partners: Female     Birth control/protection: None   Social History Narrative         Social Determinants of Health     Financial Resource Strain: Low Risk  (2024)    Overall Financial Resource Strain ([facility])      Difficulty of Paying Living Expenses: Not hard at all   Food Insecurity: No Food Insecurity (5/16/2024)    Hunger Vital Sign     Worried About Running Out of Food in the Last Year: Never true     Ran Out of Food in the Last Year: Never true   Transportation Needs: No Transportation Needs (5/16/2024)    PRAPARE - Transportation     Lack of Transportation (Medical): No     Lack of Transportation (Non-Medical): No   Physical Activity: Insufficiently Active (5/16/2024)    Exercise Vital Sign     Days of Exercise per Week: 2 days     Minutes of Exercise per Session: 30 min   Stress: Stress Concern Present (5/16/2024)    [employer] - Occupational Stress Questionnaire     Feeling of Stress : To some extent   Housing Stability: Low Risk  (6/23/2023)    Housing Stability Vital Sign     Unable to Pay for Housing in the Last Year: No     Number of Places Lived in the Last Year: 1     Unstable Housing in the Last Year: No       Medication List with Changes/Refills   Current Medications    ACETAMINOPHEN (TYLENOL ARTHRITIS PAIN ORAL)    Take by mouth. Patient states that he takes 2 tablets in the morning and 2 tablets in the evening    ALBUTEROL (PROVENTIL/VENTOLIN HFA) 90 MCG/ACTUATION INHALER    Inhale 2 puffs into the lungs every 4 (four) hours as needed for Wheezing or Shortness of Breath.    ALFUZOSIN (UROXATRAL) 10 MG TB24    Take 10 mg by mouth daily with breakfast.    AMLODIPINE (NORVASC) 5 MG TABLET    Take 1 tablet (5 mg total) by mouth 2 (two) times daily.    ATORVASTATIN (LIPITOR) 40 MG TABLET    TAKE 1 TABLET EVERY DAY    CITALOPRAM (CELEXA) 10 MG TABLET    Take 1 tablet (10 mg total) by mouth once daily. For anxiety    CLOPIDOGREL (PLAVIX) 75 MG TABLET    TAKE 1 TABLET EVERY DAY    CYCLOBENZAPRINE (FLEXERIL) 5 MG TABLET        FINASTERIDE (PROSCAR) 5 MG TABLET    Take 1 tablet (5 mg total) by mouth once daily.    FLUTICASONE PROPIONATE (FLONASE) 50 MCG/ACTUATION NASAL SPRAY    2  sprays (100 mcg total) by Each Nostril route Daily.    FUROSEMIDE (LASIX) 20 MG TABLET    Take 1 tablet (20 mg total) by mouth daily as needed (edema).    LOSARTAN (COZAAR) 50 MG TABLET    TAKE 1 TABLET TWICE DAILY    PANTOPRAZOLE (PROTONIX) 40 MG TABLET    TAKE 1 TABLET EVERY DAY    PREGABALIN (LYRICA) 75 MG CAPSULE    Take 1 tablet by mouth daily at bedtime x 1 week, then take 1 tablet twice a day by mouth    SILDENAFIL (VIAGRA) 100 MG TABLET    Take 1 po 45 minutes before intercourse    SOLIFENACIN (VESICARE) 5 MG TABLET    Take 2 tablets (10 mg total) by mouth once daily.    VITAMIN D (VITAMIN D3) 1000 UNITS TAB    Take 1,000 Units by mouth once daily.       Review of patient's allergies indicates:   Allergen Reactions    Atarax [hydroxyzine hcl] Other (See Comments)     Raised blood pressure     Zyrtec [cetirizine] Other (See Comments)     Raised blood pressure     Gold sodium thiosulfate      Patch test positive    Meloxicam Rash     Other reaction(s): hypertension         Objective:     Right Lower Extremity    HIP:  Pelvis equal   Pain to palpation along the SI as well as bursa  Groin pain elicited with internal rotation  Abbductors/Adductors full  Quad resistance full    IMAGING:    XRAY:  FINDINGS:  The bony pelvis is intact. Both femoral heads are well seated within acetabula.  There is severe joint space narrowing superior laterally bilaterally left greater than the right with moderate collar of osteophytes seen on the left and no significant osteophyte formation at the head neck junction on the right.  Os acetabula formation seen bilaterally.  No plain film evidence to suggest AVN of either hip.     Impression:     As above    Patient states he has having no issue with left-sided hip   Pain and groin pain or to right side    Assessment:       No diagnosis found.       Plan:       There are no diagnoses linked to this encounter.    Ezequiel Hughes is a new patient who presents to me today with chief  complaint of right-sided hip pain. We had a long discussion today regarding degenerative arthritis in the knees. The patient understands that arthritis is chronic and will worsen over time.  The patient also understands that arthritis may cause episodic flare-ups in pain. Management or if arthritis is achieved through a multi-modal approach including weight loss in obese individuals, activity modification, NSAIDs (topical vs oral) where appropriate, periodic intra-articular steroid injections, viscosupplementation, physical therapy, knee bracing, ambulatory aids, as well as geniculate nerve blocks.  I do believe the patient is experiencing a combination of both pinched nerves in his back, bursitis, arthritis of the right hip.  He recently underwent IAN with pain management and is to follow up in 2 weeks.  We did discuss potential need of nerve block versus ablation of the nerves in the back.  Additionally we discussed short-acting steroid injection for bursitis of the hip today which he elected to move forward with.  Ask that he refrain from soaking in standing water such as a pool or tub for 24 hours.  He may advance activity as tolerated with elevation and ice as needed.  We discussed that these injections may be repeated every 3 months.  In regards to the groin pain we discussed moving forward with right articular hip injection.  I discussed that these injections are not given in our office but can be given in the office under ultrasound guidance by our primary care sports medicine providers or in the OR by our surgical service team.  The patient elected to move forward with primary sports medicine for right intra-articular hip injection discussion.  We will make this appointment for him today as well as a three-month follow up with our office.  Additionally I would like him to restart his Lyrica as well as muscle relaxer on his medication regimen.  May call with any questions or concerns in the interim.      Alysha is aware of the patient & current presentation. He agrees with the current plan above.     Patient verbalized understanding of all instructions and agreed with the above plan.    No follow-ups on file.    The patient understands, chooses and consents to this plan and accepts all   the risks which include but are not limited to the risks mentioned above.     Disclaimer: This note was prepared using a voice recognition system and is likely to have sound alike errors within the text.

## 2024-06-25 RX ORDER — PREGABALIN 75 MG/1
75 CAPSULE ORAL 2 TIMES DAILY
Qty: 60 CAPSULE | Refills: 1 | Status: SHIPPED | OUTPATIENT
Start: 2024-06-25

## 2024-07-01 NOTE — PROGRESS NOTES
Established Patient Chronic Pain Note (Follow up visit)    Chief Complaint:   Chief Complaint   Patient presents with    Hip Pain     Right          SUBJECTIVE:  Interval History (7/11/2024):  Patient Ezequiel Hughes presents today for follow-up visit.  Patient was last seen on 6/6/2025 L5/S1 IL IAN with 80% relief sustained. So far this injection has worked best for his back and leg pain.     He has not currently having any pain that radiates down the lower extremities.  His back pain seems to be improved.He does have report that for the most part is kind of difficult to see how his pain is doing because he has been having right hip pain.    performed a nerve conduction study and also ordered bilateral hip x-ray.    Nerve conduction study showed L5 and S1 bilateral nerve involvement.Hip x-ray showed severe joint space narrowing and he has since been referred to ortho and is scheduled for a right hip injection next week.  He is only having right hip pain that radiates into the right groin at times.  No left hip pain.  Patient denies night fever/night sweats, urinary incontinence, bowel incontinence, significant weight loss and significant motor weakness.   Patient denies any other complaints or concerns at this time.      Interval History (5/13/2024):  Patient Ezequiel Hughes presents today for follow-up visit.  Patient was last seen on 4/11/2024 bilateral L4/5 TF IAN with 80% relief x 1 day.  He continues to have lower back pain which radiates down the side and back of the bilateral lower extremities.  He states the most frustrating part is the numbness that he has in the lower extremities.  Numbness gets worse with prolonged walking and standing.  He is wanting to go back on the Lyrica to see if this can help with his symptoms.  He is also requesting a nerve conduction study just to help figure out where his pain is coming from.  He is seen  in the past to discuss vertiflex and is not interested in  surgery at this time.  Last lumbar MRI was 2021.  He rates his pain today a 4/10 but states it will go higher depending on activity.  Patient denies night fever/night sweats, urinary incontinence, bowel incontinence, significant weight loss and significant motor weakness.   Neck pain is currently stable  Patient denies any other complaints or concerns at this time.      Interval History (3/13/2024):  Patient Ezequiel Hughes presents today for follow-up visit.  Patient was last seen on 2/8/2024 for C5/6 IL IAN which he reports provided 80% relief. Neck pain is improved with no current radiculopathy.  He reports he stopped the Lyrica because he did not feel like he needed it.  He does still complain of lower back pain which radiates into the bilateral legs with some numbness.  He has had bilateral L4/5 IAN in the past with great relief and is wanting to repeat these injections.  He rates his pain today as 0/10 and states later in the day the pain will go up to a 4/10.  He has been seeing the chiropractor and doing home physical therapy exercises to try to get relief.  Patient denies night fever/night sweats, urinary incontinence, bowel incontinence, significant weight loss and significant motor weakness.   Patient denies any other complaints or concerns at this time.        Interval History (1/22/2024):  Patient Ezequiel Hughes presents today for follow-up visit.  Patient was last seen on 8/2/2023. At that time he had a C4-5 MBB which did not provide significant relief. Today his pain is in the base of the neck and radiates to the R shoulder. Pain started a few weeks ago. Pain feels like pins and needles. Today pain is a 5/10 but at it's worst it will be 7/10.   No fall or injury.   He has been to urgent care twice this month for the pain and has received a toradol injection and steroids. He got some temporarily relief from these.  He has chronic lower back pain, has been followed by Dr. Castro. He has been going to the  chiropractor  which is providing good relief.  He has been on lyrica in the past but stopped medication when he got relief of radicular symptoms from a previous IAN.    Interval visit 8/2/2023  Ezequiel Hughes is a 84 y.o. male presents today for follow-up chronic neck and lower back pain.  He was last seen for procedure on 07/18/2023 where he underwent bilateral C4-6 diagnostic medial branch blocks that did not provide significant relief unfortunately.  He continues with lower back pain with radicular symptoms into the both lower extremities.  He reports that this tends to occur mostly with ambulatory activities.        Patient denies night fever/night sweats, urinary incontinence, bowel incontinence, significant weight loss, significant motor weakness and loss of sensations.    Pain Disability Index Review:      7/11/2024     9:19 AM 1/22/2024     1:06 PM 8/2/2023    10:14 AM   Last 3 PDI Scores   Pain Disability Index (PDI) 35 49 7     Interval History (6/9/2023):    Ezequiel Hughes presents today for follow-up visit.  Patient was last seen on 09/27/2022. Last injection Bilateral L4/5 TF IAN on 8/25/22with 80-90% pain relief x more than 6 months before pain insidiously returned. He reports same pain as previous, located in his lower back with radiation into both legs, though his right leg is worse than left. He reports his right leg feels weak and swells after prolonged walking, improved with rest. He has completed 37 sessions of physical therapy for his neck and back from 10/20/2022 to 03/14/2022 without relief. Patient reports pain as 4/10 today, but 6/10 on his worst days.      Interval History (9/27/2022):   Ezequiel Hughes presents today for follow-up visit.  he underwent Bilateral L4/5 TF IAN on 8/25/22.  The patient reports that he is/was better following the procedure.  he reports 80-90% pain relief. He reports this IAN was the best so far.  The changes lasted 4 weeks so far.  The changes have continued through  this visit.  Patient reports pain as 2/10 today. He does report muscle spasms in his lower right back for the past 3-4 days after prolonged stooping working on a car. He has used heat and massage with some improvement.       Interval HPI  Ezequiel Hughes is a 83 y.o. male who presents to the clinic for a follow-up appointment for back and leg pain.  He presents today after undergoing a 3rd lumbar TFESI bilaterally at L4-5 on 06/02/2022.  He reports that this resulted in 100% relief.  Since the last visit, Ezequiel Hughes states the pain has been resolved. Current pain intensity is 0/10.      Interval Hx: 2/24/22  Presents status post bilateral L4/5 transforaminal epidural steroid injection January 26, 2022. Patient reports having 100% relief in lower extremity radicular symptoms following epidural steroid injection.  This pain level varies based upon his activity throughout the day.  Patient reports as he is more active he does notice radicular symptoms down the lateral aspects of bilateral lower extremities to the feet.  Patient reports discontinuing Lyrica the day following his injection which he believes caused paresthesias to insidiously return.  Patient does report improvement in functionality including range of motion and strength following his injection.  Patient is interested in repeat intervention.  Patient denies any side effects at the Lyrica dose of 225 mg twice a day.     Interval Hx: 1/13/22  Patient presents for MRI cervical and lumbar review.  Today patient again reports lower back pain which radiates down the anterior aspects of bilateral lower extremities in L4 distribution to the foot.  Today pain is rated as a 4/10.  Pain is exacerbated with prolonged standing and with ambulation the patient reports he is able to ambulate at least 1 mi on the treadmill before requiring rest.  He also reports neck pain which radiates in bilateral trapezius distributions in C5-6 distribution.  Patient has continued  "Lyrica and is currently taking 150 mg twice daily.  He is anticipated to increase this dosage next week.  He denies any side effects from this medication.     HPI 12/9/21  Ezequiel Hughes is a 81 y.o. male with past medical history significant for transient ischemic attack, hyperlipidemia, hypertension, right bundle branch block, stage 3 chronic kidney disease, thrombocytopenia and anemia , prostate cancer, gout, obstructive sleep apnea, periodically limb movement disorder who presents to the clinic for the evaluation of neck, lower back and leg pain.  Today patient reports pain began approximately 1 year prior without inciting accident, injury or trauma.  Today patient reports lower back pain which is constant and today is rated as a 3/10.  Patient reports radicular symptoms beginning along the anterior aspects of bilateral lower extremities below the knee to the foot in L4 distribution.  Patient is having difficulty describing the character but believes it is burning in nature.  Pain is exacerbated with prolonged standing and with ambulation.  Patient reports he is quite active, goes to the gym and walks on his treadmill and is able to ambulate approximately half to 3/4 of a mi before requiring rest.  Pain is improved with sitting.He denies any bowel or bladder incontinence or saddle anesthesia. Patient has performed physical therapy for the lower back without any improvement in his symptoms.      Patient also reports constant neck pain.  Pain presents in a bandlike distribution in bilateral cervical paraspinous territory and radiates into bilateral trapezius distribution.  Patient also reports numbness in bilateral thumbs.  Pain is exacerbated with cervical flexion, extension and lateral flexion.  Patient denies upper extremity weakness, compromise in hand  strength or dexterity.  Patient has been trialed on gabapentin for what his wife describes as "scalp itching"at a lower dose of 100 mg 3 times daily without " any improvement in his neck or in his back pain.           Non-Pharmacologic Treatments:  Physical Therapy/Home Exercise: yes  Ice/Heat:yes  TENS: no  Acupuncture: no  Massage: no  Chiropractic: no    Other: no     Pain Medications:  - Adjuvant Medications: Neurontin (Gabapentin) and Tylenol (Acetaminophen)  - Anti-Coagulants: Plavix ( Clopidogrel)     Pain Procedures:   -2022:  Bilateral L4/5 TFESI  -2022: Bilateral L4-5 TFESI  -2022:  Bilateral L4-5 TFESI D2P per Haydel  -2022: Bilatearl L4/5 TF IAN  -2023:  bilateral L4-5 TFESI, limited relief  -2023:  diagnostic C4-6 medial branch blocks, limited relief  2024 C5/6 IL IAN 80% relief  2024 bilateral L4/5 TF IAN with 80% relief x 1 day  2025 L5/S1 IL IAN with 80% relief sustained      Imagin2024 EMG  IMPRESSION  1. ABNORMAL study  2. There is electrodiagnostic evidence of an acute on chronic radiculopathy of the bilateral L5 and S1 nerve roots  3. 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    Review of patient's allergies indicates:   Allergen Reactions    Atarax [hydroxyzine hcl] Other (See Comments)     Raised blood pressure     Zyrtec [cetirizine] Other (See Comments)     Raised blood pressure     Gold sodium thiosulfate      Patch test positive    Meloxicam Rash     Other reaction(s): hypertension       Current Outpatient Medications   Medication Sig    acetaminophen (TYLENOL ARTHRITIS PAIN ORAL) Take by mouth. Patient states that he takes 2 tablets in the morning and 2 tablets in the evening    albuterol (PROVENTIL/VENTOLIN HFA) 90 mcg/actuation inhaler Inhale 2 puffs into the lungs every 4 (four) hours as needed for Wheezing  or Shortness of Breath.    alfuzosin (UROXATRAL) 10 mg Tb24 Take 10 mg by mouth daily with breakfast.    amLODIPine (NORVASC) 5 MG tablet Take 1 tablet (5 mg total) by mouth 2 (two) times daily.    atorvastatin (LIPITOR) 40 MG tablet TAKE 1 TABLET EVERY DAY    citalopram (CELEXA) 10 MG tablet Take 1 tablet (10 mg total) by mouth once daily. For anxiety    clopidogreL (PLAVIX) 75 mg tablet TAKE 1 TABLET EVERY DAY    cyclobenzaprine (FLEXERIL) 5 MG tablet     finasteride (PROSCAR) 5 mg tablet Take 1 tablet (5 mg total) by mouth once daily.    fluticasone propionate (FLONASE) 50 mcg/actuation nasal spray 2 sprays (100 mcg total) by Each Nostril route Daily.    furosemide (LASIX) 20 MG tablet Take 1 tablet (20 mg total) by mouth daily as needed (edema).    losartan (COZAAR) 50 MG tablet TAKE 1 TABLET TWICE DAILY    pantoprazole (PROTONIX) 40 MG tablet TAKE 1 TABLET EVERY DAY    pregabalin (LYRICA) 75 MG capsule Take 1 capsule (75 mg total) by mouth 2 (two) times daily.    sildenafiL (VIAGRA) 100 MG tablet Take 1 po 45 minutes before intercourse    solifenacin (VESICARE) 5 MG tablet Take 2 tablets (10 mg total) by mouth once daily.    vitamin D (VITAMIN D3) 1000 units Tab Take 1,000 Units by mouth once daily.     No current facility-administered medications for this visit.         REVIEW OF SYSTEMS:    GENERAL:  No weight loss, malaise or fevers.  HEENT:   No recent changes in vision or hearing  NECK:  Negative for lumps, no difficulty with swallowing.  RESPIRATORY:  Negative for cough, wheezing or shortness of breath, patient denies any recent URI.  CARDIOVASCULAR:  Negative for chest pain, leg swelling or palpitations.  GI:  Negative for abdominal discomfort, blood in stools or black stools or change in bowel habits.  MUSCULOSKELETAL:  See HPI.  SKIN:  Negative for lesions, rash, and itching.  PSYCH:  No mood disorder or recent psychosocial stressors.  Patients sleep is not disturbed secondary to  "pain.  HEMATOLOGY/LYMPHOLOGY:  Negative for prolonged bleeding, bruising easily or swollen nodes.  Patient is currently taking anti-coagulants - plavix  NEURO:   No history of headaches, syncope, paralysis, seizures or tremors.  All other reviewed and negative other than HPI.    OBJECTIVE:    /67   Pulse 60   Resp 17   Ht 5' 10" (1.778 m)   Wt 92.7 kg (204 lb 5.9 oz)   BMI 29.32 kg/m²     PHYSICAL EXAMINATION:    Vitals:    07/11/24 0917   BP: 135/67   Pulse: 60   Resp: 17   Weight: 92.7 kg (204 lb 5.9 oz)   Height: 5' 10" (1.778 m)     Body mass index is 29.32 kg/m².   (reviewed on 7/11/2024)       GENERAL: Well appearing, in no acute distress, alert and oriented x3.  PSYCH:  Mood and affect appropriate.  SKIN: Skin color, texture, turgor normal, no rashes or lesions.  HEAD/FACE:  Normocephalic, atraumatic. Cranial nerves grossly intact.  NECK:  Axial loading positive bilaterally.  Spurling's equivocal.  Range of motion fair secondary to pain.  CV: RRR with palpation of the radial artery.  PULM: No evidence of respiratory difficulty, symmetric chest rise.  GI:  Soft and non-tender.  BACK:  Forward flexed posture.  Straight leg raising in the sitting and supine positions is negative to radicular pain. No pain to palpation over the facet joints of the lumbar spine or spinous processes.  Fair range of motion with mild pain reproduction.  EXTREMITIES: No deformities, edema, or skin discoloration. Good capillary refill.  MUSCULOSKELETAL: Bilateral upper and lower extremity strength is normal and symmetric.  No atrophy or tone abnormalities are noted. Fadir's positive on right. Pain with ROM of the right hip.No GTB tenderness.  NEURO: Bilateral upper and lower extremity coordination and muscle stretch reflexes are physiologic and symmetric.  Plantar response are downgoing. No clonus.  No loss of sensation is noted.  GAIT: normal.  General: alert and oriented, in no apparent distress  Gait: normal gait.  Skin: " no rashes, no discoloration, no obvious lesions  HEENT: normocephalic, atraumatic. Pupils equal and round.  Cardiovascular: no significant peripheral edema present.  Respiratory: without use of accessory muscles of respiration.        Neuro - Upper Extremities:  - BUE Strength:R/L: D: 5/5; B: 5/5; T: 5/5; WF: 5/5; WE: 5/5; IO: 5/5  - Extremity Reflexes: Brisk and symmetric throughout  - Sensory: Sensation to light touch intact bilaterally  Positive spurling  FROM cervical spine and upper extremities  No shoulder tenderness  No cervical tenderness      Psych:  Mood and affect is appropriate      LABS:  Lab Results   Component Value Date    WBC 4.28 05/09/2024    HGB 13.7 (L) 05/09/2024    HCT 42.9 05/09/2024    MCV 98 05/09/2024     (L) 05/09/2024       CMP  Sodium   Date Value Ref Range Status   05/09/2024 139 136 - 145 mmol/L Final     Potassium   Date Value Ref Range Status   05/09/2024 4.6 3.5 - 5.1 mmol/L Final     Chloride   Date Value Ref Range Status   05/09/2024 106 95 - 110 mmol/L Final     CO2   Date Value Ref Range Status   05/09/2024 22 (L) 23 - 29 mmol/L Final     Glucose   Date Value Ref Range Status   05/09/2024 90 70 - 110 mg/dL Final     BUN   Date Value Ref Range Status   05/09/2024 19 8 - 23 mg/dL Final     Creatinine   Date Value Ref Range Status   05/09/2024 1.3 0.5 - 1.4 mg/dL Final     Calcium   Date Value Ref Range Status   05/09/2024 9.4 8.7 - 10.5 mg/dL Final     Total Protein   Date Value Ref Range Status   05/09/2024 6.9 6.0 - 8.4 g/dL Final     Albumin   Date Value Ref Range Status   05/09/2024 4.0 3.5 - 5.2 g/dL Final     Total Bilirubin   Date Value Ref Range Status   05/09/2024 0.7 0.1 - 1.0 mg/dL Final     Comment:     For infants and newborns, interpretation of results should be based  on gestational age, weight and in agreement with clinical  observations.    Premature Infant recommended reference ranges:  Up to 24 hours.............<8.0 mg/dL  Up to 48  hours............<12.0 mg/dL  3-5 days..................<15.0 mg/dL  6-29 days.................<15.0 mg/dL       Alkaline Phosphatase   Date Value Ref Range Status   05/09/2024 51 (L) 55 - 135 U/L Final     AST   Date Value Ref Range Status   05/09/2024 15 10 - 40 U/L Final     ALT   Date Value Ref Range Status   05/09/2024 12 10 - 44 U/L Final     Anion Gap   Date Value Ref Range Status   05/09/2024 11 8 - 16 mmol/L Final     eGFR if    Date Value Ref Range Status   06/29/2022 57.9 (A) >60 mL/min/1.73 m^2 Final     eGFR if non    Date Value Ref Range Status   06/29/2022 50.1 (A) >60 mL/min/1.73 m^2 Final     Comment:     Calculation used to obtain the estimated glomerular filtration  rate (eGFR) is the CKD-EPI equation.          Lab Results   Component Value Date    HGBA1C 5.1 06/27/2023             ASSESSMENT: 84 y.o. year old male with lower back and leg pain, consistent with     1. Lumbar radiculopathy        2. DDD (degenerative disc disease), lumbar        3. Primary osteoarthritis of both hips                            PLAN:   - Interventions: Pt scheduled for R hip injection next week with ortho. He will send up a message on Connected for update on symptoms afterwards  He has seen Dr. Castro for vertiflex but states he is not wanting to pursue any surgery      S/p 2/8/2024 C5/6 IL IAN with 80% relief  S/p diagnostic C4-6 medial branch blocks on 07/18/2023, limited relief  S/p repeat bilateral L4-5 TFESI on 06/29/2023, limited relief  - Anticoagulation: yes, Plavix   - Medications: I have stressed the importance of physical activity and a home exercise plan to help with pain and improve health. and Patient can continue with medications for now since they are providing benefits, using them appropriately, and without side effects.    Continue lyrica 75mg BID. We discussed potential side effects of this medication which may include drowsiness,dizziness, dry mouth, constipation or  peripheral edema.      - Therapy:  Advised patient to continue with activities as tolerated  - Labs:  Reviewed  - Imaging:  Reviewed MRI cervical spine and lumbar MRI. Reviewed xray bilateral hips and EMG  - Consults/Referrals: EConsult to Neurosurgery in the past for consideration of vertiflex procedure for lumbar spinal stenosis  - Records:  Reviewed/Obtain old records from outside physicians and imaging  - Follow up visit: will message after Right hip injections    - Counseled patient regarding the importance of activity modification and physical therapy  - This condition does not require this patient to take time off of work, and the primary goal of our Pain Management services is to improve the patient's functional capacity.  - Patient Questions: Answered all of the patient's questions regarding diagnosis, therapy, and treatment    The above plan and management options were discussed at length with patient. Patient is in agreement with the above and verbalized understanding.      Maia Lamas NP  Interventional Pain Management  [facility]    Disclaimer:  This note was prepared using voice recognition system and is likely to have sound alike errors that may have been overlooked even after proof reading.  Please call me with any questions

## 2024-07-07 ENCOUNTER — PATIENT MESSAGE (OUTPATIENT)
Dept: ORTHOPEDICS | Facility: CLINIC | Age: 84
End: 2024-07-07
Payer: MEDICARE

## 2024-07-11 ENCOUNTER — OFFICE VISIT (OUTPATIENT)
Dept: PAIN MEDICINE | Facility: CLINIC | Age: 84
End: 2024-07-11
Payer: MEDICARE

## 2024-07-11 VITALS
DIASTOLIC BLOOD PRESSURE: 67 MMHG | HEIGHT: 70 IN | SYSTOLIC BLOOD PRESSURE: 135 MMHG | HEART RATE: 60 BPM | RESPIRATION RATE: 17 BRPM | BODY MASS INDEX: 29.26 KG/M2 | WEIGHT: 204.38 LBS

## 2024-07-11 DIAGNOSIS — M51.36 DDD (DEGENERATIVE DISC DISEASE), LUMBAR: ICD-10-CM

## 2024-07-11 DIAGNOSIS — M16.0 PRIMARY OSTEOARTHRITIS OF BOTH HIPS: ICD-10-CM

## 2024-07-11 DIAGNOSIS — M54.16 LUMBAR RADICULOPATHY: Primary | ICD-10-CM

## 2024-07-11 PROCEDURE — 3288F FALL RISK ASSESSMENT DOCD: CPT | Mod: HCNC,CPTII,S$GLB, | Performed by: NURSE PRACTITIONER

## 2024-07-11 PROCEDURE — 99213 OFFICE O/P EST LOW 20 MIN: CPT | Mod: HCNC,S$GLB,, | Performed by: NURSE PRACTITIONER

## 2024-07-11 PROCEDURE — 1125F AMNT PAIN NOTED PAIN PRSNT: CPT | Mod: HCNC,CPTII,S$GLB, | Performed by: NURSE PRACTITIONER

## 2024-07-11 PROCEDURE — 1159F MED LIST DOCD IN RCRD: CPT | Mod: HCNC,CPTII,S$GLB, | Performed by: NURSE PRACTITIONER

## 2024-07-11 PROCEDURE — 99999 PR PBB SHADOW E&M-EST. PATIENT-LVL IV: CPT | Mod: PBBFAC,HCNC,, | Performed by: NURSE PRACTITIONER

## 2024-07-11 PROCEDURE — 1101F PT FALLS ASSESS-DOCD LE1/YR: CPT | Mod: HCNC,CPTII,S$GLB, | Performed by: NURSE PRACTITIONER

## 2024-07-18 ENCOUNTER — OFFICE VISIT (OUTPATIENT)
Dept: SPORTS MEDICINE | Facility: CLINIC | Age: 84
End: 2024-07-18
Payer: MEDICARE

## 2024-07-18 VITALS — HEIGHT: 70 IN | BODY MASS INDEX: 29.26 KG/M2 | WEIGHT: 204.38 LBS

## 2024-07-18 DIAGNOSIS — M16.12 PRIMARY OSTEOARTHRITIS OF LEFT HIP: Primary | ICD-10-CM

## 2024-07-18 PROCEDURE — 1125F AMNT PAIN NOTED PAIN PRSNT: CPT | Mod: HCNC,CPTII,S$GLB, | Performed by: STUDENT IN AN ORGANIZED HEALTH CARE EDUCATION/TRAINING PROGRAM

## 2024-07-18 PROCEDURE — 99214 OFFICE O/P EST MOD 30 MIN: CPT | Mod: 25,HCNC,S$GLB, | Performed by: STUDENT IN AN ORGANIZED HEALTH CARE EDUCATION/TRAINING PROGRAM

## 2024-07-18 PROCEDURE — 3288F FALL RISK ASSESSMENT DOCD: CPT | Mod: HCNC,CPTII,S$GLB, | Performed by: STUDENT IN AN ORGANIZED HEALTH CARE EDUCATION/TRAINING PROGRAM

## 2024-07-18 PROCEDURE — 1101F PT FALLS ASSESS-DOCD LE1/YR: CPT | Mod: HCNC,CPTII,S$GLB, | Performed by: STUDENT IN AN ORGANIZED HEALTH CARE EDUCATION/TRAINING PROGRAM

## 2024-07-18 PROCEDURE — 1159F MED LIST DOCD IN RCRD: CPT | Mod: HCNC,CPTII,S$GLB, | Performed by: STUDENT IN AN ORGANIZED HEALTH CARE EDUCATION/TRAINING PROGRAM

## 2024-07-18 PROCEDURE — 1160F RVW MEDS BY RX/DR IN RCRD: CPT | Mod: HCNC,CPTII,S$GLB, | Performed by: STUDENT IN AN ORGANIZED HEALTH CARE EDUCATION/TRAINING PROGRAM

## 2024-07-18 PROCEDURE — 99999 PR PBB SHADOW E&M-EST. PATIENT-LVL III: CPT | Mod: PBBFAC,HCNC,, | Performed by: STUDENT IN AN ORGANIZED HEALTH CARE EDUCATION/TRAINING PROGRAM

## 2024-07-18 PROCEDURE — 20611 DRAIN/INJ JOINT/BURSA W/US: CPT | Mod: HCNC,LT,S$GLB, | Performed by: STUDENT IN AN ORGANIZED HEALTH CARE EDUCATION/TRAINING PROGRAM

## 2024-07-18 RX ORDER — TRIAMCINOLONE ACETONIDE 40 MG/ML
40 INJECTION, SUSPENSION INTRA-ARTICULAR; INTRAMUSCULAR
Status: DISCONTINUED | OUTPATIENT
Start: 2024-07-18 | End: 2024-07-18 | Stop reason: HOSPADM

## 2024-07-18 RX ADMIN — TRIAMCINOLONE ACETONIDE 40 MG: 40 INJECTION, SUSPENSION INTRA-ARTICULAR; INTRAMUSCULAR at 02:07

## 2024-07-18 NOTE — PATIENT INSTRUCTIONS
Assessment:  Ezequiel Hughes is a 84 y.o. male   Chief Complaint   Patient presents with    Right Hip - Pain       No diagnosis found.     Plan:  Ultrasound guided intra articular cortisone injection to the right hip  We discussed the proper protocols after the injection such as no submerging pools, baths tubs, or hot tubs for 24 hr.  Showering is okay today.  We also discussed that blood sugars can be elevated after an injection and asked patient to properly checked her sugars over the next few days and contact their PCP if there are any concerns.  We discussed red flags such as fevers, chills, red, warm, tender joint at the area of injection to please seek medical care immediately.    Apply topical diclofenac (Voltaren) up to 4 times a day to the affected area.  It can be bought over the counter at any local pharmacy.    Patient may ice every 2 hours for 15 minutes as needed to control pain and swelling.   Instructed in home exercise program for hip OA  At least 15 minutes were spent developing, teaching, and performing a home exercise program.  A written summary was provided and all questions were answered.  This service was performed under direction the of Dr. Maury Rodas MD.  CPT 69527.  Follow up in 3 months            STRETCHING EXERCISES                  STRENGTHENING EXERCISES            If you have any difficulties reading this information, you may visit the online version using the following link: Hip Conditioning Program ([email or web address]/globalassets/[email or web address]_[email or web address])      Follow-up: 3 months or sooner if there are problems between now and then.    Thank you for choosing [facility] and Dr. Maury Rodas for your orthopedic & sports medicine care. It is our goal to provide you with exceptional care that will help keep you healthy, active, and get you back in the game.    Please do not hesitate to reach out to us via email, phone, or MyChart with any  questions, concerns, or feedback.    If you felt that you received exemplary care today, please consider leaving us feedback on [employer] at:  [email or web address]    If you are experiencing pain/discomfort ,or have questions after 5pm and would like to be connected to the [facility] on-call team, please call this number and specify which Sports Medicine provider is treating you: [phone]

## 2024-07-18 NOTE — PROGRESS NOTES
Patient ID: Ezequiel Hughes  YOB: 1940  MRN: [id number]    Chief Complaint: Pain of the Right Hip      Referred By: YANDY Patel for Right Hip pain    History of Present Illness: Ezequiel Hughes is a right-hand dominant 84 y.o. male who presents today with right hip pain.  Patient reports constant achy pain in the posterior hip radiating into his groin region.  Currently rates the pain at a 4/10 and describes the pain as a constant achy.  Movement of the hip increase pain and it is painful at night when trying to sleep.  Pain increases in intensity throughout the day.  Currently takes Ibuprofen as needed for symptom relief.      The patient is active in none.  Occupation: Retired      Past Medical History:   Past Medical History:   Diagnosis Date    Allergic rhinitis     Anemia     Back pain     BPH (benign prostatic hyperplasia)     Cancer     skin cancer to neck, Dr. Graves    Cataract     Disorder of kidney and ureter     ED (erectile dysfunction)     OMARI (generalized anxiety disorder) 08/07/2023    Hiatal hernia     small    History of colon polyps     colonoscopy 11/2016    HLD (hyperlipidemia)     Hyperlipidemia     Hypertension     Lateral epicondylitis     Lumbar radiculopathy 01/26/2022    OA (osteoarthritis)     GUIDO (obstructive sleep apnea)     Polyneuropathy     Prostate cancer     Dr. Wong    TIA (transient ischemic attack)     Trouble in sleeping     Urge incontinence 03/29/2022     Past Surgical History:   Procedure Laterality Date    ANGIOGRAPHY OF UPPER EXTREMITY Left 07/05/2022    Procedure: Angiogram Extremity Bilateral;  Surgeon: Aparna Thompson MD;  Location: [facility] [place] CATH LAB;  Service: Cardiology;  Laterality: Left;    ARTERIOGRAPHY OF AORTIC ROOT N/A 07/05/2022    Procedure: ARTERIOGRAM, AORTIC ROOT;  Surgeon: Aparna Thompson MD;  Location: [facility] [place] CATH LAB;  Service: Cardiology;  Laterality: N/A;    CATARACT EXTRACTION W/  INTRAOCULAR LENS IMPLANT Right 02/21/2018     I-Stent    CATARACT EXTRACTION W/  INTRAOCULAR LENS IMPLANT Left 03/21/2018    I - Stent    CATHETERIZATION OF BOTH LEFT AND RIGHT HEART N/A 07/05/2022    Procedure: CATHETERIZATION, HEART, BOTH LEFT AND RIGHT;  Surgeon: Aparna Thompson MD;  Location: [facility] CATH LAB;  Service: Cardiology;  Laterality: N/A;  radial approach    COLONOSCOPY N/A 11/14/2016    Procedure: COLONOSCOPY;  Surgeon: Karuna Rodriguez MD;  Location: [facility] ENDO;  Service: Endoscopy;  Laterality: N/A;    COLONOSCOPY N/A 09/22/2020    Procedure: COLONOSCOPY;  Surgeon: Chuy Fish MD;  Location: [facility] ENDO;  Service: Endoscopy;  Laterality: N/A;    EPIDURAL STEROID INJECTION N/A 6/6/2024    Procedure: Lumbar L5/S1 IL IAN;  Surgeon: Abel Haywood MD;  [facility];  Service: Pain Management;  Laterality: N/A;    EPIDURAL STEROID INJECTION INTO CERVICAL SPINE N/A 2/8/2024    Procedure: C5/6 IL Right paramedian approach IAN with RN IV sedation;  Surgeon: Abel Haywood MD;  Location: [facility];  Service: Pain Management;  Laterality: N/A;    EYE SURGERY      HEMORRHOID SURGERY      I-STENT Right 02/21/2018    DR. REECE    INJECTION OF ANESTHETIC AGENT AROUND MEDIAL BRANCH NERVES INNERVATING CERVICAL FACET JOINT Bilateral 7/18/2023    Procedure: Bilateral C4-6 MBB with RN IV sedation (diagnostic, #1);  Surgeon: Abel Haywood MD;  Location: [facility] PAIN MGT;  Service: Pain Management;  Laterality: Bilateral;    KNEE ARTHROSCOPY W/ MENISCAL REPAIR Right 2015    Dr. Jain    PCIOL Right 02/21/2018    DR. REECE    PLANTAR FASCIA RELEASE      right    ROTATOR CUFF REPAIR Bilateral     bilateral    SELECTIVE INJECTION OF ANESTHETIC AGENT AROUND LUMBAR SPINAL NERVE ROOT BY TRANSFORAMINAL APPROACH Bilateral 01/26/2022    Procedure: Bilateral L4/5 TF IAN with RN IV sedation;  Surgeon: Mak Young MD;  Location: [facility];  Service: Pain Management;  Laterality: Bilateral;    SELECTIVE INJECTION OF ANESTHETIC AGENT AROUND  LUMBAR SPINAL NERVE ROOT BY TRANSFORAMINAL APPROACH Bilateral 03/14/2022    Procedure: Bilateral L4/5 TF IAN with RN IV sedation;  Surgeon: Mak Young MD;  Location: [facility] PAIN [facility]GT;  Service: Pain Management;  Laterality: Bilateral;    SELECTIVE INJECTION OF ANESTHETIC AGENT AROUND LUMBAR SPINAL NERVE ROOT BY TRANSFORAMINAL APPROACH Bilateral 06/02/2022    Procedure: Bilateral L4/5 TF IAN [place] [facility] [place]  [place]geon: Abel Haywood MD;  Location: [facility];  Service: Pain Management;  Laterality: Bilateral;    SELECTIVE INJECTION OF ANESTHETIC AGENT AROUND LUMBAR SPINAL NERVE ROOT BY TRANSFORAMINAL APPROACH Bilateral 08/25/2022    Procedure: Bilateral L4/5 TF IAN;  Surgeon: Abel Haywood MD;  Location: [facility];  Service: Pain Management;  Laterality: Bilateral;    SELECTIVE INJECTION OF ANESTHETIC AGENT AROUND LUMBAR SPINAL NERVE ROOT BY TRANSFORAMINAL APPROACH Bilateral 6/29/2023    Procedure: Bilateral L4/5 TF IAN RN IV Sedation;  Surgeon: Abel Haywood MD;  Location: [facility]IN [facility]GT;  Service: Pain Management;  Laterality: Bilateral;    SELECTIVE INJECTION OF ANESTHETIC AGENT AROUND LUMBAR SPINAL NERVE ROOT BY TRANSFORAMINAL APPROACH Bilateral 4/11/2024    Procedure: Bilateral L4/5 TF IAN;  Surgeon: Abel Haywood MD;  Location: [facility];  Service: Pain Management;  Laterality: Bilateral;    SHOULDER SURGERY Bilateral around 2000    Dr. Pepper.  rotator cuff surgeries    VASECTOMY       Family History   Problem Relation Name Age of Onset    Lung cancer Father Isaiah Hughes         life long smoker    Cancer Father Isaiah Hughes         prostate, lung    Arthritis Mother Emely Hughes     Stroke Sister          TIA    Cataracts Sister      Cancer Brother          prostate    Cataracts Brother      Cataracts Sister      Melanoma Neg Hx      Psoriasis Neg Hx      Lupus Neg Hx      Eczema Neg Hx      Diabetes Neg Hx      Heart disease Neg Hx      Kidney disease Neg Hx      Colon cancer  Neg Hx       Social History     Socioeconomic History    Marital status:    Tobacco Use    Smoking status: Former     Current packs/day: 0.00     Average packs/day: 3.0 packs/day for 35.0 years (104.9 ttl pk-yrs)     Types: Cigarettes     Start date: 1960     Quit date: 1985     Years since quittin.0     Passive exposure: Past    Smokeless tobacco: Never   Substance and Sexual Activity    Alcohol use: Yes     Alcohol/week: 3.0 standard drinks of alcohol     Types: 3 Cans of beer per week     Comment: socially    Drug use: No    Sexual activity: Yes     Partners: Female     Birth control/protection: None   Social History Narrative         Social Determinants of Health     Financial Resource Strain: Low Risk  (2024)    Overall Financial Resource Strain (CARDIA)     Difficulty of Paying Living Expenses: Not hard at all   Food Insecurity: No Food Insecurity (2024)    Hunger Vital Sign     Worried About Running Out of Food in the Last Year: Never true     Ran Out of Food in the Last Year: Never true   Transportation Needs: No Transportation Needs (2024)    PRAPARE - Transportation     Lack of Transportation (Medical): No     Lack of Transportation (Non-Medical): No   Physical Activity: Insufficiently Active (2024)    Exercise Vital Sign     Days of Exercise per Week: 2 days     Minutes of Exercise per Session: 30 min   Stress: Stress Concern Present (2024)    [employer] [place] [employer] - Occupational Stress Questionnaire     Feeling of Stress : To some extent   Housing Stability: Low Risk  (2023)    Housing Stability Vital Sign     Unable to Pay for Housing in the Last Year: No     Number of Places Lived in the Last Year: 1     Unstable Housing in the Last Year: No     Medication List with Changes/Refills   Current Medications    ACETAMINOPHEN (TYLENOL ARTHRITIS PAIN ORAL)    Take by mouth. Patient states that he takes 2 tablets in the morning and  2 tablets in the evening    ALBUTEROL (PROVENTIL/VENTOLIN HFA) 90 MCG/ACTUATION INHALER    Inhale 2 puffs into the lungs every 4 (four) hours as needed for Wheezing or Shortness of Breath.    ALFUZOSIN (UROXATRAL) 10 MG TB24    Take 10 mg by mouth daily with breakfast.    AMLODIPINE (NORVASC) 5 MG TABLET    Take 1 tablet (5 mg total) by mouth 2 (two) times daily.    ATORVASTATIN (LIPITOR) 40 MG TABLET    TAKE 1 TABLET EVERY DAY    CITALOPRAM (CELEXA) 10 MG TABLET    Take 1 tablet (10 mg total) by mouth once daily. For anxiety    CLOPIDOGREL (PLAVIX) 75 MG TABLET    TAKE 1 TABLET EVERY DAY    CYCLOBENZAPRINE (FLEXERIL) 5 MG TABLET        FINASTERIDE (PROSCAR) 5 MG TABLET    Take 1 tablet (5 mg total) by mouth once daily.    FLUTICASONE PROPIONATE (FLONASE) 50 MCG/ACTUATION NASAL SPRAY    2 sprays (100 mcg total) by Each Nostril route Daily.    FUROSEMIDE (LASIX) 20 MG TABLET    Take 1 tablet (20 mg total) by mouth daily as needed (edema).    LOSARTAN (COZAAR) 50 MG TABLET    TAKE 1 TABLET TWICE DAILY    PANTOPRAZOLE (PROTONIX) 40 MG TABLET    TAKE 1 TABLET EVERY DAY    PREGABALIN (LYRICA) 75 MG CAPSULE    Take 1 capsule (75 mg total) by mouth 2 (two) times daily.    SILDENAFIL (VIAGRA) 100 MG TABLET    Take 1 po 45 minutes before intercourse    SOLIFENACIN (VESICARE) 5 MG TABLET    Take 2 tablets (10 mg total) by mouth once daily.    VITAMIN D (VITAMIN D3) 1000 UNITS TAB    Take 1,000 Units by mouth once daily.     Review of patient's allergies indicates:   Allergen Reactions    Atarax [hydroxyzine hcl] Other (See Comments)     Raised blood pressure     Zyrtec [cetirizine] Other (See Comments)     Raised blood pressure     Gold sodium thiosulfate      Patch test positive    Meloxicam Rash     Other reaction(s): hypertension       Physical Exam:   Body mass index is 29.32 kg/m².    GENERAL: Well appearing, in no acute distress.  HEAD: Normocephalic and atraumatic.  ENT: External ears and nose grossly normal.  EYES:  EOMI bilaterally  PULMONARY: Respirations are grossly even and non-labored.  NEURO: Awake, alert, and oriented x 3.  SKIN: No obvious rashes appreciated.  PSYCH: Mood & affect are appropriate.    Detailed MSK exam:     Right hip exam:  -ROM: internal rotation 20, external rotation 40  -JAY negative, FADIR positive, Lesly negative  -Muscle strength 5/5 flexion, 5/5 extension, 5/5 abduction, 5/5 adduction  -positive log roll  -negative straight leg raise  -TTP: greater trochanter and anterior groin    Left hip exam:  -ROM: internal rotation 20, external rotation 40  -JAY negative, FADIR negative, Lesly negative  -Muscle strength 5/5 flexion, 5/5 extension, 5/5 abduction, 5/5 adduction  -negative log roll  -negative straight leg raise  -TTP: none      Imaging:  X-Ray Hips Bilateral 2 View Incl AP Pelvis  Narrative: EXAMINATION:  XR HIPS BILATERAL 2 VIEW INCL AP PELVIS    CLINICAL HISTORY:  Unilateral primary osteoarthritis, right hip    TECHNIQUE:  AP view of the pelvis and frogleg lateral views of both hips were performed.    COMPARISON:  None.    FINDINGS:  The bony pelvis is intact. Both femoral heads are well seated within acetabula.  There is severe joint space narrowing superior laterally bilaterally left greater than the right with moderate collar of osteophytes seen on the left and no significant osteophyte formation at the head neck junction on the right.  Os acetabula formation seen bilaterally.  No plain film evidence to suggest AVN of either hip.  Impression: As above    Electronically signed by: Lucio Del Toro DO  Date:    06/20/2024  Time:    09:43        Relevant imaging results were reviewed and interpreted by me and per my read shows moderate to severe arthritic changes bilateral hips, left greater than right.  This was discussed with the patient and / or family today.     Assessment:  Ezequiel Hughes is a 84 y.o. male presenting with right hip pain.   History, physical and radiographs are consistent  with a likely diagnosis of moderate to severe OA, left hip asymptomatic.   Plan: Steroid injection given today (see separate procedure note for details). We discussed the proper protocols after the injection such as no submerging pools, baths tubs, or hot tubs for 24 hr.  Showering is okay today.  We also discussed that blood sugars can be elevated after an injection and asked patient to properly checked her sugars over the next few days and contact their PCP if there are any concerns.  We discussed red flags such as fevers, chills, red, warm, tender joint at the area of injection to please seek medical care immediately.   Home exercises given. Continue conservative management for pain.   Follow up 3 months. All questions answered.      Primary osteoarthritis of left hip  -     Sports Medicine US - Guidance for Needle Placement  -     Large Joint Aspiration/Injection: R hip joint       At least 15 minutes were spent instructing the patient in therapeutic exercises.  All questions were answered and exercises were provided in the After Visit Summary.  This service was performed under direction of Maury Rodas MD.  CPT 72594.    Ultrasound guidance was used for needle localization. Images were saved and stored for documentation. The appropriate structures were visualized. Dynamic visualization of the needle was continuous throughout the procedures and maintained good position.      A copy of today's visit note has been sent to the referring provider.     Electronically signed:  Maury Rodas MD, MPH  07/18/2024  2:37 PM

## 2024-07-18 NOTE — PROCEDURES
Sports Medicine US - Guidance for Needle Placement    Date/Time: 7/18/2024 2:00 PM    Performed by: Maury Rodas MD  Authorized by: Maury Rodas MD  Preparation: Patient was prepped and draped in the usual sterile fashion.  Local anesthesia used: no    Anesthesia:  Local anesthesia used: no    Sedation:  Patient sedated: no    Patient tolerance: patient tolerated the procedure well with no immediate complications  Comments: Ultrasound guidance was used for needle localization. Images were saved and stored for documentation. The appropriate structures were visualized. Dynamic visualization of the needle was continuous throughout the procedures and maintained good position.

## 2024-07-18 NOTE — PROCEDURES
Large Joint Aspiration/Injection: R hip joint    Date/Time: 7/18/2024 2:00 PM    Performed by: Maury Rodas MD  Authorized by: Maury Rodas MD    Consent Done?:  Yes (Verbal)  Indications:  Pain and arthritis  Site marked: the procedure site was marked    Timeout: prior to procedure the correct patient, procedure, and site was verified    Prep: patient was prepped and draped in usual sterile fashion    Local anesthetic:  Bupivacaine 0.5% without epinephrine and lidocaine 1% without epinephrine    Details:  Needle Size:  22 G  Ultrasonic Guidance for needle placement?: Yes    Images are saved and documented.  Approach:  Anterior  Location:  Hip  Site:  R hip joint  Medications:  40 mg triamcinolone acetonide 40 mg/mL  Patient tolerance:  Patient tolerated the procedure well with no immediate complications     Ultrasound guidance was used for needle localization. Images were saved and stored for documentation. The appropriate structures were visualized. Dynamic visualization of the needle was continuous throughout the procedures and maintained good position.

## 2024-07-22 ENCOUNTER — PATIENT MESSAGE (OUTPATIENT)
Dept: PAIN MEDICINE | Facility: CLINIC | Age: 84
End: 2024-07-22
Payer: MEDICARE

## 2024-08-01 ENCOUNTER — PATIENT MESSAGE (OUTPATIENT)
Dept: PAIN MEDICINE | Facility: CLINIC | Age: 84
End: 2024-08-01
Payer: MEDICARE

## 2024-08-02 ENCOUNTER — OFFICE VISIT (OUTPATIENT)
Dept: PAIN MEDICINE | Facility: CLINIC | Age: 84
End: 2024-08-02
Payer: MEDICARE

## 2024-08-02 DIAGNOSIS — M25.551 RIGHT HIP PAIN: ICD-10-CM

## 2024-08-02 DIAGNOSIS — M51.36 DDD (DEGENERATIVE DISC DISEASE), LUMBAR: ICD-10-CM

## 2024-08-02 DIAGNOSIS — M54.16 LUMBAR RADICULOPATHY: Primary | ICD-10-CM

## 2024-08-02 DIAGNOSIS — M16.0 PRIMARY OSTEOARTHRITIS OF BOTH HIPS: ICD-10-CM

## 2024-08-02 NOTE — PROGRESS NOTES
Established Patient - TeleHealth Visit    The patient location is: [place]  The chief complaint leading to consultation is: chronic pain     Visit type: audiovisual    Face to Face time with patient: 10-15 minutes  20 minutes of total time spent on the encounter, which includes face to face time and non-face to face time preparing to see the patient (eg, review of tests), Obtaining and/or reviewing separately obtained history, Documenting clinical information in the electronic or other health record, Independently interpreting results (not separately reported) and communicating results to the patient/family/caregiver, or Care coordination (not separately reported).     Each patient to whom he or she provides medical services by telemedicine is:  (1) informed of the relationship between the physician and patient and the respective role of any other health care provider with respect to management of the patient; and (2) notified that he or she may decline to receive medical services by telemedicine and may withdraw from such care at any time.      Established Patient Chronic Pain Note (Follow up visit)    Chief Complaint:   No chief complaint on file.        SUBJECTIVE:  Interval History (8/2/2024):  Patient Ezequiel Hughes presents today for follow-up visit.  Patient was last seen by orthopedist Dr. Deras for R hip injection 7/18/2024  with 90% relief of his hip pain however he continues to have pain in his right groin that goes into the front of the thigh. He says he has 0/10 pain with sitting but when he walks he has a catching sensation that will make his pain 10/10. He had a lumbar IAN in June which helped with his back pain but he also feels he still has right sided low back pain at times with the right groin pain. He is curious when he could get another injection  He has been out of his lyrica x 1 week, he feels he misplaced it and plans to find it.   He has done PT in the past but has never gotten relief with  it.  Patient denies night fever/night sweats, urinary incontinence, bowel incontinence, significant weight loss and significant motor weakness.   Patient denies any other complaints or concerns at this time.      Interval History (7/11/2024):  Patient Ezequiel Hughes presents today for follow-up visit.  Patient was last seen on 6/6/2024 L5/S1 IL IAN with 80% relief sustained. So far this injection has worked best for his back and leg pain.     He has not currently having any pain that radiates down the lower extremities.  His back pain seems to be improved.He does have report that for the most part is kind of difficult to see how his pain is doing because he has been having right hip pain.    performed a nerve conduction study and also ordered bilateral hip x-ray.    Nerve conduction study showed L5 and S1 bilateral nerve involvement.Hip x-ray showed severe joint space narrowing and he has since been referred to ortho and is scheduled for a right hip injection next week.  He is only having right hip pain that radiates into the right groin at times.  No left hip pain.  Patient denies night fever/night sweats, urinary incontinence, bowel incontinence, significant weight loss and significant motor weakness.   Patient denies any other complaints or concerns at this time.      Interval History (5/13/2024):  Patient Ezequiel Hughes presents today for follow-up visit.  Patient was last seen on 4/11/2024 bilateral L4/5 TF IAN with 80% relief x 1 day.  He continues to have lower back pain which radiates down the side and back of the bilateral lower extremities.  He states the most frustrating part is the numbness that he has in the lower extremities.  Numbness gets worse with prolonged walking and standing.  He is wanting to go back on the Lyrica to see if this can help with his symptoms.  He is also requesting a nerve conduction study just to help figure out where his pain is coming from.  He is seen  in the  past to discuss vertiflex and is not interested in surgery at this time.  Last lumbar MRI was 2021.  He rates his pain today a 4/10 but states it will go higher depending on activity.  Patient denies night fever/night sweats, urinary incontinence, bowel incontinence, significant weight loss and significant motor weakness.   Neck pain is currently stable  Patient denies any other complaints or concerns at this time.      Interval History (3/13/2024):  Patient Ezequiel Hughes presents today for follow-up visit.  Patient was last seen on 2/8/2024 for C5/6 IL IAN which he reports provided 80% relief. Neck pain is improved with no current radiculopathy.  He reports he stopped the Lyrica because he did not feel like he needed it.  He does still complain of lower back pain which radiates into the bilateral legs with some numbness.  He has had bilateral L4/5 IAN in the past with great relief and is wanting to repeat these injections.  He rates his pain today as 0/10 and states later in the day the pain will go up to a 4/10.  He has been seeing the chiropractor and doing home physical therapy exercises to try to get relief.  Patient denies night fever/night sweats, urinary incontinence, bowel incontinence, significant weight loss and significant motor weakness.   Patient denies any other complaints or concerns at this time.        Interval History (1/22/2024):  Patient Ezequiel Hughes presents today for follow-up visit.  Patient was last seen on 8/2/2023. At that time he had a C4-5 MBB which did not provide significant relief. Today his pain is in the base of the neck and radiates to the R shoulder. Pain started a few weeks ago. Pain feels like pins and needles. Today pain is a 5/10 but at it's worst it will be 7/10.   No fall or injury.   He has been to urgent care twice this month for the pain and has received a toradol injection and steroids. He got some temporarily relief from these.  He has chronic lower back pain, has been  followed by Dr. Castro. He has been going to the chiropractor  which is providing good relief.  He has been on lyrica in the past but stopped medication when he got relief of radicular symptoms from a previous IAN.    Interval visit 8/2/2023  Ezequiel Hughes is a 84 y.o. male presents today for follow-up chronic neck and lower back pain.  He was last seen for procedure on 07/18/2023 where he underwent bilateral C4-6 diagnostic medial branch blocks that did not provide significant relief unfortunately.  He continues with lower back pain with radicular symptoms into the both lower extremities.  He reports that this tends to occur mostly with ambulatory activities.        Patient denies night fever/night sweats, urinary incontinence, bowel incontinence, significant weight loss, significant motor weakness and loss of sensations.    Pain Disability Index Review:      7/11/2024     9:19 AM 1/22/2024     1:06 PM 8/2/2023    10:14 AM   Last 3 PDI Scores   Pain Disability Index (PDI) 35 49 7     Interval History (6/9/2023):    Ezequiel Hughes presents today for follow-up visit.  Patient was last seen on 09/27/2022. Last injection Bilateral L4/5 TF IAN on 8/25/22with 80-90% pain relief x more than 6 months before pain insidiously returned. He reports same pain as previous, located in his lower back with radiation into both legs, though his right leg is worse than left. He reports his right leg feels weak and swells after prolonged walking, improved with rest. He has completed 37 sessions of physical therapy for his neck and back from 10/20/2022 to 03/14/2022 without relief. Patient reports pain as 4/10 today, but 6/10 on his worst days.      Interval History (9/27/2022):   Ezequiel Hughes presents today for follow-up visit.  he underwent Bilateral L4/5 TF IAN on 8/25/22.  The patient reports that he is/was better following the procedure.  he reports 80-90% pain relief. He reports this IAN was the best so far.  The changes lasted 4  weeks so far.  The changes have continued through this visit.  Patient reports pain as 2/10 today. He does report muscle spasms in his lower right back for the past 3-4 days after prolonged stooping working on a car. He has used heat and massage with some improvement.       Interval HPI  Ezequiel Hughes is a 83 y.o. male who presents to the clinic for a follow-up appointment for back and leg pain.  He presents today after undergoing a 3rd lumbar TFESI bilaterally at L4-5 on 06/02/2022.  He reports that this resulted in 100% relief.  Since the last visit, Ezequiel Hughes states the pain has been resolved. Current pain intensity is 0/10.      Interval Hx: 2/24/22  Presents status post bilateral L4/5 transforaminal epidural steroid injection January 26, 2022. Patient reports having 100% relief in lower extremity radicular symptoms following epidural steroid injection.  This pain level varies based upon his activity throughout the day.  Patient reports as he is more active he does notice radicular symptoms down the lateral aspects of bilateral lower extremities to the feet.  Patient reports discontinuing Lyrica the day following his injection which he believes caused paresthesias to insidiously return.  Patient does report improvement in functionality including range of motion and strength following his injection.  Patient is interested in repeat intervention.  Patient denies any side effects at the Lyrica dose of 225 mg twice a day.     Interval Hx: 1/13/22  Patient presents for MRI cervical and lumbar review.  Today patient again reports lower back pain which radiates down the anterior aspects of bilateral lower extremities in L4 distribution to the foot.  Today pain is rated as a 4/10.  Pain is exacerbated with prolonged standing and with ambulation the patient reports he is able to ambulate at least 1 mi on the treadmill before requiring rest.  He also reports neck pain which radiates in bilateral trapezius distributions  "in C5-6 distribution.  Patient has continued Lyrica and is currently taking 150 mg twice daily.  He is anticipated to increase this dosage next week.  He denies any side effects from this medication.     HPI 12/9/21  Ezequiel Hughes is a 81 y.o. male with past medical history significant for transient ischemic attack, hyperlipidemia, hypertension, right bundle branch block, stage 3 chronic kidney disease, thrombocytopenia and anemia , prostate cancer, gout, obstructive sleep apnea, periodically limb movement disorder who presents to the clinic for the evaluation of neck, lower back and leg pain.  Today patient reports pain began approximately 1 year prior without inciting accident, injury or trauma.  Today patient reports lower back pain which is constant and today is rated as a 3/10.  Patient reports radicular symptoms beginning along the anterior aspects of bilateral lower extremities below the knee to the foot in L4 distribution.  Patient is having difficulty describing the character but believes it is burning in nature.  Pain is exacerbated with prolonged standing and with ambulation.  Patient reports he is quite active, goes to the gym and walks on his treadmill and is able to ambulate approximately half to 3/4 of a mi before requiring rest.  Pain is improved with sitting.He denies any bowel or bladder incontinence or saddle anesthesia. Patient has performed physical therapy for the lower back without any improvement in his symptoms.      Patient also reports constant neck pain.  Pain presents in a bandlike distribution in bilateral cervical paraspinous territory and radiates into bilateral trapezius distribution.  Patient also reports numbness in bilateral thumbs.  Pain is exacerbated with cervical flexion, extension and lateral flexion.  Patient denies upper extremity weakness, compromise in hand  strength or dexterity.  Patient has been trialed on gabapentin for what his wife describes as "scalp itching"at " a lower dose of 100 mg 3 times daily without any improvement in his neck or in his back pain.           Non-Pharmacologic Treatments:  Physical Therapy/Home Exercise: yes  Ice/Heat:yes  TENS: no  Acupuncture: no  Massage: no  Chiropractic: no    Other: no     Pain Medications:  - Adjuvant Medications: Neurontin (Gabapentin) and Tylenol (Acetaminophen)  - Anti-Coagulants: Plavix ( Clopidogrel)     Pain Procedures:   -2022:  Bilateral L4/5 TFESI  -2022: Bilateral L4-5 TFESI  -2022:  Bilateral L4-5 TFESI D2P per Haydel  -2022: Bilatearl L4/5 TF IAN  -2023:  bilateral L4-5 TFESI, limited relief  -2023:  diagnostic C4-6 medial branch blocks, limited relief  2024 C5/6 IL IAN 80% relief  2024 bilateral L4/5 TF IAN with 80% relief x 1 day  2025 L5/S1 IL IAN with 80% relief sustained      Imagin2024 EMG  IMPRESSION  1. ABNORMAL study  2. There is electrodiagnostic evidence of an acute on chronic radiculopathy of the bilateral L5 and S1 nerve roots  3. 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    Review of patient's allergies indicates:   Allergen Reactions    Atarax [hydroxyzine hcl] Other (See Comments)     Raised blood pressure     Zyrtec [cetirizine] Other (See Comments)     Raised blood pressure     Gold sodium thiosulfate      Patch test positive    Meloxicam Rash     Other reaction(s): hypertension       Current Outpatient Medications   Medication Sig    acetaminophen (TYLENOL ARTHRITIS PAIN ORAL) Take by mouth. Patient states that he takes 2 tablets in the morning and 2 tablets in the evening    albuterol (PROVENTIL/VENTOLIN HFA) 90 mcg/actuation inhaler Inhale 2 puffs into the  lungs every 4 (four) hours as needed for Wheezing or Shortness of Breath.    alfuzosin (UROXATRAL) 10 mg Tb24 Take 10 mg by mouth daily with breakfast.    amLODIPine (NORVASC) 5 MG tablet Take 1 tablet (5 mg total) by mouth 2 (two) times daily.    atorvastatin (LIPITOR) 40 MG tablet TAKE 1 TABLET EVERY DAY    citalopram (CELEXA) 10 MG tablet Take 1 tablet (10 mg total) by mouth once daily. For anxiety    clopidogreL (PLAVIX) 75 mg tablet TAKE 1 TABLET EVERY DAY    cyclobenzaprine (FLEXERIL) 5 MG tablet     finasteride (PROSCAR) 5 mg tablet Take 1 tablet (5 mg total) by mouth once daily.    fluticasone propionate (FLONASE) 50 mcg/actuation nasal spray 2 sprays (100 mcg total) by Each Nostril route Daily.    furosemide (LASIX) 20 MG tablet Take 1 tablet (20 mg total) by mouth daily as needed (edema).    losartan (COZAAR) 50 MG tablet TAKE 1 TABLET TWICE DAILY    pantoprazole (PROTONIX) 40 MG tablet TAKE 1 TABLET EVERY DAY    pregabalin (LYRICA) 75 MG capsule Take 1 capsule (75 mg total) by mouth 2 (two) times daily.    sildenafiL (VIAGRA) 100 MG tablet Take 1 po 45 minutes before intercourse    solifenacin (VESICARE) 5 MG tablet Take 2 tablets (10 mg total) by mouth once daily.    vitamin D (VITAMIN D3) 1000 units Tab Take 1,000 Units by mouth once daily.     No current facility-administered medications for this visit.         REVIEW OF SYSTEMS:    GENERAL:  No weight loss, malaise or fevers.  HEENT:   No recent changes in vision or hearing  NECK:  Negative for lumps, no difficulty with swallowing.  RESPIRATORY:  Negative for cough, wheezing or shortness of breath, patient denies any recent URI.  CARDIOVASCULAR:  Negative for chest pain, leg swelling or palpitations.  GI:  Negative for abdominal discomfort, blood in stools or black stools or change in bowel habits.  MUSCULOSKELETAL:  See HPI.  SKIN:  Negative for lesions, rash, and itching.  PSYCH:  No mood disorder or recent psychosocial stressors.  Patients sleep is  not disturbed secondary to pain.  HEMATOLOGY/LYMPHOLOGY:  Negative for prolonged bleeding, bruising easily or swollen nodes.  Patient is currently taking anti-coagulants - plavix  NEURO:   No history of headaches, syncope, paralysis, seizures or tremors.  All other reviewed and negative other than HPI.    OBJECTIVE:    There were no vitals taken for this visit.      Telemedicine Exam  There were no vitals filed for this visit.  There is no height or weight on file to calculate BMI.      Physical Exam: last in clinic visit:    PHYSICAL EXAMINATION:    There were no vitals filed for this visit.    There is no height or weight on file to calculate BMI.   (reviewed on 8/2/2024)       GENERAL: Well appearing, in no acute distress, alert and oriented x3.  PSYCH:  Mood and affect appropriate.  SKIN: Skin color, texture, turgor normal, no rashes or lesions.  HEAD/FACE:  Normocephalic, atraumatic. Cranial nerves grossly intact.  NECK:  Axial loading positive bilaterally.  Spurling's equivocal.  Range of motion fair secondary to pain.  CV: RRR with palpation of the radial artery.  PULM: No evidence of respiratory difficulty, symmetric chest rise.  GI:  Soft and non-tender.  BACK:  Forward flexed posture.  Straight leg raising in the sitting and supine positions is negative to radicular pain. No pain to palpation over the facet joints of the lumbar spine or spinous processes.  Fair range of motion with mild pain reproduction.  EXTREMITIES: No deformities, edema, or skin discoloration. Good capillary refill.  MUSCULOSKELETAL: Bilateral upper and lower extremity strength is normal and symmetric.  No atrophy or tone abnormalities are noted. Fadir's positive on right. Pain with ROM of the right hip.No GTB tenderness.  NEURO: Bilateral upper and lower extremity coordination and muscle stretch reflexes are physiologic and symmetric.  Plantar response are downgoing. No clonus.  No loss of sensation is noted.  GAIT: normal.  General:  alert and oriented, in no apparent distress  Gait: normal gait.  Skin: no rashes, no discoloration, no obvious lesions  HEENT: normocephalic, atraumatic. Pupils equal and round.  Cardiovascular: no significant peripheral edema present.  Respiratory: without use of accessory muscles of respiration.        Neuro - Upper Extremities:  - BUE Strength:R/L: D: 5/5; B: 5/5; T: 5/5; WF: 5/5; WE: 5/5; IO: 5/5  - Extremity Reflexes: Brisk and symmetric throughout  - Sensory: Sensation to light touch intact bilaterally  Positive spurling  FROM cervical spine and upper extremities  No shoulder tenderness  No cervical tenderness      Psych:  Mood and affect is appropriate    Per video:  Positive bre's right      LABS:  Lab Results   Component Value Date    WBC 4.28 05/09/2024    HGB 13.7 (L) 05/09/2024    HCT 42.9 05/09/2024    MCV 98 05/09/2024     (L) 05/09/2024       CMP  Sodium   Date Value Ref Range Status   05/09/2024 139 136 - 145 mmol/L Final     Potassium   Date Value Ref Range Status   05/09/2024 4.6 3.5 - 5.1 mmol/L Final     Chloride   Date Value Ref Range Status   05/09/2024 106 95 - 110 mmol/L Final     CO2   Date Value Ref Range Status   05/09/2024 22 (L) 23 - 29 mmol/L Final     Glucose   Date Value Ref Range Status   05/09/2024 90 70 - 110 mg/dL Final     BUN   Date Value Ref Range Status   05/09/2024 19 8 - 23 mg/dL Final     Creatinine   Date Value Ref Range Status   05/09/2024 1.3 0.5 - 1.4 mg/dL Final     Calcium   Date Value Ref Range Status   05/09/2024 9.4 8.7 - 10.5 mg/dL Final     Total Protein   Date Value Ref Range Status   05/09/2024 6.9 6.0 - 8.4 g/dL Final     Albumin   Date Value Ref Range Status   05/09/2024 4.0 3.5 - 5.2 g/dL Final     Total Bilirubin   Date Value Ref Range Status   05/09/2024 0.7 0.1 - 1.0 mg/dL Final     Comment:     For infants and newborns, interpretation of results should be based  on gestational age, weight and in agreement with  clinical  observations.    Premature Infant recommended reference ranges:  Up to 24 hours.............<8.0 mg/dL  Up to 48 hours............<12.0 mg/dL  3-5 days..................<15.0 mg/dL  6-29 days.................<15.0 mg/dL       Alkaline Phosphatase   Date Value Ref Range Status   05/09/2024 51 (L) 55 - 135 U/L Final     AST   Date Value Ref Range Status   05/09/2024 15 10 - 40 U/L Final     ALT   Date Value Ref Range Status   05/09/2024 12 10 - 44 U/L Final     Anion Gap   Date Value Ref Range Status   05/09/2024 11 8 - 16 mmol/L Final     eGFR if    Date Value Ref Range Status   06/29/2022 57.9 (A) >60 mL/min/1.73 m^2 Final     eGFR if non    Date Value Ref Range Status   06/29/2022 50.1 (A) >60 mL/min/1.73 m^2 Final     Comment:     Calculation used to obtain the estimated glomerular filtration  rate (eGFR) is the CKD-EPI equation.          Lab Results   Component Value Date    HGBA1C 5.1 06/27/2023             ASSESSMENT: 84 y.o. year old male with lower back and leg pain, consistent with     No diagnosis found.                      PLAN:   - Interventions: None at this time. Too soon to repeat injections. Pt will resume lyrica, and we will add compound cream. He declines PT. We will reassess in 3 weeks  He has seen Dr. Castro for vertiflex but states he is not wanting to pursue any surgery      S/p 2/8/2024 C5/6 IL IAN with 80% relief  S/p diagnostic C4-6 medial branch blocks on 07/18/2023, limited relief  S/p repeat bilateral L4-5 TFESI on 06/29/2023, limited relief  - Anticoagulation: yes, Plavix   - Medications: I have stressed the importance of physical activity and a home exercise plan to help with pain and improve health. and Patient can continue with medications for now since they are providing benefits, using them appropriately, and without side effects.    Continue lyrica 75mg BID. We discussed potential side effects of this medication which may include  drowsiness,dizziness, dry mouth, constipation or peripheral edema.    Rx for compound cream  4% gabapentin, 1.5% diclofenac, 2.5% lidocaine, 2.5% prilocaine 2.5%compound cream for potential topical pain relief. Patent will be contacted for [facility] regarding cost and payment.         - Therapy:  Advised patient to continue with activities as tolerated  - Labs:  Reviewed  - Imaging:  Reviewed MRI cervical spine and lumbar MRI. Reviewed xray bilateral hips and EMG  - Consults/Referrals: EConsult to Neurosurgery in the past for consideration of vertiflex procedure for lumbar spinal stenosis  - Records:  Reviewed/Obtain old records from outside physicians and imaging  - Follow up visit: 3 weeks    - Counseled patient regarding the importance of activity modification and physical therapy  - This condition does not require this patient to take time off of work, and the primary goal of our Pain Management services is to improve the patient's functional capacity.  - Patient Questions: Answered all of the patient's questions regarding diagnosis, therapy, and treatment    The above plan and management options were discussed at length with patient. Patient is in agreement with the above and verbalized understanding.      Maia Lamas NP  Interventional Pain Management  [facility]    Disclaimer:  This note was prepared using voice recognition system and is likely to have sound alike errors that may have been overlooked even after proof reading.  Please call me with any questions

## 2024-08-03 ENCOUNTER — PATIENT MESSAGE (OUTPATIENT)
Dept: PRIMARY CARE CLINIC | Facility: CLINIC | Age: 84
End: 2024-08-03
Payer: MEDICARE

## 2024-08-03 DIAGNOSIS — J30.9 ALLERGIC RHINITIS, UNSPECIFIED SEASONALITY, UNSPECIFIED TRIGGER: Primary | ICD-10-CM

## 2024-08-07 RX ORDER — AZELASTINE 1 MG/ML
1 SPRAY, METERED NASAL 2 TIMES DAILY
Qty: 90 ML | Refills: 3 | Status: SHIPPED | OUTPATIENT
Start: 2024-08-07 | End: 2025-08-07

## 2024-08-09 ENCOUNTER — LAB VISIT (OUTPATIENT)
Dept: LAB | Facility: HOSPITAL | Age: 84
End: 2024-08-09
Attending: UROLOGY
Payer: MEDICARE

## 2024-08-09 DIAGNOSIS — C61 PROSTATE CANCER: ICD-10-CM

## 2024-08-09 LAB — COMPLEXED PSA SERPL-MCNC: 4.8 NG/ML (ref 0–4)

## 2024-08-09 PROCEDURE — 36415 COLL VENOUS BLD VENIPUNCTURE: CPT | Mod: HCNC,PO | Performed by: UROLOGY

## 2024-08-09 PROCEDURE — 84153 ASSAY OF PSA TOTAL: CPT | Mod: HCNC | Performed by: UROLOGY

## 2024-08-11 ENCOUNTER — PATIENT MESSAGE (OUTPATIENT)
Dept: PRIMARY CARE CLINIC | Facility: CLINIC | Age: 84
End: 2024-08-11
Payer: MEDICARE

## 2024-08-11 DIAGNOSIS — M25.552 LEFT HIP PAIN: Primary | ICD-10-CM

## 2024-08-14 ENCOUNTER — TELEPHONE (OUTPATIENT)
Dept: PRIMARY CARE CLINIC | Facility: CLINIC | Age: 84
End: 2024-08-14
Payer: MEDICARE

## 2024-08-14 NOTE — TELEPHONE ENCOUNTER
----- Message from Chong Vasquez sent at 8/14/2024  3:01 PM CDT -----  Contact: self  .Type: Orders Request    What orders/ testing are being requested? X-ray of chest     Is there a future appointment scheduled for the patient with PCP? no    When?    Would you prefer a response via [employer]? Call back    Comments: .[phone]

## 2024-08-15 ENCOUNTER — OFFICE VISIT (OUTPATIENT)
Dept: PRIMARY CARE CLINIC | Facility: CLINIC | Age: 84
End: 2024-08-15
Payer: MEDICARE

## 2024-08-15 ENCOUNTER — LAB VISIT (OUTPATIENT)
Dept: LAB | Facility: HOSPITAL | Age: 84
End: 2024-08-15
Attending: NURSE PRACTITIONER
Payer: MEDICARE

## 2024-08-15 VITALS
TEMPERATURE: 96 F | SYSTOLIC BLOOD PRESSURE: 128 MMHG | DIASTOLIC BLOOD PRESSURE: 77 MMHG | HEIGHT: 70 IN | OXYGEN SATURATION: 98 % | WEIGHT: 205 LBS | BODY MASS INDEX: 29.35 KG/M2 | HEART RATE: 70 BPM

## 2024-08-15 DIAGNOSIS — R53.83 FATIGUE, UNSPECIFIED TYPE: ICD-10-CM

## 2024-08-15 DIAGNOSIS — R41.89 SLUGGISHNESS: ICD-10-CM

## 2024-08-15 DIAGNOSIS — I70.0 AORTIC ATHEROSCLEROSIS: ICD-10-CM

## 2024-08-15 DIAGNOSIS — M47.817 LUMBOSACRAL SPONDYLOSIS WITHOUT MYELOPATHY: ICD-10-CM

## 2024-08-15 DIAGNOSIS — K21.9 GASTROESOPHAGEAL REFLUX DISEASE, UNSPECIFIED WHETHER ESOPHAGITIS PRESENT: ICD-10-CM

## 2024-08-15 DIAGNOSIS — N40.0 BENIGN PROSTATIC HYPERPLASIA WITHOUT LOWER URINARY TRACT SYMPTOMS: ICD-10-CM

## 2024-08-15 DIAGNOSIS — I10 PRIMARY HYPERTENSION: ICD-10-CM

## 2024-08-15 DIAGNOSIS — N18.31 STAGE 3A CHRONIC KIDNEY DISEASE: ICD-10-CM

## 2024-08-15 DIAGNOSIS — I70.203 ATHEROSCLEROSIS OF ARTERY OF BOTH LOWER EXTREMITIES: ICD-10-CM

## 2024-08-15 DIAGNOSIS — J20.1 ACUTE BRONCHITIS DUE TO HAEMOPHILUS INFLUENZAE: Primary | ICD-10-CM

## 2024-08-15 PROCEDURE — 82040 ASSAY OF SERUM ALBUMIN: CPT | Mod: HCNC | Performed by: NURSE PRACTITIONER

## 2024-08-15 PROCEDURE — G2211 COMPLEX E/M VISIT ADD ON: HCPCS | Mod: HCNC,S$GLB,, | Performed by: NURSE PRACTITIONER

## 2024-08-15 PROCEDURE — 1159F MED LIST DOCD IN RCRD: CPT | Mod: HCNC,CPTII,S$GLB, | Performed by: NURSE PRACTITIONER

## 2024-08-15 PROCEDURE — 82671 ASSAY OF ESTROGENS: CPT | Mod: HCNC | Performed by: NURSE PRACTITIONER

## 2024-08-15 PROCEDURE — 99999 PR PBB SHADOW E&M-EST. PATIENT-LVL IV: CPT | Mod: PBBFAC,HCNC,, | Performed by: NURSE PRACTITIONER

## 2024-08-15 PROCEDURE — 84403 ASSAY OF TOTAL TESTOSTERONE: CPT | Mod: HCNC | Performed by: NURSE PRACTITIONER

## 2024-08-15 PROCEDURE — 84270 ASSAY OF SEX HORMONE GLOBUL: CPT | Mod: HCNC | Performed by: NURSE PRACTITIONER

## 2024-08-15 PROCEDURE — 84481 FREE ASSAY (FT-3): CPT | Mod: HCNC | Performed by: NURSE PRACTITIONER

## 2024-08-15 PROCEDURE — 1160F RVW MEDS BY RX/DR IN RCRD: CPT | Mod: HCNC,CPTII,S$GLB, | Performed by: NURSE PRACTITIONER

## 2024-08-15 PROCEDURE — 84443 ASSAY THYROID STIM HORMONE: CPT | Mod: HCNC | Performed by: NURSE PRACTITIONER

## 2024-08-15 PROCEDURE — 99214 OFFICE O/P EST MOD 30 MIN: CPT | Mod: HCNC,S$GLB,, | Performed by: NURSE PRACTITIONER

## 2024-08-15 PROCEDURE — 36415 COLL VENOUS BLD VENIPUNCTURE: CPT | Mod: HCNC,PN | Performed by: NURSE PRACTITIONER

## 2024-08-15 PROCEDURE — 84439 ASSAY OF FREE THYROXINE: CPT | Mod: HCNC | Performed by: NURSE PRACTITIONER

## 2024-08-15 NOTE — PROGRESS NOTES
"Chief Complaint  Chief Complaint   Patient presents with    Fatigue    Weakness    light head        History of Present Illness    Mr. Hughes presents today for follow up.    He was recently diagnosed with acute bronchitis. He completed a course of Z-Epifanio antibiotic treatment and was prescribed an albuterol inhaler for wheezing, which he reports is no longer present. A chest XR was performed, and he was tested for COVID and flu. He denies fever, night sweats, or chills. He denies experiencing shortness of breath when lying flat or during ambulation.    He reports significant fatigue and lack of energy, stating he has "absolutely no energy." He expresses difficulty with physical activities, mentioning he could only use the treadmill for 10 minutes before becoming exhausted. He reports feeling weak and having trouble with daily activities, such as visiting his shop for 10-15 minutes and struggling to return home due to weakness. This fatigue has severely impacted his ability to exercise and perform daily activities.    He reports ongoing lower back issues and is scheduled for epidural injections in a few weeks. He has had several epidural injections in the past but reports no relief from these treatments. He also mentions numbness in his feet. He reports arthritis in both hips and has an appointment scheduled with a rheumatologist in January for further evaluation and management.    He has a history of visits with a cardiologist. He underwent a heart catheterization at his own request, with results reported as "fine". He is scheduled for follow-up with the cardiologist in approximately one month. He denies any current heart problems.    He has a history of Benign Prostatic Hyperplasia (BPH). His PSA levels have increased to 4.5. He is currently taking finasteride (Proscar) for his prostate. He denies experiencing changes in urinary output. He has an upcoming urology appointment to follow up on the increased PSA " levels.    He has a history of mildly decreased kidney function, which is noted to be normal for his age. Previous labs have shown B12, folate, and iron levels within normal limits, although his B12 level was at the lower end of the normal range.    Current medications include Losartan, Lasix, Flexeril, Plavix, and finasteride (Proscar).    Prior to 2020, he was very active, attending the gym 5 days a week. Currently, he is experiencing difficulty with exercise due to fatigue and pain. He expresses frustration with his current physical limitations and a desire to return to his previous activity levels.    He has an upcoming urology appointment scheduled for tomorrow and a follow-up visit with his cardiologist in approximately one month.    He is a candidate for RSV vaccination due to being over 60 years old. However, due to recent acute bronchitis, administration of the vaccine will be delayed until he has fully recovered his energy levels.       ROS:  General: -fever, -chills, +fatigue, -weight gain, -weight loss  Eyes: -vision changes, -redness, -discharge  ENT: -ear pain, -nasal congestion, -sore throat  Cardiovascular: -chest pain, -palpitations, -lower extremity edema  Respiratory: -cough, -shortness of breath  Gastrointestinal: -abdominal pain, -nausea, -vomiting, -diarrhea, -constipation, -blood in stool  Genitourinary: -dysuria, -hematuria, -frequency  Musculoskeletal: +joint pain, -muscle pain  Skin: -rash, -lesion  Neurological: -headache, -dizziness, +numbness, -tingling  Psychiatric: -anxiety, -depression, -sleep difficulty          PAST MEDICAL HISTORY:  Past Medical History:   Diagnosis Date    Allergic rhinitis     Anemia     Back pain     BPH (benign prostatic hyperplasia)     Cancer     skin cancer to neck, Dr. Graves    Cataract     Disorder of kidney and ureter     ED (erectile dysfunction)     OMARI (generalized anxiety disorder) 08/07/2023    Hiatal hernia     small    History of colon polyps      colonoscopy 11/2016    HLD (hyperlipidemia)     Hyperlipidemia     Hypertension     Lateral epicondylitis     Lumbar radiculopathy 01/26/2022    OA (osteoarthritis)     GUIDO (obstructive sleep apnea)     Polyneuropathy     Prostate cancer     Dr. Wong    TIA (transient ischemic attack)     Trouble in sleeping     Urge incontinence 03/29/2022       PAST SURGICAL HISTORY:  Past Surgical History:   Procedure Laterality Date    ANGIOGRAPHY OF UPPER EXTREMITY Left 07/05/2022    Procedure: Angiogram Extremity Bilateral;  Surgeon: Aparna Thompson MD;  Location: [facility] CATH LAB;  Service: Cardiology;  Laterality: Left;    ARTERIOGRAPHY OF AORTIC ROOT N/A 07/05/2022    Procedure: ARTERIOGRAM, AORTIC ROOT;  Surgeon: Aparna Thompson MD;  Location: [facility] CATH LAB;  Service: Cardiology;  Laterality: N/A;    CATARACT EXTRACTION W/  INTRAOCULAR LENS IMPLANT Right 02/21/2018    I-Stent    CATARACT EXTRACTION W/  INTRAOCULAR LENS IMPLANT Left 03/21/2018    I - Stent    CATHETERIZATION OF BOTH LEFT AND RIGHT HEART N/A 07/05/2022    Procedure: CATHETERIZATION, HEART, BOTH LEFT AND RIGHT;  Surgeon: Aparna Thompson MD;  Location: [facility] CATH LAB;  Service: Cardiology;  Laterality: N/A;  radial approach    COLONOSCOPY N/A 11/14/2016    Procedure: COLONOSCOPY;  Surgeon: Karuna Rodriguez MD;  Location: [facility] ENDO;  Service: Endoscopy;  Laterality: N/A;    COLONOSCOPY N/A 09/22/2020    Procedure: COLONOSCOPY;  Surgeon: Chuy Fish MD;  Location: [facility] ENDO;  Service: Endoscopy;  Laterality: N/A;    EPIDURAL STEROID INJECTION N/A 6/6/2024    Procedure: Lumbar L5/S1 IL IAN;  Surgeon: Abel Haywood MD;  Location: [facility] PAIN MGT;  Service: Pain Management;  Laterality: N/A;    EPIDURAL STEROID INJECTION INTO CERVICAL SPINE N/A 2/8/2024    Procedure: C5/6 IL Right paramedian approach IAN with RN IV sedation;  Surgeon: Abel Haywood MD;  Location: [facility] PAIN MGT;  Service: Pain Management;  Laterality: N/A;    EYE SURGERY       HEMORRHOID SURGERY      I-STENT Right 02/21/2018    DR. REECE    INJECTION OF ANESTHETIC AGENT AROUND MEDIAL BRANCH NERVES INNERVATING CERVICAL FACET JOINT Bilateral 7/18/2023    Procedure: Bilateral C4-6 MBB with RN IV sedation (diagnostic, #1);  Surgeon: Abel Haywood MD;  Location: [facility] PAIN MGT;  Service: Pain Management;  Laterality: Bilateral;    KNEE ARTHROSCOPY W/ MENISCAL REPAIR Right 2015    Dr. Jain    PCIOL Right 02/21/2018    DR. REECE    PLANTAR FASCIA RELEASE      right    ROTATOR CUFF REPAIR Bilateral     bilateral    SELECTIVE INJECTION OF ANESTHETIC AGENT AROUND LUMBAR SPINAL NERVE ROOT BY TRANSFORAMINAL APPROACH Bilateral 01/26/2022    Procedure: Bilateral L4/5 TF IAN with RN IV sedation;  Surgeon: Mak Young MD;  Location: [facility] PAIN MGT;  Service: Pain Management;  Laterality: Bilateral;    SELECTIVE INJECTION OF ANESTHETIC AGENT AROUND LUMBAR SPINAL NERVE ROOT BY TRANSFORAMINAL APPROACH Bilateral 03/14/2022    Procedure: Bilateral L4/5 TF IAN with RN IV sedation;  Surgeon: Mak Young MD;  Location: [facility] PAIN MGT;  Service: Pain Management;  Laterality: Bilateral;    SELECTIVE INJECTION OF ANESTHETIC AGENT AROUND LUMBAR SPINAL NERVE ROOT BY TRANSFORAMINAL APPROACH Bilateral 06/02/2022    Procedure: Bilateral L4/5 TF IAN - D2P Dr. Castro [facility] [place];  Surgeon: Abel Haywood MD;  Location: [facility] PAIN MGT;  Service: Pain Management;  Laterality: Bilateral;    SELECTIVE INJECTION OF ANESTHETIC AGENT AROUND LUMBAR SPINAL NERVE ROOT BY TRANSFORAMINAL APPROACH Bilateral 08/25/2022    Procedure: Bilateral L4/5 TF IAN;  Surgeon: Abel Haywood MD;  Location: [facility] PAIN MGT;  Service: Pain Management;  Laterality: Bilateral;    SELECTIVE INJECTION OF ANESTHETIC AGENT AROUND LUMBAR SPINAL NERVE ROOT BY TRANSFORAMINAL APPROACH Bilateral 6/29/2023    Procedure: Bilateral L4/5 TF IAN RN IV Sedation;  Surgeon: Abel Haywood MD;  Location: [facility] PAIN MGT;  Service: Pain Management;  Laterality:  Bilateral;    SELECTIVE INJECTION OF ANESTHETIC AGENT AROUND LUMBAR SPINAL NERVE ROOT BY TRANSFORAMINAL APPROACH Bilateral 2024    Procedure: Bilateral L4/5 TF IAN;  Surgeon: Abel Haywood MD;  Location: [facility] PAIN T;  Service: Pain Management;  Laterality: Bilateral;    SHOULDER SURGERY Bilateral around     Dr. Pepper.  rotator cuff surgeries    VASECTOMY         SOCIAL HISTORY:  Social History     Socioeconomic History    Marital status:    Tobacco Use    Smoking status: Former     Current packs/day: 0.00     Average packs/day: 3.0 packs/day for 35.0 years (104.9 ttl pk-yrs)     Types: Cigarettes     Start date: 1960     Quit date: 1985     Years since quittin.0     Passive exposure: Past    Smokeless tobacco: Never   Substance and Sexual Activity    Alcohol use: Yes     Alcohol/week: 3.0 standard drinks of alcohol     Types: 3 Cans of beer per week     Comment: socially    Drug use: No    Sexual activity: Yes     Partners: Female     Birth control/protection: None   Social History Narrative         Social Determinants of Health     Financial Resource Strain: Low Risk  (2024)    Overall Financial Resource Strain (CARDIA)     Difficulty of Paying Living Expenses: Not hard at all   Food Insecurity: No Food Insecurity (2024)    Hunger Vital Sign     Worried About Running Out of Food in the Last Year: Never true     Ran Out of Food in the Last Year: Never true   Transportation Needs: No Transportation Needs (2024)    PRAPARE - Transportation     Lack of Transportation (Medical): No     Lack of Transportation (Non-Medical): No   Physical Activity: Insufficiently Active (2024)    Exercise Vital Sign     Days of Exercise per Week: 2 days     Minutes of Exercise per Session: 30 min   Stress: Stress Concern Present (2024)    [employer] [place] [employer] - Occupational Stress Questionnaire     Feeling of Stress : To some extent   Housing  Stability: Low Risk  (6/23/2023)    Housing Stability Vital Sign     Unable to Pay for Housing in the Last Year: No     Number of Places Lived in the Last Year: 1     Unstable Housing in the Last Year: No       FAMILY HISTORY:  Family History   Problem Relation Name Age of Onset    Lung cancer Father Isaiah Hughes         life long smoker    Cancer Father Isaiah Hughes         prostate, lung    Arthritis Mother Emely Hughes     Stroke Sister          TIA    Cataracts Sister      Cancer Brother          prostate    Cataracts Brother      Cataracts Sister      Melanoma Neg Hx      Psoriasis Neg Hx      Lupus Neg Hx      Eczema Neg Hx      Diabetes Neg Hx      Heart disease Neg Hx      Kidney disease Neg Hx      Colon cancer Neg Hx         ALLERGIES AND MEDICATIONS: updated and reviewed.  Review of patient's allergies indicates:   Allergen Reactions    Atarax [hydroxyzine hcl] Other (See Comments)     Raised blood pressure     Zyrtec [cetirizine] Other (See Comments)     Raised blood pressure     Gold sodium thiosulfate      Patch test positive    Meloxicam Rash     Other reaction(s): hypertension     Current Outpatient Medications   Medication Sig Dispense Refill    acetaminophen (TYLENOL ARTHRITIS PAIN ORAL) Take by mouth. Patient states that he takes 2 tablets in the morning and 2 tablets in the evening      albuterol (PROVENTIL/VENTOLIN HFA) 90 mcg/actuation inhaler Inhale 2 puffs into the lungs every 4 (four) hours as needed for Wheezing or Shortness of Breath. 8.7 g 1    alfuzosin (UROXATRAL) 10 mg Tb24 Take 10 mg by mouth daily with breakfast.      amLODIPine (NORVASC) 5 MG tablet Take 1 tablet (5 mg total) by mouth 2 (two) times daily. 180 tablet 3    atorvastatin (LIPITOR) 40 MG tablet TAKE 1 TABLET EVERY DAY 90 tablet 3    azelastine (ASTELIN) 137 mcg (0.1 %) nasal spray 1 spray (137 mcg total) by Nasal route 2 (two) times daily. 90 mL 3    citalopram (CELEXA) 10 MG tablet Take 1 tablet (10 mg total) by mouth once  daily. For anxiety 90 tablet 3    clopidogreL (PLAVIX) 75 mg tablet TAKE 1 TABLET EVERY DAY 90 tablet 2    cyclobenzaprine (FLEXERIL) 5 MG tablet       finasteride (PROSCAR) 5 mg tablet Take 1 tablet (5 mg total) by mouth once daily. 90 tablet 4    furosemide (LASIX) 20 MG tablet Take 1 tablet (20 mg total) by mouth daily as needed (edema). 30 tablet 0    losartan (COZAAR) 50 MG tablet TAKE 1 TABLET TWICE DAILY 180 tablet 3    pantoprazole (PROTONIX) 40 MG tablet TAKE 1 TABLET EVERY DAY 90 tablet 3    pregabalin (LYRICA) 75 MG capsule Take 1 capsule (75 mg total) by mouth 2 (two) times daily. 60 capsule 1    sildenafiL (VIAGRA) 100 MG tablet Take 1 po 45 minutes before intercourse 30 tablet 7    solifenacin (VESICARE) 5 MG tablet Take 2 tablets (10 mg total) by mouth once daily. 90 tablet 0    vitamin D (VITAMIN D3) 1000 units Tab Take 1,000 Units by mouth once daily.       No current facility-administered medications for this visit.           Physical Exam    General: No acute distress. Well-developed. Well-nourished.  Head: Normocephalic. Atraumatic.  Eyes: EOMI. PERRLA. Conjunctivae non-injected. Sclerae anicteric.  Ears: Bilateral EACs clear. Bilateral TMs clear and intact.  Nose: Nares patent. Normal turbinates. No nasal discharge.  Mouth: Moist oral mucosa. Normal dentition.  Throat: No erythema. No exudate. Uvula midline.  Neck: Supple. Full ROM. Non-tender. No JVD. No carotid bruits. No thyromegaly. No lymphadenopathy.  Cardiovascular: Regular rate. Regular rhythm. No murmurs. No rubs. No gallops. Normal S1, S2. Peripheral pulses 2+ and symmetric. Mild mitral regurgitation. Mildly restricted motion.  Respiratory: Normal respiratory effort. Clear to auscultation bilaterally. No rales. No rhonchi. No wheezing.  Abdomen: Soft. Non-tender. Non-distended. Normal bowel sounds. Negative McBurney's. Negative Contreras's. No rebound. No guarding. No hepatosplenomegaly. No masses.  Musculoskeletal: No  obvious deformity.  "Normal muscle development. Normal muscle tone. FROM at all joints. No swelling. No tenderness.  Back: No CVA tenderness. No spinal deformity.  Extremities: No cyanosis. No clubbing. No upper extremity edema. No lower extremity edema.  Neurological: Alert & oriented x3. CN II-XII intact. Reflexes 2+ throughout. Strength 5/5 in all extremities. Sensation intact. No slurred speech. Normal gait.  Psychiatric: Normal mood. Normal affect. Good insight. Good judgment. No evidence of suicidal ideation. No evidence of homicidal ideation.  Skin: Warm. Dry. Intact. No rash. No ulcers. No suspicious lesions.        Vitals:    08/15/24 0734   BP: 128/77   Pulse: 70   Temp: 96.4 °F (35.8 °C)   SpO2: 98%   Weight: 93 kg (205 lb 0.4 oz)   Height: 5' 10" (1.778 m)    Body mass index is 29.42 kg/m².  Weight: 93 kg (205 lb 0.4 oz)   Height: 5' 10" (177.8 cm)       Health Maintenance         Date Due Completion Date    RSV Vaccine (Age 60+ and Pregnant patients) (1 - 1-dose 60+ series) Never done ---    TETANUS VACCINE 07/24/2022 7/24/2012    COVID-19 Vaccine (5 - 2023-24 season) 09/01/2023 12/28/2022    Influenza Vaccine (1) 09/01/2024 10/26/2023    Lipid Panel 02/06/2025 2/6/2024    Aspirin/Antiplatelet Therapy 07/11/2025 7/11/2024            Assessment & Plan      IMPRESSION:  Assessed recent bronchitis diagnosis and treatment, noting improvement in wheezing.  Evaluated fatigue etiology, considering potential testosterone deficiency and thyroid dysfunction.  Reviewed cardiac status, noting mild mitral regurgitation consistent with aging.  Evaluated lumbosacral issues, likely related to arthritic changes causing nerve impingement.  Considered RSV vaccination but opted to defer due to recent bronchitis.    HORMONE IMBALANCE:  - Explained that testosterone levels naturally decrease in men starting at age 30, which can contribute to fatigue.  - Testosterone panel ordered.  - Estrogen level test ordered.    THYROID DISORDER:  - Thyroid " panel ordered.    BRONCHITIS:  - Continued albuterol inhaler as needed every 4-6 hours for bronchitis symptoms.    EXERCISE RECOMMENDATIONS:  - Mr. Hughes to discontinue treadmill use temporarily.  - Recommend considering stationary bike for exercise.    Problem List Items Addressed This Visit          Neuro    Lumbosacral spondylosis without myelopathy  -Followed by Orthopedics       Cardiac/Vascular    Hypertensive disorder  -The current medical regimen is effective;  continue present plan and medications.      Overview     Hypertensive disorder         Aortic atherosclerosis  - Stable; Monitor    Overview     Per Dr. Valery Ozuna IM OV of 3/14/17 diagnosis:    Tortuous aorta    ICD-10-CM: I77.1  ICD-9-CM: 447.1   noted on cxr in 2017. not doing asa due to low platelets. control blood pressure.                 Atherosclerosis of artery of both lower extremities  -The current medical regimen is effective;  continue present plan and medications.      Overview     Cont plavix            Renal/    Benign prostatic hyperplasia  -The current medical regimen is effective;  continue present plan and medications.    - Followed by Urology     Overview     Benign prostatic hyperplasia         Chronic kidney disease, stage 3  -Stable; Monitor       GI    GERD (gastroesophageal reflux disease)  -The current medical regimen is effective;  continue present plan and medications.       Other Visit Diagnoses       Acute bronchitis due to Haemophilus influenzae    -  Primary    Fatigue, unspecified type        Relevant Orders    TESTOSTERONE PANEL    ESTROGENS, TOTAL    TSH    T4, FREE    T3, FREE    Sluggishness        Relevant Orders    TESTOSTERONE PANEL    ESTROGENS, TOTAL    TSH    T4, FREE    T3, FREE              Healthcare Maintenance: Deferred at this time.     30+ minutes of total time spent on the encounter, which includes face to face time and non-face to face time preparing to see the patient (eg, review of tests),  Obtaining and/or reviewing separately obtained history, documenting clinical information in the electronic or other health record, independently interpreting results (not separately reported) and communicating results to the patient/family/caregiver, or Care coordination (not separately reported). Visit today included increased complexity associated with the care of the episodic problem addressed and managing the longitudinal care of the patient due to the serious and/or complex managed problem(s).        Sincerely,  Kailash Patel NP

## 2024-08-16 ENCOUNTER — LAB VISIT (OUTPATIENT)
Dept: LAB | Facility: HOSPITAL | Age: 84
End: 2024-08-16
Attending: UROLOGY
Payer: MEDICARE

## 2024-08-16 ENCOUNTER — OFFICE VISIT (OUTPATIENT)
Dept: UROLOGY | Facility: CLINIC | Age: 84
End: 2024-08-16
Payer: MEDICARE

## 2024-08-16 VITALS
WEIGHT: 203.06 LBS | BODY MASS INDEX: 29.07 KG/M2 | HEIGHT: 70 IN | DIASTOLIC BLOOD PRESSURE: 72 MMHG | HEART RATE: 57 BPM | SYSTOLIC BLOOD PRESSURE: 137 MMHG | RESPIRATION RATE: 18 BRPM

## 2024-08-16 DIAGNOSIS — C61 PROSTATE CANCER: Primary | ICD-10-CM

## 2024-08-16 DIAGNOSIS — R35.1 NOCTURIA: ICD-10-CM

## 2024-08-16 DIAGNOSIS — C61 PROSTATE CANCER: ICD-10-CM

## 2024-08-16 DIAGNOSIS — N40.1 BPH WITH OBSTRUCTION/LOWER URINARY TRACT SYMPTOMS: ICD-10-CM

## 2024-08-16 DIAGNOSIS — R31.29 MICROHEMATURIA: ICD-10-CM

## 2024-08-16 DIAGNOSIS — N13.8 BPH WITH OBSTRUCTION/LOWER URINARY TRACT SYMPTOMS: ICD-10-CM

## 2024-08-16 LAB
BILIRUB UR QL STRIP: NEGATIVE
BUN SERPL-MCNC: 25 MG/DL (ref 8–23)
CREAT SERPL-MCNC: 1.3 MG/DL (ref 0.5–1.4)
EST. GFR  (NO RACE VARIABLE): 54 ML/MIN/1.73 M^2
GLUCOSE UR QL STRIP: NEGATIVE
KETONES UR QL STRIP: NEGATIVE
LEUKOCYTE ESTERASE UR QL STRIP: NEGATIVE
MICROSCOPIC COMMENT: NORMAL
PH, POC UA: 5.5
POC BLOOD, URINE: POSITIVE
POC NITRATES, URINE: NEGATIVE
POC RESIDUAL URINE VOLUME: 36 ML (ref 0–100)
PROT UR QL STRIP: NEGATIVE
RBC #/AREA URNS AUTO: 0 /HPF (ref 0–4)
SP GR UR STRIP: 1.01 (ref 1–1.03)
T3FREE SERPL-MCNC: 2.4 PG/ML (ref 2.3–4.2)
T4 FREE SERPL-MCNC: 1.07 NG/DL (ref 0.71–1.51)
TSH SERPL DL<=0.005 MIU/L-ACNC: 1.51 UIU/ML (ref 0.4–4)
UROBILINOGEN UR STRIP-ACNC: 1 (ref 0.3–2.2)

## 2024-08-16 PROCEDURE — 81001 URINALYSIS AUTO W/SCOPE: CPT | Mod: HCNC | Performed by: UROLOGY

## 2024-08-16 PROCEDURE — 81003 URINALYSIS AUTO W/O SCOPE: CPT | Mod: QW,HCNC,S$GLB, | Performed by: UROLOGY

## 2024-08-16 PROCEDURE — 1126F AMNT PAIN NOTED NONE PRSNT: CPT | Mod: HCNC,CPTII,S$GLB, | Performed by: UROLOGY

## 2024-08-16 PROCEDURE — 82565 ASSAY OF CREATININE: CPT | Mod: HCNC | Performed by: UROLOGY

## 2024-08-16 PROCEDURE — 84520 ASSAY OF UREA NITROGEN: CPT | Mod: HCNC | Performed by: UROLOGY

## 2024-08-16 PROCEDURE — 1101F PT FALLS ASSESS-DOCD LE1/YR: CPT | Mod: HCNC,CPTII,S$GLB, | Performed by: UROLOGY

## 2024-08-16 PROCEDURE — 3288F FALL RISK ASSESSMENT DOCD: CPT | Mod: HCNC,CPTII,S$GLB, | Performed by: UROLOGY

## 2024-08-16 PROCEDURE — 84153 ASSAY OF PSA TOTAL: CPT | Mod: HCNC | Performed by: UROLOGY

## 2024-08-16 PROCEDURE — 51798 US URINE CAPACITY MEASURE: CPT | Mod: HCNC,S$GLB,, | Performed by: UROLOGY

## 2024-08-16 PROCEDURE — 99999 PR PBB SHADOW E&M-EST. PATIENT-LVL IV: CPT | Mod: PBBFAC,HCNC,, | Performed by: UROLOGY

## 2024-08-16 PROCEDURE — 87086 URINE CULTURE/COLONY COUNT: CPT | Mod: HCNC | Performed by: UROLOGY

## 2024-08-16 PROCEDURE — 99214 OFFICE O/P EST MOD 30 MIN: CPT | Mod: HCNC,S$GLB,, | Performed by: UROLOGY

## 2024-08-16 PROCEDURE — 36415 COLL VENOUS BLD VENIPUNCTURE: CPT | Mod: HCNC,PN | Performed by: UROLOGY

## 2024-08-16 RX ORDER — ALFUZOSIN HYDROCHLORIDE 10 MG/1
10 TABLET, EXTENDED RELEASE ORAL
Qty: 90 TABLET | Refills: 3 | Status: SHIPPED | OUTPATIENT
Start: 2024-08-16

## 2024-08-16 RX ORDER — DIAZEPAM 5 MG/1
TABLET ORAL
Qty: 2 TABLET | Refills: 0 | Status: SHIPPED | OUTPATIENT
Start: 2024-08-16

## 2024-08-16 NOTE — PROGRESS NOTES
"    CC: follow up prostate cancer    History of Present Illness:   Ezequiel Hughes is a 84 y.o. male here for evaluation of Prostate Cancer    8/16/24-still with a lot of nocturia (6-7). Tries to limit pm fluid intake. He was having significant discomfort in his groin/leg on the right, which was causing limping. He has also had injections in his hip. Still on finasteride.     5/10/24-PSA up a little. Pt continues to take  finasteride. States that his urination is "terrible". He has nocturia x 7-8. He is scared to take vesicare. Sometimes feels like he doesn't empty. He does have GUIDO, but doesn't use his C-pap.     2/7/24-Pt currently in PT for his back. LUTS have been better. Nocturia x 2 last night. Emptying better. No hesitancy or stranguria. No gross hematuria. Slight UUI at times. He is not taking vesicare yet, but wants to start. States that he does have it.     11/21/23-IPSS 24, QoL 5 (unhappy). Nocutria x 4. He is currently on alfuzosin and finasteride but is out of the solifenacin. Took it for 1 month and it didn't help. Would like to try something stronger. Primary bother is urgency. No stranguria or difficulty emptying. Nocturia x 4.   8/11/23-Pt on proscar. He quit detrol because he wet the bed. Nocturia x 2-6. Stream is not always good. Hasn't tried Viagra yet. Was in the ED the other day with chest pain and had a UA, which showed 1+ blood, but no RBCs. Wearing his C-pap.   5/5/23-Pt reports that he is on finasteride, but isn't taking alfuzosin. Nocturia x 5-6. He has a little slight "leakage" throughout the day. He does have urgency. Didn't have any improvement with alfuzosin.   9/28/22-On finasteride. Nocturia x 4-5. Tried flomax a long time ago and it didn't help. Drinks 2 cups of coffee in the am. Not drinking about an hour before bed. No gross hematuria.   6/30/22-Nocturia x 1 lately, but prior to the last 2 nights, it has been 4 times. Still on finasteride. He does have urgency and occasional UUI. If " he goes out, he wears a pad. Stream is weak. No hesitancy. Recently, visits have gone to every 6 months. Pt reports occasional RLQ pain. He does have come constipation, but pain doesn't change with BM. Persistent for a month.    Prior records indicate last MRI and TRUS biopsy in 2020.   3/29/22- 83yo male with h/o Prostate cancer, here to establish care. He has previously seen Dr. Wong and was undergoing active surveillance. He reports that he was diagnosed with prostate cancer in 2014. He hasn't had any treatment. He is currently managed on finasteride. He does report some UUI, small amounts. He had a repeat TRUS biopsy in 2021, and pt reports path was unchanged. Also had MRIs around that time as well and states that he had a targetable lesion.         Review of Systems   Respiratory:  Negative for shortness of breath.    Cardiovascular:  Negative for chest pain and palpitations.   Genitourinary:  Negative for hematuria.   Musculoskeletal:  Positive for back pain and neck pain.   Neurological:  Positive for numbness (legs). Negative for dizziness.   All other systems reviewed and are negative.        Past Medical History:   Diagnosis Date    Allergic rhinitis     Anemia     Back pain     BPH (benign prostatic hyperplasia)     Cancer     skin cancer to neck, Dr. Graves    Cataract     Disorder of kidney and ureter     ED (erectile dysfunction)     OMARI (generalized anxiety disorder) 08/07/2023    Hiatal hernia     small    History of colon polyps     colonoscopy 11/2016    HLD (hyperlipidemia)     Hyperlipidemia     Hypertension     Lateral epicondylitis     Lumbar radiculopathy 01/26/2022    OA (osteoarthritis)     GUIDO (obstructive sleep apnea)     Polyneuropathy     Prostate cancer     Dr. Wong    TIA (transient ischemic attack)     Trouble in sleeping     Urge incontinence 03/29/2022       Past Surgical History:   Procedure Laterality Date    ANGIOGRAPHY OF UPPER EXTREMITY Left 07/05/2022     Procedure: Angiogram Extremity Bilateral;  Surgeon: Aparna Thompson MD;  Location: [facility] CATH LAB;  Service: Cardiology;  Laterality: Left;    ARTERIOGRAPHY OF AORTIC ROOT N/A 07/05/2022    Procedure: ARTERIOGRAM, AORTIC ROOT;  Surgeon: Aprana Thompson MD;  Location: [facility] CATH LAB;  Service: Cardiology;  Laterality: N/A;    CATARACT EXTRACTION W/  INTRAOCULAR LENS IMPLANT Right 02/21/2018    I-Stent    CATARACT EXTRACTION W/  INTRAOCULAR LENS IMPLANT Left 03/21/2018    I - Stent    CATHETERIZATION OF BOTH LEFT AND RIGHT HEART N/A 07/05/2022    Procedure: CATHETERIZATION, HEART, BOTH LEFT AND RIGHT;  Surgeon: Aparna Thompson MD;  Location: [facility] CATH LAB;  Service: Cardiology;  Laterality: N/A;  radial approach    COLONOSCOPY N/A 11/14/2016    Procedure: COLONOSCOPY;  Surgeon: Karuna Rodriguez MD;  Location: [facility] ENDO;  Service: Endoscopy;  Laterality: N/A;    COLONOSCOPY N/A 09/22/2020    Procedure: COLONOSCOPY;  Surgeon: Chuy Fish MD;  Location: [facility] ENDO;  Service: Endoscopy;  Laterality: N/A;    EPIDURAL STEROID INJECTION N/A 6/6/2024    Procedure: Lumbar L5/S1 IL IAN;  Surgeon: Abel Haywood MD;  Location: [facility] PAIN MGT;  Service: Pain Management;  Laterality: N/A;    EPIDURAL STEROID INJECTION INTO CERVICAL SPINE N/A 2/8/2024    Procedure: C5/6 IL Right paramedian approach IAN with RN IV sedation;  Surgeon: Abel Haywood MD;  Location: [facility] PAIN MGT;  Service: Pain Management;  Laterality: N/A;    EYE SURGERY      HEMORRHOID SURGERY      I-STENT Right 02/21/2018    DR. REECE    INJECTION OF ANESTHETIC AGENT AROUND MEDIAL BRANCH NERVES INNERVATING CERVICAL FACET JOINT Bilateral 7/18/2023    Procedure: Bilateral C4-6 MBB with RN IV sedation (diagnostic, #1);  Surgeon: Abel Haywood MD;  Location: [facility] PAIN MGT;  Service: Pain Management;  Laterality: Bilateral;    KNEE ARTHROSCOPY W/ MENISCAL REPAIR Right 2015    Dr. Jain    PCIOL Right 02/21/2018    DR. REECE    PLANTAR FASCIA RELEASE       right    ROTATOR CUFF REPAIR Bilateral     bilateral    SELECTIVE INJECTION OF ANESTHETIC AGENT AROUND LUMBAR SPINAL NERVE ROOT BY TRANSFORAMINAL APPROACH Bilateral 01/26/2022    Procedure: Bilateral L4/5 TF IAN with RN IV sedation;  Surgeon: Mak Young MD;  Location: [facility] PAIN MGT;  Service: Pain Management;  Laterality: Bilateral;    SELECTIVE INJECTION OF ANESTHETIC AGENT AROUND LUMBAR SPINAL NERVE ROOT BY TRANSFORAMINAL APPROACH Bilateral 03/14/2022    Procedure: Bilateral L4/5 TF IAN with RN IV sedation;  Surgeon: Mak Young MD;  Location: [facility] PAIN MGT;  Service: Pain Management;  Laterality: Bilateral;    SELECTIVE INJECTION OF ANESTHETIC AGENT AROUND LUMBAR SPINAL NERVE ROOT BY TRANSFORAMINAL APPROACH Bilateral 06/02/2022    Procedure: Bilateral L4/5 TF IAN - D2P Dr. Castro [facility];  Surgeon: Abel Haywood MD;  Location: [facility] PAIN MGT;  Service: Pain Management;  Laterality: Bilateral;    SELECTIVE INJECTION OF ANESTHETIC AGENT AROUND LUMBAR SPINAL NERVE ROOT BY TRANSFORAMINAL APPROACH Bilateral 08/25/2022    Procedure: Bilateral L4/5 TF IAN;  Surgeon: Abel Haywood MD;  Location: [facility] PAIN MGT;  Service: Pain Management;  Laterality: Bilateral;    SELECTIVE INJECTION OF ANESTHETIC AGENT AROUND LUMBAR SPINAL NERVE ROOT BY TRANSFORAMINAL APPROACH Bilateral 6/29/2023    Procedure: Bilateral L4/5 TF IAN RN IV Sedation;  Surgeon: Abel Haywood MD;  Location: [facility] PAIN MGT;  Service: Pain Management;  Laterality: Bilateral;    SELECTIVE INJECTION OF ANESTHETIC AGENT AROUND LUMBAR SPINAL NERVE ROOT BY TRANSFORAMINAL APPROACH Bilateral 4/11/2024    Procedure: Bilateral L4/5 TF IAN;  Surgeon: Abel Haywood MD;  Location: [facility] PAIN MGT;  Service: Pain Management;  Laterality: Bilateral;    SHOULDER SURGERY Bilateral around 2000    Dr. Pepper.  rotator cuff surgeries    VASECTOMY         Family History   Problem Relation Name Age of Onset    Lung cancer Father Isaiah Hughes         life long  smoker    Cancer Father Isaiah Hughes         prostate, lung    Arthritis Mother Emely Hughes     Stroke Sister          TIA    Cataracts Sister      Cancer Brother          prostate    Cataracts Brother      Cataracts Sister      Melanoma Neg Hx      Psoriasis Neg Hx      Lupus Neg Hx      Eczema Neg Hx      Diabetes Neg Hx      Heart disease Neg Hx      Kidney disease Neg Hx      Colon cancer Neg Hx         Social History     Tobacco Use    Smoking status: Former     Current packs/day: 0.00     Average packs/day: 3.0 packs/day for 35.0 years (104.9 ttl pk-yrs)     Types: Cigarettes     Start date: 1960     Quit date: 1985     Years since quittin.1     Passive exposure: Past    Smokeless tobacco: Never   Substance Use Topics    Alcohol use: Yes     Alcohol/week: 3.0 standard drinks of alcohol     Types: 3 Cans of beer per week     Comment: socially    Drug use: No       Current Outpatient Medications   Medication Sig Dispense Refill    acetaminophen (TYLENOL ARTHRITIS PAIN ORAL) Take by mouth. Patient states that he takes 2 tablets in the morning and 2 tablets in the evening      albuterol (PROVENTIL/VENTOLIN HFA) 90 mcg/actuation inhaler Inhale 2 puffs into the lungs every 4 (four) hours as needed for Wheezing or Shortness of Breath. 8.7 g 1    atorvastatin (LIPITOR) 40 MG tablet TAKE 1 TABLET EVERY DAY 90 tablet 3    azelastine (ASTELIN) 137 mcg (0.1 %) nasal spray 1 spray (137 mcg total) by Nasal route 2 (two) times daily. 90 mL 3    citalopram (CELEXA) 10 MG tablet Take 1 tablet (10 mg total) by mouth once daily. For anxiety 90 tablet 3    clopidogreL (PLAVIX) 75 mg tablet TAKE 1 TABLET EVERY DAY 90 tablet 2    cyclobenzaprine (FLEXERIL) 5 MG tablet       finasteride (PROSCAR) 5 mg tablet Take 1 tablet (5 mg total) by mouth once daily. 90 tablet 4    furosemide (LASIX) 20 MG tablet Take 1 tablet (20 mg total) by mouth daily as needed (edema). 30 tablet 0    losartan (COZAAR) 50 MG tablet TAKE 1 TABLET  TWICE DAILY 180 tablet 3    pantoprazole (PROTONIX) 40 MG tablet TAKE 1 TABLET EVERY DAY 90 tablet 3    pregabalin (LYRICA) 75 MG capsule Take 1 capsule (75 mg total) by mouth 2 (two) times daily. 60 capsule 1    sildenafiL (VIAGRA) 100 MG tablet Take 1 po 45 minutes before intercourse 30 tablet 7    vitamin D (VITAMIN D3) 1000 units Tab Take 1,000 Units by mouth once daily.      alfuzosin (UROXATRAL) 10 mg Tb24 Take 1 tablet (10 mg total) by mouth daily with breakfast. 90 tablet 3    amLODIPine (NORVASC) 5 MG tablet TAKE 1 TABLET TWICE DAILY 180 tablet 2    diazePAM (VALIUM) 5 MG tablet Take 1 po 45 minutes before visit. 2 tablet 0     No current facility-administered medications for this visit.       Review of patient's allergies indicates:   Allergen Reactions    Atarax [hydroxyzine hcl] Other (See Comments)     Raised blood pressure     Zyrtec [cetirizine] Other (See Comments)     Raised blood pressure     Gold sodium thiosulfate      Patch test positive    Meloxicam Rash     Other reaction(s): hypertension       Physical Exam  Vitals:    08/16/24 0848   BP: 137/72   Pulse: (!) 57   Resp: 18         General: Well-developed, well-nourished in no acute distress  HEENT: Normocephalic, atraumatic, Extraocular movements intact  Neck: supple, trachea midline, no cervical or supraclavicular lymphadenopathy  Respirations: even and unlabored  Rectal: 11/21/23->40g prostate, no nodules or tenderness. No gross blood  Extremities: atraumatic, moves all equally, no clubbing, cyanosis or edema  Psych: normal affect  Skin: warm and dry, no lesions  Neuro: Alert and oriented, Cranial nerves II-XII grossly intact    PVR: 29cc 5/3/23    UA: negative for blood, LE, nit    PSA  7/7/21: 1.3  3/23/22: 1.5  6/16/22: 1.4  9/26/22: 1.5  12/27/22: 1.9  2/9/23: 1.3  8/3/23: 2.0  11/14/23: 1.4  2/6/24: 1.6  5/3/24: 2.8  8/9/24: 4.8    Assessment:   1. Prostate cancer  POCT Urinalysis, Dipstick, Automated, W/O Scope    POCT Bladder Scan     Prostate Specific Antigen, Diagnostic    BUN    Creatinine, Serum    MRI Prostate W W/O Contrast      2. BPH with obstruction/lower urinary tract symptoms  POCT Urinalysis, Dipstick, Automated, W/O Scope    POCT Bladder Scan      3. Nocturia  POCT Urinalysis, Dipstick, Automated, W/O Scope    POCT Bladder Scan      4. Microhematuria  Urinalysis Microscopic    Urine culture              Plan:  Prostate cancer  -     POCT Urinalysis, Dipstick, Automated, W/O Scope  -     POCT Bladder Scan  -     Prostate Specific Antigen, Diagnostic; Future; Expected date: 08/16/2024  -     BUN; Future; Expected date: 08/16/2024  -     Creatinine, Serum; Future; Expected date: 08/16/2024  -     MRI Prostate W W/O Contrast; Future; Expected date: 08/16/2024    BPH with obstruction/lower urinary tract symptoms  -     POCT Urinalysis, Dipstick, Automated, W/O Scope  -     POCT Bladder Scan    Nocturia  -     POCT Urinalysis, Dipstick, Automated, W/O Scope  -     POCT Bladder Scan    Microhematuria  -     Urinalysis Microscopic  -     Urine culture    Other orders  -     diazePAM (VALIUM) 5 MG tablet; Take 1 po 45 minutes before visit.  Dispense: 2 tablet; Refill: 0  -     alfuzosin (UROXATRAL) 10 mg Tb24; Take 1 tablet (10 mg total) by mouth daily with breakfast.  Dispense: 90 tablet; Refill: 3      I had a discussion with the pt and his partner regarding the recent rise in his PSA. We discussed MRI as part of his active surveillance protocol, which he has agreed to. He would also like to recheck his PSA.   Continue finasteride  Encouraged use of C-pap.       Follow up for MRI results. .

## 2024-08-17 LAB — COMPLEXED PSA SERPL-MCNC: 2.8 NG/ML (ref 0–4)

## 2024-08-18 LAB — BACTERIA UR CULT: NORMAL

## 2024-08-20 LAB
ESTRADIOL SERPL HS-MCNC: 16 PG/ML (ref 10–42)
ESTROGEN SERPL CALC-MCNC: 32 PG/ML (ref 19–69)
ESTRONE SERPL-MCNC: 16 PG/ML (ref 9–36)

## 2024-08-20 NOTE — TELEPHONE ENCOUNTER
----- Message from Thai Castro sent at 2/13/2018  8:48 AM CST -----  Contact: steffanie/lindsey      ----- Message -----  From: Elio Ann  Sent: 2/13/2018   8:24 AM  To: Khushbu Lomas    Caller needs more information on pt for eye surgery please contact her at [phone] ext [phone]  
Name band;

## 2024-08-22 ENCOUNTER — OFFICE VISIT (OUTPATIENT)
Dept: DERMATOLOGY | Facility: CLINIC | Age: 84
End: 2024-08-22
Payer: MEDICARE

## 2024-08-22 VITALS — BODY MASS INDEX: 29.07 KG/M2 | WEIGHT: 203.06 LBS | HEIGHT: 70 IN

## 2024-08-22 DIAGNOSIS — Z85.828 HISTORY OF NONMELANOMA SKIN CANCER: Primary | ICD-10-CM

## 2024-08-22 DIAGNOSIS — L81.4 SOLAR LENTIGO: ICD-10-CM

## 2024-08-22 DIAGNOSIS — L82.1 SEBORRHEIC KERATOSES: ICD-10-CM

## 2024-08-22 DIAGNOSIS — D18.00 HEMANGIOMA, UNSPECIFIED SITE: ICD-10-CM

## 2024-08-22 DIAGNOSIS — L57.0 ACTINIC KERATOSES: ICD-10-CM

## 2024-08-22 PROCEDURE — 99999 PR PBB SHADOW E&M-EST. PATIENT-LVL III: CPT | Mod: PBBFAC,HCNC,, | Performed by: STUDENT IN AN ORGANIZED HEALTH CARE EDUCATION/TRAINING PROGRAM

## 2024-08-22 NOTE — PROGRESS NOTES
Established Patient Chronic Pain Note (Follow up visit)    Chief Complaint:   Chief Complaint   Patient presents with    Low-back Pain     Patient has pain in lower back with occasional numbness in legs.  Pain level 0/10 but pain increase with movement         SUBJECTIVE:  Interval History (8/29/2024):  Patient Ezequiel Hughes presents today for follow-up visit.  Patient is being seen for right low back and right hip pain. He rates his pain a 0/10 currently but states its because its morning. In the evening is when his pain is present and most of the pain is the right hip and into the right groin. He was to start lyrica and compound cream last appt but he just started the lyrica yesterday.   In July he had a right IA hip injection with Dr. Rodas but only got short term relief. He has has lumbar ESIs as well with short term relief.   Patient denies night fever/night sweats, urinary incontinence, bowel incontinence, significant weight loss and significant motor weakness.   Patient denies any other complaints or concerns at this time.      Interval History (8/2/2024):  Patient Ezequiel Hughes presents today for follow-up visit.  Patient was last seen by orthopedist Dr. Deras for R hip injection 7/18/2024  with 90% relief of his hip pain however he continues to have pain in his right groin that goes into the front of the thigh. He says he has 0/10 pain with sitting but when he walks he has a catching sensation that will make his pain 10/10. He had a lumbar IAN in June which helped with his back pain but he also feels he still has right sided low back pain at times with the right groin pain. He is curious when he could get another injection  He has been out of his lyrica x 1 week, he feels he misplaced it and plans to find it.   He has done PT in the past but has never gotten relief with it.  Patient denies night fever/night sweats, urinary incontinence, bowel incontinence, significant weight loss and significant  motor weakness.   Patient denies any other complaints or concerns at this time.      Interval History (7/11/2024):  Patient Ezequiel Hughes presents today for follow-up visit.  Patient was last seen on 6/6/2024 L5/S1 IL IAN with 80% relief sustained. So far this injection has worked best for his back and leg pain.     He has not currently having any pain that radiates down the lower extremities.  His back pain seems to be improved.He does have report that for the most part is kind of difficult to see how his pain is doing because he has been having right hip pain.    performed a nerve conduction study and also ordered bilateral hip x-ray.    Nerve conduction study showed L5 and S1 bilateral nerve involvement.Hip x-ray showed severe joint space narrowing and he has since been referred to ortho and is scheduled for a right hip injection next week.  He is only having right hip pain that radiates into the right groin at times.  No left hip pain.  Patient denies night fever/night sweats, urinary incontinence, bowel incontinence, significant weight loss and significant motor weakness.   Patient denies any other complaints or concerns at this time.      Interval History (5/13/2024):  Patient Ezequiel Hughes presents today for follow-up visit.  Patient was last seen on 4/11/2024 bilateral L4/5 TF IAN with 80% relief x 1 day.  He continues to have lower back pain which radiates down the side and back of the bilateral lower extremities.  He states the most frustrating part is the numbness that he has in the lower extremities.  Numbness gets worse with prolonged walking and standing.  He is wanting to go back on the Lyrica to see if this can help with his symptoms.  He is also requesting a nerve conduction study just to help figure out where his pain is coming from.  He is seen  in the past to discuss vertiflex and is not interested in surgery at this time.  Last lumbar MRI was 2021.  He rates his pain today a  4/10 but states it will go higher depending on activity.  Patient denies night fever/night sweats, urinary incontinence, bowel incontinence, significant weight loss and significant motor weakness.   Neck pain is currently stable  Patient denies any other complaints or concerns at this time.      Interval History (3/13/2024):  Patient Ezequiel Hughes presents today for follow-up visit.  Patient was last seen on 2/8/2024 for C5/6 IL IAN which he reports provided 80% relief. Neck pain is improved with no current radiculopathy.  He reports he stopped the Lyrica because he did not feel like he needed it.  He does still complain of lower back pain which radiates into the bilateral legs with some numbness.  He has had bilateral L4/5 IAN in the past with great relief and is wanting to repeat these injections.  He rates his pain today as 0/10 and states later in the day the pain will go up to a 4/10.  He has been seeing the chiropractor and doing home physical therapy exercises to try to get relief.  Patient denies night fever/night sweats, urinary incontinence, bowel incontinence, significant weight loss and significant motor weakness.   Patient denies any other complaints or concerns at this time.        Interval History (1/22/2024):  Patient Ezequiel Hughes presents today for follow-up visit.  Patient was last seen on 8/2/2023. At that time he had a C4-5 MBB which did not provide significant relief. Today his pain is in the base of the neck and radiates to the R shoulder. Pain started a few weeks ago. Pain feels like pins and needles. Today pain is a 5/10 but at it's worst it will be 7/10.   No fall or injury.   He has been to urgent care twice this month for the pain and has received a toradol injection and steroids. He got some temporarily relief from these.  He has chronic lower back pain, has been followed by Dr. Castro. He has been going to the chiropractor  which is providing good relief.  He has been on lyrica in the  past but stopped medication when he got relief of radicular symptoms from a previous IAN.    Interval visit 8/2/2023  Ezequiel Hughes is a 84 y.o. male presents today for follow-up chronic neck and lower back pain.  He was last seen for procedure on 07/18/2023 where he underwent bilateral C4-6 diagnostic medial branch blocks that did not provide significant relief unfortunately.  He continues with lower back pain with radicular symptoms into the both lower extremities.  He reports that this tends to occur mostly with ambulatory activities.        Patient denies night fever/night sweats, urinary incontinence, bowel incontinence, significant weight loss, significant motor weakness and loss of sensations.    Pain Disability Index Review:      7/11/2024     9:19 AM 1/22/2024     1:06 PM 8/2/2023    10:14 AM   Last 3 PDI Scores   Pain Disability Index (PDI) 35 49 7     Interval History (6/9/2023):    Ezequiel Hughes presents today for follow-up visit.  Patient was last seen on 09/27/2022. Last injection Bilateral L4/5 TF IAN on 8/25/22with 80-90% pain relief x more than 6 months before pain insidiously returned. He reports same pain as previous, located in his lower back with radiation into both legs, though his right leg is worse than left. He reports his right leg feels weak and swells after prolonged walking, improved with rest. He has completed 37 sessions of physical therapy for his neck and back from 10/20/2022 to 03/14/2022 without relief. Patient reports pain as 4/10 today, but 6/10 on his worst days.      Interval History (9/27/2022):   Ezequiel Hughes presents today for follow-up visit.  he underwent Bilateral L4/5 TF IAN on 8/25/22.  The patient reports that he is/was better following the procedure.  he reports 80-90% pain relief. He reports this IAN was the best so far.  The changes lasted 4 weeks so far.  The changes have continued through this visit.  Patient reports pain as 2/10 today. He does report muscle spasms  in his lower right back for the past 3-4 days after prolonged stooping working on a car. He has used heat and massage with some improvement.       Interval HPI  Ezequiel Hughes is a 83 y.o. male who presents to the clinic for a follow-up appointment for back and leg pain.  He presents today after undergoing a 3rd lumbar TFESI bilaterally at L4-5 on 06/02/2022.  He reports that this resulted in 100% relief.  Since the last visit, Ezequiel Hughes states the pain has been resolved. Current pain intensity is 0/10.      Interval Hx: 2/24/22  Presents status post bilateral L4/5 transforaminal epidural steroid injection January 26, 2022. Patient reports having 100% relief in lower extremity radicular symptoms following epidural steroid injection.  This pain level varies based upon his activity throughout the day.  Patient reports as he is more active he does notice radicular symptoms down the lateral aspects of bilateral lower extremities to the feet.  Patient reports discontinuing Lyrica the day following his injection which he believes caused paresthesias to insidiously return.  Patient does report improvement in functionality including range of motion and strength following his injection.  Patient is interested in repeat intervention.  Patient denies any side effects at the Lyrica dose of 225 mg twice a day.     Interval Hx: 1/13/22  Patient presents for MRI cervical and lumbar review.  Today patient again reports lower back pain which radiates down the anterior aspects of bilateral lower extremities in L4 distribution to the foot.  Today pain is rated as a 4/10.  Pain is exacerbated with prolonged standing and with ambulation the patient reports he is able to ambulate at least 1 mi on the treadmill before requiring rest.  He also reports neck pain which radiates in bilateral trapezius distributions in C5-6 distribution.  Patient has continued Lyrica and is currently taking 150 mg twice daily.  He is anticipated to increase  "this dosage next week.  He denies any side effects from this medication.     HPI 12/9/21  Ezequiel Hughes is a 81 y.o. male with past medical history significant for transient ischemic attack, hyperlipidemia, hypertension, right bundle branch block, stage 3 chronic kidney disease, thrombocytopenia and anemia , prostate cancer, gout, obstructive sleep apnea, periodically limb movement disorder who presents to the clinic for the evaluation of neck, lower back and leg pain.  Today patient reports pain began approximately 1 year prior without inciting accident, injury or trauma.  Today patient reports lower back pain which is constant and today is rated as a 3/10.  Patient reports radicular symptoms beginning along the anterior aspects of bilateral lower extremities below the knee to the foot in L4 distribution.  Patient is having difficulty describing the character but believes it is burning in nature.  Pain is exacerbated with prolonged standing and with ambulation.  Patient reports he is quite active, goes to the gym and walks on his treadmill and is able to ambulate approximately half to 3/4 of a mi before requiring rest.  Pain is improved with sitting.He denies any bowel or bladder incontinence or saddle anesthesia. Patient has performed physical therapy for the lower back without any improvement in his symptoms.      Patient also reports constant neck pain.  Pain presents in a bandlike distribution in bilateral cervical paraspinous territory and radiates into bilateral trapezius distribution.  Patient also reports numbness in bilateral thumbs.  Pain is exacerbated with cervical flexion, extension and lateral flexion.  Patient denies upper extremity weakness, compromise in hand  strength or dexterity.  Patient has been trialed on gabapentin for what his wife describes as "scalp itching"at a lower dose of 100 mg 3 times daily without any improvement in his neck or in his back pain.           Non-Pharmacologic " Treatments:  Physical Therapy/Home Exercise: yes  Ice/Heat:yes  TENS: no  Acupuncture: no  Massage: no  Chiropractic: no    Other: no     Pain Medications:  - Adjuvant Medications: Neurontin (Gabapentin) and Tylenol (Acetaminophen)  - Anti-Coagulants: Plavix ( Clopidogrel)     Pain Procedures:   -2022:  Bilateral L4/5 TFESI  -2022: Bilateral L4-5 TFESI  -2022:  Bilateral L4-5 TFESI D2P per Haydel  -2022: Bilatearl L4/5 TF IAN  -2023:  bilateral L4-5 TFESI, limited relief  -2023:  diagnostic C4-6 medial branch blocks, limited relief  2024 C5/6 IL IAN 80% relief  2024 bilateral L4/5 TF IAN with 80% relief x 1 day  2025 L5/S1 IL IAN with 80% relief sustained      [id number] EMG  IMPRESSION  1. ABNORMAL study  2. There is electrodiagnostic evidence of an acute on chronic radiculopathy of the bilateral L5 and S1 nerve roots  3. 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    Review of patient's allergies indicates:   Allergen Reactions    Atarax [hydroxyzine hcl] Other (See Comments)     Raised blood pressure     Zyrtec [cetirizine] Other (See Comments)     Raised blood pressure     Gold sodium thiosulfate      Patch test positive    Meloxicam Rash     Other reaction(s): hypertension       Current Outpatient Medications   Medication Sig    acetaminophen (TYLENOL ARTHRITIS PAIN ORAL) Take by mouth. Patient states that he takes 2 tablets in the morning and 2 tablets in the evening    albuterol (PROVENTIL/VENTOLIN HFA) 90 mcg/actuation inhaler Inhale 2 puffs into the lungs every 4 (four) hours as needed for Wheezing or Shortness of Breath.    alfuzosin (UROXATRAL) 10 mg Tb24 Take 1 tablet  (10 mg total) by mouth daily with breakfast.    amLODIPine (NORVASC) 5 MG tablet TAKE 1 TABLET TWICE DAILY    atorvastatin (LIPITOR) 40 MG tablet TAKE 1 TABLET EVERY DAY    azelastine (ASTELIN) 137 mcg (0.1 %) nasal spray 1 spray (137 mcg total) by Nasal route 2 (two) times daily.    citalopram (CELEXA) 10 MG tablet Take 1 tablet (10 mg total) by mouth once daily. For anxiety    clopidogreL (PLAVIX) 75 mg tablet TAKE 1 TABLET EVERY DAY    cyclobenzaprine (FLEXERIL) 5 MG tablet     diazePAM (VALIUM) 5 MG tablet Take 1 po 45 minutes before visit.    finasteride (PROSCAR) 5 mg tablet Take 1 tablet (5 mg total) by mouth once daily.    furosemide (LASIX) 20 MG tablet Take 1 tablet (20 mg total) by mouth daily as needed (edema).    levoFLOXacin (LEVAQUIN) 500 MG tablet Take 1 po 1 hour before biopsy    losartan (COZAAR) 50 MG tablet TAKE 1 TABLET TWICE DAILY    pantoprazole (PROTONIX) 40 MG tablet TAKE 1 TABLET EVERY DAY    pregabalin (LYRICA) 75 MG capsule Take 1 capsule (75 mg total) by mouth 2 (two) times daily.    sildenafiL (VIAGRA) 100 MG tablet Take 1 po 45 minutes before intercourse    vitamin D (VITAMIN D3) 1000 units Tab Take 1,000 Units by mouth once daily.     No current facility-administered medications for this visit.         REVIEW OF SYSTEMS:    GENERAL:  No weight loss, malaise or fevers.  HEENT:   No recent changes in vision or hearing  NECK:  Negative for lumps, no difficulty with swallowing.  RESPIRATORY:  Negative for cough, wheezing or shortness of breath, patient denies any recent URI.  CARDIOVASCULAR:  Negative for chest pain, leg swelling or palpitations.  GI:  Negative for abdominal discomfort, blood in stools or black stools or change in bowel habits.  MUSCULOSKELETAL:  See HPI.  SKIN:  Negative for lesions, rash, and itching.  PSYCH:  No mood disorder or recent psychosocial stressors.  Patients sleep is not disturbed secondary to pain.  HEMATOLOGY/LYMPHOLOGY:  Negative for prolonged bleeding,  "bruising easily or swollen nodes.  Patient is currently taking anti-coagulants - plavix  NEURO:   No history of headaches, syncope, paralysis, seizures or tremors.  All other reviewed and negative other than HPI.    OBJECTIVE:    /60   Pulse 64   Resp 16   Ht 5' 10" (1.778 m)   Wt 93.5 kg (206 lb 2.1 oz)   BMI 29.58 kg/m²           PHYSICAL EXAMINATION:    Vitals:    08/29/24 1212   BP: 116/60   Pulse: 64   Resp: 16   Weight: 93.5 kg (206 lb 2.1 oz)   Height: 5' 10" (1.778 m)       Body mass index is 29.58 kg/m².   (reviewed on 8/29/2024)       GENERAL: Well appearing, in no acute distress, alert and oriented x3.  PSYCH:  Mood and affect appropriate.  SKIN: Skin color, texture, turgor normal, no rashes or lesions.  HEAD/FACE:  Normocephalic, atraumatic. Cranial nerves grossly intact.  NECK:  Axial loading positive bilaterally.  Spurling's equivocal.  Range of motion fair secondary to pain.  CV: RRR with palpation of the radial artery.  PULM: No evidence of respiratory difficulty, symmetric chest rise.  GI:  Soft and non-tender.  BACK:  Forward flexed posture.  Straight leg raising in the sitting and supine positions is negative to radicular pain. No pain to palpation over the facet joints of the lumbar spine or spinous processes.  Fair range of motion with mild pain reproduction.  EXTREMITIES: No deformities, edema, or skin discoloration. Good capillary refill.  MUSCULOSKELETAL: Bilateral upper and lower extremity strength is normal and symmetric.  No atrophy or tone abnormalities are noted. Fadir's positive on right. Pain with ROM of the right hip. No GTB tenderness.  NEURO: Bilateral upper and lower extremity coordination and muscle stretch reflexes are physiologic and symmetric.  Plantar response are downgoing. No clonus.  No loss of sensation is noted.  GAIT: normal.  General: alert and oriented, in no apparent distress  Gait: normal gait.  Skin: no rashes, no discoloration, no obvious lesions  HEENT: " normocephalic, atraumatic. Pupils equal and round.  Cardiovascular: no significant peripheral edema present.  Respiratory: without use of accessory muscles of respiration.        Neuro - Upper Extremities:  - BUE Strength:R/L: D: 5/5; B: 5/5; T: 5/5; WF: 5/5; WE: 5/5; IO: 5/5  - Extremity Reflexes: Brisk and symmetric throughout  - Sensory: Sensation to light touch intact bilaterally  Positive spurling  FROM cervical spine and upper extremities  No shoulder tenderness  No cervical tenderness      Psych:  Mood and affect is appropriate      LABS:  Lab Results   Component Value Date    WBC 4.28 05/09/2024    HGB 13.7 (L) 05/09/2024    HCT 42.9 05/09/2024    MCV 98 05/09/2024     (L) 05/09/2024       CMP  Sodium   Date Value Ref Range Status   08/23/2024 137 136 - 145 mmol/L Final     Potassium   Date Value Ref Range Status   08/23/2024 4.6 3.5 - 5.1 mmol/L Final     Chloride   Date Value Ref Range Status   08/23/2024 105 95 - 110 mmol/L Final     CO2   Date Value Ref Range Status   08/23/2024 26 23 - 29 mmol/L Final     Glucose   Date Value Ref Range Status   08/23/2024 92 70 - 110 mg/dL Final     BUN   Date Value Ref Range Status   08/23/2024 17 8 - 23 mg/dL Final     Creatinine   Date Value Ref Range Status   08/23/2024 1.3 0.5 - 1.4 mg/dL Final     Calcium   Date Value Ref Range Status   08/23/2024 9.1 8.7 - 10.5 mg/dL Final     Total Protein   Date Value Ref Range Status   08/23/2024 6.6 6.0 - 8.4 g/dL Final     Albumin   Date Value Ref Range Status   08/23/2024 3.8 3.5 - 5.2 g/dL Final   08/15/2024 4.1 3.6 - 5.1 g/dL Final     Comment:     Test Performed at:  [facility] [address]  [id number]     BRUCE Fang MD, PhD, TYRON       Total Bilirubin   Date Value Ref Range Status   08/23/2024 0.8 0.1 - 1.0 mg/dL Final     Comment:     For infants and newborns, interpretation of results should be based  on gestational age, weight and in agreement with  clinical  observations.    Premature Infant recommended reference ranges:  Up to 24 hours.............<8.0 mg/dL  Up to 48 hours............<12.0 mg/dL  3-5 days..................<15.0 mg/dL  6-29 days.................<15.0 mg/dL       Alkaline Phosphatase   Date Value Ref Range Status   08/23/2024 62 55 - 135 U/L Final     AST   Date Value Ref Range Status   08/23/2024 15 10 - 40 U/L Final     ALT   Date Value Ref Range Status   08/23/2024 11 10 - 44 U/L Final     Anion Gap   Date Value Ref Range Status   08/23/2024 6 (L) 8 - 16 mmol/L Final     eGFR if    Date Value Ref Range Status   06/29/2022 57.9 (A) >60 mL/min/1.73 m^2 Final     eGFR if non    Date Value Ref Range Status   06/29/2022 50.1 (A) >60 mL/min/1.73 m^2 Final     Comment:     Calculation used to obtain the estimated glomerular filtration  rate (eGFR) is the CKD-EPI equation.          Lab Results   Component Value Date    HGBA1C 5.1 06/27/2023             ASSESSMENT: 84 y.o. year old male with lower back and leg pain, consistent with     1. Right hip pain  Case Request-RAD/Other Procedure Area: right femoral/ obturator nerve block- with steroids      2. Primary osteoarthritis of both hips  Case Request-RAD/Other Procedure Area: right femoral/ obturator nerve block- with steroids      3. DDD (degenerative disc disease), lumbar                              PLAN:   - Interventions: Schedule Right femoral and obturator block. Explained the risks and benefits of the procedure in detail with the patient today in clinic along with alternative treatment options, and the patient elected to pursue the intervention.    Continue lyrica  He declines PT.     He has seen Dr. Castro for vertiflex but states he is not wanting to pursue any surgery      S/p 2/8/2024 C5/6 IL IAN with 80% relief  S/p diagnostic C4-6 medial branch blocks on 07/18/2023, limited relief  S/p repeat bilateral L4-5 TFESI on 06/29/2023, limited relief  -  Anticoagulation: yes, Plavix   - Medications: I have stressed the importance of physical activity and a home exercise plan to help with pain and improve health. and Patient can continue with medications for now since they are providing benefits, using them appropriately, and without side effects.    Continue lyrica 75mg BID. We discussed potential side effects of this medication which may include drowsiness,dizziness, dry mouth, constipation or peripheral edema.    Rx for compound cream  4% gabapentin, 1.5% diclofenac, 2.5% lidocaine, 2.5% prilocaine 2.5%compound cream for potential topical pain relief. Patent will be contacted for [facility] regarding cost and payment.         - Therapy:  Advised patient to continue with activities as tolerated  - Labs:  Reviewed  - Imaging:  Reviewed MRI cervical spine and lumbar MRI. Reviewed xray bilateral hips and EMG  - Consults/Referrals: EConsult to Neurosurgery in the past for consideration of vertiflex procedure for lumbar spinal stenosis  - Records:  Reviewed/Obtain old records from outside physicians and imaging  - Follow up visit: 4 weeks after procedure    - Counseled patient regarding the importance of activity modification and physical therapy  - This condition does not require this patient to take time off of work, and the primary goal of our Pain Management services is to improve the patient's functional capacity.  - Patient Questions: Answered all of the patient's questions regarding diagnosis, therapy, and treatment    The above plan and management options were discussed at length with patient. Patient is in agreement with the above and verbalized understanding.      Maia Lamas NP  Interventional Pain Management  [facility]    Disclaimer:  This note was prepared using voice recognition system and is likely to have sound alike errors that may have been overlooked even after proof reading.  Please call me with any  questions

## 2024-08-22 NOTE — PROGRESS NOTES
Patient Information  Name: Ezequiel Hughes  : 1940  MRN: [id number]     Referring Physician:  Dr. Stevens ref. provider found   Primary Care Physician:  Valery Perez MD   Date of Visit: 2024      Subjective:       Ezequiel Hughes is a 84 y.o. male who presents for   Chief Complaint   Patient presents with    Skin Check     HPI  Patient here for skin check.     Does patient have a personal hx of skin cancers? no  Does patient have family hx of melanoma?  no  Does patient have hx of strong sun exposure or tanning bed use in the past? yes    Patient was last seen:Visit date not found     Prior notes by myself reviewed.   Clinical documentation obtained by nursing staff reviewed.    Review of Systems   Skin:  Negative for itching and rash.        Objective:    Physical Exam   Constitutional: He appears well-developed and well-nourished. No distress.   Neurological: He is alert and oriented to person, place, and time. He is not disoriented.   Psychiatric: He has a normal mood and affect.   Skin:   Areas Examined (abnormalities noted in diagram):   Head / Face Inspection Performed  Neck Inspection Performed  Chest / Axilla Inspection Performed  Back Inspection Performed  RUE Inspected  LUE Inspection Performed                   Diagram Legend     Erythematous scaling macule/papule c/w actinic keratosis       Vascular papule c/w angioma      Pigmented verrucoid papule/plaque c/w seborrheic keratosis      Yellow umbilicated papule c/w sebaceous hyperplasia      Irregularly shaped tan macule c/w lentigo     1-2 mm smooth white papules consistent with Milia      Movable subcutaneous cyst with punctum c/w epidermal inclusion cyst      Subcutaneous movable cyst c/w pilar cyst      Firm pink to brown papule c/w dermatofibroma      Pedunculated fleshy papule(s) c/w skin tag(s)      Evenly pigmented macule c/w junctional nevus     Mildly variegated pigmented, slightly irregular-bordered macule c/w mildly atypical nevus       Flesh colored to evenly pigmented papule c/w intradermal nevus       Pink pearly papule/plaque c/w basal cell carcinoma      Erythematous hyperkeratotic cursted plaque c/w SCC      Surgical scar with no sign of skin cancer recurrence      Open and closed comedones      Inflammatory papules and pustules      Verrucoid papule consistent consistent with wart     Erythematous eczematous patches and plaques     Dystrophic onycholytic nail with subungual debris c/w onychomycosis     Umbilicated papule    Erythematous-base heme-crusted tan verrucoid plaque consistent with inflamed seborrheic keratosis     Erythematous Silvery Scaling Plaque c/w Psoriasis     See annotation      No images are attached to the encounter or orders placed in the encounter.    [] Data reviewed  [] Independent review of test  [] Management discussed with another provider    Assessment / Plan:        Actinic keratoses  Cryosurgery Procedure Note    Verbal consent from the patient is obtained including, but not limited to, risk of hypopigmentation/hyperpigmentation, scar, recurrence of lesion. The patient is aware of the precancerous quality and need for treatment of these lesions. Liquid nitrogen cryosurgery is applied to the 17 actinic keratoses, as detailed in the physical exam, to produce a freeze injury. The patient is aware that blisters may form and is instructed on wound care with gentle cleansing and use of vaseline ointment to keep moist until healed. The patient is supplied a handout on cryosurgery and is instructed to call if lesions do not completely resolve.    Seborrheic keratoses  These are benign inherited growths without a malignant potential. Reassurance given to patient. No treatment is necessary.     Hemangioma, unspecified site  This is a benign vascular lesion. Reassurance given. No treatment required.     History of nonmelanoma skin cancer  Area(s) of previous NMSC evaluated with no signs of recurrence.    Upper body  skin examination performed today including at least 6 points as noted in physical examination. No lesions suspicious for malignancy noted.    Recommend daily sun protection/avoidance and use of at least SPF 30, broad spectrum sunscreen (OTC drug).     Solar lentigo  This is a benign hyperpigmented sun induced lesion. Recommend daily sun protection/avoidance and use of at least SPF 30, broad spectrum sunscreen (OTC drug) will reduce the number of new lesions. Treatment of these benign lesions are considered cosmetic.             LOS NUMBER AND COMPLEXITY OF PROBLEMS    COMPLEXITY OF DATA RISK TOTAL TIME (m)   14412  53592 [] 1 self-limited or minor problem [x] Minimal to none [] No treatment recommended or patient to monitor 15-29  10-19   14990  02817 Low  [] 2 or > self limited or minor problems  [] 1 stable chronic illness  [] 1 acute, uncomplicated illness or injury Limited (2)  [] Prior external notes from each unique source  [] Review result of each unique test  [] Order each unique test [x]  Low  OTC medications, minor skin biopsy 30-44  20-29   88338  19845 Moderate  []  1 or > chronic illness with progression, exacerbation or SE of treatment  [x]  2 or more stable chronic illnesses  []  1 acute illness with systemic symptoms  []  1 acute complicated injury  []  1 undiagnosed new problem with uncertain prognosis Moderate (1/3 below)  []  3 or more data items        *Now includes assessment requiring independent historian  []  Independent interpretation of a test  []  Discuss management/test with another provider Moderate  []  Prescription drug mgmt  []  Minor surgery with risk discussed  []  Mgmt limited by social determinates 45-59  30-39   26202  62171 High  []  1 or more chronic illness with severe exacerbation, progression or SE of treatment  []  1 acute or chronic illness/injury that poses a threat to life or bodily function Extensive (2/3 below)  []  3 or more data items        *Now includes assessment  requiring independent historian.  []  Independent interpretation of a test  []  Discuss management/test with another provider High  []  Major surgery with risk discussed  []  Drug therapy requiring intensive monitoring for toxicity  []  Hospitalization  []  Decision for DNR 60-74  40-54      No follow-ups on file.    Deb Burgos MD, FAAD  [facility]

## 2024-08-22 NOTE — PATIENT INSTRUCTIONS

## 2024-08-23 ENCOUNTER — HOSPITAL ENCOUNTER (OUTPATIENT)
Dept: RADIOLOGY | Facility: HOSPITAL | Age: 84
Discharge: HOME | End: 2024-08-23
Attending: UROLOGY
Payer: MEDICARE

## 2024-08-23 DIAGNOSIS — C61 PROSTATE CANCER: ICD-10-CM

## 2024-08-23 PROCEDURE — 25500020 PHARM REV CODE 255: Mod: HCNC,PN | Performed by: UROLOGY

## 2024-08-23 PROCEDURE — A9585 GADOBUTROL INJECTION: HCPCS | Mod: HCNC,PN | Performed by: UROLOGY

## 2024-08-23 PROCEDURE — 72197 MRI PELVIS W/O & W/DYE: CPT | Mod: TC,HCNC,PN

## 2024-08-23 PROCEDURE — 72197 MRI PELVIS W/O & W/DYE: CPT | Mod: 26,HCNC,, | Performed by: RADIOLOGY

## 2024-08-23 RX ORDER — GADOBUTROL 604.72 MG/ML
9 INJECTION INTRAVENOUS
Status: COMPLETED | OUTPATIENT
Start: 2024-08-23 | End: 2024-08-23

## 2024-08-23 RX ADMIN — GADOBUTROL 9 ML: 604.72 INJECTION INTRAVENOUS at 10:08

## 2024-08-24 DIAGNOSIS — I10 PRIMARY HYPERTENSION: ICD-10-CM

## 2024-08-24 RX ORDER — AMLODIPINE BESYLATE 5 MG/1
5 TABLET ORAL 2 TIMES DAILY
Qty: 180 TABLET | Refills: 2 | Status: SHIPPED | OUTPATIENT
Start: 2024-08-24

## 2024-08-24 NOTE — TELEPHONE ENCOUNTER
No care due was identified.  [facility] Embedded Care Due Messages. Reference number: [id number].   8/24/2024 5:41:40 AM CDT

## 2024-08-25 NOTE — TELEPHONE ENCOUNTER
Refill Decision Note   Ezequiel Hughes  is requesting a refill authorization.  Brief Assessment and Rationale for Refill:  Approve     Medication Therapy Plan:         Comments:     Note composed:7:50 PM 08/24/2024

## 2024-08-27 ENCOUNTER — PROCEDURE VISIT (OUTPATIENT)
Dept: UROLOGY | Facility: CLINIC | Age: 84
End: 2024-08-27
Payer: MEDICARE

## 2024-08-27 VITALS
SYSTOLIC BLOOD PRESSURE: 116 MMHG | HEIGHT: 70 IN | RESPIRATION RATE: 18 BRPM | BODY MASS INDEX: 29.38 KG/M2 | HEART RATE: 64 BPM | DIASTOLIC BLOOD PRESSURE: 57 MMHG | WEIGHT: 205.25 LBS

## 2024-08-27 DIAGNOSIS — N40.1 BPH WITH OBSTRUCTION/LOWER URINARY TRACT SYMPTOMS: ICD-10-CM

## 2024-08-27 DIAGNOSIS — R39.15 URINARY URGENCY: ICD-10-CM

## 2024-08-27 DIAGNOSIS — N13.8 BPH WITH OBSTRUCTION/LOWER URINARY TRACT SYMPTOMS: ICD-10-CM

## 2024-08-27 DIAGNOSIS — C61 PROSTATE CANCER: Primary | ICD-10-CM

## 2024-08-27 LAB
BILIRUB UR QL STRIP: NEGATIVE
GLUCOSE UR QL STRIP: NEGATIVE
KETONES UR QL STRIP: NEGATIVE
LEUKOCYTE ESTERASE UR QL STRIP: NEGATIVE
PH, POC UA: 5.5
POC BLOOD, URINE: POSITIVE
POC NITRATES, URINE: NEGATIVE
PROT UR QL STRIP: NEGATIVE
SP GR UR STRIP: 1.01 (ref 1–1.03)
UROBILINOGEN UR STRIP-ACNC: 0.2 (ref 0.3–2.2)

## 2024-08-27 PROCEDURE — 99214 OFFICE O/P EST MOD 30 MIN: CPT | Mod: HCNC,S$GLB,, | Performed by: UROLOGY

## 2024-08-27 PROCEDURE — 81003 URINALYSIS AUTO W/O SCOPE: CPT | Mod: QW,HCNC,S$GLB, | Performed by: UROLOGY

## 2024-08-27 RX ORDER — LEVOFLOXACIN 500 MG/1
TABLET, FILM COATED ORAL
Qty: 1 TABLET | Refills: 0 | Status: SHIPPED | OUTPATIENT
Start: 2024-08-27

## 2024-08-27 NOTE — PROGRESS NOTES
"    CC: follow up prostate cancer    History of Present Illness:   Ezequiel Hughes is a 84 y.o. male here for evaluation of Follow-up (MRI review )    8/27/24-MRI shows a PI RADS 4 lesion, measuring 7x4mm in the  right posteriolateral peripheral zone at the mid-gland. I have personally reviewed these images.   Hasn't started alfuzosin yet, but he states that it was sent to him. He does have slight UUI at times. Still having significant nocturia. No gross hematuria or dysuria.     8/16/24-still with a lot of nocturia (6-7). Tries to limit pm fluid intake. He was having significant discomfort in his groin/leg on the right, which was causing limping. He has also had injections in his hip. Still on finasteride.     5/10/24-PSA up a little. Pt continues to take  finasteride. States that his urination is "terrible". He has nocturia x 7-8. He is scared to take vesicare. Sometimes feels like he doesn't empty. He does have GUIDO, but doesn't use his C-pap.     2/7/24-Pt currently in PT for his back. LUTS have been better. Nocturia x 2 last night. Emptying better. No hesitancy or stranguria. No gross hematuria. Slight UUI at times. He is not taking vesicare yet, but wants to start. States that he does have it.     11/21/23-IPSS 24, QoL 5 (unhappy). Nocutria x 4. He is currently on alfuzosin and finasteride but is out of the solifenacin. Took it for 1 month and it didn't help. Would like to try something stronger. Primary bother is urgency. No stranguria or difficulty emptying. Nocturia x 4.   8/11/23-Pt on proscar. He quit detrol because he wet the bed. Nocturia x 2-6. Stream is not always good. Hasn't tried Viagra yet. Was in the ED the other day with chest pain and had a UA, which showed 1+ blood, but no RBCs. Wearing his C-pap.   5/5/23-Pt reports that he is on finasteride, but isn't taking alfuzosin. Nocturia x 5-6. He has a little slight "leakage" throughout the day. He does have urgency. Didn't have any improvement with " alfuzosin.   9/28/22-On finasteride. Nocturia x 4-5. Tried flomax a long time ago and it didn't help. Drinks 2 cups of coffee in the am. Not drinking about an hour before bed. No gross hematuria.   6/30/22-Nocturia x 1 lately, but prior to the last 2 nights, it has been 4 times. Still on finasteride. He does have urgency and occasional UUI. If he goes out, he wears a pad. Stream is weak. No hesitancy. Recently, visits have gone to every 6 months. Pt reports occasional RLQ pain. He does have come constipation, but pain doesn't change with BM. Persistent for a month.    Prior records indicate last MRI and TRUS biopsy in 2020.   3/29/22- 83yo male with h/o Prostate cancer, here to establish care. He has previously seen Dr. Wong and was undergoing active surveillance. He reports that he was diagnosed with prostate cancer in 2014. He hasn't had any treatment. He is currently managed on finasteride. He does report some UUI, small amounts. He had a repeat TRUS biopsy in 2021, and pt reports path was unchanged. Also had MRIs around that time as well and states that he had a targetable lesion.         Review of Systems   Respiratory:  Negative for shortness of breath.    Cardiovascular:  Negative for chest pain and palpitations.   Genitourinary:  Negative for hematuria.   Musculoskeletal:  Positive for back pain and neck pain.   Neurological:  Positive for numbness (legs). Negative for dizziness.   All other systems reviewed and are negative.        Past Medical History:   Diagnosis Date    Allergic rhinitis     Anemia     Back pain     BPH (benign prostatic hyperplasia)     Cancer     skin cancer to neck, Dr. Graves    Cataract     Disorder of kidney and ureter     ED (erectile dysfunction)     OMARI (generalized anxiety disorder) 08/07/2023    Hiatal hernia     small    History of colon polyps     colonoscopy 11/2016    HLD (hyperlipidemia)     Hyperlipidemia     Hypertension     Lateral epicondylitis     Lumbar  radiculopathy 01/26/2022    OA (osteoarthritis)     GUIDO (obstructive sleep apnea)     Polyneuropathy     Prostate cancer     Dr. Wong    TIA (transient ischemic attack)     Trouble in sleeping     Urge incontinence 03/29/2022       Past Surgical History:   Procedure Laterality Date    ANGIOGRAPHY OF UPPER EXTREMITY Left 07/05/2022    Procedure: Angiogram Extremity Bilateral;  Surgeon: Aparna Thompson MD;  Location: [facility] CATH LAB;  Service: Cardiology;  Laterality: Left;    ARTERIOGRAPHY OF AORTIC ROOT N/A 07/05/2022    Procedure: ARTERIOGRAM, AORTIC ROOT;  Surgeon: Aparna Thompson MD;  Location: [facility] CATH LAB;  Service: Cardiology;  Laterality: N/A;    CATARACT EXTRACTION W/  INTRAOCULAR LENS IMPLANT Right 02/21/2018    I-Stent    CATARACT EXTRACTION W/  INTRAOCULAR LENS IMPLANT Left 03/21/2018    I - Stent    CATHETERIZATION OF BOTH LEFT AND RIGHT HEART N/A 07/05/2022    Procedure: CATHETERIZATION, HEART, BOTH LEFT AND RIGHT;  Surgeon: Aparna Thompson MD;  Location: [facility] CATH LAB;  Service: Cardiology;  Laterality: N/A;  radial approach    COLONOSCOPY N/A 11/14/2016    Procedure: COLONOSCOPY;  Surgeon: Karuna Rodriguez MD;  Location: [facility] ENDO;  Service: Endoscopy;  Laterality: N/A;    COLONOSCOPY N/A 09/22/2020    Procedure: COLONOSCOPY;  Surgeon: Chuy Fish MD;  Location: [facility] ENDO;  Service: Endoscopy;  Laterality: N/A;    EPIDURAL STEROID INJECTION N/A 6/6/2024    Procedure: Lumbar L5/S1 IL IAN;  Surgeon: Abel Haywood MD;  Location: [facility] PAIN MGT;  Service: Pain Management;  Laterality: N/A;    EPIDURAL STEROID INJECTION INTO CERVICAL SPINE N/A 2/8/2024    Procedure: C5/6 IL Right paramedian approach IAN with RN IV sedation;  Surgeon: Abel Haywood MD;  Location: [facility] PAIN MGT;  Service: Pain Management;  Laterality: N/A;    EYE SURGERY      HEMORRHOID SURGERY      I-STENT Right 02/21/2018    DR. REECE    INJECTION OF ANESTHETIC AGENT AROUND MEDIAL BRANCH NERVES INNERVATING CERVICAL  FACET JOINT Bilateral 7/18/2023    Procedure: Bilateral C4-6 MBB with RN IV sedation (diagnostic, #1);  Surgeon: Abel Haywood MD;  Location: [facility] PAIN MGT;  Service: Pain Management;  Laterality: Bilateral;    KNEE ARTHROSCOPY W/ MENISCAL REPAIR Right 2015    Dr. Jain    PCIOL Right 02/21/2018    DR. REECE    PLANTAR FASCIA RELEASE      right    ROTATOR CUFF REPAIR Bilateral     bilateral    SELECTIVE INJECTION OF ANESTHETIC AGENT AROUND LUMBAR SPINAL NERVE ROOT BY TRANSFORAMINAL APPROACH Bilateral 01/26/2022    Procedure: Bilateral L4/5 TF IAN with RN IV sedation;  Surgeon: Mak Young MD;  Location: [facility] PAIN [facility]GT;  Service: Pain Management;  Laterality: Bilateral;    SELECTIVE INJECTION OF ANESTHETIC AGENT AROUND LUMBAR SPINAL NERVE ROOT BY TRANSFORAMINAL APPROACH Bilateral 03/14/2022    Procedure: Bilateral L4/5 TF IAN with RN IV sedation;  Surgeon: Mak Young MD;  Location: [facility] PAIN [facility]GT;  Service: Pain Management;  Laterality: Bilateral;    SELECTIVE INJECTION OF ANESTHETIC AGENT AROUND LUMBAR SPINAL NERVE ROOT BY TRANSFORAMINAL APPROACH Bilateral 06/02/2022    Procedure: Bilateral L4/5 TF IAN [place] [facility] [place]  [place]geon: Abel Haywood MD;  Location: [facility]IN [facility];  Service: Pain Management;  Laterality: Bilateral;    SELECTIVE INJECTION OF ANESTHETIC AGENT AROUND LUMBAR SPINAL NERVE ROOT BY TRANSFORAMINAL APPROACH Bilateral 08/25/2022    Procedure: Bilateral L4/5 TF IAN;  Surgeon: Abel Haywood MD;  Location: [facility] PAIN [facility]GT;  Service: Pain Management;  Laterality: Bilateral;    SELECTIVE INJECTION OF ANESTHETIC AGENT AROUND LUMBAR SPINAL NERVE ROOT BY TRANSFORAMINAL APPROACH Bilateral 6/29/2023    Procedure: Bilateral L4/5 TF IAN RN IV Sedation;  Surgeon: Abel Haywood MD;  Location: [facility] PAIN MGT;  Service: Pain Management;  Laterality: Bilateral;    SELECTIVE INJECTION OF ANESTHETIC AGENT AROUND LUMBAR SPINAL NERVE ROOT BY TRANSFORAMINAL APPROACH Bilateral 4/11/2024    Procedure:  Bilateral L4/5 TF IAN;  Surgeon: Abel Haywood MD;  Location: [facility] PAIN MGT;  Service: Pain Management;  Laterality: Bilateral;    SHOULDER SURGERY Bilateral around     Dr. Pepper.  rotator cuff surgeries    VASECTOMY         Family History   Problem Relation Name Age of Onset    Lung cancer Father Isaiah Hughes         life long smoker    Cancer Father Isaiah Hughes         prostate, lung    Arthritis Mother Emely Hughes     Stroke Sister          TIA    Cataracts Sister      Cancer Brother          prostate    Cataracts Brother      Cataracts Sister      Melanoma Neg Hx      Psoriasis Neg Hx      Lupus Neg Hx      Eczema Neg Hx      Diabetes Neg Hx      Heart disease Neg Hx      Kidney disease Neg Hx      Colon cancer Neg Hx         Social History     Tobacco Use    Smoking status: Former     Current packs/day: 0.00     Average packs/day: 3.0 packs/day for 35.0 years (104.9 ttl pk-yrs)     Types: Cigarettes     Start date: 1960     Quit date: 1985     Years since quittin.1     Passive exposure: Past    Smokeless tobacco: Never   Substance Use Topics    Alcohol use: Yes     Alcohol/week: 3.0 standard drinks of alcohol     Types: 3 Cans of beer per week     Comment: socially    Drug use: No       Current Outpatient Medications   Medication Sig Dispense Refill    acetaminophen (TYLENOL ARTHRITIS PAIN ORAL) Take by mouth. Patient states that he takes 2 tablets in the morning and 2 tablets in the evening      albuterol (PROVENTIL/VENTOLIN HFA) 90 mcg/actuation inhaler Inhale 2 puffs into the lungs every 4 (four) hours as needed for Wheezing or Shortness of Breath. 8.7 g 1    alfuzosin (UROXATRAL) 10 mg Tb24 Take 1 tablet (10 mg total) by mouth daily with breakfast. 90 tablet 3    amLODIPine (NORVASC) 5 MG tablet TAKE 1 TABLET TWICE DAILY 180 tablet 2    atorvastatin (LIPITOR) 40 MG tablet TAKE 1 TABLET EVERY DAY 90 tablet 3    azelastine (ASTELIN) 137 mcg (0.1 %) nasal spray 1 spray (137 mcg total)  by Nasal route 2 (two) times daily. 90 mL 3    citalopram (CELEXA) 10 MG tablet Take 1 tablet (10 mg total) by mouth once daily. For anxiety 90 tablet 3    clopidogreL (PLAVIX) 75 mg tablet TAKE 1 TABLET EVERY DAY 90 tablet 2    cyclobenzaprine (FLEXERIL) 5 MG tablet       diazePAM (VALIUM) 5 MG tablet Take 1 po 45 minutes before visit. 2 tablet 0    finasteride (PROSCAR) 5 mg tablet Take 1 tablet (5 mg total) by mouth once daily. 90 tablet 4    furosemide (LASIX) 20 MG tablet Take 1 tablet (20 mg total) by mouth daily as needed (edema). 30 tablet 0    losartan (COZAAR) 50 MG tablet TAKE 1 TABLET TWICE DAILY 180 tablet 3    pantoprazole (PROTONIX) 40 MG tablet TAKE 1 TABLET EVERY DAY 90 tablet 3    pregabalin (LYRICA) 75 MG capsule Take 1 capsule (75 mg total) by mouth 2 (two) times daily. 60 capsule 1    sildenafiL (VIAGRA) 100 MG tablet Take 1 po 45 minutes before intercourse 30 tablet 7    vitamin D (VITAMIN D3) 1000 units Tab Take 1,000 Units by mouth once daily.      levoFLOXacin (LEVAQUIN) 500 MG tablet Take 1 po 1 hour before biopsy 1 tablet 0     No current facility-administered medications for this visit.       Review of patient's allergies indicates:   Allergen Reactions    Atarax [hydroxyzine hcl] Other (See Comments)     Raised blood pressure     Zyrtec [cetirizine] Other (See Comments)     Raised blood pressure     Gold sodium thiosulfate      Patch test positive    Meloxicam Rash     Other reaction(s): hypertension       Physical Exam  Vitals:    08/27/24 1328   BP: (!) 116/57   Pulse: 64   Resp:          General: Well-developed, well-nourished in no acute distress  HEENT: Normocephalic, atraumatic, Extraocular movements intact  Neck: supple, trachea midline, no cervical or supraclavicular lymphadenopathy  Respirations: even and unlabored  Rectal: 11/21/23->40g prostate, no nodules or tenderness. No gross blood  Extremities: atraumatic, moves all equally, no clubbing, cyanosis or edema  Psych: normal  affect  Skin: warm and dry, no lesions  Neuro: Alert and oriented, Cranial nerves II-XII grossly intact    PVR: 29cc 5/3/23    UA: negative for blood, LE, nit    PSA  7/7/21: 1.3  3/23/22: 1.5  6/16/22: 1.4  9/26/22: 1.5  12/27/22: 1.9  2/9/23: 1.3  8/3/23: 2.0  11/14/23: 1.4  2/6/24: 1.6  5/3/24: 2.8  8/9/24: 4.8  8/16/24: 2.8    Assessment:   1. Prostate cancer  POCT Urinalysis, Dipstick, Automated, W/O Scope      2. BPH with obstruction/lower urinary tract symptoms        3. Urinary urgency                Plan:  Prostate cancer  -     POCT Urinalysis, Dipstick, Automated, W/O Scope    BPH with obstruction/lower urinary tract symptoms    Urinary urgency    Other orders  -     levoFLOXacin (LEVAQUIN) 500 MG tablet; Take 1 po 1 hour before biopsy  Dispense: 1 tablet; Refill: 0    Start alfuzosin  Discussed risks/benefits of UroNav biopsy and pt has agreed.       Follow up for MRI/US fusion biopsy.

## 2024-08-29 ENCOUNTER — OFFICE VISIT (OUTPATIENT)
Dept: PAIN MEDICINE | Facility: CLINIC | Age: 84
End: 2024-08-29
Payer: MEDICARE

## 2024-08-29 VITALS
BODY MASS INDEX: 29.51 KG/M2 | WEIGHT: 206.13 LBS | HEIGHT: 70 IN | HEART RATE: 64 BPM | SYSTOLIC BLOOD PRESSURE: 116 MMHG | DIASTOLIC BLOOD PRESSURE: 60 MMHG | RESPIRATION RATE: 16 BRPM

## 2024-08-29 DIAGNOSIS — M16.0 PRIMARY OSTEOARTHRITIS OF BOTH HIPS: ICD-10-CM

## 2024-08-29 DIAGNOSIS — M51.36 DDD (DEGENERATIVE DISC DISEASE), LUMBAR: ICD-10-CM

## 2024-08-29 DIAGNOSIS — M25.551 RIGHT HIP PAIN: Primary | ICD-10-CM

## 2024-08-29 PROCEDURE — 99999 PR PBB SHADOW E&M-EST. PATIENT-LVL IV: CPT | Mod: PBBFAC,HCNC,, | Performed by: NURSE PRACTITIONER

## 2024-08-29 PROCEDURE — 1101F PT FALLS ASSESS-DOCD LE1/YR: CPT | Mod: HCNC,CPTII,S$GLB, | Performed by: NURSE PRACTITIONER

## 2024-08-29 PROCEDURE — 1159F MED LIST DOCD IN RCRD: CPT | Mod: HCNC,CPTII,S$GLB, | Performed by: NURSE PRACTITIONER

## 2024-08-29 PROCEDURE — 3288F FALL RISK ASSESSMENT DOCD: CPT | Mod: HCNC,CPTII,S$GLB, | Performed by: NURSE PRACTITIONER

## 2024-08-29 PROCEDURE — 99214 OFFICE O/P EST MOD 30 MIN: CPT | Mod: HCNC,S$GLB,, | Performed by: NURSE PRACTITIONER

## 2024-08-29 PROCEDURE — 1126F AMNT PAIN NOTED NONE PRSNT: CPT | Mod: HCNC,CPTII,S$GLB, | Performed by: NURSE PRACTITIONER

## 2024-08-30 ENCOUNTER — PATIENT MESSAGE (OUTPATIENT)
Dept: UROLOGY | Facility: CLINIC | Age: 84
End: 2024-08-30
Payer: MEDICARE

## 2024-08-30 NOTE — PRE-PROCEDURE INSTRUCTIONS
Spoke with patient regarding procedure scheduled on 9.12    Arrival time 0715     Has patient been sick with fever or on antibiotics within the last 7 days? No     Does the patient have any open wounds, sores or rashes? No     Does the patient have any recent fractures? no     Has patient received a vaccination within the last 7 days? No     Received the COVID vaccination? yes     Has the patient stopped all medications as directed? na     Does patient have a pacemaker, defibrillator, or implantable stimulator? No     Does the patient have a ride to and from procedure and someone reliable to remain with patient?  hira      Is the patient diabetic? no     Does the patient have sleep apnea? Or use O2 at home? [email or web address]     Is the patient receiving sedation? Yes      Is the patient instructed to remain NPO beginning at midnight the night before their procedure? yes     Procedure location confirmed with patient? Yes     Covid- Denies signs/symptoms. Instructed to notify PAT/MD if any changes.

## 2024-09-04 ENCOUNTER — PATIENT MESSAGE (OUTPATIENT)
Dept: ADMINISTRATIVE | Facility: OTHER | Age: 84
End: 2024-09-04
Payer: MEDICARE

## 2024-09-06 ENCOUNTER — OFFICE VISIT (OUTPATIENT)
Dept: CARDIOLOGY | Facility: CLINIC | Age: 84
End: 2024-09-06
Payer: MEDICARE

## 2024-09-06 VITALS
DIASTOLIC BLOOD PRESSURE: 64 MMHG | HEART RATE: 81 BPM | SYSTOLIC BLOOD PRESSURE: 124 MMHG | OXYGEN SATURATION: 98 % | WEIGHT: 210.75 LBS | BODY MASS INDEX: 30.24 KG/M2

## 2024-09-06 DIAGNOSIS — G47.33 OBSTRUCTIVE SLEEP APNEA SYNDROME: ICD-10-CM

## 2024-09-06 DIAGNOSIS — I70.203 ATHEROSCLEROSIS OF ARTERY OF BOTH LOWER EXTREMITIES: ICD-10-CM

## 2024-09-06 DIAGNOSIS — D64.9 ANEMIA, UNSPECIFIED TYPE: ICD-10-CM

## 2024-09-06 DIAGNOSIS — N18.31 STAGE 3A CHRONIC KIDNEY DISEASE: ICD-10-CM

## 2024-09-06 DIAGNOSIS — I70.0 AORTIC ATHEROSCLEROSIS: ICD-10-CM

## 2024-09-06 DIAGNOSIS — I27.20 PULMONARY HYPERTENSION: ICD-10-CM

## 2024-09-06 DIAGNOSIS — I10 PRIMARY HYPERTENSION: Primary | ICD-10-CM

## 2024-09-06 DIAGNOSIS — R94.31 ABNORMAL EKG: ICD-10-CM

## 2024-09-06 PROCEDURE — 99999 PR PBB SHADOW E&M-EST. PATIENT-LVL IV: CPT | Mod: PBBFAC,HCNC,, | Performed by: INTERNAL MEDICINE

## 2024-09-06 NOTE — PROGRESS NOTES
Subjective:   Patient ID:  Ezequiel Hughes is a 84 y.o. male who presents for follow up of Follow-up      HPI  EMELINA KEBEDE 8/23/2023  Mr. Hughes is an 83 year old male patient whose current medical conditions include TIA, HTN, and GUIDO who presents today for routine follow-up. Patient returns today and states he is doing well. Main issue is lower back and neck pain. Followed by pain mgmt, scheduled to see neurosurgery soon. CV wise, remains stable. He had an ED visit on 8/2/23 due to CP symptoms. Troponin was negative and he was d/c'd home. No recurrence since that time. No exertional symptoms. He feels it was related to gas. No unusual SOB/MCKEON. No LH, dizziness, palpitations, near syncope, or syncope. No s/s suggestive of CHF. Very physically active, typically walks on treadmill, maintains his lawn, etc. BP stable and controlled. Repeat by me today 120/62. Patient is compliant with his medications. Recently started on Celexa by PCP.     Prior LHC 7/22 very minimal/non-obstructive CAD. Echo 6/22 with normal EF.      Labs from 7/23 reviewed. CMP stable. Lipids at goal.  2/28/2024   HERE FROF /U HE HAS BACK PAIN AND  LEG NUMBNESS. HE HAS BEEN TO CHIROPRACTOR. HE AHS NO FALLS. HE CAN ABORT IT. HAS NO CHEST PAIN HE HAS GAINED WEIGHT NOT AS ACTIVE AS BEFORE. HAS LEG SWELLING. HAS NO SYMPTOMS AT 1 MILE     9/6/2024  Here for f/u he has significant joint complaints back pain.. he is being limited not able to exercise. Has gained weight  still leg numbness no fall  has occasional leg swelling. No compression socks  no other symptoms cardiac wise.   Past Medical History:   Diagnosis Date    Allergic rhinitis     Anemia     Back pain     BPH (benign prostatic hyperplasia)     Cancer     skin cancer to neck, Dr. Graves    Cataract     Disorder of kidney and ureter     ED (erectile dysfunction)     OMARI (generalized anxiety disorder) 08/07/2023    Hiatal hernia     small    History of colon polyps     colonoscopy 11/2016     HLD (hyperlipidemia)     Hyperlipidemia     Hypertension     Lateral epicondylitis     Lumbar radiculopathy 01/26/2022    OA (osteoarthritis)     GUIDO (obstructive sleep apnea)     Polyneuropathy     Prostate cancer     Dr. Wong    TIA (transient ischemic attack)     Trouble in sleeping     Urge incontinence 03/29/2022       Past Surgical History:   Procedure Laterality Date    ANGIOGRAPHY OF UPPER EXTREMITY Left 07/05/2022    Procedure: Angiogram Extremity Bilateral;  Surgeon: Aparna Thompson MD;  Location: [facility] CATH LAB;  Service: Cardiology;  Laterality: Left;    ARTERIOGRAPHY OF AORTIC ROOT N/A 07/05/2022    Procedure: ARTERIOGRAM, AORTIC ROOT;  Surgeon: Aparna Thompson MD;  Location: [facility] CATH LAB;  Service: Cardiology;  Laterality: N/A;    CATARACT EXTRACTION W/  INTRAOCULAR LENS IMPLANT Right 02/21/2018    I-Stent    CATARACT EXTRACTION W/  INTRAOCULAR LENS IMPLANT Left 03/21/2018    I - Stent    CATHETERIZATION OF BOTH LEFT AND RIGHT HEART N/A 07/05/2022    Procedure: CATHETERIZATION, HEART, BOTH LEFT AND RIGHT;  Surgeon: Aparna Thompson MD;  Location: [facility] CATH LAB;  Service: Cardiology;  Laterality: N/A;  radial approach    COLONOSCOPY N/A 11/14/2016    Procedure: COLONOSCOPY;  Surgeon: Karuna Rodriguez MD;  Location: [facility] ENDO;  Service: Endoscopy;  Laterality: N/A;    COLONOSCOPY N/A 09/22/2020    Procedure: COLONOSCOPY;  Surgeon: Chuy Fish MD;  Location: [facility] ENDO;  Service: Endoscopy;  Laterality: N/A;    EPIDURAL STEROID INJECTION N/A 6/6/2024    Procedure: Lumbar L5/S1 IL IAN;  Surgeon: Abel Haywood MD;  Location: [facility] PAIN MGT;  Service: Pain Management;  Laterality: N/A;    EPIDURAL STEROID INJECTION INTO CERVICAL SPINE N/A 2/8/2024    Procedure: C5/6 IL Right paramedian approach IAN with RN IV sedation;  Surgeon: Abel Haywood MD;  Location: [facility] PAIN MGT;  Service: Pain Management;  Laterality: N/A;    EYE SURGERY      HEMORRHOID SURGERY      I-STENT Right 02/21/2018     DR. REECE    INJECTION OF ANESTHETIC AGENT AROUND MEDIAL BRANCH NERVES INNERVATING CERVICAL FACET JOINT Bilateral 7/18/2023    Procedure: Bilateral C4-6 MBB with RN IV sedation (diagnostic, #1);  Surgeon: Abel Haywood MD;  Location: [facility] PAIN MGT;  Service: Pain Management;  Laterality: Bilateral;    KNEE ARTHROSCOPY W/ MENISCAL REPAIR Right 2015    Dr. Jain    PCIOL Right 02/21/2018    DR. REECE    PLANTAR FASCIA RELEASE      right    ROTATOR CUFF REPAIR Bilateral     bilateral    SELECTIVE INJECTION OF ANESTHETIC AGENT AROUND LUMBAR SPINAL NERVE ROOT BY TRANSFORAMINAL APPROACH Bilateral 01/26/2022    Procedure: Bilateral L4/5 TF IAN with RN IV sedation;  Surgeon: Mak Young MD;  Location: [facility] PAIN MGT;  Service: Pain Management;  Laterality: Bilateral;    SELECTIVE INJECTION OF ANESTHETIC AGENT AROUND LUMBAR SPINAL NERVE ROOT BY TRANSFORAMINAL APPROACH Bilateral 03/14/2022    Procedure: Bilateral L4/5 TF IAN with RN IV sedation;  Surgeon: Mak Young MD;  Location: [facility] PAIN MGT;  Service: Pain Management;  Laterality: Bilateral;    SELECTIVE INJECTION OF ANESTHETIC AGENT AROUND LUMBAR SPINAL NERVE ROOT BY TRANSFORAMINAL APPROACH Bilateral 06/02/2022    Procedure: Bilateral L4/5 TF IAN - D2P Dr. Castro [facility];  Surgeon: Abel Haywood MD;  Location: [facility] PAIN MGT;  Service: Pain Management;  Laterality: Bilateral;    SELECTIVE INJECTION OF ANESTHETIC AGENT AROUND LUMBAR SPINAL NERVE ROOT BY TRANSFORAMINAL APPROACH Bilateral 08/25/2022    Procedure: Bilateral L4/5 TF IAN;  Surgeon: Abel Haywood MD;  Location: [facility] PAIN MGT;  Service: Pain Management;  Laterality: Bilateral;    SELECTIVE INJECTION OF ANESTHETIC AGENT AROUND LUMBAR SPINAL NERVE ROOT BY TRANSFORAMINAL APPROACH Bilateral 6/29/2023    Procedure: Bilateral L4/5 TF IAN RN IV Sedation;  Surgeon: Abel Haywood MD;  Location: [facility] PAIN MGT;  Service: Pain Management;  Laterality: Bilateral;    SELECTIVE INJECTION OF ANESTHETIC  AGENT AROUND LUMBAR SPINAL NERVE ROOT BY TRANSFORAMINAL APPROACH Bilateral 2024    Procedure: Bilateral L4/5 TF IAN;  Surgeon: Abel Haywood MD;  Location: [facility];  Service: Pain Management;  Laterality: Bilateral;    SHOULDER SURGERY Bilateral around     Dr. Pepper.  rotator cuff surgeries    VASECTOMY         Social History     Tobacco Use    Smoking status: Former     Current packs/day: 0.00     Average packs/day: 3.0 packs/day for 35.0 years (104.9 ttl pk-yrs)     Types: Cigarettes     Start date: 1960     Quit date: 1985     Years since quittin.1     Passive exposure: Past    Smokeless tobacco: Never   Substance Use Topics    Alcohol use: Yes     Alcohol/week: 3.0 standard drinks of alcohol     Types: 3 Cans of beer per week     Comment: socially    Drug use: No       Family History   Problem Relation Name Age of Onset    Lung cancer Father Isaiah Hughes         life long smoker    Cancer Father Isaiah Hughes         prostate, lung    Arthritis Mother Emley Hughes     Stroke Sister          TIA    Cataracts Sister      Cancer Brother          prostate    Cataracts Brother      Cataracts Sister      Melanoma Neg Hx      Psoriasis Neg Hx      Lupus Neg Hx      Eczema Neg Hx      Diabetes Neg Hx      Heart disease Neg Hx      Kidney disease Neg Hx      Colon cancer Neg Hx         Current Outpatient Medications   Medication Sig    acetaminophen (TYLENOL ARTHRITIS PAIN ORAL) Take by mouth. Patient states that he takes 2 tablets in the morning and 2 tablets in the evening    albuterol (PROVENTIL/VENTOLIN HFA) 90 mcg/actuation inhaler Inhale 2 puffs into the lungs every 4 (four) hours as needed for Wheezing or Shortness of Breath.    alfuzosin (UROXATRAL) 10 mg Tb24 Take 1 tablet (10 mg total) by mouth daily with breakfast.    amLODIPine (NORVASC) 5 MG tablet TAKE 1 TABLET TWICE DAILY    atorvastatin (LIPITOR) 40 MG tablet TAKE 1 TABLET EVERY DAY    azelastine (ASTELIN) 137 mcg (0.1 %)  nasal spray 1 spray (137 mcg total) by Nasal route 2 (two) times daily.    citalopram (CELEXA) 10 MG tablet Take 1 tablet (10 mg total) by mouth once daily. For anxiety    clopidogreL (PLAVIX) 75 mg tablet TAKE 1 TABLET EVERY DAY    cyclobenzaprine (FLEXERIL) 5 MG tablet     diazePAM (VALIUM) 5 MG tablet Take 1 po 45 minutes before visit.    finasteride (PROSCAR) 5 mg tablet Take 1 tablet (5 mg total) by mouth once daily.    furosemide (LASIX) 20 MG tablet Take 1 tablet (20 mg total) by mouth daily as needed (edema).    losartan (COZAAR) 50 MG tablet TAKE 1 TABLET TWICE DAILY    pantoprazole (PROTONIX) 40 MG tablet TAKE 1 TABLET EVERY DAY    pregabalin (LYRICA) 75 MG capsule Take 1 capsule (75 mg total) by mouth 2 (two) times daily.    sildenafiL (VIAGRA) 100 MG tablet Take 1 po 45 minutes before intercourse    vitamin D (VITAMIN D3) 1000 units Tab Take 1,000 Units by mouth once daily.    levoFLOXacin (LEVAQUIN) 500 MG tablet Take 1 po 1 hour before biopsy (Patient not taking: Reported on 9/6/2024)     No current facility-administered medications for this visit.     Current Outpatient Medications on File Prior to Visit   Medication Sig    acetaminophen (TYLENOL ARTHRITIS PAIN ORAL) Take by mouth. Patient states that he takes 2 tablets in the morning and 2 tablets in the evening    albuterol (PROVENTIL/VENTOLIN HFA) 90 mcg/actuation inhaler Inhale 2 puffs into the lungs every 4 (four) hours as needed for Wheezing or Shortness of Breath.    alfuzosin (UROXATRAL) 10 mg Tb24 Take 1 tablet (10 mg total) by mouth daily with breakfast.    amLODIPine (NORVASC) 5 MG tablet TAKE 1 TABLET TWICE DAILY    atorvastatin (LIPITOR) 40 MG tablet TAKE 1 TABLET EVERY DAY    azelastine (ASTELIN) 137 mcg (0.1 %) nasal spray 1 spray (137 mcg total) by Nasal route 2 (two) times daily.    citalopram (CELEXA) 10 MG tablet Take 1 tablet (10 mg total) by mouth once daily. For anxiety    clopidogreL (PLAVIX) 75 mg tablet TAKE 1 TABLET EVERY DAY     cyclobenzaprine (FLEXERIL) 5 MG tablet     diazePAM (VALIUM) 5 MG tablet Take 1 po 45 minutes before visit.    finasteride (PROSCAR) 5 mg tablet Take 1 tablet (5 mg total) by mouth once daily.    furosemide (LASIX) 20 MG tablet Take 1 tablet (20 mg total) by mouth daily as needed (edema).    losartan (COZAAR) 50 MG tablet TAKE 1 TABLET TWICE DAILY    pantoprazole (PROTONIX) 40 MG tablet TAKE 1 TABLET EVERY DAY    pregabalin (LYRICA) 75 MG capsule Take 1 capsule (75 mg total) by mouth 2 (two) times daily.    sildenafiL (VIAGRA) 100 MG tablet Take 1 po 45 minutes before intercourse    vitamin D (VITAMIN D3) 1000 units Tab Take 1,000 Units by mouth once daily.    levoFLOXacin (LEVAQUIN) 500 MG tablet Take 1 po 1 hour before biopsy (Patient not taking: Reported on 9/6/2024)     No current facility-administered medications on file prior to visit.       Review of Systems   Constitutional: Negative for malaise/fatigue.   Eyes:  Negative for blurred vision.   Cardiovascular:  Negative for chest pain, claudication, cyanosis, dyspnea on exertion, irregular heartbeat, leg swelling, near-syncope, orthopnea, palpitations and paroxysmal nocturnal dyspnea.   Respiratory:  Negative for cough, hemoptysis and shortness of breath.    Hematologic/Lymphatic: Negative for bleeding problem. Does not bruise/bleed easily.   Skin:  Negative for dry skin and itching.   Musculoskeletal:  Positive for arthritis, back pain and stiffness. Negative for falls, muscle weakness and myalgias.   Gastrointestinal:  Negative for abdominal pain, diarrhea, heartburn, hematemesis, hematochezia and melena.   Genitourinary:  Negative for flank pain and hematuria.   Neurological:  Positive for numbness and paresthesias. Negative for dizziness, focal weakness, headaches, light-headedness, seizures and weakness.   Psychiatric/Behavioral:  Negative for altered mental status and memory loss. The patient is not nervous/anxious.    Allergic/Immunologic: Negative  for hives.       Objective:   Physical Exam  Vitals and nursing note reviewed.   Constitutional:       General: He is not in acute distress.     Appearance: He is well-developed. He is not diaphoretic.   HENT:      Head: Normocephalic and atraumatic.   Eyes:      General:         Right eye: No discharge.         Left eye: No discharge.      Pupils: Pupils are equal, round, and reactive to light.   Neck:      Thyroid: No thyromegaly.      Vascular: No JVD.   Cardiovascular:      Rate and Rhythm: Normal rate and regular rhythm.      Pulses: Intact distal pulses.      Heart sounds: Murmur heard.      Harsh midsystolic murmur is present with a grade of 2/6 at the upper right sternal border radiating to the neck.      No friction rub. No gallop.   Pulmonary:      Effort: Pulmonary effort is normal. No respiratory distress.      Breath sounds: Normal breath sounds. No wheezing or rales.   Chest:      Chest wall: No tenderness.   Abdominal:      General: Bowel sounds are normal. There is no distension.      Palpations: Abdomen is soft.      Tenderness: There is no abdominal tenderness.   Musculoskeletal:         General: Normal range of motion.      Cervical back: Neck supple.      Right lower leg: Edema present.      Left lower leg: Edema present.      Comments: HAS VENOUS VARICOSITIES AND SPIDER VEINS.    Skin:     General: Skin is warm and dry.      Findings: No erythema or rash.   Neurological:      General: No focal deficit present.      Mental Status: He is alert and oriented to person, place, and time.      Cranial Nerves: No cranial nerve deficit.   Psychiatric:         Mood and Affect: Mood normal.         Behavior: Behavior normal.       Vitals:    09/06/24 0807 09/06/24 0808   BP: 120/60 124/64   BP Location: Right arm Left arm   Patient Position: Sitting Sitting   BP Method: Large (Manual) Large (Manual)   Pulse: 81    SpO2: 98%    Weight: 95.6 kg (210 lb 12.2 oz)      Lab Results   Component Value Date    CHOL  138 08/23/2024    CHOL 151 02/06/2024    CHOL 127 07/26/2023      Body mass index is 30.24 kg/m².   Lab Results   Component Value Date    HGBA1C 5.1 06/27/2023      BMP  Lab Results   Component Value Date     08/23/2024    K 4.6 08/23/2024     08/23/2024    CO2 26 08/23/2024    BUN 17 08/23/2024    CREATININE 1.3 08/23/2024    CALCIUM 9.1 08/23/2024    ANIONGAP 6 (L) 08/23/2024    EGFRNORACEVR 53.5 (A) 08/23/2024      Lab Results   Component Value Date    HDL 68 08/23/2024    HDL 63 02/06/2024    HDL 55 07/26/2023     Lab Results   Component Value Date    LDLCALC 57.8 (L) 08/23/2024    LDLCALC 70.2 02/06/2024    LDLCALC 60.4 (L) 07/26/2023     Lab Results   Component Value Date    TRIG 61 08/23/2024    TRIG 89 02/06/2024    TRIG 58 07/26/2023     Lab Results   Component Value Date    CHOLHDL 49.3 08/23/2024    CHOLHDL 41.7 02/06/2024    CHOLHDL 43.3 07/26/2023       Chemistry        Component Value Date/Time     08/23/2024 0918    K 4.6 08/23/2024 0918     08/23/2024 0918    CO2 26 08/23/2024 0918    BUN 17 08/23/2024 0918    CREATININE 1.3 08/23/2024 0918    GLU 92 08/23/2024 0918        Component Value Date/Time    CALCIUM 9.1 08/23/2024 0918    ALKPHOS 62 08/23/2024 0918    AST 15 08/23/2024 0918    ALT 11 08/23/2024 0918    BILITOT 0.8 08/23/2024 0918    ESTGFRAFRICA 57.9 (A) 06/29/2022 0917    EGFRNONAA 50.1 (A) 06/29/2022 0917          Lab Results   Component Value Date    TSH 1.506 08/15/2024     Lab Results   Component Value Date    INR 1.2 06/29/2022    INR 1.1 02/24/2021    INR 1.2 07/07/2020     Lab Results   Component Value Date    WBC 4.28 05/09/2024    HGB 13.7 (L) 05/09/2024    HCT 42.9 05/09/2024    MCV 98 05/09/2024     (L) 05/09/2024     BMP  Sodium   Date Value Ref Range Status   08/23/2024 137 136 - 145 mmol/L Final     Potassium   Date Value Ref Range Status   08/23/2024 4.6 3.5 - 5.1 mmol/L Final     Chloride   Date Value Ref Range Status   08/23/2024 105 95 - 110  mmol/L Final     CO2   Date Value Ref Range Status   08/23/2024 26 23 - 29 mmol/L Final     BUN   Date Value Ref Range Status   08/23/2024 17 8 - 23 mg/dL Final     Creatinine   Date Value Ref Range Status   08/23/2024 1.3 0.5 - 1.4 mg/dL Final     Calcium   Date Value Ref Range Status   08/23/2024 9.1 8.7 - 10.5 mg/dL Final     Anion Gap   Date Value Ref Range Status   08/23/2024 6 (L) 8 - 16 mmol/L Final     eGFR if    Date Value Ref Range Status   06/29/2022 57.9 (A) >60 mL/min/1.73 m^2 Final     eGFR if non    Date Value Ref Range Status   06/29/2022 50.1 (A) >60 mL/min/1.73 m^2 Final     Comment:     Calculation used to obtain the estimated glomerular filtration  rate (eGFR) is the CKD-EPI equation.        CrCl cannot be calculated (Patient's most recent lab result is older than the maximum 7 days allowed.).    Summary  Show Result Comparison     Left Ventricle: The left ventricle is normal in size. Normal wall thickness. There is concentric remodeling. Normal wall motion. There is normal systolic function with a visually estimated ejection fraction of 55 - 70%. Ejection fraction by visual approximation is 65%. There is normal diastolic function.    Right Ventricle: Normal right ventricular cavity size. Wall thickness is normal. Right ventricle wall motion  is normal. Systolic function is normal.    Aortic Valve: The aortic valve is a trileaflet valve. There is mild aortic valve sclerosis. Mildly restricted motion. There is no stenosis. Aortic valve peak velocity is 2.01 m/s. Mean gradient is 8 mmHg.    Mitral Valve: The mitral valve is structurally normal. There is mild regurgitation.    Tricuspid Valve: There is mild regurgitation.    Pulmonary Artery: The estimated pulmonary artery systolic pressure is 50 mmHg.    IVC/SVC: Intermediate venous pressure at 8 mmHg.     Assessment:     1. Primary hypertension    2. Anemia, unspecified type    3. Stage 3a chronic kidney disease     4. Aortic atherosclerosis    5. Atherosclerosis of artery of both lower extremities    6. Abnormal EKG    7. Pulmonary hypertension    8. Obstructive sleep apnea syndrome      LEG SWELLING DUE TO VENOUS INSUFFICIENCY SUGGEST LEG ELEVATION LOW SALT DIET COMPRESSION SOCKS USE. POSSIBLE AMLODIPINE CONTRIBUTION.  DJD LOW BACK PAIN CAUSING SOME NEUROGENIC CLAUDICATION. HE WILL BENEFIT FROM SWIMMING LOWER BACK EXERCISE WATER AEROBICS reemphasized issue again  HTN CONTROLLED LOW SALT DIET EMPHASIZED.  HLP ON [employer] [facility] WELL. STABLE.   GUIDO NOT USING CPAP RECEIVED NEW EQUIPMENT HE WILL START USING AGAIN.  AORTIC SCLEROSIS MURMUR WILL REPEAT ECHO HE DOES NOT HAVE ANY SIGNIFICANT STENOSIS NO CHANGE IN THERAPY   Plan:   Continue current therapy  Cardiac low salt diet.  Risk factor modification and excercise program.  F/u in 6 months with lipid cmp

## 2024-09-08 ENCOUNTER — PATIENT MESSAGE (OUTPATIENT)
Dept: ORTHOPEDICS | Facility: CLINIC | Age: 84
End: 2024-09-08
Payer: MEDICARE

## 2024-09-08 ENCOUNTER — PATIENT MESSAGE (OUTPATIENT)
Dept: PAIN MEDICINE | Facility: CLINIC | Age: 84
End: 2024-09-08
Payer: MEDICARE

## 2024-09-09 ENCOUNTER — PATIENT MESSAGE (OUTPATIENT)
Dept: PRIMARY CARE CLINIC | Facility: CLINIC | Age: 84
End: 2024-09-09
Payer: MEDICARE

## 2024-09-09 RX ORDER — PREGABALIN 75 MG/1
75 CAPSULE ORAL 2 TIMES DAILY
Qty: 60 CAPSULE | OUTPATIENT
Start: 2024-09-09

## 2024-09-09 NOTE — TELEPHONE ENCOUNTER
Liliana Mcarthur, PA  You3 hours ago (10:14 AM)       You can schedule him October 15th ish or later to discuss total hip with me  He can cancel if the nerve block works  Unfortunately if the injection in the hip didn't offer long term relief the next step is surgery in the form of a total hip replacement

## 2024-09-09 NOTE — TELEPHONE ENCOUNTER
I need him to stop the Plavix for the procedure. I believe this question is for Dr. Ozuna to give him the okay to hold the Plavix.

## 2024-09-09 NOTE — TELEPHONE ENCOUNTER
Can you refill? Pregabalin 75MG    Last Visit: 08/29/24  Next Visit: none  Last refill: 06/25/24  How pt patient is currently taking medication requested:  1 capsule PO BID  Pharmacy:   [facility] [place] [id number]

## 2024-09-10 RX ORDER — PREGABALIN 75 MG/1
75 CAPSULE ORAL 2 TIMES DAILY
Qty: 60 CAPSULE | Refills: 1 | Status: SHIPPED | OUTPATIENT
Start: 2024-09-10

## 2024-09-12 ENCOUNTER — HOSPITAL ENCOUNTER (OUTPATIENT)
Facility: HOSPITAL | Age: 84
Discharge: HOME | End: 2024-09-12
Attending: PHYSICAL MEDICINE & REHABILITATION | Admitting: PHYSICAL MEDICINE & REHABILITATION
Payer: MEDICARE

## 2024-09-12 VITALS
HEIGHT: 70 IN | TEMPERATURE: 97 F | SYSTOLIC BLOOD PRESSURE: 125 MMHG | WEIGHT: 208.31 LBS | DIASTOLIC BLOOD PRESSURE: 60 MMHG | BODY MASS INDEX: 29.82 KG/M2 | HEART RATE: 75 BPM | RESPIRATION RATE: 15 BRPM | OXYGEN SATURATION: 97 %

## 2024-09-12 DIAGNOSIS — M16.10 HIP ARTHRITIS: Primary | ICD-10-CM

## 2024-09-12 PROCEDURE — 64447 NJX AA&/STRD FEMORAL NRV IMG: CPT | Mod: HCNC,RT | Performed by: PHYSICAL MEDICINE & REHABILITATION

## 2024-09-12 PROCEDURE — 64450 NJX AA&/STRD OTHER PN/BRANCH: CPT | Mod: HCNC,RT | Performed by: PHYSICAL MEDICINE & REHABILITATION

## 2024-09-12 PROCEDURE — 64447 NJX AA&/STRD FEMORAL NRV IMG: CPT | Mod: HCNC,RT,, | Performed by: PHYSICAL MEDICINE & REHABILITATION

## 2024-09-12 PROCEDURE — 63600175 PHARM REV CODE 636 W HCPCS: Mod: HCNC | Performed by: PHYSICAL MEDICINE & REHABILITATION

## 2024-09-12 PROCEDURE — 64450 NJX AA&/STRD OTHER PN/BRANCH: CPT | Mod: HCNC,RT,, | Performed by: PHYSICAL MEDICINE & REHABILITATION

## 2024-09-12 PROCEDURE — 25500020 PHARM REV CODE 255: Mod: HCNC | Performed by: PHYSICAL MEDICINE & REHABILITATION

## 2024-09-12 RX ORDER — METHYLPREDNISOLONE ACETATE 80 MG/ML
INJECTION, SUSPENSION INTRA-ARTICULAR; INTRALESIONAL; INTRAMUSCULAR; SOFT TISSUE
Status: DISCONTINUED | OUTPATIENT
Start: 2024-09-12 | End: 2024-09-12 | Stop reason: HOSPADM

## 2024-09-12 RX ORDER — BUPIVACAINE HYDROCHLORIDE 2.5 MG/ML
INJECTION, SOLUTION EPIDURAL; INFILTRATION; INTRACAUDAL
Status: DISCONTINUED | OUTPATIENT
Start: 2024-09-12 | End: 2024-09-12 | Stop reason: HOSPADM

## 2024-09-12 RX ORDER — FENTANYL CITRATE 50 UG/ML
INJECTION, SOLUTION INTRAMUSCULAR; INTRAVENOUS
Status: DISCONTINUED | OUTPATIENT
Start: 2024-09-12 | End: 2024-09-12 | Stop reason: HOSPADM

## 2024-09-12 RX ORDER — ONDANSETRON HYDROCHLORIDE 2 MG/ML
4 INJECTION, SOLUTION INTRAVENOUS ONCE AS NEEDED
Status: DISCONTINUED | OUTPATIENT
Start: 2024-09-12 | End: 2024-09-12 | Stop reason: HOSPADM

## 2024-09-12 RX ORDER — MIDAZOLAM HYDROCHLORIDE 1 MG/ML
INJECTION INTRAMUSCULAR; INTRAVENOUS
Status: DISCONTINUED | OUTPATIENT
Start: 2024-09-12 | End: 2024-09-12 | Stop reason: HOSPADM

## 2024-09-12 NOTE — DISCHARGE INSTRUCTIONS

## 2024-09-12 NOTE — H&P
HPI  Patient presenting for Procedure(s) (LRB):  right femoral/ obturator nerve block- with steroids (Right)     No health changes since previous encounter    Past Medical History:   Diagnosis Date    Allergic rhinitis     Anemia     Back pain     BPH (benign prostatic hyperplasia)     Cancer     skin cancer to neck, Dr. Graves    Cataract     Disorder of kidney and ureter     ED (erectile dysfunction)     OMARI (generalized anxiety disorder) 08/07/2023    Hiatal hernia     small    History of colon polyps     colonoscopy 11/2016    HLD (hyperlipidemia)     Hyperlipidemia     Hypertension     Lateral epicondylitis     Lumbar radiculopathy 01/26/2022    OA (osteoarthritis)     GUIDO (obstructive sleep apnea)     Polyneuropathy     Prostate cancer     Dr. Wong    TIA (transient ischemic attack)     Trouble in sleeping     Urge incontinence 03/29/2022     Past Surgical History:   Procedure Laterality Date    ANGIOGRAPHY OF UPPER EXTREMITY Left 07/05/2022    Procedure: Angiogram Extremity Bilateral;  Surgeon: Aparna Thompson MD;  Location: [facility] CATH LAB;  Service: Cardiology;  Laterality: Left;    ARTERIOGRAPHY OF AORTIC ROOT N/A 07/05/2022    Procedure: ARTERIOGRAM, AORTIC ROOT;  Surgeon: Aparna Thompson MD;  Location: [facility] CATH LAB;  Service: Cardiology;  Laterality: N/A;    CATARACT EXTRACTION W/  INTRAOCULAR LENS IMPLANT Right 02/21/2018    I-Stent    CATARACT EXTRACTION W/  INTRAOCULAR LENS IMPLANT Left 03/21/2018    I - Stent    CATHETERIZATION OF BOTH LEFT AND RIGHT HEART N/A 07/05/2022    Procedure: CATHETERIZATION, HEART, BOTH LEFT AND RIGHT;  Surgeon: Aparna Thompson MD;  Location: [facility] CATH LAB;  Service: Cardiology;  Laterality: N/A;  radial approach    COLONOSCOPY N/A 11/14/2016    Procedure: COLONOSCOPY;  Surgeon: Karuna Rodriguez MD;  Location: [facility] ENDO;  Service: Endoscopy;  Laterality: N/A;    COLONOSCOPY N/A 09/22/2020    Procedure: COLONOSCOPY;  Surgeon: Chuy Fish MD;  Location: [facility]  ENDO;  Service: Endoscopy;  Laterality: N/A;    EPIDURAL STEROID INJECTION N/A 6/6/2024    Procedure: Lumbar L5/S1 IL IAN;  Surgeon: Abel Haywood MD;  Location: [facility] PAIN MGT;  Service: Pain Management;  Laterality: N/A;    EPIDURAL STEROID INJECTION INTO CERVICAL SPINE N/A 2/8/2024    Procedure: C5/6 IL Right paramedian approach IAN with RN IV sedation;  Surgeon: Abel Haywood MD;  Location: [facility];  Service: Pain Management;  Laterality: N/A;    EYE SURGERY      HEMORRHOID SURGERY      I-STENT Right 02/21/2018    DR. REECE    INJECTION OF ANESTHETIC AGENT AROUND MEDIAL BRANCH NERVES INNERVATING CERVICAL FACET JOINT Bilateral 7/18/2023    Procedure: Bilateral C4-6 MBB with RN IV sedation (diagnostic, #1);  Surgeon: Abel Haywood MD;  Location: [facility];  Service: Pain Management;  Laterality: Bilateral;    KNEE ARTHROSCOPY W/ MENISCAL REPAIR Right 2015    Dr. Jain    PCIOL Right 02/21/2018    DR. REECE    PLANTAR FASCIA RELEASE      right    ROTATOR CUFF REPAIR Bilateral     bilateral    SELECTIVE INJECTION OF ANESTHETIC AGENT AROUND LUMBAR SPINAL NERVE ROOT BY TRANSFORAMINAL APPROACH Bilateral 01/26/2022    Procedure: Bilateral L4/5 TF IAN with RN IV sedation;  Surgeon: Mak Young MD;  Location: [facility];  Service: Pain Management;  Laterality: Bilateral;    SELECTIVE INJECTION OF ANESTHETIC AGENT AROUND LUMBAR SPINAL NERVE ROOT BY TRANSFORAMINAL APPROACH Bilateral 03/14/2022    Procedure: Bilateral L4/5 TF IAN with RN IV sedation;  Surgeon: Mak Young MD;  Location: [facility];  Service: Pain Management;  Laterality: Bilateral;    SELECTIVE INJECTION OF ANESTHETIC AGENT AROUND LUMBAR SPINAL NERVE ROOT BY TRANSFORAMINAL APPROACH Bilateral 06/02/2022    Procedure: Bilateral L4/5 TF IAN - D2P Dr. Matthew CABRERA;  Surgeon: Abel Haywood MD;  Location: [facility];  Service: Pain Management;  Laterality: Bilateral;    SELECTIVE INJECTION OF ANESTHETIC AGENT AROUND  LUMBAR SPINAL NERVE ROOT BY TRANSFORAMINAL APPROACH Bilateral 08/25/2022    Procedure: Bilateral L4/5 TF IAN;  Surgeon: Abel Haywood MD;  Location: [facility];  Service: Pain Management;  Laterality: Bilateral;    SELECTIVE INJECTION OF ANESTHETIC AGENT AROUND LUMBAR SPINAL NERVE ROOT BY TRANSFORAMINAL APPROACH Bilateral 6/29/2023    Procedure: Bilateral L4/5 TF IAN RN IV Sedation;  Surgeon: Abel Haywood MD;  Location: [facility];  Service: Pain Management;  Laterality: Bilateral;    SELECTIVE INJECTION OF ANESTHETIC AGENT AROUND LUMBAR SPINAL NERVE ROOT BY TRANSFORAMINAL APPROACH Bilateral 4/11/2024    Procedure: Bilateral L4/5 TF IAN;  Surgeon: Abel Haywood MD;  Location: [facility];  Service: Pain Management;  Laterality: Bilateral;    SHOULDER SURGERY Bilateral around 2000    Dr. Pepper.  rotator cuff surgeries    VASECTOMY       Review of patient's allergies indicates:   Allergen Reactions    Atarax [hydroxyzine hcl] Other (See Comments)     Raised blood pressure     Zyrtec [cetirizine] Other (See Comments)     Raised blood pressure     Gold sodium thiosulfate      Patch test positive    Meloxicam Rash     Other reaction(s): hypertension        No current facility-administered medications on file prior to encounter.     Current Outpatient Medications on File Prior to Encounter   Medication Sig Dispense Refill    amLODIPine (NORVASC) 5 MG tablet TAKE 1 TABLET TWICE DAILY 180 tablet 2    atorvastatin (LIPITOR) 40 MG tablet TAKE 1 TABLET EVERY DAY 90 tablet 3    citalopram (CELEXA) 10 MG tablet Take 1 tablet (10 mg total) by mouth once daily. For anxiety 90 tablet 3    clopidogreL (PLAVIX) 75 mg tablet TAKE 1 TABLET EVERY DAY 90 tablet 2    furosemide (LASIX) 20 MG tablet Take 1 tablet (20 mg total) by mouth daily as needed (edema). 30 tablet 0    losartan (COZAAR) 50 MG tablet TAKE 1 TABLET TWICE DAILY 180 tablet 3    pantoprazole (PROTONIX) 40 MG tablet TAKE 1 TABLET EVERY DAY 90  "tablet 3    acetaminophen (TYLENOL ARTHRITIS PAIN ORAL) Take by mouth. Patient states that he takes 2 tablets in the morning and 2 tablets in the evening      albuterol (PROVENTIL/VENTOLIN HFA) 90 mcg/actuation inhaler Inhale 2 puffs into the lungs every 4 (four) hours as needed for Wheezing or Shortness of Breath. 8.7 g 1    alfuzosin (UROXATRAL) 10 mg Tb24 Take 1 tablet (10 mg total) by mouth daily with breakfast. 90 tablet 3    azelastine (ASTELIN) 137 mcg (0.1 %) nasal spray 1 spray (137 mcg total) by Nasal route 2 (two) times daily. 90 mL 3    cyclobenzaprine (FLEXERIL) 5 MG tablet       diazePAM (VALIUM) 5 MG tablet Take 1 po 45 minutes before visit. 2 tablet 0    finasteride (PROSCAR) 5 mg tablet Take 1 tablet (5 mg total) by mouth once daily. 90 tablet 4    levoFLOXacin (LEVAQUIN) 500 MG tablet Take 1 po 1 hour before biopsy (Patient not taking: Reported on 9/6/2024) 1 tablet 0    sildenafiL (VIAGRA) 100 MG tablet Take 1 po 45 minutes before intercourse 30 tablet 7    vitamin D (VITAMIN D3) 1000 units Tab Take 1,000 Units by mouth once daily.          PMHx, PSHx, Allergies, Medications reviewed in epic    ROS negative except pain complaints in HPI    OBJECTIVE:    BP (!) 153/67 (BP Location: Right arm, Patient Position: Sitting)   Pulse 75   Temp 97.4 °F (36.3 °C) (Temporal)   Resp 16   Ht 5' 10" (1.778 m)   Wt 94.5 kg (208 lb 5.4 oz)   SpO2 96%   BMI 29.89 kg/m²     PHYSICAL EXAMINATION:    GENERAL: Well appearing, in no acute distress, alert and oriented x3.  PSYCH:  Mood and affect appropriate.  SKIN: Skin color, texture, turgor normal, no rashes or lesions which will impact the procedure.  CV: RRR with palpation of the radial artery.  PULM: No evidence of respiratory difficulty, symmetric chest rise. Clear to auscultation.  NEURO: Cranial nerves grossly intact.    Plan:    Proceed with procedure as planned Procedure(s) (LRB):  right femoral/ obturator nerve block- with steroids (Right)    Abel " JAZZMINE Haywood MD  09/12/2024

## 2024-09-12 NOTE — DISCHARGE SUMMARY
Discharge Note  Short Stay      SUMMARY     Admit Date: 9/12/2024    Attending Physician: Abel Haywood MD        Discharge Physician: Abel Haywood MD        Discharge Date: 9/12/2024 8:15 AM    Procedure(s) (LRB):  right femoral/ obturator nerve block- with steroids (Right)    Final Diagnosis: Right hip pain [M25.551]  Primary osteoarthritis of both hips [M16.0]    Disposition: Home or self care    Patient Instructions:   Current Discharge Medication List        CONTINUE these medications which have NOT CHANGED    Details   amLODIPine (NORVASC) 5 MG tablet TAKE 1 TABLET TWICE DAILY  Qty: 180 tablet, Refills: 2    Comments: .  Associated Diagnoses: Primary hypertension      atorvastatin (LIPITOR) 40 MG tablet TAKE 1 TABLET EVERY DAY  Qty: 90 tablet, Refills: 3    Associated Diagnoses: Mixed hyperlipidemia      citalopram (CELEXA) 10 MG tablet Take 1 tablet (10 mg total) by mouth once daily. For anxiety  Qty: 90 tablet, Refills: 3    Associated Diagnoses: OMARI (generalized anxiety disorder)      clopidogreL (PLAVIX) 75 mg tablet TAKE 1 TABLET EVERY DAY  Qty: 90 tablet, Refills: 2      furosemide (LASIX) 20 MG tablet Take 1 tablet (20 mg total) by mouth daily as needed (edema).  Qty: 30 tablet, Refills: 0      losartan (COZAAR) 50 MG tablet TAKE 1 TABLET TWICE DAILY  Qty: 180 tablet, Refills: 3    Comments: .  Associated Diagnoses: Essential hypertension      pantoprazole (PROTONIX) 40 MG tablet TAKE 1 TABLET EVERY DAY  Qty: 90 tablet, Refills: 3      acetaminophen (TYLENOL ARTHRITIS PAIN ORAL) Take by mouth. Patient states that he takes 2 tablets in the morning and 2 tablets in the evening      alfuzosin (UROXATRAL) 10 mg Tb24 Take 1 tablet (10 mg total) by mouth daily with breakfast.  Qty: 90 tablet, Refills: 3      azelastine (ASTELIN) 137 mcg (0.1 %) nasal spray 1 spray (137 mcg total) by Nasal route 2 (two) times daily.  Qty: 90 mL, Refills: 3    Associated Diagnoses: Allergic rhinitis, unspecified  seasonality, unspecified trigger      cyclobenzaprine (FLEXERIL) 5 MG tablet       diazePAM (VALIUM) 5 MG tablet Take 1 po 45 minutes before visit.  Qty: 2 tablet, Refills: 0      finasteride (PROSCAR) 5 mg tablet Take 1 tablet (5 mg total) by mouth once daily.  Qty: 90 tablet, Refills: 4      levoFLOXacin (LEVAQUIN) 500 MG tablet Take 1 po 1 hour before biopsy  Qty: 1 tablet, Refills: 0      pregabalin (LYRICA) 75 MG capsule Take 1 capsule (75 mg total) by mouth 2 (two) times daily.  Qty: 60 capsule, Refills: 1      sildenafiL (VIAGRA) 100 MG tablet Take 1 po 45 minutes before intercourse  Qty: 30 tablet, Refills: 7      vitamin D (VITAMIN D3) 1000 units Tab Take 1,000 Units by mouth once daily.           STOP taking these medications       albuterol (PROVENTIL/VENTOLIN HFA) 90 mcg/actuation inhaler Comments:   Reason for Stopping:                   Discharge Diagnosis: Right hip pain [M25.551]  Primary osteoarthritis of both hips [M16.0]  Condition on Discharge: Stable with no complications to procedure   Diet on Discharge: Same as before.  Activity: as per instruction sheet.  Discharge to: Home with a responsible adult.  Follow up: 2-4 weeks       Please call the office at [phone] if you experience any weakness or loss of sensation, fever > 101.5, pain uncontrolled with oral medications, persistent nausea/vomiting/or diarrhea, redness or drainage from the incisions, or any other worrisome concerns. If physician on call was not reached or could not communicate with our office for any reason please go to the nearest emergency department

## 2024-09-12 NOTE — OP NOTE
"  Patient Name: Ezequiel Hughes  MRN: [id number]    INFORMED CONSENT: The procedure, risks, benefits and options were discussed with patient. There are no contraindications to the procedure. The patient expressed understanding and agreed to proceed. The personnel performing the procedure was discussed. I verify that I personally obtained Ezequiel's consent prior to the start of the procedure and the signed consent can be found on the patient's chart.    Procedure Date: 09/12/2024    Anesthesia: Topical    Pre Procedure diagnosis: Right hip pain [M25.551]  Primary osteoarthritis of both hips [M16.0]  1. Hip arthritis      Post-Procedure diagnosis: SAME      Moderate Sedation: Conscious sedation provided by M.D    The patient was monitored with continuous pulse oximetry, EKG, and intermittent blood pressure monitors, immediately prior to administration of sedation.  The patient was hemodynamically stable throughout the entire process was responsive to voice, and breathing spontaneously.  Supplemental O2 was provided at 2L/min via nasal cannula.  Patient was comfortable for the duration of the procedure.     There was a total of 2mg IV Midazolam and 50mcg Fentanyl titrated for the procedure    Total sedation time was <10 minutes      PROCEDURE: Right Obturator and Femoral branches block      DESCRIPTION OF PROCEDURE: The patient was brought to the procedure room.  After performing time out. IV access was obtained prior to the procedure.  The patient was positioned supine on the fluoroscopy table.  Continuous hemodynamic monitoring was initiated including blood pressure, EKG, and pulse oximetry. .  The skin overlying the right hip was prepped with chlorhexidine and draped in a sterile fashion.  The skin and subcutaneous tissue was anesthetized using 3 cc of lidocaine 1%. Using fluoroscopic guidance a 25g 3.5"  needle  was advanced to the anteromedial aspect of the extraarticular portion of the hip joint where the articular " branch of the femoral nerve traverses until a bony endpoint is felt. Attempted aspiration yielded no blood. . A second needle was placed and using fluoroscopic guidance the needle was advanced to the incisura of the acetabulum where the articular branch of the obturator nerve traverses until a bony endpoint was met. Attempted aspiration yielded no blood. . Total of 4 cc of bupivacaine 0.25% and 40 mg of Depo-Medrol  was injected was injected at all sites, divided equally. The needle was removed and bleeding was nill.  A sterile dressing was applied. The patient was taken back to the recovery room for further observation.    Blood Loss: Nill  Specimen: None        Discharge Diagnosis: Same as Pre and Post Procedure  Condition on Discharge: Stable.  Diet on Discharge: Same as before.  Activity: as per instruction sheet.  Discharge to: Home with a responsible adult.  Follow up: as per Discharge instructions    Abel Haywood MD

## 2024-09-14 DIAGNOSIS — I10 ESSENTIAL HYPERTENSION: ICD-10-CM

## 2024-09-14 NOTE — TELEPHONE ENCOUNTER
No care due was identified.  Health [facility] Embedded Care Due Messages. Reference number: [id number].   9/14/2024 3:12:57 AM CDT

## 2024-09-15 RX ORDER — CLOPIDOGREL BISULFATE 75 MG/1
TABLET ORAL
Qty: 90 TABLET | Refills: 2 | Status: SHIPPED | OUTPATIENT
Start: 2024-09-15

## 2024-09-15 RX ORDER — LOSARTAN POTASSIUM 50 MG/1
TABLET ORAL
Qty: 180 TABLET | Refills: 2 | Status: SHIPPED | OUTPATIENT
Start: 2024-09-15

## 2024-09-15 NOTE — TELEPHONE ENCOUNTER
Refill Decision Note   Ezequiel Hughes  is requesting a refill authorization.  Brief Assessment and Rationale for Refill:  Approve     Medication Therapy Plan:        Comments:     Note composed:9:42 AM 09/15/2024

## 2024-09-18 RX ORDER — PREGABALIN 75 MG/1
75 CAPSULE ORAL 2 TIMES DAILY
Qty: 180 CAPSULE | Refills: 1 | Status: SHIPPED | OUTPATIENT
Start: 2024-09-18

## 2024-09-19 ENCOUNTER — OFFICE VISIT (OUTPATIENT)
Dept: OPHTHALMOLOGY | Facility: CLINIC | Age: 84
End: 2024-09-19
Payer: MEDICARE

## 2024-09-19 DIAGNOSIS — H40.1111 PRIMARY OPEN ANGLE GLAUCOMA (POAG) OF RIGHT EYE, MILD STAGE: Primary | ICD-10-CM

## 2024-09-19 DIAGNOSIS — H40.1122 PRIMARY OPEN ANGLE GLAUCOMA (POAG) OF LEFT EYE, MODERATE STAGE: ICD-10-CM

## 2024-09-19 DIAGNOSIS — Z96.1 PSEUDOPHAKIA: ICD-10-CM

## 2024-09-19 DIAGNOSIS — H35.373 EPIRETINAL MEMBRANE (ERM), BILATERAL: ICD-10-CM

## 2024-09-19 PROCEDURE — 1160F RVW MEDS BY RX/DR IN RCRD: CPT | Mod: HCNC,CPTII,S$GLB, | Performed by: OPHTHALMOLOGY

## 2024-09-19 PROCEDURE — 1159F MED LIST DOCD IN RCRD: CPT | Mod: HCNC,CPTII,S$GLB, | Performed by: OPHTHALMOLOGY

## 2024-09-19 PROCEDURE — 92083 EXTENDED VISUAL FIELD XM: CPT | Mod: HCNC,S$GLB,, | Performed by: OPHTHALMOLOGY

## 2024-09-19 PROCEDURE — 92133 CPTRZD OPH DX IMG PST SGM ON: CPT | Mod: HCNC,S$GLB,, | Performed by: OPHTHALMOLOGY

## 2024-09-19 PROCEDURE — 99999 PR PBB SHADOW E&M-EST. PATIENT-LVL III: CPT | Mod: PBBFAC,HCNC,, | Performed by: OPHTHALMOLOGY

## 2024-09-19 PROCEDURE — 92014 COMPRE OPH EXAM EST PT 1/>: CPT | Mod: HCNC,S$GLB,, | Performed by: OPHTHALMOLOGY

## 2024-09-19 NOTE — PROGRESS NOTES
SUBJECTIVE  Ezequiel Hughes is 84 y.o. male  Uncorrected distance visual acuity was 20/25 in the right eye and 20/20 -2 in the left eye.   Chief Complaint   Patient presents with    Glaucoma     Patient reports for 4 month IOP check. Using gtts as advised. Denies pain or irritation at this time. Patient reports of visual stability since previous visit.             HPI     Glaucoma     Additional comments: Patient reports for 4 month IOP check. Using gtts as   advised. Denies pain or irritation at this time. Patient reports of visual   stability since previous visit.              Comments    1. Mod COAG OS + Mild COAG OD (init 22/26 post dilation IOP ) goal = 17  SLT OD 1/18/23 (19.5-12)  SLT OS 12/2/20 (19.5-15)  2. PCIOL / I-Stent OD +18.5 [id number] (distance) 2-21-18  PCIOL /I-Stent OS +21.0 (-1.75) 3/21/18 near  3. ERM OU   4. Brow Lift 9/18   *intolerant of travatan*      No eyedrops            Last edited by Sascha Lujan on 9/19/2024 10:33 AM.         Assessment /Plan :  1. Primary open angle glaucoma (POAG) of right eye, mild stage Doing well, intraocular pressure (IOP) within acceptable range relative to target IOP and no evidence of progression. Continue current treatment. Reviewed importance of continued compliance with treatment and follow up.      Return to clinic in 4 months  or as needed.  With IOP Check     2. Primary open angle glaucoma (POAG) of left eye, moderate stage    3. Epiretinal membrane (ERM), bilateral - monitor for now   4. Pseudophakia  -- Condition stable, no therapeutic change required. Monitoring routinely.

## 2024-09-23 ENCOUNTER — PROCEDURE VISIT (OUTPATIENT)
Dept: UROLOGY | Facility: CLINIC | Age: 84
End: 2024-09-23
Payer: MEDICARE

## 2024-09-23 ENCOUNTER — PATIENT MESSAGE (OUTPATIENT)
Dept: CARDIOLOGY | Facility: CLINIC | Age: 84
End: 2024-09-23
Payer: MEDICARE

## 2024-09-23 VITALS
BODY MASS INDEX: 29.82 KG/M2 | WEIGHT: 208.31 LBS | RESPIRATION RATE: 18 BRPM | SYSTOLIC BLOOD PRESSURE: 144 MMHG | DIASTOLIC BLOOD PRESSURE: 67 MMHG | HEIGHT: 70 IN | TEMPERATURE: 98 F | HEART RATE: 60 BPM

## 2024-09-23 DIAGNOSIS — C61 PROSTATE CANCER: Primary | ICD-10-CM

## 2024-09-23 LAB
BILIRUB UR QL STRIP: NEGATIVE
GLUCOSE UR QL STRIP: NEGATIVE
KETONES UR QL STRIP: NEGATIVE
LEUKOCYTE ESTERASE UR QL STRIP: NEGATIVE
PH, POC UA: 5.5
POC BLOOD, URINE: POSITIVE
POC NITRATES, URINE: NEGATIVE
PROT UR QL STRIP: NEGATIVE
SP GR UR STRIP: <=1.005 (ref 1–1.03)
UROBILINOGEN UR STRIP-ACNC: 0.2 (ref 0.3–2.2)

## 2024-09-23 PROCEDURE — 88305 TISSUE EXAM BY PATHOLOGIST: CPT | Mod: HCNC | Performed by: PATHOLOGY

## 2024-09-23 PROCEDURE — 88344 IMHCHEM/IMCYTCHM EA MLT ANTB: CPT | Mod: HCNC | Performed by: PATHOLOGY

## 2024-09-23 RX ORDER — LIDOCAINE HYDROCHLORIDE 20 MG/ML
JELLY TOPICAL
Status: COMPLETED | OUTPATIENT
Start: 2024-09-23 | End: 2024-09-23

## 2024-09-23 RX ORDER — CEFTRIAXONE 1 G/1
1 INJECTION, POWDER, FOR SOLUTION INTRAMUSCULAR; INTRAVENOUS
Status: COMPLETED | OUTPATIENT
Start: 2024-09-23 | End: 2024-09-23

## 2024-09-23 RX ADMIN — CEFTRIAXONE 1 G: 1 INJECTION, POWDER, FOR SOLUTION INTRAMUSCULAR; INTRAVENOUS at 01:09

## 2024-09-23 RX ADMIN — LIDOCAINE HYDROCHLORIDE 10 ML: 20 JELLY TOPICAL at 01:09

## 2024-09-23 NOTE — PROGRESS NOTES
.Patient came in for scheduled prostate biopsy Rocephin 1gm injection ordered, Confirmed patient's allergies, Rocephin 1gm injection administered IM to Right dorsalgluteal with 23G needle using aseptic technique. Pt tolerated procedure well. Patient agreed to wait 15 minutes after injection in the clinic and to report any adverse reactions.    Paul Mars RN

## 2024-09-23 NOTE — PROCEDURES
Procedure: (1) Transrectal Prostate Biopsy                      (2) Transrectal ultrasound of prostate with MRI fusion of images                     (3) Ultrasound Guidance of Prostate Biopsy needles    Reason for procedure: Prostate cancer, abnormal MRI      Detail: Antibiotic prophylaxis was provided using Rocephin and levaquin. After proper consents were obtained, the patient was prepped and draped in normal fashion in the left lateral decubitus position for TRUS/Bx.  5 ml of lidocaine jelly was instilled in the rectum.  Rectal exam noted a smooth prostate. The U/S with rectal probe was into the patient's rectum. A sweep of the prostate was performed from left to right in the sagittal plane, and the MRI images were aligned with the ultrasound images. A spinal needle was used and 10ml of 1% lidocaine was instilled on the side of the prostate at the base of the seminal vesicles. Systematic review was performed of the prostate, noting no hypoechoic lesions. The prostate measured to be 61cc on ultrasound, 61cc on MRI as well. The region of interest was first targeted. Biopsy was then performed using an 18Ga biopsy needle in a standard fashion directed at the base, mid and apex bilaterally for a total of 15 cores. The patient tolerated the procedure well. Estimated blood loss was 3cc.     Ezequiel was seen today for procedure.    Diagnoses and all orders for this visit:    Prostate cancer  -     POCT Urinalysis, Dipstick, Automated, W/O Scope  -     Specimen to Pathology Urology    Other orders  -     cefTRIAXone injection 1 g  -     LIDOcaine HCl 2% urojet        I had a detailed discussion with the patient regarding post-TRUS biopsy fever and need to go to the ED for admission for IV antibiotics for any temperature above 100.4 degrees.     He will follow up in 1 week for TRUS biopsy results.

## 2024-09-27 LAB
COMMENT: NORMAL
FINAL PATHOLOGIC DIAGNOSIS: NORMAL
GROSS: NORMAL
Lab: NORMAL

## 2024-10-01 DIAGNOSIS — I10 ESSENTIAL HYPERTENSION: Primary | ICD-10-CM

## 2024-10-01 RX ORDER — FUROSEMIDE 20 MG/1
20 TABLET ORAL DAILY PRN
Qty: 30 TABLET | Refills: 0 | Status: SHIPPED | OUTPATIENT
Start: 2024-10-01 | End: 2025-10-01

## 2024-10-01 NOTE — TELEPHONE ENCOUNTER
No care due was identified.  [facility] Embedded Care Due Messages. Reference number: [id number].   10/01/2024 10:00:26 AM CDT

## 2024-10-03 ENCOUNTER — TELEPHONE (OUTPATIENT)
Dept: ORTHOPEDICS | Facility: CLINIC | Age: 84
End: 2024-10-03
Payer: MEDICARE

## 2024-10-03 NOTE — TELEPHONE ENCOUNTER
Called the patient in regards to getting them schedule for a sooner appointment with Dr. Encarnacion. I got the patient schedule for 10/2824 at 9:00\. Patient verbalized understanding.

## 2024-10-04 ENCOUNTER — TELEPHONE (OUTPATIENT)
Dept: ORTHOPEDICS | Facility: CLINIC | Age: 84
End: 2024-10-04
Payer: MEDICARE

## 2024-10-04 NOTE — TELEPHONE ENCOUNTER
----- Message from Lucien Delgado sent at 10/3/2024  3:09 PM CDT -----  Contact: Ezequiel    ----- Message -----  From: Lisseth Woods  Sent: 10/3/2024  12:46 PM CDT  To: [facility] Staff    Ezequiel called in to see if he would be able to get a shot prior to him going out of town. He's been having pain in his right hip and need some relief. Please call him at [phone].    Thanks  Sl

## 2024-10-04 NOTE — TELEPHONE ENCOUNTER
Spoke with the patient in regards to their message. Informed the patient that I spoke with Robin Liliana Lyubov BARRETO and she states that per Dr. Encarnacion we can get them schedule for a steroid injection. I got the patient schedule to see Mrs. Ford Lyubov BARRETO on 10/18/24 at 9:45 for a steroid injection. Patient verbalized understanding.

## 2024-10-11 ENCOUNTER — TELEPHONE (OUTPATIENT)
Dept: ORTHOPEDICS | Facility: CLINIC | Age: 84
End: 2024-10-11
Payer: MEDICARE

## 2024-10-11 NOTE — TELEPHONE ENCOUNTER
Returned the patient's phone call in regards to their message. Patient states that they have two appointment's on 10/17/24 and 10/18/24. Informed the patient that we can keep the appointment that is schedule for 10/17/24 at 10:00 with Mrs.Brooke Lyubov BARRETO. Appointment for 10/18/24 has been canceled. Patient verbalized understanding.

## 2024-10-11 NOTE — TELEPHONE ENCOUNTER
----- Message from Shyla sent at 10/11/2024  9:13 AM CDT -----  Patient is calling for verification of appointments. Please callback to clarify [phone]

## 2024-10-15 ENCOUNTER — PROCEDURE VISIT (OUTPATIENT)
Dept: UROLOGY | Facility: CLINIC | Age: 84
End: 2024-10-15
Payer: MEDICARE

## 2024-10-15 VITALS
HEIGHT: 70 IN | TEMPERATURE: 97 F | RESPIRATION RATE: 18 BRPM | WEIGHT: 214.31 LBS | HEART RATE: 60 BPM | SYSTOLIC BLOOD PRESSURE: 126 MMHG | BODY MASS INDEX: 30.68 KG/M2 | DIASTOLIC BLOOD PRESSURE: 71 MMHG

## 2024-10-15 DIAGNOSIS — C61 PROSTATE CANCER: Primary | ICD-10-CM

## 2024-10-15 PROCEDURE — 99213 OFFICE O/P EST LOW 20 MIN: CPT | Mod: S$GLB,,, | Performed by: UROLOGY

## 2024-10-17 ENCOUNTER — OFFICE VISIT (OUTPATIENT)
Dept: ORTHOPEDICS | Facility: CLINIC | Age: 84
End: 2024-10-17
Payer: MEDICARE

## 2024-10-17 VITALS
SYSTOLIC BLOOD PRESSURE: 124 MMHG | HEIGHT: 70 IN | DIASTOLIC BLOOD PRESSURE: 68 MMHG | WEIGHT: 214.31 LBS | HEART RATE: 66 BPM | BODY MASS INDEX: 30.68 KG/M2

## 2024-10-17 DIAGNOSIS — M70.61 TROCHANTERIC BURSITIS OF RIGHT HIP: ICD-10-CM

## 2024-10-17 DIAGNOSIS — M54.16 LUMBAR RADICULOPATHY: ICD-10-CM

## 2024-10-17 DIAGNOSIS — M16.11 PRIMARY OSTEOARTHRITIS OF RIGHT HIP: Primary | ICD-10-CM

## 2024-10-17 PROCEDURE — 1125F AMNT PAIN NOTED PAIN PRSNT: CPT | Mod: HCNC,CPTII,S$GLB, | Performed by: PHYSICIAN ASSISTANT

## 2024-10-17 PROCEDURE — 3288F FALL RISK ASSESSMENT DOCD: CPT | Mod: HCNC,CPTII,S$GLB, | Performed by: PHYSICIAN ASSISTANT

## 2024-10-17 PROCEDURE — 1101F PT FALLS ASSESS-DOCD LE1/YR: CPT | Mod: HCNC,CPTII,S$GLB, | Performed by: PHYSICIAN ASSISTANT

## 2024-10-17 PROCEDURE — 99999 PR PBB SHADOW E&M-EST. PATIENT-LVL III: CPT | Mod: PBBFAC,HCNC,, | Performed by: PHYSICIAN ASSISTANT

## 2024-10-17 PROCEDURE — 1160F RVW MEDS BY RX/DR IN RCRD: CPT | Mod: HCNC,CPTII,S$GLB, | Performed by: PHYSICIAN ASSISTANT

## 2024-10-17 PROCEDURE — 1159F MED LIST DOCD IN RCRD: CPT | Mod: HCNC,CPTII,S$GLB, | Performed by: PHYSICIAN ASSISTANT

## 2024-10-17 PROCEDURE — 99213 OFFICE O/P EST LOW 20 MIN: CPT | Mod: HCNC,S$GLB,, | Performed by: PHYSICIAN ASSISTANT

## 2024-10-19 DIAGNOSIS — E78.2 MIXED HYPERLIPIDEMIA: Chronic | ICD-10-CM

## 2024-10-19 NOTE — TELEPHONE ENCOUNTER
No care due was identified.  Health [facility] Embedded Care Due Messages. Reference number: [id number].   10/19/2024 5:04:41 AM CDT

## 2024-10-20 RX ORDER — ATORVASTATIN CALCIUM 40 MG/1
TABLET, FILM COATED ORAL
Qty: 90 TABLET | Refills: 1 | Status: SHIPPED | OUTPATIENT
Start: 2024-10-20

## 2024-10-20 NOTE — TELEPHONE ENCOUNTER
Refill Decision Note   Ezequiel Hughes  is requesting a refill authorization.  Brief Assessment and Rationale for Refill:  Approve     Medication Therapy Plan:        Comments:     Note composed:12:03 AM 10/20/2024            Pt left v/m wanting to schedule a new pt appt, c/b but n/a, left v/m with c/b info

## 2024-10-25 ENCOUNTER — PATIENT MESSAGE (OUTPATIENT)
Dept: ORTHOPEDICS | Facility: CLINIC | Age: 84
End: 2024-10-25
Payer: MEDICARE

## 2024-10-25 ENCOUNTER — PATIENT MESSAGE (OUTPATIENT)
Dept: PRIMARY CARE CLINIC | Facility: CLINIC | Age: 84
End: 2024-10-25
Payer: MEDICARE

## 2024-10-28 ENCOUNTER — HOSPITAL ENCOUNTER (OUTPATIENT)
Dept: CARDIOLOGY | Facility: HOSPITAL | Age: 84
Discharge: HOME | End: 2024-10-28
Payer: MEDICARE

## 2024-10-28 ENCOUNTER — HOSPITAL ENCOUNTER (OUTPATIENT)
Dept: RADIOLOGY | Facility: HOSPITAL | Age: 84
Discharge: HOME | End: 2024-10-28
Attending: NURSE PRACTITIONER
Payer: MEDICARE

## 2024-10-28 ENCOUNTER — OFFICE VISIT (OUTPATIENT)
Dept: ORTHOPEDICS | Facility: CLINIC | Age: 84
End: 2024-10-28
Payer: MEDICARE

## 2024-10-28 ENCOUNTER — OFFICE VISIT (OUTPATIENT)
Dept: INTERNAL MEDICINE | Facility: CLINIC | Age: 84
End: 2024-10-28
Payer: MEDICARE

## 2024-10-28 VITALS
SYSTOLIC BLOOD PRESSURE: 127 MMHG | DIASTOLIC BLOOD PRESSURE: 56 MMHG | HEART RATE: 70 BPM | TEMPERATURE: 98 F | OXYGEN SATURATION: 96 %

## 2024-10-28 VITALS — BODY MASS INDEX: 30.68 KG/M2 | HEIGHT: 70 IN | WEIGHT: 214.31 LBS

## 2024-10-28 DIAGNOSIS — Z01.818 PRE-OP TESTING: ICD-10-CM

## 2024-10-28 DIAGNOSIS — Z01.818 PRE-OP TESTING: Primary | ICD-10-CM

## 2024-10-28 DIAGNOSIS — R60.0 EDEMA OF BOTH LOWER LEGS DUE TO PERIPHERAL VENOUS INSUFFICIENCY: ICD-10-CM

## 2024-10-28 DIAGNOSIS — M16.11 ARTHRITIS OF RIGHT HIP: Primary | ICD-10-CM

## 2024-10-28 DIAGNOSIS — Z01.818 PREOP TESTING: ICD-10-CM

## 2024-10-28 DIAGNOSIS — M16.11 PRIMARY OSTEOARTHRITIS OF RIGHT HIP: Primary | ICD-10-CM

## 2024-10-28 DIAGNOSIS — D69.6 THROMBOCYTOPENIA: ICD-10-CM

## 2024-10-28 DIAGNOSIS — M79.89 LEG SWELLING: ICD-10-CM

## 2024-10-28 DIAGNOSIS — M16.12 ARTHRITIS OF LEFT HIP: ICD-10-CM

## 2024-10-28 DIAGNOSIS — N40.0 BENIGN PROSTATIC HYPERPLASIA WITHOUT LOWER URINARY TRACT SYMPTOMS: ICD-10-CM

## 2024-10-28 DIAGNOSIS — F41.1 GAD (GENERALIZED ANXIETY DISORDER): ICD-10-CM

## 2024-10-28 DIAGNOSIS — G47.33 OBSTRUCTIVE SLEEP APNEA SYNDROME: ICD-10-CM

## 2024-10-28 DIAGNOSIS — I87.2 EDEMA OF BOTH LOWER LEGS DUE TO PERIPHERAL VENOUS INSUFFICIENCY: ICD-10-CM

## 2024-10-28 DIAGNOSIS — N18.31 STAGE 3A CHRONIC KIDNEY DISEASE: ICD-10-CM

## 2024-10-28 DIAGNOSIS — R06.2 WHEEZING: ICD-10-CM

## 2024-10-28 DIAGNOSIS — Z01.818 PREOPERATIVE EXAMINATION: ICD-10-CM

## 2024-10-28 DIAGNOSIS — I10 PRIMARY HYPERTENSION: ICD-10-CM

## 2024-10-28 LAB
OHS QRS DURATION: 90 MS
OHS QTC CALCULATION: 430 MS

## 2024-10-28 PROCEDURE — 71046 X-RAY EXAM CHEST 2 VIEWS: CPT | Mod: TC,HCNC

## 2024-10-28 PROCEDURE — 1101F PT FALLS ASSESS-DOCD LE1/YR: CPT | Mod: CPTII,S$GLB,, | Performed by: ORTHOPAEDIC SURGERY

## 2024-10-28 PROCEDURE — 3288F FALL RISK ASSESSMENT DOCD: CPT | Mod: CPTII,S$GLB,, | Performed by: ORTHOPAEDIC SURGERY

## 2024-10-28 PROCEDURE — 1125F AMNT PAIN NOTED PAIN PRSNT: CPT | Mod: CPTII,S$GLB,, | Performed by: ORTHOPAEDIC SURGERY

## 2024-10-28 PROCEDURE — 99999 PR PBB SHADOW E&M-EST. PATIENT-LVL IV: CPT | Mod: PBBFAC,HCNC,, | Performed by: ORTHOPAEDIC SURGERY

## 2024-10-28 PROCEDURE — 93010 ELECTROCARDIOGRAM REPORT: CPT | Mod: HCNC,,, | Performed by: INTERNAL MEDICINE

## 2024-10-28 PROCEDURE — 99999 PR PBB SHADOW E&M-EST. PATIENT-LVL II: CPT | Mod: PBBFAC,HCNC,,

## 2024-10-28 PROCEDURE — 1159F MED LIST DOCD IN RCRD: CPT | Mod: CPTII,S$GLB,, | Performed by: ORTHOPAEDIC SURGERY

## 2024-10-28 PROCEDURE — 99214 OFFICE O/P EST MOD 30 MIN: CPT | Mod: S$GLB,,, | Performed by: ORTHOPAEDIC SURGERY

## 2024-10-28 PROCEDURE — 93005 ELECTROCARDIOGRAM TRACING: CPT | Mod: HCNC

## 2024-10-28 RX ORDER — ALBUTEROL SULFATE 90 UG/1
2 INHALANT RESPIRATORY (INHALATION) EVERY 6 HOURS PRN
Qty: 18 G | Refills: 0 | Status: SHIPPED | OUTPATIENT
Start: 2024-10-28

## 2024-10-28 NOTE — H&P (VIEW-ONLY)
Subjective:     Patient ID: Ezequiel Hughes is a 84 y.o. male.    Chief Complaint: Pain of the Right Hip    HPI:  Right hip pain and you points over the posterior part of the hip as well as the groin and over the greater troch.  The left hip is not bothering him.  Pain is 6/10.  Can not walk without the walker and the cane.  He feels more steady with a walker.  He received a femoral nerve and obturator nerve block 09/12/2024 without much relief.  He is on Lyrica.  Can not take NSAIDs due to cardiac issues and being on Plavix.  He did get cleared to have surgery.  You also had right hip injection 07/18/2024 with Kenalog.  This is getting worse over 1 year now.  In the past I did repair rotator cuff tears in both of his shoulders.  He takes Tylenol and I did tell him he needs to take it 3 times a day.  He has good family support his daughter and a significant other were here today.  In the past he did try other medications over-the-counter without much success.  He came so close of having surgery in his lumbar spine by Dr. blum and was canceled.  No fever no chills no shortness breath no difficulty with chewing swallowing loss of bowel bladder control   He does have peripheral edema and he does have compressive stockings at home if she is having hard time putting in on because you can not bend down he has family members that put him on.  I reviewed his medication he is on antihypertensive meds as well as fluid pills  No fever no chills no shortness of breath or difficulty with chewing or swallowing loss of bowel bladder control blurry vision double vision loss sense smell or taste    Past Medical History:   Diagnosis Date    Allergic rhinitis     Anemia     Back pain     BPH (benign prostatic hyperplasia)     Cancer     skin cancer to neck, Dr. Graves    Cataract     Disorder of kidney and ureter     ED (erectile dysfunction)     OMARI (generalized anxiety disorder) 08/07/2023    Hiatal hernia     small    History of  colon polyps     colonoscopy 11/2016    HLD (hyperlipidemia)     Hyperlipidemia     Hypertension     Lateral epicondylitis     Lumbar radiculopathy 01/26/2022    OA (osteoarthritis)     GUIDO (obstructive sleep apnea)     Polyneuropathy     Prostate cancer     Dr. Wong    TIA (transient ischemic attack)     Trouble in sleeping     Urge incontinence 03/29/2022     Past Surgical History:   Procedure Laterality Date    ANGIOGRAPHY OF UPPER EXTREMITY Left 07/05/2022    Procedure: Angiogram Extremity Bilateral;  Surgeon: Aparna Thompson MD;  Location: [facility] CATH LAB;  Service: Cardiology;  Laterality: Left;    ARTERIOGRAPHY OF AORTIC ROOT N/A 07/05/2022    Procedure: ARTERIOGRAM, AORTIC ROOT;  Surgeon: Aparna Thompson MD;  Location: [facility] CATH LAB;  Service: Cardiology;  Laterality: N/A;    BLOCK, NERVE, PERIPHERAL Right 9/12/2024    Procedure: right femoral/ obturator nerve block- with steroids;  Surgeon: Abel Haywood MD;  Location: [facility] PAIN MGT;  Service: Pain Management;  Laterality: Right;    CATARACT EXTRACTION W/  INTRAOCULAR LENS IMPLANT Right 02/21/2018    I-Stent    CATARACT EXTRACTION W/  INTRAOCULAR LENS IMPLANT Left 03/21/2018    I - Stent    CATHETERIZATION OF BOTH LEFT AND RIGHT HEART N/A 07/05/2022    Procedure: CATHETERIZATION, HEART, BOTH LEFT AND RIGHT;  Surgeon: Aparna Thompson MD;  Location: [facility] CATH LAB;  Service: Cardiology;  Laterality: N/A;  radial approach    COLONOSCOPY N/A 11/14/2016    Procedure: COLONOSCOPY;  Surgeon: Karuna Rodriguez MD;  Location: [facility] ENDO;  Service: Endoscopy;  Laterality: N/A;    COLONOSCOPY N/A 09/22/2020    Procedure: COLONOSCOPY;  Surgeon: Chuy Fish MD;  Location: [facility] ENDO;  Service: Endoscopy;  Laterality: N/A;    EPIDURAL STEROID INJECTION N/A 6/6/2024    Procedure: Lumbar L5/S1 IL IAN;  Surgeon: Abel Haywood MD;  Location: [facility] PAIN MGT;  Service: Pain Management;  Laterality: N/A;    EPIDURAL STEROID INJECTION INTO CERVICAL SPINE N/A  2/8/2024    Procedure: C5/6 IL Right paramedian approach IAN with RN IV sedation;  Surgeon: Abel Haywood MD;  Location: [facility] PAIN MGT;  Service: Pain Management;  Laterality: N/A;    EYE SURGERY      HEMORRHOID SURGERY      I-STENT Right 02/21/2018    DR. REECE    INJECTION OF ANESTHETIC AGENT AROUND MEDIAL BRANCH NERVES INNERVATING CERVICAL FACET JOINT Bilateral 7/18/2023    Procedure: Bilateral C4-6 MBB with RN IV sedation (diagnostic, #1);  Surgeon: Abel Haywood MD;  Location: [facility] PAIN MGT;  Service: Pain Management;  Laterality: Bilateral;    KNEE ARTHROSCOPY W/ MENISCAL REPAIR Right 2015    Dr. Jain    PCIOL Right 02/21/2018    DR. REECE    PLANTAR FASCIA RELEASE      right    ROTATOR CUFF REPAIR Bilateral     bilateral    SELECTIVE INJECTION OF ANESTHETIC AGENT AROUND LUMBAR SPINAL NERVE ROOT BY TRANSFORAMINAL APPROACH Bilateral 01/26/2022    Procedure: Bilateral L4/5 TF IAN with RN IV sedation;  Surgeon: Mak Young MD;  Location: [facility] PAIN MGT;  Service: Pain Management;  Laterality: Bilateral;    SELECTIVE INJECTION OF ANESTHETIC AGENT AROUND LUMBAR SPINAL NERVE ROOT BY TRANSFORAMINAL APPROACH Bilateral 03/14/2022    Procedure: Bilateral L4/5 TF IAN with RN IV sedation;  Surgeon: Mak Young MD;  Location: [facility] PAIN MGT;  Service: Pain Management;  Laterality: Bilateral;    SELECTIVE INJECTION OF ANESTHETIC AGENT AROUND LUMBAR SPINAL NERVE ROOT BY TRANSFORAMINAL APPROACH Bilateral 06/02/2022    Procedure: Bilateral L4/5 TF IAN - D2P Dr. Matthew NEGRETE;  Surgeon: Abel Haywood MD;  Location: [facility] PAIN MGT;  Service: Pain Management;  Laterality: Bilateral;    SELECTIVE INJECTION OF ANESTHETIC AGENT AROUND LUMBAR SPINAL NERVE ROOT BY TRANSFORAMINAL APPROACH Bilateral 08/25/2022    Procedure: Bilateral L4/5 TF IAN;  Surgeon: Abel Haywood MD;  Location: [facility] PAIN MGT;  Service: Pain Management;  Laterality: Bilateral;    SELECTIVE INJECTION OF ANESTHETIC AGENT AROUND LUMBAR SPINAL  NERVE ROOT BY TRANSFORAMINAL APPROACH Bilateral 2023    Procedure: Bilateral L4/5 TF IAN RN IV Sedation;  Surgeon: Abel Haywood MD;  Location: [facility] PAIN MGT;  Service: Pain Management;  Laterality: Bilateral;    SELECTIVE INJECTION OF ANESTHETIC AGENT AROUND LUMBAR SPINAL NERVE ROOT BY TRANSFORAMINAL APPROACH Bilateral 2024    Procedure: Bilateral L4/5 TF IAN;  Surgeon: Abel Haywood MD;  Location: [facility] PAIN MGT;  Service: Pain Management;  Laterality: Bilateral;    SHOULDER SURGERY Bilateral around     Dr. Pepper.  rotator cuff surgeries    VASECTOMY       Family History   Problem Relation Name Age of Onset    Lung cancer Father Isaiah Hughes         life long smoker    Cancer Father Isaiah Hughes         prostate, lung    Arthritis Mother Emely Hughes     Stroke Sister          TIA    Cataracts Sister      Cancer Brother          prostate    Cataracts Brother      Cataracts Sister      Melanoma Neg Hx      Psoriasis Neg Hx      Lupus Neg Hx      Eczema Neg Hx      Diabetes Neg Hx      Heart disease Neg Hx      Kidney disease Neg Hx      Colon cancer Neg Hx       Social History     Socioeconomic History    Marital status:    Tobacco Use    Smoking status: Former     Current packs/day: 0.00     Average packs/day: 3.0 packs/day for 35.0 years (104.9 ttl pk-yrs)     Types: Cigarettes     Start date: 1960     Quit date: 1985     Years since quittin.2     Passive exposure: Past    Smokeless tobacco: Never   Substance and Sexual Activity    Alcohol use: Yes     Alcohol/week: 3.0 standard drinks of alcohol     Types: 3 Cans of beer per week     Comment: socially    Drug use: No    Sexual activity: Yes     Partners: Female     Birth control/protection: None   Social History Narrative         Social Drivers of Health     Financial Resource Strain: Low Risk  (2024)    Overall Financial Resource Strain (CARDIA)     Difficulty of Paying Living Expenses: Not hard at all    Food Insecurity: No Food Insecurity (5/16/2024)    Hunger Vital Sign     Worried About Running Out of Food in the Last Year: Never true     Ran Out of Food in the Last Year: Never true   Transportation Needs: No Transportation Needs (5/16/2024)    PRAPARE - Transportation     Lack of Transportation (Medical): No     Lack of Transportation (Non-Medical): No   Physical Activity: Insufficiently Active (5/16/2024)    Exercise Vital Sign     Days of Exercise per Week: 2 days     Minutes of Exercise per Session: 30 min   Stress: Stress Concern Present (5/16/2024)    [employer] - Occupational Stress Questionnaire     Feeling of Stress : To some extent   Housing Stability: Low Risk  (6/23/2023)    Housing Stability Vital Sign     Unable to Pay for Housing in the Last Year: No     Number of Places Lived in the Last Year: 1     Unstable Housing in the Last Year: No     Medication List with Changes/Refills   Current Medications    ACETAMINOPHEN (TYLENOL ARTHRITIS PAIN ORAL)    Take by mouth. Patient states that he takes 2 tablets in the morning and 2 tablets in the evening    ALFUZOSIN (UROXATRAL) 10 MG TB24    Take 1 tablet (10 mg total) by mouth daily with breakfast.    AMLODIPINE (NORVASC) 5 MG TABLET    TAKE 1 TABLET TWICE DAILY    ATORVASTATIN (LIPITOR) 40 MG TABLET    TAKE 1 TABLET EVERY DAY    AZELASTINE (ASTELIN) 137 MCG (0.1 %) NASAL SPRAY    1 spray (137 mcg total) by Nasal route 2 (two) times daily.    CITALOPRAM (CELEXA) 10 MG TABLET    Take 1 tablet (10 mg total) by mouth once daily. For anxiety    CLOPIDOGREL (PLAVIX) 75 MG TABLET    TAKE 1 TABLET EVERY DAY    CYCLOBENZAPRINE (FLEXERIL) 5 MG TABLET        DIAZEPAM (VALIUM) 5 MG TABLET    Take 1 po 45 minutes before visit.    FINASTERIDE (PROSCAR) 5 MG TABLET    Take 1 tablet (5 mg total) by mouth once daily.    FUROSEMIDE (LASIX) 20 MG TABLET    Take 1 tablet (20 mg total) by mouth daily as needed (edema).    LOSARTAN (COZAAR) 50  MG TABLET    TAKE 1 TABLET TWICE DAILY    PANTOPRAZOLE (PROTONIX) 40 MG TABLET    TAKE 1 TABLET EVERY DAY    PREGABALIN (LYRICA) 75 MG CAPSULE    Take 1 capsule (75 mg total) by mouth 2 (two) times daily.    SILDENAFIL (VIAGRA) 100 MG TABLET    Take 1 po 45 minutes before intercourse    VITAMIN D (VITAMIN D3) 1000 UNITS TAB    Take 1,000 Units by mouth once daily.   Discontinued Medications    LEVOFLOXACIN (LEVAQUIN) 500 MG TABLET    Take 1 po 1 hour before biopsy     Review of patient's allergies indicates:   Allergen Reactions    Atarax [hydroxyzine hcl] Other (See Comments)     Raised blood pressure     Zyrtec [cetirizine] Other (See Comments)     Raised blood pressure     Gold sodium thiosulfate      Patch test positive    Meloxicam Rash     Other reaction(s): hypertension     Review of Systems   Constitutional: Negative for decreased appetite.   HENT:  Negative for tinnitus.    Eyes:  Negative for double vision.   Cardiovascular:  Negative for chest pain.   Respiratory:  Negative for wheezing.    Hematologic/Lymphatic: Negative for bleeding problem.   Skin:  Negative for dry skin.   Musculoskeletal:  Positive for arthritis, back pain, joint pain, muscle weakness and stiffness. Negative for gout and neck pain.   Gastrointestinal:  Negative for abdominal pain.   Genitourinary:  Negative for bladder incontinence.   Neurological:  Negative for numbness, paresthesias and sensory change.   Psychiatric/Behavioral:  Negative for altered mental status.        Objective:   Body mass index is 30.75 kg/m².  There were no vitals filed for this visit.       General    Constitutional: He is oriented to person, place, and time. He appears well-developed.   HENT:   Head: Atraumatic.   Eyes: EOM are normal.   Pulmonary/Chest: Effort normal.   Neurological: He is alert and oriented to person, place, and time.   Psychiatric: Judgment normal.           Ambulating with a walker   Pelvis is level   Right hip pain to internal rotation  of 10° and external rotation of 25°.  Hip abduction to 30° with pain in the groin and posterior hip pain.  Has around 10° of flexion contracture.  Hip flexors and abductors are slightly weak at 5-/5   Left hip internal rotation around 15° external rotation 30° with pain in the groin only with abduction and internal rotation.  He has around 30° of abduction.  Around 5° of flexion contracture.  Hip flexors and abductors are slightly weak at 5-/5   Bilateral knees with good motion   No defect in the patella or quadriceps tendon   Calves are soft nontender   There is peripheral pitting edema with the right worse than the left up to mid tibia  Slight erythema around the ankle on the right not so much on the left  Ankle motion intact    Relevant imaging results reviewed and interpreted by me, discussed with the patient and / or family today     X-ray 06/20/2024 pelvis and bilateral hips showing complete loss of joint space with cystic changes and marginal osteophyte both hips consistent with arthritis  Assessment:     Encounter Diagnoses   Name Primary?    Arthritis of left hip     Arthritis of right hip Yes    Edema of both lower legs due to peripheral venous insufficiency         Plan:   Arthritis of right hip    Arthritis of left hip    Edema of both lower legs due to peripheral venous insufficiency         Patient Instructions   Straight catheterized both hips that the right hip is the 1 that hurts her the most   You already had multiple injections in you back and you of what lumbar surgery   You received injections in your hip already   You have a cane and you have a walker and you can not function without it   Activities of daily living like putting her socks on becoming very difficult to do   You can take Tylenol 650 mg up to 3 times a day if needed  You got cleared by Cardiology to undergo hip replacement  You are taking Lyrica  You already had an updated nerve and femoral nerve blocks to decrease the pain in your  hip   Total hip replacement is considered outpatient surgery by the government that means you have surgery go home the same day.  If you have problems then we can observe you over and  We will arrange for home physical therapy and home health nurse for 2 weeks.  After 2 weeks we see you in the office and we will start outpatient physical therapy.  Once you are awake in recovery room we get you up and walking in recovery room with the therapist.  Surgery itself is around 2 hours done under general anesthetic or spinal depending on anesthesia.  It will take roughly 3 months to heal from having surgery.  You will be allowed to walk on it immediately.  You will have a knee brace that you have to keep on for the 1st 3-4 weeks when you sleeping you take it off during the day when you up and walking.  Total hip replacement is considered 90% successful in decreasing pain and increasing function  You do have swelling in the legs you need to wear your compressive stockings    Procedure, common risks and benefits,alternatives discussed in details.All questions answered.Patient expressed understanding.Patient instructed to call for any questions that could arise in the future.    Most common Risks:  Infection/less than 2% chance  Leg-length discrepancy/this to prevent hip dislocation  /less than 3%  Neuro-vascular injury ( resulting in loss of motor and sensory functions)  Pain  Blood clot/you must stop her Plavix around 5-7 days prior    Loss of motion/we heal with scar tissue some patients form bone in the muscles around the hip that might cause loss of motion however your job is to do physical therapy to gain more motion  Fracture of bone  Failure of procedure to achieve its intended purpose/total hip replacement is considered 90% successful in decreasing pain increasing function  Failure of hardware/total hip replacement made a plastic and metal it could wear out on the average it should last 15 years  Non-union or  mal-union of bone  Malalignment  Death/you already seen Dr. monroy      Patient instructions for joint replacement    Before surgery  1.  Shower with Hibiclens soap/antibacterial for 3 days prior to surgery to decrease risk of infection  2..  Stop all blood thinners/aspirin, Coumadin, warfarin, Plavix, Eliquis, Xarelto etc 5 days prior to surgery  3.  No eating or drinking after midnight the night before surgery.  You can drink Gatorade or Powerade or Pedialyte I would like you to drink a whole bottle before midnight so you do not get dehydrated prior to arriving to surgery  4.  Take Tylenol 650 mg the night prior to surgery        After surgery at home  1.  Take Tylenol 650 mg 3 times a day for 14 days then as needed for mild pain  2.  Take gabapentin 300 mg nightly for 6 weeks/or Lyrica  3.  Take all your home medications/the next day  4.  Must take aspirin 81 mg twice a day for 6 weeks unless you are on other blood thinners/Plavix, Eliquis, Xarelto, Coumadin etc  5.  Must wear compressive stockings for 6 weeks minimum to decrease the risk of blood clot and swelling  6.  Hydrocodone/Norco or oxycodone/Percocet will be prescribed to take every 6 hr as needed for breakthrough pain  7.  May apply ice on the knee /hip to help with decreasing pain  8.  Keep wound dry for 2 weeks until stitches/staples removed than you will be allowed to shower in 24 hr and get the wound wet.  No soaking of the wound in the tub or swimming for 4 weeks after surgery  9.  No driving for 4 weeks unless specified by physician  10.  Avoid touching the wound or surrounding area /at least 2 inches on each side of the surgical incision until staples are removed/stitches   11.  May change the surgical dressing if extremely bloody or has drainage on it. May clean the wound with peroxide or Betadine and apply sterile dressing and Ace wrap over it  12.  Leave hospital dressing on for 3 days then may change by applying sterile 4 x 4 and Ace wrap  after cleaning with Betadine or peroxide.  May leave this dressing change to home health nurses  13. You will have a brace on you knee when you wake up from surgery which you need to wear when you are sleeping for the following 3-4 weeks.  You do not need to wear it during the day or when you walk.  This to prevent you from turning the wrong way and popping her hip out of the socket     Proceed with a right RUPINDER       The mobility limitation can not be sufficiently resolved by the use of a cane.  Patient's functional mobility deficit can be sufficiently resolved with the use of a rolling walker.  Patient has mobility limitation significantly impairs their ability to participate in 1 or more activities of daily living.  The use of a rolling walker we will significantly improve the patient's ability to participate in  MRADLS and it is to be used on a regular basis in the home.            Disclaimer: This note was prepared using a voice recognition system and is likely to have sound alike errors within the text.

## 2024-10-29 ENCOUNTER — PATIENT MESSAGE (OUTPATIENT)
Dept: HEPATOLOGY | Facility: HOSPITAL | Age: 84
End: 2024-10-29
Payer: MEDICARE

## 2024-10-31 ENCOUNTER — OFFICE VISIT (OUTPATIENT)
Dept: INTERNAL MEDICINE | Facility: CLINIC | Age: 84
End: 2024-10-31
Payer: MEDICARE

## 2024-10-31 ENCOUNTER — LAB VISIT (OUTPATIENT)
Dept: LAB | Facility: HOSPITAL | Age: 84
End: 2024-10-31
Attending: PHYSICIAN ASSISTANT
Payer: MEDICARE

## 2024-10-31 VITALS
SYSTOLIC BLOOD PRESSURE: 132 MMHG | DIASTOLIC BLOOD PRESSURE: 82 MMHG | OXYGEN SATURATION: 97 % | WEIGHT: 214.31 LBS | HEART RATE: 87 BPM | BODY MASS INDEX: 30.68 KG/M2 | HEIGHT: 70 IN

## 2024-10-31 DIAGNOSIS — N18.31 STAGE 3A CHRONIC KIDNEY DISEASE: ICD-10-CM

## 2024-10-31 DIAGNOSIS — I50.9 CONGESTIVE HEART FAILURE, UNSPECIFIED HF CHRONICITY, UNSPECIFIED HEART FAILURE TYPE: ICD-10-CM

## 2024-10-31 DIAGNOSIS — I27.20 PULMONARY HYPERTENSION: ICD-10-CM

## 2024-10-31 DIAGNOSIS — R06.2 WHEEZING: Primary | ICD-10-CM

## 2024-10-31 DIAGNOSIS — I10 ESSENTIAL HYPERTENSION: ICD-10-CM

## 2024-10-31 LAB
ALBUMIN SERPL BCP-MCNC: 3.7 G/DL (ref 3.5–5.2)
ALP SERPL-CCNC: 62 U/L (ref 40–150)
ALT SERPL W/O P-5'-P-CCNC: 10 U/L (ref 10–44)
ANION GAP SERPL CALC-SCNC: 7 MMOL/L (ref 8–16)
AST SERPL-CCNC: 16 U/L (ref 10–40)
BILIRUB SERPL-MCNC: 0.7 MG/DL (ref 0.1–1)
BNP SERPL-MCNC: 106 PG/ML (ref 0–99)
BUN SERPL-MCNC: 28 MG/DL (ref 8–23)
CALCIUM SERPL-MCNC: 9.1 MG/DL (ref 8.7–10.5)
CHLORIDE SERPL-SCNC: 107 MMOL/L (ref 95–110)
CO2 SERPL-SCNC: 25 MMOL/L (ref 23–29)
CREAT SERPL-MCNC: 1.6 MG/DL (ref 0.5–1.4)
EST. GFR  (NO RACE VARIABLE): 41.7 ML/MIN/1.73 M^2
GLUCOSE SERPL-MCNC: 77 MG/DL (ref 70–110)
POTASSIUM SERPL-SCNC: 5 MMOL/L (ref 3.5–5.1)
PROT SERPL-MCNC: 6.6 G/DL (ref 6–8.4)
SODIUM SERPL-SCNC: 139 MMOL/L (ref 136–145)

## 2024-10-31 PROCEDURE — 36415 COLL VENOUS BLD VENIPUNCTURE: CPT | Mod: HCNC | Performed by: PHYSICIAN ASSISTANT

## 2024-10-31 PROCEDURE — 80053 COMPREHEN METABOLIC PANEL: CPT | Mod: HCNC | Performed by: PHYSICIAN ASSISTANT

## 2024-10-31 PROCEDURE — 99999 PR PBB SHADOW E&M-EST. PATIENT-LVL IV: CPT | Mod: PBBFAC,,, | Performed by: PHYSICIAN ASSISTANT

## 2024-10-31 PROCEDURE — 83880 ASSAY OF NATRIURETIC PEPTIDE: CPT | Mod: HCNC | Performed by: PHYSICIAN ASSISTANT

## 2024-10-31 RX ORDER — FUROSEMIDE 20 MG/1
20 TABLET ORAL DAILY PRN
Qty: 30 TABLET | Refills: 0 | Status: SHIPPED | OUTPATIENT
Start: 2024-10-31 | End: 2025-10-31

## 2024-11-01 ENCOUNTER — PATIENT MESSAGE (OUTPATIENT)
Dept: CARDIOLOGY | Facility: CLINIC | Age: 84
End: 2024-11-01
Payer: MEDICARE

## 2024-11-01 ENCOUNTER — TELEPHONE (OUTPATIENT)
Dept: CARDIOLOGY | Facility: CLINIC | Age: 84
End: 2024-11-01
Payer: MEDICARE

## 2024-11-03 ENCOUNTER — PATIENT MESSAGE (OUTPATIENT)
Dept: PRIMARY CARE CLINIC | Facility: CLINIC | Age: 84
End: 2024-11-03
Payer: MEDICARE

## 2024-11-03 DIAGNOSIS — E78.2 MIXED HYPERLIPIDEMIA: Primary | ICD-10-CM

## 2024-11-04 RX ORDER — ATORVASTATIN CALCIUM 20 MG/1
20 TABLET, FILM COATED ORAL DAILY
Qty: 90 TABLET | Refills: 3 | Status: SHIPPED | OUTPATIENT
Start: 2024-11-04 | End: 2025-11-04

## 2024-11-04 NOTE — TELEPHONE ENCOUNTER
Ezequiel Hughes,     I have sent the following medications to your preferred pharmacy on file. Please let me know if you have further questions.     Medications Ordered This Encounter   Medications    atorvastatin (LIPITOR) 20 MG tablet     Sig: Take 1 tablet (20 mg total) by mouth once daily.     Dispense:  90 tablet     Refill:  3          [facility] - [place], [place] - [address]  [address] [address]  Phone: [phone] Fax: [phone]       Sincerely,   Valery Ozuna MD

## 2024-11-05 ENCOUNTER — OFFICE VISIT (OUTPATIENT)
Dept: CARDIOLOGY | Facility: CLINIC | Age: 84
End: 2024-11-05
Payer: MEDICARE

## 2024-11-05 VITALS
HEART RATE: 96 BPM | HEIGHT: 70 IN | BODY MASS INDEX: 31.56 KG/M2 | WEIGHT: 220.44 LBS | DIASTOLIC BLOOD PRESSURE: 60 MMHG | SYSTOLIC BLOOD PRESSURE: 102 MMHG

## 2024-11-05 DIAGNOSIS — I70.0 AORTIC ATHEROSCLEROSIS: Primary | ICD-10-CM

## 2024-11-05 DIAGNOSIS — I27.20 PULMONARY HYPERTENSION: ICD-10-CM

## 2024-11-05 PROCEDURE — 1126F AMNT PAIN NOTED NONE PRSNT: CPT | Mod: HCNC,CPTII,S$GLB, | Performed by: PHYSICIAN ASSISTANT

## 2024-11-05 PROCEDURE — 99999 PR PBB SHADOW E&M-EST. PATIENT-LVL II: CPT | Mod: PBBFAC,HCNC,, | Performed by: PHYSICIAN ASSISTANT

## 2024-11-05 PROCEDURE — 99214 OFFICE O/P EST MOD 30 MIN: CPT | Mod: HCNC,S$GLB,, | Performed by: PHYSICIAN ASSISTANT

## 2024-11-05 NOTE — PRE-PROCEDURE INSTRUCTIONS
Patient attended Ortho Boot Camp class today at [facility]. Reviewed Joint Replacement education in a face to face class setting. All patient questions were answered, patient verbalized understanding. Patient given surgery instruction handouts at end of class.

## 2024-11-05 NOTE — PROGRESS NOTES
[facility] Provider Note (Follow-up) Consult Note    Ezequiel Hughes is a 84 y.o. male who presents to the office today for a preoperative consultation at the request of Dr. Encarnacion who plans on performing Right Hip Arthroplasty on November 13. This consultation is requested for the specific conditions prompting preoperative evaluation (i.e. because of potential affect on operative risk). Planned anesthesia: general. The patient denies known anesthesia issues.     Patient returns today and states he is doing well. Main issue is hip pain. CV wise, remains stable. He had an ED visit on 8/2/23 due to CP symptoms. Troponin was negative and he was d/c'd home. No recurrence since that time. No exertional symptoms. No unusual SOB/MCKEON. No LH, dizziness, palpitations, near syncope, or syncope. No s/s suggestive of CHF. Very physically active, typically walks on treadmill    Follows with Dr Thompson who has cleared him for surgery at moderately elevated perioperative CV risk           PHYSICAL:   Vitals:    11/05/24 0957   BP: 102/60   Pulse: 96          JVD: no,    Heart rhythm: regular  Cardiac murmur:AS  S3: no  S4: no  Lungs: clear  Liver span: 10 cm:   Hepatojugular reflux: no  Edema: no, wears compression hose      ASSESSMENT: HTN, AS, cardiac clearance      PLAN:      Patient Instructions:   Instruct the patient to notify this clinic if HH, a physician or an advanced care provider wants to change medication one of their HF medications   Activity and Diet restrictions:   Recommend 2-3 gram sodium restriction and 1500cc- 2000cc fluid restriction.  Encourage physical activity with graded exercise program.  Requested patient to weigh themselves daily, and to notify us if their weight increases by more than 3 lbs in 1 day or 5 lbs in 3 days.      Medication changes (include current dose and changed dose)  Well compensated on exam today  Continue current regimen  Ok to proceed with scheduled hip surgery per Dr Thompson at a moderately  elevated perioperative Cardiovascular risk  Upcoming labs and date anticipated: has follow up scheduled in clinic

## 2024-11-07 RX ORDER — PANTOPRAZOLE SODIUM 40 MG/1
TABLET, DELAYED RELEASE ORAL
Qty: 90 TABLET | Refills: 1 | Status: SHIPPED | OUTPATIENT
Start: 2024-11-07

## 2024-11-07 NOTE — TELEPHONE ENCOUNTER
Refill Decision Note   Ezequiel Hughes  is requesting a refill authorization.  Brief Assessment and Rationale for Refill:  Approve     Medication Therapy Plan:         Comments:     Note composed:5:05 AM 11/07/2024

## 2024-11-07 NOTE — TELEPHONE ENCOUNTER
No care due was identified.  Health [facility] Embedded Care Due Messages. Reference number: [id number].   11/07/2024 1:43:28 AM CST

## 2024-11-08 DIAGNOSIS — I10 ESSENTIAL HYPERTENSION: ICD-10-CM

## 2024-11-08 RX ORDER — FUROSEMIDE 20 MG/1
TABLET ORAL
Qty: 30 TABLET | Refills: 11 | OUTPATIENT
Start: 2024-11-08

## 2024-11-08 NOTE — TELEPHONE ENCOUNTER
Provider Staff:  Action required for this patient       Please see care gap opportunities below in Care Due Message.    Thanks!  [facility]     Appointments      Date Provider   Last Visit   5/9/2024 Valery Ozuna MD   Next Visit   1/7/2025 Valery Ozuna MD     Refill Decision Note   Ezequiel Hughes  is requesting a refill authorization.  Brief Assessment and Rationale for Refill:  Quick Discontinue     Medication Therapy Plan:  Receipt confirmed by pharmacy (10/31/2024  4:51 PM CDT)      Comments:     Note composed:9:04 AM 11/08/2024

## 2024-11-08 NOTE — TELEPHONE ENCOUNTER
No care due was identified.  Health [facility] Embedded Care Due Messages. Reference number: [id number].   11/08/2024 8:05:07 AM CST

## 2024-11-12 ENCOUNTER — LAB VISIT (OUTPATIENT)
Dept: LAB | Facility: HOSPITAL | Age: 84
End: 2024-11-12
Attending: ORTHOPAEDIC SURGERY
Payer: MEDICARE

## 2024-11-12 ENCOUNTER — TELEPHONE (OUTPATIENT)
Dept: PREADMISSION TESTING | Facility: HOSPITAL | Age: 84
End: 2024-11-12
Payer: MEDICARE

## 2024-11-12 DIAGNOSIS — Z01.818 PREOP TESTING: ICD-10-CM

## 2024-11-12 LAB
ABO + RH BLD: NORMAL
BLD GP AB SCN CELLS X3 SERPL QL: NORMAL
SPECIMEN OUTDATE: NORMAL

## 2024-11-12 PROCEDURE — 86900 BLOOD TYPING SEROLOGIC ABO: CPT | Mod: HCNC | Performed by: ORTHOPAEDIC SURGERY

## 2024-11-12 PROCEDURE — 36415 COLL VENOUS BLD VENIPUNCTURE: CPT | Mod: HCNC | Performed by: ORTHOPAEDIC SURGERY

## 2024-11-12 NOTE — TELEPHONE ENCOUNTER
Called and spoke with patient about the following:     Please arrive to [facility] ([facility] at 10:00am on 11/13/2024 for your scheduled procedure.  Address: [address] [place] [address] [place] [address] ([address] Building on left, 1st Floor Lobby)    !!!NO FOOD after midnight! You may have clear liquids up to 3 hrs before your arrival to the Hospital!!!  Clear liquids include Gatorade, water, soda, black coffee or tea (no milk or creamer), and clear juices.  Clear liquids do NOT include anything with pulp or food particles (Chicken broth, ice cream, yogurt, Jello, etc.)    Thank you,  -[facility] Pre [facility]mit [facility].  Mon-Fri 7:30 am - 4 pm [phone]

## 2024-11-13 ENCOUNTER — ANESTHESIA (OUTPATIENT)
Dept: SURGERY | Facility: HOSPITAL | Age: 84
End: 2024-11-13
Payer: MEDICARE

## 2024-11-13 ENCOUNTER — ANESTHESIA EVENT (OUTPATIENT)
Dept: SURGERY | Facility: HOSPITAL | Age: 84
End: 2024-11-13
Payer: MEDICARE

## 2024-11-13 ENCOUNTER — HOSPITAL ENCOUNTER (OUTPATIENT)
Facility: HOSPITAL | Age: 84
Discharge: HOME | End: 2024-11-13
Attending: ORTHOPAEDIC SURGERY | Admitting: ORTHOPAEDIC SURGERY
Payer: MEDICARE

## 2024-11-13 DIAGNOSIS — M16.11 ARTHRITIS OF RIGHT HIP: Primary | ICD-10-CM

## 2024-11-13 LAB
HCT VFR BLD AUTO: 30.9 % (ref 40–54)
HGB BLD-MCNC: 10.3 G/DL (ref 14–18)

## 2024-11-13 PROCEDURE — 85014 HEMATOCRIT: CPT | Mod: HCNC | Performed by: ORTHOPAEDIC SURGERY

## 2024-11-13 PROCEDURE — 71000033 HC RECOVERY, INTIAL HOUR: Mod: HCNC | Performed by: ORTHOPAEDIC SURGERY

## 2024-11-13 PROCEDURE — 25000003 PHARM REV CODE 250: Mod: HCNC | Performed by: ORTHOPAEDIC SURGERY

## 2024-11-13 PROCEDURE — 63600175 PHARM REV CODE 636 W HCPCS: Mod: HCNC | Performed by: ORTHOPAEDIC SURGERY

## 2024-11-13 PROCEDURE — 37000008 HC ANESTHESIA 1ST 15 MINUTES: Mod: HCNC | Performed by: ORTHOPAEDIC SURGERY

## 2024-11-13 PROCEDURE — 37000009 HC ANESTHESIA EA ADD 15 MINS: Mod: HCNC | Performed by: ORTHOPAEDIC SURGERY

## 2024-11-13 PROCEDURE — 97530 THERAPEUTIC ACTIVITIES: CPT | Mod: HCNC

## 2024-11-13 PROCEDURE — 25000003 PHARM REV CODE 250: Mod: HCNC | Performed by: STUDENT IN AN ORGANIZED HEALTH CARE EDUCATION/TRAINING PROGRAM

## 2024-11-13 PROCEDURE — 71000015 HC POSTOP RECOV 1ST HR: Mod: HCNC | Performed by: ORTHOPAEDIC SURGERY

## 2024-11-13 PROCEDURE — C1713 ANCHOR/SCREW BN/BN,TIS/BN: HCPCS | Mod: HCNC | Performed by: ORTHOPAEDIC SURGERY

## 2024-11-13 PROCEDURE — 27201423 OPTIME MED/SURG SUP & DEVICES STERILE SUPPLY: Mod: HCNC | Performed by: ORTHOPAEDIC SURGERY

## 2024-11-13 PROCEDURE — C1776 JOINT DEVICE (IMPLANTABLE): HCPCS | Mod: HCNC | Performed by: ORTHOPAEDIC SURGERY

## 2024-11-13 PROCEDURE — 63600175 PHARM REV CODE 636 W HCPCS: Mod: HCNC | Performed by: NURSE ANESTHETIST, CERTIFIED REGISTERED

## 2024-11-13 PROCEDURE — 63600175 PHARM REV CODE 636 W HCPCS: Mod: HCNC | Performed by: STUDENT IN AN ORGANIZED HEALTH CARE EDUCATION/TRAINING PROGRAM

## 2024-11-13 PROCEDURE — 97161 PT EVAL LOW COMPLEX 20 MIN: CPT | Mod: HCNC

## 2024-11-13 PROCEDURE — 25000003 PHARM REV CODE 250: Mod: HCNC | Performed by: NURSE ANESTHETIST, CERTIFIED REGISTERED

## 2024-11-13 PROCEDURE — 36000711: Mod: HCNC | Performed by: ORTHOPAEDIC SURGERY

## 2024-11-13 PROCEDURE — 71000039 HC RECOVERY, EACH ADD'L HOUR: Mod: HCNC | Performed by: ORTHOPAEDIC SURGERY

## 2024-11-13 PROCEDURE — 27130 TOTAL HIP ARTHROPLASTY: CPT | Mod: AS,HCNC,RT, | Performed by: PHYSICIAN ASSISTANT

## 2024-11-13 PROCEDURE — 85018 HEMOGLOBIN: CPT | Mod: HCNC | Performed by: ORTHOPAEDIC SURGERY

## 2024-11-13 PROCEDURE — 36000710: Mod: HCNC | Performed by: ORTHOPAEDIC SURGERY

## 2024-11-13 PROCEDURE — 27130 TOTAL HIP ARTHROPLASTY: CPT | Mod: HCNC,RT,, | Performed by: ORTHOPAEDIC SURGERY

## 2024-11-13 DEVICE — M-SPEC METAL FEMORAL HEAD 12/14 TAPER DIAMETER 36MM -2: Type: IMPLANTABLE DEVICE | Site: HIP | Status: FUNCTIONAL

## 2024-11-13 DEVICE — PINNACLE CANCELLOUS BONE SCREW 6.5MM X 20MM
Type: IMPLANTABLE DEVICE | Site: HIP | Status: FUNCTIONAL
Brand: PINNACLE

## 2024-11-13 DEVICE — ACTIS TOTAL HIP SYSTEM ACTIS DUOFIX HIP PROSTHESIS FEMORAL STEM 12/14 TAPER CEMENTLESS HIGH COLLARLESS SIZE 8
Type: IMPLANTABLE DEVICE | Site: HIP | Status: FUNCTIONAL
Brand: ACTIS

## 2024-11-13 DEVICE — PINNACLE HIP SOLUTIONS ALTRX POLYETHYLENE ACETABULAR LINER NEUTRAL 36MM ID 58MM OD
Type: IMPLANTABLE DEVICE | Site: HIP | Status: FUNCTIONAL
Brand: PINNACLE ALTRX

## 2024-11-13 RX ORDER — ONDANSETRON HYDROCHLORIDE 2 MG/ML
INJECTION, SOLUTION INTRAVENOUS
Status: DISCONTINUED | OUTPATIENT
Start: 2024-11-13 | End: 2024-11-13

## 2024-11-13 RX ORDER — MORPHINE SULFATE 4 MG/ML
2 INJECTION, SOLUTION INTRAMUSCULAR; INTRAVENOUS EVERY 4 HOURS PRN
Status: DISCONTINUED | OUTPATIENT
Start: 2024-11-13 | End: 2024-11-13 | Stop reason: HOSPADM

## 2024-11-13 RX ORDER — FENTANYL CITRATE 50 UG/ML
INJECTION, SOLUTION INTRAMUSCULAR; INTRAVENOUS
Status: DISCONTINUED | OUTPATIENT
Start: 2024-11-13 | End: 2024-11-13

## 2024-11-13 RX ORDER — GABAPENTIN 300 MG/1
300 CAPSULE ORAL DAILY
Status: DISCONTINUED | OUTPATIENT
Start: 2024-11-13 | End: 2024-11-13 | Stop reason: HOSPADM

## 2024-11-13 RX ORDER — ACETAMINOPHEN 325 MG/1
650 TABLET ORAL EVERY 8 HOURS PRN
Status: DISCONTINUED | OUTPATIENT
Start: 2024-11-13 | End: 2024-11-13 | Stop reason: HOSPADM

## 2024-11-13 RX ORDER — CHLORHEXIDINE GLUCONATE ORAL RINSE 1.2 MG/ML
10 SOLUTION DENTAL
Status: DISCONTINUED | OUTPATIENT
Start: 2024-11-13 | End: 2024-11-13 | Stop reason: HOSPADM

## 2024-11-13 RX ORDER — ACETAMINOPHEN 325 MG/1
650 TABLET ORAL EVERY 8 HOURS PRN
Status: DISCONTINUED | OUTPATIENT
Start: 2024-11-13 | End: 2024-11-13 | Stop reason: SDUPTHER

## 2024-11-13 RX ORDER — CEFAZOLIN 2 G/1
2 INJECTION, POWDER, FOR SOLUTION INTRAMUSCULAR; INTRAVENOUS
Status: DISCONTINUED | OUTPATIENT
Start: 2024-11-13 | End: 2024-11-13 | Stop reason: HOSPADM

## 2024-11-13 RX ORDER — SODIUM CHLORIDE 9 MG/ML
INJECTION, SOLUTION INTRAVENOUS CONTINUOUS
Status: DISCONTINUED | OUTPATIENT
Start: 2024-11-13 | End: 2024-11-13 | Stop reason: HOSPADM

## 2024-11-13 RX ORDER — OXYCODONE HYDROCHLORIDE 5 MG/1
5 TABLET ORAL
Status: DISCONTINUED | OUTPATIENT
Start: 2024-11-13 | End: 2024-11-13 | Stop reason: HOSPADM

## 2024-11-13 RX ORDER — ONDANSETRON 8 MG/1
8 TABLET, ORALLY DISINTEGRATING ORAL EVERY 8 HOURS PRN
Status: DISCONTINUED | OUTPATIENT
Start: 2024-11-13 | End: 2024-11-13 | Stop reason: HOSPADM

## 2024-11-13 RX ORDER — PROPOFOL 10 MG/ML
VIAL (ML) INTRAVENOUS
Status: DISCONTINUED | OUTPATIENT
Start: 2024-11-13 | End: 2024-11-13

## 2024-11-13 RX ORDER — CEFAZOLIN SODIUM 1 G/3ML
2 INJECTION, POWDER, FOR SOLUTION INTRAMUSCULAR; INTRAVENOUS
Status: DISCONTINUED | OUTPATIENT
Start: 2024-11-13 | End: 2024-11-13 | Stop reason: HOSPADM

## 2024-11-13 RX ORDER — CEFAZOLIN SODIUM 1 G/3ML
INJECTION, POWDER, FOR SOLUTION INTRAMUSCULAR; INTRAVENOUS
Status: DISCONTINUED | OUTPATIENT
Start: 2024-11-13 | End: 2024-11-13

## 2024-11-13 RX ORDER — ROPIVACAINE/EPI/CLONIDINE/KET 2.46-0.005
SYRINGE (ML) INJECTION
Status: DISCONTINUED | OUTPATIENT
Start: 2024-11-13 | End: 2024-11-13 | Stop reason: HOSPADM

## 2024-11-13 RX ORDER — LIDOCAINE HYDROCHLORIDE 10 MG/ML
INJECTION, SOLUTION EPIDURAL; INFILTRATION; INTRACAUDAL; PERINEURAL
Status: DISCONTINUED | OUTPATIENT
Start: 2024-11-13 | End: 2024-11-13

## 2024-11-13 RX ORDER — EPHEDRINE SULFATE 50 MG/ML
INJECTION, SOLUTION INTRAVENOUS
Status: DISCONTINUED | OUTPATIENT
Start: 2024-11-13 | End: 2024-11-13

## 2024-11-13 RX ORDER — ROCURONIUM BROMIDE 10 MG/ML
INJECTION, SOLUTION INTRAVENOUS
Status: DISCONTINUED | OUTPATIENT
Start: 2024-11-13 | End: 2024-11-13

## 2024-11-13 RX ORDER — GLUCAGON 1 MG
1 KIT INJECTION
Status: DISCONTINUED | OUTPATIENT
Start: 2024-11-13 | End: 2024-11-13 | Stop reason: HOSPADM

## 2024-11-13 RX ORDER — OXYCODONE AND ACETAMINOPHEN 5; 325 MG/1; MG/1
1 TABLET ORAL
Status: DISCONTINUED | OUTPATIENT
Start: 2024-11-13 | End: 2024-11-13 | Stop reason: HOSPADM

## 2024-11-13 RX ORDER — BISACODYL 10 MG/1
10 SUPPOSITORY RECTAL DAILY PRN
Status: DISCONTINUED | OUTPATIENT
Start: 2024-11-13 | End: 2024-11-13 | Stop reason: HOSPADM

## 2024-11-13 RX ORDER — ONDANSETRON HYDROCHLORIDE 2 MG/ML
4 INJECTION, SOLUTION INTRAVENOUS EVERY 12 HOURS PRN
Status: DISCONTINUED | OUTPATIENT
Start: 2024-11-13 | End: 2024-11-13 | Stop reason: HOSPADM

## 2024-11-13 RX ORDER — SUCCINYLCHOLINE CHLORIDE 20 MG/ML
INJECTION INTRAMUSCULAR; INTRAVENOUS
Status: DISCONTINUED | OUTPATIENT
Start: 2024-11-13 | End: 2024-11-13

## 2024-11-13 RX ORDER — ONDANSETRON 4 MG/1
8 TABLET, ORALLY DISINTEGRATING ORAL EVERY 8 HOURS PRN
Qty: 21 TABLET | Refills: 0 | Status: SHIPPED | OUTPATIENT
Start: 2024-11-13

## 2024-11-13 RX ORDER — DEXAMETHASONE SODIUM PHOSPHATE 4 MG/ML
8 INJECTION, SOLUTION INTRA-ARTICULAR; INTRALESIONAL; INTRAMUSCULAR; INTRAVENOUS; SOFT TISSUE
Status: DISCONTINUED | OUTPATIENT
Start: 2024-11-13 | End: 2024-11-13 | Stop reason: HOSPADM

## 2024-11-13 RX ORDER — ONDANSETRON HYDROCHLORIDE 2 MG/ML
4 INJECTION, SOLUTION INTRAVENOUS DAILY PRN
Status: DISCONTINUED | OUTPATIENT
Start: 2024-11-13 | End: 2024-11-13 | Stop reason: HOSPADM

## 2024-11-13 RX ORDER — OXYCODONE HYDROCHLORIDE 5 MG/1
10 TABLET ORAL
Status: DISCONTINUED | OUTPATIENT
Start: 2024-11-13 | End: 2024-11-13 | Stop reason: HOSPADM

## 2024-11-13 RX ORDER — CHLORHEXIDINE GLUCONATE ORAL RINSE 1.2 MG/ML
10 SOLUTION DENTAL 2 TIMES DAILY
Status: DISCONTINUED | OUTPATIENT
Start: 2024-11-13 | End: 2024-11-13 | Stop reason: HOSPADM

## 2024-11-13 RX ORDER — DOCUSATE SODIUM 100 MG/1
100 CAPSULE, LIQUID FILLED ORAL 2 TIMES DAILY
Status: DISCONTINUED | OUTPATIENT
Start: 2024-11-13 | End: 2024-11-13 | Stop reason: HOSPADM

## 2024-11-13 RX ORDER — OXYCODONE AND ACETAMINOPHEN 10; 325 MG/1; MG/1
1 TABLET ORAL EVERY 6 HOURS PRN
Qty: 27 TABLET | Refills: 0 | Status: SHIPPED | OUTPATIENT
Start: 2024-11-13

## 2024-11-13 RX ORDER — TRANEXAMIC ACID 10 MG/ML
1000 INJECTION, SOLUTION INTRAVENOUS
Status: COMPLETED | OUTPATIENT
Start: 2024-11-13 | End: 2024-11-13

## 2024-11-13 RX ORDER — HYDROMORPHONE HYDROCHLORIDE 1 MG/ML
0.2 INJECTION, SOLUTION INTRAMUSCULAR; INTRAVENOUS; SUBCUTANEOUS EVERY 5 MIN PRN
Status: DISCONTINUED | OUTPATIENT
Start: 2024-11-13 | End: 2024-11-13 | Stop reason: HOSPADM

## 2024-11-13 RX ADMIN — ROCURONIUM BROMIDE 10 MG: 10 SOLUTION INTRAVENOUS at 11:11

## 2024-11-13 RX ADMIN — TRANEXAMIC ACID 1000 MG: 10 INJECTION, SOLUTION INTRAVENOUS at 11:11

## 2024-11-13 RX ADMIN — ACETAMINOPHEN 650 MG: 325 TABLET ORAL at 10:11

## 2024-11-13 RX ADMIN — HYDROMORPHONE HYDROCHLORIDE 0.2 MG: 1 INJECTION, SOLUTION INTRAMUSCULAR; INTRAVENOUS; SUBCUTANEOUS at 01:11

## 2024-11-13 RX ADMIN — HYDROMORPHONE HYDROCHLORIDE 0.2 MG: 1 INJECTION, SOLUTION INTRAMUSCULAR; INTRAVENOUS; SUBCUTANEOUS at 02:11

## 2024-11-13 RX ADMIN — ROCURONIUM BROMIDE 5 MG: 10 SOLUTION INTRAVENOUS at 11:11

## 2024-11-13 RX ADMIN — ROCURONIUM BROMIDE 25 MG: 10 SOLUTION INTRAVENOUS at 11:11

## 2024-11-13 RX ADMIN — ONDANSETRON 4 MG: 2 INJECTION INTRAMUSCULAR; INTRAVENOUS at 12:11

## 2024-11-13 RX ADMIN — PROPOFOL 120 MG: 10 INJECTION, EMULSION INTRAVENOUS at 11:11

## 2024-11-13 RX ADMIN — CHLORHEXIDINE GLUCONATE 0.12% ORAL RINSE 10 ML: 1.2 LIQUID ORAL at 10:11

## 2024-11-13 RX ADMIN — GABAPENTIN 300 MG: 300 CAPSULE ORAL at 10:11

## 2024-11-13 RX ADMIN — EPHEDRINE SULFATE 10 MG: 50 INJECTION INTRAVENOUS at 12:11

## 2024-11-13 RX ADMIN — CEFAZOLIN 2 G: 330 INJECTION, POWDER, FOR SOLUTION INTRAMUSCULAR; INTRAVENOUS at 11:11

## 2024-11-13 RX ADMIN — FENTANYL CITRATE 50 MCG: 50 INJECTION, SOLUTION INTRAMUSCULAR; INTRAVENOUS at 11:11

## 2024-11-13 RX ADMIN — SUGAMMADEX 200 MG: 100 INJECTION, SOLUTION INTRAVENOUS at 12:11

## 2024-11-13 RX ADMIN — TRANEXAMIC ACID 1000 MG: 10 INJECTION, SOLUTION INTRAVENOUS at 12:11

## 2024-11-13 RX ADMIN — DEXAMETHASONE SODIUM PHOSPHATE 8 MG: 4 INJECTION, SOLUTION INTRA-ARTICULAR; INTRALESIONAL; INTRAMUSCULAR; INTRAVENOUS; SOFT TISSUE at 11:11

## 2024-11-13 RX ADMIN — SUCCINYLCHOLINE CHLORIDE 120 MG: 20 INJECTION, SOLUTION INTRAMUSCULAR; INTRAVENOUS; PARENTERAL at 11:11

## 2024-11-13 RX ADMIN — SODIUM CHLORIDE, SODIUM LACTATE, POTASSIUM CHLORIDE, AND CALCIUM CHLORIDE: .6; .31; .03; .02 INJECTION, SOLUTION INTRAVENOUS at 11:11

## 2024-11-13 RX ADMIN — ROCURONIUM BROMIDE 20 MG: 10 SOLUTION INTRAVENOUS at 12:11

## 2024-11-13 RX ADMIN — LIDOCAINE HYDROCHLORIDE 100 MG: 10 SOLUTION INTRAVENOUS at 11:11

## 2024-11-13 NOTE — DISCHARGE INSTRUCTIONS
Patient instructions for joint replacement      After surgery at home  1.  Take Tylenol 650 mg 3 times a day for 14 days then as needed for mild pain  2.  Take your Lyrica as previously prescribed  3.  Take all your home medications starting tomorrow   4.  Must take aspirin 81 mg twice a day for 6 weeks unless you are on other blood thinners/Plavix, Eliquis, Xarelto, Coumadin etc  5.  Must wear compressive stockings for 6 weeks minimum to decrease the risk of blood clot and swelling  6.  Hydrocodone/Norco or oxycodone/Percocet will be prescribed to take every 6 hr as needed for breakthrough pain  7.  May apply ice on the hip to help with decreasing pain  8.  Keep wound dry for 2 weeks until stitches/staples removed than you will be allowed to shower in 24 hr and get the wound wet.  No soaking of the wound in the tub or swimming for 4 weeks after surgery  9.  No driving for 4 weeks unless specified by physician  10.  Avoid touching the wound or surrounding area /at least 2 inches on each side of the surgical incision until staples are removed/stitches   11.  May change the surgical dressing if extremely bloody or has drainage on it. May clean the wound with peroxide or Betadine and apply sterile dressing and Ace wrap over it  12.  Leave hospital dressing on for 3 days then may change by applying sterile 4 x 4 and Ace wrap after cleaning with Betadine or peroxide.  May leave this dressing change to home health nurses

## 2024-11-13 NOTE — DISCHARGE SUMMARY
[facility] ([facility]ospital)  Discharge Note  Short Stay    Procedure(s) (LRB):  ARTHROPLASTY, HIP (Right)      OUTCOME: Patient tolerated treatment/procedure well without complication and is now ready for discharge.    DISPOSITION: Home-Health Care [facility]    FINAL DIAGNOSIS:  <principal problem not specified>  Arthritis of right hip  FOLLOWUP: In clinic    DISCHARGE INSTRUCTIONS:  No discharge procedures on file.     TIME SPENT ON DISCHARGE:  25 minutes

## 2024-11-13 NOTE — ANESTHESIA PREPROCEDURE EVALUATION
11/13/2024  Ezequiel Hughes is a 84 y.o., male    *Recently cleared by Dr. Thompson for procedure: moderately elevated perioperative CV risk     Patient Active Problem List   Diagnosis    Lumbosacral spondylosis without myelopathy    Benign prostatic hyperplasia    Hypertensive disorder    Hyperlipidemia    Cataract    Osteoarthritis    Thrombocytopenia    Anemia, unspecified    Chronic kidney disease, stage 3    Incomplete right bundle branch block    Preop cardiovascular exam    Prostate cancer    Aortic atherosclerosis    Lung granuloma    Atherosclerosis of artery of both lower extremities    Leg swelling    Sleep apnea    Periodic limb movement disorder (PLMD)    Inadequate sleep hygiene    Basal cell carcinoma (BCC) of anterior chest    Gout    History of colon polyps    Diverticulosis of colon    Internal hemorrhoids    Generalized weakness    Other eosinophilia    Unspecified inflammatory spondylopathy, thoracic region    Chronic neck pain    Lumbar radiculopathy    Cervical radiculopathy    Bilateral carpal tunnel syndrome    Lumbar radiculopathy, chronic    Urge incontinence    Abnormal EKG    Weakness of trunk musculature    Pulmonary hypertension    Other hydrocephalus    OMARI (generalized anxiety disorder)    Constipation    GERD (gastroesophageal reflux disease)    Primary open angle glaucoma (POAG) of right eye, mild stage    Primary open angle glaucoma (POAG) of left eye, moderate stage    Arthritis of right hip    Wheezing     Past Medical History:   Diagnosis Date    Allergic rhinitis     Anemia     Back pain     BPH (benign prostatic hyperplasia)     Cancer     skin cancer to neck, Dr. Graves    Cataract     Disorder of kidney and ureter     ED (erectile dysfunction)     OMARI (generalized anxiety disorder) 08/07/2023    Hiatal hernia     small    History of colon polyps     colonoscopy 11/2016     HLD (hyperlipidemia)     Hyperlipidemia     Hypertension     Lateral epicondylitis     Lumbar radiculopathy 01/26/2022    OA (osteoarthritis)     GUIDO (obstructive sleep apnea)     Polyneuropathy     Prostate cancer     Dr. Wong    TIA (transient ischemic attack)     Trouble in sleeping     Urge incontinence 03/29/2022     Past Surgical History:   Procedure Laterality Date    ANGIOGRAPHY OF UPPER EXTREMITY Left 07/05/2022    Procedure: Angiogram Extremity Bilateral;  Surgeon: Aparna Thompson MD;  Location: [facility] CATH LAB;  Service: Cardiology;  Laterality: Left;    ARTERIOGRAPHY OF AORTIC ROOT N/A 07/05/2022    Procedure: ARTERIOGRAM, AORTIC ROOT;  Surgeon: Aparna Thompsno MD;  Location: [facility] CATH LAB;  Service: Cardiology;  Laterality: N/A;    BLOCK, NERVE, PERIPHERAL Right 9/12/2024    Procedure: right femoral/ obturator nerve block- with steroids;  Surgeon: Abel Haywood MD;  Location: [facility] PAIN MGT;  Service: Pain Management;  Laterality: Right;    CATARACT EXTRACTION W/  INTRAOCULAR LENS IMPLANT Right 02/21/2018    I-Stent    CATARACT EXTRACTION W/  INTRAOCULAR LENS IMPLANT Left 03/21/2018    I - Stent    CATHETERIZATION OF BOTH LEFT AND RIGHT HEART N/A 07/05/2022    Procedure: CATHETERIZATION, HEART, BOTH LEFT AND RIGHT;  Surgeon: Aparna Thompson MD;  Location: [facility] CATH LAB;  Service: Cardiology;  Laterality: N/A;  radial approach    COLONOSCOPY N/A 11/14/2016    Procedure: COLONOSCOPY;  Surgeon: Karuna Rodriguez MD;  Location: [facility] ENDO;  Service: Endoscopy;  Laterality: N/A;    COLONOSCOPY N/A 09/22/2020    Procedure: COLONOSCOPY;  Surgeon: Chuy Fish MD;  Location: [facility] ENDO;  Service: Endoscopy;  Laterality: N/A;    EPIDURAL STEROID INJECTION N/A 6/6/2024    Procedure: Lumbar L5/S1 IL IAN;  Surgeon: Abel Haywood MD;  Location: [facility] PAIN MGT;  Service: Pain Management;  Laterality: N/A;    EPIDURAL STEROID INJECTION INTO CERVICAL SPINE N/A 2/8/2024    Procedure: C5/6 IL Right  paramedian approach IAN with RN IV sedation;  Surgeon: Abel Haywood MD;  Location: [facility] PAIN MGT;  Service: Pain Management;  Laterality: N/A;    EYE SURGERY      HEMORRHOID SURGERY      I-STENT Right 02/21/2018    DR. REECE    INJECTION OF ANESTHETIC AGENT AROUND MEDIAL BRANCH NERVES INNERVATING CERVICAL FACET JOINT Bilateral 7/18/2023    Procedure: Bilateral C4-6 MBB with RN IV sedation (diagnostic, #1);  Surgeon: Abel Haywood MD;  Location: [facility] PAIN MGT;  Service: Pain Management;  Laterality: Bilateral;    KNEE ARTHROSCOPY W/ MENISCAL REPAIR Right 2015    Dr. Jain    PCIOL Right 02/21/2018    DR. REECE    PLANTAR FASCIA RELEASE      right    ROTATOR CUFF REPAIR Bilateral     bilateral    SELECTIVE INJECTION OF ANESTHETIC AGENT AROUND LUMBAR SPINAL NERVE ROOT BY TRANSFORAMINAL APPROACH Bilateral 01/26/2022    Procedure: Bilateral L4/5 TF IAN with RN IV sedation;  Surgeon: Mak Young MD;  Location: [facility] PAIN MGT;  Service: Pain Management;  Laterality: Bilateral;    SELECTIVE INJECTION OF ANESTHETIC AGENT AROUND LUMBAR SPINAL NERVE ROOT BY TRANSFORAMINAL APPROACH Bilateral 03/14/2022    Procedure: Bilateral L4/5 TF IAN with RN IV sedation;  Surgeon: Mak Young MD;  Location: [facility] PAIN MGT;  Service: Pain Management;  Laterality: Bilateral;    SELECTIVE INJECTION OF ANESTHETIC AGENT AROUND LUMBAR SPINAL NERVE ROOT BY TRANSFORAMINAL APPROACH Bilateral 06/02/2022    Procedure: Bilateral L4/5 TF IAN - D2P Dr. Castro [facility] [place] [facility];  Surgeon: Abel Haywood MD;  Location: [facility] PAIN MGT;  Service: Pain Management;  Laterality: Bilateral;    SELECTIVE INJECTION OF ANESTHETIC AGENT AROUND LUMBAR SPINAL NERVE ROOT BY TRANSFORAMINAL APPROACH Bilateral 08/25/2022    Procedure: Bilateral L4/5 TF IAN;  Surgeon: Abel Haywood MD;  Location: [facility] PAIN MGT;  Service: Pain Management;  Laterality: Bilateral;    SELECTIVE INJECTION OF ANESTHETIC AGENT AROUND LUMBAR SPINAL NERVE ROOT BY TRANSFORAMINAL APPROACH  Bilateral 6/29/2023    Procedure: Bilateral L4/5 TF IAN RN IV Sedation;  Surgeon: Abel Haywood MD;  Location: [facility] PAIN MGT;  Service: Pain Management;  Laterality: Bilateral;    SELECTIVE INJECTION OF ANESTHETIC AGENT AROUND LUMBAR SPINAL NERVE ROOT BY TRANSFORAMINAL APPROACH Bilateral 4/11/2024    Procedure: Bilateral L4/5 TF IAN;  Surgeon: Abel Haywood MD;  Location: [facility] PAIN MGT;  Service: Pain Management;  Laterality: Bilateral;    SHOULDER SURGERY Bilateral around 2000    Dr. Pepper.  rotator cuff surgeries    VASECTOMY         Chemistry        Component Value Date/Time     10/31/2024 0919    K 5.0 10/31/2024 0919     10/31/2024 0919    CO2 25 10/31/2024 0919    BUN 28 (H) 10/31/2024 0919    CREATININE 1.6 (H) 10/31/2024 0919    GLU 77 10/31/2024 0919        Component Value Date/Time    CALCIUM 9.1 10/31/2024 0919    ALKPHOS 62 10/31/2024 0919    AST 16 10/31/2024 0919    ALT 10 10/31/2024 0919    BILITOT 0.7 10/31/2024 0919    ESTGFRAFRICA 57.9 (A) 06/29/2022 0917    EGFRNONAA 50.1 (A) 06/29/2022 0917        Lab Results   Component Value Date    WBC 6.42 10/28/2024    HGB 12.4 (L) 10/28/2024    HCT 38.1 (L) 10/28/2024    MCV 98 10/28/2024     (L) 10/28/2024     ECHO (3/11/24):  Summary         Left Ventricle: The left ventricle is normal in size. Normal wall thickness. There is concentric remodeling. Normal wall motion. There is normal systolic function with a visually estimated ejection fraction of 55 - 70%. Ejection fraction by visual approximation is 65%. There is normal diastolic function.    Right Ventricle: Normal right ventricular cavity size. Wall thickness is normal. Right ventricle wall motion  is normal. Systolic function is normal.    Aortic Valve: The aortic valve is a trileaflet valve. There is mild aortic valve sclerosis. Mildly restricted motion. There is no stenosis. Aortic valve peak velocity is 2.01 m/s. Mean gradient is 8 mmHg.    Mitral Valve: The mitral  valve is structurally normal. There is mild regurgitation.    Tricuspid Valve: There is mild regurgitation.    Pulmonary Artery: The estimated pulmonary artery systolic pressure is 50 mmHg.    IVC/SVC: Intermediate venous pressure at 8 mmHg.    Pre-op Assessment    I have reviewed the Patient Summary Reports.     I have reviewed the Nursing Notes. I have reviewed the NPO Status.      Review of Systems  Anesthesia Hx:  No problems with previous Anesthesia   History of prior surgery of interest to airway management or planning:  Previous anesthesia: General, MAC          Denies Personal Hx of Anesthesia complications.                    Social:  Former Smoker Hx of heavy smoking - Average packs/day: 3.0 packs/day for 35.0 years (104.9 ttl pk-yrs) - quit in the 80's      Hematology/Oncology:       -- Anemia:               Hematology Comments: 12/38                    Cardiovascular:     Hypertension    Dysrhythmias          ECG has been reviewed. Sinus rhythm with Premature atrial complexes   Left axis deviation   Abnormal ECG   When compared with ECG of 28-FEB-2024 10:25,   Premature atrial complexes are now Present   Confirmed by KYA ESTES MD ([id number]) on 10/28/2024 1:57:43 PM                              Pulmonary:    Denies COPD.  Denies Asthma.   Denies Shortness of breath.  Sleep Apnea                Renal/:  Chronic Renal Disease, CKD   CKD-3             Hepatic/GI:    Hiatal Hernia, GERD                Musculoskeletal:  Arthritis               Neurological:  TIA,  Neuromuscular Disease,                                   Endocrine:     BMI 32      Obesity / BMI > 30  Psych:  Psychiatric History                  Physical Exam  General: Well nourished, Cooperative, Alert and Oriented    Airway:  Mallampati: II / II  Mouth Opening: Normal  TM Distance: Normal  Tongue: Normal  Neck ROM: Extension Decreased    Dental:  Intact  Patient denies any currently loose or chipped teeth; Patient denies any removable  dental appliances    Chest/Lungs:  Clear to auscultation    Heart:  Rate: Normal        Anesthesia Plan  Type of Anesthesia, risks & benefits discussed:    Anesthesia Type: Gen ETT  Intra-op Monitoring Plan: Standard ASA Monitors  Post Op Pain Control Plan: multimodal analgesia and IV/PO Opioids PRN  Airway Plan: Direct, Post-Induction  Informed Consent: Informed consent signed with the Patient and all parties understand the risks and agree with anesthesia plan.  All questions answered.   ASA Score: 3  Day of Surgery Review of History & Physical: H&P Update referred to the surgeon/provider.  Anesthesia Plan Notes:   Airway - Non-Surgical Placement Date 04/22/15; Placement Time 1248; Method of Intubation Direct laryngoscopy; Inserted by CRNA; Airway Device Endotracheal Tube; Mask Ventilation Easy; Intubated Postinduction; Blade Avila #2; Airway Device Size 7.5; Style Cuffed; Cuff Inflation Minimal occlusive pressure; Placement Verified by Auscultation, Capnometry; Grade Grade I; Complicating Factors None; Intubation Findings Positive EtCO2, Bilateral breath sounds, Atraumatic/Condition of teeth unchanged; Depth of Insertion 22; Securement Lips; Complications None; Breath Sounds Equal Bilateral; Insertion Attempts 1; Removal Date 04/22/15; Removal Time 1416    Ready For Surgery From Anesthesia Perspective.     .

## 2024-11-13 NOTE — ANESTHESIA PROCEDURE NOTES
Intubation    Date/Time: 11/13/2024 11:14 AM    Performed by: Majo Merino CRNA  Authorized by: Maury Issa II, MD    Intubation:     Induction:  Intravenous    Intubated:  Postinduction    Mask Ventilation:  Easy mask    Attempts:  1    Attempted By:  CRNA    Method of Intubation:  Direct    Blade:  Avila 2    Laryngeal View Grade: Grade I - full view of cords      Difficult Airway Encountered?: No      Complications:  None    Airway Device:  Oral endotracheal tube    Airway Device Size:  7.5    Style/Cuff Inflation:  Cuffed    Tube secured:  23    Placement Verified By:  Capnometry    Complicating Factors:  None    Findings Post-Intubation:  BS equal bilateral and atraumatic/condition of teeth unchanged

## 2024-11-13 NOTE — TRANSFER OF CARE
"Anesthesia Transfer of Care Note    Patient: Ezequiel Hughes    Procedure(s) Performed: Procedure(s) (LRB):  ARTHROPLASTY, HIP (Right)    Patient location: PACU    Anesthesia Type: general    Transport from OR: Transported from OR on room air with adequate spontaneous ventilation    Post pain: adequate analgesia    Post assessment: no apparent anesthetic complications and tolerated procedure well    Post vital signs: stable    Level of consciousness: responds to stimulation and sedated    Nausea/Vomiting: no nausea/vomiting    Complications: none    Transfer of care protocol was followedComments: Report given to PACU RN at bedside. Hand off tool used. RN given opportunity to ask questions or clarify concerns. No Concerns verbalized. RN was asked if ready to assume care of patient. RN verbally confirmed. Pt. left in stable condition. SV. Vital Signs Return to Near Baseline. No s/s of distress noted.       Last vitals: Visit Vitals  BP (!) 140/65 (BP Location: Right arm, Patient Position: Sitting)   Pulse 67   Temp 36.7 °C (98.1 °F) (Temporal)   Resp 18   Ht 5' 10" (1.778 m)   Wt 96.3 kg (212 lb 4.9 oz)   SpO2 96%   BMI 30.46 kg/m²     "

## 2024-11-13 NOTE — ANESTHESIA POSTPROCEDURE EVALUATION
Anesthesia Post Evaluation    Patient: Ezequiel Hughes    Procedure(s) Performed: Procedure(s) (LRB):  ARTHROPLASTY, HIP (Right)    Final Anesthesia Type: general      Patient location during evaluation: PACU  Patient participation: Yes- Able to Participate  Level of consciousness: awake and alert  Post-procedure vital signs: reviewed and stable  Pain management: adequate  Airway patency: patent  GUIDO mitigation strategies: Verification of full reversal of neuromuscular block  PONV status at discharge: No PONV  Anesthetic complications: no      Cardiovascular status: hemodynamically stable  Respiratory status: spontaneous ventilation  Hydration status: euvolemic  Follow-up not needed.              Vitals Value Taken Time   BP 89/50 11/13/24 1401   Temp 36.2 °C (97.2 °F) 11/13/24 1330   Pulse 72 11/13/24 1401   Resp 28 11/13/24 1401   SpO2 91 % 11/13/24 1401   Vitals shown include unfiled device data.      No case tracking events are documented in the log.      Pain/Gucci Score: Pain Rating Prior to Med Admin: 1 (11/13/2024 10:02 AM)

## 2024-11-13 NOTE — PLAN OF CARE
[facility] (Hospital)  Discharge Assessment    Primary Care Provider: Valery Ozuna MD     Discharge Assessment (most recent)       BRIEF DISCHARGE ASSESSMENT - 11/13/24 4542          Discharge Planning    Assessment Type Discharge Planning Brief Assessment     Resource/Environmental Concerns none     Support Systems Spouse/significant other     Equipment Currently Used at Home walker, standard     Current Living Arrangements home     Patient/Family Anticipates Transition to home with help/services     Patient/Family Anticipated Services at Transition durable medical equipment;home health care     DME Needed Upon Discharge  walker, rolling     Discharge Plan A Home Health                   Spoke with spouse for brief assessment.   Help at home after discharge: spouse  Transportation:  personal vehicle   DME needed: rolling walker, sent to [facility] for review. Pending delivery.   Discharge plan:  Home Health - orders sent to [facility] for nursing/PT.

## 2024-11-13 NOTE — OP NOTE
[facility] ([facility])  Orthopedic Surgery  Operative Note    SUMMARY     Date of Procedure: 11/13/2024     Procedure: Procedure(s) (LRB):  ARTHROPLASTY, HIP (Right)       Surgeons and Role:     * Denton Encarnacion MD - Primary     * Liliana Mcarthur PA - Assisting  Maty AREVALO      Pre-Operative Diagnosis: Primary osteoarthritis of right hip [M16.11]    Post-Operative Diagnosis: Post-Op Diagnosis Codes:     * Primary osteoarthritis of right hip [M16.11]    Anesthesia: General    Injections/Meds:  Adela mixed with 40 cc of injectable saline Tourniquet time:  No tourniquet    Significant Surgical Tasks Conducted by the Assistant(s), if Applicable:  Needed multiple assistance to hold multiple retractors as well as the extremity and maneuver the extremity to provide visualization protect neurovascular structures in order to perform surgery safely and efficiently    Complications: none     Estimated Blood Loss (EBL):  200 mL           Implants[facility]  [facility] of [facility] Depuy Actis femoral stem high offset size 8., cobalt femoral head-2/36, acetabular cup gription size number 58 with 3 screws neutral acetabular insert    Specimens: None           Condition: Good    Disposition: PACU - hemodynamically stable.    Attestation: I performed the procedure.    Description:  Posterior approach performed right RUPINDER. After administering appropriate meds patient positioned in the lateral decubitus for a posterior approach.  Operative site was prepped and draped in usual sterile fashion. Incision was made for posterior approach to the hip carried down through subcutaneous tissues.  Full-thickness skin flap raised anterior and posteriorly. Iliotibial band and gluteus fascia was split .self-retaining retractor applied.  We identified the external rotators tagged those and released them. Capsule was opened in a T-fashion and partially resected. Hip was dislocated posteriorly without difficulty.  We marked our cut on  the femoral neck and was cut with the oscillating saw.  The femoral head was measured.  Proceeded with preparing the femoral canal after we put the femoral neck retractor.  Canal Finders inserted into the femoral canal and then a cookie cutter was used to lateralize.  We sequentially broached up to the appropriate tight fit. The broach was removed.  He then directed attention to the acetabulum anterior and posterior retractors applied.  We proceeded sequentially reaming the acetabulum using the Innomed inclination angle guide at 45° of inclination.  the acetabulum was reamed out on 15-20 degrees of anteversion/45 deg inclination.. Once we had some punctate bleeding and the acetabulum  appropriate size trial was applied . All the time with multiple retractors to protect the sciatic nerve. We went ahead at that point inserted the real acetabular component in a Press-Fit mode.  Screws applied in posterior superior quadrant.  Trial acetabular insert applied. We proceeded putting a broach in the femur for trial and sequentially tried numerous head sizes.  We put the hip through 90° of flexion neutral abduction and put through maneuvers of dislocation until we felt that it is appropriate and stable. We took all the trials out . pulse lavage the wounds.  Bleeders cauterized.  Proceeded with regional block injection into the soft tissue around the hip. Appropriate acetabular insert locked in place.  The femoral component was inserted and we tried again with a trial head.  We confirmed the size and then proceeded to insert the real femoral head. Tested the hip again felt stable proceed with closing the capsule with 1.  Vicryl and then reattaching the external rotators to the greater trochanter. The iliotibial band and the gluteus fascia was closed 1.  Vicryl figure-of-eight.  Subcutaneous tissue was closed 1.  And 0 Vicryl inverted stitch.  Skin using 3-0 Stratafix in a running subcuticular fashion.  Andriy dressing applied.   Sterile dressing applied.  Sterile dressing applied.  Tolerated procedure well

## 2024-11-13 NOTE — PT/OT/SLP EVAL
Physical Therapy Evaluation and Treatment    Patient Name: Ezequiel Hughes   MRN: [id number]  Recent Surgery: Procedure(s) (LRB):  ARTHROPLASTY, HIP (Right) Day of Surgery    Recommendations:     Discharge Recommendations: Low Intensity Therapy   Discharge Equipment Recommendations: walker, rolling (delivered to bed side)   Barriers to discharge: None    Assessment:     Ezequiel Hughes is a 84 y.o. male admitted with a medical diagnosis of Arthritis of right hip. He presents with the following impairments/functional limitations: weakness, impaired endurance, impaired functional mobility, gait instability, impaired balance, pain, decreased safety awareness, decreased lower extremity function, decreased ROM, orthopedic precautions.    Rehab Prognosis: Good; patient would benefit from acute PT services to address these deficits and reach maximum level of function.    Plan:     During this hospitalization, patient to be seen 3 x/week to address the above listed problems via gait training, therapeutic activities, therapeutic exercises    Plan of Care Expires: 11/27/24    Subjective     Chief Complaint: Pt is motivated to participate  Patient Comments/Goals: none stated  Pain/Comfort:  Pain Rating 1: 3/10  Location - Side 1: Right  Location - Orientation 1: generalized  Location 1: hip  Pain Addressed 1: Reposition, Distraction  Pain Rating Post-Intervention 1: 3/10    Social History:  Living Environment: Patient lives alone in a single story home with number of outside stair(s): 4 with rail. Pt has good support from family and always has someone with him.   Prior Level of Function: Prior to admission, patient was modified independent, not driving and retired, and ambulated household and community distances using straight cane  Equipment Used at Home: rollator, cane, straight  DME owned (not currently used): none  Assistance Upon Discharge: family    Objective:     Communicated with nurse and epic chart review prior to session.  "Patient found sitting edge of bed with peripheral IV, wound vac, knee immobilizer upon PT entry to room.    General Precautions: Standard, fall   Orthopedic Precautions: RLE weight bearing as tolerated, RLE posterior precautions   Braces: Knee immobilizer    Respiratory Status: Room air    Exams:  Cognition: Patient is oriented to Person, Place, Time, Situation  RLE ROM: WFL within precautions  RLE Strength:  NT due to surgery  LLE ROM: WFL  LLE Strength:  Grossly 4/5  Sensation:    -       Intact  Skin Integrity/Edema:     -       Skin integrity: Visible skin intact    Functional Mobility:  Gait belt applied - Yes  Bed Mobility  Seated EOB at start of session and returned to chair  Transfers  Sit to Stand: stand by assistance with rolling walker, cuing for weight bearing precautions.   Bed to Chair: stand by assistance with rolling walker using Step Transfer  Gait  Patient ambulated 100ft with rolling walker and stand by assistance. Patient demonstrates steady gait and antalgic gait. No c/o dizziness or SOB, no LOB. All lines remained intact throughout ambulation trail.  Balance  Sitting: stand by assistance  Standing: stand by assistance    Therapeutic Activities and Exercises:   Pt educated on role of PT in acute care and POC. Educated on R LE WBAT, posterior precautions, use of knee immobilizer. Educated on importance of OOB activities, activity pacing, and HEP (marching/hip flex, hip abd, heel slides/LAQ, quad sets, ankle pumps) in order to maintain/regain strength. Encouraged to sit up in chair for all meals. Educated on proper use of RW for safety and to reduce risk of falling. Educated on "call don't fall" policy and increased risk of falling due to weakness, instructed to utilize call bell for assistance with all transfers. Pt agreeable to all requests.    AM-PAC 6 CLICK MOBILITY  Total Score:12    Patient left up in chair with all lines intact, call button in reach, and RN and family present.    GOALS: "   Multidisciplinary Problems       Physical Therapy Goals          Problem: Physical Therapy    Goal Priority Disciplines Outcome Interventions   Physical Therapy Goal     PT, PT/OT     Description: Goals to be met by 11/27/24.  1. Pt will complete bed mobility MOD I.  2. Pt will complete sit to stand MOD I.  3. Pt will ambulate 200ft MOD I using RW.  4. Pt will increase AMPAC score by 2 points to progress functional mobility.                       History:     Past Medical History:   Diagnosis Date    Allergic rhinitis     Anemia     Back pain     BPH (benign prostatic hyperplasia)     Cancer     skin cancer to neck, Dr. Graves    Cataract     Disorder of kidney and ureter     ED (erectile dysfunction)     OMARI (generalized anxiety disorder) 08/07/2023    Hiatal hernia     small    History of colon polyps     colonoscopy 11/2016    HLD (hyperlipidemia)     Hyperlipidemia     Hypertension     Lateral epicondylitis     Lumbar radiculopathy 01/26/2022    OA (osteoarthritis)     GUIDO (obstructive sleep apnea)     Polyneuropathy     Prostate cancer     Dr. Wong    TIA (transient ischemic attack)     Trouble in sleeping     Urge incontinence 03/29/2022       Past Surgical History:   Procedure Laterality Date    ANGIOGRAPHY OF UPPER EXTREMITY Left 07/05/2022    Procedure: Angiogram Extremity Bilateral;  Surgeon: Aparna Thompson MD;  Location: [facility] CATH LAB;  Service: Cardiology;  Laterality: Left;    ARTERIOGRAPHY OF AORTIC ROOT N/A 07/05/2022    Procedure: ARTERIOGRAM, AORTIC ROOT;  Surgeon: Aparna Thompson MD;  Location: [facility] CATH LAB;  Service: Cardiology;  Laterality: N/A;    BLOCK, NERVE, PERIPHERAL Right 9/12/2024    Procedure: right femoral/ obturator nerve block- with steroids;  Surgeon: Abel Haywood MD;  Location: [facility];  Service: Pain Management;  Laterality: Right;    CATARACT EXTRACTION W/  INTRAOCULAR LENS IMPLANT Right 02/21/2018    I-Stent    CATARACT EXTRACTION W/  INTRAOCULAR LENS  IMPLANT Left 03/21/2018    I - Stent    CATHETERIZATION OF BOTH LEFT AND RIGHT HEART N/A 07/05/2022    Procedure: CATHETERIZATION, HEART, BOTH LEFT AND RIGHT;  Surgeon: Aparna Thompson MD;  Location: [facility] CATH LAB;  Service: Cardiology;  Laterality: N/A;  radial approach    COLONOSCOPY N/A 11/14/2016    Procedure: COLONOSCOPY;  Surgeon: Karuna Rodriguez MD;  Location: [facility] ENDO;  Service: Endoscopy;  Laterality: N/A;    COLONOSCOPY N/A 09/22/2020    Procedure: COLONOSCOPY;  Surgeon: Chuy Fish MD;  Location: [facility] ENDO;  Service: Endoscopy;  Laterality: N/A;    EPIDURAL STEROID INJECTION N/A 6/6/2024    Procedure: Lumbar L5/S1 IL IAN;  Surgeon: Abel Haywood MD;  Location: [facility] PAIN MGT;  Service: Pain Management;  Laterality: N/A;    EPIDURAL STEROID INJECTION INTO CERVICAL SPINE N/A 2/8/2024    Procedure: C5/6 IL Right paramedian approach IAN with RN IV sedation;  Surgeon: Abel Haywood MD;  Location: [facility] PAIN MGT;  Service: Pain Management;  Laterality: N/A;    EYE SURGERY      HEMORRHOID SURGERY      I-STENT Right 02/21/2018    DR. REECE    INJECTION OF ANESTHETIC AGENT AROUND MEDIAL BRANCH NERVES INNERVATING CERVICAL FACET JOINT Bilateral 7/18/2023    Procedure: Bilateral C4-6 MBB with RN IV sedation (diagnostic, #1);  Surgeon: Abel Haywood MD;  Location: [facility] PAIN MGT;  Service: Pain Management;  Laterality: Bilateral;    KNEE ARTHROSCOPY W/ MENISCAL REPAIR Right 2015    Dr. Jain    PCIOL Right 02/21/2018    DR. REECE    PLANTAR FASCIA RELEASE      right    ROTATOR CUFF REPAIR Bilateral     bilateral    SELECTIVE INJECTION OF ANESTHETIC AGENT AROUND LUMBAR SPINAL NERVE ROOT BY TRANSFORAMINAL APPROACH Bilateral 01/26/2022    Procedure: Bilateral L4/5 TF IAN with RN IV sedation;  Surgeon: Mak Young MD;  Location: [facility] PAIN MGT;  Service: Pain Management;  Laterality: Bilateral;    SELECTIVE INJECTION OF ANESTHETIC AGENT AROUND LUMBAR SPINAL NERVE ROOT BY TRANSFORAMINAL APPROACH  Bilateral 03/14/2022    Procedure: Bilateral L4/5 TF IAN with RN IV sedation;  Surgeon: Mak Young MD;  Location: [facility] PAIN MGT;  Service: Pain Management;  Laterality: Bilateral;    SELECTIVE INJECTION OF ANESTHETIC AGENT AROUND LUMBAR SPINAL NERVE ROOT BY TRANSFORAMINAL APPROACH Bilateral 06/02/2022    Procedure: Bilateral L4/5 TF IAN - D2P Dr. Castro [facility] [place] [facility];  Surgeon: Abel Haywood MD;  Location: [facility] PAIN MGT;  Service: Pain Management;  Laterality: Bilateral;    SELECTIVE INJECTION OF ANESTHETIC AGENT AROUND LUMBAR SPINAL NERVE ROOT BY TRANSFORAMINAL APPROACH Bilateral 08/25/2022    Procedure: Bilateral L4/5 TF IAN;  Surgeon: Abel Haywood MD;  Location: [facility] PAIN MGT;  Service: Pain Management;  Laterality: Bilateral;    SELECTIVE INJECTION OF ANESTHETIC AGENT AROUND LUMBAR SPINAL NERVE ROOT BY TRANSFORAMINAL APPROACH Bilateral 6/29/2023    Procedure: Bilateral L4/5 TF IAN RN IV Sedation;  Surgeon: Abel Haywood MD;  Location: [facility] PAIN [facility]GT;  Service: Pain Management;  Laterality: Bilateral;    SELECTIVE INJECTION OF ANESTHETIC AGENT AROUND LUMBAR SPINAL NERVE ROOT BY TRANSFORAMINAL APPROACH Bilateral 4/11/2024    Procedure: Bilateral L4/5 TF IAN;  Surgeon: Abel Haywood MD;  Location: [facility] PAIN MGT;  Service: Pain Management;  Laterality: Bilateral;    SHOULDER SURGERY Bilateral around 2000    Dr. Pepper.  rotator cuff surgeries    VASECTOMY         Time Tracking:     PT Received On: 11/13/24  PT Start Time: 1525  PT Stop Time: 1550  PT Total Time (min): 25 min     Billable Minutes: Evaluation 15min and Therapeutic Activity 10min    11/13/2024

## 2024-11-14 ENCOUNTER — PATIENT MESSAGE (OUTPATIENT)
Dept: PRIMARY CARE CLINIC | Facility: CLINIC | Age: 84
End: 2024-11-14
Payer: MEDICARE

## 2024-11-14 VITALS
OXYGEN SATURATION: 96 % | WEIGHT: 212.31 LBS | HEART RATE: 67 BPM | TEMPERATURE: 98 F | HEIGHT: 70 IN | BODY MASS INDEX: 30.39 KG/M2 | SYSTOLIC BLOOD PRESSURE: 103 MMHG | RESPIRATION RATE: 10 BRPM | DIASTOLIC BLOOD PRESSURE: 55 MMHG

## 2024-11-14 PROCEDURE — G0180 MD CERTIFICATION HHA PATIENT: HCPCS | Mod: ,,, | Performed by: ORTHOPAEDIC SURGERY

## 2024-11-15 ENCOUNTER — TELEPHONE (OUTPATIENT)
Dept: ORTHOPEDICS | Facility: CLINIC | Age: 84
End: 2024-11-15
Payer: MEDICARE

## 2024-11-15 ENCOUNTER — TELEPHONE (OUTPATIENT)
Dept: PRIMARY CARE CLINIC | Facility: CLINIC | Age: 84
End: 2024-11-15
Payer: MEDICARE

## 2024-11-15 RX ORDER — DEXTROMETHORPHAN HYDROBROMIDE, GUAIFENESIN 5; 100 MG/5ML; MG/5ML
650 LIQUID ORAL EVERY 8 HOURS
Qty: 90 TABLET | Refills: 0 | Status: SHIPPED | OUTPATIENT
Start: 2024-11-15 | End: 2024-12-15

## 2024-11-15 NOTE — TELEPHONE ENCOUNTER
----- Message from Anne-Marie sent at 11/15/2024 10:54 AM CST -----  States she sent a message through [employer] on yesterday. [facility] came yesterday and his blood pressure was low. States they need some guidance on what he should do. Please call Christie Petersen [phone]. Thank you

## 2024-11-15 NOTE — TELEPHONE ENCOUNTER
----- Message from Trell Everett PA-C sent at 11/15/2024  2:02 PM CST -----  Contact: Christie/daughter  Done.  ----- Message -----  From: Deanna Daniels LPN  Sent: 11/15/2024  12:38 PM CST  To: Trell Everett PA-C    Are you able to send in RX of the tylenol 650mg? I know its OTC...  ----- Message -----  From: Quita Wilcox  Sent: 11/15/2024  12:21 PM CST  To: Alysha Chawla Staff    Type:  Needs Medical Advice    Who Called: Christie  Symptoms (please be specific): /   How long has patient had these symptoms:  /  Pharmacy name and phone #:  /  Would the patient rather a call back or a response via [facility]? call  Best Call Back Number: [phone]  Additional Information: needing prescription for Tylenol

## 2024-11-15 NOTE — TELEPHONE ENCOUNTER
Called and spoke with daughter aide     All questions answered    Daughter will call back on Monday and let us know how patient is doing

## 2024-11-17 ENCOUNTER — PATIENT MESSAGE (OUTPATIENT)
Dept: ORTHOPEDICS | Facility: CLINIC | Age: 84
End: 2024-11-17
Payer: MEDICARE

## 2024-11-18 ENCOUNTER — HOSPITAL ENCOUNTER (INPATIENT)
Facility: HOSPITAL | Age: 84
LOS: 2 days | Discharge: HOME HEALTH | DRG: 682 | End: 2024-11-21
Attending: EMERGENCY MEDICINE | Admitting: FAMILY MEDICINE
Payer: MEDICARE

## 2024-11-18 ENCOUNTER — TELEPHONE (OUTPATIENT)
Dept: ORTHOPEDICS | Facility: CLINIC | Age: 84
End: 2024-11-18
Payer: MEDICARE

## 2024-11-18 DIAGNOSIS — I95.9 HYPOTENSION: ICD-10-CM

## 2024-11-18 DIAGNOSIS — Z96.641 S/P TOTAL RIGHT HIP ARTHROPLASTY: Primary | ICD-10-CM

## 2024-11-18 DIAGNOSIS — R07.9 CHEST PAIN: ICD-10-CM

## 2024-11-18 DIAGNOSIS — Z96.641 HISTORY OF TOTAL RIGHT HIP REPLACEMENT: ICD-10-CM

## 2024-11-18 DIAGNOSIS — N17.9 ACUTE RENAL FAILURE SUPERIMPOSED ON STAGE 4 CHRONIC KIDNEY DISEASE, UNSPECIFIED ACUTE RENAL FAILURE TYPE: Primary | ICD-10-CM

## 2024-11-18 DIAGNOSIS — E86.1 HYPOTENSION DUE TO HYPOVOLEMIA: ICD-10-CM

## 2024-11-18 DIAGNOSIS — K59.00 CONSTIPATION: ICD-10-CM

## 2024-11-18 DIAGNOSIS — N18.4 ACUTE RENAL FAILURE SUPERIMPOSED ON STAGE 4 CHRONIC KIDNEY DISEASE, UNSPECIFIED ACUTE RENAL FAILURE TYPE: Primary | ICD-10-CM

## 2024-11-18 DIAGNOSIS — R06.02 SHORTNESS OF BREATH: ICD-10-CM

## 2024-11-18 DIAGNOSIS — J18.9 PNEUMONIA OF LEFT LOWER LOBE DUE TO INFECTIOUS ORGANISM: ICD-10-CM

## 2024-11-18 DIAGNOSIS — K59.03 DRUG-INDUCED CONSTIPATION: ICD-10-CM

## 2024-11-18 DIAGNOSIS — R60.0 BILATERAL LOWER EXTREMITY EDEMA: ICD-10-CM

## 2024-11-18 DIAGNOSIS — H40.1111 PRIMARY OPEN ANGLE GLAUCOMA (POAG) OF RIGHT EYE, MILD STAGE: ICD-10-CM

## 2024-11-18 DIAGNOSIS — M16.11 ARTHRITIS OF RIGHT HIP: ICD-10-CM

## 2024-11-18 LAB
ALBUMIN SERPL BCP-MCNC: 3 G/DL (ref 3.5–5.2)
ALP SERPL-CCNC: 124 U/L (ref 40–150)
ALT SERPL W/O P-5'-P-CCNC: 28 U/L (ref 10–44)
ANION GAP SERPL CALC-SCNC: 16 MMOL/L (ref 8–16)
AST SERPL-CCNC: 28 U/L (ref 10–40)
BASOPHILS # BLD AUTO: 0.03 K/UL (ref 0–0.2)
BASOPHILS NFR BLD: 0.4 % (ref 0–1.9)
BILIRUB SERPL-MCNC: 0.9 MG/DL (ref 0.1–1)
BILIRUB UR QL STRIP: NEGATIVE
BNP SERPL-MCNC: 219 PG/ML (ref 0–99)
BUN SERPL-MCNC: 90 MG/DL (ref 8–23)
CALCIUM SERPL-MCNC: 8.3 MG/DL (ref 8.7–10.5)
CHLORIDE SERPL-SCNC: 96 MMOL/L (ref 95–110)
CK SERPL-CCNC: 413 U/L (ref 20–200)
CLARITY UR: CLEAR
CO2 SERPL-SCNC: 16 MMOL/L (ref 23–29)
COLOR UR: YELLOW
CREAT SERPL-MCNC: 3.9 MG/DL (ref 0.5–1.4)
DIFFERENTIAL METHOD BLD: ABNORMAL
EOSINOPHIL # BLD AUTO: 0.1 K/UL (ref 0–0.5)
EOSINOPHIL NFR BLD: 1.8 % (ref 0–8)
ERYTHROCYTE [DISTWIDTH] IN BLOOD BY AUTOMATED COUNT: 12.4 % (ref 11.5–14.5)
EST. GFR  (NO RACE VARIABLE): 14 ML/MIN/1.73 M^2
GLUCOSE SERPL-MCNC: 115 MG/DL (ref 70–110)
GLUCOSE UR QL STRIP: NEGATIVE
HCT VFR BLD AUTO: 26.7 % (ref 40–54)
HGB BLD-MCNC: 8.9 G/DL (ref 14–18)
HGB UR QL STRIP: NEGATIVE
IMM GRANULOCYTES # BLD AUTO: 0.16 K/UL (ref 0–0.04)
IMM GRANULOCYTES NFR BLD AUTO: 2.2 % (ref 0–0.5)
INFLUENZA A, MOLECULAR: NEGATIVE
INFLUENZA B, MOLECULAR: NEGATIVE
KETONES UR QL STRIP: NEGATIVE
LACTATE SERPL-SCNC: 2.3 MMOL/L (ref 0.5–2.2)
LEUKOCYTE ESTERASE UR QL STRIP: NEGATIVE
LYMPHOCYTES # BLD AUTO: 0.4 K/UL (ref 1–4.8)
LYMPHOCYTES NFR BLD: 5.5 % (ref 18–48)
MAGNESIUM SERPL-MCNC: 2.3 MG/DL (ref 1.6–2.6)
MCH RBC QN AUTO: 31.8 PG (ref 27–31)
MCHC RBC AUTO-ENTMCNC: 33.3 G/DL (ref 32–36)
MCV RBC AUTO: 95 FL (ref 82–98)
MONOCYTES # BLD AUTO: 0.9 K/UL (ref 0.3–1)
MONOCYTES NFR BLD: 13.1 % (ref 4–15)
NEUTROPHILS # BLD AUTO: 5.5 K/UL (ref 1.8–7.7)
NEUTROPHILS NFR BLD: 77 % (ref 38–73)
NITRITE UR QL STRIP: NEGATIVE
NRBC BLD-RTO: 0 /100 WBC
PH UR STRIP: 5 [PH] (ref 5–8)
PLATELET # BLD AUTO: 142 K/UL (ref 150–450)
PMV BLD AUTO: 8.7 FL (ref 9.2–12.9)
POTASSIUM SERPL-SCNC: 4.9 MMOL/L (ref 3.5–5.1)
PROCALCITONIN SERPL IA-MCNC: 0.67 NG/ML
PROT SERPL-MCNC: 6.5 G/DL (ref 6–8.4)
PROT UR QL STRIP: NEGATIVE
RBC # BLD AUTO: 2.8 M/UL (ref 4.6–6.2)
SARS-COV-2 RDRP RESP QL NAA+PROBE: NEGATIVE
SODIUM SERPL-SCNC: 128 MMOL/L (ref 136–145)
SP GR UR STRIP: 1.02 (ref 1–1.03)
SPECIMEN SOURCE: NORMAL
TROPONIN I SERPL DL<=0.01 NG/ML-MCNC: 0.08 NG/ML (ref 0–0.03)
URN SPEC COLLECT METH UR: NORMAL
UROBILINOGEN UR STRIP-ACNC: NEGATIVE EU/DL
WBC # BLD AUTO: 7.12 K/UL (ref 3.9–12.7)

## 2024-11-18 PROCEDURE — 83880 ASSAY OF NATRIURETIC PEPTIDE: CPT | Mod: HCNC | Performed by: EMERGENCY MEDICINE

## 2024-11-18 PROCEDURE — 36415 COLL VENOUS BLD VENIPUNCTURE: CPT | Mod: HCNC | Performed by: EMERGENCY MEDICINE

## 2024-11-18 PROCEDURE — 80053 COMPREHEN METABOLIC PANEL: CPT | Mod: HCNC | Performed by: EMERGENCY MEDICINE

## 2024-11-18 PROCEDURE — 87040 BLOOD CULTURE FOR BACTERIA: CPT | Mod: HCNC | Performed by: EMERGENCY MEDICINE

## 2024-11-18 PROCEDURE — 85025 COMPLETE CBC W/AUTO DIFF WBC: CPT | Mod: HCNC | Performed by: EMERGENCY MEDICINE

## 2024-11-18 PROCEDURE — 63600175 PHARM REV CODE 636 W HCPCS: Mod: HCNC | Performed by: EMERGENCY MEDICINE

## 2024-11-18 PROCEDURE — 83735 ASSAY OF MAGNESIUM: CPT | Mod: HCNC | Performed by: EMERGENCY MEDICINE

## 2024-11-18 PROCEDURE — 82550 ASSAY OF CK (CPK): CPT | Mod: HCNC | Performed by: EMERGENCY MEDICINE

## 2024-11-18 PROCEDURE — 84145 PROCALCITONIN (PCT): CPT | Mod: HCNC | Performed by: EMERGENCY MEDICINE

## 2024-11-18 PROCEDURE — 94640 AIRWAY INHALATION TREATMENT: CPT | Mod: HCNC

## 2024-11-18 PROCEDURE — 25000242 PHARM REV CODE 250 ALT 637 W/ HCPCS: Mod: HCNC | Performed by: EMERGENCY MEDICINE

## 2024-11-18 PROCEDURE — 96361 HYDRATE IV INFUSION ADD-ON: CPT | Mod: HCNC

## 2024-11-18 PROCEDURE — 93010 ELECTROCARDIOGRAM REPORT: CPT | Mod: HCNC,,, | Performed by: STUDENT IN AN ORGANIZED HEALTH CARE EDUCATION/TRAINING PROGRAM

## 2024-11-18 PROCEDURE — 83605 ASSAY OF LACTIC ACID: CPT | Mod: HCNC | Performed by: EMERGENCY MEDICINE

## 2024-11-18 PROCEDURE — 96361 HYDRATE IV INFUSION ADD-ON: CPT

## 2024-11-18 PROCEDURE — 84484 ASSAY OF TROPONIN QUANT: CPT | Mod: HCNC | Performed by: EMERGENCY MEDICINE

## 2024-11-18 PROCEDURE — 87635 SARS-COV-2 COVID-19 AMP PRB: CPT | Mod: HCNC | Performed by: EMERGENCY MEDICINE

## 2024-11-18 PROCEDURE — 99285 EMERGENCY DEPT VISIT HI MDM: CPT | Mod: 25,HCNC

## 2024-11-18 PROCEDURE — 96372 THER/PROPH/DIAG INJ SC/IM: CPT | Performed by: FAMILY MEDICINE

## 2024-11-18 PROCEDURE — G0378 HOSPITAL OBSERVATION PER HR: HCPCS | Mod: HCNC

## 2024-11-18 PROCEDURE — 25000003 PHARM REV CODE 250: Mod: HCNC | Performed by: EMERGENCY MEDICINE

## 2024-11-18 PROCEDURE — 96365 THER/PROPH/DIAG IV INF INIT: CPT | Mod: HCNC

## 2024-11-18 PROCEDURE — 81003 URINALYSIS AUTO W/O SCOPE: CPT | Mod: HCNC | Performed by: EMERGENCY MEDICINE

## 2024-11-18 PROCEDURE — 93005 ELECTROCARDIOGRAM TRACING: CPT | Mod: HCNC

## 2024-11-18 PROCEDURE — 63600175 PHARM REV CODE 636 W HCPCS: Mod: HCNC | Performed by: FAMILY MEDICINE

## 2024-11-18 PROCEDURE — 87502 INFLUENZA DNA AMP PROBE: CPT | Mod: HCNC | Performed by: EMERGENCY MEDICINE

## 2024-11-18 PROCEDURE — 25000003 PHARM REV CODE 250: Mod: HCNC | Performed by: FAMILY MEDICINE

## 2024-11-18 RX ORDER — NALOXONE HCL 0.4 MG/ML
0.02 VIAL (ML) INJECTION
Status: DISCONTINUED | OUTPATIENT
Start: 2024-11-18 | End: 2024-11-21 | Stop reason: HOSPADM

## 2024-11-18 RX ORDER — CITALOPRAM 10 MG/1
10 TABLET ORAL DAILY
Status: DISCONTINUED | OUTPATIENT
Start: 2024-11-19 | End: 2024-11-21 | Stop reason: HOSPADM

## 2024-11-18 RX ORDER — PROCHLORPERAZINE EDISYLATE 5 MG/ML
5 INJECTION INTRAMUSCULAR; INTRAVENOUS EVERY 6 HOURS PRN
Status: DISCONTINUED | OUTPATIENT
Start: 2024-11-18 | End: 2024-11-21 | Stop reason: HOSPADM

## 2024-11-18 RX ORDER — SYRING-NEEDL,DISP,INSUL,0.3 ML 29 G X1/2"
296 SYRINGE, EMPTY DISPOSABLE MISCELLANEOUS ONCE
Status: COMPLETED | OUTPATIENT
Start: 2024-11-18 | End: 2024-11-18

## 2024-11-18 RX ORDER — CLOPIDOGREL BISULFATE 75 MG/1
75 TABLET ORAL DAILY
Status: DISCONTINUED | OUTPATIENT
Start: 2024-11-19 | End: 2024-11-21 | Stop reason: HOSPADM

## 2024-11-18 RX ORDER — PANTOPRAZOLE SODIUM 40 MG/1
40 TABLET, DELAYED RELEASE ORAL DAILY
Status: DISCONTINUED | OUTPATIENT
Start: 2024-11-19 | End: 2024-11-21 | Stop reason: HOSPADM

## 2024-11-18 RX ORDER — TRAMADOL HYDROCHLORIDE 50 MG/1
50 TABLET ORAL EVERY 8 HOURS PRN
Status: DISCONTINUED | OUTPATIENT
Start: 2024-11-18 | End: 2024-11-21 | Stop reason: HOSPADM

## 2024-11-18 RX ORDER — SODIUM CHLORIDE, SODIUM LACTATE, POTASSIUM CHLORIDE, CALCIUM CHLORIDE 600; 310; 30; 20 MG/100ML; MG/100ML; MG/100ML; MG/100ML
1000 INJECTION, SOLUTION INTRAVENOUS
Status: COMPLETED | OUTPATIENT
Start: 2024-11-18 | End: 2024-11-18

## 2024-11-18 RX ORDER — POLYETHYLENE GLYCOL 3350 17 G/17G
17 POWDER, FOR SOLUTION ORAL 2 TIMES DAILY
Status: DISCONTINUED | OUTPATIENT
Start: 2024-11-18 | End: 2024-11-21 | Stop reason: HOSPADM

## 2024-11-18 RX ORDER — GLUCAGON 1 MG
1 KIT INJECTION
Status: DISCONTINUED | OUTPATIENT
Start: 2024-11-18 | End: 2024-11-21 | Stop reason: HOSPADM

## 2024-11-18 RX ORDER — ACETAMINOPHEN 325 MG/1
650 TABLET ORAL EVERY 4 HOURS PRN
Status: DISCONTINUED | OUTPATIENT
Start: 2024-11-18 | End: 2024-11-21 | Stop reason: HOSPADM

## 2024-11-18 RX ORDER — HYDRALAZINE HYDROCHLORIDE 20 MG/ML
10 INJECTION INTRAMUSCULAR; INTRAVENOUS EVERY 6 HOURS PRN
Status: DISCONTINUED | OUTPATIENT
Start: 2024-11-18 | End: 2024-11-21 | Stop reason: HOSPADM

## 2024-11-18 RX ORDER — IBUPROFEN 200 MG
16 TABLET ORAL
Status: DISCONTINUED | OUTPATIENT
Start: 2024-11-18 | End: 2024-11-21 | Stop reason: HOSPADM

## 2024-11-18 RX ORDER — HYDROCODONE BITARTRATE AND ACETAMINOPHEN 10; 325 MG/1; MG/1
1 TABLET ORAL EVERY 6 HOURS PRN
Qty: 28 TABLET | Refills: 0 | Status: ON HOLD | OUTPATIENT
Start: 2024-11-18 | End: 2024-11-18

## 2024-11-18 RX ORDER — HEPARIN SODIUM 5000 [USP'U]/ML
5000 INJECTION, SOLUTION INTRAVENOUS; SUBCUTANEOUS EVERY 8 HOURS
Status: DISCONTINUED | OUTPATIENT
Start: 2024-11-18 | End: 2024-11-21 | Stop reason: HOSPADM

## 2024-11-18 RX ORDER — ONDANSETRON 8 MG/1
8 TABLET, ORALLY DISINTEGRATING ORAL EVERY 8 HOURS PRN
Status: DISCONTINUED | OUTPATIENT
Start: 2024-11-18 | End: 2024-11-21 | Stop reason: HOSPADM

## 2024-11-18 RX ORDER — ATORVASTATIN CALCIUM 10 MG/1
20 TABLET, FILM COATED ORAL DAILY
Status: DISCONTINUED | OUTPATIENT
Start: 2024-11-19 | End: 2024-11-21 | Stop reason: HOSPADM

## 2024-11-18 RX ORDER — IBUPROFEN 200 MG
24 TABLET ORAL
Status: DISCONTINUED | OUTPATIENT
Start: 2024-11-18 | End: 2024-11-21 | Stop reason: HOSPADM

## 2024-11-18 RX ORDER — SODIUM CHLORIDE 0.9 % (FLUSH) 0.9 %
10 SYRINGE (ML) INJECTION EVERY 12 HOURS PRN
Status: DISCONTINUED | OUTPATIENT
Start: 2024-11-18 | End: 2024-11-21 | Stop reason: HOSPADM

## 2024-11-18 RX ORDER — IPRATROPIUM BROMIDE AND ALBUTEROL SULFATE 2.5; .5 MG/3ML; MG/3ML
3 SOLUTION RESPIRATORY (INHALATION)
Status: COMPLETED | OUTPATIENT
Start: 2024-11-18 | End: 2024-11-18

## 2024-11-18 RX ORDER — SODIUM CHLORIDE 9 MG/ML
INJECTION, SOLUTION INTRAVENOUS CONTINUOUS
Status: DISCONTINUED | OUTPATIENT
Start: 2024-11-18 | End: 2024-11-21 | Stop reason: HOSPADM

## 2024-11-18 RX ORDER — POLYETHYLENE GLYCOL 3350 17 G/17G
17 POWDER, FOR SOLUTION ORAL DAILY
COMMUNITY

## 2024-11-18 RX ORDER — CEFTRIAXONE 1 G/1
1 INJECTION, POWDER, FOR SOLUTION INTRAMUSCULAR; INTRAVENOUS
Status: DISCONTINUED | OUTPATIENT
Start: 2024-11-18 | End: 2024-11-21 | Stop reason: HOSPADM

## 2024-11-18 RX ORDER — FINASTERIDE 5 MG/1
5 TABLET, FILM COATED ORAL DAILY
Status: DISCONTINUED | OUTPATIENT
Start: 2024-11-19 | End: 2024-11-21 | Stop reason: HOSPADM

## 2024-11-18 RX ORDER — AMOXICILLIN 250 MG
1 CAPSULE ORAL 2 TIMES DAILY
Status: DISCONTINUED | OUTPATIENT
Start: 2024-11-18 | End: 2024-11-21 | Stop reason: HOSPADM

## 2024-11-18 RX ADMIN — AZITHROMYCIN MONOHYDRATE 500 MG: 500 INJECTION, POWDER, LYOPHILIZED, FOR SOLUTION INTRAVENOUS at 04:11

## 2024-11-18 RX ADMIN — CEFTRIAXONE 1 G: 1 INJECTION, POWDER, FOR SOLUTION INTRAMUSCULAR; INTRAVENOUS at 04:11

## 2024-11-18 RX ADMIN — SODIUM CHLORIDE 500 ML: 9 INJECTION, SOLUTION INTRAVENOUS at 03:11

## 2024-11-18 RX ADMIN — SODIUM CHLORIDE: 9 INJECTION, SOLUTION INTRAVENOUS at 08:11

## 2024-11-18 RX ADMIN — HEPARIN SODIUM 5000 UNITS: 5000 INJECTION INTRAVENOUS; SUBCUTANEOUS at 10:11

## 2024-11-18 RX ADMIN — SODIUM CHLORIDE, POTASSIUM CHLORIDE, SODIUM LACTATE AND CALCIUM CHLORIDE 1000 ML: 600; 310; 30; 20 INJECTION, SOLUTION INTRAVENOUS at 04:11

## 2024-11-18 RX ADMIN — IPRATROPIUM BROMIDE AND ALBUTEROL SULFATE 3 ML: 2.5; .5 SOLUTION RESPIRATORY (INHALATION) at 04:11

## 2024-11-18 RX ADMIN — MAGNESIUM CITRATE 296 ML: 1.75 LIQUID ORAL at 06:11

## 2024-11-18 NOTE — TELEPHONE ENCOUNTER
Called and spoke with aide (daughter) who stated he was starting to wheeze really bad and you can hear rattling in the chest - also stated even though they stopped his BP medications (per PCP) he is still having really low BP. Daughter is taking patient to the ED to get evaluated for the BP and wheezing / SOB.

## 2024-11-18 NOTE — TELEPHONE ENCOUNTER
Liliana Mcarthur PA to Me       11/18/24 12:04 PM  Depending on your insurance the PT is able to change the dressings and ask as the nurse as well

## 2024-11-18 NOTE — TELEPHONE ENCOUNTER
----- Message from YANDY Patel sent at 11/18/2024  1:22 PM CST -----  Contact: Christie/daughter  Sent Norco to the pharmacy in [place]  ----- Message -----  From: Deanna Daniels LPN  Sent: 11/18/2024  12:49 PM CST  To: YANDY Robbins      ----- Message -----  From: Quita Wilcox  Sent: 11/18/2024  12:32 PM CST  To: Alysha Soriano is needing a call back in regards to the patient having hallucinations on the pain medication. She state that she would like to have something else called in. Please send script to     [facility] - [place] - [address]  [facility] [address]  Phone: [phone] Fax: [phone]     Please give her a call back at [phone]

## 2024-11-18 NOTE — ED PROVIDER NOTES
SCRIBE #1 NOTE: I, Librado Edwards, am scribing for, and in the presence of, Patricia Miller MD. I have scribed the entire note.       History     Chief Complaint   Patient presents with    Shortness of Breath     Reports SOB x 3 days after R hip replacement sx 5 days ago. Pts fsmily reports home BP has been 90's-100's.    Constipation     Reports LBM 6 days ago. Taking postop pain meds.      Review of patient's allergies indicates:   Allergen Reactions    Atarax [hydroxyzine hcl] Other (See Comments)     Raised blood pressure     Zyrtec [cetirizine] Other (See Comments)     Raised blood pressure     Gold sodium thiosulfate      Patch test positive    Meloxicam Rash     Other reaction(s): hypertension         History of Present Illness     HPI    11/18/2024, 3:48 PM  History obtained from the patient and pt's daughter      History of Present Illness: Ezequiel Hughes is a 84 y.o. male patient with a PMHx of HTN, HLD, colon polyps, OMARI, prostate cancer, anemia, pneumonia, and TIA who presents to the Emergency Department for evaluation of SOB which onset gradually within the past week. Pt gets SOB with exertion. Pt developed a cough and some wheezing Saturday. Pt had right hip replacement surgery on Tuesday. Pt reports he has not passed a BM since Tuesday of last week with the surgery; pt has taken two laxatives and Miralax with no improvement to constipation. Pt is taking his pain medications from the surgery and is maintaining an appetite. Pt ate steak and potatoes prior to coming to the ED. Symptoms are constant and moderate in severity. Associated sxs include low BP today of 79/53. Pt was instructed to stop one of their BP medications by Dr. Ozuna but still took the other ones as instructed this morning. Patient denies any abd pain, CP, fever, dizziness, and all other sxs at this time. No prior Tx. Pt is on Plavix, Lasix, an d No further complaints or concerns at this time.       Arrival mode: Personal vehicle      PCP: Valery Ozuna MD        Past Medical History:  Past Medical History:   Diagnosis Date    Allergic rhinitis     Anemia     Back pain     BPH (benign prostatic hyperplasia)     Cancer     skin cancer to neck, Dr. Graves    Cataract     Disorder of kidney and ureter     ED (erectile dysfunction)     OMARI (generalized anxiety disorder) 08/07/2023    Hiatal hernia     small    History of colon polyps     colonoscopy 11/2016    HLD (hyperlipidemia)     Hyperlipidemia     Hypertension     Lateral epicondylitis     Lumbar radiculopathy 01/26/2022    OA (osteoarthritis)     GUIDO (obstructive sleep apnea)     Pneumonia of right middle lobe due to infectious organism 11/18/2024    Polyneuropathy     Prostate cancer     Dr. Wong    TIA (transient ischemic attack)     Trouble in sleeping     Urge incontinence 03/29/2022       Past Surgical History:  Past Surgical History:   Procedure Laterality Date    ANGIOGRAPHY OF UPPER EXTREMITY Left 07/05/2022    Procedure: Angiogram Extremity Bilateral;  Surgeon: Aparna Thompson MD;  Location: [facility] CATH LAB;  Service: Cardiology;  Laterality: Left;    ARTERIOGRAPHY OF AORTIC ROOT N/A 07/05/2022    Procedure: ARTERIOGRAM, AORTIC ROOT;  Surgeon: Aparna Thompson MD;  Location: [facility] CATH LAB;  Service: Cardiology;  Laterality: N/A;    BLOCK, NERVE, PERIPHERAL Right 9/12/2024    Procedure: right femoral/ obturator nerve block- with steroids;  Surgeon: Abel Haywood MD;  Location: [facility] PAIN MGT;  Service: Pain Management;  Laterality: Right;    CATARACT EXTRACTION W/  INTRAOCULAR LENS IMPLANT Right 02/21/2018    I-Stent    CATARACT EXTRACTION W/  INTRAOCULAR LENS IMPLANT Left 03/21/2018    I - Stent    CATHETERIZATION OF BOTH LEFT AND RIGHT HEART N/A 07/05/2022    Procedure: CATHETERIZATION, HEART, BOTH LEFT AND RIGHT;  Surgeon: Aparna Thompson MD;  Location: [facility] CATH LAB;  Service: Cardiology;  Laterality: N/A;  radial approach    COLONOSCOPY N/A 11/14/2016     Procedure: COLONOSCOPY;  Surgeon: Karuna Rodriguez MD;  Location: [facility] ENDO;  Service: Endoscopy;  Laterality: N/A;    COLONOSCOPY N/A 09/22/2020    Procedure: COLONOSCOPY;  Surgeon: Chuy Fish MD;  Location: [facility] ENDO;  Service: Endoscopy;  Laterality: N/A;    EPIDURAL STEROID INJECTION N/A 6/6/2024    Procedure: Lumbar L5/S1 IL IAN;  Surgeon: Abel Haywood MD;  Location: [facility] PAIN MGT;  Service: Pain Management;  Laterality: N/A;    EPIDURAL STEROID INJECTION INTO CERVICAL SPINE N/A 2/8/2024    Procedure: C5/6 IL Right paramedian approach IAN with RN IV sedation;  Surgeon: Abel Haywood MD;  Location: [facility] PAIN MGT;  Service: Pain Management;  Laterality: N/A;    EYE SURGERY      HEMORRHOID SURGERY      HIP ARTHROPLASTY Right 11/13/2024    Procedure: ARTHROPLASTY, HIP;  Surgeon: Denton Encarnacion MD;  Location: [facility] OR;  Service: Orthopedics;  Laterality: Right;    I-STENT Right 02/21/2018    DR. REECE    INJECTION OF ANESTHETIC AGENT AROUND MEDIAL BRANCH NERVES INNERVATING CERVICAL FACET JOINT Bilateral 7/18/2023    Procedure: Bilateral C4-6 MBB with RN IV sedation (diagnostic, #1);  Surgeon: Abel Haywood MD;  Location: [facility] PAIN MGT;  Service: Pain Management;  Laterality: Bilateral;    KNEE ARTHROSCOPY W/ MENISCAL REPAIR Right 2015    Dr. Jain    PCIOL Right 02/21/2018    DR. REECE    PLANTAR FASCIA RELEASE      right    ROTATOR CUFF REPAIR Bilateral     bilateral    SELECTIVE INJECTION OF ANESTHETIC AGENT AROUND LUMBAR SPINAL NERVE ROOT BY TRANSFORAMINAL APPROACH Bilateral 01/26/2022    Procedure: Bilateral L4/5 TF IAN with RN IV sedation;  Surgeon: Mak Young MD;  Location: [facility] PAIN MGT;  Service: Pain Management;  Laterality: Bilateral;    SELECTIVE INJECTION OF ANESTHETIC AGENT AROUND LUMBAR SPINAL NERVE ROOT BY TRANSFORAMINAL APPROACH Bilateral 03/14/2022    Procedure: Bilateral L4/5 TF IAN with RN IV sedation;  Surgeon: Mak Young MD;  Location: [facility] PAIN MGT;   Service: Pain Management;  Laterality: Bilateral;    SELECTIVE INJECTION OF ANESTHETIC AGENT AROUND LUMBAR SPINAL NERVE ROOT BY TRANSFORAMINAL APPROACH Bilateral 06/02/2022    Procedure: Bilateral L4/5 TF IAN - D2P [facility] [place];  Surgeon: Abel Haywood MD;  Location: [facility] PAIN MGT;  Service: Pain Management;  Laterality: Bilateral;    SELECTIVE INJECTION OF ANESTHETIC AGENT AROUND LUMBAR SPINAL NERVE ROOT BY TRANSFORAMINAL APPROACH Bilateral 08/25/2022    Procedure: Bilateral L4/5 TF IAN;  Surgeon: Abel Haywood MD;  Location: [facility] PAIN MGT;  Service: Pain Management;  Laterality: Bilateral;    SELECTIVE INJECTION OF ANESTHETIC AGENT AROUND LUMBAR SPINAL NERVE ROOT BY TRANSFORAMINAL APPROACH Bilateral 6/29/2023    Procedure: Bilateral L4/5 TF IAN RN IV Sedation;  Surgeon: Abel Haywood MD;  Location: [facility] PAIN MGT;  Service: Pain Management;  Laterality: Bilateral;    SELECTIVE INJECTION OF ANESTHETIC AGENT AROUND LUMBAR SPINAL NERVE ROOT BY TRANSFORAMINAL APPROACH Bilateral 4/11/2024    Procedure: Bilateral L4/5 TF IAN;  Surgeon: Abel Haywood MD;  Location: [facility] PAIN MGT;  Service: Pain Management;  Laterality: Bilateral;    SHOULDER SURGERY Bilateral around 2000    Dr. Pepper.  rotator cuff surgeries    VASECTOMY           Family History:  Family History   Problem Relation Name Age of Onset    Lung cancer Father Isaiah Hughes         life long smoker    Cancer Father Isaiah Hughes         prostate, lung    Arthritis Mother Emely Hughes     Stroke Sister          TIA    Cataracts Sister      Cancer Brother          prostate    Cataracts Brother      Cataracts Sister      Melanoma Neg Hx      Psoriasis Neg Hx      Lupus Neg Hx      Eczema Neg Hx      Diabetes Neg Hx      Heart disease Neg Hx      Kidney disease Neg Hx      Colon cancer Neg Hx         Social History:  Social History     Tobacco Use    Smoking status: Former     Current packs/day: 0.00     Average packs/day: 3.0 packs/day for 35.0  years (104.9 ttl pk-yrs)     Types: Cigarettes     Start date: 1960     Quit date: 1985     Years since quittin.3     Passive exposure: Past    Smokeless tobacco: Never   Substance and Sexual Activity    Alcohol use: Yes     Alcohol/week: 3.0 standard drinks of alcohol     Types: 3 Cans of beer per week     Comment: socially    Drug use: No    Sexual activity: Yes     Partners: Female     Birth control/protection: None        Review of Systems     Review of Systems   Constitutional:  Negative for appetite change, chills and fever.   HENT:  Negative for congestion.    Respiratory:  Positive for cough, shortness of breath and wheezing.    Cardiovascular:  Negative for chest pain.        (+) decreased BP   Gastrointestinal:  Positive for constipation. Negative for abdominal pain, nausea and vomiting.   Genitourinary:  Negative for dysuria.   Musculoskeletal:  Negative for back pain.   Skin:  Negative for rash.   Neurological:  Negative for dizziness, facial asymmetry, weakness, light-headedness and headaches.   Hematological:  Does not bruise/bleed easily.   All other systems reviewed and are negative.       Physical Exam     Initial Vitals [24 1442]   BP Pulse Resp Temp SpO2   (S) (!) 78/52 82 18 97.9 °F (36.6 °C) 97 %      MAP       --          Physical Exam  Nursing Notes and Vital Signs Reviewed.  Constitutional: Patient is in no acute distress. Chronically ill-appearing.  Head: Atraumatic. Normocephalic.  Eyes: PERRL. EOM intact. Conjunctivae are not pale. No scleral icterus.  ENT: Mucous membranes are dry. Oropharynx is clear and symmetric.    Neck: Supple. Full ROM. No lymphadenopathy.  Cardiovascular: Regular rate. Regular rhythm. No murmurs, rubs, or gallops. Distal pulses are 2+ and symmetric.  Pulmonary/Chest: No respiratory distress. Clear to auscultation bilaterally. No wheezing or rales.  Abdominal: Soft and non-distended.  There is no tenderness.  No rebound, guarding, or rigidity.  Good bowel sounds.  Genitourinary: No CVA tenderness  Musculoskeletal: Moves all extremities. No obvious deformities. Trace pedal edema. Healing surgical incision to the right lateral hip.  Skin: Warm and dry.  Neurological:  Alert, awake, and appropriate.  Normal speech.  No acute focal neurological deficits are appreciated.  Psychiatric: Normal affect. Good eye contact. Appropriate in content.     ED Course   Critical Care    Date/Time: 11/18/2024 5:00 PM    Performed by: Patricia Miller MD  Authorized by: Patricia Miller MD  Direct patient critical care time: 40 minutes  Additional history critical care time: 7 minutes  Ordering / reviewing critical care time: 6 minutes  Documentation critical care time: 6 minutes  Consulting other physicians critical care time: 5 minutes  Total critical care time (exclusive of procedural time) : 64 minutes  Critical care was necessary to treat or prevent imminent or life-threatening deterioration of the following conditions: renal failure and dehydration.  Critical care was time spent personally by me on the following activities: blood draw for specimens, development of treatment plan with patient or surrogate, discussions with consultants, interpretation of cardiac output measurements, evaluation of patient's response to treatment, examination of patient, ordering and performing treatments and interventions, obtaining history from patient or surrogate, ordering and review of laboratory studies, ordering and review of radiographic studies, pulse oximetry, re-evaluation of patient's condition and review of old charts.        ED Vital Signs:  Vitals:    11/20/24 1721 11/20/24 1905 11/20/24 1936 11/20/24 2000   BP:   (!) 129/58    Pulse: 67 65 66 75   Resp:   18    Temp:   98.7 °F (37.1 °C)    TempSrc:   Oral    SpO2:   97%    Weight:       Height:        11/20/24 2100 11/20/24 2300 11/21/24 0011 11/21/24 0100   BP:   (!) 128/59    Pulse: 74 66 70 62   Resp:   18    Temp:    98.6 °F (37 °C)    TempSrc:   Oral    SpO2:   96%    Weight:       Height:        11/21/24 0305 11/21/24 0402 11/21/24 0434 11/21/24 0500   BP:   133/64    Pulse: 66 67 67 64   Resp:   17    Temp:   98.3 °F (36.8 °C)    TempSrc:   Oral    SpO2:   95%    Weight:       Height:        11/21/24 0736 11/21/24 0848 11/21/24 1151   BP: (!) 119/55  117/64   Pulse: 66 75 68   Resp: 16  18   Temp: 98.2 °F (36.8 °C)  98.3 °F (36.8 °C)   TempSrc: Oral  Oral   SpO2: 96%  98%   Weight:      Height:          Abnormal Lab Results:  Labs Reviewed   CBC W/ AUTO DIFFERENTIAL - Abnormal       Result Value    WBC 7.12      RBC 2.80 (*)     Hemoglobin 8.9 (*)     Hematocrit 26.7 (*)     MCV 95      MCH 31.8 (*)     MCHC 33.3      RDW 12.4      Platelets 142 (*)     MPV 8.7 (*)     Immature Granulocytes 2.2 (*)     Gran # (ANC) 5.5      Immature Grans (Abs) 0.16 (*)     Lymph # 0.4 (*)     Mono # 0.9      Eos # 0.1      Baso # 0.03      nRBC 0      Gran % 77.0 (*)     Lymph % 5.5 (*)     Mono % 13.1      Eosinophil % 1.8      Basophil % 0.4      Differential Method Automated     COMPREHENSIVE METABOLIC PANEL - Abnormal    Sodium 128 (*)     Potassium 4.9      Chloride 96      CO2 16 (*)     Glucose 115 (*)     BUN 90 (*)     Creatinine 3.9 (*)     Calcium 8.3 (*)     Total Protein 6.5      Albumin 3.0 (*)     Total Bilirubin 0.9      Alkaline Phosphatase 124      AST 28      ALT 28      eGFR 14 (*)     Anion Gap 16     TROPONIN I - Abnormal    Troponin I 0.076 (*)    B-TYPE NATRIURETIC PEPTIDE - Abnormal     (*)    LACTIC ACID, PLASMA - Abnormal    Lactate (Lactic Acid) 2.3 (*)    CK - Abnormal     (*)    PROCALCITONIN - Abnormal    Procalcitonin 0.67 (*)    INFLUENZA A & B BY MOLECULAR    Influenza A, Molecular Negative      Influenza B, Molecular Negative      Flu A & B Source Nasal swab     URINALYSIS, REFLEX TO URINE CULTURE    Specimen UA Urine, Clean Catch      Color, UA Yellow      Appearance, UA Clear      pH, UA 5.0       Specific Gravity, UA 1.020      Protein, UA Negative      Glucose, UA Negative      Ketones, UA Negative      Bilirubin (UA) Negative      Occult Blood UA Negative      Nitrite, UA Negative      Urobilinogen, UA Negative      Leukocytes, UA Negative      Narrative:     Specimen Source->Urine   SARS-COV-2 RNA AMPLIFICATION, QUAL    SARS-CoV-2 RNA, Amplification, Qual Negative     MAGNESIUM    Magnesium 2.3     PROCALCITONIN        All Lab Results:  Results for orders placed or performed during the hospital encounter of 11/18/24   EKG 12-lead    Collection Time: 11/18/24  2:46 PM   Result Value Ref Range    QRS Duration 88 ms    [facility] QTC Calculation 410 ms   Lactic acid, plasma    Collection Time: 11/18/24  3:19 PM   Result Value Ref Range    Lactate (Lactic Acid) 2.3 (H) 0.5 - 2.2 mmol/L   CBC auto differential    Collection Time: 11/18/24  3:21 PM   Result Value Ref Range    WBC 7.12 3.90 - 12.70 K/uL    RBC 2.80 (L) 4.60 - 6.20 M/uL    Hemoglobin 8.9 (L) 14.0 - 18.0 g/dL    Hematocrit 26.7 (L) 40.0 - 54.0 %    MCV 95 82 - 98 fL    MCH 31.8 (H) 27.0 - 31.0 pg    MCHC 33.3 32.0 - 36.0 g/dL    RDW 12.4 11.5 - 14.5 %    Platelets 142 (L) 150 - 450 K/uL    MPV 8.7 (L) 9.2 - 12.9 fL    Immature Granulocytes 2.2 (H) 0.0 - 0.5 %    Gran # (ANC) 5.5 1.8 - 7.7 K/uL    Immature Grans (Abs) 0.16 (H) 0.00 - 0.04 K/uL    Lymph # 0.4 (L) 1.0 - 4.8 K/uL    Mono # 0.9 0.3 - 1.0 K/uL    Eos # 0.1 0.0 - 0.5 K/uL    Baso # 0.03 0.00 - 0.20 K/uL    nRBC 0 0 /100 WBC    Gran % 77.0 (H) 38.0 - 73.0 %    Lymph % 5.5 (L) 18.0 - 48.0 %    Mono % 13.1 4.0 - 15.0 %    Eosinophil % 1.8 0.0 - 8.0 %    Basophil % 0.4 0.0 - 1.9 %    Differential Method Automated    Comprehensive metabolic panel    Collection Time: 11/18/24  3:21 PM   Result Value Ref Range    Sodium 128 (L) 136 - 145 mmol/L    Potassium 4.9 3.5 - 5.1 mmol/L    Chloride 96 95 - 110 mmol/L    CO2 16 (L) 23 - 29 mmol/L    Glucose 115 (H) 70 - 110 mg/dL    BUN 90 (H) 8 - 23 mg/dL     Creatinine 3.9 (H) 0.5 - 1.4 mg/dL    Calcium 8.3 (L) 8.7 - 10.5 mg/dL    Total Protein 6.5 6.0 - 8.4 g/dL    Albumin 3.0 (L) 3.5 - 5.2 g/dL    Total Bilirubin 0.9 0.1 - 1.0 mg/dL    Alkaline Phosphatase 124 40 - 150 U/L    AST 28 10 - 40 U/L    ALT 28 10 - 44 U/L    eGFR 14 (A) >60 mL/min/1.73 m^2    Anion Gap 16 8 - 16 mmol/L   Troponin I #1    Collection Time: 11/18/24  3:21 PM   Result Value Ref Range    Troponin I 0.076 (H) 0.000 - 0.026 ng/mL   BNP    Collection Time: 11/18/24  3:21 PM   Result Value Ref Range     (H) 0 - 99 pg/mL   Magnesium    Collection Time: 11/18/24  3:21 PM   Result Value Ref Range    Magnesium 2.3 1.6 - 2.6 mg/dL   CPK    Collection Time: 11/18/24  3:21 PM   Result Value Ref Range     (H) 20 - 200 U/L   Procalcitonin    Collection Time: 11/18/24  3:21 PM   Result Value Ref Range    Procalcitonin 0.67 (H) <0.25 ng/mL   Influenza A & B by Molecular    Collection Time: 11/18/24  3:43 PM    Specimen: Nasal Swab   Result Value Ref Range    Influenza A, Molecular Negative Negative    Influenza B, Molecular Negative Negative    Flu A & B Source Nasal swab    Urinalysis, Reflex to Urine Culture Urine, Clean Catch    Collection Time: 11/18/24  3:43 PM    Specimen: Urine   Result Value Ref Range    Specimen UA Urine, Clean Catch     Color, UA Yellow Yellow, Straw, Sonia    Appearance, UA Clear Clear    pH, UA 5.0 5.0 - 8.0    Specific Gravity, UA 1.020 1.005 - 1.030    Protein, UA Negative Negative    Glucose, UA Negative Negative    Ketones, UA Negative Negative    Bilirubin (UA) Negative Negative    Occult Blood UA Negative Negative    Nitrite, UA Negative Negative    Urobilinogen, UA Negative <2.0 EU/dL    Leukocytes, UA Negative Negative   COVID-19 Rapid Screening    Collection Time: 11/18/24  3:43 PM   Result Value Ref Range    SARS-CoV-2 RNA, Amplification, Qual Negative Negative   Blood Culture #1 **CANNOT BE ORDERED STAT**    Collection Time: 11/18/24  4:46 PM     Specimen: Peripheral, Antecubital, Right; Blood   Result Value Ref Range    Blood Culture, Routine No Growth to date     Blood Culture, Routine No Growth to date     Blood Culture, Routine No Growth to date     Blood Culture, Routine No Growth to date     Blood Culture, Routine No Growth to date    Blood Culture #2 **CANNOT BE ORDERED STAT**    Collection Time: 11/18/24  4:46 PM    Specimen: Peripheral, Antecubital, Left; Blood   Result Value Ref Range    Blood Culture, Routine No Growth to date     Blood Culture, Routine No Growth to date     Blood Culture, Routine No Growth to date     Blood Culture, Routine No Growth to date     Blood Culture, Routine No Growth to date    Basic Metabolic Panel (BMP)    Collection Time: 11/19/24  6:32 AM   Result Value Ref Range    Sodium 131 (L) 136 - 145 mmol/L    Potassium 4.7 3.5 - 5.1 mmol/L    Chloride 100 95 - 110 mmol/L    CO2 21 (L) 23 - 29 mmol/L    Glucose 104 70 - 110 mg/dL    BUN 87 (H) 8 - 23 mg/dL    Creatinine 3.3 (H) 0.5 - 1.4 mg/dL    Calcium 7.8 (L) 8.7 - 10.5 mg/dL    Anion Gap 10 8 - 16 mmol/L    eGFR 17 (A) >60 mL/min/1.73 m^2   CBC with Automated Differential    Collection Time: 11/19/24  6:32 AM   Result Value Ref Range    WBC 4.82 3.90 - 12.70 K/uL    RBC 2.29 (L) 4.60 - 6.20 M/uL    Hemoglobin 7.2 (L) 14.0 - 18.0 g/dL    Hematocrit 22.3 (L) 40.0 - 54.0 %    MCV 97 82 - 98 fL    MCH 31.4 (H) 27.0 - 31.0 pg    MCHC 32.3 32.0 - 36.0 g/dL    RDW 12.5 11.5 - 14.5 %    Platelets 122 (L) 150 - 450 K/uL    MPV 8.8 (L) 9.2 - 12.9 fL    Immature Granulocytes 2.7 (H) 0.0 - 0.5 %    Gran # (ANC) 3.4 1.8 - 7.7 K/uL    Immature Grans (Abs) 0.13 (H) 0.00 - 0.04 K/uL    Lymph # 0.4 (L) 1.0 - 4.8 K/uL    Mono # 0.7 0.3 - 1.0 K/uL    Eos # 0.2 0.0 - 0.5 K/uL    Baso # 0.01 0.00 - 0.20 K/uL    nRBC 0 0 /100 WBC    Gran % 70.6 38.0 - 73.0 %    Lymph % 8.3 (L) 18.0 - 48.0 %    Mono % 14.9 4.0 - 15.0 %    Eosinophil % 3.3 0.0 - 8.0 %    Basophil % 0.2 0.0 - 1.9 %     Differential Method Automated    Lactic acid, plasma    Collection Time: 11/19/24  8:58 AM   Result Value Ref Range    Lactate (Lactic Acid) 0.9 0.5 - 2.2 mmol/L   CK    Collection Time: 11/19/24  8:58 AM   Result Value Ref Range     (H) 20 - 200 U/L   Troponin I    Collection Time: 11/19/24  8:58 AM   Result Value Ref Range    Troponin I 0.071 (H) 0.000 - 0.026 ng/mL   Procalcitonin    Collection Time: 11/19/24  8:58 AM   Result Value Ref Range    Procalcitonin 0.39 (H) <0.25 ng/mL   Basic Metabolic Panel (BMP)    Collection Time: 11/20/24  5:47 AM   Result Value Ref Range    Sodium 133 (L) 136 - 145 mmol/L    Potassium 4.9 3.5 - 5.1 mmol/L    Chloride 104 95 - 110 mmol/L    CO2 20 (L) 23 - 29 mmol/L    Glucose 100 70 - 110 mg/dL    BUN 78 (H) 8 - 23 mg/dL    Creatinine 2.5 (H) 0.5 - 1.4 mg/dL    Calcium 7.7 (L) 8.7 - 10.5 mg/dL    Anion Gap 9 8 - 16 mmol/L    eGFR 24 (A) >60 mL/min/1.73 m^2   CBC with Automated Differential    Collection Time: 11/20/24  5:47 AM   Result Value Ref Range    WBC 4.44 3.90 - 12.70 K/uL    RBC 2.30 (L) 4.60 - 6.20 M/uL    Hemoglobin 7.2 (L) 14.0 - 18.0 g/dL    Hematocrit 22.2 (L) 40.0 - 54.0 %    MCV 97 82 - 98 fL    MCH 31.3 (H) 27.0 - 31.0 pg    MCHC 32.4 32.0 - 36.0 g/dL    RDW 12.6 11.5 - 14.5 %    Platelets 132 (L) 150 - 450 K/uL    MPV 8.3 (L) 9.2 - 12.9 fL    Immature Granulocytes 3.8 (H) 0.0 - 0.5 %    Gran # (ANC) 2.8 1.8 - 7.7 K/uL    Immature Grans (Abs) 0.17 (H) 0.00 - 0.04 K/uL    Lymph # 0.5 (L) 1.0 - 4.8 K/uL    Mono # 0.7 0.3 - 1.0 K/uL    Eos # 0.2 0.0 - 0.5 K/uL    Baso # 0.02 0.00 - 0.20 K/uL    nRBC 0 0 /100 WBC    Gran % 63.5 38.0 - 73.0 %    Lymph % 11.0 (L) 18.0 - 48.0 %    Mono % 16.2 (H) 4.0 - 15.0 %    Eosinophil % 5.0 0.0 - 8.0 %    Basophil % 0.5 0.0 - 1.9 %    Differential Method Automated    CK    Collection Time: 11/20/24  5:47 AM   Result Value Ref Range     (H) 20 - 200 U/L   Basic Metabolic Panel (BMP)    Collection Time: 11/21/24  5:54  AM   Result Value Ref Range    Sodium 137 136 - 145 mmol/L    Potassium 4.8 3.5 - 5.1 mmol/L    Chloride 108 95 - 110 mmol/L    CO2 21 (L) 23 - 29 mmol/L    Glucose 103 70 - 110 mg/dL    BUN 59 (H) 8 - 23 mg/dL    Creatinine 1.8 (H) 0.5 - 1.4 mg/dL    Calcium 8.1 (L) 8.7 - 10.5 mg/dL    Anion Gap 8 8 - 16 mmol/L    eGFR 36 (A) >60 mL/min/1.73 m^2   CBC with Automated Differential    Collection Time: 11/21/24  5:54 AM   Result Value Ref Range    WBC 4.15 3.90 - 12.70 K/uL    RBC 2.40 (L) 4.60 - 6.20 M/uL    Hemoglobin 7.4 (L) 14.0 - 18.0 g/dL    Hematocrit 23.8 (L) 40.0 - 54.0 %    MCV 99 (H) 82 - 98 fL    MCH 30.8 27.0 - 31.0 pg    MCHC 31.1 (L) 32.0 - 36.0 g/dL    RDW 12.6 11.5 - 14.5 %    Platelets 153 150 - 450 K/uL    MPV 8.6 (L) 9.2 - 12.9 fL    Immature Granulocytes 3.9 (H) 0.0 - 0.5 %    Gran # (ANC) 2.7 1.8 - 7.7 K/uL    Immature Grans (Abs) 0.16 (H) 0.00 - 0.04 K/uL    Lymph # 0.5 (L) 1.0 - 4.8 K/uL    Mono # 0.6 0.3 - 1.0 K/uL    Eos # 0.2 0.0 - 0.5 K/uL    Baso # 0.02 0.00 - 0.20 K/uL    nRBC 0 0 /100 WBC    Gran % 65.6 38.0 - 73.0 %    Lymph % 10.8 (L) 18.0 - 48.0 %    Mono % 13.7 4.0 - 15.0 %    Eosinophil % 5.5 0.0 - 8.0 %    Basophil % 0.5 0.0 - 1.9 %    Differential Method Automated      *Note: Due to a large number of results and/or encounters for the requested time period, some results have not been displayed. A complete set of results can be found in Results Review.         Imaging Results:  Imaging Results              US Lower Extremity Veins Bilateral (Final result)  Result time 11/18/24 17:19:52      Final result by Soledad Oquendo MD (11/18/24 17:19:52)                   Impression:      No evidence of deep venous thrombosis in either lower extremity.      Electronically signed by: Soledad Oquendo  Date:    11/18/2024  Time:    17:19               Narrative:    EXAMINATION:  US LOWER EXTREMITY VEINS BILATERAL    CLINICAL HISTORY:  Localized edema    TECHNIQUE:  Duplex and color flow  Doppler and dynamic compression was performed of the bilateral lower extremity veins was performed.    COMPARISON:  Bilateral lower extremity venous ultrasound 08/23/2017    FINDINGS:  Right thigh veins: The common femoral, femoral, popliteal, upper greater saphenous, and deep femoral veins are patent and free of thrombus. The veins are normally compressible and have normal phasic flow and augmentation response.    Right calf veins: The visualized calf veins are patent.    Left thigh veins: The common femoral, femoral, popliteal, upper greater saphenous, and deep femoral veins are patent and free of thrombus. The veins are normally compressible and have normal phasic flow and augmentation response.    Left calf veins: The visualized calf veins are patent.    Miscellaneous: None                                       X-Ray Abdomen Flat And Erect (Final result)  Result time 11/18/24 15:47:34      Final result by Jaya Howell MD (11/18/24 15:47:34)                   Impression:      Moderate constipation      Electronically signed by: Jaya Howell MD  Date:    11/18/2024  Time:    15:47               Narrative:    EXAMINATION:  XR ABDOMEN FLAT AND ERECT    CLINICAL HISTORY:  Constipation, unspecified    COMPARISON:  None    FINDINGS:  Moderate constipation.  Nonobstructive bowel gas pattern is noted. No radiopaque kidney calculus is identified.  There are multiple calcifications in the pelvis most consistent with phleboliths.  No evidence of organomegaly.  The bones demonstrate moderate degenerative changes within the lower lumbar region.  Right hip replacement.  Moderate constipation.  The bones are otherwise intact.                                       X-Ray Chest AP Portable (Final result)  Result time 11/18/24 15:48:12      Final result by Jaya Howell MD (11/18/24 15:48:12)                   Impression:      No acute process seen.      Electronically signed by: Jaya Howell  MD  Date:    11/18/2024  Time:    15:48               Narrative:    EXAMINATION:  XR CHEST AP PORTABLE    CLINICAL HISTORY:  cough;    FINDINGS:  Single view of the chest.  10/28/2024.    Cardiac silhouette is normal.  No lobar consolidation.  Suspect subtle infiltrates within the right midlung zone.  Recommend follow-up after acute exacerbation.  No evidence of pleural effusion or pneumothorax.  Bones appear intact.  Moderate degenerative changes and moderate atherosclerotic disease.                                       The EKG was ordered, reviewed, and independently interpreted by the ED provider.  Interpretation time: 14:46  Rate: 70 BPM  Rhythm: Sinus rhythm with frequent PVCs  Interpretation: Left axis deviation. Cannot rule out anterior infarct, age undetermined. No STEMI.             The Emergency Provider reviewed the vital signs and test results, which are outlined above.     ED Discussion       5:01 PM: Discussed case with Dr. Bowie ([facility]). Dr. Bowie agrees with current care and management of pt and accepts admission.   Admitting Service:   Admitting Physician: Dr. Bowie  Admit to: OBS Med Surg    5:01 PM: Re-evaluated pt. I have discussed test results, shared treatment plan, and the need for admission with patient and family at bedside. Pt and family express understanding at this time and agree with all information. All questions answered. Pt and family have no further questions or concerns at this time. Pt is ready for admit.        Medical Decision Making  DDX: 1. Dehydration 2. Kidney failure 3. Sepsis     ECG reviewed and no acute ischemic changes noted, CXR shows concerns for early pneumonia, IV antibiotics initiated, IV fluids given for hypotension which is suspected from dehydration due to decreased po intake along with still taking BP meds, [place] noted, US of legs negative for  DVT, trop and BNP mildly elevated likely from [place], he has no chest pain or dyspnea, he is urinating  without difficulty, mentation normal, lactate 2.3, procal mildly elevated, hospital med to admit.     Amount and/or Complexity of Data Reviewed  Labs: ordered. Decision-making details documented in ED Course.  Radiology: ordered. Decision-making details documented in ED Course.  ECG/medicine tests: ordered and independent interpretation performed. Decision-making details documented in ED Course.    Risk  Prescription drug management.  Decision regarding hospitalization.       Additional MDM:   Sepsis:   This patient does have evidence of infective focus  My overall impression is sepsis.  Source: Respiratory  Antibiotics given- Antibiotics     Patient Encounter Information Not Found      Latest lactate reviewed- 2.3  Organ dysfunction indicated by Acute kidney injury    Fluid challenge Not needed - patient is not hypotensive      Post- resuscitation assessment No - Post resuscitation assessment not needed       Will Not start Pressors- Levophed for MAP of 65  Source control achieved by: iv antibiotics rocephin and azithromycin                ED Medication(s):  Medications   sodium chloride 0.9% bolus 500 mL 500 mL (0 mLs Intravenous Stopped 11/18/24 1627)   lactated ringers infusion (1,000 mLs Intravenous New Bag 11/18/24 1627)   azithromycin (ZITHROMAX) 500 mg in D5W 250 mL  IVPB (admixture device) (0 mg Intravenous Stopped 11/20/24 1852)   albuterol-ipratropium 2.5 mg-0.5 mg/3 mL nebulizer solution 3 mL (3 mLs Nebulization Given 11/18/24 1653)   magnesium citrate solution 296 mL (296 mLs Oral Given 11/18/24 1847)       Discharge Medication List as of 11/21/2024 12:05 PM           Contact information for follow-up providers       Valery Ozuna MD Follow up in 1 week(s).    Specialty: Family Medicine  Contact information:  [address]  [address]  [facility] [address]  [phone]               Denton Encarnacion MD Follow up in 1 week(s).    Specialties: Orthopedic Surgery, General Surgery  Contact  information:  [id number] [place] [address] [address]aura [place] [address]  [phone]                       Contact information for after-discharge care       Home Medical Care       [facility] .    Service: Home Health Services  Contact information:  [address] [facility] [address]  [facility] [address]  [phone]                                       Scribe Attestation:   Scribe #1: I performed the above scribed service and the documentation accurately describes the services I performed. I attest to the accuracy of the note.     Attending:   Physician Attestation Statement for Scribe #1: I, Patricia Miller MD, personally performed the services described in this documentation, as scribed by Librado Edwards, in my presence, and it is both accurate and complete.           Clinical Impression       ICD-10-CM ICD-9-CM   1. Acute renal failure superimposed on stage 4 chronic kidney disease, unspecified acute renal failure type  N17.9 584.9    N18.4 585.4   2. Hypotension  I95.9 458.9   3. Shortness of breath  R06.02 786.05   4. Constipation  K59.00 564.00   5. Bilateral lower extremity edema  R60.0 782.3   6. History of total right hip replacement  Z96.641 V43.64   7. Pneumonia of left lower lobe due to infectious organism  J18.9 486   8. Drug-induced constipation  K59.03 564.09     E980.5   9. Hypotension due to hypovolemia  E86.1 458.8     276.52   10. Chest pain  R07.9 786.50   11. Primary open angle glaucoma (POAG) of right eye, mild stage  H40.1111 365.11     365.71   12. Arthritis of right hip  M16.11 716.95       Disposition:   Disposition: Placed in Observation  Condition: Stable        Patricia Miller MD  11/23/24 [id number]

## 2024-11-19 LAB
ANION GAP SERPL CALC-SCNC: 10 MMOL/L (ref 8–16)
BASOPHILS # BLD AUTO: 0.01 K/UL (ref 0–0.2)
BASOPHILS NFR BLD: 0.2 % (ref 0–1.9)
BUN SERPL-MCNC: 87 MG/DL (ref 8–23)
CALCIUM SERPL-MCNC: 7.8 MG/DL (ref 8.7–10.5)
CHLORIDE SERPL-SCNC: 100 MMOL/L (ref 95–110)
CK SERPL-CCNC: 291 U/L (ref 20–200)
CO2 SERPL-SCNC: 21 MMOL/L (ref 23–29)
CREAT SERPL-MCNC: 3.3 MG/DL (ref 0.5–1.4)
DIFFERENTIAL METHOD BLD: ABNORMAL
EOSINOPHIL # BLD AUTO: 0.2 K/UL (ref 0–0.5)
EOSINOPHIL NFR BLD: 3.3 % (ref 0–8)
ERYTHROCYTE [DISTWIDTH] IN BLOOD BY AUTOMATED COUNT: 12.5 % (ref 11.5–14.5)
EST. GFR  (NO RACE VARIABLE): 17 ML/MIN/1.73 M^2
GLUCOSE SERPL-MCNC: 104 MG/DL (ref 70–110)
HCT VFR BLD AUTO: 22.3 % (ref 40–54)
HGB BLD-MCNC: 7.2 G/DL (ref 14–18)
IMM GRANULOCYTES # BLD AUTO: 0.13 K/UL (ref 0–0.04)
IMM GRANULOCYTES NFR BLD AUTO: 2.7 % (ref 0–0.5)
LACTATE SERPL-SCNC: 0.9 MMOL/L (ref 0.5–2.2)
LYMPHOCYTES # BLD AUTO: 0.4 K/UL (ref 1–4.8)
LYMPHOCYTES NFR BLD: 8.3 % (ref 18–48)
MCH RBC QN AUTO: 31.4 PG (ref 27–31)
MCHC RBC AUTO-ENTMCNC: 32.3 G/DL (ref 32–36)
MCV RBC AUTO: 97 FL (ref 82–98)
MONOCYTES # BLD AUTO: 0.7 K/UL (ref 0.3–1)
MONOCYTES NFR BLD: 14.9 % (ref 4–15)
NEUTROPHILS # BLD AUTO: 3.4 K/UL (ref 1.8–7.7)
NEUTROPHILS NFR BLD: 70.6 % (ref 38–73)
NRBC BLD-RTO: 0 /100 WBC
PLATELET # BLD AUTO: 122 K/UL (ref 150–450)
PMV BLD AUTO: 8.8 FL (ref 9.2–12.9)
POTASSIUM SERPL-SCNC: 4.7 MMOL/L (ref 3.5–5.1)
PROCALCITONIN SERPL IA-MCNC: 0.39 NG/ML
RBC # BLD AUTO: 2.29 M/UL (ref 4.6–6.2)
SODIUM SERPL-SCNC: 131 MMOL/L (ref 136–145)
TROPONIN I SERPL DL<=0.01 NG/ML-MCNC: 0.07 NG/ML (ref 0–0.03)
WBC # BLD AUTO: 4.82 K/UL (ref 3.9–12.7)

## 2024-11-19 PROCEDURE — 84145 PROCALCITONIN (PCT): CPT | Mod: HCNC | Performed by: FAMILY MEDICINE

## 2024-11-19 PROCEDURE — 63600175 PHARM REV CODE 636 W HCPCS: Mod: HCNC | Performed by: EMERGENCY MEDICINE

## 2024-11-19 PROCEDURE — 97162 PT EVAL MOD COMPLEX 30 MIN: CPT | Mod: HCNC

## 2024-11-19 PROCEDURE — 96361 HYDRATE IV INFUSION ADD-ON: CPT

## 2024-11-19 PROCEDURE — 96372 THER/PROPH/DIAG INJ SC/IM: CPT | Performed by: FAMILY MEDICINE

## 2024-11-19 PROCEDURE — 11000001 HC ACUTE MED/SURG PRIVATE ROOM: Mod: HCNC

## 2024-11-19 PROCEDURE — 97530 THERAPEUTIC ACTIVITIES: CPT | Mod: HCNC

## 2024-11-19 PROCEDURE — 25000003 PHARM REV CODE 250: Mod: HCNC | Performed by: FAMILY MEDICINE

## 2024-11-19 PROCEDURE — 36415 COLL VENOUS BLD VENIPUNCTURE: CPT | Mod: HCNC | Performed by: FAMILY MEDICINE

## 2024-11-19 PROCEDURE — 84484 ASSAY OF TROPONIN QUANT: CPT | Mod: HCNC | Performed by: FAMILY MEDICINE

## 2024-11-19 PROCEDURE — 63600175 PHARM REV CODE 636 W HCPCS: Mod: HCNC | Performed by: FAMILY MEDICINE

## 2024-11-19 PROCEDURE — 25000003 PHARM REV CODE 250: Mod: HCNC | Performed by: EMERGENCY MEDICINE

## 2024-11-19 PROCEDURE — 83605 ASSAY OF LACTIC ACID: CPT | Mod: HCNC | Performed by: FAMILY MEDICINE

## 2024-11-19 PROCEDURE — 80048 BASIC METABOLIC PNL TOTAL CA: CPT | Mod: HCNC | Performed by: FAMILY MEDICINE

## 2024-11-19 PROCEDURE — 99024 POSTOP FOLLOW-UP VISIT: CPT | Mod: HCNC,,, | Performed by: PHYSICIAN ASSISTANT

## 2024-11-19 PROCEDURE — 85025 COMPLETE CBC W/AUTO DIFF WBC: CPT | Mod: HCNC | Performed by: FAMILY MEDICINE

## 2024-11-19 PROCEDURE — 82550 ASSAY OF CK (CPK): CPT | Mod: HCNC | Performed by: FAMILY MEDICINE

## 2024-11-19 RX ADMIN — SENNOSIDES AND DOCUSATE SODIUM 1 TABLET: 50; 8.6 TABLET ORAL at 10:11

## 2024-11-19 RX ADMIN — HEPARIN SODIUM 5000 UNITS: 5000 INJECTION INTRAVENOUS; SUBCUTANEOUS at 09:11

## 2024-11-19 RX ADMIN — SODIUM CHLORIDE: 9 INJECTION, SOLUTION INTRAVENOUS at 07:11

## 2024-11-19 RX ADMIN — ATORVASTATIN CALCIUM 20 MG: 10 TABLET, FILM COATED ORAL at 10:11

## 2024-11-19 RX ADMIN — PANTOPRAZOLE SODIUM 40 MG: 40 TABLET, DELAYED RELEASE ORAL at 10:11

## 2024-11-19 RX ADMIN — CITALOPRAM HYDROBROMIDE 10 MG: 10 TABLET ORAL at 10:11

## 2024-11-19 RX ADMIN — POLYETHYLENE GLYCOL 3350 17 G: 17 POWDER, FOR SOLUTION ORAL at 01:11

## 2024-11-19 RX ADMIN — HEPARIN SODIUM 5000 UNITS: 5000 INJECTION INTRAVENOUS; SUBCUTANEOUS at 05:11

## 2024-11-19 RX ADMIN — FINASTERIDE 5 MG: 5 TABLET, FILM COATED ORAL at 10:11

## 2024-11-19 RX ADMIN — CEFTRIAXONE 1 G: 1 INJECTION, POWDER, FOR SOLUTION INTRAMUSCULAR; INTRAVENOUS at 05:11

## 2024-11-19 RX ADMIN — SODIUM CHLORIDE: 9 INJECTION, SOLUTION INTRAVENOUS at 06:11

## 2024-11-19 RX ADMIN — TRAMADOL HYDROCHLORIDE 50 MG: 50 TABLET, COATED ORAL at 09:11

## 2024-11-19 RX ADMIN — AZITHROMYCIN MONOHYDRATE 500 MG: 500 INJECTION, POWDER, LYOPHILIZED, FOR SOLUTION INTRAVENOUS at 06:11

## 2024-11-19 RX ADMIN — CLOPIDOGREL BISULFATE 75 MG: 75 TABLET ORAL at 10:11

## 2024-11-19 NOTE — NURSING
Patient arrived to room 543 at 18:00. Patient denies sob, audible wheezing noted upon exertion but gradually subsided with rest. Patient s/p right hip replacement , dressing and portable mini vac intact with a small amount of old drainage noted. Otherwise patient skin is clean, dry and intact. Patient noted with bilateral lower extremity 3 plus pitting edema,  Patient denies pain. Family observed at bedside.

## 2024-11-19 NOTE — PT/OT/SLP EVAL
Physical Therapy Evaluation and Treatment    Patient Name: Ezequiel Hughes   MRN: [id number]  Recent Surgery: * No surgery found *      Recommendations:     Discharge Recommendations: Low Intensity Therapy (with 24/7 SPV and A)   Discharge Equipment Recommendations: shower chair   Barriers to discharge: None    Assessment:     Ezequiel Hughes is a 84 y.o. male admitted with a medical diagnosis of Acute renal failure superimposed on stage 4 chronic kidney disease. He presents with the following impairments/functional limitations: weakness, impaired endurance, impaired functional mobility, gait instability, impaired balance, pain, decreased safety awareness, decreased lower extremity function, decreased ROM, orthopedic precautions, impaired cardiopulmonary response to activity.    Rehab Prognosis: Good; patient would benefit from acute PT services to address these deficits and reach maximum level of function.    Plan:     During this hospitalization, patient to be seen 3 x/week to address the above listed problems via gait training, therapeutic activities, therapeutic exercises    Plan of Care Expires: 12/03/24    Subjective     Chief Complaint: Pt c/o weakness  Patient Comments/Goals: none stated  Pain/Comfort:  Pain Rating 1: 0/10    Social History:  Living Environment: Patient lives with their significant other in a single story home with number of outside stair(s): 4  Prior Level of Function: Prior to RUPINDER on 11/13/24, patient was modified independent, not driving and retired, and ambulated household and community distances using straight cane. Prior to admission and since sx, pt has been ambulating with RW. Current with HHPT.   Equipment Used at Home: rollator, walker, rolling, cane, straight  DME owned (not currently used): none  Assistance Upon Discharge: family    Objective:     Communicated with charge nurse Jocelyn and [facility] chart review prior to session. Patient found HOB elevated with peripheral IV, telemetry, wound  "vac, bed alarm upon PT entry to room.    General Precautions: Standard, fall   Orthopedic Precautions: RLE weight bearing as tolerated, RLE posterior precautions (R RUPINDER 11/13/24)   Braces: N/A    Respiratory Status: Room air    Exams:  Cognition: Patient is oriented to Person, Place, Time, Situation  RLE ROM: WFL within precautions  RLE Strength:  NT due to recent surgery  LLE ROM: WFL  LLE Strength:  Grossly 4/5  Sensation:    -       Intact  Skin Integrity/Edema:     -       Skin integrity: Visible skin intact    Functional Mobility:  Gait belt applied - Yes  Bed Mobility  Rolling Right: contact guard assistance  Scooting: minimum assistance  Supine to Sit: contact guard assistance for maintenance of precautions  Transfers  Sit to Stand: minimum assistance with rolling walker and with cues for weight bearing precautions  Bed to Chair: contact guard assistance with rolling walker using Step Transfer  Toilet Transfer: contact guard assistance with rolling walker using Step Transfer  Gait  Patient ambulated 60ft with rolling walker and contact guard assistance. Patient demonstrates occasional unsteady gait and antalgic gait. No c/o dizziness, mild SOB, educated about pursed lip breathing technique and cued for use with mobility, no gross LOB. All lines remained intact throughout ambulation trail.  Balance  Sitting: contact guard assistance  Standing: contact guard assistance    Therapeutic Activities and Exercises:   Pt educated on role of PT in acute care and POC. Educated on R LE WBAT and posterior precautions. Educated on use of incentive spirometer. Educated on importance of OOB activities, activity pacing, and HEP (marching/hip flex, hip abd, heel slides/LAQ, quad sets, ankle pumps; within precautions) in order to maintain/regain strength. Encouraged to sit up in chair for all meals. Educated on proper use of RW for safety and to reduce risk of falling. Educated on "call don't fall" policy and increased risk of " falling due to weakness, instructed to utilize call bell for assistance with all transfers. Pt agreeable to all requests.    AM-PAC 6 CLICK MOBILITY  Total Score:16    Patient left up in chair with all lines intact, call button in reach, RN notified, chair alarm on, and family present.    GOALS:   Multidisciplinary Problems       Physical Therapy Goals          Problem: Physical Therapy    Goal Priority Disciplines Outcome Interventions   Physical Therapy Goal     PT, PT/OT     Description: Goals to be met by 12/3/24.  1. Pt will complete bed mobility MOD I.  2. Pt will complete sit to stand MOD I.  3. Pt will ambulate 200ft MOD I using RW.  4. Pt will increase AMPAC score by 2 points to progress functional mobility.                         History:     Past Medical History:   Diagnosis Date    Allergic rhinitis     Anemia     Back pain     BPH (benign prostatic hyperplasia)     Cancer     skin cancer to neck, Dr. Graves    Cataract     Disorder of kidney and ureter     ED (erectile dysfunction)     OMARI (generalized anxiety disorder) 08/07/2023    Hiatal hernia     small    History of colon polyps     colonoscopy 11/2016    HLD (hyperlipidemia)     Hyperlipidemia     Hypertension     Lateral epicondylitis     Lumbar radiculopathy 01/26/2022    OA (osteoarthritis)     GUIDO (obstructive sleep apnea)     Pneumonia of right middle lobe due to infectious organism 11/18/2024    Polyneuropathy     Prostate cancer     Dr. Wong    TIA (transient ischemic attack)     Trouble in sleeping     Urge incontinence 03/29/2022       Past Surgical History:   Procedure Laterality Date    ANGIOGRAPHY OF UPPER EXTREMITY Left 07/05/2022    Procedure: Angiogram Extremity Bilateral;  Surgeon: Aparna Thompson MD;  Location: [facility] CATH LAB;  Service: Cardiology;  Laterality: Left;    ARTERIOGRAPHY OF AORTIC ROOT N/A 07/05/2022    Procedure: ARTERIOGRAM, AORTIC ROOT;  Surgeon: Aparna Thompson MD;  Location: [facility] CATH LAB;  Service:  Cardiology;  Laterality: N/A;    BLOCK, NERVE, PERIPHERAL Right 9/12/2024    Procedure: right femoral/ obturator nerve block- with steroids;  Surgeon: Abel Haywood MD;  Location: [facility] PAIN MGT;  Service: Pain Management;  Laterality: Right;    CATARACT EXTRACTION W/  INTRAOCULAR LENS IMPLANT Right 02/21/2018    I-Stent    CATARACT EXTRACTION W/  INTRAOCULAR LENS IMPLANT Left 03/21/2018    I - Stent    CATHETERIZATION OF BOTH LEFT AND RIGHT HEART N/A 07/05/2022    Procedure: CATHETERIZATION, HEART, BOTH LEFT AND RIGHT;  Surgeon: Aparna Thompson MD;  Location: [facility] CATH LAB;  Service: Cardiology;  Laterality: N/A;  radial approach    COLONOSCOPY N/A 11/14/2016    Procedure: COLONOSCOPY;  Surgeon: Karuna Rodriguez MD;  Location: [facility] ENDO;  Service: Endoscopy;  Laterality: N/A;    COLONOSCOPY N/A 09/22/2020    Procedure: COLONOSCOPY;  Surgeon: Chuy Fish MD;  Location: [facility] ENDO;  Service: Endoscopy;  Laterality: N/A;    EPIDURAL STEROID INJECTION N/A 6/6/2024    Procedure: Lumbar L5/S1 IL IAN;  Surgeon: Abel Haywood MD;  Location: [facility] PAIN MGT;  Service: Pain Management;  Laterality: N/A;    EPIDURAL STEROID INJECTION INTO CERVICAL SPINE N/A 2/8/2024    Procedure: C5/6 IL Right paramedian approach IAN with RN IV sedation;  Surgeon: Abel Haywood MD;  Location: [facility] PAIN MGT;  Service: Pain Management;  Laterality: N/A;    EYE SURGERY      HEMORRHOID SURGERY      HIP ARTHROPLASTY Right 11/13/2024    Procedure: ARTHROPLASTY, HIP;  Surgeon: Denton Encarnacion MD;  Location: [facility] OR;  Service: Orthopedics;  Laterality: Right;    I-STENT Right 02/21/2018    DR. REECE    INJECTION OF ANESTHETIC AGENT AROUND MEDIAL BRANCH NERVES INNERVATING CERVICAL FACET JOINT Bilateral 7/18/2023    Procedure: Bilateral C4-6 MBB with RN IV sedation (diagnostic, #1);  Surgeon: Abel Haywood MD;  [facility];  Service: Pain Management;  Laterality: Bilateral;    KNEE ARTHROSCOPY W/ MENISCAL  REPAIR Right 2015    Dr. Jain    PCIOL Right 02/21/2018    DR. REECE    PLANTAR FASCIA RELEASE      right    ROTATOR CUFF REPAIR Bilateral     bilateral    SELECTIVE INJECTION OF ANESTHETIC AGENT AROUND LUMBAR SPINAL NERVE ROOT BY TRANSFORAMINAL APPROACH Bilateral 01/26/2022    Procedure: Bilateral L4/5 TF IAN with RN IV sedation;  Surgeon: Mak Young MD;  Location: [facility] PAIN MGT;  Service: Pain Management;  Laterality: Bilateral;    SELECTIVE INJECTION OF ANESTHETIC AGENT AROUND LUMBAR SPINAL NERVE ROOT BY TRANSFORAMINAL APPROACH Bilateral 03/14/2022    Procedure: Bilateral L4/5 TF IAN with RN IV sedation;  Surgeon: Mak Young MD;  Location: [facility] PAIN MGT;  Service: Pain Management;  Laterality: Bilateral;    SELECTIVE INJECTION OF ANESTHETIC AGENT AROUND LUMBAR SPINAL NERVE ROOT BY TRANSFORAMINAL APPROACH Bilateral 06/02/2022    Procedure: Bilateral L4/5 TF IAN - D2P Dr. Castro [facility];  Surgeon: Abel Haywood MD;  Location: [facility] PAIN MGT;  Service: Pain Management;  Laterality: Bilateral;    SELECTIVE INJECTION OF ANESTHETIC AGENT AROUND LUMBAR SPINAL NERVE ROOT BY TRANSFORAMINAL APPROACH Bilateral 08/25/2022    Procedure: Bilateral L4/5 TF IAN;  Surgeon: Abel Haywood MD;  Location: [facility] PAIN MGT;  Service: Pain Management;  Laterality: Bilateral;    SELECTIVE INJECTION OF ANESTHETIC AGENT AROUND LUMBAR SPINAL NERVE ROOT BY TRANSFORAMINAL APPROACH Bilateral 6/29/2023    Procedure: Bilateral L4/5 TF IAN RN IV Sedation;  Surgeon: Abel Haywood MD;  Location: [facility] PAIN MGT;  Service: Pain Management;  Laterality: Bilateral;    SELECTIVE INJECTION OF ANESTHETIC AGENT AROUND LUMBAR SPINAL NERVE ROOT BY TRANSFORAMINAL APPROACH Bilateral 4/11/2024    Procedure: Bilateral L4/5 TF IAN;  Surgeon: Abel Haywood MD;  Location: [facility] PAIN MGT;  Service: Pain Management;  Laterality: Bilateral;    SHOULDER SURGERY Bilateral around 2000    Dr. Pepper.  rotator cuff surgeries    VASECTOMY         Time  Tracking:     PT Received On: 11/19/24  PT Start Time: 1430  PT Stop Time: 1455  PT Total Time (min): 25 min     Billable Minutes: Evaluation 15min and Therapeutic Activity 10min    11/19/2024

## 2024-11-19 NOTE — SUBJECTIVE & OBJECTIVE
Interval History:  Follow up for constipation urinary retention, DALLAS and cough.  Patient's hypotension improved with IV fluids.  Patient also had a large bowel movement last night with Mag citrate.  He also had an episode of emesis after drinking the Mag citrate.  He was continued on MiraLax as his repeat KUB this morning showed that he did have mild constipation, but no bowel obstruction or ileus.    Review of Systems  Objective:     Vital Signs (Most Recent):  Temp: 97.7 °F (36.5 °C) (11/19/24 1230)  Pulse: 72 (11/19/24 1335)  Resp: 18 (11/19/24 1230)  BP: (!) 120/59 (11/19/24 1230)  SpO2: (!) 93 % (11/19/24 1230) Vital Signs (24h Range):  Temp:  [97.7 °F (36.5 °C)-98.8 °F (37.1 °C)] 97.7 °F (36.5 °C)  Pulse:  [] 72  Resp:  [16-21] 18  SpO2:  [92 %-99 %] 93 %  BP: ()/(51-78) 120/59     Weight: 107.2 kg (236 lb 5.3 oz)  Body mass index is 33.43 kg/m².    Intake/Output Summary (Last 24 hours) at 11/19/2024 1505  Last data filed at 11/19/2024 0446  Gross per 24 hour   Intake 1536.27 ml   Output 300 ml   Net 1236.27 ml         Physical Exam  Vitals and nursing note reviewed.   Constitutional:       Appearance: Normal appearance.   HENT:      Head: Normocephalic and atraumatic.      Nose: Nose normal.      Mouth/Throat:      Mouth: Mucous membranes are moist.   Eyes:      Extraocular Movements: Extraocular movements intact.      Conjunctiva/sclera: Conjunctivae normal.   Cardiovascular:      Rate and Rhythm: Normal rate and regular rhythm.      Pulses: Normal pulses.      Heart sounds: Normal heart sounds.   Pulmonary:      Effort: Pulmonary effort is normal.      Breath sounds: Normal breath sounds.   Abdominal:      General: Abdomen is flat. Bowel sounds are normal.      Palpations: Abdomen is soft.   Musculoskeletal:         General: Normal range of motion.      Cervical back: Normal range of motion and neck supple.      Comments: Right hip bandage showed some dried blood and jose martin drain in place    Skin:     General: Skin is warm.      Capillary Refill: Capillary refill takes less than 2 seconds.   Neurological:      Mental Status: He is alert and oriented to person, place, and time. Mental status is at baseline.   Psychiatric:         Mood and Affect: Mood normal.         Behavior: Behavior normal.             Significant Labs: All pertinent labs within the past 24 hours have been reviewed.  Recent Lab Results  (Last 5 results in the past 24 hours)        11/19/24  0858   11/19/24  0632   11/18/24  1646   11/18/24  1543   11/18/24  1521        Influenza A, Molecular       Negative         Influenza B, Molecular       Negative         Procalcitonin 0.39  Comment: A concentration < 0.25 ng/mL represents a low risk of bacterial   infection.  Procalcitonin may not be accurate among patients with localized   infection, recent trauma or major surgery, immunosuppressed state,   invasive fungal infection, renal dysfunction. Decisions regarding   initiation or continuation of antibiotic therapy should not be based   solely on procalcitonin levels.           0.67  Comment: A concentration < 0.25 ng/mL represents a low risk of bacterial   infection.  Procalcitonin may not be accurate among patients with localized   infection, recent trauma or major surgery, immunosuppressed state,   invasive fungal infection, renal dysfunction. Decisions regarding   initiation or continuation of antibiotic therapy should not be based   solely on procalcitonin levels.         Albumin         3.0       ALP         124       ALT         28       Anion Gap   10       16       Appearance, UA       Clear         AST         28       Baso #   0.01       0.03       Basophil %   0.2       0.4       Bilirubin (UA)       Negative         BILIRUBIN TOTAL         0.9  Comment: For infants and newborns, interpretation of results should be based  on gestational age, weight and in agreement with clinical  observations.    Premature Infant recommended  reference ranges:  Up to 24 hours.............<8.0 mg/dL  Up to 48 hours............<12.0 mg/dL  3-5 days..................<15.0 mg/dL  6-29 days.................<15.0 mg/dL         Blood Culture, Routine     No Growth to date  [P]                No Growth to date  [P]           BNP         219  Comment: Values of less than 100 pg/ml are consistent with non-CHF populations.       BUN   87       90       Calcium   7.8       8.3       Chloride   100       96       CO2   21       16       Color, UA       Yellow                  413       Creatinine   3.3       3.9       Differential Method   Automated       Automated       eGFR   17       14       Eos #   0.2       0.1       Eos %   3.3       1.8       Flu A & B Source       Nasal swab         Glucose   104       115       Glucose, UA       Negative         Gran # (ANC)   3.4       5.5       Gran %   70.6       77.0       Hematocrit   22.3       26.7       Hemoglobin   7.2       8.9       Immature Grans (Abs)   0.13  Comment: Mild elevation in immature granulocytes is non specific and   can be seen in a variety of conditions including stress response,   acute inflammation, trauma and pregnancy. Correlation with other   laboratory and clinical findings is essential.         0.16  Comment: Mild elevation in immature granulocytes is non specific and   can be seen in a variety of conditions including stress response,   acute inflammation, trauma and pregnancy. Correlation with other   laboratory and clinical findings is essential.         Immature Granulocytes   2.7       2.2       Ketones, UA       Negative         Lactic Acid Level 0.9  Comment: Falsely low lactic acid results can be found in samples   containing >=13.0 mg/dL total bilirubin and/or >=3.5 mg/dL   direct bilirubin.                 Leukocyte Esterase, UA       Negative         Lymph #   0.4       0.4       Lymph %   8.3       5.5       Magnesium          2.3       MCH   31.4       31.8       MCHC    32.3       33.3       MCV   97       95       Mono #   0.7       0.9       Mono %   14.9       13.1       MPV   8.8       8.7       NITRITE UA       Negative         nRBC   0       0       Blood, UA       Negative         pH, UA       5.0         Platelet Count   122       142       Potassium   4.7       4.9       PROTEIN TOTAL         6.5       Protein, UA       Negative  Comment: Recommend a 24 hour urine protein or a urine   protein/creatinine ratio if globulin induced proteinuria is  clinically suspected.           RBC   2.29       2.80       RDW   12.5       12.4       SARS-CoV-2 RNA, Amplification, Qual       Negative  Comment: This test utilizes isothermal nucleic acid amplification technology   to   detect the SARS-CoV-2 RdRp nucleic acid segment. The analytical   sensitivity   (limit of detection) is 500 copies/swab.    A POSITIVE result is indicative of the presence of SARS-CoV-2 RNA;   clinical   correlation with patient history and other diagnostic information is   necessary to determine patient infection status.    A NEGATIVE result means that SARS-CoV-2 nucleic acids are not present   above   the limit of detection. A NEGATIVE result should be treated as   presumptive.   It does not rule out the possibility of COVID-19 and should not be   the sole   basis for treatment decisions.    If COVID-19 is strongly suspected based on clinical and exposure   history,   re-testing using an alternate molecular assay should be considered.    This test is Food and Drug Administration (FDA) approved. Performance   characteristics of this has been independently verified by [facility] Department of Pathology and Laboratory Medicine.           Sodium   131       128       Spec Grav UA       1.020         Specimen UA       Urine, Clean Catch         Troponin I 0.071  Comment: The reference interval for Troponin I represents the 99th percentile   cutoff   for our facility and is consistent with 3rd  generation assay   performance.           0.076  Comment: The reference interval for Troponin I represents the 99th percentile   cutoff   for our facility and is consistent with 3rd generation assay   performance.         UROBILINOGEN UA       Negative         WBC   4.82       7.12                               [P] - Preliminary Result               Significant Imaging:   X-Ray Abdomen AP 1 View   Final Result      No acute abnormality.         Electronically signed by: Jaya Howell MD   Date:    11/19/2024   Time:    10:04      US Lower Extremity Veins Bilateral   Final Result      No evidence of deep venous thrombosis in either lower extremity.         Electronically signed by: Soledad Oquendo   Date:    11/18/2024   Time:    17:19      X-Ray Abdomen Flat And Erect   Final Result      Moderate constipation         Electronically signed by: Jaya Howell MD   Date:    11/18/2024   Time:    15:47      X-Ray Chest AP Portable   Final Result      No acute process seen.         Electronically signed by: Jaya Howell MD   Date:    11/18/2024   Time:    15:48

## 2024-11-19 NOTE — ASSESSMENT & PLAN NOTE
Patient has a diagnosis of pneumonia. The cause of the pneumonia is suspected to be viral in etiology but organism is not known. The pneumonia is stable. The patient has the following signs/symptoms of pneumonia: cough. The patient does not have a current oxygen requirement and the patient does not have a home oxygen requirement. I have reviewed the pertinent imaging. The following cultures have been collected: Blood cultures The culture results are listed below.     Current antimicrobial regimen consists of the antibiotics listed below. Will monitor patient closely and continue current treatment plan unchanged.    Antibiotics (From admission, onward)      Start     Stop Route Frequency Ordered    11/18/24 1602  cefTRIAXone injection 1 g         -- IV Every 24 hours (non-standard times) 11/18/24 1602    11/18/24 1602  azithromycin (ZITHROMAX) 500 mg in D5W 250 mL  IVPB (admixture device)         -- IV Every 24 hours (non-standard times) 11/18/24 1602            Microbiology Results (last 7 days)       Procedure Component Value Units Date/Time    Blood Culture #1 **CANNOT BE ORDERED STAT** [[id number]] Collected: 11/18/24 1646    Order Status: Sent Specimen: Blood from Peripheral, Antecubital, Right     Blood Culture #2 **CANNOT BE ORDERED STAT** [[id number]] Collected: 11/18/24 1646    Order Status: Sent Specimen: Blood from Peripheral, Antecubital, Left     Influenza A & B by Molecular [[id number]] Collected: 11/18/24 1543    Order Status: Completed Specimen: Nasal Swab Updated: 11/18/24 1615     Influenza A, Molecular Negative     Influenza B, Molecular Negative     Flu A & B Source Nasal swab

## 2024-11-19 NOTE — ASSESSMENT & PLAN NOTE
Patient has a diagnosis of pneumonia. The cause of the pneumonia is suspected to be viral in etiology but organism is not known. The pneumonia is stable. The patient has the following signs/symptoms of pneumonia: cough. The patient does not have a current oxygen requirement and the patient does not have a home oxygen requirement. I have reviewed the pertinent imaging. The following cultures have been collected: Blood cultures The culture results are listed below.     Current antimicrobial regimen consists of the antibiotics listed below. Will monitor patient closely and continue current treatment plan unchanged.    -flu and COVID negative    Antibiotics (From admission, onward)      Start     Stop Route Frequency Ordered    11/18/24 1602  cefTRIAXone injection 1 g         -- IV Every 24 hours (non-standard times) 11/18/24 1602    11/18/24 1602  azithromycin (ZITHROMAX) 500 mg in D5W 250 mL  IVPB (admixture device)         -- IV Every 24 hours (non-standard times) 11/18/24 1602            Microbiology Results (last 7 days)       Procedure Component Value Units Date/Time    Blood Culture #2 **CANNOT BE ORDERED STAT** [[id number]] Collected: 11/18/24 1646    Order Status: Completed Specimen: Blood from Peripheral, Antecubital, Left Updated: 11/19/24 0745     Blood Culture, Routine No Growth to date    Blood Culture #1 **CANNOT BE ORDERED STAT** [[id number]] Collected: 11/18/24 1646    Order Status: Completed Specimen: Blood from Peripheral, Antecubital, Right Updated: 11/19/24 0745     Blood Culture, Routine No Growth to date    Influenza A & B by Molecular [[id number]] Collected: 11/18/24 1543    Order Status: Completed Specimen: Nasal Swab Updated: 11/18/24 1615     Influenza A, Molecular Negative     Influenza B, Molecular Negative     Flu A & B Source Nasal swab

## 2024-11-19 NOTE — ASSESSMENT & PLAN NOTE
Patients blood pressure range in the last 24 hours was: BP  Min: 78/52  Max: 116/78.The patient's inpatient anti-hypertensive regimen is listed below:  Current Antihypertensives  hydrALAZINE injection 10 mg, Every 6 hours PRN, Intravenous    Plan  - BP is uncontrolled, will adjust as follows:  Patient was currently hypotensive  - hold home medications

## 2024-11-19 NOTE — PHARMACY MED REC
"Admission Medication History     The home medication history was taken by Sury Campbell.    You may go to "Admission" then "Reconcile Home Medications" tabs to review and/or act upon these items.     The home medication list has been updated by the Pharmacy department.   Please read ALL comments highlighted in yellow.   Please address this information as you see fit.    Feel free to contact us if you have any questions or require assistance.      The medications listed below were removed from the home medication list. Please reorder if appropriate:  Patient reports no longer taking the following medication(s):  Amlodipine 5 mg  Flexeril 5 mg  Norco  mg    Medications listed below were obtained from: Patient/family and Analytic software- Christie Le Srinathcarolyn  (daughter) Gerda Hughes (significant other )      LAST MED REC COMPLETED:   Sury Campbell  [id number]    Current Outpatient Medications on File Prior to Encounter   Medication Sig Dispense Refill Last Dose/Taking    acetaminophen (TYLENOL) 650 MG TbSR Take 1 tablet (650 mg total) by mouth every 8 (eight) hours. 90 tablet 0 11/18/2024    alfuzosin (UROXATRAL) 10 mg Tb24 Take 1 tablet (10 mg total) by mouth daily with breakfast. 90 tablet 3 11/18/2024    atorvastatin (LIPITOR) 20 MG tablet Take 1 tablet (20 mg total) by mouth once daily. 90 tablet 3 11/17/2024    citalopram (CELEXA) 10 MG tablet Take 1 tablet (10 mg total) by mouth once daily. For anxiety 90 tablet 3 11/18/2024    clopidogreL (PLAVIX) 75 mg tablet TAKE 1 TABLET EVERY DAY 90 tablet 2 11/18/2024    finasteride (PROSCAR) 5 mg tablet Take 1 tablet (5 mg total) by mouth once daily. 90 tablet 4 11/17/2024    furosemide (LASIX) 20 MG tablet Take 1 tablet (20 mg total) by mouth daily as needed (edema). 30 tablet 0 11/18/2024    losartan (COZAAR) 50 MG tablet TAKE 1 TABLET TWICE DAILY 180 tablet 2 11/18/2024    ondansetron (ZOFRAN-ODT) 4 MG TbDL Dissolve 2 tablets (8 mg total) by mouth " every 8 (eight) hours as needed (Nausea). 21 tablet 0 11/18/2024    polyethylene glycol (GLYCOLAX) 17 gram/dose powder Take 17 g by mouth once daily.   11/18/2024    pregabalin (LYRICA) 75 MG capsule Take 1 capsule (75 mg total) by mouth 2 (two) times daily. 180 capsule 1 11/18/2024    vitamin D (VITAMIN D3) 1000 units Tab Take 1,000 Units by mouth once daily.   11/17/2024    [DISCONTINUED] oxyCODONE-acetaminophen (PERCOCET)  mg per tablet Take 1 tablet by mouth every 6 (six) hours as needed for Pain. 27 tablet 0 11/18/2024 at  9:00 AM    albuterol (VENTOLIN HFA) 90 mcg/actuation inhaler Inhale 2 puffs into the lungs every 6 (six) hours as needed for Wheezing. Rescue 18 g 0 Unknown    azelastine (ASTELIN) 137 mcg (0.1 %) nasal spray 1 spray (137 mcg total) by Nasal route 2 (two) times daily. 90 mL 3 Unknown    pantoprazole (PROTONIX) 40 MG tablet TAKE 1 TABLET EVERY DAY (Patient taking differently: Take 40 mg by mouth once daily.) 90 tablet 1 Unknown    sildenafiL (VIAGRA) 100 MG tablet Take 1 po 45 minutes before intercourse 30 tablet 7 Unknown                           .

## 2024-11-19 NOTE — ASSESSMENT & PLAN NOTE
DALLAS is likely due to pre-renal azotemia due to intravascular volume depletion. Baseline creatinine is 1.3-1.6. Most recent creatinine and eGFR are listed below.  Recent Labs     11/18/24  1521 11/19/24  0632   CREATININE 3.9* 3.3*   EGFRNORACEVR 14* 17*        Plan  - D[place]AS is improving. Will adjust treatment as follows:  Continue IV fluids  - Avoid nephrotoxins and renally dose meds for GFR listed above  - Monitor urine output-improving, serial BMP, and adjust therapy as needed

## 2024-11-19 NOTE — HPI
Mr. Hughes is a 84-year-old male with past medical history of hypertension, hyperlipidemia, TIA, anxiety and BPH presented with constipation, hypotension, cough, and trouble urinating.  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 Encarnacion.  Per wife at bedside patient has not had a bowel movement since 11/12/2024 and has had very little urination since his surgery 1 week ago.  He was also had a cough that he states it has been dry and he denied shortness a breath.  Patient was also being seen by home health who stated that the patient was hypotensive and recommended that he presented to an urgent care.  Patient arrived to the ER and was noted to have a low blood pressure and was started on IV fluids which helped improve his hypotension.  Patient was also started on azithromycin and Rocephin for a brewing pneumonia noted on chest x-ray in the right middle lobe.  He will be admitted for observation to treat for constipation, hypotension, decreased urination and right hip pain.

## 2024-11-19 NOTE — PROGRESS NOTES
[facility]  Progress Note    Patient Name: Ezequiel Hughes  MRN: [id number]  Patient Class: IP- Inpatient   Admission Date: 11/18/2024  Length of Stay: 0 days  Attending Physician: Ruthie Bowie MD  Primary Care Provider: Valery Ozuna MD        Subjective:     Principal Problem:Acute renal failure superimposed on stage 4 chronic kidney disease        HPI:  Mr. Hughes is a 84-year-old male with past medical history of hypertension, hyperlipidemia, TIA, anxiety and BPH presented with constipation, hypotension, cough, and trouble urinating.  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 Encarnacion.  Per wife at bedside patient has not had a bowel movement since 11/12/2024 and has had very little urination since his surgery 1 week ago.  He was also had a cough that he states it has been dry and he denied shortness a breath.  Patient was also being seen by home health who stated that the patient was hypotensive and recommended that he presented to an urgent care.  Patient arrived to the ER and was noted to have a low blood pressure and was started on IV fluids which helped improve his hypotension.  Patient was also started on azithromycin and Rocephin for a brewing pneumonia noted on chest x-ray in the right middle lobe.  He will be admitted for observation to treat for constipation, hypotension, decreased urination and right hip pain.    Overview/Hospital Course:  Patient admitted post right total hip replacement on 11/13/2024 due to constipation, decreased urine output, DALLAS, and cough with shortness a breath.  Patient was given Mag citrate on 11/18/2024 and had a large bowel movement.  His renal function began to improve and he began to urinate more frequently.  Patient was also seen by Orthopedics and his surgical site was evaluated and noted to be healing well.  Patient was also noted to have a beginning stages of pneumonia on chest x-ray in the right middle  lobe for which the patient is on azithromycin and Rocephin.  On hospital day 1 patient's blood pressure also improved likely due to IV fluids and improvement in his urination.    Interval History:  Follow up for constipation urinary retention, DALLAS and cough.  Patient's hypotension improved with IV fluids.  Patient also had a large bowel movement last night with Mag citrate.  He also had an episode of emesis after drinking the Mag citrate.  He was continued on MiraLax as his repeat KUB this morning showed that he did have mild constipation, but no bowel obstruction or ileus.    Review of Systems  Objective:     Vital Signs (Most Recent):  Temp: 97.7 °F (36.5 °C) (11/19/24 1230)  Pulse: 72 (11/19/24 1335)  Resp: 18 (11/19/24 1230)  BP: (!) 120/59 (11/19/24 1230)  SpO2: (!) 93 % (11/19/24 1230) Vital Signs (24h Range):  Temp:  [97.7 °F (36.5 °C)-98.8 °F (37.1 °C)] 97.7 °F (36.5 °C)  Pulse:  [] 72  Resp:  [16-21] 18  SpO2:  [92 %-99 %] 93 %  BP: ()/(51-78) 120/59     Weight: 107.2 kg (236 lb 5.3 oz)  Body mass index is 33.43 kg/m².    Intake/Output Summary (Last 24 hours) at 11/19/2024 1505  Last data filed at 11/19/2024 0446  Gross per 24 hour   Intake 1536.27 ml   Output 300 ml   Net 1236.27 ml         Physical Exam  Vitals and nursing note reviewed.   Constitutional:       Appearance: Normal appearance.   HENT:      Head: Normocephalic and atraumatic.      Nose: Nose normal.      Mouth/Throat:      Mouth: Mucous membranes are moist.   Eyes:      Extraocular Movements: Extraocular movements intact.      Conjunctiva/sclera: Conjunctivae normal.   Cardiovascular:      Rate and Rhythm: Normal rate and regular rhythm.      Pulses: Normal pulses.      Heart sounds: Normal heart sounds.   Pulmonary:      Effort: Pulmonary effort is normal.      Breath sounds: Normal breath sounds.   Abdominal:      General: Abdomen is flat. Bowel sounds are normal.      Palpations: Abdomen is soft.   Musculoskeletal:          General: Normal range of motion.      Cervical back: Normal range of motion and neck supple.      Comments: Right hip bandage showed some dried blood and jose martin drain in place   Skin:     General: Skin is warm.      Capillary Refill: Capillary refill takes less than 2 seconds.   Neurological:      Mental Status: He is alert and oriented to person, place, and time. Mental status is at baseline.   Psychiatric:         Mood and Affect: Mood normal.         Behavior: Behavior normal.             Significant Labs: All pertinent labs within the past 24 hours have been reviewed.  Recent Lab Results  (Last 5 results in the past 24 hours)        11/19/24  0858   11/19/24  0632   11/18/24  1646   11/18/24  1543   11/18/24  1521        Influenza A, Molecular       Negative         Influenza B, Molecular       Negative         Procalcitonin 0.39  Comment: A concentration < 0.25 ng/mL represents a low risk of bacterial   infection.  Procalcitonin may not be accurate among patients with localized   infection, recent trauma or major surgery, immunosuppressed state,   invasive fungal infection, renal dysfunction. Decisions regarding   initiation or continuation of antibiotic therapy should not be based   solely on procalcitonin levels.           0.67  Comment: A concentration < 0.25 ng/mL represents a low risk of bacterial   infection.  Procalcitonin may not be accurate among patients with localized   infection, recent trauma or major surgery, immunosuppressed state,   invasive fungal infection, renal dysfunction. Decisions regarding   initiation or continuation of antibiotic therapy should not be based   solely on procalcitonin levels.         Albumin         3.0       ALP         124       ALT         28       Anion Gap   10       16       Appearance, UA       Clear         AST         28       Baso #   0.01       0.03       Basophil %   0.2       0.4       Bilirubin (UA)       Negative         BILIRUBIN TOTAL         0.9  Comment:  For infants and newborns, interpretation of results should be based  on gestational age, weight and in agreement with clinical  observations.    Premature Infant recommended reference ranges:  Up to 24 hours.............<8.0 mg/dL  Up to 48 hours............<12.0 mg/dL  3-5 days..................<15.0 mg/dL  6-29 days.................<15.0 mg/dL         Blood Culture, Routine     No Growth to date  [P]                No Growth to date  [P]           BNP         219  Comment: Values of less than 100 pg/ml are consistent with non-CHF populations.       BUN   87       90       Calcium   7.8       8.3       Chloride   100       96       CO2   21       16       Color, UA       Yellow                  413       Creatinine   3.3       3.9       Differential Method   Automated       Automated       eGFR   17       14       Eos #   0.2       0.1       Eos %   3.3       1.8       Flu A & B Source       Nasal swab         Glucose   104       115       Glucose, UA       Negative         Gran # (ANC)   3.4       5.5       Gran %   70.6       77.0       Hematocrit   22.3       26.7       Hemoglobin   7.2       8.9       Immature Grans (Abs)   0.13  Comment: Mild elevation in immature granulocytes is non specific and   can be seen in a variety of conditions including stress response,   acute inflammation, trauma and pregnancy. Correlation with other   laboratory and clinical findings is essential.         0.16  Comment: Mild elevation in immature granulocytes is non specific and   can be seen in a variety of conditions including stress response,   acute inflammation, trauma and pregnancy. Correlation with other   laboratory and clinical findings is essential.         Immature Granulocytes   2.7       2.2       Ketones, UA       Negative         Lactic Acid Level 0.9  Comment: Falsely low lactic acid results can be found in samples   containing >=13.0 mg/dL total bilirubin and/or >=3.5 mg/dL   direct bilirubin.                  Leukocyte Esterase, UA       Negative         Lymph #   0.4       0.4       Lymph %   8.3       5.5       Magnesium          2.3       MCH   31.4       31.8       MCHC   32.3       33.3       MCV   97       95       Mono #   0.7       0.9       Mono %   14.9       13.1       MPV   8.8       8.7       NITRITE UA       Negative         nRBC   0       0       Blood, UA       Negative         pH, UA       5.0         Platelet Count   122       142       Potassium   4.7       4.9       PROTEIN TOTAL         6.5       Protein, UA       Negative  Comment: Recommend a 24 hour urine protein or a urine   protein/creatinine ratio if globulin induced proteinuria is  clinically suspected.           RBC   2.29       2.80       RDW   12.5       12.4       SARS-CoV-2 RNA, Amplification, Qual       Negative  Comment: This test utilizes isothermal nucleic acid amplification technology   to   detect the SARS-CoV-2 RdRp nucleic acid segment. The analytical   sensitivity   (limit of detection) is 500 copies/swab.    A POSITIVE result is indicative of the presence of SARS-CoV-2 RNA;   clinical   correlation with patient history and other diagnostic information is   necessary to determine patient infection status.    A NEGATIVE result means that SARS-CoV-2 nucleic acids are not present   above   the limit of detection. A NEGATIVE result should be treated as   presumptive.   It does not rule out the possibility of COVID-19 and should not be   the sole   basis for treatment decisions.    If COVID-19 is strongly suspected based on clinical and exposure   history,   re-testing using an alternate molecular assay should be considered.    This test is Food and Drug Administration (FDA) approved. Performance   characteristics of this has been independently verified by [facility] Department of Pathology and Laboratory Medicine.           Sodium   131       128       Spec Grav UA       1.020         Specimen UA       Urine, Clean  Catch         Troponin I 0.071  Comment: The reference interval for Troponin I represents the 99th percentile   cutoff   for our facility and is consistent with 3rd generation assay   performance.           0.076  Comment: The reference interval for Troponin I represents the 99th percentile   cutoff   for our facility and is consistent with 3rd generation assay   performance.         UROBILINOGEN UA       Negative         WBC   4.82       7.12                               [P] - Preliminary Result               Significant Imaging:   X-Ray Abdomen AP 1 View   Final Result      No acute abnormality.         Electronically signed by: Jaya Howell MD   Date:    11/19/2024   Time:    10:04      US Lower Extremity Veins Bilateral   Final Result      No evidence of deep venous thrombosis in either lower extremity.         Electronically signed by: Soledad Oquendo   Date:    11/18/2024   Time:    17:19      X-Ray Abdomen Flat And Erect   Final Result      Moderate constipation         Electronically signed by: Jaya Howell MD   Date:    11/18/2024   Time:    15:47      X-Ray Chest AP Portable   Final Result      No acute process seen.         Electronically signed by: Jaya Howell MD   Date:    11/18/2024   Time:    15:48           Assessment/Plan:      * Acute renal failure superimposed on stage 4 chronic kidney disease  DALLAS is likely due to pre-renal azotemia due to intravascular volume depletion. Baseline creatinine is 1.3-1.6. Most recent creatinine and eGFR are listed below.  Recent Labs     11/18/24  1521 11/19/24  0632   CREATININE 3.9* 3.3*   EGFRNORACEVR 14* 17*        Plan  - DALLAS is improving. Will adjust treatment as follows:  Continue IV fluids  - Avoid nephrotoxins and renally dose meds for GFR listed above  - Monitor urine output-improving, serial BMP, and adjust therapy as needed      Pneumonia of right middle lobe due to infectious organism  Patient has a diagnosis of pneumonia. The cause of the  pneumonia is suspected to be viral in etiology but organism is not known. The pneumonia is stable. The patient has the following signs/symptoms of pneumonia: cough. The patient does not have a current oxygen requirement and the patient does not have a home oxygen requirement. I have reviewed the pertinent imaging. The following cultures have been collected: Blood cultures The culture results are listed below.     Current antimicrobial regimen consists of the antibiotics listed below. Will monitor patient closely and continue current treatment plan unchanged.    -flu and COVID negative    Antibiotics (From admission, onward)      Start     Stop Route Frequency Ordered    11/18/24 1602  cefTRIAXone injection 1 g         -- IV Every 24 hours (non-standard times) 11/18/24 1602    11/18/24 1602  azithromycin (ZITHROMAX) 500 mg in D5W 250 mL  IVPB (admixture device)         -- IV Every 24 hours (non-standard times) 11/18/24 1602            Microbiology Results (last 7 days)       Procedure Component Value Units Date/Time    Blood Culture #2 **CANNOT BE ORDERED STAT** [[id number]] Collected: 11/18/24 1646    Order Status: Completed Specimen: Blood from Peripheral, Antecubital, Left Updated: 11/19/24 0745     Blood Culture, Routine No Growth to date    Blood Culture #1 **CANNOT BE ORDERED STAT** [[id number]] Collected: 11/18/24 1646    Order Status: Completed Specimen: Blood from Peripheral, Antecubital, Right Updated: 11/19/24 0745     Blood Culture, Routine No Growth to date    Influenza A & B by Molecular [[id number]] Collected: 11/18/24 1543    Order Status: Completed Specimen: Nasal Swab Updated: 11/18/24 1615     Influenza A, Molecular Negative     Influenza B, Molecular Negative     Flu A & B Source Nasal swab            Arthritis of right hip  -consult Orthopedics  -status post right total hip replacement on 11/13/2024      Constipation  -continue MiraLax b.i.d.   -continue Colace   -Mag citrate x1 on  11/18/2024  -large BM on 11/18/2024  -KUB on 11/19/2024 showed mild constipation      Hyperlipidemia  -continue home medication      Hypertensive disorder  Patients blood pressure range in the last 24 hours was: BP  Min: 78/52  Max: 116/78.The patient's inpatient anti-hypertensive regimen is listed below:  Current Antihypertensives  hydrALAZINE injection 10 mg, Every 6 hours PRN, Intravenous    Plan  - BP is uncontrolled, will adjust as follows:  Patient was currently hypotensive  - hold home medications      VTE Risk Mitigation (From admission, onward)           Ordered     heparin (porcine) injection 5,000 Units  Every 8 hours         11/18/24 1805     IP VTE HIGH RISK PATIENT  Once         11/18/24 1805     Place sequential compression device  Until discontinued         11/18/24 1805                    Discharge Planning   AUDIE:      Code Status: Full Code   Is the patient medically ready for discharge?:     Reason for patient still in hospital (select all that apply): Patient trending condition, Treatment, and Imaging  Discharge Plan A: Home Health                  Ruthie Bowie MD  Department of Hospital Medicine   [facility]

## 2024-11-19 NOTE — CONSULTS
[facility]  Orthopedics  Consult Note    Patient Name: Ezequiel Hughes  MRN: [id number]  Admission Date: 11/18/2024  Hospital Length of Stay: 0 days  Attending Provider: Ruthie Bowie MD  Primary Care Provider: Valery Ozuna MD    Patient information was obtained from patient, spouse/SO, past medical records, ER records, and primary team.     Inpatient consult to Orthopedics  Consult performed by: Trell Everett PA-C  Consult ordered by: Ruthie Bowie MD        Subjective:     Principal Problem:Acute renal failure superimposed on stage 4 chronic kidney disease    Chief Complaint:   Chief Complaint   Patient presents with    Shortness of Breath     Reports SOB x 3 days after R hip replacement sx 5 days ago. Pts fsmily reports home BP has been 90's-100's.    Constipation     Reports LBM 6 days ago. Taking postop pain meds.         HPI: Ezequiel Hughes is a 84 y.o. male with past medical history significant for   Hypertension, hyperlipidemia, TIA, anxiety, and BPH is admitted for a similar acute on chronic renal failure and Orthopedics has been  consulted because the patient is postop day 6 status post right total hip arthroplasty performed by Dr. Encarnacion.  Patient states that he is not having any pain in the hip.  He was doing very well until a couple of days ago when he began to experience shortness of breath and dizziness.  Denies numbness or tingling in the extremity.  Patient states he has no concerns about his right hip at this time.    Past Medical History:   Diagnosis Date    Allergic rhinitis     Anemia     Back pain     BPH (benign prostatic hyperplasia)     Cancer     skin cancer to neck, Dr. Graves    Cataract     Disorder of kidney and ureter     ED (erectile dysfunction)     OMARI (generalized anxiety disorder) 08/07/2023    Hiatal hernia     small    History of colon polyps     colonoscopy 11/2016    HLD (hyperlipidemia)     Hyperlipidemia     Hypertension     Lateral epicondylitis     Lumbar  radiculopathy 01/26/2022    OA (osteoarthritis)     GUIDO (obstructive sleep apnea)     Pneumonia of right middle lobe due to infectious organism 11/18/2024    Polyneuropathy     Prostate cancer     Dr. Wong    TIA (transient ischemic attack)     Trouble in sleeping     Urge incontinence 03/29/2022       Past Surgical History:   Procedure Laterality Date    ANGIOGRAPHY OF UPPER EXTREMITY Left 07/05/2022    Procedure: Angiogram Extremity Bilateral;  Surgeon: Aparna Thompson MD;  Location: [facility] CATH LAB;  Service: Cardiology;  Laterality: Left;    ARTERIOGRAPHY OF AORTIC ROOT N/A 07/05/2022    Procedure: ARTERIOGRAM, AORTIC ROOT;  Surgeon: Aparna Thompson MD;  Location: [facility] CATH LAB;  Service: Cardiology;  Laterality: N/A;    BLOCK, NERVE, PERIPHERAL Right 9/12/2024    Procedure: right femoral/ obturator nerve block- with steroids;  Surgeon: Abel Haywood MD;  Location: [facility] PAIN MGT;  Service: Pain Management;  Laterality: Right;    CATARACT EXTRACTION W/  INTRAOCULAR LENS IMPLANT Right 02/21/2018    I-Stent    CATARACT EXTRACTION W/  INTRAOCULAR LENS IMPLANT Left 03/21/2018    I - Stent    CATHETERIZATION OF BOTH LEFT AND RIGHT HEART N/A 07/05/2022    Procedure: CATHETERIZATION, HEART, BOTH LEFT AND RIGHT;  Surgeon: Aparna Thompson MD;  Location: [facility] CATH LAB;  Service: Cardiology;  Laterality: N/A;  radial approach    COLONOSCOPY N/A 11/14/2016    Procedure: COLONOSCOPY;  Surgeon: Karuna Rodriguez MD;  Location: [facility] ENDO;  Service: Endoscopy;  Laterality: N/A;    COLONOSCOPY N/A 09/22/2020    Procedure: COLONOSCOPY;  Surgeon: Chuy Fish MD;  Location: [facility] ENDO;  Service: Endoscopy;  Laterality: N/A;    EPIDURAL STEROID INJECTION N/A 6/6/2024    Procedure: Lumbar L5/S1 IL IAN;  Surgeon: Abel Haywood MD;  Location: [facility] PAIN MGT;  Service: Pain Management;  Laterality: N/A;    EPIDURAL STEROID INJECTION INTO CERVICAL SPINE N/A 2/8/2024    Procedure: C5/6 IL Right paramedian approach IAN  with RN IV sedation;  Surgeon: Abel Haywood MD;  Location: [facility] PAIN MGT;  Service: Pain Management;  Laterality: N/A;    EYE SURGERY      HEMORRHOID SURGERY      HIP ARTHROPLASTY Right 11/13/2024    Procedure: ARTHROPLASTY, HIP;  Surgeon: Denton Encarnacion MD;  Location: [facility] OR;  Service: Orthopedics;  Laterality: Right;    I-STENT Right 02/21/2018    DR. REECE    INJECTION OF ANESTHETIC AGENT AROUND MEDIAL BRANCH NERVES INNERVATING CERVICAL FACET JOINT Bilateral 7/18/2023    Procedure: Bilateral C4-6 MBB with RN IV sedation (diagnostic, #1);  Surgeon: Abel Haywood MD;  Location: [facility] PAIN MGT;  Service: Pain Management;  Laterality: Bilateral;    KNEE ARTHROSCOPY W/ MENISCAL REPAIR Right 2015    Dr. Jain    PCIOL Right 02/21/2018    DR. REECE    PLANTAR FASCIA RELEASE      right    ROTATOR CUFF REPAIR Bilateral     bilateral    SELECTIVE INJECTION OF ANESTHETIC AGENT AROUND LUMBAR SPINAL NERVE ROOT BY TRANSFORAMINAL APPROACH Bilateral 01/26/2022    Procedure: Bilateral L4/5 TF IAN with RN IV sedation;  Surgeon: Mak Young MD;  Location: [facility] PAIN MGT;  Service: Pain Management;  Laterality: Bilateral;    SELECTIVE INJECTION OF ANESTHETIC AGENT AROUND LUMBAR SPINAL NERVE ROOT BY TRANSFORAMINAL APPROACH Bilateral 03/14/2022    Procedure: Bilateral L4/5 TF IAN with RN IV sedation;  Surgeon: Mak Young MD;  [facility];  Service: Pain Management;  Laterality: Bilateral;    SELECTIVE INJECTION OF ANESTHETIC AGENT AROUND LUMBAR SPINAL NERVE ROOT BY TRANSFORAMINAL APPROACH Bilateral 06/02/2022    Procedure: Bilateral L4/5 TF IAN [place]. [place] [facility] [place]  Surgeon: Abel Haywood MD;  Location: [facility] PAIN MGT;  Service: Pain Management;  Laterality: Bilateral;    SELECTIVE INJECTION OF ANESTHETIC AGENT AROUND LUMBAR SPINAL NERVE ROOT BY TRANSFORAMINAL APPROACH Bilateral 08/25/2022    Procedure: Bilateral L4/5 TF IAN;  Surgeon: Abel Haywood MD;  Location: [facility] PAIN MGT;  Service:  Pain Management;  Laterality: Bilateral;    SELECTIVE INJECTION OF ANESTHETIC AGENT AROUND LUMBAR SPINAL NERVE ROOT BY TRANSFORAMINAL APPROACH Bilateral 6/29/2023    Procedure: Bilateral L4/5 TF IAN RN IV Sedation;  Surgeon: Abel Haywood MD;  Location: [facility] PAIN MGT;  Service: Pain Management;  Laterality: Bilateral;    SELECTIVE INJECTION OF ANESTHETIC AGENT AROUND LUMBAR SPINAL NERVE ROOT BY TRANSFORAMINAL APPROACH Bilateral 4/11/2024    Procedure: Bilateral L4/5 TF IAN;  Surgeon: Abel Haywood MD;  Location: [facility] PAIN MGT;  Service: Pain Management;  Laterality: Bilateral;    SHOULDER SURGERY Bilateral around 2000    Dr. Pepper.  rotator cuff surgeries    VASECTOMY         Review of patient's allergies indicates:   Allergen Reactions    Atarax [hydroxyzine hcl] Other (See Comments)     Raised blood pressure     Zyrtec [cetirizine] Other (See Comments)     Raised blood pressure     Gold sodium thiosulfate      Patch test positive    Meloxicam Rash     Other reaction(s): hypertension       Current Facility-Administered Medications   Medication    0.9%  NaCl infusion    acetaminophen tablet 650 mg    atorvastatin tablet 20 mg    azithromycin (ZITHROMAX) 500 mg in D5W 250 mL  IVPB (admixture device)    cefTRIAXone injection 1 g    citalopram tablet 10 mg    clopidogreL tablet 75 mg    dextrose 10% bolus 125 mL 125 mL    dextrose 10% bolus 250 mL 250 mL    finasteride tablet 5 mg    glucagon (human recombinant) injection 1 mg    glucose chewable tablet 16 g    glucose chewable tablet 24 g    heparin (porcine) injection 5,000 Units    hydrALAZINE injection 10 mg    naloxone 0.4 mg/mL injection 0.02 mg    ondansetron disintegrating tablet 8 mg    pantoprazole EC tablet 40 mg    polyethylene glycol packet 17 g    prochlorperazine injection Soln 5 mg    senna-docusate 8.6-50 mg per tablet 1 tablet    sodium chloride 0.9% flush 10 mL    traMADoL tablet 50 mg     Family History       Problem Relation (Age of  Onset)    Arthritis Mother    Cancer Father, Brother    Cataracts Sister, Brother, Sister    Lung cancer Father    Stroke Sister          Tobacco Use    Smoking status: Former     Current packs/day: 0.00     Average packs/day: 3.0 packs/day for 35.0 years (104.9 ttl pk-yrs)     Types: Cigarettes     Start date: 1960     Quit date: 1985     Years since quittin.3     Passive exposure: Past    Smokeless tobacco: Never   Substance and Sexual Activity    Alcohol use: Yes     Alcohol/week: 3.0 standard drinks of alcohol     Types: 3 Cans of beer per week     Comment: socially    Drug use: No    Sexual activity: Yes     Partners: Female     Birth control/protection: None     Review of Systems   Constitutional: Negative for chills, decreased appetite, fever and weight loss.   HENT:  Negative for congestion, hoarse voice and sore throat.    Eyes:  Negative for blurred vision, double vision, vision loss in left eye and vision loss in right eye.   Cardiovascular:  Negative for chest pain, palpitations and syncope.   Respiratory:  Negative for cough, shortness of breath and wheezing.    Endocrine: Negative for cold intolerance and heat intolerance.   Hematologic/Lymphatic: Negative for bleeding problem. Does not bruise/bleed easily.   Skin:  Negative for dry skin, flushing, itching and suspicious lesions.   Musculoskeletal:  Negative for falls, joint pain and joint swelling.   Gastrointestinal:  Negative for abdominal pain, diarrhea, nausea and vomiting.   Genitourinary:  Negative for dysuria, frequency and urgency.   Neurological:  Negative for dizziness, headaches, numbness and paresthesias.   Psychiatric/Behavioral:  Negative for altered mental status and memory loss.      Objective:     Vital Signs (Most Recent):  Temp: 98.8 °F (37.1 °C) (24)  Pulse: 68 (24)  Resp: 18 (24)  BP: (!) 121/57 (24)  SpO2: 96 % (24) Vital Signs (24h Range):  Temp:  [97.9 °F  "(36.6 °C)-98.8 °F (37.1 °C)] 98.8 °F (37.1 °C)  Pulse:  [] 68  Resp:  [16-21] 18  SpO2:  [92 %-99 %] 96 %  BP: ()/(51-78) 121/57     Weight: 107.2 kg (236 lb 5.3 oz)  Height: 5' 10.5" (179.1 cm)  Body mass index is 33.43 kg/m².      Intake/Output Summary (Last 24 hours) at 11/19/2024 0834  Last data filed at 11/19/2024 0446  Gross per 24 hour   Intake 1536.27 ml   Output 300 ml   Net 1236.27 ml       Ortho/SPM Exam  Right hip:   Dressings are intact and dry with mild amount bloody shadowing  Mild edema around the upper thigh   No TTP around the incision   No pain with logroll   Calf and compartments are soft and compressible   Motor exam normal   Sensation and pulses intact   Cap refill brisk    GEN: Well developed, well nourished male. AAOX3. No acute distress.   Head: Normocephalic, atraumatic.   Eyes: DOMINIQUE  Neck: Trachea is midline, no adenopathy  Resp: Breathing unlabored.  Neuro: Motor function normal, Cranial nerves intact  Psych: Mood and affect appropriate.      Significant Labs:   Recent Lab Results  (Last 5 results in the past 24 hours)        11/19/24  0632   11/18/24  1646   11/18/24  1543   11/18/24  1521   11/18/24  1519        Influenza A, Molecular     Negative           Influenza B, Molecular     Negative           Procalcitonin       0.67  Comment: A concentration < 0.25 ng/mL represents a low risk of bacterial   infection.  Procalcitonin may not be accurate among patients with localized   infection, recent trauma or major surgery, immunosuppressed state,   invasive fungal infection, renal dysfunction. Decisions regarding   initiation or continuation of antibiotic therapy should not be based   solely on procalcitonin levels.           Albumin       3.0         ALP       124         ALT       28         Anion Gap 10       16         Appearance, UA     Clear           AST       28         Baso # 0.01       0.03         Basophil % 0.2       0.4         Bilirubin (UA)     Negative        "    BILIRUBIN TOTAL       0.9  Comment: For infants and newborns, interpretation of results should be based  on gestational age, weight and in agreement with clinical  observations.    Premature Infant recommended reference ranges:  Up to 24 hours.............<8.0 mg/dL  Up to 48 hours............<12.0 mg/dL  3-5 days..................<15.0 mg/dL  6-29 days.................<15.0 mg/dL           Blood Culture, Routine   No Growth to date  [P]                No Growth to date  [P]             BNP       219  Comment: Values of less than 100 pg/ml are consistent with non-CHF populations.         BUN 87       90         Calcium 7.8       8.3         Chloride 100       96         CO2 21       16         Color, UA     Yellow           CPK       413         Creatinine 3.3       3.9         Differential Method Automated       Automated         eGFR 17       14         Eos # 0.2       0.1         Eos % 3.3       1.8         Flu A & B Source     Nasal swab           Glucose 104       115         Glucose, UA     Negative           Gran # (ANC) 3.4       5.5         Gran % 70.6       77.0         Hematocrit 22.3       26.7         Hemoglobin 7.2       8.9         Immature Grans (Abs) 0.13  Comment: Mild elevation in immature granulocytes is non specific and   can be seen in a variety of conditions including stress response,   acute inflammation, trauma and pregnancy. Correlation with other   laboratory and clinical findings is essential.         0.16  Comment: Mild elevation in immature granulocytes is non specific and   can be seen in a variety of conditions including stress response,   acute inflammation, trauma and pregnancy. Correlation with other   laboratory and clinical findings is essential.           Immature Granulocytes 2.7       2.2         Ketones, UA     Negative           Lactic Acid Level         2.3  Comment: Falsely low lactic acid results can be found in samples   containing >=13.0 mg/dL total bilirubin and/or  >=3.5 mg/dL   direct bilirubin.         Leukocyte Esterase, UA     Negative           Lymph # 0.4       0.4         Lymph % 8.3       5.5         Magnesium        2.3         MCH 31.4       31.8         MCHC 32.3       33.3         MCV 97       95         Mono # 0.7       0.9         Mono % 14.9       13.1         MPV 8.8       8.7         NITRITE UA     Negative           nRBC 0       0         Blood, UA     Negative           pH, UA     5.0           Platelet Count 122       142         Potassium 4.7       4.9         PROTEIN TOTAL       6.5         Protein, UA     Negative  Comment: Recommend a 24 hour urine protein or a urine   protein/creatinine ratio if globulin induced proteinuria is  clinically suspected.             RBC 2.29       2.80         RDW 12.5       12.4         SARS-CoV-2 RNA, Amplification, Qual     Negative  Comment: This test utilizes isothermal nucleic acid amplification technology   to   detect the SARS-CoV-2 RdRp nucleic acid segment. The analytical   sensitivity   (limit of detection) is 500 copies/swab.    A POSITIVE result is indicative of the presence of SARS-CoV-2 RNA;   clinical   correlation with patient history and other diagnostic information is   necessary to determine patient infection status.    A NEGATIVE result means that SARS-CoV-2 nucleic acids are not present   above   the limit of detection. A NEGATIVE result should be treated as   presumptive.   It does not rule out the possibility of COVID-19 and should not be   the sole   basis for treatment decisions.    If COVID-19 is strongly suspected based on clinical and exposure   history,   re-testing using an alternate molecular assay should be considered.    This test is Food and Drug Administration (FDA) approved. Performance   characteristics of this has been independently verified by [facility] Department of Pathology and Laboratory Medicine.             Sodium 131       128         Spec Grav UA     1.020            Specimen UA     Urine, Clean Catch           Troponin I       0.076  Comment: The reference interval for Troponin I represents the 99th percentile   cutoff   for our facility and is consistent with 3rd generation assay   performance.           UROBILINOGEN UA     Negative           WBC 4.82       7.12                                 [P] - Preliminary Result             All pertinent labs within the past 24 hours have been reviewed.    Significant Imaging: I have reviewed and interpreted all pertinent imaging results/findings.    Assessment/Plan:     Active Diagnoses:    Diagnosis Date Noted POA    PRINCIPAL PROBLEM:  Acute renal failure superimposed on stage 4 chronic kidney disease [N17.9, N18.4] 11/18/2024 Yes    Pneumonia of right middle lobe due to infectious organism [J18.9] 11/18/2024 Yes    Arthritis of right hip [M16.11] 10/28/2024 Yes    Constipation [K59.00] 08/07/2023 Yes    Hypertensive disorder [I10] 07/23/2013 Yes    Hyperlipidemia [E78.5] 07/23/2013 Yes      Problems Resolved During this Admission:       Assessment:   84 y.o. male with past medical history significant for   Hypertension, hyperlipidemia, TIA, anxiety, and BPH is admitted for a similar acute on chronic renal failure and Orthopedics has been  consulted because the patient is postop day 6 status post right total hip arthroplasty performed by Dr. Encarnacion    Plan:   Weightbearing as tolerated to the right lower extremity   PT/OT for gait training and ADLs   Patient needs to be on posterior hip precautions with knee immobilizer while in bed  Needs rolling walker for ambulation   Dressing may be changed if it becomes saturated or begins to peel, otherwise leave it in place until clinic follow up next week   DVT prophylaxis per primary team   No need for admission from orthopedic perspective   We will see him back in clinic next week for regular postoperative follow-up    Trell Everett PA-C  Orthopedics  [facility]

## 2024-11-19 NOTE — H&P
[facility] [place] [facility]  History & Physical    Patient Name: Ezequiel Hughes  MRN: [id number]  Patient Class: OP- Observation  Admission Date: 11/18/2024  Attending Physician: Ruthie Bowie MD   Primary Care Provider: Valery Ozuna MD         Patient information was obtained from patient, spouse/SO, and ER records.     Subjective:     Principal Problem:Acute renal failure superimposed on stage 4 chronic kidney disease    Chief Complaint:   Chief Complaint   Patient presents with    Shortness of Breath     Reports SOB x 3 days after R hip replacement sx 5 days ago. Pts fsmily reports home BP has been 90's-100's.    Constipation     Reports LBM 6 days ago. Taking postop pain meds.         HPI: Mr. Hughes is a 84-year-old male with past medical history of hypertension, hyperlipidemia, TIA, anxiety and BPH presented with constipation, hypotension, cough, and trouble urinating.  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 Encarnacion.  Per wife at bedside patient has not had a bowel movement since 11/12/2024 and has had very little urination since his surgery 1 week ago.  He was also had a cough that he states it has been dry and he denied shortness a breath.  Patient was also being seen by home health who stated that the patient was hypotensive and recommended that he presented to an urgent care.  Patient arrived to the ER and was noted to have a low blood pressure and was started on IV fluids which helped improve his hypotension.  Patient was also started on azithromycin and Rocephin for a brewing pneumonia noted on chest x-ray in the right middle lobe.  He will be admitted for observation to treat for constipation, hypotension, decreased urination and right hip pain.    Past Medical History:   Diagnosis Date    Allergic rhinitis     Anemia     Back pain     BPH (benign prostatic hyperplasia)     Cancer     skin cancer to neck, Dr. Graves    Cataract     Disorder of  kidney and ureter     ED (erectile dysfunction)     OMARI (generalized anxiety disorder) 08/07/2023    Hiatal hernia     small    History of colon polyps     colonoscopy 11/2016    HLD (hyperlipidemia)     Hyperlipidemia     Hypertension     Lateral epicondylitis     Lumbar radiculopathy 01/26/2022    OA (osteoarthritis)     GUIDO (obstructive sleep apnea)     Pneumonia of right middle lobe due to infectious organism 11/18/2024    Polyneuropathy     Prostate cancer     Dr. Wong    TIA (transient ischemic attack)     Trouble in sleeping     Urge incontinence 03/29/2022       Past Surgical History:   Procedure Laterality Date    ANGIOGRAPHY OF UPPER EXTREMITY Left 07/05/2022    Procedure: Angiogram Extremity Bilateral;  Surgeon: Aparna Thompson MD;  Location: [facility] CATH LAB;  Service: Cardiology;  Laterality: Left;    ARTERIOGRAPHY OF AORTIC ROOT N/A 07/05/2022    Procedure: ARTERIOGRAM, AORTIC ROOT;  Surgeon: Aparna Thompson MD;  Location: [facility] CATH LAB;  Service: Cardiology;  Laterality: N/A;    BLOCK, NERVE, PERIPHERAL Right 9/12/2024    Procedure: right femoral/ obturator nerve block- with steroids;  Surgeon: Abel Haywood MD;  Location: [facility] PAIN MGT;  Service: Pain Management;  Laterality: Right;    CATARACT EXTRACTION W/  INTRAOCULAR LENS IMPLANT Right 02/21/2018    I-Stent    CATARACT EXTRACTION W/  INTRAOCULAR LENS IMPLANT Left 03/21/2018    I - Stent    CATHETERIZATION OF BOTH LEFT AND RIGHT HEART N/A 07/05/2022    Procedure: CATHETERIZATION, HEART, BOTH LEFT AND RIGHT;  Surgeon: Aparna Thompson MD;  Location: [facility] CATH LAB;  Service: Cardiology;  Laterality: N/A;  radial approach    COLONOSCOPY N/A 11/14/2016    Procedure: COLONOSCOPY;  Surgeon: Karuna Rodriguez MD;  Location: [facility] ENDO;  Service: Endoscopy;  Laterality: N/A;    COLONOSCOPY N/A 09/22/2020    Procedure: COLONOSCOPY;  Surgeon: Chuy Fish MD;  Location: [facility] ENDO;  Service: Endoscopy;  Laterality: N/A;    EPIDURAL STEROID  INJECTION N/A 6/6/2024    Procedure: Lumbar L5/S1 IL IAN;  Surgeon: Abel Haywood MD;  Location: [facility] PAIN MGT;  Service: Pain Management;  Laterality: N/A;    EPIDURAL STEROID INJECTION INTO CERVICAL SPINE N/A 2/8/2024    Procedure: C5/6 IL Right paramedian approach IAN with RN IV sedation;  Surgeon: Abel Haywood MD;  Location: [facility] PAIN MGT;  Service: Pain Management;  Laterality: N/A;    EYE SURGERY      HEMORRHOID SURGERY      HIP ARTHROPLASTY Right 11/13/2024    Procedure: ARTHROPLASTY, HIP;  Surgeon: Denton Encarnacion MD;  Location: [facility] OR;  Service: Orthopedics;  Laterality: Right;    I-STENT Right 02/21/2018    DR. REECE    INJECTION OF ANESTHETIC AGENT AROUND MEDIAL BRANCH NERVES INNERVATING CERVICAL FACET JOINT Bilateral 7/18/2023    Procedure: Bilateral C4-6 MBB with RN IV sedation (diagnostic, #1);  Surgeon: Abel Haywood MD;  Location: [facility] PAIN MGT;  Service: Pain Management;  Laterality: Bilateral;    KNEE ARTHROSCOPY W/ MENISCAL REPAIR Right 2015    Dr. Jain    PCIOL Right 02/21/2018    DR. REECE    PLANTAR FASCIA RELEASE      right    ROTATOR CUFF REPAIR Bilateral     bilateral    SELECTIVE INJECTION OF ANESTHETIC AGENT AROUND LUMBAR SPINAL NERVE ROOT BY TRANSFORAMINAL APPROACH Bilateral 01/26/2022    Procedure: Bilateral L4/5 TF IAN with RN IV sedation;  Surgeon: Mak Young MD;  Location: [facility] PAIN MGT;  Service: Pain Management;  Laterality: Bilateral;    SELECTIVE INJECTION OF ANESTHETIC AGENT AROUND LUMBAR SPINAL NERVE ROOT BY TRANSFORAMINAL APPROACH Bilateral 03/14/2022    Procedure: Bilateral L4/5 TF IAN with RN IV sedation;  Surgeon: Mak Young MD;  Location: [facility] PAIN MGT;  Service: Pain Management;  Laterality: Bilateral;    SELECTIVE INJECTION OF ANESTHETIC AGENT AROUND LUMBAR SPINAL NERVE ROOT BY TRANSFORAMINAL APPROACH Bilateral 06/02/2022    Procedure: Bilateral L4/5 TF IAN - D2P Dr. Castro [facility];  Surgeon: Abel Haywood MD;  Location: [facility] PAIN MGT;   Service: Pain Management;  Laterality: Bilateral;    SELECTIVE INJECTION OF ANESTHETIC AGENT AROUND LUMBAR SPINAL NERVE ROOT BY TRANSFORAMINAL APPROACH Bilateral 08/25/2022    Procedure: Bilateral L4/5 TF IAN;  Surgeon: Abel Haywood MD;  Location: [facility] PAIN MGT;  Service: Pain Management;  Laterality: Bilateral;    SELECTIVE INJECTION OF ANESTHETIC AGENT AROUND LUMBAR SPINAL NERVE ROOT BY TRANSFORAMINAL APPROACH Bilateral 6/29/2023    Procedure: Bilateral L4/5 TF IAN RN IV Sedation;  Surgeon: Abel Haywood MD;  Location: [facility] PAIN MGT;  Service: Pain Management;  Laterality: Bilateral;    SELECTIVE INJECTION OF ANESTHETIC AGENT AROUND LUMBAR SPINAL NERVE ROOT BY TRANSFORAMINAL APPROACH Bilateral 4/11/2024    Procedure: Bilateral L4/5 TF IAN;  Surgeon: Abel Haywood MD;  Location: [facility] PAIN MGT;  Service: Pain Management;  Laterality: Bilateral;    SHOULDER SURGERY Bilateral around 2000    Dr. Pepper.  rotator cuff surgeries    VASECTOMY         Review of patient's allergies indicates:   Allergen Reactions    Atarax [hydroxyzine hcl] Other (See Comments)     Raised blood pressure     Zyrtec [cetirizine] Other (See Comments)     Raised blood pressure     Gold sodium thiosulfate      Patch test positive    Meloxicam Rash     Other reaction(s): hypertension       No current facility-administered medications on file prior to encounter.     Current Outpatient Medications on File Prior to Encounter   Medication Sig    acetaminophen (TYLENOL) 650 MG TbSR Take 1 tablet (650 mg total) by mouth every 8 (eight) hours.    alfuzosin (UROXATRAL) 10 mg Tb24 Take 1 tablet (10 mg total) by mouth daily with breakfast.    atorvastatin (LIPITOR) 20 MG tablet Take 1 tablet (20 mg total) by mouth once daily.    citalopram (CELEXA) 10 MG tablet Take 1 tablet (10 mg total) by mouth once daily. For anxiety    clopidogreL (PLAVIX) 75 mg tablet TAKE 1 TABLET EVERY DAY    finasteride (PROSCAR) 5 mg tablet Take 1 tablet (5 mg  total) by mouth once daily.    furosemide (LASIX) 20 MG tablet Take 1 tablet (20 mg total) by mouth daily as needed (edema).    losartan (COZAAR) 50 MG tablet TAKE 1 TABLET TWICE DAILY    ondansetron (ZOFRAN-ODT) 4 MG TbDL Dissolve 2 tablets (8 mg total) by mouth every 8 (eight) hours as needed (Nausea).    polyethylene glycol (GLYCOLAX) 17 gram/dose powder Take 17 g by mouth once daily.    pregabalin (LYRICA) 75 MG capsule Take 1 capsule (75 mg total) by mouth 2 (two) times daily.    vitamin D (VITAMIN D3) 1000 units Tab Take 1,000 Units by mouth once daily.    [DISCONTINUED] oxyCODONE-acetaminophen (PERCOCET)  mg per tablet Take 1 tablet by mouth every 6 (six) hours as needed for Pain.    albuterol (VENTOLIN HFA) 90 mcg/actuation inhaler Inhale 2 puffs into the lungs every 6 (six) hours as needed for Wheezing. Rescue    azelastine (ASTELIN) 137 mcg (0.1 %) nasal spray 1 spray (137 mcg total) by Nasal route 2 (two) times daily.    pantoprazole (PROTONIX) 40 MG tablet TAKE 1 TABLET EVERY DAY (Patient taking differently: Take 40 mg by mouth once daily.)    sildenafiL (VIAGRA) 100 MG tablet Take 1 po 45 minutes before intercourse    [DISCONTINUED] amLODIPine (NORVASC) 5 MG tablet TAKE 1 TABLET TWICE DAILY    [DISCONTINUED] cyclobenzaprine (FLEXERIL) 5 MG tablet     [DISCONTINUED] HYDROcodone-acetaminophen (NORCO)  mg per tablet Take 1 tablet by mouth every 6 (six) hours as needed for Pain.     Family History       Problem Relation (Age of Onset)    Arthritis Mother    Cancer Father, Brother    Cataracts Sister, Brother, Sister    Lung cancer Father    Stroke Sister          Tobacco Use    Smoking status: Former     Current packs/day: 0.00     Average packs/day: 3.0 packs/day for 35.0 years (104.9 ttl pk-yrs)     Types: Cigarettes     Start date: 1960     Quit date: 1985     Years since quittin.3     Passive exposure: Past    Smokeless tobacco: Never   Substance and Sexual Activity    Alcohol  use: Yes     Alcohol/week: 3.0 standard drinks of alcohol     Types: 3 Cans of beer per week     Comment: socially    Drug use: No    Sexual activity: Yes     Partners: Female     Birth control/protection: None     Review of Systems   Respiratory:  Positive for wheezing.    Gastrointestinal:  Positive for constipation.   Musculoskeletal:  Positive for arthralgias and joint swelling.     Objective:     Vital Signs (Most Recent):  Temp: 98 °F (36.7 °C) (11/18/24 1714)  Pulse: 69 (11/18/24 1714)  Resp: 19 (11/18/24 1714)  BP: (!) 108/54 (11/18/24 1714)  SpO2: 99 % (11/18/24 1714) Vital Signs (24h Range):  Temp:  [97.9 °F (36.6 °C)-98 °F (36.7 °C)] 98 °F (36.7 °C)  Pulse:  [] 69  Resp:  [16-21] 19  SpO2:  [95 %-99 %] 99 %  BP: ()/(51-78) 108/54     Weight: 104.3 kg (230 lb)  Body mass index is 33 kg/m².     Physical Exam  Vitals and nursing note reviewed.   Constitutional:       Appearance: Normal appearance.   HENT:      Head: Normocephalic and atraumatic.      Nose: Nose normal.      Mouth/Throat:      Mouth: Mucous membranes are moist.   Eyes:      Extraocular Movements: Extraocular movements intact.      Conjunctiva/sclera: Conjunctivae normal.   Cardiovascular:      Rate and Rhythm: Normal rate. Rhythm irregular.      Pulses: Normal pulses.      Heart sounds: Normal heart sounds.   Pulmonary:      Effort: Pulmonary effort is normal.      Breath sounds: Normal breath sounds.   Abdominal:      General: Abdomen is flat. Bowel sounds are normal.      Palpations: Abdomen is soft.   Musculoskeletal:         General: Normal range of motion.      Cervical back: Normal range of motion and neck supple.      Comments: Right hip dressing noted to have dried blood and jose martin drain in place   Skin:     General: Skin is warm.      Capillary Refill: Capillary refill takes less than 2 seconds.   Neurological:      Mental Status: He is alert and oriented to person, place, and time. Mental status is at baseline.    Psychiatric:         Mood and Affect: Mood normal.         Behavior: Behavior normal.                Significant Labs: All pertinent labs within the past 24 hours have been reviewed.  Recent Lab Results         11/18/24  1543   11/18/24  1521   11/18/24  1519        Influenza A, Molecular Negative           Influenza B, Molecular Negative           Procalcitonin   0.67  Comment: A concentration < 0.25 ng/mL represents a low risk of bacterial   infection.  Procalcitonin may not be accurate among patients with localized   infection, recent trauma or major surgery, immunosuppressed state,   invasive fungal infection, renal dysfunction. Decisions regarding   initiation or continuation of antibiotic therapy should not be based   solely on procalcitonin levels.           Albumin   3.0         ALP   124         ALT   28         Anion Gap   16         Appearance, UA Clear           AST   28         Baso #   0.03         Basophil %   0.4         Bilirubin (UA) Negative           BILIRUBIN TOTAL   0.9  Comment: For infants and newborns, interpretation of results should be based  on gestational age, weight and in agreement with clinical  observations.    Premature Infant recommended reference ranges:  Up to 24 hours.............<8.0 mg/dL  Up to 48 hours............<12.0 mg/dL  3-5 days..................<15.0 mg/dL  6-29 days.................<15.0 mg/dL           BNP   219  Comment: Values of less than 100 pg/ml are consistent with non-CHF populations.         BUN   90         Calcium   8.3         Chloride   96         CO2   16         Color, UA Yellow           CPK   413         Creatinine   3.9         Differential Method   Automated         eGFR   14         Eos #   0.1         Eos %   1.8         Flu A & B Source Nasal swab           Glucose   115         Glucose, UA Negative           Gran # (ANC)   5.5         Gran %   77.0         Hematocrit   26.7         Hemoglobin   8.9         Immature Grans (Abs)    0.16  Comment: Mild elevation in immature granulocytes is non specific and   can be seen in a variety of conditions including stress response,   acute inflammation, trauma and pregnancy. Correlation with other   laboratory and clinical findings is essential.           Immature Granulocytes   2.2         Ketones, UA Negative           Lactic Acid Level     2.3  Comment: Falsely low lactic acid results can be found in samples   containing >=13.0 mg/dL total bilirubin and/or >=3.5 mg/dL   direct bilirubin.         Leukocyte Esterase, UA Negative           Lymph #   0.4         Lymph %   5.5         Magnesium    2.3         MCH   31.8         MCHC   33.3         MCV   95         Mono #   0.9         Mono %   13.1         MPV   8.7         NITRITE UA Negative           nRBC   0         Blood, UA Negative           pH, UA 5.0           Platelet Count   142         Potassium   4.9         PROTEIN TOTAL   6.5         Protein, UA Negative  Comment: Recommend a 24 hour urine protein or a urine   protein/creatinine ratio if globulin induced proteinuria is  clinically suspected.             RBC   2.80         RDW   12.4         SARS-CoV-2 RNA, Amplification, Qual Negative  Comment: This test utilizes isothermal nucleic acid amplification technology   to   detect the SARS-CoV-2 RdRp nucleic acid segment. The analytical   sensitivity   (limit of detection) is 500 copies/swab.    A POSITIVE result is indicative of the presence of SARS-CoV-2 RNA;   clinical   correlation with patient history and other diagnostic information is   necessary to determine patient infection status.    A NEGATIVE result means that SARS-CoV-2 nucleic acids are not present   above   the limit of detection. A NEGATIVE result should be treated as   presumptive.   It does not rule out the possibility of COVID-19 and should not be   the sole   basis for treatment decisions.    If COVID-19 is strongly suspected based on clinical and exposure   history,    re-testing using an alternate molecular assay should be considered.    This test is Food and Drug Administration (FDA) approved. Performance   characteristics of this has been independently verified by [facility] Department of Pathology and Laboratory Medicine.             Sodium   128         Spec Grav UA 1.020           Specimen UA Urine, Clean Catch           Troponin I   0.076  Comment: The reference interval for Troponin I represents the 99th percentile   cutoff   for our facility and is consistent with 3rd generation assay   performance.           UROBILINOGEN UA Negative           WBC   7.12                 Significant Imaging:   US Lower Extremity Veins Bilateral   Final Result      No evidence of deep venous thrombosis in either lower extremity.         Electronically signed by: Soledad Oquendo   Date:    11/18/2024   Time:    17:19      X-Ray Abdomen Flat And Erect   Final Result      Moderate constipation         Electronically signed by: Jaya Howell MD   Date:    11/18/2024   Time:    15:47      X-Ray Chest AP Portable   Final Result      No acute process seen.         Electronically signed by: Jaya Howell MD   Date:    11/18/2024   Time:    15:48         Assessment/Plan:     * Acute renal failure superimposed on stage 4 chronic kidney disease  DALLAS is likely due to pre-renal azotemia due to intravascular volume depletion. Baseline creatinine is 1.3-1.6. Most recent creatinine and eGFR are listed below.  Recent Labs     11/18/24  1521   CREATININE 3.9*   EGFRNORACEVR 14*      Plan  - DALLAS is worsening. Will adjust treatment as follows:  Start IV fluids  - Avoid nephrotoxins and renally dose meds for GFR listed above  - Monitor urine output, serial BMP, and adjust therapy as needed      Pneumonia of right middle lobe due to infectious organism  Patient has a diagnosis of pneumonia. The cause of the pneumonia is suspected to be viral in etiology but organism is not known. The  pneumonia is stable. The patient has the following signs/symptoms of pneumonia: cough. The patient does not have a current oxygen requirement and the patient does not have a home oxygen requirement. I have reviewed the pertinent imaging. The following cultures have been collected: Blood cultures The culture results are listed below.     Current antimicrobial regimen consists of the antibiotics listed below. Will monitor patient closely and continue current treatment plan unchanged.    Antibiotics (From admission, onward)      Start     Stop Route Frequency Ordered    11/18/24 1602  cefTRIAXone injection 1 g         -- IV Every 24 hours (non-standard times) 11/18/24 1602    11/18/24 1602  azithromycin (ZITHROMAX) 500 mg in D5W 250 mL  IVPB (admixture device)         -- IV Every 24 hours (non-standard times) 11/18/24 1602            Microbiology Results (last 7 days)       Procedure Component Value Units Date/Time    Blood Culture #1 **CANNOT BE ORDERED STAT** [[id number]] Collected: 11/18/24 1646    Order Status: Sent Specimen: Blood from Peripheral, Antecubital, Right     Blood Culture #2 **CANNOT BE ORDERED STAT** [[id number]] Collected: 11/18/24 1646    Order Status: Sent Specimen: Blood from Peripheral, Antecubital, Left     Influenza A & B by Molecular [[id number]] Collected: 11/18/24 1543    Order Status: Completed Specimen: Nasal Swab Updated: 11/18/24 1615     Influenza A, Molecular Negative     Influenza B, Molecular Negative     Flu A & B Source Nasal swab            Arthritis of right hip  -consult Orthopedics  -status post right total hip replacement on 11/13/2024      Constipation  -MiraLax b.i.d.   -Colace   -Mag citrate x1  -subside enema x1      Hyperlipidemia  -continue home medication      Hypertensive disorder  Patients blood pressure range in the last 24 hours was: BP  Min: 78/52  Max: 116/78.The patient's inpatient anti-hypertensive regimen is listed below:  Current Antihypertensives  hydrALAZINE  injection 10 mg, Every 6 hours PRN, Intravenous    Plan  - BP is uncontrolled, will adjust as follows:  Patient was currently hypotensive  - hold home medications      VTE Risk Mitigation (From admission, onward)           Ordered     heparin (porcine) injection 5,000 Units  Every 8 hours         11/18/24 1805     IP VTE HIGH RISK PATIENT  Once         11/18/24 1805     Place sequential compression device  Until discontinued         11/18/24 1805                       On 11/18/2024, patient should be placed in hospital observation services under my care.             Ruthie Bowie MD  Department of Hospital Medicine  [facility] - Med Surg

## 2024-11-19 NOTE — PLAN OF CARE
PT EVAL complete. Required CGA for bed mobility, ambulated 60ft CGA using RW. Recommending low intensity therapy with 24/7 SPV and A upon d/c.

## 2024-11-19 NOTE — HOSPITAL COURSE
Patient admitted post right total hip replacement on 11/13/2024 due to constipation, decreased urine output, DALLAS, and cough with shortness a breath.  Patient was given Mag citrate on 11/18/2024 and had a large bowel movement.  His renal function began to improve and he began to urinate more frequently.  Patient was also seen by Orthopedics and his surgical site was evaluated and noted to be healing well.  Patient was also noted to have a beginning stages of pneumonia on chest x-ray in the right middle lobe for which the patient is on azithromycin and Rocephin, patient completed 3 days prior to discharge..  On hospital day 1 patient's blood pressure also improved likely due to IV fluids and improvement in his urination.  He was continued on p.o. MiraLax which resulted in 1 bowel movement per day during hospitalization.  His renal function returned to baseline with a creatinine of 1.6.  Patient was urinating well and he was counseled on monitoring for daily bowel movements to prevent urinary retention and renal failure.  He was advised to use Metamucil daily as he has a history of chronic constipation and to use MiraLax daily x3 days if constipated for more than 24 hours.  Patient was also counseled on following up with his primary care physician for hospital follow up and repeat lab work.  Patient's daughter was at bedside when discharge instructions were discussed.  Patient was also advised to follow up with Orthopedics as scheduled prior to admission.  Patient was stable for discharge at this time.

## 2024-11-19 NOTE — SUBJECTIVE & OBJECTIVE
Past Medical History:   Diagnosis Date    Allergic rhinitis     Anemia     Back pain     BPH (benign prostatic hyperplasia)     Cancer     skin cancer to neck, Dr. Graves    Cataract     Disorder of kidney and ureter     ED (erectile dysfunction)     OMARI (generalized anxiety disorder) 08/07/2023    Hiatal hernia     small    History of colon polyps     colonoscopy 11/2016    HLD (hyperlipidemia)     Hyperlipidemia     Hypertension     Lateral epicondylitis     Lumbar radiculopathy 01/26/2022    OA (osteoarthritis)     GUIDO (obstructive sleep apnea)     Pneumonia of right middle lobe due to infectious organism 11/18/2024    Polyneuropathy     Prostate cancer     Dr. Wong    TIA (transient ischemic attack)     Trouble in sleeping     Urge incontinence 03/29/2022       Past Surgical History:   Procedure Laterality Date    ANGIOGRAPHY OF UPPER EXTREMITY Left 07/05/2022    Procedure: Angiogram Extremity Bilateral;  Surgeon: Aparna Thompson MD;  Location: [facility] CATH LAB;  Service: Cardiology;  Laterality: Left;    ARTERIOGRAPHY OF AORTIC ROOT N/A 07/05/2022    Procedure: ARTERIOGRAM, AORTIC ROOT;  Surgeon: Aparna Thompson MD;  Location: [facility] CATH LAB;  Service: Cardiology;  Laterality: N/A;    BLOCK, NERVE, PERIPHERAL Right 9/12/2024    Procedure: right femoral/ obturator nerve block- with steroids;  Surgeon: Abel Haywood MD;  Location: [facility] PAIN MGT;  Service: Pain Management;  Laterality: Right;    CATARACT EXTRACTION W/  INTRAOCULAR LENS IMPLANT Right 02/21/2018    I-Stent    CATARACT EXTRACTION W/  INTRAOCULAR LENS IMPLANT Left 03/21/2018    I - Stent    CATHETERIZATION OF BOTH LEFT AND RIGHT HEART N/A 07/05/2022    Procedure: CATHETERIZATION, HEART, BOTH LEFT AND RIGHT;  Surgeon: Aparna Thompson MD;  Location: [facility] CATH LAB;  Service: Cardiology;  Laterality: N/A;  radial approach    COLONOSCOPY N/A 11/14/2016    Procedure: COLONOSCOPY;  Surgeon: Karuna Rodriguez MD;  Location: [facility] ENDO;  Service:  Endoscopy;  Laterality: N/A;    COLONOSCOPY N/A 09/22/2020    Procedure: COLONOSCOPY;  Surgeon: Chuy Fish MD;  Location: [facility] [place] ENDO;  Service: Endoscopy;  Laterality: N/A;    EPIDURAL STEROID INJECTION N/A 6/6/2024    Procedure: Lumbar L5/S1 IL IAN;  Surgeon: Abel Haywood MD;  Location: [facility] PAIN MGT;  Service: Pain Management;  Laterality: N/A;    EPIDURAL STEROID INJECTION INTO CERVICAL SPINE N/A 2/8/2024    Procedure: C5/6 IL Right paramedian approach IAN with RN IV sedation;  Surgeon: Abel Haywood MD;  Location: [facility] PAIN MGT;  Service: Pain Management;  Laterality: N/A;    EYE SURGERY      HEMORRHOID SURGERY      HIP ARTHROPLASTY Right 11/13/2024    Procedure: ARTHROPLASTY, HIP;  Surgeon: Denton Encarnacion MD;  Location: [facility] [place] OR;  Service: Orthopedics;  Laterality: Right;    I-STENT Right 02/21/2018    DR. REECE    INJECTION OF ANESTHETIC AGENT AROUND MEDIAL BRANCH NERVES INNERVATING CERVICAL FACET JOINT Bilateral 7/18/2023    Procedure: Bilateral C4-6 MBB with RN IV sedation (diagnostic, #1);  Surgeon: Abel Haywood MD;  Location: [facility] PAIN MGT;  Service: Pain Management;  Laterality: Bilateral;    KNEE ARTHROSCOPY W/ MENISCAL REPAIR Right 2015    Dr. Jain    PCIOL Right 02/21/2018    DR. REECE    PLANTAR FASCIA RELEASE      right    ROTATOR CUFF REPAIR Bilateral     bilateral    SELECTIVE INJECTION OF ANESTHETIC AGENT AROUND LUMBAR SPINAL NERVE ROOT BY TRANSFORAMINAL APPROACH Bilateral 01/26/2022    Procedure: Bilateral L4/5 TF IAN with RN IV sedation;  Surgeon: Mak Young MD;  Location: [facility] PAIN MGT;  Service: Pain Management;  Laterality: Bilateral;    SELECTIVE INJECTION OF ANESTHETIC AGENT AROUND LUMBAR SPINAL NERVE ROOT BY TRANSFORAMINAL APPROACH Bilateral 03/14/2022    Procedure: Bilateral L4/5 TF IAN with RN IV sedation;  Surgeon: Mak Young MD;  Location: [facility] PAIN MGT;  Service: Pain Management;  Laterality: Bilateral;    SELECTIVE INJECTION OF ANESTHETIC AGENT  AROUND LUMBAR SPINAL NERVE ROOT BY TRANSFORAMINAL APPROACH Bilateral 06/02/2022    Procedure: Bilateral L4/5 TF IAN - D2P Dr. Matthew CABRERA;  Surgeon: Abel Haywood MD;  Location: [facility] PAIN MGT;  Service: Pain Management;  Laterality: Bilateral;    SELECTIVE INJECTION OF ANESTHETIC AGENT AROUND LUMBAR SPINAL NERVE ROOT BY TRANSFORAMINAL APPROACH Bilateral 08/25/2022    Procedure: Bilateral L4/5 TF IAN;  Surgeon: Abel Haywood MD;  Location: [facility] PAIN MGT;  Service: Pain Management;  Laterality: Bilateral;    SELECTIVE INJECTION OF ANESTHETIC AGENT AROUND LUMBAR SPINAL NERVE ROOT BY TRANSFORAMINAL APPROACH Bilateral 6/29/2023    Procedure: Bilateral L4/5 TF IAN RN IV Sedation;  Surgeon: Abel Haywood MD;  Location: [facility] PAIN MGT;  Service: Pain Management;  Laterality: Bilateral;    SELECTIVE INJECTION OF ANESTHETIC AGENT AROUND LUMBAR SPINAL NERVE ROOT BY TRANSFORAMINAL APPROACH Bilateral 4/11/2024    Procedure: Bilateral L4/5 TF IAN;  Surgeon: Abel Haywood MD;  Location: [facility] PAIN MGT;  Service: Pain Management;  Laterality: Bilateral;    SHOULDER SURGERY Bilateral around 2000    Dr. Pepper.  rotator cuff surgeries    VASECTOMY         Review of patient's allergies indicates:   Allergen Reactions    Atarax [hydroxyzine hcl] Other (See Comments)     Raised blood pressure     Zyrtec [cetirizine] Other (See Comments)     Raised blood pressure     Gold sodium thiosulfate      Patch test positive    Meloxicam Rash     Other reaction(s): hypertension       No current facility-administered medications on file prior to encounter.     Current Outpatient Medications on File Prior to Encounter   Medication Sig    acetaminophen (TYLENOL) 650 MG TbSR Take 1 tablet (650 mg total) by mouth every 8 (eight) hours.    alfuzosin (UROXATRAL) 10 mg Tb24 Take 1 tablet (10 mg total) by mouth daily with breakfast.    atorvastatin (LIPITOR) 20 MG tablet Take 1 tablet (20 mg total) by mouth once daily.    citalopram  (CELEXA) 10 MG tablet Take 1 tablet (10 mg total) by mouth once daily. For anxiety    clopidogreL (PLAVIX) 75 mg tablet TAKE 1 TABLET EVERY DAY    finasteride (PROSCAR) 5 mg tablet Take 1 tablet (5 mg total) by mouth once daily.    furosemide (LASIX) 20 MG tablet Take 1 tablet (20 mg total) by mouth daily as needed (edema).    losartan (COZAAR) 50 MG tablet TAKE 1 TABLET TWICE DAILY    ondansetron (ZOFRAN-ODT) 4 MG TbDL Dissolve 2 tablets (8 mg total) by mouth every 8 (eight) hours as needed (Nausea).    polyethylene glycol (GLYCOLAX) 17 gram/dose powder Take 17 g by mouth once daily.    pregabalin (LYRICA) 75 MG capsule Take 1 capsule (75 mg total) by mouth 2 (two) times daily.    vitamin D (VITAMIN D3) 1000 units Tab Take 1,000 Units by mouth once daily.    [DISCONTINUED] oxyCODONE-acetaminophen (PERCOCET)  mg per tablet Take 1 tablet by mouth every 6 (six) hours as needed for Pain.    albuterol (VENTOLIN HFA) 90 mcg/actuation inhaler Inhale 2 puffs into the lungs every 6 (six) hours as needed for Wheezing. Rescue    azelastine (ASTELIN) 137 mcg (0.1 %) nasal spray 1 spray (137 mcg total) by Nasal route 2 (two) times daily.    pantoprazole (PROTONIX) 40 MG tablet TAKE 1 TABLET EVERY DAY (Patient taking differently: Take 40 mg by mouth once daily.)    sildenafiL (VIAGRA) 100 MG tablet Take 1 po 45 minutes before intercourse    [DISCONTINUED] amLODIPine (NORVASC) 5 MG tablet TAKE 1 TABLET TWICE DAILY    [DISCONTINUED] cyclobenzaprine (FLEXERIL) 5 MG tablet     [DISCONTINUED] HYDROcodone-acetaminophen (NORCO)  mg per tablet Take 1 tablet by mouth every 6 (six) hours as needed for Pain.     Family History       Problem Relation (Age of Onset)    Arthritis Mother    Cancer Father, Brother    Cataracts Sister, Brother, Sister    Lung cancer Father    Stroke Sister          Tobacco Use    Smoking status: Former     Current packs/day: 0.00     Average packs/day: 3.0 packs/day for 35.0 years (104.9 ttl pk-yrs)      Types: Cigarettes     Start date: 1960     Quit date: 1985     Years since quittin.3     Passive exposure: Past    Smokeless tobacco: Never   Substance and Sexual Activity    Alcohol use: Yes     Alcohol/week: 3.0 standard drinks of alcohol     Types: 3 Cans of beer per week     Comment: socially    Drug use: No    Sexual activity: Yes     Partners: Female     Birth control/protection: None     Review of Systems   Respiratory:  Positive for wheezing.    Gastrointestinal:  Positive for constipation.   Musculoskeletal:  Positive for arthralgias and joint swelling.     Objective:     Vital Signs (Most Recent):  Temp: 98 °F (36.7 °C) (24)  Pulse: 69 (24)  Resp: 19 (24)  BP: (!) 108/54 (24)  SpO2: 99 % (24) Vital Signs (24h Range):  Temp:  [97.9 °F (36.6 °C)-98 °F (36.7 °C)] 98 °F (36.7 °C)  Pulse:  [] 69  Resp:  [16-21] 19  SpO2:  [95 %-99 %] 99 %  BP: ()/(51-78) 108/54     Weight: 104.3 kg (230 lb)  Body mass index is 33 kg/m².     Physical Exam  Vitals and nursing note reviewed.   Constitutional:       Appearance: Normal appearance.   HENT:      Head: Normocephalic and atraumatic.      Nose: Nose normal.      Mouth/Throat:      Mouth: Mucous membranes are moist.   Eyes:      Extraocular Movements: Extraocular movements intact.      Conjunctiva/sclera: Conjunctivae normal.   Cardiovascular:      Rate and Rhythm: Normal rate. Rhythm irregular.      Pulses: Normal pulses.      Heart sounds: Normal heart sounds.   Pulmonary:      Effort: Pulmonary effort is normal.      Breath sounds: Normal breath sounds.   Abdominal:      General: Abdomen is flat. Bowel sounds are normal.      Palpations: Abdomen is soft.   Musculoskeletal:         General: Normal range of motion.      Cervical back: Normal range of motion and neck supple.      Comments: Right hip dressing noted to have dried blood and jose martin drain in place   Skin:     General: Skin is  warm.      Capillary Refill: Capillary refill takes less than 2 seconds.   Neurological:      Mental Status: He is alert and oriented to person, place, and time. Mental status is at baseline.   Psychiatric:         Mood and Affect: Mood normal.         Behavior: Behavior normal.                Significant Labs: All pertinent labs within the past 24 hours have been reviewed.  Recent Lab Results         11/18/24  1543   11/18/24  1521   11/18/24  1519        Influenza A, Molecular Negative           Influenza B, Molecular Negative           Procalcitonin   0.67  Comment: A concentration < 0.25 ng/mL represents a low risk of bacterial   infection.  Procalcitonin may not be accurate among patients with localized   infection, recent trauma or major surgery, immunosuppressed state,   invasive fungal infection, renal dysfunction. Decisions regarding   initiation or continuation of antibiotic therapy should not be based   solely on procalcitonin levels.           Albumin   3.0         ALP   124         ALT   28         Anion Gap   16         Appearance, UA Clear           AST   28         Baso #   0.03         Basophil %   0.4         Bilirubin (UA) Negative           BILIRUBIN TOTAL   0.9  Comment: For infants and newborns, interpretation of results should be based  on gestational age, weight and in agreement with clinical  observations.    Premature Infant recommended reference ranges:  Up to 24 hours.............<8.0 mg/dL  Up to 48 hours............<12.0 mg/dL  3-5 days..................<15.0 mg/dL  6-29 days.................<15.0 mg/dL           BNP   219  Comment: Values of less than 100 pg/ml are consistent with non-CHF populations.         BUN   90         Calcium   8.3         Chloride   96         CO2   16         Color, UA Yellow           CPK   413         Creatinine   3.9         Differential Method   Automated         eGFR   14         Eos #   0.1         Eos %   1.8         Flu A & B Source Nasal swab            Glucose   115         Glucose, UA Negative           Gran # (ANC)   5.5         Gran %   77.0         Hematocrit   26.7         Hemoglobin   8.9         Immature Grans (Abs)   0.16  Comment: Mild elevation in immature granulocytes is non specific and   can be seen in a variety of conditions including stress response,   acute inflammation, trauma and pregnancy. Correlation with other   laboratory and clinical findings is essential.           Immature Granulocytes   2.2         Ketones, UA Negative           Lactic Acid Level     2.3  Comment: Falsely low lactic acid results can be found in samples   containing >=13.0 mg/dL total bilirubin and/or >=3.5 mg/dL   direct bilirubin.         Leukocyte Esterase, UA Negative           Lymph #   0.4         Lymph %   5.5         Magnesium    2.3         MCH   31.8         MCHC   33.3         MCV   95         Mono #   0.9         Mono %   13.1         MPV   8.7         NITRITE UA Negative           nRBC   0         Blood, UA Negative           pH, UA 5.0           Platelet Count   142         Potassium   4.9         PROTEIN TOTAL   6.5         Protein, UA Negative  Comment: Recommend a 24 hour urine protein or a urine   protein/creatinine ratio if globulin induced proteinuria is  clinically suspected.             RBC   2.80         RDW   12.4         SARS-CoV-2 RNA, Amplification, Qual Negative  Comment: This test utilizes isothermal nucleic acid amplification technology   to   detect the SARS-CoV-2 RdRp nucleic acid segment. The analytical   sensitivity   (limit of detection) is 500 copies/swab.    A POSITIVE result is indicative of the presence of SARS-CoV-2 RNA;   clinical   correlation with patient history and other diagnostic information is   necessary to determine patient infection status.    A NEGATIVE result means that SARS-CoV-2 nucleic acids are not present   above   the limit of detection. A NEGATIVE result should be treated as   presumptive.   It does not rule  out the possibility of COVID-19 and should not be   the sole   basis for treatment decisions.    If COVID-19 is strongly suspected based on clinical and exposure   history,   re-testing using an alternate molecular assay should be considered.    This test is Food and Drug Administration (FDA) approved. Performance   characteristics of this has been independently verified by [facility] Department of Pathology and Laboratory Medicine.             Sodium   128         Spec Grav UA 1.020           Specimen UA Urine, Clean Catch           Troponin I   0.076  Comment: The reference interval for Troponin I represents the 99th percentile   cutoff   for our facility and is consistent with 3rd generation assay   performance.           UROBILINOGEN UA Negative           WBC   7.12                 Significant Imaging:   US Lower Extremity Veins Bilateral   Final Result      No evidence of deep venous thrombosis in either lower extremity.         Electronically signed by: Soledad Oquendo   Date:    11/18/2024   Time:    17:19      X-Ray Abdomen Flat And Erect   Final Result      Moderate constipation         Electronically signed by: Jaya Howell MD   Date:    11/18/2024   Time:    15:47      X-Ray Chest AP Portable   Final Result      No acute process seen.         Electronically signed by: Jaya Howell MD   Date:    11/18/2024   Time:    15:48

## 2024-11-19 NOTE — ASSESSMENT & PLAN NOTE
DALLAS is likely due to pre-renal azotemia due to intravascular volume depletion. Baseline creatinine is 1.3-1.6. Most recent creatinine and eGFR are listed below.  Recent Labs     11/18/24  1521   CREATININE 3.9*   EGFRNORACEVR 14*      Plan  - D[place]AS is worsening. Will adjust treatment as follows:  Start IV fluids  - Avoid nephrotoxins and renally dose meds for GFR listed above  - Monitor urine output, serial BMP, and adjust therapy as needed

## 2024-11-19 NOTE — PLAN OF CARE
Plan of care and orders reviewed with patient. Medications reviewed with patient. Safety measures inplace including bed locked in lowest position call light in reach and side rails for mobility. Chart and orders reviewed.     Problem: Adult Inpatient Plan of Care  Goal: Plan of Care Review  Outcome: Progressing  Flowsheets (Taken 11/19/2024 9673)  Plan of Care Reviewed With: patient  Goal: Patient-Specific Goal (Individualized)  Outcome: Progressing  Goal: Absence of Hospital-Acquired Illness or Injury  Outcome: Progressing  Goal: Optimal Comfort and Wellbeing  Outcome: Progressing  Goal: Readiness for Transition of Care  Outcome: Progressing     Problem: Acute Kidney Injury/Impairment  Goal: Fluid and Electrolyte Balance  Outcome: Progressing  Goal: Improved Oral Intake  Outcome: Progressing  Goal: Effective Renal Function  Outcome: Progressing     Problem: Pneumonia  Goal: Fluid Balance  Outcome: Progressing  Goal: Resolution of Infection Signs and Symptoms  Outcome: Progressing  Goal: Effective Oxygenation and Ventilation  Outcome: Progressing     Problem: Wound  Goal: Optimal Coping  Outcome: Progressing  Goal: Optimal Functional Ability  Outcome: Progressing  Goal: Absence of Infection Signs and Symptoms  Outcome: Progressing  Goal: Improved Oral Intake  Outcome: Progressing  Goal: Optimal Pain Control and Function  Outcome: Progressing  Goal: Skin Health and Integrity  Outcome: Progressing  Goal: Optimal Wound Healing  Outcome: Progressing     Problem: Skin Injury Risk Increased  Goal: Skin Health and Integrity  Outcome: Progressing     Problem: Fall Injury Risk  Goal: Absence of Fall and Fall-Related Injury  Outcome: Progressing      Detail Level: Zone Samples Given: Hanna Liz Render In Strict Bullet Format?: No Initiate Treatment: Yuly Minaya Samples Given: Moira Novak

## 2024-11-19 NOTE — PLAN OF CARE
[facility] - Med Surg  Discharge Assessment    Primary Care Provider: Valery Ozuna MD     Discharge Assessment (most recent)       BRIEF DISCHARGE ASSESSMENT - 11/19/24 1048          Discharge Planning    Assessment Type Discharge Planning Brief Assessment     Resource/Environmental Concerns none     Support Systems Spouse/significant other;Children     Assistance Needed Independent     Equipment Currently Used at Home walker, rolling     Current Living Arrangements home     Patient/Family Anticipates Transition to home with help/services     Patient/Family Anticipated Services at Transition home health care     DME Needed Upon Discharge  none     Discharge Plan A Home Health                   Anticipated DC dispo: Home   Prior Level of Function: Independent   People in home:  N/A     Comments:  CM met with patient at bedside to introduce role and discuss discharge planning. Family  will be help at home and can provide transport at time of discharge. CM discharge needs depends on hospital progress. CM will continue following to assist with other needs.

## 2024-11-20 LAB
ANION GAP SERPL CALC-SCNC: 9 MMOL/L (ref 8–16)
BASOPHILS # BLD AUTO: 0.02 K/UL (ref 0–0.2)
BASOPHILS NFR BLD: 0.5 % (ref 0–1.9)
BUN SERPL-MCNC: 78 MG/DL (ref 8–23)
CALCIUM SERPL-MCNC: 7.7 MG/DL (ref 8.7–10.5)
CHLORIDE SERPL-SCNC: 104 MMOL/L (ref 95–110)
CK SERPL-CCNC: 205 U/L (ref 20–200)
CO2 SERPL-SCNC: 20 MMOL/L (ref 23–29)
CREAT SERPL-MCNC: 2.5 MG/DL (ref 0.5–1.4)
DIFFERENTIAL METHOD BLD: ABNORMAL
EOSINOPHIL # BLD AUTO: 0.2 K/UL (ref 0–0.5)
EOSINOPHIL NFR BLD: 5 % (ref 0–8)
ERYTHROCYTE [DISTWIDTH] IN BLOOD BY AUTOMATED COUNT: 12.6 % (ref 11.5–14.5)
EST. GFR  (NO RACE VARIABLE): 24 ML/MIN/1.73 M^2
GLUCOSE SERPL-MCNC: 100 MG/DL (ref 70–110)
HCT VFR BLD AUTO: 22.2 % (ref 40–54)
HGB BLD-MCNC: 7.2 G/DL (ref 14–18)
IMM GRANULOCYTES # BLD AUTO: 0.17 K/UL (ref 0–0.04)
IMM GRANULOCYTES NFR BLD AUTO: 3.8 % (ref 0–0.5)
LYMPHOCYTES # BLD AUTO: 0.5 K/UL (ref 1–4.8)
LYMPHOCYTES NFR BLD: 11 % (ref 18–48)
MCH RBC QN AUTO: 31.3 PG (ref 27–31)
MCHC RBC AUTO-ENTMCNC: 32.4 G/DL (ref 32–36)
MCV RBC AUTO: 97 FL (ref 82–98)
MONOCYTES # BLD AUTO: 0.7 K/UL (ref 0.3–1)
MONOCYTES NFR BLD: 16.2 % (ref 4–15)
NEUTROPHILS # BLD AUTO: 2.8 K/UL (ref 1.8–7.7)
NEUTROPHILS NFR BLD: 63.5 % (ref 38–73)
NRBC BLD-RTO: 0 /100 WBC
PLATELET # BLD AUTO: 132 K/UL (ref 150–450)
PMV BLD AUTO: 8.3 FL (ref 9.2–12.9)
POTASSIUM SERPL-SCNC: 4.9 MMOL/L (ref 3.5–5.1)
RBC # BLD AUTO: 2.3 M/UL (ref 4.6–6.2)
SODIUM SERPL-SCNC: 133 MMOL/L (ref 136–145)
WBC # BLD AUTO: 4.44 K/UL (ref 3.9–12.7)

## 2024-11-20 PROCEDURE — 25000003 PHARM REV CODE 250: Mod: HCNC | Performed by: FAMILY MEDICINE

## 2024-11-20 PROCEDURE — 97530 THERAPEUTIC ACTIVITIES: CPT | Mod: HCNC

## 2024-11-20 PROCEDURE — 99900035 HC TECH TIME PER 15 MIN (STAT): Mod: HCNC

## 2024-11-20 PROCEDURE — 97166 OT EVAL MOD COMPLEX 45 MIN: CPT | Mod: HCNC

## 2024-11-20 PROCEDURE — 63600175 PHARM REV CODE 636 W HCPCS: Mod: HCNC | Performed by: EMERGENCY MEDICINE

## 2024-11-20 PROCEDURE — 11000001 HC ACUTE MED/SURG PRIVATE ROOM: Mod: HCNC

## 2024-11-20 PROCEDURE — 94799 UNLISTED PULMONARY SVC/PX: CPT | Mod: HCNC

## 2024-11-20 PROCEDURE — 63600175 PHARM REV CODE 636 W HCPCS: Mod: HCNC | Performed by: FAMILY MEDICINE

## 2024-11-20 PROCEDURE — 99024 POSTOP FOLLOW-UP VISIT: CPT | Mod: HCNC,,, | Performed by: PHYSICIAN ASSISTANT

## 2024-11-20 PROCEDURE — 85025 COMPLETE CBC W/AUTO DIFF WBC: CPT | Mod: HCNC | Performed by: FAMILY MEDICINE

## 2024-11-20 PROCEDURE — 82550 ASSAY OF CK (CPK): CPT | Mod: HCNC | Performed by: FAMILY MEDICINE

## 2024-11-20 PROCEDURE — 80048 BASIC METABOLIC PNL TOTAL CA: CPT | Mod: HCNC | Performed by: FAMILY MEDICINE

## 2024-11-20 PROCEDURE — 36415 COLL VENOUS BLD VENIPUNCTURE: CPT | Mod: HCNC | Performed by: FAMILY MEDICINE

## 2024-11-20 RX ADMIN — SODIUM CHLORIDE: 9 INJECTION, SOLUTION INTRAVENOUS at 07:11

## 2024-11-20 RX ADMIN — POLYETHYLENE GLYCOL 3350 17 G: 17 POWDER, FOR SOLUTION ORAL at 10:11

## 2024-11-20 RX ADMIN — SENNOSIDES AND DOCUSATE SODIUM 1 TABLET: 50; 8.6 TABLET ORAL at 10:11

## 2024-11-20 RX ADMIN — AZITHROMYCIN MONOHYDRATE 500 MG: 500 INJECTION, POWDER, LYOPHILIZED, FOR SOLUTION INTRAVENOUS at 05:11

## 2024-11-20 RX ADMIN — HEPARIN SODIUM 5000 UNITS: 5000 INJECTION INTRAVENOUS; SUBCUTANEOUS at 09:11

## 2024-11-20 RX ADMIN — HEPARIN SODIUM 5000 UNITS: 5000 INJECTION INTRAVENOUS; SUBCUTANEOUS at 03:11

## 2024-11-20 RX ADMIN — FINASTERIDE 5 MG: 5 TABLET, FILM COATED ORAL at 10:11

## 2024-11-20 RX ADMIN — HEPARIN SODIUM 5000 UNITS: 5000 INJECTION INTRAVENOUS; SUBCUTANEOUS at 06:11

## 2024-11-20 RX ADMIN — SODIUM CHLORIDE: 9 INJECTION, SOLUTION INTRAVENOUS at 06:11

## 2024-11-20 RX ADMIN — PANTOPRAZOLE SODIUM 40 MG: 40 TABLET, DELAYED RELEASE ORAL at 10:11

## 2024-11-20 RX ADMIN — CLOPIDOGREL BISULFATE 75 MG: 75 TABLET ORAL at 10:11

## 2024-11-20 RX ADMIN — ATORVASTATIN CALCIUM 20 MG: 10 TABLET, FILM COATED ORAL at 10:11

## 2024-11-20 RX ADMIN — CITALOPRAM HYDROBROMIDE 10 MG: 10 TABLET ORAL at 10:11

## 2024-11-20 RX ADMIN — CEFTRIAXONE 1 G: 1 INJECTION, POWDER, FOR SOLUTION INTRAMUSCULAR; INTRAVENOUS at 03:11

## 2024-11-20 NOTE — ASSESSMENT & PLAN NOTE
D[place]AS is likely due to pre-renal azotemia due to intravascular volume depletion. Baseline creatinine is 1.3-1.6. Most recent creatinine and eGFR are listed below.  Recent Labs     11/18/24  1521 11/19/24  0632 11/20/24  0547   CREATININE 3.9* 3.3* 2.5*   EGFRNORACEVR 14* 17* 24*        Plan  - D[place] is improving. Will adjust treatment as follows:  Continue IV fluids  - Avoid nephrotoxins and renally dose meds for GFR listed above  - Monitor urine output-improving, serial BMP, and adjust therapy as needed  -improving but not resolved

## 2024-11-20 NOTE — SUBJECTIVE & OBJECTIVE
Interval History:  Follow up for constipation.  Patient doing better this morning.  He had an additional bowel movement yesterday and was encouraged to continue to use MiraLax.  He is urinating well and renal function is improving.  Plan for discharge tomorrow.    Review of Systems  Objective:     Vital Signs (Most Recent):  Temp: 97.1 °F (36.2 °C) (11/20/24 1621)  Pulse: 64 (11/20/24 1621)  Resp: 15 (11/20/24 1621)  BP: (!) 149/64 (11/20/24 1621)  SpO2: 95 % (11/20/24 1621) Vital Signs (24h Range):  Temp:  [97.1 °F (36.2 °C)-98.7 °F (37.1 °C)] 97.1 °F (36.2 °C)  Pulse:  [56-92] 64  Resp:  [15-18] 15  SpO2:  [91 %-97 %] 95 %  BP: (103-149)/(55-64) 149/64     Weight: 107.2 kg (236 lb 5.3 oz)  Body mass index is 33.43 kg/m².    Intake/Output Summary (Last 24 hours) at 11/20/2024 1644  Last data filed at 11/20/2024 0605  Gross per 24 hour   Intake 240 ml   Output 3 ml   Net 237 ml         Physical Exam  Vitals and nursing note reviewed.   Constitutional:       Appearance: Normal appearance.   HENT:      Head: Normocephalic and atraumatic.      Nose: Nose normal.      Mouth/Throat:      Mouth: Mucous membranes are moist.   Eyes:      Extraocular Movements: Extraocular movements intact.      Conjunctiva/sclera: Conjunctivae normal.   Cardiovascular:      Rate and Rhythm: Normal rate and regular rhythm.      Pulses: Normal pulses.      Heart sounds: Normal heart sounds.   Pulmonary:      Effort: Pulmonary effort is normal.      Breath sounds: Normal breath sounds.   Abdominal:      General: Abdomen is flat. Bowel sounds are normal.      Palpations: Abdomen is soft.   Musculoskeletal:         General: Normal range of motion.      Cervical back: Normal range of motion and neck supple.   Skin:     General: Skin is warm.      Capillary Refill: Capillary refill takes less than 2 seconds.   Neurological:      Mental Status: He is alert and oriented to person, place, and time. Mental status is at baseline.   Psychiatric:          Mood and Affect: Mood normal.         Behavior: Behavior normal.             Significant Labs: All pertinent labs within the past 24 hours have been reviewed.  Recent Lab Results         11/20/24  0547        Anion Gap 9       Baso # 0.02       Basophil % 0.5       BUN 78       Calcium 7.7       Chloride 104       CO2 20              Creatinine 2.5       Differential Method Automated       eGFR 24       Eos # 0.2       Eos % 5.0       Glucose 100       Gran # (ANC) 2.8       Gran % 63.5       Hematocrit 22.2       Hemoglobin 7.2       Immature Grans (Abs) 0.17  Comment: Mild elevation in immature granulocytes is non specific and   can be seen in a variety of conditions including stress response,   acute inflammation, trauma and pregnancy. Correlation with other   laboratory and clinical findings is essential.         Immature Granulocytes 3.8       Lymph # 0.5       Lymph % 11.0       MCH 31.3       MCHC 32.4       MCV 97       Mono # 0.7       Mono % 16.2       MPV 8.3       nRBC 0       Platelet Count 132       Potassium 4.9       RBC 2.30       RDW 12.6       Sodium 133       WBC 4.44               Significant Imaging:   X-Ray Abdomen AP 1 View   Final Result      No acute abnormality.         Electronically signed by: Jaya Howell MD   Date:    11/19/2024   Time:    10:04      US Lower Extremity Veins Bilateral   Final Result      No evidence of deep venous thrombosis in either lower extremity.         Electronically signed by: Soledad Oquendo   Date:    11/18/2024   Time:    17:19      X-Ray Abdomen Flat And Erect   Final Result      Moderate constipation         Electronically signed by: Jaya Howell MD   Date:    11/18/2024   Time:    15:47      X-Ray Chest AP Portable   Final Result      No acute process seen.         Electronically signed by: Jaya Howell MD   Date:    11/18/2024   Time:    15:48

## 2024-11-20 NOTE — NURSING
Per Trell KEBEDE, dressings may be changed if they become saturated or begin to peel, otherwise leave in place until clinic follow-up next week.

## 2024-11-20 NOTE — ASSESSMENT & PLAN NOTE
Patients blood pressure range in the last 24 hours was: BP  Min: 103/56  Max: 149/64.The patient's inpatient anti-hypertensive regimen is listed below:  Current Antihypertensives  hydrALAZINE injection 10 mg, Every 6 hours PRN, Intravenous    Plan  - BP is uncontrolled, will adjust as follows:  Patient was currently hypotensive  - hold home medications

## 2024-11-20 NOTE — PT/OT/SLP EVAL
Occupational Therapy Evaluation and Treatment    Name: Ezequiel Hughes  MRN: [id number]  Admitting Diagnosis: Acute renal failure superimposed on stage 4 chronic kidney disease  Recent Surgery: * No surgery found *      Recommendations:     Discharge Recommendations: Low Intensity Therapy  Level of Assistance Recommended: 24 hours light assistance  Discharge Equipment Recommendations: cane, straight, walker, rolling, shower chair, bedside commode  Barriers to discharge:      Assessment:     Ezequiel Hughes is a 84 y.o. male with a medical diagnosis of Acute renal failure superimposed on stage 4 chronic kidney disease. He presents with performance deficits affecting function including weakness, impaired endurance, decreased coordination, impaired self care skills, impaired functional mobility, decreased lower extremity function, gait instability, decreased safety awareness, impaired balance.     Rehab Prognosis: Good; patient would benefit from acute OT services to address these deficits and reach maximum level of function.    Plan:     Patient to be seen 2 x/week to address the above listed problems via self-care/home management, therapeutic activities, therapeutic exercises  Plan of Care Expires: 12/04/24  Plan of Care Reviewed with: patient, family    Subjective   DEBILITY AND GENERALIZED WEAKNESS  Patient Comments/Goals: RETURN HOME WITH HOME HEALTH THERAPY  Pain/Comfort:  Pain Rating 1: 0/10    Patients cultural, spiritual, Gnosticism conflicts given the current situation:      Social History:  Living Environment: Patient lives alone in a single story home with number of outside stair(s): 6 (B)RAIL  Prior Level of Function: Prior to admission, patient was modified independent  Roles and Routines: Patient was driving and not working prior to admission.  Equipment Used at Home:    DME owned (not currently used): none  Assistance Upon Discharge: family    Objective:     Communicated with NURSE AND EPIC CHART REVIEW prior to  session. Patient found HOB elevated with peripheral IV, telemetry, wound vac, bed alarm upon OT entry to room.    General Precautions: Standard, fall   Orthopedic Precautions: RLE posterior precautions, RLE weight bearing as tolerated   Braces: N/A    Respiratory Status: Room air    Occupational Performance        Bed Mobility:   Rolling/Turning to Left with contact guard assistance  Rolling/Turning to Right with contact guard assistance  Scooting to HOB in supine: contact guard assistance and WITH BED RAILS  Scooting anteriorly to EOB to have both feet planted on floor: contact guard assistance  Supine to sit from right side of bed with minimum assistance and WITH LEG LIFT  Supine to sit from left side of bed with minimum assistance and WITH LEG LIFT    Functional Mobility/Transfers:  Sit <> Stand Transfer with minimum assistance with rolling walker  Functional Mobility: PT AMBULATED 20 FEET WITH MIN A AND ROLLING WALKER    Activities of Daily Living:  Lower Body Dressing: maximal assistance and WITH MAUREEN/ DOFF SOCKS  Toileting: stand by assistance    Cognitive/Visual Perceptual:  Cognitive/Psychosocial Skills:    -     Oriented to: Person, Place, Time, and Situation  -     Follows Commands/attention: Follows multistep  commands  -     Communication: clear/fluent  -     Memory: No Deficits noted  -     Safety awareness/insight to disability: impaired    Physical Exam:  Upper Extremity Range of Motion:     -       Right Upper Extremity: WFL  -       Left Upper Extremity: WFL  Upper Extremity Strength:    -       Right Upper Extremity: MMT: 4/5 GROSSLY  -       Left Upper Extremity: MMT: 4/5 GROSSLY   Strength:    -       Right Upper Extremity: WFL  -       Left Upper Extremity: WFL    AMPAC 6 Click ADL:  AMPAC Total Score: 16    Treatment & Education:  Therapist provided facilitation and instruction of proper body mechanics, energy conservation, and fall prevention strategies during tasks listed above  Patient  educated on role of OT, POC, and goals for therapy  Patient educated on importance of OOB activities with staff member assistance and sitting OOB majority of the day    Patient clear to stand pivot transfer with RN/PCT, assist xSTEP<PIVOT WITH ROLLING WALKER .    Patient left HOB elevated with all lines intact, call button in reach, RN notified, and family present  .    GOALS:   Multidisciplinary Problems       Occupational Therapy Goals          Problem: Occupational Therapy    Goal Priority Disciplines Outcome Interventions   Occupational Therapy Goal     OT, PT/OT Progressing    Description: O.T. GOAL TO BE MET BY 12-4-24  PT WILL TOLERATE 1 SET X 15 REPS B UE ROM EXERCISE MOD (I) WITH TOILET ING  MOD (I) WITH AE WITH LE DRESSING                         History:     Past Medical History:   Diagnosis Date    Allergic rhinitis     Anemia     Back pain     BPH (benign prostatic hyperplasia)     Cancer     skin cancer to neck, Dr. Graves    Cataract     Disorder of kidney and ureter     ED (erectile dysfunction)     OMARI (generalized anxiety disorder) 08/07/2023    Hiatal hernia     small    History of colon polyps     colonoscopy 11/2016    HLD (hyperlipidemia)     Hyperlipidemia     Hypertension     Lateral epicondylitis     Lumbar radiculopathy 01/26/2022    OA (osteoarthritis)     GUIDO (obstructive sleep apnea)     Pneumonia of right middle lobe due to infectious organism 11/18/2024    Polyneuropathy     Prostate cancer     Dr. Wong    TIA (transient ischemic attack)     Trouble in sleeping     Urge incontinence 03/29/2022         Past Surgical History:   Procedure Laterality Date    ANGIOGRAPHY OF UPPER EXTREMITY Left 07/05/2022    Procedure: Angiogram Extremity Bilateral;  Surgeon: Aparna Thompson MD;  Location: [facility] CATH LAB;  Service: Cardiology;  Laterality: Left;    ARTERIOGRAPHY OF AORTIC ROOT N/A 07/05/2022    Procedure: ARTERIOGRAM, AORTIC ROOT;  Surgeon: Aparna Thompson MD;  Location: [facility] CATH  LAB;  Service: Cardiology;  Laterality: N/A;    BLOCK, NERVE, PERIPHERAL Right 9/12/2024    Procedure: right femoral/ obturator nerve block- with steroids;  Surgeon: Abel Haywood MD;  Location: [facility] PAIN MGT;  Service: Pain Management;  Laterality: Right;    CATARACT EXTRACTION W/  INTRAOCULAR LENS IMPLANT Right 02/21/2018    I-Stent    CATARACT EXTRACTION W/  INTRAOCULAR LENS IMPLANT Left 03/21/2018    I - Stent    CATHETERIZATION OF BOTH LEFT AND RIGHT HEART N/A 07/05/2022    Procedure: CATHETERIZATION, HEART, BOTH LEFT AND RIGHT;  Surgeon: Aparna Thompson MD;  Location: [facility] CATH LAB;  Service: Cardiology;  Laterality: N/A;  radial approach    COLONOSCOPY N/A 11/14/2016    Procedure: COLONOSCOPY;  Surgeon: Karuna Rodriguez MD;  Location: [facility] ENDO;  Service: Endoscopy;  Laterality: N/A;    COLONOSCOPY N/A 09/22/2020    Procedure: COLONOSCOPY;  Surgeon: Chuy Fish MD;  Location: [facility] ENDO;  Service: Endoscopy;  Laterality: N/A;    EPIDURAL STEROID INJECTION N/A 6/6/2024    Procedure: Lumbar L5/S1 IL IAN;  Surgeon: Abel Haywood MD;  Location: [facility] PAIN MGT;  Service: Pain Management;  Laterality: N/A;    EPIDURAL STEROID INJECTION INTO CERVICAL SPINE N/A 2/8/2024    Procedure: C5/6 IL Right paramedian approach IAN with RN IV sedation;  Surgeon: Abel Haywood MD;  Location: [facility] PAIN MGT;  Service: Pain Management;  Laterality: N/A;    EYE SURGERY      HEMORRHOID SURGERY      HIP ARTHROPLASTY Right 11/13/2024    Procedure: ARTHROPLASTY, HIP;  Surgeon: Denton Encarnacion MD;  Location: [facility] OR;  Service: Orthopedics;  Laterality: Right;    I-STENT Right 02/21/2018    DR. REECE    INJECTION OF ANESTHETIC AGENT AROUND MEDIAL BRANCH NERVES INNERVATING CERVICAL FACET JOINT Bilateral 7/18/2023    Procedure: Bilateral C4-6 MBB with RN IV sedation (diagnostic, #1);  Surgeon: Abel Haywood MD;  [facility];  Service: Pain Management;  Laterality: Bilateral;    KNEE ARTHROSCOPY  W/ MENISCAL REPAIR Right 2015    Dr. Jain    PCIOL Right 02/21/2018    DR. REECE    PLANTAR FASCIA RELEASE      right    ROTATOR CUFF REPAIR Bilateral     bilateral    SELECTIVE INJECTION OF ANESTHETIC AGENT AROUND LUMBAR SPINAL NERVE ROOT BY TRANSFORAMINAL APPROACH Bilateral 01/26/2022    Procedure: Bilateral L4/5 TF IAN with RN IV sedation;  Surgeon: Mak Young MD;  Location: [facility] PAIN MGT;  Service: Pain Management;  Laterality: Bilateral;    SELECTIVE INJECTION OF ANESTHETIC AGENT AROUND LUMBAR SPINAL NERVE ROOT BY TRANSFORAMINAL APPROACH Bilateral 03/14/2022    Procedure: Bilateral L4/5 TF IAN with RN IV sedation;  Surgeon: Mak Young MD;  Location: [facility] PAIN MGT;  Service: Pain Management;  Laterality: Bilateral;    SELECTIVE INJECTION OF ANESTHETIC AGENT AROUND LUMBAR SPINAL NERVE ROOT BY TRANSFORAMINAL APPROACH Bilateral 06/02/2022    Procedure: Bilateral L4/5 TF IAN - [place]. [place] [facility]  [place]geon: Abel Haywood MD;  Location: [facility] PAIN MGT;  Service: Pain Management;  Laterality: Bilateral;    SELECTIVE INJECTION OF ANESTHETIC AGENT AROUND LUMBAR SPINAL NERVE ROOT BY TRANSFORAMINAL APPROACH Bilateral 08/25/2022    Procedure: Bilateral L4/5 TF IAN;  Surgeon: Abel Haywood MD;  Location: [facility] PAIN MGT;  Service: Pain Management;  Laterality: Bilateral;    SELECTIVE INJECTION OF ANESTHETIC AGENT AROUND LUMBAR SPINAL NERVE ROOT BY TRANSFORAMINAL APPROACH Bilateral 6/29/2023    Procedure: Bilateral L4/5 TF IAN RN IV Sedation;  Surgeon: Abel Haywood MD;  Location: [facility] PAIN MGT;  Service: Pain Management;  Laterality: Bilateral;    SELECTIVE INJECTION OF ANESTHETIC AGENT AROUND LUMBAR SPINAL NERVE ROOT BY TRANSFORAMINAL APPROACH Bilateral 4/11/2024    Procedure: Bilateral L4/5 TF IAN;  Surgeon: Abel Haywood MD;  Location: [facility] PAIN MGT;  Service: Pain Management;  Laterality: Bilateral;    SHOULDER SURGERY Bilateral around 2000    Dr. Pepper.  rotator cuff surgeries    VASECTOMY          Time Tracking:     OT Date of Treatment: 11/20/24  OT Start Time: 1520  OT Stop Time: 1538  OT Total Time (min): 18 min    Billable Minutes: Evaluation 8 MINUTES and Therapeutic Activity 10 MINUTES    11/20/2024

## 2024-11-20 NOTE — PROGRESS NOTES
[facility]  Progress Note    Patient Name: Ezequiel Hughes  MRN: [id number]  Patient Class: IP- Inpatient   Admission Date: 11/18/2024  Length of Stay: 1 days  Attending Physician: Ruthie Bowie MD  Primary Care Provider: Valery Ozuna MD        Subjective:     Principal Problem:Acute renal failure superimposed on stage 4 chronic kidney disease        HPI:  Mr. Hughes is a 84-year-old male with past medical history of hypertension, hyperlipidemia, TIA, anxiety and BPH presented with constipation, hypotension, cough, and trouble urinating.  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 Encarnacion.  Per wife at bedside patient has not had a bowel movement since 11/12/2024 and has had very little urination since his surgery 1 week ago.  He was also had a cough that he states it has been dry and he denied shortness a breath.  Patient was also being seen by home health who stated that the patient was hypotensive and recommended that he presented to an urgent care.  Patient arrived to the ER and was noted to have a low blood pressure and was started on IV fluids which helped improve his hypotension.  Patient was also started on azithromycin and Rocephin for a brewing pneumonia noted on chest x-ray in the right middle lobe.  He will be admitted for observation to treat for constipation, hypotension, decreased urination and right hip pain.    Overview/Hospital Course:  Patient admitted post right total hip replacement on 11/13/2024 due to constipation, decreased urine output, DALLAS, and cough with shortness a breath.  Patient was given Mag citrate on 11/18/2024 and had a large bowel movement.  His renal function began to improve and he began to urinate more frequently.  Patient was also seen by Orthopedics and his surgical site was evaluated and noted to be healing well.  Patient was also noted to have a beginning stages of pneumonia on chest x-ray in the right middle  lobe for which the patient is on azithromycin and Rocephin.  On hospital day 1 patient's blood pressure also improved likely due to IV fluids and improvement in his urination.    Interval History:  Follow up for constipation.  Patient doing better this morning.  He had an additional bowel movement yesterday and was encouraged to continue to use MiraLax.  He is urinating well and renal function is improving.  Plan for discharge tomorrow.    Review of Systems  Objective:     Vital Signs (Most Recent):  Temp: 97.1 °F (36.2 °C) (11/20/24 1621)  Pulse: 64 (11/20/24 1621)  Resp: 15 (11/20/24 1621)  BP: (!) 149/64 (11/20/24 1621)  SpO2: 95 % (11/20/24 1621) Vital Signs (24h Range):  Temp:  [97.1 °F (36.2 °C)-98.7 °F (37.1 °C)] 97.1 °F (36.2 °C)  Pulse:  [56-92] 64  Resp:  [15-18] 15  SpO2:  [91 %-97 %] 95 %  BP: (103-149)/(55-64) 149/64     Weight: 107.2 kg (236 lb 5.3 oz)  Body mass index is 33.43 kg/m².    Intake/Output Summary (Last 24 hours) at 11/20/2024 1644  Last data filed at 11/20/2024 0605  Gross per 24 hour   Intake 240 ml   Output 3 ml   Net 237 ml         Physical Exam  Vitals and nursing note reviewed.   Constitutional:       Appearance: Normal appearance.   HENT:      Head: Normocephalic and atraumatic.      Nose: Nose normal.      Mouth/Throat:      Mouth: Mucous membranes are moist.   Eyes:      Extraocular Movements: Extraocular movements intact.      Conjunctiva/sclera: Conjunctivae normal.   Cardiovascular:      Rate and Rhythm: Normal rate and regular rhythm.      Pulses: Normal pulses.      Heart sounds: Normal heart sounds.   Pulmonary:      Effort: Pulmonary effort is normal.      Breath sounds: Normal breath sounds.   Abdominal:      General: Abdomen is flat. Bowel sounds are normal.      Palpations: Abdomen is soft.   Musculoskeletal:         General: Normal range of motion.      Cervical back: Normal range of motion and neck supple.   Skin:     General: Skin is warm.      Capillary Refill:  Capillary refill takes less than 2 seconds.   Neurological:      Mental Status: He is alert and oriented to person, place, and time. Mental status is at baseline.   Psychiatric:         Mood and Affect: Mood normal.         Behavior: Behavior normal.             Significant Labs: All pertinent labs within the past 24 hours have been reviewed.  Recent Lab Results         11/20/24  0547        Anion Gap 9       Baso # 0.02       Basophil % 0.5       BUN 78       Calcium 7.7       Chloride 104       CO2 20              Creatinine 2.5       Differential Method Automated       eGFR 24       Eos # 0.2       Eos % 5.0       Glucose 100       Gran # (ANC) 2.8       Gran % 63.5       Hematocrit 22.2       Hemoglobin 7.2       Immature Grans (Abs) 0.17  Comment: Mild elevation in immature granulocytes is non specific and   can be seen in a variety of conditions including stress response,   acute inflammation, trauma and pregnancy. Correlation with other   laboratory and clinical findings is essential.         Immature Granulocytes 3.8       Lymph # 0.5       Lymph % 11.0       MCH 31.3       MCHC 32.4       MCV 97       Mono # 0.7       Mono % 16.2       MPV 8.3       nRBC 0       Platelet Count 132       Potassium 4.9       RBC 2.30       RDW 12.6       Sodium 133       WBC 4.44               Significant Imaging:   X-Ray Abdomen AP 1 View   Final Result      No acute abnormality.         Electronically signed by: Jaya Howell MD   Date:    11/19/2024   Time:    10:04      US Lower Extremity Veins Bilateral   Final Result      No evidence of deep venous thrombosis in either lower extremity.         Electronically signed by: Soledad Oquendo   Date:    11/18/2024   Time:    17:19      X-Ray Abdomen Flat And Erect   Final Result      Moderate constipation         Electronically signed by: Jaya Howell MD   Date:    11/18/2024   Time:    15:47      X-Ray Chest AP Portable   Final Result      No acute process seen.          Electronically signed by: Jaya Howell MD   Date:    11/18/2024   Time:    15:48           Assessment/Plan:      * Acute renal failure superimposed on stage 4 chronic kidney disease  DALLAS is likely due to pre-renal azotemia due to intravascular volume depletion. Baseline creatinine is 1.3-1.6. Most recent creatinine and eGFR are listed below.  Recent Labs     11/18/24  1521 11/19/24  0632 11/20/24  0547   CREATININE 3.9* 3.3* 2.5*   EGFRNORACEVR 14* 17* 24*        Plan  - DALLAS is improving. Will adjust treatment as follows:  Continue IV fluids  - Avoid nephrotoxins and renally dose meds for GFR listed above  - Monitor urine output-improving, serial BMP, and adjust therapy as needed  -improving but not resolved    Pneumonia of right middle lobe due to infectious organism  Patient has a diagnosis of pneumonia. The cause of the pneumonia is suspected to be viral in etiology but organism is not known. The pneumonia is stable. The patient has the following signs/symptoms of pneumonia: cough. The patient does not have a current oxygen requirement and the patient does not have a home oxygen requirement. I have reviewed the pertinent imaging. The following cultures have been collected: Blood cultures The culture results are listed below.     Current antimicrobial regimen consists of the antibiotics listed below. Will monitor patient closely and continue current treatment plan unchanged.    -flu and COVID negative  -azithromycin x3 days (day 3 of 3)    Antibiotics (From admission, onward)      Start     Stop Route Frequency Ordered    11/18/24 1602  cefTRIAXone injection 1 g         -- IV Every 24 hours (non-standard times) 11/18/24 1602    11/18/24 1602  azithromycin (ZITHROMAX) 500 mg in D5W 250 mL  IVPB (admixture device)         11/21/24 1829 IV Every 24 hours (non-standard times) 11/18/24 1602            Microbiology Results (last 7 days)       Procedure Component Value Units Date/Time    Blood Culture #1 **CANNOT  BE ORDERED STAT** [[id number]] Collected: 11/18/24 1646    Order Status: Completed Specimen: Blood from Peripheral, Antecubital, Right Updated: 11/20/24 0613     Blood Culture, Routine No Growth to date      No Growth to date    Blood Culture #2 **CANNOT BE ORDERED STAT** [[id number]] Collected: 11/18/24 1646    Order Status: Completed Specimen: Blood from Peripheral, Antecubital, Left Updated: 11/20/24 0613     Blood Culture, Routine No Growth to date      No Growth to date    Influenza A & B by Molecular [[id number]] Collected: 11/18/24 1543    Order Status: Completed Specimen: Nasal Swab Updated: 11/18/24 1615     Influenza A, Molecular Negative     Influenza B, Molecular Negative     Flu A & B Source Nasal swab            Arthritis of right hip  -consult Orthopedics  -status post right total hip replacement on 11/13/2024      Constipation  -continue MiraLax b.i.d.   -continue Colace   -Mag citrate x1 on 11/18/2024  -large BM on 11/18/2024  -KUB on 11/19/2024 showed mild constipation      Hyperlipidemia  -continue home medication      Hypertensive disorder  Patients blood pressure range in the last 24 hours was: BP  Min: 103/56  Max: 149/64.The patient's inpatient anti-hypertensive regimen is listed below:  Current Antihypertensives  hydrALAZINE injection 10 mg, Every 6 hours PRN, Intravenous    Plan  - BP is uncontrolled, will adjust as follows:  Patient was currently hypotensive  - hold home medications      VTE Risk Mitigation (From admission, onward)           Ordered     heparin (porcine) injection 5,000 Units  Every 8 hours         11/18/24 1805     IP VTE HIGH RISK PATIENT  Once         11/18/24 1805     Place sequential compression device  Until discontinued         11/18/24 1805                    Discharge Planning   AUDIE:      Code Status: Full Code   Is the patient medically ready for discharge?:     Reason for patient still in hospital (select all that apply): Patient trending condition, Laboratory  test, and Treatment  Discharge Plan A: Home Health                  Ruthie Bowie MD  Department of Hospital Medicine   '[facility]

## 2024-11-20 NOTE — PROGRESS NOTES
[facility] Surg  Orthopedics  Progress Note    Patient Name: Ezequiel Hughes  MRN: [id number]  Admission Date: 11/18/2024  Hospital Length of Stay: 1 days  Attending Provider: Ruthie Bowie MD  Primary Care Provider: Valery Ozuna MD    Subjective:     Principal Problem:Acute renal failure superimposed on stage 4 chronic kidney disease    Principal Orthopedic Problem:  Right total hip arthroplasty    Interval History: Ezequiel Hughes is a 84 y.o. male postop day 7 status post right total hip arthroplasty performed by Dr. Encarnacion.  He is admitted for acute on chronic renal failure.  He states that the hip is not causing him any pain.  Denies numbness or tingling in the extremity.    Review of patient's allergies indicates:   Allergen Reactions    Atarax [hydroxyzine hcl] Other (See Comments)     Raised blood pressure     Zyrtec [cetirizine] Other (See Comments)     Raised blood pressure     Gold sodium thiosulfate      Patch test positive    Meloxicam Rash     Other reaction(s): hypertension       Current Facility-Administered Medications   Medication    0.9%  NaCl infusion    acetaminophen tablet 650 mg    atorvastatin tablet 20 mg    azithromycin (ZITHROMAX) 500 mg in D5W 250 mL  IVPB (admixture device)    cefTRIAXone injection 1 g    citalopram tablet 10 mg    clopidogreL tablet 75 mg    dextrose 10% bolus 125 mL 125 mL    dextrose 10% bolus 250 mL 250 mL    finasteride tablet 5 mg    glucagon (human recombinant) injection 1 mg    glucose chewable tablet 16 g    glucose chewable tablet 24 g    heparin (porcine) injection 5,000 Units    hydrALAZINE injection 10 mg    naloxone 0.4 mg/mL injection 0.02 mg    ondansetron disintegrating tablet 8 mg    pantoprazole EC tablet 40 mg    polyethylene glycol packet 17 g    prochlorperazine injection Soln 5 mg    senna-docusate 8.6-50 mg per tablet 1 tablet    sodium chloride 0.9% flush 10 mL    traMADoL tablet 50 mg     Objective:     Vital Signs (Most Recent):  Temp: 98.2 °F  "(36.8 °C) (11/20/24 0728)  Pulse: 67 (11/20/24 0728)  Resp: 16 (11/20/24 0728)  BP: (!) 109/55 (11/20/24 0728)  SpO2: 96 % (11/20/24 0728) Vital Signs (24h Range):  Temp:  [97.7 °F (36.5 °C)-98.7 °F (37.1 °C)] 98.2 °F (36.8 °C)  Pulse:  [] 67  Resp:  [16-18] 16  SpO2:  [91 %-96 %] 96 %  BP: (103-121)/(55-59) 109/55     Weight: 107.2 kg (236 lb 5.3 oz)  Height: 5' 10.5" (179.1 cm)  Body mass index is 33.43 kg/m².      Intake/Output Summary (Last 24 hours) at 11/20/2024 0853  Last data filed at 11/20/2024 0605  Gross per 24 hour   Intake 240 ml   Output 3 ml   Net 237 ml       Ortho/SPM Exam  Right hip:   Dressings are intact and dry with mild amount bloody shadowing  Mild edema around the upper thigh   No TTP around the incision   No pain with logroll   Calf and compartments are soft and compressible   Motor exam normal   Sensation and pulses intact   Cap refill brisk    GEN: Well developed, well nourished male. AAOX3. No acute distress.   Head: Normocephalic, atraumatic.   Eyes: DOMINIQUE  Neck: Trachea is midline, no adenopathy  Resp: Breathing unlabored.  Neuro: Motor function normal, Cranial nerves intact  Psych: Mood and affect appropriate.      Significant Labs:   Recent Lab Results         11/20/24  0547   11/19/24  0858        Procalcitonin   0.39  Comment: A concentration < 0.25 ng/mL represents a low risk of bacterial   infection.  Procalcitonin may not be accurate among patients with localized   infection, recent trauma or major surgery, immunosuppressed state,   invasive fungal infection, renal dysfunction. Decisions regarding   initiation or continuation of antibiotic therapy should not be based   solely on procalcitonin levels.         Anion Gap 9         Baso # 0.02         Basophil % 0.5         BUN 78         Calcium 7.7         Chloride 104         CO2 20            291       Creatinine 2.5         Differential Method Automated         eGFR 24         Eos # 0.2         Eos % 5.0         " Glucose 100         Gran # (ANC) 2.8         Gran % 63.5         Hematocrit 22.2         Hemoglobin 7.2         Immature Grans (Abs) 0.17  Comment: Mild elevation in immature granulocytes is non specific and   can be seen in a variety of conditions including stress response,   acute inflammation, trauma and pregnancy. Correlation with other   laboratory and clinical findings is essential.           Immature Granulocytes 3.8         Lactic Acid Level   0.9  Comment: Falsely low lactic acid results can be found in samples   containing >=13.0 mg/dL total bilirubin and/or >=3.5 mg/dL   direct bilirubin.         Lymph # 0.5         Lymph % 11.0         MCH 31.3         MCHC 32.4         MCV 97         Mono # 0.7         Mono % 16.2         MPV 8.3         nRBC 0         Platelet Count 132         Potassium 4.9         RBC 2.30         RDW 12.6         Sodium 133         Troponin I   0.071  Comment: The reference interval for Troponin I represents the 99th percentile   cutoff   for our facility and is consistent with 3rd generation assay   performance.         WBC 4.44               All pertinent labs within the past 24 hours have been reviewed.    Significant Imaging: I have reviewed and interpreted all pertinent imaging results/findings.    Assessment/Plan:     Active Diagnoses:    Diagnosis Date Noted POA    PRINCIPAL PROBLEM:  Acute renal failure superimposed on stage 4 chronic kidney disease [N17.9, N18.4] 11/18/2024 Yes    Pneumonia of right middle lobe due to infectious organism [J18.9] 11/18/2024 Yes    Arthritis of right hip [M16.11] 10/28/2024 Yes    Constipation [K59.00] 08/07/2023 Yes    Hypertensive disorder [I10] 07/23/2013 Yes    Hyperlipidemia [E78.5] 07/23/2013 Yes      Problems Resolved During this Admission:       Assessment:  84 y.o. male with past medical history significant for hypertension, hyperlipidemia, TIA, anxiety, and BPH is admitted for acute on chronic renal failure and Orthopedics has been  consulted because the patient is postop day 7 status post right total hip arthroplasty performed by Dr. Encarnacion    Plan:  Weightbearing as tolerated to the right lower extremity   PT/OT for gait training and ADLs   Patient needs to be on posterior hip precautions with knee immobilizer while in bed   Needs rolling walker for ambulation   Dressings may be changed if they become saturated or begin to peel, otherwise leave in place until clinic follow-up next week   DVT prophylaxis per primary team   No further orthopedic intervention is indicated at this time   We will see the patient back in the Ortho Clinic next week for postoperative follow-up   Orthopedics will sign off at this time, please feel free to reach out for further questions or concerns    Trell Everett PA-C  Orthopedics  [facility]

## 2024-11-20 NOTE — ASSESSMENT & PLAN NOTE
Patient has a diagnosis of pneumonia. The cause of the pneumonia is suspected to be viral in etiology but organism is not known. The pneumonia is stable. The patient has the following signs/symptoms of pneumonia: cough. The patient does not have a current oxygen requirement and the patient does not have a home oxygen requirement. I have reviewed the pertinent imaging. The following cultures have been collected: Blood cultures The culture results are listed below.     Current antimicrobial regimen consists of the antibiotics listed below. Will monitor patient closely and continue current treatment plan unchanged.    -flu and COVID negative  -azithromycin x3 days (day 3 of 3)    Antibiotics (From admission, onward)      Start     Stop Route Frequency Ordered    11/18/24 1602  cefTRIAXone injection 1 g         -- IV Every 24 hours (non-standard times) 11/18/24 1602    11/18/24 1602  azithromycin (ZITHROMAX) 500 mg in D5W 250 mL  IVPB (admixture device)         11/21/24 1829 IV Every 24 hours (non-standard times) 11/18/24 1602            Microbiology Results (last 7 days)       Procedure Component Value Units Date/Time    Blood Culture #1 **CANNOT BE ORDERED STAT** [[id number]] Collected: 11/18/24 1646    Order Status: Completed Specimen: Blood from Peripheral, Antecubital, Right Updated: 11/20/24 0613     Blood Culture, Routine No Growth to date      No Growth to date    Blood Culture #2 **CANNOT BE ORDERED STAT** [[id number]] Collected: 11/18/24 1646    Order Status: Completed Specimen: Blood from Peripheral, Antecubital, Left Updated: 11/20/24 0613     Blood Culture, Routine No Growth to date      No Growth to date    Influenza A & B by Molecular [[id number]] Collected: 11/18/24 1543    Order Status: Completed Specimen: Nasal Swab Updated: 11/18/24 1615     Influenza A, Molecular Negative     Influenza B, Molecular Negative     Flu A & B Source Nasal swab

## 2024-11-20 NOTE — PLAN OF CARE
Discussed poc with pt, pt verbalized understanding    Purposeful rounding every 2hours    VS wnl  Cardiac monitoring in use, pt is NSR, tele monitor # [id number]  Fall precautions in place, remains injury free  Pt denies c/o pain, nausea,  and vomitting  Lower extremities elevated due to bilateral edema    Bed locked at lowest position  Call light within reach    Chart check complete  Will cont with POC

## 2024-11-20 NOTE — PLAN OF CARE
Discussed plan of care with patient and this patient and wife was able to verbalized understanding.  Patient remains free from injury.  Safety and comfort  precautions maintained this shift.   Call light and personal belongings within reach, bed in low position with bed wheels locked.  No s/s of acute distress.   Purposeful rounding continued this shift.  Pain levels are controlled per MD order. IVF infusing.  Cardiac monitoring in place.  Diet orders continued.  Vital signs continued per order.  Q2 repositioning if needed  Patient mobility status remains with one assisit  Chart and orders review completed.   Patient education about care completed.     Problem: Adult Inpatient Plan of Care  Goal: Plan of Care Review  Outcome: Progressing  Goal: Patient-Specific Goal (Individualized)  Outcome: Progressing  Goal: Absence of Hospital-Acquired Illness or Injury  Outcome: Progressing  Goal: Optimal Comfort and Wellbeing  Outcome: Progressing  Goal: Readiness for Transition of Care  Outcome: Progressing     Problem: Acute Kidney Injury/Impairment  Goal: Fluid and Electrolyte Balance  Outcome: Progressing  Goal: Improved Oral Intake  Outcome: Progressing  Goal: Effective Renal Function  Outcome: Progressing     Problem: Pneumonia  Goal: Fluid Balance  Outcome: Progressing  Goal: Resolution of Infection Signs and Symptoms  Outcome: Progressing  Goal: Effective Oxygenation and Ventilation  Outcome: Progressing     Problem: Wound  Goal: Optimal Coping  Outcome: Progressing  Goal: Optimal Functional Ability  Outcome: Progressing  Goal: Absence of Infection Signs and Symptoms  Outcome: Progressing  Goal: Improved Oral Intake  Outcome: Progressing  Goal: Optimal Pain Control and Function  Outcome: Progressing  Goal: Skin Health and Integrity  Outcome: Progressing  Goal: Optimal Wound Healing  Outcome: Progressing     Problem: Skin Injury Risk Increased  Goal: Skin Health and Integrity  Outcome: Progressing     Problem: Fall  Injury Risk  Goal: Absence of Fall and Fall-Related Injury  Outcome: Progressing

## 2024-11-21 VITALS
WEIGHT: 236.31 LBS | DIASTOLIC BLOOD PRESSURE: 64 MMHG | BODY MASS INDEX: 33.08 KG/M2 | SYSTOLIC BLOOD PRESSURE: 117 MMHG | RESPIRATION RATE: 18 BRPM | TEMPERATURE: 98 F | OXYGEN SATURATION: 98 % | HEIGHT: 71 IN | HEART RATE: 68 BPM

## 2024-11-21 LAB
ANION GAP SERPL CALC-SCNC: 8 MMOL/L (ref 8–16)
BASOPHILS # BLD AUTO: 0.02 K/UL (ref 0–0.2)
BASOPHILS NFR BLD: 0.5 % (ref 0–1.9)
BUN SERPL-MCNC: 59 MG/DL (ref 8–23)
CALCIUM SERPL-MCNC: 8.1 MG/DL (ref 8.7–10.5)
CHLORIDE SERPL-SCNC: 108 MMOL/L (ref 95–110)
CO2 SERPL-SCNC: 21 MMOL/L (ref 23–29)
CREAT SERPL-MCNC: 1.8 MG/DL (ref 0.5–1.4)
DIFFERENTIAL METHOD BLD: ABNORMAL
EOSINOPHIL # BLD AUTO: 0.2 K/UL (ref 0–0.5)
EOSINOPHIL NFR BLD: 5.5 % (ref 0–8)
ERYTHROCYTE [DISTWIDTH] IN BLOOD BY AUTOMATED COUNT: 12.6 % (ref 11.5–14.5)
EST. GFR  (NO RACE VARIABLE): 36 ML/MIN/1.73 M^2
GLUCOSE SERPL-MCNC: 103 MG/DL (ref 70–110)
HCT VFR BLD AUTO: 23.8 % (ref 40–54)
HGB BLD-MCNC: 7.4 G/DL (ref 14–18)
IMM GRANULOCYTES # BLD AUTO: 0.16 K/UL (ref 0–0.04)
IMM GRANULOCYTES NFR BLD AUTO: 3.9 % (ref 0–0.5)
LYMPHOCYTES # BLD AUTO: 0.5 K/UL (ref 1–4.8)
LYMPHOCYTES NFR BLD: 10.8 % (ref 18–48)
MCH RBC QN AUTO: 30.8 PG (ref 27–31)
MCHC RBC AUTO-ENTMCNC: 31.1 G/DL (ref 32–36)
MCV RBC AUTO: 99 FL (ref 82–98)
MONOCYTES # BLD AUTO: 0.6 K/UL (ref 0.3–1)
MONOCYTES NFR BLD: 13.7 % (ref 4–15)
NEUTROPHILS # BLD AUTO: 2.7 K/UL (ref 1.8–7.7)
NEUTROPHILS NFR BLD: 65.6 % (ref 38–73)
NRBC BLD-RTO: 0 /100 WBC
PLATELET # BLD AUTO: 153 K/UL (ref 150–450)
PMV BLD AUTO: 8.6 FL (ref 9.2–12.9)
POTASSIUM SERPL-SCNC: 4.8 MMOL/L (ref 3.5–5.1)
RBC # BLD AUTO: 2.4 M/UL (ref 4.6–6.2)
SODIUM SERPL-SCNC: 137 MMOL/L (ref 136–145)
WBC # BLD AUTO: 4.15 K/UL (ref 3.9–12.7)

## 2024-11-21 PROCEDURE — 97116 GAIT TRAINING THERAPY: CPT | Mod: HCNC

## 2024-11-21 PROCEDURE — 85025 COMPLETE CBC W/AUTO DIFF WBC: CPT | Mod: HCNC | Performed by: FAMILY MEDICINE

## 2024-11-21 PROCEDURE — 63600175 PHARM REV CODE 636 W HCPCS: Mod: HCNC | Performed by: FAMILY MEDICINE

## 2024-11-21 PROCEDURE — 25000003 PHARM REV CODE 250: Mod: HCNC | Performed by: FAMILY MEDICINE

## 2024-11-21 PROCEDURE — 97530 THERAPEUTIC ACTIVITIES: CPT | Mod: HCNC

## 2024-11-21 PROCEDURE — 80048 BASIC METABOLIC PNL TOTAL CA: CPT | Mod: HCNC | Performed by: FAMILY MEDICINE

## 2024-11-21 PROCEDURE — 99900035 HC TECH TIME PER 15 MIN (STAT): Mod: HCNC

## 2024-11-21 PROCEDURE — 36415 COLL VENOUS BLD VENIPUNCTURE: CPT | Mod: HCNC | Performed by: FAMILY MEDICINE

## 2024-11-21 RX ADMIN — ATORVASTATIN CALCIUM 20 MG: 10 TABLET, FILM COATED ORAL at 10:11

## 2024-11-21 RX ADMIN — HEPARIN SODIUM 5000 UNITS: 5000 INJECTION INTRAVENOUS; SUBCUTANEOUS at 05:11

## 2024-11-21 RX ADMIN — CLOPIDOGREL BISULFATE 75 MG: 75 TABLET ORAL at 10:11

## 2024-11-21 RX ADMIN — SENNOSIDES AND DOCUSATE SODIUM 1 TABLET: 50; 8.6 TABLET ORAL at 10:11

## 2024-11-21 RX ADMIN — SODIUM CHLORIDE: 9 INJECTION, SOLUTION INTRAVENOUS at 05:11

## 2024-11-21 RX ADMIN — POLYETHYLENE GLYCOL 3350 17 G: 17 POWDER, FOR SOLUTION ORAL at 10:11

## 2024-11-21 RX ADMIN — CITALOPRAM HYDROBROMIDE 10 MG: 10 TABLET ORAL at 10:11

## 2024-11-21 RX ADMIN — FINASTERIDE 5 MG: 5 TABLET, FILM COATED ORAL at 10:11

## 2024-11-21 RX ADMIN — PANTOPRAZOLE SODIUM 40 MG: 40 TABLET, DELAYED RELEASE ORAL at 10:11

## 2024-11-21 NOTE — PHYSICIAN QUERY
Please clarify DALLAS as it relates to the recent R hip replacement.    Other clarification (please specify): likely related to constipation causing urinary retention.

## 2024-11-21 NOTE — DISCHARGE SUMMARY
[facility] Medicine  Discharge Summary      Patient Name: Ezequiel Hughes  MRN: [id number]  [facility]: [id number]  Patient Class: IP- Inpatient  Admission Date: 11/18/2024  Hospital Length of Stay: 2 days  Discharge Date and Time: 11/21/2024 12:59 PM  Attending Physician: No att. providers found   Discharging Provider: Ruthie Bowie MD  Primary Care Provider: Valery Ozuna MD    Primary Care Team: Networked reference to record PCT     HPI:   Mr. Hughes is a 84-year-old male with past medical history of hypertension, hyperlipidemia, TIA, anxiety and BPH presented with constipation, hypotension, cough, and trouble urinating.  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 Encarnacion.  Per wife at bedside patient has not had a bowel movement since 11/12/2024 and has had very little urination since his surgery 1 week ago.  He was also had a cough that he states it has been dry and he denied shortness a breath.  Patient was also being seen by home health who stated that the patient was hypotensive and recommended that he presented to an urgent care.  Patient arrived to the ER and was noted to have a low blood pressure and was started on IV fluids which helped improve his hypotension.  Patient was also started on azithromycin and Rocephin for a brewing pneumonia noted on chest x-ray in the right middle lobe.  He will be admitted for observation to treat for constipation, hypotension, decreased urination and right hip pain.    * No surgery found *      Hospital Course:   Patient admitted post right total hip replacement on 11/13/2024 due to constipation, decreased urine output, DALLAS, and cough with shortness a breath.  Patient was given Mag citrate on 11/18/2024 and had a large bowel movement.  His renal function began to improve and he began to urinate more frequently.  Patient was also seen by Orthopedics and his surgical site was evaluated and noted to be healing well.  Patient was  also noted to have a beginning stages of pneumonia on chest x-ray in the right middle lobe for which the patient is on azithromycin and Rocephin, patient completed 3 days prior to discharge..  On hospital day 1 patient's blood pressure also improved likely due to IV fluids and improvement in his urination.  He was continued on p.o. MiraLax which resulted in 1 bowel movement per day during hospitalization.  His renal function returned to baseline with a creatinine of 1.6.  Patient was urinating well and he was counseled on monitoring for daily bowel movements to prevent urinary retention and renal failure.  He was advised to use Metamucil daily as he has a history of chronic constipation and to use MiraLax daily x3 days if constipated for more than 24 hours.  Patient was also counseled on following up with his primary care physician for hospital follow up and repeat lab work.  Patient's daughter was at bedside when discharge instructions were discussed.  Patient was also advised to follow up with Orthopedics as scheduled prior to admission.  Patient was stable for discharge at this time.     Goals of Care Treatment Preferences:  Code Status: Full Code    Living Will: Yes              [facility] Screening:  The patient declined to be screened for utility difficulties, food insecurity, transport difficulties, housing insecurity, and interpersonal safety, so no concerns could be identified this admission.     Consults:   Consults (From admission, onward)          Status Ordering Provider     Inpatient consult to Orthopedics  Once        Provider:  Denton Encarnacion MD    Completed TERELL JACOBS            No new Assessment & Plan notes have been filed under this hospital service since the last note was generated.  Service: Hospital Medicine    Final Active Diagnoses:    Diagnosis Date Noted POA    PRINCIPAL PROBLEM:  Acute renal failure superimposed on stage 4 chronic kidney disease [N17.9, N18.4] 11/18/2024 Yes     Pneumonia of right middle lobe due to infectious organism [J18.9] 11/18/2024 Yes    Arthritis of right hip [M16.11] 10/28/2024 Yes    Constipation [K59.00] 08/07/2023 Yes    Hypertensive disorder [I10] 07/23/2013 Yes    Hyperlipidemia [E78.5] 07/23/2013 Yes      Problems Resolved During this Admission:       Discharged Condition: stable    Disposition: Home or Self Care    Follow Up:   Contact information for follow-up providers       Valery Ozuna MD Follow up in 1 week(s).    Specialty: Family Medicine  Contact information:  [address]  [address]  [facility] [address]  [phone]               Denton Encarnacion MD Follow up in 1 week(s).    Specialties: Orthopedic Surgery, General Surgery  Contact information:  [address] [place] [address]  [phone]                       Contact information for after-discharge care       Home Medical Care       [facility] .    Service: Home Health Services  Contact information:  [address]  [facility] [id number] [phone]                                 Patient Instructions:      Ambulatory referral/consult to Home Health   Referral Priority: Routine Referral Type: Home Health   Referral Reason: Specialty Services Required   Requested Specialty: Home Health Services   Number of Visits Requested: 1     Diet Adult Regular     Activity as tolerated       Significant Diagnostic Studies: Labs: BMP:   Recent Labs   Lab 11/20/24  0547 11/21/24  0554    103   * 137   K 4.9 4.8    108   CO2 20* 21*   BUN 78* 59*   CREATININE 2.5* 1.8*   CALCIUM 7.7* 8.1*    and CBC   Recent Labs   Lab 11/20/24  0547 11/21/24  0554   WBC 4.44 4.15   HGB 7.2* 7.4*   HCT 22.2* 23.8*   * 153     Radiology:   X-Ray Abdomen AP 1 View   Final Result      No acute abnormality.         Electronically signed by: Jaya Howell MD   Date:    11/19/2024   Time:    10:04      US Lower Extremity Veins  Bilateral   Final Result      No evidence of deep venous thrombosis in either lower extremity.         Electronically signed by: Soledad Oquendo   Date:    11/18/2024   Time:    17:19      X-Ray Abdomen Flat And Erect   Final Result      Moderate constipation         Electronically signed by: Jaya Howell MD   Date:    11/18/2024   Time:    15:47      X-Ray Chest AP Portable   Final Result      No acute process seen.         Electronically signed by: Jaya Howell MD   Date:    11/18/2024   Time:    15:48           Pending Diagnostic Studies:       None           Medications:  Reconciled Home Medications:      Medication List        CONTINUE taking these medications      acetaminophen 650 MG Tbsr  Commonly known as: TYLENOL  Take 1 tablet (650 mg total) by mouth every 8 (eight) hours.     albuterol 90 mcg/actuation inhaler  Commonly known as: VENTOLIN HFA  Inhale 2 puffs into the lungs every 6 (six) hours as needed for Wheezing. Rescue     alfuzosin 10 mg Tb24  Commonly known as: UROXATRAL  Take 1 tablet (10 mg total) by mouth daily with breakfast.     atorvastatin 20 MG tablet  Commonly known as: LIPITOR  Take 1 tablet (20 mg total) by mouth once daily.     azelastine 137 mcg (0.1 %) nasal spray  Commonly known as: ASTELIN  1 spray (137 mcg total) by Nasal route 2 (two) times daily.     citalopram 10 MG tablet  Commonly known as: CeleXA  Take 1 tablet (10 mg total) by mouth once daily. For anxiety     clopidogreL 75 mg tablet  Commonly known as: PLAVIX  TAKE 1 TABLET EVERY DAY     finasteride 5 mg tablet  Commonly known as: PROSCAR  Take 1 tablet (5 mg total) by mouth once daily.     furosemide 20 MG tablet  Commonly known as: LASIX  Take 1 tablet (20 mg total) by mouth daily as needed (edema).     losartan 50 MG tablet  Commonly known as: COZAAR  TAKE 1 TABLET TWICE DAILY     ondansetron 4 MG Tbdl  Commonly known as: ZOFRAN-ODT  Dissolve 2 tablets (8 mg total) by mouth every 8 (eight) hours as needed  (Nausea).     pantoprazole 40 MG tablet  Commonly known as: PROTONIX  TAKE 1 TABLET EVERY DAY     polyethylene glycol 17 gram/dose powder  Commonly known as: GLYCOLAX  Take 17 g by mouth once daily.     pregabalin 75 MG capsule  Commonly known as: LYRICA  Take 1 capsule (75 mg total) by mouth 2 (two) times daily.     sildenafiL 100 MG tablet  Commonly known as: VIAGRA  Take 1 po 45 minutes before intercourse     vitamin D 1000 units Tab  Commonly known as: VITAMIN D3  Take 1,000 Units by mouth once daily.              Indwelling Lines/Drains at time of discharge:   Lines/Drains/Airways       None                   Time spent on the discharge of patient: 40 minutes         Ruthie Bowie MD  Department of Hospital Medicine  [facility] Med S[facility]

## 2024-11-21 NOTE — PT/OT/SLP PROGRESS
Physical Therapy Treatment    Patient Name:  Ezequiel Hughes   MRN:  [id number]    Recommendations:     Discharge Recommendations: Low Intensity Therapy (with 24/7 care)  Discharge Equipment Recommendations: shower chair  Barriers to discharge: None    Assessment:     Ezequiel Hughes is a 84 y.o. male admitted with a medical diagnosis of Acute renal failure superimposed on stage 4 chronic kidney disease.  He presents with the following impairments/functional limitations: weakness, impaired endurance, decreased lower extremity function, impaired functional mobility, gait instability, decreased safety awareness, impaired balance, decreased ROM, orthopedic precautions.    Rehab Prognosis: Good; patient would benefit from acute skilled PT services to address these deficits and reach maximum level of function.    Recent Surgery: * No surgery found *      Plan:     During this hospitalization, patient to be seen 3 x/week to address the identified rehab impairments via gait training, therapeutic activities, therapeutic exercises and progress toward the following goals:    Plan of Care Expires:  12/03/24    Subjective     Chief Complaint: Pt is motivated to participate  Patient/Family Comments/goals: none stated  Pain/Comfort:  Pain Rating 1: 0/10      Objective:     Communicated with nurse and epic chart review prior to session.  Patient found up in chair with peripheral IV, telemetry, wound vac upon PT entry to room.     General Precautions: Standard, fall  Orthopedic Precautions: RLE weight bearing as tolerated, RLE posterior precautions  Braces: N/A  Respiratory Status: Room air     Functional Mobility:  Gait belt applied - Yes  Bed Mobility  Seated in chair at start of session and returned to chair  Transfers  Sit to Stand: stand by assistance with rolling walker  Toilet Transfer: stand by assistance with rolling walker using Step Transfer  Pt stood at toilet x5min  Gait  Patient ambulated 180ft with rolling walker and stand by  "assistance. Patient demonstrates occasional unsteady gait. No c/o dizziness or SOB, no gross LOB. All lines remained intact throughout ambulation trial.  Balance  Sitting: stand by assistance  Standing: stand by assistance    AM-PAC 6 CLICK MOBILITY  Turning over in bed (including adjusting bedclothes, sheets and blankets)?: 3 (NT this date)  Sitting down on and standing up from a chair with arms (e.g., wheelchair, bedside commode, etc.): 3  Moving from lying on back to sitting on the side of the bed?: 3 (NT this date)  Moving to and from a bed to a chair (including a wheelchair)?: 3  Need to walk in hospital room?: 3  Climbing 3-5 steps with a railing?: 1 (NT)  Basic Mobility Total Score: 16       Treatment & Education:  Reviewed role of PT in acute care and POC. Pt tolerated interventions well. Reviewed R LE WBAT and posterior precautions, use of knee immobilizer. Reviewed importance of OOB activities, activity pacing, and HEP (marching/hip flex, hip abd, heel slides/LAQ, quad sets, ankle pumps) in order to maintain/regain strength. Encouraged to sit up in chair for all meals. Reviewed proper use of RW for safety and to reduce risk of falling. Reviewed "call don't fall" policy and increased risk of falling due to weakness, instructed to utilize call bell for assistance with all transfers. Pt agreeable to all requests.    Patient left up in chair with all lines intact, call button in reach, and family present..    GOALS:   Multidisciplinary Problems       Physical Therapy Goals          Problem: Physical Therapy    Goal Priority Disciplines Outcome Interventions   Physical Therapy Goal     PT, PT/OT Progressing    Description: Goals to be met by 12/3/24.  1. Pt will complete bed mobility MOD I.  2. Pt will complete sit to stand MOD I.  3. Pt will ambulate 200ft MOD I using RW.  4. Pt will increase AMPAC score by 2 points to progress functional mobility.                         Time Tracking:     PT Received On: " 11/21/24  PT Start Time: 0935     PT Stop Time: 1000  PT Total Time (min): 25 min     Billable Minutes: Gait Training 15min and Therapeutic Activity 10min    Treatment Type: Treatment  PT/PTA: PT     Number of PTA visits since last PT visit: 0     11/21/2024

## 2024-11-21 NOTE — PLAN OF CARE
Pt tolerated interventions well. Required SBA for bed mobility, ambulated 180ft SBA using RW. Recommending low intensity therapy upon d/c.

## 2024-11-21 NOTE — PLAN OF CARE
Discussed plan of care with patient and this patient and his wife was able to verbalized understanding.  Patient remains free from injury.  Safety and comfort precautions maintained this shift.   Call light and personal belongings within reach, bed in low position with bed wheels locked.  No s/s of acute distress.   Purposeful rounding continued this shift.  Pain is controlled per MD order. IVF infusing.  Cardiac monitoring in place.  Diet orders continued.  Vital signs continued per order.  Q2 repositioning per staff if needed  Patient mobility status remains independent   Chart and orders review completed.   Patient education about care completed.       Problem: Adult Inpatient Plan of Care  Goal: Plan of Care Review  Outcome: Progressing  Goal: Patient-Specific Goal (Individualized)  Outcome: Progressing  Goal: Absence of Hospital-Acquired Illness or Injury  Outcome: Progressing  Goal: Optimal Comfort and Wellbeing  Outcome: Progressing  Goal: Readiness for Transition of Care  Outcome: Progressing     Problem: Acute Kidney Injury/Impairment  Goal: Fluid and Electrolyte Balance  Outcome: Progressing  Goal: Improved Oral Intake  Outcome: Progressing  Goal: Effective Renal Function  Outcome: Progressing     Problem: Pneumonia  Goal: Fluid Balance  Outcome: Progressing  Goal: Resolution of Infection Signs and Symptoms  Outcome: Progressing  Goal: Effective Oxygenation and Ventilation  Outcome: Progressing     Problem: Wound  Goal: Optimal Coping  Outcome: Progressing  Goal: Optimal Functional Ability  Outcome: Progressing  Goal: Absence of Infection Signs and Symptoms  Outcome: Progressing  Goal: Improved Oral Intake  Outcome: Progressing  Goal: Optimal Pain Control and Function  Outcome: Progressing  Goal: Skin Health and Integrity  Outcome: Progressing  Goal: Optimal Wound Healing  Outcome: Progressing     Problem: Skin Injury Risk Increased  Goal: Skin Health and Integrity  Outcome: Progressing     Problem: Fall  Injury Risk  Goal: Absence of Fall and Fall-Related Injury  Outcome: Progressing

## 2024-11-21 NOTE — PLAN OF CARE
[facility] - Med Surg  Discharge Final Note    Primary Care Provider: Valery Ozuna MD    Expected Discharge Date: 11/21/2024    Final Discharge Note (most recent)       Final Note - 11/21/24 0905          Final Note    Assessment Type Final Discharge Note     Anticipated Discharge Disposition Home-Health Care [facility]     Hospital Resources/Appts/Education Provided Appointments scheduled and added to AVS        Post-Acute Status    Post-Acute Authorization Home Health     Home Health Status Set-up Complete/Auth obtained     Discharge Delays None known at this time                     After-discharge care                Home Medical Care       *[facility]   Service: Home Health Services    [address] [address]   Phone: [phone]                     Patient has no d/c needs at this time. Sw to follow up, as needed, for d/c planning purposes.

## 2024-11-22 ENCOUNTER — TELEPHONE (OUTPATIENT)
Dept: ORTHOPEDICS | Facility: CLINIC | Age: 84
End: 2024-11-22
Payer: MEDICARE

## 2024-11-22 LAB
OHS QRS DURATION: 88 MS
OHS QTC CALCULATION: 410 MS

## 2024-11-22 NOTE — TELEPHONE ENCOUNTER
----- Message from Quita sent at 11/22/2024 10:05 AM CST -----  Contact: Azael/ Nancy Addison is needing a call back in regards to the patients wound vac. He stated that he is currently at the patients residence. Please give him a call back at  [phone]

## 2024-11-22 NOTE — TELEPHONE ENCOUNTER
Called and spoke with albertina - answered all questions - verbalized understanding and thankful for call

## 2024-11-24 LAB
BACTERIA BLD CULT: NORMAL
BACTERIA BLD CULT: NORMAL

## 2024-11-25 ENCOUNTER — PATIENT OUTREACH (OUTPATIENT)
Dept: ADMINISTRATIVE | Facility: CLINIC | Age: 84
End: 2024-11-25
Payer: MEDICARE

## 2024-11-25 NOTE — PROGRESS NOTES
C3 nurse spoke with Ezequiel Hughes  for a TCC post hospital discharge follow up call. Nurse offered to scheduled TCC hospital follow-up appointment. The patient declined appointment at this time.

## 2024-11-27 ENCOUNTER — OFFICE VISIT (OUTPATIENT)
Dept: ORTHOPEDICS | Facility: CLINIC | Age: 84
End: 2024-11-27
Payer: MEDICARE

## 2024-11-27 ENCOUNTER — HOSPITAL ENCOUNTER (OUTPATIENT)
Dept: RADIOLOGY | Facility: HOSPITAL | Age: 84
Discharge: HOME | End: 2024-11-27
Attending: ORTHOPAEDIC SURGERY
Payer: MEDICARE

## 2024-11-27 VITALS — BODY MASS INDEX: 33.08 KG/M2 | HEIGHT: 71 IN | WEIGHT: 236.31 LBS

## 2024-11-27 DIAGNOSIS — M16.11 PRIMARY OSTEOARTHRITIS OF RIGHT HIP: ICD-10-CM

## 2024-11-27 DIAGNOSIS — Z96.641 S/P TOTAL RIGHT HIP ARTHROPLASTY: Primary | ICD-10-CM

## 2024-11-27 PROCEDURE — 73502 X-RAY EXAM HIP UNI 2-3 VIEWS: CPT | Mod: TC,HCNC,RT

## 2024-11-27 PROCEDURE — 3288F FALL RISK ASSESSMENT DOCD: CPT | Mod: HCNC,CPTII,S$GLB, | Performed by: PHYSICIAN ASSISTANT

## 2024-11-27 PROCEDURE — 99999 PR PBB SHADOW E&M-EST. PATIENT-LVL IV: CPT | Mod: PBBFAC,HCNC,, | Performed by: PHYSICIAN ASSISTANT

## 2024-11-27 PROCEDURE — 73502 X-RAY EXAM HIP UNI 2-3 VIEWS: CPT | Mod: 26,HCNC,RT, | Performed by: RADIOLOGY

## 2024-11-27 PROCEDURE — 1125F AMNT PAIN NOTED PAIN PRSNT: CPT | Mod: HCNC,CPTII,S$GLB, | Performed by: PHYSICIAN ASSISTANT

## 2024-11-27 PROCEDURE — 1159F MED LIST DOCD IN RCRD: CPT | Mod: HCNC,CPTII,S$GLB, | Performed by: PHYSICIAN ASSISTANT

## 2024-11-27 PROCEDURE — 1101F PT FALLS ASSESS-DOCD LE1/YR: CPT | Mod: HCNC,CPTII,S$GLB, | Performed by: PHYSICIAN ASSISTANT

## 2024-11-27 PROCEDURE — 99024 POSTOP FOLLOW-UP VISIT: CPT | Mod: HCNC,S$GLB,, | Performed by: PHYSICIAN ASSISTANT

## 2024-11-27 RX ORDER — METHOCARBAMOL 500 MG/1
500 TABLET, FILM COATED ORAL 3 TIMES DAILY
Qty: 90 TABLET | Refills: 0 | Status: SHIPPED | OUTPATIENT
Start: 2024-11-27

## 2024-11-27 NOTE — PROGRESS NOTES
Patient ID: Ezequiel Hughes is a 84 y.o. male.    Chief Complaint: Post-op Evaluation (R [facility] 11/13/24/)      HPI: Ezequiel Hughes  is a 84 y.o. male who c/o Post-op Evaluation (R [facility] 11/13/24/)     Post op visit 1   Patient notes pain is 1/10   The patient is doing well since surgery and is pleased with his results   He has been able to advance activity of daily living and thus his quality of life   Pain is controlled on present regimen he is doing much better as he had a jaxon [facility] 2 weeks       Discussion with family and chart review notes patient was admitted for observation with acute kidney injury and hypotension due to dehydration/constipation   Symptoms have improved and are resolving   Continue to encourage hydration and protein replacement with meal replacement shakes for increased calories     Equipment the patient is using a rolling walker to assist with ambulation  DVT compliant with both home blood thinners and Hcuy hose  Therapy compliant with home health patient would like to go to outpatient PT with his established provider Sebastian I had Rosaura Sanchez    Patient is presently denying any shortness of breath, chest pain, fever/chills, nausea/vomiting, loss of taste or smell, numbness/tingling or sensation changes, loss of bladder or bowel function, loss of taste/smell.     Surgery: Right Total Hip    Surgery Date:  11/13/2024    Past Medical History:   Diagnosis Date    Allergic rhinitis     Anemia     Back pain     BPH (benign prostatic hyperplasia)     Cancer     skin cancer to neck, Dr. Graves    Cataract     Disorder of kidney and ureter     ED (erectile dysfunction)     OMARI (generalized anxiety disorder) 08/07/2023    Hiatal hernia     small    History of colon polyps     colonoscopy 11/2016    HLD (hyperlipidemia)     Hyperlipidemia     Hypertension     Lateral epicondylitis     Lumbar radiculopathy 01/26/2022    OA (osteoarthritis)     GUIDO (obstructive sleep apnea)     Pneumonia of  right middle lobe due to infectious organism 11/18/2024    Polyneuropathy     Prostate cancer     Dr. Wong    TIA (transient ischemic attack)     Trouble in sleeping     Urge incontinence 03/29/2022     Past Surgical History:   Procedure Laterality Date    ANGIOGRAPHY OF UPPER EXTREMITY Left 07/05/2022    Procedure: Angiogram Extremity Bilateral;  Surgeon: Aparna Thompson MD;  Location: [facility] CATH LAB;  Service: Cardiology;  Laterality: Left;    ARTERIOGRAPHY OF AORTIC ROOT N/A 07/05/2022    Procedure: ARTERIOGRAM, AORTIC ROOT;  Surgeon: Aparna Thompson MD;  Location: [facility] CATH LAB;  Service: Cardiology;  Laterality: N/A;    BLOCK, NERVE, PERIPHERAL Right 9/12/2024    Procedure: right femoral/ obturator nerve block- with steroids;  Surgeon: Abel Haywood MD;  Location: [facility] PAIN MGT;  Service: Pain Management;  Laterality: Right;    CATARACT EXTRACTION W/  INTRAOCULAR LENS IMPLANT Right 02/21/2018    I-Stent    CATARACT EXTRACTION W/  INTRAOCULAR LENS IMPLANT Left 03/21/2018    I - Stent    CATHETERIZATION OF BOTH LEFT AND RIGHT HEART N/A 07/05/2022    Procedure: CATHETERIZATION, HEART, BOTH LEFT AND RIGHT;  Surgeon: Aparna Thompson MD;  Location: [facility] CATH LAB;  Service: Cardiology;  Laterality: N/A;  radial approach    COLONOSCOPY N/A 11/14/2016    Procedure: COLONOSCOPY;  Surgeon: Karuna Rodriguez MD;  Location: [facility];  Service: Endoscopy;  Laterality: N/A;    COLONOSCOPY N/A 09/22/2020    Procedure: COLONOSCOPY;  Surgeon: Chuy Fish MD;  Location: [facility] ENDO;  Service: Endoscopy;  Laterality: N/A;    EPIDURAL STEROID INJECTION N/A 6/6/2024    Procedure: Lumbar L5/S1 IL IAN;  Surgeon: Abel Haywood MD;  Location: [facility] PAIN MGT;  Service: Pain Management;  Laterality: N/A;    EPIDURAL STEROID INJECTION INTO CERVICAL SPINE N/A 2/8/2024    Procedure: C5/6 IL Right paramedian approach IAN with RN IV sedation;  Surgeon: Abel Haywood MD;  Location: [facility] PAIN MGT;  Service: Pain  Management;  Laterality: N/A;    EYE SURGERY      HEMORRHOID SURGERY      HIP ARTHROPLASTY Right 11/13/2024    Procedure: ARTHROPLASTY, HIP;  Surgeon: Denton Encarnacion MD;  Location: [facility] OR;  Service: Orthopedics;  Laterality: Right;    I-STENT Right 02/21/2018    DR. REECE    INJECTION OF ANESTHETIC AGENT AROUND MEDIAL BRANCH NERVES INNERVATING CERVICAL FACET JOINT Bilateral 7/18/2023    Procedure: Bilateral C4-6 MBB with RN IV sedation (diagnostic, #1);  Surgeon: Abel Haywood MD;  Location: [facility] PAIN MGT;  Service: Pain Management;  Laterality: Bilateral;    KNEE ARTHROSCOPY W/ MENISCAL REPAIR Right 2015    Dr. Jain    PCIOL Right 02/21/2018    DR. REECE    PLANTAR FASCIA RELEASE      right    ROTATOR CUFF REPAIR Bilateral     bilateral    SELECTIVE INJECTION OF ANESTHETIC AGENT AROUND LUMBAR SPINAL NERVE ROOT BY TRANSFORAMINAL APPROACH Bilateral 01/26/2022    Procedure: Bilateral L4/5 TF IAN with RN IV sedation;  Surgeon: Mak Young MD;  Location: [facility] PAIN MGT;  Service: Pain Management;  Laterality: Bilateral;    SELECTIVE INJECTION OF ANESTHETIC AGENT AROUND LUMBAR SPINAL NERVE ROOT BY TRANSFORAMINAL APPROACH Bilateral 03/14/2022    Procedure: Bilateral L4/5 TF IAN with RN IV sedation;  Surgeon: Mak Young MD;  Location: [facility] PAIN MGT;  Service: Pain Management;  Laterality: Bilateral;    SELECTIVE INJECTION OF ANESTHETIC AGENT AROUND LUMBAR SPINAL NERVE ROOT BY TRANSFORAMINAL APPROACH Bilateral 06/02/2022    Procedure: Bilateral L4/5 TF IAN - D2P Dr. Castro [facility];  Surgeon: Abel Haywood MD;  Location: [facility] PAIN MGT;  Service: Pain Management;  Laterality: Bilateral;    SELECTIVE INJECTION OF ANESTHETIC AGENT AROUND LUMBAR SPINAL NERVE ROOT BY TRANSFORAMINAL APPROACH Bilateral 08/25/2022    Procedure: Bilateral L4/5 TF IAN;  Surgeon: Abel Haywood MD;  Location: [facility] PAIN MGT;  Service: Pain Management;  Laterality: Bilateral;    SELECTIVE INJECTION OF ANESTHETIC AGENT AROUND  LUMBAR SPINAL NERVE ROOT BY TRANSFORAMINAL APPROACH Bilateral 2023    Procedure: Bilateral L4/5 TF IAN RN IV Sedation;  Surgeon: Abel Haywood MD;  Location: [facility] PAIN MGT;  Service: Pain Management;  Laterality: Bilateral;    SELECTIVE INJECTION OF ANESTHETIC AGENT AROUND LUMBAR SPINAL NERVE ROOT BY TRANSFORAMINAL APPROACH Bilateral 2024    Procedure: Bilateral L4/5 TF IAN;  Surgeon: Abel Haywood MD;  Location: [facility] PAIN MGT;  Service: Pain Management;  Laterality: Bilateral;    SHOULDER SURGERY Bilateral around     Dr. Pepper.  rotator cuff surgeries    VASECTOMY       Family History   Problem Relation Name Age of Onset    Lung cancer Father Isaiah Hughes         life long smoker    Cancer Father Isaiah Hughes         prostate, lung    Arthritis Mother Emely Hughes     Stroke Sister          TIA    Cataracts Sister      Cancer Brother          prostate    Cataracts Brother      Cataracts Sister      Melanoma Neg Hx      Psoriasis Neg Hx      Lupus Neg Hx      Eczema Neg Hx      Diabetes Neg Hx      Heart disease Neg Hx      Kidney disease Neg Hx      Colon cancer Neg Hx       Social History     Socioeconomic History    Marital status:    Tobacco Use    Smoking status: Former     Current packs/day: 0.00     Average packs/day: 3.0 packs/day for 35.0 years (104.9 ttl pk-yrs)     Types: Cigarettes     Start date: 1960     Quit date: 1985     Years since quittin.3     Passive exposure: Past    Smokeless tobacco: Never   Substance and Sexual Activity    Alcohol use: Yes     Alcohol/week: 3.0 standard drinks of alcohol     Types: 3 Cans of beer per week     Comment: socially    Drug use: No    Sexual activity: Yes     Partners: Female     Birth control/protection: None   Social History Narrative         Social Drivers of Health     Financial Resource Strain: Patient Declined (2024)    Overall Financial Resource Strain (CARDIA)     Difficulty of Paying Living  Expenses: Patient declined   Food Insecurity: Patient Declined (11/18/2024)    Hunger Vital Sign     Worried About Running Out of Food in the Last Year: Patient declined     Ran Out of Food in the Last Year: Patient declined   Transportation Needs: Patient Declined (11/18/2024)    TRANSPORTATION NEEDS     Transportation : Patient declined   Physical Activity: Insufficiently Active (5/16/2024)    Exercise Vital Sign     Days of Exercise per Week: 2 days     Minutes of Exercise per Session: 30 min   Stress: Patient Declined (11/18/2024)    [employer] [place] [employer] - Occupational Stress Questionnaire     Feeling of Stress : Patient declined   Housing Stability: Patient Declined (11/18/2024)    Housing Stability Vital Sign     Unable to Pay for Housing in the Last Year: Patient declined     Homeless in the Last Year: Patient declined     Medication List with Changes/Refills   New Medications    METHOCARBAMOL (ROBAXIN) 500 MG TAB    Take 1 tablet (500 mg total) by mouth 3 (three) times daily.   Current Medications    ACETAMINOPHEN (TYLENOL) 650 MG TBSR    Take 1 tablet (650 mg total) by mouth every 8 (eight) hours.    ALBUTEROL (VENTOLIN HFA) 90 MCG/ACTUATION INHALER    Inhale 2 puffs into the lungs every 6 (six) hours as needed for Wheezing. Rescue    ALFUZOSIN (UROXATRAL) 10 MG TB24    Take 1 tablet (10 mg total) by mouth daily with breakfast.    ATORVASTATIN (LIPITOR) 20 MG TABLET    Take 1 tablet (20 mg total) by mouth once daily.    AZELASTINE (ASTELIN) 137 MCG (0.1 %) NASAL SPRAY    1 spray (137 mcg total) by Nasal route 2 (two) times daily.    CITALOPRAM (CELEXA) 10 MG TABLET    Take 1 tablet (10 mg total) by mouth once daily. For anxiety    CLOPIDOGREL (PLAVIX) 75 MG TABLET    TAKE 1 TABLET EVERY DAY    FINASTERIDE (PROSCAR) 5 MG TABLET    Take 1 tablet (5 mg total) by mouth once daily.    FUROSEMIDE (LASIX) 20 MG TABLET    Take 1 tablet (20 mg total) by mouth daily as needed (edema).    LOSARTAN  (COZAAR) 50 MG TABLET    TAKE 1 TABLET TWICE DAILY    ONDANSETRON (ZOFRAN-ODT) 4 MG TBDL    Dissolve 2 tablets (8 mg total) by mouth every 8 (eight) hours as needed (Nausea).    PANTOPRAZOLE (PROTONIX) 40 MG TABLET    TAKE 1 TABLET EVERY DAY    POLYETHYLENE GLYCOL (GLYCOLAX) 17 GRAM/DOSE POWDER    Take 17 g by mouth once daily.    PREGABALIN (LYRICA) 75 MG CAPSULE    Take 1 capsule (75 mg total) by mouth 2 (two) times daily.    SILDENAFIL (VIAGRA) 100 MG TABLET    Take 1 po 45 minutes before intercourse    VITAMIN D (VITAMIN D3) 1000 UNITS TAB    Take 1,000 Units by mouth once daily.     Review of patient's allergies indicates:   Allergen Reactions    Atarax [hydroxyzine hcl] Other (See Comments)     Raised blood pressure     Zyrtec [cetirizine] Other (See Comments)     Raised blood pressure     Gold sodium thiosulfate      Patch test positive    Meloxicam Rash     Other reaction(s): hypertension       IMAGING  FINDINGS:  No pelvic fracture is severe left hip joint osteoarthritis.     Total right hip arthroplasty with no dislocation.  No periprosthetic fracture.        Impression:   See above    Objective:     Right Lower Extremity  NVI  WWP foot  Comp soft  Cap refill < 2 sec  Calf NT, Soft  (-) Ольга sign  KEISHA  ROM : Patient is able to easily exhibit full flexion and extension on passive range of motion.   Abduction and adduction is full   Quad resistant full  Wiggles toes  DF/PF intact  Sensation intact  Inc C/D/I; subQ closure noted  No SOI    Assessment:       Encounter Diagnosis   Name Primary?    S/P total right hip arthroplasty Yes          Plan:       Ezequiel was seen today for post-op evaluation.    Diagnoses and all orders for this visit:    S/P total right hip arthroplasty  -     Ambulatory referral/consult to Physical/Occupational Therapy; Future    Other orders  -     methocarbamoL (ROBAXIN) 500 MG Tab; Take 1 tablet (500 mg total) by mouth 3 (three) times daily.        Ezequiel Hughes is an established pt  here for postop follow-up after right total hip replacement by Dr. Encarnacion.  The incision was cleaned with hydrogen peroxide.  No staples were removed a subcu closure noted.  The patient was instructed not to soak the incision in standing water but may clean the incision with clean running water and antibacterial soap.  Patient should notify the office of any signs or symptoms of infection including fevers, erythema, purulent drainage, increasing pain.  Patient will continue with DVT prophylaxis.  Patient will start outpatient physical therapy.  Will follow-up in 4-6 weeks.  Patient verbalized understanding of all instructions and agreed with the above plan.    No follow-ups on file.    The patient understands, chooses and consents to this plan and accepts all   the risks which include but are not limited to the risks mentioned above.     Disclaimer: This note was prepared using a voice recognition system and is likely to have sound alike errors within the text.

## 2024-12-02 ENCOUNTER — PATIENT MESSAGE (OUTPATIENT)
Dept: PRIMARY CARE CLINIC | Facility: CLINIC | Age: 84
End: 2024-12-02
Payer: MEDICARE

## 2024-12-05 DIAGNOSIS — F41.1 GAD (GENERALIZED ANXIETY DISORDER): ICD-10-CM

## 2024-12-05 RX ORDER — CITALOPRAM 10 MG/1
TABLET ORAL
Qty: 90 TABLET | Refills: 3 | Status: SHIPPED | OUTPATIENT
Start: 2024-12-05

## 2024-12-05 NOTE — TELEPHONE ENCOUNTER
No care due was identified.  [facility] Embedded Care Due Messages. Reference number: [id number].   12/05/2024 2:22:58 AM CST

## 2024-12-09 ENCOUNTER — PATIENT MESSAGE (OUTPATIENT)
Dept: PRIMARY CARE CLINIC | Facility: CLINIC | Age: 84
End: 2024-12-09
Payer: MEDICARE

## 2024-12-11 ENCOUNTER — LAB VISIT (OUTPATIENT)
Dept: LAB | Facility: HOSPITAL | Age: 84
End: 2024-12-11
Attending: FAMILY MEDICINE
Payer: MEDICARE

## 2024-12-11 ENCOUNTER — OFFICE VISIT (OUTPATIENT)
Dept: PRIMARY CARE CLINIC | Facility: CLINIC | Age: 84
End: 2024-12-11
Payer: MEDICARE

## 2024-12-11 DIAGNOSIS — I10 ESSENTIAL HYPERTENSION: ICD-10-CM

## 2024-12-11 DIAGNOSIS — N18.31 STAGE 3A CHRONIC KIDNEY DISEASE: ICD-10-CM

## 2024-12-11 DIAGNOSIS — R60.9 EDEMA, UNSPECIFIED TYPE: Primary | ICD-10-CM

## 2024-12-11 DIAGNOSIS — R60.9 EDEMA, UNSPECIFIED TYPE: ICD-10-CM

## 2024-12-11 DIAGNOSIS — N40.0 BENIGN PROSTATIC HYPERPLASIA WITHOUT LOWER URINARY TRACT SYMPTOMS: ICD-10-CM

## 2024-12-11 PROCEDURE — 83735 ASSAY OF MAGNESIUM: CPT | Mod: HCNC | Performed by: FAMILY MEDICINE

## 2024-12-11 PROCEDURE — 82550 ASSAY OF CK (CPK): CPT | Mod: HCNC | Performed by: FAMILY MEDICINE

## 2024-12-11 PROCEDURE — 1111F DSCHRG MED/CURRENT MED MERGE: CPT | Mod: HCNC,CPTII,95, | Performed by: FAMILY MEDICINE

## 2024-12-11 PROCEDURE — 99214 OFFICE O/P EST MOD 30 MIN: CPT | Mod: HCNC,95,, | Performed by: FAMILY MEDICINE

## 2024-12-11 PROCEDURE — G2211 COMPLEX E/M VISIT ADD ON: HCPCS | Mod: HCNC,95,, | Performed by: FAMILY MEDICINE

## 2024-12-11 PROCEDURE — 36415 COLL VENOUS BLD VENIPUNCTURE: CPT | Mod: HCNC,PO | Performed by: FAMILY MEDICINE

## 2024-12-11 PROCEDURE — 83880 ASSAY OF NATRIURETIC PEPTIDE: CPT | Mod: HCNC | Performed by: FAMILY MEDICINE

## 2024-12-11 PROCEDURE — 80069 RENAL FUNCTION PANEL: CPT | Mod: HCNC | Performed by: FAMILY MEDICINE

## 2024-12-11 NOTE — PROGRESS NOTES
Chief Complaint  Chief Complaint   Patient presents with    Follow-up       HPI  Ezequiel Hughes is a 84 y.o. male with multiple medical diagnoses as listed in the medical history and problem list that presents for  virtual visit.       History of Present Illness    CHIEF COMPLAINT:  - Patient presents for follow-up after right hip replacement surgery, with concerns about persistent leg swelling and nocturia.    HPI:  Patient underwent right hip replacement surgery 3-4 weeks ago (estimated surgery date around November 13-14). Post-operative complications led to hospital readmission on November 18. During hospitalization, the patient was found to be dehydrated, hypotensive (79/unknown), constipated, and had severe leg edema. He also developed pneumonia, which was treated with antibiotics.    Patient reports gradual improvement in his hip condition. He has attended one physical therapy session, with another scheduled for the upcoming Friday, and additional sessions planned for the following week. Patient is also performing home exercises.    Patient's primary concern is persistent leg edema, particularly below the ankles, which impedes wearing socks. The right leg is typically more swollen than the left. While edema in the upper legs has improved, the lower legs and ankles remain significantly swollen.    Patient reports nocturia, with urinary frequency every 45-60 minutes throughout the night, significantly disrupting sleep. He attempts to maintain hydration by consuming water.    Recent blood pressure readings have been stable: 119/59 (pulse 68) on December 8th and 113/50 on December 7th.    Patient denies any symptoms of deep vein thrombosis in his legs.    MEDICATIONS:  - Losartan 50 mg, twice daily, for blood pressure  - Furosemide (Lasix) 20 mg, daily in the morning, for fluid retention  - Finasteride (Proscar), at night, for prostate  - Alfuzosin 10 mg, with breakfast, for prostate    PMH:  - Hypertension  -  Kidney issues  - Prostate condition    RECENT/REMOTE SURGICAL HISTORY:  - Right hip replacement: November 13th or 14th, 2023         Pmh, Psh, Family Hx, Social Hx updated in Epic Tabs today.     Review of Systems   Constitutional:  Positive for activity change and unexpected weight change. Negative for fatigue.   Cardiovascular:  Positive for leg swelling. Negative for chest pain and palpitations.   Genitourinary:  Positive for frequency and urgency.           Physical Exam  Vitals reviewed.   Constitutional:       General: He is awake. He is not in acute distress.     Appearance: Normal appearance. He is well-developed, well-groomed and normal weight. He is not ill-appearing.   HENT:      Head: Normocephalic and atraumatic.      Right Ear: External ear normal.      Left Ear: External ear normal.      Nose: Nose normal.      Mouth/Throat:      Lips: Pink.   Eyes:      Conjunctiva/sclera: Conjunctivae normal.   Pulmonary:      Effort: Pulmonary effort is normal.   Musculoskeletal:      Right lower leg: Pitting Edema present.      Left lower leg: Pitting Edema present.   Neurological:      Mental Status: He is alert.   Psychiatric:         Attention and Perception: Attention and perception normal. He is attentive.         Mood and Affect: Mood and affect normal. Mood is not anxious or depressed. Affect is not labile, blunt, angry or inappropriate.         Speech: Speech normal. He is communicative. Speech is not rapid and pressured, delayed, slurred or tangential.         Behavior: Behavior normal. Behavior is not agitated, slowed, aggressive, withdrawn, hyperactive or combative. Behavior is cooperative.         Thought Content: Thought content normal. Thought content is not paranoid or delusional. Thought content does not include homicidal or suicidal ideation. Thought content does not include homicidal or suicidal plan.         Cognition and Memory: Cognition and memory normal. Memory is not impaired. He does not  exhibit impaired recent memory or impaired remote memory.         Judgment: Judgment normal. Judgment is not impulsive or inappropriate.       Physical Exam    Extremities: Right leg swelling with no visible ankle. Left leg less swollen than right.  IMAGING:  - Bilateral lower extremity ultrasound: November 18th, no evidence of DVTs bilaterally           ASSESSMENT/PLAN:  1. Edema, unspecified type  -     BNP; Future; Expected date: 12/11/2024  -     Renal Function Panel; Future; Expected date: 12/11/2024  -     CK; Future; Expected date: 12/11/2024  -     Magnesium; Future; Expected date: 12/11/2024    2. Essential hypertension  -     BNP; Future; Expected date: 12/11/2024  -     Renal Function Panel; Future; Expected date: 12/11/2024  -     CK; Future; Expected date: 12/11/2024  -     Magnesium; Future; Expected date: 12/11/2024    3. Stage 3a chronic kidney disease    4. Benign prostatic hyperplasia without lower urinary tract symptoms  Overview:  Benign prostatic hyperplasia        Assessment & Plan    IMPRESSION:  - Evaluated patient's recent hip replacement surgery and subsequent hospital readmission for dehydration, constipation, and declining kidney function  - Assessed current edema in lower extremities, noting right leg more affected than left  - Considered adjusting furosemide dosage to address edema, but decided to check kidney function first due to recent complications  - Reviewed recent blood pressure readings, which have been within acceptable range  - Plan to adjust medications based on new lab results, focusing on furosemide and blood pressure medications    PLAN SUMMARY:  - Ordered renal panel, BNP, magnesium level, and CK level  - Continue Losartan 50 mg twice daily  - Continue furosemide 20 mg in the morning  - Continue finasteride at night and alfuzosin 10 mg with breakfast for prostate management  - Continue physical therapy for hip replacement  - Follow up after lab results are reviewed for  potential medication adjustments  - Consider increasing furosemide dose based on lab results  - Contact office if any issues arise before next appointment    HIP REPLACEMENT:  - Patient had right hip replacement surgery recently and is undergoing physical therapy.  - Patient reports the hip is improving daily.  - Acknowledged the recent hip surgery and its impact on the patient's mobility and overall health.  - Patient reports the hip is improving daily with physical therapy.  - Continuing physical therapy for the hip replacement.    EDEMA:  - Instructed the patient to prop up legs when possible to help reduce swelling.  - Advised the patient to weigh themselves first thing in the morning and again 4-6 hours later to track fluid loss.  - Continued furosemide 20 mg in the morning.  - Patient reports significant swelling in legs, particularly in the ankles.  - Visually examined the patient's legs via video call, noting significant swelling in the right leg and less severe swelling in the left leg.  - Plan to assess kidney function and electrolyte levels before adjusting diuretic medication.  - Consider increasing the dose of furosemide based on lab results.  - Explained the need to check kidney function before increasing diuretic dose.  - Discussed the importance of maintaining hydration while taking furosemide.  - Patient to continue current water intake of 64 ounces daily.    PNEUMONIA:  - Patient developed pneumonia during recent hospitalization.  - Administered antibiotics for pneumonia during recent hospitalization.    HYPERTENSION:  - Continued Losartan 50 mg twice daily.  - Reviewed patient's blood pressure readings: 119/59 with pulse 68 on December 8th, and 113/50 on December 7th.  - Evaluated recent blood pressure readings as good.  - Plan to reassess blood pressure medication based on upcoming lab results.    PROSTATE MANAGEMENT:  - Continued finasteride at night and alfuzosin 10 mg with breakfast for  prostate management.    KIDNEY ISSUES:  - Patient experienced dehydration and kidney issues after surgery, requiring hospitalization  .  - Plan to assess current kidney function through lab work before adjusting medications.  - Emphasized the importance of maintaining hydration.    LABS:  - Ordered renal panel.  - Ordered BNP, magnesium level, and CK level.    OTHER INSTRUCTIONS:  - Explained the relationship between pain medications and constipation after surgery.  - Explained the relationship between pain medications and blood pressure after surgery.    FOLLOW UP:  - Follow up after lab results are reviewed for potential medication adjustments.  - Contact the office if any issues arise before the next appointment.       X-Ray Hip 2 or 3 views Right with Pelvis when performed  Narrative: EXAM: XR HIP WITH PELVIS WHEN PERFORMED 2 OR 3 VIEWS RIGHT    CLINICAL HISTORY: Right hip pain    FINDINGS:  No pelvic fracture is severe left hip joint osteoarthritis.    Total right hip arthroplasty with no dislocation.  No periprosthetic fracture.  Impression:  See above    Finalized on: 11/27/2024 10:07 PM By:  Sander Nazario MD  [facility]# [id number]      2024-11-27 22:09:35.300    [facility]    Lab Results   Component Value Date    WBC 4.15 11/21/2024    HGB 7.4 (L) 11/21/2024    HCT 23.8 (L) 11/21/2024     11/21/2024    CHOL 138 08/23/2024    TRIG 61 08/23/2024    HDL 68 08/23/2024    ALT 28 11/18/2024    AST 28 11/18/2024     11/21/2024    K 4.8 11/21/2024     11/21/2024    CREATININE 1.8 (H) 11/21/2024    BUN 59 (H) 11/21/2024    CO2 21 (L) 11/21/2024    TSH 1.506 08/15/2024    PSA 1.3 11/10/2021    INR 1.2 06/29/2022    HGBA1C 5.1 06/27/2023       Follow-up: Follow up if symptoms worsen or fail to improve.    ______________________________________________________________________    Face to Face time with patient:  2:40 PM CST - 314pm     The patient location is:  home  The chief complaint leading to consultation is:  noted   Visit type: Virtual visit with synchronous audio and video   Each patient to whom he or she provides medical services by telemedicine is:  (1) informed of the relationship between the physician and patient and the respective role of any other health care provider with respect to management of the patient; and (2) notified that he or she may decline to receive medical services by telemedicine and may withdraw from such care at any time.    I spent a total of   30    minutes face to face and non-face to face on the date of this visit.This includes time preparing to see the patient (eg, review of tests, notes), obtaining and/or reviewing additional history from an independent historian and/or outside medical records, documenting clinical information in the electronic health record, independently interpreting results and/or communicating results to the patient/family/caregiver, or care coordinator.  Visit today included increased complexity associated with the care of the episodic problem addressed and managing the longitudinal care of the patient due to the serious and/or complex managed problem(s).    This note was generated with the assistance of ambient listening technology. Verbal consent was obtained by the patient and accompanying visitor(s) for the recording of patient appointment to facilitate this note. I attest to having reviewed and edited the generated note for accuracy, though some syntax or spelling errors may persist. Please contact the author of this note for any clarification.

## 2024-12-12 DIAGNOSIS — I10 ESSENTIAL HYPERTENSION: ICD-10-CM

## 2024-12-12 NOTE — TELEPHONE ENCOUNTER
No care due was identified.  [facility] Embedded Care Due Messages. Reference number: [id number].   12/12/2024 10:03:42 AM CST

## 2024-12-13 LAB
ALBUMIN SERPL BCP-MCNC: 3.3 G/DL (ref 3.5–5.2)
ANION GAP SERPL CALC-SCNC: 9 MMOL/L (ref 8–16)
BNP SERPL-MCNC: 190 PG/ML (ref 0–99)
BUN SERPL-MCNC: 23 MG/DL (ref 8–23)
CALCIUM SERPL-MCNC: 8.7 MG/DL (ref 8.7–10.5)
CHLORIDE SERPL-SCNC: 106 MMOL/L (ref 95–110)
CK SERPL-CCNC: 89 U/L (ref 20–200)
CO2 SERPL-SCNC: 21 MMOL/L (ref 23–29)
CREAT SERPL-MCNC: 1.5 MG/DL (ref 0.5–1.4)
EST. GFR  (NO RACE VARIABLE): 45.1 ML/MIN/1.73 M^2
GLUCOSE SERPL-MCNC: 97 MG/DL (ref 70–110)
MAGNESIUM SERPL-MCNC: 2.2 MG/DL (ref 1.6–2.6)
PHOSPHATE SERPL-MCNC: 3.4 MG/DL (ref 2.7–4.5)
POTASSIUM SERPL-SCNC: 4.8 MMOL/L (ref 3.5–5.1)
SODIUM SERPL-SCNC: 136 MMOL/L (ref 136–145)

## 2024-12-13 RX ORDER — FUROSEMIDE 20 MG/1
20 TABLET ORAL DAILY PRN
Qty: 30 TABLET | Refills: 0 | Status: SHIPPED | OUTPATIENT
Start: 2024-12-13 | End: 2025-12-13

## 2024-12-13 NOTE — TELEPHONE ENCOUNTER
Please call the lab in [place] and in [place] to find out why this patient's labs from Wednesday afternoon have not been processed? I was expecting them back yesterday to determine treatment.     If it needs to be recollected then please re-enter orders and have them drawn at [place] as well.     Please inform.

## 2024-12-16 ENCOUNTER — LAB VISIT (OUTPATIENT)
Dept: LAB | Facility: HOSPITAL | Age: 84
End: 2024-12-16
Attending: UROLOGY
Payer: MEDICARE

## 2024-12-16 DIAGNOSIS — C61 PROSTATE CANCER: ICD-10-CM

## 2024-12-16 LAB — COMPLEXED PSA SERPL-MCNC: 1.7 NG/ML (ref 0–4)

## 2024-12-16 PROCEDURE — 36415 COLL VENOUS BLD VENIPUNCTURE: CPT | Mod: HCNC,PN | Performed by: UROLOGY

## 2024-12-16 PROCEDURE — 84153 ASSAY OF PSA TOTAL: CPT | Mod: HCNC | Performed by: UROLOGY

## 2024-12-18 ENCOUNTER — PATIENT MESSAGE (OUTPATIENT)
Dept: ORTHOPEDICS | Facility: CLINIC | Age: 84
End: 2024-12-18
Payer: MEDICARE

## 2024-12-18 NOTE — PROGRESS NOTES
Ok thank you for sending.   Let's increase the lasix to 40mg daily for next 7 days.   Then have you go back to lasix 20mg daily after that.     New Rx was sent to [facility] for just the 40mg of lasix for the 7 days.   New Rx was sent to [facility] (mail order) on 12/13/24 for your regular dose of lasix 20mg to resume back on once you receive it in the mail.     Labs reviewed. Bnp ( fluid number from heart) is better than last time. Kidney function is also improving so continue with the 64 oz of water a day.     Potassium and magnesium levels are good.     Please let me know if you have further questions.    Sincerely,   Valery Ozuna MD

## 2024-12-19 NOTE — TELEPHONE ENCOUNTER
Liliana Mcarthur, PA to Me       12/18/24  1:07 PM  We do not treat spinal issues here in orthopedics  He will need to follow-up with established NSG or pain  If he needs a referral outside of [facility] please let me know

## 2024-12-20 ENCOUNTER — OFFICE VISIT (OUTPATIENT)
Dept: UROLOGY | Facility: CLINIC | Age: 84
End: 2024-12-20
Payer: MEDICARE

## 2024-12-20 VITALS
BODY MASS INDEX: 30.71 KG/M2 | WEIGHT: 219.38 LBS | SYSTOLIC BLOOD PRESSURE: 99 MMHG | HEART RATE: 67 BPM | RESPIRATION RATE: 18 BRPM | DIASTOLIC BLOOD PRESSURE: 60 MMHG | HEIGHT: 71 IN

## 2024-12-20 DIAGNOSIS — N32.81 OAB (OVERACTIVE BLADDER): ICD-10-CM

## 2024-12-20 DIAGNOSIS — C61 PROSTATE CANCER: Primary | ICD-10-CM

## 2024-12-20 DIAGNOSIS — N40.1 BPH WITH OBSTRUCTION/LOWER URINARY TRACT SYMPTOMS: ICD-10-CM

## 2024-12-20 DIAGNOSIS — N13.8 BPH WITH OBSTRUCTION/LOWER URINARY TRACT SYMPTOMS: ICD-10-CM

## 2024-12-20 LAB
BILIRUB UR QL STRIP: NEGATIVE
GLUCOSE UR QL STRIP: NEGATIVE
KETONES UR QL STRIP: NEGATIVE
LEUKOCYTE ESTERASE UR QL STRIP: NEGATIVE
PH, POC UA: 5
POC BLOOD, URINE: NEGATIVE
POC NITRATES, URINE: NEGATIVE
POC RESIDUAL URINE VOLUME: 45 ML (ref 0–100)
PROT UR QL STRIP: NEGATIVE
SP GR UR STRIP: <1.005 (ref 1–1.03)
UROBILINOGEN UR STRIP-ACNC: 0.2 (ref 0.3–2.2)

## 2024-12-20 PROCEDURE — 99999 PR PBB SHADOW E&M-EST. PATIENT-LVL IV: CPT | Mod: PBBFAC,HCNC,, | Performed by: UROLOGY

## 2024-12-20 RX ORDER — MIRABEGRON 25 MG/1
25 TABLET, FILM COATED, EXTENDED RELEASE ORAL DAILY
Qty: 30 TABLET | Refills: 11 | Status: SHIPPED | OUTPATIENT
Start: 2024-12-20 | End: 2025-12-20

## 2024-12-20 NOTE — PROGRESS NOTES
"    CC: follow up prostate cancer    History of Present Illness:   Ezequiel Hughes is a 84 y.o. male here for evaluation of Follow-up      12/20/24-Pt having nocturia sometimes every hour. He has been having significant lower extremity edema and he is on lasix and compression stockings. Had a hip surgery last month, but was having edema before that. He does feel like he is emptying well. Having some UUI.     10/15/24: here for biopsy results. Path shows Rae grade group 2 prostate cancer in 2/7 cores at the target. None otherwise. Pt did well following biopsy. No longer having significant gross hematuria or hematochezia. No fever.     8/27/24-MRI shows a PI RADS 4 lesion, measuring 7x4mm in the  right posteriolateral peripheral zone at the mid-gland. I have personally reviewed these images.   Hasn't started alfuzosin yet, but he states that it was sent to him. He does have slight UUI at times. Still having significant nocturia. No gross hematuria or dysuria.     8/16/24-still with a lot of nocturia (6-7). Tries to limit pm fluid intake. He was having significant discomfort in his groin/leg on the right, which was causing limping. He has also had injections in his hip. Still on finasteride.     5/10/24-PSA up a little. Pt continues to take  finasteride. States that his urination is "terrible". He has nocturia x 7-8. He is scared to take vesicare. Sometimes feels like he doesn't empty. He does have GUIDO, but doesn't use his C-pap.     2/7/24-Pt currently in PT for his back. LUTS have been better. Nocturia x 2 last night. Emptying better. No hesitancy or stranguria. No gross hematuria. Slight UUI at times. He is not taking vesicare yet, but wants to start. States that he does have it.     11/21/23-IPSS 24, QoL 5 (unhappy). Nocutria x 4. He is currently on alfuzosin and finasteride but is out of the solifenacin. Took it for 1 month and it didn't help. Would like to try something stronger. Primary bother is urgency. No " "stranguria or difficulty emptying. Nocturia x 4.   8/11/23-Pt on proscar. He quit detrol because he wet the bed. Nocturia x 2-6. Stream is not always good. Hasn't tried Viagra yet. Was in the ED the other day with chest pain and had a UA, which showed 1+ blood, but no RBCs. Wearing his C-pap.   5/5/23-Pt reports that he is on finasteride, but isn't taking alfuzosin. Nocturia x 5-6. He has a little slight "leakage" throughout the day. He does have urgency. Didn't have any improvement with alfuzosin.   9/28/22-On finasteride. Nocturia x 4-5. Tried flomax a long time ago and it didn't help. Drinks 2 cups of coffee in the am. Not drinking about an hour before bed. No gross hematuria.   6/30/22-Nocturia x 1 lately, but prior to the last 2 nights, it has been 4 times. Still on finasteride. He does have urgency and occasional UUI. If he goes out, he wears a pad. Stream is weak. No hesitancy. Recently, visits have gone to every 6 months. Pt reports occasional RLQ pain. He does have come constipation, but pain doesn't change with BM. Persistent for a month.    Prior records indicate last MRI and TRUS biopsy in 2020.   3/29/22- 83yo male with h/o Prostate cancer, here to establish care. He has previously seen Dr. Wong and was undergoing active surveillance. He reports that he was diagnosed with prostate cancer in 2014. He hasn't had any treatment. He is currently managed on finasteride. He does report some UUI, small amounts. He had a repeat TRUS biopsy in 2021, and pt reports path was unchanged. Also had MRIs around that time as well and states that he had a targetable lesion.         Review of Systems   Respiratory:  Negative for shortness of breath.    Cardiovascular:  Negative for chest pain and palpitations.   Genitourinary:  Negative for hematuria.   Musculoskeletal:  Positive for back pain and neck pain.   Neurological:  Positive for numbness (legs). Negative for dizziness.   All other systems reviewed and are " negative.        Past Medical History:   Diagnosis Date    Allergic rhinitis     Anemia     Back pain     BPH (benign prostatic hyperplasia)     Cancer     skin cancer to neck, Dr. Graves    Cataract     Disorder of kidney and ureter     ED (erectile dysfunction)     OMARI (generalized anxiety disorder) 08/07/2023    Hiatal hernia     small    History of colon polyps     colonoscopy 11/2016    HLD (hyperlipidemia)     Hyperlipidemia     Hypertension     Lateral epicondylitis     Lumbar radiculopathy 01/26/2022    OA (osteoarthritis)     GUIDO (obstructive sleep apnea)     Pneumonia of right middle lobe due to infectious organism 11/18/2024    Polyneuropathy     Prostate cancer     Dr. Wong    TIA (transient ischemic attack)     Trouble in sleeping     Urge incontinence 03/29/2022       Past Surgical History:   Procedure Laterality Date    ANGIOGRAPHY OF UPPER EXTREMITY Left 07/05/2022    Procedure: Angiogram Extremity Bilateral;  Surgeon: Aprana Thompson MD;  Location: [facility], [place] CATH LAB;  Service: Cardiology;  Laterality: Left;    ARTERIOGRAPHY OF AORTIC ROOT N/A 07/05/2022    Procedure: ARTERIOGRAM, AORTIC ROOT;  Surgeon: Aparna Thompson MD;  Location: [facility], [place] CATH LAB;  Service: Cardiology;  Laterality: N/A;    BLOCK, NERVE, PERIPHERAL Right 9/12/2024    Procedure: right femoral/ obturator nerve block- with steroids;  Surgeon: Abel Haywood MD;  Location: [facility] PAIN MGT;  Service: Pain Management;  Laterality: Right;    CATARACT EXTRACTION W/  INTRAOCULAR LENS IMPLANT Right 02/21/2018    I-Stent    CATARACT EXTRACTION W/  INTRAOCULAR LENS IMPLANT Left 03/21/2018    I - Stent    CATHETERIZATION OF BOTH LEFT AND RIGHT HEART N/A 07/05/2022    Procedure: CATHETERIZATION, HEART, BOTH LEFT AND RIGHT;  Surgeon: Aparna Thompson MD;  Location: [facility], [place] CATH LAB;  Service: Cardiology;  Laterality: N/A;  radial approach    COLONOSCOPY N/A 11/14/2016    Procedure: COLONOSCOPY;  Surgeon: Karuna Rodriguez MD;  Location: [facility], [place]  ENDO;  Service: Endoscopy;  Laterality: N/A;    COLONOSCOPY N/A 09/22/2020    Procedure: COLONOSCOPY;  Surgeon: Chuy Fish MD;  Location: [facility] ENDO;  Service: Endoscopy;  Laterality: N/A;    EPIDURAL STEROID INJECTION N/A 6/6/2024    Procedure: Lumbar L5/S1 IL IAN;  Surgeon: Abel Haywood MD;  Location: [facility] PAIN MGT;  Service: Pain Management;  Laterality: N/A;    EPIDURAL STEROID INJECTION INTO CERVICAL SPINE N/A 2/8/2024    Procedure: C5/6 IL Right paramedian approach IAN with RN IV sedation;  Surgeon: Abel Haywood MD;  Location: [facility] PAIN MGT;  Service: Pain Management;  Laterality: N/A;    EYE SURGERY      HEMORRHOID SURGERY      HIP ARTHROPLASTY Right 11/13/2024    Procedure: ARTHROPLASTY, HIP;  Surgeon: Denton Encarnacion MD;  Location: [facility] OR;  Service: Orthopedics;  Laterality: Right;    I-STENT Right 02/21/2018    DR. REECE    INJECTION OF ANESTHETIC AGENT AROUND MEDIAL BRANCH NERVES INNERVATING CERVICAL FACET JOINT Bilateral 7/18/2023    Procedure: Bilateral C4-6 MBB with RN IV sedation (diagnostic, #1);  Surgeon: Abel Haywood MD;  Location: [facility] PAIN MGT;  Service: Pain Management;  Laterality: Bilateral;    KNEE ARTHROSCOPY W/ MENISCAL REPAIR Right 2015    Dr. Jain    PCIOL Right 02/21/2018    DR. REECE    PLANTAR FASCIA RELEASE      right    ROTATOR CUFF REPAIR Bilateral     bilateral    SELECTIVE INJECTION OF ANESTHETIC AGENT AROUND LUMBAR SPINAL NERVE ROOT BY TRANSFORAMINAL APPROACH Bilateral 01/26/2022    Procedure: Bilateral L4/5 TF IAN with RN IV sedation;  Surgeon: Mak Young MD;  Location: [facility] PAIN MGT;  Service: Pain Management;  Laterality: Bilateral;    SELECTIVE INJECTION OF ANESTHETIC AGENT AROUND LUMBAR SPINAL NERVE ROOT BY TRANSFORAMINAL APPROACH Bilateral 03/14/2022    Procedure: Bilateral L4/5 TF IAN with RN IV sedation;  Surgeon: Mak Young MD;  Location: [facility] PAIN MGT;  Service: Pain Management;  Laterality: Bilateral;    SELECTIVE INJECTION OF  ANESTHETIC AGENT AROUND LUMBAR SPINAL NERVE ROOT BY TRANSFORAMINAL APPROACH Bilateral 06/02/2022    Procedure: Bilateral L4/5 TF IAN - D2P Dr. Castro NS;  Surgeon: Abel Haywood MD;  Location: [facility] PAIN MGT;  Service: Pain Management;  Laterality: Bilateral;    SELECTIVE INJECTION OF ANESTHETIC AGENT AROUND LUMBAR SPINAL NERVE ROOT BY TRANSFORAMINAL APPROACH Bilateral 08/25/2022    Procedure: Bilateral L4/5 TF IAN;  Surgeon: Abel Haywood MD;  Location: [facility] PAIN MGT;  Service: Pain Management;  Laterality: Bilateral;    SELECTIVE INJECTION OF ANESTHETIC AGENT AROUND LUMBAR SPINAL NERVE ROOT BY TRANSFORAMINAL APPROACH Bilateral 6/29/2023    Procedure: Bilateral L4/5 TF IAN RN IV Sedation;  Surgeon: Abel Haywood MD;  Location: [facility] PAIN MGT;  Service: Pain Management;  Laterality: Bilateral;    SELECTIVE INJECTION OF ANESTHETIC AGENT AROUND LUMBAR SPINAL NERVE ROOT BY TRANSFORAMINAL APPROACH Bilateral 4/11/2024    Procedure: Bilateral L4/5 TF IAN;  Surgeon: Abel Haywood MD;  Location: [facility] PAIN MGT;  Service: Pain Management;  Laterality: Bilateral;    SHOULDER SURGERY Bilateral around 2000    Dr. Pepper.  rotator cuff surgeries    VASECTOMY         Family History   Problem Relation Name Age of Onset    Lung cancer Father Isaiah Hughes         life long smoker    Cancer Father Isaiah Hughes         prostate, lung    Arthritis Mother Emely Hughes     Stroke Sister          TIA    Cataracts Sister      Cancer Brother          prostate    Cataracts Brother      Cataracts Sister      Melanoma Neg Hx      Psoriasis Neg Hx      Lupus Neg Hx      Eczema Neg Hx      Diabetes Neg Hx      Heart disease Neg Hx      Kidney disease Neg Hx      Colon cancer Neg Hx         Social History     Tobacco Use    Smoking status: Former     Current packs/day: 0.00     Average packs/day: 3.0 packs/day for 35.0 years (104.9 ttl pk-yrs)     Types: Cigarettes     Start date: 1/1/1960     Quit date: 7/23/1985     Years since  quittin.4     Passive exposure: Past    Smokeless tobacco: Never   Substance Use Topics    Alcohol use: Yes     Alcohol/week: 3.0 standard drinks of alcohol     Types: 3 Cans of beer per week     Comment: socially    Drug use: No       Current Outpatient Medications   Medication Sig Dispense Refill    albuterol (VENTOLIN HFA) 90 mcg/actuation inhaler Inhale 2 puffs into the lungs every 6 (six) hours as needed for Wheezing. Rescue 18 g 0    alfuzosin (UROXATRAL) 10 mg Tb24 Take 1 tablet (10 mg total) by mouth daily with breakfast. 90 tablet 3    atorvastatin (LIPITOR) 20 MG tablet Take 1 tablet (20 mg total) by mouth once daily. 90 tablet 3    azelastine (ASTELIN) 137 mcg (0.1 %) nasal spray 1 spray (137 mcg total) by Nasal route 2 (two) times daily. 90 mL 3    clopidogreL (PLAVIX) 75 mg tablet TAKE 1 TABLET EVERY DAY 90 tablet 2    finasteride (PROSCAR) 5 mg tablet Take 1 tablet (5 mg total) by mouth once daily. 90 tablet 4    furosemide (LASIX) 40 MG tablet Take 1 tablet (40 mg total) by mouth once daily. for 7 days 7 tablet 0    losartan (COZAAR) 50 MG tablet TAKE 1 TABLET TWICE DAILY 180 tablet 2    methocarbamoL (ROBAXIN) 500 MG Tab Take 1 tablet (500 mg total) by mouth 3 (three) times daily. 90 tablet 0    ondansetron (ZOFRAN-ODT) 4 MG TbDL Dissolve 2 tablets (8 mg total) by mouth every 8 (eight) hours as needed (Nausea). 21 tablet 0    pantoprazole (PROTONIX) 40 MG tablet TAKE 1 TABLET EVERY DAY 90 tablet 1    polyethylene glycol (GLYCOLAX) 17 gram/dose powder Take 17 g by mouth once daily.      pregabalin (LYRICA) 75 MG capsule Take 1 capsule (75 mg total) by mouth 2 (two) times daily. 180 capsule 1    sildenafiL (VIAGRA) 100 MG tablet Take 1 po 45 minutes before intercourse 30 tablet 7    vitamin D (VITAMIN D3) 1000 units Tab Take 1,000 Units by mouth once daily.      citalopram (CELEXA) 10 MG tablet TAKE 1 TABLET ONE TIME DAILY FOR ANXIETY 90 tablet 3    furosemide (LASIX) 20 MG tablet Take 1 tablet  (20 mg total) by mouth daily as needed (edema). (Patient not taking: Reported on 12/20/2024) 30 tablet 0    mirabegron (MYRBETRIQ) 25 mg Tb24 ER tablet Take 1 tablet (25 mg total) by mouth once daily. 30 tablet 11     No current facility-administered medications for this visit.       Review of patient's allergies indicates:   Allergen Reactions    Atarax [hydroxyzine hcl] Other (See Comments)     Raised blood pressure     Zyrtec [cetirizine] Other (See Comments)     Raised blood pressure     Gold sodium thiosulfate      Patch test positive    Meloxicam Rash     Other reaction(s): hypertension       Physical Exam  Vitals:    12/20/24 1019   BP: 99/60   Pulse: 67   Resp: 18         General: Well-developed, well-nourished in no acute distress  HEENT: Normocephalic, atraumatic, Extraocular movements intact  Neck: supple, trachea midline, no cervical or supraclavicular lymphadenopathy  Respirations: even and unlabored  Rectal: 11/21/23->40g prostate, no nodules or tenderness. No gross blood  Extremities: atraumatic, moves all equally, no clubbing, cyanosis, 2+LE edema bilaterally  Psych: normal affect  Skin: warm and dry, no lesions  Neuro: Alert and oriented, Cranial nerves II-XII grossly intact    PVR:45cc    UA: negative for blood, LE, nit    PSA  7/7/21: 1.3  3/23/22: 1.5  6/16/22: 1.4  9/26/22: 1.5  12/27/22: 1.9  2/9/23: 1.3  8/3/23: 2.0  11/14/23: 1.4  2/6/24: 1.6  5/3/24: 2.8  8/9/24: 4.8  8/16/24: 2.8  12/16/24: 1.7    Assessment:   1. Prostate cancer  PROSTATE SPECIFIC ANTIGEN, DIAGNOSTIC    POCT Urinalysis, Dipstick, Automated, W/O Scope    POCT Bladder Scan      2. OAB (overactive bladder)        3. BPH with obstruction/lower urinary tract symptoms                    Plan:  Prostate cancer  -     PROSTATE SPECIFIC ANTIGEN, DIAGNOSTIC; Future; Expected date: 03/20/2025  -     POCT Urinalysis, Dipstick, Automated, W/O Scope  -     POCT Bladder Scan    OAB (overactive bladder)    BPH with obstruction/lower  urinary tract symptoms    Other orders  -     mirabegron (MYRBETRIQ) 25 mg Tb24 ER tablet; Take 1 tablet (25 mg total) by mouth once daily.  Dispense: 30 tablet; Refill: 11      Continue finasteride and alfuzosin.     Follow up in about 3 months (around 3/20/2025) for labs before visit.

## 2024-12-27 ENCOUNTER — DOCUMENT SCAN (OUTPATIENT)
Dept: HOME HEALTH SERVICES | Facility: HOSPITAL | Age: 84
End: 2024-12-27
Payer: MEDICARE

## 2025-01-02 ENCOUNTER — DOCUMENT SCAN (OUTPATIENT)
Dept: HOME HEALTH SERVICES | Facility: HOSPITAL | Age: 85
End: 2025-01-02
Payer: MEDICARE

## 2025-01-02 ENCOUNTER — PATIENT MESSAGE (OUTPATIENT)
Dept: PRIMARY CARE CLINIC | Facility: CLINIC | Age: 85
End: 2025-01-02
Payer: MEDICARE

## 2025-01-05 NOTE — PROGRESS NOTES
RHEUMATOLOGY CLINIC INITIAL VISIT    Reason for consult:-  Hip pain    Chief complaints, HPI, ROS, EXAM, Assessment & Plans:-  Ezequiel Mosquera a 84 y.o. pleasant male comes in with hip pain.  Longstanding history of severe osteoarthritis involving multiple joints including shoulders and hip joints referred for medical management of osteoarthritis.  Status post joint replacement bilateral shoulders.  Recovered well from recent replacement of right hip.  Denies any left hip joint pain.  Worsening edema of bilateral lower extremities right more than left.  Wearing compression stockings daily.  No small joint pain.  Rheumatological review of system negative otherwise.  Exam shows no synovitis, dactylitis, enthesitis or effusion.  Significant pedal edema bilateral extremities.    1. Primary osteoarthritis involving multiple joints    2. Left hip pain    3. Pedal edema      Problem List Items Addressed This Visit       Osteoarthritis - Primary    Overview     Arthritis    Osteoarthritis         Pedal edema     Other Visit Diagnoses       Left hip pain                Severe osteoarthritis without any evidence of underlying inflammatory arthritic disease.  Advised to continue taking Tylenol Arthritis Strength extended.  Add glucosamine chondroitin supplements.  Anti-inflammatory diet.  Continue Lyrica for neuropathic and osteoarthritic.  I have explained all of the above in detail and the patient understands all of the current recommendation(s). I have answered all questions to the best of my ability and to their complete satisfaction.         # Follow up if symptoms worsen or fail to improve.      Past Medical History:   Diagnosis Date    Allergic rhinitis     Anemia     Back pain     BPH (benign prostatic hyperplasia)     Cancer     skin cancer to neck, Dr. Graves    Cataract     Disorder of kidney and ureter     ED (erectile dysfunction)     OMARI (generalized anxiety disorder) 08/07/2023    Hiatal hernia     small     History of colon polyps     colonoscopy 11/2016    HLD (hyperlipidemia)     Hyperlipidemia     Hypertension     Lateral epicondylitis     Lumbar radiculopathy 01/26/2022    OA (osteoarthritis)     GUIDO (obstructive sleep apnea)     Pneumonia of right middle lobe due to infectious organism 11/18/2024    Polyneuropathy     Prostate cancer     Dr. Wong    TIA (transient ischemic attack)     Trouble in sleeping     Urge incontinence 03/29/2022       Past Surgical History:   Procedure Laterality Date    ANGIOGRAPHY OF UPPER EXTREMITY Left 07/05/2022    Procedure: Angiogram Extremity Bilateral;  Surgeon: Aparna Thompson MD;  Location: [facility] CATH LAB;  Service: Cardiology;  Laterality: Left;    ARTERIOGRAPHY OF AORTIC ROOT N/A 07/05/2022    Procedure: ARTERIOGRAM, AORTIC ROOT;  Surgeon: Aparna Thompson MD;  Location: [facility] CATH LAB;  Service: Cardiology;  Laterality: N/A;    BLOCK, NERVE, PERIPHERAL Right 9/12/2024    Procedure: right femoral/ obturator nerve block- with steroids;  Surgeon: Abel Haywood MD;  Location: [facility] PAIN MGT;  Service: Pain Management;  Laterality: Right;    CATARACT EXTRACTION W/  INTRAOCULAR LENS IMPLANT Right 02/21/2018    I-Stent    CATARACT EXTRACTION W/  INTRAOCULAR LENS IMPLANT Left 03/21/2018    I - Stent    CATHETERIZATION OF BOTH LEFT AND RIGHT HEART N/A 07/05/2022    Procedure: CATHETERIZATION, HEART, BOTH LEFT AND RIGHT;  Surgeon: Aparna Thompson MD;  Location: [facility] CATH LAB;  Service: Cardiology;  Laterality: N/A;  radial approach    COLONOSCOPY N/A 11/14/2016    Procedure: COLONOSCOPY;  Surgeon: Karuna Rodriguez MD;  Location: [facility] ENDO;  Service: Endoscopy;  Laterality: N/A;    COLONOSCOPY N/A 09/22/2020    Procedure: COLONOSCOPY;  Surgeon: Chuy Fish MD;  Location: [facility] ENDO;  Service: Endoscopy;  Laterality: N/A;    EPIDURAL STEROID INJECTION N/A 6/6/2024    Procedure: Lumbar L5/S1 IL IAN;  Surgeon: Abel Haywood MD;  Location: [facility] PAIN MGT;  Service: Pain  Management;  Laterality: N/A;    EPIDURAL STEROID INJECTION INTO CERVICAL SPINE N/A 2/8/2024    Procedure: C5/6 IL Right paramedian approach IAN with RN IV sedation;  Surgeon: Abel Haywood MD;  Location: [facility] PAIN MGT;  Service: Pain Management;  Laterality: N/A;    EYE SURGERY      HEMORRHOID SURGERY      HIP ARTHROPLASTY Right 11/13/2024    Procedure: ARTHROPLASTY, HIP;  Surgeon: Denton Encarnacion MD;  Location: [facility] OR;  Service: Orthopedics;  Laterality: Right;    I-STENT Right 02/21/2018    DR. REECE    INJECTION OF ANESTHETIC AGENT AROUND MEDIAL BRANCH NERVES INNERVATING CERVICAL FACET JOINT Bilateral 7/18/2023    Procedure: Bilateral C4-6 MBB with RN IV sedation (diagnostic, #1);  Surgeon: Abel Haywood MD;  Location: [facility] PAIN MGT;  Service: Pain Management;  Laterality: Bilateral;    KNEE ARTHROSCOPY W/ MENISCAL REPAIR Right 2015    Dr. Jain    PCIOL Right 02/21/2018    DR. REECE    PLANTAR FASCIA RELEASE      right    ROTATOR CUFF REPAIR Bilateral     bilateral    SELECTIVE INJECTION OF ANESTHETIC AGENT AROUND LUMBAR SPINAL NERVE ROOT BY TRANSFORAMINAL APPROACH Bilateral 01/26/2022    Procedure: Bilateral L4/5 TF IAN with RN IV sedation;  Surgeon: Mak Young MD;  Location: [facility] PAIN MGT;  Service: Pain Management;  Laterality: Bilateral;    SELECTIVE INJECTION OF ANESTHETIC AGENT AROUND LUMBAR SPINAL NERVE ROOT BY TRANSFORAMINAL APPROACH Bilateral 03/14/2022    Procedure: Bilateral L4/5 TF IAN with RN IV sedation;  Surgeon: Mak Young MD;  Location: [facility] PAIN MGT;  Service: Pain Management;  Laterality: Bilateral;    SELECTIVE INJECTION OF ANESTHETIC AGENT AROUND LUMBAR SPINAL NERVE ROOT BY TRANSFORAMINAL APPROACH Bilateral 06/02/2022    Procedure: Bilateral L4/5 TF IAN - D2P Dr. Castro [facility];  Surgeon: Abel Haywood MD;  Location: [facility] PAIN MGT;  Service: Pain Management;  Laterality: Bilateral;    SELECTIVE INJECTION OF ANESTHETIC AGENT AROUND LUMBAR SPINAL NERVE ROOT BY  TRANSFORAMINAL APPROACH Bilateral 2022    Procedure: Bilateral L4/5 TF IAN;  Surgeon: Abel Haywood MD;  Location: [facility] PAIN MGT;  Service: Pain Management;  Laterality: Bilateral;    SELECTIVE INJECTION OF ANESTHETIC AGENT AROUND LUMBAR SPINAL NERVE ROOT BY TRANSFORAMINAL APPROACH Bilateral 2023    Procedure: Bilateral L4/5 TF IAN RN IV Sedation;  Surgeon: Abel Haywood MD;  Location: [facility] PAIN MGT;  Service: Pain Management;  Laterality: Bilateral;    SELECTIVE INJECTION OF ANESTHETIC AGENT AROUND LUMBAR SPINAL NERVE ROOT BY TRANSFORAMINAL APPROACH Bilateral 2024    Procedure: Bilateral L4/5 TF IAN;  Surgeon: Abel Haywood MD;  Location: [facility] PAIN MGT;  Service: Pain Management;  Laterality: Bilateral;    SHOULDER SURGERY Bilateral around     Dr. Pepper.  rotator cuff surgeries    VASECTOMY          Social History     Tobacco Use    Smoking status: Former     Current packs/day: 0.00     Average packs/day: 3.0 packs/day for 35.0 years (104.9 ttl pk-yrs)     Types: Cigarettes     Start date: 1960     Quit date: 1985     Years since quittin.4     Passive exposure: Past    Smokeless tobacco: Never   Substance Use Topics    Alcohol use: Yes     Alcohol/week: 3.0 standard drinks of alcohol     Types: 3 Cans of beer per week     Comment: socially    Drug use: No       Family History   Problem Relation Name Age of Onset    Lung cancer Father Isaiah Hughes         life long smoker    Cancer Father Isaiah Hughes         prostate, lung    Arthritis Mother Emely Hughes     Stroke Sister          TIA    Cataracts Sister      Cancer Brother          prostate    Cataracts Brother      Cataracts Sister      Melanoma Neg Hx      Psoriasis Neg Hx      Lupus Neg Hx      Eczema Neg Hx      Diabetes Neg Hx      Heart disease Neg Hx      Kidney disease Neg Hx      Colon cancer Neg Hx         Review of patient's allergies indicates:   Allergen Reactions    Atarax [hydroxyzine hcl] Other (See  Comments)     Raised blood pressure     Zyrtec [cetirizine] Other (See Comments)     Raised blood pressure     Gold sodium thiosulfate      Patch test positive    Meloxicam Rash     Other reaction(s): hypertension       Medication List with Changes/Refills   Current Medications    ALBUTEROL (VENTOLIN HFA) 90 MCG/ACTUATION INHALER    Inhale 2 puffs into the lungs every 6 (six) hours as needed for Wheezing. Rescue    ALFUZOSIN (UROXATRAL) 10 MG TB24    Take 1 tablet (10 mg total) by mouth daily with breakfast.    ATORVASTATIN (LIPITOR) 20 MG TABLET    Take 1 tablet (20 mg total) by mouth once daily.    AZELASTINE (ASTELIN) 137 MCG (0.1 %) NASAL SPRAY    1 spray (137 mcg total) by Nasal route 2 (two) times daily.    BUMETANIDE (BUMEX) 1 MG TABLET    Take 1 tablet (1 mg total) by mouth once daily. For leg swelling ( to replace furosemide)    CITALOPRAM (CELEXA) 10 MG TABLET    TAKE 1 TABLET ONE TIME DAILY FOR ANXIETY    CLOPIDOGREL (PLAVIX) 75 MG TABLET    TAKE 1 TABLET EVERY DAY    FINASTERIDE (PROSCAR) 5 MG TABLET    Take 1 tablet (5 mg total) by mouth once daily.    FUROSEMIDE (LASIX) 20 MG TABLET    Take 1 tablet (20 mg total) by mouth daily as needed (edema).    FUROSEMIDE (LASIX) 40 MG TABLET    Take 1 tablet (40 mg total) by mouth once daily. for 7 days    LOSARTAN (COZAAR) 50 MG TABLET    TAKE 1 TABLET TWICE DAILY    METHOCARBAMOL (ROBAXIN) 500 MG TAB    Take 1 tablet (500 mg total) by mouth 3 (three) times daily.    MIRABEGRON (MYRBETRIQ) 25 MG TB24 ER TABLET    Take 1 tablet (25 mg total) by mouth once daily.    ONDANSETRON (ZOFRAN-ODT) 4 MG TBDL    Dissolve 2 tablets (8 mg total) by mouth every 8 (eight) hours as needed (Nausea).    PANTOPRAZOLE (PROTONIX) 40 MG TABLET    TAKE 1 TABLET EVERY DAY    POLYETHYLENE GLYCOL (GLYCOLAX) 17 GRAM/DOSE POWDER    Take 17 g by mouth once daily.    PREGABALIN (LYRICA) 75 MG CAPSULE    Take 1 capsule (75 mg total) by mouth 2 (two) times daily.    SILDENAFIL (VIAGRA) 100 MG  TABLET    Take 1 po 45 minutes before intercourse    VITAMIN D (VITAMIN D3) 1000 UNITS TAB    Take 1,000 Units by mouth once daily.         Thank you for allowing me to participate in the care ofEzequiel Hughes.    Disclaimer: This note was prepared using voice recognition system and is likely to have sound alike errors and is not proof read.  Please call me with any questions.

## 2025-01-05 NOTE — PROGRESS NOTES
Subjective:   Patient ID:  Ezequiel Hughes is a 83 y.o. male who presents for follow up of No chief complaint on file.      GISSELL KEBEDE 8/23/2023  Mr. Hughes is an 83 year old male patient whose current medical conditions include TIA, HTN, and GUIDO who presents today for routine follow-up. Patient returns today and states he is doing well. Main issue is lower back and neck pain. Followed by pain mgmt, scheduled to see neurosurgery soon. CV wise, remains stable. He had an ED visit on 8/2/23 due to CP symptoms. Troponin was negative and he was d/c'd home. No recurrence since that time. No exertional symptoms. He feels it was related to gas. No unusual SOB/MCKEON. No LH, dizziness, palpitations, near syncope, or syncope. No s/s suggestive of CHF. Very physically active, typically walks on treadmill, maintains his lawn, etc. BP stable and controlled. Repeat by me today 120/62. Patient is compliant with his medications. Recently started on Celexa by PCP.     Prior LHC 7/22 very minimal/non-obstructive CAD. Echo 6/22 with normal EF.      Labs from 7/23 reviewed. CMP stable. Lipids at goal.  2/28/2024   HERE FROF /U HE HAS BACK PAIN AND  LEG NUMBNESS. HE HAS BEEN TO CHIROPRACTOR. HE AHS NO FALLS. HE CAN ABORT IT. HAS NO CHEST PAIN HE HAS GAINED WEIGHT NOT AS ACTIVE AS BEFORE. HAS LEG SWELLING. HAS NO SYMPTOMS AT 1 MILE   Past Medical History:   Diagnosis Date    Allergic rhinitis     Anemia     Back pain     BPH (benign prostatic hyperplasia)     Cancer     skin cancer to neck, Dr. Graves    Cataract     Disorder of kidney and ureter     ED (erectile dysfunction)     OMARI (generalized anxiety disorder) 8/7/2023    Hiatal hernia     small    History of colon polyps     colonoscopy 11/2016    HLD (hyperlipidemia)     Hyperlipidemia     Hypertension     Lateral epicondylitis     Lumbar radiculopathy 1/26/2022    OA (osteoarthritis)     GUIDO (obstructive sleep apnea)     Prostate cancer     Dr. Wong    TIA (transient  ischemic attack)     Trouble in sleeping     Urge incontinence 3/29/2022       Past Surgical History:   Procedure Laterality Date    ANGIOGRAPHY OF UPPER EXTREMITY Left 07/05/2022    Procedure: Angiogram Extremity Bilateral;  Surgeon: Aparna Thompson MD;  Location: [facility] CATH LAB;  Service: Cardiology;  Laterality: Left;    ARTERIOGRAPHY OF AORTIC ROOT N/A 07/05/2022    Procedure: ARTERIOGRAM, AORTIC ROOT;  Surgeon: Aparna Thompson MD;  Location: [facility] CATH LAB;  Service: Cardiology;  Laterality: N/A;    CATARACT EXTRACTION W/  INTRAOCULAR LENS IMPLANT Right 02/21/2018    I-Stent    CATARACT EXTRACTION W/  INTRAOCULAR LENS IMPLANT Left 03/21/2018    I - Stent    CATHETERIZATION OF BOTH LEFT AND RIGHT HEART N/A 07/05/2022    Procedure: CATHETERIZATION, HEART, BOTH LEFT AND RIGHT;  Surgeon: Aparna Thompson MD;  Location: [facility] CATH LAB;  Service: Cardiology;  Laterality: N/A;  radial approach    COLONOSCOPY N/A 11/14/2016    Procedure: COLONOSCOPY;  Surgeon: Karuna Rodriguez MD;  Location: [facility] ENDO;  Service: Endoscopy;  Laterality: N/A;    COLONOSCOPY N/A 09/22/2020    Procedure: COLONOSCOPY;  Surgeon: Chuy Fish MD;  Location: [facility] ENDO;  Service: Endoscopy;  Laterality: N/A;    EPIDURAL STEROID INJECTION INTO CERVICAL SPINE N/A 2/8/2024    Procedure: C5/6 IL Right paramedian approach IAN with RN IV sedation;  Surgeon: Abel Haywood MD;  Location: [facility] PAIN MGT;  Service: Pain Management;  Laterality: N/A;    EYE SURGERY      HEMORRHOID SURGERY      I-STENT Right 02/21/2018    DR. REECE    INJECTION OF ANESTHETIC AGENT AROUND MEDIAL BRANCH NERVES INNERVATING CERVICAL FACET JOINT Bilateral 7/18/2023    Procedure: Bilateral C4-6 MBB with RN IV sedation (diagnostic, #1);  Surgeon: Abel Haywood MD;  Location: [facility] PAIN MGT;  Service: Pain Management;  Laterality: Bilateral;    KNEE ARTHROSCOPY W/ MENISCAL REPAIR Right 2015    Dr. Jain    PCIOL Right 02/21/2018    DR. REECE    PLANTAR FASCIA  RELEASE      right    ROTATOR CUFF REPAIR Bilateral     bilateral    SELECTIVE INJECTION OF ANESTHETIC AGENT AROUND LUMBAR SPINAL NERVE ROOT BY TRANSFORAMINAL APPROACH Bilateral 2022    Procedure: Bilateral L4/5 TF IAN with RN IV sedation;  Surgeon: Mak Young MD;  Location: [facility] PAIN MGT;  Service: Pain Management;  Laterality: Bilateral;    SELECTIVE INJECTION OF ANESTHETIC AGENT AROUND LUMBAR SPINAL NERVE ROOT BY TRANSFORAMINAL APPROACH Bilateral 2022    Procedure: Bilateral L4/5 TF IAN with RN IV sedation;  Surgeon: Mak Young MD;  Location: [facility] PAIN MGT;  Service: Pain Management;  Laterality: Bilateral;    SELECTIVE INJECTION OF ANESTHETIC AGENT AROUND LUMBAR SPINAL NERVE ROOT BY TRANSFORAMINAL APPROACH Bilateral 2022    Procedure: Bilateral L4/5 TF IAN - D2P Dr. Matthew CABRERA;  Surgeon: Abel Haywood MD;  Location: [facility] PAIN [facility]GT;  Service: Pain Management;  Laterality: Bilateral;    SELECTIVE INJECTION OF ANESTHETIC AGENT AROUND LUMBAR SPINAL NERVE ROOT BY TRANSFORAMINAL APPROACH Bilateral 2022    Procedure: Bilateral L4/5 TF IAN;  Surgeon: Abel Haywood MD;  Location: [facility]IN [facility]GT;  Service: Pain Management;  Laterality: Bilateral;    SELECTIVE INJECTION OF ANESTHETIC AGENT AROUND LUMBAR SPINAL NERVE ROOT BY TRANSFORAMINAL APPROACH Bilateral 2023    Procedure: Bilateral L4/5 TF IAN RN IV Sedation;  Surgeon: Abel Haywood MD;  Location: [facility]IN [facility]GT;  Service: Pain Management;  Laterality: Bilateral;    SHOULDER SURGERY Bilateral around     Dr. Pepper.  rotator cuff surgeries    VASECTOMY         Social History     Tobacco Use    Smoking status: Former     Current packs/day: 0.00     Average packs/day: 3.0 packs/day for 35.0 years (104.9 ttl pk-yrs)     Types: Cigarettes     Start date: 1960     Quit date: 1985     Years since quittin.6     Passive exposure: Past    Smokeless tobacco: Never   Substance Use Topics    Alcohol use: Yes      Alcohol/week: 6.0 standard drinks of alcohol     Types: 6 Drinks containing 0.5 oz of alcohol per week     Comment: socially    Drug use: No       Family History   Problem Relation Age of Onset    Lung cancer Father         life long smoker    Cancer Father         prostate, lung    Arthritis Mother     Stroke Sister         TIA    Cataracts Sister     Cancer Brother         prostate    Cataracts Brother     Cataracts Sister     Melanoma Neg Hx     Psoriasis Neg Hx     Lupus Neg Hx     Eczema Neg Hx     Diabetes Neg Hx     Heart disease Neg Hx     Kidney disease Neg Hx     Colon cancer Neg Hx        Current Outpatient Medications   Medication Sig    acetaminophen (TYLENOL ARTHRITIS PAIN ORAL) Take by mouth. Patient states that he takes 2 tablets in the morning and 2 tablets in the evening    albuterol (PROVENTIL/VENTOLIN HFA) 90 mcg/actuation inhaler Inhale 2 puffs into the lungs every 4 (four) hours as needed for Wheezing or Shortness of Breath.    alfuzosin (UROXATRAL) 10 mg Tb24 Take 10 mg by mouth daily with breakfast.    amLODIPine (NORVASC) 5 MG tablet Take 1 tablet (5 mg total) by mouth 2 (two) times daily.    atorvastatin (LIPITOR) 40 MG tablet TAKE 1 TABLET EVERY DAY    citalopram (CELEXA) 10 MG tablet Take 1 tablet (10 mg total) by mouth once daily. For anxiety    clopidogreL (PLAVIX) 75 mg tablet TAKE 1 TABLET EVERY DAY    cyclobenzaprine (FLEXERIL) 5 MG tablet 1 tablet ever 8 hours PRN muscle stiffness/spasms Orally Three times a day for 5 days    finasteride (PROSCAR) 5 mg tablet Take 1 tablet (5 mg total) by mouth once daily.    fluticasone propionate (FLONASE) 50 mcg/actuation nasal spray USE 2 SPRAYS IN EACH NOSTRIL ONE TIME DAILY  (SUBSTITUTED FOR FLONASE)    losartan (COZAAR) 50 MG tablet TAKE 1 TABLET TWICE DAILY    pantoprazole (PROTONIX) 40 MG tablet TAKE 1 TABLET EVERY DAY    pregabalin (LYRICA) 75 MG capsule Take 1 tablet by mouth daily at bedtime x 1 week, then take 1 tablet twice a day by  mouth    sildenafiL (VIAGRA) 100 MG tablet Take 1 po 45 minutes before intercourse    solifenacin (VESICARE) 5 MG tablet Take 2 tablets (10 mg total) by mouth once daily.     No current facility-administered medications for this visit.     Current Outpatient Medications on File Prior to Visit   Medication Sig    acetaminophen (TYLENOL ARTHRITIS PAIN ORAL) Take by mouth. Patient states that he takes 2 tablets in the morning and 2 tablets in the evening    albuterol (PROVENTIL/VENTOLIN HFA) 90 mcg/actuation inhaler Inhale 2 puffs into the lungs every 4 (four) hours as needed for Wheezing or Shortness of Breath.    alfuzosin (UROXATRAL) 10 mg Tb24 Take 10 mg by mouth daily with breakfast.    amLODIPine (NORVASC) 5 MG tablet Take 1 tablet (5 mg total) by mouth 2 (two) times daily.    atorvastatin (LIPITOR) 40 MG tablet TAKE 1 TABLET EVERY DAY    citalopram (CELEXA) 10 MG tablet Take 1 tablet (10 mg total) by mouth once daily. For anxiety    clopidogreL (PLAVIX) 75 mg tablet TAKE 1 TABLET EVERY DAY    cyclobenzaprine (FLEXERIL) 5 MG tablet 1 tablet ever 8 hours PRN muscle stiffness/spasms Orally Three times a day for 5 days    finasteride (PROSCAR) 5 mg tablet Take 1 tablet (5 mg total) by mouth once daily.    fluticasone propionate (FLONASE) 50 mcg/actuation nasal spray USE 2 SPRAYS IN EACH NOSTRIL ONE TIME DAILY  (SUBSTITUTED FOR FLONASE)    losartan (COZAAR) 50 MG tablet TAKE 1 TABLET TWICE DAILY    pantoprazole (PROTONIX) 40 MG tablet TAKE 1 TABLET EVERY DAY    pregabalin (LYRICA) 75 MG capsule Take 1 tablet by mouth daily at bedtime x 1 week, then take 1 tablet twice a day by mouth    sildenafiL (VIAGRA) 100 MG tablet Take 1 po 45 minutes before intercourse    solifenacin (VESICARE) 5 MG tablet Take 2 tablets (10 mg total) by mouth once daily.     No current facility-administered medications on file prior to visit.     Review of patient's allergies indicates:   Allergen Reactions    Atarax [hydroxyzine hcl] Other  (See Comments)     Raised blood pressure     Zyrtec [cetirizine] Other (See Comments)     Raised blood pressure     Gold sodium thiosulfate      Patch test positive    Meloxicam Rash     Other reaction(s): hypertension      Review of Systems   Constitutional: Negative for malaise/fatigue.   Eyes:  Negative for blurred vision.   Cardiovascular:  Positive for leg swelling. Negative for chest pain, claudication, cyanosis, dyspnea on exertion, irregular heartbeat, near-syncope, orthopnea, palpitations and paroxysmal nocturnal dyspnea.   Respiratory:  Negative for cough, hemoptysis and shortness of breath.    Hematologic/Lymphatic: Negative for bleeding problem. Does not bruise/bleed easily.   Skin:  Negative for dry skin and itching.   Musculoskeletal:  Positive for arthritis and back pain. Negative for falls, muscle weakness and myalgias.   Gastrointestinal:  Negative for abdominal pain, diarrhea, heartburn, hematemesis, hematochezia and melena.   Genitourinary:  Negative for flank pain and hematuria.   Neurological:  Positive for numbness and paresthesias. Negative for dizziness, focal weakness, headaches, light-headedness, seizures and weakness.   Psychiatric/Behavioral:  Negative for altered mental status and memory loss. The patient is not nervous/anxious.    Allergic/Immunologic: Negative for hives.       Objective:   Physical Exam  Vitals and nursing note reviewed.   Constitutional:       General: He is not in acute distress.     Appearance: He is well-developed. He is not diaphoretic.   HENT:      Head: Normocephalic and atraumatic.   Eyes:      General:         Right eye: No discharge.         Left eye: No discharge.      Pupils: Pupils are equal, round, and reactive to light.   Neck:      Thyroid: No thyromegaly.      Vascular: No JVD.   Cardiovascular:      Rate and Rhythm: Normal rate and regular rhythm.      Pulses: Normal pulses and intact distal pulses.           Carotid pulses are 2+ on the right side  and 2+ on the left side.       Radial pulses are 2+ on the right side and 2+ on the left side.        Femoral pulses are 2+ on the right side and 2+ on the left side.       Popliteal pulses are 2+ on the right side and 2+ on the left side.        Dorsalis pedis pulses are 2+ on the right side and 2+ on the left side.        Posterior tibial pulses are 2+ on the right side and 2+ on the left side.      Heart sounds: Normal heart sounds. No murmur heard.     No friction rub. No gallop.   Pulmonary:      Effort: Pulmonary effort is normal. No respiratory distress.      Breath sounds: Normal breath sounds. No wheezing or rales.   Chest:      Chest wall: No tenderness.   Abdominal:      General: Bowel sounds are normal. There is no distension.      Palpations: Abdomen is soft.      Tenderness: There is no abdominal tenderness.   Musculoskeletal:         General: Normal range of motion.      Cervical back: Neck supple.      Right lower leg: Edema present.      Left lower leg: Edema present.   Skin:     General: Skin is warm and dry.      Findings: No erythema or rash.   Neurological:      General: No focal deficit present.      Mental Status: He is alert and oriented to person, place, and time.      Cranial Nerves: No cranial nerve deficit.   Psychiatric:         Mood and Affect: Mood normal.         Behavior: Behavior normal.       Vitals:    02/28/24 1032 02/28/24 1033   BP: 136/71 127/69   BP Location: Right arm Left arm   Patient Position: Sitting Sitting   BP Method: Small (Automatic) Small (Automatic)   Pulse: 65    SpO2: 99%    Weight: 96.1 kg (211 lb 13.8 oz)      Lab Results   Component Value Date    CHOL 151 02/06/2024    CHOL 127 07/26/2023    CHOL 138 06/27/2023      Body mass index is 30.4 kg/m².   Lab Results   Component Value Date    HGBA1C 5.1 06/27/2023      BMP  Lab Results   Component Value Date     02/06/2024    K 4.4 02/06/2024     02/06/2024    CO2 19 (L) 02/06/2024    BUN 25 (H)  02/06/2024    CREATININE 1.3 02/06/2024    CALCIUM 8.9 02/06/2024    ANIONGAP 12 02/06/2024    EGFRNORACEVR 53.8 (A) 02/06/2024      Lab Results   Component Value Date    HDL 63 02/06/2024    HDL 55 07/26/2023    HDL 56 06/27/2023     Lab Results   Component Value Date    LDLCALC 70.2 02/06/2024    LDLCALC 60.4 (L) 07/26/2023    LDLCALC 61.4 (L) 06/27/2023     Lab Results   Component Value Date    TRIG 89 02/06/2024    TRIG 58 07/26/2023    TRIG 103 06/27/2023     Lab Results   Component Value Date    CHOLHDL 41.7 02/06/2024    CHOLHDL 43.3 07/26/2023    CHOLHDL 40.6 06/27/2023       Chemistry        Component Value Date/Time     02/06/2024 1040    K 4.4 02/06/2024 1040     02/06/2024 1040    CO2 19 (L) 02/06/2024 1040    BUN 25 (H) 02/06/2024 1040    CREATININE 1.3 02/06/2024 1040    GLU 82 02/06/2024 1040        Component Value Date/Time    CALCIUM 8.9 02/06/2024 1040    ALKPHOS 60 02/06/2024 1040    AST 17 02/06/2024 1040    ALT 13 02/06/2024 1040    BILITOT 0.9 02/06/2024 1040    ESTGFRAFRICA 57.9 (A) 06/29/2022 0917    EGFRNONAA 50.1 (A) 06/29/2022 0917          Lab Results   Component Value Date    TSH 2.242 06/27/2023     Lab Results   Component Value Date    INR 1.2 06/29/2022    INR 1.1 02/24/2021    INR 1.2 07/07/2020     Lab Results   Component Value Date    WBC 4.10 09/26/2023    HGB 13.0 (L) 09/26/2023    HCT 40.1 09/26/2023     (H) 09/26/2023     (L) 09/26/2023     BMP  Sodium   Date Value Ref Range Status   02/06/2024 140 136 - 145 mmol/L Final     Potassium   Date Value Ref Range Status   02/06/2024 4.4 3.5 - 5.1 mmol/L Final     Chloride   Date Value Ref Range Status   02/06/2024 109 95 - 110 mmol/L Final     CO2   Date Value Ref Range Status   02/06/2024 19 (L) 23 - 29 mmol/L Final     BUN   Date Value Ref Range Status   02/06/2024 25 (H) 8 - 23 mg/dL Final     Creatinine   Date Value Ref Range Status   02/06/2024 1.3 0.5 - 1.4 mg/dL Final     Calcium   Date Value Ref  Range Status   02/06/2024 8.9 8.7 - 10.5 mg/dL Final     Anion Gap   Date Value Ref Range Status   02/06/2024 12 8 - 16 mmol/L Final     eGFR if    Date Value Ref Range Status   06/29/2022 57.9 (A) >60 mL/min/1.73 m^2 Final     eGFR if non    Date Value Ref Range Status   06/29/2022 50.1 (A) >60 mL/min/1.73 m^2 Final     Comment:     Calculation used to obtain the estimated glomerular filtration  rate (eGFR) is the CKD-EPI equation.        CrCl cannot be calculated (Patient's most recent lab result is older than the maximum 7 days allowed.).    Assessment:     1. Primary hypertension    2. Lumbosacral spondylosis without myelopathy    3. Mixed hyperlipidemia    4. Stage 3a chronic kidney disease    5. Incomplete right bundle branch block    6. Prostate cancer    7. Aortic atherosclerosis    8. Atherosclerosis of artery of both lower extremities    9. Periodic limb movement disorder (PLMD)    10. Inadequate sleep hygiene    11. Obstructive sleep apnea syndrome    12. Lumbar radiculopathy, chronic    13. Weakness of trunk musculature    14. Abnormal EKG      LEG SWELLING DUE TO VENOUS INSUFFICIENCY SUGGEST LEG ELEVATION LOW SALT DIET COMPRESSION SOCKS USE. POSSIBLE AMLODIPINE CONTRIBUTION.  DJD LOW BACK PAIN CAUSING SOME NEUROGENIC CLAUDICATION. HE WILL BENEFIT FROM SWIMMING LOWER BACK EXERCISE WATER AEROBICS   HTN CONTROLLED LOW SALT DIET EMPHASIZED.  HLP ON [employer] WELL. STABLE.   GUIDO NOT USING CPPA RECEIVED NEW EQUIPMENT HE WILL START USING AGAIN.  AORTIC SCLEROSIS MURMUR WILLR EPEAT ECHO  Plan:   ECHO   Continue current therapy  Cardiac low salt diet.  Risk factor modification and excercise program./WEIGHT LOSS  F/u in 6 months with lipid cmp  COMPRESSION SOCKS.         hide

## 2025-01-06 ENCOUNTER — OFFICE VISIT (OUTPATIENT)
Dept: RHEUMATOLOGY | Facility: CLINIC | Age: 85
End: 2025-01-06
Payer: MEDICARE

## 2025-01-06 VITALS
SYSTOLIC BLOOD PRESSURE: 131 MMHG | HEIGHT: 70 IN | WEIGHT: 222.88 LBS | HEART RATE: 71 BPM | DIASTOLIC BLOOD PRESSURE: 71 MMHG | BODY MASS INDEX: 31.91 KG/M2

## 2025-01-06 DIAGNOSIS — M15.0 PRIMARY OSTEOARTHRITIS INVOLVING MULTIPLE JOINTS: Primary | ICD-10-CM

## 2025-01-06 DIAGNOSIS — M25.552 LEFT HIP PAIN: ICD-10-CM

## 2025-01-06 DIAGNOSIS — R60.0 PEDAL EDEMA: ICD-10-CM

## 2025-01-06 PROCEDURE — 99204 OFFICE O/P NEW MOD 45 MIN: CPT | Mod: S$GLB,,, | Performed by: INTERNAL MEDICINE

## 2025-01-06 PROCEDURE — 1101F PT FALLS ASSESS-DOCD LE1/YR: CPT | Mod: CPTII,S$GLB,, | Performed by: INTERNAL MEDICINE

## 2025-01-06 PROCEDURE — 99999 PR PBB SHADOW E&M-EST. PATIENT-LVL V: CPT | Mod: PBBFAC,HCNC,, | Performed by: INTERNAL MEDICINE

## 2025-01-06 PROCEDURE — 1126F AMNT PAIN NOTED NONE PRSNT: CPT | Mod: CPTII,S$GLB,, | Performed by: INTERNAL MEDICINE

## 2025-01-06 PROCEDURE — 1160F RVW MEDS BY RX/DR IN RCRD: CPT | Mod: CPTII,S$GLB,, | Performed by: INTERNAL MEDICINE

## 2025-01-06 PROCEDURE — 3288F FALL RISK ASSESSMENT DOCD: CPT | Mod: CPTII,S$GLB,, | Performed by: INTERNAL MEDICINE

## 2025-01-06 PROCEDURE — 1159F MED LIST DOCD IN RCRD: CPT | Mod: CPTII,S$GLB,, | Performed by: INTERNAL MEDICINE

## 2025-01-06 RX ORDER — POTASSIUM CHLORIDE 20 MEQ/1
20 TABLET, EXTENDED RELEASE ORAL DAILY
Qty: 90 TABLET | Refills: 3 | Status: SHIPPED | OUTPATIENT
Start: 2025-01-06

## 2025-01-06 NOTE — TELEPHONE ENCOUNTER
Ezequiel Hughes,     I have sent the following medications to your preferred pharmacy on file. Please let me know if you have further questions.     Medications Ordered This Encounter   Medications    potassium chloride SA (K-DUR,KLOR-CON) 20 MEQ tablet     Sig: Take 1 tablet (20 mEq total) by mouth once daily.     Dispense:  90 tablet     Refill:  3          [facility] - [place], [place] - [address]  [address] [address]  Phone: [phone] Fax: [phone]         Sincerely,   Valery Ozuna MD

## 2025-01-06 NOTE — PATIENT INSTRUCTIONS
Take Arthritis strength extended release Tylenol 650 mg 1 tablet 3 times daily as needed for pain.  Start Osteo Bi-Flex triple strength 1 capsule 2 times daily for joint protection.

## 2025-01-07 ENCOUNTER — OFFICE VISIT (OUTPATIENT)
Dept: PRIMARY CARE CLINIC | Facility: CLINIC | Age: 85
End: 2025-01-07
Payer: MEDICARE

## 2025-01-07 ENCOUNTER — HOSPITAL ENCOUNTER (OUTPATIENT)
Dept: RADIOLOGY | Facility: HOSPITAL | Age: 85
Discharge: HOME | End: 2025-01-07
Attending: FAMILY MEDICINE
Payer: MEDICARE

## 2025-01-07 VITALS
BODY MASS INDEX: 32.42 KG/M2 | HEART RATE: 108 BPM | OXYGEN SATURATION: 97 % | DIASTOLIC BLOOD PRESSURE: 68 MMHG | TEMPERATURE: 98 F | SYSTOLIC BLOOD PRESSURE: 132 MMHG | WEIGHT: 226 LBS

## 2025-01-07 DIAGNOSIS — R60.9 EDEMA, UNSPECIFIED TYPE: ICD-10-CM

## 2025-01-07 DIAGNOSIS — R60.0 PEDAL EDEMA: ICD-10-CM

## 2025-01-07 DIAGNOSIS — I70.0 AORTIC ATHEROSCLEROSIS: Primary | ICD-10-CM

## 2025-01-07 DIAGNOSIS — I49.9 IRREGULAR HEART BEAT: ICD-10-CM

## 2025-01-07 DIAGNOSIS — N18.4 CKD STAGE 4 SECONDARY TO HYPERTENSION: ICD-10-CM

## 2025-01-07 DIAGNOSIS — I12.9 CKD STAGE 4 SECONDARY TO HYPERTENSION: ICD-10-CM

## 2025-01-07 DIAGNOSIS — I27.20 PULMONARY HYPERTENSION: ICD-10-CM

## 2025-01-07 DIAGNOSIS — R06.2 WHEEZING: ICD-10-CM

## 2025-01-07 DIAGNOSIS — C61 PROSTATE CANCER: ICD-10-CM

## 2025-01-07 DIAGNOSIS — D64.9 ANEMIA, UNSPECIFIED TYPE: ICD-10-CM

## 2025-01-07 DIAGNOSIS — R63.5 ABNORMAL WEIGHT GAIN: ICD-10-CM

## 2025-01-07 DIAGNOSIS — Z23 NEED FOR VACCINATION: ICD-10-CM

## 2025-01-07 DIAGNOSIS — I10 ESSENTIAL HYPERTENSION: ICD-10-CM

## 2025-01-07 DIAGNOSIS — G91.8 OTHER HYDROCEPHALUS: ICD-10-CM

## 2025-01-07 DIAGNOSIS — I70.0 AORTIC ATHEROSCLEROSIS: ICD-10-CM

## 2025-01-07 DIAGNOSIS — R60.9 EDEMA, UNSPECIFIED TYPE: Primary | ICD-10-CM

## 2025-01-07 DIAGNOSIS — J84.10 LUNG GRANULOMA: ICD-10-CM

## 2025-01-07 DIAGNOSIS — I70.203 ATHEROSCLEROSIS OF ARTERY OF BOTH LOWER EXTREMITIES: ICD-10-CM

## 2025-01-07 DIAGNOSIS — E78.2 MIXED HYPERLIPIDEMIA: ICD-10-CM

## 2025-01-07 PROCEDURE — 71046 X-RAY EXAM CHEST 2 VIEWS: CPT | Mod: TC,HCNC,FY,PO

## 2025-01-07 PROCEDURE — 71046 X-RAY EXAM CHEST 2 VIEWS: CPT | Mod: 26,HCNC,, | Performed by: RADIOLOGY

## 2025-01-07 PROCEDURE — 99999 PR PBB SHADOW E&M-EST. PATIENT-LVL V: CPT | Mod: PBBFAC,,, | Performed by: FAMILY MEDICINE

## 2025-01-07 RX ORDER — ALBUTEROL SULFATE 90 UG/1
2 INHALANT RESPIRATORY (INHALATION) EVERY 6 HOURS PRN
Qty: 18 G | Refills: 0 | Status: SHIPPED | OUTPATIENT
Start: 2025-01-07

## 2025-01-07 NOTE — PROGRESS NOTES
No evidence of pneumonia or fluid build up on the lungs. There is evidence of chronic obstructive changes of the lungs, which the inhaler with the spacer may help some with the breathing and is worth a try. We will have labs from blood work back tomorrow. Valery Ozuna MD

## 2025-01-07 NOTE — PROGRESS NOTES
Chief Complaint  Chief Complaint   Patient presents with    Follow-up       HPI  Ezequiel Hughes is a 84 y.o. male with multiple medical diagnoses as listed in the medical history and problem list that presents for  in person visit.     History of Present Illness    CHIEF COMPLAINT:  - Patient presents for a follow-up of chronic conditions, with recent concerns about severe osteoarthritis, joint replacement, and persistent leg swelling.    HPI:  Patient with a history of chronic kidney disease stage 4, pulmonary hypertension, peripheral vascular disease, prostate cancer, and prior lung brain tumors, has severe osteoarthritis and recently underwent a right hip replacement. He has significant worsening edema in both lower extremities, for which he wears compression stockings daily.    Patient recently saw a rheumatologist, Dr. CHATTERJEE, who recommended Tylenol Arthritis, Glucosamine, Chondroitin supplements, an anti-inflammatory diet, and continuation of Lyrica for neuropathic and osteoarthritic pain. His medication was adjusted from furosemide to Bumex due to persistent leg swelling and nocturia, with stable blood pressure. He takes OTC potassium supplementation.    Patient reports minimal improvement in swelling with Bumex. His wife notes they assess swelling by his ankles and weight, which is currently 25 lbs more than his usual weight of 195-197 lbs. He has numbness in his toes and pain below his knees, worsening throughout the day. He has difficulty walking from his location to his car due to pain.    Patient elevates his legs in a recliner and uses a stationary bicycle for exercise. He continues physical therapy but feels it provides little benefit. He cannot cross his right leg over his left due to the recent hip replacement.    Patient reports wheezing, particularly in the morning and at night, which resolves after being awake for a few hours. He has been prescribed an inhaler but has difficulty using it effectively.  His daughter bought him a spacer to help with inhaler use.    Patient sees multiple specialists, including Dr. Colon (cardiologist), Dr. Guy (urologist), and has an upcoming appointment with Dr. Alejandra (vascular surgeon). He recently reached out about needing potassium supplementation.    MEDICATIONS:  - Furosemide (Lasix), switched to Bumex  - Bumex 1 mg, for edema/fluid retention  - Lyrica, for neuropathic and osteoarthritic pain  - Over-the-counter potassium supplementation (gummies)  - Tylenol Arthritis, recommended by rheumatologist  - Glucosamine, recommended by rheumatologist  - Chondroitin supplements, recommended by rheumatologist    PMH:  - Chronic kidney disease: stage 4  - Pulmonary hypertension  - Peripheral vascular disease  - Prostate cancer: history of  - Lung brain tumors: prior history  - Severe osteoarthritis    RECENT/REMOTE SURGICAL HISTORY:  - Right hip replacement: recent, for severe osteoarthritis    HEALTH MAINTENAINCE:  - Flu shot received on October 31st         No questionnaires on file.       Pmh, Psh, Family Hx, Social Hx, HM updated in Epic Tabs today.    Review of Systems   Constitutional:  Positive for activity change, appetite change and unexpected weight change. Negative for chills and fatigue.   HENT:  Negative for sore throat and trouble swallowing.    Respiratory:  Negative for cough and shortness of breath.    Cardiovascular:  Positive for chest pain, palpitations and leg swelling.   Gastrointestinal:  Negative for abdominal pain, constipation, diarrhea and nausea.   Genitourinary:  Negative for difficulty urinating, dysuria and flank pain.   Musculoskeletal:  Positive for arthralgias, back pain, gait problem and joint swelling.   Neurological:  Negative for dizziness and headaches.        Objective:     Vitals:    01/07/25 0727   BP: 132/68   BP Location: Left arm   Pulse: 108   Temp: 97.7 °F (36.5 °C)   TempSrc: Oral   SpO2: 97%   Weight: 102.5 kg (225 lb 15.5 oz)     Wt  Readings from Last 10 Encounters:   01/07/25 102.5 kg (225 lb 15.5 oz)   01/06/25 101.1 kg (222 lb 14.2 oz)   12/20/24 99.5 kg (219 lb 5.7 oz)   11/27/24 107.2 kg (236 lb 5.3 oz)   11/18/24 107.2 kg (236 lb 5.3 oz)   11/13/24 96.3 kg (212 lb 4.9 oz)   11/05/24 100 kg (220 lb 7.4 oz)   10/31/24 97.2 kg (214 lb 4.6 oz)   10/28/24 96.9 kg (213 lb 8.3 oz)   10/28/24 97.2 kg (214 lb 4.6 oz)     Physical Exam    Vitals: Pulse rate elevated.  Cardiovascular: Irregular heartbeat.  TEST RESULTS:  - Potassium: Past, Low levels noted  - Kidney function: Past, Abnormal, requiring careful management of diuretics  - EKG: Past, Multiple occasions, No A-fib shown, extra beats noted       Physical Exam  Vitals and nursing note reviewed.   Constitutional:       General: He is awake.      Appearance: Normal appearance. He is well-developed and well-groomed.   HENT:      Head: Normocephalic and atraumatic.      Right Ear: External ear normal.      Left Ear: External ear normal.      Nose: Nose normal.   Eyes:      Conjunctiva/sclera: Conjunctivae normal.   Neck:      Thyroid: No thyromegaly or thyroid tenderness.   Cardiovascular:      Rate and Rhythm: Normal rate. Rhythm irregular.      Heart sounds: Murmur heard.   Pulmonary:      Effort: Pulmonary effort is normal. No accessory muscle usage.      Breath sounds: Normal breath sounds. No wheezing.   Musculoskeletal:      Cervical back: Normal range of motion and neck supple.      Right lower leg: 3+ Edema present.      Left lower leg: 3+ Edema present.   Neurological:      General: No focal deficit present.      Mental Status: He is alert. Mental status is at baseline.   Psychiatric:         Attention and Perception: Attention normal.         Mood and Affect: Mood normal.         Speech: Speech normal.         Behavior: Behavior normal. Behavior is cooperative.         Thought Content: Thought content normal.         Judgment: Judgment normal.         Assessment:     1. Edema,  unspecified type    2. CKD stage 4 secondary to hypertension    3. Atherosclerosis of artery of both lower extremities    4. Mixed hyperlipidemia    5. Essential hypertension    6. Lung granuloma    7. Aortic atherosclerosis    8. Anemia, unspecified type    9. Other hydrocephalus    10. Prostate cancer    11. Pulmonary hypertension    12. Wheezing    13. Irregular heart beat    14. Abnormal weight gain    15. Need for vaccination        LABS:   Lab Results   Component Value Date    HGBA1C 5.1 06/27/2023    HGBA1C 5.3 01/17/2023    HGBA1C 5.2 07/22/2020      Lab Results   Component Value Date    CHOL 138 08/23/2024    CHOL 151 02/06/2024    CHOL 127 07/26/2023     Lab Results   Component Value Date    LDLCALC 57.8 (L) 08/23/2024    LDLCALC 70.2 02/06/2024    LDLCALC 60.4 (L) 07/26/2023     Lab Results   Component Value Date    WBC 4.15 11/21/2024    HGB 7.4 (L) 11/21/2024    HCT 23.8 (L) 11/21/2024     11/21/2024    CHOL 138 08/23/2024    TRIG 61 08/23/2024    HDL 68 08/23/2024    ALT 28 11/18/2024    AST 28 11/18/2024     12/11/2024    K 4.8 12/11/2024     12/11/2024    CREATININE 1.5 (H) 12/11/2024    BUN 23 12/11/2024    CO2 21 (L) 12/11/2024    TSH 1.506 08/15/2024    PSA 1.3 11/10/2021    INR 1.2 06/29/2022    HGBA1C 5.1 06/27/2023       Plan:   Ezequiel was seen today for follow-up.    Diagnoses and all orders for this visit:    Edema, unspecified type  -     Renal Function Panel; Future  -     BNP; Future  -     Magnesium; Future  -     Ambulatory referral/consult to Cardiology; Future  -     X-Ray Chest PA And Lateral; Future    CKD stage 4 secondary to hypertension  -     Renal Function Panel; Future  -     BNP; Future  -     Magnesium; Future    Atherosclerosis of artery of both lower extremities  -     Renal Function Panel; Future  -     BNP; Future  -     Magnesium; Future  -     Ambulatory referral/consult to Cardiology; Future    Mixed hyperlipidemia    Essential hypertension    Lung  granuloma    Aortic atherosclerosis    Anemia, unspecified type    Other hydrocephalus    Prostate cancer    Pulmonary hypertension  -     X-Ray Chest PA And Lateral; Future    Wheezing  -     albuterol (VENTOLIN HFA) 90 mcg/actuation inhaler; Inhale 2 puffs into the lungs every 6 (six) hours as needed for Wheezing. Rescue  -     X-Ray Chest PA And Lateral; Future    Irregular heart beat  -     Ambulatory referral/consult to Cardiology; Future  -     X-Ray Chest PA And Lateral; Future    Abnormal weight gain  -     X-Ray Chest PA And Lateral; Future    Need for vaccination  -     Influenza - Trivalent (Adjuvanted)        Assessment & Plan    IMPRESSION:  - Assessed persistent edema in bilateral lower extremities; recently adjusted furosemide to Bumex due to persistent leg swelling and nocturia  - Considered increasing Bumex dosage from 1mg, pending today's kidney function labs  - Evaluated irregular heart rhythm; not consistent with A-fib based on previous EKGs, but warrants cardiology follow-up  - Attributed wheezing to fluid overload rather than primary respiratory issue  - Ordered chest XR to further evaluate fluid status and respiratory symptoms    PLAN SUMMARY:  - CBC ordered to assess kidney function and potassium levels  - Chest XR ordered at [facility]  - Flu shot administered in office  - Referred to cardiology for expedited follow-up with Dr. Colon within 1 week  - Started prescription potassium supplement  - Continued Bumex 1mg; may increase dose pending lab results  - Refilled inhaler medication; to be used with spacer device  - Continued Tylenol Arthritis as recommended by rheumatologist  - Started Glucosamine and Chondroitin supplements as recommended by rheumatologist  - Follow up in 1 month to reassess fluid status and medication efficacy    RESPIRATORY CONDITION:  - Explained use of spacer device with inhaler to improve medication delivery.  - Refilled inhaler medication; to be used with  spacer device.    LEG SWELLING:  - Discussed potential benefits of compression devices for leg swelling, pending vascular surgeon evaluation.  - Patient to elevate legs regularly.  - Patient to continue using compression stockings daily.  - Recommend considering use of compression devices at home if recommended by vascular surgeon.    EXERCISE RECOMMENDATIONS:  - Patient to use bicycle exercise equipment as tolerated.    ELECTROLYTE IMBALANCE:  - Started prescription potassium supplement; sent to [facility] pharmacy.  - Continued OTC potassium gummies until prescription arrives.  - Continued Bumex 1mg; may increase dose pending lab results.    ARTHRITIS:  - Continued Tylenol Arthritis as recommended by rheumatologist.  - Started Glucosamine and Chondroitin supplements as recommended by rheumatologist.    LABS:  - CBC ordered to assess kidney function and potassium levels.    CHEST X-RAY:  - Chest XR ordered at [facility].    FLU VACCINATION:  - Flu shot administered in office.    CARDIOLOGY REFERRAL:  - Referred to cardiology for expedited follow-up with Dr. Colon within 1 week.    FOLLOW UP:  - Follow up in 1 month to reassess fluid status and medication efficacy.  - Contact office if symptoms worsen or new concerns arise.         X-Ray Chest PA And Lateral  Narrative: EXAM: XR CHEST PA AND LATERAL    CLINICAL HISTORY:  Pulmonary hypertension, edema    TECHNIQUE: 2 view chest    PRIOR:  November 2024    FINDINGS:  Heart size normal and stable.  Diaphragms flattened on the lateral but no focal pulmonary abnormalities.  Thoracic spondylosis without fractures..  Impression:  COPD without evidence of acute cardiopulmonary disease    Finalized on: 1/7/2025 9:40 AM By:  Noe Crisostomo MD  [facility]# [id number]      2025-01-07 09:42:[id number]    [facility]    The ASCVD Risk score (Perez RODRIGUEZ, et al., 2019) failed to calculate for the following reasons:    The 2019 ASCVD risk score is only valid for ages 40 to 79    Follow-up:  Follow up in about 1 month (around 2/7/2025) for f/u OV Dr. Ozuna 1 mo f/u .    I spent a total of  47     minutes face to face and non-face to face on the date of this visit.This includes time preparing to see the patient (eg, review of tests, notes), obtaining and/or reviewing additional history from an independent historian and/or outside medical records, documenting clinical information in the electronic health record, independently interpreting results and/or communicating results to the patient/family/caregiver, or care coordinator.  Visit today included increased complexity associated with the care of the episodic problem addressed and managing the longitudinal care of the patient due to the serious and/or complex managed problem(s).    This note was generated with the assistance of ambient listening technology. Verbal consent was obtained by the patient and accompanying visitor(s) for the recording of patient appointment to facilitate this note. I attest to having reviewed and edited the generated note for accuracy, though some syntax or spelling errors may persist. Please contact the author of this note for any clarification.       There are no Patient Instructions on file for this visit.

## 2025-01-13 ENCOUNTER — PATIENT MESSAGE (OUTPATIENT)
Dept: ORTHOPEDICS | Facility: CLINIC | Age: 85
End: 2025-01-13

## 2025-01-13 ENCOUNTER — OFFICE VISIT (OUTPATIENT)
Dept: ORTHOPEDICS | Facility: CLINIC | Age: 85
End: 2025-01-13
Payer: MEDICARE

## 2025-01-13 VITALS — HEIGHT: 70 IN | BODY MASS INDEX: 32.35 KG/M2 | WEIGHT: 226 LBS

## 2025-01-13 DIAGNOSIS — Z96.641 S/P TOTAL RIGHT HIP ARTHROPLASTY: Primary | ICD-10-CM

## 2025-01-13 DIAGNOSIS — I10 ESSENTIAL HYPERTENSION: ICD-10-CM

## 2025-01-13 PROCEDURE — 3288F FALL RISK ASSESSMENT DOCD: CPT | Mod: CPTII,S$GLB,, | Performed by: PHYSICIAN ASSISTANT

## 2025-01-13 PROCEDURE — 1101F PT FALLS ASSESS-DOCD LE1/YR: CPT | Mod: CPTII,S$GLB,, | Performed by: PHYSICIAN ASSISTANT

## 2025-01-13 PROCEDURE — 99024 POSTOP FOLLOW-UP VISIT: CPT | Mod: S$GLB,,, | Performed by: PHYSICIAN ASSISTANT

## 2025-01-13 PROCEDURE — 1126F AMNT PAIN NOTED NONE PRSNT: CPT | Mod: CPTII,S$GLB,, | Performed by: PHYSICIAN ASSISTANT

## 2025-01-13 PROCEDURE — 99999 PR PBB SHADOW E&M-EST. PATIENT-LVL III: CPT | Mod: PBBFAC,,, | Performed by: PHYSICIAN ASSISTANT

## 2025-01-13 PROCEDURE — 1159F MED LIST DOCD IN RCRD: CPT | Mod: CPTII,S$GLB,, | Performed by: PHYSICIAN ASSISTANT

## 2025-01-13 PROCEDURE — 1160F RVW MEDS BY RX/DR IN RCRD: CPT | Mod: CPTII,S$GLB,, | Performed by: PHYSICIAN ASSISTANT

## 2025-01-13 RX ORDER — FUROSEMIDE 20 MG/1
20 TABLET ORAL DAILY PRN
Qty: 30 TABLET | Refills: 11 | OUTPATIENT
Start: 2025-01-13 | End: 2026-01-13

## 2025-01-13 NOTE — TELEPHONE ENCOUNTER
No care due was identified.  [facility] Embedded Care Due Messages. Reference number: [id number].   1/13/2025 11:24:51 AM CST

## 2025-01-13 NOTE — PROGRESS NOTES
Patient ID: Ezequiel Hughes is a 84 y.o. male.    Chief Complaint: Pain of the Right Hip      HPI: Ezequiel Hughes  is a 84 y.o. male who c/o Pain of the Right Hip     Post op visit 2  Patient notes pain is 0/10   The patient has been doing quite well since surgery and is pleased with the results of his right hip   He states more so than pain he has been having trouble with swelling to the bilateral lower extremities.  He was evaluated by Cardiology changed from Lasix to Bumex and has been referred to lymphedema clinic which I agree with   He is not using any devices today to assist with ambulation but does keep a walker with him should he need  He is compliant with PT  Compliant with DVT prophylaxis   Compliant with all postop instructions    Patient is presently denying any shortness of breath, chest pain, fever/chills, nausea/vomiting, loss of taste or smell, numbness/tingling or sensation changes, loss of bladder or bowel function, loss of taste/smell.     Surgery: Right Total Hip    Surgery Date:  11/13/2024    Past Medical History:   Diagnosis Date    Allergic rhinitis     Anemia     Back pain     BPH (benign prostatic hyperplasia)     Cancer     skin cancer to neck, Dr. Graves    Cataract     Disorder of kidney and ureter     ED (erectile dysfunction)     OMARI (generalized anxiety disorder) 08/07/2023    Hiatal hernia     small    History of colon polyps     colonoscopy 11/2016    HLD (hyperlipidemia)     Hyperlipidemia     Hypertension     Lateral epicondylitis     Lumbar radiculopathy 01/26/2022    OA (osteoarthritis)     GUIDO (obstructive sleep apnea)     Pneumonia of right middle lobe due to infectious organism 11/18/2024    Polyneuropathy     Prostate cancer     Dr. Wong    TIA (transient ischemic attack)     Trouble in sleeping     Urge incontinence 03/29/2022     Past Surgical History:   Procedure Laterality Date    ANGIOGRAPHY OF UPPER EXTREMITY Left 07/05/2022    Procedure: Angiogram Extremity  Bilateral;  Surgeon: Aparna Thompson MD;  Location: [facility] CATH LAB;  Service: Cardiology;  Laterality: Left;    ARTERIOGRAPHY OF AORTIC ROOT N/A 07/05/2022    Procedure: ARTERIOGRAM, AORTIC ROOT;  Surgeon: Aparna Thompson MD;  Location: [facility] CATH LAB;  Service: Cardiology;  Laterality: N/A;    BLOCK, NERVE, PERIPHERAL Right 9/12/2024    Procedure: right femoral/ obturator nerve block- with steroids;  Surgeon: Able Haywood MD;  Location: [facility] PAIN MGT;  Service: Pain Management;  Laterality: Right;    CATARACT EXTRACTION W/  INTRAOCULAR LENS IMPLANT Right 02/21/2018    I-Stent    CATARACT EXTRACTION W/  INTRAOCULAR LENS IMPLANT Left 03/21/2018    I - Stent    CATHETERIZATION OF BOTH LEFT AND RIGHT HEART N/A 07/05/2022    Procedure: CATHETERIZATION, HEART, BOTH LEFT AND RIGHT;  Surgeon: Aparna Thompson MD;  Location: [facility] CATH LAB;  Service: Cardiology;  Laterality: N/A;  radial approach    COLONOSCOPY N/A 11/14/2016    Procedure: COLONOSCOPY;  Surgeon: Karuna Rodriguez MD;  Location: [facility] ENDO;  Service: Endoscopy;  Laterality: N/A;    COLONOSCOPY N/A 09/22/2020    Procedure: COLONOSCOPY;  Surgeon: Chuy Fish MD;  Location: [facility] ENDO;  Service: Endoscopy;  Laterality: N/A;    EPIDURAL STEROID INJECTION N/A 6/6/2024    Procedure: Lumbar L5/S1 IL IAN;  Surgeon: Abel Haywood MD;  Location: [facility] PAIN MGT;  Service: Pain Management;  Laterality: N/A;    EPIDURAL STEROID INJECTION INTO CERVICAL SPINE N/A 2/8/2024    Procedure: C5/6 IL Right paramedian approach IAN with RN IV sedation;  Surgeon: Abel Haywood MD;  Location: [facility] PAIN MGT;  Service: Pain Management;  Laterality: N/A;    EYE SURGERY      HEMORRHOID SURGERY      HIP ARTHROPLASTY Right 11/13/2024    Procedure: ARTHROPLASTY, HIP;  Surgeon: Denton Encarnacion MD;  Location: [facility] OR;  Service: Orthopedics;  Laterality: Right;    I-STENT Right 02/21/2018    DR. REECE    INJECTION OF ANESTHETIC AGENT AROUND MEDIAL BRANCH NERVES  INNERVATING CERVICAL FACET JOINT Bilateral 7/18/2023    Procedure: Bilateral C4-6 MBB with RN IV sedation (diagnostic, #1);  Surgeon: Abel Haywood MD;  Location: [facility] PAIN MGT;  Service: Pain Management;  Laterality: Bilateral;    KNEE ARTHROSCOPY W/ MENISCAL REPAIR Right 2015    Dr. Jain    PCIOL Right 02/21/2018    DR. RECEE    PLANTAR FASCIA RELEASE      right    ROTATOR CUFF REPAIR Bilateral     bilateral    SELECTIVE INJECTION OF ANESTHETIC AGENT AROUND LUMBAR SPINAL NERVE ROOT BY TRANSFORAMINAL APPROACH Bilateral 01/26/2022    Procedure: Bilateral L4/5 TF IAN with RN IV sedation;  Surgeon: Mak Young MD;  Location: [facility] PAIN MGT;  Service: Pain Management;  Laterality: Bilateral;    SELECTIVE INJECTION OF ANESTHETIC AGENT AROUND LUMBAR SPINAL NERVE ROOT BY TRANSFORAMINAL APPROACH Bilateral 03/14/2022    Procedure: Bilateral L4/5 TF IAN with RN IV sedation;  Surgeon: Mak Young MD;  Location: [facility] PAIN MGT;  Service: Pain Management;  Laterality: Bilateral;    SELECTIVE INJECTION OF ANESTHETIC AGENT AROUND LUMBAR SPINAL NERVE ROOT BY TRANSFORAMINAL APPROACH Bilateral 06/02/2022    Procedure: Bilateral L4/5 TF IAN - D2P Dr. Castro NSG;  Surgeon: Abel Haywood MD;  Location: [facility] PAIN MGT;  Service: Pain Management;  Laterality: Bilateral;    SELECTIVE INJECTION OF ANESTHETIC AGENT AROUND LUMBAR SPINAL NERVE ROOT BY TRANSFORAMINAL APPROACH Bilateral 08/25/2022    Procedure: Bilateral L4/5 TF IAN;  Surgeon: Abel Haywood MD;  Location: [facility] PAIN MGT;  Service: Pain Management;  Laterality: Bilateral;    SELECTIVE INJECTION OF ANESTHETIC AGENT AROUND LUMBAR SPINAL NERVE ROOT BY TRANSFORAMINAL APPROACH Bilateral 6/29/2023    Procedure: Bilateral L4/5 TF IAN RN IV Sedation;  Surgeon: Abel Haywood MD;  Location: [facility] PAIN MGT;  Service: Pain Management;  Laterality: Bilateral;    SELECTIVE INJECTION OF ANESTHETIC AGENT AROUND LUMBAR SPINAL NERVE ROOT BY TRANSFORAMINAL APPROACH Bilateral  2024    Procedure: Bilateral L4/5 TF IAN;  Surgeon: Abel Haywood MD;  Location: [facility];  Service: Pain Management;  Laterality: Bilateral;    SHOULDER SURGERY Bilateral around     Dr. Pepper.  rotator cuff surgeries    VASECTOMY       Family History   Problem Relation Name Age of Onset    Lung cancer Father Isaiah Hughes         life long smoker    Cancer Father Isaiah Hughes         prostate, lung    Arthritis Mother Emely Hughes     Stroke Sister          TIA    Cataracts Sister      Cancer Brother          prostate    Cataracts Brother      Cataracts Sister      Melanoma Neg Hx      Psoriasis Neg Hx      Lupus Neg Hx      Eczema Neg Hx      Diabetes Neg Hx      Heart disease Neg Hx      Kidney disease Neg Hx      Colon cancer Neg Hx       Social History     Socioeconomic History    Marital status:    Tobacco Use    Smoking status: Former     Current packs/day: 0.00     Average packs/day: 3.0 packs/day for 35.0 years (104.9 ttl pk-yrs)     Types: Cigarettes     Start date: 1960     Quit date: 1985     Years since quittin.5     Passive exposure: Past    Smokeless tobacco: Never   Substance and Sexual Activity    Alcohol use: Yes     Alcohol/week: 3.0 standard drinks of alcohol     Types: 3 Cans of beer per week     Comment: socially    Drug use: No    Sexual activity: Yes     Partners: Female     Birth control/protection: None   Social History Narrative         Social Drivers of Health     Financial Resource Strain: Patient Declined (2024)    Overall Financial Resource Strain (CARDIA)     Difficulty of Paying Living Expenses: Patient declined   Food Insecurity: Patient Declined (2024)    Hunger Vital Sign     Worried About Running Out of Food in the Last Year: Patient declined     Ran Out of Food in the Last Year: Patient declined   Transportation Needs: Patient Declined (2024)    TRANSPORTATION NEEDS     Transportation : Patient declined   Physical  Activity: Insufficiently Active (5/16/2024)    Exercise Vital Sign     Days of Exercise per Week: 2 days     Minutes of Exercise per Session: 30 min   Stress: Patient Declined (11/18/2024)    [employer] - Occupational Stress Questionnaire     Feeling of Stress : Patient declined   Housing Stability: Patient Declined (11/18/2024)    Housing Stability Vital Sign     Unable to Pay for Housing in the Last Year: Patient declined     Homeless in the Last Year: Patient declined     Medication List with Changes/Refills   Current Medications    ALBUTEROL (VENTOLIN HFA) 90 MCG/ACTUATION INHALER    Inhale 2 puffs into the lungs every 6 (six) hours as needed for Wheezing. Rescue    ALFUZOSIN (UROXATRAL) 10 MG TB24    Take 1 tablet (10 mg total) by mouth daily with breakfast.    ATORVASTATIN (LIPITOR) 20 MG TABLET    Take 1 tablet (20 mg total) by mouth once daily.    AZELASTINE (ASTELIN) 137 MCG (0.1 %) NASAL SPRAY    1 spray (137 mcg total) by Nasal route 2 (two) times daily.    BUMETANIDE (BUMEX) 1 MG TABLET    Take 1 tablet (1 mg total) by mouth once daily. For leg swelling ( to replace furosemide)    CITALOPRAM (CELEXA) 10 MG TABLET    TAKE 1 TABLET ONE TIME DAILY FOR ANXIETY    CLOPIDOGREL (PLAVIX) 75 MG TABLET    TAKE 1 TABLET EVERY DAY    FINASTERIDE (PROSCAR) 5 MG TABLET    Take 1 tablet (5 mg total) by mouth once daily.    LOSARTAN (COZAAR) 50 MG TABLET    TAKE 1 TABLET TWICE DAILY    METHOCARBAMOL (ROBAXIN) 500 MG TAB    Take 1 tablet (500 mg total) by mouth 3 (three) times daily.    MIRABEGRON (MYRBETRIQ) 25 MG TB24 ER TABLET    Take 1 tablet (25 mg total) by mouth once daily.    ONDANSETRON (ZOFRAN-ODT) 4 MG TBDL    Dissolve 2 tablets (8 mg total) by mouth every 8 (eight) hours as needed (Nausea).    PANTOPRAZOLE (PROTONIX) 40 MG TABLET    TAKE 1 TABLET EVERY DAY    POLYETHYLENE GLYCOL (GLYCOLAX) 17 GRAM/DOSE POWDER    Take 17 g by mouth once daily.    POTASSIUM CHLORIDE SA  (K-DUR,KLOR-CON) 20 MEQ TABLET    Take 1 tablet (20 mEq total) by mouth once daily.    PREGABALIN (LYRICA) 75 MG CAPSULE    Take 1 capsule (75 mg total) by mouth 2 (two) times daily.    SILDENAFIL (VIAGRA) 100 MG TABLET    Take 1 po 45 minutes before intercourse    VITAMIN D (VITAMIN D3) 1000 UNITS TAB    Take 1,000 Units by mouth once daily.     Review of patient's allergies indicates:   Allergen Reactions    Atarax [hydroxyzine hcl] Other (See Comments)     Raised blood pressure     Zyrtec [cetirizine] Other (See Comments)     Raised blood pressure     Gold sodium thiomalate     Gold sodium thiosulfate      Patch test positive    Meloxicam Rash     Other reaction(s): hypertension       Objective:     Right Lower Extremity  NVI  WWP foot  Comp soft  Cap refill < 2 sec  Calf NT, soft  (-) Ольга sign  KEISHA  ROM : Patient is able to easily exhibit full flexion and extension on passive range of motion.   Abduction and adduction is full   Quadrants a simple  Wiggles toes  DF/PF intact  Sensation intact  Inc C/D/I  No SOI    Imaging:    No x-ray obtained today    Assessment:       Encounter Diagnosis   Name Primary?    S/P total right hip arthroplasty Yes          Plan:       Ezequiel was seen today for pain.    Diagnoses and all orders for this visit:    S/P total right hip arthroplasty        Ezequiel J Hughes is an established pt here for postop follow-up after right total hip replacement by Dr. Encarnacion.  The patient will continue the current medication regimen and treatment plan.  Patient should notify the office of any signs or symptoms of infection including fevers, erythema, purulent drainage, increasing pain.  Patient will continue with DVT prophylaxis until at least 6 weeks postop.  Patient will continue outpatient physical therapy.  Will follow-up as scheduled. Patient verbalized understanding of all instructions and agreed with the above plan.    No follow-ups on file.    The patient understands, chooses and consents  to this plan and accepts all   the risks which include but are not limited to the risks mentioned above.     Disclaimer: This note was prepared using a voice recognition system and is likely to have sound alike errors within the text.

## 2025-01-13 NOTE — TELEPHONE ENCOUNTER
Refill Decision Note   Ezequiel Hughes  is requesting a refill authorization.  Brief Assessment and Rationale for Refill:  Quick Discontinue     Medication Therapy Plan: Furosemide dc'd 1/7/25-Pt taking Bumetanide 1 mg      Comments:     Note composed:4:56 PM 01/13/2025

## 2025-01-14 ENCOUNTER — OFFICE VISIT (OUTPATIENT)
Dept: OPHTHALMOLOGY | Facility: CLINIC | Age: 85
End: 2025-01-14
Payer: MEDICARE

## 2025-01-14 DIAGNOSIS — H40.1111 PRIMARY OPEN ANGLE GLAUCOMA (POAG) OF RIGHT EYE, MILD STAGE: Primary | ICD-10-CM

## 2025-01-14 DIAGNOSIS — Z96.1 PSEUDOPHAKIA: ICD-10-CM

## 2025-01-14 DIAGNOSIS — H40.1122 PRIMARY OPEN ANGLE GLAUCOMA (POAG) OF LEFT EYE, MODERATE STAGE: ICD-10-CM

## 2025-01-14 PROCEDURE — 1159F MED LIST DOCD IN RCRD: CPT | Mod: CPTII,S$GLB,, | Performed by: OPHTHALMOLOGY

## 2025-01-14 PROCEDURE — 1160F RVW MEDS BY RX/DR IN RCRD: CPT | Mod: CPTII,S$GLB,, | Performed by: OPHTHALMOLOGY

## 2025-01-14 PROCEDURE — 99999 PR PBB SHADOW E&M-EST. PATIENT-LVL III: CPT | Mod: PBBFAC,,, | Performed by: OPHTHALMOLOGY

## 2025-01-14 PROCEDURE — 92012 INTRM OPH EXAM EST PATIENT: CPT | Mod: S$GLB,,, | Performed by: OPHTHALMOLOGY

## 2025-01-14 NOTE — PROGRESS NOTES
SUBJECTIVE  Ezequiel Hughes is 84 y.o. male  Uncorrected distance visual acuity was 20/20 in the right eye and 20/20 in the left eye.   Chief Complaint   Patient presents with    Glaucoma     Pt here for 4m IOP chk. No pain or discomfort. VA stable.           HPI     Glaucoma     Additional comments: Pt here for 4m IOP chk. No pain or discomfort. VA   stable.            Comments    1. Mod COAG OS + Mild COAG OD (init 22/26 post dilation IOP ) goal = 17  SLT OD 1/18/23 (19.5-12)  SLT OS 12/2/20 (19.5-15)  2. PCIOL / I-Stent OD +18.5 [id number] (distance) 2-21-18  PCIOL /I-Stent OS +21.0 (-1.75) 3/21/18 near  3. ERM OU   4. Brow Lift 9/18   *intolerant of travatan*      No eyedrops             Last edited by Govind Luu MA on 1/14/2025 10:59 AM.         Assessment /Plan :  1. Primary open angle glaucoma (POAG) of right eye, mild stage Doing well, intraocular pressure (IOP) within acceptable range relative to target IOP and no evidence of progression. Continue current treatment. Reviewed importance of continued compliance with treatment and follow up.    Return to clinic in 4 months  or as needed.  With IOP Check and GOCT     2. Primary open angle glaucoma (POAG) of left eye, moderate stage Doing well, intraocular pressure (IOP) within acceptable range relative to target IOP and no evidence of progression. Continue current treatment. Reviewed importance of continued compliance with treatment and follow up.     3. Pseudophakia  -- Condition stable, no therapeutic change required. Monitoring routinely.

## 2025-01-15 ENCOUNTER — TELEPHONE (OUTPATIENT)
Dept: ORTHOPEDICS | Facility: CLINIC | Age: 85
End: 2025-01-15
Payer: MEDICARE

## 2025-01-15 ENCOUNTER — PATIENT MESSAGE (OUTPATIENT)
Dept: ORTHOPEDICS | Facility: CLINIC | Age: 85
End: 2025-01-15
Payer: MEDICARE

## 2025-01-15 DIAGNOSIS — M16.12 ARTHRITIS OF LEFT HIP: ICD-10-CM

## 2025-01-15 DIAGNOSIS — R60.0 EDEMA OF BOTH LOWER LEGS DUE TO PERIPHERAL VENOUS INSUFFICIENCY: ICD-10-CM

## 2025-01-15 DIAGNOSIS — I87.2 EDEMA OF BOTH LOWER LEGS DUE TO PERIPHERAL VENOUS INSUFFICIENCY: ICD-10-CM

## 2025-01-15 DIAGNOSIS — Z96.641 S/P TOTAL RIGHT HIP ARTHROPLASTY: Primary | ICD-10-CM

## 2025-01-15 NOTE — TELEPHONE ENCOUNTER
----- Message from [employer] sent at 1/15/2025  3:19 PM CST -----  Contact: pt wife  Type:  Patient Returning Call    Who Called:Gerda   Who Left Message for Patient:Deanna   Does the patient know what this is regarding?: no   Would the patient rather a call back or a response via [facility]? Call   Best Call Back Number: [phone]  Additional Information:

## 2025-01-19 DIAGNOSIS — R06.2 WHEEZING: ICD-10-CM

## 2025-01-19 NOTE — TELEPHONE ENCOUNTER
No care due was identified.  [facility] Embedded Care Due Messages. Reference number: [id number].   1/19/2025 8:43:43 AM CST

## 2025-01-21 ENCOUNTER — PATIENT MESSAGE (OUTPATIENT)
Dept: CARDIOLOGY | Facility: CLINIC | Age: 85
End: 2025-01-21
Payer: MEDICARE

## 2025-01-21 ENCOUNTER — EXTERNAL HOME HEALTH (OUTPATIENT)
Dept: HOME HEALTH SERVICES | Facility: HOSPITAL | Age: 85
End: 2025-01-21
Payer: MEDICARE

## 2025-01-21 ENCOUNTER — TELEPHONE (OUTPATIENT)
Dept: CARDIOLOGY | Facility: CLINIC | Age: 85
End: 2025-01-21
Payer: MEDICARE

## 2025-01-23 RX ORDER — ALBUTEROL SULFATE 90 UG/1
2 INHALANT RESPIRATORY (INHALATION) EVERY 6 HOURS PRN
Qty: 18 G | Refills: 0 | Status: SHIPPED | OUTPATIENT
Start: 2025-01-23

## 2025-01-31 ENCOUNTER — PATIENT MESSAGE (OUTPATIENT)
Dept: CARDIOLOGY | Facility: CLINIC | Age: 85
End: 2025-01-31

## 2025-01-31 ENCOUNTER — HOSPITAL ENCOUNTER (OUTPATIENT)
Dept: CARDIOLOGY | Facility: HOSPITAL | Age: 85
Discharge: HOME | End: 2025-01-31
Attending: PHYSICIAN ASSISTANT
Payer: MEDICARE

## 2025-01-31 ENCOUNTER — OFFICE VISIT (OUTPATIENT)
Dept: CARDIOLOGY | Facility: CLINIC | Age: 85
End: 2025-01-31
Payer: MEDICARE

## 2025-01-31 VITALS
DIASTOLIC BLOOD PRESSURE: 62 MMHG | SYSTOLIC BLOOD PRESSURE: 94 MMHG | HEART RATE: 60 BPM | HEIGHT: 70 IN | BODY MASS INDEX: 31.25 KG/M2 | WEIGHT: 218.25 LBS

## 2025-01-31 DIAGNOSIS — N18.31 STAGE 3A CHRONIC KIDNEY DISEASE: ICD-10-CM

## 2025-01-31 DIAGNOSIS — I45.10 INCOMPLETE RIGHT BUNDLE BRANCH BLOCK: ICD-10-CM

## 2025-01-31 DIAGNOSIS — I70.0 AORTIC ATHEROSCLEROSIS: ICD-10-CM

## 2025-01-31 DIAGNOSIS — R60.9 EDEMA, UNSPECIFIED TYPE: ICD-10-CM

## 2025-01-31 DIAGNOSIS — R79.89 ELEVATED SERUM CREATININE: Primary | ICD-10-CM

## 2025-01-31 DIAGNOSIS — I49.9 IRREGULAR HEART BEAT: ICD-10-CM

## 2025-01-31 DIAGNOSIS — R94.31 ABNORMAL EKG: ICD-10-CM

## 2025-01-31 DIAGNOSIS — D64.9 ANEMIA, UNSPECIFIED TYPE: ICD-10-CM

## 2025-01-31 DIAGNOSIS — M79.89 LEG SWELLING: Primary | ICD-10-CM

## 2025-01-31 DIAGNOSIS — I70.203 ATHEROSCLEROSIS OF ARTERY OF BOTH LOWER EXTREMITIES: ICD-10-CM

## 2025-01-31 LAB
OHS QRS DURATION: 94 MS
OHS QTC CALCULATION: 424 MS

## 2025-01-31 PROCEDURE — 99999 PR PBB SHADOW E&M-EST. PATIENT-LVL V: CPT | Mod: PBBFAC,HCNC,, | Performed by: PHYSICIAN ASSISTANT

## 2025-01-31 PROCEDURE — 3288F FALL RISK ASSESSMENT DOCD: CPT | Mod: HCNC,CPTII,S$GLB, | Performed by: PHYSICIAN ASSISTANT

## 2025-01-31 PROCEDURE — 1125F AMNT PAIN NOTED PAIN PRSNT: CPT | Mod: HCNC,CPTII,S$GLB, | Performed by: PHYSICIAN ASSISTANT

## 2025-01-31 PROCEDURE — 1160F RVW MEDS BY RX/DR IN RCRD: CPT | Mod: HCNC,CPTII,S$GLB, | Performed by: PHYSICIAN ASSISTANT

## 2025-01-31 PROCEDURE — 99214 OFFICE O/P EST MOD 30 MIN: CPT | Mod: HCNC,S$GLB,, | Performed by: PHYSICIAN ASSISTANT

## 2025-01-31 PROCEDURE — 1159F MED LIST DOCD IN RCRD: CPT | Mod: HCNC,CPTII,S$GLB, | Performed by: PHYSICIAN ASSISTANT

## 2025-01-31 PROCEDURE — 93005 ELECTROCARDIOGRAM TRACING: CPT | Mod: HCNC

## 2025-01-31 PROCEDURE — 93010 ELECTROCARDIOGRAM REPORT: CPT | Mod: HCNC,,, | Performed by: INTERNAL MEDICINE

## 2025-01-31 PROCEDURE — 1101F PT FALLS ASSESS-DOCD LE1/YR: CPT | Mod: HCNC,CPTII,S$GLB, | Performed by: PHYSICIAN ASSISTANT

## 2025-01-31 RX ORDER — AMLODIPINE BESYLATE 5 MG/1
5 TABLET ORAL 2 TIMES DAILY
COMMUNITY

## 2025-01-31 NOTE — PROGRESS NOTES
Subjective   Patient ID:  Ezequiel Hughes is a 84 y.o. male who presents for follow-up of BLE edema      HPI  Mr. Hughes is an 84 year old male patient whose current medical conditions include TIA, HTN, incomplete RBBB, and GUIDO who presents today for follow-up. Patient previously underwent R hip replacement surgery in November. Since that time, he has had issues with increased BLE edema. He saw Dr. Ozuna, his PCP, who switched his diuretic therapy to Bumex with some improvement. He was also referred to vascular surgery for venous reflux studies. No significant evidence of reflux or DVT noted on studies and patient was referred to lymphedema clinic. He returns today and states he is doing ok. Still having issues with BLE edema. Doing lymphedema therapy 2x week for the past three weeks which has seemed to help (family reports legs appear normal in AM). Other associated symptoms include weight gain, bilateral leg numbness, fatigue, MCKEON. He denies any associated barney CP, palpitations, near syncope, or syncope. BP borderline low in office today. He reports compliance with his medications. Admits he hast not been drinking his usual amount of water for the past two days.     Labs reviewed. BNP only mildly out of normal range (116). Albumin normal, creatinine 1.8. Of note, patient was significantly anemic during hospital stay (H/H 7.2/23.8).      EKG today shows SR, incomplete RBBB, occasional PAC.  TTE 3/24 with normal EF, mild aortic sclerosis, mild MR, pulmonary HTN.            Review of Systems   Constitutional: Positive for malaise/fatigue and weight gain. Negative for chills, decreased appetite and fever.   HENT:  Negative for congestion, hoarse voice and sore throat.    Eyes:  Negative for blurred vision and discharge.   Cardiovascular:  Positive for dyspnea on exertion and leg swelling. Negative for chest pain, claudication, cyanosis, irregular heartbeat, near-syncope, orthopnea, palpitations and paroxysmal nocturnal  dyspnea.   Respiratory:  Negative for cough, hemoptysis, shortness of breath, snoring, sputum production and wheezing.    Endocrine: Negative for cold intolerance and heat intolerance.   Hematologic/Lymphatic: Negative for bleeding problem. Does not bruise/bleed easily.   Skin:  Negative for rash.   Musculoskeletal:  Positive for joint pain. Negative for arthritis, back pain, joint swelling, muscle cramps, muscle weakness and myalgias.   Gastrointestinal:  Negative for abdominal pain, constipation, diarrhea, heartburn, melena and nausea.   Genitourinary:  Negative for hematuria.   Neurological:  Negative for dizziness, focal weakness, headaches, light-headedness, loss of balance, numbness, paresthesias, seizures and weakness.   Psychiatric/Behavioral:  Negative for memory loss. The patient does not have insomnia.    Allergic/Immunologic: Negative for hives.     Repeat /56       Objective     Physical Exam  Vitals and nursing note reviewed.   Constitutional:       General: He is not in acute distress.     Appearance: Normal appearance. He is well-developed. He is not diaphoretic.   HENT:      Head: Normocephalic and atraumatic.   Eyes:      General:         Right eye: No discharge.         Left eye: No discharge.      Pupils: Pupils are equal, round, and reactive to light.   Cardiovascular:      Rate and Rhythm: Normal rate and regular rhythm. Occasional Extrasystoles are present.     Heart sounds: Normal heart sounds, S1 normal and S2 normal. No murmur heard.  Pulmonary:      Effort: Pulmonary effort is normal. No respiratory distress.      Breath sounds: Normal breath sounds.   Abdominal:      General: There is no distension.   Musculoskeletal:      Right lower leg: Edema present.      Left lower leg: Edema present.   Skin:     General: Skin is warm and dry.      Findings: No erythema.      Comments: Compression wraps BLE   Neurological:      Mental Status: He is alert and oriented to person, place, and time.    Psychiatric:         Mood and Affect: Mood normal.         Behavior: Behavior normal.         Thought Content: Thought content normal.       TTE Results 3/11/24  Summary  Show Result Comparison     Left Ventricle: The left ventricle is normal in size. Normal wall thickness. There is concentric remodeling. Normal wall motion. There is normal systolic function with a visually estimated ejection fraction of 55 - 70%. Ejection fraction by visual approximation is 65%. There is normal diastolic function.    Right Ventricle: Normal right ventricular cavity size. Wall thickness is normal. Right ventricle wall motion  is normal. Systolic function is normal.    Aortic Valve: The aortic valve is a trileaflet valve. There is mild aortic valve sclerosis. Mildly restricted motion. There is no stenosis. Aortic valve peak velocity is 2.01 m/s. Mean gradient is 8 mmHg.    Mitral Valve: The mitral valve is structurally normal. There is mild regurgitation.    Tricuspid Valve: There is mild regurgitation.    Pulmonary Artery: The estimated pulmonary artery systolic pressure is 50 mmHg.    IVC/SVC: Intermediate venous pressure at 8 mmHg.            Assessment and Plan     1. Leg swelling    2. Atherosclerosis of artery of both lower extremities    3. Abnormal EKG    4. Aortic atherosclerosis    5. Incomplete right bundle branch block    6. Stage 3a chronic kidney disease    7. Edema, unspecified type    8. Irregular heart beat    9. Anemia, unspecified type      Patient presents for f/u. Still having issues with BLE edema--seems likely due in to venous insufficiency/deconditioning and mobility issues post surgery/lymphedema. Agree with lymphedema clinic/wraps. Continue Bumex at current dosage for now but repeat CMP today to make sure creatinine stable given borderline BP. Update CBC and TTE. Will discuss screening NOEMI with Dr. Thompson.  Plan:  -CBC, CMP today with phone review  -TTE in next 1-2 weeks  -Continue same CV meds/mgmt for  now  -Will discuss screening NOEMI with Dr. Thompson  -F/u TBA

## 2025-02-01 ENCOUNTER — TELEPHONE (OUTPATIENT)
Dept: CARDIOLOGY | Facility: CLINIC | Age: 85
End: 2025-02-01
Payer: MEDICARE

## 2025-02-01 NOTE — TELEPHONE ENCOUNTER
----- Message from Bindu Carey PA-C sent at 1/31/2025  3:55 PM CST -----  H/H improved post hospital say but still anemic. He needs heme/onc appt, referral placed.

## 2025-02-03 ENCOUNTER — TELEPHONE (OUTPATIENT)
Dept: CARDIOLOGY | Facility: CLINIC | Age: 85
End: 2025-02-03
Payer: MEDICARE

## 2025-02-03 ENCOUNTER — HOSPITAL ENCOUNTER (OUTPATIENT)
Dept: CARDIOLOGY | Facility: HOSPITAL | Age: 85
Discharge: HOME | End: 2025-02-03
Attending: PHYSICIAN ASSISTANT
Payer: MEDICARE

## 2025-02-03 VITALS
WEIGHT: 218 LBS | HEIGHT: 70 IN | BODY MASS INDEX: 31.21 KG/M2 | SYSTOLIC BLOOD PRESSURE: 94 MMHG | DIASTOLIC BLOOD PRESSURE: 62 MMHG

## 2025-02-03 DIAGNOSIS — M79.89 LEG SWELLING: ICD-10-CM

## 2025-02-03 DIAGNOSIS — I70.0 AORTIC ATHEROSCLEROSIS: ICD-10-CM

## 2025-02-03 LAB
AORTIC ROOT ANNULUS: 3.4 CM
AV INDEX (PROSTH): 0.58
AV MEAN GRADIENT: 11 MMHG
AV PEAK GRADIENT: 19 MMHG
AV REGURGITATION PRESSURE HALF TIME: 903 MS
AV VALVE AREA BY VELOCITY RATIO: 1.9 CM²
AV VALVE AREA: 1.8 CM²
AV VELOCITY RATIO: 0.59
BSA FOR ECHO PROCEDURE: 2.21 M2
CV ECHO LV RWT: 0.55 CM
DOP CALC AO PEAK VEL: 2.2 M/S
DOP CALC AO VTI: 48.9 CM
DOP CALC LVOT AREA: 3.1 CM2
DOP CALC LVOT DIAMETER: 2 CM
DOP CALC LVOT PEAK VEL: 1.3 M/S
DOP CALC LVOT STROKE VOLUME: 89.5 CM3
DOP CALC RVOT PEAK VEL: 0.81 M/S
DOP CALC RVOT VTI: 18 CM
DOP CALCLVOT PEAK VEL VTI: 28.5 CM
E WAVE DECELERATION TIME: 209 MSEC
E/A RATIO: 0.97
E/E' RATIO: 15 M/S
ECHO LV POSTERIOR WALL: 1.3 CM (ref 0.6–1.1)
EJECTION FRACTION: 60 %
FRACTIONAL SHORTENING: 34 % (ref 28–44)
INTERVENTRICULAR SEPTUM: 1.1 CM (ref 0.6–1.1)
IVRT: 99 MSEC
LA MAJOR: 6.4 CM
LA WIDTH: 3.8 CM
LEFT ATRIUM AREA SYSTOLIC (APICAL 2 CHAMBER): 22.11 CM2
LEFT ATRIUM AREA SYSTOLIC (APICAL 4 CHAMBER): 26.76 CM2
LEFT ATRIUM SIZE: 3.9 CM
LEFT ATRIUM VOLUME INDEX MOD: 36 ML/M2
LEFT ATRIUM VOLUME MOD: 79 ML
LEFT INTERNAL DIMENSION IN SYSTOLE: 3.1 CM (ref 2.1–4)
LEFT VENTRICLE DIASTOLIC VOLUME INDEX: 46.02 ML/M2
LEFT VENTRICLE DIASTOLIC VOLUME: 99.87 ML
LEFT VENTRICLE END SYSTOLIC VOLUME APICAL 2 CHAMBER: 66.13 ML
LEFT VENTRICLE END SYSTOLIC VOLUME APICAL 4 CHAMBER: 78.28 ML
LEFT VENTRICLE MASS INDEX: 97.7 G/M2
LEFT VENTRICLE SYSTOLIC VOLUME INDEX: 17 ML/M2
LEFT VENTRICLE SYSTOLIC VOLUME: 36.79 ML
LEFT VENTRICULAR INTERNAL DIMENSION IN DIASTOLE: 4.7 CM (ref 3.5–6)
LEFT VENTRICULAR MASS: 212 G
LV LATERAL E/E' RATIO: 14.4 M/S
LV SEPTAL E/E' RATIO: 16.4 M/S
LVED V (TEICH): 99.87 ML
LVES V (TEICH): 36.79 ML
LVOT MG: 4.34 MMHG
LVOT MV: 0.95 CM/S
MV PEAK A VEL: 1.19 M/S
MV PEAK E VEL: 1.15 M/S
OHS CV RV/LV RATIO: 0.57 CM
PISA AR MAX VEL: 3.92 M/S
PISA TR MAX VEL: 3.6 M/S
PULM VEIN S/D RATIO: 1.77
PV MEAN GRADIENT: 2 MMHG
PV MV: 0.82 M/S
PV PEAK D VEL: 0.47 M/S
PV PEAK GRADIENT: 5 MMHG
PV PEAK S VEL: 0.83 M/S
PV PEAK VELOCITY: 1.13 M/S
RA MAJOR: 5.26 CM
RA PRESSURE ESTIMATED: 3 MMHG
RA WIDTH: 3.6 CM
RIGHT VENTRICLE DIASTOLIC BASEL DIMENSION: 2.7 CM
RIGHT VENTRICULAR END-DIASTOLIC DIMENSION: 2.68 CM
RV TB RVSP: 7 MMHG
SINUS: 2.84 CM
STJ: 2.84 CM
TDI LATERAL: 0.08 M/S
TDI SEPTAL: 0.07 M/S
TDI: 0.08 M/S
TR MAX PG: 52 MMHG
TV REST PULMONARY ARTERY PRESSURE: 55 MMHG
Z-SCORE OF LEFT VENTRICULAR DIMENSION IN END DIASTOLE: -4.24
Z-SCORE OF LEFT VENTRICULAR DIMENSION IN END SYSTOLE: -2.72

## 2025-02-03 PROCEDURE — 93306 TTE W/DOPPLER COMPLETE: CPT | Mod: HCNC

## 2025-02-03 PROCEDURE — 93306 TTE W/DOPPLER COMPLETE: CPT | Mod: 26,HCNC,, | Performed by: INTERNAL MEDICINE

## 2025-02-03 NOTE — TELEPHONE ENCOUNTER
Contacted pt and informed him Creatinine with upward trend. Recommend dropping Bumex (diuretic) to 1/2 pill daily. Got pt scheduled for repeat BMP on 02/10/25. Pt requests [employer] message sent with information. Pt vu w/o q/c    ----- Message from Bindu Carey PA-C sent at 2/3/2025  8:54 AM CST -----  Creatinine with upward trend. Recommend dropping Bumex (diuretic) to 1/2 pill daily.    Can we get a repeat BMP in 1 week? Needs symptom check on Friday as well.

## 2025-02-04 ENCOUNTER — TELEPHONE (OUTPATIENT)
Dept: HEMATOLOGY/ONCOLOGY | Facility: CLINIC | Age: 85
End: 2025-02-04
Payer: MEDICARE

## 2025-02-04 ENCOUNTER — TELEPHONE (OUTPATIENT)
Dept: CARDIOLOGY | Facility: CLINIC | Age: 85
End: 2025-02-04
Payer: MEDICARE

## 2025-02-04 DIAGNOSIS — Z00.00 ENCOUNTER FOR WELLNESS EXAMINATION IN ADULT: Primary | ICD-10-CM

## 2025-02-04 DIAGNOSIS — D64.9 ANEMIA: Primary | ICD-10-CM

## 2025-02-04 DIAGNOSIS — R63.5 ABNORMAL WEIGHT GAIN: ICD-10-CM

## 2025-02-04 DIAGNOSIS — C61 PROSTATE CANCER: ICD-10-CM

## 2025-02-04 DIAGNOSIS — E78.2 MIXED HYPERLIPIDEMIA: ICD-10-CM

## 2025-02-04 DIAGNOSIS — I70.203 ATHEROSCLEROSIS OF ARTERY OF BOTH LOWER EXTREMITIES: ICD-10-CM

## 2025-02-04 DIAGNOSIS — Z12.5 PROSTATE CANCER SCREENING: ICD-10-CM

## 2025-02-04 DIAGNOSIS — R60.9 EDEMA, UNSPECIFIED TYPE: ICD-10-CM

## 2025-02-04 DIAGNOSIS — N18.4 CKD STAGE 4 SECONDARY TO HYPERTENSION: ICD-10-CM

## 2025-02-04 DIAGNOSIS — I10 ESSENTIAL HYPERTENSION: ICD-10-CM

## 2025-02-04 DIAGNOSIS — I12.9 CKD STAGE 4 SECONDARY TO HYPERTENSION: ICD-10-CM

## 2025-02-04 DIAGNOSIS — R73.09 ABNORMAL GLUCOSE: ICD-10-CM

## 2025-02-04 DIAGNOSIS — I70.0 AORTIC ATHEROSCLEROSIS: ICD-10-CM

## 2025-02-04 NOTE — TELEPHONE ENCOUNTER
Called and reviewd test results with patient. Informed patient to continue taking 1/2 bumex as discussed and to continue lymphedema clinic with compression. Patient verbalized understanding. No further questions or concerns. KA    ----- Message from Bindu Carey PA-C sent at 2/4/2025  2:23 PM CST -----  Please phone patient. TTE reviewed and showed normal heart function/strength. He does have evidence of diastolic dysfunction as well as pulmonary HTN. I would continue Bumex at 1/2 pill as we discussed. Continue lymphedema clinic/compression.

## 2025-02-04 NOTE — TELEPHONE ENCOUNTER
----- Message from Bindu Carey PA-C sent at 2/4/2025  2:23 PM CST -----  Please phone patient. TTE reviewed and showed normal heart function/strength. He does have evidence of diastolic dysfunction as well as pulmonary HTN. I would continue Bumex at 1/2 pill as we discussed. Continue lymphedema clinic/compression.

## 2025-02-04 NOTE — PROGRESS NOTES
Please phone patient. TTE reviewed and showed normal heart function/strength. He does have evidence of diastolic dysfunction as well as pulmonary HTN. I would continue Bumex at 1/2 pill as we discussed. Continue lymphedema clinic/compression.

## 2025-02-06 ENCOUNTER — LAB VISIT (OUTPATIENT)
Dept: LAB | Facility: HOSPITAL | Age: 85
End: 2025-02-06
Attending: FAMILY MEDICINE
Payer: MEDICARE

## 2025-02-06 DIAGNOSIS — Z12.5 PROSTATE CANCER SCREENING: ICD-10-CM

## 2025-02-06 DIAGNOSIS — I10 ESSENTIAL HYPERTENSION: ICD-10-CM

## 2025-02-06 DIAGNOSIS — Z00.00 ENCOUNTER FOR WELLNESS EXAMINATION IN ADULT: ICD-10-CM

## 2025-02-06 DIAGNOSIS — C61 PROSTATE CANCER: ICD-10-CM

## 2025-02-06 DIAGNOSIS — I12.9 CKD STAGE 4 SECONDARY TO HYPERTENSION: ICD-10-CM

## 2025-02-06 DIAGNOSIS — E78.2 MIXED HYPERLIPIDEMIA: ICD-10-CM

## 2025-02-06 DIAGNOSIS — N18.4 CKD STAGE 4 SECONDARY TO HYPERTENSION: ICD-10-CM

## 2025-02-06 DIAGNOSIS — R63.5 ABNORMAL WEIGHT GAIN: ICD-10-CM

## 2025-02-06 DIAGNOSIS — R73.09 ABNORMAL GLUCOSE: ICD-10-CM

## 2025-02-06 DIAGNOSIS — I70.0 AORTIC ATHEROSCLEROSIS: ICD-10-CM

## 2025-02-06 DIAGNOSIS — R60.9 EDEMA, UNSPECIFIED TYPE: ICD-10-CM

## 2025-02-06 DIAGNOSIS — I70.203 ATHEROSCLEROSIS OF ARTERY OF BOTH LOWER EXTREMITIES: ICD-10-CM

## 2025-02-06 LAB
COMPLEXED PSA SERPL-MCNC: 1.6 NG/ML (ref 0–4)
ESTIMATED AVG GLUCOSE: 100 MG/DL (ref 68–131)
HBA1C MFR BLD: 5.1 % (ref 4–5.6)
T4 FREE SERPL-MCNC: 0.79 NG/DL (ref 0.71–1.51)
TSH SERPL DL<=0.005 MIU/L-ACNC: 1.77 UIU/ML (ref 0.4–4)

## 2025-02-06 PROCEDURE — 36415 COLL VENOUS BLD VENIPUNCTURE: CPT | Mod: HCNC,PN | Performed by: FAMILY MEDICINE

## 2025-02-06 PROCEDURE — 83036 HEMOGLOBIN GLYCOSYLATED A1C: CPT | Mod: HCNC | Performed by: FAMILY MEDICINE

## 2025-02-06 PROCEDURE — 84443 ASSAY THYROID STIM HORMONE: CPT | Mod: HCNC | Performed by: FAMILY MEDICINE

## 2025-02-06 PROCEDURE — 84153 ASSAY OF PSA TOTAL: CPT | Mod: HCNC | Performed by: FAMILY MEDICINE

## 2025-02-06 PROCEDURE — 84439 ASSAY OF FREE THYROXINE: CPT | Mod: HCNC | Performed by: FAMILY MEDICINE

## 2025-02-07 ENCOUNTER — TELEPHONE (OUTPATIENT)
Dept: CARDIOLOGY | Facility: CLINIC | Age: 85
End: 2025-02-07
Payer: MEDICARE

## 2025-02-07 NOTE — TELEPHONE ENCOUNTER
Contacted pt for a phone review. Pt states he has been seeing Dr. Alejandra at [facility]. He states he has been doing compression therapy and was referred to Lymphedema clinic for chronic swelling. His weight is at 208 lbs today. He vu w/o q/c    ----- Message from Med Assistant Rasmussen sent at 2/3/2025 11:27 AM CST -----  Regarding: Symptom Check  ---- YANDY Siegel 02/03/25 -----   Needs symptom check on Friday as well.

## 2025-02-10 ENCOUNTER — LAB VISIT (OUTPATIENT)
Dept: LAB | Facility: HOSPITAL | Age: 85
End: 2025-02-10
Attending: PHYSICIAN ASSISTANT
Payer: MEDICARE

## 2025-02-10 ENCOUNTER — OFFICE VISIT (OUTPATIENT)
Dept: PRIMARY CARE CLINIC | Facility: CLINIC | Age: 85
End: 2025-02-10
Payer: MEDICARE

## 2025-02-10 VITALS
WEIGHT: 209 LBS | HEART RATE: 76 BPM | SYSTOLIC BLOOD PRESSURE: 108 MMHG | TEMPERATURE: 98 F | BODY MASS INDEX: 29.99 KG/M2 | RESPIRATION RATE: 18 BRPM | OXYGEN SATURATION: 98 % | DIASTOLIC BLOOD PRESSURE: 72 MMHG

## 2025-02-10 DIAGNOSIS — I89.0 LYMPHEDEMA: Primary | ICD-10-CM

## 2025-02-10 DIAGNOSIS — I10 ESSENTIAL HYPERTENSION: ICD-10-CM

## 2025-02-10 DIAGNOSIS — D64.9 ANEMIA, UNSPECIFIED TYPE: ICD-10-CM

## 2025-02-10 DIAGNOSIS — R79.89 ELEVATED SERUM CREATININE: ICD-10-CM

## 2025-02-10 DIAGNOSIS — L29.9 PRURITUS: ICD-10-CM

## 2025-02-10 DIAGNOSIS — I12.9 CKD STAGE 4 SECONDARY TO HYPERTENSION: ICD-10-CM

## 2025-02-10 DIAGNOSIS — N18.4 CKD STAGE 4 SECONDARY TO HYPERTENSION: ICD-10-CM

## 2025-02-10 DIAGNOSIS — N17.9 AKI (ACUTE KIDNEY INJURY): ICD-10-CM

## 2025-02-10 LAB
ANION GAP SERPL CALC-SCNC: 11 MMOL/L (ref 8–16)
BUN SERPL-MCNC: 31 MG/DL (ref 8–23)
CALCIUM SERPL-MCNC: 9.1 MG/DL (ref 8.7–10.5)
CHLORIDE SERPL-SCNC: 106 MMOL/L (ref 95–110)
CO2 SERPL-SCNC: 21 MMOL/L (ref 23–29)
CREAT SERPL-MCNC: 1.6 MG/DL (ref 0.5–1.4)
EST. GFR  (NO RACE VARIABLE): 41.7 ML/MIN/1.73 M^2
GLUCOSE SERPL-MCNC: 91 MG/DL (ref 70–110)
POTASSIUM SERPL-SCNC: 4.4 MMOL/L (ref 3.5–5.1)
SODIUM SERPL-SCNC: 138 MMOL/L (ref 136–145)

## 2025-02-10 PROCEDURE — 99999 PR PBB SHADOW E&M-EST. PATIENT-LVL V: CPT | Mod: PBBFAC,HCNC,, | Performed by: FAMILY MEDICINE

## 2025-02-10 PROCEDURE — 3288F FALL RISK ASSESSMENT DOCD: CPT | Mod: HCNC,CPTII,S$GLB, | Performed by: FAMILY MEDICINE

## 2025-02-10 PROCEDURE — 99215 OFFICE O/P EST HI 40 MIN: CPT | Mod: HCNC,S$GLB,, | Performed by: FAMILY MEDICINE

## 2025-02-10 PROCEDURE — 1159F MED LIST DOCD IN RCRD: CPT | Mod: HCNC,CPTII,S$GLB, | Performed by: FAMILY MEDICINE

## 2025-02-10 PROCEDURE — 1126F AMNT PAIN NOTED NONE PRSNT: CPT | Mod: HCNC,CPTII,S$GLB, | Performed by: FAMILY MEDICINE

## 2025-02-10 PROCEDURE — 1101F PT FALLS ASSESS-DOCD LE1/YR: CPT | Mod: HCNC,CPTII,S$GLB, | Performed by: FAMILY MEDICINE

## 2025-02-10 PROCEDURE — 80048 BASIC METABOLIC PNL TOTAL CA: CPT | Mod: HCNC,XB | Performed by: PHYSICIAN ASSISTANT

## 2025-02-10 PROCEDURE — G2211 COMPLEX E/M VISIT ADD ON: HCPCS | Mod: HCNC,S$GLB,, | Performed by: FAMILY MEDICINE

## 2025-02-10 NOTE — PROGRESS NOTES
Chief Complaint  Chief Complaint   Patient presents with    Follow-up       HPI  Ezequiel Hughes is a 84 y.o. male with multiple medical diagnoses as listed in the medical history and problem list that presents for  in person visit.     History of Present Illness    CHIEF COMPLAINT:  - Follow-up visit to discuss recent cardiology appointments, vascular surgery consultation, and management of lower extremity edema.    HPI:  Patient presents for follow-up after recent visits with cardiology and vascular surgery. Last seen on January 7th, 2025. Saw Dr. Alejandra for vascular surgery due to lower extremity swelling. Venous ultrasound showed negative results for reflux bilaterally of deep veins, with only a small area of reflux in the right leg and small varicose veins. No evidence of significant reflux or DVT.    Reports bilateral lower extremity edema. Initially on Lasix, now switched to Bumex. Referred to lymphedema clinic for chronic swelling, attending twice weekly. Notes significant improvement with compression wraps and Velcro devices. Weight gradually decreased from 218 lbs on February 3rd to 209 lbs today, with home weight around 205 lbs.    Saw orthopedics for follow-up after right hip surgery. Reports no pain (0/10) in the right hip since surgery. Saw [facility] with Dr. Colon, on January 31st. Condition assessed as more likely due to deconditioning and mobility issues post-surgery with lymphedema.    Reports lower back pain, worse at night. States hip from surgery has never bothered him, but lower back causes discomfort. Expresses desire to avoid back surgery.    Reports lack of energy. Diagnosed with anemia, worsened since hip surgery in mid-November. Hemoglobin levels dropped from 12.4 pre-surgery to as low as 7.2 during hospitalization, currently at 9.8.    Reports recurrence of itching on side of head. Using CeraVe anti-itch cream but finds it ineffective. Reports restless leg syndrome symptoms have  returned.    MEDICATIONS:  - Bumex (bumetanide), 0.5 tablet daily, for edema/fluid retention  - Plavix, daily, as blood thinner  - Talopraam (escitalopram), at night, for anxiety  - Potassium, while on Bumex  - Cholesterol medication  - Tylenol (acetaminophen), as needed for arthritis pain  - Reflux medication, as needed  - Blood pressure medications, 4-5 different ones    PMH:  - Anemia  - Kidney issues  - Prostate issues  - Reflux  - Arthritis  - Anxiety  - Restless leg syndrome    RECENT/REMOTE SURGICAL HISTORY:  - Right hip replacement: Mid-November 2024, for hip pain         No questionnaires on file.       Pmh, Psh, Family Hx, Social Hx, HM updated in Epic Tabs today.    Review of Systems   Constitutional:  Positive for activity change and fatigue. Negative for appetite change and unexpected weight change.   Respiratory:  Negative for cough and shortness of breath.    Cardiovascular:  Negative for chest pain, palpitations and leg swelling.   Gastrointestinal:  Negative for constipation, diarrhea and nausea.   Musculoskeletal:  Positive for back pain. Negative for arthralgias, gait problem, myalgias, neck pain and neck stiffness.   Skin:  Negative for color change, rash and wound.        Itching at night in scalp    Psychiatric/Behavioral:  Positive for sleep disturbance. The patient is nervous/anxious.         Objective:     Vitals:    02/10/25 0817   BP: 108/72   Patient Position: Sitting   Pulse: 76   Resp: 18   Temp: 97.9 °F (36.6 °C)   SpO2: 98%   Weight: 94.8 kg (209 lb)     Wt Readings from Last 10 Encounters:   02/10/25 94.8 kg (209 lb)   02/03/25 98.9 kg (218 lb)   01/31/25 99 kg (218 lb 4.1 oz)   01/13/25 102.5 kg (225 lb 15.5 oz)   01/07/25 102.5 kg (225 lb 15.5 oz)   01/06/25 101.1 kg (222 lb 14.2 oz)   12/20/24 99.5 kg (219 lb 5.7 oz)   11/27/24 107.2 kg (236 lb 5.3 oz)   11/18/24 107.2 kg (236 lb 5.3 oz)   11/13/24 96.3 kg (212 lb 4.9 oz)     Physical Exam    Vitals: Blood pressure: 133/61.  Weight: 209 lbs.  TEST RESULTS:  - Potassium: January 31st, normal levels  - Creatinine: January 31st, 1.9, stable  - GFR: January 31st, 34  - Hemoglobin: January 31st, 9.8, improved since surgery but still on lower end of normal  - Thyroid studies: January 31st, normal  - A1c: January 31st, 5.1, normal  - Uromicrobium and creatinine ratio: January 31st, negative  - PSA: January 31st, normal  - Hemoglobin: October 28th (pre-surgery), 12.4  - Hemoglobin: November (post-surgery), declined to 10.3, then 8.9, then 7.2  - Lower extremity venous ultrasound: Negative for reflux bilaterally of deep veins, small area of reflux in right leg and small varicose veins, brain and superficial system within normal limits  - Echocardiogram: Showed very mild grade 2 diastolic dysfunction (age-related)       Physical Exam  Vitals and nursing note reviewed.   Constitutional:       General: He is awake.      Appearance: Normal appearance. He is well-developed, well-groomed and normal weight.   HENT:      Head: Normocephalic and atraumatic.      Right Ear: External ear normal.      Left Ear: External ear normal.      Nose: Nose normal.   Eyes:      Conjunctiva/sclera: Conjunctivae normal.   Neck:      Thyroid: No thyromegaly or thyroid tenderness.   Cardiovascular:      Rate and Rhythm: Normal rate and regular rhythm.      Heart sounds: Murmur heard.   Pulmonary:      Effort: Pulmonary effort is normal. No accessory muscle usage.      Breath sounds: Normal breath sounds.   Musculoskeletal:         General: No swelling or tenderness.      Cervical back: Normal range of motion and neck supple.   Skin:     Comments: Wearing compression / velcro leg wraps, normal sized ankles now.    Neurological:      General: No focal deficit present.      Mental Status: He is alert. Mental status is at baseline.   Psychiatric:         Attention and Perception: Attention normal.         Mood and Affect: Mood normal.         Speech: Speech normal.          Behavior: Behavior normal. Behavior is cooperative.         Thought Content: Thought content normal.         Judgment: Judgment normal.         Assessment:   LABS:   Lab Results   Component Value Date    HGBA1C 5.1 02/06/2025    HGBA1C 5.1 06/27/2023    HGBA1C 5.3 01/17/2023      Lab Results   Component Value Date    CHOL 138 08/23/2024    CHOL 151 02/06/2024    CHOL 127 07/26/2023     Lab Results   Component Value Date    LDLCALC 57.8 (L) 08/23/2024    LDLCALC 70.2 02/06/2024    LDLCALC 60.4 (L) 07/26/2023     Lab Results   Component Value Date    WBC 4.53 01/31/2025    HGB 9.8 (L) 01/31/2025    HCT 31.1 (L) 01/31/2025     (L) 01/31/2025    CHOL 138 08/23/2024    TRIG 61 08/23/2024    HDL 68 08/23/2024    ALT 7 (L) 01/31/2025    AST 12 01/31/2025     01/31/2025    K 4.7 01/31/2025     01/31/2025    CREATININE 1.9 (H) 01/31/2025    BUN 41 (H) 01/31/2025    CO2 22 (L) 01/31/2025    TSH 1.771 02/06/2025    PSA 1.6 02/06/2025    INR 1.2 06/29/2022    HGBA1C 5.1 02/06/2025       Plan:   Assessment & Plan    I89.0 Lymphedema  N17.9 DALLAS (acute kidney injury)  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, 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  D64.9 Anemia, unspecified type  I10 Essential hypertension  L29.9 Pruritus    IMPRESSION:  - Assessed patient's progress following vascular surgery and lymphedema treatment  - Noted significant improvement in lower extremity edema with compression therapy  - Reviewed recent lab results showing stable creatinine at 1.9 and improved hemoglobin at 9.8  - Evaluated recent echocardiogram showing mild age-related diastolic dysfunction  - Considered adjusting diuretic regimen as kidney function improves  - Assessed persistent anemia, likely related to recent surgery and kidney injury  - Evaluated recurrence of scalp itching and restless leg syndrome, potentially related to medication changes or anemia    PLAN SUMMARY:  - Begin gradual return to exercise, including walking and gym activities  - Continue  using compression wraps as directed by lymphedema clinic  - Order CBC and CMP to evaluate kidney function and electrolytes  - Consider restarting Lyrica for scalp pruritus if not currently taking  - Adjust Bumex dosage to 0.5 tablet daily  - Follow up in 3 months    LYMPHEDEMA:  - Instructed the patient to continue using compression wraps as directed by the lymphedema clinic.    CKD STAGE 4 SECONDARY TO HYPERTENSION:  - Educated the patient about the relationship between kidney function and erythropoietin production.  - Ordered CBC and CMP to evaluate kidney function and electrolytes.    ANEMIA, UNSPECIFIED TYPE:  - Explained to the patient the relationship between erythropoietin production and anemia, which is closely related to kidney function.  - Discussed the potential correlation between anemia and symptoms such as fatigue and restless legs syndrome.    ESSENTIAL HYPERTENSION:  - Adjusted the patient's Bumex dosage to 0.5 tablet daily.    PRURITUS:  - Recommend restarting Lyrica for scalp pruritus if the patient is not currently taking it.    EXERCISE REHABILITATION:  - Patient to begin gradual return to exercise, including walking and gym activities.    FOLLOW UP:  - Follow up in 3 months.  - Contact the office if needed before next scheduled appointment.       Ezequiel was seen today for follow-up.    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 Normal wall   thickness. There is concentric remodeling. There is normal systolic   function. Ejection fraction is approximately 60%. Grade II diastolic   dysfunction.    Right Ventricle: Normal right ventricular cavity size. Wall thickness   is normal. Systolic function is normal.    Aortic Valve: There is mild aortic valve sclerosis. There is mild    stenosis. Aortic valve area by VTI is 1.8 cm². Aortic valve peak velocity   is 2.2 m/s. Mean gradient is 11 mmHg. The dimensionless index is 0.58.    Tricuspid Valve: There is mild regurgitation with a centrally directed   jet.    Pulmonary Artery: The estimated pulmonary artery systolic pressure is   55 mmHg.    IVC/SVC: Normal venous pressure at 3 mmHg.    The ASCVD Risk score (Perez DK, et al., 2019) failed to calculate for the following reasons:    The 2019 ASCVD risk score is only valid for ages 40 to 79    Follow-up: Follow up in about 3 months (around 5/15/2025) for f/u OV Dr. Ozuna- 3 mo f/u .    I spent a total of   45    minutes face to face and non-face to face on the date of this visit.This includes time preparing to see the patient (eg, review of tests, notes), obtaining and/or reviewing additional history from an independent historian and/or outside medical records, documenting clinical information in the electronic health record, independently interpreting results and/or communicating results to the patient/family/caregiver, or care coordinator.  Visit today included increased complexity associated with the care of the episodic problem addressed and managing the longitudinal care of the patient due to the serious and/or complex managed problem(s).    This note was generated with the assistance of ambient listening technology. Verbal consent was obtained by the patient and accompanying visitor(s) for the recording of patient appointment to facilitate this note. I attest to having reviewed and edited the generated note for accuracy, though some syntax or spelling errors may persist. Please contact the author of this note for any clarification.       There are no Patient Instructions on file for this visit.

## 2025-02-11 ENCOUNTER — TELEPHONE (OUTPATIENT)
Dept: CARDIOLOGY | Facility: CLINIC | Age: 85
End: 2025-02-11
Payer: MEDICARE

## 2025-02-11 NOTE — TELEPHONE ENCOUNTER
Contacted pt and informed him Creatinine improved, can continue same dose (.05 tablet) of Bumex for now and f/u as scheduled w/ Dr. Thompson in March. Pt vu w/o q/c    ----- Message from Bindu Caery PA-C sent at 2/11/2025  2:03 PM CST -----  Please phone patient. Creatinine improved, can continue same dose of Bumex for now.  Needs f/u in in 1-2 months with Dr. Thompson.

## 2025-02-11 NOTE — PROGRESS NOTES
Please phone patient. Creatinine improved, can continue same dose of Bumex for now.  Needs f/u in in 1-2 months with Dr. Thompson.

## 2025-02-24 ENCOUNTER — OFFICE VISIT (OUTPATIENT)
Dept: ORTHOPEDICS | Facility: CLINIC | Age: 85
End: 2025-02-24
Payer: MEDICARE

## 2025-02-24 ENCOUNTER — HOSPITAL ENCOUNTER (OUTPATIENT)
Dept: RADIOLOGY | Facility: HOSPITAL | Age: 85
Discharge: HOME | End: 2025-02-24
Attending: ORTHOPAEDIC SURGERY
Payer: MEDICARE

## 2025-02-24 VITALS — BODY MASS INDEX: 29.92 KG/M2 | WEIGHT: 209 LBS | HEIGHT: 70 IN

## 2025-02-24 DIAGNOSIS — Z00.00 ENCOUNTER FOR MEDICARE ANNUAL WELLNESS EXAM: ICD-10-CM

## 2025-02-24 DIAGNOSIS — M16.12 ARTHRITIS OF LEFT HIP: ICD-10-CM

## 2025-02-24 DIAGNOSIS — I87.2 EDEMA OF BOTH LOWER LEGS DUE TO PERIPHERAL VENOUS INSUFFICIENCY: ICD-10-CM

## 2025-02-24 DIAGNOSIS — R60.0 EDEMA OF BOTH LOWER LEGS DUE TO PERIPHERAL VENOUS INSUFFICIENCY: ICD-10-CM

## 2025-02-24 DIAGNOSIS — M16.11 PRIMARY OSTEOARTHRITIS OF RIGHT HIP: ICD-10-CM

## 2025-02-24 DIAGNOSIS — Z96.641 S/P TOTAL RIGHT HIP ARTHROPLASTY: Primary | ICD-10-CM

## 2025-02-24 DIAGNOSIS — M54.16 LUMBAR RADICULOPATHY: Primary | ICD-10-CM

## 2025-02-24 PROCEDURE — 3288F FALL RISK ASSESSMENT DOCD: CPT | Mod: HCNC,CPTII,S$GLB, | Performed by: ORTHOPAEDIC SURGERY

## 2025-02-24 PROCEDURE — 73502 X-RAY EXAM HIP UNI 2-3 VIEWS: CPT | Mod: TC,HCNC,RT

## 2025-02-24 PROCEDURE — 99999 PR PBB SHADOW E&M-EST. PATIENT-LVL III: CPT | Mod: PBBFAC,HCNC,, | Performed by: ORTHOPAEDIC SURGERY

## 2025-02-24 PROCEDURE — 1126F AMNT PAIN NOTED NONE PRSNT: CPT | Mod: HCNC,CPTII,S$GLB, | Performed by: ORTHOPAEDIC SURGERY

## 2025-02-24 PROCEDURE — 99024 POSTOP FOLLOW-UP VISIT: CPT | Mod: HCNC,S$GLB,, | Performed by: ORTHOPAEDIC SURGERY

## 2025-02-24 PROCEDURE — 1101F PT FALLS ASSESS-DOCD LE1/YR: CPT | Mod: HCNC,CPTII,S$GLB, | Performed by: ORTHOPAEDIC SURGERY

## 2025-02-24 PROCEDURE — 1159F MED LIST DOCD IN RCRD: CPT | Mod: HCNC,CPTII,S$GLB, | Performed by: ORTHOPAEDIC SURGERY

## 2025-02-24 PROCEDURE — 73502 X-RAY EXAM HIP UNI 2-3 VIEWS: CPT | Mod: 26,HCNC,RT, | Performed by: RADIOLOGY

## 2025-02-24 NOTE — PROGRESS NOTES
Subjective:     Patient ID: Ezequiel Hughes is a 84 y.o. male.    Chief Complaint: Pain and Post-op Evaluation of the Right Hip    HPI:  Right hip pain and you points over the posterior part of the hip as well as the groin and over the greater troch.  The left hip is not bothering him.  Pain is 6/10.  Can not walk without the walker and the cane.  He feels more steady with a walker.  He received a femoral nerve and obturator nerve block 09/12/2024 without much relief.  He is on Lyrica.  Can not take NSAIDs due to cardiac issues and being on Plavix.  He did get cleared to have surgery.  You also had right hip injection 07/18/2024 with Kenalog.  This is getting worse over 1 year now.  In the past I did repair rotator cuff tears in both of his shoulders.  He takes Tylenol and I did tell him he needs to take it 3 times a day.  He has good family support his daughter and a significant other were here today.  In the past he did try other medications over-the-counter without much success.  He came so close of having surgery in his lumbar spine by Dr. blum and was canceled.  No fever no chills no shortness breath no difficulty with chewing swallowing loss of bowel bladder control   He does have peripheral edema and he does have compressive stockings at home if she is having hard time putting in on because you can not bend down he has family members that put him on.  I reviewed his medication he is on antihypertensive meds as well as fluid pills  No fever no chills no shortness of breath or difficulty with chewing or swallowing loss of bowel bladder control blurry vision double vision loss sense smell or taste    02/24/2025  Date of surgery 11/13/2024 right RUPINDER-0/10 pain  Left hip arthritis-0/10 pain  Patient despite having arthritis in the left hip he is not having any pain in it.  He is extremely pleased with the right total hip if it was in for the lymphedema.  He finally was placed on Bumex that helped.  You went down  from 235 lb the beginning of January now he is 208 lb.  There is question between him and primary care and Dr. thompson about Bumex and now he is taking half a tablet.  Wondering when he could be switched back to Lasix if needed.  He was told Bumex affects the kidneys.  He does have neuropathy in both of his feet are numb.  He received injections by Dr. DWYER as well as Dr. Haywood.  He is taking pregabalin/Lyrica 75 mg twice a day.  He is refusing to have surgery on his back.  Some of his back pain eased up since having total hip replacement.  He is walking without any assistive devices.  He is going to [facility] [place] and he only have 2 more sessions to go.  Extremely happy with his results.  We did discuss that pregabalin could be increased to 3 times a day if needed.  He would like another pain management and I recommended zakiya  No fever no chills no shortness breath or difficulty with chewing swallowing loss of bowel bladder control  Past Medical History:   Diagnosis Date    Allergic rhinitis     Anemia     Back pain     BPH (benign prostatic hyperplasia)     Cancer     skin cancer to neck, Dr. Graves    Cataract     Disorder of kidney and ureter     ED (erectile dysfunction)     OMARI (generalized anxiety disorder) 08/07/2023    Hiatal hernia     small    History of colon polyps     colonoscopy 11/2016    HLD (hyperlipidemia)     Hyperlipidemia     Hypertension     Lateral epicondylitis     Lumbar radiculopathy 01/26/2022    OA (osteoarthritis)     GUIDO (obstructive sleep apnea)     Pneumonia of right middle lobe due to infectious organism 11/18/2024    Polyneuropathy     Prostate cancer     Dr. Wong    TIA (transient ischemic attack)     Trouble in sleeping     Urge incontinence 03/29/2022     Past Surgical History:   Procedure Laterality Date    ANGIOGRAPHY OF UPPER EXTREMITY Left 07/05/2022    Procedure: Angiogram Extremity Bilateral;  Surgeon: Aparna Thompson MD;  Location: [facility] CATH LAB;   Service: Cardiology;  Laterality: Left;    ARTERIOGRAPHY OF AORTIC ROOT N/A 07/05/2022    Procedure: ARTERIOGRAM, AORTIC ROOT;  Surgeon: Aparna Thompson MD;  Location: [facility] CATH LAB;  Service: Cardiology;  Laterality: N/A;    BLOCK, NERVE, PERIPHERAL Right 9/12/2024    Procedure: right femoral/ obturator nerve block- with steroids;  Surgeon: Abel Haywood MD;  Location: [facility] PAIN MGT;  Service: Pain Management;  Laterality: Right;    CATARACT EXTRACTION W/  INTRAOCULAR LENS IMPLANT Right 02/21/2018    I-Stent    CATARACT EXTRACTION W/  INTRAOCULAR LENS IMPLANT Left 03/21/2018    I - Stent    CATHETERIZATION OF BOTH LEFT AND RIGHT HEART N/A 07/05/2022    Procedure: CATHETERIZATION, HEART, BOTH LEFT AND RIGHT;  Surgeon: Aparna Thompson MD;  Location: [facility] CATH LAB;  Service: Cardiology;  Laterality: N/A;  radial approach    COLONOSCOPY N/A 11/14/2016    Procedure: COLONOSCOPY;  Surgeon: Karuna Rodriguez MD;  Location: [facility] ENDO;  Service: Endoscopy;  Laterality: N/A;    COLONOSCOPY N/A 09/22/2020    Procedure: COLONOSCOPY;  Surgeon: Chuy Fish MD;  Location: [facility] ENDO;  Service: Endoscopy;  Laterality: N/A;    EPIDURAL STEROID INJECTION N/A 6/6/2024    Procedure: Lumbar L5/S1 IL IAN;  Surgeon: Abel Haywood MD;  Location: [facility] PAIN MGT;  Service: Pain Management;  Laterality: N/A;    EPIDURAL STEROID INJECTION INTO CERVICAL SPINE N/A 2/8/2024    Procedure: C5/6 IL Right paramedian approach IAN with RN IV sedation;  Surgeon: Abel Haywood MD;  Location: [facility] PAIN MGT;  Service: Pain Management;  Laterality: N/A;    EYE SURGERY      HEMORRHOID SURGERY      HIP ARTHROPLASTY Right 11/13/2024    Procedure: ARTHROPLASTY, HIP;  Surgeon: Denton Encarnacion MD;  Location: [facility] OR;  Service: Orthopedics;  Laterality: Right;    I-STENT Right 02/21/2018    DR. REECE    INJECTION OF ANESTHETIC AGENT AROUND MEDIAL BRANCH NERVES INNERVATING CERVICAL FACET JOINT Bilateral 7/18/2023    Procedure:  Bilateral C4-6 MBB with RN IV sedation (diagnostic, #1);  Surgeon: Abel Haywood MD;  Location: [facility] PAIN MGT;  Service: Pain Management;  Laterality: Bilateral;    KNEE ARTHROSCOPY W/ MENISCAL REPAIR Right 2015    Dr. Jain    PCIOL Right 02/21/2018    DR. REECE    PLANTAR FASCIA RELEASE      right    ROTATOR CUFF REPAIR Bilateral     bilateral    SELECTIVE INJECTION OF ANESTHETIC AGENT AROUND LUMBAR SPINAL NERVE ROOT BY TRANSFORAMINAL APPROACH Bilateral 01/26/2022    Procedure: Bilateral L4/5 TF IAN with RN IV sedation;  Surgeon: Mak Young MD;  Location: [facility] PAIN MGT;  Service: Pain Management;  Laterality: Bilateral;    SELECTIVE INJECTION OF ANESTHETIC AGENT AROUND LUMBAR SPINAL NERVE ROOT BY TRANSFORAMINAL APPROACH Bilateral 03/14/2022    Procedure: Bilateral L4/5 TF IAN with RN IV sedation;  Surgeon: Mak Young MD;  Location: [facility] PAIN MGT;  Service: Pain Management;  Laterality: Bilateral;    SELECTIVE INJECTION OF ANESTHETIC AGENT AROUND LUMBAR SPINAL NERVE ROOT BY TRANSFORAMINAL APPROACH Bilateral 06/02/2022    Procedure: Bilateral L4/5 TF IAN - D2P Dr. Castro [facility];  Surgeon: Abel Haywood MD;  Location: [facility] PAIN MGT;  Service: Pain Management;  Laterality: Bilateral;    SELECTIVE INJECTION OF ANESTHETIC AGENT AROUND LUMBAR SPINAL NERVE ROOT BY TRANSFORAMINAL APPROACH Bilateral 08/25/2022    Procedure: Bilateral L4/5 TF IAN;  Surgeon: Abel Haywood MD;  Location: [facility] PAIN MGT;  Service: Pain Management;  Laterality: Bilateral;    SELECTIVE INJECTION OF ANESTHETIC AGENT AROUND LUMBAR SPINAL NERVE ROOT BY TRANSFORAMINAL APPROACH Bilateral 6/29/2023    Procedure: Bilateral L4/5 TF IAN RN IV Sedation;  Surgeon: Abel Haywood MD;  Location: [facility] PAIN MGT;  Service: Pain Management;  Laterality: Bilateral;    SELECTIVE INJECTION OF ANESTHETIC AGENT AROUND LUMBAR SPINAL NERVE ROOT BY TRANSFORAMINAL APPROACH Bilateral 4/11/2024    Procedure: Bilateral L4/5 TF IAN;  Surgeon: Chastity  Abel DOVER MD;  Location: [facility] PAIN MGT;  Service: Pain Management;  Laterality: Bilateral;    SHOULDER SURGERY Bilateral around     Dr. Pepper.  rotator cuff surgeries    VASECTOMY       Family History   Problem Relation Name Age of Onset    Lung cancer Father Isaiah Hughes         life long smoker    Cancer Father Isaiah Hughes         prostate, lung    Arthritis Mother Emely Hughes     Stroke Sister          TIA    Cataracts Sister      Cancer Brother          prostate    Cataracts Brother      Cataracts Sister      Melanoma Neg Hx      Psoriasis Neg Hx      Lupus Neg Hx      Eczema Neg Hx      Diabetes Neg Hx      Heart disease Neg Hx      Kidney disease Neg Hx      Colon cancer Neg Hx       Social History     Socioeconomic History    Marital status:    Tobacco Use    Smoking status: Former     Current packs/day: 0.00     Average packs/day: 3.0 packs/day for 35.0 years (104.9 ttl pk-yrs)     Types: Cigarettes     Start date: 1960     Quit date: 1985     Years since quittin.6     Passive exposure: Past    Smokeless tobacco: Never   Substance and Sexual Activity    Alcohol use: Yes     Alcohol/week: 3.0 standard drinks of alcohol     Types: 3 Cans of beer per week     Comment: socially    Drug use: No    Sexual activity: Yes     Partners: Female     Birth control/protection: None   Social History Narrative         Social Drivers of Health     Financial Resource Strain: Patient Declined (2024)    Overall Financial Resource Strain (CARDIA)     Difficulty of Paying Living Expenses: Patient declined   Food Insecurity: Patient Declined (2024)    Hunger Vital Sign     Worried About Running Out of Food in the Last Year: Patient declined     Ran Out of Food in the Last Year: Patient declined   Transportation Needs: Patient Declined (2024)    TRANSPORTATION NEEDS     Transportation : Patient declined   Physical Activity: Insufficiently Active (2024)    Exercise Vital  Sign     Days of Exercise per Week: 2 days     Minutes of Exercise per Session: 30 min   Stress: Patient Declined (11/18/2024)    [employer] - Occupational Stress Questionnaire     Feeling of Stress : Patient declined   Housing Stability: Patient Declined (11/18/2024)    Housing Stability Vital Sign     Unable to Pay for Housing in the Last Year: Patient declined     Homeless in the Last Year: Patient declined     Medication List with Changes/Refills   Current Medications    ACETAMINOPHEN (TYLENOL ARTHRITIS ORAL)    Take by mouth as needed.    ALBUTEROL (VENTOLIN HFA) 90 MCG/ACTUATION INHALER    Inhale 2 puffs into the lungs every 6 (six) hours as needed for Wheezing. Rescue    ALFUZOSIN (UROXATRAL) 10 MG TB24    Take 1 tablet (10 mg total) by mouth daily with breakfast.    AMLODIPINE (NORVASC) 5 MG TABLET    Take 5 mg by mouth 2 (two) times daily.    ATORVASTATIN (LIPITOR) 20 MG TABLET    Take 1 tablet (20 mg total) by mouth once daily.    AZELASTINE (ASTELIN) 137 MCG (0.1 %) NASAL SPRAY    1 spray (137 mcg total) by Nasal route 2 (two) times daily.    BUMETANIDE (BUMEX) 1 MG TABLET    Take 1 tablet (1 mg total) by mouth once daily. For leg swelling ( to replace furosemide)    CITALOPRAM (CELEXA) 10 MG TABLET    TAKE 1 TABLET ONE TIME DAILY FOR ANXIETY    CLOPIDOGREL (PLAVIX) 75 MG TABLET    TAKE 1 TABLET EVERY DAY    FINASTERIDE (PROSCAR) 5 MG TABLET    Take 1 tablet (5 mg total) by mouth once daily.    LOSARTAN (COZAAR) 50 MG TABLET    TAKE 1 TABLET TWICE DAILY    PANTOPRAZOLE (PROTONIX) 40 MG TABLET    TAKE 1 TABLET EVERY DAY    POLYETHYLENE GLYCOL (GLYCOLAX) 17 GRAM/DOSE POWDER    Take 17 g by mouth once daily.    POTASSIUM CHLORIDE SA (K-DUR,KLOR-CON) 20 MEQ TABLET    Take 1 tablet (20 mEq total) by mouth once daily.    PREGABALIN (LYRICA) 75 MG CAPSULE    Take 1 capsule (75 mg total) by mouth 2 (two) times daily.    VITAMIN D (VITAMIN D3) 1000 UNITS TAB    Take 1,000 Units by  mouth once daily.     Review of patient's allergies indicates:   Allergen Reactions    Atarax [hydroxyzine hcl] Other (See Comments)     Raised blood pressure     Zyrtec [cetirizine] Other (See Comments)     Raised blood pressure     Gold sodium thiomalate     Gold sodium thiosulfate      Patch test positive    Meloxicam Rash     Other reaction(s): hypertension     Review of Systems   Constitutional: Negative for decreased appetite.   HENT:  Negative for tinnitus.    Eyes:  Negative for double vision.   Cardiovascular:  Negative for chest pain.   Respiratory:  Negative for wheezing.    Hematologic/Lymphatic: Negative for bleeding problem.   Skin:  Negative for dry skin.   Musculoskeletal:  Positive for arthritis, back pain, joint pain, muscle weakness and stiffness. Negative for gout and neck pain.   Gastrointestinal:  Negative for abdominal pain.   Genitourinary:  Negative for bladder incontinence.   Neurological:  Negative for numbness, paresthesias and sensory change.   Psychiatric/Behavioral:  Negative for altered mental status.        Objective:   Body mass index is 29.99 kg/m².  There were no vitals filed for this visit.       General    Constitutional: He is oriented to person, place, and time. He appears well-developed.   HENT:   Head: Atraumatic.   Eyes: EOM are normal.   Pulmonary/Chest: Effort normal.   Neurological: He is alert and oriented to person, place, and time.   Psychiatric: Judgment normal.           Ambulating with a walker   Pelvis is level   Right hip preop RUPINDER pain to internal rotation of 10° and external rotation of 25°.  Hip abduction to 30° with pain in the groin and posterior hip pain.  Has around 10° of flexion contracture.  Hip flexors and abductors are slightly weak at 5-/5   Right hip postop RUPINDER passive internal external rotation without pain.  Internal rotation 20° external rotation 40°.  No pain in the groin .  Hip flexors and abductors are 5/5    Left hip internal rotation around  15° external rotation 30° with pain in the groin only with abduction and internal rotation.  He has around 30° of abduction.  Around 5° of flexion contracture.  Hip flexors and abductors are slightly weak at 5-/5   Bilateral knees with good motion   No defect in the patella or quadriceps tendon   Calves are soft nontender   There is peripheral pitting edema with the right worse than the left up to mid tibia  Slight erythema around the ankle on the right not so much on the left  Ankle motion intact    Relevant imaging results reviewed and interpreted by me, discussed with the patient and / or family today   X-ray 02/24/2025 of the pelvis right RUPINDER in excellent alignment/Depuy components.  The left hip with severe arthritic change and loss of joint space and cystic changes  X-ray 06/20/2024 pelvis and bilateral hips showing complete loss of joint space with cystic changes and marginal osteophyte both hips consistent with arthritis  Assessment:     Encounter Diagnoses   Name Primary?    S/P total right hip arthroplasty Yes    Arthritis of left hip     Edema of both lower legs due to peripheral venous insufficiency         Plan:   S/P total right hip arthroplasty    Arthritis of left hip    Edema of both lower legs due to peripheral venous insufficiency         Patient Instructions   Your x-ray show excellent alignment of your right total hip arthroplasty   Your x-ray showed that you have arthritis in the left hip but you not having any pain  You have 2 more visits to [facility] [place] in physical therapy and you are very pleased   You are walking without any assistive devices and your pain is 0/10   You having major numbness and tingling in your feet and you take pregabalin 75 mg twice a day ordered by Dr. Haywood  You already received injections in the spine by Dr. Haywood and Dr. DWYER yet you not pleased  I did tell him he can try another provider in Pain Management doctor Irma maybe adjusting the Lyrica or up  in the does to 3 times a day might be what will help   Vitamin B12 might help   As far as the hip is concern on the left it is not hurting leave that alone   Right RUPINDER doing well  I will see you 1 year follow-up            Disclaimer: This note was prepared using a voice recognition system and is likely to have sound alike errors within the text.

## 2025-02-24 NOTE — PATIENT INSTRUCTIONS
Your x-ray show excellent alignment of your right total hip arthroplasty   Your x-ray showed that you have arthritis in the left hip but you not having any pain  You have 2 more visits to [facility] [place] in physical therapy and you are very pleased   You are walking without any assistive devices and your pain is 0/10   You having major numbness and tingling in your feet and you take pregabalin 75 mg twice a day ordered by Dr. Haywood  You already received injections in the spine by Dr. Haywood and Dr. DWYER yet you not pleased  I did tell him he can try another provider in Pain Management doctor Irma maybe adjusting the Lyrica or up in the does to 3 times a day might be what will help   Vitamin B12 might help   As far as the hip is concern on the left it is not hurting leave that alone   Right RUPINDER doing well  I will see you 1 year follow-up

## 2025-02-27 ENCOUNTER — TELEPHONE (OUTPATIENT)
Dept: PAIN MEDICINE | Facility: CLINIC | Age: 85
End: 2025-02-27
Payer: MEDICARE

## 2025-03-02 ENCOUNTER — PATIENT MESSAGE (OUTPATIENT)
Dept: ADMINISTRATIVE | Facility: OTHER | Age: 85
End: 2025-03-02
Payer: MEDICARE

## 2025-03-04 NOTE — PROGRESS NOTES
Established Patient Chronic Pain Note (Follow up visit)    Chief Complaint:   Chief Complaint   Patient presents with    Knee Pain    Leg Pain         SUBJECTIVE:  Interval History (3/7/2025):  Patient Ezequiel Hughes presents today for follow-up visit.  Patient was last seen on  9/12/2024 right femoral obturator block with 30% relief. He continues to have some right hip pain as well as low back pain which radiates into the lower legs with numbness and pain. Symptoms worse with prolonged walking. He saw his orthopedist who feels it is coming from his back. Symptoms feel similar to when he had an Trang back last June.   Pain today is 2/10 but at times goes up to 6/10.  Patient denies night fever/night sweats, urinary incontinence, bowel incontinence, significant weight loss and significant motor weakness.   Patient denies any other complaints or concerns at this time.    Interval History (8/29/2024):  Patient Ezequiel Hughes presents today for follow-up visit.  Patient is being seen for right low back and right hip pain. He rates his pain a 0/10 currently but states its because its morning. In the evening is when his pain is present and most of the pain is the right hip and into the right groin. He was to start lyrica and compound cream last appt but he just started the lyrica yesterday.   In July he had a right IA hip injection with Dr. Rodas but only got short term relief. He has has lumbar ESIs as well with short term relief.   Patient denies night fever/night sweats, urinary incontinence, bowel incontinence, significant weight loss and significant motor weakness.   Patient denies any other complaints or concerns at this time.      Interval History (8/2/2024):  Patient Ezequiel Hughes presents today for follow-up visit.  Patient was last seen by orthopedist Dr. Deras for R hip injection 7/18/2024  with 90% relief of his hip pain however he continues to have pain in his right groin that goes into the front of the  thigh. He says he has 0/10 pain with sitting but when he walks he has a catching sensation that will make his pain 10/10. He had a lumbar IAN in June which helped with his back pain but he also feels he still has right sided low back pain at times with the right groin pain. He is curious when he could get another injection  He has been out of his lyrica x 1 week, he feels he misplaced it and plans to find it.   He has done PT in the past but has never gotten relief with it.  Patient denies night fever/night sweats, urinary incontinence, bowel incontinence, significant weight loss and significant motor weakness.   Patient denies any other complaints or concerns at this time.      Interval History (7/11/2024):  Patient Ezequiel Hughes presents today for follow-up visit.  Patient was last seen on 6/6/2024 L5/S1 IL IAN with 80% relief sustained. So far this injection has worked best for his back and leg pain.     He has not currently having any pain that radiates down the lower extremities.  His back pain seems to be improved.He does have report that for the most part is kind of difficult to see how his pain is doing because he has been having right hip pain.    performed a nerve conduction study and also ordered bilateral hip x-ray.    Nerve conduction study showed L5 and S1 bilateral nerve involvement.Hip x-ray showed severe joint space narrowing and he has since been referred to ortho and is scheduled for a right hip injection next week.  He is only having right hip pain that radiates into the right groin at times.  No left hip pain.  Patient denies night fever/night sweats, urinary incontinence, bowel incontinence, significant weight loss and significant motor weakness.   Patient denies any other complaints or concerns at this time.      Interval History (5/13/2024):  Patient Ezequiel Hughes presents today for follow-up visit.  Patient was last seen on 4/11/2024 bilateral L4/5 TF IAN with 80% relief x 1 day.   He continues to have lower back pain which radiates down the side and back of the bilateral lower extremities.  He states the most frustrating part is the numbness that he has in the lower extremities.  Numbness gets worse with prolonged walking and standing.  He is wanting to go back on the Lyrica to see if this can help with his symptoms.  He is also requesting a nerve conduction study just to help figure out where his pain is coming from.  He is seen  in the past to discuss vertiflex and is not interested in surgery at this time.  Last lumbar MRI was 2021.  He rates his pain today a 4/10 but states it will go higher depending on activity.  Patient denies night fever/night sweats, urinary incontinence, bowel incontinence, significant weight loss and significant motor weakness.   Neck pain is currently stable  Patient denies any other complaints or concerns at this time.      Interval History (3/13/2024):  Patient Ezequiel Hughes presents today for follow-up visit.  Patient was last seen on 2/8/2024 for C5/6 IL IAN which he reports provided 80% relief. Neck pain is improved with no current radiculopathy.  He reports he stopped the Lyrica because he did not feel like he needed it.  He does still complain of lower back pain which radiates into the bilateral legs with some numbness.  He has had bilateral L4/5 IAN in the past with great relief and is wanting to repeat these injections.  He rates his pain today as 0/10 and states later in the day the pain will go up to a 4/10.  He has been seeing the chiropractor and doing home physical therapy exercises to try to get relief.  Patient denies night fever/night sweats, urinary incontinence, bowel incontinence, significant weight loss and significant motor weakness.   Patient denies any other complaints or concerns at this time.        Interval History (1/22/2024):  Patient Ezequiel Hughes presents today for follow-up visit.  Patient was last seen on 8/2/2023. At that  time he had a C4-5 MBB which did not provide significant relief. Today his pain is in the base of the neck and radiates to the R shoulder. Pain started a few weeks ago. Pain feels like pins and needles. Today pain is a 5/10 but at it's worst it will be 7/10.   No fall or injury.   He has been to urgent care twice this month for the pain and has received a toradol injection and steroids. He got some temporarily relief from these.  He has chronic lower back pain, has been followed by Dr. Castro. He has been going to the chiropractor  which is providing good relief.  He has been on lyrica in the past but stopped medication when he got relief of radicular symptoms from a previous IAN.    Interval visit 8/2/2023  Ezequiel Hughes is a 84 y.o. male presents today for follow-up chronic neck and lower back pain.  He was last seen for procedure on 07/18/2023 where he underwent bilateral C4-6 diagnostic medial branch blocks that did not provide significant relief unfortunately.  He continues with lower back pain with radicular symptoms into the both lower extremities.  He reports that this tends to occur mostly with ambulatory activities.        Patient denies night fever/night sweats, urinary incontinence, bowel incontinence, significant weight loss, significant motor weakness and loss of sensations.    Pain Disability Index Review:      3/7/2025     9:31 AM 7/11/2024     9:19 AM 1/22/2024     1:06 PM   Last 3 PDI Scores   Pain Disability Index (PDI) 14 35 49     Interval History (6/9/2023):    Ezequiel Hughes presents today for follow-up visit.  Patient was last seen on 09/27/2022. Last injection Bilateral L4/5 TF IAN on 8/25/22with 80-90% pain relief x more than 6 months before pain insidiously returned. He reports same pain as previous, located in his lower back with radiation into both legs, though his right leg is worse than left. He reports his right leg feels weak and swells after prolonged walking, improved with rest. He  has completed 37 sessions of physical therapy for his neck and back from 10/20/2022 to 03/14/2022 without relief. Patient reports pain as 4/10 today, but 6/10 on his worst days.      Interval History (9/27/2022):   Ezequiel Hughes presents today for follow-up visit.  he underwent Bilateral L4/5 TF IAN on 8/25/22.  The patient reports that he is/was better following the procedure.  he reports 80-90% pain relief. He reports this IAN was the best so far.  The changes lasted 4 weeks so far.  The changes have continued through this visit.  Patient reports pain as 2/10 today. He does report muscle spasms in his lower right back for the past 3-4 days after prolonged stooping working on a car. He has used heat and massage with some improvement.       Interval HPI  Ezequiel Hughes is a 83 y.o. male who presents to the clinic for a follow-up appointment for back and leg pain.  He presents today after undergoing a 3rd lumbar TFESI bilaterally at L4-5 on 06/02/2022.  He reports that this resulted in 100% relief.  Since the last visit, Ezequiel Hughes states the pain has been resolved. Current pain intensity is 0/10.      Interval Hx: 2/24/22  Presents status post bilateral L4/5 transforaminal epidural steroid injection January 26, 2022. Patient reports having 100% relief in lower extremity radicular symptoms following epidural steroid injection.  This pain level varies based upon his activity throughout the day.  Patient reports as he is more active he does notice radicular symptoms down the lateral aspects of bilateral lower extremities to the feet.  Patient reports discontinuing Lyrica the day following his injection which he believes caused paresthesias to insidiously return.  Patient does report improvement in functionality including range of motion and strength following his injection.  Patient is interested in repeat intervention.  Patient denies any side effects at the Lyrica dose of 225 mg twice a day.     Interval Hx:  1/13/22  Patient presents for MRI cervical and lumbar review.  Today patient again reports lower back pain which radiates down the anterior aspects of bilateral lower extremities in L4 distribution to the foot.  Today pain is rated as a 4/10.  Pain is exacerbated with prolonged standing and with ambulation the patient reports he is able to ambulate at least 1 mi on the treadmill before requiring rest.  He also reports neck pain which radiates in bilateral trapezius distributions in C5-6 distribution.  Patient has continued Lyrica and is currently taking 150 mg twice daily.  He is anticipated to increase this dosage next week.  He denies any side effects from this medication.     HPI 12/9/21  Ezequiel Hughes is a 81 y.o. male with past medical history significant for transient ischemic attack, hyperlipidemia, hypertension, right bundle branch block, stage 3 chronic kidney disease, thrombocytopenia and anemia , prostate cancer, gout, obstructive sleep apnea, periodically limb movement disorder who presents to the clinic for the evaluation of neck, lower back and leg pain.  Today patient reports pain began approximately 1 year prior without inciting accident, injury or trauma.  Today patient reports lower back pain which is constant and today is rated as a 3/10.  Patient reports radicular symptoms beginning along the anterior aspects of bilateral lower extremities below the knee to the foot in L4 distribution.  Patient is having difficulty describing the character but believes it is burning in nature.  Pain is exacerbated with prolonged standing and with ambulation.  Patient reports he is quite active, goes to the gym and walks on his treadmill and is able to ambulate approximately half to 3/4 of a mi before requiring rest.  Pain is improved with sitting.He denies any bowel or bladder incontinence or saddle anesthesia. Patient has performed physical therapy for the lower back without any improvement in his symptoms.     "  Patient also reports constant neck pain.  Pain presents in a bandlike distribution in bilateral cervical paraspinous territory and radiates into bilateral trapezius distribution.  Patient also reports numbness in bilateral thumbs.  Pain is exacerbated with cervical flexion, extension and lateral flexion.  Patient denies upper extremity weakness, compromise in hand  strength or dexterity.  Patient has been trialed on gabapentin for what his wife describes as "scalp itching"at a lower dose of 100 mg 3 times daily without any improvement in his neck or in his back pain.           Non-Pharmacologic Treatments:  Physical Therapy/Home Exercise: yes  Ice/Heat:yes  TENS: no  Acupuncture: no  Massage: no  Chiropractic: no    Other: no     Pain Medications:  - Adjuvant Medications: Neurontin (Gabapentin) and Tylenol (Acetaminophen)  - Anti-Coagulants: Plavix ( Clopidogrel)     Pain Procedures:   -2022:  Bilateral L4/5 TFESI  -2022: Bilateral L4-5 TFESI  -2022:  Bilateral L4-5 TFESI D2P per Matthew  -2022: Bilatearl L4/5 TF IAN  -2023:  bilateral L4-5 TFESI, limited relief  -2023:  diagnostic C4-6 medial branch blocks, limited relief  2024 C5/6 IL IAN 80% relief  2024 bilateral L4/5 TF IAN with 80% relief x 1 day  2024 L5/S1 IL IAN with 80% relief sustained  2024 right femoral obturator block with 30% relief    [id number] EMG  IMPRESSION  1. ABNORMAL study  2. There is electrodiagnostic evidence of an acute on chronic radiculopathy of the bilateral L5 and S1 nerve roots  3. 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    Review of patient's allergies indicates:   Allergen Reactions    " Atarax [hydroxyzine hcl] Other (See Comments)     Raised blood pressure     Zyrtec [cetirizine] Other (See Comments)     Raised blood pressure     Gold sodium thiomalate     Gold sodium thiosulfate      Patch test positive    Meloxicam Rash     Other reaction(s): hypertension       Current Outpatient Medications   Medication Sig    acetaminophen (TYLENOL ARTHRITIS ORAL) Take by mouth as needed.    albuterol (VENTOLIN HFA) 90 mcg/actuation inhaler Inhale 2 puffs into the lungs every 6 (six) hours as needed for Wheezing. Rescue    alfuzosin (UROXATRAL) 10 mg Tb24 Take 1 tablet (10 mg total) by mouth daily with breakfast.    amLODIPine (NORVASC) 5 MG tablet Take 5 mg by mouth 2 (two) times daily.    atorvastatin (LIPITOR) 20 MG tablet Take 1 tablet (20 mg total) by mouth once daily.    azelastine (ASTELIN) 137 mcg (0.1 %) nasal spray 1 spray (137 mcg total) by Nasal route 2 (two) times daily.    bumetanide (BUMEX) 1 MG tablet Take 1 tablet (1 mg total) by mouth once daily. For leg swelling ( to replace furosemide) (Patient taking differently: Take 0.5 mg by mouth once daily. For leg swelling ( to replace furosemide))    citalopram (CELEXA) 10 MG tablet TAKE 1 TABLET ONE TIME DAILY FOR ANXIETY    clopidogreL (PLAVIX) 75 mg tablet TAKE 1 TABLET EVERY DAY    finasteride (PROSCAR) 5 mg tablet Take 1 tablet (5 mg total) by mouth once daily.    losartan (COZAAR) 50 MG tablet TAKE 1 TABLET TWICE DAILY    pantoprazole (PROTONIX) 40 MG tablet TAKE 1 TABLET EVERY DAY    polyethylene glycol (GLYCOLAX) 17 gram/dose powder Take 17 g by mouth once daily.    potassium chloride SA (K-DUR,KLOR-CON) 20 MEQ tablet Take 1 tablet (20 mEq total) by mouth once daily.    pregabalin (LYRICA) 75 MG capsule Take 1 capsule (75 mg total) by mouth 2 (two) times daily.    vitamin D (VITAMIN D3) 1000 units Tab Take 1,000 Units by mouth once daily.     No current facility-administered medications for this visit.         REVIEW OF SYSTEMS:    GENERAL:   "No weight loss, malaise or fevers.  HEENT:   No recent changes in vision or hearing  NECK:  Negative for lumps, no difficulty with swallowing.  RESPIRATORY:  Negative for cough, wheezing or shortness of breath, patient denies any recent URI.  CARDIOVASCULAR:  Negative for chest pain, leg swelling or palpitations.  GI:  Negative for abdominal discomfort, blood in stools or black stools or change in bowel habits.  MUSCULOSKELETAL:  See HPI.  SKIN:  Negative for lesions, rash, and itching.  PSYCH:  No mood disorder or recent psychosocial stressors.  Patients sleep is not disturbed secondary to pain.  HEMATOLOGY/LYMPHOLOGY:  Negative for prolonged bleeding, bruising easily or swollen nodes.  Patient is currently taking anti-coagulants - plavix  NEURO:   No history of headaches, syncope, paralysis, seizures or tremors.  All other reviewed and negative other than HPI.    OBJECTIVE:    /61   Pulse 60   Ht 5' 10" (1.778 m)   Wt 97.7 kg (215 lb 8 oz)   BMI 30.92 kg/m²           PHYSICAL EXAMINATION:     Vitals:    03/07/25 0931   BP: 113/61   Pulse: 60   Weight: 97.7 kg (215 lb 8 oz)   Height: 5' 10" (1.778 m)         Body mass index is 30.92 kg/m².   (reviewed on 3/7/2025)       GENERAL: Well appearing, in no acute distress, alert and oriented x3.  PSYCH:  Mood and affect appropriate.  SKIN: Skin color, texture, turgor normal, no rashes or lesions.  HEAD/FACE:  Normocephalic, atraumatic. Cranial nerves grossly intact.  NECK:  Axial loading positive bilaterally.  Spurling's equivocal.  Range of motion fair secondary to pain.  CV: RRR with palpation of the radial artery.  PULM: No evidence of respiratory difficulty, symmetric chest rise.  GI:  Soft and non-tender.  BACK:  Forward flexed posture.  Straight leg raising in the sitting and supine positions is equivocal to radicular pain. No pain to palpation over the facet joints of the lumbar spine or spinous processes.  Fair range of motion with mild pain reproduction " with flexion and extention.  EXTREMITIES: No deformities, edema, or skin discoloration. Good capillary refill.  MUSCULOSKELETAL: Bilateral upper and lower extremity strength is normal and symmetric.  No atrophy or tone abnormalities are noted. Fadir's positive on right. Pain with ROM of the right hip. No GTB tenderness.  NEURO: Bilateral upper and lower extremity coordination and muscle stretch reflexes are physiologic and symmetric.  Plantar response are downgoing. No clonus.  No loss of sensation is noted.  GAIT: normal.  General: alert and oriented, in no apparent distress  Gait: normal gait.  Skin: no rashes, no discoloration, no obvious lesions  HEENT: normocephalic, atraumatic. Pupils equal and round.  Cardiovascular: no significant peripheral edema present.  Respiratory: without use of accessory muscles of respiration.        Neuro - Upper Extremities:  - BUE Strength:R/L: D: 5/5; B: 5/5; T: 5/5; WF: 5/5; WE: 5/5; IO: 5/5  - Extremity Reflexes: Brisk and symmetric throughout  - Sensory: Sensation to light touch intact bilaterally  Positive spurling  FROM cervical spine and upper extremities  No shoulder tenderness  No cervical tenderness      Psych:  Mood and affect is appropriate      LABS:  Lab Results   Component Value Date    WBC 4.27 02/24/2025    HGB 10.2 (L) 02/24/2025    HCT 32.4 (L) 02/24/2025    MCV 94 02/24/2025    PLT 92 (L) 02/24/2025       CMP  Sodium   Date Value Ref Range Status   02/10/2025 138 136 - 145 mmol/L Final   02/10/2025 138 136 - 145 mmol/L Final     Potassium   Date Value Ref Range Status   02/10/2025 4.4 3.5 - 5.1 mmol/L Final   02/10/2025 4.3 3.5 - 5.1 mmol/L Final     Chloride   Date Value Ref Range Status   02/10/2025 106 95 - 110 mmol/L Final   02/10/2025 106 95 - 110 mmol/L Final     CO2   Date Value Ref Range Status   02/10/2025 21 (L) 23 - 29 mmol/L Final   02/10/2025 23 23 - 29 mmol/L Final     Glucose   Date Value Ref Range Status   02/10/2025 91 70 - 110 mg/dL Final    02/10/2025 88 70 - 110 mg/dL Final     BUN   Date Value Ref Range Status   02/10/2025 31 (H) 8 - 23 mg/dL Final   02/10/2025 30 (H) 8 - 23 mg/dL Final     Creatinine   Date Value Ref Range Status   02/10/2025 1.6 (H) 0.5 - 1.4 mg/dL Final   02/10/2025 1.5 (H) 0.5 - 1.4 mg/dL Final     Calcium   Date Value Ref Range Status   02/10/2025 9.1 8.7 - 10.5 mg/dL Final   02/10/2025 9.0 8.7 - 10.5 mg/dL Final     Total Protein   Date Value Ref Range Status   02/10/2025 7.1 6.0 - 8.4 g/dL Final     Albumin   Date Value Ref Range Status   02/10/2025 4.0 3.5 - 5.2 g/dL Final   08/15/2024 4.1 3.6 - 5.1 g/dL Final     Comment:     Test Performed at:  [facility] [address]  [id number]     BRUCE Fang MD, PhD, TYRON       Total Bilirubin   Date Value Ref Range Status   02/10/2025 0.8 0.1 - 1.0 mg/dL Final     Comment:     For infants and newborns, interpretation of results should be based  on gestational age, weight and in agreement with clinical  observations.    Premature Infant recommended reference ranges:  Up to 24 hours.............<8.0 mg/dL  Up to 48 hours............<12.0 mg/dL  3-5 days..................<15.0 mg/dL  6-29 days.................<15.0 mg/dL       Alkaline Phosphatase   Date Value Ref Range Status   02/10/2025 72 40 - 150 U/L Final     AST   Date Value Ref Range Status   02/10/2025 18 10 - 40 U/L Final     ALT   Date Value Ref Range Status   02/10/2025 9 (L) 10 - 44 U/L Final     Anion Gap   Date Value Ref Range Status   02/10/2025 11 8 - 16 mmol/L Final   02/10/2025 9 8 - 16 mmol/L Final     eGFR if    Date Value Ref Range Status   06/29/2022 57.9 (A) >60 mL/min/1.73 m^2 Final     eGFR if non    Date Value Ref Range Status   06/29/2022 50.1 (A) >60 mL/min/1.73 m^2 Final     Comment:     Calculation used to obtain the estimated glomerular filtration  rate (eGFR) is the CKD-EPI equation.          Lab Results   Component  Value Date    HGBA1C 5.1 02/06/2025             ASSESSMENT: 84 y.o. year old male with lower back and leg pain, consistent with     1. Right hip pain        2. Lumbar radiculopathy  Ambulatory referral/consult to Pain Clinic    Case Request-RAD/Other Procedure Area: L5/S1 IL IAN hold plavix 5 days      3. Degeneration of intervertebral disc of lumbar region with discogenic back pain and lower extremity pain                                PLAN:   - Interventions: Schedule L5/S1 IL IAN  Explained the risks and benefits of the procedure in detail with the patient today in clinic along with alternative treatment options, and the patient elected to pursue the intervention.      If does not get relief, update lumbar MRI    He declines PT.     He has seen Dr. Castro for vertiflex but states he is not wanting to pursue any surgery      S/p 2/8/2024 C5/6 IL IAN with 80% relief  S/p diagnostic C4-6 medial branch blocks on 07/18/2023, limited relief  S/p repeat bilateral L4-5 TFESI on 06/29/2023, limited relief  - Anticoagulation: yes, Plavix - Dr. Thompson hold x 5 days  - Medications: I have stressed the importance of physical activity and a home exercise plan to help with pain and improve health. and Patient can continue with medications for now since they are providing benefits, using them appropriately, and without side effects.    Continue lyrica 75mg BID. We discussed potential side effects of this medication which may include drowsiness,dizziness, dry mouth, constipation or peripheral edema.    Rx for compound cream  4% gabapentin, 1.5% diclofenac, 2.5% lidocaine, 2.5% prilocaine 2.5%compound cream for potential topical pain relief. Patent will be contacted for [facility] regarding cost and payment.         - Therapy:  Advised patient to continue with activities as tolerated  - Labs:  Reviewed  - Imaging:  Reviewed MRI cervical spine and lumbar MRI. Reviewed xray bilateral hips and EMG  -  Consults/Referrals: EConsult to Neurosurgery in the past for consideration of vertiflex procedure for lumbar spinal stenosis  Continue follow up with ortho for R hip pain    - Records:  Reviewed/Obtain old records from outside physicians and imaging  - Follow up visit: 4 weeks after procedure    - Counseled patient regarding the importance of activity modification and physical therapy  - This condition does not require this patient to take time off of work, and the primary goal of our Pain Management services is to improve the patient's functional capacity.  - Patient Questions: Answered all of the patient's questions regarding diagnosis, therapy, and treatment    The above plan and management options were discussed at length with patient. Patient is in agreement with the above and verbalized understanding.      Maia Lamas NP  Interventional Pain Management  [facility]    Disclaimer:  This note was prepared using voice recognition system and is likely to have sound alike errors that may have been overlooked even after proof reading.  Please call me with any questions

## 2025-03-07 ENCOUNTER — OFFICE VISIT (OUTPATIENT)
Dept: PAIN MEDICINE | Facility: CLINIC | Age: 85
End: 2025-03-07
Payer: MEDICARE

## 2025-03-07 VITALS
HEIGHT: 70 IN | WEIGHT: 215.5 LBS | BODY MASS INDEX: 30.85 KG/M2 | DIASTOLIC BLOOD PRESSURE: 61 MMHG | SYSTOLIC BLOOD PRESSURE: 113 MMHG | HEART RATE: 60 BPM

## 2025-03-07 DIAGNOSIS — M25.551 RIGHT HIP PAIN: Primary | ICD-10-CM

## 2025-03-07 DIAGNOSIS — M54.16 LUMBAR RADICULOPATHY: ICD-10-CM

## 2025-03-07 DIAGNOSIS — M51.362 DEGENERATION OF INTERVERTEBRAL DISC OF LUMBAR REGION WITH DISCOGENIC BACK PAIN AND LOWER EXTREMITY PAIN: ICD-10-CM

## 2025-03-07 PROCEDURE — 99999 PR PBB SHADOW E&M-EST. PATIENT-LVL IV: CPT | Mod: PBBFAC,,, | Performed by: NURSE PRACTITIONER

## 2025-03-12 PROBLEM — D47.2 MGUS (MONOCLONAL GAMMOPATHY OF UNKNOWN SIGNIFICANCE): Status: ACTIVE | Noted: 2025-03-12

## 2025-03-12 NOTE — PROGRESS NOTES
Subjective:       Patient ID: Ezequiel Hughes is a 84 y.o. male.    Chief Complaint:   1. Anemia, unspecified type  Ambulatory referral/consult to Hematology / Oncology      2. MGUS (monoclonal gammopathy of unknown significance)          Current Treatment:       Treatment History:    HPI: This is an 84 year old  male with allergic rhinitis, GUIDO, hiatal hernia, TIA, hyperlipidemia, BPH, back pain, HTN, cataract, ED, and lateral epicondylitis who is seen in Hem/Onc for prostate cancer and eosinophilia. He was seen initially in 9/2015 for thrombocytopenia. He was followed until 2018 when he was lost to follow up until 2021. He underwent BMBx to evaluate eosinophilia which revealed few focal clusters of left-shifted eosinophils in the tissue sections and left-shifted eosinophilic maturation also seen in the aspirate smear and touch prep. There was not sufficient findings to diagnose hypereosinophilic syndrome (HES) or chronic eosinophilic leukemia (JOCELINE). No definitive clonal T cell or mast cell-associated processes were identified.  No evidence of plasma cell neoplasm. Corresponding flow cytometry detects no relative marrow eosinophilia, clonal B-cells, aberrant T-cell antigen expression, or increase in blasts.     Next generation sequencing did not reveal any pathogenic genetic alteration. Evaluation for CHIC2, PDGFRA and FIP1L1 gene regions was within normal limits.  CT scan of chest abdomen and pelvis was unremarkable. It was recommended he follow up in a year but was lost to follow up.     He was recently referred by YANDY Carey for anemia.    Interval History: Patient presents for follow up on labs. He presents with significant others and has no complaints other than wanting to lose weight. When prompted, he admits to fatigue which he attributes to a decreased activity level. He admits to being cold a lot; his significant others state he has complained of SOB. Review of labs reveals intermittent anemia  dating back to 7/2013. No history of iron deficiency. CKD dating back to 4/2013; coincidentally, anemia starts a few months after onset of CKD. Review of previous electrophoresis demonstrates a faint IgG specific kappa monoclonal protein detected in 1/2021 that was not detected previously. It measure 0.34g/dL initially; most recently, it measured 0.15g/dL in 6/2021. Most recent labs indicate improvement in anemia following surgery in 11/2024 and no iron deficiency. Most recent CMP from a month ago with stage 3 CKD. Discussed potential causes of anemia to include iron deficiency, CKD, MGUS, vitamin deficiencies, and MM. Will recheck CBC, CMP as well as B12, folate, and MGUS labs. Discussed ways to protect kidney function and promote kidney health to include maintaining good BP, staying hydrated, and avoiding NSAIDs. Will have him follow up in 2 weeks with results.     Reviewed labs with patient:   CBC:   Recent Labs   Lab 02/24/25  1107   WBC 4.27   RBC 3.44 L   Hemoglobin 10.2 L   Hematocrit 32.4 L   Platelets 92 L   MCV 94   MCH 29.7   MCHC 31.5 L     CMP:  Recent Labs   Lab 02/10/25  0936   Glucose 91  88   Calcium 9.1  9.0   Albumin 4.0   Total Protein 7.1   Sodium 138  138   Potassium 4.4  4.3   CO2 21 L  23   Chloride 106  106   BUN 31 H  30 H   Creatinine 1.6 H  1.5 H   Alkaline Phosphatase 72   ALT 9 L   AST 18   Total Bilirubin 0.8     Lab Results   Component Value Date    IRON 76 02/24/2025    TRANSFERRIN 200 02/24/2025    TIBC 296 02/24/2025    FESATURATED 26 02/24/2025      Lab Results   Component Value Date    FERRITIN 68 02/24/2025     Social History[1]  Past Medical History:   Diagnosis Date    Allergic rhinitis     Anemia     Back pain     BPH (benign prostatic hyperplasia)     Cancer     skin cancer to neck, Dr. Graves    Cataract     Disorder of kidney and ureter     ED (erectile dysfunction)     OMARI (generalized anxiety disorder) 08/07/2023    Hiatal hernia     small    History of colon  polyps     colonoscopy 11/2016    HLD (hyperlipidemia)     Hyperlipidemia     Hypertension     Lateral epicondylitis     Lumbar radiculopathy 01/26/2022    OA (osteoarthritis)     GUIDO (obstructive sleep apnea)     Pneumonia of right middle lobe due to infectious organism 11/18/2024    Polyneuropathy     Prostate cancer     Dr. Wong    TIA (transient ischemic attack)     Trouble in sleeping     Urge incontinence 03/29/2022     Family History   Problem Relation Name Age of Onset    Lung cancer Father Isaiah Hughes         life long smoker    Cancer Father Isaiah Hughes         prostate, lung    Arthritis Mother Emely Hughes     Stroke Sister          TIA    Cataracts Sister      Cancer Brother          prostate    Cataracts Brother      Cataracts Sister      Melanoma Neg Hx      Psoriasis Neg Hx      Lupus Neg Hx      Eczema Neg Hx      Diabetes Neg Hx      Heart disease Neg Hx      Kidney disease Neg Hx      Colon cancer Neg Hx       Past Surgical History:   Procedure Laterality Date    ANGIOGRAPHY OF UPPER EXTREMITY Left 07/05/2022    Procedure: Angiogram Extremity Bilateral;  Surgeon: Aparna Thompson MD;  Location: [facility] CATH LAB;  Service: Cardiology;  Laterality: Left;    ARTERIOGRAPHY OF AORTIC ROOT N/A 07/05/2022    Procedure: ARTERIOGRAM, AORTIC ROOT;  Surgeon: Aparna Thompson MD;  Location: [facility] CATH LAB;  Service: Cardiology;  Laterality: N/A;    BLOCK, NERVE, PERIPHERAL Right 9/12/2024    Procedure: right femoral/ obturator nerve block- with steroids;  Surgeon: Abel Haywood MD;  Location: [facility] PAIN MGT;  Service: Pain Management;  Laterality: Right;    CATARACT EXTRACTION W/  INTRAOCULAR LENS IMPLANT Right 02/21/2018    I-Stent    CATARACT EXTRACTION W/  INTRAOCULAR LENS IMPLANT Left 03/21/2018    I - Stent    CATHETERIZATION OF BOTH LEFT AND RIGHT HEART N/A 07/05/2022    Procedure: CATHETERIZATION, HEART, BOTH LEFT AND RIGHT;  Surgeon: Aparna Thompson MD;  Location: [facility] CATH LAB;  Service: Cardiology;   Laterality: N/A;  radial approach    COLONOSCOPY N/A 11/14/2016    Procedure: COLONOSCOPY;  Surgeon: Karuna Rodriguez MD;  Location: [facility] ENDO;  Service: Endoscopy;  Laterality: N/A;    COLONOSCOPY N/A 09/22/2020    Procedure: COLONOSCOPY;  Surgeon: Chuy Fish MD;  Location: [facility] ENDO;  Service: Endoscopy;  Laterality: N/A;    EPIDURAL STEROID INJECTION N/A 6/6/2024    Procedure: Lumbar L5/S1 IL IAN;  Surgeon: Abel Haywood MD;  Location: [facility] PAIN MGT;  Service: Pain Management;  Laterality: N/A;    EPIDURAL STEROID INJECTION INTO CERVICAL SPINE N/A 2/8/2024    Procedure: C5/6 IL Right paramedian approach IAN with RN IV sedation;  Surgeon: Abel Haywood MD;  Location: [facility] PAIN MGT;  Service: Pain Management;  Laterality: N/A;    EYE SURGERY      HEMORRHOID SURGERY      HIP ARTHROPLASTY Right 11/13/2024    Procedure: ARTHROPLASTY, HIP;  Surgeon: Denton Encarnacion MD;  Location: [facility] OR;  Service: Orthopedics;  Laterality: Right;    I-STENT Right 02/21/2018    DR. REECE    INJECTION OF ANESTHETIC AGENT AROUND MEDIAL BRANCH NERVES INNERVATING CERVICAL FACET JOINT Bilateral 7/18/2023    Procedure: Bilateral C4-6 MBB with RN IV sedation (diagnostic, #1);  Surgeon: Abel Haywood MD;  Location: [facility] PAIN MGT;  Service: Pain Management;  Laterality: Bilateral;    KNEE ARTHROSCOPY W/ MENISCAL REPAIR Right 2015    Dr. Jain    PCIOL Right 02/21/2018    DR. REECE    PLANTAR FASCIA RELEASE      right    ROTATOR CUFF REPAIR Bilateral     bilateral    SELECTIVE INJECTION OF ANESTHETIC AGENT AROUND LUMBAR SPINAL NERVE ROOT BY TRANSFORAMINAL APPROACH Bilateral 01/26/2022    Procedure: Bilateral L4/5 TF IAN with RN IV sedation;  Surgeon: Mak Young MD;  Location: [facility] PAIN MGT;  Service: Pain Management;  Laterality: Bilateral;    SELECTIVE INJECTION OF ANESTHETIC AGENT AROUND LUMBAR SPINAL NERVE ROOT BY TRANSFORAMINAL APPROACH Bilateral 03/14/2022    Procedure: Bilateral L4/5 TF IAN with RN IV  sedation;  Surgeon: Mak Young MD;  Location: [facility] PAIN MGT;  Service: Pain Management;  Laterality: Bilateral;    SELECTIVE INJECTION OF ANESTHETIC AGENT AROUND LUMBAR SPINAL NERVE ROOT BY TRANSFORAMINAL APPROACH Bilateral 06/02/2022    Procedure: Bilateral L4/5 TF IAN - D2P Dr. Castro [facility];  Surgeon: Abel Haywood MD;  Location: [facility] PAIN MGT;  Service: Pain Management;  Laterality: Bilateral;    SELECTIVE INJECTION OF ANESTHETIC AGENT AROUND LUMBAR SPINAL NERVE ROOT BY TRANSFORAMINAL APPROACH Bilateral 08/25/2022    Procedure: Bilateral L4/5 TF IAN;  Surgeon: Abel Haywood MD;  Location: [facility] PAIN MGT;  Service: Pain Management;  Laterality: Bilateral;    SELECTIVE INJECTION OF ANESTHETIC AGENT AROUND LUMBAR SPINAL NERVE ROOT BY TRANSFORAMINAL APPROACH Bilateral 6/29/2023    Procedure: Bilateral L4/5 TF IAN RN IV Sedation;  Surgeon: Abel Haywood MD;  Location: [facility] PAIN MGT;  Service: Pain Management;  Laterality: Bilateral;    SELECTIVE INJECTION OF ANESTHETIC AGENT AROUND LUMBAR SPINAL NERVE ROOT BY TRANSFORAMINAL APPROACH Bilateral 4/11/2024    Procedure: Bilateral L4/5 TF IAN;  Surgeon: Abel Haywood MD;  Location: [facility] PAIN MGT;  Service: Pain Management;  Laterality: Bilateral;    SHOULDER SURGERY Bilateral around 2000    Dr. Pepper.  rotator cuff surgeries    VASECTOMY       Review of Systems   Constitutional:  Positive for fatigue. Negative for appetite change.   HENT:  Negative for mouth sores, rhinorrhea and sore throat.    Eyes: Negative.    Respiratory:  Positive for shortness of breath.    Cardiovascular: Negative.    Gastrointestinal:  Negative for constipation, diarrhea, nausea and vomiting.   Endocrine: Positive for cold intolerance.   Genitourinary: Negative.    Musculoskeletal: Negative.    Integumentary:  Negative.   Allergic/Immunologic: Negative.    Neurological:  Negative for dizziness, weakness, light-headedness, numbness and headaches.   Hematological: Negative.     Psychiatric/Behavioral: Negative.         Medication List with Changes/Refills   Current Medications    ACETAMINOPHEN (TYLENOL ARTHRITIS ORAL)    Take by mouth as needed.    ALBUTEROL (VENTOLIN HFA) 90 MCG/ACTUATION INHALER    Inhale 2 puffs into the lungs every 6 (six) hours as needed for Wheezing. Rescue    ALFUZOSIN (UROXATRAL) 10 MG TB24    Take 1 tablet (10 mg total) by mouth daily with breakfast.    AMLODIPINE (NORVASC) 5 MG TABLET    Take 5 mg by mouth 2 (two) times daily.    ATORVASTATIN (LIPITOR) 20 MG TABLET    Take 1 tablet (20 mg total) by mouth once daily.    AZELASTINE (ASTELIN) 137 MCG (0.1 %) NASAL SPRAY    1 spray (137 mcg total) by Nasal route 2 (two) times daily.    BUMETANIDE (BUMEX) 1 MG TABLET    Take 1 tablet (1 mg total) by mouth once daily. For leg swelling ( to replace furosemide)    CITALOPRAM (CELEXA) 10 MG TABLET    TAKE 1 TABLET ONE TIME DAILY FOR ANXIETY    CLOPIDOGREL (PLAVIX) 75 MG TABLET    TAKE 1 TABLET EVERY DAY    FINASTERIDE (PROSCAR) 5 MG TABLET    Take 1 tablet (5 mg total) by mouth once daily.    LOSARTAN (COZAAR) 50 MG TABLET    TAKE 1 TABLET TWICE DAILY    PANTOPRAZOLE (PROTONIX) 40 MG TABLET    TAKE 1 TABLET EVERY DAY    POLYETHYLENE GLYCOL (GLYCOLAX) 17 GRAM/DOSE POWDER    Take 17 g by mouth once daily.    POTASSIUM CHLORIDE SA (K-DUR,KLOR-CON) 20 MEQ TABLET    Take 1 tablet (20 mEq total) by mouth once daily.    PREGABALIN (LYRICA) 75 MG CAPSULE    Take 1 capsule (75 mg total) by mouth 2 (two) times daily.    VITAMIN D (VITAMIN D3) 1000 UNITS TAB    Take 1,000 Units by mouth once daily.     Objective:     Vitals:    03/13/25 1030   BP: 136/66   Pulse: (!) 54   Resp: 20   Temp: 97.7 °F (36.5 °C)     Physical Exam  Vitals reviewed.   Constitutional:       Appearance: Normal appearance.   HENT:      Head: Normocephalic.      Mouth/Throat:      Comments:     Eyes:      Extraocular Movements: Extraocular movements intact.      Pupils: Pupils are equal, round, and reactive  to light.   Cardiovascular:      Rate and Rhythm: Normal rate and regular rhythm.      Heart sounds: Normal heart sounds.   Pulmonary:      Effort: Pulmonary effort is normal.      Breath sounds: Normal breath sounds.   Abdominal:      General: Bowel sounds are normal.      Palpations: Abdomen is soft.      Comments: rounded     Genitourinary:     Comments: deferred    Musculoskeletal:         General: Normal range of motion.      Cervical back: Normal range of motion and neck supple.   Skin:     General: Skin is warm and dry.   Neurological:      Mental Status: He is alert and oriented to person, place, and time.   Psychiatric:         Behavior: Behavior normal.         Thought Content: Thought content normal.          (0) Fully active, able to carry on all predisease performance without restriction  Assessment:     Problem List Items Addressed This Visit          Oncology    Anemia, unspecified - Primary    Likely due to CKD as patient has no history of iron deficiency.          MGUS (monoclonal gammopathy of unknown significance)    Noted during workup for cytopenia. Most recent M spike in 6/2021 at 0.15g/dL.           Plan:     Anemia, unspecified type  -     Ambulatory referral/consult to Hematology / Oncology    MGUS (monoclonal gammopathy of unknown significance)    Labs reviewed; anemia despite no iron deficiency.  Will recheck CBC, CMP and check B12/folate, MGUS labs.    Follow up in  2 weeks  with  SPEP, JOSH, FLC, B12, folate, CBC, and Comprehensive Metabolic Panel.    Route Chart for Scheduling    Med Onc Chart Routing      Follow up with physician    Follow up with MIGDALIA 2 weeks. at the [facility]   Infusion scheduling note    Injection scheduling note    Labs CBC, CMP, vitamin B12, folate, free light chains, SPEP and other   Scheduling:  Preferred lab:  Lab interval:  and JOSH in 1 week at [place] (link to existing lab appt)   Imaging None      Pharmacy appointment No pharmacy appointment needed      Other  referrals       No additional referrals needed              I will review assessment/plan with collaborating physician.      JULEE Velásquez         [1]   Social History  Socioeconomic History    Marital status:    Tobacco Use    Smoking status: Former     Current packs/day: 0.00     Average packs/day: 3.0 packs/day for 35.0 years (104.9 ttl pk-yrs)     Types: Cigarettes     Start date: 1960     Quit date: 1985     Years since quittin.6     Passive exposure: Past    Smokeless tobacco: Never   Substance and Sexual Activity    Alcohol use: Yes     Alcohol/week: 3.0 standard drinks of alcohol     Types: 3 Cans of beer per week     Comment: socially    Drug use: No    Sexual activity: Yes     Partners: Female     Birth control/protection: None   Social History Narrative         Social Drivers of Health     Financial Resource Strain: Patient Declined (2024)    Overall Financial Resource Strain (CARDIA)     Difficulty of Paying Living Expenses: Patient declined   Food Insecurity: Patient Declined (2024)    Hunger Vital Sign     Worried About Running Out of Food in the Last Year: Patient declined     Ran Out of Food in the Last Year: Patient declined   Transportation Needs: Patient Declined (2024)    TRANSPORTATION NEEDS     Transportation : Patient declined   Physical Activity: Insufficiently Active (2024)    Exercise Vital Sign     Days of Exercise per Week: 2 days     Minutes of Exercise per Session: 30 min   Stress: Patient Declined (2024)    [employer] - Occupational Stress Questionnaire     Feeling of Stress : Patient declined   Housing Stability: Patient Declined (2024)    Housing Stability Vital Sign     Unable to Pay for Housing in the Last Year: Patient declined     Homeless in the Last Year: Patient declined

## 2025-03-13 ENCOUNTER — OFFICE VISIT (OUTPATIENT)
Dept: HEMATOLOGY/ONCOLOGY | Facility: CLINIC | Age: 85
End: 2025-03-13
Payer: MEDICARE

## 2025-03-13 VITALS
DIASTOLIC BLOOD PRESSURE: 66 MMHG | TEMPERATURE: 98 F | WEIGHT: 218.25 LBS | RESPIRATION RATE: 20 BRPM | HEIGHT: 70 IN | HEART RATE: 54 BPM | SYSTOLIC BLOOD PRESSURE: 136 MMHG | BODY MASS INDEX: 31.25 KG/M2 | OXYGEN SATURATION: 98 %

## 2025-03-13 DIAGNOSIS — D47.2 MGUS (MONOCLONAL GAMMOPATHY OF UNKNOWN SIGNIFICANCE): ICD-10-CM

## 2025-03-13 DIAGNOSIS — D64.9 ANEMIA, UNSPECIFIED TYPE: Primary | ICD-10-CM

## 2025-03-13 DIAGNOSIS — D53.9 NUTRITIONAL ANEMIA, UNSPECIFIED: ICD-10-CM

## 2025-03-13 DIAGNOSIS — N18.30 STAGE 3 CHRONIC KIDNEY DISEASE, UNSPECIFIED WHETHER STAGE 3A OR 3B CKD: ICD-10-CM

## 2025-03-13 PROCEDURE — 99999 PR PBB SHADOW E&M-EST. PATIENT-LVL V: CPT | Mod: PBBFAC,,, | Performed by: NURSE PRACTITIONER

## 2025-03-17 ENCOUNTER — TELEPHONE (OUTPATIENT)
Dept: PAIN MEDICINE | Facility: CLINIC | Age: 85
End: 2025-03-17
Payer: MEDICARE

## 2025-03-17 ENCOUNTER — PATIENT MESSAGE (OUTPATIENT)
Dept: CARDIOLOGY | Facility: CLINIC | Age: 85
End: 2025-03-17
Payer: MEDICARE

## 2025-03-17 NOTE — TELEPHONE ENCOUNTER
----- Message from Ladi sent at 3/17/2025  1:49 PM CDT -----  Contact: [phone]  [email or web address] Patient is calling for Medical Advice regarding:was seen last week How long has patient had these symptoms:was suppose to have a call to schedule the injection Pharmacy name and phone#:[facility]:call back Comments:Pt has not heard from anyone Please advise patient replies from provider may take up to 48 hours.

## 2025-03-17 NOTE — TELEPHONE ENCOUNTER
Patient stated he never received a call from the procedure schedulers about scheduling his procedure. I informed him that I would message them in regards.    Zeferino PEREZ

## 2025-03-18 ENCOUNTER — E-CONSULT (OUTPATIENT)
Dept: CARDIOLOGY | Facility: HOSPITAL | Age: 85
End: 2025-03-18
Payer: MEDICARE

## 2025-03-18 DIAGNOSIS — Z79.01 ANTICOAGULATED: Primary | ICD-10-CM

## 2025-03-18 DIAGNOSIS — Z01.810 PREOP CARDIOVASCULAR EXAM: Primary | ICD-10-CM

## 2025-03-18 NOTE — TELEPHONE ENCOUNTER
I called the patient and scheduled his procedure with Dr. Haywood and made him a follow up appointment. I also sent an e-consult to his cardiologist as well. Which I just received the clearance.     Leslie HERR ([facility])

## 2025-03-18 NOTE — CONSULTS
[facility]  Response for E-Consult     Patient Name: Ezequiel Hughes  MRN: [id number]  Primary Care Provider: Valery Ozuna MD   Requesting Provider: Abel Haywood MD  Consults    Recommendation: no cardiac contraindication to proceed   Ok to hold plavix 5 days before.    Additional future steps to consider: none    Total time of Consultation: 10 minute    I did not speak to the requesting provider verbally about this.     *This eConsult is based on the clinical data available to me and is furnished without benefit of a physical examination. The eConsult will need to be interpreted in light of any clinical issues or changes in patient status not available to me at the time of filing this eConsults. Significant changes in patient condition or level of acuity should result in immediate formal consultation and reevaluation. Please alert me if you have further questions.    Thank you for this eConsult referral.     Lamar Thompson MD  [facility]

## 2025-03-19 NOTE — PRE-PROCEDURE INSTRUCTIONS
Spoke with patient regarding procedure scheduled on 4.1     Arrival time 0915     Has patient been sick with fever or on antibiotics within the last 7 days? No     Does the patient have any open wounds, sores or rashes? No     Does the patient have any recent fractures? no     Has patient received a vaccination within the last 7 days? No     Received the COVID vaccination? yes     Has the patient stopped all medications as directed? hold plavix 5 days prior to procedure. Cardiac clearance obtained from dr monroy on 3.18     Does patient have a pacemaker, defibrillator, or implantable stimulator? No     Does the patient have a ride to and from procedure and someone reliable to remain with patient?  briseida     Is the patient diabetic? no      Does the patient have sleep apnea? Or use O2 at home? jonelle     Is the patient receiving sedation? Yes      Is the patient instructed to remain NPO beginning at midnight the night before their procedure? yes     Procedure location confirmed with patient? Yes     Covid- Denies signs/symptoms. Instructed to notify PAT/MD if any changes.

## 2025-03-20 ENCOUNTER — LAB VISIT (OUTPATIENT)
Dept: LAB | Facility: HOSPITAL | Age: 85
End: 2025-03-20
Attending: UROLOGY
Payer: MEDICARE

## 2025-03-20 DIAGNOSIS — C61 PROSTATE CANCER: ICD-10-CM

## 2025-03-20 DIAGNOSIS — D64.9 ANEMIA, UNSPECIFIED TYPE: ICD-10-CM

## 2025-03-20 DIAGNOSIS — D47.2 MGUS (MONOCLONAL GAMMOPATHY OF UNKNOWN SIGNIFICANCE): ICD-10-CM

## 2025-03-20 PROCEDURE — 86334 IMMUNOFIX E-PHORESIS SERUM: CPT | Mod: HCNC | Performed by: NURSE PRACTITIONER

## 2025-03-20 PROCEDURE — 36415 COLL VENOUS BLD VENIPUNCTURE: CPT | Mod: HCNC,PN | Performed by: NURSE PRACTITIONER

## 2025-03-20 PROCEDURE — 86334 IMMUNOFIX E-PHORESIS SERUM: CPT | Mod: 26,HCNC,, | Performed by: PATHOLOGY

## 2025-03-20 PROCEDURE — 84153 ASSAY OF PSA TOTAL: CPT | Mod: HCNC | Performed by: UROLOGY

## 2025-03-21 LAB
COMPLEXED PSA SERPL-MCNC: 1.6 NG/ML (ref 0–4)
INTERPRETATION SERPL IFE-IMP: NORMAL
PATHOLOGIST INTERPRETATION IFE: NORMAL

## 2025-03-26 ENCOUNTER — LAB VISIT (OUTPATIENT)
Dept: LAB | Facility: HOSPITAL | Age: 85
End: 2025-03-26
Attending: FAMILY MEDICINE
Payer: MEDICARE

## 2025-03-26 ENCOUNTER — OFFICE VISIT (OUTPATIENT)
Dept: CARDIOLOGY | Facility: CLINIC | Age: 85
End: 2025-03-26
Payer: MEDICARE

## 2025-03-26 VITALS
BODY MASS INDEX: 31.35 KG/M2 | HEART RATE: 60 BPM | OXYGEN SATURATION: 96 % | SYSTOLIC BLOOD PRESSURE: 110 MMHG | WEIGHT: 218.5 LBS | DIASTOLIC BLOOD PRESSURE: 60 MMHG

## 2025-03-26 DIAGNOSIS — I70.0 AORTIC ATHEROSCLEROSIS: ICD-10-CM

## 2025-03-26 DIAGNOSIS — I45.10 INCOMPLETE RIGHT BUNDLE BRANCH BLOCK: ICD-10-CM

## 2025-03-26 DIAGNOSIS — I10 PRIMARY HYPERTENSION: Primary | ICD-10-CM

## 2025-03-26 DIAGNOSIS — I70.203 ATHEROSCLEROSIS OF ARTERY OF BOTH LOWER EXTREMITIES: ICD-10-CM

## 2025-03-26 DIAGNOSIS — D64.9 ANEMIA, UNSPECIFIED TYPE: ICD-10-CM

## 2025-03-26 DIAGNOSIS — G47.33 OBSTRUCTIVE SLEEP APNEA SYNDROME: ICD-10-CM

## 2025-03-26 DIAGNOSIS — C61 PROSTATE CANCER: ICD-10-CM

## 2025-03-26 DIAGNOSIS — L29.9 PRURITUS: ICD-10-CM

## 2025-03-26 DIAGNOSIS — D47.2 MGUS (MONOCLONAL GAMMOPATHY OF UNKNOWN SIGNIFICANCE): ICD-10-CM

## 2025-03-26 DIAGNOSIS — M79.89 LEG SWELLING: ICD-10-CM

## 2025-03-26 DIAGNOSIS — I25.10 CORONARY ARTERY DISEASE INVOLVING NATIVE CORONARY ARTERY OF NATIVE HEART WITHOUT ANGINA PECTORIS: ICD-10-CM

## 2025-03-26 DIAGNOSIS — D53.9 NUTRITIONAL ANEMIA, UNSPECIFIED: ICD-10-CM

## 2025-03-26 DIAGNOSIS — N18.31 STAGE 3A CHRONIC KIDNEY DISEASE: ICD-10-CM

## 2025-03-26 LAB
ABSOLUTE EOSINOPHIL (OHS): 0.13 K/UL
ABSOLUTE MONOCYTE (OHS): 0.41 K/UL (ref 0.3–1)
ABSOLUTE NEUTROPHIL COUNT (OHS): 2.38 K/UL (ref 1.8–7.7)
ALBUMIN SERPL BCP-MCNC: 3.9 G/DL (ref 3.5–5.2)
ALP SERPL-CCNC: 66 UNIT/L (ref 40–150)
ALT SERPL W/O P-5'-P-CCNC: 9 UNIT/L (ref 10–44)
ANION GAP (OHS): 11 MMOL/L (ref 8–16)
AST SERPL-CCNC: 13 UNIT/L (ref 11–45)
BASOPHILS # BLD AUTO: 0.02 K/UL
BASOPHILS NFR BLD AUTO: 0.5 %
BILIRUB SERPL-MCNC: 0.7 MG/DL (ref 0.1–1)
BUN SERPL-MCNC: 29 MG/DL (ref 8–23)
CALCIUM SERPL-MCNC: 8.8 MG/DL (ref 8.7–10.5)
CHLORIDE SERPL-SCNC: 106 MMOL/L (ref 95–110)
CO2 SERPL-SCNC: 21 MMOL/L (ref 23–29)
CREAT SERPL-MCNC: 1.8 MG/DL (ref 0.5–1.4)
CRP SERPL-MCNC: 2.7 MG/L
ERYTHROCYTE [DISTWIDTH] IN BLOOD BY AUTOMATED COUNT: 14.4 % (ref 11.5–14.5)
FOLATE SERPL-MCNC: 10.5 NG/ML (ref 4–24)
GFR SERPLBLD CREATININE-BSD FMLA CKD-EPI: 36 ML/MIN/1.73/M2
GLUCOSE SERPL-MCNC: 95 MG/DL (ref 70–110)
HCT VFR BLD AUTO: 33 % (ref 40–54)
HGB BLD-MCNC: 10.7 GM/DL (ref 14–18)
IMM GRANULOCYTES # BLD AUTO: 0.02 K/UL (ref 0–0.04)
IMM GRANULOCYTES NFR BLD AUTO: 0.5 % (ref 0–0.5)
LYMPHOCYTES # BLD AUTO: 0.79 K/UL (ref 1–4.8)
MCH RBC QN AUTO: 30.2 PG (ref 27–50)
MCHC RBC AUTO-ENTMCNC: 32.4 G/DL (ref 32–36)
MCV RBC AUTO: 93 FL (ref 82–98)
NUCLEATED RBC (/100WBC) (OHS): 0 /100 WBC
PLATELET # BLD AUTO: 96 K/UL (ref 150–450)
PMV BLD AUTO: 8.7 FL (ref 9.2–12.9)
POTASSIUM SERPL-SCNC: 4.5 MMOL/L (ref 3.5–5.1)
PROT SERPL-MCNC: 6.6 GM/DL (ref 6–8.4)
RBC # BLD AUTO: 3.54 M/UL (ref 4.6–6.2)
RELATIVE EOSINOPHIL (OHS): 3.5 %
RELATIVE LYMPHOCYTE (OHS): 21.1 % (ref 18–48)
RELATIVE MONOCYTE (OHS): 10.9 % (ref 4–15)
RELATIVE NEUTROPHIL (OHS): 63.5 % (ref 38–73)
SODIUM SERPL-SCNC: 138 MMOL/L (ref 136–145)
VIT B12 SERPL-MCNC: 369 PG/ML (ref 210–950)
WBC # BLD AUTO: 3.75 K/UL (ref 3.9–12.7)

## 2025-03-26 PROCEDURE — 99999 PR PBB SHADOW E&M-EST. PATIENT-LVL IV: CPT | Mod: PBBFAC,HCNC,, | Performed by: INTERNAL MEDICINE

## 2025-03-26 PROCEDURE — 84165 PROTEIN E-PHORESIS SERUM: CPT | Mod: HCNC,,, | Performed by: PATHOLOGY

## 2025-03-26 PROCEDURE — 82746 ASSAY OF FOLIC ACID SERUM: CPT | Mod: HCNC

## 2025-03-26 PROCEDURE — 84075 ASSAY ALKALINE PHOSPHATASE: CPT | Mod: HCNC

## 2025-03-26 PROCEDURE — 3288F FALL RISK ASSESSMENT DOCD: CPT | Mod: HCNC,CPTII,S$GLB, | Performed by: INTERNAL MEDICINE

## 2025-03-26 PROCEDURE — 1126F AMNT PAIN NOTED NONE PRSNT: CPT | Mod: HCNC,CPTII,S$GLB, | Performed by: INTERNAL MEDICINE

## 2025-03-26 PROCEDURE — 85025 COMPLETE CBC W/AUTO DIFF WBC: CPT | Mod: HCNC

## 2025-03-26 PROCEDURE — 1159F MED LIST DOCD IN RCRD: CPT | Mod: HCNC,CPTII,S$GLB, | Performed by: INTERNAL MEDICINE

## 2025-03-26 PROCEDURE — 1101F PT FALLS ASSESS-DOCD LE1/YR: CPT | Mod: HCNC,CPTII,S$GLB, | Performed by: INTERNAL MEDICINE

## 2025-03-26 PROCEDURE — 86140 C-REACTIVE PROTEIN: CPT | Mod: HCNC

## 2025-03-26 PROCEDURE — 99215 OFFICE O/P EST HI 40 MIN: CPT | Mod: HCNC,S$GLB,, | Performed by: INTERNAL MEDICINE

## 2025-03-26 PROCEDURE — 84165 PROTEIN E-PHORESIS SERUM: CPT | Mod: HCNC

## 2025-03-26 PROCEDURE — 82607 VITAMIN B-12: CPT | Mod: HCNC

## 2025-03-26 PROCEDURE — 36415 COLL VENOUS BLD VENIPUNCTURE: CPT | Mod: HCNC

## 2025-03-26 PROCEDURE — 83521 IG LIGHT CHAINS FREE EACH: CPT | Mod: HCNC

## 2025-03-26 NOTE — PROGRESS NOTES
Subjective:   Patient ID:  Ezequiel Hughes is a 85 y.o. male who presents for follow up of Follow-up, Fatigue, and legs numb       HPI  Mr. Hughes is an 83 year old male patient whose current medical conditions include TIA, HTN, and GUIDO who presents today for routine follow-up. Patient returns today and states he is doing well. Main issue is lower back and neck pain. Followed by pain mgmt, scheduled to see neurosurgery soon. CV wise, remains stable. He had an ED visit on 8/2/23 due to CP symptoms. Troponin was negative and he was d/c'd home. No recurrence since that time. No exertional symptoms. He feels it was related to gas. No unusual SOB/MCKEON. No LH, dizziness, palpitations, near syncope, or syncope. No s/s suggestive of CHF. Very physically active, typically walks on treadmill, maintains his lawn, etc. BP stable and controlled. Repeat by me today 120/62. Patient is compliant with his medications. Recently started on Celexa by PCP.     Prior LHC 7/22 very minimal/non-obstructive CAD. Echo 6/22 with normal EF.      Labs from 7/23 reviewed. CMP stable. Lipids at goal.  2/28/2024   HERE FROF /U HE HAS BACK PAIN AND  LEG NUMBNESS. HE HAS BEEN TO CHIROPRACTOR. HE AHS NO FALLS. HE CAN ABORT IT. HAS NO CHEST PAIN HE HAS GAINED WEIGHT NOT AS ACTIVE AS BEFORE. HAS LEG SWELLING. HAS NO SYMPTOMS AT 1 MILE      9/6/2024  Here for f/u he has significant joint complaints back pain.. he is being limited not able to exercise. Has gained weight  still leg numbness no fall  has occasional leg swelling. No compression socks  no other symptoms cardiac barros.     EMELINA MOLINA[A 1/31/2025  Mr. Hughes is an 84 year old male patient whose current medical conditions include TIA, HTN, incomplete RBBB, and GUIDO who presents today for follow-up. Patient previously underwent R hip replacement surgery in November. Since that time, he has had issues with increased BLE edema. He saw Dr. Ozuna, his PCP, who switched his diuretic therapy to Bumex with  some improvement. He was also referred to vascular surgery for venous reflux studies. No significant evidence of reflux or DVT noted on studies and patient was referred to lymphedema clinic. He returns today and states he is doing ok. Still having issues with BLE edema. Doing lymphedema therapy 2x week for the past three weeks which has seemed to help (family reports legs appear normal in AM). Other associated symptoms include weight gain, bilateral leg numbness, fatigue, MCKEON. He denies any associated barney CP, palpitations, near syncope, or syncope. BP borderline low in office today. He reports compliance with his medications. Admits he hast not been drinking his usual amount of water for the past two days.     Labs reviewed. BNP only mildly out of normal range (116). Albumin normal, creatinine 1.8. Of note, patient was significantly anemic during hospital stay (H/H 7.2/23.8).        EKG today shows SR, incomplete RBBB, occasional PAC.  TTE 3/24 with normal EF, mild aortic sclerosis, mild MR, pulmonary HTN.    3/26/2025  Had rt hip replacement has recovered well his leg swelling  is managed with compression socks and braces. He is in lymphedema clinic he has lost weight.  He has numbness in legs. He is compliant with compression socks. Has no fall and epidural is planned for April first   Back to gym asymptomatic cardiac wise.   Past Medical History:   Diagnosis Date    Allergic rhinitis     Anemia     Back pain     BPH (benign prostatic hyperplasia)     Cancer     skin cancer to neck, Dr. Graves    Cataract     Coronary artery disease involving native coronary artery of native heart without angina pectoris 3/26/2025    Disorder of kidney and ureter     ED (erectile dysfunction)     OMARI (generalized anxiety disorder) 08/07/2023    Hiatal hernia     small    History of colon polyps     colonoscopy 11/2016    HLD (hyperlipidemia)     Hyperlipidemia     Hypertension     Lateral epicondylitis     Lumbar  radiculopathy 01/26/2022    OA (osteoarthritis)     GUIDO (obstructive sleep apnea)     Pneumonia of right middle lobe due to infectious organism 11/18/2024    Polyneuropathy     Prostate cancer     Dr. Wong    TIA (transient ischemic attack)     Trouble in sleeping     Urge incontinence 03/29/2022       Past Surgical History:   Procedure Laterality Date    ANGIOGRAPHY OF UPPER EXTREMITY Left 07/05/2022    Procedure: Angiogram Extremity Bilateral;  Surgeon: Aparna Thompson MD;  Location: [facility] CATH LAB;  Service: Cardiology;  Laterality: Left;    ARTERIOGRAPHY OF AORTIC ROOT N/A 07/05/2022    Procedure: ARTERIOGRAM, AORTIC ROOT;  Surgeon: Aparna Thompson MD;  Location: [facility] CATH LAB;  Service: Cardiology;  Laterality: N/A;    BLOCK, NERVE, PERIPHERAL Right 9/12/2024    Procedure: right femoral/ obturator nerve block- with steroids;  Surgeon: Abel Haywood MD;  Location: [facility] PAIN MGT;  Service: Pain Management;  Laterality: Right;    CATARACT EXTRACTION W/  INTRAOCULAR LENS IMPLANT Right 02/21/2018    I-Stent    CATARACT EXTRACTION W/  INTRAOCULAR LENS IMPLANT Left 03/21/2018    I - Stent    CATHETERIZATION OF BOTH LEFT AND RIGHT HEART N/A 07/05/2022    Procedure: CATHETERIZATION, HEART, BOTH LEFT AND RIGHT;  Surgeon: Aparna Thompson MD;  Location: [facility] CATH LAB;  Service: Cardiology;  Laterality: N/A;  radial approach    COLONOSCOPY N/A 11/14/2016    Procedure: COLONOSCOPY;  Surgeon: Karuna Rodriguez MD;  Location: [facility] ENDO;  Service: Endoscopy;  Laterality: N/A;    COLONOSCOPY N/A 09/22/2020    Procedure: COLONOSCOPY;  Surgeon: Chuy Fish MD;  Location: [facility] ENDO;  Service: Endoscopy;  Laterality: N/A;    EPIDURAL STEROID INJECTION N/A 6/6/2024    Procedure: Lumbar L5/S1 IL IAN;  Surgeon: Abel Haywood MD;  Location: [facility] PAIN MGT;  Service: Pain Management;  Laterality: N/A;    EPIDURAL STEROID INJECTION INTO CERVICAL SPINE N/A 2/8/2024    Procedure: C5/6 IL Right paramedian approach IAN  with RN IV sedation;  Surgeon: Abel Haywood MD;  Location: [facility] PAIN MGT;  Service: Pain Management;  Laterality: N/A;    EYE SURGERY      HEMORRHOID SURGERY      HIP ARTHROPLASTY Right 11/13/2024    Procedure: ARTHROPLASTY, HIP;  Surgeon: Denton Encarnacion MD;  Location: [facility] OR;  Service: Orthopedics;  Laterality: Right;    I-STENT Right 02/21/2018    DR. REECE    INJECTION OF ANESTHETIC AGENT AROUND MEDIAL BRANCH NERVES INNERVATING CERVICAL FACET JOINT Bilateral 7/18/2023    Procedure: Bilateral C4-6 MBB with RN IV sedation (diagnostic, #1);  Surgeon: Abel Haywood MD;  Location: [facility] PAIN MGT;  Service: Pain Management;  Laterality: Bilateral;    KNEE ARTHROSCOPY W/ MENISCAL REPAIR Right 2015    Dr. Jain    PCIOL Right 02/21/2018    DR. REECE    PLANTAR FASCIA RELEASE      right    ROTATOR CUFF REPAIR Bilateral     bilateral    SELECTIVE INJECTION OF ANESTHETIC AGENT AROUND LUMBAR SPINAL NERVE ROOT BY TRANSFORAMINAL APPROACH Bilateral 01/26/2022    Procedure: Bilateral L4/5 TF IAN with RN IV sedation;  Surgeon: Mak Young MD;  Location: [facility] PAIN MGT;  Service: Pain Management;  Laterality: Bilateral;    SELECTIVE INJECTION OF ANESTHETIC AGENT AROUND LUMBAR SPINAL NERVE ROOT BY TRANSFORAMINAL APPROACH Bilateral 03/14/2022    Procedure: Bilateral L4/5 TF IAN with RN IV sedation;  Surgeon: Mak Young MD;  [facility];  Service: Pain Management;  Laterality: Bilateral;    SELECTIVE INJECTION OF ANESTHETIC AGENT AROUND LUMBAR SPINAL NERVE ROOT BY TRANSFORAMINAL APPROACH Bilateral 06/02/2022    Procedure: Bilateral L4/5 TF IAN [place] Dr. [place] [facility]  Surgeon: Abel Haywood MD;  Location: [facility] PAIN MGT;  Service: Pain Management;  Laterality: Bilateral;    SELECTIVE INJECTION OF ANESTHETIC AGENT AROUND LUMBAR SPINAL NERVE ROOT BY TRANSFORAMINAL APPROACH Bilateral 08/25/2022    Procedure: Bilateral L4/5 TF IAN;  Surgeon: Abel Haywood MD;  Location: [facility] PAIN MGT;  Service:  Pain Management;  Laterality: Bilateral;    SELECTIVE INJECTION OF ANESTHETIC AGENT AROUND LUMBAR SPINAL NERVE ROOT BY TRANSFORAMINAL APPROACH Bilateral 6/29/2023    Procedure: Bilateral L4/5 TF IAN RN IV Sedation;  Surgeon: Abel Haywood MD;  Location: [facility] PAIN MGT;  Service: Pain Management;  Laterality: Bilateral;    SELECTIVE INJECTION OF ANESTHETIC AGENT AROUND LUMBAR SPINAL NERVE ROOT BY TRANSFORAMINAL APPROACH Bilateral 4/11/2024    Procedure: Bilateral L4/5 TF IAN;  Surgeon: Abel Haywood MD;  Location: [facility] PAIN MGT;  Service: Pain Management;  Laterality: Bilateral;    SHOULDER SURGERY Bilateral around 2000    Dr. Pepper.  rotator cuff surgeries    VASECTOMY         Social History[1]    Family History   Problem Relation Name Age of Onset    Lung cancer Father Isaiah Hughes         life long smoker    Cancer Father Isaiah Hughes         prostate, lung    Arthritis Mother Emely Hughes     Stroke Sister          TIA    Cataracts Sister      Cancer Brother          prostate    Cataracts Brother      Cataracts Sister      Melanoma Neg Hx      Psoriasis Neg Hx      Lupus Neg Hx      Eczema Neg Hx      Diabetes Neg Hx      Heart disease Neg Hx      Kidney disease Neg Hx      Colon cancer Neg Hx         Current Outpatient Medications   Medication Sig    acetaminophen (TYLENOL ARTHRITIS ORAL) Take 2 capsules by mouth 2 (two) times a day.    albuterol (VENTOLIN HFA) 90 mcg/actuation inhaler Inhale 2 puffs into the lungs every 6 (six) hours as needed for Wheezing. Rescue    alfuzosin (UROXATRAL) 10 mg Tb24 Take 1 tablet (10 mg total) by mouth daily with breakfast.    amLODIPine (NORVASC) 5 MG tablet Take 5 mg by mouth 2 (two) times daily.    atorvastatin (LIPITOR) 20 MG tablet Take 1 tablet (20 mg total) by mouth once daily.    azelastine (ASTELIN) 137 mcg (0.1 %) nasal spray 1 spray (137 mcg total) by Nasal route 2 (two) times daily.    bumetanide (BUMEX) 1 MG tablet Take 1 tablet (1 mg total) by mouth once  daily. For leg swelling ( to replace furosemide) (Patient taking differently: Take 0.5 mg by mouth once daily. For leg swelling ( to replace furosemide))    citalopram (CELEXA) 10 MG tablet TAKE 1 TABLET ONE TIME DAILY FOR ANXIETY    clopidogreL (PLAVIX) 75 mg tablet TAKE 1 TABLET EVERY DAY    finasteride (PROSCAR) 5 mg tablet Take 1 tablet (5 mg total) by mouth once daily.    losartan (COZAAR) 50 MG tablet TAKE 1 TABLET TWICE DAILY    pantoprazole (PROTONIX) 40 MG tablet TAKE 1 TABLET EVERY DAY    polyethylene glycol (GLYCOLAX) 17 gram/dose powder Take 17 g by mouth once daily.    pregabalin (LYRICA) 75 MG capsule Take 1 capsule (75 mg total) by mouth 2 (two) times daily.    potassium chloride SA (K-DUR,KLOR-CON) 20 MEQ tablet Take 1 tablet (20 mEq total) by mouth once daily. (Patient not taking: Reported on 3/26/2025)    vitamin D (VITAMIN D3) 1000 units Tab Take 1,000 Units by mouth once daily. (Patient not taking: Reported on 3/26/2025)     No current facility-administered medications for this visit.     Current Outpatient Medications on File Prior to Visit   Medication Sig    acetaminophen (TYLENOL ARTHRITIS ORAL) Take 2 capsules by mouth 2 (two) times a day.    albuterol (VENTOLIN HFA) 90 mcg/actuation inhaler Inhale 2 puffs into the lungs every 6 (six) hours as needed for Wheezing. Rescue    alfuzosin (UROXATRAL) 10 mg Tb24 Take 1 tablet (10 mg total) by mouth daily with breakfast.    amLODIPine (NORVASC) 5 MG tablet Take 5 mg by mouth 2 (two) times daily.    atorvastatin (LIPITOR) 20 MG tablet Take 1 tablet (20 mg total) by mouth once daily.    azelastine (ASTELIN) 137 mcg (0.1 %) nasal spray 1 spray (137 mcg total) by Nasal route 2 (two) times daily.    bumetanide (BUMEX) 1 MG tablet Take 1 tablet (1 mg total) by mouth once daily. For leg swelling ( to replace furosemide) (Patient taking differently: Take 0.5 mg by mouth once daily. For leg swelling ( to replace furosemide))    citalopram (CELEXA) 10 MG  tablet TAKE 1 TABLET ONE TIME DAILY FOR ANXIETY    clopidogreL (PLAVIX) 75 mg tablet TAKE 1 TABLET EVERY DAY    finasteride (PROSCAR) 5 mg tablet Take 1 tablet (5 mg total) by mouth once daily.    losartan (COZAAR) 50 MG tablet TAKE 1 TABLET TWICE DAILY    pantoprazole (PROTONIX) 40 MG tablet TAKE 1 TABLET EVERY DAY    polyethylene glycol (GLYCOLAX) 17 gram/dose powder Take 17 g by mouth once daily.    pregabalin (LYRICA) 75 MG capsule Take 1 capsule (75 mg total) by mouth 2 (two) times daily.    potassium chloride SA (K-DUR,KLOR-CON) 20 MEQ tablet Take 1 tablet (20 mEq total) by mouth once daily. (Patient not taking: Reported on 3/26/2025)    vitamin D (VITAMIN D3) 1000 units Tab Take 1,000 Units by mouth once daily. (Patient not taking: Reported on 3/26/2025)     No current facility-administered medications on file prior to visit.       Review of Systems   Constitutional: Negative for malaise/fatigue.   Eyes:  Negative for blurred vision.   Cardiovascular:  Positive for leg swelling. Negative for chest pain, claudication, cyanosis, dyspnea on exertion, irregular heartbeat, near-syncope, orthopnea, palpitations and paroxysmal nocturnal dyspnea.   Respiratory:  Negative for cough, hemoptysis and shortness of breath.    Hematologic/Lymphatic: Negative for bleeding problem. Does not bruise/bleed easily.   Skin:  Negative for dry skin and itching.   Musculoskeletal:  Positive for arthritis, back pain and stiffness. Negative for falls, muscle weakness and myalgias.   Gastrointestinal:  Negative for abdominal pain, diarrhea, heartburn, hematemesis, hematochezia and melena.   Genitourinary:  Negative for flank pain and hematuria.   Neurological:  Positive for loss of balance. Negative for dizziness, focal weakness, headaches, light-headedness, numbness, paresthesias, seizures and weakness.   Psychiatric/Behavioral:  Negative for altered mental status and memory loss. The patient is not nervous/anxious.     Allergic/Immunologic: Negative for hives.       Objective:   Physical Exam  Vitals and nursing note reviewed.   Constitutional:       General: He is not in acute distress.     Appearance: He is well-developed. He is not diaphoretic.   HENT:      Head: Normocephalic and atraumatic.   Eyes:      General:         Right eye: No discharge.         Left eye: No discharge.      Pupils: Pupils are equal, round, and reactive to light.   Neck:      Thyroid: No thyromegaly.      Vascular: No JVD.   Cardiovascular:      Rate and Rhythm: Normal rate and regular rhythm.      Pulses: Normal pulses and intact distal pulses.           Carotid pulses are 2+ on the right side and 2+ on the left side.       Radial pulses are 2+ on the right side and 2+ on the left side.      Heart sounds: Murmur heard.      Harsh midsystolic murmur is present with a grade of 1/6 at the upper right sternal border radiating to the neck.      No friction rub. No gallop.   Pulmonary:      Effort: Pulmonary effort is normal. No respiratory distress.      Breath sounds: Normal breath sounds. No wheezing or rales.   Chest:      Chest wall: No tenderness.   Abdominal:      General: Bowel sounds are normal. There is no distension.      Palpations: Abdomen is soft.      Tenderness: There is no abdominal tenderness.   Musculoskeletal:         General: Normal range of motion.      Cervical back: Neck supple.      Right lower leg: Edema present.      Left lower leg: Edema present.      Comments: Compression socks in place.   Skin:     General: Skin is warm and dry.      Findings: No erythema or rash.   Neurological:      General: No focal deficit present.      Mental Status: He is alert and oriented to person, place, and time.      Cranial Nerves: No cranial nerve deficit.   Psychiatric:         Mood and Affect: Mood normal.         Behavior: Behavior normal.       Vitals:    03/26/25 0807 03/26/25 0808   BP: 120/60 110/60   BP Location: Right arm Left arm    Patient Position: Sitting Sitting   Pulse: 60    SpO2: 96%    Weight: 99.1 kg (218 lb 7.6 oz)      Lab Results   Component Value Date    CHOL 122 02/10/2025    CHOL 138 08/23/2024    CHOL 151 02/06/2024      Body mass index is 31.35 kg/m².   Lab Results   Component Value Date    HGBA1C 5.1 02/06/2025      BMP  Lab Results   Component Value Date     02/10/2025     02/10/2025    K 4.4 02/10/2025    K 4.3 02/10/2025     02/10/2025     02/10/2025    CO2 21 (L) 02/10/2025    CO2 23 02/10/2025    BUN 31 (H) 02/10/2025    BUN 30 (H) 02/10/2025    CREATININE 1.6 (H) 02/10/2025    CREATININE 1.5 (H) 02/10/2025    CALCIUM 9.1 02/10/2025    CALCIUM 9.0 02/10/2025    ANIONGAP 11 02/10/2025    ANIONGAP 9 02/10/2025    EGFRNORACEVR 41.7 (A) 02/10/2025    EGFRNORACEVR 45.1 (A) 02/10/2025      Lab Results   Component Value Date    HDL 51 02/10/2025    HDL 68 08/23/2024    HDL 63 02/06/2024     Lab Results   Component Value Date    LDLCALC 56.8 (L) 02/10/2025    LDLCALC 57.8 (L) 08/23/2024    LDLCALC 70.2 02/06/2024     Lab Results   Component Value Date    TRIG 71 02/10/2025    TRIG 61 08/23/2024    TRIG 89 02/06/2024     Lab Results   Component Value Date    CHOLHDL 41.8 02/10/2025    CHOLHDL 49.3 08/23/2024    CHOLHDL 41.7 02/06/2024       Chemistry        Component Value Date/Time     02/10/2025 0936     02/10/2025 0936    K 4.4 02/10/2025 0936    K 4.3 02/10/2025 0936     02/10/2025 0936     02/10/2025 0936    CO2 21 (L) 02/10/2025 0936    CO2 23 02/10/2025 0936    BUN 31 (H) 02/10/2025 0936    BUN 30 (H) 02/10/2025 0936    CREATININE 1.6 (H) 02/10/2025 0936    CREATININE 1.5 (H) 02/10/2025 0936    GLU 91 02/10/2025 0936    GLU 88 02/10/2025 0936        Component Value Date/Time    CALCIUM 9.1 02/10/2025 0936    CALCIUM 9.0 02/10/2025 0936    ALKPHOS 72 02/10/2025 0936    AST 18 02/10/2025 0936    ALT 9 (L) 02/10/2025 0936    BILITOT 0.8 02/10/2025 0936    ESTGFRAFRICA 57.9 (A)  06/29/2022 0917    EGFRNONAA 50.1 (A) 06/29/2022 0917          Lab Results   Component Value Date    TSH 1.771 02/06/2025     Lab Results   Component Value Date    INR 1.2 06/29/2022    INR 1.1 02/24/2021    INR 1.2 07/07/2020     Lab Results   Component Value Date    WBC 4.27 02/24/2025    HGB 10.2 (L) 02/24/2025    HCT 32.4 (L) 02/24/2025    MCV 94 02/24/2025    PLT 92 (L) 02/24/2025     BMP  Sodium   Date Value Ref Range Status   02/10/2025 138 136 - 145 mmol/L Final   02/10/2025 138 136 - 145 mmol/L Final     Potassium   Date Value Ref Range Status   02/10/2025 4.4 3.5 - 5.1 mmol/L Final   02/10/2025 4.3 3.5 - 5.1 mmol/L Final     Chloride   Date Value Ref Range Status   02/10/2025 106 95 - 110 mmol/L Final   02/10/2025 106 95 - 110 mmol/L Final     CO2   Date Value Ref Range Status   02/10/2025 21 (L) 23 - 29 mmol/L Final   02/10/2025 23 23 - 29 mmol/L Final     BUN   Date Value Ref Range Status   02/10/2025 31 (H) 8 - 23 mg/dL Final   02/10/2025 30 (H) 8 - 23 mg/dL Final     Creatinine   Date Value Ref Range Status   02/10/2025 1.6 (H) 0.5 - 1.4 mg/dL Final   02/10/2025 1.5 (H) 0.5 - 1.4 mg/dL Final     Calcium   Date Value Ref Range Status   02/10/2025 9.1 8.7 - 10.5 mg/dL Final   02/10/2025 9.0 8.7 - 10.5 mg/dL Final     Anion Gap   Date Value Ref Range Status   02/10/2025 11 8 - 16 mmol/L Final   02/10/2025 9 8 - 16 mmol/L Final     eGFR if    Date Value Ref Range Status   06/29/2022 57.9 (A) >60 mL/min/1.73 m^2 Final     eGFR if non    Date Value Ref Range Status   06/29/2022 50.1 (A) >60 mL/min/1.73 m^2 Final     Comment:     Calculation used to obtain the estimated glomerular filtration  rate (eGFR) is the CKD-EPI equation.        CrCl cannot be calculated (Patient's most recent lab result is older than the maximum 7 days allowed.).    Summary  Show Result Comparison     Left Ventricle: The left ventricle is normal in size. Normal wall thickness. There is concentric  remodeling. There is normal systolic function. Ejection fraction is approximately 60%. Grade II diastolic dysfunction.    Right Ventricle: Normal right ventricular cavity size. Wall thickness is normal. Systolic function is normal.    Aortic Valve: There is mild aortic valve sclerosis. There is mild stenosis. Aortic valve area by VTI is 1.8 cm². Aortic valve peak velocity is 2.2 m/s. Mean gradient is 11 mmHg. The dimensionless index is 0.58.    Tricuspid Valve: There is mild regurgitation with a centrally directed jet.    Pulmonary Artery: The estimated pulmonary artery systolic pressure is 55 mmHg.    IVC/SVC: Normal venous pressure at 3 mmHg.  Assessment:     1. Primary hypertension    2. Stage 3a chronic kidney disease    3. Incomplete right bundle branch block    4. Prostate cancer    5. Aortic atherosclerosis    6. Atherosclerosis of artery of both lower extremities    7. Leg swelling    8. Obstructive sleep apnea syndrome    9. Coronary artery disease involving native coronary artery of native heart without angina pectoris    Diastolic dysfunction stable asymptomatic continue low salt diet.  Needs epidural no cardiac contraindication can discontinue plavix 5-7 days before. ie today.  Lymphedema managed with compression and diuretics improved   Leg numbness secondary to lumbosacral disease getting treated with pain management  Kleber stable continue current management.  Ckd stable will continue monitoring renal function  Aortic atherosclerosis on antiplatelets and statins on target asymptomatic continue same    Cad non obstructive asymptomatic continue same as well as active lifestyle.   Lipids on target stable continue statins.  Pvd asymptomatic continue antiplatelets and statins. Continue exercise.   Plan:   Continue current therapy  Cardiac low salt diet.  Risk factor modification and excercise program./weight loss  F/u in 6 months with lipid cmp            [1]   Social History  Tobacco Use    Smoking status:  Former     Current packs/day: 0.00     Average packs/day: 3.0 packs/day for 35.0 years (104.9 ttl pk-yrs)     Types: Cigarettes     Start date: 1960     Quit date: 1985     Years since quittin.7     Passive exposure: Past    Smokeless tobacco: Never   Substance Use Topics    Alcohol use: Yes     Alcohol/week: 3.0 standard drinks of alcohol     Types: 3 Cans of beer per week     Comment: socially    Drug use: No

## 2025-03-27 LAB
ALBUMIN, SPE (OHS): 3.91 G/DL (ref 3.35–5.55)
ALPHA 1 GLOB (OHS): 0.28 GM/DL (ref 0.17–0.41)
ALPHA 2 GLOB (OHS): 0.6 GM/DL (ref 0.43–0.99)
BETA GLOB (OHS): 0.65 GM/DL (ref 0.5–1.1)
GAMMA GLOBULIN (OHS): 0.96 GM/DL (ref 0.67–1.58)
KAPPA LC FREE SER-MCNC: 1.96 MG/L (ref 0.26–1.65)
KAPPA LC FREE/LAMBDA FREE SER: 4.22 MG/DL (ref 0.33–1.94)
LAMBDA LC FREE SERPL-MCNC: 2.15 MG/DL (ref 0.57–2.63)
PROT SERPL-MCNC: 6.4 GM/DL (ref 6–8.4)

## 2025-03-28 ENCOUNTER — OFFICE VISIT (OUTPATIENT)
Dept: UROLOGY | Facility: CLINIC | Age: 85
End: 2025-03-28
Payer: MEDICARE

## 2025-03-28 VITALS
HEIGHT: 70 IN | DIASTOLIC BLOOD PRESSURE: 65 MMHG | RESPIRATION RATE: 18 BRPM | BODY MASS INDEX: 30.65 KG/M2 | HEART RATE: 57 BPM | WEIGHT: 214.06 LBS | SYSTOLIC BLOOD PRESSURE: 123 MMHG

## 2025-03-28 DIAGNOSIS — N52.01 ERECTILE DYSFUNCTION DUE TO ARTERIAL INSUFFICIENCY: ICD-10-CM

## 2025-03-28 DIAGNOSIS — C61 PROSTATE CANCER: Primary | ICD-10-CM

## 2025-03-28 DIAGNOSIS — R39.15 URINARY URGENCY: ICD-10-CM

## 2025-03-28 DIAGNOSIS — R35.1 NOCTURIA: ICD-10-CM

## 2025-03-28 LAB
BILIRUB UR QL STRIP: NEGATIVE
GLUCOSE UR QL STRIP: NEGATIVE
KETONES UR QL STRIP: NEGATIVE
LEUKOCYTE ESTERASE UR QL STRIP: NEGATIVE
PATHOLOGIST REVIEW - SPE (OHS): NORMAL
PH, POC UA: 5.5
POC BLOOD, URINE: NEGATIVE
POC NITRATES, URINE: NEGATIVE
POC RESIDUAL URINE VOLUME: 46 ML (ref 0–100)
PROT UR QL STRIP: NEGATIVE
SP GR UR STRIP: 1.01 (ref 1–1.03)
UROBILINOGEN UR STRIP-ACNC: 0.2 (ref 0.3–2.2)

## 2025-03-28 PROCEDURE — 3288F FALL RISK ASSESSMENT DOCD: CPT | Mod: HCNC,CPTII,S$GLB, | Performed by: UROLOGY

## 2025-03-28 PROCEDURE — 81003 URINALYSIS AUTO W/O SCOPE: CPT | Mod: QW,HCNC,S$GLB, | Performed by: UROLOGY

## 2025-03-28 PROCEDURE — 51798 US URINE CAPACITY MEASURE: CPT | Mod: HCNC,S$GLB,, | Performed by: UROLOGY

## 2025-03-28 PROCEDURE — 1101F PT FALLS ASSESS-DOCD LE1/YR: CPT | Mod: HCNC,CPTII,S$GLB, | Performed by: UROLOGY

## 2025-03-28 PROCEDURE — 1125F AMNT PAIN NOTED PAIN PRSNT: CPT | Mod: HCNC,CPTII,S$GLB, | Performed by: UROLOGY

## 2025-03-28 PROCEDURE — 99214 OFFICE O/P EST MOD 30 MIN: CPT | Mod: HCNC,S$GLB,, | Performed by: UROLOGY

## 2025-03-28 PROCEDURE — 99999 PR PBB SHADOW E&M-EST. PATIENT-LVL IV: CPT | Mod: PBBFAC,HCNC,, | Performed by: UROLOGY

## 2025-03-28 RX ORDER — SILDENAFIL 100 MG/1
TABLET, FILM COATED ORAL
Qty: 10 TABLET | Refills: 7 | Status: SHIPPED | OUTPATIENT
Start: 2025-03-28

## 2025-03-28 RX ORDER — FINASTERIDE 5 MG/1
5 TABLET, FILM COATED ORAL DAILY
Qty: 90 TABLET | Refills: 4 | Status: SHIPPED | OUTPATIENT
Start: 2025-03-28

## 2025-03-28 RX ORDER — TOLTERODINE 2 MG/1
2 CAPSULE, EXTENDED RELEASE ORAL DAILY
Qty: 90 CAPSULE | Refills: 4 | Status: SHIPPED | OUTPATIENT
Start: 2025-03-28 | End: 2026-03-28

## 2025-03-28 NOTE — PROGRESS NOTES
"    CC: follow up prostate cancer    History of Present Illness:   Ezequiel Hughes is a 85 y.o. male here for evaluation of Prostate Cancer    3/28/25-Pt believes he needs to lose weight. Has nocturia x 8, large volumes and states that he understands it is related to his lower extremity swelling. Feels like he is emptying. He does have urgency.     12/20/24-Pt having nocturia sometimes every hour. He has been having significant lower extremity edema and he is on lasix and compression stockings. Had a hip surgery last month, but was having edema before that. He does feel like he is emptying well. Having some UUI.     10/15/24: here for biopsy results. Path shows Rae grade group 2 prostate cancer in 2/7 cores at the target. None otherwise. Pt did well following biopsy. No longer having significant gross hematuria or hematochezia. No fever.     8/27/24-MRI shows a PI RADS 4 lesion, measuring 7x4mm in the  right posteriolateral peripheral zone at the mid-gland. I have personally reviewed these images.   Hasn't started alfuzosin yet, but he states that it was sent to him. He does have slight UUI at times. Still having significant nocturia. No gross hematuria or dysuria.     8/16/24-still with a lot of nocturia (6-7). Tries to limit pm fluid intake. He was having significant discomfort in his groin/leg on the right, which was causing limping. He has also had injections in his hip. Still on finasteride.     5/10/24-PSA up a little. Pt continues to take  finasteride. States that his urination is "terrible". He has nocturia x 7-8. He is scared to take vesicare. Sometimes feels like he doesn't empty. He does have GUIDO, but doesn't use his C-pap.     2/7/24-Pt currently in PT for his back. LUTS have been better. Nocturia x 2 last night. Emptying better. No hesitancy or stranguria. No gross hematuria. Slight UUI at times. He is not taking vesicare yet, but wants to start. States that he does have it.     11/21/23-IPSS 24, QoL 5 " "(unhappy). Nocutria x 4. He is currently on alfuzosin and finasteride but is out of the solifenacin. Took it for 1 month and it didn't help. Would like to try something stronger. Primary bother is urgency. No stranguria or difficulty emptying. Nocturia x 4.   8/11/23-Pt on proscar. He quit detrol because he wet the bed. Nocturia x 2-6. Stream is not always good. Hasn't tried Viagra yet. Was in the ED the other day with chest pain and had a UA, which showed 1+ blood, but no RBCs. Wearing his C-pap.   5/5/23-Pt reports that he is on finasteride, but isn't taking alfuzosin. Nocturia x 5-6. He has a little slight "leakage" throughout the day. He does have urgency. Didn't have any improvement with alfuzosin.   9/28/22-On finasteride. Nocturia x 4-5. Tried flomax a long time ago and it didn't help. Drinks 2 cups of coffee in the am. Not drinking about an hour before bed. No gross hematuria.   6/30/22-Nocturia x 1 lately, but prior to the last 2 nights, it has been 4 times. Still on finasteride. He does have urgency and occasional UUI. If he goes out, he wears a pad. Stream is weak. No hesitancy. Recently, visits have gone to every 6 months. Pt reports occasional RLQ pain. He does have come constipation, but pain doesn't change with BM. Persistent for a month.    Prior records indicate last MRI and TRUS biopsy in 2020.   3/29/22- 83yo male with h/o Prostate cancer, here to establish care. He has previously seen Dr. Wong and was undergoing active surveillance. He reports that he was diagnosed with prostate cancer in 2014. He hasn't had any treatment. He is currently managed on finasteride. He does report some UUI, small amounts. He had a repeat TRUS biopsy in 2021, and pt reports path was unchanged. Also had MRIs around that time as well and states that he had a targetable lesion.         Review of Systems   Respiratory:  Negative for shortness of breath.    Cardiovascular:  Negative for chest pain and palpitations. "   Genitourinary:  Negative for hematuria.   Musculoskeletal:  Positive for back pain and neck pain.   Neurological:  Positive for numbness (legs). Negative for dizziness.   All other systems reviewed and are negative.        Past Medical History:   Diagnosis Date    Allergic rhinitis     Anemia     Back pain     BPH (benign prostatic hyperplasia)     Cancer     skin cancer to neck, Dr. Graves    Cataract     Coronary artery disease involving native coronary artery of native heart without angina pectoris 3/26/2025    Disorder of kidney and ureter     ED (erectile dysfunction)     OMARI (generalized anxiety disorder) 08/07/2023    Hiatal hernia     small    History of colon polyps     colonoscopy 11/2016    HLD (hyperlipidemia)     Hyperlipidemia     Hypertension     Lateral epicondylitis     Lumbar radiculopathy 01/26/2022    OA (osteoarthritis)     GUIDO (obstructive sleep apnea)     Pneumonia of right middle lobe due to infectious organism 11/18/2024    Polyneuropathy     Prostate cancer     Dr. Wong    TIA (transient ischemic attack)     Trouble in sleeping     Urge incontinence 03/29/2022       Past Surgical History:   Procedure Laterality Date    ANGIOGRAPHY OF UPPER EXTREMITY Left 07/05/2022    Procedure: Angiogram Extremity Bilateral;  Surgeon: Aparna Thompson MD;  Location: [facility] CATH LAB;  Service: Cardiology;  Laterality: Left;    ARTERIOGRAPHY OF AORTIC ROOT N/A 07/05/2022    Procedure: ARTERIOGRAM, AORTIC ROOT;  Surgeon: Aparna Thompson MD;  Location: [facility] CATH LAB;  Service: Cardiology;  Laterality: N/A;    BLOCK, NERVE, PERIPHERAL Right 9/12/2024    Procedure: right femoral/ obturator nerve block- with steroids;  Surgeon: Abel Haywood MD;  Location: [facility] PAIN T;  Service: Pain Management;  Laterality: Right;    CATARACT EXTRACTION W/  INTRAOCULAR LENS IMPLANT Right 02/21/2018    I-Stent    CATARACT EXTRACTION W/  INTRAOCULAR LENS IMPLANT Left 03/21/2018    I - Stent    CATHETERIZATION OF  BOTH LEFT AND RIGHT HEART N/A 07/05/2022    Procedure: CATHETERIZATION, HEART, BOTH LEFT AND RIGHT;  Surgeon: Aparna Thompson MD;  Location: [facility] CATH LAB;  Service: Cardiology;  Laterality: N/A;  radial approach    COLONOSCOPY N/A 11/14/2016    Procedure: COLONOSCOPY;  Surgeon: Karuna Rodriguez MD;  Location: [facility] ENDO;  Service: Endoscopy;  Laterality: N/A;    COLONOSCOPY N/A 09/22/2020    Procedure: COLONOSCOPY;  Surgeon: Chuy Fish MD;  Location: [facility] ENDO;  Service: Endoscopy;  Laterality: N/A;    EPIDURAL STEROID INJECTION N/A 6/6/2024    Procedure: Lumbar L5/S1 IL IAN;  Surgeon: Abel Haywood MD;  Location: [facility] PAIN MGT;  Service: Pain Management;  Laterality: N/A;    EPIDURAL STEROID INJECTION INTO CERVICAL SPINE N/A 2/8/2024    Procedure: C5/6 IL Right paramedian approach IAN with RN IV sedation;  Surgeon: Abel Haywood MD;  Location: [facility] PAIN MGT;  Service: Pain Management;  Laterality: N/A;    EPIDURAL STEROID INJECTION INTO LUMBAR SPINE N/A 4/1/2025    Procedure: L5/S1 IL IAN hold plavix 5 days;  Surgeon: Abel Haywood MD;  Location: [facility] PAIN MGT;  Service: Pain Management;  Laterality: N/A;    EYE SURGERY      HEMORRHOID SURGERY      HIP ARTHROPLASTY Right 11/13/2024    Procedure: ARTHROPLASTY, HIP;  Surgeon: Denton Encarnacion MD;  Location: [facility] OR;  Service: Orthopedics;  Laterality: Right;    I-STENT Right 02/21/2018    DR. REECE    INJECTION OF ANESTHETIC AGENT AROUND MEDIAL BRANCH NERVES INNERVATING CERVICAL FACET JOINT Bilateral 7/18/2023    Procedure: Bilateral C4-6 MBB with RN IV sedation (diagnostic, #1);  Surgeon: Abel Hyawood MD;  Location: [facility] PAIN MGT;  Service: Pain Management;  Laterality: Bilateral;    KNEE ARTHROSCOPY W/ MENISCAL REPAIR Right 2015    Dr. Jain    PCIOL Right 02/21/2018    DR. REECE    PLANTAR FASCIA RELEASE      right    ROTATOR CUFF REPAIR Bilateral     bilateral    SELECTIVE INJECTION OF ANESTHETIC AGENT AROUND LUMBAR SPINAL  NERVE ROOT BY TRANSFORAMINAL APPROACH Bilateral 01/26/2022    Procedure: Bilateral L4/5 TF IAN with RN IV sedation;  Surgeon: Mak Young MD;  Location: [facility] PAIN MGT;  Service: Pain Management;  Laterality: Bilateral;    SELECTIVE INJECTION OF ANESTHETIC AGENT AROUND LUMBAR SPINAL NERVE ROOT BY TRANSFORAMINAL APPROACH Bilateral 03/14/2022    Procedure: Bilateral L4/5 TF IAN with RN IV sedation;  Surgeon: Mak Young MD;  Location: [facility] PAIN MGT;  Service: Pain Management;  Laterality: Bilateral;    SELECTIVE INJECTION OF ANESTHETIC AGENT AROUND LUMBAR SPINAL NERVE ROOT BY TRANSFORAMINAL APPROACH Bilateral 06/02/2022    Procedure: Bilateral L4/5 TF IAN - D2P Dr. Matthew CABRERA;  Surgeon: Abel Haywood MD;  Location: [facility] PAIN MGT;  Service: Pain Management;  Laterality: Bilateral;    SELECTIVE INJECTION OF ANESTHETIC AGENT AROUND LUMBAR SPINAL NERVE ROOT BY TRANSFORAMINAL APPROACH Bilateral 08/25/2022    Procedure: Bilateral L4/5 TF IAN;  Surgeon: Abel Haywood MD;  Location: [facility] PAIN MGT;  Service: Pain Management;  Laterality: Bilateral;    SELECTIVE INJECTION OF ANESTHETIC AGENT AROUND LUMBAR SPINAL NERVE ROOT BY TRANSFORAMINAL APPROACH Bilateral 6/29/2023    Procedure: Bilateral L4/5 TF IAN RN IV Sedation;  Surgeon: Abel Haywood MD;  Location: [facility] PAIN MGT;  Service: Pain Management;  Laterality: Bilateral;    SELECTIVE INJECTION OF ANESTHETIC AGENT AROUND LUMBAR SPINAL NERVE ROOT BY TRANSFORAMINAL APPROACH Bilateral 4/11/2024    Procedure: Bilateral L4/5 TF IAN;  Surgeon: Abel Haywodo MD;  Location: [facility] PAIN MGT;  Service: Pain Management;  Laterality: Bilateral;    SHOULDER SURGERY Bilateral around 2000    Dr. Pepper.  rotator cuff surgeries    VASECTOMY         Family History   Problem Relation Name Age of Onset    Lung cancer Father Isaiah Hughes         life long smoker    Cancer Father Isaiah Hughes         prostate, lung    Arthritis Mother Emelymanuel Hughes     Stroke Sister          TIA     Cataracts Sister      Cancer Brother          prostate    Cataracts Brother      Cataracts Sister      Melanoma Neg Hx      Psoriasis Neg Hx      Lupus Neg Hx      Eczema Neg Hx      Diabetes Neg Hx      Heart disease Neg Hx      Kidney disease Neg Hx      Colon cancer Neg Hx         Social History     Tobacco Use    Smoking status: Former     Current packs/day: 0.00     Average packs/day: 3.0 packs/day for 35.0 years (104.9 ttl pk-yrs)     Types: Cigarettes     Start date: 1960     Quit date: 1985     Years since quittin.7     Passive exposure: Past    Smokeless tobacco: Never   Substance Use Topics    Alcohol use: Yes     Alcohol/week: 3.0 standard drinks of alcohol     Types: 3 Cans of beer per week     Comment: socially    Drug use: No       Current Outpatient Medications   Medication Sig Dispense Refill    acetaminophen (TYLENOL ARTHRITIS ORAL) Take 2 capsules by mouth 2 (two) times a day.      albuterol (VENTOLIN HFA) 90 mcg/actuation inhaler Inhale 2 puffs into the lungs every 6 (six) hours as needed for Wheezing. Rescue 18 g 0    alfuzosin (UROXATRAL) 10 mg Tb24 Take 1 tablet (10 mg total) by mouth daily with breakfast. 90 tablet 3    amLODIPine (NORVASC) 5 MG tablet Take 5 mg by mouth 2 (two) times daily.      atorvastatin (LIPITOR) 20 MG tablet Take 1 tablet (20 mg total) by mouth once daily. 90 tablet 3    azelastine (ASTELIN) 137 mcg (0.1 %) nasal spray 1 spray (137 mcg total) by Nasal route 2 (two) times daily. 90 mL 3    bumetanide (BUMEX) 1 MG tablet Take 1 tablet (1 mg total) by mouth once daily. For leg swelling ( to replace furosemide) (Patient not taking: Reported on 4/10/2025) 30 tablet 11    citalopram (CELEXA) 10 MG tablet TAKE 1 TABLET ONE TIME DAILY FOR ANXIETY 90 tablet 3    clopidogreL (PLAVIX) 75 mg tablet TAKE 1 TABLET EVERY DAY 90 tablet 2    losartan (COZAAR) 50 MG tablet TAKE 1 TABLET TWICE DAILY 180 tablet 2    polyethylene glycol (GLYCOLAX) 17 gram/dose powder Take  17 g by mouth once daily.      potassium chloride SA (K-DUR,KLOR-CON) 20 MEQ tablet Take 1 tablet (20 mEq total) by mouth once daily. (Patient not taking: Reported on 4/10/2025) 90 tablet 3    pregabalin (LYRICA) 75 MG capsule Take 1 capsule (75 mg total) by mouth 2 (two) times daily. 180 capsule 1    vitamin D (VITAMIN D3) 1000 units Tab Take 1,000 Units by mouth once daily. (Patient not taking: Reported on 4/10/2025)      finasteride (PROSCAR) 5 mg tablet Take 1 tablet (5 mg total) by mouth once daily. 90 tablet 4    pantoprazole (PROTONIX) 40 MG tablet TAKE 1 TABLET EVERY DAY 90 tablet 3    sildenafiL (VIAGRA) 100 MG tablet Take 1 po 45 minutes before intercourse (Patient not taking: Reported on 4/10/2025) 10 tablet 7    tolterodine (DETROL LA) 2 MG Cp24 Take 1 capsule (2 mg total) by mouth once daily. (Patient not taking: Reported on 4/10/2025) 90 capsule 4     No current facility-administered medications for this visit.       Review of patient's allergies indicates:   Allergen Reactions    Atarax [hydroxyzine hcl] Other (See Comments)     Raised blood pressure     Zyrtec [cetirizine] Other (See Comments)     Raised blood pressure     Gold sodium thiomalate     Gold sodium thiosulfate      Patch test positive    Meloxicam Rash     Other reaction(s): hypertension       Physical Exam  Vitals:    03/28/25 0909   BP: 123/65   Pulse: (!) 57   Resp: 18         General: Well-developed, well-nourished in no acute distress  HEENT: Normocephalic, atraumatic, Extraocular movements intact  Neck: supple, trachea midline, no cervical or supraclavicular lymphadenopathy  Respirations: even and unlabored  Rectal: 11/21/23->40g prostate, no nodules or tenderness. No gross blood  Extremities: atraumatic, moves all equally, no clubbing, cyanosis, 2+LE edema bilaterally  Psych: normal affect  Skin: warm and dry, no lesions  Neuro: Alert and oriented, Cranial nerves II-XII grossly intact    PVR:46cc    UA: negative for blood, LE,  nit    PSA  7/7/21: 1.3  3/23/22: 1.5  6/16/22: 1.4  9/26/22: 1.5  12/27/22: 1.9  2/9/23: 1.3  8/3/23: 2.0  11/14/23: 1.4  2/6/24: 1.6  5/3/24: 2.8  8/9/24: 4.8  8/16/24: 2.8  12/16/24: 1.7  3/20/25: 1.6    Assessment:   1. Prostate cancer  POCT Urinalysis, Dipstick, Automated, W/O Scope    POCT Bladder Scan    Prostate Specific Antigen, Diagnostic      2. Urinary urgency        3. Nocturia        4. Erectile dysfunction due to arterial insufficiency                    Plan:  Prostate cancer  -     POCT Urinalysis, Dipstick, Automated, W/O Scope  -     POCT Bladder Scan  -     Prostate Specific Antigen, Diagnostic; Future; Expected date: 06/28/2025    Urinary urgency    Nocturia    Erectile dysfunction due to arterial insufficiency    Other orders  -     tolterodine (DETROL LA) 2 MG Cp24; Take 1 capsule (2 mg total) by mouth once daily. (Patient not taking: Reported on 4/10/2025)  Dispense: 90 capsule; Refill: 4  -     finasteride (PROSCAR) 5 mg tablet; Take 1 tablet (5 mg total) by mouth once daily.  Dispense: 90 tablet; Refill: 4  -     sildenafiL (VIAGRA) 100 MG tablet; Take 1 po 45 minutes before intercourse (Patient not taking: Reported on 4/10/2025)  Dispense: 10 tablet; Refill: 7    Continue alfuzosin  Will continue with active surveillance  Follow up in about 3 months (around 6/28/2025) for labs before visit.

## 2025-03-31 RX ORDER — PANTOPRAZOLE SODIUM 40 MG/1
40 TABLET, DELAYED RELEASE ORAL
Qty: 90 TABLET | Refills: 3 | Status: SHIPPED | OUTPATIENT
Start: 2025-03-31

## 2025-03-31 NOTE — TELEPHONE ENCOUNTER
Refill Decision Note   Ezequiel Hughes  is requesting a refill authorization.  Brief Assessment and Rationale for Refill:  Approve     Medication Therapy Plan:        Comments:     Note composed:3:05 PM 03/31/2025

## 2025-03-31 NOTE — TELEPHONE ENCOUNTER
No care due was identified.  [facility] Embedded Care Due Messages. Reference number: [id number].   3/31/2025 1:53:06 AM CDT

## 2025-04-01 ENCOUNTER — HOSPITAL ENCOUNTER (OUTPATIENT)
Facility: HOSPITAL | Age: 85
Discharge: HOME | End: 2025-04-01
Attending: PHYSICAL MEDICINE & REHABILITATION | Admitting: PHYSICAL MEDICINE & REHABILITATION
Payer: MEDICARE

## 2025-04-01 VITALS
HEART RATE: 67 BPM | OXYGEN SATURATION: 97 % | DIASTOLIC BLOOD PRESSURE: 58 MMHG | BODY MASS INDEX: 30.17 KG/M2 | HEIGHT: 70 IN | WEIGHT: 210.75 LBS | RESPIRATION RATE: 16 BRPM | SYSTOLIC BLOOD PRESSURE: 120 MMHG

## 2025-04-01 DIAGNOSIS — M54.16 LUMBAR RADICULOPATHY: ICD-10-CM

## 2025-04-01 DIAGNOSIS — M54.16 LUMBAR RADICULOPATHY, CHRONIC: Primary | ICD-10-CM

## 2025-04-01 PROCEDURE — 63600175 PHARM REV CODE 636 W HCPCS: Mod: HCNC | Performed by: PHYSICAL MEDICINE & REHABILITATION

## 2025-04-01 PROCEDURE — 62323 NJX INTERLAMINAR LMBR/SAC: CPT | Mod: HCNC,,, | Performed by: PHYSICAL MEDICINE & REHABILITATION

## 2025-04-01 PROCEDURE — 25500020 PHARM REV CODE 255: Mod: HCNC | Performed by: PHYSICAL MEDICINE & REHABILITATION

## 2025-04-01 PROCEDURE — 62323 NJX INTERLAMINAR LMBR/SAC: CPT | Mod: HCNC | Performed by: PHYSICAL MEDICINE & REHABILITATION

## 2025-04-01 RX ORDER — METHYLPREDNISOLONE ACETATE 80 MG/ML
INJECTION, SUSPENSION INTRA-ARTICULAR; INTRALESIONAL; INTRAMUSCULAR; SOFT TISSUE
Status: DISCONTINUED | OUTPATIENT
Start: 2025-04-01 | End: 2025-04-01 | Stop reason: HOSPADM

## 2025-04-01 RX ORDER — BUPIVACAINE HYDROCHLORIDE 2.5 MG/ML
INJECTION, SOLUTION EPIDURAL; INFILTRATION; INTRACAUDAL; PERINEURAL
Status: DISCONTINUED | OUTPATIENT
Start: 2025-04-01 | End: 2025-04-01 | Stop reason: HOSPADM

## 2025-04-01 RX ORDER — MIDAZOLAM HYDROCHLORIDE 1 MG/ML
INJECTION INTRAMUSCULAR; INTRAVENOUS
Status: DISCONTINUED | OUTPATIENT
Start: 2025-04-01 | End: 2025-04-01 | Stop reason: HOSPADM

## 2025-04-01 RX ORDER — ONDANSETRON HYDROCHLORIDE 2 MG/ML
4 INJECTION, SOLUTION INTRAVENOUS ONCE AS NEEDED
Status: DISCONTINUED | OUTPATIENT
Start: 2025-04-01 | End: 2025-04-01 | Stop reason: HOSPADM

## 2025-04-01 RX ORDER — FENTANYL CITRATE 50 UG/ML
INJECTION, SOLUTION INTRAMUSCULAR; INTRAVENOUS
Status: DISCONTINUED | OUTPATIENT
Start: 2025-04-01 | End: 2025-04-01 | Stop reason: HOSPADM

## 2025-04-01 NOTE — DISCHARGE SUMMARY
Discharge Note  Short Stay      SUMMARY     Admit Date: 4/1/2025    Attending Physician: Abel Haywood MD        Discharge Physician: Abel Haywood MD        Discharge Date: 4/1/2025 10:04 AM    Procedure(s) (LRB):  L5/S1 IL IAN hold plavix 5 days (N/A)    Final Diagnosis: Lumbar radiculopathy [M54.16]    Disposition: Home or self care    Patient Instructions:   Current Discharge Medication List        CONTINUE these medications which have NOT CHANGED    Details   amLODIPine (NORVASC) 5 MG tablet Take 5 mg by mouth 2 (two) times daily.      losartan (COZAAR) 50 MG tablet TAKE 1 TABLET TWICE DAILY  Qty: 180 tablet, Refills: 2    Comments: .  Associated Diagnoses: Essential hypertension      acetaminophen (TYLENOL ARTHRITIS ORAL) Take 2 capsules by mouth 2 (two) times a day.      albuterol (VENTOLIN HFA) 90 mcg/actuation inhaler Inhale 2 puffs into the lungs every 6 (six) hours as needed for Wheezing. Rescue  Qty: 18 g, Refills: 0    Associated Diagnoses: Wheezing      alfuzosin (UROXATRAL) 10 mg Tb24 Take 1 tablet (10 mg total) by mouth daily with breakfast.  Qty: 90 tablet, Refills: 3      atorvastatin (LIPITOR) 20 MG tablet Take 1 tablet (20 mg total) by mouth once daily.  Qty: 90 tablet, Refills: 3    Associated Diagnoses: Mixed hyperlipidemia      azelastine (ASTELIN) 137 mcg (0.1 %) nasal spray 1 spray (137 mcg total) by Nasal route 2 (two) times daily.  Qty: 90 mL, Refills: 3    Associated Diagnoses: Allergic rhinitis, unspecified seasonality, unspecified trigger      bumetanide (BUMEX) 1 MG tablet Take 1 tablet (1 mg total) by mouth once daily. For leg swelling ( to replace furosemide)  Qty: 30 tablet, Refills: 11    Associated Diagnoses: Edema, unspecified type; Atherosclerosis of artery of both lower extremities      citalopram (CELEXA) 10 MG tablet TAKE 1 TABLET ONE TIME DAILY FOR ANXIETY  Qty: 90 tablet, Refills: 3    Associated Diagnoses: OMARI (generalized anxiety disorder)      clopidogreL  (PLAVIX) 75 mg tablet TAKE 1 TABLET EVERY DAY  Qty: 90 tablet, Refills: 2      finasteride (PROSCAR) 5 mg tablet Take 1 tablet (5 mg total) by mouth once daily.  Qty: 90 tablet, Refills: 4      pantoprazole (PROTONIX) 40 MG tablet TAKE 1 TABLET EVERY DAY  Qty: 90 tablet, Refills: 3      polyethylene glycol (GLYCOLAX) 17 gram/dose powder Take 17 g by mouth once daily.      potassium chloride SA (K-DUR,KLOR-CON) 20 MEQ tablet Take 1 tablet (20 mEq total) by mouth once daily.  Qty: 90 tablet, Refills: 3      pregabalin (LYRICA) 75 MG capsule Take 1 capsule (75 mg total) by mouth 2 (two) times daily.  Qty: 180 capsule, Refills: 1      sildenafiL (VIAGRA) 100 MG tablet Take 1 po 45 minutes before intercourse  Qty: 10 tablet, Refills: 7      tolterodine (DETROL LA) 2 MG Cp24 Take 1 capsule (2 mg total) by mouth once daily.  Qty: 90 capsule, Refills: 4      vitamin D (VITAMIN D3) 1000 units Tab Take 1,000 Units by mouth once daily.                 Discharge Diagnosis: Lumbar radiculopathy [M54.16]  Condition on Discharge: Stable with no complications to procedure   Diet on Discharge: Same as before.  Activity: as per instruction sheet.  Discharge to: Home with a responsible adult.  Follow up: 2-4 weeks       Please call the office at [phone] if you experience any weakness or loss of sensation, fever > 101.5, pain uncontrolled with oral medications, persistent nausea/vomiting/or diarrhea, redness or drainage from the incisions, or any other worrisome concerns. If physician on call was not reached or could not communicate with our office for any reason please go to the nearest emergency department

## 2025-04-01 NOTE — OP NOTE
Lumbar Interlaminar Epidural Steroid Injection under Fluoroscopic Guidance.   Time-out taken to identify patient and procedure prior to starting the procedure.     04/01/2025    PROCEDURE: Interlaminar epidural steroid injection under fluoroscopic guidance.     Pre-Op diagnosis: Lumbar radiculopathy [M54.16]    Post-Op diagnosis: Lumbar radiculopathy [M54.16]    PHYSICIAN: Abel Haywood MD    ASSISTANTS: None     SEDATION: Conscious sedation provided by M.D    The patient was monitored with continuous pulse oximetry, EKG, and intermittent blood pressure monitors, immediately prior to administration of sedation.  The patient was hemodynamically stable throughout the entire process was responsive to voice, and breathing spontaneously.  Supplemental O2 was provided at 2L/min via nasal cannula.  Patient was comfortable for the duration of the procedure.     There was a total of 2mg IV Midazolam and 25mcg Fentanyl titrated for the procedure    Total sedation time was >10minutes and <20minutes      ESTIMATED BLOOD LOSS: none.     COMPLICATIONS: none.     SPECIMENS: none    TECHNIQUE: With the patient laying in a prone position, the area was prepped and draped in the usual sterile fashion using ChloraPrep and a fenestrated drape. 1% lidocaine was given using a 27-gauge needle by raising a wheal and going down to the hub of the needle over the L5/S1 interlaminar space.  The interlaminar space was then approached with a 3.5 inch 18-gauge Touhy needle was introduced under fluoroscopic guidance in the AP and Lateral view. Once the Ligamentum flavum was encountered loss of resistance to saline was used to enter the epidural space. With positive loss of resistance and negative CSF or Blood, 3mL contrast dye Omnipaque (300mg/ml) was injected to confirm placement and there was no vascular runoff. Then 1ml 80mg/ml Depomedrol + 1mL 0.25% Bupivicaine + 8mL preservative free normal saline was injected slowly. Displacement of the  radio opaque contrast after injection of the medication confirmed that the medication went into the area of the epidural space.  The patient tolerated the procedure well.       The patient was monitored after the procedure.   They were given post-procedure and discharge instructions to follow at home.  The patient was discharged in a stable condition.

## 2025-04-01 NOTE — DISCHARGE INSTRUCTIONS

## 2025-04-01 NOTE — H&P
HPI  Patient presenting for Procedure(s) (LRB):  L5/S1 IL IAN hold plavix 5 days (N/A)       No health changes since previous encounter    Past Medical History:   Diagnosis Date    Allergic rhinitis     Anemia     Back pain     BPH (benign prostatic hyperplasia)     Cancer     skin cancer to neck, Dr. Graves    Cataract     Coronary artery disease involving native coronary artery of native heart without angina pectoris 3/26/2025    Disorder of kidney and ureter     ED (erectile dysfunction)     OMAIR (generalized anxiety disorder) 08/07/2023    Hiatal hernia     small    History of colon polyps     colonoscopy 11/2016    HLD (hyperlipidemia)     Hyperlipidemia     Hypertension     Lateral epicondylitis     Lumbar radiculopathy 01/26/2022    OA (osteoarthritis)     GUIDO (obstructive sleep apnea)     Pneumonia of right middle lobe due to infectious organism 11/18/2024    Polyneuropathy     Prostate cancer     Dr. Wong    TIA (transient ischemic attack)     Trouble in sleeping     Urge incontinence 03/29/2022     Past Surgical History:   Procedure Laterality Date    ANGIOGRAPHY OF UPPER EXTREMITY Left 07/05/2022    Procedure: Angiogram Extremity Bilateral;  Surgeon: Aparna Thompson MD;  Location: [facility] CATH LAB;  Service: Cardiology;  Laterality: Left;    ARTERIOGRAPHY OF AORTIC ROOT N/A 07/05/2022    Procedure: ARTERIOGRAM, AORTIC ROOT;  Surgeon: Aparna Thompson MD;  Location: [facility] CATH LAB;  Service: Cardiology;  Laterality: N/A;    BLOCK, NERVE, PERIPHERAL Right 9/12/2024    Procedure: right femoral/ obturator nerve block- with steroids;  Surgeon: Abel Haywood MD;  Location: [facility] PAIN MGT;  Service: Pain Management;  Laterality: Right;    CATARACT EXTRACTION W/  INTRAOCULAR LENS IMPLANT Right 02/21/2018    I-Stent    CATARACT EXTRACTION W/  INTRAOCULAR LENS IMPLANT Left 03/21/2018    I - Stent    CATHETERIZATION OF BOTH LEFT AND RIGHT HEART N/A 07/05/2022    Procedure: CATHETERIZATION, HEART, BOTH LEFT  AND RIGHT;  Surgeon: Aparna Thompson MD;  Location: [facility] CATH LAB;  Service: Cardiology;  Laterality: N/A;  radial approach    COLONOSCOPY N/A 11/14/2016    Procedure: COLONOSCOPY;  Surgeon: Karuna Rodriguez MD;  Location: [facility] ENDO;  Service: Endoscopy;  Laterality: N/A;    COLONOSCOPY N/A 09/22/2020    Procedure: COLONOSCOPY;  Surgeon: Chuy Fish MD;  Location: [facility] ENDO;  Service: Endoscopy;  Laterality: N/A;    EPIDURAL STEROID INJECTION N/A 6/6/2024    Procedure: Lumbar L5/S1 IL IAN;  Surgeon: Abel Haywood MD;  [facility];  Service: Pain Management;  Laterality: N/A;    EPIDURAL STEROID INJECTION INTO CERVICAL SPINE N/A 2/8/2024    Procedure: C5/6 IL Right paramedian approach IAN with RN IV sedation;  Surgeon: Abel Haywood MD;  Location: [facility] PAIN MGT;  Service: Pain Management;  Laterality: N/A;    EYE SURGERY      HEMORRHOID SURGERY      HIP ARTHROPLASTY Right 11/13/2024    Procedure: ARTHROPLASTY, HIP;  Surgeon: Denton Encarnacion MD;  Location: [facility] OR;  Service: Orthopedics;  Laterality: Right;    I-STENT Right 02/21/2018    DR. REECE    INJECTION OF ANESTHETIC AGENT AROUND MEDIAL BRANCH NERVES INNERVATING CERVICAL FACET JOINT Bilateral 7/18/2023    Procedure: Bilateral C4-6 MBB with RN IV sedation (diagnostic, #1);  Surgeon: Abel Haywood MD;  Location: [facility] PAIN MGT;  Service: Pain Management;  Laterality: Bilateral;    KNEE ARTHROSCOPY W/ MENISCAL REPAIR Right 2015    Dr. Jain    PCIOL Right 02/21/2018    DR. REECE    PLANTAR FASCIA RELEASE      right    ROTATOR CUFF REPAIR Bilateral     bilateral    SELECTIVE INJECTION OF ANESTHETIC AGENT AROUND LUMBAR SPINAL NERVE ROOT BY TRANSFORAMINAL APPROACH Bilateral 01/26/2022    Procedure: Bilateral L4/5 TF IAN with RN IV sedation;  Surgeon: Mak Young MD;  Location: [facility] PAIN MGT;  Service: Pain Management;  Laterality: Bilateral;    SELECTIVE INJECTION OF ANESTHETIC AGENT AROUND LUMBAR SPINAL NERVE ROOT BY  TRANSFORAMINAL APPROACH Bilateral 03/14/2022    Procedure: Bilateral L4/5 TF IAN with RN IV sedation;  Surgeon: Mak Young MD;  Location: [facility] PAIN MGT;  Service: Pain Management;  Laterality: Bilateral;    SELECTIVE INJECTION OF ANESTHETIC AGENT AROUND LUMBAR SPINAL NERVE ROOT BY TRANSFORAMINAL APPROACH Bilateral 06/02/2022    Procedure: Bilateral L4/5 TF IAN - D2P Dr. [facility];  Surgeon: Abel Haywood MD;  Location: [facility] PAIN MGT;  Service: Pain Management;  Laterality: Bilateral;    SELECTIVE INJECTION OF ANESTHETIC AGENT AROUND LUMBAR SPINAL NERVE ROOT BY TRANSFORAMINAL APPROACH Bilateral 08/25/2022    Procedure: Bilateral L4/5 TF IAN;  Surgeon: Abel Haywood MD;  Location: [facility] PAIN MGT;  Service: Pain Management;  Laterality: Bilateral;    SELECTIVE INJECTION OF ANESTHETIC AGENT AROUND LUMBAR SPINAL NERVE ROOT BY TRANSFORAMINAL APPROACH Bilateral 6/29/2023    Procedure: Bilateral L4/5 TF IAN RN IV Sedation;  Surgeon: Abel Haywood MD;  Location: [facility] PAIN MGT;  Service: Pain Management;  Laterality: Bilateral;    SELECTIVE INJECTION OF ANESTHETIC AGENT AROUND LUMBAR SPINAL NERVE ROOT BY TRANSFORAMINAL APPROACH Bilateral 4/11/2024    Procedure: Bilateral L4/5 TF IAN;  Surgeon: Abel Haywood MD;  Location: [facility] PAIN MGT;  Service: Pain Management;  Laterality: Bilateral;    SHOULDER SURGERY Bilateral around 2000    Dr. Pepper.  rotator cuff surgeries    VASECTOMY       Review of patient's allergies indicates:   Allergen Reactions    Atarax [hydroxyzine hcl] Other (See Comments)     Raised blood pressure     Zyrtec [cetirizine] Other (See Comments)     Raised blood pressure     Gold sodium thiomalate     Gold sodium thiosulfate      Patch test positive    Meloxicam Rash     Other reaction(s): hypertension        Medications Ordered Prior to Encounter[1]     PMHx, PSHx, Allergies, Medications reviewed in epic    ROS negative except pain complaints in HPI    OBJECTIVE:    BP (!) 155/68 (BP  "Location: Right arm, Patient Position: Sitting)   Resp 15   Ht 5' 10" (1.778 m)   Wt 95.6 kg (210 lb 12.2 oz)   SpO2 100%   BMI 30.24 kg/m²     PHYSICAL EXAMINATION:    GENERAL: Well appearing, in no acute distress, alert and oriented x3.  PSYCH:  Mood and affect appropriate.  SKIN: Skin color, texture, turgor normal, no rashes or lesions which will impact the procedure.  CV: RRR with palpation of the radial artery.  PULM: No evidence of respiratory difficulty, symmetric chest rise. Clear to auscultation.  NEURO: Cranial nerves grossly intact.    Plan:    Proceed with procedure as planned Procedure(s) (LRB):  L5/S1 IL IAN hold plavix 5 days (N/A)    Abel Haywood MD  04/01/2025                 [1]   No current facility-administered medications on file prior to encounter.     Current Outpatient Medications on File Prior to Encounter   Medication Sig Dispense Refill    amLODIPine (NORVASC) 5 MG tablet Take 5 mg by mouth 2 (two) times daily.      losartan (COZAAR) 50 MG tablet TAKE 1 TABLET TWICE DAILY 180 tablet 2    acetaminophen (TYLENOL ARTHRITIS ORAL) Take 2 capsules by mouth 2 (two) times a day.      albuterol (VENTOLIN HFA) 90 mcg/actuation inhaler Inhale 2 puffs into the lungs every 6 (six) hours as needed for Wheezing. Rescue 18 g 0    alfuzosin (UROXATRAL) 10 mg Tb24 Take 1 tablet (10 mg total) by mouth daily with breakfast. 90 tablet 3    atorvastatin (LIPITOR) 20 MG tablet Take 1 tablet (20 mg total) by mouth once daily. 90 tablet 3    azelastine (ASTELIN) 137 mcg (0.1 %) nasal spray 1 spray (137 mcg total) by Nasal route 2 (two) times daily. 90 mL 3    bumetanide (BUMEX) 1 MG tablet Take 1 tablet (1 mg total) by mouth once daily. For leg swelling ( to replace furosemide) (Patient taking differently: Take 0.5 mg by mouth once daily. For leg swelling ( to replace furosemide)) 30 tablet 11    citalopram (CELEXA) 10 MG tablet TAKE 1 TABLET ONE TIME DAILY FOR ANXIETY 90 tablet 3    clopidogreL (PLAVIX) " 75 mg tablet TAKE 1 TABLET EVERY DAY 90 tablet 2    polyethylene glycol (GLYCOLAX) 17 gram/dose powder Take 17 g by mouth once daily.      potassium chloride SA (K-DUR,KLOR-CON) 20 MEQ tablet Take 1 tablet (20 mEq total) by mouth once daily. 90 tablet 3    pregabalin (LYRICA) 75 MG capsule Take 1 capsule (75 mg total) by mouth 2 (two) times daily. 180 capsule 1    vitamin D (VITAMIN D3) 1000 units Tab Take 1,000 Units by mouth once daily.

## 2025-04-03 ENCOUNTER — OFFICE VISIT (OUTPATIENT)
Dept: HEMATOLOGY/ONCOLOGY | Facility: CLINIC | Age: 85
End: 2025-04-03
Payer: MEDICARE

## 2025-04-03 VITALS
OXYGEN SATURATION: 98 % | DIASTOLIC BLOOD PRESSURE: 53 MMHG | SYSTOLIC BLOOD PRESSURE: 129 MMHG | WEIGHT: 213.63 LBS | HEART RATE: 53 BPM | BODY MASS INDEX: 30.58 KG/M2 | TEMPERATURE: 98 F | HEIGHT: 70 IN

## 2025-04-03 DIAGNOSIS — D64.9 ANEMIA, UNSPECIFIED TYPE: ICD-10-CM

## 2025-04-03 DIAGNOSIS — D47.2 MGUS (MONOCLONAL GAMMOPATHY OF UNKNOWN SIGNIFICANCE): ICD-10-CM

## 2025-04-03 DIAGNOSIS — D63.1 ANEMIA IN STAGE 3B CHRONIC KIDNEY DISEASE: ICD-10-CM

## 2025-04-03 DIAGNOSIS — D72.19 OTHER EOSINOPHILIA: ICD-10-CM

## 2025-04-03 DIAGNOSIS — D69.6 THROMBOCYTOPENIA: Primary | ICD-10-CM

## 2025-04-03 DIAGNOSIS — C61 PROSTATE CANCER: ICD-10-CM

## 2025-04-03 DIAGNOSIS — N18.32 ANEMIA IN STAGE 3B CHRONIC KIDNEY DISEASE: ICD-10-CM

## 2025-04-03 PROCEDURE — 99999 PR PBB SHADOW E&M-EST. PATIENT-LVL IV: CPT | Mod: PBBFAC,HCNC,,

## 2025-04-03 PROCEDURE — G2211 COMPLEX E/M VISIT ADD ON: HCPCS | Mod: HCNC,S$GLB,,

## 2025-04-03 PROCEDURE — 3288F FALL RISK ASSESSMENT DOCD: CPT | Mod: CPTII,HCNC,S$GLB,

## 2025-04-03 PROCEDURE — 99215 OFFICE O/P EST HI 40 MIN: CPT | Mod: HCNC,S$GLB,,

## 2025-04-03 PROCEDURE — 1126F AMNT PAIN NOTED NONE PRSNT: CPT | Mod: CPTII,HCNC,S$GLB,

## 2025-04-03 PROCEDURE — 1101F PT FALLS ASSESS-DOCD LE1/YR: CPT | Mod: CPTII,HCNC,S$GLB,

## 2025-04-08 ENCOUNTER — TELEPHONE (OUTPATIENT)
Dept: SPORTS MEDICINE | Facility: CLINIC | Age: 85
End: 2025-04-08
Payer: MEDICARE

## 2025-04-08 NOTE — TELEPHONE ENCOUNTER
----- Message from Tech Raysa sent at 4/1/2025 11:42 AM CDT -----  Please reach out and schedule them for an injection within next two weeks  ----- Message -----  From: Abel Haywood MD  Sent: 4/1/2025  10:06 AM CDT  To: Douglas Magana    Good morning, This patient is interested a follow up appt for repeat carpal tunnel injection as it gave him tremendous relief (last in 2022). Thank you, Abel Haywood MDInterventional Pain MedicineOchsner - [place]

## 2025-04-10 ENCOUNTER — OFFICE VISIT (OUTPATIENT)
Dept: DERMATOLOGY | Facility: CLINIC | Age: 85
End: 2025-04-10
Payer: MEDICARE

## 2025-04-10 DIAGNOSIS — L72.0 EPIDERMAL INCLUSION CYST: ICD-10-CM

## 2025-04-10 DIAGNOSIS — D18.00 HEMANGIOMA, UNSPECIFIED SITE: ICD-10-CM

## 2025-04-10 DIAGNOSIS — L57.0 ACTINIC KERATOSES: ICD-10-CM

## 2025-04-10 DIAGNOSIS — L81.4 SOLAR LENTIGO: ICD-10-CM

## 2025-04-10 DIAGNOSIS — Z85.828 HISTORY OF NONMELANOMA SKIN CANCER: Primary | ICD-10-CM

## 2025-04-10 DIAGNOSIS — L82.1 SEBORRHEIC KERATOSES: ICD-10-CM

## 2025-04-10 PROCEDURE — 99999 PR PBB SHADOW E&M-EST. PATIENT-LVL III: CPT | Mod: PBBFAC,HCNC,, | Performed by: STUDENT IN AN ORGANIZED HEALTH CARE EDUCATION/TRAINING PROGRAM

## 2025-04-10 NOTE — PROGRESS NOTES
Patient Information  Name: Ezequiel Hughes  : 1940  MRN: [id number]     Referring Physician:  Dr. Rincon   Primary Care Physician:  Valery Perez MD   Date of Visit: 04/10/2025      Subjective:       Ezequiel Hughes is a 85 y.o. male who presents for   Chief Complaint   Patient presents with    Follow-up     HPI  Patient here for skin check.     Does patient have a personal hx of skin cancers? no  Does patient have family hx of melanoma?  no  Does patient have hx of strong sun exposure or tanning bed use in the past? yes    Patient was last seen:Visit date not found     Prior notes by myself reviewed.   Clinical documentation obtained by nursing staff reviewed.    Review of Systems   Skin:  Negative for itching and rash.        Objective:    Physical Exam   Constitutional: He appears well-developed and well-nourished. No distress.   Neurological: He is alert and oriented to person, place, and time. He is not disoriented.   Psychiatric: He has a normal mood and affect.   Skin:   Areas Examined (abnormalities noted in diagram):   Scalp / Hair Palpated and Inspected  Head / Face Inspection Performed  Neck Inspection Performed  Chest / Axilla Inspection Performed  Abdomen Inspection Performed  Back Inspection Performed  RUE Inspected  LUE Inspection Performed                   Diagram Legend     Erythematous scaling macule/papule c/w actinic keratosis       Vascular papule c/w angioma      Pigmented verrucoid papule/plaque c/w seborrheic keratosis      Yellow umbilicated papule c/w sebaceous hyperplasia      Irregularly shaped tan macule c/w lentigo     1-2 mm smooth white papules consistent with Milia      Movable subcutaneous cyst with punctum c/w epidermal inclusion cyst      Subcutaneous movable cyst c/w pilar cyst      Firm pink to brown papule c/w dermatofibroma      Pedunculated fleshy papule(s) c/w skin tag(s)      Evenly pigmented macule c/w junctional nevus     Mildly variegated pigmented, slightly  irregular-bordered macule c/w mildly atypical nevus      Flesh colored to evenly pigmented papule c/w intradermal nevus       Pink pearly papule/plaque c/w basal cell carcinoma      Erythematous hyperkeratotic cursted plaque c/w SCC      Surgical scar with no sign of skin cancer recurrence      Open and closed comedones      Inflammatory papules and pustules      Verrucoid papule consistent consistent with wart     Erythematous eczematous patches and plaques     Dystrophic onycholytic nail with subungual debris c/w onychomycosis     Umbilicated papule    Erythematous-base heme-crusted tan verrucoid plaque consistent with inflamed seborrheic keratosis     Erythematous Silvery Scaling Plaque c/w Psoriasis     See annotation      No images are attached to the encounter or orders placed in the encounter.    [] Data reviewed  [] Independent review of test  [] Management discussed with another provider    Assessment / Plan:        Actinic keratoses  Cryosurgery Procedure Note    Verbal consent from the patient is obtained including, but not limited to, risk of hypopigmentation/hyperpigmentation, scar, recurrence of lesion. The patient is aware of the precancerous quality and need for treatment of these lesions. Liquid nitrogen cryosurgery is applied to the 22 actinic keratoses, as detailed in the physical exam, to produce a freeze injury. The patient is aware that blisters may form and is instructed on wound care with gentle cleansing and use of vaseline ointment to keep moist until healed. The patient is supplied a handout on cryosurgery and is instructed to call if lesions do not completely resolve.    Seborrheic keratoses  These are benign inherited growths without a malignant potential. Reassurance given to patient. No treatment is necessary.     Hemangioma, unspecified site  This is a benign vascular lesion. Reassurance given. No treatment required.     History of nonmelanoma skin cancer  Area(s) of previous NMSC  evaluated with no signs of recurrence.    Upper body skin examination performed today including at least 6 points as noted in physical examination. No lesions suspicious for malignancy noted.    Recommend daily sun protection/avoidance and use of at least SPF 30, broad spectrum sunscreen (OTC drug).     Solar lentigo  This is a benign hyperpigmented sun induced lesion. Recommend daily sun protection/avoidance and use of at least SPF 30, broad spectrum sunscreen (OTC drug) will reduce the number of new lesions. Treatment of these benign lesions are considered cosmetic.    Epidermal inclusion cyst  Patient has been informed of the diagnosis, the planned procedure, the risks, benefits, and alternatives associated with the procedure. All questions were answered. Patient would like to proceed with scheduling for surgery.               LOS NUMBER AND COMPLEXITY OF PROBLEMS    COMPLEXITY OF DATA RISK TOTAL TIME (m)   77855  32006 [] 1 self-limited or minor problem [x] Minimal to none [] No treatment recommended or patient to monitor 15-29  10-19   27144  55375 Low  [] 2 or > self limited or minor problems  [] 1 stable chronic illness  [] 1 acute, uncomplicated illness or injury Limited (2)  [] Prior external notes from each unique source  [] Review result of each unique test  [] Order each unique test [x]  Low  OTC medications, minor skin biopsy 30-44  20-29   71270  00399 Moderate  []  1 or > chronic illness with progression, exacerbation or SE of treatment  [x]  2 or more stable chronic illnesses  []  1 acute illness with systemic symptoms  []  1 acute complicated injury  []  1 undiagnosed new problem with uncertain prognosis Moderate (1/3 below)  []  3 or more data items        *Now includes assessment requiring independent historian  []  Independent interpretation of a test  []  Discuss management/test with another provider Moderate  []  Prescription drug mgmt  []  Minor surgery with risk discussed  []  Mgmt limited by  social determinates 45-59  30-39   48523  28575 High  []  1 or more chronic illness with severe exacerbation, progression or SE of treatment  []  1 acute or chronic illness/injury that poses a threat to life or bodily function Extensive (2/3 below)  []  3 or more data items        *Now includes assessment requiring independent historian.  []  Independent interpretation of a test  []  Discuss management/test with another provider High  []  Major surgery with risk discussed  []  Drug therapy requiring intensive monitoring for toxicity  []  Hospitalization  []  Decision for DNR 60-74  40-54      No follow-ups on file.    Deb Burgos MD, FAAD  [facility]

## 2025-04-10 NOTE — PATIENT INSTRUCTIONS

## 2025-04-21 ENCOUNTER — OFFICE VISIT (OUTPATIENT)
Dept: SPORTS MEDICINE | Facility: CLINIC | Age: 85
End: 2025-04-21
Payer: MEDICARE

## 2025-04-21 DIAGNOSIS — G56.02 CARPAL TUNNEL SYNDROME OF LEFT WRIST: Primary | ICD-10-CM

## 2025-04-21 PROCEDURE — 99999 PR PBB SHADOW E&M-EST. PATIENT-LVL III: CPT | Mod: PBBFAC,HCNC,, | Performed by: STUDENT IN AN ORGANIZED HEALTH CARE EDUCATION/TRAINING PROGRAM

## 2025-04-21 RX ORDER — BETAMETHASONE SODIUM PHOSPHATE AND BETAMETHASONE ACETATE 3; 3 MG/ML; MG/ML
3 INJECTION, SUSPENSION INTRA-ARTICULAR; INTRALESIONAL; INTRAMUSCULAR; SOFT TISSUE
Status: DISCONTINUED | OUTPATIENT
Start: 2025-04-21 | End: 2025-04-21 | Stop reason: HOSPADM

## 2025-04-21 RX ADMIN — BETAMETHASONE SODIUM PHOSPHATE AND BETAMETHASONE ACETATE 3 MG: 3; 3 INJECTION, SUSPENSION INTRA-ARTICULAR; INTRALESIONAL; INTRAMUSCULAR; SOFT TISSUE at 10:04

## 2025-04-21 NOTE — PROGRESS NOTES
Patient ID: Ezequiel Hughes  YOB: 1940  MRN: [id number]    Chief Complaint: Numbness of the Left Hand    History of Present Illness:     History of Present Illness    CHIEF COMPLAINT:  - Left hand numbness affecting the thumb and index finger.    HPI:  Ezequiel presents for evaluation of numbness in the left hand, specifically in the last part of the index and middle fingers, which is described as extremely aggravating. The numbness is bothersome but not painful and only affects the left hand. He is right-handed.    A few years ago, he saw Dr. Cole for carpal tunnel syndrome and received an injection  (3 years), as well as trigger thumb and received injection in the thumb (5 years ago), which provided significant relief. The numbness has since returned, and he hopes for another injection to alleviate symptoms.    He denies any recent injury, fall, or changes in health status. The thumb locking has resolved, clicking has resolved, pain in the affected area has resolved, and thumb involvement has resolved. He also denies any new allergies or significant medication changes.    PREVIOUS TREATMENTS:  - Injection for carpal tunnel in the left wrist: 3 years ago, provided significant benefit  - Injection for trigger thumb in the left thumb: 5 years ago, provided significant benefit      ROS:  ROS as indicated in HPI.          Past Medical History:   Past Medical History:   Diagnosis Date    Allergic rhinitis     Anemia     Back pain     BPH (benign prostatic hyperplasia)     Cancer     skin cancer to neck, Dr. Graves    Cataract     Coronary artery disease involving native coronary artery of native heart without angina pectoris 3/26/2025    Disorder of kidney and ureter     ED (erectile dysfunction)     OMARI (generalized anxiety disorder) 08/07/2023    Hiatal hernia     small    History of colon polyps     colonoscopy 11/2016    HLD (hyperlipidemia)     Hyperlipidemia     Hypertension     Lateral  epicondylitis     Lumbar radiculopathy 01/26/2022    OA (osteoarthritis)     GUIDO (obstructive sleep apnea)     Pneumonia of right middle lobe due to infectious organism 11/18/2024    Polyneuropathy     Prostate cancer     Dr. Wong    TIA (transient ischemic attack)     Trouble in sleeping     Urge incontinence 03/29/2022     Past Surgical History:   Procedure Laterality Date    ANGIOGRAPHY OF UPPER EXTREMITY Left 07/05/2022    Procedure: Angiogram Extremity Bilateral;  Surgeon: Aparna Thompson MD;  Location: [facility] CATH LAB;  Service: Cardiology;  Laterality: Left;    ARTERIOGRAPHY OF AORTIC ROOT N/A 07/05/2022    Procedure: ARTERIOGRAM, AORTIC ROOT;  Surgeon: Aparna Thompson MD;  Location: [facility] CATH LAB;  Service: Cardiology;  Laterality: N/A;    BLOCK, NERVE, PERIPHERAL Right 9/12/2024    Procedure: right femoral/ obturator nerve block- with steroids;  Surgeon: Abel Haywood MD;  Location: [facility] PAIN MGT;  Service: Pain Management;  Laterality: Right;    CATARACT EXTRACTION W/  INTRAOCULAR LENS IMPLANT Right 02/21/2018    I-Stent    CATARACT EXTRACTION W/  INTRAOCULAR LENS IMPLANT Left 03/21/2018    I - Stent    CATHETERIZATION OF BOTH LEFT AND RIGHT HEART N/A 07/05/2022    Procedure: CATHETERIZATION, HEART, BOTH LEFT AND RIGHT;  Surgeon: Aparna Thompson MD;  Location: [facility] CATH LAB;  Service: Cardiology;  Laterality: N/A;  radial approach    COLONOSCOPY N/A 11/14/2016    Procedure: COLONOSCOPY;  Surgeon: Karuna Rodriguez MD;  Location: [facility] ENDO;  Service: Endoscopy;  Laterality: N/A;    COLONOSCOPY N/A 09/22/2020    Procedure: COLONOSCOPY;  Surgeon: Chuy Fish MD;  Location: [facility] ENDO;  Service: Endoscopy;  Laterality: N/A;    EPIDURAL STEROID INJECTION N/A 6/6/2024    Procedure: Lumbar L5/S1 IL IAN;  Surgeon: Abel Haywood MD;  Location: [facility] PAIN MGT;  Service: Pain Management;  Laterality: N/A;    EPIDURAL STEROID INJECTION INTO CERVICAL SPINE N/A 2/8/2024    Procedure: C5/6 IL Right  paramedian approach IAN with RN IV sedation;  Surgeon: Abel Haywood MD;  Location: [facility] PAIN MGT;  Service: Pain Management;  Laterality: N/A;    EPIDURAL STEROID INJECTION INTO LUMBAR SPINE N/A 4/1/2025    Procedure: L5/S1 IL IAN hold plavix 5 days;  Surgeon: Abel Haywood MD;  Location: [facility] PAIN MGT;  Service: Pain Management;  Laterality: N/A;    EYE SURGERY      HEMORRHOID SURGERY      HIP ARTHROPLASTY Right 11/13/2024    Procedure: ARTHROPLASTY, HIP;  Surgeon: Denton Encarnacion MD;  Location: [facility] OR;  Service: Orthopedics;  Laterality: Right;    I-STENT Right 02/21/2018    DR. REECE    INJECTION OF ANESTHETIC AGENT AROUND MEDIAL BRANCH NERVES INNERVATING CERVICAL FACET JOINT Bilateral 7/18/2023    Procedure: Bilateral C4-6 MBB with RN IV sedation (diagnostic, #1);  Surgeon: Abel Haywood MD;  Location: [facility] PAIN MGT;  Service: Pain Management;  Laterality: Bilateral;    KNEE ARTHROSCOPY W/ MENISCAL REPAIR Right 2015    Dr. Jain    PCIOL Right 02/21/2018    DR. REECE    PLANTAR FASCIA RELEASE      right    ROTATOR CUFF REPAIR Bilateral     bilateral    SELECTIVE INJECTION OF ANESTHETIC AGENT AROUND LUMBAR SPINAL NERVE ROOT BY TRANSFORAMINAL APPROACH Bilateral 01/26/2022    Procedure: Bilateral L4/5 TF IAN with RN IV sedation;  Surgeon: Mak Young MD;  Location: [facility] PAIN MGT;  Service: Pain Management;  Laterality: Bilateral;    SELECTIVE INJECTION OF ANESTHETIC AGENT AROUND LUMBAR SPINAL NERVE ROOT BY TRANSFORAMINAL APPROACH Bilateral 03/14/2022    Procedure: Bilateral L4/5 TF IAN with RN IV sedation;  Surgeon: Mak Young MD;  Location: [facility] PAIN MGT;  Service: Pain Management;  Laterality: Bilateral;    SELECTIVE INJECTION OF ANESTHETIC AGENT AROUND LUMBAR SPINAL NERVE ROOT BY TRANSFORAMINAL APPROACH Bilateral 06/02/2022    Procedure: Bilateral L4/5 TF IAN - D2P Dr. Castro [facility];  Surgeon: Abel Haywood MD;  Location: [facility] PAIN MGT;  Service: Pain Management;  Laterality:  Bilateral;    SELECTIVE INJECTION OF ANESTHETIC AGENT AROUND LUMBAR SPINAL NERVE ROOT BY TRANSFORAMINAL APPROACH Bilateral 08/25/2022    Procedure: Bilateral L4/5 TF IAN;  Surgeon: Abel Haywood MD;  Location: [facility] PAIN MGT;  Service: Pain Management;  Laterality: Bilateral;    SELECTIVE INJECTION OF ANESTHETIC AGENT AROUND LUMBAR SPINAL NERVE ROOT BY TRANSFORAMINAL APPROACH Bilateral 6/29/2023    Procedure: Bilateral L4/5 TF IAN RN IV Sedation;  Surgeon: Abel Haywood MD;  Location: [facility] PAIN MGT;  Service: Pain Management;  Laterality: Bilateral;    SELECTIVE INJECTION OF ANESTHETIC AGENT AROUND LUMBAR SPINAL NERVE ROOT BY TRANSFORAMINAL APPROACH Bilateral 4/11/2024    Procedure: Bilateral L4/5 TF IAN;  Surgeon: Abel Haywood MD;  Location: [facility] PAIN MGT;  Service: Pain Management;  Laterality: Bilateral;    SHOULDER SURGERY Bilateral around 2000    Dr. Pepper.  rotator cuff surgeries    VASECTOMY       Family History   Problem Relation Name Age of Onset    Lung cancer Father Isaiah Hughes         life long smoker    Cancer Father Isaiah Hughes         prostate, lung    Arthritis Mother Emely Hughes     Stroke Sister          TIA    Cataracts Sister      Cancer Brother          prostate    Cataracts Brother      Cataracts Sister      Melanoma Neg Hx      Psoriasis Neg Hx      Lupus Neg Hx      Eczema Neg Hx      Diabetes Neg Hx      Heart disease Neg Hx      Kidney disease Neg Hx      Colon cancer Neg Hx       Social History[1]  Medication List with Changes/Refills   Current Medications    ACETAMINOPHEN (TYLENOL ARTHRITIS ORAL)    Take 2 capsules by mouth 2 (two) times a day.    ALBUTEROL (VENTOLIN HFA) 90 MCG/ACTUATION INHALER    Inhale 2 puffs into the lungs every 6 (six) hours as needed for Wheezing. Rescue    ALFUZOSIN (UROXATRAL) 10 MG TB24    Take 1 tablet (10 mg total) by mouth daily with breakfast.    AMLODIPINE (NORVASC) 5 MG TABLET    Take 5 mg by mouth 2 (two) times daily.    ATORVASTATIN  (LIPITOR) 20 MG TABLET    Take 1 tablet (20 mg total) by mouth once daily.    AZELASTINE (ASTELIN) 137 MCG (0.1 %) NASAL SPRAY    1 spray (137 mcg total) by Nasal route 2 (two) times daily.    BUMETANIDE (BUMEX) 1 MG TABLET    Take 1 tablet (1 mg total) by mouth once daily. For leg swelling ( to replace furosemide)    CITALOPRAM (CELEXA) 10 MG TABLET    TAKE 1 TABLET ONE TIME DAILY FOR ANXIETY    CLOPIDOGREL (PLAVIX) 75 MG TABLET    TAKE 1 TABLET EVERY DAY    FINASTERIDE (PROSCAR) 5 MG TABLET    Take 1 tablet (5 mg total) by mouth once daily.    LOSARTAN (COZAAR) 50 MG TABLET    TAKE 1 TABLET TWICE DAILY    PANTOPRAZOLE (PROTONIX) 40 MG TABLET    TAKE 1 TABLET EVERY DAY    POLYETHYLENE GLYCOL (GLYCOLAX) 17 GRAM/DOSE POWDER    Take 17 g by mouth once daily.    POTASSIUM CHLORIDE SA (K-DUR,KLOR-CON) 20 MEQ TABLET    Take 1 tablet (20 mEq total) by mouth once daily.    PREGABALIN (LYRICA) 75 MG CAPSULE    Take 1 capsule (75 mg total) by mouth 2 (two) times daily.    SILDENAFIL (VIAGRA) 100 MG TABLET    Take 1 po 45 minutes before intercourse    TOLTERODINE (DETROL LA) 2 MG CP24    Take 1 capsule (2 mg total) by mouth once daily.    VITAMIN D (VITAMIN D3) 1000 UNITS TAB    Take 1,000 Units by mouth once daily.     Review of patient's allergies indicates:   Allergen Reactions    Atarax [hydroxyzine hcl] Other (See Comments)     Raised blood pressure     Zyrtec [cetirizine] Other (See Comments)     Raised blood pressure     Gold sodium thiomalate     Gold sodium thiosulfate      Patch test positive    Meloxicam Rash     Other reaction(s): hypertension       Physical Exam:   There is no height or weight on file to calculate BMI.    GENERAL: Well appearing, in no acute distress.  HEAD: Normocephalic and atraumatic.  ENT: External ears and nose grossly normal.  EYES: EOMI bilaterally  PULMONARY: Respirations are grossly even and non-labored.  NEURO: Awake, alert, and oriented x 3.  SKIN: No obvious rashes  appreciated.  PSYCH: Mood & affect are appropriate.    Detailed MSK exam:   Left hand  Subjective numbness on the fingertip volar aspect of the thumb and index finger.  Hyperextension at the MP joint noted consistent with decreased mobility the CMC joint of the thumb and arthritis.  Negative Tinel's negative carpal compression at the carpal tunnel.  Negative Tinel's and Wartenberg sign.    Imaging:    No new imaging    Assessment:  Ezequiel Hughes is a 85 y.o. male presents today for numbness and tingling of the left index finger and thumb.  Most consistent with carpal tunnel.  Has had injections in the past for trigger finger CMC arthritis and carpal tunnel and symptoms today most consistent with recurrent carpal tunnel.  Discussed the diagnosis prognosis as well as conservative treatment options moving forward.  He is requesting an injection today.  Please refer to procedure note for the details.  He can follow up with me as needed in the future.    Carpal tunnel syndrome of left wrist  -     Sports Medicine US - Guidance for Needle Placement         A copy of today's visit note has been sent to the referring provider.       Darrian Cole MD    This note was generated with the assistance of ambient listening technology. Verbal consent was obtained by the patient and accompanying visitor(s) for the recording of patient appointment to facilitate this note. I attest to having reviewed and edited the generated note for accuracy, though some syntax or spelling errors may persist. Please contact the author of this note for any clarification.       Disclaimer: This note was prepared using a voice recognition system and is likely to have sound alike errors within the text.          [1]   Social History  Socioeconomic History    Marital status:    Tobacco Use    Smoking status: Former     Current packs/day: 0.00     Average packs/day: 3.0 packs/day for 35.0 years (104.9 ttl pk-yrs)     Types: Cigarettes     Start date:  1960     Quit date: 1985     Years since quittin.7     Passive exposure: Past    Smokeless tobacco: Never   Substance and Sexual Activity    Alcohol use: Yes     Alcohol/week: 3.0 standard drinks of alcohol     Types: 3 Cans of beer per week     Comment: socially    Drug use: No    Sexual activity: Yes     Partners: Female     Birth control/protection: None   Social History Narrative         Social Drivers of Health     Financial Resource Strain: Patient Declined (2024)    Overall Financial Resource Strain (CARDIA)     Difficulty of Paying Living Expenses: Patient declined   Food Insecurity: Patient Declined (2024)    Hunger Vital Sign     Worried About Running Out of Food in the Last Year: Patient declined     Ran Out of Food in the Last Year: Patient declined   Transportation Needs: Patient Declined (2024)    TRANSPORTATION NEEDS     Transportation : Patient declined   Physical Activity: Insufficiently Active (2024)    Exercise Vital Sign     Days of Exercise per Week: 2 days     Minutes of Exercise per Session: 30 min   Stress: Patient Declined (2024)    [employer] - Occupational Stress Questionnaire     Feeling of Stress : Patient declined   Housing Stability: Patient Declined (2024)    Housing Stability Vital Sign     Unable to Pay for Housing in the Last Year: Patient declined     Homeless in the Last Year: Patient declined

## 2025-04-21 NOTE — PROCEDURES
Carpal Tunnel: L carpal tunnel    Date/Time: 4/21/2025 10:00 AM    Performed by: Darrian Cole MD  Authorized by: Darrian Cole MD    Consent Done?:  Yes (Verbal)  Indications:  Pain  Site marked: the procedure site was marked    Timeout: prior to procedure the correct patient, procedure, and site was verified    Prep: patient was prepped and draped in usual sterile fashion      Local anesthesia used?: Yes    Local anesthetic:  Topical anesthetic  Location:  Wrist  Site:  L carpal tunnel  Ultrasonic Guidance for Needle Placement?: Yes    Needle size:  25 G  Approach:  Volar  Medications:  3 mg betamethasone acetate-betamethasone sodium phosphate 6 mg/mL  Patient tolerance:  Patient tolerated the procedure well with no immediate complications     Additional Comments: Ultrasound guidance was used for needle localization. Images were saved and stored for documentation. The appropriate structures were visualized. Dynamic visualization of the needle was continuous throughout the procedures and maintained good position.     We discussed the proper protocols after the injection such as no submerging pools, baths tubs, or hot tubs for 24 hr.  Showering is okay today.  We also discussed that blood sugars can be elevated after an injection and asked patient to properly checked her sugars over the next few days and contact their PCP if there are any concerns.  We discussed red flags such as fevers, chills, red, warm, tender joint at the area of injection to please seek medical care immediately.

## 2025-04-22 NOTE — PROGRESS NOTES
Established Patient Chronic Pain Note (Follow up visit)    Chief Complaint:   No chief complaint on file.        SUBJECTIVE:  Interval History (5/1/2025):  Patient Ezequiel Hughes presents today for follow-up visit.  Patient was last seen on 4/1/2025 L5/S1 IL TRANG with 95% relief. He rates his pain 0/10. States this is the best injection he has had yet.  Hx of Right THR on 11/13/2024 with Dr. Encarnacion. Hip doing much better.   Patient denies night fever/night sweats, urinary incontinence, bowel incontinence, significant weight loss and significant motor weakness.   Patient denies any other complaints or concerns at this time.    Interval History (3/7/2025):  Patient Ezequiel Hughes presents today for follow-up visit.  Patient was last seen on  9/12/2024 right femoral obturator block with 30% relief. He continues to have some right hip pain as well as low back pain which radiates into the lower legs with numbness and pain. Symptoms worse with prolonged walking. He saw his orthopedist who feels it is coming from his back. Symptoms feel similar to when he had an Trang back last June.   Pain today is 2/10 but at times goes up to 6/10.  Patient denies night fever/night sweats, urinary incontinence, bowel incontinence, significant weight loss and significant motor weakness.   Patient denies any other complaints or concerns at this time.    Interval History (8/29/2024):  Patient Ezequiel Hughes presents today for follow-up visit.  Patient is being seen for right low back and right hip pain. He rates his pain a 0/10 currently but states its because its morning. In the evening is when his pain is present and most of the pain is the right hip and into the right groin. He was to start lyrica and compound cream last appt but he just started the lyrica yesterday.   In July he had a right IA hip injection with Dr. Rodas but only got short term relief. He has has lumbar ESIs as well with short term relief.   Patient denies night  fever/night sweats, urinary incontinence, bowel incontinence, significant weight loss and significant motor weakness.   Patient denies any other complaints or concerns at this time.      Interval History (8/2/2024):  Patient Ezequiel Hughes presents today for follow-up visit.  Patient was last seen by orthopedist Dr. Deras for R hip injection 7/18/2024  with 90% relief of his hip pain however he continues to have pain in his right groin that goes into the front of the thigh. He says he has 0/10 pain with sitting but when he walks he has a catching sensation that will make his pain 10/10. He had a lumbar IAN in June which helped with his back pain but he also feels he still has right sided low back pain at times with the right groin pain. He is curious when he could get another injection  He has been out of his lyrica x 1 week, he feels he misplaced it and plans to find it.   He has done PT in the past but has never gotten relief with it.  Patient denies night fever/night sweats, urinary incontinence, bowel incontinence, significant weight loss and significant motor weakness.   Patient denies any other complaints or concerns at this time.      Interval History (7/11/2024):  Patient Ezequiel Hughes presents today for follow-up visit.  Patient was last seen on 6/6/2024 L5/S1 IL IAN with 80% relief sustained. So far this injection has worked best for his back and leg pain.     He has not currently having any pain that radiates down the lower extremities.  His back pain seems to be improved.He does have report that for the most part is kind of difficult to see how his pain is doing because he has been having right hip pain.    performed a nerve conduction study and also ordered bilateral hip x-ray.    Nerve conduction study showed L5 and S1 bilateral nerve involvement.Hip x-ray showed severe joint space narrowing and he has since been referred to ortho and is scheduled for a right hip injection next week.  He is  only having right hip pain that radiates into the right groin at times.  No left hip pain.  Patient denies night fever/night sweats, urinary incontinence, bowel incontinence, significant weight loss and significant motor weakness.   Patient denies any other complaints or concerns at this time.      Interval History (5/13/2024):  Patient Ezequiel Hughes presents today for follow-up visit.  Patient was last seen on 4/11/2024 bilateral L4/5 TF IAN with 80% relief x 1 day.  He continues to have lower back pain which radiates down the side and back of the bilateral lower extremities.  He states the most frustrating part is the numbness that he has in the lower extremities.  Numbness gets worse with prolonged walking and standing.  He is wanting to go back on the Lyrica to see if this can help with his symptoms.  He is also requesting a nerve conduction study just to help figure out where his pain is coming from.  He is seen  in the past to discuss vertiflex and is not interested in surgery at this time.  Last lumbar MRI was 2021.  He rates his pain today a 4/10 but states it will go higher depending on activity.  Patient denies night fever/night sweats, urinary incontinence, bowel incontinence, significant weight loss and significant motor weakness.   Neck pain is currently stable  Patient denies any other complaints or concerns at this time.      Interval History (3/13/2024):  Patient Ezequiel Hughes presents today for follow-up visit.  Patient was last seen on 2/8/2024 for C5/6 IL IAN which he reports provided 80% relief. Neck pain is improved with no current radiculopathy.  He reports he stopped the Lyrica because he did not feel like he needed it.  He does still complain of lower back pain which radiates into the bilateral legs with some numbness.  He has had bilateral L4/5 IAN in the past with great relief and is wanting to repeat these injections.  He rates his pain today as 0/10 and states later in the day the  pain will go up to a 4/10.  He has been seeing the chiropractor and doing home physical therapy exercises to try to get relief.  Patient denies night fever/night sweats, urinary incontinence, bowel incontinence, significant weight loss and significant motor weakness.   Patient denies any other complaints or concerns at this time.        Interval History (1/22/2024):  Patient Ezequiel Hughes presents today for follow-up visit.  Patient was last seen on 8/2/2023. At that time he had a C4-5 MBB which did not provide significant relief. Today his pain is in the base of the neck and radiates to the R shoulder. Pain started a few weeks ago. Pain feels like pins and needles. Today pain is a 5/10 but at it's worst it will be 7/10.   No fall or injury.   He has been to urgent care twice this month for the pain and has received a toradol injection and steroids. He got some temporarily relief from these.  He has chronic lower back pain, has been followed by Dr. Castro. He has been going to the chiropractor  which is providing good relief.  He has been on lyrica in the past but stopped medication when he got relief of radicular symptoms from a previous IAN.    Interval visit 8/2/2023  Ezequiel Hughes is a 85 y.o. male presents today for follow-up chronic neck and lower back pain.  He was last seen for procedure on 07/18/2023 where he underwent bilateral C4-6 diagnostic medial branch blocks that did not provide significant relief unfortunately.  He continues with lower back pain with radicular symptoms into the both lower extremities.  He reports that this tends to occur mostly with ambulatory activities.        Patient denies night fever/night sweats, urinary incontinence, bowel incontinence, significant weight loss, significant motor weakness and loss of sensations.    Pain Disability Index Review:      3/7/2025     9:31 AM 7/11/2024     9:19 AM 1/22/2024     1:06 PM   Last 3 PDI Scores   Pain Disability Index (PDI) 14 35 49      Interval History (6/9/2023):    Ezequiel Hughes presents today for follow-up visit.  Patient was last seen on 09/27/2022. Last injection Bilateral L4/5 TF IAN on 8/25/22with 80-90% pain relief x more than 6 months before pain insidiously returned. He reports same pain as previous, located in his lower back with radiation into both legs, though his right leg is worse than left. He reports his right leg feels weak and swells after prolonged walking, improved with rest. He has completed 37 sessions of physical therapy for his neck and back from 10/20/2022 to 03/14/2022 without relief. Patient reports pain as 4/10 today, but 6/10 on his worst days.      Interval History (9/27/2022):   Ezequiel Hughes presents today for follow-up visit.  he underwent Bilateral L4/5 TF IAN on 8/25/22.  The patient reports that he is/was better following the procedure.  he reports 80-90% pain relief. He reports this IAN was the best so far.  The changes lasted 4 weeks so far.  The changes have continued through this visit.  Patient reports pain as 2/10 today. He does report muscle spasms in his lower right back for the past 3-4 days after prolonged stooping working on a car. He has used heat and massage with some improvement.       Interval HPI  Ezequiel Hughes is a 83 y.o. male who presents to the clinic for a follow-up appointment for back and leg pain.  He presents today after undergoing a 3rd lumbar TFESI bilaterally at L4-5 on 06/02/2022.  He reports that this resulted in 100% relief.  Since the last visit, Ezequiel Hughes states the pain has been resolved. Current pain intensity is 0/10.      Interval Hx: 2/24/22  Presents status post bilateral L4/5 transforaminal epidural steroid injection January 26, 2022. Patient reports having 100% relief in lower extremity radicular symptoms following epidural steroid injection.  This pain level varies based upon his activity throughout the day.  Patient reports as he is more active he does notice  radicular symptoms down the lateral aspects of bilateral lower extremities to the feet.  Patient reports discontinuing Lyrica the day following his injection which he believes caused paresthesias to insidiously return.  Patient does report improvement in functionality including range of motion and strength following his injection.  Patient is interested in repeat intervention.  Patient denies any side effects at the Lyrica dose of 225 mg twice a day.     Interval Hx: 1/13/22  Patient presents for MRI cervical and lumbar review.  Today patient again reports lower back pain which radiates down the anterior aspects of bilateral lower extremities in L4 distribution to the foot.  Today pain is rated as a 4/10.  Pain is exacerbated with prolonged standing and with ambulation the patient reports he is able to ambulate at least 1 mi on the treadmill before requiring rest.  He also reports neck pain which radiates in bilateral trapezius distributions in C5-6 distribution.  Patient has continued Lyrica and is currently taking 150 mg twice daily.  He is anticipated to increase this dosage next week.  He denies any side effects from this medication.     HPI 12/9/21  Ezequiel Hughes is a 81 y.o. male with past medical history significant for transient ischemic attack, hyperlipidemia, hypertension, right bundle branch block, stage 3 chronic kidney disease, thrombocytopenia and anemia , prostate cancer, gout, obstructive sleep apnea, periodically limb movement disorder who presents to the clinic for the evaluation of neck, lower back and leg pain.  Today patient reports pain began approximately 1 year prior without inciting accident, injury or trauma.  Today patient reports lower back pain which is constant and today is rated as a 3/10.  Patient reports radicular symptoms beginning along the anterior aspects of bilateral lower extremities below the knee to the foot in L4 distribution.  Patient is having difficulty describing the  "character but believes it is burning in nature.  Pain is exacerbated with prolonged standing and with ambulation.  Patient reports he is quite active, goes to the gym and walks on his treadmill and is able to ambulate approximately half to 3/4 of a mi before requiring rest.  Pain is improved with sitting.He denies any bowel or bladder incontinence or saddle anesthesia. Patient has performed physical therapy for the lower back without any improvement in his symptoms.      Patient also reports constant neck pain.  Pain presents in a bandlike distribution in bilateral cervical paraspinous territory and radiates into bilateral trapezius distribution.  Patient also reports numbness in bilateral thumbs.  Pain is exacerbated with cervical flexion, extension and lateral flexion.  Patient denies upper extremity weakness, compromise in hand  strength or dexterity.  Patient has been trialed on gabapentin for what his wife describes as "scalp itching"at a lower dose of 100 mg 3 times daily without any improvement in his neck or in his back pain.           Non-Pharmacologic Treatments:  Physical Therapy/Home Exercise: yes  Ice/Heat:yes  TENS: no  Acupuncture: no  Massage: no  Chiropractic: no    Other: no     Pain Medications:  - Adjuvant Medications: Neurontin (Gabapentin) and Tylenol (Acetaminophen)  - Anti-Coagulants: Plavix ( Clopidogrel)     Pain Procedures:   -2022:  Bilateral L4/5 TFESI  -2022: Bilateral L4-5 TFESI  -2022:  Bilateral L4-5 TFESI D2P per Matthew  -2022: Bilatearl L4/5 TF IAN  -2023:  bilateral L4-5 TFESI, limited relief  -2023:  diagnostic C4-6 medial branch blocks, limited relief  2024 C5/6 IL IAN 80% relief  2024 bilateral L4/5 TF IAN with 80% relief x 1 day  2024 L5/S1 IL IAN with 80% relief sustained  2024 right femoral obturator block with 30% relief  Patient was last seen on 2025 L5/S1 IL IAN with 95% relief.    Imagin2024 " EMG  IMPRESSION  1. ABNORMAL study  2. There is electrodiagnostic evidence of an acute on chronic radiculopathy of the bilateral L5 and S1 nerve roots  3. 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    Review of patient's allergies indicates:   Allergen Reactions    Atarax [hydroxyzine hcl] Other (See Comments)     Raised blood pressure     Zyrtec [cetirizine] Other (See Comments)     Raised blood pressure     Gold sodium thiomalate     Gold sodium thiosulfate      Patch test positive    Meloxicam Rash     Other reaction(s): hypertension       Current Outpatient Medications   Medication Sig    acetaminophen (TYLENOL ARTHRITIS ORAL) Take 2 capsules by mouth 2 (two) times a day.    albuterol (VENTOLIN HFA) 90 mcg/actuation inhaler Inhale 2 puffs into the lungs every 6 (six) hours as needed for Wheezing. Rescue    alfuzosin (UROXATRAL) 10 mg Tb24 Take 1 tablet (10 mg total) by mouth daily with breakfast.    amLODIPine (NORVASC) 5 MG tablet Take 5 mg by mouth 2 (two) times daily.    atorvastatin (LIPITOR) 20 MG tablet Take 1 tablet (20 mg total) by mouth once daily.    azelastine (ASTELIN) 137 mcg (0.1 %) nasal spray 1 spray (137 mcg total) by Nasal route 2 (two) times daily.    bumetanide (BUMEX) 1 MG tablet Take 1 tablet (1 mg total) by mouth once daily. For leg swelling ( to replace furosemide) (Patient not taking: Reported on 4/10/2025)    citalopram (CELEXA) 10 MG tablet TAKE 1 TABLET ONE TIME DAILY FOR ANXIETY    clopidogreL (PLAVIX) 75 mg tablet TAKE 1 TABLET EVERY DAY    finasteride (PROSCAR) 5 mg tablet Take 1 tablet (5 mg total) by mouth once daily.    losartan (COZAAR) 50 MG tablet TAKE 1 TABLET TWICE DAILY    pantoprazole (PROTONIX)  40 MG tablet TAKE 1 TABLET EVERY DAY    polyethylene glycol (GLYCOLAX) 17 gram/dose powder Take 17 g by mouth once daily.    potassium chloride SA (K-DUR,KLOR-CON) 20 MEQ tablet Take 1 tablet (20 mEq total) by mouth once daily. (Patient not taking: Reported on 4/10/2025)    pregabalin (LYRICA) 75 MG capsule Take 1 capsule (75 mg total) by mouth 2 (two) times daily.    sildenafiL (VIAGRA) 100 MG tablet Take 1 po 45 minutes before intercourse (Patient not taking: Reported on 4/10/2025)    tolterodine (DETROL LA) 2 MG Cp24 Take 1 capsule (2 mg total) by mouth once daily. (Patient not taking: Reported on 4/10/2025)    vitamin D (VITAMIN D3) 1000 units Tab Take 1,000 Units by mouth once daily. (Patient not taking: Reported on 4/10/2025)     No current facility-administered medications for this visit.         REVIEW OF SYSTEMS:    GENERAL:  No weight loss, malaise or fevers.  HEENT:   No recent changes in vision or hearing  NECK:  Negative for lumps, no difficulty with swallowing.  RESPIRATORY:  Negative for cough, wheezing or shortness of breath, patient denies any recent URI.  CARDIOVASCULAR:  Negative for chest pain, leg swelling or palpitations.  GI:  Negative for abdominal discomfort, blood in stools or black stools or change in bowel habits.  MUSCULOSKELETAL:  See HPI.  SKIN:  Negative for lesions, rash, and itching.  PSYCH:  No mood disorder or recent psychosocial stressors.  Patients sleep is not disturbed secondary to pain.  HEMATOLOGY/LYMPHOLOGY:  Negative for prolonged bleeding, bruising easily or swollen nodes.  Patient is currently taking anti-coagulants - plavix  NEURO:   No history of headaches, syncope, paralysis, seizures or tremors.  All other reviewed and negative other than HPI.    OBJECTIVE:    There were no vitals taken for this visit.          PHYSICAL EXAMINATION:     There were no vitals filed for this visit.        There is no height or weight on file to calculate BMI.   (reviewed on 4/22/2025)        GENERAL: Well appearing, in no acute distress, alert and oriented x3.  PSYCH:  Mood and affect appropriate.  SKIN: Skin color, texture, turgor normal, no rashes or lesions.  HEAD/FACE:  Normocephalic, atraumatic. Cranial nerves grossly intact.  NECK:  Axial loading positive bilaterally.  Spurling's equivocal.  Range of motion fair secondary to pain.  CV: RRR with palpation of the radial artery.  PULM: No evidence of respiratory difficulty, symmetric chest rise.  GI:  Soft and non-tender.  BACK:  Forward flexed posture.  Straight leg raising in the sitting and supine positions is equivocal to radicular pain. No pain to palpation over the facet joints of the lumbar spine or spinous processes.  Fair range of motion with mild pain reproduction with flexion and extention.  EXTREMITIES: No deformities, edema, or skin discoloration. Good capillary refill.  MUSCULOSKELETAL: Bilateral upper and lower extremity strength is normal and symmetric.  No atrophy or tone abnormalities are noted. Fadir's positive on right. Pain with ROM of the right hip. No GTB tenderness.  NEURO: Bilateral upper and lower extremity coordination and muscle stretch reflexes are physiologic and symmetric.  Plantar response are downgoing. No clonus.  No loss of sensation is noted.  GAIT: normal.  General: alert and oriented, in no apparent distress  Gait: normal gait.  Skin: no rashes, no discoloration, no obvious lesions  HEENT: normocephalic, atraumatic. Pupils equal and round.  Cardiovascular: no significant peripheral edema present.  Respiratory: without use of accessory muscles of respiration.        Neuro - Upper Extremities:  - BUE Strength:R/L: D: 5/5; B: 5/5; T: 5/5; WF: 5/5; WE: 5/5; IO: 5/5  - Extremity Reflexes: Brisk and symmetric throughout  - Sensory: Sensation to light touch intact bilaterally  Positive spurling  FROM cervical spine and upper extremities  No shoulder tenderness  No cervical tenderness      Psych:  Mood and affect  is appropriate      LABS:  Lab Results   Component Value Date    WBC 3.75 (L) 03/26/2025    HGB 10.7 (L) 03/26/2025    HCT 33.0 (L) 03/26/2025    MCV 93 03/26/2025    PLT 96 (L) 03/26/2025       CMP  Sodium   Date Value Ref Range Status   03/26/2025 138 136 - 145 mmol/L Final   02/10/2025 138 136 - 145 mmol/L Final   02/10/2025 138 136 - 145 mmol/L Final     Potassium   Date Value Ref Range Status   03/26/2025 4.5 3.5 - 5.1 mmol/L Final   02/10/2025 4.4 3.5 - 5.1 mmol/L Final   02/10/2025 4.3 3.5 - 5.1 mmol/L Final     Chloride   Date Value Ref Range Status   03/26/2025 106 95 - 110 mmol/L Final   02/10/2025 106 95 - 110 mmol/L Final   02/10/2025 106 95 - 110 mmol/L Final     CO2   Date Value Ref Range Status   03/26/2025 21 (L) 23 - 29 mmol/L Final   02/10/2025 21 (L) 23 - 29 mmol/L Final   02/10/2025 23 23 - 29 mmol/L Final     Glucose   Date Value Ref Range Status   02/10/2025 91 70 - 110 mg/dL Final   02/10/2025 88 70 - 110 mg/dL Final     BUN   Date Value Ref Range Status   03/26/2025 29 (H) 8 - 23 mg/dL Final     Creatinine   Date Value Ref Range Status   03/26/2025 1.8 (H) 0.5 - 1.4 mg/dL Final     Calcium   Date Value Ref Range Status   03/26/2025 8.8 8.7 - 10.5 mg/dL Final   02/10/2025 9.1 8.7 - 10.5 mg/dL Final   02/10/2025 9.0 8.7 - 10.5 mg/dL Final     Total Protein   Date Value Ref Range Status   02/10/2025 7.1 6.0 - 8.4 g/dL Final     Albumin   Date Value Ref Range Status   03/26/2025 3.9 3.5 - 5.2 g/dL Final   02/10/2025 4.0 3.5 - 5.2 g/dL Final   08/15/2024 4.1 3.6 - 5.1 g/dL Final     Comment:     Test Performed at:  [facility] [address]  [id number]     BRUCE Fang MD, PhD, TYRON       Total Bilirubin   Date Value Ref Range Status   02/10/2025 0.8 0.1 - 1.0 mg/dL Final     Comment:     For infants and newborns, interpretation of results should be based  on gestational age, weight and in agreement with clinical  observations.    Premature  Infant recommended reference ranges:  Up to 24 hours.............<8.0 mg/dL  Up to 48 hours............<12.0 mg/dL  3-5 days..................<15.0 mg/dL  6-29 days.................<15.0 mg/dL       Bilirubin Total   Date Value Ref Range Status   03/26/2025 0.7 0.1 - 1.0 mg/dL Final     Comment:     For infants and newborns, interpretation of results should be based   on gestational age, weight and in agreement with clinical   observations.    Premature Infant recommended reference ranges:   0-24 hours:  <8.0 mg/dL   24-48 hours: <12.0 mg/dL   3-5 days:    <15.0 mg/dL   6-29 days:   <15.0 mg/dL     Alkaline Phosphatase   Date Value Ref Range Status   02/10/2025 72 40 - 150 U/L Final     ALP   Date Value Ref Range Status   03/26/2025 66 40 - 150 unit/L Final     AST   Date Value Ref Range Status   03/26/2025 13 11 - 45 unit/L Final   02/10/2025 18 10 - 40 U/L Final     ALT   Date Value Ref Range Status   03/26/2025 9 (L) 10 - 44 unit/L Final   02/10/2025 9 (L) 10 - 44 U/L Final     Anion Gap   Date Value Ref Range Status   03/26/2025 11 8 - 16 mmol/L Final     eGFR if    Date Value Ref Range Status   06/29/2022 57.9 (A) >60 mL/min/1.73 m^2 Final     eGFR if non    Date Value Ref Range Status   06/29/2022 50.1 (A) >60 mL/min/1.73 m^2 Final     Comment:     Calculation used to obtain the estimated glomerular filtration  rate (eGFR) is the CKD-EPI equation.          Lab Results   Component Value Date    HGBA1C 5.1 02/06/2025             ASSESSMENT: 85 y.o. year old male with lower back and leg pain, consistent with     No diagnosis found.                          PLAN:   - Interventions: None at this time. Repeat IAN in future if needed    He declines PT.     He has seen Dr. Castro for vertiflex but states he is not wanting to pursue any surgery    S/p 4/1/2025 L5/S1 IL IAN with 95% relief.  S/p 2/8/2024 C5/6 IL IAN with 80% relief  S/p diagnostic C4-6 medial branch blocks on 07/18/2023,  limited relief  S/p repeat bilateral L4-5 TFESI on 06/29/2023, limited relief  - Anticoagulation: yes, Plavix - Dr. Thompson hold x 5 days  - Medications: I have stressed the importance of physical activity and a home exercise plan to help with pain and improve health. and Patient can continue with medications for now since they are providing benefits, using them appropriately, and without side effects.    Continue lyrica 75mg BID. We discussed potential side effects of this medication which may include drowsiness,dizziness, dry mouth, constipation or peripheral edema.    Rx for compound cream  4% gabapentin, 1.5% diclofenac, 2.5% lidocaine, 2.5% prilocaine 2.5%compound cream for potential topical pain relief. Patent will be contacted for [facility] regarding cost and payment.     Make take tylenol as needed    - Therapy:  Advised patient to continue with activities as tolerated  - Labs:  Reviewed  - Imaging:  Reviewed MRI cervical spine and lumbar MRI. Reviewed xray bilateral hips and EMG  - Consults/Referrals: EConsult to Neurosurgery in the past for consideration of vertiflex procedure for lumbar spinal stenosis  Continue follow up with ortho for R hip     - Records:  Reviewed/Obtain old records from outside physicians and imaging  - Follow up visit: 3 months/prn    - Counseled patient regarding the importance of activity modification and physical therapy  - This condition does not require this patient to take time off of work, and the primary goal of our Pain Management services is to improve the patient's functional capacity.  - Patient Questions: Answered all of the patient's questions regarding diagnosis, therapy, and treatment    The above plan and management options were discussed at length with patient. Patient is in agreement with the above and verbalized understanding.    Visit today included increased complexity associated with the care of the episodic problem of chronic pain which was  addressed and continue to manage the longitudinal care of the patient due to the serious and/or complex managed problem(s) listed above.      Maia Lamas NP  Interventional Pain Management  [facility]    Disclaimer:  This note was prepared using voice recognition system and is likely to have sound alike errors that may have been overlooked even after proof reading.  Please call me with any questions

## 2025-04-23 DIAGNOSIS — I10 ESSENTIAL HYPERTENSION: ICD-10-CM

## 2025-04-23 DIAGNOSIS — M54.16 LUMBAR RADICULOPATHY, CHRONIC: Primary | ICD-10-CM

## 2025-04-23 DIAGNOSIS — I70.203 ATHEROSCLEROSIS OF ARTERY OF BOTH LOWER EXTREMITIES: Primary | ICD-10-CM

## 2025-04-23 RX ORDER — CLOPIDOGREL BISULFATE 75 MG/1
75 TABLET ORAL
Qty: 90 TABLET | Refills: 3 | Status: SHIPPED | OUTPATIENT
Start: 2025-04-23

## 2025-04-23 RX ORDER — LOSARTAN POTASSIUM 50 MG/1
50 TABLET ORAL 2 TIMES DAILY
Qty: 180 TABLET | Refills: 3 | Status: SHIPPED | OUTPATIENT
Start: 2025-04-23

## 2025-04-23 RX ORDER — PREGABALIN 75 MG/1
75 CAPSULE ORAL 2 TIMES DAILY
Qty: 180 CAPSULE | Refills: 1 | Status: SHIPPED | OUTPATIENT
Start: 2025-04-23

## 2025-04-23 NOTE — TELEPHONE ENCOUNTER
Refill Routing Note   Medication(s) are not appropriate for processing by [facility] for the following reason(s):        Required labs abnormal    [facility] action(s):  Defer           Pharmacist review requested: Yes     Appointments  past 12m or future 3m with PCP    Date Provider   Last Visit   2/10/2025 Valery Ozuna MD   Next Visit   5/12/2025 Valery Ozuna MD   ED visits in past 90 days: 0        Note composed:10:13 AM 04/23/2025

## 2025-04-23 NOTE — TELEPHONE ENCOUNTER
No care due was identified.  [facility] Embedded Care Due Messages. Reference number: [id number].   4/23/2025 4:37:20 AM CDT

## 2025-04-23 NOTE — TELEPHONE ENCOUNTER
Good Morning/Afternoon,     Pt is requesting for a refill of: lyrica 75mg capsule   Last filed:09/18/2024  Last encounter:03/07/2025  Up coming apt: 05/01/2025  Pharmacy: [facility] - [facility] [address]     Medical Assistant  Leslie HERR (CCMA, Interventional Pain Management Surgery Scheduler)

## 2025-04-23 NOTE — TELEPHONE ENCOUNTER
Refill Routing Note   Medication(s) are not appropriate for processing by [facility] for the following reason(s):        Required labs abnormal    [facility] action(s):  Defer             Pharmacist review requested: Yes     Appointments  past 12m or future 3m with PCP    Date Provider   Last Visit   2/10/2025 Valery Ozuna MD   Next Visit   5/12/2025 Valery Ozuna MD   ED visits in past 90 days: 0        Note composed:7:58 AM 04/23/2025

## 2025-05-01 ENCOUNTER — OFFICE VISIT (OUTPATIENT)
Dept: PAIN MEDICINE | Facility: CLINIC | Age: 85
End: 2025-05-01
Payer: MEDICARE

## 2025-05-01 VITALS
SYSTOLIC BLOOD PRESSURE: 110 MMHG | RESPIRATION RATE: 17 BRPM | WEIGHT: 208.31 LBS | HEART RATE: 61 BPM | HEIGHT: 70 IN | DIASTOLIC BLOOD PRESSURE: 59 MMHG | BODY MASS INDEX: 29.82 KG/M2

## 2025-05-01 DIAGNOSIS — M54.16 LUMBAR RADICULOPATHY: Primary | ICD-10-CM

## 2025-05-01 DIAGNOSIS — M47.812 CERVICAL SPONDYLOSIS: ICD-10-CM

## 2025-05-01 DIAGNOSIS — M51.362 DEGENERATION OF INTERVERTEBRAL DISC OF LUMBAR REGION WITH DISCOGENIC BACK PAIN AND LOWER EXTREMITY PAIN: ICD-10-CM

## 2025-05-01 PROCEDURE — 99213 OFFICE O/P EST LOW 20 MIN: CPT | Mod: HCNC,S$GLB,, | Performed by: NURSE PRACTITIONER

## 2025-05-01 PROCEDURE — 99999 PR PBB SHADOW E&M-EST. PATIENT-LVL IV: CPT | Mod: PBBFAC,HCNC,, | Performed by: NURSE PRACTITIONER

## 2025-05-01 PROCEDURE — G2211 COMPLEX E/M VISIT ADD ON: HCPCS | Mod: HCNC,S$GLB,, | Performed by: NURSE PRACTITIONER

## 2025-05-01 PROCEDURE — 1126F AMNT PAIN NOTED NONE PRSNT: CPT | Mod: CPTII,HCNC,S$GLB, | Performed by: NURSE PRACTITIONER

## 2025-05-01 PROCEDURE — 3288F FALL RISK ASSESSMENT DOCD: CPT | Mod: CPTII,HCNC,S$GLB, | Performed by: NURSE PRACTITIONER

## 2025-05-01 PROCEDURE — 1101F PT FALLS ASSESS-DOCD LE1/YR: CPT | Mod: CPTII,HCNC,S$GLB, | Performed by: NURSE PRACTITIONER

## 2025-05-01 PROCEDURE — 1159F MED LIST DOCD IN RCRD: CPT | Mod: CPTII,HCNC,S$GLB, | Performed by: NURSE PRACTITIONER

## 2025-05-02 PROBLEM — D63.1 ANEMIA IN STAGE 3B CHRONIC KIDNEY DISEASE: Status: ACTIVE | Noted: 2025-05-02

## 2025-05-02 PROBLEM — N18.32 ANEMIA IN STAGE 3B CHRONIC KIDNEY DISEASE: Status: ACTIVE | Noted: 2025-05-02

## 2025-05-02 NOTE — ASSESSMENT & PLAN NOTE
Continues f/u with urologist  
Lab Results   Component Value Date    HGB 10.7 (L) 03/26/2025     No indication for SHARMILA therapy at this time as Hgb >10g/dl  Plan for continued surveillance  
Lab Results   Component Value Date    HGB 10.7 (L) 03/26/2025   Multifactorial r/t comorbid conditions  Now trending up  Tx plan ongoing      
Lab Results   Component Value Date    PLT 96 (L) 03/26/2025     Long standing thrombocytopenia  Previous workups unrevealing  Advised with worsening cytopenias would be advisable to repeat BMBx   
Labs reviewed in detail with patient and wife    JOSH: IgG kappa specific monoclonal band present  SPEP: Normal total protein. Normal gamma globulins are decreased.   There is a paraprotein band in gamma = 0.42 g/dL (0.15 g/dL previously)  FLC-R: 1.96    no indicators of active disease  Monoclonal protein present but   3.0 g/dL  · No CRAB features with exception of anemia or other indicators of active myeloma     MGUS is a common condition, it rarely causes anemia in isolation.   Anemia is a more frequent symptom of multiple myeloma, which can develop from MGUS.  OF note, pt with CKD which likely contributory to anemia      
Previous workups unrevealing  Now WNL   
DISPLAY PLAN FREE TEXT

## 2025-05-02 NOTE — PROGRESS NOTES
Subjective:      Patient ID: Ezequiel Hughes is a 85 y.o. male.    Chief Complaint: Results      HPI:  Mr. Hughes is a pleasant 84 year old gentleman with allergic rhinitis, GUIDO, hiatal hernia, TIA, hyperlipidemia, BPH, back pain, HTN, cataract, ED, and lateral epicondylitis who is seen in Hem/Onc for prostate cancer and eosinophilia. He was seen initially in 9/2015 for thrombocytopenia. He was followed until 2018 when he was lost to follow up until 2021. He underwent BMBx to evaluate eosinophilia which revealed few focal clusters of left-shifted eosinophils in the tissue sections and left-shifted eosinophilic maturation also seen in the aspirate smear and touch prep. There was not sufficient findings to diagnose hypereosinophilic syndrome (HES) or chronic eosinophilic leukemia (JOCELINE). No definitive clonal T cell or mast cell-associated processes were identified.  No evidence of plasma cell neoplasm. Corresponding flow cytometry detects no relative marrow eosinophilia, clonal B-cells, aberrant T-cell antigen expression, or increase in blasts.      Next generation sequencing did not reveal any pathogenic genetic alteration. Evaluation for CHIC2, PDGFRA and FIP1L1 gene regions was within normal limits.  CT scan of chest abdomen and pelvis was unremarkable. It was recommended he follow up in a year but was lost to follow up.     Interval History: He presents today for review of lab results. He states overall he is feeling well--received L5-S1 IAN yesterday. Notes BLE edema on Bumex. He is working on weight loss.                     Social History[1]    Family History   Problem Relation Name Age of Onset    Lung cancer Father Isaiah Hughes         life long smoker    Cancer Father Isaiah Hughes         prostate, lung    Arthritis Mother Emely Hughes     Stroke Sister          TIA    Cataracts Sister      Cancer Brother          prostate    Cataracts Brother      Cataracts Sister      Melanoma Neg Hx      Psoriasis Neg Hx      Lupus  Neg Hx      Eczema Neg Hx      Diabetes Neg Hx      Heart disease Neg Hx      Kidney disease Neg Hx      Colon cancer Neg Hx         Past Surgical History:   Procedure Laterality Date    ANGIOGRAPHY OF UPPER EXTREMITY Left 07/05/2022    Procedure: Angiogram Extremity Bilateral;  Surgeon: Aparna Thompson MD;  Location: [facility] CATH LAB;  Service: Cardiology;  Laterality: Left;    ARTERIOGRAPHY OF AORTIC ROOT N/A 07/05/2022    Procedure: ARTERIOGRAM, AORTIC ROOT;  Surgeon: Aparna Thompson MD;  Location: [facility] CATH LAB;  Service: Cardiology;  Laterality: N/A;    BLOCK, NERVE, PERIPHERAL Right 9/12/2024    Procedure: right femoral/ obturator nerve block- with steroids;  Surgeon: Abel Haywood MD;  Location: [facility] PAIN MGT;  Service: Pain Management;  Laterality: Right;    CATARACT EXTRACTION W/  INTRAOCULAR LENS IMPLANT Right 02/21/2018    I-Stent    CATARACT EXTRACTION W/  INTRAOCULAR LENS IMPLANT Left 03/21/2018    I - Stent    CATHETERIZATION OF BOTH LEFT AND RIGHT HEART N/A 07/05/2022    Procedure: CATHETERIZATION, HEART, BOTH LEFT AND RIGHT;  Surgeon: Aparna Thompson MD;  Location: [facility] CATH LAB;  Service: Cardiology;  Laterality: N/A;  radial approach    COLONOSCOPY N/A 11/14/2016    Procedure: COLONOSCOPY;  Surgeon: Karuna Rodriguez MD;  Location: [facility];  Service: Endoscopy;  Laterality: N/A;    COLONOSCOPY N/A 09/22/2020    Procedure: COLONOSCOPY;  Surgeon: Chuy Fish MD;  Location: [facility] ENDO;  Service: Endoscopy;  Laterality: N/A;    EPIDURAL STEROID INJECTION N/A 6/6/2024    Procedure: Lumbar L5/S1 IL IAN;  Surgeon: Abel Haywood MD;  Location: [facility] PAIN MGT;  Service: Pain Management;  Laterality: N/A;    EPIDURAL STEROID INJECTION INTO CERVICAL SPINE N/A 2/8/2024    Procedure: C5/6 IL Right paramedian approach IAN with RN IV sedation;  Surgeon: Abel Haywood MD;  Location: [facility] PAIN MGT;  Service: Pain Management;  Laterality: N/A;    EPIDURAL STEROID INJECTION INTO LUMBAR SPINE N/A  4/1/2025    Procedure: L5/S1 IL IAN hold plavix 5 days;  Surgeon: Abel Haywood MD;  Location: [facility] PAIN MGT;  Service: Pain Management;  Laterality: N/A;    EYE SURGERY      HEMORRHOID SURGERY      HIP ARTHROPLASTY Right 11/13/2024    Procedure: ARTHROPLASTY, HIP;  Surgeon: Denton Encarnacion MD;  Location: [facility] OR;  Service: Orthopedics;  Laterality: Right;    I-STENT Right 02/21/2018    DR. REECE    INJECTION OF ANESTHETIC AGENT AROUND MEDIAL BRANCH NERVES INNERVATING CERVICAL FACET JOINT Bilateral 7/18/2023    Procedure: Bilateral C4-6 MBB with RN IV sedation (diagnostic, #1);  Surgeon: Abel Haywood MD;  Location: [facility] PAIN MGT;  Service: Pain Management;  Laterality: Bilateral;    KNEE ARTHROSCOPY W/ MENISCAL REPAIR Right 2015    Dr. Jain    PCIOL Right 02/21/2018    DR. REECE    PLANTAR FASCIA RELEASE      right    ROTATOR CUFF REPAIR Bilateral     bilateral    SELECTIVE INJECTION OF ANESTHETIC AGENT AROUND LUMBAR SPINAL NERVE ROOT BY TRANSFORAMINAL APPROACH Bilateral 01/26/2022    Procedure: Bilateral L4/5 TF IAN with RN IV sedation;  Surgeon: Mak Young MD;  Location: [facility] PAIN [facility]GT;  Service: Pain Management;  Laterality: Bilateral;    SELECTIVE INJECTION OF ANESTHETIC AGENT AROUND LUMBAR SPINAL NERVE ROOT BY TRANSFORAMINAL APPROACH Bilateral 03/14/2022    Procedure: Bilateral L4/5 TF IAN with RN IV sedation;  Surgeon: Mak Young MD;  Location: [facility]IN [facility]GT;  Service: Pain Management;  Laterality: Bilateral;    SELECTIVE INJECTION OF ANESTHETIC AGENT AROUND LUMBAR SPINAL NERVE ROOT BY TRANSFORAMINAL APPROACH Bilateral 06/02/2022    Procedure: Bilateral L4/5 TF IAN - D2P Dr. Castro [facility];  Surgeon: Abel Haywood MD;  Location: [facility]IN [facility]GT;  Service: Pain Management;  Laterality: Bilateral;    SELECTIVE INJECTION OF ANESTHETIC AGENT AROUND LUMBAR SPINAL NERVE ROOT BY TRANSFORAMINAL APPROACH Bilateral 08/25/2022    Procedure: Bilateral L4/5 TF IAN;  Surgeon: Abel Haywood MD;   Location: [facility] PAIN MGT;  Service: Pain Management;  Laterality: Bilateral;    SELECTIVE INJECTION OF ANESTHETIC AGENT AROUND LUMBAR SPINAL NERVE ROOT BY TRANSFORAMINAL APPROACH Bilateral 6/29/2023    Procedure: Bilateral L4/5 TF IAN RN IV Sedation;  Surgeon: Abel Haywood MD;  Location: [facility] PAIN MGT;  Service: Pain Management;  Laterality: Bilateral;    SELECTIVE INJECTION OF ANESTHETIC AGENT AROUND LUMBAR SPINAL NERVE ROOT BY TRANSFORAMINAL APPROACH Bilateral 4/11/2024    Procedure: Bilateral L4/5 TF IAN;  Surgeon: Abel Haywood MD;  Location: [facility] PAIN MGT;  Service: Pain Management;  Laterality: Bilateral;    SHOULDER SURGERY Bilateral around 2000    Dr. Pepper.  rotator cuff surgeries    VASECTOMY         Past Medical History:   Diagnosis Date    Allergic rhinitis     Anemia     Back pain     BPH (benign prostatic hyperplasia)     Cancer     skin cancer to neck, Dr. Graves    Cataract     Coronary artery disease involving native coronary artery of native heart without angina pectoris 3/26/2025    Disorder of kidney and ureter     ED (erectile dysfunction)     OMARI (generalized anxiety disorder) 08/07/2023    Hiatal hernia     small    History of colon polyps     colonoscopy 11/2016    HLD (hyperlipidemia)     Hyperlipidemia     Hypertension     Lateral epicondylitis     Lumbar radiculopathy 01/26/2022    OA (osteoarthritis)     GUIDO (obstructive sleep apnea)     Pneumonia of right middle lobe due to infectious organism 11/18/2024    Polyneuropathy     Prostate cancer     Dr. Wong    TIA (transient ischemic attack)     Trouble in sleeping     Urge incontinence 03/29/2022       Review of Systems   Constitutional:  Negative for activity change, appetite change, chills, fatigue, fever and unexpected weight change.   HENT:  Negative for congestion, dental problem, mouth sores and nosebleeds.    Eyes:  Negative for visual disturbance.   Respiratory:  Negative for cough, choking and chest  tightness.    Cardiovascular:  Negative for chest pain, palpitations and leg swelling.   Gastrointestinal:  Negative for abdominal distention, abdominal pain, anal bleeding, blood in stool, constipation, diarrhea, nausea and vomiting.   Endocrine: Negative.    Genitourinary:  Negative for dysuria, frequency, hematuria and urgency.   Musculoskeletal:  Negative for arthralgias, back pain, gait problem, joint swelling and myalgias.   Skin:  Negative for wound.   Allergic/Immunologic: Negative for immunocompromised state.   Neurological:  Negative for dizziness, light-headedness, numbness and headaches.   Hematological:  Negative for adenopathy. Does not bruise/bleed easily.   Psychiatric/Behavioral:  The patient is not nervous/anxious.        Medication List with Changes/Refills   Current Medications    ACETAMINOPHEN (TYLENOL ARTHRITIS ORAL)    Take 2 capsules by mouth 2 (two) times a day.    ALBUTEROL (VENTOLIN HFA) 90 MCG/ACTUATION INHALER    Inhale 2 puffs into the lungs every 6 (six) hours as needed for Wheezing. Rescue    ALFUZOSIN (UROXATRAL) 10 MG TB24    Take 1 tablet (10 mg total) by mouth daily with breakfast.    AMLODIPINE (NORVASC) 5 MG TABLET    Take 5 mg by mouth 2 (two) times daily.    ATORVASTATIN (LIPITOR) 20 MG TABLET    Take 1 tablet (20 mg total) by mouth once daily.    AZELASTINE (ASTELIN) 137 MCG (0.1 %) NASAL SPRAY    1 spray (137 mcg total) by Nasal route 2 (two) times daily.    BUMETANIDE (BUMEX) 1 MG TABLET    Take 1 tablet (1 mg total) by mouth once daily. For leg swelling ( to replace furosemide)    CITALOPRAM (CELEXA) 10 MG TABLET    TAKE 1 TABLET ONE TIME DAILY FOR ANXIETY    FINASTERIDE (PROSCAR) 5 MG TABLET    Take 1 tablet (5 mg total) by mouth once daily.    PANTOPRAZOLE (PROTONIX) 40 MG TABLET    TAKE 1 TABLET EVERY DAY    POLYETHYLENE GLYCOL (GLYCOLAX) 17 GRAM/DOSE POWDER    Take 17 g by mouth once daily.    POTASSIUM CHLORIDE SA (K-DUR,KLOR-CON) 20 MEQ TABLET    Take 1 tablet (20  mEq total) by mouth once daily.    SILDENAFIL (VIAGRA) 100 MG TABLET    Take 1 po 45 minutes before intercourse    TOLTERODINE (DETROL LA) 2 MG CP24    Take 1 capsule (2 mg total) by mouth once daily.    VITAMIN D (VITAMIN D3) 1000 UNITS TAB    Take 1,000 Units by mouth once daily.   Changed and/or Refilled Medications    Modified Medication Previous Medication    CLOPIDOGREL (PLAVIX) 75 MG TABLET clopidogreL (PLAVIX) 75 mg tablet       TAKE 1 TABLET EVERY DAY    TAKE 1 TABLET EVERY DAY    LOSARTAN (COZAAR) 50 MG TABLET losartan (COZAAR) 50 MG tablet       TAKE 1 TABLET TWICE DAILY    TAKE 1 TABLET TWICE DAILY    PREGABALIN (LYRICA) 75 MG CAPSULE pregabalin (LYRICA) 75 MG capsule       Take 1 capsule (75 mg total) by mouth 2 (two) times daily.    Take 1 capsule (75 mg total) by mouth 2 (two) times daily.        Objective:     Vitals:    04/03/25 0753   BP: (!) 129/53   Pulse: (!) 53   Temp: 97.7 °F (36.5 °C)       Physical Exam  Constitutional:       Appearance: Normal appearance.   Cardiovascular:      Rate and Rhythm: Normal rate.   Pulmonary:      Effort: Pulmonary effort is normal.   Neurological:      Mental Status: He is alert and oriented to person, place, and time.   Psychiatric:         Mood and Affect: Mood normal.         Behavior: Behavior normal.         Lab Results   Component Value Date    WBC 3.75 (L) 03/26/2025    HGB 10.7 (L) 03/26/2025    HCT 33.0 (L) 03/26/2025    MCV 93 03/26/2025    PLT 96 (L) 03/26/2025       Lab Results   Component Value Date     03/26/2025    K 4.5 03/26/2025     03/26/2025    CO2 21 (L) 03/26/2025    BUN 29 (H) 03/26/2025    CREATININE 1.8 (H) 03/26/2025    CALCIUM 8.8 03/26/2025    ANIONGAP 11 03/26/2025    ESTGFRAFRICA 57.9 (A) 06/29/2022    EGFRNONAA 50.1 (A) 06/29/2022     Lab Results   Component Value Date    ALT 9 (L) 03/26/2025    AST 13 03/26/2025    ALKPHOS 66 03/26/2025    BILITOT 0.7 03/26/2025       Assessment/Plan:     Problem List Items Addressed  This Visit       Thrombocytopenia - Primary    Lab Results   Component Value Date    PLT 96 (L) 03/26/2025     Long standing thrombocytopenia  Previous workups unrevealing  Advised with worsening cytopenias would be advisable to repeat BMBx          Relevant Orders    CBC Auto Differential    Comprehensive Metabolic Panel    Ferritin    Protein Electrophoresis, Serum    Immunofixation, Serum    Immunoglobulin Free LT Chains Blood    Vitamin D    Beta 2 Microglobulin, Serum    Anemia, unspecified    Lab Results   Component Value Date    HGB 10.7 (L) 03/26/2025   Multifactorial r/t comorbid conditions  Now trending up  Tx plan ongoing             Relevant Orders    CBC Auto Differential    Comprehensive Metabolic Panel    Ferritin    Protein Electrophoresis, Serum    Immunofixation, Serum    Immunoglobulin Free LT Chains Blood    Vitamin D    Beta 2 Microglobulin, Serum    Prostate cancer    Continues f/u with urologist         Other eosinophilia    Previous workups unrevealing  Now WNL          MGUS (monoclonal gammopathy of unknown significance)    Labs reviewed in detail with patient and wife    JOSH: IgG kappa specific monoclonal band present  SPEP: Normal total protein. Normal gamma globulins are decreased.   There is a paraprotein band in gamma = 0.42 g/dL (0.15 g/dL previously)  FLC-R: 1.96    no indicators of active disease  Monoclonal protein present but   3.0 g/dL  · No CRAB features with exception of anemia or other indicators of active myeloma     MGUS is a common condition, it rarely causes anemia in isolation.   Anemia is a more frequent symptom of multiple myeloma, which can develop from MGUS.  OF note, pt with CKD which likely contributory to anemia             Relevant Orders    CBC Auto Differential    Comprehensive Metabolic Panel    Ferritin    Protein Electrophoresis, Serum    Immunofixation, Serum    Immunoglobulin Free LT Chains Blood    Vitamin D    Beta 2 Microglobulin, Serum    Anemia in  stage 3b chronic kidney disease    Lab Results   Component Value Date    HGB 10.7 (L) 2025     No indication for SHARMILA therapy at this time as Hgb >10g/dl  Plan for continued surveillance         Relevant Orders    CBC Auto Differential    Comprehensive Metabolic Panel    Ferritin    Protein Electrophoresis, Serum    Immunofixation, Serum    Immunoglobulin Free LT Chains Blood    Vitamin D    Beta 2 Microglobulin, Serum           Med Onc Chart Routing      Follow up with physician    Follow up with MIGDALIA 3 months. with labs prior   Infusion scheduling note    Injection scheduling note    Labs CBC, CMP, ferritin, beta 2 microglobulin, free light chains, iron and TIBC, SPEP, vitamin B12, folate and other   Scheduling:  Preferred lab:  Lab interval:     Imaging    Pharmacy appointment    Other referrals                     PARESH Moreno  Hematology/Oncology         [1]   Social History  Socioeconomic History    Marital status:    Tobacco Use    Smoking status: Former     Current packs/day: 0.00     Average packs/day: 3.0 packs/day for 35.0 years (104.9 ttl pk-yrs)     Types: Cigarettes     Start date: 1960     Quit date: 1985     Years since quittin.8     Passive exposure: Past    Smokeless tobacco: Never   Substance and Sexual Activity    Alcohol use: Yes     Alcohol/week: 3.0 standard drinks of alcohol     Types: 3 Cans of beer per week     Comment: socially    Drug use: No    Sexual activity: Yes     Partners: Female     Birth control/protection: None   Social History Narrative         Social Drivers of Health     Financial Resource Strain: Patient Declined (2024)    Overall Financial Resource Strain (CARDIA)     Difficulty of Paying Living Expenses: Patient declined   Food Insecurity: Patient Declined (2024)    Hunger Vital Sign     Worried About Running Out of Food in the Last Year: Patient declined     Ran Out of Food in the Last Year: Patient declined    Transportation Needs: Patient Declined (11/18/2024)    TRANSPORTATION NEEDS     Transportation : Patient declined   Physical Activity: Insufficiently Active (5/16/2024)    Exercise Vital Sign     Days of Exercise per Week: 2 days     Minutes of Exercise per Session: 30 min   Stress: Patient Declined (11/18/2024)    [employer] [place] [employer] - Occupational Stress Questionnaire     Feeling of Stress : Patient declined   Housing Stability: Patient Declined (11/18/2024)    Housing Stability Vital Sign     Unable to Pay for Housing in the Last Year: Patient declined     Homeless in the Last Year: Patient declined

## 2025-05-12 ENCOUNTER — LAB VISIT (OUTPATIENT)
Dept: LAB | Facility: HOSPITAL | Age: 85
End: 2025-05-12
Attending: FAMILY MEDICINE
Payer: MEDICARE

## 2025-05-12 ENCOUNTER — OFFICE VISIT (OUTPATIENT)
Dept: PRIMARY CARE CLINIC | Facility: CLINIC | Age: 85
End: 2025-05-12
Payer: MEDICARE

## 2025-05-12 VITALS
RESPIRATION RATE: 19 BRPM | OXYGEN SATURATION: 100 % | BODY MASS INDEX: 30.68 KG/M2 | DIASTOLIC BLOOD PRESSURE: 58 MMHG | HEIGHT: 70 IN | TEMPERATURE: 96 F | SYSTOLIC BLOOD PRESSURE: 122 MMHG | HEART RATE: 68 BPM | WEIGHT: 214.31 LBS

## 2025-05-12 DIAGNOSIS — D64.9 ANEMIA, UNSPECIFIED TYPE: ICD-10-CM

## 2025-05-12 DIAGNOSIS — D72.19 OTHER EOSINOPHILIA: ICD-10-CM

## 2025-05-12 DIAGNOSIS — M54.16 LUMBAR RADICULOPATHY, CHRONIC: ICD-10-CM

## 2025-05-12 DIAGNOSIS — E78.2 MIXED HYPERLIPIDEMIA: ICD-10-CM

## 2025-05-12 DIAGNOSIS — R29.898 BILATERAL LEG WEAKNESS: Primary | ICD-10-CM

## 2025-05-12 DIAGNOSIS — M48.07 SPINAL STENOSIS, LUMBOSACRAL REGION: ICD-10-CM

## 2025-05-12 DIAGNOSIS — I10 ESSENTIAL HYPERTENSION: ICD-10-CM

## 2025-05-12 DIAGNOSIS — M48.061 SPINAL STENOSIS OF LUMBAR REGION AT MULTIPLE LEVELS: ICD-10-CM

## 2025-05-12 DIAGNOSIS — N18.30 STAGE 3 CHRONIC KIDNEY DISEASE, UNSPECIFIED WHETHER STAGE 3A OR 3B CKD: ICD-10-CM

## 2025-05-12 DIAGNOSIS — I70.203 ATHEROSCLEROSIS OF ARTERY OF BOTH LOWER EXTREMITIES: ICD-10-CM

## 2025-05-12 DIAGNOSIS — R53.1 GENERALIZED WEAKNESS: ICD-10-CM

## 2025-05-12 DIAGNOSIS — D69.6 THROMBOCYTOPENIA: ICD-10-CM

## 2025-05-12 DIAGNOSIS — M47.16 LUMBAR SPONDYLOSIS WITH MYELOPATHY: ICD-10-CM

## 2025-05-12 DIAGNOSIS — I89.0 LYMPHEDEMA: ICD-10-CM

## 2025-05-12 DIAGNOSIS — M79.89 LEG SWELLING: ICD-10-CM

## 2025-05-12 LAB
ANION GAP (OHS): 8 MMOL/L (ref 8–16)
BUN SERPL-MCNC: 28 MG/DL (ref 8–23)
CALCIUM SERPL-MCNC: 8.8 MG/DL (ref 8.7–10.5)
CHLORIDE SERPL-SCNC: 106 MMOL/L (ref 95–110)
CO2 SERPL-SCNC: 23 MMOL/L (ref 23–29)
CREAT SERPL-MCNC: 1.4 MG/DL (ref 0.5–1.4)
GFR SERPLBLD CREATININE-BSD FMLA CKD-EPI: 49 ML/MIN/1.73/M2
GLUCOSE SERPL-MCNC: 86 MG/DL (ref 70–110)
POTASSIUM SERPL-SCNC: 4.7 MMOL/L (ref 3.5–5.1)
SODIUM SERPL-SCNC: 137 MMOL/L (ref 136–145)

## 2025-05-12 PROCEDURE — 99215 OFFICE O/P EST HI 40 MIN: CPT | Mod: S$GLB,,, | Performed by: FAMILY MEDICINE

## 2025-05-12 PROCEDURE — G2211 COMPLEX E/M VISIT ADD ON: HCPCS | Mod: S$GLB,,, | Performed by: FAMILY MEDICINE

## 2025-05-12 PROCEDURE — 3288F FALL RISK ASSESSMENT DOCD: CPT | Mod: CPTII,S$GLB,, | Performed by: FAMILY MEDICINE

## 2025-05-12 PROCEDURE — 36415 COLL VENOUS BLD VENIPUNCTURE: CPT | Mod: HCNC,PN

## 2025-05-12 PROCEDURE — 1159F MED LIST DOCD IN RCRD: CPT | Mod: CPTII,S$GLB,, | Performed by: FAMILY MEDICINE

## 2025-05-12 PROCEDURE — 80048 BASIC METABOLIC PNL TOTAL CA: CPT

## 2025-05-12 PROCEDURE — 1101F PT FALLS ASSESS-DOCD LE1/YR: CPT | Mod: CPTII,S$GLB,, | Performed by: FAMILY MEDICINE

## 2025-05-12 PROCEDURE — 99999 PR PBB SHADOW E&M-EST. PATIENT-LVL V: CPT | Mod: PBBFAC,HCNC,, | Performed by: FAMILY MEDICINE

## 2025-05-12 PROCEDURE — 1126F AMNT PAIN NOTED NONE PRSNT: CPT | Mod: CPTII,S$GLB,, | Performed by: FAMILY MEDICINE

## 2025-05-12 RX ORDER — DIAZEPAM 5 MG/1
5 TABLET ORAL EVERY 6 HOURS PRN
Qty: 2 TABLET | Refills: 0 | Status: SHIPPED | OUTPATIENT
Start: 2025-05-12 | End: 2025-05-14

## 2025-05-12 NOTE — PATIENT INSTRUCTIONS
Please call [phone] to schedule your nuclear imaging or MRI procedure at [facility] facilities.      Sincerely,   Valery Ozuna MD

## 2025-05-12 NOTE — PROGRESS NOTES
Chief Complaint  Chief Complaint   Patient presents with    Follow-up     Multiple specialist visits.        HPI  Ezequiel Hughes is a 85 y.o. male with multiple medical diagnoses as listed in the medical history and problem list that presents for  in person visit.     History of Present Illness    CHIEF COMPLAINT:  - Patient presents for follow-up of multiple medical issues, primarily concerned about persistent leg swelling and numbness that affects his mobility and daily activities.    HPI:  Patient is a male with a complex medical history who presents for follow-up of multiple conditions. His pain has improved significantly since having a right hip replacement and his last injection. He continues to have persistent leg swelling and numbness that significantly impacts his mobility and daily activities.    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 The numbness affects his ability to stand for any length of time, causing pain and numbness under his feet.    He wears compression garments for his leg swelling, though he did not wear them today. He previously attended a lymphedema clinic but stopped going after receiving his own compression device, as advised by the clinic staff. The swelling persists despite using the device.    He reports numbness in 2 fingers, specifically mentioning the ends of the fingers. He was evaluated by Dr. Cole for this issue and received an injection, which helped to some degree, though he is uncertain of the full effect.    He has been seen by multiple specialists for his various conditions. He was recently evaluated at urgent care for symptomatic microscopic hematuria. He had an appointment with hematology oncology on April 3rd for thrombocytopenia, MGUS, eosinophilia anemia, and stage 3 chronic kidney  disease. A bone marrow biopsy was advised at that time, with a 3-month follow-up planned for July. He has also been evaluated by dermatology for a skin check due to a history of skin cancer, and sports medicine for carpal tunnel symptoms of the left thumb and index finger.    He is frustrated with his physical limitations, stating that mentally he feels capable, but physically he cannot do what he wants to do. He recently started exercising again but mentions difficulty losing weight.    He denies falling, stating he sits down before he falls.    MEDICATIONS:  - Lyrica, for pain symptoms  - Furosemide, switched from Lasix, for lower extremity edema    PMH:  - Lymphedema  - Lumbar radiculopathy  - Microscopic hematuria  - Thrombocytopenia  - MGUS (Monoclonal Gammopathy of Undetermined Significance)  - Eosinophilia anemia  - Stage 3 chronic kidney disease  - History of skin cancer  - Carpal tunnel syndrome: left thumb and index finger    RECENT/REMOTE SURGICAL HISTORY:  - Total right hip arthroplasty: Prior to February 10th, indication not specified  - Upcoming: Excision of a lump on back: Scheduled for next Monday, performed by Dr. Burgos    SOCIAL HISTORY:  - Occupation: Has a shop         No questionnaires on file.       Pmh, Psh, Family Hx, Social Hx, HM updated in Epic Tabs today.    Review of Systems   Constitutional:  Positive for activity change, appetite change and unexpected weight change. Negative for fatigue.   Cardiovascular:  Positive for leg swelling.   Gastrointestinal:  Negative for abdominal distention and abdominal pain.   Musculoskeletal:  Positive for arthralgias, back pain and gait problem.   Neurological:  Positive for weakness and numbness.   Psychiatric/Behavioral:  Positive for dysphoric mood. Negative for decreased concentration and sleep disturbance. The patient is not nervous/anxious.         Objective:     Vitals:    05/12/25 0903   BP: (!) 122/58   BP Location: Right arm   Patient Position:  "Sitting   Pulse: 68   Resp: 19   Temp: 96.1 °F (35.6 °C)   TempSrc: Tympanic   SpO2: 100%   Weight: 97.2 kg (214 lb 4.6 oz)   Height: 5' 10" (1.778 m)     Wt Readings from Last 10 Encounters:   05/12/25 97.2 kg (214 lb 4.6 oz)   05/01/25 94.5 kg (208 lb 5.4 oz)   04/03/25 96.9 kg (213 lb 10 oz)   04/01/25 95.6 kg (210 lb 12.2 oz)   03/28/25 97.1 kg (214 lb 1.1 oz)   03/26/25 99.1 kg (218 lb 7.6 oz)   03/13/25 99 kg (218 lb 4.1 oz)   03/07/25 97.7 kg (215 lb 8 oz)   02/24/25 94.8 kg (208 lb 15.9 oz)   02/10/25 94.8 kg (209 lb)     Physical Exam    TEST RESULTS:  - Hemoglobin: February to March, improved from 10.2 to 10.7  - Platelet count: March, 96,000, consistent with thrombocytopenia  - GFR 36, creatinine 1.8, consistent with stage 3b chronic kidney disease  IMAGING:  - MRI Lumbar Spine: 2021, multiple levels of disc bulging starting at L1-L2 (more to the left), L3 pushing on spinal cord, worst at L4-L5 with pressure on spinal cord and narrowing       Physical Exam  Vitals reviewed.   Constitutional:       Appearance: Normal appearance.   Cardiovascular:      Rate and Rhythm: Normal rate and regular rhythm.      Heart sounds: Normal heart sounds.   Pulmonary:      Effort: Pulmonary effort is normal.      Breath sounds: Normal breath sounds.   Musculoskeletal:      Thoracic back: Normal.      Lumbar back: No tenderness or bony tenderness. Decreased range of motion.   Neurological:      Mental Status: He is alert and oriented to person, place, and time. Mental status is at baseline.      Sensory: Sensory deficit present.      Motor: Weakness present.      Gait: Gait abnormal.      Comments: Pain and weakness/numbness in bilateral lower extremities with walking and standing. Relieved with sitting.          Assessment:   LABS:   Lab Results   Component Value Date    HGBA1C 5.1 02/06/2025    HGBA1C 5.1 06/27/2023    HGBA1C 5.3 01/17/2023      Lab Results   Component Value Date    CHOL 122 02/10/2025    CHOL 138 " 08/23/2024    CHOL 151 02/06/2024     Lab Results   Component Value Date    LDLCALC 56.8 (L) 02/10/2025    LDLCALC 57.8 (L) 08/23/2024    LDLCALC 70.2 02/06/2024     Lab Results   Component Value Date    WBC 3.75 (L) 03/26/2025    HGB 10.7 (L) 03/26/2025    HCT 33.0 (L) 03/26/2025    PLT 96 (L) 03/26/2025    CHOL 122 02/10/2025    TRIG 71 02/10/2025    HDL 51 02/10/2025    ALT 9 (L) 03/26/2025    AST 13 03/26/2025     03/26/2025    K 4.5 03/26/2025     03/26/2025    CREATININE 1.8 (H) 03/26/2025    BUN 29 (H) 03/26/2025    CO2 21 (L) 03/26/2025    TSH 1.771 02/06/2025    PSA 1.6 02/06/2025    INR 1.2 06/29/2022    HGBA1C 5.1 02/06/2025       Plan:   1. Bilateral leg weakness  -     Ambulatory referral/consult to Neurosurgery; Future; Expected date: 05/19/2025    2. Lumbar spondylosis with myelopathy    3. Spinal stenosis of lumbar region at multiple levels  -     Ambulatory referral/consult to Neurosurgery; Future; Expected date: 05/19/2025    4. Lumbar radiculopathy, chronic  -     MRI Lumbar Spine W WO Contrast; Future; Expected date: 05/12/2025  -     Basic Metabolic Panel; Future; Expected date: 05/12/2025  -     diazePAM (VALIUM) 5 MG tablet; Take 1 tablet (5 mg total) by mouth every 6 (six) hours as needed for Anxiety (1 hr prior to mri, can repeat again in 1 hr if not effective).  Dispense: 2 tablet; Refill: 0    5. Essential hypertension    6. Anemia, unspecified type  Overview:  Anemia      7. Atherosclerosis of artery of both lower extremities  Overview:  Cont plavix      8. Mixed hyperlipidemia  Overview:  Hyperlipidemia      9. Thrombocytopenia  Overview:  1/6/2020 - plts less than 120        10. Stage 3 chronic kidney disease, unspecified whether stage 3a or 3b CKD  -     Basic Metabolic Panel; Future; Expected date: 05/12/2025    11. Other eosinophilia    12. Spinal stenosis, lumbosacral region  -     MRI Lumbar Spine W WO Contrast; Future; Expected date: 05/12/2025  -     Basic Metabolic  Panel; Future; Expected date: 05/12/2025  -     diazePAM (VALIUM) 5 MG tablet; Take 1 tablet (5 mg total) by mouth every 6 (six) hours as needed for Anxiety (1 hr prior to mri, can repeat again in 1 hr if not effective).  Dispense: 2 tablet; Refill: 0    13. Lymphedema    14. Leg swelling    15. Generalized weakness      Assessment & Plan    N18.30 Stage 3 chronic kidney disease, unspecified whether stage 3a or 3b CKD  I10 Essential hypertension  D64.9 Anemia, unspecified type  I70.203 Atherosclerosis of artery of both lower extremities  M47.817 Lumbosacral spondylosis without myelopathy  E78.2 Mixed hyperlipidemia  D69.6 Thrombocytopenia  D72.19 Other eosinophilia  M48.061 Spinal stenosis of lumbar region at multiple levels  R29.898 Bilateral leg weakness  M54.16 Lumbar radiculopathy, chronic  M48.07 Spinal stenosis, lumbosacral region  I89.0 Lymphedema  M79.89 Leg swelling  R53.1 Generalized weakness    IMPRESSION:  Reviewed history of multiple disc bulges, particularly at L4-L5 level, pressing on spinal cord based on previous MRI from 2021.  Evaluated options for managing persistent lower extremity numbness and weakness, likely stemming from spinal cord compression.  Discussed potential need for spinal stabilization surgery using plates or rods to address shifting discs and alleviate pressure on spinal cord.  Assessed concerns about surgical risks and previous experiences with planned procedures.  Determined need for new neurosurgery consultation given Dr. Castro's reported departure.    PLAN SUMMARY:  - Ordered MRI Lumbar Spine with and WO Contrast  - Ordered renal function test prior to MRI  - Started Diazepam 5 mg for MRI relaxation  - Referred to neurosurgery for evaluation of back issues  - Continue Furosemide every other day or as needed for leg swelling  - Continue using compression garments for lymphedema management  - Follow up in 3 months to reassess spine condition and discuss potential surgical  intervention    STAGE 3 CHRONIC KIDNEY DISEASE, UNSPECIFIED WHETHER STAGE 3A OR 3B CKD:  - Ordered renal function test prior to MRI.    LUMBOSACRAL SPONDYLOSIS WITHOUT MYELOPATHY:  - Explained connection between spinal cord compression and lower extremity numbness/weakness.  - Discussed purpose of spinal stabilization surgery in reinforcing spine structure and clarified differences between pain management treatments (e.g., nerve burning) and addressing underlying structural issues.  - Ordered MRI Lumbar Spine with and WO Contrast to reassess current condition of spine and determine if surgical intervention is warranted.  - Referred to neurosurgery for evaluation of back issues and potential surgical intervention.  - Started Diazepam 5 mg: Take 1 tablet orally 1 hour prior to MRI; may repeat dose in 1 hour if needed for relaxation during procedure.    SPINAL STENOSIS OF LUMBAR REGION AT MULTIPLE LEVELS:  - See management plan under Lumbosacral Spondylosis Without Myelopathy.    BILATERAL LEG WEAKNESS:  - Related to spinal cord compression as discussed above.  - Recommend performing exercise in shorter, more frequent sessions to avoid overexertion.    LUMBAR RADICULOPATHY, CHRONIC:  - See management plan under Lumbosacral Spondylosis Without Myelopathy.    SPINAL STENOSIS, LUMBOSACRAL REGION:  - See management plan under Lumbosacral Spondylosis Without Myelopathy.    LYMPHEDEMA:  - Patient to continue using compression garments for lymphedema management.    LEG SWELLING:  - Continued Furosemide: Take every other day or as needed when legs are swollen.    GENERALIZED WEAKNESS:  - Recommend performing exercise in shorter, more frequent sessions to avoid overexertion.    LIFESTYLE CHANGES:  - Educated on newer, larger MRI machines available at [facility] and benefits of better imaging quality for surgical planning.  - Patient to monitor weight and continue efforts to manage diet.  - Follow up in 3 months.  - Contact office  if needed before next appointment.           [place] Fluoro for Pain Management  See OP Notes for results.     IMPRESSION: See OP Notes for results.     This procedure was auto-finalized by: Virtual Radiologist    The ASCVD Risk score (Perez DK, et al., 2019) failed to calculate for the following reasons:    The 2019 ASCVD risk score is only valid for ages 40 to 79    Follow-up: Follow up in about 3 months (around 8/12/2025).    I spent a total of    45   minutes face to face and non-face to face on the date of this visit.This includes time preparing to see the patient (eg, review of tests, notes), obtaining and/or reviewing additional history from an independent historian and/or outside medical records, documenting clinical information in the electronic health record, independently interpreting results and/or communicating results to the patient/family/caregiver, or care coordinator.  Visit today included increased complexity associated with the care of the episodic problem addressed and managing the longitudinal care of the patient due to the serious and/or complex managed problem(s).    This note was generated with the assistance of ambient listening technology. Verbal consent was obtained by the patient and accompanying visitor(s) for the recording of patient appointment to facilitate this note. I attest to having reviewed and edited the generated note for accuracy, though some syntax or spelling errors may persist. Please contact the author of this note for any clarification.       Patient Instructions   Please call [phone] to schedule your nuclear imaging or MRI procedure at [facility] facilities.      Sincerely,   Valery Ozuna MD

## 2025-05-13 ENCOUNTER — TELEPHONE (OUTPATIENT)
Dept: NEUROSURGERY | Facility: CLINIC | Age: 85
End: 2025-05-13
Payer: MEDICARE

## 2025-05-13 NOTE — TELEPHONE ENCOUNTER
Contacted patient regarding referral with Neurosurgery. Patient states no surgeries within last 2 years, nor was pain/injury caused by any accident that would be involved in any type of future litigation.    Salome Garcia RN

## 2025-05-16 ENCOUNTER — HOSPITAL ENCOUNTER (OUTPATIENT)
Dept: RADIOLOGY | Facility: HOSPITAL | Age: 85
Discharge: HOME | End: 2025-05-16
Attending: FAMILY MEDICINE
Payer: MEDICARE

## 2025-05-16 DIAGNOSIS — M48.07 SPINAL STENOSIS, LUMBOSACRAL REGION: ICD-10-CM

## 2025-05-16 DIAGNOSIS — M54.16 LUMBAR RADICULOPATHY, CHRONIC: ICD-10-CM

## 2025-05-16 PROCEDURE — 72158 MRI LUMBAR SPINE W/O & W/DYE: CPT | Mod: 26,,, | Performed by: RADIOLOGY

## 2025-05-16 PROCEDURE — A9585 GADOBUTROL INJECTION: HCPCS | Mod: PN | Performed by: FAMILY MEDICINE

## 2025-05-16 PROCEDURE — 25500020 PHARM REV CODE 255: Mod: PN | Performed by: FAMILY MEDICINE

## 2025-05-16 PROCEDURE — 72158 MRI LUMBAR SPINE W/O & W/DYE: CPT | Mod: TC,PN

## 2025-05-16 RX ORDER — GADOBUTROL 604.72 MG/ML
9.5 INJECTION INTRAVENOUS
Status: COMPLETED | OUTPATIENT
Start: 2025-05-16 | End: 2025-05-16

## 2025-05-16 RX ADMIN — GADOBUTROL 9.5 ML: 604.72 INJECTION INTRAVENOUS at 03:05

## 2025-05-19 ENCOUNTER — OFFICE VISIT (OUTPATIENT)
Dept: OPHTHALMOLOGY | Facility: CLINIC | Age: 85
End: 2025-05-19
Payer: MEDICARE

## 2025-05-19 ENCOUNTER — RESULTS FOLLOW-UP (OUTPATIENT)
Dept: PRIMARY CARE CLINIC | Facility: CLINIC | Age: 85
End: 2025-05-19

## 2025-05-19 DIAGNOSIS — Z96.1 PSEUDOPHAKIA: ICD-10-CM

## 2025-05-19 DIAGNOSIS — H40.1111 PRIMARY OPEN ANGLE GLAUCOMA (POAG) OF RIGHT EYE, MILD STAGE: Primary | ICD-10-CM

## 2025-05-19 DIAGNOSIS — H40.1122 PRIMARY OPEN ANGLE GLAUCOMA (POAG) OF LEFT EYE, MODERATE STAGE: ICD-10-CM

## 2025-05-19 PROCEDURE — 92133 CPTRZD OPH DX IMG PST SGM ON: CPT | Mod: S$GLB,,, | Performed by: OPTOMETRIST

## 2025-05-19 PROCEDURE — 1160F RVW MEDS BY RX/DR IN RCRD: CPT | Mod: CPTII,S$GLB,, | Performed by: OPTOMETRIST

## 2025-05-19 PROCEDURE — 1159F MED LIST DOCD IN RCRD: CPT | Mod: CPTII,S$GLB,, | Performed by: OPTOMETRIST

## 2025-05-19 PROCEDURE — 99999 PR PBB SHADOW E&M-EST. PATIENT-LVL III: CPT | Mod: PBBFAC,,, | Performed by: OPTOMETRIST

## 2025-05-19 PROCEDURE — 99204 OFFICE O/P NEW MOD 45 MIN: CPT | Mod: S$GLB,,, | Performed by: OPTOMETRIST

## 2025-05-19 NOTE — PROGRESS NOTES
HPI     Follow-up            Comments: Return to clinic in 4 months    With IOP Check and GOCT  Pt states no floaters or flashes. VA is good         Last edited by Nereyda Hong on 5/19/2025  8:57 AM.            Assessment /Plan     For exam results, see Encounter Report.    Primary open angle glaucoma (POAG) of right eye, mild stage  Primary open angle glaucoma (POAG) of left eye, moderate stage  -     Posterior Segment OCT Optic Nerve- Both eyes  IOP stable today and within acceptable range relative to target OU  Stable gOCT today compared to previous scans.   No change in treatment  Off gtts, doing well s/p iStent and SLT OU  Monitor 4 months off drops      Pseudophakia  Condition stable, no therapeutic change required.   Monitoring routinely.         RTC 4 months for dilated eye exam with HVF 24-2 or PRN if any problems.   Discussed above and answered questions.

## 2025-05-20 ENCOUNTER — PROCEDURE VISIT (OUTPATIENT)
Dept: DERMATOLOGY | Facility: CLINIC | Age: 85
End: 2025-05-20
Payer: MEDICARE

## 2025-05-20 DIAGNOSIS — L72.0 EPIDERMAL INCLUSION CYST: Primary | ICD-10-CM

## 2025-05-20 PROCEDURE — 88304 TISSUE EXAM BY PATHOLOGIST: CPT | Mod: TC | Performed by: STUDENT IN AN ORGANIZED HEALTH CARE EDUCATION/TRAINING PROGRAM

## 2025-05-20 PROCEDURE — 99499 UNLISTED E&M SERVICE: CPT | Mod: S$GLB,,, | Performed by: STUDENT IN AN ORGANIZED HEALTH CARE EDUCATION/TRAINING PROGRAM

## 2025-05-20 PROCEDURE — 12031 INTMD RPR S/A/T/EXT 2.5 CM/<: CPT | Mod: 51,S$GLB,, | Performed by: STUDENT IN AN ORGANIZED HEALTH CARE EDUCATION/TRAINING PROGRAM

## 2025-05-20 PROCEDURE — 11402 EXC TR-EXT B9+MARG 1.1-2 CM: CPT | Mod: S$GLB,,, | Performed by: STUDENT IN AN ORGANIZED HEALTH CARE EDUCATION/TRAINING PROGRAM

## 2025-05-20 NOTE — PROGRESS NOTES
Patient Information  Name: Ezequiel Hughes  : 1940  MRN: [id number]     Referring Physician:  Dr. Stevens ref. provider found   Primary Care Physician:  Valery Perez MD   Date of Visit: 2025      Subjective:       Ezequiel Hughes is a 85 y.o. male who presents for   Chief Complaint   Patient presents with    Cyst    cyst removal      Cyst removal on back      Cyst    Patient with a subcutaneous nodule his left lower back.  Here for excision.    Dermatologic Surgery preoperative checklist:    Pacemaker/Defibrillator:  no    Anticoagulants:  no    Implants requiring prophylaxis:  no    Any other conditions affecting surgery:  no    Patient was last seen:4/10/2025     Prior notes by myself reviewed.   Clinical documentation obtained by nursing staff reviewed.    Review of Systems   Skin:  Negative for itching and rash.        Objective:    Physical Exam   Constitutional: He appears well-developed and well-nourished. No distress.   Neurological: He is alert and oriented to person, place, and time. He is not disoriented.   Psychiatric: He has a normal mood and affect.   Skin:   Areas Examined (abnormalities noted in diagram):   Back Inspection Performed              Diagram Legend     Erythematous scaling macule/papule c/w actinic keratosis       Vascular papule c/w angioma      Pigmented verrucoid papule/plaque c/w seborrheic keratosis      Yellow umbilicated papule c/w sebaceous hyperplasia      Irregularly shaped tan macule c/w lentigo     1-2 mm smooth white papules consistent with Milia      Movable subcutaneous cyst with punctum c/w epidermal inclusion cyst      Subcutaneous movable cyst c/w pilar cyst      Firm pink to brown papule c/w dermatofibroma      Pedunculated fleshy papule(s) c/w skin tag(s)      Evenly pigmented macule c/w junctional nevus     Mildly variegated pigmented, slightly irregular-bordered macule c/w mildly atypical nevus      Flesh colored to evenly pigmented papule c/w intradermal  nevus       Pink pearly papule/plaque c/w basal cell carcinoma      Erythematous hyperkeratotic cursted plaque c/w SCC      Surgical scar with no sign of skin cancer recurrence      Open and closed comedones      Inflammatory papules and pustules      Verrucoid papule consistent consistent with wart     Erythematous eczematous patches and plaques     Dystrophic onycholytic nail with subungual debris c/w onychomycosis     Umbilicated papule    Erythematous-base heme-crusted tan verrucoid plaque consistent with inflamed seborrheic keratosis     Erythematous Silvery Scaling Plaque c/w Psoriasis     See annotation          [] Data reviewed  [] Independent review of test  [] Management discussed with another provider    Assessment / Plan:      Pathology Orders:       Normal Orders This Visit    Specimen to Pathology, Dermatology     Questions:    Procedure Type: Dermatology and skin neoplasms    Number of Specimens: 1    ------------------------: -------------------------    Spec 1 Procedure: Excision    Spec 1 Clinical Impression: EIC    Spec 1 Source: left lower back    Clinical Information:     Clinical History:     Specimen Source: Back, Left    Release to patient: Immediate    Send normal result to authorizing provider's In Basket if patient is active on MyChart: Yes          Epidermal inclusion cyst  -     Specimen to Pathology, Dermatology  PROCEDURE: Elliptical excision with intermediate layered repair in order to decrease dead space, decrease tension, and close large gap.    ANESTHETIC: 3 cc 1% Xylocaine with Epinephrine 1:100,000, buffered    SURGEON: Deb Burgos M.D.    ASSISTANTS: Sury Dow MA    PREOPERATIVE DIAGNOSIS:  EIC    POSTOPERATIVE DIAGNOSIS:  Same as preoperative diagnosis    PATHOLOGIC DIAGNOSIS: Pending    LOCATION: left lower back    INITIAL LESION SIZE: 1.5 cm    EXCISED DIAMETER: 1.5 cm    PREPARATION: The diagnosis, procedure, alternatives, benefits and risks, including but not limited  to: infection, bleeding/bruising, drug reactions, pain, scar or cosmetic defect, local sensation disturbances, wound dehiscence (separation of wound edges after sutures removed) and/or recurrence of present condition were explained to the patient. The patient elected to proceed.  Patient's identity was verified using 2 patient identifiers and the side and site was verified.  Time out period with surgeon, assistant and patient in surgical suite was taken.    PROCEDURE: The location noted above was prepped, draped, and anesthetized in the usual sterile fashion per Deb Burgos MD. Lesional tissue was carefully marked with at least 0 mm margins of clinically normal skin in all directions. A fusiform elliptical excision was done with #15 blade carried down completely through the dermis into the deep subcutaneous tissues to the level of the non-muscle fascia, and dissection was carried out in that plane.  Electrocoagulation was used to obtain hemostasis. Blood loss was minimal. The wound was then approximated in a layered fashion with subcutaneous and intradermal sutures of 4.0 Monocryl, approximately 3 in number, and the wound was then superficially closed with simple interrupted sutures of 3.0 Prolene.    The patient tolerated the procedure well.    The area was cleaned and dressed appropriately and the patient was given wound care instructions, as well as an appointment for follow-up evaluation.    LENGTH OF REPAIR: 2 cm             LOS NUMBER AND COMPLEXITY OF PROBLEMS    COMPLEXITY OF DATA RISK TOTAL TIME (m)   99669  29123 [] 1 self-limited or minor problem [] Minimal to none [] No treatment recommended or patient to monitor 15-29  10-19   02491  02609 Low  [] 2 or > self limited or minor problems  [] 1 stable chronic illness  [] 1 acute, uncomplicated illness or injury Limited (2)  [] Prior external notes from each unique source  [] Review result of each unique test  [] Order each unique test []  Low  OTC  medications, minor skin biopsy 30-44  20-29   32689  35275 Moderate  []  1 or > chronic illness with progression, exacerbation or SE of treatment  []  2 or more stable chronic illnesses  []  1 acute illness with systemic symptoms  []  1 acute complicated injury  []  1 undiagnosed new problem with uncertain prognosis Moderate (1/3 below)  []  3 or more data items        *Now includes assessment requiring independent historian  []  Independent interpretation of a test  []  Discuss management/test with another provider Moderate  []  Prescription drug mgmt  []  Minor surgery with risk discussed  []  Mgmt limited by social determinates 45-59  30-39   21587  11233 High  []  1 or more chronic illness with severe exacerbation, progression or SE of treatment  []  1 acute or chronic illness/injury that poses a threat to life or bodily function Extensive (2/3 below)  []  3 or more data items        *Now includes assessment requiring independent historian.  []  Independent interpretation of a test  []  Discuss management/test with another provider High  []  Major surgery with risk discussed  []  Drug therapy requiring intensive monitoring for toxicity  []  Hospitalization  []  Decision for DNR 60-74  40-54      No follow-ups on file.    Deb Burgos MD, FAAD  [facility]

## 2025-05-22 LAB
ESTROGEN SERPL-MCNC: NORMAL PG/ML
INSULIN SERPL-ACNC: NORMAL U[IU]/ML
LAB AP CLINICAL INFORMATION: NORMAL
LAB AP GROSS DESCRIPTION: NORMAL
LAB AP REPORT FOOTNOTES: NORMAL
T3RU NFR SERPL: NORMAL %

## 2025-05-23 ENCOUNTER — RESULTS FOLLOW-UP (OUTPATIENT)
Dept: DERMATOLOGY | Facility: CLINIC | Age: 85
End: 2025-05-23

## 2025-05-26 ENCOUNTER — PATIENT MESSAGE (OUTPATIENT)
Dept: PRIMARY CARE CLINIC | Facility: CLINIC | Age: 85
End: 2025-05-26
Payer: MEDICARE

## 2025-05-26 NOTE — TELEPHONE ENCOUNTER
No care due was identified.  [facility] Embedded Care Due Messages. Reference number: [id number].   5/26/2025 1:14:10 PM CDT

## 2025-05-27 ENCOUNTER — OFFICE VISIT (OUTPATIENT)
Dept: NEUROSURGERY | Facility: CLINIC | Age: 85
End: 2025-05-27
Payer: MEDICARE

## 2025-05-27 VITALS — WEIGHT: 212.75 LBS | HEIGHT: 70 IN | BODY MASS INDEX: 30.46 KG/M2

## 2025-05-27 DIAGNOSIS — R29.898 BILATERAL LEG WEAKNESS: ICD-10-CM

## 2025-05-27 DIAGNOSIS — M48.061 SPINAL STENOSIS OF LUMBAR REGION AT MULTIPLE LEVELS: ICD-10-CM

## 2025-05-27 PROCEDURE — G2211 COMPLEX E/M VISIT ADD ON: HCPCS | Mod: S$GLB,,, | Performed by: STUDENT IN AN ORGANIZED HEALTH CARE EDUCATION/TRAINING PROGRAM

## 2025-05-27 PROCEDURE — 1159F MED LIST DOCD IN RCRD: CPT | Mod: CPTII,S$GLB,, | Performed by: STUDENT IN AN ORGANIZED HEALTH CARE EDUCATION/TRAINING PROGRAM

## 2025-05-27 PROCEDURE — 1126F AMNT PAIN NOTED NONE PRSNT: CPT | Mod: CPTII,S$GLB,, | Performed by: STUDENT IN AN ORGANIZED HEALTH CARE EDUCATION/TRAINING PROGRAM

## 2025-05-27 PROCEDURE — 1160F RVW MEDS BY RX/DR IN RCRD: CPT | Mod: CPTII,S$GLB,, | Performed by: STUDENT IN AN ORGANIZED HEALTH CARE EDUCATION/TRAINING PROGRAM

## 2025-05-27 PROCEDURE — 99999 PR PBB SHADOW E&M-EST. PATIENT-LVL IV: CPT | Mod: PBBFAC,,, | Performed by: STUDENT IN AN ORGANIZED HEALTH CARE EDUCATION/TRAINING PROGRAM

## 2025-05-27 PROCEDURE — 99215 OFFICE O/P EST HI 40 MIN: CPT | Mod: S$GLB,,, | Performed by: STUDENT IN AN ORGANIZED HEALTH CARE EDUCATION/TRAINING PROGRAM

## 2025-05-27 RX ORDER — AMLODIPINE BESYLATE 5 MG/1
5 TABLET ORAL 2 TIMES DAILY
Qty: 180 TABLET | Refills: 3 | Status: SHIPPED | OUTPATIENT
Start: 2025-05-27 | End: 2026-05-27

## 2025-05-27 NOTE — PROGRESS NOTES
Subjective:      Patient ID: Ezequiel Hughes is a 85 y.o. male.    Chief Complaint: Results      Mr Hughes is an 86 yo male presenting to office today for initial evaluation for chronic and progressive N/T bilaterally from the knees down to feet. He states he has struggled with back pain in the past but this is not his chief complaint. He has done IAN for this and it alleviated his back pain but not the N/T to the bilateral feet. He         Review of Systems   Musculoskeletal:  Positive for gait problem.   Neurological:  Positive for numbness.        Objective:     Physical Exam:    Constitutional: He appears well-developed and well-nourished.     Psych/Behavior: He is alert. He is oriented to person, place, and time. He has a normal mood and affect.     Musculoskeletal: Gait is normal.        Right Upper Extremities: Range of motion is full. Tone is normal.        Left Upper Extremities: Range of motion is full. Tone is normal.       Right Lower Extremities: Tone is normal.        Left Lower Extremities: Tone is normal.     Neurological:        Cranial nerves: Cranial nerve(s) II, III, IV, V, VI, VII, VIII, IX, X, XI and XII are intact.   4+ /5 DF on R Foot  5/5 DF on L Foot   5/5 PF B Feet  Negative tinnels at the fibular head bilaterally     +2 pitting edema on RLE  +1 pitting edema on LLE   Capillary refill normal BLE   Distal pulses normal BLE - PT, DP     Assessment:     1. Spinal stenosis of lumbar region at multiple levels    2. Bilateral leg weakness       Plan:     Spinal stenosis of lumbar region at multiple levels  -     Ambulatory referral/consult to Neurosurgery    Bilateral leg weakness  -     Ambulatory referral/consult to Neurosurgery    86 yo male presenting for initial evaluation today. Chronic and progressive N/T to the bilateral lower extremities from the knees down. He has MRI for review today which reveals severe central and foraminal stenosis at L4-5. We discussed the risks vs benefits of L4-5 MIS  Laminectomy and he elects to proceed.     No follow-ups on file.     Mr. sampson presents with severe bilateral extremity numbness tingling pain when he stands and walks.  He has done conservative care over the past year or 2 and this problem is worsening.  His most recent epidural spinal injection did not help his leg symptoms at all.  At L4-5 he has completely obliterated spinal canal from ligamentous hypertrophy.  I would recommend minimally invasive laminectomy for decompression.  All risks benefits indications alternatives were discussed and he would like to proceed.

## 2025-05-27 NOTE — PROGRESS NOTES
Subjective:      Patient ID: Ezequiel Hughes is a 85 y.o. male.    Chief Complaint: Results    HPI  Review of Systems   Objective:     Neurosurgery Physical Exam  Assessment:     1. Spinal stenosis of lumbar region at multiple levels    2. Bilateral leg weakness       Plan:     Spinal stenosis of lumbar region at multiple levels  -     Ambulatory referral/consult to Neurosurgery    Bilateral leg weakness  -     Ambulatory referral/consult to Neurosurgery

## 2025-06-02 ENCOUNTER — PATIENT MESSAGE (OUTPATIENT)
Dept: CARDIOLOGY | Facility: CLINIC | Age: 85
End: 2025-06-02
Payer: MEDICARE

## 2025-06-03 ENCOUNTER — CLINICAL SUPPORT (OUTPATIENT)
Dept: DERMATOLOGY | Facility: CLINIC | Age: 85
End: 2025-06-03
Payer: MEDICARE

## 2025-06-03 ENCOUNTER — PATIENT MESSAGE (OUTPATIENT)
Dept: NEUROSURGERY | Facility: CLINIC | Age: 85
End: 2025-06-03
Payer: MEDICARE

## 2025-06-03 DIAGNOSIS — Z48.02 VISIT FOR SUTURE REMOVAL: Primary | ICD-10-CM

## 2025-06-03 PROCEDURE — 99213 OFFICE O/P EST LOW 20 MIN: CPT | Mod: PBBFAC

## 2025-06-03 PROCEDURE — 99024 POSTOP FOLLOW-UP VISIT: CPT | Mod: ,,, | Performed by: STUDENT IN AN ORGANIZED HEALTH CARE EDUCATION/TRAINING PROGRAM

## 2025-06-03 PROCEDURE — 99999 PR PBB SHADOW E&M-EST. PATIENT-LVL III: CPT | Mod: PBBFAC,HCNC,,

## 2025-06-04 RX ORDER — ALFUZOSIN HYDROCHLORIDE 10 MG/1
10 TABLET, EXTENDED RELEASE ORAL
Qty: 90 TABLET | Refills: 3 | Status: SHIPPED | OUTPATIENT
Start: 2025-06-04

## 2025-06-05 ENCOUNTER — PATIENT MESSAGE (OUTPATIENT)
Dept: ORTHOPEDICS | Facility: CLINIC | Age: 85
End: 2025-06-05
Payer: MEDICARE

## 2025-06-05 DIAGNOSIS — M48.061 SPINAL STENOSIS OF LUMBAR REGION AT MULTIPLE LEVELS: Primary | ICD-10-CM

## 2025-06-06 ENCOUNTER — TELEPHONE (OUTPATIENT)
Dept: ORTHOPEDICS | Facility: CLINIC | Age: 85
End: 2025-06-06
Payer: MEDICARE

## 2025-06-06 DIAGNOSIS — M25.561 PAIN IN BOTH KNEES, UNSPECIFIED CHRONICITY: Primary | ICD-10-CM

## 2025-06-06 DIAGNOSIS — M25.562 PAIN IN BOTH KNEES, UNSPECIFIED CHRONICITY: Primary | ICD-10-CM

## 2025-06-07 ENCOUNTER — E-CONSULT (OUTPATIENT)
Dept: CARDIOLOGY | Facility: CLINIC | Age: 85
End: 2025-06-07
Payer: MEDICARE

## 2025-06-07 DIAGNOSIS — Z01.810 PREOP CARDIOVASCULAR EXAM: Primary | ICD-10-CM

## 2025-06-07 NOTE — CONSULTS
[facility]  Response for E-Consult     Patient Name: Ezequiel Hughes  MRN: [id number]  Primary Care Provider: Valery Ozuna MD   Requesting Provider: Yonis Jones MD  E-Consult to General Cardiology  Consult performed by: Alfredo Sam MD  Consult ordered by: Yonis Jones MD          E consult for preop clearance of minimally invasive laminectomy for decompression   The chart reviewed.  PMH CAD HTN HLD CKD IIIa, spinal stenosis,   01/25 EKG NSR PAC  02/25 echo showed EF nml, mild AS  Prior [facility] 7/22 very minimal/non-obstructive CAD.     Plan  Elevated periop risk of CV events for non-high risk procedure.  Ok to proceed the scheduled procedure without further cardiac study.  OK to hold Plavix 7 days before the procedure and resume postop once hemodynamically stable      Total time of Consultation: 10 minute    I did not speak to the requesting provider verbally about this.     *This eConsult is based on the clinical data available to me and is furnished without benefit of a physical examination. The eConsult will need to be interpreted in light of any clinical issues or changes in patient status not available to me at the time of filing this eConsults. Significant changes in patient condition or level of acuity should result in immediate formal consultation and reevaluation. Please alert me if you have further questions.    Thank you for this eConsult referral.     Alfredo Sam MD  [facility] - [facility]

## 2025-06-12 ENCOUNTER — HOSPITAL ENCOUNTER (OUTPATIENT)
Dept: RADIOLOGY | Facility: HOSPITAL | Age: 85
Discharge: HOME | End: 2025-06-12
Attending: PHYSICIAN ASSISTANT
Payer: MEDICARE

## 2025-06-12 ENCOUNTER — OFFICE VISIT (OUTPATIENT)
Dept: ORTHOPEDICS | Facility: CLINIC | Age: 85
End: 2025-06-12
Payer: MEDICARE

## 2025-06-12 VITALS — WEIGHT: 212.75 LBS | HEIGHT: 70 IN | BODY MASS INDEX: 30.46 KG/M2

## 2025-06-12 DIAGNOSIS — M25.561 PAIN IN BOTH KNEES, UNSPECIFIED CHRONICITY: ICD-10-CM

## 2025-06-12 DIAGNOSIS — M25.562 PAIN IN BOTH KNEES, UNSPECIFIED CHRONICITY: ICD-10-CM

## 2025-06-12 DIAGNOSIS — M17.11 PRIMARY OSTEOARTHRITIS OF RIGHT KNEE: ICD-10-CM

## 2025-06-12 DIAGNOSIS — Z96.641 S/P TOTAL RIGHT HIP ARTHROPLASTY: ICD-10-CM

## 2025-06-12 DIAGNOSIS — M25.561 ACUTE PAIN OF RIGHT KNEE: ICD-10-CM

## 2025-06-12 DIAGNOSIS — M54.16 LUMBAR RADICULOPATHY: Primary | ICD-10-CM

## 2025-06-12 PROCEDURE — 73564 X-RAY EXAM KNEE 4 OR MORE: CPT | Mod: 26,50,HCNC, | Performed by: RADIOLOGY

## 2025-06-12 PROCEDURE — 99214 OFFICE O/P EST MOD 30 MIN: CPT | Mod: HCNC,S$GLB,, | Performed by: PHYSICIAN ASSISTANT

## 2025-06-12 PROCEDURE — 73564 X-RAY EXAM KNEE 4 OR MORE: CPT | Mod: TC,50,HCNC

## 2025-06-12 PROCEDURE — 1160F RVW MEDS BY RX/DR IN RCRD: CPT | Mod: CPTII,HCNC,S$GLB, | Performed by: PHYSICIAN ASSISTANT

## 2025-06-12 PROCEDURE — 1125F AMNT PAIN NOTED PAIN PRSNT: CPT | Mod: CPTII,HCNC,S$GLB, | Performed by: PHYSICIAN ASSISTANT

## 2025-06-12 PROCEDURE — 1159F MED LIST DOCD IN RCRD: CPT | Mod: CPTII,HCNC,S$GLB, | Performed by: PHYSICIAN ASSISTANT

## 2025-06-12 PROCEDURE — 99999 PR PBB SHADOW E&M-EST. PATIENT-LVL III: CPT | Mod: PBBFAC,HCNC,, | Performed by: PHYSICIAN ASSISTANT

## 2025-06-12 NOTE — PROGRESS NOTES
Subjective:     Patient ID: Ezequiel Hughes is a 85 y.o. male.    Chief Complaint: Pain of the Right Knee    HPI:  Right hip pain and you points over the posterior part of the hip as well as the groin and over the greater troch.  The left hip is not bothering him.  Pain is 6/10.  Can not walk without the walker and the cane.  He feels more steady with a walker.  He received a femoral nerve and obturator nerve block 09/12/2024 without much relief.  He is on Lyrica.  Can not take NSAIDs due to cardiac issues and being on Plavix.  He did get cleared to have surgery.  You also had right hip injection 07/18/2024 with Kenalog.  This is getting worse over 1 year now.  In the past I did repair rotator cuff tears in both of his shoulders.  He takes Tylenol and I did tell him he needs to take it 3 times a day.  He has good family support his daughter and a significant other were here today.  In the past he did try other medications over-the-counter without much success.  He came so close of having surgery in his lumbar spine by Dr. blum and was canceled.  No fever no chills no shortness breath no difficulty with chewing swallowing loss of bowel bladder control   He does have peripheral edema and he does have compressive stockings at home if she is having hard time putting in on because you can not bend down he has family members that put him on.  I reviewed his medication he is on antihypertensive meds as well as fluid pills  No fever no chills no shortness of breath or difficulty with chewing or swallowing loss of bowel bladder control blurry vision double vision loss sense smell or taste    02/24/2025  Date of surgery 11/13/2024 right RUPINDER-0/10 pain  Left hip arthritis-0/10 pain  Patient despite having arthritis in the left hip he is not having any pain in it.  He is extremely pleased with the right total hip if it was in for the lymphedema.  He finally was placed on Bumex that helped.  You went down from 235 lb the beginning  of January now he is 208 lb.  There is question between him and primary care and Dr. monroy about Bumex and now he is taking half a tablet.  Wondering when he could be switched back to Lasix if needed.  He was told Bumex affects the kidneys.  He does have neuropathy in both of his feet are numb.  He received injections by Dr. DWYER as well as Dr. Haywood.  He is taking pregabalin/Lyrica 75 mg twice a day.  He is refusing to have surgery on his back.  Some of his back pain eased up since having total hip replacement.  He is walking without any assistive devices.  He is going to [facility] in [place] and he only have 2 more sessions to go.  Extremely happy with his results.  We did discuss that pregabalin could be increased to 3 times a day if needed.  He would like another pain management and I recommended zakiya  No fever no chills no shortness breath or difficulty with chewing swallowing loss of bowel bladder control    6/12/25  Patient presents today with chief complaint of right knee pain.  Patient notes pain is at worst a 3/10 affecting activity of daily living and thus his quality of life.  He states more so than pain the extremity feels weak the pain is deep worse at night than during the day he states he is careful of the way he uses an ambulates with it as he  feels it will give out he states it is not that it is so much instability but that it is just overall weak.  Additionally he notes to numbness tingling burning more so to the foot than the extremity.  He denies any trauma or falls   He is not using any devices to assist with ambulation   He is fully independent of ADLs and presents with family to his appointment today    Patient is status post right total hip 11/13/2024   He states overall this has been doing quite well and he is still pleased with his results    The patient is additionally continued with edema in the extremity he has completed lymphedema clinic does not have home pumps     Aside  the patient states he was recently evaluated by [facility] and found to have degenerative disc disease as well as multilevel disc bulging   He is set to have a procedure with Neurosurgery which appears to be a laminectomy based on description  He has not yet heard from their office to schedule and ask if there is a way I can assist with this  He states he reached out to cardiology and did get an E consult for clearance for surgery      Past Medical History:   Diagnosis Date    Allergic rhinitis     Anemia     Back pain     BPH (benign prostatic hyperplasia)     Cancer     skin cancer to neck, Dr. Graves    Cataract     Coronary artery disease involving native coronary artery of native heart without angina pectoris 3/26/2025    Disorder of kidney and ureter     ED (erectile dysfunction)     OMARI (generalized anxiety disorder) 08/07/2023    Hiatal hernia     small    History of colon polyps     colonoscopy 11/2016    HLD (hyperlipidemia)     Hyperlipidemia     Hypertension     Lateral epicondylitis     Lumbar radiculopathy 01/26/2022    OA (osteoarthritis)     GUIDO (obstructive sleep apnea)     Pneumonia of right middle lobe due to infectious organism 11/18/2024    Polyneuropathy     Prostate cancer     Dr. Wong    TIA (transient ischemic attack)     Trouble in sleeping     Urge incontinence 03/29/2022     Past Surgical History:   Procedure Laterality Date    ANGIOGRAPHY OF UPPER EXTREMITY Left 07/05/2022    Procedure: Angiogram Extremity Bilateral;  Surgeon: Aparna Thompson MD;  Location: [facility] CATH LAB;  Service: Cardiology;  Laterality: Left;    ARTERIOGRAPHY OF AORTIC ROOT N/A 07/05/2022    Procedure: ARTERIOGRAM, AORTIC ROOT;  Surgeon: Aparna Thompson MD;  Location: [facility] CATH LAB;  Service: Cardiology;  Laterality: N/A;    BLOCK, NERVE, PERIPHERAL Right 9/12/2024    Procedure: right femoral/ obturator nerve block- with steroids;  Surgeon: Abel Haywood MD;  Location: [facility];  Service:  Pain Management;  Laterality: Right;    CATARACT EXTRACTION W/  INTRAOCULAR LENS IMPLANT Right 02/21/2018    I-Stent    CATARACT EXTRACTION W/  INTRAOCULAR LENS IMPLANT Left 03/21/2018    I - Stent    CATHETERIZATION OF BOTH LEFT AND RIGHT HEART N/A 07/05/2022    Procedure: CATHETERIZATION, HEART, BOTH LEFT AND RIGHT;  Surgeon: Aparna Thompson MD;  Location: [facility] CATH LAB;  Service: Cardiology;  Laterality: N/A;  radial approach    COLONOSCOPY N/A 11/14/2016    Procedure: COLONOSCOPY;  Surgeon: Karuna Rodriguez MD;  Location: [facility] ENDO;  Service: Endoscopy;  Laterality: N/A;    COLONOSCOPY N/A 09/22/2020    Procedure: COLONOSCOPY;  Surgeon: Chuy Fish MD;  Location: [facility] ENDO;  Service: Endoscopy;  Laterality: N/A;    EPIDURAL STEROID INJECTION N/A 6/6/2024    Procedure: Lumbar L5/S1 IL IAN;  Surgeon: Abel Haywood MD;  Location: [facility] PAIN MGT;  Service: Pain Management;  Laterality: N/A;    EPIDURAL STEROID INJECTION INTO CERVICAL SPINE N/A 2/8/2024    Procedure: C5/6 IL Right paramedian approach IAN with RN IV sedation;  Surgeon: Abel Haywood MD;  [facility];  Service: Pain Management;  Laterality: N/A;    EPIDURAL STEROID INJECTION INTO LUMBAR SPINE N/A 4/1/2025    Procedure: L5/S1 IL IAN hold plavix 5 days;  Surgeon: Abel Haywood MD;  Location: [facility] PAIN MGT;  Service: Pain Management;  Laterality: N/A;    EYE SURGERY      HEMORRHOID SURGERY      HIP ARTHROPLASTY Right 11/13/2024    Procedure: ARTHROPLASTY, HIP;  Surgeon: Denton Encarnacion MD;  Location: [facility] OR;  Service: Orthopedics;  Laterality: Right;    I-STENT Right 02/21/2018    DR. REECE    INJECTION OF ANESTHETIC AGENT AROUND MEDIAL BRANCH NERVES INNERVATING CERVICAL FACET JOINT Bilateral 7/18/2023    Procedure: Bilateral C4-6 MBB with RN IV sedation (diagnostic, #1);  Surgeon: Abel Haywood MD;  [facility];  Service: Pain Management;  Laterality: Bilateral;    KNEE ARTHROSCOPY W/ MENISCAL  REPAIR Right 2015    Dr. Jain    PCIOL Right 02/21/2018    DR. REECE    PLANTAR FASCIA RELEASE      right    ROTATOR CUFF REPAIR Bilateral     bilateral    SELECTIVE INJECTION OF ANESTHETIC AGENT AROUND LUMBAR SPINAL NERVE ROOT BY TRANSFORAMINAL APPROACH Bilateral 01/26/2022    Procedure: Bilateral L4/5 TF IAN with RN IV sedation;  Surgeon: Mak Young MD;  Location: [facility] PAIN MGT;  Service: Pain Management;  Laterality: Bilateral;    SELECTIVE INJECTION OF ANESTHETIC AGENT AROUND LUMBAR SPINAL NERVE ROOT BY TRANSFORAMINAL APPROACH Bilateral 03/14/2022    Procedure: Bilateral L4/5 TF IAN with RN IV sedation;  Surgeon: Mak Young MD;  Location: [facility] PAIN MGT;  Service: Pain Management;  Laterality: Bilateral;    SELECTIVE INJECTION OF ANESTHETIC AGENT AROUND LUMBAR SPINAL NERVE ROOT BY TRANSFORAMINAL APPROACH Bilateral 06/02/2022    Procedure: Bilateral L4/5 TF IAN - D2P Dr. Castro [facility];  Surgeon: Abel Haywood MD;  Location: [facility] PAIN MGT;  Service: Pain Management;  Laterality: Bilateral;    SELECTIVE INJECTION OF ANESTHETIC AGENT AROUND LUMBAR SPINAL NERVE ROOT BY TRANSFORAMINAL APPROACH Bilateral 08/25/2022    Procedure: Bilateral L4/5 TF IAN;  Surgeon: Abel Haywood MD;  Location: [facility] PAIN MGT;  Service: Pain Management;  Laterality: Bilateral;    SELECTIVE INJECTION OF ANESTHETIC AGENT AROUND LUMBAR SPINAL NERVE ROOT BY TRANSFORAMINAL APPROACH Bilateral 6/29/2023    Procedure: Bilateral L4/5 TF IAN RN IV Sedation;  Surgeon: Abel Haywood MD;  Location: [facility] PAIN MGT;  Service: Pain Management;  Laterality: Bilateral;    SELECTIVE INJECTION OF ANESTHETIC AGENT AROUND LUMBAR SPINAL NERVE ROOT BY TRANSFORAMINAL APPROACH Bilateral 4/11/2024    Procedure: Bilateral L4/5 TF IAN;  Surgeon: Abel Haywood MD;  Location: [facility] PAIN MGT;  Service: Pain Management;  Laterality: Bilateral;    SHOULDER SURGERY Bilateral around 2000    Dr. Pepper.  rotator cuff surgeries    VASECTOMY       Family  History   Problem Relation Name Age of Onset    Lung cancer Father Isaiah Hughes         life long smoker    Cancer Father Isaiah Hughes         prostate, lung    Arthritis Mother Emely Hughes     Stroke Sister          TIA    Cataracts Sister      Cancer Brother          prostate    Cataracts Brother      Cataracts Sister      Melanoma Neg Hx      Psoriasis Neg Hx      Lupus Neg Hx      Eczema Neg Hx      Diabetes Neg Hx      Heart disease Neg Hx      Kidney disease Neg Hx      Colon cancer Neg Hx       Social History     Socioeconomic History    Marital status:    Tobacco Use    Smoking status: Former     Current packs/day: 0.00     Average packs/day: 3.0 packs/day for 35.0 years (104.9 ttl pk-yrs)     Types: Cigarettes     Start date: 1960     Quit date: 1985     Years since quittin.9     Passive exposure: Past    Smokeless tobacco: Never   Substance and Sexual Activity    Alcohol use: Yes     Alcohol/week: 3.0 standard drinks of alcohol     Types: 3 Cans of beer per week     Comment: socially    Drug use: No    Sexual activity: Yes     Partners: Female     Birth control/protection: None   Social History Narrative         Social Drivers of Health     Financial Resource Strain: Patient Declined (2024)    Overall Financial Resource Strain (CARDIA)     Difficulty of Paying Living Expenses: Patient declined   Food Insecurity: Patient Declined (2024)    Hunger Vital Sign     Worried About Running Out of Food in the Last Year: Patient declined     Ran Out of Food in the Last Year: Patient declined   Transportation Needs: Patient Declined (2024)    TRANSPORTATION NEEDS     Transportation : Patient declined   Physical Activity: Insufficiently Active (2024)    Exercise Vital Sign     Days of Exercise per Week: 2 days     Minutes of Exercise per Session: 30 min   Stress: Patient Declined (2024)    [employer] [place] [employer] - Occupational Stress  Questionnaire     Feeling of Stress : Patient declined   Housing Stability: Patient Declined (11/18/2024)    Housing Stability Vital Sign     Unable to Pay for Housing in the Last Year: Patient declined     Homeless in the Last Year: Patient declined     Medication List with Changes/Refills   Current Medications    ACETAMINOPHEN (TYLENOL ARTHRITIS ORAL)    Take 2 capsules by mouth 2 (two) times a day.    ALBUTEROL (VENTOLIN HFA) 90 MCG/ACTUATION INHALER    Inhale 2 puffs into the lungs every 6 (six) hours as needed for Wheezing. Rescue    ALFUZOSIN (UROXATRAL) 10 MG TB24    TAKE 1 TABLET (10 MG TOTAL) BY MOUTH DAILY WITH BREAKFAST.    AMLODIPINE (NORVASC) 5 MG TABLET    Take 1 tablet (5 mg total) by mouth 2 (two) times daily.    ATORVASTATIN (LIPITOR) 20 MG TABLET    Take 1 tablet (20 mg total) by mouth once daily.    AZELASTINE (ASTELIN) 137 MCG (0.1 %) NASAL SPRAY    1 spray (137 mcg total) by Nasal route 2 (two) times daily.    BUMETANIDE (BUMEX) 1 MG TABLET    Take 1 tablet (1 mg total) by mouth once daily. For leg swelling ( to replace furosemide)    CITALOPRAM (CELEXA) 10 MG TABLET    TAKE 1 TABLET ONE TIME DAILY FOR ANXIETY    CLOPIDOGREL (PLAVIX) 75 MG TABLET    TAKE 1 TABLET EVERY DAY    DIAZEPAM (VALIUM) 5 MG TABLET    Take 1 tablet (5 mg total) by mouth every 6 (six) hours as needed for Anxiety (1 hr prior to mri, can repeat again in 1 hr if not effective).    FINASTERIDE (PROSCAR) 5 MG TABLET    Take 1 tablet (5 mg total) by mouth once daily.    LOSARTAN (COZAAR) 50 MG TABLET    TAKE 1 TABLET TWICE DAILY    PANTOPRAZOLE (PROTONIX) 40 MG TABLET    TAKE 1 TABLET EVERY DAY    POLYETHYLENE GLYCOL (GLYCOLAX) 17 GRAM/DOSE POWDER    Take 17 g by mouth once daily.    POTASSIUM CHLORIDE SA (K-DUR,KLOR-CON) 20 MEQ TABLET    Take 1 tablet (20 mEq total) by mouth once daily.    PREGABALIN (LYRICA) 75 MG CAPSULE    Take 1 capsule (75 mg total) by mouth 2 (two) times daily.    TOLTERODINE (DETROL LA) 2 MG CP24    Take  1 capsule (2 mg total) by mouth once daily.    VITAMIN D (VITAMIN D3) 1000 UNITS TAB    Take 1,000 Units by mouth once daily.     Review of patient's allergies indicates:   Allergen Reactions    Atarax [hydroxyzine hcl] Other (See Comments)     Raised blood pressure     Zyrtec [cetirizine] Other (See Comments)     Raised blood pressure     Gold sodium thiomalate     Gold sodium thiosulfate      Patch test positive    Meloxicam Rash     Other reaction(s): hypertension     Review of Systems   Constitutional: Negative for decreased appetite.   HENT:  Negative for tinnitus.    Eyes:  Negative for double vision.   Cardiovascular:  Negative for chest pain.   Respiratory:  Negative for wheezing.    Hematologic/Lymphatic: Negative for bleeding problem.   Skin:  Negative for dry skin.   Musculoskeletal:  Positive for arthritis, back pain, joint pain, muscle weakness and stiffness. Negative for gout and neck pain.   Gastrointestinal:  Negative for abdominal pain.   Genitourinary:  Negative for bladder incontinence.   Neurological:  Negative for numbness, paresthesias and sensory change.   Psychiatric/Behavioral:  Negative for altered mental status.        Objective:   Body mass index is 30.53 kg/m².  There were no vitals filed for this visit.       General    Constitutional: He is oriented to person, place, and time. He appears well-developed.   HENT:   Head: Atraumatic.   Eyes: EOM are normal.   Pulmonary/Chest: Effort normal.   Neurological: He is alert and oriented to person, place, and time.   Psychiatric: Judgment normal.             R KNEE:  ROM: passive flex/ ext full  Patella midling, mild crepitus noted   Ligaments stable  No defect in the patellar or quadriceps tendon  Pain on palpation to anterior patella  Calf NT, soft  (-) Ольга sign  DF/PF full  Wiggles toes  Sensation intact to light touch   Mild edema appreciated diffusely throughout the extremity  NVI  Cap refill < 2 sec    Skin warm to touch, no obvious  lesion noted       Xray:  FINDINGS:  There is mild-to-moderate joint space narrowing seen involving the lateral compartment of the right knee.  There is equivocal minimal joint space narrowing seen involving the medial compartment of the left knee.  Mild degenerative changes seen at both patellofemoral compartments.  No joint effusions are suggested.  No acute fracture or dislocation.  Vascular calcifications are noted.     Impression:     1.  As above         Assessment:     No diagnosis found.       Plan:   There are no diagnoses linked to this encounter.       There are no Patient Instructions on file for this visit.    Mr. Hughes presents today with chief complaint of right knee pain which is a new complaint to him although well established with our office given history of right total hip performed in 2024. We had a long discussion today regarding degenerative arthritis in the knees. The patient understands that arthritis is chronic and will worsen over time.  The patient also understands that arthritis may cause episodic flare-ups in pain. Management or if arthritis is achieved through a multi-modal approach including weight loss in obese individuals, activity modification, NSAIDs (topical vs oral) where appropriate, periodic intra-articular steroid injections, viscosupplementation, physical therapy, knee bracing, ambulatory aids, as well as geniculate nerve blocks.  Although personally I do believe the patient is experiencing more lumbar radiculopathy today based on presentation and symptoms.  I do agree with Neurosurgery that he proceed with laminectomy.  He ask that I reach out to their office to see if anything more can be done in regards to scheduling surgery.  We discussed short-acting steroid injections today into the knee but I do not want this to delay or interfere in any way with an upcoming procedure.  He has an annual follow-up before the end of the year for his hip.  I ask that anything acutely  change in the knee he reach out to our office and he may call with any questions or concerns in the interim.    Dr. Encarnacion is aware of the patient & current presentation. He agrees with the current plan above.     Disclaimer: This note was prepared using a voice recognition system and is likely to have sound alike errors within the text.

## 2025-06-16 ENCOUNTER — PATIENT MESSAGE (OUTPATIENT)
Dept: PRIMARY CARE CLINIC | Facility: CLINIC | Age: 85
End: 2025-06-16
Payer: MEDICARE

## 2025-06-20 ENCOUNTER — OFFICE VISIT (OUTPATIENT)
Dept: INTERNAL MEDICINE | Facility: CLINIC | Age: 85
End: 2025-06-20
Payer: MEDICARE

## 2025-06-20 ENCOUNTER — LAB VISIT (OUTPATIENT)
Dept: LAB | Facility: HOSPITAL | Age: 85
End: 2025-06-20
Attending: UROLOGY
Payer: MEDICARE

## 2025-06-20 VITALS
BODY MASS INDEX: 30.08 KG/M2 | SYSTOLIC BLOOD PRESSURE: 112 MMHG | HEART RATE: 70 BPM | HEIGHT: 70 IN | DIASTOLIC BLOOD PRESSURE: 70 MMHG | WEIGHT: 210.13 LBS | RESPIRATION RATE: 18 BRPM

## 2025-06-20 DIAGNOSIS — N39.41 URGE INCONTINENCE: ICD-10-CM

## 2025-06-20 DIAGNOSIS — C44.519 BASAL CELL CARCINOMA (BCC) OF ANTERIOR CHEST: ICD-10-CM

## 2025-06-20 DIAGNOSIS — C61 PROSTATE CANCER: ICD-10-CM

## 2025-06-20 DIAGNOSIS — D47.2 MGUS (MONOCLONAL GAMMOPATHY OF UNKNOWN SIGNIFICANCE): ICD-10-CM

## 2025-06-20 DIAGNOSIS — I27.20 PULMONARY HYPERTENSION: ICD-10-CM

## 2025-06-20 DIAGNOSIS — Z00.00 ENCOUNTER FOR MEDICARE ANNUAL WELLNESS EXAM: ICD-10-CM

## 2025-06-20 DIAGNOSIS — M10.9 GOUT, UNSPECIFIED CAUSE, UNSPECIFIED CHRONICITY, UNSPECIFIED SITE: ICD-10-CM

## 2025-06-20 DIAGNOSIS — I10 PRIMARY HYPERTENSION: ICD-10-CM

## 2025-06-20 DIAGNOSIS — N18.30 STAGE 3 CHRONIC KIDNEY DISEASE, UNSPECIFIED WHETHER STAGE 3A OR 3B CKD: ICD-10-CM

## 2025-06-20 DIAGNOSIS — N40.0 BENIGN PROSTATIC HYPERPLASIA WITHOUT LOWER URINARY TRACT SYMPTOMS: ICD-10-CM

## 2025-06-20 DIAGNOSIS — E66.9 OBESITY (BMI 30-39.9): ICD-10-CM

## 2025-06-20 DIAGNOSIS — I25.10 CORONARY ARTERY DISEASE INVOLVING NATIVE CORONARY ARTERY OF NATIVE HEART WITHOUT ANGINA PECTORIS: ICD-10-CM

## 2025-06-20 DIAGNOSIS — K21.9 GASTROESOPHAGEAL REFLUX DISEASE, UNSPECIFIED WHETHER ESOPHAGITIS PRESENT: ICD-10-CM

## 2025-06-20 DIAGNOSIS — I70.203 ATHEROSCLEROSIS OF ARTERY OF BOTH LOWER EXTREMITIES: ICD-10-CM

## 2025-06-20 DIAGNOSIS — F41.1 GAD (GENERALIZED ANXIETY DISORDER): ICD-10-CM

## 2025-06-20 DIAGNOSIS — C61 PROSTATE CANCER: Primary | ICD-10-CM

## 2025-06-20 DIAGNOSIS — I70.0 AORTIC ATHEROSCLEROSIS: ICD-10-CM

## 2025-06-20 DIAGNOSIS — J84.10 LUNG GRANULOMA: ICD-10-CM

## 2025-06-20 DIAGNOSIS — H40.1122 PRIMARY OPEN ANGLE GLAUCOMA (POAG) OF LEFT EYE, MODERATE STAGE: ICD-10-CM

## 2025-06-20 DIAGNOSIS — G47.33 OBSTRUCTIVE SLEEP APNEA SYNDROME: ICD-10-CM

## 2025-06-20 DIAGNOSIS — M47.817 LUMBOSACRAL SPONDYLOSIS WITHOUT MYELOPATHY: ICD-10-CM

## 2025-06-20 DIAGNOSIS — E78.2 MIXED HYPERLIPIDEMIA: ICD-10-CM

## 2025-06-20 PROBLEM — M54.16 LUMBAR RADICULOPATHY: Status: RESOLVED | Noted: 2022-01-26 | Resolved: 2025-06-20

## 2025-06-20 LAB — PSA SERPL-MCNC: 1.7 NG/ML

## 2025-06-20 PROCEDURE — 99999 PR PBB SHADOW E&M-EST. PATIENT-LVL V: CPT | Mod: PBBFAC,HCNC,, | Performed by: NURSE PRACTITIONER

## 2025-06-20 PROCEDURE — 84153 ASSAY OF PSA TOTAL: CPT | Mod: HCNC

## 2025-06-20 PROCEDURE — 36415 COLL VENOUS BLD VENIPUNCTURE: CPT | Mod: HCNC

## 2025-06-20 NOTE — PATIENT INSTRUCTIONS
Counseling and Referral of Other Preventative  (Italic type indicates deductible and co-insurance are waived)    Patient Name: Ezequiel Hughes  Today's Date: 6/20/2025    Health Maintenance       Date Due Completion Date    RSV Vaccine (Age 60+ and Pregnant patients) (1 - 1-dose 75+ series) Never done ---    TETANUS VACCINE 07/24/2022 7/24/2012    COVID-19 Vaccine (5 - 2024-25 season) 09/01/2024 12/28/2022    Lipid Panel 02/10/2026 2/10/2025        No orders of the defined types were placed in this encounter.      The following information is provided to all patients.  This information is to help you find resources for any of the problems found today that may be affecting your health:                  Living healthy guide: www.[facility].gov      Understanding Diabetes: www.diabetes.org      Eating healthy: www.cdc.gov/healthyweight      [facility] home safety checklist: www.cdc.gov/steadi/patient.html      Agency on Aging: www.goea.[place].gov      Alcoholics anonymous (AA): www.aa.org      Physical Activity: www.maureen.nih.gov/vp9jeki      Tobacco use: www.quitwithusla.org

## 2025-06-20 NOTE — PROGRESS NOTES
"  Ezequiel Hughes presented for a follow-up Medicare AWV today. The following components were reviewed and updated:  Patient accompanied by  wife , who was present throughout the visit and aided in information gathering.        Medical history  Family History  Social history  Allergies and Current Medications  Health Risk Assessment  Health Maintenance  Care Team    **See Completed Assessments for Annual Wellness visit with in the encounter summary    The following assessments were completed:  Depression Screening  Cognitive function Screening  Timed Get Up Test  Whisper Test      Opioid documentation:      Patient does not have a current opioid prescription.          Vitals:    25 0859   BP: 112/70   Patient Position: Sitting   Pulse: 70   Resp: 18   Weight: 95.3 kg (210 lb 1.6 oz)   Height: 5' 10" (1.778 m)     Body mass index is 30.15 kg/m².       Physical Exam  Vitals and nursing note reviewed.   Constitutional:       Appearance: Normal appearance.   HENT:      Head: Normocephalic and atraumatic.      Right Ear: Decreased hearing noted.      Left Ear: Decreased hearing noted.   Eyes:      Pupils: Pupils are equal, round, and reactive to light.   Cardiovascular:      Rate and Rhythm: Normal rate and regular rhythm.      Heart sounds: Murmur heard.   Pulmonary:      Effort: Pulmonary effort is normal.      Breath sounds: Normal breath sounds.   Musculoskeletal:         General: Normal range of motion.      Right lower le+ Edema present.      Left lower le+ Edema present.   Skin:     General: Skin is warm.   Neurological:      Mental Status: He is alert. Mental status is at baseline.   Psychiatric:         Mood and Affect: Mood normal.         Behavior: Behavior normal.         Thought Content: Thought content normal.         Judgment: Judgment normal.       Current Outpatient Medications   Medication Instructions    acetaminophen (TYLENOL ARTHRITIS ORAL) 2 capsules, 2 times daily    albuterol (VENTOLIN " HFA) 90 mcg/actuation inhaler 2 puffs, Inhalation, Every 6 hours PRN, Rescue    alfuzosin (UROXATRAL) 10 mg, Oral, With breakfast    amLODIPine (NORVASC) 5 mg, Oral, 2 times daily    atorvastatin (LIPITOR) 20 mg, Oral, Daily    azelastine (ASTELIN) 137 mcg, Nasal, 2 times daily    bumetanide (BUMEX) 1 mg, Oral, Daily, For leg swelling ( to replace furosemide)    citalopram (CELEXA) 10 MG tablet TAKE 1 TABLET ONE TIME DAILY FOR ANXIETY    clopidogreL (PLAVIX) 75 mg, Oral    diazePAM (VALIUM) 5 mg, Oral, Every 6 hours PRN    finasteride (PROSCAR) 5 mg, Oral, Daily    losartan (COZAAR) 50 mg, Oral, 2 times daily    pantoprazole (PROTONIX) 40 mg, Oral    polyethylene glycol (GLYCOLAX) 17 g, Daily    potassium chloride SA (K-DUR,KLOR-CON) 20 MEQ tablet 20 mEq, Oral, Daily    pregabalin (LYRICA) 75 mg, Oral, 2 times daily    tolterodine (DETROL LA) 2 mg, Oral, Daily    vitamin D (VITAMIN D3) 1,000 Units, Daily         Diagnoses and health risks identified today and associated recommendations/orders:  1. Prostate cancer  Chronic and Stable.  Diagnosed with prostate cancer in 2014.Under survillabnce with labs and MRI  Continue current treatment plan as previously prescribed with your urologist    2. Encounter for Medicare annual wellness exam   Referral to Enhanced Annual Wellness Visit (eAWV) M+3    3. MGUS (monoclonal gammopathy of unknown significance)  Chronic and Stable. Continue current treatment plan as previously prescribed with your hematologist    4. Coronary artery disease involving native coronary artery of native heart without angina pectoris  Chronic and Stable on  Plavix and Lipitor.asa, diet  and exerise   Continue current treatment plan as previously prescribed with your PCP and card    5. Obstructive sleep apnea syndrome  This problem is currently not controlled.   Pt states he doest use cpap- needs need machine  Will [facility] up [facility]    6. Pulmonary hypertension  Chronic and  Ongoing. - duscuseed the need to get back on CPAP  Continue current treatment plan as previously prescribed with your card    7. Benign prostatic hyperplasia without lower urinary tract symptoms  Chronic and Ongoing on [place]. Continue current treatment plan as previously prescribed with you urologist    8. Aortic atherosclerosis  Chronic and Stable on  Plavix and Lipitor.asa, diet  and exerise   Continue current treatment plan as previously prescribed with your PCP and card    9. Stage 3 chronic kidney disease, unspecified whether stage 3a or 3b CKD  Chronic and Stable renal and bllod counts. Continue current treatment plan as previously prescribed with your PCP    10. Gastroesophageal reflux disease, unspecified whether esophagitis present  Chronic and Stable on Protonix and diet mod..xgf. Continue current treatment plan as previously prescribed with your PCP    11. Gout, unspecified cause, unspecified chronicity, unspecified site  Chronic and Stable. Continue current treatment plan as previously prescribed with your PCP    12. Mixed hyperlipidemia  Chronic and Stable on  Lipitor diet  and exerise   Continue current treatment plan as previously prescribed with your PCP and card    13. Lumbosacral spondylosis without myelopathy  Chronic-scheduled for lumbar surgery 7./10./25 with     14. Primary open angle glaucoma (POAG) of left eye, moderate stage  Chronic-stable undesurvillance by opth    15. Urge incontinence  Chronic and Ongoing states he hasn't started the Uroxtral - will discuss with urologsit on next visit.     16. Basal cell carcinoma (BCC) of anterior chest  Stable and controlled  with surgical removal-states no new lesin -followed by derm    17. OMARI (generalized anxiety disorder)  Chronic and Stable on Celezx. Continue current treatment plan as previously prescribed with your PCP    18. Atherosclerosis of artery of both lower extremities   Chronic and Stable on  Plavix and Lipitor.asa, diet  and  exerise   Continue current treatment plan as previously prescribed with your PCP and card    19 .Obesity  Discussed and recommend  low fat/carb/chol diet. Cardio exercise as tolerated. Life style modifications.      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 no new problems. no sob.    Stablle -followed by PCP      21 Primary hypertension   Chronic and Stable on Norvasc and Losartan. Continue current treatment plan as previously prescribed with your PCP-card    I offered to discuss advanced care planning, including how to pick a person who would make decisions for you if you were unable to make them for yourself, called a health care power of , and what kind of decisions you might make such as use of life sustaining treatments such as ventilators and tube feeding when faced with a life limiting illness recorded on a living will that they will need to know. (How you want to be cared for as you near the end of your natural life)     X  Patient has advanced directives on file, which we reviewed, and they do not wish to make changes.    Provided Ezequiel with a 5-10 year written screening schedule and personal prevention plan. Recommendations were developed using the USPSTF age appropriate recommendations. Education, counseling, and referrals were provided as needed.  After Visit Summary printed and given to patient which includes a list of additional screenings\tests needed.    Follow up in about 1 year (around 6/20/2026).    Milady Lomas NP

## 2025-06-25 ENCOUNTER — OFFICE VISIT (OUTPATIENT)
Dept: UROLOGY | Facility: CLINIC | Age: 85
End: 2025-06-25
Payer: MEDICARE

## 2025-06-25 VITALS
HEART RATE: 57 BPM | BODY MASS INDEX: 29.51 KG/M2 | RESPIRATION RATE: 17 BRPM | HEIGHT: 70 IN | WEIGHT: 206.13 LBS | TEMPERATURE: 98 F | SYSTOLIC BLOOD PRESSURE: 99 MMHG | DIASTOLIC BLOOD PRESSURE: 57 MMHG

## 2025-06-25 DIAGNOSIS — N13.8 BPH WITH OBSTRUCTION/LOWER URINARY TRACT SYMPTOMS: ICD-10-CM

## 2025-06-25 DIAGNOSIS — R35.1 NOCTURIA: ICD-10-CM

## 2025-06-25 DIAGNOSIS — N40.1 BPH WITH OBSTRUCTION/LOWER URINARY TRACT SYMPTOMS: ICD-10-CM

## 2025-06-25 DIAGNOSIS — C61 PROSTATE CANCER: Primary | ICD-10-CM

## 2025-06-25 PROCEDURE — 1101F PT FALLS ASSESS-DOCD LE1/YR: CPT | Mod: CPTII,HCNC,S$GLB, | Performed by: UROLOGY

## 2025-06-25 PROCEDURE — 99214 OFFICE O/P EST MOD 30 MIN: CPT | Mod: HCNC,S$GLB,, | Performed by: UROLOGY

## 2025-06-25 PROCEDURE — 3288F FALL RISK ASSESSMENT DOCD: CPT | Mod: CPTII,HCNC,S$GLB, | Performed by: UROLOGY

## 2025-06-25 PROCEDURE — 1126F AMNT PAIN NOTED NONE PRSNT: CPT | Mod: CPTII,HCNC,S$GLB, | Performed by: UROLOGY

## 2025-06-25 PROCEDURE — 1159F MED LIST DOCD IN RCRD: CPT | Mod: CPTII,HCNC,S$GLB, | Performed by: UROLOGY

## 2025-06-25 PROCEDURE — 99999 PR PBB SHADOW E&M-EST. PATIENT-LVL IV: CPT | Mod: PBBFAC,HCNC,, | Performed by: UROLOGY

## 2025-06-25 NOTE — PROGRESS NOTES
"    CC: follow up prostate cancer    History of Present Illness:   Ezequiel Hughes is a 85 y.o. male here for evaluation of Follow-up (3 month w/ PSA)    6/25/25-Pt reports that he never Took detrol because he wasn't sure if it was cancelling out his bumex. Nocturia x 3 last night but sometimes up to hourly. Lower extremity swelling is improving. Having back surgery on L4/L5 soon. He is continuing to take the alfuzosin and finasteride.     3/28/25-Pt believes he needs to lose weight. Has nocturia x 8, large volumes and states that he understands it is related to his lower extremity swelling. Feels like he is emptying. He does have urgency.     12/20/24-Pt having nocturia sometimes every hour. He has been having significant lower extremity edema and he is on lasix and compression stockings. Had a hip surgery last month, but was having edema before that. He does feel like he is emptying well. Having some UUI.     10/15/24: here for biopsy results. Path shows Rae grade group 2 prostate cancer in 2/7 cores at the target. None otherwise. Pt did well following biopsy. No longer having significant gross hematuria or hematochezia. No fever.     8/27/24-MRI shows a PI RADS 4 lesion, measuring 7x4mm in the  right posteriolateral peripheral zone at the mid-gland. I have personally reviewed these images.   Hasn't started alfuzosin yet, but he states that it was sent to him. He does have slight UUI at times. Still having significant nocturia. No gross hematuria or dysuria.     8/16/24-still with a lot of nocturia (6-7). Tries to limit pm fluid intake. He was having significant discomfort in his groin/leg on the right, which was causing limping. He has also had injections in his hip. Still on finasteride.     5/10/24-PSA up a little. Pt continues to take  finasteride. States that his urination is "terrible". He has nocturia x 7-8. He is scared to take vesicare. Sometimes feels like he doesn't empty. He does have GUIDO, but doesn't " "use his C-pap.     2/7/24-Pt currently in PT for his back. LUTS have been better. Nocturia x 2 last night. Emptying better. No hesitancy or stranguria. No gross hematuria. Slight UUI at times. He is not taking vesicare yet, but wants to start. States that he does have it.     11/21/23-IPSS 24, QoL 5 (unhappy). Nocutria x 4. He is currently on alfuzosin and finasteride but is out of the solifenacin. Took it for 1 month and it didn't help. Would like to try something stronger. Primary bother is urgency. No stranguria or difficulty emptying. Nocturia x 4.   8/11/23-Pt on proscar. He quit detrol because he wet the bed. Nocturia x 2-6. Stream is not always good. Hasn't tried Viagra yet. Was in the ED the other day with chest pain and had a UA, which showed 1+ blood, but no RBCs. Wearing his C-pap.   5/5/23-Pt reports that he is on finasteride, but isn't taking alfuzosin. Nocturia x 5-6. He has a little slight "leakage" throughout the day. He does have urgency. Didn't have any improvement with alfuzosin.   9/28/22-On finasteride. Nocturia x 4-5. Tried flomax a long time ago and it didn't help. Drinks 2 cups of coffee in the am. Not drinking about an hour before bed. No gross hematuria.   6/30/22-Nocturia x 1 lately, but prior to the last 2 nights, it has been 4 times. Still on finasteride. He does have urgency and occasional UUI. If he goes out, he wears a pad. Stream is weak. No hesitancy. Recently, visits have gone to every 6 months. Pt reports occasional RLQ pain. He does have come constipation, but pain doesn't change with BM. Persistent for a month.    Prior records indicate last MRI and TRUS biopsy in 2020.   3/29/22- 83yo male with h/o Prostate cancer, here to establish care. He has previously seen Dr. Wong and was undergoing active surveillance. He reports that he was diagnosed with prostate cancer in 2014. He hasn't had any treatment. He is currently managed on finasteride. He does report some UUI, small " amounts. He had a repeat TRUS biopsy in 2021, and pt reports path was unchanged. Also had MRIs around that time as well and states that he had a targetable lesion.         Review of Systems   Respiratory:  Negative for shortness of breath.    Cardiovascular:  Negative for chest pain and palpitations.   Genitourinary:  Negative for hematuria.   Musculoskeletal:  Positive for back pain and neck pain.   Neurological:  Positive for numbness (legs). Negative for dizziness.   All other systems reviewed and are negative.        Past Medical History:   Diagnosis Date    Allergic rhinitis     Anemia     Back pain     BPH (benign prostatic hyperplasia)     Cancer     skin cancer to neck, Dr. Graves    Cataract     Coronary artery disease involving native coronary artery of native heart without angina pectoris 03/26/2025    Disorder of kidney and ureter     ED (erectile dysfunction)     OMARI (generalized anxiety disorder) 08/07/2023    Hiatal hernia     small    History of colon polyps     colonoscopy 11/2016    HLD (hyperlipidemia)     Hyperlipidemia     Hypertension     Lateral epicondylitis     Lumbar radiculopathy 01/26/2022    Lumbosacral spondylosis without myelopathy 07/16/2013    OA (osteoarthritis)     Obesity (BMI 30-39.9) 6/20/2025    GUIDO (obstructive sleep apnea)     Pneumonia of right middle lobe due to infectious organism 11/18/2024    Polyneuropathy     Prostate cancer     Dr. Wong    TIA (transient ischemic attack)     Trouble in sleeping     Urge incontinence 03/29/2022       Past Surgical History:   Procedure Laterality Date    ANGIOGRAPHY OF UPPER EXTREMITY Left 07/05/2022    Procedure: Angiogram Extremity Bilateral;  Surgeon: Aparna Thompson MD;  Location: [facility] CATH LAB;  Service: Cardiology;  Laterality: Left;    ARTERIOGRAPHY OF AORTIC ROOT N/A 07/05/2022    Procedure: ARTERIOGRAM, AORTIC ROOT;  Surgeon: Aparna Thompson MD;  Location: [facility] CATH LAB;  Service: Cardiology;  Laterality: N/A;     BLOCK, NERVE, PERIPHERAL Right 9/12/2024    Procedure: right femoral/ obturator nerve block- with steroids;  Surgeon: Abel Haywood MD;  Location: [facility] PAIN MGT;  Service: Pain Management;  Laterality: Right;    CATARACT EXTRACTION W/  INTRAOCULAR LENS IMPLANT Right 02/21/2018    I-Stent    CATARACT EXTRACTION W/  INTRAOCULAR LENS IMPLANT Left 03/21/2018    I - Stent    CATHETERIZATION OF BOTH LEFT AND RIGHT HEART N/A 07/05/2022    Procedure: CATHETERIZATION, HEART, BOTH LEFT AND RIGHT;  Surgeon: Aparna Thompson MD;  Location: [facility] CATH LAB;  Service: Cardiology;  Laterality: N/A;  radial approach    COLONOSCOPY N/A 11/14/2016    Procedure: COLONOSCOPY;  Surgeon: Karuna Rodriguez MD;  Location: [facility] ENDO;  Service: Endoscopy;  Laterality: N/A;    COLONOSCOPY N/A 09/22/2020    Procedure: COLONOSCOPY;  Surgeon: Chuy Fish MD;  Location: [facility] ENDO;  Service: Endoscopy;  Laterality: N/A;    EPIDURAL STEROID INJECTION N/A 6/6/2024    Procedure: Lumbar L5/S1 IL IAN;  Surgeon: Abel Haywood MD;  Location: [facility] PAIN MGT;  Service: Pain Management;  Laterality: N/A;    EPIDURAL STEROID INJECTION INTO CERVICAL SPINE N/A 2/8/2024    Procedure: C5/6 IL Right paramedian approach IAN with RN IV sedation;  Surgeon: Abel Haywood MD;  Location: [facility] PAIN MGT;  Service: Pain Management;  Laterality: N/A;    EPIDURAL STEROID INJECTION INTO LUMBAR SPINE N/A 4/1/2025    Procedure: L5/S1 IL IAN hold plavix 5 days;  Surgeon: Abel Haywood MD;  Location: [facility] PAIN MGT;  Service: Pain Management;  Laterality: N/A;    EYE SURGERY      HEMORRHOID SURGERY      HIP ARTHROPLASTY Right 11/13/2024    Procedure: ARTHROPLASTY, HIP;  Surgeon: Denton Encarnacion MD;  Location: [facility] OR;  Service: Orthopedics;  Laterality: Right;    I-STENT Right 02/21/2018    DR. REECE    INJECTION OF ANESTHETIC AGENT AROUND MEDIAL BRANCH NERVES INNERVATING CERVICAL FACET JOINT Bilateral 7/18/2023    Procedure: Bilateral C4-6 MBB  with RN IV sedation (diagnostic, #1);  Surgeon: Abel Haywood MD;  Location: [facility] PAIN MGT;  Service: Pain Management;  Laterality: Bilateral;    KNEE ARTHROSCOPY W/ MENISCAL REPAIR Right 2015    Dr. Jain    PCIOL Right 02/21/2018    DR. REECE    PLANTAR FASCIA RELEASE      right    ROTATOR CUFF REPAIR Bilateral     bilateral    SELECTIVE INJECTION OF ANESTHETIC AGENT AROUND LUMBAR SPINAL NERVE ROOT BY TRANSFORAMINAL APPROACH Bilateral 01/26/2022    Procedure: Bilateral L4/5 TF IAN with RN IV sedation;  Surgeon: Mak Young MD;  Location: [facility] PAIN MGT;  Service: Pain Management;  Laterality: Bilateral;    SELECTIVE INJECTION OF ANESTHETIC AGENT AROUND LUMBAR SPINAL NERVE ROOT BY TRANSFORAMINAL APPROACH Bilateral 03/14/2022    Procedure: Bilateral L4/5 TF IAN with RN IV sedation;  Surgeon: Mak Young MD;  Location: [facility] PAIN MGT;  Service: Pain Management;  Laterality: Bilateral;    SELECTIVE INJECTION OF ANESTHETIC AGENT AROUND LUMBAR SPINAL NERVE ROOT BY TRANSFORAMINAL APPROACH Bilateral 06/02/2022    Procedure: Bilateral L4/5 TF IAN - D2P Dr. Castro [facility];  Surgeon: Abel Haywood MD;  Location: [facility] PAIN MGT;  Service: Pain Management;  Laterality: Bilateral;    SELECTIVE INJECTION OF ANESTHETIC AGENT AROUND LUMBAR SPINAL NERVE ROOT BY TRANSFORAMINAL APPROACH Bilateral 08/25/2022    Procedure: Bilateral L4/5 TF IAN;  Surgeon: Abel Haywood MD;  Location: [facility] PAIN MGT;  Service: Pain Management;  Laterality: Bilateral;    SELECTIVE INJECTION OF ANESTHETIC AGENT AROUND LUMBAR SPINAL NERVE ROOT BY TRANSFORAMINAL APPROACH Bilateral 6/29/2023    Procedure: Bilateral L4/5 TF IAN RN IV Sedation;  Surgeon: Abel Haywood MD;  Location: [facility] PAIN MGT;  Service: Pain Management;  Laterality: Bilateral;    SELECTIVE INJECTION OF ANESTHETIC AGENT AROUND LUMBAR SPINAL NERVE ROOT BY TRANSFORAMINAL APPROACH Bilateral 4/11/2024    Procedure: Bilateral L4/5 TF IAN;  Surgeon: Abel Haywood MD;   Location: [facility];  Service: Pain Management;  Laterality: Bilateral;    SHOULDER SURGERY Bilateral around     Dr. Pepper.  rotator cuff surgeries    VASECTOMY         Family History   Problem Relation Name Age of Onset    Lung cancer Father Isaiah Hughes         life long smoker    Cancer Father Isaiah Hughes         prostate, lung    Arthritis Mother Emely Hughes     Stroke Sister          TIA    Cataracts Sister      Cancer Brother          prostate    Cataracts Brother      Cataracts Sister      Melanoma Neg Hx      Psoriasis Neg Hx      Lupus Neg Hx      Eczema Neg Hx      Diabetes Neg Hx      Heart disease Neg Hx      Kidney disease Neg Hx      Colon cancer Neg Hx         Social History     Tobacco Use    Smoking status: Former     Current packs/day: 0.00     Average packs/day: 3.0 packs/day for 35.0 years (104.9 ttl pk-yrs)     Types: Cigarettes     Start date: 1960     Quit date: 1985     Years since quittin.9     Passive exposure: Past    Smokeless tobacco: Never   Substance Use Topics    Alcohol use: Yes     Alcohol/week: 3.0 standard drinks of alcohol     Types: 3 Cans of beer per week     Comment: socially    Drug use: No       Current Outpatient Medications   Medication Sig Dispense Refill    acetaminophen (TYLENOL ARTHRITIS ORAL) Take 2 capsules by mouth 2 (two) times a day.      albuterol (VENTOLIN HFA) 90 mcg/actuation inhaler Inhale 2 puffs into the lungs every 6 (six) hours as needed for Wheezing. Rescue 18 g 0    alfuzosin (UROXATRAL) 10 mg Tb24 TAKE 1 TABLET (10 MG TOTAL) BY MOUTH DAILY WITH BREAKFAST. 90 tablet 3    amLODIPine (NORVASC) 5 MG tablet Take 1 tablet (5 mg total) by mouth 2 (two) times daily. 180 tablet 3    atorvastatin (LIPITOR) 20 MG tablet Take 1 tablet (20 mg total) by mouth once daily. 90 tablet 3    azelastine (ASTELIN) 137 mcg (0.1 %) nasal spray 1 spray (137 mcg total) by Nasal route 2 (two) times daily. 90 mL 3    bumetanide (BUMEX) 1 MG tablet Take 1  tablet (1 mg total) by mouth once daily. For leg swelling ( to replace furosemide) 30 tablet 11    citalopram (CELEXA) 10 MG tablet TAKE 1 TABLET ONE TIME DAILY FOR ANXIETY 90 tablet 3    clopidogreL (PLAVIX) 75 mg tablet TAKE 1 TABLET EVERY DAY 90 tablet 3    finasteride (PROSCAR) 5 mg tablet Take 1 tablet (5 mg total) by mouth once daily. 90 tablet 4    losartan (COZAAR) 50 MG tablet TAKE 1 TABLET TWICE DAILY 180 tablet 3    pantoprazole (PROTONIX) 40 MG tablet TAKE 1 TABLET EVERY DAY 90 tablet 3    polyethylene glycol (GLYCOLAX) 17 gram/dose powder Take 17 g by mouth once daily.      potassium chloride SA (K-DUR,KLOR-CON) 20 MEQ tablet Take 1 tablet (20 mEq total) by mouth once daily. 90 tablet 3    pregabalin (LYRICA) 75 MG capsule Take 1 capsule (75 mg total) by mouth 2 (two) times daily. 180 capsule 1    tolterodine (DETROL LA) 2 MG Cp24 Take 1 capsule (2 mg total) by mouth once daily. 90 capsule 4    vitamin D (VITAMIN D3) 1000 units Tab Take 1,000 Units by mouth once daily.      diazePAM (VALIUM) 5 MG tablet Take 1 tablet (5 mg total) by mouth every 6 (six) hours as needed for Anxiety (1 hr prior to mri, can repeat again in 1 hr if not effective). 2 tablet 0     No current facility-administered medications for this visit.       Review of patient's allergies indicates:   Allergen Reactions    Atarax [hydroxyzine hcl] Other (See Comments)     Raised blood pressure     Zyrtec [cetirizine] Other (See Comments)     Raised blood pressure     Gold sodium thiomalate     Gold sodium thiosulfate      Patch test positive    Meloxicam Rash     Other reaction(s): hypertension       Physical Exam  Vitals:    06/25/25 0921   BP: (!) 99/57   Pulse: (!) 57   Resp: 17   Temp: 97.8 °F (36.6 °C)         General: Well-developed, well-nourished in no acute distress  HEENT: Normocephalic, atraumatic, Extraocular movements intact  Neck: supple, trachea midline, no cervical or supraclavicular lymphadenopathy  Respirations: even and  unlabored  Rectal: 11/21/23->40g prostate, no nodules or tenderness. No gross blood  Extremities: atraumatic, moves all equally, no clubbing, cyanosis, 21+LE edema on the right only  Psych: normal affect  Skin: warm and dry, no lesions  Neuro: Alert and oriented, Cranial nerves II-XII grossly intact        PSA  7/7/21: 1.3  3/23/22: 1.5  6/16/22: 1.4  9/26/22: 1.5  12/27/22: 1.9  2/9/23: 1.3  8/3/23: 2.0  11/14/23: 1.4  2/6/24: 1.6  5/3/24: 2.8  8/9/24: 4.8  8/16/24: 2.8  12/16/24: 1.7  3/20/25: 1.6  6/20/25: 1.70    Assessment:   1. Prostate cancer  Prostate Specific Antigen, Diagnostic      2. Nocturia        3. BPH with obstruction/lower urinary tract symptoms            Plan:  Prostate cancer  -     Prostate Specific Antigen, Diagnostic; Future; Expected date: 09/25/2025    Nocturia    BPH with obstruction/lower urinary tract symptoms    Pt to continue alfuzosin, finasteride and states that he will start detrol.     Follow up in about 3 months (around 9/25/2025) for labs before visit.

## 2025-06-26 ENCOUNTER — HOSPITAL ENCOUNTER (OUTPATIENT)
Dept: RADIOLOGY | Facility: HOSPITAL | Age: 85
Discharge: HOME | End: 2025-06-26
Payer: MEDICARE

## 2025-06-26 ENCOUNTER — PATIENT MESSAGE (OUTPATIENT)
Dept: PRIMARY CARE CLINIC | Facility: CLINIC | Age: 85
End: 2025-06-26
Payer: MEDICARE

## 2025-06-26 DIAGNOSIS — E78.2 MIXED HYPERLIPIDEMIA: ICD-10-CM

## 2025-06-26 DIAGNOSIS — I10 PRIMARY HYPERTENSION: Primary | ICD-10-CM

## 2025-06-26 DIAGNOSIS — I25.10 CORONARY ARTERY DISEASE INVOLVING NATIVE CORONARY ARTERY OF NATIVE HEART WITHOUT ANGINA PECTORIS: ICD-10-CM

## 2025-06-26 DIAGNOSIS — Z01.818 ENCOUNTER FOR PREOPERATIVE EXAMINATION FOR GENERAL SURGICAL PROCEDURE: ICD-10-CM

## 2025-06-26 PROCEDURE — 71046 X-RAY EXAM CHEST 2 VIEWS: CPT | Mod: TC,HCNC,FY,PO

## 2025-06-26 PROCEDURE — 71046 X-RAY EXAM CHEST 2 VIEWS: CPT | Mod: 26,HCNC,, | Performed by: RADIOLOGY

## 2025-06-27 ENCOUNTER — OFFICE VISIT (OUTPATIENT)
Dept: PRIMARY CARE CLINIC | Facility: CLINIC | Age: 85
End: 2025-06-27
Payer: MEDICARE

## 2025-06-27 VITALS
BODY MASS INDEX: 29.98 KG/M2 | HEART RATE: 75 BPM | TEMPERATURE: 97 F | WEIGHT: 209.44 LBS | SYSTOLIC BLOOD PRESSURE: 100 MMHG | OXYGEN SATURATION: 94 % | HEIGHT: 70 IN | DIASTOLIC BLOOD PRESSURE: 50 MMHG

## 2025-06-27 DIAGNOSIS — N18.31 STAGE 3A CHRONIC KIDNEY DISEASE: ICD-10-CM

## 2025-06-27 DIAGNOSIS — M48.061 SPINAL STENOSIS OF LUMBAR REGION AT MULTIPLE LEVELS: ICD-10-CM

## 2025-06-27 DIAGNOSIS — M54.16 LUMBAR RADICULOPATHY, CHRONIC: ICD-10-CM

## 2025-06-27 DIAGNOSIS — Z01.818 PRE-OP EXAM: Primary | ICD-10-CM

## 2025-06-27 DIAGNOSIS — Z79.01 ENCOUNTER FOR CURRENT LONG-TERM USE OF ANTICOAGULANTS: ICD-10-CM

## 2025-06-27 PROCEDURE — 99214 OFFICE O/P EST MOD 30 MIN: CPT | Mod: HCNC,S$GLB,, | Performed by: NURSE PRACTITIONER

## 2025-06-27 PROCEDURE — 1159F MED LIST DOCD IN RCRD: CPT | Mod: CPTII,HCNC,S$GLB, | Performed by: NURSE PRACTITIONER

## 2025-06-27 PROCEDURE — 99999 PR PBB SHADOW E&M-EST. PATIENT-LVL IV: CPT | Mod: PBBFAC,HCNC,, | Performed by: NURSE PRACTITIONER

## 2025-06-27 PROCEDURE — 1160F RVW MEDS BY RX/DR IN RCRD: CPT | Mod: CPTII,HCNC,S$GLB, | Performed by: NURSE PRACTITIONER

## 2025-06-27 PROCEDURE — 1101F PT FALLS ASSESS-DOCD LE1/YR: CPT | Mod: CPTII,HCNC,S$GLB, | Performed by: NURSE PRACTITIONER

## 2025-06-27 PROCEDURE — 3288F FALL RISK ASSESSMENT DOCD: CPT | Mod: CPTII,HCNC,S$GLB, | Performed by: NURSE PRACTITIONER

## 2025-06-27 PROCEDURE — 1126F AMNT PAIN NOTED NONE PRSNT: CPT | Mod: CPTII,HCNC,S$GLB, | Performed by: NURSE PRACTITIONER

## 2025-06-27 NOTE — PROGRESS NOTES
Patient ID: Ezequiel Hughes is a 85 y.o. male.    Chief Complaint: Pre-op Exam    History of Present Illness    Mr. Hughes presents today for pre-operative clearance for back surgery.    He is scheduled for lumbar laminectomy on the 07/10/2025 The procedure is expected to be outpatient, lasting approximately 25 minutes, with same-day discharge.    Labs drawn yesterday at 11:46 (non-fasting) showed GFR trending at baseline. Hx of stable chronic CKD Blood count remains at baseline, consistent with long-term Plavix use. PT and INR are slightly elevated due to anticoagulants. Urinalysis showed no infection. He typically drinks 64 ounces of water daily but reports decreased intake over the last 2-3 days.    EKG was performed in January by an outside provider. He plans to skip blood pressure medication tonight due to low blood pressure readings but denies dizziness or faintness.    Current medications include Plavix 75mg and Bumex.    Current weight is 209 lbs, down from 214 lbs in May and 230 lbs during previous health complications. He is working toward a goal of under 200 lbs.    He reports occasional consumption of whiskey, beer, and margaritas in moderation, with significant reduction in overall alcohol intake. He alternates alcoholic beverages with water.      ROS:  General: -fever, -chills, -fatigue, -weight gain, -weight loss  Eyes: -vision changes, -redness, -discharge  ENT: -ear pain, -nasal congestion, -sore throat  Cardiovascular: -chest pain, -palpitations, -lower extremity edema  Respiratory: -cough, -shortness of breath  Gastrointestinal: -abdominal pain, -nausea, -vomiting, -diarrhea, -constipation, -blood in stool  Genitourinary: -dysuria, -hematuria, -frequency  Musculoskeletal: -joint pain, -muscle pain  Skin: -rash, -lesion  Neurological: -headache, -dizziness, -numbness, -tingling  Psychiatric: -anxiety, -depression, -sleep difficulty         Wt Readings from Last 10 Encounters:   06/27/25 95 kg (209 lb 7  oz)   06/25/25 93.5 kg (206 lb 2.1 oz)   06/20/25 95.3 kg (210 lb 1.6 oz)   06/12/25 96.5 kg (212 lb 11.9 oz)   05/27/25 96.5 kg (212 lb 11.9 oz)   05/12/25 97.2 kg (214 lb 4.6 oz)   05/01/25 94.5 kg (208 lb 5.4 oz)   04/03/25 96.9 kg (213 lb 10 oz)   04/01/25 95.6 kg (210 lb 12.2 oz)   03/28/25 97.1 kg (214 lb 1.1 oz)       The ASCVD Risk score (Perez DK, et al., 2019) failed to calculate for the following reasons:    The 2019 ASCVD risk score is only valid for ages 40 to 79    PAST MEDICAL HISTORY:  Past Medical History:   Diagnosis Date    Allergic rhinitis     Anemia     Back pain     BPH (benign prostatic hyperplasia)     Cancer     skin cancer to neck, Dr. Graves    Cataract     Coronary artery disease involving native coronary artery of native heart without angina pectoris 03/26/2025    Disorder of kidney and ureter     ED (erectile dysfunction)     OMARI (generalized anxiety disorder) 08/07/2023    Hiatal hernia     small    History of colon polyps     colonoscopy 11/2016    HLD (hyperlipidemia)     Hyperlipidemia     Hypertension     Lateral epicondylitis     Lumbar radiculopathy 01/26/2022    Lumbosacral spondylosis without myelopathy 07/16/2013    OA (osteoarthritis)     Obesity (BMI 30-39.9) 6/20/2025    GUIDO (obstructive sleep apnea)     Pneumonia of right middle lobe due to infectious organism 11/18/2024    Polyneuropathy     Prostate cancer     Dr. Wong    TIA (transient ischemic attack)     Trouble in sleeping     Urge incontinence 03/29/2022       PAST SURGICAL HISTORY:  Past Surgical History:   Procedure Laterality Date    ANGIOGRAPHY OF UPPER EXTREMITY Left 07/05/2022    Procedure: Angiogram Extremity Bilateral;  Surgeon: Aparna Thompson MD;  Location: [facility] CATH LAB;  Service: Cardiology;  Laterality: Left;    ARTERIOGRAPHY OF AORTIC ROOT N/A 07/05/2022    Procedure: ARTERIOGRAM, AORTIC ROOT;  Surgeon: Aparna Thompson MD;  Location: [facility] CATH LAB;  Service: Cardiology;  Laterality: N/A;     BLOCK, NERVE, PERIPHERAL Right 9/12/2024    Procedure: right femoral/ obturator nerve block- with steroids;  Surgeon: Abel Haywood MD;  Location: [facility] PAIN MGT;  Service: Pain Management;  Laterality: Right;    CATARACT EXTRACTION W/  INTRAOCULAR LENS IMPLANT Right 02/21/2018    I-Stent    CATARACT EXTRACTION W/  INTRAOCULAR LENS IMPLANT Left 03/21/2018    I - Stent    CATHETERIZATION OF BOTH LEFT AND RIGHT HEART N/A 07/05/2022    Procedure: CATHETERIZATION, HEART, BOTH LEFT AND RIGHT;  Surgeon: Aparna Thompson MD;  Location: [facility] CATH LAB;  Service: Cardiology;  Laterality: N/A;  radial approach    COLONOSCOPY N/A 11/14/2016    Procedure: COLONOSCOPY;  Surgeon: Karuna Rodriguez MD;  Location: [facility] ENDO;  Service: Endoscopy;  Laterality: N/A;    COLONOSCOPY N/A 09/22/2020    Procedure: COLONOSCOPY;  Surgeon: Chuy Fish MD;  Location: [facility] ENDO;  Service: Endoscopy;  Laterality: N/A;    EPIDURAL STEROID INJECTION N/A 6/6/2024    Procedure: Lumbar L5/S1 IL IAN;  Surgeon: Abel Haywood MD;  Location: [facility] PAIN MGT;  Service: Pain Management;  Laterality: N/A;    EPIDURAL STEROID INJECTION INTO CERVICAL SPINE N/A 2/8/2024    Procedure: C5/6 IL Right paramedian approach IAN with RN IV sedation;  Surgeon: Abel Haywood MD;  Location: [facility] PAIN MGT;  Service: Pain Management;  Laterality: N/A;    EPIDURAL STEROID INJECTION INTO LUMBAR SPINE N/A 4/1/2025    Procedure: L5/S1 IL IAN hold plavix 5 days;  Surgeon: Abel Haywood MD;  Location: [facility] PAIN MGT;  Service: Pain Management;  Laterality: N/A;    EYE SURGERY      HEMORRHOID SURGERY      HIP ARTHROPLASTY Right 11/13/2024    Procedure: ARTHROPLASTY, HIP;  Surgeon: Denton Encarnacion MD;  Location: [facility] OR;  Service: Orthopedics;  Laterality: Right;    I-STENT Right 02/21/2018    DR. REECE    INJECTION OF ANESTHETIC AGENT AROUND MEDIAL BRANCH NERVES INNERVATING CERVICAL FACET JOINT Bilateral 7/18/2023    Procedure: Bilateral C4-6 MBB  with RN IV sedation (diagnostic, #1);  Surgeon: Abel Haywood MD;  Location: [facility] PAIN MGT;  Service: Pain Management;  Laterality: Bilateral;    KNEE ARTHROSCOPY W/ MENISCAL REPAIR Right 2015    Dr. Jain    PCIOL Right 02/21/2018    DR. REECE    PLANTAR FASCIA RELEASE      right    ROTATOR CUFF REPAIR Bilateral     bilateral    SELECTIVE INJECTION OF ANESTHETIC AGENT AROUND LUMBAR SPINAL NERVE ROOT BY TRANSFORAMINAL APPROACH Bilateral 01/26/2022    Procedure: Bilateral L4/5 TF IAN with RN IV sedation;  Surgeon: Mak Young MD;  Location: [facility] PAIN MGT;  Service: Pain Management;  Laterality: Bilateral;    SELECTIVE INJECTION OF ANESTHETIC AGENT AROUND LUMBAR SPINAL NERVE ROOT BY TRANSFORAMINAL APPROACH Bilateral 03/14/2022    Procedure: Bilateral L4/5 TF IAN with RN IV sedation;  Surgeon: Mak Young MD;  Location: [facility] PAIN MGT;  Service: Pain Management;  Laterality: Bilateral;    SELECTIVE INJECTION OF ANESTHETIC AGENT AROUND LUMBAR SPINAL NERVE ROOT BY TRANSFORAMINAL APPROACH Bilateral 06/02/2022    Procedure: Bilateral L4/5 TF IAN - D2P Dr. Castro [facility];  Surgeon: Abel Haywood MD;  Location: [facility] PAIN MGT;  Service: Pain Management;  Laterality: Bilateral;    SELECTIVE INJECTION OF ANESTHETIC AGENT AROUND LUMBAR SPINAL NERVE ROOT BY TRANSFORAMINAL APPROACH Bilateral 08/25/2022    Procedure: Bilateral L4/5 TF IAN;  Surgeon: Abel Haywood MD;  Location: [facility] PAIN MGT;  Service: Pain Management;  Laterality: Bilateral;    SELECTIVE INJECTION OF ANESTHETIC AGENT AROUND LUMBAR SPINAL NERVE ROOT BY TRANSFORAMINAL APPROACH Bilateral 6/29/2023    Procedure: Bilateral L4/5 TF IAN RN IV Sedation;  Surgeon: Abel Hawyood MD;  Location: [facility] PAIN MGT;  Service: Pain Management;  Laterality: Bilateral;    SELECTIVE INJECTION OF ANESTHETIC AGENT AROUND LUMBAR SPINAL NERVE ROOT BY TRANSFORAMINAL APPROACH Bilateral 4/11/2024    Procedure: Bilateral L4/5 TF IAN;  Surgeon: Abel Haywood MD;   Location: [facility];  Service: Pain Management;  Laterality: Bilateral;    SHOULDER SURGERY Bilateral around 2000    Dr. Pepper.  rotator cuff surgeries    VASECTOMY         SOCIAL HISTORY:  Social History[1]    FAMILY HISTORY:  Family History   Problem Relation Name Age of Onset    Lung cancer Father Isaiah Hughes         life long smoker    Cancer Father Isaiah Hughes         prostate, lung    Arthritis Mother Emely Hughes     Stroke Sister          TIA    Cataracts Sister      Cancer Brother          prostate    Cataracts Brother      Cataracts Sister      Melanoma Neg Hx      Psoriasis Neg Hx      Lupus Neg Hx      Eczema Neg Hx      Diabetes Neg Hx      Heart disease Neg Hx      Kidney disease Neg Hx      Colon cancer Neg Hx         ALLERGIES AND MEDICATIONS: updated and reviewed.  Review of patient's allergies indicates:   Allergen Reactions    Atarax [hydroxyzine hcl] Other (See Comments)     Raised blood pressure     Zyrtec [cetirizine] Other (See Comments)     Raised blood pressure     Gold sodium thiomalate     Gold sodium thiosulfate      Patch test positive    Meloxicam Rash     Other reaction(s): hypertension     Current Medications[2]      Physical Exam  Constitutional:       Appearance: Normal appearance. He is obese.   HENT:      Head: Normocephalic and atraumatic.      Right Ear: Tympanic membrane normal.      Left Ear: Tympanic membrane normal.      Nose: Nose normal.      Mouth/Throat:      Mouth: Mucous membranes are moist.   Eyes:      Pupils: Pupils are equal, round, and reactive to light.   Cardiovascular:      Rate and Rhythm: Normal rate.      Pulses: Normal pulses.   Abdominal:      General: Bowel sounds are normal.   Musculoskeletal:         General: Tenderness present.      Cervical back: Normal range of motion.   Skin:     General: Skin is warm and dry.      Capillary Refill: Capillary refill takes less than 2 seconds.   Neurological:      Mental Status: He is alert and oriented to  person, place, and time.   Psychiatric:         Mood and Affect: Mood normal.          Assessment:   LABS:   Lab Results   Component Value Date    HGBA1C 5.1 02/06/2025    HGBA1C 5.1 06/27/2023    HGBA1C 5.3 01/17/2023      Lab Results   Component Value Date    CHOL 122 02/10/2025    CHOL 138 08/23/2024    CHOL 151 02/06/2024     Lab Results   Component Value Date    LDLCALC 56.8 (L) 02/10/2025    LDLCALC 57.8 (L) 08/23/2024    LDLCALC 70.2 02/06/2024     Lab Results   Component Value Date    WBC 3.41 (L) 06/26/2025    HGB 10.7 (L) 06/26/2025    HCT 33.9 (L) 06/26/2025     (L) 06/26/2025    CHOL 122 02/10/2025    TRIG 71 02/10/2025    HDL 51 02/10/2025    ALT 10 06/26/2025    AST 16 06/26/2025     06/30/2025    K 4.9 06/30/2025     06/30/2025    CREATININE 1.6 (H) 06/30/2025    BUN 32 (H) 06/30/2025    CO2 24 06/30/2025    TSH 1.771 02/06/2025    PSA 1.70 06/20/2025    INR 1.2 06/26/2025    HGBA1C 5.1 02/06/2025       Plan:   Ezequiel was seen today for pre-op exam.    Diagnoses and all orders for this visit:    Pre-op exam    Lumbar radiculopathy, chronic    Spinal stenosis of lumbar region at multiple levels    Encounter for current long-term use of anticoagulants    Stage 3a chronic kidney disease  -     Renal Function Panel; Future        Assessment & Plan      IMPRESSION:  -Assessed pre-operative status for upcoming back surgery at [facility] on the 07/10/2025.  -Evaluated renal function, noting slight decrease likely due to dehydration.  -Reviewed recent EKG (January) and chest XR, determining no need for repeat EKG.  -Assessed pre-op clearance requirements, including need for antibiotic (Ancef) prior to procedure.    LUMBAR RADICULOPATHY:  - Patient scheduled for laminectomy   - Conducted pre-procedural exam and assessed the patient's condition requiring surgical intervention.    CHRONIC KIDNEY DISEASE:  - Current renal status is stage 3b stable chronic kidney disease.  - Continued BUMEX  (diuretic), noting its impact on lab results and hydration status.  - Explained importance of proper hydration, especially before surgery and labs.    COAGULATION PROFILE AND ANTIPLATELET THERAPY:  - Monitored PT and INR, which were elevated due to Plavix use.  - Despite elevation, coagulation levels are clinically acceptable.  - Blood count is at baseline due to long-term Plavix therapy.  - Continued Plavix (anticoagulant) therapy with ongoing monitoring of its effects on coagulation profile.    HYDRATION MANAGEMENT:  - Mr. Hughes to increase water intake over the weekend to improve hydration status.  - Discussed that beer does not count as hydration due to its diuretic effects.       FOLLOW-UP:  - Patient is optimized for planned surgery from a Primary Care standpoint.      I spent a total of 32 minutes face to face and non-face to face on the date of this visit.This includes time preparing to see the patient (eg, review of tests, notes), obtaining and/or reviewing additional history from an independent historian and/or outside medical records, documenting clinical information in the electronic health record, independently interpreting results and/or communicating results to the patient/family/caregiver, or care coordinator.  Visit today included increased complexity associated with the care of the episodic problem addressed and managing the longitudinal care of the patient due to the serious and/or complex managed problem(s).      This note was generated with the assistance of ambient listening technology. Verbal consent was obtained by the patient and accompanying visitor(s) for the recording of patient appointment to facilitate this note. I attest to having reviewed and edited the generated note for accuracy, though some syntax or spelling errors may persist. Please contact the author of this note for any clarification.        Sincerely,  Kailash Patel NP          [1]   Social History  Socioeconomic History     Marital status:    Tobacco Use    Smoking status: Former     Current packs/day: 0.00     Average packs/day: 3.0 packs/day for 35.0 years (104.9 ttl pk-yrs)     Types: Cigarettes     Start date: 1960     Quit date: 1985     Years since quittin.9     Passive exposure: Past    Smokeless tobacco: Never   Substance and Sexual Activity    Alcohol use: Yes     Alcohol/week: 3.0 standard drinks of alcohol     Types: 3 Cans of beer per week     Comment: socially    Drug use: No    Sexual activity: Yes     Partners: Female     Birth control/protection: None   Social History Narrative         Social Drivers of Health     Financial Resource Strain: Low Risk  (2025)    Overall Financial Resource Strain (CARDIA)     Difficulty of Paying Living Expenses: Not very hard   Food Insecurity: No Food Insecurity (2025)    Hunger Vital Sign     Worried About Running Out of Food in the Last Year: Never true     Ran Out of Food in the Last Year: Never true   Transportation Needs: No Transportation Needs (2025)    PRAPARE - Transportation     Lack of Transportation (Medical): No     Lack of Transportation (Non-Medical): No   Physical Activity: Insufficiently Active (2025)    Exercise Vital Sign     Days of Exercise per Week: 2 days     Minutes of Exercise per Session: 30 min   Stress: No Stress Concern Present (2025)    [employer] - Occupational Stress Questionnaire     Feeling of Stress : Not at all   Housing Stability: Low Risk  (2025)    Housing Stability Vital Sign     Unable to Pay for Housing in the Last Year: No     Homeless in the Last Year: No   [2]   Current Outpatient Medications   Medication Sig Dispense Refill    acetaminophen (TYLENOL ARTHRITIS ORAL) Take 2 capsules by mouth 2 (two) times a day.      albuterol (VENTOLIN HFA) 90 mcg/actuation inhaler Inhale 2 puffs into the lungs every 6 (six) hours as needed for Wheezing. Rescue 18 g 0     alfuzosin (UROXATRAL) 10 mg Tb24 TAKE 1 TABLET (10 MG TOTAL) BY MOUTH DAILY WITH BREAKFAST. 90 tablet 3    amLODIPine (NORVASC) 5 MG tablet Take 1 tablet (5 mg total) by mouth 2 (two) times daily. 180 tablet 3    atorvastatin (LIPITOR) 20 MG tablet Take 1 tablet (20 mg total) by mouth once daily. 90 tablet 3    azelastine (ASTELIN) 137 mcg (0.1 %) nasal spray 1 spray (137 mcg total) by Nasal route 2 (two) times daily. 90 mL 3    bumetanide (BUMEX) 1 MG tablet Take 1 tablet (1 mg total) by mouth once daily. For leg swelling ( to replace furosemide) 30 tablet 11    citalopram (CELEXA) 10 MG tablet TAKE 1 TABLET ONE TIME DAILY FOR ANXIETY 90 tablet 3    clopidogreL (PLAVIX) 75 mg tablet TAKE 1 TABLET EVERY DAY 90 tablet 3    diazePAM (VALIUM) 5 MG tablet Take 1 tablet (5 mg total) by mouth every 6 (six) hours as needed for Anxiety (1 hr prior to mri, can repeat again in 1 hr if not effective). 2 tablet 0    finasteride (PROSCAR) 5 mg tablet Take 1 tablet (5 mg total) by mouth once daily. 90 tablet 4    losartan (COZAAR) 50 MG tablet TAKE 1 TABLET TWICE DAILY 180 tablet 3    pantoprazole (PROTONIX) 40 MG tablet TAKE 1 TABLET EVERY DAY 90 tablet 3    polyethylene glycol (GLYCOLAX) 17 gram/dose powder Take 17 g by mouth once daily.      potassium chloride SA (K-DUR,KLOR-CON) 20 MEQ tablet Take 1 tablet (20 mEq total) by mouth once daily. 90 tablet 3    pregabalin (LYRICA) 75 MG capsule Take 1 capsule (75 mg total) by mouth 2 (two) times daily. 180 capsule 1    tolterodine (DETROL LA) 2 MG Cp24 Take 1 capsule (2 mg total) by mouth once daily. 90 capsule 4    vitamin D (VITAMIN D3) 1000 units Tab Take 1,000 Units by mouth once daily.       No current facility-administered medications for this visit.

## 2025-06-30 ENCOUNTER — LAB VISIT (OUTPATIENT)
Dept: LAB | Facility: HOSPITAL | Age: 85
End: 2025-06-30
Attending: NURSE PRACTITIONER
Payer: MEDICARE

## 2025-06-30 DIAGNOSIS — N18.31 STAGE 3A CHRONIC KIDNEY DISEASE: ICD-10-CM

## 2025-06-30 LAB
ALBUMIN SERPL BCP-MCNC: 3.8 G/DL (ref 3.5–5.2)
ANION GAP (OHS): 8 MMOL/L (ref 8–16)
BUN SERPL-MCNC: 32 MG/DL (ref 8–23)
CALCIUM SERPL-MCNC: 8.6 MG/DL (ref 8.7–10.5)
CHLORIDE SERPL-SCNC: 107 MMOL/L (ref 95–110)
CO2 SERPL-SCNC: 24 MMOL/L (ref 23–29)
CREAT SERPL-MCNC: 1.6 MG/DL (ref 0.5–1.4)
GFR SERPLBLD CREATININE-BSD FMLA CKD-EPI: 41 ML/MIN/1.73/M2
GLUCOSE SERPL-MCNC: 88 MG/DL (ref 70–110)
PHOSPHATE SERPL-MCNC: 3.6 MG/DL (ref 2.7–4.5)
POTASSIUM SERPL-SCNC: 4.9 MMOL/L (ref 3.5–5.1)
SODIUM SERPL-SCNC: 139 MMOL/L (ref 136–145)

## 2025-06-30 PROCEDURE — 82040 ASSAY OF SERUM ALBUMIN: CPT | Mod: HCNC

## 2025-06-30 PROCEDURE — 36415 COLL VENOUS BLD VENIPUNCTURE: CPT | Mod: HCNC,PO

## 2025-07-01 ENCOUNTER — TELEPHONE (OUTPATIENT)
Dept: PRIMARY CARE CLINIC | Facility: CLINIC | Age: 85
End: 2025-07-01
Payer: MEDICARE

## 2025-07-01 ENCOUNTER — PATIENT MESSAGE (OUTPATIENT)
Dept: PRIMARY CARE CLINIC | Facility: CLINIC | Age: 85
End: 2025-07-01
Payer: MEDICARE

## 2025-07-01 NOTE — TELEPHONE ENCOUNTER
Those are NOT symptoms related to his atorvastatin.     Those symptoms are coming more from his back issues with the numbness and swelling in his feet.     Do NOT stop the atorvastatin

## 2025-07-01 NOTE — TELEPHONE ENCOUNTER
Copied from CRM #[id number]. Topic: Medications - Medication Question  >> Jul 1, 2025 11:15 AM Norma wrote:  Pt  is calling to speak with the nurse regarding atorvastatin (LIPITOR) 20 MG tablet. Reports wanting to discuss the medication side effects. Please give pt a call back at [phone]

## 2025-07-02 ENCOUNTER — TELEPHONE (OUTPATIENT)
Dept: CARDIOLOGY | Facility: HOSPITAL | Age: 85
End: 2025-07-02
Payer: MEDICARE

## 2025-07-03 ENCOUNTER — LAB VISIT (OUTPATIENT)
Dept: LAB | Facility: HOSPITAL | Age: 85
End: 2025-07-03
Payer: MEDICARE

## 2025-07-03 DIAGNOSIS — D47.2 MGUS (MONOCLONAL GAMMOPATHY OF UNKNOWN SIGNIFICANCE): ICD-10-CM

## 2025-07-03 DIAGNOSIS — D69.6 THROMBOCYTOPENIA: ICD-10-CM

## 2025-07-03 DIAGNOSIS — D63.1 ANEMIA IN STAGE 3B CHRONIC KIDNEY DISEASE: ICD-10-CM

## 2025-07-03 DIAGNOSIS — N18.32 ANEMIA IN STAGE 3B CHRONIC KIDNEY DISEASE: ICD-10-CM

## 2025-07-03 DIAGNOSIS — D64.9 ANEMIA, UNSPECIFIED TYPE: ICD-10-CM

## 2025-07-03 LAB
25(OH)D3+25(OH)D2 SERPL-MCNC: 36 NG/ML (ref 30–96)
ABSOLUTE EOSINOPHIL (OHS): 0.35 K/UL
ABSOLUTE MONOCYTE (OHS): 0.29 K/UL (ref 0.3–1)
ABSOLUTE NEUTROPHIL COUNT (OHS): 1.67 K/UL (ref 1.8–7.7)
ALBUMIN SERPL BCP-MCNC: 3.9 G/DL (ref 3.5–5.2)
ALP SERPL-CCNC: 55 UNIT/L (ref 40–150)
ALT SERPL W/O P-5'-P-CCNC: 9 UNIT/L (ref 10–44)
ANION GAP (OHS): 8 MMOL/L (ref 8–16)
AST SERPL-CCNC: 18 UNIT/L (ref 11–45)
BASOPHILS # BLD AUTO: 0.04 K/UL
BASOPHILS NFR BLD AUTO: 1.2 %
BETA 2 MICROGLOBILIN (OHS): 3.8 UG/ML (ref 0–2.5)
BILIRUB SERPL-MCNC: 0.9 MG/DL (ref 0.1–1)
BUN SERPL-MCNC: 30 MG/DL (ref 8–23)
CALCIUM SERPL-MCNC: 8.7 MG/DL (ref 8.7–10.5)
CHLORIDE SERPL-SCNC: 105 MMOL/L (ref 95–110)
CO2 SERPL-SCNC: 22 MMOL/L (ref 23–29)
CREAT SERPL-MCNC: 1.5 MG/DL (ref 0.5–1.4)
ERYTHROCYTE [DISTWIDTH] IN BLOOD BY AUTOMATED COUNT: 13.6 % (ref 11.5–14.5)
FERRITIN SERPL-MCNC: 65 NG/ML (ref 20–300)
GFR SERPLBLD CREATININE-BSD FMLA CKD-EPI: 45 ML/MIN/1.73/M2
GLUCOSE SERPL-MCNC: 112 MG/DL (ref 70–110)
HCT VFR BLD AUTO: 34 % (ref 40–54)
HGB BLD-MCNC: 11 GM/DL (ref 14–18)
IMM GRANULOCYTES # BLD AUTO: 0.01 K/UL (ref 0–0.04)
IMM GRANULOCYTES NFR BLD AUTO: 0.3 % (ref 0–0.5)
LYMPHOCYTES # BLD AUTO: 0.95 K/UL (ref 1–4.8)
MCH RBC QN AUTO: 30.7 PG (ref 27–31)
MCHC RBC AUTO-ENTMCNC: 32.4 G/DL (ref 32–36)
MCV RBC AUTO: 95 FL (ref 82–98)
NUCLEATED RBC (/100WBC) (OHS): 0 /100 WBC
PLATELET # BLD AUTO: 101 K/UL (ref 150–450)
PMV BLD AUTO: 8.9 FL (ref 9.2–12.9)
POTASSIUM SERPL-SCNC: 4.6 MMOL/L (ref 3.5–5.1)
PROT SERPL-MCNC: 7 GM/DL (ref 6–8.4)
RBC # BLD AUTO: 3.58 M/UL (ref 4.6–6.2)
RELATIVE EOSINOPHIL (OHS): 10.6 %
RELATIVE LYMPHOCYTE (OHS): 28.7 % (ref 18–48)
RELATIVE MONOCYTE (OHS): 8.8 % (ref 4–15)
RELATIVE NEUTROPHIL (OHS): 50.4 % (ref 38–73)
SODIUM SERPL-SCNC: 135 MMOL/L (ref 136–145)
WBC # BLD AUTO: 3.31 K/UL (ref 3.9–12.7)

## 2025-07-03 PROCEDURE — 86334 IMMUNOFIX E-PHORESIS SERUM: CPT | Mod: HCNC

## 2025-07-03 PROCEDURE — 83521 IG LIGHT CHAINS FREE EACH: CPT | Mod: HCNC

## 2025-07-03 PROCEDURE — 82040 ASSAY OF SERUM ALBUMIN: CPT | Mod: HCNC

## 2025-07-03 PROCEDURE — 36415 COLL VENOUS BLD VENIPUNCTURE: CPT | Mod: HCNC,PO

## 2025-07-03 PROCEDURE — 82306 VITAMIN D 25 HYDROXY: CPT | Mod: HCNC

## 2025-07-03 PROCEDURE — 82232 ASSAY OF BETA-2 PROTEIN: CPT | Mod: HCNC

## 2025-07-03 PROCEDURE — 82728 ASSAY OF FERRITIN: CPT | Mod: HCNC

## 2025-07-03 PROCEDURE — 84165 PROTEIN E-PHORESIS SERUM: CPT | Mod: HCNC

## 2025-07-03 PROCEDURE — 85025 COMPLETE CBC W/AUTO DIFF WBC: CPT | Mod: HCNC

## 2025-07-07 LAB
ALBUMIN, SPE (OHS): 3.94 G/DL (ref 3.35–5.55)
ALPHA 1 GLOB (OHS): 0.26 GM/DL (ref 0.17–0.41)
ALPHA 2 GLOB (OHS): 0.58 GM/DL (ref 0.43–0.99)
BETA GLOB (OHS): 0.64 GM/DL (ref 0.5–1.1)
GAMMA GLOBULIN (OHS): 0.99 GM/DL (ref 0.67–1.58)
KAPPA LC FREE SER-MCNC: 1.94 MG/L (ref 0.26–1.65)
KAPPA LC FREE/LAMBDA FREE SER: 3.96 MG/DL (ref 0.33–1.94)
LAMBDA LC FREE SERPL-MCNC: 2.04 MG/DL (ref 0.57–2.63)
PROT SERPL-MCNC: 6.4 GM/DL (ref 6–8.4)

## 2025-07-07 NOTE — PRE-PROCEDURE INSTRUCTIONS
Pre op instructions reviewed with PATIENT over telephone, verbalized understanding.    Procedure Date: 7/10/25  Arrival Time:  TBD; We will call you after 2pm the day before your procedure with your arrival time.    Address:   [facility] ([facility], 2nd Building on the left)  [address] [place] [address] [place] [address]  >>>Please enter through front entrance Lobby of 1st floor to Registration desk<<<        !!!INSTRUCTIONS IMPORTANT!!!  NO FOOD, DRINK OR TOBACCO PRODUCTS AFTER MIDNIGHT THE NIGHT BEFORE SURGERY.     Do not eat after 12 midnight, Do not smoke or use chewing tobacco after 12 midnight!  OK to brush teeth, but no gum, candy, or mints!        >>>MEDICATION INSTRUCTIONS<<<: Morning of Surgery, take small sip of water with ONLY these medications:  AMLODIPINE  PROTONIX  FINASTERIDE      *ADHD Medication: Stop taking ADHD/ADD medications 48 hrs prior to surgery, as this can affect the anesthesia used.     *Diabetic/ Prediabetic Patients: !!!If you take diabetic or weight loss medication, Do NOT take morning of surgery unless instructed by Doctor!!!  Metformin to be stopped 24 hrs prior to surgery.   Long Acting Insulin Instructions: HOLD the night before surgery unless instructed differently by Provider!  Ozempic/ Mounjaro/ Wegovy/ Trulicity/ Semaglutide injections or weight loss medication to be stopped 7 days prior to surgery.    If you take Ozempic/ Mounjaro / Wegovy / Trulicity / Semaglutide, Tirzepatide any weight loss injections OR PHENTERMINE --->>> PLEASE LET US KNOW IMMEDIATELY, as these medications need to be stopped 7 days prior to surgery!      !!!STOP ALL Aspirins, NSAIDS, WEIGHT LOSS INJECTIONS/PILLS, Herbal supplements, & Vitamins 7 DAYS BEFORE SURGERY!!!    ____  Avoid Alcoholic beverages 3 days prior to surgery, as it can thin the blood.  ____  NO Acrylic/fake nails or nail polish worn day of surgery (specifically hand/arm & foot surgeries).  ____  NO powder, lotions,  deodorants, oils or cream on body.  ____  Remove all jewelry & piercings & foreign objects before arrival & leave at home.  ____  Remove Dentures, Hearing Aids & Contact Lens prior to surgery.  ____  Bring photo ID and insurance information to hospital (Leave Valuables at Home).  ____  If going home the same day, arrange for a ride home. You will not be able to drive for 24 hrs if Anesthesia was used.   ____  Females (ages 11-60): may need to give a urine sample the morning of surgery; please see Pre op Nurse prior to using the restroom.  ____  Males: Stop ED medications (Viagra, Cialis) 24 hrs prior to surgery.  ____  Wear clean, loose fitting clothing to allow for dressings/ bandages.      Bathing Instructions:    -Shower with anti-bacterial Soap (Hibiclens or Dial) the night before surgery and the morning of.   -Do not use Hibiclens on your face or genitals.   -Apply clean clothes after shower.  -Do not shave your face or body 2 days prior to surgery unless instructed otherwise by your Surgeon.  -Do not apply lotions, creams, powders, oils or deodorant after the morning shower.     -Do not shave your pubic area 7 days prior to surgery (GYN PATIENTS)    [facility] Visitor/Ride Policy:  Only 2 adults allowed in pre op/recovery area during your procedure. You MUST HAVE A RIDE HOME from a responsible adult that you know and trust. Medical Transport, Uber or Lyft can ONLY be used if patient has a responsible adult to accompany them during ride home.       *Signs and symptoms of Infection Before or After Surgery:               !!!If you experience any fever, chills, nausea/ vomiting, foul odor/ excessive drainage from surgical site, flu-like symptoms, new wounds or cuts, PLEASE CALL THE SURGEON OFFICE at [phone] or SEND MESSAGE THROUGH [facility] PORTAL!!!     *If you are running late the morning of surgery, please call the Hospital Surgery Dept @ [phone].     *Billing questions:  [phone]      Thank you,  -[facility] Pre Admit Dept.  [phone] or [phone]  M-F 7:30 am-4:00 pm (Closed Major Holidays)

## 2025-07-08 LAB
PATHOLOGIST INTERPRETATION - IFE SERUM (OHS): NORMAL
PATHOLOGIST INTERPRETATION - IFE SERUM (OHS): NORMAL
PATHOLOGIST REVIEW - SPE (OHS): NORMAL

## 2025-07-09 NOTE — PRE-PROCEDURE INSTRUCTIONS
Called and spoke with PATIENT about the following:     Please arrive to [facility] ([facility]) at 5:30 AM on THURSDAY 7/10/25 for your scheduled procedure.  Address: [address] [place] [address] [place] [address] (2nd Building on left, 1st Floor Lobby)    NO FOOD, DRINK OR TOBACCO PRODUCTS AFTER MIDNIGHT THE NIGHT BEFORE SURGERY.     Do not eat OR drink after 12 midnight, Do not smoke or use chewing tobacco after 12 midnight!  OK to brush teeth, but no gum, candy, or mints!       Thank you,  -[facility] Pre Admit Dept.  Mon-Fri 7:30 am - 4 pm [phone]

## 2025-07-10 ENCOUNTER — ANESTHESIA EVENT (OUTPATIENT)
Dept: SURGERY | Facility: HOSPITAL | Age: 85
End: 2025-07-10
Payer: MEDICARE

## 2025-07-10 ENCOUNTER — HOSPITAL ENCOUNTER (OUTPATIENT)
Facility: HOSPITAL | Age: 85
LOS: 1 days | Discharge: HOME | End: 2025-07-10
Attending: STUDENT IN AN ORGANIZED HEALTH CARE EDUCATION/TRAINING PROGRAM | Admitting: STUDENT IN AN ORGANIZED HEALTH CARE EDUCATION/TRAINING PROGRAM
Payer: MEDICARE

## 2025-07-10 ENCOUNTER — ANESTHESIA (OUTPATIENT)
Dept: SURGERY | Facility: HOSPITAL | Age: 85
End: 2025-07-10
Payer: MEDICARE

## 2025-07-10 VITALS
DIASTOLIC BLOOD PRESSURE: 59 MMHG | BODY MASS INDEX: 29.42 KG/M2 | HEART RATE: 63 BPM | OXYGEN SATURATION: 97 % | WEIGHT: 205.5 LBS | TEMPERATURE: 97 F | RESPIRATION RATE: 15 BRPM | SYSTOLIC BLOOD PRESSURE: 128 MMHG | HEIGHT: 70 IN

## 2025-07-10 DIAGNOSIS — M48.061 SPINAL STENOSIS OF LUMBAR REGION AT MULTIPLE LEVELS: Primary | ICD-10-CM

## 2025-07-10 PROBLEM — M48.062 LUMBAR STENOSIS WITH NEUROGENIC CLAUDICATION: Status: ACTIVE | Noted: 2025-05-27

## 2025-07-10 PROCEDURE — 27201423 OPTIME MED/SURG SUP & DEVICES STERILE SUPPLY: Mod: HCNC | Performed by: STUDENT IN AN ORGANIZED HEALTH CARE EDUCATION/TRAINING PROGRAM

## 2025-07-10 PROCEDURE — 63600175 PHARM REV CODE 636 W HCPCS: Mod: HCNC | Performed by: NURSE ANESTHETIST, CERTIFIED REGISTERED

## 2025-07-10 PROCEDURE — 37000009 HC ANESTHESIA EA ADD 15 MINS: Mod: HCNC | Performed by: STUDENT IN AN ORGANIZED HEALTH CARE EDUCATION/TRAINING PROGRAM

## 2025-07-10 PROCEDURE — 71000033 HC RECOVERY, INTIAL HOUR: Mod: HCNC | Performed by: STUDENT IN AN ORGANIZED HEALTH CARE EDUCATION/TRAINING PROGRAM

## 2025-07-10 PROCEDURE — 37000008 HC ANESTHESIA 1ST 15 MINUTES: Mod: HCNC | Performed by: STUDENT IN AN ORGANIZED HEALTH CARE EDUCATION/TRAINING PROGRAM

## 2025-07-10 PROCEDURE — 25000003 PHARM REV CODE 250: Mod: HCNC | Performed by: NURSE ANESTHETIST, CERTIFIED REGISTERED

## 2025-07-10 PROCEDURE — 71000015 HC POSTOP RECOV 1ST HR: Mod: HCNC | Performed by: STUDENT IN AN ORGANIZED HEALTH CARE EDUCATION/TRAINING PROGRAM

## 2025-07-10 PROCEDURE — 36000710: Mod: HCNC | Performed by: STUDENT IN AN ORGANIZED HEALTH CARE EDUCATION/TRAINING PROGRAM

## 2025-07-10 PROCEDURE — 25000003 PHARM REV CODE 250: Mod: HCNC | Performed by: ANESTHESIOLOGY

## 2025-07-10 PROCEDURE — C1729 CATH, DRAINAGE: HCPCS | Mod: HCNC | Performed by: STUDENT IN AN ORGANIZED HEALTH CARE EDUCATION/TRAINING PROGRAM

## 2025-07-10 PROCEDURE — 63600175 PHARM REV CODE 636 W HCPCS: Mod: HCNC | Performed by: STUDENT IN AN ORGANIZED HEALTH CARE EDUCATION/TRAINING PROGRAM

## 2025-07-10 PROCEDURE — 36000711: Mod: HCNC | Performed by: STUDENT IN AN ORGANIZED HEALTH CARE EDUCATION/TRAINING PROGRAM

## 2025-07-10 RX ORDER — GENTAMICIN 40 MG/ML
INJECTION, SOLUTION INTRAMUSCULAR; INTRAVENOUS
Status: DISCONTINUED | OUTPATIENT
Start: 2025-07-10 | End: 2025-07-10 | Stop reason: HOSPADM

## 2025-07-10 RX ORDER — CEFAZOLIN SODIUM 1 G/3ML
INJECTION, POWDER, FOR SOLUTION INTRAMUSCULAR; INTRAVENOUS
Status: DISCONTINUED | OUTPATIENT
Start: 2025-07-10 | End: 2025-07-10

## 2025-07-10 RX ORDER — EPHEDRINE SULFATE 50 MG/ML
INJECTION, SOLUTION INTRAVENOUS
Status: DISCONTINUED | OUTPATIENT
Start: 2025-07-10 | End: 2025-07-10

## 2025-07-10 RX ORDER — FENTANYL CITRATE 50 UG/ML
25 INJECTION, SOLUTION INTRAMUSCULAR; INTRAVENOUS EVERY 5 MIN PRN
Status: DISCONTINUED | OUTPATIENT
Start: 2025-07-10 | End: 2025-07-10 | Stop reason: HOSPADM

## 2025-07-10 RX ORDER — ONDANSETRON HYDROCHLORIDE 2 MG/ML
4 INJECTION, SOLUTION INTRAVENOUS DAILY PRN
Status: DISCONTINUED | OUTPATIENT
Start: 2025-07-10 | End: 2025-07-10 | Stop reason: HOSPADM

## 2025-07-10 RX ORDER — ROCURONIUM BROMIDE 10 MG/ML
INJECTION, SOLUTION INTRAVENOUS
Status: DISCONTINUED | OUTPATIENT
Start: 2025-07-10 | End: 2025-07-10

## 2025-07-10 RX ORDER — OXYCODONE AND ACETAMINOPHEN 5; 325 MG/1; MG/1
1 TABLET ORAL
Status: DISCONTINUED | OUTPATIENT
Start: 2025-07-10 | End: 2025-07-10 | Stop reason: HOSPADM

## 2025-07-10 RX ORDER — LIDOCAINE HYDROCHLORIDE 20 MG/ML
INJECTION, SOLUTION EPIDURAL; INFILTRATION; INTRACAUDAL; PERINEURAL
Status: DISCONTINUED | OUTPATIENT
Start: 2025-07-10 | End: 2025-07-10

## 2025-07-10 RX ORDER — HYDROCODONE BITARTRATE AND ACETAMINOPHEN 10; 325 MG/1; MG/1
1 TABLET ORAL EVERY 6 HOURS PRN
Qty: 28 TABLET | Refills: 0 | Status: SHIPPED | OUTPATIENT
Start: 2025-07-10 | End: 2025-07-17

## 2025-07-10 RX ORDER — PROPOFOL 10 MG/ML
VIAL (ML) INTRAVENOUS
Status: DISCONTINUED | OUTPATIENT
Start: 2025-07-10 | End: 2025-07-10

## 2025-07-10 RX ORDER — FENTANYL CITRATE 50 UG/ML
INJECTION, SOLUTION INTRAMUSCULAR; INTRAVENOUS
Status: DISCONTINUED | OUTPATIENT
Start: 2025-07-10 | End: 2025-07-10

## 2025-07-10 RX ORDER — ONDANSETRON HYDROCHLORIDE 2 MG/ML
INJECTION, SOLUTION INTRAVENOUS
Status: DISCONTINUED | OUTPATIENT
Start: 2025-07-10 | End: 2025-07-10

## 2025-07-10 RX ORDER — ONDANSETRON HYDROCHLORIDE 2 MG/ML
4 INJECTION, SOLUTION INTRAVENOUS ONCE AS NEEDED
Status: DISCONTINUED | OUTPATIENT
Start: 2025-07-10 | End: 2025-07-10 | Stop reason: HOSPADM

## 2025-07-10 RX ORDER — HYDROMORPHONE HYDROCHLORIDE 2 MG/ML
0.2 INJECTION, SOLUTION INTRAMUSCULAR; INTRAVENOUS; SUBCUTANEOUS EVERY 5 MIN PRN
Status: DISCONTINUED | OUTPATIENT
Start: 2025-07-10 | End: 2025-07-10 | Stop reason: HOSPADM

## 2025-07-10 RX ADMIN — ROCURONIUM BROMIDE 40 MG: 10 INJECTION, SOLUTION INTRAVENOUS at 07:07

## 2025-07-10 RX ADMIN — ONDANSETRON 4 MG: 2 INJECTION INTRAMUSCULAR; INTRAVENOUS at 07:07

## 2025-07-10 RX ADMIN — ROCURONIUM BROMIDE 10 MG: 10 INJECTION, SOLUTION INTRAVENOUS at 07:07

## 2025-07-10 RX ADMIN — SODIUM CHLORIDE, SODIUM LACTATE, POTASSIUM CHLORIDE, AND CALCIUM CHLORIDE: .6; .31; .03; .02 INJECTION, SOLUTION INTRAVENOUS at 08:07

## 2025-07-10 RX ADMIN — CEFAZOLIN 2 G: 330 INJECTION, POWDER, FOR SOLUTION INTRAMUSCULAR; INTRAVENOUS at 07:07

## 2025-07-10 RX ADMIN — LIDOCAINE HYDROCHLORIDE 80 MG: 20 INJECTION, SOLUTION EPIDURAL; INFILTRATION; INTRACAUDAL; PERINEURAL at 07:07

## 2025-07-10 RX ADMIN — EPHEDRINE SULFATE 5 MG: 50 INJECTION INTRAVENOUS at 07:07

## 2025-07-10 RX ADMIN — FENTANYL CITRATE 50 MCG: 50 INJECTION, SOLUTION INTRAMUSCULAR; INTRAVENOUS at 07:07

## 2025-07-10 RX ADMIN — PROPOFOL 100 MG: 10 INJECTION, EMULSION INTRAVENOUS at 07:07

## 2025-07-10 RX ADMIN — FENTANYL CITRATE 50 MCG: 50 INJECTION, SOLUTION INTRAMUSCULAR; INTRAVENOUS at 08:07

## 2025-07-10 RX ADMIN — SUGAMMADEX 200 MG: 100 INJECTION, SOLUTION INTRAVENOUS at 08:07

## 2025-07-10 RX ADMIN — OXYCODONE HYDROCHLORIDE AND ACETAMINOPHEN 1 TABLET: 5; 325 TABLET ORAL at 08:07

## 2025-07-10 RX ADMIN — SODIUM CHLORIDE, SODIUM LACTATE, POTASSIUM CHLORIDE, AND CALCIUM CHLORIDE: .6; .31; .03; .02 INJECTION, SOLUTION INTRAVENOUS at 06:07

## 2025-07-10 NOTE — H&P
Subjective:      Patient ID: Ezequiel Hughes is a 85 y.o. male.     Chief Complaint: Results        Mr Hughes is an 84 yo male presenting to office today for initial evaluation for chronic and progressive N/T bilaterally from the knees down to feet. He states he has struggled with back pain in the past but this is not his chief complaint. He has done IAN for this and it alleviated his back pain but not the N/T to the bilateral feet. He            Review of Systems   Musculoskeletal:  Positive for gait problem.   Neurological:  Positive for numbness.         Objective:      Physical Exam:     Constitutional: He appears well-developed and well-nourished.      Psych/Behavior: He is alert. He is oriented to person, place, and time. He has a normal mood and affect.      Musculoskeletal: Gait is normal.        Right Upper Extremities: Range of motion is full. Tone is normal.        Left Upper Extremities: Range of motion is full. Tone is normal.       Right Lower Extremities: Tone is normal.        Left Lower Extremities: Tone is normal.      Neurological:        Cranial nerves: Cranial nerve(s) II, III, IV, V, VI, VII, VIII, IX, X, XI and XII are intact.   4+ /5 DF on R Foot  5/5 DF on L Foot   5/5 PF B Feet  Negative tinnels at the fibular head bilaterally      +2 pitting edema on RLE  +1 pitting edema on LLE   Capillary refill normal BLE   Distal pulses normal BLE - PT, DP      Assessment:      1. Spinal stenosis of lumbar region at multiple levels    2. Bilateral leg weakness       Plan:      Spinal stenosis of lumbar region at multiple levels  -     Ambulatory referral/consult to Neurosurgery     Bilateral leg weakness  -     Ambulatory referral/consult to Neurosurgery     84 yo male presenting for initial evaluation today. Chronic and progressive N/T to the bilateral lower extremities from the knees down. He has MRI for review today which reveals severe central and foraminal stenosis at L4-5. We discussed the risks  vs benefits of L4-5 MIS Laminectomy and he elects to proceed.      No follow-ups on file.      Mr. sampson presents with severe bilateral extremity numbness tingling pain when he stands and walks.  He has done conservative care over the past year or 2 and this problem is worsening.  His most recent epidural spinal injection did not help his leg symptoms at all.  At L4-5 he has completely obliterated spinal canal from ligamentous hypertrophy.  I would recommend minimally invasive laminectomy for decompression.  All risks benefits indications alternatives were discussed and he would like to proceed.

## 2025-07-10 NOTE — ANESTHESIA PROCEDURE NOTES
Intubation    Date/Time: 7/10/2025 7:07 AM    Performed by: Joaquin Sams CRNA  Authorized by: Joaquin Sams CRNA    Intubation:     Induction:  Intravenous    Mask Ventilation:  Easy mask    Attempts:  1    Attempted By:  CRNA    Method of Intubation:  Direct    Blade:  Avila 2    Laryngeal View Grade: Grade I - full view of cords      Difficult Airway Encountered?: No      Complications:  None    Airway Device:  Oral endotracheal tube    Airway Device Size:  7.5    Style/Cuff Inflation:  Cuffed    Inflation Amount (mL):  6    Tube secured:  23    Secured at:  The lips    Placement Verified By:  Capnometry    Complicating Factors:  None    Findings Post-Intubation:  BS equal bilateral and atraumatic/condition of teeth unchanged

## 2025-07-10 NOTE — PLAN OF CARE
Patient agreeable with plan of care; no questions or concerns from patient or family; all belongings to stay with Gerda; Anes rounded and consented; awaiting MD round

## 2025-07-10 NOTE — TRANSFER OF CARE
"Anesthesia Transfer of Care Note    Patient: Ezequiel Hughes    Procedure(s) Performed: Procedure(s) (LRB):  FACETECTOMY (N/A)  DISSECTION, NERVE, USING OPERATING MICROSCOPE (N/A)    Patient location: PACU    Anesthesia Type: general    Transport from OR: Transported from OR on room air with adequate spontaneous ventilation    Post pain: adequate analgesia    Post assessment: no apparent anesthetic complications and tolerated procedure well    Post vital signs: stable    Level of consciousness: awake, alert and oriented    Nausea/Vomiting: no nausea/vomiting    Complications: none    Transfer of care protocol was followed      Last vitals: Visit Vitals  BP (!) 154/61 (BP Location: Right arm, Patient Position: Lying)   Pulse 65   Temp 37.2 °C (98.9 °F) (Temporal)   Resp 18   Ht 5' 10" (1.778 m)   Wt 93.2 kg (205 lb 7.5 oz)   BMI 29.48 kg/m²     "

## 2025-07-10 NOTE — OP NOTE
Date of service: 07/10/2025    Preoperative diagnosis: L4-5 severe canal stenosis from ligamentous hypertrophy     Postoperative diagnosis: Same     Surgeon: Yonis Jones MD     Assistant: Nicolle KEBEDE     Operative procedures:   1. Right minimally invasive L4-5 laminectomy medial facetectomy for decompression     Anesthesia: General     Complications: None     Estimated blood loss: 5 cc     Description of procedure:  Mr. Schwab is a pleasant 85-year-old male who presented to my office with progressive bilateral lower extremity weakness numbness tingling pain and feeling that his legs were giving out.  Had an MRI scan which showed severe L4-5 central stenosis essentially and obliterated spinal canal.  He had failed conservative care anterior progression of symptoms all risks benefits indications alternatives were discussed with him and signed informed consent was placed in chart.  Patient will an operative theater.  He underwent general endotracheal intubation without complication.  Once all lines were obtained the patient was flipped prone onto an flat top Pedro table with a Wesley frame.  His arms were in Superman position with all pressure points padded.  Antibiotics were given within 30 minutes of incision.  SCD boots were placed DVT prophylaxis.  Beta blockers were continued patient was taken preoperatively.  No voiced by secondary short duration of procedure back was cleaned.  AP and lateral fluoroscopy were used to localize were and L5 the patient was then cleaned prepped and draped in usual sterile fashion.  Time-out was performed.  Midline was marked.  A line 1.5 cm to the right of midline was marked.  A 20 gauge gauge spinal needle was then used to localize the right L4-5 facet complex and a 2 cm incision was planned.  The incision was opened with 10 blade knife and Bovie electrocautery was used for hemostasis and dissection.  Dissection was carried deep and the fascia was opened.  The 1.  LEFT MSG FOR PT TO CALL ME BACK. Metrics dilator was then passed through the fascia onto the posterior elements number right-sided at L4-5.  The soft tissues were scraped away.  Sequential dilation was used an 18 x 70 mm tube was placed in the right-sided L4-5 and fixated to the bed and confirmed to be in good position AP and lateral fluoroscopy.  Microscope was brought in the field.  Small plug of muscle and soft tissue overlying the right L4 lamina inferior articulating process and facet complex.  The high-speed electric drill was then used to drill a hemilaminectomy and medial facetectomy exposing the ligamentum flavum.  The ligament was then elevated and stripped inferiorly using a ball-tip probe and upgoing curette.  The ligament which was quite thick was removed in piecemeal fashion using Kerrison Evans.  Bilateral lateral recess decompression was performed with Kerrison Evans.  This point there was good decompression of the thecal sac across the L4-5 disc space attention was turned to closure.  The wound was copiously irrigated with gentamicin soaked irrigation.  Small amount of FloSeal was used for hemostasis in the epidural space.  The tube was slowly withdrawn and bipolar electrode stasis within the muscle.  The muscle and fascia closed with 0 Vicryl suture subcutaneous layer was closed with 2-0 Vicryl sutures.  The skin was closed with 3-0 Vicryl Rapide and Dermabond was applied to the skin.  At the conclusion of the procedure patient was transferred supine back to his hospital bed and was extubated and transferred to PACU in stable condition.  Counts were recorded as correct x3.  Surgical assistant was well known technical aspects of the surgery including opening and closing.  The surgery can not be performed safely with all surgical assistant.

## 2025-07-10 NOTE — ANESTHESIA PREPROCEDURE EVALUATION
07/10/2025  Ezequiel Hughse is a 85 y.o., male.      Pre-op Assessment    I have reviewed the Patient Summary Reports.     I have reviewed the Nursing Notes. I have reviewed the NPO Status.      Review of Systems  Anesthesia Hx:  No problems with previous Anesthesia                Hematology/Oncology:  Hematology Normal   Oncology Normal                                   Cardiovascular:     Hypertension   CAD    Dysrhythmias                                      Pulmonary:  Pneumonia      Sleep Apnea                Renal/:  Chronic Renal Disease                Hepatic/GI:    Hiatal Hernia, GERD                Musculoskeletal:  Arthritis               Neurological:  TIA,  Neuromuscular Disease,                                   Endocrine:  Endocrine Normal            Dermatological:  Skin Normal    Psych:  Psychiatric History                  Physical Exam  General: Well nourished, Cooperative, Alert and Oriented    Airway:  Mallampati: III / III  Mouth Opening: Small, but > 3cm  TM Distance: 4 - 6 cm  Tongue: Normal  Neck ROM: Flexion Decreased, Extension Decreased, Left Lateral Motion Decreased, Right Lateral Motion Decreased    Dental:  Intact      Patient Active Problem List   Diagnosis    Benign prostatic hyperplasia    Hypertensive disorder    Hyperlipidemia    Cataract    Osteoarthritis    Thrombocytopenia    Anemia, unspecified    Chronic kidney disease, stage 3    Incomplete right bundle branch block    Prostate cancer    Aortic atherosclerosis    Lung granuloma    Atherosclerosis of artery of both lower extremities    Leg swelling    Sleep apnea    Periodic limb movement disorder (PLMD)    Inadequate sleep hygiene    Basal cell carcinoma (BCC) of anterior chest    Gout    History of colon polyps    Diverticulosis of colon    Internal hemorrhoids    Generalized weakness    Other eosinophilia    Chronic  neck pain    Cervical radiculopathy    Bilateral carpal tunnel syndrome    Lumbar radiculopathy, chronic    Urge incontinence    Abnormal EKG    Pulmonary hypertension    Other hydrocephalus    OMARI (generalized anxiety disorder)    Constipation    GERD (gastroesophageal reflux disease)    Primary open angle glaucoma (POAG) of right eye, mild stage    Primary open angle glaucoma (POAG) of left eye, moderate stage    Arthritis of right hip    Acute renal failure superimposed on stage 4 chronic kidney disease    Pneumonia of right middle lobe due to infectious organism    Pedal edema    Lymphedema    MGUS (monoclonal gammopathy of unknown significance)    Coronary artery disease involving native coronary artery of native heart without angina pectoris    Anemia in stage 3b chronic kidney disease    Spinal stenosis of lumbar region at multiple levels    Obesity (BMI 30-39.9)   Results for orders placed during the hospital encounter of 02/03/25    Echo    Interpretation Summary    Left Ventricle: The left ventricle is normal in size. Normal wall thickness. There is concentric remodeling. There is normal systolic function. Ejection fraction is approximately 60%. Grade II diastolic dysfunction.    Right Ventricle: Normal right ventricular cavity size. Wall thickness is normal. Systolic function is normal.    Aortic Valve: There is mild aortic valve sclerosis. There is mild stenosis. Aortic valve area by VTI is 1.8 cm². Aortic valve peak velocity is 2.2 m/s. Mean gradient is 11 mmHg. The dimensionless index is 0.58.    Tricuspid Valve: There is mild regurgitation with a centrally directed jet.    Pulmonary Artery: The estimated pulmonary artery systolic pressure is 55 mmHg.    IVC/SVC: Normal venous pressure at 3 mmHg.        Anesthesia Plan  Type of Anesthesia, risks & benefits discussed:    Anesthesia Type: Gen ETT, MAC  Intra-op Monitoring Plan: Standard ASA Monitors  Post Op Pain Control Plan: multimodal  analgesia  Induction:  IV  Airway Plan: Direct  Informed Consent: Informed consent signed with the Patient and all parties understand the risks and agree with anesthesia plan.  All questions answered.   ASA Score: 2  Day of Surgery Review of History & Physical: H&P Update referred to the surgeon/provider.I have interviewed and examined the patient. I have reviewed the patient's H&P dated: There are no significant changes. H&P completed by Anesthesiologist.    Ready For Surgery From Anesthesia Perspective.     .

## 2025-07-10 NOTE — ANESTHESIA POSTPROCEDURE EVALUATION
Anesthesia Post Evaluation    Patient: Ezequiel Hughes    Procedure(s) Performed: Procedure(s) (LRB):  FACETECTOMY (N/A)  DISSECTION, NERVE, USING OPERATING MICROSCOPE (N/A)    Final Anesthesia Type: general      Patient location during evaluation: PACU  Patient participation: Yes- Able to Participate  Level of consciousness: awake and alert and oriented  Post-procedure vital signs: reviewed and stable  Pain management: adequate  Airway patency: patent  GUIDO mitigation strategies: Verification of full reversal of neuromuscular block  PONV status at discharge: No PONV  Anesthetic complications: no      Cardiovascular status: blood pressure returned to baseline and hemodynamically stable  Respiratory status: unassisted  Hydration status: euvolemic  Follow-up not needed.              Vitals Value Taken Time   /59 07/10/25 08:45   Temp 36.3 °C (97.4 °F) 07/10/25 08:15   Pulse 66 07/10/25 08:47   Resp 9 07/10/25 08:46   SpO2 93 % 07/10/25 08:47   Vitals shown include unfiled device data.      Event Time   Out of Recovery 08:53:05         Pain/Gucci Score: Pain Rating Prior to Med Admin: 5 (7/10/2025  8:40 AM)  Gucci Score: 10 (7/10/2025  8:58 AM)

## 2025-07-28 ENCOUNTER — OFFICE VISIT (OUTPATIENT)
Dept: HEMATOLOGY/ONCOLOGY | Facility: CLINIC | Age: 85
End: 2025-07-28
Payer: MEDICARE

## 2025-07-28 VITALS
RESPIRATION RATE: 18 BRPM | BODY MASS INDEX: 30.23 KG/M2 | HEIGHT: 70 IN | WEIGHT: 211.19 LBS | OXYGEN SATURATION: 98 % | HEART RATE: 64 BPM | SYSTOLIC BLOOD PRESSURE: 110 MMHG | TEMPERATURE: 98 F | DIASTOLIC BLOOD PRESSURE: 57 MMHG

## 2025-07-28 DIAGNOSIS — D63.1 ANEMIA DUE TO STAGE 3A CHRONIC KIDNEY DISEASE: Primary | ICD-10-CM

## 2025-07-28 DIAGNOSIS — N18.31 ANEMIA DUE TO STAGE 3A CHRONIC KIDNEY DISEASE: Primary | ICD-10-CM

## 2025-07-28 DIAGNOSIS — N18.31 STAGE 3A CHRONIC KIDNEY DISEASE: ICD-10-CM

## 2025-07-28 DIAGNOSIS — D47.2 MGUS (MONOCLONAL GAMMOPATHY OF UNKNOWN SIGNIFICANCE): ICD-10-CM

## 2025-07-28 PROCEDURE — 99214 OFFICE O/P EST MOD 30 MIN: CPT | Mod: HCNC,S$GLB,, | Performed by: NURSE PRACTITIONER

## 2025-07-28 PROCEDURE — 1159F MED LIST DOCD IN RCRD: CPT | Mod: CPTII,HCNC,S$GLB, | Performed by: NURSE PRACTITIONER

## 2025-07-28 PROCEDURE — 1160F RVW MEDS BY RX/DR IN RCRD: CPT | Mod: CPTII,HCNC,S$GLB, | Performed by: NURSE PRACTITIONER

## 2025-07-28 PROCEDURE — 1126F AMNT PAIN NOTED NONE PRSNT: CPT | Mod: CPTII,HCNC,S$GLB, | Performed by: NURSE PRACTITIONER

## 2025-07-28 PROCEDURE — 1101F PT FALLS ASSESS-DOCD LE1/YR: CPT | Mod: CPTII,HCNC,S$GLB, | Performed by: NURSE PRACTITIONER

## 2025-07-28 PROCEDURE — 99999 PR PBB SHADOW E&M-EST. PATIENT-LVL V: CPT | Mod: PBBFAC,HCNC,, | Performed by: NURSE PRACTITIONER

## 2025-07-28 PROCEDURE — 1111F DSCHRG MED/CURRENT MED MERGE: CPT | Mod: CPTII,HCNC,S$GLB, | Performed by: NURSE PRACTITIONER

## 2025-07-28 PROCEDURE — 3288F FALL RISK ASSESSMENT DOCD: CPT | Mod: CPTII,HCNC,S$GLB, | Performed by: NURSE PRACTITIONER

## 2025-07-28 NOTE — PROGRESS NOTES
Subjective:       Patient ID: Ezequiel Hughes is a 85 y.o. male.    Chief Complaint:   1. Anemia due to stage 3a chronic kidney disease        2. MGUS (monoclonal gammopathy of unknown significance)          Current Treatment:       Treatment History:    HPI: This is an 85 year old  male with allergic rhinitis, GUIDO, hiatal hernia, TIA, hyperlipidemia, BPH, back pain, HTN, cataract, ED, and lateral epicondylitis who is seen in Hem/Onc for prostate cancer and eosinophilia. He was seen initially in 9/2015 for thrombocytopenia. He was followed until 2018 when he was lost to follow up until 2021. He underwent BMBx to evaluate eosinophilia which revealed few focal clusters of left-shifted eosinophils in the tissue sections and left-shifted eosinophilic maturation also seen in the aspirate smear and touch prep. There was not sufficient findings to diagnose hypereosinophilic syndrome (HES) or chronic eosinophilic leukemia (JOCELINE). No definitive clonal T cell or mast cell-associated processes were identified.  No evidence of plasma cell neoplasm. Corresponding flow cytometry detects no relative marrow eosinophilia, clonal B-cells, aberrant T-cell antigen expression, or increase in blasts.     Next generation sequencing did not reveal any pathogenic genetic alteration. Evaluation for CHIC2, PDGFRA and FIP1L1 gene regions was within normal limits.  CT scan of chest abdomen and pelvis was unremarkable. It was recommended he follow up in a year but was lost to follow up.     He was recently referred by YANDY Carey for anemia.    Review of labs reveals intermittent anemia dating back to 7/2013. No history of iron deficiency. CKD dating back to 4/2013; coincidentally, anemia starts a few months after onset of CKD. Review of previous electrophoresis demonstrates a faint IgG specific kappa monoclonal protein detected in 1/2021 that was not detected previously. It measure 0.34g/dL initially; most recently, it measured  0.15g/dL in 6/2021.     Interval History: Patient presents for follow up on labs. He presents with family and has no complaints today. He denies symptoms of anemia. Discussed that his anemia is due to CKD and that he does not have multiple myeloma but does have MGUS. Discussed treatment recommendations of observation for MGUS. Also discussed treatment recommendations for anemia of CKD. Hgb 11.0; no indication for SHARMILA. Discussed ways to protect kidney function to include hydration, avoiding NSAIDs, and managing HTN with medications and diet. Will refer to Nephrology per his request. Review of labs reveals CKD started in 4/2013. Discussed things that can cause CKD such as HTN and medications including anesthesia and antibiotics. Will have him follow up in 3 months with labs.     Reviewed labs with patient:   CBC:   Recent Labs   Lab 07/03/25  0845   WBC 3.31 L   RBC 3.58 L   HGB 11.0 L   HCT 34.0 L   Platelet Count 101 L   MCV 95   MCH 30.7   MCHC 32.4     CMP:  Recent Labs   Lab 07/03/25  0845   Glucose 112 H   Calcium 8.7   Albumin 3.9   Protein Total 6.4  7.0   Sodium 135 L   Potassium 4.6   CO2 22 L   Chloride 105   BUN 30 H   Creatinine 1.5 H   ALP 55   ALT 9 L   AST 18   Bilirubin Total 0.9     Lab Results   Component Value Date    IRON 76 02/24/2025    TRANSFERRIN 200 02/24/2025    TIBC 296 02/24/2025    FESATURATED 26 02/24/2025      Lab Results   Component Value Date    FERRITIN 65.0 07/03/2025     Social History[1]  Past Medical History:   Diagnosis Date    Allergic rhinitis     Anemia     Back pain     BPH (benign prostatic hyperplasia)     Cancer     skin cancer to neck, Dr. Graves    Cataract     Coronary artery disease involving native coronary artery of native heart without angina pectoris 03/26/2025    Disorder of kidney and ureter     ED (erectile dysfunction)     OMARI (generalized anxiety disorder) 08/07/2023    Hiatal hernia     small    History of colon polyps     colonoscopy  11/2016    HLD (hyperlipidemia)     Hyperlipidemia     Hypertension     Lateral epicondylitis     Lumbar radiculopathy 01/26/2022    Lumbosacral spondylosis without myelopathy 07/16/2013    OA (osteoarthritis)     Obesity (BMI 30-39.9) 6/20/2025    GUIDO (obstructive sleep apnea)     Pneumonia of right middle lobe due to infectious organism 11/18/2024    Polyneuropathy     Prostate cancer     Dr. Wong    TIA (transient ischemic attack)     Trouble in sleeping     Urge incontinence 03/29/2022     Family History   Problem Relation Name Age of Onset    Lung cancer Father Isaiah Hughes         life long smoker    Cancer Father Isaiah Hughes         prostate, lung    Arthritis Mother Emely Hughes     Stroke Sister          TIA    Cataracts Sister      Cancer Brother          prostate    Cataracts Brother      Cataracts Sister      Melanoma Neg Hx      Psoriasis Neg Hx      Lupus Neg Hx      Eczema Neg Hx      Diabetes Neg Hx      Heart disease Neg Hx      Kidney disease Neg Hx      Colon cancer Neg Hx       Past Surgical History:   Procedure Laterality Date    ANGIOGRAPHY OF UPPER EXTREMITY Left 07/05/2022    Procedure: Angiogram Extremity Bilateral;  Surgeon: Aparna Thompson MD;  Location: [facility] CATH LAB;  Service: Cardiology;  Laterality: Left;    ARTERIOGRAPHY OF AORTIC ROOT N/A 07/05/2022    Procedure: ARTERIOGRAM, AORTIC ROOT;  Surgeon: Aparna Thompson MD;  Location: [facility] CATH LAB;  Service: Cardiology;  Laterality: N/A;    BLOCK, NERVE, PERIPHERAL Right 9/12/2024    Procedure: right femoral/ obturator nerve block- with steroids;  Surgeon: Abel Haywood MD;  Location: [facility];  Service: Pain Management;  Laterality: Right;    CATARACT EXTRACTION W/  INTRAOCULAR LENS IMPLANT Right 02/21/2018    I-Stent    CATARACT EXTRACTION W/  INTRAOCULAR LENS IMPLANT Left 03/21/2018    I - Stent    CATHETERIZATION OF BOTH LEFT AND RIGHT HEART N/A 07/05/2022    Procedure: CATHETERIZATION,  HEART, BOTH LEFT AND RIGHT;  Surgeon: Aparna Thompson MD;  Location: [facility] CATH LAB;  Service: Cardiology;  Laterality: N/A;  radial approach    COLONOSCOPY N/A 11/14/2016    Procedure: COLONOSCOPY;  Surgeon: Karuna Rodriguez MD;  Location: [facility] ENDO;  Service: Endoscopy;  Laterality: N/A;    COLONOSCOPY N/A 09/22/2020    Procedure: COLONOSCOPY;  Surgeon: Chuy Fish MD;  Location: [facility] ENDO;  Service: Endoscopy;  Laterality: N/A;    DISSECTION, NERVE, USING OPERATING MICROSCOPE N/A 7/10/2025    Procedure: DISSECTION, NERVE, USING OPERATING MICROSCOPE;  Surgeon: Yonis Jones MD;  Location: [facility] OR;  Service: Neurosurgery;  Laterality: N/A;    EPIDURAL STEROID INJECTION N/A 6/6/2024    Procedure: Lumbar L5/S1 IL IAN;  Surgeon: Abel Haywood MD;  Location: [facility] MGT;  Service: Pain Management;  Laterality: N/A;    EPIDURAL STEROID INJECTION INTO CERVICAL SPINE N/A 2/8/2024    Procedure: C5/6 IL Right paramedian approach IAN with RN IV sedation;  Surgeon: Abel Haywood MD;  Location: [facility];  Service: Pain Management;  Laterality: N/A;    EPIDURAL STEROID INJECTION INTO LUMBAR SPINE N/A 4/1/2025    Procedure: L5/S1 IL IAN hold plavix 5 days;  Surgeon: Abel Haywood MD;  Location: [facility];  Service: Pain Management;  Laterality: N/A;    EYE SURGERY      FACETECTOMY OF VERTEBRA N/A 7/10/2025    Procedure: FACETECTOMY;  Surgeon: Yonis Jones MD;  Location: [facility] OR;  Service: Neurosurgery;  Laterality: N/A;  L4-5    HEMORRHOID SURGERY      HIP ARTHROPLASTY Right 11/13/2024    Procedure: ARTHROPLASTY, HIP;  Surgeon: Denton Encarnacion MD;  Location: [facility] OR;  Service: Orthopedics;  Laterality: Right;    I-STENT Right 02/21/2018    DR. REECE    INJECTION OF ANESTHETIC AGENT AROUND MEDIAL BRANCH NERVES INNERVATING CERVICAL FACET JOINT Bilateral 7/18/2023    Procedure: Bilateral C4-6 MBB with RN IV sedation (diagnostic, #1);  Surgeon: Abel Haywood MD;   Location: [facility];  Service: Pain Management;  Laterality: Bilateral;    KNEE ARTHROSCOPY W/ MENISCAL REPAIR Right 2015    Dr. Jain    PCIOL Right 02/21/2018    DR. REECE    PLANTAR FASCIA RELEASE      right    ROTATOR CUFF REPAIR Bilateral     bilateral    SELECTIVE INJECTION OF ANESTHETIC AGENT AROUND LUMBAR SPINAL NERVE ROOT BY TRANSFORAMINAL APPROACH Bilateral 01/26/2022    Procedure: Bilateral L4/5 TF IAN with RN IV sedation;  Surgeon: Mak Young MD;  Location: [facility];  Service: Pain Management;  Laterality: Bilateral;    SELECTIVE INJECTION OF ANESTHETIC AGENT AROUND LUMBAR SPINAL NERVE ROOT BY TRANSFORAMINAL APPROACH Bilateral 03/14/2022    Procedure: Bilateral L4/5 TF IAN with RN IV sedation;  Surgeon: Mak Young MD;  Location: [facility];  Service: Pain Management;  Laterality: Bilateral;    SELECTIVE INJECTION OF ANESTHETIC AGENT AROUND LUMBAR SPINAL NERVE ROOT BY TRANSFORAMINAL APPROACH Bilateral 06/02/2022    Procedure: Bilateral L4/5 TF IAN - D2P Dr. Matthew CABRERAG;  Surgeon: Abel Haywood MD;  Location: [facility];  Service: Pain Management;  Laterality: Bilateral;    SELECTIVE INJECTION OF ANESTHETIC AGENT AROUND LUMBAR SPINAL NERVE ROOT BY TRANSFORAMINAL APPROACH Bilateral 08/25/2022    Procedure: Bilateral L4/5 TF IAN;  Surgeon: Abel Haywood MD;  Location: [facility];  Service: Pain Management;  Laterality: Bilateral;    SELECTIVE INJECTION OF ANESTHETIC AGENT AROUND LUMBAR SPINAL NERVE ROOT BY TRANSFORAMINAL APPROACH Bilateral 6/29/2023    Procedure: Bilateral L4/5 TF IAN RN IV Sedation;  Surgeon: Abel Haywood MD;  Location: [facility];  Service: Pain Management;  Laterality: Bilateral;    SELECTIVE INJECTION OF ANESTHETIC AGENT AROUND LUMBAR SPINAL NERVE ROOT BY TRANSFORAMINAL APPROACH Bilateral 4/11/2024    Procedure: Bilateral L4/5 TF IAN;  Surgeon: Abel Haywood MD;  Location: [facility];  Service: Pain Management;  Laterality:  Bilateral;    SHOULDER SURGERY Bilateral around 2000    Dr. Pepper.  rotator cuff surgeries    VASECTOMY       Review of Systems   Constitutional:  Positive for fatigue. Negative for appetite change.   HENT:  Negative for mouth sores, rhinorrhea and sore throat.    Eyes: Negative.    Respiratory:  Positive for shortness of breath.    Cardiovascular: Negative.    Gastrointestinal:  Negative for constipation, diarrhea, nausea and vomiting.   Endocrine: Positive for cold intolerance.   Genitourinary: Negative.    Musculoskeletal: Negative.    Integumentary:  Negative.   Allergic/Immunologic: Negative.    Neurological:  Negative for dizziness, weakness, light-headedness, numbness and headaches.   Hematological: Negative.    Psychiatric/Behavioral: Negative.         Medication List with Changes/Refills   Current Medications    ACETAMINOPHEN (TYLENOL ARTHRITIS ORAL)    Take 2 capsules by mouth 2 (two) times a day.    ALBUTEROL (VENTOLIN HFA) 90 MCG/ACTUATION INHALER    Inhale 2 puffs into the lungs every 6 (six) hours as needed for Wheezing. Rescue    ALFUZOSIN (UROXATRAL) 10 MG TB24    TAKE 1 TABLET (10 MG TOTAL) BY MOUTH DAILY WITH BREAKFAST.    AMLODIPINE (NORVASC) 5 MG TABLET    Take 1 tablet (5 mg total) by mouth 2 (two) times daily.    ATORVASTATIN (LIPITOR) 20 MG TABLET    Take 1 tablet (20 mg total) by mouth once daily.    AZELASTINE (ASTELIN) 137 MCG (0.1 %) NASAL SPRAY    1 spray (137 mcg total) by Nasal route 2 (two) times daily.    BUMETANIDE (BUMEX) 1 MG TABLET    Take 1 tablet (1 mg total) by mouth once daily. For leg swelling ( to replace furosemide)    CITALOPRAM (CELEXA) 10 MG TABLET    TAKE 1 TABLET ONE TIME DAILY FOR ANXIETY    CLOPIDOGREL (PLAVIX) 75 MG TABLET    TAKE 1 TABLET EVERY DAY    DIAZEPAM (VALIUM) 5 MG TABLET    Take 1 tablet (5 mg total) by mouth every 6 (six) hours as needed for Anxiety (1 hr prior to mri, can repeat again in 1 hr if not effective).    FINASTERIDE (PROSCAR) 5 MG TABLET     Take 1 tablet (5 mg total) by mouth once daily.    LOSARTAN (COZAAR) 50 MG TABLET    TAKE 1 TABLET TWICE DAILY    PANTOPRAZOLE (PROTONIX) 40 MG TABLET    TAKE 1 TABLET EVERY DAY    POLYETHYLENE GLYCOL (GLYCOLAX) 17 GRAM/DOSE POWDER    Take 17 g by mouth once daily.    POTASSIUM CHLORIDE SA (K-DUR,KLOR-CON) 20 MEQ TABLET    Take 1 tablet (20 mEq total) by mouth once daily.    PREGABALIN (LYRICA) 75 MG CAPSULE    Take 1 capsule (75 mg total) by mouth 2 (two) times daily.    TOLTERODINE (DETROL LA) 2 MG CP24    Take 1 capsule (2 mg total) by mouth once daily.    VITAMIN D (VITAMIN D3) 1000 UNITS TAB    Take 1,000 Units by mouth once daily.     Objective:     Vitals:    07/28/25 0850   BP: (!) 110/57   Pulse: 64   Resp: 18   Temp: 97.9 °F (36.6 °C)     Physical Exam  Vitals reviewed.   Constitutional:       Appearance: Normal appearance.   HENT:      Head: Normocephalic.      Mouth/Throat:      Comments:     Eyes:      Extraocular Movements: Extraocular movements intact.      Pupils: Pupils are equal, round, and reactive to light.   Cardiovascular:      Rate and Rhythm: Normal rate and regular rhythm.      Heart sounds: Normal heart sounds.   Pulmonary:      Effort: Pulmonary effort is normal.      Breath sounds: Normal breath sounds.   Abdominal:      General: Bowel sounds are normal.      Palpations: Abdomen is soft.      Comments: rounded     Genitourinary:     Comments: deferred    Musculoskeletal:         General: Normal range of motion.      Cervical back: Normal range of motion and neck supple.   Skin:     General: Skin is warm and dry.   Neurological:      Mental Status: He is alert and oriented to person, place, and time.   Psychiatric:         Behavior: Behavior normal.         Thought Content: Thought content normal.        (0) Fully active, able to carry on all predisease performance without restriction  Assessment:     Problem List Items Addressed This Visit          Oncology    Anemia, unspecified -  Primary    Likely due to CKD as patient has no history of iron deficiency.          MGUS (monoclonal gammopathy of unknown significance)    Noted during workup for cytopenia. Most recent M spike in 2021 at 0.15g/dL.           Plan:     Anemia due to stage 3a chronic kidney disease    MGUS (monoclonal gammopathy of unknown significance)    Labs reviewed; stable anemia.  No indication for SHARMILA; Hgb 11.0.  Continue adequate hydration, avoiding NSAIDs, and managing HTN with medications and diet.   Follow up in 3 months with SPEP, JOSH, FLC, iron profile, ferritin, CBC, and Comprehensive Metabolic Panel.  Referral to Nephrology to manage CKD.     Route Chart for Scheduling    Med Onc Chart Routing      Follow up with physician    Follow up with MIGDALIA 3 months.   Infusion scheduling note    Injection scheduling note    Labs CBC, CMP, ferritin, iron and TIBC, other, SPEP and free light chains   Scheduling:  Preferred lab:  Lab interval:  and JOSH in 3 months, 3-4 days prior   Imaging None      Pharmacy appointment No pharmacy appointment needed      Other referrals No nutrition appointment needed -        Additional referrals needed  Nephrology        I will review assessment/plan with collaborating physician.      JULEE Velásquez             [1]  Social History  Socioeconomic History    Marital status:    Tobacco Use    Smoking status: Former     Current packs/day: 0.00     Average packs/day: 3.0 packs/day for 35.0 years (104.9 ttl pk-yrs)     Types: Cigarettes     Start date: 1960     Quit date: 1985     Years since quittin.0     Passive exposure: Past    Smokeless tobacco: Never   Substance and Sexual Activity    Alcohol use: Yes     Alcohol/week: 3.0 standard drinks of alcohol     Types: 3 Cans of beer per week     Comment: socially; DO NOT DRINK 3DAYS PRIOR TO SX 7/10    Drug use: No    Sexual activity: Yes     Partners: Female     Birth control/protection: None   Social History Narrative          Social Drivers of Health     Financial Resource Strain: Low Risk  (7/23/2025)    Overall Financial Resource Strain (CARDIA)     Difficulty of Paying Living Expenses: Not hard at all   Food Insecurity: No Food Insecurity (7/23/2025)    Hunger Vital Sign     Worried About Running Out of Food in the Last Year: Never true     Ran Out of Food in the Last Year: Never true   Transportation Needs: No Transportation Needs (7/23/2025)    PRAPARE - Transportation     Lack of Transportation (Medical): No     Lack of Transportation (Non-Medical): No   Physical Activity: Insufficiently Active (7/23/2025)    Exercise Vital Sign     Days of Exercise per Week: 1 day     Minutes of Exercise per Session: 10 min   Stress: No Stress Concern Present (7/23/2025)    [employer] - Occupational Stress Questionnaire     Feeling of Stress : Not at all   Housing Stability: Low Risk  (7/23/2025)    Housing Stability Vital Sign     Unable to Pay for Housing in the Last Year: No     Number of Times Moved in the Last Year: 0     Homeless in the Last Year: No

## 2025-08-06 ENCOUNTER — TELEPHONE (OUTPATIENT)
Dept: CARDIOLOGY | Facility: CLINIC | Age: 85
End: 2025-08-06
Payer: MEDICARE

## 2025-08-06 NOTE — TELEPHONE ENCOUNTER
Last office Visit: 3/26/25    Planned Procedure: spinal cord stimulator    Date of Procedure: TBD    Referring Provider: Dr Merrill Flowers    Recommendations:     Antibiotic Prophylaxis: SBE Prophylactic Antibiotic Therapy as per recommendations of AHA Guidelines     Anticoagulants: plavix  How long to stop before treatment?  When to restart after treatment?     Is Epinephrine OK?     Risk Assessment is Low/ Medium/ High: ?    Office Contact: Brie  Phone: [phone]  Fax: [phone]

## 2025-08-07 ENCOUNTER — TELEPHONE (OUTPATIENT)
Dept: PRIMARY CARE CLINIC | Facility: CLINIC | Age: 85
End: 2025-08-07
Payer: MEDICARE

## 2025-08-07 NOTE — TELEPHONE ENCOUNTER
Copied from CRM #[id number]. Topic: General Inquiry - Patient Advice  >> Aug 7, 2025  1:56 PM Alvin wrote:  .Type:  Needs Medical Advice    Who Called:  Ezequiel     Would the patient rather a call back or a response via [facility]?  Call back   Best Call Back Number:  [phone]  Additional Information: Pt is calling in regards to the appt scheduled on 08/12 and would like to know if he needs to do labs before the appt date

## 2025-08-08 ENCOUNTER — LAB VISIT (OUTPATIENT)
Dept: LAB | Facility: HOSPITAL | Age: 85
End: 2025-08-08
Attending: INTERNAL MEDICINE
Payer: MEDICARE

## 2025-08-08 DIAGNOSIS — D63.1 ANEMIA DUE TO STAGE 3A CHRONIC KIDNEY DISEASE: ICD-10-CM

## 2025-08-08 DIAGNOSIS — N18.31 ANEMIA DUE TO STAGE 3A CHRONIC KIDNEY DISEASE: ICD-10-CM

## 2025-08-08 DIAGNOSIS — I25.10 CORONARY ARTERY DISEASE INVOLVING NATIVE CORONARY ARTERY OF NATIVE HEART WITHOUT ANGINA PECTORIS: ICD-10-CM

## 2025-08-08 DIAGNOSIS — D47.2 MGUS (MONOCLONAL GAMMOPATHY OF UNKNOWN SIGNIFICANCE): ICD-10-CM

## 2025-08-08 DIAGNOSIS — I70.203 ATHEROSCLEROSIS OF ARTERY OF BOTH LOWER EXTREMITIES: ICD-10-CM

## 2025-08-08 DIAGNOSIS — D50.0 IRON DEFICIENCY ANEMIA DUE TO CHRONIC BLOOD LOSS: Primary | ICD-10-CM

## 2025-08-08 DIAGNOSIS — I70.0 AORTIC ATHEROSCLEROSIS: ICD-10-CM

## 2025-08-08 LAB
ABSOLUTE EOSINOPHIL (OHS): 0.35 K/UL
ABSOLUTE MONOCYTE (OHS): 0.36 K/UL (ref 0.3–1)
ABSOLUTE NEUTROPHIL COUNT (OHS): 2.18 K/UL (ref 1.8–7.7)
ALBUMIN SERPL BCP-MCNC: 3.9 G/DL (ref 3.5–5.2)
ALP SERPL-CCNC: 63 UNIT/L (ref 40–150)
ALT SERPL W/O P-5'-P-CCNC: 9 UNIT/L (ref 10–44)
ANION GAP (OHS): 8 MMOL/L (ref 8–16)
AST SERPL-CCNC: 30 UNIT/L (ref 11–45)
BASOPHILS # BLD AUTO: 0.04 K/UL
BASOPHILS NFR BLD AUTO: 1 %
BILIRUB SERPL-MCNC: 0.7 MG/DL (ref 0.1–1)
BUN SERPL-MCNC: 34 MG/DL (ref 8–23)
CALCIUM SERPL-MCNC: 8.7 MG/DL (ref 8.7–10.5)
CHLORIDE SERPL-SCNC: 110 MMOL/L (ref 95–110)
CHOLEST SERPL-MCNC: 157 MG/DL (ref 120–199)
CHOLEST/HDLC SERPL: 3 {RATIO} (ref 2–5)
CO2 SERPL-SCNC: 21 MMOL/L (ref 23–29)
CREAT SERPL-MCNC: 1.8 MG/DL (ref 0.5–1.4)
ERYTHROCYTE [DISTWIDTH] IN BLOOD BY AUTOMATED COUNT: 13.1 % (ref 11.5–14.5)
FERRITIN SERPL-MCNC: 70 NG/ML (ref 20–300)
GFR SERPLBLD CREATININE-BSD FMLA CKD-EPI: 36 ML/MIN/1.73/M2
GLUCOSE SERPL-MCNC: 92 MG/DL (ref 70–110)
HCT VFR BLD AUTO: 33.7 % (ref 40–54)
HDLC SERPL-MCNC: 53 MG/DL (ref 40–75)
HDLC SERPL: 33.8 % (ref 20–50)
HGB BLD-MCNC: 10.8 GM/DL (ref 14–18)
IMM GRANULOCYTES # BLD AUTO: 0.01 K/UL (ref 0–0.04)
IMM GRANULOCYTES NFR BLD AUTO: 0.2 % (ref 0–0.5)
IRON SATN MFR SERPL: 23 % (ref 20–50)
IRON SERPL-MCNC: 69 UG/DL (ref 45–160)
LDLC SERPL CALC-MCNC: 91.2 MG/DL (ref 63–159)
LYMPHOCYTES # BLD AUTO: 1.1 K/UL (ref 1–4.8)
MCH RBC QN AUTO: 30.6 PG (ref 27–31)
MCHC RBC AUTO-ENTMCNC: 32 G/DL (ref 32–36)
MCV RBC AUTO: 96 FL (ref 82–98)
NONHDLC SERPL-MCNC: 104 MG/DL
NUCLEATED RBC (/100WBC) (OHS): 0 /100 WBC
PLATELET # BLD AUTO: 105 K/UL (ref 150–450)
PMV BLD AUTO: 8.6 FL (ref 9.2–12.9)
POTASSIUM SERPL-SCNC: 4.8 MMOL/L (ref 3.5–5.1)
PROT SERPL-MCNC: 6.7 GM/DL (ref 6–8.4)
RBC # BLD AUTO: 3.53 M/UL (ref 4.6–6.2)
RELATIVE EOSINOPHIL (OHS): 8.7 %
RELATIVE LYMPHOCYTE (OHS): 27.2 % (ref 18–48)
RELATIVE MONOCYTE (OHS): 8.9 % (ref 4–15)
RELATIVE NEUTROPHIL (OHS): 54 % (ref 38–73)
SODIUM SERPL-SCNC: 139 MMOL/L (ref 136–145)
TIBC SERPL-MCNC: 295 UG/DL (ref 250–450)
TRANSFERRIN SERPL-MCNC: 199 MG/DL (ref 200–375)
TRIGL SERPL-MCNC: 64 MG/DL (ref 30–150)
WBC # BLD AUTO: 4.04 K/UL (ref 3.9–12.7)

## 2025-08-08 PROCEDURE — 36415 COLL VENOUS BLD VENIPUNCTURE: CPT | Mod: HCNC,PO

## 2025-08-08 PROCEDURE — 83521 IG LIGHT CHAINS FREE EACH: CPT | Mod: HCNC

## 2025-08-08 PROCEDURE — 82728 ASSAY OF FERRITIN: CPT | Mod: HCNC

## 2025-08-08 PROCEDURE — 82947 ASSAY GLUCOSE BLOOD QUANT: CPT | Mod: HCNC

## 2025-08-08 PROCEDURE — 85025 COMPLETE CBC W/AUTO DIFF WBC: CPT | Mod: HCNC

## 2025-08-08 PROCEDURE — 86334 IMMUNOFIX E-PHORESIS SERUM: CPT | Mod: HCNC

## 2025-08-08 PROCEDURE — 84466 ASSAY OF TRANSFERRIN: CPT | Mod: HCNC

## 2025-08-08 PROCEDURE — 80061 LIPID PANEL: CPT | Mod: HCNC

## 2025-08-08 PROCEDURE — 84165 PROTEIN E-PHORESIS SERUM: CPT | Mod: HCNC

## 2025-08-11 LAB
ALBUMIN, SPE (OHS): 3.76 G/DL (ref 3.35–5.55)
ALPHA 1 GLOB (OHS): 0.3 GM/DL (ref 0.17–0.41)
ALPHA 2 GLOB (OHS): 0.61 GM/DL (ref 0.43–0.99)
BETA GLOB (OHS): 0.63 GM/DL (ref 0.5–1.1)
GAMMA GLOBULIN (OHS): 1 GM/DL (ref 0.67–1.58)
KAPPA LC FREE SER-MCNC: 2.09 MG/L (ref 0.26–1.65)
KAPPA LC FREE/LAMBDA FREE SER: 3.86 MG/DL (ref 0.33–1.94)
LAMBDA LC FREE SERPL-MCNC: 1.85 MG/DL (ref 0.57–2.63)
PROT SERPL-MCNC: 6.3 GM/DL (ref 6–8.4)

## 2025-08-12 ENCOUNTER — OFFICE VISIT (OUTPATIENT)
Dept: PRIMARY CARE CLINIC | Facility: CLINIC | Age: 85
End: 2025-08-12
Payer: MEDICARE

## 2025-08-12 VITALS
RESPIRATION RATE: 18 BRPM | SYSTOLIC BLOOD PRESSURE: 118 MMHG | BODY MASS INDEX: 30.19 KG/M2 | OXYGEN SATURATION: 98 % | HEART RATE: 70 BPM | HEIGHT: 70 IN | TEMPERATURE: 97 F | DIASTOLIC BLOOD PRESSURE: 50 MMHG | WEIGHT: 210.88 LBS

## 2025-08-12 DIAGNOSIS — D47.2 MGUS (MONOCLONAL GAMMOPATHY OF UNKNOWN SIGNIFICANCE): ICD-10-CM

## 2025-08-12 DIAGNOSIS — D64.9 ANEMIA, UNSPECIFIED TYPE: ICD-10-CM

## 2025-08-12 DIAGNOSIS — M48.061 SPINAL STENOSIS OF LUMBAR REGION AT MULTIPLE LEVELS: ICD-10-CM

## 2025-08-12 DIAGNOSIS — G72.0 STATIN MYOPATHY: ICD-10-CM

## 2025-08-12 DIAGNOSIS — M47.16 LUMBAR SPONDYLOSIS WITH MYELOPATHY: ICD-10-CM

## 2025-08-12 DIAGNOSIS — T46.6X5A STATIN MYOPATHY: ICD-10-CM

## 2025-08-12 DIAGNOSIS — M54.16 LUMBAR RADICULOPATHY, CHRONIC: Primary | ICD-10-CM

## 2025-08-12 DIAGNOSIS — K21.9 GASTROESOPHAGEAL REFLUX DISEASE, UNSPECIFIED WHETHER ESOPHAGITIS PRESENT: ICD-10-CM

## 2025-08-12 DIAGNOSIS — N18.30 STAGE 3 CHRONIC KIDNEY DISEASE, UNSPECIFIED WHETHER STAGE 3A OR 3B CKD: ICD-10-CM

## 2025-08-12 DIAGNOSIS — I70.203 ATHEROSCLEROSIS OF ARTERY OF BOTH LOWER EXTREMITIES: ICD-10-CM

## 2025-08-12 DIAGNOSIS — D63.1 ANEMIA IN STAGE 3B CHRONIC KIDNEY DISEASE: ICD-10-CM

## 2025-08-12 DIAGNOSIS — N18.32 ANEMIA IN STAGE 3B CHRONIC KIDNEY DISEASE: ICD-10-CM

## 2025-08-12 DIAGNOSIS — E78.2 MIXED HYPERLIPIDEMIA: ICD-10-CM

## 2025-08-12 PROCEDURE — 99999 PR PBB SHADOW E&M-EST. PATIENT-LVL III: CPT | Mod: PBBFAC,HCNC,, | Performed by: FAMILY MEDICINE

## 2025-08-12 PROCEDURE — 3288F FALL RISK ASSESSMENT DOCD: CPT | Mod: CPTII,HCNC,S$GLB, | Performed by: FAMILY MEDICINE

## 2025-08-12 PROCEDURE — 1101F PT FALLS ASSESS-DOCD LE1/YR: CPT | Mod: CPTII,HCNC,S$GLB, | Performed by: FAMILY MEDICINE

## 2025-08-12 PROCEDURE — G2211 COMPLEX E/M VISIT ADD ON: HCPCS | Mod: HCNC,S$GLB,, | Performed by: FAMILY MEDICINE

## 2025-08-12 PROCEDURE — 99215 OFFICE O/P EST HI 40 MIN: CPT | Mod: HCNC,S$GLB,, | Performed by: FAMILY MEDICINE

## 2025-08-12 PROCEDURE — 1126F AMNT PAIN NOTED NONE PRSNT: CPT | Mod: CPTII,HCNC,S$GLB, | Performed by: FAMILY MEDICINE

## 2025-08-12 RX ORDER — PANTOPRAZOLE SODIUM 20 MG/1
20 TABLET, DELAYED RELEASE ORAL DAILY
Qty: 90 TABLET | Refills: 0 | Status: SHIPPED | OUTPATIENT
Start: 2025-08-12

## 2025-08-12 RX ORDER — POTASSIUM CHLORIDE 750 MG/1
10 TABLET, EXTENDED RELEASE ORAL DAILY
Qty: 90 TABLET | Refills: 3 | Status: SHIPPED | OUTPATIENT
Start: 2025-08-12

## 2025-08-13 LAB
PATHOLOGIST INTERPRETATION - IFE SERUM (OHS): NORMAL
PATHOLOGIST REVIEW - SPE (OHS): NORMAL

## 2025-08-15 ENCOUNTER — PATIENT MESSAGE (OUTPATIENT)
Facility: CLINIC | Age: 85
End: 2025-08-15
Payer: MEDICARE

## 2025-08-28 ENCOUNTER — PATIENT MESSAGE (OUTPATIENT)
Dept: CARDIOLOGY | Facility: CLINIC | Age: 85
End: 2025-08-28
Payer: MEDICARE

## 2025-08-28 ENCOUNTER — PATIENT MESSAGE (OUTPATIENT)
Dept: ADMINISTRATIVE | Facility: OTHER | Age: 85
End: 2025-08-28
Payer: MEDICARE

## 2025-08-28 ENCOUNTER — PATIENT MESSAGE (OUTPATIENT)
Facility: CLINIC | Age: 85
End: 2025-08-28

## 2025-08-28 DIAGNOSIS — N18.30 STAGE 3 CHRONIC KIDNEY DISEASE, UNSPECIFIED WHETHER STAGE 3A OR 3B CKD: Primary | ICD-10-CM

## 2025-08-29 DIAGNOSIS — Z88.8 ALLERGY TO STATIN MEDICATION: Primary | ICD-10-CM

## 2025-08-29 RX ORDER — EVOLOCUMAB 140 MG/ML
140 INJECTION, SOLUTION SUBCUTANEOUS
Qty: 6 ML | Refills: 3 | Status: SHIPPED | OUTPATIENT
Start: 2025-08-29

## (undated) DEVICE — DRAPE LAP T SHT W/ INSTR PAD

## (undated) DEVICE — WAX BONE STERILE 2.5G

## (undated) DEVICE — DRAPE STERI U-SHAPED 47X51IN

## (undated) DEVICE — PAD CURAD NONADH 3X4IN

## (undated) DEVICE — HOOD FLYTE PEELWY STERISHIELD

## (undated) DEVICE — COVER LIGHT HANDLE 80/CA

## (undated) DEVICE — DRESSING SURGICAL 1/2X1/2

## (undated) DEVICE — CORD BIPOLAR 12 FOOT

## (undated) DEVICE — Device

## (undated) DEVICE — ANGIOTOUCH KIT

## (undated) DEVICE — GUIDE WIRE J-TIP 260CM .025

## (undated) DEVICE — CATH CV QD LUMN 6FRX110CM

## (undated) DEVICE — CATH AR1 INFINITI 5X100

## (undated) DEVICE — ELECTRODE REM PLYHSV RETURN 9

## (undated) DEVICE — BUR LEGEND MIDAS REX 12X2.5

## (undated) DEVICE — COVER PROXIMA MAYO STAND

## (undated) DEVICE — GUIDEWIRE EMERALD .035IN 260CM

## (undated) DEVICE — ALCOHOL 70% ANTISEPTIC ISO 4OZ

## (undated) DEVICE — SPONGE COTTON TRAY 4X4IN

## (undated) DEVICE — IMMOB KNEE UNIV TRI PANEL 19IN

## (undated) DEVICE — CONTRAST VISIPAQUE 150ML

## (undated) DEVICE — KIT GLIDESHEATH SLEND 6FR 10CM

## (undated) DEVICE — GUIDEWIRE ANGIO 1.5MM .035X180

## (undated) DEVICE — PAD ABDOMINAL STERILE 8X10IN

## (undated) DEVICE — SPHERE NDI PASSIVE

## (undated) DEVICE — BAND TR COMP DEVICE REG 24CM

## (undated) DEVICE — PATCH VASC CLOSURE THROMBIX

## (undated) DEVICE — MANIFOLD 4 PORT

## (undated) DEVICE — NDL SPINAL 20GX3.5 HUB

## (undated) DEVICE — BLADE EZ CLEAN 2 1/2

## (undated) DEVICE — SUT VICRYL 2-0 27 CT-1

## (undated) DEVICE — BURR MR8 MTCH HD PROX 3MMX10CM

## (undated) DEVICE — DRAPE INCISE IOBAN 2 23X17IN

## (undated) DEVICE — CATH JR4 5FR

## (undated) DEVICE — SEE MEDLINE ITEM 157187

## (undated) DEVICE — TOWEL OR DISP STRL BLUE 4/PK

## (undated) DEVICE — KIT SURGIFLO HEMOSTATIC MATRIX

## (undated) DEVICE — SKIN MARKER DEVON 160

## (undated) DEVICE — SYR 10CC LUER LOCK

## (undated) DEVICE — PACK BASIC SETUP SC BR

## (undated) DEVICE — TAPE SILK 3IN

## (undated) DEVICE — SYS IRRISEPT 450ML0.05% CHG

## (undated) DEVICE — NDL ECLIPSE SAF REG 25GX1.5IN

## (undated) DEVICE — SUT VICRYL PLUS 0 CT1 18IN

## (undated) DEVICE — SUT VICRYL+ 2-0 SH 18IN

## (undated) DEVICE — DRAPE MOBILE C-ARM

## (undated) DEVICE — DRESSING TRANS 4X10 TEGADERM

## (undated) DEVICE — DRAPE CORETEMP FLD WRM 56X62IN

## (undated) DEVICE — HEADREST PRONESAFE DERMAPROX

## (undated) DEVICE — STAPLER SKIN PROXIMATE WIDE

## (undated) DEVICE — TUBING MEDI-VAC 20FT .25IN

## (undated) DEVICE — DRAPE FULL SHEET 70X100IN

## (undated) DEVICE — GOWN POLY REINF BRTH SLV XL

## (undated) DEVICE — KIT EVACUATOR 3-SPRING 1/8 DRN

## (undated) DEVICE — GLOVE SIGNATURE ESSNTL LTX 8

## (undated) DEVICE — INSERT CUSHIONPRONE VIEW LARGE

## (undated) DEVICE — SOL NACL IRR 3000ML

## (undated) DEVICE — KIT SYR REUSABLE

## (undated) DEVICE — KIT MANIFOLD LOW PRESS TUBING

## (undated) DEVICE — BNDG COFLEX FOAM LF2 ST 4X5YD

## (undated) DEVICE — MATRIX FLOSEAL HEMOSTATIC 10ML

## (undated) DEVICE — DRESSING ADH COVADERM PLUS 4X4

## (undated) DEVICE — SPONGE PATTY SURGICAL .5X3IN

## (undated) DEVICE — GAUZE DRAIN N WVN 6PLY 4X4IN

## (undated) DEVICE — CATH PIG145 INFINITI 5X110CM

## (undated) DEVICE — DURAPREP SURG SCRUB 26ML

## (undated) DEVICE — KIT IRR SUCTION HND PIECE

## (undated) DEVICE — GLOVE SURGICAL LATEX SZ 7

## (undated) DEVICE — ELECTRODE BLD EXT 6.50 ST DISP

## (undated) DEVICE — CATH 5FR AL2.0

## (undated) DEVICE — DRESSING TRANS 2X2 TEGADERM

## (undated) DEVICE — UNDERGLOVES BIOGEL PI SIZE 7.5

## (undated) DEVICE — CONTAINER SPECIMEN OR STER 4OZ

## (undated) DEVICE — GLOVE SURG PLYSPHRN ORTH SZ7.5

## (undated) DEVICE — NDL SPINAL 18GX3.5 SPINOCAN

## (undated) DEVICE — GLOVE SENSICARE PI GRN 8

## (undated) DEVICE — ADHESIVE MASTISOL VIAL 48/BX

## (undated) DEVICE — SUT VICRYL 1 OB 36 CTX

## (undated) DEVICE — DRAPE HIP PCH 112X137X89IN

## (undated) DEVICE — DRAPE THREE-QTR REINF 53X77IN

## (undated) DEVICE — DRAPE INCISE IOBAN 2 23X33IN

## (undated) DEVICE — LOTION DURA PREP REMOVER 40Z

## (undated) DEVICE — DRAPE INVISISHIELD TWL 18X24IN

## (undated) DEVICE — SYR ONLY LUER LOCK 20CC

## (undated) DEVICE — SOCKINETTE IMPERVIOUS 12X48IN

## (undated) DEVICE — SYS SURGIPHOR STRL IRRG

## (undated) DEVICE — GUIDEWIRE WHOLEY HI TORQ 175CM

## (undated) DEVICE — BLADE EZ CLEAN 2.5IN MODIFIED

## (undated) DEVICE — DRAPE U SPLIT SHEET 54X76IN

## (undated) DEVICE — CATH JL4 5FR

## (undated) DEVICE — COVER TABLE HVY DTY 60X90IN

## (undated) DEVICE — CATH IMPULSE IM CRV 100CM 5FR

## (undated) DEVICE — ALCOHOL ISOPROPYL 4OZ

## (undated) DEVICE — APPLICATOR CHLORAPREP ORN 26ML

## (undated) DEVICE — PACK CATH LAB CUSTOM BR

## (undated) DEVICE — DRESSING AQUACEL AG ADV 3.5X6

## (undated) DEVICE — SUT VICRYL+ 27 UR-6 VIOL

## (undated) DEVICE — TOOL MR8 MATCH HEAD 14CM 3MM

## (undated) DEVICE — MARKER SKIN RULER STERILE